# Patient Record
Sex: FEMALE | Race: BLACK OR AFRICAN AMERICAN | Employment: OTHER | URBAN - METROPOLITAN AREA
[De-identification: names, ages, dates, MRNs, and addresses within clinical notes are randomized per-mention and may not be internally consistent; named-entity substitution may affect disease eponyms.]

---

## 2017-01-09 ENCOUNTER — HOSPITAL ENCOUNTER (OUTPATIENT)
Dept: OTHER | Age: 62
Discharge: HOME | End: 2017-01-09
Attending: PHYSICIAN ASSISTANT | Admitting: PHYSICIAN ASSISTANT

## 2017-01-09 DIAGNOSIS — L03.115 CELLULITIS OF RIGHT LOWER LEG: ICD-10-CM

## 2017-01-09 DIAGNOSIS — M79.89 PAIN AND SWELLING OF RIGHT LOWER LEG: ICD-10-CM

## 2017-01-09 DIAGNOSIS — M79.661 PAIN AND SWELLING OF RIGHT LOWER LEG: ICD-10-CM

## 2017-05-31 ENCOUNTER — HOSPITAL ENCOUNTER (OUTPATIENT)
Dept: MAMMOGRAPHY | Age: 62
Discharge: HOME | End: 2017-05-31
Attending: FAMILY MEDICINE | Admitting: FAMILY MEDICINE

## 2017-05-31 DIAGNOSIS — Z12.31 ENCOUNTER FOR SCREENING MAMMOGRAM FOR BREAST CANCER: ICD-10-CM

## 2017-05-31 DIAGNOSIS — N95.9 MENOPAUSAL AND PERIMENOPAUSAL DISORDER: ICD-10-CM

## 2017-08-02 PROBLEM — K92.2 GIB (GASTROINTESTINAL BLEEDING): Status: ACTIVE | Noted: 2017-08-02

## 2017-08-02 PROBLEM — G47.00 INSOMNIA: Status: ACTIVE | Noted: 2017-08-02

## 2017-08-03 PROBLEM — D62 ANEMIA ASSOCIATED WITH ACUTE BLOOD LOSS: Status: ACTIVE | Noted: 2017-08-03

## 2017-12-01 ENCOUNTER — HOSPITAL ENCOUNTER (OUTPATIENT)
Dept: OTHER | Age: 62
Discharge: HOME | End: 2017-12-01
Attending: PODIATRIST | Admitting: PODIATRIST

## 2017-12-01 ENCOUNTER — HOSPITAL ENCOUNTER (OUTPATIENT)
Dept: MRI IMAGING | Age: 62
Discharge: HOME | End: 2017-12-01
Admitting: PODIATRIST

## 2017-12-01 DIAGNOSIS — S93.315D DISLOCATION OF TARSAL JOINT OF LEFT FOOT, SUBSEQUENT ENCOUNTER: ICD-10-CM

## 2017-12-01 DIAGNOSIS — S93.314D DISLOCATION OF TARSAL JOINT OF RIGHT FOOT, SUBSEQUENT ENCOUNTER: ICD-10-CM

## 2017-12-15 ENCOUNTER — HOSPITAL ENCOUNTER (OUTPATIENT)
Dept: MRI IMAGING | Age: 62
Discharge: HOME | End: 2017-12-15
Attending: PODIATRIST | Admitting: PODIATRIST

## 2017-12-15 DIAGNOSIS — M12.571: ICD-10-CM

## 2017-12-15 DIAGNOSIS — M12.579: ICD-10-CM

## 2018-01-05 PROBLEM — D64.9 ANEMIA: Status: ACTIVE | Noted: 2018-01-05

## 2018-01-05 PROBLEM — G47.00 INSOMNIA: Status: ACTIVE | Noted: 2018-01-05

## 2018-01-05 PROBLEM — E87.1 HYPONATREMIA: Status: ACTIVE | Noted: 2018-01-05

## 2018-01-05 PROBLEM — E87.6 HYPOKALEMIA: Status: ACTIVE | Noted: 2018-01-05

## 2018-01-06 PROBLEM — K50.10 CROHN'S COLITIS (HCC): Status: ACTIVE | Noted: 2018-01-06

## 2018-01-08 PROBLEM — E83.42 HYPOMAGNESEMIA: Status: ACTIVE | Noted: 2018-01-08

## 2018-02-05 PROBLEM — R07.9 CHEST PAIN: Status: ACTIVE | Noted: 2018-02-05

## 2018-02-05 PROBLEM — K50.10 CROHN'S COLITIS (HCC): Status: ACTIVE | Noted: 2018-02-05

## 2018-03-27 ENCOUNTER — PAT TELEPHONE (OUTPATIENT)
Dept: PREADMISSION TESTING | Age: 63
End: 2018-03-27

## 2018-03-27 VITALS — WEIGHT: 220 LBS | BODY MASS INDEX: 35.36 KG/M2 | HEIGHT: 66 IN

## 2018-03-27 RX ORDER — PREDNISONE 20 MG/1
20 TABLET ORAL DAILY
Status: ON HOLD | COMMUNITY
End: 2018-05-24 | Stop reason: HOSPADM

## 2018-04-02 ENCOUNTER — HOSPITAL ENCOUNTER (OUTPATIENT)
Dept: ENDOSCOPY | Age: 63
Discharge: HOME | End: 2018-04-02
Attending: INTERNAL MEDICINE | Admitting: INTERNAL MEDICINE

## 2018-04-02 VITALS
RESPIRATION RATE: 16 BRPM | HEART RATE: 72 BPM | TEMPERATURE: 97.7 F | SYSTOLIC BLOOD PRESSURE: 137 MMHG | WEIGHT: 241.31 LBS | BODY MASS INDEX: 38.78 KG/M2 | HEIGHT: 66 IN | OXYGEN SATURATION: 100 % | DIASTOLIC BLOOD PRESSURE: 89 MMHG

## 2018-04-02 RX ORDER — SODIUM CHLORIDE 9 MG/ML
INJECTION, SOLUTION INTRAVENOUS CONTINUOUS
Status: DISCONTINUED | OUTPATIENT
Start: 2018-04-02 | End: 2018-04-03 | Stop reason: HOSPADM

## 2018-04-02 RX ORDER — SODIUM CHLORIDE 0.9 % (FLUSH) 0.9 %
10 SYRINGE (ML) INJECTION PRN
Status: DISCONTINUED | OUTPATIENT
Start: 2018-04-02 | End: 2018-04-03 | Stop reason: HOSPADM

## 2018-04-02 RX ORDER — SODIUM CHLORIDE 0.9 % (FLUSH) 0.9 %
10 SYRINGE (ML) INJECTION EVERY 12 HOURS SCHEDULED
Status: DISCONTINUED | OUTPATIENT
Start: 2018-04-02 | End: 2018-04-03 | Stop reason: HOSPADM

## 2018-04-02 RX ADMIN — SODIUM CHLORIDE: 9 INJECTION, SOLUTION INTRAVENOUS at 10:21

## 2018-04-02 ASSESSMENT — PAIN - FUNCTIONAL ASSESSMENT: PAIN_FUNCTIONAL_ASSESSMENT: 0-10

## 2018-04-02 ASSESSMENT — PAIN SCALES - GENERAL
PAINLEVEL_OUTOF10: 0

## 2018-05-20 PROBLEM — K52.9 COLITIS: Status: ACTIVE | Noted: 2018-05-20

## 2018-06-05 PROBLEM — K56.609 SBO (SMALL BOWEL OBSTRUCTION) (HCC): Status: ACTIVE | Noted: 2018-06-05

## 2018-06-07 PROBLEM — N39.0 UTI (URINARY TRACT INFECTION): Status: ACTIVE | Noted: 2018-06-07

## 2018-06-08 PROBLEM — A49.02 MRSA (METHICILLIN RESISTANT STAPHYLOCOCCUS AUREUS) INFECTION: Status: ACTIVE | Noted: 2018-06-08

## 2018-06-26 PROBLEM — I95.9 HYPOTENSION: Status: ACTIVE | Noted: 2018-06-26

## 2018-06-26 PROBLEM — R55 SYNCOPE: Status: ACTIVE | Noted: 2018-06-26

## 2018-06-26 PROBLEM — R55 SYNCOPE AND COLLAPSE: Status: ACTIVE | Noted: 2018-06-26

## 2018-07-03 PROBLEM — I82.4Z3 DVT, LOWER EXTREMITY, DISTAL, ACUTE, BILATERAL (HCC): Status: ACTIVE | Noted: 2018-07-03

## 2018-07-03 PROBLEM — I82.409 DVT (DEEP VENOUS THROMBOSIS) (HCC): Status: ACTIVE | Noted: 2018-07-03

## 2018-07-26 PROBLEM — N39.0 UTI (URINARY TRACT INFECTION): Status: RESOLVED | Noted: 2018-06-07 | Resolved: 2018-07-26

## 2018-09-26 ENCOUNTER — APPOINTMENT (OUTPATIENT)
Dept: CT IMAGING | Age: 63
DRG: 720 | End: 2018-09-26
Payer: MEDICAID

## 2018-09-26 ENCOUNTER — APPOINTMENT (OUTPATIENT)
Dept: GENERAL RADIOLOGY | Age: 63
DRG: 720 | End: 2018-09-26
Payer: MEDICAID

## 2018-09-26 ENCOUNTER — HOSPITAL ENCOUNTER (INPATIENT)
Age: 63
LOS: 5 days | Discharge: HOME | DRG: 720 | End: 2018-10-01
Attending: EMERGENCY MEDICINE | Admitting: INTERNAL MEDICINE
Payer: MEDICAID

## 2018-09-26 DIAGNOSIS — A41.9 SEVERE SEPSIS (HCC): ICD-10-CM

## 2018-09-26 DIAGNOSIS — L98.8 FISTULA: ICD-10-CM

## 2018-09-26 DIAGNOSIS — E87.1 HYPONATREMIA: ICD-10-CM

## 2018-09-26 DIAGNOSIS — K57.20 PERFORATION AND ABSCESS OF LARGE INTESTINE CONCURRENT WITH AND DUE TO DIVERTICULITIS: Primary | ICD-10-CM

## 2018-09-26 DIAGNOSIS — R65.20 SEVERE SEPSIS (HCC): ICD-10-CM

## 2018-09-26 PROBLEM — K63.1 PERFORATION BOWEL (HCC): Status: ACTIVE | Noted: 2018-09-26

## 2018-09-26 PROBLEM — K57.92 DIVERTICULITIS: Status: ACTIVE | Noted: 2018-09-26

## 2018-09-26 LAB
A/G RATIO: 0.6 (ref 1.1–2.2)
ALBUMIN SERPL-MCNC: 2.8 G/DL (ref 3.4–5)
ALP BLD-CCNC: 78 U/L (ref 40–129)
ALT SERPL-CCNC: <5 U/L (ref 10–40)
ANION GAP SERPL CALCULATED.3IONS-SCNC: 19 MMOL/L (ref 3–16)
AST SERPL-CCNC: 8 U/L (ref 15–37)
BASE EXCESS VENOUS: -4.7 MMOL/L (ref -3–3)
BASOPHILS ABSOLUTE: 0 K/UL (ref 0–0.2)
BASOPHILS RELATIVE PERCENT: 0.2 %
BILIRUB SERPL-MCNC: 0.5 MG/DL (ref 0–1)
BILIRUBIN URINE: NEGATIVE
BLOOD, URINE: ABNORMAL
BUN BLDV-MCNC: 8 MG/DL (ref 7–20)
C DIFFICILE TOXIN, EIA: NORMAL
CALCIUM SERPL-MCNC: 9.1 MG/DL (ref 8.3–10.6)
CARBOXYHEMOGLOBIN: 7.1 % (ref 0–1.5)
CHLORIDE BLD-SCNC: 87 MMOL/L (ref 99–110)
CLARITY: ABNORMAL
CO2: 19 MMOL/L (ref 21–32)
COLOR: YELLOW
CREAT SERPL-MCNC: 0.8 MG/DL (ref 0.6–1.2)
EKG ATRIAL RATE: 131 BPM
EKG DIAGNOSIS: NORMAL
EKG P AXIS: 109 DEGREES
EKG P-R INTERVAL: 122 MS
EKG Q-T INTERVAL: 296 MS
EKG QRS DURATION: 80 MS
EKG QTC CALCULATION (BAZETT): 437 MS
EKG R AXIS: 11 DEGREES
EKG T AXIS: 98 DEGREES
EKG VENTRICULAR RATE: 131 BPM
EOSINOPHILS ABSOLUTE: 0 K/UL (ref 0–0.6)
EOSINOPHILS RELATIVE PERCENT: 0 %
EPITHELIAL CELLS, UA: 5 /HPF (ref 0–5)
GFR AFRICAN AMERICAN: >60
GFR NON-AFRICAN AMERICAN: >60
GIARDIA ANTIGEN STOOL: NORMAL
GLOBULIN: 4.4 G/DL
GLUCOSE BLD-MCNC: 144 MG/DL (ref 70–99)
GLUCOSE URINE: NEGATIVE MG/DL
HCO3 VENOUS: 16.6 MMOL/L (ref 23–29)
HCT VFR BLD CALC: 25.2 % (ref 36–48)
HEMOGLOBIN: 8.1 G/DL (ref 12–16)
HYALINE CASTS: 0 /LPF (ref 0–8)
INR BLD: 1.33 (ref 0.86–1.14)
KETONES, URINE: NEGATIVE MG/DL
LACTIC ACID: 1.2 MMOL/L (ref 0.4–2)
LACTIC ACID: 2.5 MMOL/L (ref 0.4–2)
LEUKOCYTE ESTERASE, URINE: ABNORMAL
LIPASE: 22 U/L (ref 13–60)
LYMPHOCYTES ABSOLUTE: 1.7 K/UL (ref 1–5.1)
LYMPHOCYTES RELATIVE PERCENT: 10.4 %
MCH RBC QN AUTO: 26.9 PG (ref 26–34)
MCHC RBC AUTO-ENTMCNC: 32.3 G/DL (ref 31–36)
MCV RBC AUTO: 83.3 FL (ref 80–100)
METHEMOGLOBIN VENOUS: 0.3 %
MICROSCOPIC EXAMINATION: YES
MONOCYTES ABSOLUTE: 0.9 K/UL (ref 0–1.3)
MONOCYTES RELATIVE PERCENT: 5.5 %
NEUTROPHILS ABSOLUTE: 13.6 K/UL (ref 1.7–7.7)
NEUTROPHILS RELATIVE PERCENT: 83.9 %
NITRITE, URINE: NEGATIVE
O2 CONTENT, VEN: 11 VOL %
O2 SAT, VEN: 100 %
O2 THERAPY: ABNORMAL
OCCULT BLOOD SCREENING: ABNORMAL
PCO2, VEN: 19.1 MMHG (ref 40–50)
PDW BLD-RTO: 17.3 % (ref 12.4–15.4)
PH UA: 5.5
PH VENOUS: 7.55 (ref 7.35–7.45)
PLATELET # BLD: 609 K/UL (ref 135–450)
PMV BLD AUTO: 6.5 FL (ref 5–10.5)
PO2, VEN: 137 MMHG (ref 25–40)
POTASSIUM SERPL-SCNC: 3.7 MMOL/L (ref 3.5–5.1)
PROTEIN UA: NEGATIVE MG/DL
PROTHROMBIN TIME: 15.2 SEC (ref 9.8–13)
RBC # BLD: 3.02 M/UL (ref 4–5.2)
RBC UA: 13 /HPF (ref 0–4)
SODIUM BLD-SCNC: 125 MMOL/L (ref 136–145)
SODIUM BLD-SCNC: 128 MMOL/L (ref 136–145)
SODIUM URINE: 50 MMOL/L
SPECIFIC GRAVITY UA: 1.01
TCO2 CALC VENOUS: 39 MMOL/L
TOTAL PROTEIN: 7.2 G/DL (ref 6.4–8.2)
URIC ACID, SERUM: 4.1 MG/DL (ref 2.6–6)
URINE REFLEX TO CULTURE: YES
URINE TYPE: ABNORMAL
UROBILINOGEN, URINE: 0.2 E.U./DL
WBC # BLD: 16.2 K/UL (ref 4–11)
WBC UA: 1 /HPF (ref 0–5)

## 2018-09-26 PROCEDURE — 36415 COLL VENOUS BLD VENIPUNCTURE: CPT

## 2018-09-26 PROCEDURE — 93005 ELECTROCARDIOGRAM TRACING: CPT | Performed by: PHYSICIAN ASSISTANT

## 2018-09-26 PROCEDURE — 6360000002 HC RX W HCPCS: Performed by: INTERNAL MEDICINE

## 2018-09-26 PROCEDURE — 99223 1ST HOSP IP/OBS HIGH 75: CPT | Performed by: SURGERY

## 2018-09-26 PROCEDURE — 84300 ASSAY OF URINE SODIUM: CPT

## 2018-09-26 PROCEDURE — 82705 FATS/LIPIDS FECES QUAL: CPT

## 2018-09-26 PROCEDURE — 83690 ASSAY OF LIPASE: CPT

## 2018-09-26 PROCEDURE — 2580000003 HC RX 258: Performed by: INTERNAL MEDICINE

## 2018-09-26 PROCEDURE — 6370000000 HC RX 637 (ALT 250 FOR IP): Performed by: PHYSICIAN ASSISTANT

## 2018-09-26 PROCEDURE — 93010 ELECTROCARDIOGRAM REPORT: CPT | Performed by: INTERNAL MEDICINE

## 2018-09-26 PROCEDURE — 87209 SMEAR COMPLEX STAIN: CPT

## 2018-09-26 PROCEDURE — 1200000000 HC SEMI PRIVATE

## 2018-09-26 PROCEDURE — APPSS60 APP SPLIT SHARED TIME 46-60 MINUTES: Performed by: NURSE PRACTITIONER

## 2018-09-26 PROCEDURE — 83935 ASSAY OF URINE OSMOLALITY: CPT

## 2018-09-26 PROCEDURE — 87505 NFCT AGENT DETECTION GI: CPT

## 2018-09-26 PROCEDURE — 96367 TX/PROPH/DG ADDL SEQ IV INF: CPT

## 2018-09-26 PROCEDURE — 87324 CLOSTRIDIUM AG IA: CPT

## 2018-09-26 PROCEDURE — 85610 PROTHROMBIN TIME: CPT

## 2018-09-26 PROCEDURE — 96376 TX/PRO/DX INJ SAME DRUG ADON: CPT

## 2018-09-26 PROCEDURE — 87040 BLOOD CULTURE FOR BACTERIA: CPT

## 2018-09-26 PROCEDURE — 71045 X-RAY EXAM CHEST 1 VIEW: CPT

## 2018-09-26 PROCEDURE — 87086 URINE CULTURE/COLONY COUNT: CPT

## 2018-09-26 PROCEDURE — 87449 NOS EACH ORGANISM AG IA: CPT

## 2018-09-26 PROCEDURE — 87177 OVA AND PARASITES SMEARS: CPT

## 2018-09-26 PROCEDURE — APPNB30 APP NON BILLABLE TIME 0-30 MINS: Performed by: NURSE PRACTITIONER

## 2018-09-26 PROCEDURE — 96375 TX/PRO/DX INJ NEW DRUG ADDON: CPT

## 2018-09-26 PROCEDURE — 96365 THER/PROPH/DIAG IV INF INIT: CPT

## 2018-09-26 PROCEDURE — 99291 CRITICAL CARE FIRST HOUR: CPT

## 2018-09-26 PROCEDURE — 2500000003 HC RX 250 WO HCPCS: Performed by: INTERNAL MEDICINE

## 2018-09-26 PROCEDURE — 85025 COMPLETE CBC W/AUTO DIFF WBC: CPT

## 2018-09-26 PROCEDURE — 6360000002 HC RX W HCPCS: Performed by: PHYSICIAN ASSISTANT

## 2018-09-26 PROCEDURE — 6360000004 HC RX CONTRAST MEDICATION: Performed by: EMERGENCY MEDICINE

## 2018-09-26 PROCEDURE — 87425 ROTAVIRUS AG IA: CPT

## 2018-09-26 PROCEDURE — 84295 ASSAY OF SERUM SODIUM: CPT

## 2018-09-26 PROCEDURE — 87046 STOOL CULTR AEROBIC BACT EA: CPT

## 2018-09-26 PROCEDURE — 6360000002 HC RX W HCPCS: Performed by: EMERGENCY MEDICINE

## 2018-09-26 PROCEDURE — 84550 ASSAY OF BLOOD/URIC ACID: CPT

## 2018-09-26 PROCEDURE — G0328 FECAL BLOOD SCRN IMMUNOASSAY: HCPCS

## 2018-09-26 PROCEDURE — 2580000003 HC RX 258: Performed by: PHYSICIAN ASSISTANT

## 2018-09-26 PROCEDURE — 83605 ASSAY OF LACTIC ACID: CPT

## 2018-09-26 PROCEDURE — 82803 BLOOD GASES ANY COMBINATION: CPT

## 2018-09-26 PROCEDURE — 81001 URINALYSIS AUTO W/SCOPE: CPT

## 2018-09-26 PROCEDURE — 96361 HYDRATE IV INFUSION ADD-ON: CPT

## 2018-09-26 PROCEDURE — 74177 CT ABD & PELVIS W/CONTRAST: CPT

## 2018-09-26 PROCEDURE — 80053 COMPREHEN METABOLIC PANEL: CPT

## 2018-09-26 RX ORDER — ZOLPIDEM TARTRATE 10 MG/1
10 TABLET ORAL NIGHTLY PRN
Status: DISCONTINUED | OUTPATIENT
Start: 2018-09-26 | End: 2018-09-26 | Stop reason: SINTOL

## 2018-09-26 RX ORDER — SODIUM CHLORIDE 9 MG/ML
INJECTION, SOLUTION INTRAVENOUS CONTINUOUS
Status: DISCONTINUED | OUTPATIENT
Start: 2018-09-26 | End: 2018-09-27

## 2018-09-26 RX ORDER — ATENOLOL 50 MG/1
100 TABLET ORAL DAILY
Status: DISCONTINUED | OUTPATIENT
Start: 2018-09-26 | End: 2018-09-26 | Stop reason: SINTOL

## 2018-09-26 RX ORDER — MESALAMINE 400 MG/1
1600 CAPSULE, DELAYED RELEASE ORAL 3 TIMES DAILY
Status: DISCONTINUED | OUTPATIENT
Start: 2018-09-26 | End: 2018-09-26 | Stop reason: SINTOL

## 2018-09-26 RX ORDER — ONDANSETRON 2 MG/ML
4 INJECTION INTRAMUSCULAR; INTRAVENOUS EVERY 6 HOURS PRN
Status: DISCONTINUED | OUTPATIENT
Start: 2018-09-26 | End: 2018-10-01 | Stop reason: HOSPADM

## 2018-09-26 RX ORDER — ACETAMINOPHEN, ASPIRIN AND CAFFEINE 250; 250; 65 MG/1; MG/1; MG/1
1 TABLET, FILM COATED ORAL EVERY 6 HOURS PRN
Status: DISCONTINUED | OUTPATIENT
Start: 2018-09-26 | End: 2018-09-26 | Stop reason: SINTOL

## 2018-09-26 RX ORDER — 0.9 % SODIUM CHLORIDE 0.9 %
30 INTRAVENOUS SOLUTION INTRAVENOUS ONCE
Status: COMPLETED | OUTPATIENT
Start: 2018-09-26 | End: 2018-09-26

## 2018-09-26 RX ORDER — BUSPIRONE HYDROCHLORIDE 5 MG/1
30 TABLET ORAL 2 TIMES DAILY
Status: DISCONTINUED | OUTPATIENT
Start: 2018-09-26 | End: 2018-09-26 | Stop reason: SINTOL

## 2018-09-26 RX ORDER — ONDANSETRON 2 MG/ML
4 INJECTION INTRAMUSCULAR; INTRAVENOUS ONCE
Status: COMPLETED | OUTPATIENT
Start: 2018-09-26 | End: 2018-09-26

## 2018-09-26 RX ORDER — POTASSIUM CHLORIDE 7.45 MG/ML
10 INJECTION INTRAVENOUS PRN
Status: DISCONTINUED | OUTPATIENT
Start: 2018-09-26 | End: 2018-10-01 | Stop reason: HOSPADM

## 2018-09-26 RX ORDER — POTASSIUM CHLORIDE 20 MEQ/1
40 TABLET, EXTENDED RELEASE ORAL PRN
Status: DISCONTINUED | OUTPATIENT
Start: 2018-09-26 | End: 2018-10-01 | Stop reason: HOSPADM

## 2018-09-26 RX ORDER — BACLOFEN 10 MG/1
10 TABLET ORAL 3 TIMES DAILY
Status: DISCONTINUED | OUTPATIENT
Start: 2018-09-26 | End: 2018-09-26 | Stop reason: SINTOL

## 2018-09-26 RX ORDER — FAMOTIDINE 20 MG/1
20 TABLET, FILM COATED ORAL 2 TIMES DAILY PRN
Status: DISCONTINUED | OUTPATIENT
Start: 2018-09-26 | End: 2018-09-26 | Stop reason: SINTOL

## 2018-09-26 RX ORDER — FLUTICASONE PROPIONATE 50 MCG
1 SPRAY, SUSPENSION (ML) NASAL DAILY
Status: DISCONTINUED | OUTPATIENT
Start: 2018-09-26 | End: 2018-09-26 | Stop reason: SINTOL

## 2018-09-26 RX ORDER — MESALAMINE 4 G/60ML
4 ENEMA RECTAL NIGHTLY
Status: DISCONTINUED | OUTPATIENT
Start: 2018-09-26 | End: 2018-09-26 | Stop reason: SINTOL

## 2018-09-26 RX ORDER — HYDROMORPHONE HCL 110MG/55ML
1 PATIENT CONTROLLED ANALGESIA SYRINGE INTRAVENOUS EVERY 4 HOURS PRN
Status: DISCONTINUED | OUTPATIENT
Start: 2018-09-26 | End: 2018-10-01 | Stop reason: HOSPADM

## 2018-09-26 RX ORDER — SODIUM CHLORIDE 0.9 % (FLUSH) 0.9 %
10 SYRINGE (ML) INJECTION PRN
Status: DISCONTINUED | OUTPATIENT
Start: 2018-09-26 | End: 2018-10-01 | Stop reason: HOSPADM

## 2018-09-26 RX ORDER — ACETAMINOPHEN 650 MG/1
650 SUPPOSITORY RECTAL ONCE
Status: COMPLETED | OUTPATIENT
Start: 2018-09-26 | End: 2018-09-26

## 2018-09-26 RX ORDER — PANTOPRAZOLE SODIUM 40 MG/1
40 TABLET, DELAYED RELEASE ORAL DAILY
Status: DISCONTINUED | OUTPATIENT
Start: 2018-09-26 | End: 2018-09-26 | Stop reason: SINTOL

## 2018-09-26 RX ORDER — VANCOMYCIN HYDROCHLORIDE 1 G/200ML
1000 INJECTION, SOLUTION INTRAVENOUS ONCE
Status: COMPLETED | OUTPATIENT
Start: 2018-09-26 | End: 2018-09-26

## 2018-09-26 RX ORDER — LORAZEPAM 2 MG/ML
0.5 INJECTION INTRAMUSCULAR NIGHTLY PRN
Status: DISCONTINUED | OUTPATIENT
Start: 2018-09-26 | End: 2018-10-01 | Stop reason: HOSPADM

## 2018-09-26 RX ORDER — MORPHINE SULFATE 4 MG/ML
4 INJECTION, SOLUTION INTRAMUSCULAR; INTRAVENOUS EVERY 30 MIN PRN
Status: DISCONTINUED | OUTPATIENT
Start: 2018-09-26 | End: 2018-09-26 | Stop reason: HOSPADM

## 2018-09-26 RX ORDER — POTASSIUM CHLORIDE 20 MEQ/1
20 TABLET, EXTENDED RELEASE ORAL 2 TIMES DAILY
Status: DISCONTINUED | OUTPATIENT
Start: 2018-09-26 | End: 2018-09-26 | Stop reason: SINTOL

## 2018-09-26 RX ORDER — DULOXETIN HYDROCHLORIDE 30 MG/1
60 CAPSULE, DELAYED RELEASE ORAL DAILY
Status: DISCONTINUED | OUTPATIENT
Start: 2018-09-26 | End: 2018-09-26 | Stop reason: SINTOL

## 2018-09-26 RX ORDER — LANOLIN ALCOHOL/MO/W.PET/CERES
400 CREAM (GRAM) TOPICAL 2 TIMES DAILY
Status: DISCONTINUED | OUTPATIENT
Start: 2018-09-26 | End: 2018-09-26 | Stop reason: SINTOL

## 2018-09-26 RX ORDER — SODIUM CHLORIDE 0.9 % (FLUSH) 0.9 %
10 SYRINGE (ML) INJECTION EVERY 12 HOURS SCHEDULED
Status: DISCONTINUED | OUTPATIENT
Start: 2018-09-26 | End: 2018-10-01 | Stop reason: HOSPADM

## 2018-09-26 RX ORDER — FUROSEMIDE 20 MG/1
20 TABLET ORAL DAILY
Status: DISCONTINUED | OUTPATIENT
Start: 2018-09-26 | End: 2018-09-26 | Stop reason: SINTOL

## 2018-09-26 RX ORDER — SODIUM CHLORIDE 1000 MG
1 TABLET, SOLUBLE MISCELLANEOUS 4 TIMES DAILY
Status: DISCONTINUED | OUTPATIENT
Start: 2018-09-26 | End: 2018-09-26 | Stop reason: SINTOL

## 2018-09-26 RX ORDER — NICOTINE POLACRILEX 4 MG/1
20 GUM, CHEWING ORAL DAILY
Status: DISCONTINUED | OUTPATIENT
Start: 2018-09-26 | End: 2018-09-26 | Stop reason: CLARIF

## 2018-09-26 RX ORDER — ONDANSETRON 4 MG/1
4 TABLET, ORALLY DISINTEGRATING ORAL EVERY 6 HOURS PRN
Status: DISCONTINUED | OUTPATIENT
Start: 2018-09-26 | End: 2018-10-01 | Stop reason: HOSPADM

## 2018-09-26 RX ORDER — MESALAMINE 1.2 G/1
4800 TABLET, DELAYED RELEASE ORAL
Status: DISCONTINUED | OUTPATIENT
Start: 2018-09-27 | End: 2018-09-26 | Stop reason: CLARIF

## 2018-09-26 RX ADMIN — HYDROMORPHONE HYDROCHLORIDE 1 MG: 2 INJECTION INTRAMUSCULAR; INTRAVENOUS; SUBCUTANEOUS at 12:29

## 2018-09-26 RX ADMIN — TAZOBACTAM SODIUM AND PIPERACILLIN SODIUM 3.38 G: 375; 3 INJECTION, SOLUTION INTRAVENOUS at 04:52

## 2018-09-26 RX ADMIN — HYDROMORPHONE HYDROCHLORIDE 1 MG: 2 INJECTION INTRAMUSCULAR; INTRAVENOUS; SUBCUTANEOUS at 07:58

## 2018-09-26 RX ADMIN — SODIUM CHLORIDE: 9 INJECTION, SOLUTION INTRAVENOUS at 05:56

## 2018-09-26 RX ADMIN — HYDROMORPHONE HYDROCHLORIDE 1 MG: 2 INJECTION INTRAMUSCULAR; INTRAVENOUS; SUBCUTANEOUS at 18:06

## 2018-09-26 RX ADMIN — Medication 10 ML: at 20:54

## 2018-09-26 RX ADMIN — SODIUM CHLORIDE: 9 INJECTION, SOLUTION INTRAVENOUS at 20:51

## 2018-09-26 RX ADMIN — CEFEPIME HYDROCHLORIDE 1 G: 1 INJECTION, POWDER, FOR SOLUTION INTRAMUSCULAR; INTRAVENOUS at 02:52

## 2018-09-26 RX ADMIN — IOPAMIDOL: 755 INJECTION, SOLUTION INTRAVENOUS at 03:25

## 2018-09-26 RX ADMIN — VANCOMYCIN HYDROCHLORIDE 1000 MG: 1 INJECTION, SOLUTION INTRAVENOUS at 03:43

## 2018-09-26 RX ADMIN — SODIUM CHLORIDE 2994 ML: 9 INJECTION, SOLUTION INTRAVENOUS at 01:53

## 2018-09-26 RX ADMIN — ONDANSETRON 4 MG: 2 INJECTION INTRAMUSCULAR; INTRAVENOUS at 01:56

## 2018-09-26 RX ADMIN — MORPHINE SULFATE 4 MG: 4 INJECTION INTRAVENOUS at 01:56

## 2018-09-26 RX ADMIN — TAZOBACTAM SODIUM AND PIPERACILLIN SODIUM 3.38 G: 375; 3 INJECTION, SOLUTION INTRAVENOUS at 12:20

## 2018-09-26 RX ADMIN — MORPHINE SULFATE 4 MG: 4 INJECTION INTRAVENOUS at 05:04

## 2018-09-26 RX ADMIN — HYDROMORPHONE HYDROCHLORIDE 1 MG: 2 INJECTION INTRAMUSCULAR; INTRAVENOUS; SUBCUTANEOUS at 22:08

## 2018-09-26 RX ADMIN — TAZOBACTAM SODIUM AND PIPERACILLIN SODIUM 3.38 G: 375; 3 INJECTION, SOLUTION INTRAVENOUS at 17:55

## 2018-09-26 RX ADMIN — ACETAMINOPHEN 650 MG: 650 SUPPOSITORY RECTAL at 01:56

## 2018-09-26 ASSESSMENT — ENCOUNTER SYMPTOMS
ABDOMINAL PAIN: 1
DIARRHEA: 1
COUGH: 0
BLOOD IN STOOL: 1
NAUSEA: 0
DOUBLE VISION: 0
NAUSEA: 1
BLURRED VISION: 0
VOMITING: 0
RHINORRHEA: 0
BACK PAIN: 0
SHORTNESS OF BREATH: 0

## 2018-09-26 ASSESSMENT — PAIN SCALES - GENERAL
PAINLEVEL_OUTOF10: 10
PAINLEVEL_OUTOF10: 5
PAINLEVEL_OUTOF10: 4
PAINLEVEL_OUTOF10: 10
PAINLEVEL_OUTOF10: 9
PAINLEVEL_OUTOF10: 10
PAINLEVEL_OUTOF10: 9
PAINLEVEL_OUTOF10: 8
PAINLEVEL_OUTOF10: 9
PAINLEVEL_OUTOF10: 10
PAINLEVEL_OUTOF10: 8
PAINLEVEL_OUTOF10: 10
PAINLEVEL_OUTOF10: 9

## 2018-09-26 ASSESSMENT — PAIN DESCRIPTION - LOCATION: LOCATION: ABDOMEN

## 2018-09-26 ASSESSMENT — PAIN DESCRIPTION - ORIENTATION: ORIENTATION: LEFT;LOWER

## 2018-09-26 ASSESSMENT — PAIN DESCRIPTION - PAIN TYPE: TYPE: ACUTE PAIN

## 2018-09-26 NOTE — ED PROVIDER NOTES
IVPB (1,000 mg Intravenous New Bag 9/26/18 0343)   piperacillin-tazobactam (ZOSYN) 3.375 g in dextrose 50 mL IVPB (premix) (not administered)   0.9 % sodium chloride bolus (0 mLs Intravenous Stopped 9/26/18 0438)   acetaminophen (TYLENOL) suppository 650 mg (650 mg Rectal Given 9/26/18 0156)   ondansetron (ZOFRAN) injection 4 mg (4 mg Intravenous Given 9/26/18 0156)   cefepime (MAXIPIME) 1 g in dextrose 5 % 50 mL IVPB (0 g Intravenous Stopped 9/26/18 0343)   iopamidol (ISOVUE-370) 76 % injection 75 mL ( Intravenous Given 9/26/18 0325)        CLINICAL IMPRESSION  1. Perforation and abscess of large intestine concurrent with and due to diverticulitis    2. Fistula    3. Severe sepsis ([address]    4. Hyponatremia        DISPOSITION  Dianne Keller was admitted in fair condition. I have discussed the findings of today's workup with the patient and addressed the patient's questions and concerns. The plan is to admit to the hospital at this time under the PCP's covering service. I spoke with Dr. Casa Fraga who accepted the patient and will take over the patient's care. The patient is agreeable with this plan. The total critical care time spent while evaluating and treating this patient was at least 45 minutes. This excludes time spent doing separately billable procedures. This includes time at the bedside, data interpretation, medication management, obtaining critical history from collateral sources if the patient is unable to provide it directly, and physician consultation. Specifics of interventions taken and potentially life-threatening diagnostic considerations are listed above in the medical decision making. This chart was created using Dragon dictation software. Efforts were made by me to ensure accuracy, however some errors may be present due to limitations of this technology.             Mahendra Vasquez MD  09/26/18 [address] Thakur MD  09/26/18 3991

## 2018-09-26 NOTE — H&P
History and Physical  Dr. Moncho Monte  9/26/2018    PCP: Tera Pritchett MD    Cc:   Chief Complaint   Patient presents with    Abdominal Pain     LLQ abdominal pain for weeks, now with fever and worsening pain. hx chrons. + dizziness       HPI:  Marie Willams is a 65 y.o. female who  has a past medical history of Arthritis; Blood transfusion reaction; Crohn's disease ([facility] GERD (gastroesophageal reflux disease); Hiatal hernia; Hypertension; MRSA (methicillin resistant staph aureus) culture positive; and Pneumonia. Patient presents with Diverticulitis. HPI of presenting complaint in block format: (1-3 for expanded problem focused, ?4 for detailed/comprehensive)   Location: LLQ abd pain  Duration: for weeks  Timing: acutely worse in past 48 hrs  Context: 65 y.o. female presents to the emergency department today for evaluation for left lower abdominal pain. The patient states that she has been experiencing constant abdominal pain for weeks, but she states that she does have a history of Crohn's disease. She states that her gastroenterologist is started on prednisone however she never filled this prescription. Patient states over the past week she has had increasing lower abdominal pain which she states that sharp, constant   Severity: severe, rated 10/10  Quality: descubed as crampy  Modifying Factors: pain a little better after BM. Worse with movement, palpation  Associated Signs or Symptoms: +brbpr, +fevers. No cough. Problem list of hospitalization thus far: Active Hospital Problems    Diagnosis    Diverticulitis [K57.92]    Perforation bowel ([facility] [K63.1]    Crohn's colitis ([facility] [K50.10]    Hyponatremia [E87.1]    Essential hypertension, benign [I10]         Review of Systems: (1 system for EPF, 2-9 for detailed, 10+ for comprehensive)  Review of Systems   Constitutional: Positive for fever. Negative for chills and weight loss. HENT: Negative for congestion and hearing loss.     Eyes: Negative for blurred vision and double vision. Respiratory: Negative for cough and shortness of breath. Cardiovascular: Negative for chest pain and claudication. Gastrointestinal: Positive for abdominal pain, blood in stool, diarrhea and nausea. Genitourinary: Negative for frequency and urgency. Musculoskeletal: Positive for myalgias. Negative for back pain. Skin: Negative for itching and rash. Neurological: Negative for dizziness and sensory change. Endo/Heme/Allergies: Negative for environmental allergies. Psychiatric/Behavioral: Negative for depression. The patient is not nervous/anxious.             Past Medical History:   Past Medical History:   Diagnosis Date    Arthritis     Blood transfusion reaction     Crohn's disease ([facility]     GERD (gastroesophageal reflux disease)     Hiatal hernia 6/24/2014    Hypertension     MRSA (methicillin resistant staph aureus) culture positive 06/05/2018    urine    Pneumonia 1/8/2014       Past Surgical History:   Past Surgical History:   Procedure Laterality Date    ABDOMEN SURGERY      ABDOMINAL ADHESION SURGERY  06/08/2018    BLADDER SUSPENSION      COLONOSCOPY      COLONOSCOPY  9/14/15    sigmoid colon biopsy     COLONOSCOPY  08/07/2018    FOOT SURGERY Left 6/25/14    excision plantar left hallux    FOOT SURGERY  6/3/15    PLANTAR PLATE REPAIR BILATERAL, ARTHROTOMY MPJ 2ND BILATERAL    FOOT SURGERY Left 08/05/2002    Excision second metatarsal head left    FRACTURE SURGERY      rt hip    HAND CARPECTOMY Bilateral     HEEL SPUR SURGERY      HERNIA REPAIR      HYSTERECTOMY      bladder surgery    JOINT REPLACEMENT      rt hip, L shoulder replacement 11/2014    KNEE SURGERY Bilateral     arthrosvopic    SIGMOIDOSCOPY  01/15/2018    with biopsies    TONSILLECTOMY      UPPER GASTROINTESTINAL ENDOSCOPY  6/14/13    and colonsocopy with antral erosion biopsies       Social History:   Social History     Social History    Marital status: needed for Nausea or Vomiting 20 tablet 0    aspirin-acetaminophen-caffeine (EXCEDRIN MIGRAINE) 250-250-65 MG per tablet Take 1 tablet by mouth every 6 hours as needed for Headaches      mesalamine (ROWASA) 4 g enema Place 60 mLs rectally nightly 1 enema 3    mesalamine (LIALDA) 1.2 g EC tablet Take 4 tablets by mouth daily (with breakfast) 30 tablet 3    fluticasone (FLONASE) 50 MCG/ACT nasal spray 1 spray by Nasal route daily 1 Bottle 3    DULoxetine (CYMBALTA) 60 MG extended release capsule Take 60 mg by mouth daily      lidocaine (XYLOCAINE) 5 % ointment Apply topically as needed Apply topically as needed to feet      zolpidem (AMBIEN) 10 MG tablet Take 10 mg by mouth nightly as needed for Sleep .  potassium chloride SA (K-DUR;KLOR-CON M) 20 MEQ tablet Take 1 tablet by mouth 2 times daily. 60 tablet 3    Omeprazole 20 MG TBEC Take 20 mg by mouth daily       busPIRone (BUSPAR) 15 MG tablet Take 30 mg by mouth 2 times daily            Allergies   Allergen Reactions    Ace Inhibitors Swelling    Skelaxin [Metaxalone] Swelling             EXAM: (2-7 system for EPF/Detailed, ?8 for Comprehensive)  /80   Pulse 91   Temp 98.5 °F (36.9 °C) (Oral)   Resp 18   Ht 5' 6\" (1.676 m)   Wt 220 lb (99.8 kg)   SpO2 94%   BMI 35.51 kg/m²   Constitutional: vitals as above: alert, appears stated age and cooperative  Psychiatric: normal insight and judgment, oriented to person, place, time, and general circumstances  Head: Normocephalic, without obvious abnormality, atraumatic  Eyes:lids and lashes normal, conjunctivae and sclerae normal and pupils equal, round, reactive to light and accomodation  EMNT: lips normal, external ears   Neck: no adenopathy, no carotid bruit, no JVD and supple, symmetrical, trachea midline   Respiratory: clear to auscultation without wheezes or rales  Cardiovascular: normal rate, regular rhythm, normal S1 and S2 and no murmurs  Gastrointestinal: mild distended, bs+.  Diffusely by myself. Ct abd  (2) EKG  was not viewed by myself. The patient is being placed in inpatient status with the expectation of requiring a hospital stay spanning at least two midnights for care and treatment of the problems noted in the problem list.  This determination is also based on the patients comorbidities and past medical history, the severity and timing of the signs and symptoms upon presentation.         Electronically signed by: Lillian Mixon 9/26/2018

## 2018-09-26 NOTE — CONSULTS
Mg) MG tablet Take 1 tablet by mouth 2 times daily 60 tablet 1    atenolol (TENORMIN) 100 MG tablet Take 1 tablet by mouth daily 30 tablet 3    furosemide (LASIX) 20 MG tablet Take 1 tablet by mouth daily 60 tablet 3    adalimumab (HUMIRA) 40 MG/0.8ML injection Inject 40 mg into the skin every 14 days      ondansetron (ZOFRAN ODT) 4 MG disintegrating tablet Take 1 tablet by mouth every 6 hours as needed for Nausea or Vomiting 20 tablet 0    aspirin-acetaminophen-caffeine (EXCEDRIN MIGRAINE) 250-250-65 MG per tablet Take 1 tablet by mouth every 6 hours as needed for Headaches      mesalamine (ROWASA) 4 g enema Place 60 mLs rectally nightly 1 enema 3    mesalamine (LIALDA) 1.2 g EC tablet Take 4 tablets by mouth daily (with breakfast) 30 tablet 3    fluticasone (FLONASE) 50 MCG/ACT nasal spray 1 spray by Nasal route daily 1 Bottle 3    DULoxetine (CYMBALTA) 60 MG extended release capsule Take 60 mg by mouth daily      lidocaine (XYLOCAINE) 5 % ointment Apply topically as needed Apply topically as needed to feet      zolpidem (AMBIEN) 10 MG tablet Take 10 mg by mouth nightly as needed for Sleep .  potassium chloride SA (K-DUR;KLOR-CON M) 20 MEQ tablet Take 1 tablet by mouth 2 times daily.  60 tablet 3    Omeprazole 20 MG TBEC Take 20 mg by mouth daily       busPIRone (BUSPAR) 15 MG tablet Take 30 mg by mouth 2 times daily            Allergies  Allergies   Allergen Reactions    Ace Inhibitors Swelling    Skelaxin [Metaxalone] Swelling      Social   Social History   Substance Use Topics    Smoking status: Current Every Day Smoker     Packs/day: 1.00     Years: 10.00     Types: Cigarettes, E-Cigarettes     Last attempt to quit: 10/5/2017    Smokeless tobacco: Never Used    Alcohol use 1.2 oz/week     2 Shots of liquor per week      Comment: occasionally        Family History   Problem Relation Age of Onset    High Blood Pressure Mother     High Blood Pressure Father     Kidney Disease Sister

## 2018-09-26 NOTE — ED PROVIDER NOTES
[address] [facility]  eMERGENCY dEPARTMENT eNCOUnter        Pt Name: Mary Kate Wiley  MRN: [id number]  [place] 1955  Date of evaluation: 9/26/2018  Provider: Raul Billings PA-C  PCP: Sebastian Andrea MD  ED Attending: Marie Lemus MD    [address] [place] [address]       Chief Complaint   Patient presents with    Abdominal Pain     LLQ abdominal pain for weeks, now with fever and worsening pain. hx chrons. + dizziness       HISTORY OF PRESENT ILLNESS   (Location/Symptom, Timing/Onset, Context/Setting, Quality, Duration, Modifying Factors, Severity)  Note limiting factors. Mary Kate Wiley is a 65 y.o. female presents to the emergency department today for evaluation for left lower abdominal pain. The patient states that she has been experiencing constant abdominal pain for weeks, but she states that she does have a history of Crohn's disease. She states that her gastroenterologist is started on prednisone however she never filled this prescription. Patient states over the past week she has had increasing lower abdominal pain which she states that sharp, constant and is currently rated as a 10/10. No known alleviating factors. She states she has had multiple episodes of diarrhea, she states that she has noticed some bright red blood in the stool, again intravenous or Crohn's, she is not noticed any recently. No black tarry appearance the stool. She denies any recent antibiotics. She is nauseous but denies any vomiting. She had a fever of 104 upon around to the she is not losing fevers at home. She denies a cough or congestion. No urinary symptoms. No chest pain or shortness of breath. Nursing Notes were all reviewed and agreed with or any disagreements were addressed  in the HPI. REVIEW OF SYSTEMS    (2-9 systems for level 4, 10 or more for level 5)     Review of Systems   Constitutional: Negative for activity change, appetite change, chills and fever. mouth every 6 hours as needed for Headaches    ATENOLOL (TENORMIN) 100 MG TABLET    Take 1 tablet by mouth daily    BACLOFEN (LIORESAL) 10 MG TABLET    Take 1 tablet by mouth 3 times daily    BUSPIRONE (BUSPAR) 15 MG TABLET    Take 30 mg by mouth 2 times daily     DULOXETINE (CYMBALTA) 60 MG EXTENDED RELEASE CAPSULE    Take 60 mg by mouth daily    FLUTICASONE (FLONASE) 50 MCG/ACT NASAL SPRAY    1 spray by Nasal route daily    FUROSEMIDE (LASIX) 20 MG TABLET    Take 1 tablet by mouth daily    LIDOCAINE (XYLOCAINE) 5 % OINTMENT    Apply topically as needed Apply topically as needed to feet    MAGNESIUM OXIDE (MAG-OX) 400 (240 MG) MG TABLET    Take 1 tablet by mouth 2 times daily    MESALAMINE (LIALDA) 1.2 G EC TABLET    Take 4 tablets by mouth daily (with breakfast)    MESALAMINE (ROWASA) 4 G ENEMA    Place 60 mLs rectally nightly    OMEPRAZOLE 20 MG TBEC    Take 20 mg by mouth daily     ONDANSETRON (ZOFRAN ODT) 4 MG DISINTEGRATING TABLET    Take 1 tablet by mouth every 6 hours as needed for Nausea or Vomiting    POTASSIUM CHLORIDE SA (K-DUR;KLOR-CON M) 20 MEQ TABLET    Take 1 tablet by mouth 2 times daily. RIVAROXABAN (XARELTO) 20 MG TABS TABLET    Take 1 tablet by mouth daily (with breakfast)    RIVAROXABAN 15 & 20 MG STARTER PACK    Per pack    SODIUM CHLORIDE 1 G TABLET    Take 1 tablet by mouth 4 times daily    ZOLPIDEM (AMBIEN) 10 MG TABLET    Take 10 mg by mouth nightly as needed for Sleep .          ALLERGIES     Ace inhibitors and Skelaxin [metaxalone]    FAMILY HISTORY       Family History   Problem Relation Age of Onset    High Blood Pressure Mother     High Blood Pressure Father     Kidney Disease Sister           SOCIAL HISTORY       Social History     Social History    Marital status:      Spouse name: Mardel Goldberg Number of children: 2    Years of education: 15     Social History Main Topics    Smoking status: Current Every Day Smoker     Packs/day: 1.00     Years: 10.00     Types: Cigarettes, E-Cigarettes     Last attempt to quit: 10/5/2017    Smokeless tobacco: Never Used    Alcohol use 1.2 oz/week     2 Shots of liquor per week      Comment: occasionally    Drug use: No    Sexual activity: Not Currently     Partners: Male     Other Topics Concern    None     Social History Narrative    None       SCREENINGS             PHYSICAL EXAM    (up to 7 for level 4, 8 or more for level 5)     ED Triage Vitals [09/26/18 0107]   BP Temp Temp Source Pulse Resp SpO2 Height Weight   116/66 104.2 °F (40.1 °C) Oral 134 18 95 % 5' 6\" (1.676 m) 220 lb (99.8 kg)       Physical Exam   Constitutional: She is oriented to person, place, and time. She appears well-developed and well-nourished. HENT:   Head: Normocephalic and atraumatic. Right Ear: External ear normal.   Left Ear: External ear normal.   Nose: Nose normal.   Eyes: Right eye exhibits no discharge. Left eye exhibits no discharge. Neck: Normal range of motion. Neck supple. No tracheal deviation present. Cardiovascular: Regular rhythm and normal heart sounds. Tachycardia present. No murmur heard. Pulmonary/Chest: Effort normal and breath sounds normal. No respiratory distress. She has no wheezes. She has no rales. Abdominal: Soft. She exhibits no distension. Bowel sounds are decreased. There is tenderness in the left lower quadrant. There is guarding. There is no rebound. Musculoskeletal: Normal range of motion. Neurological: She is alert and oriented to person, place, and time. Skin: Skin is warm and dry. She is not diaphoretic. Psychiatric: She has a normal mood and affect. Her behavior is normal.   Nursing note and vitals reviewed.       DIAGNOSTIC RESULTS   LABS:    Labs Reviewed   CBC WITH AUTO DIFFERENTIAL - Abnormal; Notable for the following:        Result Value    WBC 16.2 (*)     RBC 3.02 (*)     Hemoglobin 8.1 (*)     Hematocrit 25.2 (*)     RDW 17.3 (*)     Platelets 497 (*)     Neutrophils # 13.6 (*)     All other components within normal limits    Narrative:     Performed at:  [facility] [address] [employer]   Phone [phone]   COMPREHENSIVE METABOLIC PANEL - Abnormal; Notable for the following:     Sodium 125 (*)     Chloride 87 (*)     CO2 19 (*)     Anion Gap 19 (*)     Glucose 144 (*)     Alb 2.8 (*)     Albumin/Globulin Ratio 0.6 (*)     ALT <5 (*)     AST 8 (*)     All other components within normal limits    Narrative:     Performed at:  [facility] [address] [facility] [employer]   Phone [phone]   URINE RT REFLEX TO CULTURE - Abnormal; Notable for the following:     Clarity, UA CLOUDY (*)     Blood, Urine SMALL (*)     Leukocyte Esterase, Urine SMALL (*)     All other components within normal limits    Narrative:     Performed at:  [facility] [address] [employer]   Phone [phone]   LACTIC ACID, PLASMA - Abnormal; Notable for the following:     Lactic Acid 2.5 (*)     All other components within normal limits    Narrative:     Performed at:  [facility] [address], [place], [facility] [employer]   Phone [phone]   [address] - Abnormal; Notable for the following:     Occult Blood Screening   (*)     Value: Result: POSITIVE  Normal range: Negative      All other components within normal limits    Narrative:     ORDER#: [id number]                          ORDERED BY: CURRY Zarate  SOURCE: Stool Watery Loose                 COLLECTED:  09/26/18 01:34  ANTIBIOTICS AT LINA.:                      RECEIVED :  09/26/18 02:00  Performed at:  [facility] [address] [employer]   Phone [phone]   PROTIME-INR - Abnormal; Notable for the following:     Protime 15.2 (*)     INR 1.33 (*)     All other components within normal limits    Narrative:     Performed formation with suspected   fistulous communication bridging the proximal sigmoid colon to the   rectosigmoid junction or possibly the vaginal stump (see annotated images). Exam findings were discussed by Dr. Bessie Aguirre to [facility] on   9/26/2018 at 04:15. XR CHEST PORTABLE   Preliminary Result   No acute abnormality. Large hiatal hernia. Ct Abdomen Pelvis W Iv Contrast Additional Contrast? None    Result Date: 9/26/2018  EXAMINATION: CT OF THE ABDOMEN AND PELVIS WITH CONTRAST 9/26/2018 3:32 am TECHNIQUE: CT of the abdomen and pelvis was performed with the administration of intravenous contrast. Multiplanar reformatted images are provided for review. Dose modulation, iterative reconstruction, and/or weight based adjustment of the mA/kV was utilized to reduce the radiation dose to as low as reasonably achievable. COMPARISON: CT abdomen pelvis 08/02/2018, 06/05/2018, 05/20/2018 HISTORY: ORDERING SYSTEM PROVIDED HISTORY: LLQ pain, fever TECHNOLOGIST PROVIDED HISTORY: If patient is on cardiac monitor and/or pulse ox, they may be taken off cardiac monitor and pulse ox, left on O2 if currently on. All monitors reattached when patient returns to room. Additional Contrast?->None Ordering Physician Provided Reason for Exam: abdominal pain Acuity: Unknown Type of Exam: Unknown Relevant Medical/Surgical History: (LLQ abdominal pain for weeks, now with fever and worsening pain. hx chrons. + dizziness) FINDINGS: ABDOMEN Liver: Unchanged too small to characterize hypodensity at the right hepatic dome and along the falciform ligament, the latter of which has fluctuated in size and probably represents focal fatty infiltration. Gallbladder: Normal. Biliary: No intra or extrahepatic bile duct dilatation. Pancreas: Normal. Spleen: Surgically absent. Adrenals: Normal. Kidneys: Unchanged exophytic right renal simple cyst at the inferior pole.  Additional too small to characterize renal losing fevers at home. She denies a cough or congestion. No urinary symptoms. No chest pain or shortness of breath. On physical exam she does have tenderness and guarding to her left lower quadrant. She is tachycardic. She is noted to be febrile at 104. Patient was able to give us a stool seen: The ED this is positive for occult blood, negative for C. diff. She is a leukocytosis of 16.2, anemia of 8.1 although this seems to be similar to her previous readings. Mild hyponatremia of 125. Blood gases 2.5. Concern for sepsis, therefore patient was given 30 mL/kg fluid bolus, started empirically on vancomycin and cefepime. Urine shows no evidence of infection or significant blood. CT the abdomen and pelvis does show worsening severe diverticulitis with evidence of perforation and contained abscess with suspected fistulous communication. Gen. surgery has been consult it. This marks the end of my shift. Please see attending note for details, disposition, plan is to admit the patient. The patient tolerated their visit well. They were seen and evaluated by the attending physician who agreed with the assessment and plan. The patient and / or the family were informed of the results of any tests, a time was given to answer questions, a plan was proposed and they agreed with plan. FINAL IMPRESSION      1. Perforation and abscess of large intestine concurrent with and due to diverticulitis    2. Fistula    3. Severe sepsis [facility])          DISPOSITION/PLAN   DISPOSITION Decision To Admit 09/26/2018 04:16:48 AM      PATIENT REFERRED TO:  No follow-up provider specified.     DISCHARGE MEDICATIONS:  New Prescriptions    No medications on file       DISCONTINUED MEDICATIONS:  Discontinued Medications    No medications on file              (Please note that portions of this note were completed with a voice recognition program.  Efforts were made to edit the dictations but occasionally words are

## 2018-09-26 NOTE — CONSULTS
[facility] GENERAL AND LAPAROSCOPIC SURGERY                       PATIENT NAME: Shell Mendozafield     ADMISSION DATE: 9/26/2018  1:02 AM      TODAY'S DATE: 9/26/2018    Reason for Consult:  Abd pain    Requesting Physician:  Dr. Marcos Plaza:              The patient is a 65 y.o. female who presents with abd pain, LLQ, focal, severe, worse over past week, on top of chronic pain in area over many months. Has had dx of Crohn's. Possible diverticulitis and or possible fistula issue raised as well. Has had fever, no chills. Nausea and has decreased appetite, no emesis. Ha adhesive SBO with lap SHAILESH done several mos ago. Left colon noted to be abnormal in appearance at that time.     Past Medical History:        Diagnosis Date    Arthritis     Blood transfusion reaction     Crohn's disease ([address]     GERD (gastroesophageal reflux disease)     Hiatal hernia 6/24/2014    Hypertension     MRSA (methicillin resistant staph aureus) culture positive 06/05/2018    urine    Pneumonia 1/8/2014       Past Surgical History:        Procedure Laterality Date    ABDOMEN SURGERY      ABDOMINAL ADHESION SURGERY  06/08/2018    BLADDER SUSPENSION      COLONOSCOPY      COLONOSCOPY  9/14/15    sigmoid colon biopsy     COLONOSCOPY  08/07/2018    FOOT SURGERY Left 6/25/14    excision plantar left hallux    FOOT SURGERY  6/3/15    PLANTAR PLATE REPAIR BILATERAL, ARTHROTOMY MPJ 2ND BILATERAL    FOOT SURGERY Left 08/05/2002    Excision second metatarsal head left    FRACTURE SURGERY      rt hip    HAND CARPECTOMY Bilateral     HEEL SPUR SURGERY      HERNIA REPAIR      HYSTERECTOMY      bladder surgery    JOINT REPLACEMENT      rt hip, L shoulder replacement 11/2014    KNEE SURGERY Bilateral     arthrosvopic    SIGMOIDOSCOPY  01/15/2018    with biopsies    TONSILLECTOMY      UPPER GASTROINTESTINAL ENDOSCOPY  6/14/13    and colonsocopy with antral erosion biopsies       Current Medications: and/or weight based adjustment of   the mA/kV was utilized to reduce the radiation dose to as low as reasonably   achievable.       COMPARISON:   CT abdomen pelvis 08/02/2018, 06/05/2018, 05/20/2018       HISTORY:   ORDERING SYSTEM PROVIDED HISTORY: LLQ pain, fever   TECHNOLOGIST PROVIDED HISTORY:   If patient is on cardiac monitor and/or pulse ox, they may be taken off   cardiac monitor and pulse ox, left on O2 if currently on. All monitors   reattached when patient returns to room. Additional Contrast?->None   Ordering Physician Provided Reason for Exam: abdominal pain   Acuity: Unknown   Type of Exam: Unknown   Relevant Medical/Surgical History: (LLQ abdominal pain for weeks, now with   fever and worsening pain. hx chrons. + dizziness)       FINDINGS:   ABDOMEN       Liver: Unchanged too small to characterize hypodensity at the right hepatic   dome and along the falciform ligament, the latter of which has fluctuated in   size and probably represents focal fatty infiltration.       Gallbladder: Normal.       Biliary: No intra or extrahepatic bile duct dilatation.       Pancreas: Normal.       Spleen: Surgically absent.       Adrenals: Normal.       Kidneys: Unchanged exophytic right renal simple cyst at the inferior pole.    Additional too small to characterize renal hypodensities are unchanged.  No   hydronephrosis or nephrolithiasis.       GI Tract: Large hiatus hernia with the majority of the stomach intrathoracic   in position.  No gastric outlet obstruction.  Appendix not discretely   identified however there are no pericecal inflammatory changes.  Colonic   diverticulosis with a long segment of severe diverticulitis along the distal   descending and proximal sigmoid colon.  Severe surrounding inflammatory   stranding with extraluminal gas containing collection with faint peripheral   enhancement measuring approximately 2.6 x 1.7 x 3.6 cm.  This area of   inflammation seems to bridge the proximal sigmoid

## 2018-09-26 NOTE — ED NOTES
Collected stool sample in [facility] which included urine. Per Autumn Sacks in lab, unable to take stool samples with urine.       [facility], [facility]  09/26/18 7971

## 2018-09-27 LAB
ABO/RH: NORMAL
ANION GAP SERPL CALCULATED.3IONS-SCNC: 15 MMOL/L (ref 3–16)
ANTIBODY SCREEN: NORMAL
BASOPHILS ABSOLUTE: 0.1 K/UL (ref 0–0.2)
BASOPHILS RELATIVE PERCENT: 1 %
BLOOD BANK DISPENSE STATUS: NORMAL
BLOOD BANK DISPENSE STATUS: NORMAL
BLOOD BANK PRODUCT CODE: NORMAL
BLOOD BANK PRODUCT CODE: NORMAL
BPU ID: NORMAL
BPU ID: NORMAL
BUN BLDV-MCNC: 5 MG/DL (ref 7–20)
CALCIUM SERPL-MCNC: 8.5 MG/DL (ref 8.3–10.6)
CHLORIDE BLD-SCNC: 97 MMOL/L (ref 99–110)
CO2: 20 MMOL/L (ref 21–32)
CREAT SERPL-MCNC: 0.6 MG/DL (ref 0.6–1.2)
DESCRIPTION BLOOD BANK: NORMAL
DESCRIPTION BLOOD BANK: NORMAL
EKG ATRIAL RATE: 77 BPM
EKG DIAGNOSIS: NORMAL
EKG P AXIS: -3 DEGREES
EKG P-R INTERVAL: 110 MS
EKG Q-T INTERVAL: 360 MS
EKG QRS DURATION: 88 MS
EKG QTC CALCULATION (BAZETT): 407 MS
EKG R AXIS: 22 DEGREES
EKG T AXIS: 27 DEGREES
EKG VENTRICULAR RATE: 77 BPM
EOSINOPHILS ABSOLUTE: 0 K/UL (ref 0–0.6)
EOSINOPHILS RELATIVE PERCENT: 0.5 %
FERRITIN: 67.5 NG/ML (ref 15–150)
FOLATE: 8.73 NG/ML (ref 4.78–24.2)
GFR AFRICAN AMERICAN: >60
GFR NON-AFRICAN AMERICAN: >60
GI BACTERIAL PATHOGENS BY PCR: NORMAL
GLUCOSE BLD-MCNC: 100 MG/DL (ref 70–99)
HCT VFR BLD CALC: 21.2 % (ref 36–48)
HCT VFR BLD CALC: 21.6 % (ref 36–48)
HCT VFR BLD CALC: 28.9 % (ref 36–48)
HEMOGLOBIN: 6.9 G/DL (ref 12–16)
HEMOGLOBIN: 9.2 G/DL (ref 12–16)
IMMATURE RETIC FRACT: 0.6 (ref 0.21–0.37)
IRON SATURATION: 7 % (ref 15–50)
IRON: 13 UG/DL (ref 37–145)
LYMPHOCYTES ABSOLUTE: 1.8 K/UL (ref 1–5.1)
LYMPHOCYTES RELATIVE PERCENT: 21.4 %
MCH RBC QN AUTO: 27.2 PG (ref 26–34)
MCHC RBC AUTO-ENTMCNC: 32.4 G/DL (ref 31–36)
MCV RBC AUTO: 84 FL (ref 80–100)
MONOCYTES ABSOLUTE: 1.1 K/UL (ref 0–1.3)
MONOCYTES RELATIVE PERCENT: 13.4 %
NEUTROPHILS ABSOLUTE: 5.3 K/UL (ref 1.7–7.7)
NEUTROPHILS RELATIVE PERCENT: 63.7 %
OSMOLALITY URINE: 454 MOSM/KG (ref 390–1070)
PDW BLD-RTO: 17.4 % (ref 12.4–15.4)
PLATELET # BLD: 512 K/UL (ref 135–450)
PMV BLD AUTO: 6.1 FL (ref 5–10.5)
POTASSIUM SERPL-SCNC: 3 MMOL/L (ref 3.5–5.1)
RBC # BLD: 2.52 M/UL (ref 4–5.2)
RETICULOCYTE ABSOLUTE COUNT: 0.08 M/UL (ref 0.02–0.1)
RETICULOCYTE COUNT PCT: 3.04 % (ref 0.5–2.18)
SODIUM BLD-SCNC: 127 MMOL/L (ref 136–145)
SODIUM BLD-SCNC: 132 MMOL/L (ref 136–145)
TOTAL IRON BINDING CAPACITY: 190 UG/DL (ref 260–445)
URINE CULTURE, ROUTINE: NORMAL
VITAMIN B-12: >2000 PG/ML (ref 211–911)
WBC # BLD: 8.4 K/UL (ref 4–11)

## 2018-09-27 PROCEDURE — 86850 RBC ANTIBODY SCREEN: CPT

## 2018-09-27 PROCEDURE — 82607 VITAMIN B-12: CPT

## 2018-09-27 PROCEDURE — 84295 ASSAY OF SERUM SODIUM: CPT

## 2018-09-27 PROCEDURE — 82728 ASSAY OF FERRITIN: CPT

## 2018-09-27 PROCEDURE — 85018 HEMOGLOBIN: CPT

## 2018-09-27 PROCEDURE — P9016 RBC LEUKOCYTES REDUCED: HCPCS

## 2018-09-27 PROCEDURE — 2580000003 HC RX 258: Performed by: SURGERY

## 2018-09-27 PROCEDURE — 86901 BLOOD TYPING SEROLOGIC RH(D): CPT

## 2018-09-27 PROCEDURE — 2580000003 HC RX 258: Performed by: INTERNAL MEDICINE

## 2018-09-27 PROCEDURE — 85014 HEMATOCRIT: CPT

## 2018-09-27 PROCEDURE — 86900 BLOOD TYPING SEROLOGIC ABO: CPT

## 2018-09-27 PROCEDURE — 99232 SBSQ HOSP IP/OBS MODERATE 35: CPT | Performed by: SURGERY

## 2018-09-27 PROCEDURE — 2500000003 HC RX 250 WO HCPCS: Performed by: INTERNAL MEDICINE

## 2018-09-27 PROCEDURE — 86923 COMPATIBILITY TEST ELECTRIC: CPT

## 2018-09-27 PROCEDURE — 6360000002 HC RX W HCPCS: Performed by: INTERNAL MEDICINE

## 2018-09-27 PROCEDURE — 82746 ASSAY OF FOLIC ACID SERUM: CPT

## 2018-09-27 PROCEDURE — 85045 AUTOMATED RETICULOCYTE COUNT: CPT

## 2018-09-27 PROCEDURE — 83550 IRON BINDING TEST: CPT

## 2018-09-27 PROCEDURE — 93005 ELECTROCARDIOGRAM TRACING: CPT | Performed by: INTERNAL MEDICINE

## 2018-09-27 PROCEDURE — 1200000000 HC SEMI PRIVATE

## 2018-09-27 PROCEDURE — 83540 ASSAY OF IRON: CPT

## 2018-09-27 PROCEDURE — 80048 BASIC METABOLIC PNL TOTAL CA: CPT

## 2018-09-27 PROCEDURE — 85025 COMPLETE CBC W/AUTO DIFF WBC: CPT

## 2018-09-27 PROCEDURE — 36415 COLL VENOUS BLD VENIPUNCTURE: CPT

## 2018-09-27 PROCEDURE — 93010 ELECTROCARDIOGRAM REPORT: CPT | Performed by: INTERNAL MEDICINE

## 2018-09-27 PROCEDURE — 36430 TRANSFUSION BLD/BLD COMPNT: CPT

## 2018-09-27 PROCEDURE — 6370000000 HC RX 637 (ALT 250 FOR IP): Performed by: INTERNAL MEDICINE

## 2018-09-27 RX ORDER — SODIUM CHLORIDE 9 MG/ML
INJECTION, SOLUTION INTRAVENOUS
Status: DISCONTINUED
Start: 2018-09-27 | End: 2018-09-27 | Stop reason: WASHOUT

## 2018-09-27 RX ORDER — LANOLIN ALCOHOL/MO/W.PET/CERES
400 CREAM (GRAM) TOPICAL 2 TIMES DAILY
Status: DISCONTINUED | OUTPATIENT
Start: 2018-09-27 | End: 2018-10-01 | Stop reason: HOSPADM

## 2018-09-27 RX ORDER — ATENOLOL 50 MG/1
100 TABLET ORAL DAILY
Status: DISCONTINUED | OUTPATIENT
Start: 2018-09-27 | End: 2018-10-01 | Stop reason: HOSPADM

## 2018-09-27 RX ORDER — POTASSIUM CHLORIDE 750 MG/1
20 TABLET, FILM COATED, EXTENDED RELEASE ORAL 2 TIMES DAILY
Status: DISCONTINUED | OUTPATIENT
Start: 2018-09-27 | End: 2018-10-01 | Stop reason: HOSPADM

## 2018-09-27 RX ORDER — POTASSIUM CHLORIDE 7.45 MG/ML
10 INJECTION INTRAVENOUS
Status: COMPLETED | OUTPATIENT
Start: 2018-09-27 | End: 2018-09-28

## 2018-09-27 RX ORDER — 0.9 % SODIUM CHLORIDE 0.9 %
250 INTRAVENOUS SOLUTION INTRAVENOUS ONCE
Status: COMPLETED | OUTPATIENT
Start: 2018-09-27 | End: 2018-09-28

## 2018-09-27 RX ADMIN — ATENOLOL 100 MG: 50 TABLET ORAL at 10:37

## 2018-09-27 RX ADMIN — Medication 400 MG: at 10:36

## 2018-09-27 RX ADMIN — Medication 10 ML: at 07:58

## 2018-09-27 RX ADMIN — TAZOBACTAM SODIUM AND PIPERACILLIN SODIUM 3.38 G: 375; 3 INJECTION, SOLUTION INTRAVENOUS at 10:37

## 2018-09-27 RX ADMIN — HYDROMORPHONE HYDROCHLORIDE 1 MG: 2 INJECTION INTRAMUSCULAR; INTRAVENOUS; SUBCUTANEOUS at 15:34

## 2018-09-27 RX ADMIN — POTASSIUM CHLORIDE 20 MEQ: 750 TABLET, FILM COATED, EXTENDED RELEASE ORAL at 20:07

## 2018-09-27 RX ADMIN — TAZOBACTAM SODIUM AND PIPERACILLIN SODIUM 3.38 G: 375; 3 INJECTION, SOLUTION INTRAVENOUS at 19:01

## 2018-09-27 RX ADMIN — SODIUM CHLORIDE 250 ML: 9 INJECTION, SOLUTION INTRAVENOUS at 10:46

## 2018-09-27 RX ADMIN — Medication 400 MG: at 20:07

## 2018-09-27 RX ADMIN — Medication 10 ML: at 20:12

## 2018-09-27 RX ADMIN — POTASSIUM CHLORIDE 10 MEQ: 7.46 INJECTION, SOLUTION INTRAVENOUS at 20:49

## 2018-09-27 RX ADMIN — POTASSIUM CHLORIDE 20 MEQ: 750 TABLET, FILM COATED, EXTENDED RELEASE ORAL at 10:37

## 2018-09-27 RX ADMIN — SODIUM CHLORIDE 250 ML: 9 INJECTION, SOLUTION INTRAVENOUS at 19:26

## 2018-09-27 RX ADMIN — POTASSIUM CHLORIDE 10 MEQ: 7.46 INJECTION, SOLUTION INTRAVENOUS at 19:02

## 2018-09-27 RX ADMIN — POTASSIUM CHLORIDE 10 MEQ: 7.46 INJECTION, SOLUTION INTRAVENOUS at 22:52

## 2018-09-27 RX ADMIN — HYDROMORPHONE HYDROCHLORIDE 1 MG: 2 INJECTION INTRAMUSCULAR; INTRAVENOUS; SUBCUTANEOUS at 05:47

## 2018-09-27 RX ADMIN — TAZOBACTAM SODIUM AND PIPERACILLIN SODIUM 3.38 G: 375; 3 INJECTION, SOLUTION INTRAVENOUS at 03:44

## 2018-09-27 ASSESSMENT — PAIN SCALES - GENERAL
PAINLEVEL_OUTOF10: 5
PAINLEVEL_OUTOF10: 10
PAINLEVEL_OUTOF10: 10

## 2018-09-27 NOTE — PROGRESS NOTES
treatment, recommend surgical resecttion  [facility] pt and Dr. Latha Aguirre, all in agreement  Will try to cool off and do semi electively  May need stoma temporarily along with left hemicolectomy    Ulysses Getting

## 2018-09-27 NOTE — PROGRESS NOTES
Other: Extraluminal fluid collection along the sigmoid colon as detailed above. No enlarged lymph nodes. MUSCULOSKELETAL Bones and Soft Tissues: No acute superficial soft tissue or osseous abnormality. Thoracolumbar spondylosis with grade 1 degenerative anterolisthesis of L4 on L5 and minimal retrolisthesis of L1 on L2. Other: Basilar atelectasis relating to hiatus hernia. Normal included heart and pericardium. Worsening severe diverticulitis involving the proximal sigmoid colon with evidence of perforation and (contained) abscess formation with suspected fistulous communication bridging the proximal sigmoid colon to the rectosigmoid junction or possibly the vaginal stump (see annotated images). Exam findings were discussed by Dr. Gordon Abdul to [facility] on 9/26/2018 at 04:15. Xr Chest Portable    Result Date: 9/27/2018  EXAMINATION: SINGLE XRAY VIEW OF THE CHEST, 9/26/2018 2:12 am COMPARISON: 06/26/2018 HISTORY: ORDERING SYSTEM PROVIDED HISTORY: SOB TECHNOLOGIST PROVIDED HISTORY: Reason for exam:->SOB Ordering Physician Provided Reason for Exam: Fever,shortness of breath, and dizziness Acuity: Acute Type of Exam: Initial Relevant Medical/Surgical History: Previous pneumonia and hypertension FINDINGS: There is a large hiatal hernia. There is chronic scarring in the left lung base. There is no new acute consolidation. The cardiac silhouette is unchanged. There is a left shoulder arthroplasty. No acute abnormality. Large hiatal hernia. Assessment and Plan:  Patient Active Hospital Problem List:   Diverticulitis (9/26/2018)    Assessment: Established problem. Stable. Still with some abd pain    Plan: cont iv zosyn. Appreciate surgery eval   Essential hypertension, benign (9/13/2015)    Assessment: Established problem. Stable. 127/74    Plan: bp controlled. Cont same bp meds. Cont pain control   Hyponatremia (1/5/2018)    Assessment: Established problem. Stable.    - discussed

## 2018-09-27 NOTE — PLAN OF CARE
Problem: Falls - Risk of:  Goal: Will remain free from falls  Will remain free from falls   Outcome: Ongoing  Fall prevention measures in place at this time. Absence of falls at this time, will continue to monitor.

## 2018-09-27 NOTE — PROGRESS NOTES
[Urine:350]  No intake/output data recorded. Chest- clear  Heart-regular  Abd-soft  Ext- no edema    Labs  Hemoglobin   Date Value Ref Range Status   09/27/2018 6.9 (LL) 12.0 - 16.0 g/dL Final     Hematocrit   Date Value Ref Range Status   09/27/2018 21.6 (L) 36.0 - 48.0 % Final     WBC   Date Value Ref Range Status   09/27/2018 8.4 4.0 - 11.0 K/uL Final     Platelets   Date Value Ref Range Status   09/27/2018 512 (H) 135 - 450 K/uL Final     Lab Results   Component Value Date    CREATININE 0.6 09/27/2018    BUN 5 (L) 09/27/2018     (L) 09/27/2018    K 3.0 (L) 09/27/2018    CL 97 (L) 09/27/2018    CO2 20 (L) 09/27/2018   Deo-50  Uosm pending   Uric acid wnl    Assessment/Plan:  1. Acute-on-chronic hyponatremia, best serum sodium around 133 to 136. This may be secondary to increased ADH due to decreased intravascular  volume. Na increased from 125 to 132. D/C NSS. Bolus PRN. 2.  Resp. Alkalosis  3. History of hypertension. Blood pressure is acceptable. 4.  Anemia-transfuse  5. Severe diverticulitis as per Medicine and Surgery. On Zosyn. 6.  History of Crohn disease.   7.  Hypokalemia- replace     Ritche Neil Matters

## 2018-09-27 NOTE — CONSULTS
buspirone. SOCIAL HISTORY:  . Positive smoking. Occasional alcohol use. Denies any drug abuse. FAMILY HISTORY:  Includes hypertension and kidney disease. REVIEW OF SYSTEMS:  General:  Positive malaise. Skin:  No skin rash,  itching or discharge. Neurologic:  No headaches or focal deficits. Cardiovascular:  No chest pain. Pulmonary:  No shortness breath. No  cough, no hemoptysis. GI:  Positive abdominal pain. Episodes of nausea  and vomiting. No diarrhea. Decreased p.o. intake. Renal:  Denies any  change in urine output or gross hematuria. Endocrine:  No history of  diabetes. No heat or cold intolerance. ID:  Positive fever and chills. Musculoskeletal:  Denies any active joint pains. PHYSICAL EXAMINATION:  VITAL SIGNS:  Blood pressure 118/76, pulse 85, respiration 11, temperature  97.2, and saturating 95%. Weight listed at 99.8 kg. GENERAL:  Appears well. HEENT:  Anicteric sclera. Clear conjunctiva. SKIN:  Anicteric. No rash. CHEST:  Clear. HEART:  Regular. ABDOMEN:  Tenderness on the left quadrant. No rebound or involuntary  guarding. EXTREMITIES:  Show no edema. LABORATORY DATA:  Sodium 125, potassium 3.7, chloride _____, bicarb 19, BUN  8, creatinine 0.8, glucose 144, calcium 9.1, and albumin 2.8. WBC 16.2,  hemoglobin 8.1, hematocrit 25.2, and platelet count 514,655. INR 1.3. Blood cultures are pending. Urinalysis, specific gravity 1.009, pH 5.5,  blood is small, protein is negative, leukocyte esterase small, and wbc's 1. IMAGING STUDIES:  Chest x-ray shows no acute abnormality, _____ hernia. CT  of the abdomen and pelvis with IV contrast shows severe diverticulitis  involving the proximal sigmoid colon with evidence of perforation and  abscess formation with suspected fistulous communication bridging the  proximal sigmoid colon to the rectosigmoid junction or possibly the vaginal  stem. ASSESSMENT AND PLAN:  1.   Acute-on-chronic hyponatremia, best Call Dr. Otoole's office for your follow-up appointment.  Eat soft diet for 2 weeks.

## 2018-09-27 NOTE — PROGRESS NOTES
Shift assessment completed at this time, see flowsheet. VSS, Evening meds given, see MAR. Bed alarm engaged. Call light within reach, pt denies any other needs at this time. Will continue to monitor.

## 2018-09-28 LAB
ANION GAP SERPL CALCULATED.3IONS-SCNC: 13 MMOL/L (ref 3–16)
BASOPHILS ABSOLUTE: 0.1 K/UL (ref 0–0.2)
BASOPHILS RELATIVE PERCENT: 0.7 %
BUN BLDV-MCNC: 3 MG/DL (ref 7–20)
CALCIUM SERPL-MCNC: 8.8 MG/DL (ref 8.3–10.6)
CHLORIDE BLD-SCNC: 95 MMOL/L (ref 99–110)
CO2: 19 MMOL/L (ref 21–32)
CREAT SERPL-MCNC: <0.5 MG/DL (ref 0.6–1.2)
EOSINOPHILS ABSOLUTE: 0.1 K/UL (ref 0–0.6)
EOSINOPHILS RELATIVE PERCENT: 1.1 %
FECAL NEUTRAL FAT: NORMAL
FECAL SPLIT FATS: NORMAL
GFR AFRICAN AMERICAN: >60
GFR NON-AFRICAN AMERICAN: >60
GLUCOSE BLD-MCNC: 78 MG/DL (ref 70–99)
HCT VFR BLD CALC: 27 % (ref 36–48)
HEMOGLOBIN: 8.8 G/DL (ref 12–16)
LYMPHOCYTES ABSOLUTE: 1.7 K/UL (ref 1–5.1)
LYMPHOCYTES RELATIVE PERCENT: 20.9 %
MAGNESIUM: 1.6 MG/DL (ref 1.8–2.4)
MCH RBC QN AUTO: 27.8 PG (ref 26–34)
MCHC RBC AUTO-ENTMCNC: 32.5 G/DL (ref 31–36)
MCV RBC AUTO: 85.6 FL (ref 80–100)
MONOCYTES ABSOLUTE: 1.2 K/UL (ref 0–1.3)
MONOCYTES RELATIVE PERCENT: 15.3 %
NEUTROPHILS ABSOLUTE: 4.9 K/UL (ref 1.7–7.7)
NEUTROPHILS RELATIVE PERCENT: 62 %
PDW BLD-RTO: 16.3 % (ref 12.4–15.4)
PLATELET # BLD: 439 K/UL (ref 135–450)
PMV BLD AUTO: 6.1 FL (ref 5–10.5)
POTASSIUM SERPL-SCNC: 3.8 MMOL/L (ref 3.5–5.1)
RBC # BLD: 3.16 M/UL (ref 4–5.2)
ROTAVIRUS ANTIGEN: NEGATIVE
SODIUM BLD-SCNC: 127 MMOL/L (ref 136–145)
SODIUM BLD-SCNC: 127 MMOL/L (ref 136–145)
SODIUM BLD-SCNC: 132 MMOL/L (ref 136–145)
WBC # BLD: 7.9 K/UL (ref 4–11)

## 2018-09-28 PROCEDURE — 80048 BASIC METABOLIC PNL TOTAL CA: CPT

## 2018-09-28 PROCEDURE — APPNB30 APP NON BILLABLE TIME 0-30 MINS: Performed by: NURSE PRACTITIONER

## 2018-09-28 PROCEDURE — 36415 COLL VENOUS BLD VENIPUNCTURE: CPT

## 2018-09-28 PROCEDURE — 6360000002 HC RX W HCPCS: Performed by: INTERNAL MEDICINE

## 2018-09-28 PROCEDURE — 85025 COMPLETE CBC W/AUTO DIFF WBC: CPT

## 2018-09-28 PROCEDURE — 99232 SBSQ HOSP IP/OBS MODERATE 35: CPT | Performed by: SURGERY

## 2018-09-28 PROCEDURE — APPSS15 APP SPLIT SHARED TIME 0-15 MINUTES: Performed by: NURSE PRACTITIONER

## 2018-09-28 PROCEDURE — 2580000003 HC RX 258: Performed by: INTERNAL MEDICINE

## 2018-09-28 PROCEDURE — 83735 ASSAY OF MAGNESIUM: CPT

## 2018-09-28 PROCEDURE — 2580000003 HC RX 258

## 2018-09-28 PROCEDURE — 1200000000 HC SEMI PRIVATE

## 2018-09-28 PROCEDURE — 6370000000 HC RX 637 (ALT 250 FOR IP): Performed by: INTERNAL MEDICINE

## 2018-09-28 PROCEDURE — 6360000002 HC RX W HCPCS: Performed by: NURSE PRACTITIONER

## 2018-09-28 PROCEDURE — 2500000003 HC RX 250 WO HCPCS: Performed by: INTERNAL MEDICINE

## 2018-09-28 PROCEDURE — 84295 ASSAY OF SERUM SODIUM: CPT

## 2018-09-28 RX ORDER — MAGNESIUM SULFATE IN WATER 40 MG/ML
2 INJECTION, SOLUTION INTRAVENOUS ONCE
Status: COMPLETED | OUTPATIENT
Start: 2018-09-28 | End: 2018-09-28

## 2018-09-28 RX ORDER — SODIUM CHLORIDE 9 MG/ML
INJECTION, SOLUTION INTRAVENOUS CONTINUOUS
Status: DISPENSED | OUTPATIENT
Start: 2018-09-28 | End: 2018-09-29

## 2018-09-28 RX ORDER — SODIUM CHLORIDE 9 MG/ML
INJECTION, SOLUTION INTRAVENOUS
Status: COMPLETED
Start: 2018-09-28 | End: 2018-09-28

## 2018-09-28 RX ORDER — PANTOPRAZOLE SODIUM 40 MG/1
40 TABLET, DELAYED RELEASE ORAL
Status: DISCONTINUED | OUTPATIENT
Start: 2018-09-29 | End: 2018-10-01 | Stop reason: HOSPADM

## 2018-09-28 RX ORDER — BACLOFEN 10 MG/1
10 TABLET ORAL 3 TIMES DAILY
Status: DISCONTINUED | OUTPATIENT
Start: 2018-09-28 | End: 2018-10-01 | Stop reason: HOSPADM

## 2018-09-28 RX ORDER — DULOXETIN HYDROCHLORIDE 30 MG/1
60 CAPSULE, DELAYED RELEASE ORAL DAILY
Status: DISCONTINUED | OUTPATIENT
Start: 2018-09-28 | End: 2018-10-01 | Stop reason: HOSPADM

## 2018-09-28 RX ORDER — OMEPRAZOLE 20 MG/1
20 CAPSULE, DELAYED RELEASE ORAL DAILY
Status: DISCONTINUED | OUTPATIENT
Start: 2018-09-28 | End: 2018-09-28

## 2018-09-28 RX ORDER — SODIUM CHLORIDE 1000 MG
1 TABLET, SOLUBLE MISCELLANEOUS
Status: DISCONTINUED | OUTPATIENT
Start: 2018-09-28 | End: 2018-10-01 | Stop reason: HOSPADM

## 2018-09-28 RX ORDER — MESALAMINE 400 MG/1
400 CAPSULE, DELAYED RELEASE ORAL 3 TIMES DAILY
Status: DISCONTINUED | OUTPATIENT
Start: 2018-09-28 | End: 2018-10-01 | Stop reason: HOSPADM

## 2018-09-28 RX ORDER — BUSPIRONE HYDROCHLORIDE 5 MG/1
15 TABLET ORAL 2 TIMES DAILY
Status: DISCONTINUED | OUTPATIENT
Start: 2018-09-28 | End: 2018-10-01 | Stop reason: HOSPADM

## 2018-09-28 RX ORDER — MESALAMINE 1.2 G/1
1.2 TABLET, DELAYED RELEASE ORAL
Status: DISCONTINUED | OUTPATIENT
Start: 2018-09-29 | End: 2018-09-28

## 2018-09-28 RX ADMIN — MAGNESIUM SULFATE HEPTAHYDRATE 2 G: 40 INJECTION, SOLUTION INTRAVENOUS at 09:29

## 2018-09-28 RX ADMIN — HYDROMORPHONE HYDROCHLORIDE 1 MG: 2 INJECTION INTRAMUSCULAR; INTRAVENOUS; SUBCUTANEOUS at 02:22

## 2018-09-28 RX ADMIN — POTASSIUM CHLORIDE 10 MEQ: 7.46 INJECTION, SOLUTION INTRAVENOUS at 02:16

## 2018-09-28 RX ADMIN — SODIUM CHLORIDE TAB 1 GM 1 G: 1 TAB at 17:19

## 2018-09-28 RX ADMIN — SODIUM CHLORIDE: 9 INJECTION, SOLUTION INTRAVENOUS at 16:03

## 2018-09-28 RX ADMIN — DULOXETINE HYDROCHLORIDE 60 MG: 30 CAPSULE, DELAYED RELEASE ORAL at 16:03

## 2018-09-28 RX ADMIN — MESALAMINE 400 MG: 400 CAPSULE, DELAYED RELEASE ORAL at 16:02

## 2018-09-28 RX ADMIN — POTASSIUM CHLORIDE 20 MEQ: 750 TABLET, FILM COATED, EXTENDED RELEASE ORAL at 22:18

## 2018-09-28 RX ADMIN — TAZOBACTAM SODIUM AND PIPERACILLIN SODIUM 3.38 G: 375; 3 INJECTION, SOLUTION INTRAVENOUS at 12:12

## 2018-09-28 RX ADMIN — POTASSIUM CHLORIDE 20 MEQ: 750 TABLET, FILM COATED, EXTENDED RELEASE ORAL at 09:28

## 2018-09-28 RX ADMIN — BACLOFEN 10 MG: 10 TABLET ORAL at 22:18

## 2018-09-28 RX ADMIN — Medication 10 ML: at 22:37

## 2018-09-28 RX ADMIN — Medication 400 MG: at 09:28

## 2018-09-28 RX ADMIN — BUSPIRONE HYDROCHLORIDE 15 MG: 5 TABLET ORAL at 22:26

## 2018-09-28 RX ADMIN — SODIUM CHLORIDE TAB 1 GM 1 G: 1 TAB at 12:12

## 2018-09-28 RX ADMIN — HYDROMORPHONE HYDROCHLORIDE 1 MG: 2 INJECTION INTRAMUSCULAR; INTRAVENOUS; SUBCUTANEOUS at 16:16

## 2018-09-28 RX ADMIN — TAZOBACTAM SODIUM AND PIPERACILLIN SODIUM 3.38 G: 375; 3 INJECTION, SOLUTION INTRAVENOUS at 03:23

## 2018-09-28 RX ADMIN — Medication 10 ML: at 09:29

## 2018-09-28 RX ADMIN — BACLOFEN 10 MG: 10 TABLET ORAL at 16:03

## 2018-09-28 RX ADMIN — TAZOBACTAM SODIUM AND PIPERACILLIN SODIUM 3.38 G: 375; 3 INJECTION, SOLUTION INTRAVENOUS at 19:41

## 2018-09-28 RX ADMIN — MESALAMINE 400 MG: 400 CAPSULE, DELAYED RELEASE ORAL at 23:00

## 2018-09-28 RX ADMIN — POTASSIUM CHLORIDE 10 MEQ: 7.46 INJECTION, SOLUTION INTRAVENOUS at 03:24

## 2018-09-28 RX ADMIN — ATENOLOL 100 MG: 50 TABLET ORAL at 09:29

## 2018-09-28 RX ADMIN — SODIUM CHLORIDE 250 ML: 9 INJECTION, SOLUTION INTRAVENOUS at 09:29

## 2018-09-28 RX ADMIN — Medication 400 MG: at 22:26

## 2018-09-28 RX ADMIN — BUSPIRONE HYDROCHLORIDE 15 MG: 5 TABLET ORAL at 16:02

## 2018-09-28 RX ADMIN — POTASSIUM CHLORIDE 10 MEQ: 7.46 INJECTION, SOLUTION INTRAVENOUS at 00:15

## 2018-09-28 RX ADMIN — LORAZEPAM 0.5 MG: 2 INJECTION INTRAMUSCULAR; INTRAVENOUS at 22:27

## 2018-09-28 ASSESSMENT — PAIN SCALES - GENERAL
PAINLEVEL_OUTOF10: 10
PAINLEVEL_OUTOF10: 6
PAINLEVEL_OUTOF10: 8
PAINLEVEL_OUTOF10: 4
PAINLEVEL_OUTOF10: 2
PAINLEVEL_OUTOF10: 9

## 2018-09-28 NOTE — PROGRESS NOTES
Message sent to Dr. Sharon Thomas regarding home meds that pt would like to take, waiting on call back.

## 2018-09-28 NOTE — PROGRESS NOTES
Progress Note - Dr. Norma Zeng - Internal Medicine  PCP: Josh Cardoza MD [address] / [place] [phone]    Hospital Day: 2  Code Status: Full Code  Current Diet: DIET CLEAR LIQUID;        CC: follow up on medical issues    Subjective:   Mariposa Brand is a 65 y.o. female. She denies problems    Doing ok  No new issues  Pain controlled  Awaiting decision on timing for surgery    She denies chest pain, denies shortness of breath, denies nausea,  denies emesis. 10 system Review of Systems is reviewed with patient, and pertinent positives are listed here: None . Otherwise, Review of systems is negative. I have reviewed the patient's medical and social history in detail and updated the computerized patient record. To recap: She  has a past medical history of Arthritis; Blood transfusion reaction; Crohn's disease ([facility] GERD (gastroesophageal reflux disease); Hiatal hernia; Hypertension; MRSA (methicillin resistant staph aureus) culture positive; and Pneumonia. . She  has a past surgical history that includes Hysterectomy; knee surgery (Bilateral); Hand Carpectomy (Bilateral); hernia repair; Abdomen surgery; bladder suspension; fracture surgery; Heel spur surgery; Tonsillectomy; Colonoscopy; Upper gastrointestinal endoscopy (6/14/13); Foot surgery (Left, 6/25/14); Foot surgery (6/3/15); Colonoscopy (9/14/15); Foot surgery (Left, 08/05/2002); joint replacement; Sigmoidoscopy (01/15/2018); Abdominal adhesion surgery (06/08/2018); and Colonoscopy (08/07/2018). . She  reports that she has been smoking Cigarettes and E-Cigarettes. She has a 10.00 pack-year smoking history. She has never used smokeless tobacco. She reports that she drinks about 1.2 oz of alcohol per week . She reports that she does not use drugs. .        Active Hospital Problems    Diagnosis Date Noted    Diverticulitis [K57.92] 09/26/2018    Perforation bowel ([facility] [K63.1] 09/26/2018    Crohn's colitis ([facility] [K50.10] 01/06/2018    arthroplasty. No acute abnormality. Large hiatal hernia. Assessment and Plan:  Patient Active Hospital Problem List:   Diverticulitis (9/26/2018)    Assessment: Established problem. Stable. Still with some abd pain    Plan: cont iv zosyn. Appreciate surgery eval - colectomy at some point   Essential hypertension, benign (9/13/2015)    Assessment: Established problem. Stable. 124/82    Plan: bp controlled. Cont same bp meds. Cont pain control   Hyponatremia (1/5/2018)    Assessment: Established problem. Stable. na 128 - discussed with dr Carmen Carrero - likely due to persistent fluid loss thru diarrhea    Plan: will cont fluid restriction. Cont to monitor labs   Cro[facility]'s colitis ([facility] (1/6/2018)   Perforation bowel ([address] (9/26/2018)    Assessment: Established problem. Stable. On iv pain meds, iv zosyn    Plan: plan for surgery in next few days   Anemia    Assessment - Established problem. Hgb 6.9 yest. Pt was transfused 2u; hgb now 8.8    Plan - No indication for transfusion. Cont to monitor h/h to assess progression of anemia.   Recommend ferrous sulfate or MVI as outpatient.      Dr Mari Andrews to cover over weekend     Katarina Mao  9/28/2018

## 2018-09-28 NOTE — PROGRESS NOTES
Pt c/o pain 9/10 and very tearful, Dilaudid administered per MAR. Pt states she takes Xarelto for small blood clots in legs from previous stay. Pt educated on not starting back on this until otherwise specified from surgery. Will continue to monitor.

## 2018-09-28 NOTE — PROGRESS NOTES
[Blood:718.8]  Out: -   No intake/output data recorded. Chest- clear  Heart-regular  Abd-soft  Ext- no edema    Labs  Hemoglobin   Date Value Ref Range Status   09/28/2018 8.8 (L) 12.0 - 16.0 g/dL Final     Hematocrit   Date Value Ref Range Status   09/28/2018 27.0 (L) 36.0 - 48.0 % Final     WBC   Date Value Ref Range Status   09/28/2018 7.9 4.0 - 11.0 K/uL Final     Platelets   Date Value Ref Range Status   09/28/2018 439 135 - 450 K/uL Final     Lab Results   Component Value Date    CREATININE <0.5 (L) 09/28/2018    BUN 3 (L) 09/28/2018     (L) 09/28/2018    K 3.8 09/28/2018    CL 95 (L) 09/28/2018    CO2 19 (L) 09/28/2018   Deo-50  Uosm 454  Uric acid wnl    Assessment/Plan:  1. Acute-on-chronic hyponatremia, best serum sodium around 133 to 136. This may be secondary to increased ADH due to decreased intravascular volume + underlying increased ADH. Continue NaCl tabs. NSS IV. Follow Na every 6 hours. 2.  Resp. Alkalosis  3. History of hypertension. Blood pressure is acceptable. 4.  Anemia-transfuse  5. Severe diverticulitis as per Medicine and Surgery. On Zosyn. 6.  History of Crohn disease.   7.  Hypokalemia- replaced     Martina Chan

## 2018-09-29 LAB
ANION GAP SERPL CALCULATED.3IONS-SCNC: 7 MMOL/L (ref 3–16)
BASOPHILS ABSOLUTE: 0 K/UL (ref 0–0.2)
BASOPHILS RELATIVE PERCENT: 0.4 %
BUN BLDV-MCNC: 3 MG/DL (ref 7–20)
CALCIUM SERPL-MCNC: 8.1 MG/DL (ref 8.3–10.6)
CHLORIDE BLD-SCNC: 95 MMOL/L (ref 99–110)
CO2: 26 MMOL/L (ref 21–32)
CREAT SERPL-MCNC: <0.5 MG/DL (ref 0.6–1.2)
EOSINOPHILS ABSOLUTE: 0.1 K/UL (ref 0–0.6)
EOSINOPHILS RELATIVE PERCENT: 1.2 %
GFR AFRICAN AMERICAN: >60
GFR NON-AFRICAN AMERICAN: >60
GLUCOSE BLD-MCNC: 106 MG/DL (ref 70–99)
HCT VFR BLD CALC: 26.4 % (ref 36–48)
HEMOGLOBIN: 8.5 G/DL (ref 12–16)
LYMPHOCYTES ABSOLUTE: 1.3 K/UL (ref 1–5.1)
LYMPHOCYTES RELATIVE PERCENT: 18.9 %
MCH RBC QN AUTO: 27 PG (ref 26–34)
MCHC RBC AUTO-ENTMCNC: 32.2 G/DL (ref 31–36)
MCV RBC AUTO: 83.9 FL (ref 80–100)
MONOCYTES ABSOLUTE: 1 K/UL (ref 0–1.3)
MONOCYTES RELATIVE PERCENT: 15.1 %
NEUTROPHILS ABSOLUTE: 4.4 K/UL (ref 1.7–7.7)
NEUTROPHILS RELATIVE PERCENT: 64.4 %
PDW BLD-RTO: 16.8 % (ref 12.4–15.4)
PLATELET # BLD: 494 K/UL (ref 135–450)
PMV BLD AUTO: 6 FL (ref 5–10.5)
POTASSIUM SERPL-SCNC: 3.9 MMOL/L (ref 3.5–5.1)
RBC # BLD: 3.14 M/UL (ref 4–5.2)
SODIUM BLD-SCNC: 127 MMOL/L (ref 136–145)
SODIUM BLD-SCNC: 128 MMOL/L (ref 136–145)
SODIUM BLD-SCNC: 129 MMOL/L (ref 136–145)
WBC # BLD: 6.9 K/UL (ref 4–11)

## 2018-09-29 PROCEDURE — 6370000000 HC RX 637 (ALT 250 FOR IP): Performed by: INTERNAL MEDICINE

## 2018-09-29 PROCEDURE — 6360000002 HC RX W HCPCS: Performed by: INTERNAL MEDICINE

## 2018-09-29 PROCEDURE — 99231 SBSQ HOSP IP/OBS SF/LOW 25: CPT | Performed by: SURGERY

## 2018-09-29 PROCEDURE — 1200000000 HC SEMI PRIVATE

## 2018-09-29 PROCEDURE — 36415 COLL VENOUS BLD VENIPUNCTURE: CPT

## 2018-09-29 PROCEDURE — 85025 COMPLETE CBC W/AUTO DIFF WBC: CPT

## 2018-09-29 PROCEDURE — 80048 BASIC METABOLIC PNL TOTAL CA: CPT

## 2018-09-29 PROCEDURE — 84295 ASSAY OF SERUM SODIUM: CPT

## 2018-09-29 PROCEDURE — 2580000003 HC RX 258: Performed by: INTERNAL MEDICINE

## 2018-09-29 PROCEDURE — 2500000003 HC RX 250 WO HCPCS: Performed by: INTERNAL MEDICINE

## 2018-09-29 RX ORDER — FUROSEMIDE 20 MG/1
20 TABLET ORAL 2 TIMES DAILY
Status: DISCONTINUED | OUTPATIENT
Start: 2018-09-29 | End: 2018-10-01 | Stop reason: HOSPADM

## 2018-09-29 RX ORDER — FUROSEMIDE 20 MG/1
20 TABLET ORAL ONCE
Status: COMPLETED | OUTPATIENT
Start: 2018-09-29 | End: 2018-09-29

## 2018-09-29 RX ADMIN — TAZOBACTAM SODIUM AND PIPERACILLIN SODIUM 3.38 G: 375; 3 INJECTION, SOLUTION INTRAVENOUS at 11:41

## 2018-09-29 RX ADMIN — Medication 400 MG: at 09:54

## 2018-09-29 RX ADMIN — Medication 10 ML: at 17:20

## 2018-09-29 RX ADMIN — POTASSIUM CHLORIDE 20 MEQ: 750 TABLET, FILM COATED, EXTENDED RELEASE ORAL at 09:55

## 2018-09-29 RX ADMIN — MESALAMINE 400 MG: 400 CAPSULE, DELAYED RELEASE ORAL at 15:46

## 2018-09-29 RX ADMIN — BACLOFEN 10 MG: 10 TABLET ORAL at 21:15

## 2018-09-29 RX ADMIN — Medication 10 ML: at 21:16

## 2018-09-29 RX ADMIN — POTASSIUM CHLORIDE 20 MEQ: 750 TABLET, FILM COATED, EXTENDED RELEASE ORAL at 21:15

## 2018-09-29 RX ADMIN — SODIUM CHLORIDE TAB 1 GM 1 G: 1 TAB at 09:55

## 2018-09-29 RX ADMIN — SODIUM CHLORIDE TAB 1 GM 1 G: 1 TAB at 13:05

## 2018-09-29 RX ADMIN — Medication 400 MG: at 21:15

## 2018-09-29 RX ADMIN — HYDROMORPHONE HYDROCHLORIDE 1 MG: 2 INJECTION INTRAMUSCULAR; INTRAVENOUS; SUBCUTANEOUS at 04:50

## 2018-09-29 RX ADMIN — FUROSEMIDE 20 MG: 20 TABLET ORAL at 11:41

## 2018-09-29 RX ADMIN — PANTOPRAZOLE SODIUM 40 MG: 40 TABLET, DELAYED RELEASE ORAL at 06:49

## 2018-09-29 RX ADMIN — HYDROMORPHONE HYDROCHLORIDE 1 MG: 2 INJECTION INTRAMUSCULAR; INTRAVENOUS; SUBCUTANEOUS at 17:19

## 2018-09-29 RX ADMIN — BACLOFEN 10 MG: 10 TABLET ORAL at 15:46

## 2018-09-29 RX ADMIN — MESALAMINE 400 MG: 400 CAPSULE, DELAYED RELEASE ORAL at 09:55

## 2018-09-29 RX ADMIN — BACLOFEN 10 MG: 10 TABLET ORAL at 09:55

## 2018-09-29 RX ADMIN — DULOXETINE HYDROCHLORIDE 60 MG: 30 CAPSULE, DELAYED RELEASE ORAL at 09:55

## 2018-09-29 RX ADMIN — FUROSEMIDE 20 MG: 20 TABLET ORAL at 17:19

## 2018-09-29 RX ADMIN — Medication 10 ML: at 09:54

## 2018-09-29 RX ADMIN — BUSPIRONE HYDROCHLORIDE 15 MG: 5 TABLET ORAL at 09:55

## 2018-09-29 RX ADMIN — TAZOBACTAM SODIUM AND PIPERACILLIN SODIUM 3.38 G: 375; 3 INJECTION, SOLUTION INTRAVENOUS at 03:05

## 2018-09-29 RX ADMIN — LORAZEPAM 0.5 MG: 2 INJECTION INTRAMUSCULAR; INTRAVENOUS at 23:39

## 2018-09-29 RX ADMIN — MESALAMINE 400 MG: 400 CAPSULE, DELAYED RELEASE ORAL at 21:18

## 2018-09-29 RX ADMIN — ATENOLOL 100 MG: 50 TABLET ORAL at 09:55

## 2018-09-29 RX ADMIN — HYDROMORPHONE HYDROCHLORIDE 1 MG: 2 INJECTION INTRAMUSCULAR; INTRAVENOUS; SUBCUTANEOUS at 23:38

## 2018-09-29 RX ADMIN — BUSPIRONE HYDROCHLORIDE 15 MG: 5 TABLET ORAL at 21:15

## 2018-09-29 RX ADMIN — SODIUM CHLORIDE TAB 1 GM 1 G: 1 TAB at 17:20

## 2018-09-29 RX ADMIN — TAZOBACTAM SODIUM AND PIPERACILLIN SODIUM 3.38 G: 375; 3 INJECTION, SOLUTION INTRAVENOUS at 19:19

## 2018-09-29 ASSESSMENT — PAIN SCALES - GENERAL
PAINLEVEL_OUTOF10: 9
PAINLEVEL_OUTOF10: 10
PAINLEVEL_OUTOF10: 8
PAINLEVEL_OUTOF10: 8
PAINLEVEL_OUTOF10: 9
PAINLEVEL_OUTOF10: 8

## 2018-09-29 ASSESSMENT — PAIN DESCRIPTION - ORIENTATION
ORIENTATION: LEFT;LOWER
ORIENTATION: LEFT;LOWER
ORIENTATION: LOWER

## 2018-09-29 ASSESSMENT — PAIN DESCRIPTION - LOCATION
LOCATION: ABDOMEN

## 2018-09-29 NOTE — PROGRESS NOTES
[facility] GI  Gastroenterology Progress Note  Thuy Andrade is a 59 y.o. female patient. 1. Perforation and abscess of large intestine concurrent with and due to diverticulitis    2. Fistula    3. Severe sepsis ([address]    4. Hyponatremia        SUBJECTIVE:  Pain continued improved, less diarrhea. Physical    VITALS:  BP (!) 135/91   Pulse 91   Temp 99.1 °F (37.3 °C) (Oral)   Resp 18   Ht 5' 6\" (1.676 m)   Wt 217 lb 6.4 oz (98.6 kg)   SpO2 100%   BMI 35.09 kg/m²   TEMPERATURE:  Current - Temp: 99.1 °F (37.3 °C); Max - Temp  Av °F (36.7 °C)  Min: 97.3 °F (36.3 °C)  Max: 99.1 °F (37.3 °C)    Abdomen soft, ND, minimal tender, Bowel sounds normal   rrr   Lungs clear anteriorly    Data      Recent Labs      18   0536   18   2049  18   0603  18   0723   WBC  8.4   --    --   7.9  6.9   HGB  6.9*   --   9.2*  8.8*  8.5*   HCT  21.2*   < >  28.9*  27.0*  26.4*   MCV  84.0   --    --   85.6  83.9   PLT  512*   --    --   439  494*    < > = values in this interval not displayed. Recent Labs      18   0845   18   0603   18   1854  18   0053  18   0722   NA  132*   < >  127*   < >  132*  129*  128*   K  3.0*   --   3.8   --    --    --   3.9   CL  97*   --   95*   --    --    --   95*   CO2  20*   --   19*   --    --    --   26   BUN  5*   --   3*   --    --    --   3*   CREATININE  0.6   --   <0.5*   --    --    --   <0.5*    < > = values in this interval not displayed. No results for input(s): AST, ALT, ALB, BILIDIR, BILITOT, ALKPHOS in the last 72 hours. No results for input(s): LIPASE, AMYLASE in the last 72 hours. ASSESSMENT   1. Crohn's diease of the left colon with abscess and fistula. Diverticula in the area so ? Diverticulitis but this may be all crohn's related. Continues clinically Improving with abx. PLAN    1. Iv abx  2. Plan elective delayed surgery per Dr. Lula Gregg. 3. Continue Hold steroids and Humira for now. Charanjit Burgess MD  [id number] [facility]

## 2018-09-29 NOTE — PROGRESS NOTES
[facility] and Laparoscopic Surgery        Progress Note    Patient Name: Erlin Yeh  MRN: [id number]  YOB: 1955  Date of Evaluation: 2018    Chief Complaint: Abdominal pain    Subjective:  No acute events overnight  Pain continues to improve  Tolerating full liquids without nausea  Passing stool    Vital Signs:  Patient Vitals for the past 24 hrs:   BP Temp Temp src Pulse Resp SpO2 Weight   18 0450 (!) 135/91 99.1 °F (37.3 °C) Oral 91 18 100 % 217 lb 6.4 oz (98.6 kg)   18 2213 (!) 136/97 97.9 °F (36.6 °C) Oral 80 18 91 % -   18 1715 (!) 144/103 97.3 °F (36.3 °C) Axillary 67 16 96 % -   18 1412 (!) 150/91 98.5 °F (36.9 °C) Oral 57 16 98 % -      TEMPERATURE HISTORY 24H: Temp (24hrs), Av.2 °F (36.8 °C), Min:97.3 °F (36.3 °C), Max:99.1 °F (37.3 °C)    BLOOD PRESSURE HISTORY: Systolic (80CLS), HGN:794 , Min:124 , QOC:324    Diastolic (71HCH), WIX:91, Min:82, Max:103      Intake/Output:  I/O last 3 completed shifts: In: 960 [P.O.:960]  Out: -   No intake/output data recorded. Drain/tube Output:       Physical Exam:  General: awake, alert, oriented to  person, place, time  Abdomen: soft, minimal left lower quadrant tenderness without peritonitis, non-distended  SKIN:  no bruising or bleeding and normal skin color, texture, turgor      Labs:  CBC:    Recent Labs      18   0536   18   2049  18   0603  18   0723   WBC  8.4   --    --   7.9  6.9   HGB  6.9*   --   9.2*  8.8*  8.5*   HCT  21.2*   < >  28.9*  27.0*  26.4*   PLT  512*   --    --   439  494*    < > = values in this interval not displayed.      BMP:    Recent Labs      18   0845   18   0603   18   1854  18   0053  18   0722   NA  132*   < >  127*   < >  132*  129*  128*   K  3.0*   --   3.8   --    --    --   3.9   CL  97*   --   95*   --    --    --   95*   CO2  20*   --   19*   --    --    --   26   BUN  5*   --   3*   --    --    --

## 2018-09-29 NOTE — PROGRESS NOTES
hypertension. Plan:       Sodium levels are still low129. Lites today multifactorial.  May also have SIADH-like picture. Continue fluid restriction at current rate. Continue salt tablets 1 g 3 times a day. Add Lasix 20 mg by mouth twice daily. Reduce sodium check every 12 hours. Aim for slow correction. Medications reviewed. All nephrotoxic medications have been discontinued. Discussed with patient. Discussed with RN. Not Ready for discharge          EXAM  Vitals:    09/29/18 0946   BP: 123/86   Pulse: 74   Resp: 18   Temp: 97.9 °F (36.6 °C)   SpO2: 95%       Intake/Output Summary (Last 24 hours) at 09/29/18 1138  Last data filed at 09/29/18 1030   Gross per 24 hour   Intake             1320 ml   Output                0 ml   Net             1320 ml       Not ill appearing. No respiratory distress  Awake alert   no hypotension  External exam of the ears and nose are normal  HENT: exam is normal  Eyes: Pupils are equal, round, and reactive to light. Lymph Nodes. No axillary or cervical lymph nodes are palpable. Neck. JVD not visible. No lymph nodes palpable. CVS.  Heart sounds are normal.Palpation of the heart is normal. No murmurs. No pericardial rub.  RS.dullness on percussion of the lower chest wall. Lung fields are clear   PA soft , bowel sounds are normal no distension and no tenderness to palpation. Skin No rash , No palpable nodules  Musculoskeletal: Normal range of motion. 1+ edema and no tenderness. CNS  No focal    Edematrace  More awake and alert. All pulses are well felt      Principal Problem:    Diverticulitis  Active Problems:    Essential hypertension, benign    Hyponatremia    Crohn's colitis ([address]    Perforation bowel ([address]  Resolved Problems:    * No resolved hospital problems.  *        Medications Reviewed by me   furosemide  20 mg Oral BID    furosemide  20 mg Oral Once    sodium chloride  1 g Oral TID     busPIRone  15 mg Oral BID    baclofen  10 mg 15.4 07/04/2018    PROTIME 13.2 06/26/2018    INR 1.33 09/26/2018    INR 1.35 07/04/2018    INR 1.16 06/26/2018     PTT:    Lab Results   Component Value Date    APTT 28.7 10/06/2017    APTT 25.8 08/02/2017    APTT 23.7 05/17/2017     MARION:  Lab Results   Component Value Date    MARION Negative 07/03/2018     CHEMISTRY COMMON GROUP : Lab Results   Component Value Date    GLUCOSE 106 09/29/2018     Recent Labs      09/27/18   0845  09/28/18   0603  09/29/18   0722   GLUCOSE  100*  78  106*   CALCIUM  8.5  8.8  8.1*         RADIOLOGY:        Imaging Results. Chest X Ray reviwed by me    Chest Xray Reviewed by me  Renal Ultrasound Reviewed by me    EKG reviewed by me.                 Electronically Signed: Pan Stubbs MD 9/29/2018 11:38 AM

## 2018-09-30 PROBLEM — D62 ANEMIA ASSOCIATED WITH ACUTE BLOOD LOSS: Status: RESOLVED | Noted: 2017-08-03 | Resolved: 2018-09-30

## 2018-09-30 PROBLEM — K50.10 CROHN'S COLITIS (HCC): Status: RESOLVED | Noted: 2018-02-05 | Resolved: 2018-09-30

## 2018-09-30 PROBLEM — K52.9 COLITIS: Status: RESOLVED | Noted: 2018-05-20 | Resolved: 2018-09-30

## 2018-09-30 PROBLEM — D64.9 ANEMIA: Status: RESOLVED | Noted: 2018-01-05 | Resolved: 2018-09-30

## 2018-09-30 PROBLEM — K92.2 GIB (GASTROINTESTINAL BLEEDING): Status: RESOLVED | Noted: 2017-08-02 | Resolved: 2018-09-30

## 2018-09-30 PROBLEM — G47.00 INSOMNIA: Status: RESOLVED | Noted: 2017-08-02 | Resolved: 2018-09-30

## 2018-09-30 PROBLEM — L02.211 ABDOMINAL WALL ABSCESS: Status: RESOLVED | Noted: 2018-09-30 | Resolved: 2018-09-30

## 2018-09-30 PROBLEM — R55 SYNCOPE AND COLLAPSE: Status: RESOLVED | Noted: 2018-06-26 | Resolved: 2018-09-30

## 2018-09-30 PROBLEM — K56.609 SMALL BOWEL OBSTRUCTION (HCC): Status: RESOLVED | Noted: 2018-06-05 | Resolved: 2018-09-30

## 2018-09-30 PROBLEM — L02.211 ABDOMINAL WALL ABSCESS: Status: ACTIVE | Noted: 2018-09-30

## 2018-09-30 PROBLEM — A49.02 MRSA (METHICILLIN RESISTANT STAPHYLOCOCCUS AUREUS) INFECTION: Status: RESOLVED | Noted: 2018-06-08 | Resolved: 2018-09-30

## 2018-09-30 PROBLEM — R07.9 CHEST PAIN: Status: RESOLVED | Noted: 2018-02-05 | Resolved: 2018-09-30

## 2018-09-30 PROBLEM — K65.1 INTRA-ABDOMINAL ABSCESS (HCC): Status: ACTIVE | Noted: 2018-09-30

## 2018-09-30 LAB
ANION GAP SERPL CALCULATED.3IONS-SCNC: 9 MMOL/L (ref 3–16)
BASOPHILS ABSOLUTE: 0.1 K/UL (ref 0–0.2)
BASOPHILS RELATIVE PERCENT: 0.8 %
BUN BLDV-MCNC: <2 MG/DL (ref 7–20)
CALCIUM SERPL-MCNC: 8.9 MG/DL (ref 8.3–10.6)
CHLORIDE BLD-SCNC: 97 MMOL/L (ref 99–110)
CO2: 26 MMOL/L (ref 21–32)
CREAT SERPL-MCNC: <0.5 MG/DL (ref 0.6–1.2)
EOSINOPHILS ABSOLUTE: 0.1 K/UL (ref 0–0.6)
EOSINOPHILS RELATIVE PERCENT: 1 %
GFR AFRICAN AMERICAN: >60
GFR NON-AFRICAN AMERICAN: >60
GLUCOSE BLD-MCNC: 96 MG/DL (ref 70–99)
HCT VFR BLD CALC: 26.5 % (ref 36–48)
HEMOGLOBIN: 8.5 G/DL (ref 12–16)
LYMPHOCYTES ABSOLUTE: 1.8 K/UL (ref 1–5.1)
LYMPHOCYTES RELATIVE PERCENT: 23 %
MCH RBC QN AUTO: 27.4 PG (ref 26–34)
MCHC RBC AUTO-ENTMCNC: 32.2 G/DL (ref 31–36)
MCV RBC AUTO: 85 FL (ref 80–100)
MONOCYTES ABSOLUTE: 1.1 K/UL (ref 0–1.3)
MONOCYTES RELATIVE PERCENT: 13.4 %
NEUTROPHILS ABSOLUTE: 4.9 K/UL (ref 1.7–7.7)
NEUTROPHILS RELATIVE PERCENT: 61.8 %
OVA AND PARASITE TRICHROME: NEGATIVE
OVA AND PARASITE WET MOUNT: NEGATIVE
PDW BLD-RTO: 16.5 % (ref 12.4–15.4)
PLATELET # BLD: 463 K/UL (ref 135–450)
PMV BLD AUTO: 6.2 FL (ref 5–10.5)
POTASSIUM SERPL-SCNC: 3.9 MMOL/L (ref 3.5–5.1)
RBC # BLD: 3.11 M/UL (ref 4–5.2)
SODIUM BLD-SCNC: 132 MMOL/L (ref 136–145)
SODIUM BLD-SCNC: 132 MMOL/L (ref 136–145)
WBC # BLD: 8 K/UL (ref 4–11)

## 2018-09-30 PROCEDURE — 84295 ASSAY OF SERUM SODIUM: CPT

## 2018-09-30 PROCEDURE — 1200000000 HC SEMI PRIVATE

## 2018-09-30 PROCEDURE — 2580000003 HC RX 258: Performed by: INTERNAL MEDICINE

## 2018-09-30 PROCEDURE — 6370000000 HC RX 637 (ALT 250 FOR IP): Performed by: INTERNAL MEDICINE

## 2018-09-30 PROCEDURE — 85025 COMPLETE CBC W/AUTO DIFF WBC: CPT

## 2018-09-30 PROCEDURE — 99231 SBSQ HOSP IP/OBS SF/LOW 25: CPT | Performed by: SURGERY

## 2018-09-30 PROCEDURE — 36415 COLL VENOUS BLD VENIPUNCTURE: CPT

## 2018-09-30 PROCEDURE — 2500000003 HC RX 250 WO HCPCS: Performed by: INTERNAL MEDICINE

## 2018-09-30 PROCEDURE — 80048 BASIC METABOLIC PNL TOTAL CA: CPT

## 2018-09-30 PROCEDURE — 6360000002 HC RX W HCPCS: Performed by: INTERNAL MEDICINE

## 2018-09-30 RX ORDER — NICOTINE 21 MG/24HR
1 PATCH, TRANSDERMAL 24 HOURS TRANSDERMAL EVERY 24 HOURS
Status: DISCONTINUED | OUTPATIENT
Start: 2018-09-30 | End: 2018-10-01 | Stop reason: HOSPADM

## 2018-09-30 RX ADMIN — BUSPIRONE HYDROCHLORIDE 15 MG: 5 TABLET ORAL at 13:07

## 2018-09-30 RX ADMIN — TAZOBACTAM SODIUM AND PIPERACILLIN SODIUM 3.38 G: 375; 3 INJECTION, SOLUTION INTRAVENOUS at 18:23

## 2018-09-30 RX ADMIN — TAZOBACTAM SODIUM AND PIPERACILLIN SODIUM 3.38 G: 375; 3 INJECTION, SOLUTION INTRAVENOUS at 13:06

## 2018-09-30 RX ADMIN — MESALAMINE 400 MG: 400 CAPSULE, DELAYED RELEASE ORAL at 22:16

## 2018-09-30 RX ADMIN — Medication 400 MG: at 13:07

## 2018-09-30 RX ADMIN — PANTOPRAZOLE SODIUM 40 MG: 40 TABLET, DELAYED RELEASE ORAL at 06:29

## 2018-09-30 RX ADMIN — POTASSIUM CHLORIDE 20 MEQ: 750 TABLET, FILM COATED, EXTENDED RELEASE ORAL at 13:06

## 2018-09-30 RX ADMIN — FUROSEMIDE 20 MG: 20 TABLET ORAL at 13:07

## 2018-09-30 RX ADMIN — SODIUM CHLORIDE TAB 1 GM 1 G: 1 TAB at 18:23

## 2018-09-30 RX ADMIN — Medication 10 ML: at 13:15

## 2018-09-30 RX ADMIN — BACLOFEN 10 MG: 10 TABLET ORAL at 13:07

## 2018-09-30 RX ADMIN — TAZOBACTAM SODIUM AND PIPERACILLIN SODIUM 3.38 G: 375; 3 INJECTION, SOLUTION INTRAVENOUS at 03:15

## 2018-09-30 RX ADMIN — HYDROMORPHONE HYDROCHLORIDE 1 MG: 2 INJECTION INTRAMUSCULAR; INTRAVENOUS; SUBCUTANEOUS at 13:03

## 2018-09-30 RX ADMIN — BUSPIRONE HYDROCHLORIDE 15 MG: 5 TABLET ORAL at 22:15

## 2018-09-30 RX ADMIN — DULOXETINE HYDROCHLORIDE 60 MG: 30 CAPSULE, DELAYED RELEASE ORAL at 13:07

## 2018-09-30 RX ADMIN — SODIUM CHLORIDE TAB 1 GM 1 G: 1 TAB at 13:44

## 2018-09-30 RX ADMIN — POTASSIUM CHLORIDE 20 MEQ: 750 TABLET, FILM COATED, EXTENDED RELEASE ORAL at 22:16

## 2018-09-30 RX ADMIN — Medication 400 MG: at 22:16

## 2018-09-30 RX ADMIN — FUROSEMIDE 20 MG: 20 TABLET ORAL at 18:23

## 2018-09-30 RX ADMIN — MESALAMINE 400 MG: 400 CAPSULE, DELAYED RELEASE ORAL at 13:07

## 2018-09-30 RX ADMIN — BACLOFEN 10 MG: 10 TABLET ORAL at 22:15

## 2018-09-30 RX ADMIN — ATENOLOL 100 MG: 50 TABLET ORAL at 13:07

## 2018-09-30 RX ADMIN — Medication 10 ML: at 22:16

## 2018-09-30 ASSESSMENT — PAIN DESCRIPTION - PAIN TYPE: TYPE: ACUTE PAIN

## 2018-09-30 ASSESSMENT — PAIN SCALES - GENERAL
PAINLEVEL_OUTOF10: 10
PAINLEVEL_OUTOF10: 10
PAINLEVEL_OUTOF10: 4
PAINLEVEL_OUTOF10: 4

## 2018-09-30 ASSESSMENT — PAIN DESCRIPTION - FREQUENCY: FREQUENCY: CONTINUOUS

## 2018-09-30 ASSESSMENT — PAIN DESCRIPTION - LOCATION: LOCATION: ABDOMEN;HEAD

## 2018-09-30 ASSESSMENT — PAIN DESCRIPTION - ORIENTATION: ORIENTATION: LOWER;LEFT

## 2018-09-30 NOTE — PROGRESS NOTES
[facility]        Progress Note    Patient Name: Khanh Heredia  MRN: [id number]  YOB: 1955  Date of Evaluation: 2018    Chief Complaint: Abdominal pain    Subjective:  No acute events overnight  Pain continues to improve  Tolerating diet without nausea  Passing stool  Primary complaint is a sore throat    Vital Signs:  Patient Vitals for the past 24 hrs:   BP Temp Temp src Pulse Resp SpO2   18 0538 121/82 98.3 °F (36.8 °C) Oral 60 16 95 %   18 0130 126/68 99.1 °F (37.3 °C) - 90 18 96 %   18 2112 129/63 99 °F (37.2 °C) Oral 91 18 97 %   18 1718 (!) 135/90 98 °F (36.7 °C) Oral 67 18 99 %   18 1305 126/84 97.7 °F (36.5 °C) Oral 59 18 94 %   18 0946 123/86 97.9 °F (36.6 °C) Oral 74 18 95 %      TEMPERATURE HISTORY 24H: Temp (24hrs), Av.3 °F (36.8 °C), Min:97.7 °F (36.5 °C), Max:99.1 °F (37.3 °C)    BLOOD PRESSURE HISTORY: Systolic (90BET), TEJAS:949 , Min:121 , QHV:903    Diastolic (21HMM), JQU:12, Min:63, Max:97      Intake/Output:  I/O last 3 completed shifts: In: 360 [P.O.:360]  Out: 900 [Urine:900]  No intake/output data recorded.   Drain/tube Output:       Physical Exam:  General: awake, alert, oriented to  person, place, time  Abdomen: soft, minimal if any left lower quadrant tenderness without peritonitis, non-distended  SKIN:  no bruising or bleeding and normal skin color, texture, turgor      Labs:  CBC:    Recent Labs      18   0603  18   0723  18   0659   WBC  7.9  6.9  8.0   HGB  8.8*  8.5*  8.5*   HCT  27.0*  26.4*  26.5*   PLT  439  494*  463*     BMP:    Recent Labs      18   0603   18   0722  18   19418   0659   NA  127*   < >  128*  127*  132*   K  3.8   --   3.9   --   3.9   CL  95*   --   95*   --   97*   CO2  19*   --   26   --   26   BUN  3*   --   3*   --   <2*   CREATININE  <0.5*   --   <0.5*   --   <0.5*   GLUCOSE  78   --   106*   --   96    < > HISTORY: LLQ pain, fever TECHNOLOGIST PROVIDED HISTORY: If patient is on cardiac monitor and/or pulse ox, they may be taken off cardiac monitor and pulse ox, left on O2 if currently on. All monitors reattached when patient returns to room. Additional Contrast?->None Ordering Physician Provided Reason for Exam: abdominal pain Acuity: Unknown Type of Exam: Unknown Relevant Medical/Surgical History: (LLQ abdominal pain for weeks, now with fever and worsening pain. hx chrons. + dizziness) FINDINGS: ABDOMEN Liver: Unchanged too small to characterize hypodensity at the right hepatic dome and along the falciform ligament, the latter of which has fluctuated in size and probably represents focal fatty infiltration. Gallbladder: Normal. Biliary: No intra or extrahepatic bile duct dilatation. Pancreas: Normal. Spleen: Surgically absent. Adrenals: Normal. Kidneys: Unchanged exophytic right renal simple cyst at the inferior pole. Additional too small to characterize renal hypodensities are unchanged. No hydronephrosis or nephrolithiasis. GI Tract: Large hiatus hernia with the majority of the stomach intrathoracic in position. No gastric outlet obstruction. Appendix not discretely identified however there are no pericecal inflammatory changes. Colonic diverticulosis with a long segment of severe diverticulitis along the distal descending and proximal sigmoid colon. Severe surrounding inflammatory stranding with extraluminal gas containing collection with faint peripheral enhancement measuring approximately 2.6 x 1.7 x 3.6 cm. This area of inflammation seems to bridge the proximal sigmoid colon and sigmoid rectum, suspicious for fistula a surgical clip is present along the distal margin. Peritoneum/Retroperitoneum: Scattered reactive lymph nodes about the mesentery. Unchanged omental stranding. Vascular: Normal caliber abdominal aorta. Patent hepatic, portal, superior mesenteric and splenic veins.  PELVIS Genitourinary: times per day    piperacillin-tazobactam  3.375 g Intravenous Q8H     Continuous Infusions:  PRN Meds:. HYDROmorphone, sodium chloride flush, potassium chloride **OR** potassium bicarb-citric acid **OR** potassium chloride, ondansetron, ondansetron, LORazepam      Assessment:  59 y.o. female admitted with   1. Perforation and abscess of large intestine concurrent with and due to diverticulitis    2. Fistula    3. Severe sepsis ([address]    4. Hyponatremia        Sigmoid diverticulitis perforation with contained abscess, with possible fistula to the proximal sigmoid colon to the rectosigmoid junction or possibly the vaginal stump   History of Crohn's disease  Hyponatremia      Plan:  1. No plans for immediate surgical intervention; ideally resected diseased portion of colon electively after recovery from period of acute inflammation, showing improvement with conservative therapy  2. Tolerating diet  3. Antibiotics, switch to PO on discharge  4. Activity as tolerated, ambulate TID, up to chair for all meals  5. Pulmonary toilet, incentive spirometry  6. Pain controlled  7. May discharge from surgery standpoint and follow with Dr. Dima Costa in 2 weeks to discuss elective resection     Miguel Angel Haynes MD, FACS  9/30/2018  8:12 AM

## 2018-09-30 NOTE — PROGRESS NOTES
Department of Internal Medicine  General Internal Medicine   Progress Note      SUBJECTIVE: feeling better , PO intake improved , no emesis     History obtained from chart review and the patient  General ROS: positive for  - fatigue and malaise  negative for - chills, fever or night sweats  Psychological ROS: negative  Ophthalmic ROS: negative  Respiratory ROS: no cough, shortness of breath, or wheezing  Cardiovascular ROS: no chest pain or dyspnea on exertion  Gastrointestinal ROS: positive for - abdominal pain, appetite loss, constipation and nausea/vomiting  negative for - hematemesis, melena or swallowing difficulty/pain  Genito-Urinary ROS: some  dysuria,  No trouble voiding, or hematuria  Musculoskeletal ROS: chronic pain   Neurological ROS: no TIA or stroke symptoms    OBJECTIVE      Medications      Current Facility-Administered Medications: furosemide (LASIX) tablet 20 mg, 20 mg, Oral, BID  sodium chloride tablet 1 g, 1 g, Oral, TID WC  busPIRone (BUSPAR) tablet 15 mg, 15 mg, Oral, BID  baclofen (LIORESAL) tablet 10 mg, 10 mg, Oral, TID  DULoxetine (CYMBALTA) extended release capsule 60 mg, 60 mg, Oral, Daily  pantoprazole (PROTONIX) tablet 40 mg, 40 mg, Oral, QAM AC  mesalamine (DELZICOL) delayed release capsule 400 mg, 400 mg, Oral, TID  magnesium oxide (MAG-OX) tablet 400 mg, 400 mg, Oral, BID  potassium chloride (KLOR-CON) extended release tablet 20 mEq, 20 mEq, Oral, BID  atenolol (TENORMIN) tablet 100 mg, 100 mg, Oral, Daily  HYDROmorphone (DILAUDID) injection 1 mg, 1 mg, Intravenous, Q4H PRN  sodium chloride flush 0.9 % injection 10 mL, 10 mL, Intravenous, 2 times per day  sodium chloride flush 0.9 % injection 10 mL, 10 mL, Intravenous, PRN  potassium chloride (KLOR-CON M) extended release tablet 40 mEq, 40 mEq, Oral, PRN **OR** potassium bicarb-citric acid (EFFER-K) effervescent tablet 40 mEq, 40 mEq, Oral, PRN **OR** potassium chloride 10 mEq/100 mL IVPB (Peripheral Line), 10 mEq, Intravenous,

## 2018-10-01 VITALS
SYSTOLIC BLOOD PRESSURE: 121 MMHG | BODY MASS INDEX: 34.94 KG/M2 | RESPIRATION RATE: 18 BRPM | TEMPERATURE: 98.3 F | OXYGEN SATURATION: 97 % | HEIGHT: 66 IN | DIASTOLIC BLOOD PRESSURE: 82 MMHG | WEIGHT: 217.4 LBS | HEART RATE: 74 BPM

## 2018-10-01 PROBLEM — A41.9 SEPSIS (HCC): Status: ACTIVE | Noted: 2018-10-01

## 2018-10-01 LAB
BLOOD CULTURE, ROUTINE: NORMAL
CULTURE, BLOOD 2: NORMAL
SODIUM BLD-SCNC: 131 MMOL/L (ref 136–145)

## 2018-10-01 PROCEDURE — 6370000000 HC RX 637 (ALT 250 FOR IP): Performed by: INTERNAL MEDICINE

## 2018-10-01 PROCEDURE — 99232 SBSQ HOSP IP/OBS MODERATE 35: CPT | Performed by: SURGERY

## 2018-10-01 PROCEDURE — 2500000003 HC RX 250 WO HCPCS: Performed by: INTERNAL MEDICINE

## 2018-10-01 PROCEDURE — APPNB30 APP NON BILLABLE TIME 0-30 MINS: Performed by: NURSE PRACTITIONER

## 2018-10-01 PROCEDURE — APPSS15 APP SPLIT SHARED TIME 0-15 MINUTES: Performed by: NURSE PRACTITIONER

## 2018-10-01 PROCEDURE — 6360000002 HC RX W HCPCS: Performed by: INTERNAL MEDICINE

## 2018-10-01 PROCEDURE — 84295 ASSAY OF SERUM SODIUM: CPT

## 2018-10-01 PROCEDURE — 36415 COLL VENOUS BLD VENIPUNCTURE: CPT

## 2018-10-01 PROCEDURE — 2580000003 HC RX 258: Performed by: INTERNAL MEDICINE

## 2018-10-01 RX ORDER — HYDROCODONE BITARTRATE AND ACETAMINOPHEN 5; 325 MG/1; MG/1
1 TABLET ORAL EVERY 6 HOURS PRN
Qty: 20 TABLET | Refills: 0 | Status: SHIPPED | OUTPATIENT
Start: 2018-10-01 | End: 2018-10-06

## 2018-10-01 RX ORDER — AMOXICILLIN AND CLAVULANATE POTASSIUM 875; 125 MG/1; MG/1
1 TABLET, FILM COATED ORAL 2 TIMES DAILY
Qty: 14 TABLET | Refills: 0 | Status: ON HOLD | OUTPATIENT
Start: 2018-10-01 | End: 2018-10-22

## 2018-10-01 RX ORDER — SODIUM CHLORIDE 1000 MG
1 TABLET, SOLUBLE MISCELLANEOUS 2 TIMES DAILY
Qty: 120 TABLET | Refills: 3
Start: 2018-10-01 | End: 2019-06-05 | Stop reason: ALTCHOICE

## 2018-10-01 RX ADMIN — DULOXETINE HYDROCHLORIDE 60 MG: 30 CAPSULE, DELAYED RELEASE ORAL at 08:26

## 2018-10-01 RX ADMIN — PANTOPRAZOLE SODIUM 40 MG: 40 TABLET, DELAYED RELEASE ORAL at 06:42

## 2018-10-01 RX ADMIN — LORAZEPAM 0.5 MG: 2 INJECTION INTRAMUSCULAR; INTRAVENOUS at 01:24

## 2018-10-01 RX ADMIN — ATENOLOL 100 MG: 50 TABLET ORAL at 08:25

## 2018-10-01 RX ADMIN — Medication 400 MG: at 08:26

## 2018-10-01 RX ADMIN — BUSPIRONE HYDROCHLORIDE 15 MG: 5 TABLET ORAL at 08:24

## 2018-10-01 RX ADMIN — POTASSIUM CHLORIDE 20 MEQ: 750 TABLET, FILM COATED, EXTENDED RELEASE ORAL at 08:25

## 2018-10-01 RX ADMIN — HYDROMORPHONE HYDROCHLORIDE 1 MG: 2 INJECTION INTRAMUSCULAR; INTRAVENOUS; SUBCUTANEOUS at 06:47

## 2018-10-01 RX ADMIN — BACLOFEN 10 MG: 10 TABLET ORAL at 08:26

## 2018-10-01 RX ADMIN — FUROSEMIDE 20 MG: 20 TABLET ORAL at 08:26

## 2018-10-01 RX ADMIN — TAZOBACTAM SODIUM AND PIPERACILLIN SODIUM 3.38 G: 375; 3 INJECTION, SOLUTION INTRAVENOUS at 03:06

## 2018-10-01 RX ADMIN — TAZOBACTAM SODIUM AND PIPERACILLIN SODIUM 3.38 G: 375; 3 INJECTION, SOLUTION INTRAVENOUS at 11:21

## 2018-10-01 RX ADMIN — MESALAMINE 400 MG: 400 CAPSULE, DELAYED RELEASE ORAL at 08:26

## 2018-10-01 RX ADMIN — Medication 10 ML: at 08:26

## 2018-10-01 RX ADMIN — SODIUM CHLORIDE TAB 1 GM 1 G: 1 TAB at 08:25

## 2018-10-01 RX ADMIN — HYDROMORPHONE HYDROCHLORIDE 1 MG: 2 INJECTION INTRAMUSCULAR; INTRAVENOUS; SUBCUTANEOUS at 01:19

## 2018-10-01 ASSESSMENT — PAIN SCALES - GENERAL
PAINLEVEL_OUTOF10: 9
PAINLEVEL_OUTOF10: 10

## 2018-10-01 NOTE — PROGRESS NOTES
Department of Internal Medicine  General Internal Medicine   Progress Note      SUBJECTIVE: feeling better ,  Denies fever , does have a Nicotine urge for being a life long smoker     History obtained from chart review and the patient  General ROS: positive for  - fatigue and malaise  negative for - chills, fever or night sweats  Psychological ROS: negative  Ophthalmic ROS: negative  Respiratory ROS: no cough, shortness of breath, or wheezing  Cardiovascular ROS: no chest pain or dyspnea on exertion  Gastrointestinal ROS: positive for - abdominal pain, appetite loss, constipation and nausea/vomiting  negative for - hematemesis, melena or swallowing difficulty/pain  Genito-Urinary ROS: some  dysuria,  No trouble voiding, or hematuria  Musculoskeletal ROS: chronic pain   Neurological ROS: no TIA or stroke symptoms    OBJECTIVE      Medications      Current Facility-Administered Medications: nicotine (NICODERM CQ) 21 MG/24HR 1 patch, 1 patch, Transdermal, Q24H  furosemide (LASIX) tablet 20 mg, 20 mg, Oral, BID  sodium chloride tablet 1 g, 1 g, Oral, TID WC  busPIRone (BUSPAR) tablet 15 mg, 15 mg, Oral, BID  baclofen (LIORESAL) tablet 10 mg, 10 mg, Oral, TID  DULoxetine (CYMBALTA) extended release capsule 60 mg, 60 mg, Oral, Daily  pantoprazole (PROTONIX) tablet 40 mg, 40 mg, Oral, QAM AC  mesalamine (DELZICOL) delayed release capsule 400 mg, 400 mg, Oral, TID  magnesium oxide (MAG-OX) tablet 400 mg, 400 mg, Oral, BID  potassium chloride (KLOR-CON) extended release tablet 20 mEq, 20 mEq, Oral, BID  atenolol (TENORMIN) tablet 100 mg, 100 mg, Oral, Daily  HYDROmorphone (DILAUDID) injection 1 mg, 1 mg, Intravenous, Q4H PRN  sodium chloride flush 0.9 % injection 10 mL, 10 mL, Intravenous, 2 times per day  sodium chloride flush 0.9 % injection 10 mL, 10 mL, Intravenous, PRN  potassium chloride (KLOR-CON M) extended release tablet 40 mEq, 40 mEq, Oral, PRN **OR** potassium bicarb-citric acid (EFFER-K) effervescent tablet 40

## 2018-10-01 NOTE — PROGRESS NOTES
Patient has discharge order from Dr. Tammy Salcedo, Dr. Viola Martinez said okay for discharge.   Paged GI and Nephrology to see if they are ok with pt being discharged, waiting for response

## 2018-10-01 NOTE — PROGRESS NOTES
and Plan    Assessment:     Hyponatremiaslow to improvemoderate to severe  Possible SIADH. History of Crohn's disease. History of hypertension. Plan:       Sodium levels improving. Repeat sodium is 132    Continue on sodium tablets 1 g twice daily. Continue on Lasix 20 mg once daily. 1200 fluid restriction. Stable from renal for discharge. Will need weekly renal panel  Discussed with RN. EXAM  Vitals:    10/01/18 0818   BP: 121/82   Pulse: 74   Resp: 18   Temp: 98.3 °F (36.8 °C)   SpO2: 97%     No intake or output data in the 24 hours ending 10/01/18 1020      External exam of the ears and nose are normal  HENT: exam is normal  Eyes: Pupils are equal, round, and reactive to light. Lymph Nodes. No axillary or cervical lymph nodes are palpable. Neck. JVD not visible. No lymph nodes palpable. CVS.  Heart sounds are normal.Palpation of the heart is normal. No murmurs. No pericardial rub.  RS.dullness on percussion of the lower chest wall. Lung fields are clear   PA soft , bowel sounds are normal no distension and no tenderness to palpation. Skin No rash , No palpable nodules  Musculoskeletal: Normal range of motion. 1+ edema and no tenderness. CNS  No focal       Principal Problem:    Diverticulitis  Active Problems:    Essential hypertension, benign    Atherosclerotic PVD with intermittent claudication (HCC)    Anemia    Hyponatremia    Crohn's colitis ([address]    Hypotension    Perforation bowel (HCC)    Intra-abdominal abscess (HCC)    Sepsis ([address]  Resolved Problems:    * No resolved hospital problems.  *        Medications Reviewed by me   nicotine  1 patch Transdermal Q24H    furosemide  20 mg Oral BID    sodium chloride  1 g Oral TID WC    busPIRone  15 mg Oral BID    baclofen  10 mg Oral TID    DULoxetine  60 mg Oral Daily    pantoprazole  40 mg Oral QAM AC    mesalamine  400 mg Oral TID    magnesium oxide  400 mg Oral BID    potassium chloride  20 mEq Oral BID    5024   GLUCOSE  106*  96   CALCIUM  8.1*  8.9         RADIOLOGY:        Imaging Results. Chest X Ray reviwed by me    Chest Xray Reviewed by me  Renal Ultrasound Reviewed by me    EKG reviewed by me.                 Electronically Signed: Ayesha Coombs MD 10/1/2018 10:20 AM

## 2018-10-07 ENCOUNTER — HOSPITAL ENCOUNTER (INPATIENT)
Age: 63
LOS: 15 days | Discharge: NURSING, REHAB OR LONG-TERM CARE | DRG: 231 | End: 2018-10-22
Attending: EMERGENCY MEDICINE | Admitting: EMERGENCY MEDICINE
Payer: MEDICAID

## 2018-10-07 ENCOUNTER — APPOINTMENT (OUTPATIENT)
Dept: CT IMAGING | Age: 63
DRG: 231 | End: 2018-10-07
Payer: MEDICAID

## 2018-10-07 DIAGNOSIS — K63.1 PERFORATION BOWEL (HCC): ICD-10-CM

## 2018-10-07 DIAGNOSIS — I70.219 ATHEROSCLEROTIC PVD WITH INTERMITTENT CLAUDICATION (HCC): ICD-10-CM

## 2018-10-07 DIAGNOSIS — F51.01 PRIMARY INSOMNIA: ICD-10-CM

## 2018-10-07 DIAGNOSIS — K52.9 COLITIS: Primary | ICD-10-CM

## 2018-10-07 LAB
A/G RATIO: 0.7 (ref 1.1–2.2)
ALBUMIN SERPL-MCNC: 3 G/DL (ref 3.4–5)
ALP BLD-CCNC: 88 U/L (ref 40–129)
ALT SERPL-CCNC: <5 U/L (ref 10–40)
ANION GAP SERPL CALCULATED.3IONS-SCNC: 14 MMOL/L (ref 3–16)
AST SERPL-CCNC: 9 U/L (ref 15–37)
BASOPHILS ABSOLUTE: 0.1 K/UL (ref 0–0.2)
BASOPHILS RELATIVE PERCENT: 0.7 %
BILIRUB SERPL-MCNC: 0.3 MG/DL (ref 0–1)
BILIRUBIN URINE: NEGATIVE
BLOOD, URINE: NEGATIVE
BUN BLDV-MCNC: 6 MG/DL (ref 7–20)
C DIFFICILE TOXIN, EIA: NORMAL
CALCIUM SERPL-MCNC: 9.4 MG/DL (ref 8.3–10.6)
CHLORIDE BLD-SCNC: 97 MMOL/L (ref 99–110)
CLARITY: CLEAR
CO2: 23 MMOL/L (ref 21–32)
COLOR: YELLOW
CREAT SERPL-MCNC: 0.5 MG/DL (ref 0.6–1.2)
EKG ATRIAL RATE: 83 BPM
EKG DIAGNOSIS: NORMAL
EKG P AXIS: 110 DEGREES
EKG P-R INTERVAL: 132 MS
EKG Q-T INTERVAL: 370 MS
EKG QRS DURATION: 88 MS
EKG QTC CALCULATION (BAZETT): 434 MS
EKG R AXIS: 26 DEGREES
EKG T AXIS: 22 DEGREES
EKG VENTRICULAR RATE: 83 BPM
EOSINOPHILS ABSOLUTE: 0.2 K/UL (ref 0–0.6)
EOSINOPHILS RELATIVE PERCENT: 2.4 %
EPITHELIAL CELLS, UA: 3 /HPF (ref 0–5)
GFR AFRICAN AMERICAN: >60
GFR NON-AFRICAN AMERICAN: >60
GLOBULIN: 4.4 G/DL
GLUCOSE BLD-MCNC: 124 MG/DL (ref 70–99)
GLUCOSE URINE: NEGATIVE MG/DL
HCT VFR BLD CALC: 29.9 % (ref 36–48)
HEMOGLOBIN: 9.8 G/DL (ref 12–16)
HYALINE CASTS: 4 /LPF (ref 0–8)
KETONES, URINE: ABNORMAL MG/DL
LEUKOCYTE ESTERASE, URINE: ABNORMAL
LIPASE: 26 U/L (ref 13–60)
LYMPHOCYTES ABSOLUTE: 2.1 K/UL (ref 1–5.1)
LYMPHOCYTES RELATIVE PERCENT: 23.3 %
MCH RBC QN AUTO: 27.3 PG (ref 26–34)
MCHC RBC AUTO-ENTMCNC: 32.6 G/DL (ref 31–36)
MCV RBC AUTO: 83.6 FL (ref 80–100)
MICROSCOPIC EXAMINATION: YES
MONOCYTES ABSOLUTE: 0.4 K/UL (ref 0–1.3)
MONOCYTES RELATIVE PERCENT: 4.5 %
NEUTROPHILS ABSOLUTE: 6.1 K/UL (ref 1.7–7.7)
NEUTROPHILS RELATIVE PERCENT: 69.1 %
NITRITE, URINE: NEGATIVE
PDW BLD-RTO: 18 % (ref 12.4–15.4)
PH UA: 6.5
PLATELET # BLD: 562 K/UL (ref 135–450)
PMV BLD AUTO: 6.4 FL (ref 5–10.5)
POTASSIUM SERPL-SCNC: 3.3 MMOL/L (ref 3.5–5.1)
PROTEIN UA: NEGATIVE MG/DL
RBC # BLD: 3.58 M/UL (ref 4–5.2)
RBC UA: 4 /HPF (ref 0–4)
SODIUM BLD-SCNC: 134 MMOL/L (ref 136–145)
SPECIFIC GRAVITY UA: 1.02
TOTAL PROTEIN: 7.4 G/DL (ref 6.4–8.2)
TROPONIN: <0.01 NG/ML
URINE REFLEX TO CULTURE: YES
URINE TYPE: ABNORMAL
UROBILINOGEN, URINE: 0.2 E.U./DL
WBC # BLD: 8.9 K/UL (ref 4–11)
WBC UA: 5 /HPF (ref 0–5)

## 2018-10-07 PROCEDURE — 87324 CLOSTRIDIUM AG IA: CPT

## 2018-10-07 PROCEDURE — 96361 HYDRATE IV INFUSION ADD-ON: CPT

## 2018-10-07 PROCEDURE — 96374 THER/PROPH/DIAG INJ IV PUSH: CPT

## 2018-10-07 PROCEDURE — 99233 SBSQ HOSP IP/OBS HIGH 50: CPT | Performed by: SURGERY

## 2018-10-07 PROCEDURE — 6360000002 HC RX W HCPCS: Performed by: INTERNAL MEDICINE

## 2018-10-07 PROCEDURE — 99285 EMERGENCY DEPT VISIT HI MDM: CPT

## 2018-10-07 PROCEDURE — 96375 TX/PRO/DX INJ NEW DRUG ADDON: CPT

## 2018-10-07 PROCEDURE — 6360000004 HC RX CONTRAST MEDICATION: Performed by: EMERGENCY MEDICINE

## 2018-10-07 PROCEDURE — 2580000003 HC RX 258: Performed by: EMERGENCY MEDICINE

## 2018-10-07 PROCEDURE — 87040 BLOOD CULTURE FOR BACTERIA: CPT

## 2018-10-07 PROCEDURE — 96376 TX/PRO/DX INJ SAME DRUG ADON: CPT

## 2018-10-07 PROCEDURE — 87086 URINE CULTURE/COLONY COUNT: CPT

## 2018-10-07 PROCEDURE — 93005 ELECTROCARDIOGRAM TRACING: CPT | Performed by: EMERGENCY MEDICINE

## 2018-10-07 PROCEDURE — 84484 ASSAY OF TROPONIN QUANT: CPT

## 2018-10-07 PROCEDURE — 87449 NOS EACH ORGANISM AG IA: CPT

## 2018-10-07 PROCEDURE — 81001 URINALYSIS AUTO W/SCOPE: CPT

## 2018-10-07 PROCEDURE — 36415 COLL VENOUS BLD VENIPUNCTURE: CPT

## 2018-10-07 PROCEDURE — 83690 ASSAY OF LIPASE: CPT

## 2018-10-07 PROCEDURE — 74177 CT ABD & PELVIS W/CONTRAST: CPT

## 2018-10-07 PROCEDURE — 85025 COMPLETE CBC W/AUTO DIFF WBC: CPT

## 2018-10-07 PROCEDURE — 6360000002 HC RX W HCPCS: Performed by: EMERGENCY MEDICINE

## 2018-10-07 PROCEDURE — 2500000003 HC RX 250 WO HCPCS: Performed by: INTERNAL MEDICINE

## 2018-10-07 PROCEDURE — 1200000000 HC SEMI PRIVATE

## 2018-10-07 PROCEDURE — 2580000003 HC RX 258: Performed by: INTERNAL MEDICINE

## 2018-10-07 PROCEDURE — 80053 COMPREHEN METABOLIC PANEL: CPT

## 2018-10-07 PROCEDURE — 93010 ELECTROCARDIOGRAM REPORT: CPT | Performed by: INTERNAL MEDICINE

## 2018-10-07 PROCEDURE — 94760 N-INVAS EAR/PLS OXIMETRY 1: CPT

## 2018-10-07 RX ORDER — ONDANSETRON 2 MG/ML
4 INJECTION INTRAMUSCULAR; INTRAVENOUS ONCE
Status: COMPLETED | OUTPATIENT
Start: 2018-10-07 | End: 2018-10-07

## 2018-10-07 RX ORDER — SODIUM CHLORIDE 9 MG/ML
INJECTION, SOLUTION INTRAVENOUS CONTINUOUS
Status: DISCONTINUED | OUTPATIENT
Start: 2018-10-07 | End: 2018-10-08

## 2018-10-07 RX ORDER — BUSPIRONE HYDROCHLORIDE 5 MG/1
30 TABLET ORAL 2 TIMES DAILY
Status: DISCONTINUED | OUTPATIENT
Start: 2018-10-07 | End: 2018-10-22 | Stop reason: HOSPADM

## 2018-10-07 RX ORDER — ONDANSETRON 2 MG/ML
4 INJECTION INTRAMUSCULAR; INTRAVENOUS EVERY 6 HOURS PRN
Status: DISCONTINUED | OUTPATIENT
Start: 2018-10-07 | End: 2018-10-22 | Stop reason: HOSPADM

## 2018-10-07 RX ORDER — SODIUM CHLORIDE 1000 MG
1 TABLET, SOLUBLE MISCELLANEOUS 2 TIMES DAILY
Status: DISCONTINUED | OUTPATIENT
Start: 2018-10-07 | End: 2018-10-08

## 2018-10-07 RX ORDER — NICOTINE POLACRILEX 4 MG/1
20 GUM, CHEWING ORAL DAILY
Status: DISCONTINUED | OUTPATIENT
Start: 2018-10-07 | End: 2018-10-07 | Stop reason: CLARIF

## 2018-10-07 RX ORDER — MESALAMINE 4 G/60ML
4 ENEMA RECTAL NIGHTLY
Status: DISCONTINUED | OUTPATIENT
Start: 2018-10-08 | End: 2018-10-08

## 2018-10-07 RX ORDER — POTASSIUM CHLORIDE 20 MEQ/1
40 TABLET, EXTENDED RELEASE ORAL PRN
Status: DISCONTINUED | OUTPATIENT
Start: 2018-10-07 | End: 2018-10-22 | Stop reason: HOSPADM

## 2018-10-07 RX ORDER — POTASSIUM CHLORIDE 7.45 MG/ML
10 INJECTION INTRAVENOUS PRN
Status: DISCONTINUED | OUTPATIENT
Start: 2018-10-07 | End: 2018-10-22 | Stop reason: HOSPADM

## 2018-10-07 RX ORDER — PROMETHAZINE HYDROCHLORIDE 25 MG/ML
12.5 INJECTION, SOLUTION INTRAMUSCULAR; INTRAVENOUS ONCE
Status: COMPLETED | OUTPATIENT
Start: 2018-10-07 | End: 2018-10-07

## 2018-10-07 RX ORDER — HYDROMORPHONE HCL 110MG/55ML
1 PATIENT CONTROLLED ANALGESIA SYRINGE INTRAVENOUS ONCE
Status: COMPLETED | OUTPATIENT
Start: 2018-10-07 | End: 2018-10-07

## 2018-10-07 RX ORDER — PHENAZOPYRIDINE HYDROCHLORIDE 100 MG/1
200 TABLET, FILM COATED ORAL
Status: DISCONTINUED | OUTPATIENT
Start: 2018-10-07 | End: 2018-10-22 | Stop reason: HOSPADM

## 2018-10-07 RX ORDER — ZOLPIDEM TARTRATE 5 MG/1
10 TABLET ORAL NIGHTLY PRN
Status: DISCONTINUED | OUTPATIENT
Start: 2018-10-07 | End: 2018-10-22 | Stop reason: HOSPADM

## 2018-10-07 RX ORDER — ATENOLOL 50 MG/1
100 TABLET ORAL DAILY
Status: DISCONTINUED | OUTPATIENT
Start: 2018-10-08 | End: 2018-10-22 | Stop reason: HOSPADM

## 2018-10-07 RX ORDER — LANOLIN ALCOHOL/MO/W.PET/CERES
400 CREAM (GRAM) TOPICAL 2 TIMES DAILY
Status: DISCONTINUED | OUTPATIENT
Start: 2018-10-07 | End: 2018-10-22 | Stop reason: HOSPADM

## 2018-10-07 RX ORDER — BACLOFEN 10 MG/1
10 TABLET ORAL 3 TIMES DAILY
Status: DISCONTINUED | OUTPATIENT
Start: 2018-10-07 | End: 2018-10-22 | Stop reason: HOSPADM

## 2018-10-07 RX ORDER — FLUTICASONE PROPIONATE 50 MCG
1 SPRAY, SUSPENSION (ML) NASAL DAILY
Status: DISCONTINUED | OUTPATIENT
Start: 2018-10-08 | End: 2018-10-22 | Stop reason: HOSPADM

## 2018-10-07 RX ORDER — MORPHINE SULFATE 2 MG/ML
2 INJECTION, SOLUTION INTRAMUSCULAR; INTRAVENOUS EVERY 4 HOURS PRN
Status: DISCONTINUED | OUTPATIENT
Start: 2018-10-07 | End: 2018-10-08

## 2018-10-07 RX ORDER — SODIUM CHLORIDE 0.9 % (FLUSH) 0.9 %
10 SYRINGE (ML) INJECTION PRN
Status: DISCONTINUED | OUTPATIENT
Start: 2018-10-07 | End: 2018-10-22 | Stop reason: HOSPADM

## 2018-10-07 RX ORDER — SODIUM CHLORIDE 0.9 % (FLUSH) 0.9 %
10 SYRINGE (ML) INJECTION EVERY 12 HOURS SCHEDULED
Status: DISCONTINUED | OUTPATIENT
Start: 2018-10-07 | End: 2018-10-22 | Stop reason: HOSPADM

## 2018-10-07 RX ORDER — POTASSIUM CHLORIDE 20 MEQ/1
20 TABLET, EXTENDED RELEASE ORAL 2 TIMES DAILY
Status: DISCONTINUED | OUTPATIENT
Start: 2018-10-07 | End: 2018-10-16

## 2018-10-07 RX ORDER — FUROSEMIDE 40 MG/1
20 TABLET ORAL DAILY
Status: DISCONTINUED | OUTPATIENT
Start: 2018-10-08 | End: 2018-10-14

## 2018-10-07 RX ORDER — LORAZEPAM 2 MG/ML
1 INJECTION INTRAMUSCULAR EVERY 6 HOURS PRN
Status: DISCONTINUED | OUTPATIENT
Start: 2018-10-07 | End: 2018-10-11

## 2018-10-07 RX ORDER — SIMETHICONE 80 MG
80 TABLET,CHEWABLE ORAL EVERY 6 HOURS
Status: DISCONTINUED | OUTPATIENT
Start: 2018-10-07 | End: 2018-10-22 | Stop reason: HOSPADM

## 2018-10-07 RX ORDER — PANTOPRAZOLE SODIUM 40 MG/1
40 TABLET, DELAYED RELEASE ORAL
Status: DISCONTINUED | OUTPATIENT
Start: 2018-10-08 | End: 2018-10-22 | Stop reason: HOSPADM

## 2018-10-07 RX ORDER — DULOXETIN HYDROCHLORIDE 60 MG/1
60 CAPSULE, DELAYED RELEASE ORAL DAILY
Status: DISCONTINUED | OUTPATIENT
Start: 2018-10-08 | End: 2018-10-22 | Stop reason: HOSPADM

## 2018-10-07 RX ORDER — ONDANSETRON 4 MG/1
4 TABLET, ORALLY DISINTEGRATING ORAL EVERY 6 HOURS PRN
Status: DISCONTINUED | OUTPATIENT
Start: 2018-10-07 | End: 2018-10-22 | Stop reason: HOSPADM

## 2018-10-07 RX ORDER — ACETAMINOPHEN, ASPIRIN AND CAFFEINE 250; 250; 65 MG/1; MG/1; MG/1
1 TABLET, FILM COATED ORAL EVERY 6 HOURS PRN
Status: DISCONTINUED | OUTPATIENT
Start: 2018-10-07 | End: 2018-10-22 | Stop reason: HOSPADM

## 2018-10-07 RX ORDER — MESALAMINE 1.2 G/1
4800 TABLET, DELAYED RELEASE ORAL
Status: DISCONTINUED | OUTPATIENT
Start: 2018-10-08 | End: 2018-10-22 | Stop reason: HOSPADM

## 2018-10-07 RX ORDER — 0.9 % SODIUM CHLORIDE 0.9 %
1000 INTRAVENOUS SOLUTION INTRAVENOUS ONCE
Status: COMPLETED | OUTPATIENT
Start: 2018-10-07 | End: 2018-10-07

## 2018-10-07 RX ORDER — HYDRALAZINE HYDROCHLORIDE 20 MG/ML
10 INJECTION INTRAMUSCULAR; INTRAVENOUS EVERY 6 HOURS PRN
Status: DISCONTINUED | OUTPATIENT
Start: 2018-10-07 | End: 2018-10-22 | Stop reason: HOSPADM

## 2018-10-07 RX ADMIN — TAZOBACTAM SODIUM AND PIPERACILLIN SODIUM 3.38 G: 375; 3 INJECTION, SOLUTION INTRAVENOUS at 18:38

## 2018-10-07 RX ADMIN — TAZOBACTAM SODIUM AND PIPERACILLIN SODIUM 3.38 G: 375; 3 INJECTION, SOLUTION INTRAVENOUS at 11:25

## 2018-10-07 RX ADMIN — PROMETHAZINE HYDROCHLORIDE 12.5 MG: 25 INJECTION INTRAMUSCULAR; INTRAVENOUS at 11:25

## 2018-10-07 RX ADMIN — IOPAMIDOL 75 ML: 755 INJECTION, SOLUTION INTRAVENOUS at 07:21

## 2018-10-07 RX ADMIN — MORPHINE SULFATE 2 MG: 2 INJECTION, SOLUTION INTRAMUSCULAR; INTRAVENOUS at 15:50

## 2018-10-07 RX ADMIN — HYDRALAZINE HYDROCHLORIDE 10 MG: 20 INJECTION INTRAMUSCULAR; INTRAVENOUS at 15:50

## 2018-10-07 RX ADMIN — SODIUM CHLORIDE: 9 INJECTION, SOLUTION INTRAVENOUS at 15:17

## 2018-10-07 RX ADMIN — LORAZEPAM 1 MG: 2 INJECTION INTRAMUSCULAR; INTRAVENOUS at 21:39

## 2018-10-07 RX ADMIN — SODIUM CHLORIDE 1000 ML: 9 INJECTION, SOLUTION INTRAVENOUS at 08:03

## 2018-10-07 RX ADMIN — ONDANSETRON HYDROCHLORIDE 4 MG: 2 SOLUTION INTRAMUSCULAR; INTRAVENOUS at 20:56

## 2018-10-07 RX ADMIN — HYDROMORPHONE HYDROCHLORIDE 1 MG: 2 INJECTION, SOLUTION INTRAMUSCULAR; INTRAVENOUS; SUBCUTANEOUS at 11:25

## 2018-10-07 RX ADMIN — ONDANSETRON HYDROCHLORIDE 4 MG: 2 SOLUTION INTRAMUSCULAR; INTRAVENOUS at 08:04

## 2018-10-07 RX ADMIN — HYDROMORPHONE HYDROCHLORIDE 1 MG: 2 INJECTION INTRAMUSCULAR; INTRAVENOUS; SUBCUTANEOUS at 08:04

## 2018-10-07 ASSESSMENT — PAIN DESCRIPTION - PAIN TYPE
TYPE: ACUTE PAIN
TYPE: ACUTE PAIN

## 2018-10-07 ASSESSMENT — ENCOUNTER SYMPTOMS
CONSTIPATION: 0
EYE PAIN: 0
VOMITING: 1
SHORTNESS OF BREATH: 0
EYE DISCHARGE: 0
DIARRHEA: 1
BACK PAIN: 0
NAUSEA: 1
ABDOMINAL PAIN: 1
COUGH: 0
BLOOD IN STOOL: 0

## 2018-10-07 ASSESSMENT — PAIN SCALES - GENERAL
PAINLEVEL_OUTOF10: 9
PAINLEVEL_OUTOF10: 10
PAINLEVEL_OUTOF10: 8
PAINLEVEL_OUTOF10: 8

## 2018-10-07 ASSESSMENT — PAIN DESCRIPTION - LOCATION
LOCATION: ABDOMEN
LOCATION: ABDOMEN

## 2018-10-07 ASSESSMENT — PAIN DESCRIPTION - FREQUENCY: FREQUENCY: CONTINUOUS

## 2018-10-07 NOTE — ED PROVIDER NOTES
tablet by mouth 2 times daily.  60 tablet 3    Omeprazole 20 MG TBEC Take 20 mg by mouth daily       busPIRone (BUSPAR) 15 MG tablet Take 30 mg by mouth 2 times daily       amoxicillin-clavulanate (AUGMENTIN) 875-125 MG per tablet Take 1 tablet by mouth 2 times daily for 7 days 14 tablet 0    baclofen (LIORESAL) 10 MG tablet Take 1 tablet by mouth 3 times daily 90 tablet 1    ondansetron (ZOFRAN ODT) 4 MG disintegrating tablet Take 1 tablet by mouth every 6 hours as needed for Nausea or Vomiting 20 tablet 0    aspirin-acetaminophen-caffeine (EXCEDRIN MIGRAINE) 250-250-65 MG per tablet Take 1 tablet by mouth every 6 hours as needed for Headaches       ALLERGIES  Ace inhibitors and Skelaxin [metaxalone]  FAMILY HISTORY:  Family History   Problem Relation Age of Onset    High Blood Pressure Mother     High Blood Pressure Father     Kidney Disease Sister      SOCIAL HISTORY:  Social History   Substance Use Topics    Smoking status: Current Every Day Smoker     Packs/day: 1.00     Years: 10.00     Types: Cigarettes, E-Cigarettes     Last attempt to quit: 10/5/2017    Smokeless tobacco: Never Used    Alcohol use 1.2 oz/week     2 Shots of liquor per week      Comment: occasionally     IMMUNIZATIONS:  Noncontributory    PHYSICAL EXAM  VITAL SIGNS:  BP (!) 152/87   Pulse 87   Temp 97.7 °F (36.5 °C) (Oral)   Resp 20   Ht 5' 6\" (1.676 m)   Wt 217 lb (98.4 kg)   SpO2 96%   BMI 35.02 kg/m²   Constitutional:  59 y.o. female cooperative, appears uncomfortable moaning  HENT:  Atraumatic, mucous membranes moist  Eyes:   Conjunctiva clear, no icterus  Neck:  Supple, no adenopathy  Cardiovascular:  Regular, no rubs, no discernible murmur  Thorax & Lungs:  No accessory muscle usage, clear  Abdomen:  Soft, distended, bowel sounds decreased, diffuse tenderness present  Back:  No deformity  Genitalia:  Deferred  Rectal:  Deferred  Extremities:  No cyanosis, no edema  Skin:  Warm, dry  Neurologic:  Alert, no slurred

## 2018-10-07 NOTE — H&P
History and Physical  Dr. Aleksandra Avendaño  10/7/2018    PCP: Lisa Cerna MD    Cc:   Chief Complaint   Patient presents with    Abdominal Pain     Patient presents to ER via [address] [facility] with complaints of abdominal pain with nausea and vomiting. Also complains of chest pain. HPI:  Nisa Andrews is a 59 y.o. female who  has a past medical history of Arthritis; Blood transfusion reaction; Crohn's disease ([facility] [address] GERD (gastroesophageal reflux disease); Hiatal hernia; Hypertension; MRSA (methicillin resistant staph aureus) culture positive; and Pneumonia. Patient presents with Crohn's colitis ([facility] [address] [facility] HPI of presenting complaint in block format: (1-3 for expanded problem focused, ?4 for detailed/comprehensive)   Location: diffuse abd pain  Duration: going on for weeks  Timing: significantly more severe in past 10-12 hrs  Context: 59 y.o. female who presents because of abdominal pain. She has a history of Crohn's disease and was just admitted because of bowel perforation and intra-abdominal abscess. She is being medically managed at this point in time with oral antibiotics after an admission with IV antibiotics. She does have a delayed surgical treatment planned in the future. She says for the past 10 hours, her symptoms have gotten much worse. She's had multiple episodes of nausea and vomiting. CT Abd/Pelvis reviewed with radiology - Persistent short segment wall thickening of the proximal sigmoid colon with upstream colonic dilation consistent with obstruction.  Given the persistent wall thickening in this region and absence of significant diverticulosis elsewhere in the colon, findings are highly concerning for malignancy. Severity: rated 8/10  Quality: sharp pain  Modifying Factors: worse with eating. Worse with palpation  Associated Signs or Symptoms: +abd pain. +diarrhea. No f/c. Denies BRBPR/melena         Problem list of hospitalization thus far:   Active Hospital Problems    Diagnosis    (Patient taking differently: Take 20 mg by mouth 2 times daily ) 60 tablet 3    mesalamine (ROWASA) 4 g enema Place 60 mLs rectally nightly 1 enema 3    mesalamine (LIALDA) 1.2 g EC tablet Take 4 tablets by mouth daily (with breakfast) 30 tablet 3    fluticasone (FLONASE) 50 MCG/ACT nasal spray 1 spray by Nasal route daily 1 Bottle 3    DULoxetine (CYMBALTA) 60 MG extended release capsule Take 60 mg by mouth daily      lidocaine (XYLOCAINE) 5 % ointment Apply topically as needed Apply topically as needed to feet      zolpidem (AMBIEN) 10 MG tablet Take 10 mg by mouth nightly as needed for Sleep .  potassium chloride SA (K-DUR;KLOR-CON M) 20 MEQ tablet Take 1 tablet by mouth 2 times daily.  60 tablet 3    Omeprazole 20 MG TBEC Take 20 mg by mouth daily       busPIRone (BUSPAR) 15 MG tablet Take 30 mg by mouth 2 times daily       amoxicillin-clavulanate (AUGMENTIN) 875-125 MG per tablet Take 1 tablet by mouth 2 times daily for 7 days 14 tablet 0    baclofen (LIORESAL) 10 MG tablet Take 1 tablet by mouth 3 times daily 90 tablet 1    ondansetron (ZOFRAN ODT) 4 MG disintegrating tablet Take 1 tablet by mouth every 6 hours as needed for Nausea or Vomiting 20 tablet 0    aspirin-acetaminophen-caffeine (EXCEDRIN MIGRAINE) 250-250-65 MG per tablet Take 1 tablet by mouth every 6 hours as needed for Headaches           Allergies   Allergen Reactions    Ace Inhibitors Swelling    Skelaxin [Metaxalone] Swelling             EXAM: (2-7 system for EPF/Detailed, ?8 for Comprehensive)  BP (!) 155/102   Pulse 92   Temp 97.7 °F (36.5 °C) (Oral)   Resp 20   Ht 5' 6\" (1.676 m)   Wt 217 lb (98.4 kg)   SpO2 97%   BMI 35.02 kg/m²   Constitutional: vitals as above: alert, appears stated age and cooperative  Psychiatric: normal insight and judgment, oriented to person, place, time, and general circumstances  Head: Normocephalic, without obvious abnormality, atraumatic  Eyes:lids and lashes normal, conjunctivae and without evidence of mass. The pancreas is unremarkable. 3.6 cm near fluid density lesion of the right renal lower pole most likely represents a cyst.  There is mild bilateral renal cortical scarring. No hydronephrosis or hydroureter. No evidence of urolithiasis. Imaging through the pelvis limited by streak artifact from right hip prosthesis. There is diffuse urinary bladder wall thickening. No definite gas within the urinary bladder is visible. GI/Bowel: Significant short segment wall thickening of the proximal sigmoid colon, overall mildly decreased from September 2018. There is proximal dilation of the colon, which contains air-fluid levels. The cecum is markedly distended. The appendix is not visualized. Large hiatal hernia partially visualized. There is no small bowel dilation or focal wall thickening. Pelvis: The uterus is absent. The ovaries are not visualized. Trace pelvic free fluid is present. A soft tissue tract extends from thickened sigmoid colon towards the vaginal cuff and urinary bladder. Soft tissue density in this region is also inseparable from small bowel. There is left anterior pelvic fat stranding. Nonenlarged pelvic lymph nodes are present. Peritoneum/Retroperitoneum: Trace perihepatic fluid. Small soft tissue nodules along the distal descending colon. There is otherwise no concerning intra-abdominal adenopathy. There is no pneumoperitoneum. The abdominal aorta is normal caliber and without evidence of aneurysm. The portal vein is patent. Bones/Soft Tissues: There are no suspicious osseous lesions. Persistent short segment wall thickening of the proximal sigmoid colon with upstream colonic dilation consistent with obstruction. Given the persistent wall thickening in this region and absence of significant diverticulosis elsewhere in the colon, findings are highly concerning for malignancy.  Soft tissue tract extending from thickened sigmoid colon, inseparable from the

## 2018-10-07 NOTE — ED NOTES
Assume patient care at this time. Agree with previous assessment - pt resting, requesting pain meds. Current Plan of Care reviewed with patient. Pt denies any needs or questions at this time. Bed locked and in low position, with side rails up x 2.       Lawyer Hung, RN  10/07/18 332 Higinio Avalos RN  10/07/18 2038

## 2018-10-08 ENCOUNTER — ANESTHESIA EVENT (OUTPATIENT)
Dept: OPERATING ROOM | Age: 63
DRG: 231 | End: 2018-10-08
Payer: MEDICAID

## 2018-10-08 ENCOUNTER — ANESTHESIA (OUTPATIENT)
Dept: OPERATING ROOM | Age: 63
DRG: 231 | End: 2018-10-08
Payer: MEDICAID

## 2018-10-08 VITALS
SYSTOLIC BLOOD PRESSURE: 142 MMHG | RESPIRATION RATE: 18 BRPM | TEMPERATURE: 95.2 F | DIASTOLIC BLOOD PRESSURE: 94 MMHG | OXYGEN SATURATION: 99 %

## 2018-10-08 LAB
ANION GAP SERPL CALCULATED.3IONS-SCNC: 11 MMOL/L (ref 3–16)
ANION GAP SERPL CALCULATED.3IONS-SCNC: 13 MMOL/L (ref 3–16)
BASOPHILS ABSOLUTE: 0 K/UL (ref 0–0.2)
BASOPHILS ABSOLUTE: 0.1 K/UL (ref 0–0.2)
BASOPHILS RELATIVE PERCENT: 0.2 %
BASOPHILS RELATIVE PERCENT: 0.7 %
BUN BLDV-MCNC: 3 MG/DL (ref 7–20)
BUN BLDV-MCNC: 5 MG/DL (ref 7–20)
CALCIUM SERPL-MCNC: 6.6 MG/DL (ref 8.3–10.6)
CALCIUM SERPL-MCNC: 8.3 MG/DL (ref 8.3–10.6)
CHLORIDE BLD-SCNC: 96 MMOL/L (ref 99–110)
CHLORIDE BLD-SCNC: 97 MMOL/L (ref 99–110)
CO2: 20 MMOL/L (ref 21–32)
CO2: 26 MMOL/L (ref 21–32)
CREAT SERPL-MCNC: 0.6 MG/DL (ref 0.6–1.2)
CREAT SERPL-MCNC: <0.5 MG/DL (ref 0.6–1.2)
EOSINOPHILS ABSOLUTE: 0 K/UL (ref 0–0.6)
EOSINOPHILS ABSOLUTE: 0.1 K/UL (ref 0–0.6)
EOSINOPHILS RELATIVE PERCENT: 0.1 %
EOSINOPHILS RELATIVE PERCENT: 1.1 %
GFR AFRICAN AMERICAN: >60
GFR AFRICAN AMERICAN: >60
GFR NON-AFRICAN AMERICAN: >60
GFR NON-AFRICAN AMERICAN: >60
GLUCOSE BLD-MCNC: 104 MG/DL (ref 70–99)
GLUCOSE BLD-MCNC: 134 MG/DL (ref 70–99)
HCT VFR BLD CALC: 28.1 % (ref 36–48)
HCT VFR BLD CALC: 28.9 % (ref 36–48)
HEMOGLOBIN: 8.8 G/DL (ref 12–16)
HEMOGLOBIN: 9.4 G/DL (ref 12–16)
LYMPHOCYTES ABSOLUTE: 1.3 K/UL (ref 1–5.1)
LYMPHOCYTES ABSOLUTE: 1.9 K/UL (ref 1–5.1)
LYMPHOCYTES RELATIVE PERCENT: 25.6 %
LYMPHOCYTES RELATIVE PERCENT: 9.8 %
MAGNESIUM: 1.3 MG/DL (ref 1.8–2.4)
MCH RBC QN AUTO: 26.6 PG (ref 26–34)
MCH RBC QN AUTO: 27 PG (ref 26–34)
MCHC RBC AUTO-ENTMCNC: 31.3 G/DL (ref 31–36)
MCHC RBC AUTO-ENTMCNC: 32.5 G/DL (ref 31–36)
MCV RBC AUTO: 83.2 FL (ref 80–100)
MCV RBC AUTO: 85.1 FL (ref 80–100)
MONOCYTES ABSOLUTE: 0.3 K/UL (ref 0–1.3)
MONOCYTES ABSOLUTE: 0.5 K/UL (ref 0–1.3)
MONOCYTES RELATIVE PERCENT: 2.2 %
MONOCYTES RELATIVE PERCENT: 7.2 %
NEUTROPHILS ABSOLUTE: 11.7 K/UL (ref 1.7–7.7)
NEUTROPHILS ABSOLUTE: 5 K/UL (ref 1.7–7.7)
NEUTROPHILS RELATIVE PERCENT: 65.4 %
NEUTROPHILS RELATIVE PERCENT: 87.7 %
ORGANISM: ABNORMAL
PDW BLD-RTO: 17.9 % (ref 12.4–15.4)
PDW BLD-RTO: 18 % (ref 12.4–15.4)
PERFORMED ON: ABNORMAL
PLATELET # BLD: 471 K/UL (ref 135–450)
PLATELET # BLD: 559 K/UL (ref 135–450)
PMV BLD AUTO: 6.2 FL (ref 5–10.5)
PMV BLD AUTO: 6.4 FL (ref 5–10.5)
POC POTASSIUM: 3.2 MMOL/L (ref 3.5–5.1)
POC SAMPLE TYPE: ABNORMAL
POTASSIUM SERPL-SCNC: 2.7 MMOL/L (ref 3.5–5.1)
POTASSIUM SERPL-SCNC: 3.4 MMOL/L (ref 3.5–5.1)
RBC # BLD: 3.3 M/UL (ref 4–5.2)
RBC # BLD: 3.47 M/UL (ref 4–5.2)
SODIUM BLD-SCNC: 128 MMOL/L (ref 136–145)
SODIUM BLD-SCNC: 135 MMOL/L (ref 136–145)
URINE CULTURE, ROUTINE: ABNORMAL
URINE CULTURE, ROUTINE: ABNORMAL
WBC # BLD: 13.3 K/UL (ref 4–11)
WBC # BLD: 7.6 K/UL (ref 4–11)

## 2018-10-08 PROCEDURE — 6360000002 HC RX W HCPCS

## 2018-10-08 PROCEDURE — 7100000000 HC PACU RECOVERY - FIRST 15 MIN: Performed by: SURGERY

## 2018-10-08 PROCEDURE — 87075 CULTR BACTERIA EXCEPT BLOOD: CPT

## 2018-10-08 PROCEDURE — 87070 CULTURE OTHR SPECIMN AEROBIC: CPT

## 2018-10-08 PROCEDURE — 84132 ASSAY OF SERUM POTASSIUM: CPT

## 2018-10-08 PROCEDURE — 6360000002 HC RX W HCPCS: Performed by: SURGERY

## 2018-10-08 PROCEDURE — 7100000001 HC PACU RECOVERY - ADDTL 15 MIN: Performed by: SURGERY

## 2018-10-08 PROCEDURE — 6360000002 HC RX W HCPCS: Performed by: INTERNAL MEDICINE

## 2018-10-08 PROCEDURE — 83735 ASSAY OF MAGNESIUM: CPT

## 2018-10-08 PROCEDURE — 3700000000 HC ANESTHESIA ATTENDED CARE: Performed by: SURGERY

## 2018-10-08 PROCEDURE — 3600000013 HC SURGERY LEVEL 3 ADDTL 15MIN: Performed by: SURGERY

## 2018-10-08 PROCEDURE — 88307 TISSUE EXAM BY PATHOLOGIST: CPT

## 2018-10-08 PROCEDURE — 2500000003 HC RX 250 WO HCPCS: Performed by: NURSE ANESTHETIST, CERTIFIED REGISTERED

## 2018-10-08 PROCEDURE — 0DNU0ZZ RELEASE OMENTUM, OPEN APPROACH: ICD-10-PCS | Performed by: SURGERY

## 2018-10-08 PROCEDURE — 2500000003 HC RX 250 WO HCPCS: Performed by: INTERNAL MEDICINE

## 2018-10-08 PROCEDURE — 6370000000 HC RX 637 (ALT 250 FOR IP): Performed by: SURGERY

## 2018-10-08 PROCEDURE — 80048 BASIC METABOLIC PNL TOTAL CA: CPT

## 2018-10-08 PROCEDURE — 6360000002 HC RX W HCPCS: Performed by: NURSE ANESTHETIST, CERTIFIED REGISTERED

## 2018-10-08 PROCEDURE — 0W9G30Z DRAINAGE OF PERITONEAL CAVITY WITH DRAINAGE DEVICE, PERCUTANEOUS APPROACH: ICD-10-PCS | Performed by: RADIOLOGY

## 2018-10-08 PROCEDURE — 0D1L0Z4 BYPASS TRANSVERSE COLON TO CUTANEOUS, OPEN APPROACH: ICD-10-PCS | Performed by: SURGERY

## 2018-10-08 PROCEDURE — 2580000003 HC RX 258: Performed by: FAMILY MEDICINE

## 2018-10-08 PROCEDURE — P9045 ALBUMIN (HUMAN), 5%, 250 ML: HCPCS

## 2018-10-08 PROCEDURE — 6360000002 HC RX W HCPCS: Performed by: FAMILY MEDICINE

## 2018-10-08 PROCEDURE — 2709999900 HC NON-CHARGEABLE SUPPLY: Performed by: SURGERY

## 2018-10-08 PROCEDURE — 87186 SC STD MICRODIL/AGAR DIL: CPT

## 2018-10-08 PROCEDURE — 87077 CULTURE AEROBIC IDENTIFY: CPT

## 2018-10-08 PROCEDURE — APPNB30 APP NON BILLABLE TIME 0-30 MINS: Performed by: NURSE PRACTITIONER

## 2018-10-08 PROCEDURE — 1200000000 HC SEMI PRIVATE

## 2018-10-08 PROCEDURE — 2580000003 HC RX 258: Performed by: INTERNAL MEDICINE

## 2018-10-08 PROCEDURE — 2720000010 HC SURG SUPPLY STERILE: Performed by: SURGERY

## 2018-10-08 PROCEDURE — 6370000000 HC RX 637 (ALT 250 FOR IP): Performed by: INTERNAL MEDICINE

## 2018-10-08 PROCEDURE — 88305 TISSUE EXAM BY PATHOLOGIST: CPT

## 2018-10-08 PROCEDURE — 85025 COMPLETE CBC W/AUTO DIFF WBC: CPT

## 2018-10-08 PROCEDURE — 2580000003 HC RX 258: Performed by: NURSE ANESTHETIST, CERTIFIED REGISTERED

## 2018-10-08 PROCEDURE — 44139 MOBILIZATION OF COLON: CPT | Performed by: SURGERY

## 2018-10-08 PROCEDURE — 3600000003 HC SURGERY LEVEL 3 BASE: Performed by: SURGERY

## 2018-10-08 PROCEDURE — 0DBU0ZZ EXCISION OF OMENTUM, OPEN APPROACH: ICD-10-PCS | Performed by: SURGERY

## 2018-10-08 PROCEDURE — 0DTN0ZZ RESECTION OF SIGMOID COLON, OPEN APPROACH: ICD-10-PCS | Performed by: SURGERY

## 2018-10-08 PROCEDURE — 49204 PR EXCISION/DESTRUCTION OPEN ABDOMINAL TUMORS 5.1-10.0 CM: CPT | Performed by: SURGERY

## 2018-10-08 PROCEDURE — 87205 SMEAR GRAM STAIN: CPT

## 2018-10-08 PROCEDURE — 36415 COLL VENOUS BLD VENIPUNCTURE: CPT

## 2018-10-08 PROCEDURE — 99232 SBSQ HOSP IP/OBS MODERATE 35: CPT | Performed by: SURGERY

## 2018-10-08 PROCEDURE — 87076 CULTURE ANAEROBE IDENT EACH: CPT

## 2018-10-08 PROCEDURE — 3700000001 HC ADD 15 MINUTES (ANESTHESIA): Performed by: SURGERY

## 2018-10-08 PROCEDURE — 2580000003 HC RX 258: Performed by: SURGERY

## 2018-10-08 PROCEDURE — 44143 PARTIAL REMOVAL OF COLON: CPT | Performed by: SURGERY

## 2018-10-08 PROCEDURE — 87185 SC STD ENZYME DETCJ PER NZM: CPT

## 2018-10-08 RX ORDER — METOCLOPRAMIDE HYDROCHLORIDE 5 MG/ML
10 INJECTION INTRAMUSCULAR; INTRAVENOUS EVERY 6 HOURS
Status: COMPLETED | OUTPATIENT
Start: 2018-10-08 | End: 2018-10-10

## 2018-10-08 RX ORDER — OXYCODONE HYDROCHLORIDE 5 MG/1
5 TABLET ORAL PRN
Status: DISCONTINUED | OUTPATIENT
Start: 2018-10-08 | End: 2018-10-08 | Stop reason: HOSPADM

## 2018-10-08 RX ORDER — EPHEDRINE SULFATE 50 MG/ML
INJECTION INTRAVENOUS PRN
Status: DISCONTINUED | OUTPATIENT
Start: 2018-10-08 | End: 2018-10-08 | Stop reason: SDUPTHER

## 2018-10-08 RX ORDER — HYDROMORPHONE HCL 110MG/55ML
1 PATIENT CONTROLLED ANALGESIA SYRINGE INTRAVENOUS EVERY 4 HOURS PRN
Status: DISCONTINUED | OUTPATIENT
Start: 2018-10-08 | End: 2018-10-08

## 2018-10-08 RX ORDER — DIPHENHYDRAMINE HYDROCHLORIDE 50 MG/ML
12.5 INJECTION INTRAMUSCULAR; INTRAVENOUS
Status: DISCONTINUED | OUTPATIENT
Start: 2018-10-08 | End: 2018-10-08 | Stop reason: HOSPADM

## 2018-10-08 RX ORDER — LIDOCAINE HYDROCHLORIDE 20 MG/ML
INJECTION, SOLUTION EPIDURAL; INFILTRATION; INTRACAUDAL; PERINEURAL PRN
Status: DISCONTINUED | OUTPATIENT
Start: 2018-10-08 | End: 2018-10-08 | Stop reason: SDUPTHER

## 2018-10-08 RX ORDER — HYDROMORPHONE HCL 110MG/55ML
0.25 PATIENT CONTROLLED ANALGESIA SYRINGE INTRAVENOUS EVERY 5 MIN PRN
Status: DISCONTINUED | OUTPATIENT
Start: 2018-10-08 | End: 2018-10-08 | Stop reason: HOSPADM

## 2018-10-08 RX ORDER — SODIUM CHLORIDE 0.9 % (FLUSH) 0.9 %
10 SYRINGE (ML) INJECTION PRN
Status: DISCONTINUED | OUTPATIENT
Start: 2018-10-08 | End: 2018-10-08 | Stop reason: HOSPADM

## 2018-10-08 RX ORDER — HYDROMORPHONE HCL 110MG/55ML
1 PATIENT CONTROLLED ANALGESIA SYRINGE INTRAVENOUS
Status: DISCONTINUED | OUTPATIENT
Start: 2018-10-08 | End: 2018-10-11

## 2018-10-08 RX ORDER — OXYCODONE HYDROCHLORIDE 5 MG/1
10 TABLET ORAL PRN
Status: DISCONTINUED | OUTPATIENT
Start: 2018-10-08 | End: 2018-10-08 | Stop reason: HOSPADM

## 2018-10-08 RX ORDER — FENTANYL CITRATE 50 UG/ML
50 INJECTION, SOLUTION INTRAMUSCULAR; INTRAVENOUS EVERY 5 MIN PRN
Status: COMPLETED | OUTPATIENT
Start: 2018-10-08 | End: 2018-10-08

## 2018-10-08 RX ORDER — HYDROMORPHONE HCL 110MG/55ML
0.5 PATIENT CONTROLLED ANALGESIA SYRINGE INTRAVENOUS EVERY 5 MIN PRN
Status: COMPLETED | OUTPATIENT
Start: 2018-10-08 | End: 2018-10-08

## 2018-10-08 RX ORDER — SODIUM CHLORIDE 0.9 % (FLUSH) 0.9 %
10 SYRINGE (ML) INJECTION EVERY 12 HOURS SCHEDULED
Status: DISCONTINUED | OUTPATIENT
Start: 2018-10-08 | End: 2018-10-08 | Stop reason: HOSPADM

## 2018-10-08 RX ORDER — NEOSTIGMINE METHYLSULFATE 5 MG/5 ML
SYRINGE (ML) INTRAVENOUS PRN
Status: DISCONTINUED | OUTPATIENT
Start: 2018-10-08 | End: 2018-10-08 | Stop reason: SDUPTHER

## 2018-10-08 RX ORDER — LABETALOL HYDROCHLORIDE 5 MG/ML
5 INJECTION, SOLUTION INTRAVENOUS EVERY 10 MIN PRN
Status: DISCONTINUED | OUTPATIENT
Start: 2018-10-08 | End: 2018-10-08 | Stop reason: HOSPADM

## 2018-10-08 RX ORDER — ALBUMIN, HUMAN INJ 5% 5 %
12.5 SOLUTION INTRAVENOUS ONCE
Status: COMPLETED | OUTPATIENT
Start: 2018-10-08 | End: 2018-10-08

## 2018-10-08 RX ORDER — POTASSIUM CHLORIDE 7.45 MG/ML
10 INJECTION INTRAVENOUS
Status: DISPENSED | OUTPATIENT
Start: 2018-10-08 | End: 2018-10-08

## 2018-10-08 RX ORDER — ACETAMINOPHEN 10 MG/ML
1000 INJECTION, SOLUTION INTRAVENOUS EVERY 6 HOURS
Status: COMPLETED | OUTPATIENT
Start: 2018-10-08 | End: 2018-10-10

## 2018-10-08 RX ORDER — MEPERIDINE HYDROCHLORIDE 25 MG/ML
12.5 INJECTION INTRAMUSCULAR; INTRAVENOUS; SUBCUTANEOUS EVERY 5 MIN PRN
Status: DISCONTINUED | OUTPATIENT
Start: 2018-10-08 | End: 2018-10-08 | Stop reason: HOSPADM

## 2018-10-08 RX ORDER — SODIUM CHLORIDE 9 MG/ML
INJECTION, SOLUTION INTRAVENOUS CONTINUOUS
Status: DISCONTINUED | OUTPATIENT
Start: 2018-10-08 | End: 2018-10-08

## 2018-10-08 RX ORDER — PROMETHAZINE HYDROCHLORIDE 25 MG/ML
6.25 INJECTION, SOLUTION INTRAMUSCULAR; INTRAVENOUS PRN
Status: DISCONTINUED | OUTPATIENT
Start: 2018-10-08 | End: 2018-10-08 | Stop reason: HOSPADM

## 2018-10-08 RX ORDER — SUCCINYLCHOLINE CHLORIDE 20 MG/ML
INJECTION INTRAMUSCULAR; INTRAVENOUS PRN
Status: DISCONTINUED | OUTPATIENT
Start: 2018-10-08 | End: 2018-10-08 | Stop reason: SDUPTHER

## 2018-10-08 RX ORDER — PROPOFOL 10 MG/ML
INJECTION, EMULSION INTRAVENOUS PRN
Status: DISCONTINUED | OUTPATIENT
Start: 2018-10-08 | End: 2018-10-08 | Stop reason: SDUPTHER

## 2018-10-08 RX ORDER — MAGNESIUM HYDROXIDE 1200 MG/15ML
LIQUID ORAL CONTINUOUS PRN
Status: COMPLETED | OUTPATIENT
Start: 2018-10-08 | End: 2018-10-08

## 2018-10-08 RX ORDER — FENTANYL CITRATE 50 UG/ML
INJECTION, SOLUTION INTRAMUSCULAR; INTRAVENOUS PRN
Status: DISCONTINUED | OUTPATIENT
Start: 2018-10-08 | End: 2018-10-08 | Stop reason: SDUPTHER

## 2018-10-08 RX ORDER — ALBUMIN, HUMAN INJ 5% 5 %
SOLUTION INTRAVENOUS
Status: COMPLETED
Start: 2018-10-08 | End: 2018-10-08

## 2018-10-08 RX ORDER — ONDANSETRON 2 MG/ML
INJECTION INTRAMUSCULAR; INTRAVENOUS PRN
Status: DISCONTINUED | OUTPATIENT
Start: 2018-10-08 | End: 2018-10-08 | Stop reason: SDUPTHER

## 2018-10-08 RX ORDER — ROCURONIUM BROMIDE 10 MG/ML
INJECTION, SOLUTION INTRAVENOUS PRN
Status: DISCONTINUED | OUTPATIENT
Start: 2018-10-08 | End: 2018-10-08 | Stop reason: SDUPTHER

## 2018-10-08 RX ORDER — GLYCOPYRROLATE 1 MG/5 ML
SYRINGE (ML) INTRAVENOUS PRN
Status: DISCONTINUED | OUTPATIENT
Start: 2018-10-08 | End: 2018-10-08 | Stop reason: SDUPTHER

## 2018-10-08 RX ORDER — SODIUM CHLORIDE 9 MG/ML
INJECTION, SOLUTION INTRAVENOUS CONTINUOUS PRN
Status: DISCONTINUED | OUTPATIENT
Start: 2018-10-08 | End: 2018-10-08 | Stop reason: SDUPTHER

## 2018-10-08 RX ORDER — DEXAMETHASONE SODIUM PHOSPHATE 10 MG/ML
INJECTION, SOLUTION INTRAMUSCULAR; INTRAVENOUS PRN
Status: DISCONTINUED | OUTPATIENT
Start: 2018-10-08 | End: 2018-10-08 | Stop reason: SDUPTHER

## 2018-10-08 RX ORDER — MAGNESIUM SULFATE IN WATER 40 MG/ML
2 INJECTION, SOLUTION INTRAVENOUS ONCE
Status: COMPLETED | OUTPATIENT
Start: 2018-10-08 | End: 2018-10-08

## 2018-10-08 RX ORDER — SODIUM CHLORIDE 9 MG/ML
INJECTION, SOLUTION INTRAVENOUS CONTINUOUS
Status: DISCONTINUED | OUTPATIENT
Start: 2018-10-08 | End: 2018-10-10

## 2018-10-08 RX ADMIN — TAZOBACTAM SODIUM AND PIPERACILLIN SODIUM 3.38 G: 375; 3 INJECTION, SOLUTION INTRAVENOUS at 13:38

## 2018-10-08 RX ADMIN — ACETAMINOPHEN, ASPIRIN AND CAFFEINE 1 TABLET: 250; 250; 65 TABLET, FILM COATED ORAL at 04:40

## 2018-10-08 RX ADMIN — ACETAMINOPHEN 1000 MG: 10 INJECTION, SOLUTION INTRAVENOUS at 19:54

## 2018-10-08 RX ADMIN — ATENOLOL 100 MG: 50 TABLET ORAL at 09:12

## 2018-10-08 RX ADMIN — FENTANYL CITRATE 25 MCG: 50 INJECTION, SOLUTION INTRAMUSCULAR; INTRAVENOUS at 17:37

## 2018-10-08 RX ADMIN — PROPOFOL 140 MG: 10 INJECTION, EMULSION INTRAVENOUS at 15:49

## 2018-10-08 RX ADMIN — Medication 3.5 MG: at 18:56

## 2018-10-08 RX ADMIN — POTASSIUM CHLORIDE 10 MEQ: 7.46 INJECTION, SOLUTION INTRAVENOUS at 16:57

## 2018-10-08 RX ADMIN — TAZOBACTAM SODIUM AND PIPERACILLIN SODIUM 3.38 G: 375; 3 INJECTION, SOLUTION INTRAVENOUS at 04:17

## 2018-10-08 RX ADMIN — POTASSIUM CHLORIDE 10 MEQ: 7.46 INJECTION, SOLUTION INTRAVENOUS at 11:24

## 2018-10-08 RX ADMIN — HYDROMORPHONE HYDROCHLORIDE 0.5 MG: 2 INJECTION INTRAMUSCULAR; INTRAVENOUS; SUBCUTANEOUS at 20:18

## 2018-10-08 RX ADMIN — SUCCINYLCHOLINE CHLORIDE 100 MG: 20 INJECTION, SOLUTION INTRAMUSCULAR; INTRAVENOUS at 15:50

## 2018-10-08 RX ADMIN — FENTANYL CITRATE 25 MCG: 50 INJECTION, SOLUTION INTRAMUSCULAR; INTRAVENOUS at 17:48

## 2018-10-08 RX ADMIN — SIMETHICONE CHEW TAB 80 MG 80 MG: 80 TABLET ORAL at 04:18

## 2018-10-08 RX ADMIN — FENTANYL CITRATE 50 MCG: 50 INJECTION INTRAMUSCULAR; INTRAVENOUS at 19:50

## 2018-10-08 RX ADMIN — Medication 0.6 MG: at 18:56

## 2018-10-08 RX ADMIN — FENTANYL CITRATE 50 MCG: 50 INJECTION INTRAMUSCULAR; INTRAVENOUS at 19:43

## 2018-10-08 RX ADMIN — HYDROMORPHONE HYDROCHLORIDE 0.5 MG: 2 INJECTION INTRAMUSCULAR; INTRAVENOUS; SUBCUTANEOUS at 20:04

## 2018-10-08 RX ADMIN — FENTANYL CITRATE 50 MCG: 50 INJECTION, SOLUTION INTRAMUSCULAR; INTRAVENOUS at 16:45

## 2018-10-08 RX ADMIN — PHENYLEPHRINE HYDROCHLORIDE 50 MCG: 10 INJECTION INTRAVENOUS at 16:55

## 2018-10-08 RX ADMIN — Medication 10 ML: at 10:17

## 2018-10-08 RX ADMIN — EPHEDRINE SULFATE 10 MG: 50 INJECTION INTRAVENOUS at 16:08

## 2018-10-08 RX ADMIN — FENTANYL CITRATE 50 MCG: 50 INJECTION INTRAMUSCULAR; INTRAVENOUS at 19:35

## 2018-10-08 RX ADMIN — FENTANYL CITRATE 50 MCG: 50 INJECTION INTRAMUSCULAR; INTRAVENOUS at 19:54

## 2018-10-08 RX ADMIN — HYDROMORPHONE HYDROCHLORIDE 0.5 MG: 2 INJECTION INTRAMUSCULAR; INTRAVENOUS; SUBCUTANEOUS at 19:59

## 2018-10-08 RX ADMIN — ROCURONIUM BROMIDE 10 MG: 10 INJECTION INTRAVENOUS at 17:19

## 2018-10-08 RX ADMIN — PHENYLEPHRINE HYDROCHLORIDE 100 MCG: 10 INJECTION INTRAVENOUS at 18:16

## 2018-10-08 RX ADMIN — HYDROMORPHONE HYDROCHLORIDE 1 MG: 2 INJECTION INTRAMUSCULAR; INTRAVENOUS; SUBCUTANEOUS at 13:29

## 2018-10-08 RX ADMIN — ALBUMIN, HUMAN INJ 5% 12.5 G: 5 SOLUTION at 19:36

## 2018-10-08 RX ADMIN — HYDROMORPHONE HYDROCHLORIDE 1 MG: 2 INJECTION INTRAMUSCULAR; INTRAVENOUS; SUBCUTANEOUS at 09:38

## 2018-10-08 RX ADMIN — PANTOPRAZOLE SODIUM 40 MG: 40 TABLET, DELAYED RELEASE ORAL at 09:56

## 2018-10-08 RX ADMIN — TAZOBACTAM SODIUM AND PIPERACILLIN SODIUM 3.38 G: 375; 3 INJECTION, SOLUTION INTRAVENOUS at 21:57

## 2018-10-08 RX ADMIN — HYDROMORPHONE HYDROCHLORIDE 1 MG: 2 INJECTION INTRAMUSCULAR; INTRAVENOUS; SUBCUTANEOUS at 22:30

## 2018-10-08 RX ADMIN — SODIUM CHLORIDE: 9 INJECTION, SOLUTION INTRAVENOUS at 15:49

## 2018-10-08 RX ADMIN — POTASSIUM CHLORIDE 10 MEQ: 7.46 INJECTION, SOLUTION INTRAVENOUS at 12:26

## 2018-10-08 RX ADMIN — POTASSIUM CHLORIDE 10 MEQ: 7.46 INJECTION, SOLUTION INTRAVENOUS at 10:17

## 2018-10-08 RX ADMIN — ROCURONIUM BROMIDE 10 MG: 10 INJECTION INTRAVENOUS at 16:29

## 2018-10-08 RX ADMIN — EPHEDRINE SULFATE 10 MG: 50 INJECTION INTRAVENOUS at 16:04

## 2018-10-08 RX ADMIN — HYDROMORPHONE HYDROCHLORIDE 0.5 MG: 2 INJECTION INTRAMUSCULAR; INTRAVENOUS; SUBCUTANEOUS at 20:09

## 2018-10-08 RX ADMIN — ONDANSETRON 4 MG: 2 INJECTION INTRAMUSCULAR; INTRAVENOUS at 16:00

## 2018-10-08 RX ADMIN — SODIUM CHLORIDE: 9 INJECTION, SOLUTION INTRAVENOUS at 04:18

## 2018-10-08 RX ADMIN — POTASSIUM CHLORIDE 10 MEQ: 7.46 INJECTION, SOLUTION INTRAVENOUS at 13:30

## 2018-10-08 RX ADMIN — PHENYLEPHRINE HYDROCHLORIDE 50 MCG: 10 INJECTION INTRAVENOUS at 17:00

## 2018-10-08 RX ADMIN — FENTANYL CITRATE 100 MCG: 50 INJECTION, SOLUTION INTRAMUSCULAR; INTRAVENOUS at 15:49

## 2018-10-08 RX ADMIN — SODIUM CHLORIDE: 9 INJECTION, SOLUTION INTRAVENOUS at 16:45

## 2018-10-08 RX ADMIN — ROCURONIUM BROMIDE 40 MG: 10 INJECTION INTRAVENOUS at 16:06

## 2018-10-08 RX ADMIN — Medication 0.2 MG: at 16:15

## 2018-10-08 RX ADMIN — MAGNESIUM SULFATE HEPTAHYDRATE 2 G: 40 INJECTION, SOLUTION INTRAVENOUS at 17:58

## 2018-10-08 RX ADMIN — Medication 10 ML: at 21:57

## 2018-10-08 RX ADMIN — MORPHINE SULFATE 2 MG: 2 INJECTION, SOLUTION INTRAMUSCULAR; INTRAVENOUS at 01:42

## 2018-10-08 RX ADMIN — HYDROCORTISONE SODIUM SUCCINATE 100 MG: 100 INJECTION, POWDER, FOR SOLUTION INTRAMUSCULAR; INTRAVENOUS at 15:18

## 2018-10-08 RX ADMIN — ALBUMIN (HUMAN) 12.5 G: 12.5 INJECTION, SOLUTION INTRAVENOUS at 19:36

## 2018-10-08 RX ADMIN — PHENYLEPHRINE HYDROCHLORIDE 100 MCG: 10 INJECTION INTRAVENOUS at 16:15

## 2018-10-08 RX ADMIN — ROCURONIUM BROMIDE 10 MG: 10 INJECTION INTRAVENOUS at 18:19

## 2018-10-08 RX ADMIN — EPHEDRINE SULFATE 10 MG: 50 INJECTION INTRAVENOUS at 16:13

## 2018-10-08 RX ADMIN — LIDOCAINE HYDROCHLORIDE 80 MG: 20 INJECTION, SOLUTION EPIDURAL; INFILTRATION; INTRACAUDAL; PERINEURAL at 15:49

## 2018-10-08 RX ADMIN — POTASSIUM CHLORIDE 10 MEQ: 7.46 INJECTION, SOLUTION INTRAVENOUS at 15:18

## 2018-10-08 RX ADMIN — SODIUM CHLORIDE: 9 INJECTION, SOLUTION INTRAVENOUS at 21:56

## 2018-10-08 RX ADMIN — DEXAMETHASONE SODIUM PHOSPHATE 10 MG: 10 INJECTION, SOLUTION INTRAMUSCULAR; INTRAVENOUS at 16:00

## 2018-10-08 RX ADMIN — PHENYLEPHRINE HYDROCHLORIDE 100 MCG: 10 INJECTION INTRAVENOUS at 18:55

## 2018-10-08 ASSESSMENT — PAIN DESCRIPTION - INTENSITY: RATING_2: 9

## 2018-10-08 ASSESSMENT — PULMONARY FUNCTION TESTS
PIF_VALUE: 17
PIF_VALUE: 17
PIF_VALUE: 19
PIF_VALUE: 17
PIF_VALUE: 18
PIF_VALUE: 17
PIF_VALUE: 19
PIF_VALUE: 16
PIF_VALUE: 16
PIF_VALUE: 19
PIF_VALUE: 18
PIF_VALUE: 16
PIF_VALUE: 18
PIF_VALUE: 19
PIF_VALUE: 16
PIF_VALUE: 17
PIF_VALUE: 19
PIF_VALUE: 2
PIF_VALUE: 20
PIF_VALUE: 17
PIF_VALUE: 17
PIF_VALUE: 1
PIF_VALUE: 17
PIF_VALUE: 18
PIF_VALUE: 19
PIF_VALUE: 17
PIF_VALUE: 19
PIF_VALUE: 20
PIF_VALUE: 16
PIF_VALUE: 19
PIF_VALUE: 17
PIF_VALUE: 17
PIF_VALUE: 19
PIF_VALUE: 20
PIF_VALUE: 19
PIF_VALUE: 19
PIF_VALUE: 17
PIF_VALUE: 16
PIF_VALUE: 0
PIF_VALUE: 18
PIF_VALUE: 1
PIF_VALUE: 16
PIF_VALUE: 19
PIF_VALUE: 30
PIF_VALUE: 17
PIF_VALUE: 18
PIF_VALUE: 14
PIF_VALUE: 17
PIF_VALUE: 20
PIF_VALUE: 17
PIF_VALUE: 18
PIF_VALUE: 17
PIF_VALUE: 16
PIF_VALUE: 17
PIF_VALUE: 16
PIF_VALUE: 19
PIF_VALUE: 16
PIF_VALUE: 19
PIF_VALUE: 18
PIF_VALUE: 20
PIF_VALUE: 17
PIF_VALUE: 15
PIF_VALUE: 17
PIF_VALUE: 19
PIF_VALUE: 19
PIF_VALUE: 20
PIF_VALUE: 16
PIF_VALUE: 17
PIF_VALUE: 16
PIF_VALUE: 18
PIF_VALUE: 16
PIF_VALUE: 17
PIF_VALUE: 17
PIF_VALUE: 16
PIF_VALUE: 19
PIF_VALUE: 18
PIF_VALUE: 17
PIF_VALUE: 19
PIF_VALUE: 18
PIF_VALUE: 17
PIF_VALUE: 18
PIF_VALUE: 17
PIF_VALUE: 17
PIF_VALUE: 19
PIF_VALUE: 18
PIF_VALUE: 1
PIF_VALUE: 19
PIF_VALUE: 17
PIF_VALUE: 18
PIF_VALUE: 19
PIF_VALUE: 18
PIF_VALUE: 19
PIF_VALUE: 18
PIF_VALUE: 18
PIF_VALUE: 20
PIF_VALUE: 17
PIF_VALUE: 20
PIF_VALUE: 17
PIF_VALUE: 3
PIF_VALUE: 19
PIF_VALUE: 18
PIF_VALUE: 19
PIF_VALUE: 17
PIF_VALUE: 19
PIF_VALUE: 16
PIF_VALUE: 19
PIF_VALUE: 17
PIF_VALUE: 18
PIF_VALUE: 17
PIF_VALUE: 19
PIF_VALUE: 19
PIF_VALUE: 17
PIF_VALUE: 19
PIF_VALUE: 16
PIF_VALUE: 17
PIF_VALUE: 19
PIF_VALUE: 17
PIF_VALUE: 20
PIF_VALUE: 16
PIF_VALUE: 15
PIF_VALUE: 19
PIF_VALUE: 17
PIF_VALUE: 20
PIF_VALUE: 16
PIF_VALUE: 16
PIF_VALUE: 17
PIF_VALUE: 17
PIF_VALUE: 18
PIF_VALUE: 17
PIF_VALUE: 18
PIF_VALUE: 19
PIF_VALUE: 18
PIF_VALUE: 17
PIF_VALUE: 19
PIF_VALUE: 18
PIF_VALUE: 19
PIF_VALUE: 19
PIF_VALUE: 1
PIF_VALUE: 19
PIF_VALUE: 20
PIF_VALUE: 13
PIF_VALUE: 18
PIF_VALUE: 18
PIF_VALUE: 17
PIF_VALUE: 20
PIF_VALUE: 19
PIF_VALUE: 19
PIF_VALUE: 18
PIF_VALUE: 19
PIF_VALUE: 19
PIF_VALUE: 17
PIF_VALUE: 18
PIF_VALUE: 17
PIF_VALUE: 17
PIF_VALUE: 19
PIF_VALUE: 19
PIF_VALUE: 15
PIF_VALUE: 16
PIF_VALUE: 17
PIF_VALUE: 18
PIF_VALUE: 16
PIF_VALUE: 19
PIF_VALUE: 17
PIF_VALUE: 17
PIF_VALUE: 18
PIF_VALUE: 19
PIF_VALUE: 17
PIF_VALUE: 20
PIF_VALUE: 1
PIF_VALUE: 17
PIF_VALUE: 16
PIF_VALUE: 16
PIF_VALUE: 19
PIF_VALUE: 17
PIF_VALUE: 18
PIF_VALUE: 16
PIF_VALUE: 18
PIF_VALUE: 16
PIF_VALUE: 17
PIF_VALUE: 16
PIF_VALUE: 18
PIF_VALUE: 17
PIF_VALUE: 17
PIF_VALUE: 16
PIF_VALUE: 1
PIF_VALUE: 16
PIF_VALUE: 16
PIF_VALUE: 20
PIF_VALUE: 17
PIF_VALUE: 15
PIF_VALUE: 20
PIF_VALUE: 16
PIF_VALUE: 18
PIF_VALUE: 19

## 2018-10-08 ASSESSMENT — PAIN SCALES - GENERAL
PAINLEVEL_OUTOF10: 10
PAINLEVEL_OUTOF10: 9
PAINLEVEL_OUTOF10: 9
PAINLEVEL_OUTOF10: 3
PAINLEVEL_OUTOF10: 9
PAINLEVEL_OUTOF10: 10
PAINLEVEL_OUTOF10: 9
PAINLEVEL_OUTOF10: 9
PAINLEVEL_OUTOF10: 0
PAINLEVEL_OUTOF10: 9
PAINLEVEL_OUTOF10: 9

## 2018-10-08 ASSESSMENT — PAIN DESCRIPTION - LOCATION
LOCATION: ABDOMEN
LOCATION_2: HEAD
LOCATION: ABDOMEN

## 2018-10-08 ASSESSMENT — PAIN DESCRIPTION - PAIN TYPE
TYPE: ACUTE PAIN
TYPE: SURGICAL PAIN
TYPE: ACUTE PAIN
TYPE: SURGICAL PAIN

## 2018-10-08 ASSESSMENT — PAIN DESCRIPTION - ORIENTATION
ORIENTATION: LOWER;MID
ORIENTATION: LOWER;MID

## 2018-10-08 ASSESSMENT — PAIN DESCRIPTION - PROGRESSION: CLINICAL_PROGRESSION_2: GRADUALLY WORSENING

## 2018-10-08 ASSESSMENT — PAIN DESCRIPTION - DURATION: DURATION_2: CONTINUOUS

## 2018-10-08 ASSESSMENT — PAIN DESCRIPTION - ONSET: ONSET_2: ON-GOING

## 2018-10-08 ASSESSMENT — PAIN DESCRIPTION - DESCRIPTORS: DESCRIPTORS_2: HEADACHE

## 2018-10-08 NOTE — PROGRESS NOTES
C/o increased anxiety, states she takes ativan at home, message sent via [employer] serve to hospitalist regarding c/o, awaiting return call.  Kim Deal RN

## 2018-10-08 NOTE — ANESTHESIA PRE PROCEDURE
Department of Anesthesiology  Preprocedure Note       Name:  Niurka Marin   Age:  59 y.o.  :  1955                                          MRN:  [id number]         Date:  10/8/2018      Surgeon: Jacklyn Atwood):  Eleno Fernandez MD    Procedure: Procedure(s):  EXPLORATORY LAPAROTOMY, SIGMOID RESECTION    Medications prior to admission:   Prior to Admission medications    Medication Sig Start Date End Date Taking? Authorizing Provider   sodium chloride 1 g tablet Take 1 tablet by mouth 2 times daily 10/1/18  Yes Jaylene Paez MD   rivaroxaban (XARELTO) 20 MG TABS tablet Take 1 tablet by mouth daily (with breakfast) 18  Yes Tello Ceron MD   magnesium oxide (MAG-OX) 400 (240 Mg) MG tablet Take 1 tablet by mouth 2 times daily 18  Yes Tello Ceron MD   atenolol (TENORMIN) 100 MG tablet Take 1 tablet by mouth daily 18  Yes Tello Ceron MD   furosemide (LASIX) 20 MG tablet Take 1 tablet by mouth daily  Patient taking differently: Take 20 mg by mouth 2 times daily  18  Yes Tello Ceron MD   mesalamine (ROWASA) 4 g enema Place 60 mLs rectally nightly 4/15/87  Yes Sugar Ivy PA-C   mesalamine (LIALDA) 1.2 g EC tablet Take 4 tablets by mouth daily (with breakfast)   Yes Sugar Ivy PA-C   fluticasone (FLONASE) 50 MCG/ACT nasal spray 1 spray by Nasal route daily 17  Yes Ana Fontana MD   DULoxetine (CYMBALTA) 60 MG extended release capsule Take 60 mg by mouth daily   Yes Historical Provider, MD   lidocaine (XYLOCAINE) 5 % ointment Apply topically as needed Apply topically as needed to feet   Yes Historical Provider, MD   zolpidem (AMBIEN) 10 MG tablet Take 10 mg by mouth nightly as needed for Sleep . Yes Historical Provider, MD   potassium chloride SA (K-DUR;KLOR-CON M) 20 MEQ tablet Take 1 tablet by mouth 2 times daily.  1/15/14  Yes Georgette Harvey MD   Omeprazole 20 MG TBEC Take 20 mg by mouth daily    Yes Historical Provider, MD

## 2018-10-08 NOTE — PROGRESS NOTES
Occupational/Occupational Therapy  Dianne Krishna    Attempted to see pt for initial OT/PT evaluation. Pt declined OT/PT eval today d/t having surgery later and having been in and out of bed multiple times. Pt reported just back to bed and she doesn't want to participate in the evaluation now and would prefer to wait until after her surgery. Max encouragement for participation, but pt continued to decline. Will attempt again after surgery as schedule allows and pt is appropriate. RN aware. Thank you.      Alvaro Kaiser, OTR/L, [address], [place], [id number]

## 2018-10-08 NOTE — PROGRESS NOTES
per day  sodium chloride flush 0.9 % injection 10 mL, 10 mL, Intravenous, PRN  magnesium hydroxide (MILK OF MAGNESIA) 400 MG/5ML suspension 30 mL, 30 mL, Oral, Daily PRN  ondansetron (ZOFRAN) injection 4 mg, 4 mg, Intravenous, Q6H PRN  phenazopyridine (PYRIDIUM) tablet 200 mg, 200 mg, Oral, TID WC  piperacillin-tazobactam (ZOSYN) 3.375 g in dextrose 50 mL IVPB extended infusion (premix), 3.375 g, Intravenous, Q8H  simethicone (MYLICON) chewable tablet 80 mg, 80 mg, Oral, Q6H  pantoprazole (PROTONIX) tablet 40 mg, 40 mg, Oral, QAM AC  hydrALAZINE (APRESOLINE) injection 10 mg, 10 mg, Intravenous, Q6H PRN  LORazepam (ATIVAN) injection 1 mg, 1 mg, Intravenous, Q6H PRN  Prior to Admission medications    Medication Sig Start Date End Date Taking?  Authorizing Provider   sodium chloride 1 g tablet Take 1 tablet by mouth 2 times daily 10/1/18  Yes Macy Padron MD   rivaroxaban (XARELTO) 20 MG TABS tablet Take 1 tablet by mouth daily (with breakfast) 8/8/18  Yes Argenis Barksdale MD   magnesium oxide (MAG-OX) 400 (240 Mg) MG tablet Take 1 tablet by mouth 2 times daily 6/30/18  Yes Argenis Barksdale MD   atenolol (TENORMIN) 100 MG tablet Take 1 tablet by mouth daily 6/30/18  Yes Argenis Barksdale MD   furosemide (LASIX) 20 MG tablet Take 1 tablet by mouth daily  Patient taking differently: Take 20 mg by mouth 2 times daily  6/30/18  Yes Argenis Barksdale MD   mesalamine (ROWASA) 4 g enema Place 60 mLs rectally nightly 3/40/23  Yes Ramiro Ivy PA-C   mesalamine (LIALDA) 1.2 g EC tablet Take 4 tablets by mouth daily (with breakfast) 5/40/04  Yes Ramiro Ivy PA-C   fluticasone (FLONASE) 50 MCG/ACT nasal spray 1 spray by Nasal route daily 8/5/17  Yes Petra Smith MD   DULoxetine (CYMBALTA) 60 MG extended release capsule Take 60 mg by mouth daily   Yes Historical Provider, MD   lidocaine (XYLOCAINE) 5 % ointment Apply topically as needed Apply topically as needed to feet   Yes Historical Provider, MD   zolpidem

## 2018-10-08 NOTE — PROGRESS NOTES
Patient received to PACU in stable condition. VSS. Denies pain. Dressing to abdomin clean dry and intact. Ice applied. Will continue to monitor.

## 2018-10-08 NOTE — BRIEF OP NOTE
Brief Postoperative Note    Damien Keller  YOB: 1955  [id number]    Pre-operative Diagnosis: Colon obstruction, Crohn's, or possible diverticulitis with perforation also.  Excision of mesenteric mass (Most likely inflammatory)     Post-operative Diagnosis: Same    Procedure: Ex Lap, SHAILESH, Sigmoid colectomy, with end colostomy, mobilization of splenic flexure, closure of rectal stump    Anesthesia: General    Surgeons/Assistants: Ric    Estimated Blood Loss: 280 ml     Complications: None    Specimens: Was Obtained: Sigmoid colon and additional proximal colon, and mesenteric mass    Findings: Severe Crohn's along with possible diverticulitis with obstruction, perforation, abscess in pelvis associated with colonic disease    Drain: ELIAS X 1 in pelvis     Electronically signed by Jennifer Ace MD on 10/8/2018 at 7:01 PM

## 2018-10-08 NOTE — PROGRESS NOTES
Pt complain of 10/10 abdominal pain PRN dilaudid given see MAR.  K 2.7 requested IV K from pharmacy pt NPO

## 2018-10-08 NOTE — PROGRESS NOTES
Assessment complete. VSS. Patient resting in bed. Respirations even and easy. Call light in reach. No needs expressed at this time. Will continue to monitor.

## 2018-10-09 LAB
ABO/RH: NORMAL
ANION GAP SERPL CALCULATED.3IONS-SCNC: 13 MMOL/L (ref 3–16)
ANTIBODY SCREEN: NORMAL
BASOPHILS ABSOLUTE: 0 K/UL (ref 0–0.2)
BASOPHILS RELATIVE PERCENT: 0.2 %
BUN BLDV-MCNC: 9 MG/DL (ref 7–20)
CALCIUM SERPL-MCNC: 7.4 MG/DL (ref 8.3–10.6)
CHLORIDE BLD-SCNC: 102 MMOL/L (ref 99–110)
CO2: 20 MMOL/L (ref 21–32)
CREAT SERPL-MCNC: 1 MG/DL (ref 0.6–1.2)
EOSINOPHILS ABSOLUTE: 0 K/UL (ref 0–0.6)
EOSINOPHILS RELATIVE PERCENT: 0 %
GFR AFRICAN AMERICAN: >60
GFR NON-AFRICAN AMERICAN: 56
GLUCOSE BLD-MCNC: 112 MG/DL (ref 70–99)
GLUCOSE BLD-MCNC: 120 MG/DL (ref 70–99)
HAV IGM SER IA-ACNC: NORMAL
HCT VFR BLD CALC: 23.1 % (ref 36–48)
HCT VFR BLD CALC: 24.6 % (ref 36–48)
HEMOGLOBIN: 7.4 G/DL (ref 12–16)
HEMOGLOBIN: 7.9 G/DL (ref 12–16)
HEPATITIS B CORE IGM ANTIBODY: NORMAL
HEPATITIS B SURFACE ANTIGEN INTERPRETATION: NORMAL
HEPATITIS C ANTIBODY INTERPRETATION: NORMAL
LYMPHOCYTES ABSOLUTE: 1.6 K/UL (ref 1–5.1)
LYMPHOCYTES RELATIVE PERCENT: 10.3 %
MCH RBC QN AUTO: 27.2 PG (ref 26–34)
MCHC RBC AUTO-ENTMCNC: 32.1 G/DL (ref 31–36)
MCV RBC AUTO: 84.7 FL (ref 80–100)
MONOCYTES ABSOLUTE: 0.6 K/UL (ref 0–1.3)
MONOCYTES RELATIVE PERCENT: 3.7 %
NEUTROPHILS ABSOLUTE: 13.6 K/UL (ref 1.7–7.7)
NEUTROPHILS RELATIVE PERCENT: 85.8 %
PDW BLD-RTO: 18 % (ref 12.4–15.4)
PERFORMED ON: ABNORMAL
PLATELET # BLD: 414 K/UL (ref 135–450)
PMV BLD AUTO: 6.9 FL (ref 5–10.5)
POTASSIUM SERPL-SCNC: 3.9 MMOL/L (ref 3.5–5.1)
RBC # BLD: 2.91 M/UL (ref 4–5.2)
SODIUM BLD-SCNC: 135 MMOL/L (ref 136–145)
WBC # BLD: 15.9 K/UL (ref 4–11)

## 2018-10-09 PROCEDURE — 85014 HEMATOCRIT: CPT

## 2018-10-09 PROCEDURE — 6360000002 HC RX W HCPCS: Performed by: SURGERY

## 2018-10-09 PROCEDURE — 36415 COLL VENOUS BLD VENIPUNCTURE: CPT

## 2018-10-09 PROCEDURE — 6370000000 HC RX 637 (ALT 250 FOR IP): Performed by: SURGERY

## 2018-10-09 PROCEDURE — 99024 POSTOP FOLLOW-UP VISIT: CPT | Performed by: SURGERY

## 2018-10-09 PROCEDURE — 2580000003 HC RX 258: Performed by: SURGERY

## 2018-10-09 PROCEDURE — 6370000000 HC RX 637 (ALT 250 FOR IP): Performed by: INTERNAL MEDICINE

## 2018-10-09 PROCEDURE — P9045 ALBUMIN (HUMAN), 5%, 250 ML: HCPCS | Performed by: SURGERY

## 2018-10-09 PROCEDURE — 85018 HEMOGLOBIN: CPT

## 2018-10-09 PROCEDURE — 86850 RBC ANTIBODY SCREEN: CPT

## 2018-10-09 PROCEDURE — 86901 BLOOD TYPING SEROLOGIC RH(D): CPT

## 2018-10-09 PROCEDURE — 85025 COMPLETE CBC W/AUTO DIFF WBC: CPT

## 2018-10-09 PROCEDURE — 1200000000 HC SEMI PRIVATE

## 2018-10-09 PROCEDURE — 86900 BLOOD TYPING SEROLOGIC ABO: CPT

## 2018-10-09 PROCEDURE — 2580000003 HC RX 258: Performed by: INTERNAL MEDICINE

## 2018-10-09 PROCEDURE — 80048 BASIC METABOLIC PNL TOTAL CA: CPT

## 2018-10-09 PROCEDURE — 86923 COMPATIBILITY TEST ELECTRIC: CPT

## 2018-10-09 PROCEDURE — 80074 ACUTE HEPATITIS PANEL: CPT

## 2018-10-09 RX ORDER — 0.9 % SODIUM CHLORIDE 0.9 %
1000 INTRAVENOUS SOLUTION INTRAVENOUS ONCE
Status: COMPLETED | OUTPATIENT
Start: 2018-10-09 | End: 2018-10-09

## 2018-10-09 RX ORDER — 0.9 % SODIUM CHLORIDE 0.9 %
500 INTRAVENOUS SOLUTION INTRAVENOUS ONCE
Status: COMPLETED | OUTPATIENT
Start: 2018-10-09 | End: 2018-10-09

## 2018-10-09 RX ORDER — 0.9 % SODIUM CHLORIDE 0.9 %
250 INTRAVENOUS SOLUTION INTRAVENOUS ONCE
Status: COMPLETED | OUTPATIENT
Start: 2018-10-09 | End: 2018-10-10

## 2018-10-09 RX ORDER — ALBUMIN, HUMAN INJ 5% 5 %
12.5 SOLUTION INTRAVENOUS ONCE
Status: COMPLETED | OUTPATIENT
Start: 2018-10-09 | End: 2018-10-09

## 2018-10-09 RX ADMIN — BACLOFEN 10 MG: 10 TABLET ORAL at 09:47

## 2018-10-09 RX ADMIN — SIMETHICONE CHEW TAB 80 MG 80 MG: 80 TABLET ORAL at 15:54

## 2018-10-09 RX ADMIN — TAZOBACTAM SODIUM AND PIPERACILLIN SODIUM 4.5 G: 500; 4 INJECTION, SOLUTION INTRAVENOUS at 23:54

## 2018-10-09 RX ADMIN — BACLOFEN 10 MG: 10 TABLET ORAL at 21:43

## 2018-10-09 RX ADMIN — TAZOBACTAM SODIUM AND PIPERACILLIN SODIUM 4.5 G: 500; 4 INJECTION, SOLUTION INTRAVENOUS at 11:53

## 2018-10-09 RX ADMIN — TAZOBACTAM SODIUM AND PIPERACILLIN SODIUM 4.5 G: 500; 4 INJECTION, SOLUTION INTRAVENOUS at 06:21

## 2018-10-09 RX ADMIN — POTASSIUM CHLORIDE 20 MEQ: 1500 TABLET, EXTENDED RELEASE ORAL at 09:47

## 2018-10-09 RX ADMIN — PHENAZOPYRIDINE HYDROCHLORIDE 200 MG: 100 TABLET ORAL at 09:47

## 2018-10-09 RX ADMIN — PHENAZOPYRIDINE HYDROCHLORIDE 200 MG: 100 TABLET ORAL at 17:37

## 2018-10-09 RX ADMIN — SODIUM CHLORIDE: 9 INJECTION, SOLUTION INTRAVENOUS at 17:38

## 2018-10-09 RX ADMIN — TAZOBACTAM SODIUM AND PIPERACILLIN SODIUM 4.5 G: 500; 4 INJECTION, SOLUTION INTRAVENOUS at 17:37

## 2018-10-09 RX ADMIN — SODIUM CHLORIDE 500 ML: 9 INJECTION, SOLUTION INTRAVENOUS at 07:00

## 2018-10-09 RX ADMIN — ACETAMINOPHEN 1000 MG: 10 INJECTION, SOLUTION INTRAVENOUS at 16:16

## 2018-10-09 RX ADMIN — METOCLOPRAMIDE 10 MG: 5 INJECTION, SOLUTION INTRAMUSCULAR; INTRAVENOUS at 15:54

## 2018-10-09 RX ADMIN — PHENAZOPYRIDINE HYDROCHLORIDE 200 MG: 100 TABLET ORAL at 11:53

## 2018-10-09 RX ADMIN — SODIUM CHLORIDE 250 ML: 9 INJECTION, SOLUTION INTRAVENOUS at 18:41

## 2018-10-09 RX ADMIN — METOCLOPRAMIDE 10 MG: 5 INJECTION, SOLUTION INTRAMUSCULAR; INTRAVENOUS at 21:44

## 2018-10-09 RX ADMIN — DULOXETINE HYDROCHLORIDE 60 MG: 60 CAPSULE, DELAYED RELEASE ORAL at 09:47

## 2018-10-09 RX ADMIN — Medication 10 ML: at 09:48

## 2018-10-09 RX ADMIN — ACETAMINOPHEN 1000 MG: 10 INJECTION, SOLUTION INTRAVENOUS at 09:48

## 2018-10-09 RX ADMIN — FUROSEMIDE 20 MG: 40 TABLET ORAL at 09:47

## 2018-10-09 RX ADMIN — METOCLOPRAMIDE 10 MG: 5 INJECTION, SOLUTION INTRAMUSCULAR; INTRAVENOUS at 09:48

## 2018-10-09 RX ADMIN — ALBUMIN (HUMAN) 12.5 G: 12.5 INJECTION, SOLUTION INTRAVENOUS at 18:41

## 2018-10-09 RX ADMIN — SODIUM CHLORIDE: 9 INJECTION, SOLUTION INTRAVENOUS at 02:12

## 2018-10-09 RX ADMIN — SIMETHICONE CHEW TAB 80 MG 80 MG: 80 TABLET ORAL at 09:47

## 2018-10-09 RX ADMIN — HYDROMORPHONE HYDROCHLORIDE 1 MG: 2 INJECTION INTRAMUSCULAR; INTRAVENOUS; SUBCUTANEOUS at 06:20

## 2018-10-09 RX ADMIN — SIMETHICONE CHEW TAB 80 MG 80 MG: 80 TABLET ORAL at 21:43

## 2018-10-09 RX ADMIN — SODIUM CHLORIDE 1000 ML: 9 INJECTION, SOLUTION INTRAVENOUS at 10:39

## 2018-10-09 RX ADMIN — BACLOFEN 10 MG: 10 TABLET ORAL at 15:54

## 2018-10-09 RX ADMIN — HYDROMORPHONE HYDROCHLORIDE 1 MG: 2 INJECTION INTRAMUSCULAR; INTRAVENOUS; SUBCUTANEOUS at 11:53

## 2018-10-09 RX ADMIN — ACETAMINOPHEN 1000 MG: 10 INJECTION, SOLUTION INTRAVENOUS at 02:07

## 2018-10-09 RX ADMIN — BUSPIRONE HYDROCHLORIDE 30 MG: 5 TABLET ORAL at 21:42

## 2018-10-09 RX ADMIN — Medication 400 MG: at 09:47

## 2018-10-09 RX ADMIN — METOCLOPRAMIDE 10 MG: 5 INJECTION, SOLUTION INTRAMUSCULAR; INTRAVENOUS at 02:07

## 2018-10-09 RX ADMIN — Medication 10 ML: at 21:45

## 2018-10-09 RX ADMIN — POTASSIUM CHLORIDE 20 MEQ: 1500 TABLET, EXTENDED RELEASE ORAL at 21:43

## 2018-10-09 RX ADMIN — HYDROMORPHONE HYDROCHLORIDE 1 MG: 2 INJECTION INTRAMUSCULAR; INTRAVENOUS; SUBCUTANEOUS at 09:47

## 2018-10-09 RX ADMIN — ATENOLOL 100 MG: 50 TABLET ORAL at 09:47

## 2018-10-09 RX ADMIN — ACETAMINOPHEN 1000 MG: 10 INJECTION, SOLUTION INTRAVENOUS at 22:10

## 2018-10-09 RX ADMIN — Medication 400 MG: at 21:43

## 2018-10-09 RX ADMIN — HYDROMORPHONE HYDROCHLORIDE 1 MG: 2 INJECTION INTRAMUSCULAR; INTRAVENOUS; SUBCUTANEOUS at 18:30

## 2018-10-09 RX ADMIN — HYDROMORPHONE HYDROCHLORIDE 1 MG: 2 INJECTION INTRAMUSCULAR; INTRAVENOUS; SUBCUTANEOUS at 15:59

## 2018-10-09 RX ADMIN — BUSPIRONE HYDROCHLORIDE 30 MG: 5 TABLET ORAL at 09:46

## 2018-10-09 ASSESSMENT — PAIN DESCRIPTION - ORIENTATION: ORIENTATION: LOWER

## 2018-10-09 ASSESSMENT — PAIN DESCRIPTION - ONSET: ONSET: ON-GOING

## 2018-10-09 ASSESSMENT — PAIN DESCRIPTION - FREQUENCY: FREQUENCY: CONTINUOUS

## 2018-10-09 ASSESSMENT — PAIN SCALES - GENERAL
PAINLEVEL_OUTOF10: 0
PAINLEVEL_OUTOF10: 7
PAINLEVEL_OUTOF10: 9
PAINLEVEL_OUTOF10: 7
PAINLEVEL_OUTOF10: 7
PAINLEVEL_OUTOF10: 9
PAINLEVEL_OUTOF10: 10
PAINLEVEL_OUTOF10: 9
PAINLEVEL_OUTOF10: 10
PAINLEVEL_OUTOF10: 10

## 2018-10-09 ASSESSMENT — PAIN DESCRIPTION - PROGRESSION: CLINICAL_PROGRESSION: GRADUALLY WORSENING

## 2018-10-09 ASSESSMENT — PAIN DESCRIPTION - PAIN TYPE: TYPE: SURGICAL PAIN

## 2018-10-09 ASSESSMENT — PAIN DESCRIPTION - DESCRIPTORS: DESCRIPTORS: BURNING

## 2018-10-09 ASSESSMENT — PAIN SCALES - WONG BAKER: WONGBAKER_NUMERICALRESPONSE: 0

## 2018-10-09 ASSESSMENT — PAIN DESCRIPTION - LOCATION: LOCATION: ABDOMEN

## 2018-10-09 NOTE — PROGRESS NOTES
[facility]    Surgery Progress Note           POD # 1    PATIENT NAME: Dianne Keller     TODAY'S DATE: 10/9/2018    SUBJECTIVE:    Pt  Alert, awake, surgery reviewed  No N/V, NO CP or SOB  Feels tired, some incisional pain     OBJECTIVE:   VITALS:  BP 98/70   Pulse 75   Temp 97.6 °F (36.4 °C) (Oral)   Resp 18   Ht 5' 6\" (1.676 m)   Wt 217 lb (98.4 kg)   SpO2 94%   BMI 35.02 kg/m²     INTAKE/OUTPUT:    I/O last 3 completed shifts: In: 7396 [I.V.:7396]  Out: 400 [Urine:250; Drains:150]  No intake/output data recorded. CONSTITUTIONAL:  awake and alert  LUNGS:  clear to auscultation  HEART: RRR  ABDOMEN:   hypoactive bowel sounds, soft, non-distended, tenderness noted diffusely   INCISION: clean, dry dressing  ELIAS fairly low output, mildly bloody appearing    Data:  CBC:   Recent Labs      10/08/18   0634  10/08/18   2010  10/09/18   0611   WBC  7.6  13.3*  15.9*   HGB  9.4*  8.8*  7.9*   HCT  28.9*  28.1*  24.6*   PLT  559*  471*  414     BMP:    Recent Labs      10/08/18   0634  10/08/18   2010  10/09/18   0611   NA  135*  128*  135*   K  2.7*  3.4*  3.9   CL  96*  97*  102   CO2  26  20*  20*   BUN  3*  5*  9   CREATININE  <0.5*  0.6  1.0   GLUCOSE  104*  134*  120*     Hepatic:   Recent Labs      10/07/18   0631   AST  9*   ALT  <5*   BILITOT  0.3   ALKPHOS  88     Mag:      Recent Labs      10/08/18   0634   MG  1.30*      Phos:   No results for input(s): PHOS in the last 72 hours. INR: No results for input(s): INR in the last 72 hours.     Radiology Review:  None    ASSESSMENT AND PLAN:  59 y.o. female status post ex lap, sigmoid colectomy, end colostomy, mob spl flexure  Colon obstruction, Crohn's, pelvic abscess    Cont IV, needs addn IVF, leave elliott today  Cont IV atbx  Follow drain output / amount, consistency  OOB, IS, po ice chips, not ready for diet yet  BID dressing changes, possible wound closure later this week    FU labs in [place]

## 2018-10-09 NOTE — PROGRESS NOTES
are highly concerning for malignancy. Soft tissue tract extending from thickened sigmoid colon, inseparable from the urinary bladder, vaginal cuff, and small bowel in the pelvis. Small soft tissue nodules within the left anterior pelvis and along the distal descending colon. Otherwise no concerning intra-abdominal adenopathy. Large hiatal hernia. Ct Abdomen Pelvis W Iv Contrast Additional Contrast? None    Result Date: 9/26/2018  EXAMINATION: CT OF THE ABDOMEN AND PELVIS WITH CONTRAST 9/26/2018 3:32 am TECHNIQUE: CT of the abdomen and pelvis was performed with the administration of intravenous contrast. Multiplanar reformatted images are provided for review. Dose modulation, iterative reconstruction, and/or weight based adjustment of the mA/kV was utilized to reduce the radiation dose to as low as reasonably achievable. COMPARISON: CT abdomen pelvis 08/02/2018, 06/05/2018, 05/20/2018 HISTORY: ORDERING SYSTEM PROVIDED HISTORY: LLQ pain, fever TECHNOLOGIST PROVIDED HISTORY: If patient is on cardiac monitor and/or pulse ox, they may be taken off cardiac monitor and pulse ox, left on O2 if currently on. All monitors reattached when patient returns to room. Additional Contrast?->None Ordering Physician Provided Reason for Exam: abdominal pain Acuity: Unknown Type of Exam: Unknown Relevant Medical/Surgical History: (LLQ abdominal pain for weeks, now with fever and worsening pain. hx chrons. + dizziness) FINDINGS: ABDOMEN Liver: Unchanged too small to characterize hypodensity at the right hepatic dome and along the falciform ligament, the latter of which has fluctuated in size and probably represents focal fatty infiltration. Gallbladder: Normal. Biliary: No intra or extrahepatic bile duct dilatation. Pancreas: Normal. Spleen: Surgically absent. Adrenals: Normal. Kidneys: Unchanged exophytic right renal simple cyst at the inferior pole. Additional too small to characterize renal hypodensities are unchanged.   No SYSTEM PROVIDED HISTORY: SOB TECHNOLOGIST PROVIDED HISTORY: Reason for exam:->SOB Ordering Physician Provided Reason for Exam: Fever,shortness of breath, and dizziness Acuity: Acute Type of Exam: Initial Relevant Medical/Surgical History: Previous pneumonia and hypertension FINDINGS: There is a large hiatal hernia. There is chronic scarring in the left lung base. There is no new acute consolidation. The cardiac silhouette is unchanged. There is a left shoulder arthroplasty. No acute abnormality. Large hiatal hernia. Assessment and Plan:  Patient Active Hospital Problem List:   Crohn's colitis [facility]) (1/6/2018)    Assessment: Established problem. Improving. Doing well s/p Ex Lap, SHAILESH, Sigmoid colectomy, with end colostomy, mobilization of splenic flexure, closure of rectal stump     Plan: cont mgmt per surgery. I increased pain meds to iv dilaudid overnight   Essential hypertension, benign (9/13/2015)    Assessment: Established problem. Stable. 98/70. Plan: watch closely. Could be lower due to anemia vs narcotics. Hold parameters placed on bp meds   Anemia (1/5/2018)    Assessment: Established problem. Uncontrolled. Acute loss from surgery, hgb 7.9    Plan: No indication for transfusion. Cont to monitor h/h to assess progression of anemia. Recommend ferrous sulfate or MVI as outpatient. Will transfuse if less than 7. Hyponatremia (1/5/2018)    Assessment: Established problem. Stable. Na 135    Plan: cont fluids   Omental mass ()    Assessment: New Problem to me.   Seen in surgery    Plan: await bx              Vladimir Verma  10/9/2018

## 2018-10-09 NOTE — PROGRESS NOTES
Patient returned from surgery at this time. Report bedside hand off received from PACU nurse. Dressing to abdomen clean dry and intact. ELIAS drain noted with minimal output. Patient resting comfortably at this time. Denies needs at present. Will monitor.

## 2018-10-09 NOTE — CARE COORDINATION
Ostomy Referral Progress Note      NAME:  Vanda Pittman [facility]  MEDICAL RECORD NUMBER:  [id number]  AGE: 59 y.o. GENDER:  female  :  1955  TODAY'S DATE:  10/9/2018    Suzanne Atkinson is a 59 y.o. female referred by:   [x] Physician  [x] Nursing  [] Other:     Pt S/P Ex Lap, SHAILESH, Sigmoid colectomy, with end colostomy, mobilization of splenic flexure, closure of rectal stump    Surgeon Judson Juarez    PAST MEDICAL HISTORY:        Diagnosis Date    Arthritis     Blood transfusion reaction     Crohn's disease ([facility] [address]     GERD (gastroesophageal reflux disease)     Hiatal hernia 2014    Hypertension     MRSA (methicillin resistant staph aureus) culture positive 2018    urine    Pneumonia 2014       MEDICATIONS:    No current facility-administered medications on file prior to encounter.       Current Outpatient Prescriptions on File Prior to Encounter   Medication Sig Dispense Refill    sodium chloride 1 g tablet Take 1 tablet by mouth 2 times daily 120 tablet 3    rivaroxaban (XARELTO) 20 MG TABS tablet Take 1 tablet by mouth daily (with breakfast) 30 tablet 0    magnesium oxide (MAG-OX) 400 (240 Mg) MG tablet Take 1 tablet by mouth 2 times daily 60 tablet 1    atenolol (TENORMIN) 100 MG tablet Take 1 tablet by mouth daily 30 tablet 3    furosemide (LASIX) 20 MG tablet Take 1 tablet by mouth daily (Patient taking differently: Take 20 mg by mouth 2 times daily ) 60 tablet 3    mesalamine (ROWASA) 4 g enema Place 60 mLs rectally nightly 1 enema 3    mesalamine (LIALDA) 1.2 g EC tablet Take 4 tablets by mouth daily (with breakfast) 30 tablet 3    fluticasone (FLONASE) 50 MCG/ACT nasal spray 1 spray by Nasal route daily 1 Bottle 3    DULoxetine (CYMBALTA) 60 MG extended release capsule Take 60 mg by mouth daily      lidocaine (XYLOCAINE) 5 % ointment Apply topically as needed Apply topically as needed to feet      zolpidem piece;Clean;Dry; Intact 10/9/2018 10:23 AM   Stoma  Assessment Pink;Moist;Protrudes 10/9/2018 10:23 AM   Peristomal Assessment Clean; Intact 10/9/2018 12:00 AM   Treatment Bag change 10/8/2018  8:04 PM   Stool Appearance Watery 10/9/2018 10:23 AM   Stool Color Other (Comment) 10/9/2018 10:23 AM   Output (mL) 0 ml 10/9/2018  6:49 AM   Number of days: 0            Pt seen. Stoma is pink and moist with serosang effluent. No leaking noted. Will plan pouch change tomorrow afternoon. Intake/Output Summary (Last 24 hours) at 10/09/18 1023  Last data filed at 10/09/18 0649   Gross per 24 hour   Intake             7396 ml   Output              400 ml   Net             6996 ml         Plan   Plan for Ostomy Care:     Will continue to follow for educational needs    Ostomy Plan of Care  [] Supplies/Instructions left in room  [] Patient using home supplies  [] Brand/supplies at bedside   [] Current pouching system     Current Diet: Diet NPO Effective Now Exceptions are: Ice Chips, Sips with Meds, Popsicles  Dietician consult:  No    Discharge Plan:  Placement for patient upon discharge: home with support    Outpatient visit plan No  Supplies given No   Samples requested No    Referrals:  []   [] [facility]  [] Supplies  [] Other      Patient/Caregiver Teaching:  Written Instructions given to patient/family  Teaching provided:  [] Reviewed GI and A&P        [] Supplies  [] Pouch emptying      [] Manipulate closure  [] Routine Care         [] Comment  [] Pouch maintenance           Level of patient/caregiver understanding able to:  [] Indicates understanding       [] Needs reinforcement  [] Unsuccessful      [] Verbal Understanding  [] Demonstrated understanding       [] No evidence of learning  [] Refused teaching         [x] N/A    Electronically signed by Dustin Durand on 10/9/2018 at 10:23 AM

## 2018-10-10 LAB
A/G RATIO: 0.6 (ref 1.1–2.2)
ALBUMIN SERPL-MCNC: 2.5 G/DL (ref 3.4–5)
ALP BLD-CCNC: 80 U/L (ref 40–129)
ALT SERPL-CCNC: <5 U/L (ref 10–40)
ANION GAP SERPL CALCULATED.3IONS-SCNC: 11 MMOL/L (ref 3–16)
AST SERPL-CCNC: 19 U/L (ref 15–37)
BASOPHILS ABSOLUTE: 0 K/UL (ref 0–0.2)
BASOPHILS RELATIVE PERCENT: 0.3 %
BILIRUB SERPL-MCNC: 0.5 MG/DL (ref 0–1)
BLOOD BANK DISPENSE STATUS: NORMAL
BLOOD BANK PRODUCT CODE: NORMAL
BPU ID: NORMAL
BUN BLDV-MCNC: 10 MG/DL (ref 7–20)
CALCIUM SERPL-MCNC: 7.8 MG/DL (ref 8.3–10.6)
CHLORIDE BLD-SCNC: 104 MMOL/L (ref 99–110)
CO2: 18 MMOL/L (ref 21–32)
CREAT SERPL-MCNC: 0.7 MG/DL (ref 0.6–1.2)
DESCRIPTION BLOOD BANK: NORMAL
EOSINOPHILS ABSOLUTE: 0.2 K/UL (ref 0–0.6)
EOSINOPHILS RELATIVE PERCENT: 1.4 %
GFR AFRICAN AMERICAN: >60
GFR NON-AFRICAN AMERICAN: >60
GLOBULIN: 4 G/DL
GLUCOSE BLD-MCNC: 163 MG/DL (ref 70–99)
HCT VFR BLD CALC: 26.7 % (ref 36–48)
HEMOGLOBIN: 8.3 G/DL (ref 12–16)
LYMPHOCYTES ABSOLUTE: 2.1 K/UL (ref 1–5.1)
LYMPHOCYTES RELATIVE PERCENT: 16.7 %
MAGNESIUM: 1.8 MG/DL (ref 1.8–2.4)
MCH RBC QN AUTO: 26.7 PG (ref 26–34)
MCHC RBC AUTO-ENTMCNC: 31.3 G/DL (ref 31–36)
MCV RBC AUTO: 85.3 FL (ref 80–100)
MONOCYTES ABSOLUTE: 0.5 K/UL (ref 0–1.3)
MONOCYTES RELATIVE PERCENT: 4.1 %
NEUTROPHILS ABSOLUTE: 9.6 K/UL (ref 1.7–7.7)
NEUTROPHILS RELATIVE PERCENT: 77.5 %
PDW BLD-RTO: 17.7 % (ref 12.4–15.4)
PLATELET # BLD: 440 K/UL (ref 135–450)
PMV BLD AUTO: 6.6 FL (ref 5–10.5)
POTASSIUM SERPL-SCNC: 4.4 MMOL/L (ref 3.5–5.1)
RBC # BLD: 3.13 M/UL (ref 4–5.2)
SODIUM BLD-SCNC: 133 MMOL/L (ref 136–145)
TOTAL PROTEIN: 6.5 G/DL (ref 6.4–8.2)
WBC # BLD: 12.4 K/UL (ref 4–11)

## 2018-10-10 PROCEDURE — G8988 SELF CARE GOAL STATUS: HCPCS

## 2018-10-10 PROCEDURE — 97530 THERAPEUTIC ACTIVITIES: CPT

## 2018-10-10 PROCEDURE — G8978 MOBILITY CURRENT STATUS: HCPCS

## 2018-10-10 PROCEDURE — APPNB30 APP NON BILLABLE TIME 0-30 MINS: Performed by: NURSE PRACTITIONER

## 2018-10-10 PROCEDURE — G8979 MOBILITY GOAL STATUS: HCPCS

## 2018-10-10 PROCEDURE — 6370000000 HC RX 637 (ALT 250 FOR IP): Performed by: INTERNAL MEDICINE

## 2018-10-10 PROCEDURE — 2580000003 HC RX 258: Performed by: INTERNAL MEDICINE

## 2018-10-10 PROCEDURE — 80053 COMPREHEN METABOLIC PANEL: CPT

## 2018-10-10 PROCEDURE — 97165 OT EVAL LOW COMPLEX 30 MIN: CPT

## 2018-10-10 PROCEDURE — 99024 POSTOP FOLLOW-UP VISIT: CPT | Performed by: SURGERY

## 2018-10-10 PROCEDURE — 36415 COLL VENOUS BLD VENIPUNCTURE: CPT

## 2018-10-10 PROCEDURE — P9016 RBC LEUKOCYTES REDUCED: HCPCS

## 2018-10-10 PROCEDURE — 6370000000 HC RX 637 (ALT 250 FOR IP): Performed by: SURGERY

## 2018-10-10 PROCEDURE — 6360000002 HC RX W HCPCS: Performed by: NURSE PRACTITIONER

## 2018-10-10 PROCEDURE — 97161 PT EVAL LOW COMPLEX 20 MIN: CPT

## 2018-10-10 PROCEDURE — 85025 COMPLETE CBC W/AUTO DIFF WBC: CPT

## 2018-10-10 PROCEDURE — 1200000000 HC SEMI PRIVATE

## 2018-10-10 PROCEDURE — 2500000003 HC RX 250 WO HCPCS: Performed by: NURSE PRACTITIONER

## 2018-10-10 PROCEDURE — 6360000002 HC RX W HCPCS: Performed by: SURGERY

## 2018-10-10 PROCEDURE — 36430 TRANSFUSION BLD/BLD COMPNT: CPT

## 2018-10-10 PROCEDURE — G8987 SELF CARE CURRENT STATUS: HCPCS

## 2018-10-10 PROCEDURE — 2580000003 HC RX 258: Performed by: SURGERY

## 2018-10-10 PROCEDURE — 83735 ASSAY OF MAGNESIUM: CPT

## 2018-10-10 PROCEDURE — APPSS15 APP SPLIT SHARED TIME 0-15 MINUTES: Performed by: NURSE PRACTITIONER

## 2018-10-10 RX ORDER — DEXTROSE, SODIUM CHLORIDE, AND POTASSIUM CHLORIDE 5; .45; .15 G/100ML; G/100ML; G/100ML
INJECTION INTRAVENOUS CONTINUOUS
Status: DISCONTINUED | OUTPATIENT
Start: 2018-10-10 | End: 2018-10-13

## 2018-10-10 RX ORDER — KETOROLAC TROMETHAMINE 15 MG/ML
15 INJECTION, SOLUTION INTRAMUSCULAR; INTRAVENOUS EVERY 6 HOURS PRN
Status: DISCONTINUED | OUTPATIENT
Start: 2018-10-10 | End: 2018-10-10 | Stop reason: CLARIF

## 2018-10-10 RX ORDER — KETOROLAC TROMETHAMINE 15 MG/ML
15 INJECTION, SOLUTION INTRAMUSCULAR; INTRAVENOUS EVERY 6 HOURS PRN
Status: DISCONTINUED | OUTPATIENT
Start: 2018-10-10 | End: 2018-10-10

## 2018-10-10 RX ORDER — FLUCONAZOLE 2 MG/ML
200 INJECTION, SOLUTION INTRAVENOUS ONCE
Status: COMPLETED | OUTPATIENT
Start: 2018-10-10 | End: 2018-10-10

## 2018-10-10 RX ORDER — FLUCONAZOLE 2 MG/ML
200 INJECTION, SOLUTION INTRAVENOUS EVERY 24 HOURS
Status: DISCONTINUED | OUTPATIENT
Start: 2018-10-11 | End: 2018-10-11

## 2018-10-10 RX ORDER — FLUCONAZOLE 2 MG/ML
400 INJECTION, SOLUTION INTRAVENOUS ONCE
Status: DISCONTINUED | OUTPATIENT
Start: 2018-10-10 | End: 2018-10-10 | Stop reason: DRUGHIGH

## 2018-10-10 RX ORDER — FLUCONAZOLE 2 MG/ML
200 INJECTION, SOLUTION INTRAVENOUS EVERY 24 HOURS
Status: DISCONTINUED | OUTPATIENT
Start: 2018-10-10 | End: 2018-10-10

## 2018-10-10 RX ADMIN — KETOROLAC TROMETHAMINE 15 MG: 15 INJECTION, SOLUTION INTRAMUSCULAR; INTRAVENOUS at 09:37

## 2018-10-10 RX ADMIN — ATENOLOL 100 MG: 50 TABLET ORAL at 08:41

## 2018-10-10 RX ADMIN — BACLOFEN 10 MG: 10 TABLET ORAL at 08:40

## 2018-10-10 RX ADMIN — PHENAZOPYRIDINE HYDROCHLORIDE 200 MG: 100 TABLET ORAL at 12:34

## 2018-10-10 RX ADMIN — Medication 10 ML: at 20:37

## 2018-10-10 RX ADMIN — TAZOBACTAM SODIUM AND PIPERACILLIN SODIUM 4.5 G: 500; 4 INJECTION, SOLUTION INTRAVENOUS at 06:20

## 2018-10-10 RX ADMIN — ACETAMINOPHEN 1000 MG: 10 INJECTION, SOLUTION INTRAVENOUS at 14:04

## 2018-10-10 RX ADMIN — POTASSIUM CHLORIDE, DEXTROSE MONOHYDRATE AND SODIUM CHLORIDE: 150; 5; 450 INJECTION, SOLUTION INTRAVENOUS at 09:14

## 2018-10-10 RX ADMIN — TAZOBACTAM SODIUM AND PIPERACILLIN SODIUM 4.5 G: 500; 4 INJECTION, SOLUTION INTRAVENOUS at 12:34

## 2018-10-10 RX ADMIN — HYDROMORPHONE HYDROCHLORIDE 1 MG: 2 INJECTION INTRAMUSCULAR; INTRAVENOUS; SUBCUTANEOUS at 22:47

## 2018-10-10 RX ADMIN — METOCLOPRAMIDE 10 MG: 5 INJECTION, SOLUTION INTRAMUSCULAR; INTRAVENOUS at 08:41

## 2018-10-10 RX ADMIN — Medication 10 ML: at 08:41

## 2018-10-10 RX ADMIN — FLUCONAZOLE IN SODIUM CHLORIDE 200 MG: 2 INJECTION, SOLUTION INTRAVENOUS at 09:28

## 2018-10-10 RX ADMIN — PANTOPRAZOLE SODIUM 40 MG: 40 TABLET, DELAYED RELEASE ORAL at 06:21

## 2018-10-10 RX ADMIN — SIMETHICONE CHEW TAB 80 MG 80 MG: 80 TABLET ORAL at 20:37

## 2018-10-10 RX ADMIN — Medication 10 ML: at 01:33

## 2018-10-10 RX ADMIN — PHENAZOPYRIDINE HYDROCHLORIDE 200 MG: 100 TABLET ORAL at 08:40

## 2018-10-10 RX ADMIN — BUSPIRONE HYDROCHLORIDE 30 MG: 5 TABLET ORAL at 20:36

## 2018-10-10 RX ADMIN — HYDROMORPHONE HYDROCHLORIDE 1 MG: 2 INJECTION INTRAMUSCULAR; INTRAVENOUS; SUBCUTANEOUS at 01:37

## 2018-10-10 RX ADMIN — PHENAZOPYRIDINE HYDROCHLORIDE 200 MG: 100 TABLET ORAL at 17:54

## 2018-10-10 RX ADMIN — POTASSIUM CHLORIDE 20 MEQ: 1500 TABLET, EXTENDED RELEASE ORAL at 20:37

## 2018-10-10 RX ADMIN — FLUCONAZOLE IN SODIUM CHLORIDE 200 MG: 2 INJECTION, SOLUTION INTRAVENOUS at 11:17

## 2018-10-10 RX ADMIN — TAZOBACTAM SODIUM AND PIPERACILLIN SODIUM 4.5 G: 500; 4 INJECTION, SOLUTION INTRAVENOUS at 17:54

## 2018-10-10 RX ADMIN — SODIUM CHLORIDE: 9 INJECTION, SOLUTION INTRAVENOUS at 01:33

## 2018-10-10 RX ADMIN — METOCLOPRAMIDE 10 MG: 5 INJECTION, SOLUTION INTRAMUSCULAR; INTRAVENOUS at 20:36

## 2018-10-10 RX ADMIN — METOCLOPRAMIDE 10 MG: 5 INJECTION, SOLUTION INTRAMUSCULAR; INTRAVENOUS at 14:03

## 2018-10-10 RX ADMIN — Medication 400 MG: at 20:37

## 2018-10-10 RX ADMIN — SIMETHICONE CHEW TAB 80 MG 80 MG: 80 TABLET ORAL at 08:40

## 2018-10-10 RX ADMIN — HYDROMORPHONE HYDROCHLORIDE 1 MG: 2 INJECTION INTRAMUSCULAR; INTRAVENOUS; SUBCUTANEOUS at 17:54

## 2018-10-10 RX ADMIN — HYDROMORPHONE HYDROCHLORIDE 1 MG: 2 INJECTION INTRAMUSCULAR; INTRAVENOUS; SUBCUTANEOUS at 08:41

## 2018-10-10 RX ADMIN — ACETAMINOPHEN 1000 MG: 10 INJECTION, SOLUTION INTRAVENOUS at 01:36

## 2018-10-10 RX ADMIN — HYDROMORPHONE HYDROCHLORIDE 1 MG: 2 INJECTION INTRAMUSCULAR; INTRAVENOUS; SUBCUTANEOUS at 11:17

## 2018-10-10 RX ADMIN — FUROSEMIDE 20 MG: 40 TABLET ORAL at 08:41

## 2018-10-10 RX ADMIN — HYDROMORPHONE HYDROCHLORIDE 1 MG: 2 INJECTION INTRAMUSCULAR; INTRAVENOUS; SUBCUTANEOUS at 14:03

## 2018-10-10 RX ADMIN — DULOXETINE HYDROCHLORIDE 60 MG: 60 CAPSULE, DELAYED RELEASE ORAL at 08:42

## 2018-10-10 RX ADMIN — BACLOFEN 10 MG: 10 TABLET ORAL at 20:37

## 2018-10-10 RX ADMIN — BUSPIRONE HYDROCHLORIDE 30 MG: 5 TABLET ORAL at 08:40

## 2018-10-10 RX ADMIN — POTASSIUM CHLORIDE, DEXTROSE MONOHYDRATE AND SODIUM CHLORIDE: 150; 5; 450 INJECTION, SOLUTION INTRAVENOUS at 17:54

## 2018-10-10 RX ADMIN — Medication 10 ML: at 01:37

## 2018-10-10 RX ADMIN — BACLOFEN 10 MG: 10 TABLET ORAL at 14:04

## 2018-10-10 RX ADMIN — POTASSIUM CHLORIDE 20 MEQ: 1500 TABLET, EXTENDED RELEASE ORAL at 08:41

## 2018-10-10 RX ADMIN — ACETAMINOPHEN 1000 MG: 10 INJECTION, SOLUTION INTRAVENOUS at 09:13

## 2018-10-10 RX ADMIN — METOCLOPRAMIDE 10 MG: 5 INJECTION, SOLUTION INTRAMUSCULAR; INTRAVENOUS at 01:33

## 2018-10-10 ASSESSMENT — PAIN DESCRIPTION - FREQUENCY
FREQUENCY: INTERMITTENT
FREQUENCY: INTERMITTENT
FREQUENCY: CONTINUOUS

## 2018-10-10 ASSESSMENT — PAIN DESCRIPTION - LOCATION
LOCATION: ABDOMEN

## 2018-10-10 ASSESSMENT — PAIN DESCRIPTION - PAIN TYPE
TYPE: SURGICAL PAIN

## 2018-10-10 ASSESSMENT — PAIN SCALES - GENERAL
PAINLEVEL_OUTOF10: 10
PAINLEVEL_OUTOF10: 10
PAINLEVEL_OUTOF10: 7
PAINLEVEL_OUTOF10: 9
PAINLEVEL_OUTOF10: 10
PAINLEVEL_OUTOF10: 10
PAINLEVEL_OUTOF10: 7
PAINLEVEL_OUTOF10: 9
PAINLEVEL_OUTOF10: 10
PAINLEVEL_OUTOF10: 8
PAINLEVEL_OUTOF10: 9
PAINLEVEL_OUTOF10: 9
PAINLEVEL_OUTOF10: 3
PAINLEVEL_OUTOF10: 0

## 2018-10-10 ASSESSMENT — PAIN DESCRIPTION - ONSET
ONSET: ON-GOING
ONSET: ON-GOING

## 2018-10-10 ASSESSMENT — PAIN DESCRIPTION - DESCRIPTORS
DESCRIPTORS: BURNING
DESCRIPTORS: SHARP
DESCRIPTORS: SHARP

## 2018-10-10 ASSESSMENT — PAIN SCALES - WONG BAKER
WONGBAKER_NUMERICALRESPONSE: 0

## 2018-10-10 ASSESSMENT — PAIN DESCRIPTION - PROGRESSION: CLINICAL_PROGRESSION: NOT CHANGED

## 2018-10-10 ASSESSMENT — PAIN DESCRIPTION - ORIENTATION
ORIENTATION: LOWER;MID;LEFT
ORIENTATION: LEFT
ORIENTATION: LOWER
ORIENTATION: LOWER

## 2018-10-10 ASSESSMENT — PAIN DESCRIPTION - DIRECTION: RADIATING_TOWARDS: LEFT SIDE

## 2018-10-10 NOTE — PROGRESS NOTES
38.1  Mobility Inpatient CMS 0-100% Score: 61.29  Mobility Inpatient CMS G-Code Modifier : CL          Goals  Short term goals  Time Frame for Short term goals: Pt to meet STG prior to discharge. Short term goal 1: Pt to demonstrate all bed mobility with supervision. Short term goal 2: Pt to demonstrate sit <> stand with RW and supervision. Short term goal 3: Pt to demonstrate amb > 50' with RW and min A. Short term goal 4: Pt to demonstrate ascend/descend 1 curb step with RW and min A. Therapy Time   Individual Concurrent Group Co-treatment   Time In 1019         Time Out 1100         Minutes 41         Timed Code Treatment Minutes: [address], SPT  PT providing direct supervision during session and assisting in making skilled judgements throughout session.   Ankit Rinaldi, PT, DPT, [id number]

## 2018-10-10 NOTE — PROGRESS NOTES
Blood vitals obtained. Vital signs stable. Pt denies s/s of reaction. Pt denies pain at this time. Call light in hand. Bed alarm set. Will continue to monitor.  Electronically signed by Alex Soto RN on 10/10/2018 at 1:49 AM

## 2018-10-10 NOTE — PROGRESS NOTES
PT with pt, BP slightly low. Explained to pt that elliott has to be removed and pt is hesitant about getting it removed because of having to get up out of bed to use the bathroom. Will talk to her again about it later.

## 2018-10-10 NOTE — PROGRESS NOTES
busPIRone  30 mg Oral BID    potassium chloride  20 mEq Oral BID    DULoxetine  60 mg Oral Daily    fluticasone  1 spray Nasal Daily    mesalamine  4,800 mg Oral Daily with breakfast    baclofen  10 mg Oral TID    magnesium oxide  400 mg Oral BID    atenolol  100 mg Oral Daily    furosemide  20 mg Oral Daily    sodium chloride flush  10 mL Intravenous 2 times per day    phenazopyridine  200 mg Oral TID WC    simethicone  80 mg Oral Q6H    pantoprazole  40 mg Oral QAM AC     Continuous Infusions:   dextrose 5% and 0.45% NaCl with KCl 20 mEq 125 mL/hr at 10/10/18 0914     PRN Meds:.ketorolac, HYDROmorphone, zolpidem, aspirin-acetaminophen-caffeine, ondansetron, potassium chloride **OR** potassium bicarb-citric acid **OR** potassium chloride, sodium chloride flush, magnesium hydroxide, ondansetron, hydrALAZINE, LORazepam      Assessment:  59 y.o. female admitted with   1. Colitis        Status-post exploratory laparotomy, lysis of adhesions, sigmoid colectomy with end colostomy, mobilization of splenic flexure,  closure of rectal stump, and resection of mesenteric mass, 6 cm in size on 10/8/2018 for Colon obstruction, Crohn's, and possible  diverticulitis with perforation and pericolonic abscess in pelvis and mesenteric mass  Electrolyte derangement      Plan:  1. Continue wet-to-dry dressing changes BID to abdominal wound; possible secondary closure later this week  2. Advance to clear liquid diet as tolerated  3. IV hydration; monitor and replace electrolytes as needed  4. Antibiotics; add Diflucan for Candida albicans per cultures, follow WBC  5. Activity as tolerated, ambulate TID  6. Pulmonary toilet, incentive spirometry, wean oxygen as tolerated  7. PRN analgesics and antiemetics--minimizing narcotics as tolerated, add Toradol  8. DVT prophylaxis with SCD's  9.  Management of medical comorbid etiologies per primary team and consulting services    EDUCATION:  Educated patient on plan of care and

## 2018-10-10 NOTE — CARE COORDINATION
to:  [] Indicates understanding       [x] Needs reinforcement  [] Unsuccessful      [] Verbal Understanding  [] Demonstrated understanding       [] No evidence of learning  [] Refused teaching         [] N/A    Electronically signed by Carina Almeida on 10/10/2018 at 4:17 PM

## 2018-10-10 NOTE — PROGRESS NOTES
injection 4 mg, 4 mg, Intravenous, Q6H PRN  phenazopyridine (PYRIDIUM) tablet 200 mg, 200 mg, Oral, TID WC  simethicone (MYLICON) chewable tablet 80 mg, 80 mg, Oral, Q6H  pantoprazole (PROTONIX) tablet 40 mg, 40 mg, Oral, QAM AC  hydrALAZINE (APRESOLINE) injection 10 mg, 10 mg, Intravenous, Q6H PRN  LORazepam (ATIVAN) injection 1 mg, 1 mg, Intravenous, Q6H PRN         Objective:  /87   Pulse 77   Temp 98.6 °F (37 °C) (Oral)   Resp 18   Ht 5' 6\" (1.676 m)   Wt 217 lb (98.4 kg)   SpO2 96%   BMI 35.02 kg/m²      Patient Vitals for the past 24 hrs:   BP Temp Temp src Pulse Resp SpO2   10/10/18 0745 117/87 98.6 °F (37 °C) Oral 77 18 96 %   10/10/18 0522 120/81 98.2 °F (36.8 °C) Oral 64 18 95 %   10/10/18 0411 (!) 122/90 97.8 °F (36.6 °C) Axillary 60 16 94 %   10/10/18 0148 127/84 98.7 °F (37.1 °C) Oral 72 16 93 %   10/10/18 0130 - - - - - 91 %   10/10/18 0125 85/63 98.3 °F (36.8 °C) Oral 78 16 (!) 86 %   10/09/18 2140 109/72 98.2 °F (36.8 °C) Oral 78 18 95 %   10/09/18 1930 110/67 - - - - -   10/09/18 1829 - - - - - 95 %   10/09/18 1715 108/81 97.6 °F (36.4 °C) Oral 83 18 -   10/09/18 1417 109/75 97.9 °F (36.6 °C) Oral 76 18 -   10/09/18 1215 110/82 97.9 °F (36.6 °C) Oral 81 18 92 %   10/09/18 0941 111/76 97.3 °F (36.3 °C) Oral 85 16 92 %     Patient Vitals for the past 96 hrs (Last 3 readings):   Weight   10/07/18 0618 217 lb (98.4 kg)           Intake/Output Summary (Last 24 hours) at 10/10/18 0820  Last data filed at 10/10/18 9159   Gross per 24 hour   Intake             1944 ml   Output              875 ml   Net             1069 ml         Physical Exam:   S1, S2 normal, no murmur, rub or gallop, regular rate and rhythm  clear to auscultation bilaterally  Appropriately tender.  BS+  extremities normal, atraumatic, no cyanosis or edema    Labs:  Lab Results   Component Value Date    WBC 15.9 (H) 10/09/2018    HGB 7.4 (L) 10/09/2018    HCT 23.1 (L) 10/09/2018     10/09/2018    CHOL 151 02/06/2018 TRIG 57 02/06/2018    HDL 81 (H) 02/06/2018    ALT <5 (L) 10/07/2018    AST 9 (L) 10/07/2018     (L) 10/09/2018    K 3.9 10/09/2018     10/09/2018    CREATININE 1.0 10/09/2018    BUN 9 10/09/2018    CO2 20 (L) 10/09/2018    INR 1.33 (H) 09/26/2018    LABMICR YES 10/07/2018     Lab Results   Component Value Date    TROPONINI <0.01 10/07/2018       Recent Imaging Results are Reviewed:  Ct Abdomen Pelvis W Iv Contrast Additional Contrast? None    Result Date: 10/7/2018  EXAMINATION: CT OF THE ABDOMEN AND PELVIS WITH CONTRAST 10/7/2018 7:30 am TECHNIQUE: CT of the abdomen and pelvis was performed with the administration of intravenous contrast. Multiplanar reformatted images are provided for review. Dose modulation, iterative reconstruction, and/or weight based adjustment of the mA/kV was utilized to reduce the radiation dose to as low as reasonably achievable. COMPARISON: CT abdomen and pelvis with contrast 09/26/2018 HISTORY: ORDERING SYSTEM PROVIDED HISTORY: ABDOMINAL PAIN TECHNOLOGIST PROVIDED HISTORY: Additional Contrast?->None Ordering Physician Provided Reason for Exam: abd pain FINDINGS: Lower Chest: Mild left basilar atelectasis. Organs: Stable hypoattenuation along level of falciform ligament, likely focal steatosis. A faint subcentimeter hypodense lesion posteriorly in the right hepatic lobe is unchanged and too small to characterize. There is no evidence of biliary ductal dilation. The gallbladder is unremarkable. Splenic atrophy. The adrenal glands are unremarkable without evidence of mass. The pancreas is unremarkable. 3.6 cm near fluid density lesion of the right renal lower pole most likely represents a cyst.  There is mild bilateral renal cortical scarring. No hydronephrosis or hydroureter. No evidence of urolithiasis. Imaging through the pelvis limited by streak artifact from right hip prosthesis. There is diffuse urinary bladder wall thickening.   No definite gas within the urinary

## 2018-10-10 NOTE — PROGRESS NOTES
Co-treatment   Time In 1020         Time Out 1100         Minutes 40               Timed Code Treatment Minutes:  25    Total Treatment Minutes:  [address] [facility], [address], OTR/L, [id number]

## 2018-10-10 NOTE — PLAN OF CARE
Problem: Pain:  Goal: Control of acute pain  Control of acute pain   Outcome: Ongoing    Goal: Control of chronic pain  Control of chronic pain   Outcome: Ongoing      Problem: Falls - Risk of:  Goal: Absence of physical injury  Absence of physical injury   Outcome: Ongoing      Problem: Infection - Methicillin-Resistant Staphylococcus Aureus Infection:  Goal: Absence of methicillin-resistant Staphylococcus aureus infection  Absence of methicillin-resistant Staphylococcus aureus infection   Outcome: Ongoing      Problem: Risk for Impaired Skin Integrity  Goal: Tissue integrity - skin and mucous membranes  Structural intactness and normal physiological function of skin and  mucous membranes.    Outcome: Ongoing

## 2018-10-11 LAB
ANION GAP SERPL CALCULATED.3IONS-SCNC: 11 MMOL/L (ref 3–16)
BASOPHILS ABSOLUTE: 0 K/UL (ref 0–0.2)
BASOPHILS RELATIVE PERCENT: 0.1 %
BLOOD BANK DISPENSE STATUS: NORMAL
BLOOD BANK PRODUCT CODE: NORMAL
BPU ID: NORMAL
BUN BLDV-MCNC: 10 MG/DL (ref 7–20)
CALCIUM SERPL-MCNC: 8.5 MG/DL (ref 8.3–10.6)
CHLORIDE BLD-SCNC: 104 MMOL/L (ref 99–110)
CO2: 17 MMOL/L (ref 21–32)
CREAT SERPL-MCNC: 0.8 MG/DL (ref 0.6–1.2)
DESCRIPTION BLOOD BANK: NORMAL
EOSINOPHILS ABSOLUTE: 0.2 K/UL (ref 0–0.6)
EOSINOPHILS RELATIVE PERCENT: 1 %
GFR AFRICAN AMERICAN: >60
GFR NON-AFRICAN AMERICAN: >60
GLUCOSE BLD-MCNC: 132 MG/DL (ref 70–99)
HCT VFR BLD CALC: 23.8 % (ref 36–48)
HEMOGLOBIN: 7.4 G/DL (ref 12–16)
LYMPHOCYTES ABSOLUTE: 1.8 K/UL (ref 1–5.1)
LYMPHOCYTES RELATIVE PERCENT: 9.1 %
MCH RBC QN AUTO: 26.6 PG (ref 26–34)
MCHC RBC AUTO-ENTMCNC: 31 G/DL (ref 31–36)
MCV RBC AUTO: 85.7 FL (ref 80–100)
MONOCYTES ABSOLUTE: 0.6 K/UL (ref 0–1.3)
MONOCYTES RELATIVE PERCENT: 3 %
NEUTROPHILS ABSOLUTE: 17.2 K/UL (ref 1.7–7.7)
NEUTROPHILS RELATIVE PERCENT: 86.8 %
PDW BLD-RTO: 17.7 % (ref 12.4–15.4)
PLATELET # BLD: 414 K/UL (ref 135–450)
PMV BLD AUTO: 6.9 FL (ref 5–10.5)
POTASSIUM SERPL-SCNC: 4.7 MMOL/L (ref 3.5–5.1)
RBC # BLD: 2.78 M/UL (ref 4–5.2)
SODIUM BLD-SCNC: 132 MMOL/L (ref 136–145)
WBC # BLD: 19.8 K/UL (ref 4–11)

## 2018-10-11 PROCEDURE — 6360000002 HC RX W HCPCS: Performed by: SURGERY

## 2018-10-11 PROCEDURE — 2580000003 HC RX 258: Performed by: INTERNAL MEDICINE

## 2018-10-11 PROCEDURE — 1200000000 HC SEMI PRIVATE

## 2018-10-11 PROCEDURE — 6370000000 HC RX 637 (ALT 250 FOR IP): Performed by: INTERNAL MEDICINE

## 2018-10-11 PROCEDURE — 36415 COLL VENOUS BLD VENIPUNCTURE: CPT

## 2018-10-11 PROCEDURE — 85025 COMPLETE CBC W/AUTO DIFF WBC: CPT

## 2018-10-11 PROCEDURE — 6360000002 HC RX W HCPCS: Performed by: NURSE PRACTITIONER

## 2018-10-11 PROCEDURE — APPNB30 APP NON BILLABLE TIME 0-30 MINS: Performed by: NURSE PRACTITIONER

## 2018-10-11 PROCEDURE — 2500000003 HC RX 250 WO HCPCS: Performed by: NURSE PRACTITIONER

## 2018-10-11 PROCEDURE — 2580000003 HC RX 258: Performed by: NURSE PRACTITIONER

## 2018-10-11 PROCEDURE — 6370000000 HC RX 637 (ALT 250 FOR IP): Performed by: SURGERY

## 2018-10-11 PROCEDURE — 99024 POSTOP FOLLOW-UP VISIT: CPT | Performed by: SURGERY

## 2018-10-11 PROCEDURE — 86923 COMPATIBILITY TEST ELECTRIC: CPT

## 2018-10-11 PROCEDURE — APPSS15 APP SPLIT SHARED TIME 0-15 MINUTES: Performed by: NURSE PRACTITIONER

## 2018-10-11 PROCEDURE — 80048 BASIC METABOLIC PNL TOTAL CA: CPT

## 2018-10-11 PROCEDURE — 2500000003 HC RX 250 WO HCPCS: Performed by: SURGERY

## 2018-10-11 PROCEDURE — P9016 RBC LEUKOCYTES REDUCED: HCPCS

## 2018-10-11 RX ORDER — FLUCONAZOLE 2 MG/ML
400 INJECTION, SOLUTION INTRAVENOUS EVERY 24 HOURS
Status: DISCONTINUED | OUTPATIENT
Start: 2018-10-11 | End: 2018-10-22

## 2018-10-11 RX ORDER — LINEZOLID 2 MG/ML
600 INJECTION, SOLUTION INTRAVENOUS EVERY 12 HOURS
Status: DISCONTINUED | OUTPATIENT
Start: 2018-10-11 | End: 2018-10-22

## 2018-10-11 RX ORDER — HYDROCODONE BITARTRATE AND ACETAMINOPHEN 5; 325 MG/1; MG/1
1 TABLET ORAL EVERY 6 HOURS PRN
Status: DISCONTINUED | OUTPATIENT
Start: 2018-10-11 | End: 2018-10-17

## 2018-10-11 RX ORDER — 0.9 % SODIUM CHLORIDE 0.9 %
250 INTRAVENOUS SOLUTION INTRAVENOUS ONCE
Status: COMPLETED | OUTPATIENT
Start: 2018-10-11 | End: 2018-10-11

## 2018-10-11 RX ORDER — HYDROCODONE BITARTRATE AND ACETAMINOPHEN 5; 325 MG/1; MG/1
2 TABLET ORAL EVERY 6 HOURS PRN
Status: DISCONTINUED | OUTPATIENT
Start: 2018-10-11 | End: 2018-10-11

## 2018-10-11 RX ORDER — LORAZEPAM 2 MG/ML
0.5 INJECTION INTRAMUSCULAR EVERY 6 HOURS PRN
Status: DISCONTINUED | OUTPATIENT
Start: 2018-10-11 | End: 2018-10-22 | Stop reason: HOSPADM

## 2018-10-11 RX ADMIN — FUROSEMIDE 20 MG: 40 TABLET ORAL at 09:29

## 2018-10-11 RX ADMIN — POTASSIUM CHLORIDE 20 MEQ: 1500 TABLET, EXTENDED RELEASE ORAL at 20:22

## 2018-10-11 RX ADMIN — Medication: at 17:43

## 2018-10-11 RX ADMIN — TAZOBACTAM SODIUM AND PIPERACILLIN SODIUM 4.5 G: 500; 4 INJECTION, SOLUTION INTRAVENOUS at 12:38

## 2018-10-11 RX ADMIN — TAZOBACTAM SODIUM AND PIPERACILLIN SODIUM 4.5 G: 500; 4 INJECTION, SOLUTION INTRAVENOUS at 05:37

## 2018-10-11 RX ADMIN — PHENAZOPYRIDINE HYDROCHLORIDE 200 MG: 100 TABLET ORAL at 12:38

## 2018-10-11 RX ADMIN — Medication 400 MG: at 20:22

## 2018-10-11 RX ADMIN — DULOXETINE HYDROCHLORIDE 60 MG: 60 CAPSULE, DELAYED RELEASE ORAL at 09:30

## 2018-10-11 RX ADMIN — FLUTICASONE PROPIONATE 1 SPRAY: 50 SPRAY, METERED NASAL at 09:32

## 2018-10-11 RX ADMIN — BACLOFEN 10 MG: 10 TABLET ORAL at 12:38

## 2018-10-11 RX ADMIN — BUSPIRONE HYDROCHLORIDE 30 MG: 5 TABLET ORAL at 20:22

## 2018-10-11 RX ADMIN — TAZOBACTAM SODIUM AND PIPERACILLIN SODIUM 4.5 G: 500; 4 INJECTION, SOLUTION INTRAVENOUS at 15:00

## 2018-10-11 RX ADMIN — TAZOBACTAM SODIUM AND PIPERACILLIN SODIUM 4.5 G: 500; 4 INJECTION, SOLUTION INTRAVENOUS at 00:16

## 2018-10-11 RX ADMIN — SIMETHICONE CHEW TAB 80 MG 80 MG: 80 TABLET ORAL at 09:32

## 2018-10-11 RX ADMIN — SIMETHICONE CHEW TAB 80 MG 80 MG: 80 TABLET ORAL at 20:22

## 2018-10-11 RX ADMIN — LINEZOLID 600 MG: 600 INJECTION, SOLUTION INTRAVENOUS at 20:21

## 2018-10-11 RX ADMIN — BUSPIRONE HYDROCHLORIDE 30 MG: 5 TABLET ORAL at 09:30

## 2018-10-11 RX ADMIN — LINEZOLID 600 MG: 600 INJECTION, SOLUTION INTRAVENOUS at 09:59

## 2018-10-11 RX ADMIN — SODIUM CHLORIDE 250 ML: 9 INJECTION, SOLUTION INTRAVENOUS at 09:54

## 2018-10-11 RX ADMIN — PHENAZOPYRIDINE HYDROCHLORIDE 200 MG: 100 TABLET ORAL at 15:56

## 2018-10-11 RX ADMIN — HYDROMORPHONE HYDROCHLORIDE 0.5 MG: 1 INJECTION, SOLUTION INTRAMUSCULAR; INTRAVENOUS; SUBCUTANEOUS at 15:07

## 2018-10-11 RX ADMIN — SIMETHICONE CHEW TAB 80 MG 80 MG: 80 TABLET ORAL at 12:38

## 2018-10-11 RX ADMIN — POTASSIUM CHLORIDE, DEXTROSE MONOHYDRATE AND SODIUM CHLORIDE: 150; 5; 450 INJECTION, SOLUTION INTRAVENOUS at 22:33

## 2018-10-11 RX ADMIN — POTASSIUM CHLORIDE 20 MEQ: 1500 TABLET, EXTENDED RELEASE ORAL at 09:30

## 2018-10-11 RX ADMIN — FLUCONAZOLE 400 MG: 2 INJECTION INTRAVENOUS at 15:18

## 2018-10-11 RX ADMIN — POTASSIUM CHLORIDE, DEXTROSE MONOHYDRATE AND SODIUM CHLORIDE: 150; 5; 450 INJECTION, SOLUTION INTRAVENOUS at 01:43

## 2018-10-11 RX ADMIN — FLUCONAZOLE IN SODIUM CHLORIDE 200 MG: 2 INJECTION, SOLUTION INTRAVENOUS at 09:51

## 2018-10-11 RX ADMIN — BACLOFEN 10 MG: 10 TABLET ORAL at 09:31

## 2018-10-11 RX ADMIN — Medication 400 MG: at 09:31

## 2018-10-11 RX ADMIN — ATENOLOL 100 MG: 50 TABLET ORAL at 09:30

## 2018-10-11 RX ADMIN — ACETAMINOPHEN, ASPIRIN AND CAFFEINE 1 TABLET: 250; 250; 65 TABLET, FILM COATED ORAL at 12:38

## 2018-10-11 RX ADMIN — PHENAZOPYRIDINE HYDROCHLORIDE 200 MG: 100 TABLET ORAL at 09:31

## 2018-10-11 RX ADMIN — Medication 10 ML: at 20:22

## 2018-10-11 RX ADMIN — Medication 10 ML: at 09:32

## 2018-10-11 RX ADMIN — BACLOFEN 10 MG: 10 TABLET ORAL at 20:22

## 2018-10-11 ASSESSMENT — PAIN SCALES - GENERAL
PAINLEVEL_OUTOF10: 3
PAINLEVEL_OUTOF10: 5
PAINLEVEL_OUTOF10: 1
PAINLEVEL_OUTOF10: 6
PAINLEVEL_OUTOF10: 0
PAINLEVEL_OUTOF10: 5
PAINLEVEL_OUTOF10: 3

## 2018-10-11 ASSESSMENT — PAIN DESCRIPTION - PROGRESSION
CLINICAL_PROGRESSION: NOT CHANGED

## 2018-10-11 ASSESSMENT — PAIN SCALES - WONG BAKER
WONGBAKER_NUMERICALRESPONSE: 0

## 2018-10-11 ASSESSMENT — PAIN DESCRIPTION - PAIN TYPE: TYPE: ACUTE PAIN

## 2018-10-11 ASSESSMENT — PAIN DESCRIPTION - LOCATION: LOCATION: HEAD

## 2018-10-11 NOTE — PROGRESS NOTES
Bedside report completed, patient resting comfortably. Will continue to monitor.    Katharine Morales

## 2018-10-11 NOTE — PROGRESS NOTES
visible. GI/Bowel: Significant short segment wall thickening of the proximal sigmoid colon, overall mildly decreased from September 2018. There is proximal dilation of the colon, which contains air-fluid levels. The cecum is markedly distended. The appendix is not visualized. Large hiatal hernia partially visualized. There is no small bowel dilation or focal wall thickening. Pelvis: The uterus is absent. The ovaries are not visualized. Trace pelvic free fluid is present. A soft tissue tract extends from thickened sigmoid colon towards the vaginal cuff and urinary bladder. Soft tissue density in this region is also inseparable from small bowel. There is left anterior pelvic fat stranding. Nonenlarged pelvic lymph nodes are present. Peritoneum/Retroperitoneum: Trace perihepatic fluid. Small soft tissue nodules along the distal descending colon. There is otherwise no concerning intra-abdominal adenopathy. There is no pneumoperitoneum. The abdominal aorta is normal caliber and without evidence of aneurysm. The portal vein is patent. Bones/Soft Tissues: There are no suspicious osseous lesions. Persistent short segment wall thickening of the proximal sigmoid colon with upstream colonic dilation consistent with obstruction. Given the persistent wall thickening in this region and absence of significant diverticulosis elsewhere in the colon, findings are highly concerning for malignancy. Soft tissue tract extending from thickened sigmoid colon, inseparable from the urinary bladder, vaginal cuff, and small bowel in the pelvis. Small soft tissue nodules within the left anterior pelvis and along the distal descending colon. Otherwise no concerning intra-abdominal adenopathy. Large hiatal hernia.      Ct Abdomen Pelvis W Iv Contrast Additional Contrast? None    Result Date: 9/26/2018  EXAMINATION: CT OF THE ABDOMEN AND PELVIS WITH CONTRAST 9/26/2018 3:32 am TECHNIQUE: CT of the abdomen and pelvis was performed

## 2018-10-11 NOTE — PROGRESS NOTES
Colostomy care provided bag changed pt educated. Tolerated well. Pt not involved in care of stoma at this time.

## 2018-10-11 NOTE — PROGRESS NOTES
09/26/2018    LABMICR YES 10/07/2018     Lab Results   Component Value Date    TROPONINI <0.01 10/07/2018       Recent Imaging Results are Reviewed:  Ct Abdomen Pelvis W Iv Contrast Additional Contrast? None    Result Date: 10/7/2018  EXAMINATION: CT OF THE ABDOMEN AND PELVIS WITH CONTRAST 10/7/2018 7:30 am TECHNIQUE: CT of the abdomen and pelvis was performed with the administration of intravenous contrast. Multiplanar reformatted images are provided for review. Dose modulation, iterative reconstruction, and/or weight based adjustment of the mA/kV was utilized to reduce the radiation dose to as low as reasonably achievable. COMPARISON: CT abdomen and pelvis with contrast 09/26/2018 HISTORY: ORDERING SYSTEM PROVIDED HISTORY: ABDOMINAL PAIN TECHNOLOGIST PROVIDED HISTORY: Additional Contrast?->None Ordering Physician Provided Reason for Exam: abd pain FINDINGS: Lower Chest: Mild left basilar atelectasis. Organs: Stable hypoattenuation along level of falciform ligament, likely focal steatosis. A faint subcentimeter hypodense lesion posteriorly in the right hepatic lobe is unchanged and too small to characterize. There is no evidence of biliary ductal dilation. The gallbladder is unremarkable. Splenic atrophy. The adrenal glands are unremarkable without evidence of mass. The pancreas is unremarkable. 3.6 cm near fluid density lesion of the right renal lower pole most likely represents a cyst.  There is mild bilateral renal cortical scarring. No hydronephrosis or hydroureter. No evidence of urolithiasis. Imaging through the pelvis limited by streak artifact from right hip prosthesis. There is diffuse urinary bladder wall thickening. No definite gas within the urinary bladder is visible. GI/Bowel: Significant short segment wall thickening of the proximal sigmoid colon, overall mildly decreased from September 2018. There is proximal dilation of the colon, which contains air-fluid levels.   The cecum is markedly

## 2018-10-11 NOTE — PROGRESS NOTES
Shift assessment complete. meds administered as ordered. VSS. Pt. Very confused. MD aware. Pt. Denies any other additional needs at this time.  Call light in reach

## 2018-10-12 LAB
ANION GAP SERPL CALCULATED.3IONS-SCNC: 9 MMOL/L (ref 3–16)
ANISOCYTOSIS: ABNORMAL
BASOPHILS ABSOLUTE: 0 K/UL (ref 0–0.2)
BASOPHILS RELATIVE PERCENT: 0 %
BLOOD CULTURE, ROUTINE: NORMAL
BUN BLDV-MCNC: 5 MG/DL (ref 7–20)
BURR CELLS: ABNORMAL
CALCIUM SERPL-MCNC: 8.7 MG/DL (ref 8.3–10.6)
CHLORIDE BLD-SCNC: 105 MMOL/L (ref 99–110)
CO2: 20 MMOL/L (ref 21–32)
CREAT SERPL-MCNC: 0.6 MG/DL (ref 0.6–1.2)
EOSINOPHILS ABSOLUTE: 0.2 K/UL (ref 0–0.6)
EOSINOPHILS RELATIVE PERCENT: 1 %
GFR AFRICAN AMERICAN: >60
GFR NON-AFRICAN AMERICAN: >60
GLUCOSE BLD-MCNC: 103 MG/DL (ref 70–99)
HCT VFR BLD CALC: 26.9 % (ref 36–48)
HEMOGLOBIN: 8.6 G/DL (ref 12–16)
LYMPHOCYTES ABSOLUTE: 1.7 K/UL (ref 1–5.1)
LYMPHOCYTES RELATIVE PERCENT: 11 %
MACROCYTES: ABNORMAL
MCH RBC QN AUTO: 27.1 PG (ref 26–34)
MCHC RBC AUTO-ENTMCNC: 31.9 G/DL (ref 31–36)
MCV RBC AUTO: 84.7 FL (ref 80–100)
MICROCYTES: ABNORMAL
MONOCYTES ABSOLUTE: 1.1 K/UL (ref 0–1.3)
MONOCYTES RELATIVE PERCENT: 7 %
NEUTROPHILS ABSOLUTE: 12.7 K/UL (ref 1.7–7.7)
NEUTROPHILS RELATIVE PERCENT: 81 %
PDW BLD-RTO: 18 % (ref 12.4–15.4)
PLATELET # BLD: 425 K/UL (ref 135–450)
PLATELET SLIDE REVIEW: ADEQUATE
PMV BLD AUTO: 6.7 FL (ref 5–10.5)
POTASSIUM SERPL-SCNC: 4.4 MMOL/L (ref 3.5–5.1)
RBC # BLD: 3.17 M/UL (ref 4–5.2)
SLIDE REVIEW: ABNORMAL
SODIUM BLD-SCNC: 134 MMOL/L (ref 136–145)
TARGET CELLS: ABNORMAL
TOXIC GRANULATION: PRESENT
VACUOLATED NEUTROPHILS: PRESENT
WBC # BLD: 15.7 K/UL (ref 4–11)

## 2018-10-12 PROCEDURE — 1200000000 HC SEMI PRIVATE

## 2018-10-12 PROCEDURE — 6360000002 HC RX W HCPCS: Performed by: NURSE PRACTITIONER

## 2018-10-12 PROCEDURE — 36415 COLL VENOUS BLD VENIPUNCTURE: CPT

## 2018-10-12 PROCEDURE — APPSS15 APP SPLIT SHARED TIME 0-15 MINUTES: Performed by: NURSE PRACTITIONER

## 2018-10-12 PROCEDURE — 2580000003 HC RX 258: Performed by: INTERNAL MEDICINE

## 2018-10-12 PROCEDURE — 2500000003 HC RX 250 WO HCPCS: Performed by: SURGERY

## 2018-10-12 PROCEDURE — 99024 POSTOP FOLLOW-UP VISIT: CPT | Performed by: SURGERY

## 2018-10-12 PROCEDURE — 6370000000 HC RX 637 (ALT 250 FOR IP): Performed by: INTERNAL MEDICINE

## 2018-10-12 PROCEDURE — APPNB30 APP NON BILLABLE TIME 0-30 MINS: Performed by: NURSE PRACTITIONER

## 2018-10-12 PROCEDURE — 6370000000 HC RX 637 (ALT 250 FOR IP): Performed by: SURGERY

## 2018-10-12 PROCEDURE — 2500000003 HC RX 250 WO HCPCS: Performed by: NURSE PRACTITIONER

## 2018-10-12 PROCEDURE — 80048 BASIC METABOLIC PNL TOTAL CA: CPT

## 2018-10-12 PROCEDURE — 85025 COMPLETE CBC W/AUTO DIFF WBC: CPT

## 2018-10-12 PROCEDURE — 6360000002 HC RX W HCPCS: Performed by: SURGERY

## 2018-10-12 RX ADMIN — FUROSEMIDE 20 MG: 40 TABLET ORAL at 08:58

## 2018-10-12 RX ADMIN — BACLOFEN 10 MG: 10 TABLET ORAL at 13:05

## 2018-10-12 RX ADMIN — POTASSIUM CHLORIDE 20 MEQ: 1500 TABLET, EXTENDED RELEASE ORAL at 08:57

## 2018-10-12 RX ADMIN — HYDROMORPHONE HYDROCHLORIDE 0.5 MG: 1 INJECTION, SOLUTION INTRAMUSCULAR; INTRAVENOUS; SUBCUTANEOUS at 05:00

## 2018-10-12 RX ADMIN — BUSPIRONE HYDROCHLORIDE 30 MG: 5 TABLET ORAL at 08:59

## 2018-10-12 RX ADMIN — POTASSIUM CHLORIDE 20 MEQ: 1500 TABLET, EXTENDED RELEASE ORAL at 22:02

## 2018-10-12 RX ADMIN — Medication 400 MG: at 22:02

## 2018-10-12 RX ADMIN — DULOXETINE HYDROCHLORIDE 60 MG: 60 CAPSULE, DELAYED RELEASE ORAL at 10:18

## 2018-10-12 RX ADMIN — POTASSIUM CHLORIDE, DEXTROSE MONOHYDRATE AND SODIUM CHLORIDE: 150; 5; 450 INJECTION, SOLUTION INTRAVENOUS at 22:01

## 2018-10-12 RX ADMIN — PHENAZOPYRIDINE HYDROCHLORIDE 200 MG: 100 TABLET ORAL at 08:58

## 2018-10-12 RX ADMIN — TAZOBACTAM SODIUM AND PIPERACILLIN SODIUM 4.5 G: 500; 4 INJECTION, SOLUTION INTRAVENOUS at 13:06

## 2018-10-12 RX ADMIN — HYDROMORPHONE HYDROCHLORIDE 0.5 MG: 1 INJECTION, SOLUTION INTRAMUSCULAR; INTRAVENOUS; SUBCUTANEOUS at 17:34

## 2018-10-12 RX ADMIN — SIMETHICONE CHEW TAB 80 MG 80 MG: 80 TABLET ORAL at 13:06

## 2018-10-12 RX ADMIN — FLUTICASONE PROPIONATE 1 SPRAY: 50 SPRAY, METERED NASAL at 09:26

## 2018-10-12 RX ADMIN — Medication 10 ML: at 09:00

## 2018-10-12 RX ADMIN — HYDROMORPHONE HYDROCHLORIDE 0.5 MG: 1 INJECTION, SOLUTION INTRAMUSCULAR; INTRAVENOUS; SUBCUTANEOUS at 09:25

## 2018-10-12 RX ADMIN — TAZOBACTAM SODIUM AND PIPERACILLIN SODIUM 4.5 G: 500; 4 INJECTION, SOLUTION INTRAVENOUS at 17:34

## 2018-10-12 RX ADMIN — ATENOLOL 100 MG: 50 TABLET ORAL at 08:56

## 2018-10-12 RX ADMIN — TAZOBACTAM SODIUM AND PIPERACILLIN SODIUM 4.5 G: 500; 4 INJECTION, SOLUTION INTRAVENOUS at 00:14

## 2018-10-12 RX ADMIN — Medication 400 MG: at 08:56

## 2018-10-12 RX ADMIN — HYDROMORPHONE HYDROCHLORIDE 0.5 MG: 1 INJECTION, SOLUTION INTRAMUSCULAR; INTRAVENOUS; SUBCUTANEOUS at 22:10

## 2018-10-12 RX ADMIN — HYDROMORPHONE HYDROCHLORIDE 0.5 MG: 1 INJECTION, SOLUTION INTRAMUSCULAR; INTRAVENOUS; SUBCUTANEOUS at 00:57

## 2018-10-12 RX ADMIN — LINEZOLID 600 MG: 600 INJECTION, SOLUTION INTRAVENOUS at 09:00

## 2018-10-12 RX ADMIN — FLUCONAZOLE 400 MG: 2 INJECTION INTRAVENOUS at 13:06

## 2018-10-12 RX ADMIN — PHENAZOPYRIDINE HYDROCHLORIDE 200 MG: 100 TABLET ORAL at 17:34

## 2018-10-12 RX ADMIN — POTASSIUM CHLORIDE, DEXTROSE MONOHYDRATE AND SODIUM CHLORIDE: 150; 5; 450 INJECTION, SOLUTION INTRAVENOUS at 09:11

## 2018-10-12 RX ADMIN — BACLOFEN 10 MG: 10 TABLET ORAL at 08:59

## 2018-10-12 RX ADMIN — PANTOPRAZOLE SODIUM 40 MG: 40 TABLET, DELAYED RELEASE ORAL at 05:00

## 2018-10-12 RX ADMIN — BACLOFEN 10 MG: 10 TABLET ORAL at 22:02

## 2018-10-12 RX ADMIN — LINEZOLID 600 MG: 600 INJECTION, SOLUTION INTRAVENOUS at 22:01

## 2018-10-12 RX ADMIN — PHENAZOPYRIDINE HYDROCHLORIDE 200 MG: 100 TABLET ORAL at 13:05

## 2018-10-12 RX ADMIN — SIMETHICONE CHEW TAB 80 MG 80 MG: 80 TABLET ORAL at 22:02

## 2018-10-12 RX ADMIN — BUSPIRONE HYDROCHLORIDE 30 MG: 5 TABLET ORAL at 22:01

## 2018-10-12 RX ADMIN — Medication 10 ML: at 22:01

## 2018-10-12 RX ADMIN — TAZOBACTAM SODIUM AND PIPERACILLIN SODIUM 4.5 G: 500; 4 INJECTION, SOLUTION INTRAVENOUS at 05:00

## 2018-10-12 RX ADMIN — SIMETHICONE CHEW TAB 80 MG 80 MG: 80 TABLET ORAL at 08:56

## 2018-10-12 ASSESSMENT — PAIN SCALES - GENERAL
PAINLEVEL_OUTOF10: 9
PAINLEVEL_OUTOF10: 8
PAINLEVEL_OUTOF10: 5
PAINLEVEL_OUTOF10: 6
PAINLEVEL_OUTOF10: 6
PAINLEVEL_OUTOF10: 9
PAINLEVEL_OUTOF10: 9
PAINLEVEL_OUTOF10: 4
PAINLEVEL_OUTOF10: 5
PAINLEVEL_OUTOF10: 7

## 2018-10-12 ASSESSMENT — PAIN DESCRIPTION - ORIENTATION
ORIENTATION: LEFT;LOWER
ORIENTATION: LEFT;LOWER

## 2018-10-12 ASSESSMENT — PAIN DESCRIPTION - PAIN TYPE
TYPE: ACUTE PAIN
TYPE: ACUTE PAIN

## 2018-10-12 ASSESSMENT — PAIN DESCRIPTION - LOCATION
LOCATION: ABDOMEN
LOCATION: ABDOMEN

## 2018-10-12 NOTE — PROGRESS NOTES
auscultation, diminished  Abdomen: soft, incisional tenderness only, bowel sounds present, stoma pink/viable  Drain: serosanguinous   Skin/Wound: generally clean wound bed without signs of infection, only mild odor, scant clear drainage deep in wound bed, appropriately tender    Labs:  CBC:    Recent Labs      10/10/18   0917  10/11/18   0609  10/12/18   0551   WBC  12.4*  19.8*  15.7*   HGB  8.3*  7.4*  8.6*   HCT  26.7*  23.8*  26.9*   PLT  440  414  425     BMP:    Recent Labs      10/10/18   0917  10/11/18   0609  10/12/18   0551   NA  133*  132*  134*   K  4.4  4.7  4.4   CL  104  104  105   CO2  18*  17*  20*   BUN  10  10  5*   CREATININE  0.7  0.8  0.6   GLUCOSE  163*  132*  103*     Hepatic:   Recent Labs      10/10/18   0917   AST  19   ALT  <5*   BILITOT  0.5   ALKPHOS  80     Amylase: No results for input(s): AMYLASE in the last 72 hours. Lipase: No results for input(s): LIPASE in the last 72 hours. Mag:      Recent Labs      10/10/18   0917   MG  1.80      Phos:   No results for input(s): PHOS in the last 72 hours. Coags: No results for input(s): INR, APTT in the last 72 hours. Cultures:  Anaerobic culture  No results for input(s): LABANAE in the last 72 hours. Blood culture  No results for input(s): BC in the last 72 hours. Blood culture 2  No results for input(s): BLOODCULT2 in the last 72 hours. Body fluid culture  No results for input(s): BLOODCULT2 in the last 72 hours. Surgical culture  No results for input(s): CXSURG in the last 72 hours. Fecal occult  No results for input(s): OCCULTBLDFEC in the last 72 hours. Gram stain  No results for input(s): LABGRAM in the last 72 hours. Stool culture 1  No results for input(s): CXST in the last 72 hours. Stool culture 2  No results for input(s): STOOLCULT2 in the last 72 hours. Urine culture  No results for input(s): LABURIN in the last 72 hours.     Wound abscess  No results for input(s): WNDABS in the last 72 chrons. + dizziness) FINDINGS: ABDOMEN Liver: Unchanged too small to characterize hypodensity at the right hepatic dome and along the falciform ligament, the latter of which has fluctuated in size and probably represents focal fatty infiltration. Gallbladder: Normal. Biliary: No intra or extrahepatic bile duct dilatation. Pancreas: Normal. Spleen: Surgically absent. Adrenals: Normal. Kidneys: Unchanged exophytic right renal simple cyst at the inferior pole. Additional too small to characterize renal hypodensities are unchanged. No hydronephrosis or nephrolithiasis. GI Tract: Large hiatus hernia with the majority of the stomach intrathoracic in position. No gastric outlet obstruction. Appendix not discretely identified however there are no pericecal inflammatory changes. Colonic diverticulosis with a long segment of severe diverticulitis along the distal descending and proximal sigmoid colon. Severe surrounding inflammatory stranding with extraluminal gas containing collection with faint peripheral enhancement measuring approximately 2.6 x 1.7 x 3.6 cm. This area of inflammation seems to bridge the proximal sigmoid colon and sigmoid rectum, suspicious for fistula a surgical clip is present along the distal margin. Peritoneum/Retroperitoneum: Scattered reactive lymph nodes about the mesentery. Unchanged omental stranding. Vascular: Normal caliber abdominal aorta. Patent hepatic, portal, superior mesenteric and splenic veins. PELVIS Genitourinary: Normal urinary bladder. Status post hysterectomy. Other: Extraluminal fluid collection along the sigmoid colon as detailed above. No enlarged lymph nodes. MUSCULOSKELETAL Bones and Soft Tissues: No acute superficial soft tissue or osseous abnormality. Thoracolumbar spondylosis with grade 1 degenerative anterolisthesis of L4 on L5 and minimal retrolisthesis of L1 on L2. Other: Basilar atelectasis relating to hiatus hernia. Normal included heart and pericardium.

## 2018-10-12 NOTE — PLAN OF CARE
Problem: Nutrition  Goal: Optimal nutrition therapy  Outcome: Ongoing  Nutrition Problem: Inadequate oral intake  Intervention: Food and/or Nutrient Delivery: Continue current diet, Start ONS  Nutritional Goals: Pt will consume at least 50% of meals and supplements

## 2018-10-12 NOTE — PROGRESS NOTES
Assessment completed and medications for the night passed. Ostomy bag leaking again. Changed at this time and depends and bed changed as well. Will continue to monitor.    Marry Garcia

## 2018-10-12 NOTE — CARE COORDINATION
concurrent with and due to diverticulitis K57.20    Intra-abdominal abscess ([address] K65.1    Sepsis ([address] A41.9    Colitis K52.9    Omental mass K66.8       Measurements:        Incision 10/08/18 Abdomen (Active)   Wound Assessment NISHANT 10/12/2018  8:49 AM   Shweta-wound Assessment Intact 10/12/2018  8:49 AM   Closure NISHANT 10/11/2018  9:37 AM   Culture Taken No 10/8/2018  8:04 PM   Drainage Amount NISHANT 10/12/2018  8:49 AM   Odor None 10/12/2018  8:49 AM   Dressing/Treatment ABD; Moist to dry;Packing 10/12/2018  8:49 AM   Dressing Changed Dressing reinforced 10/12/2018  8:49 AM   Dressing Status Dry;Clean; Intact; Changed 10/12/2018  8:49 AM   Dressing Change Due 10/12/18 10/12/2018  8:49 AM   Number of days: 3   Midline wound - [id number], undermines 3 at 12 and 3 at 9. White foam placed to these areas - there is suture visible around 9. Pt seen for VAC application. Premedicated, pt has some confused conversation following pain medication. Ostomy had been leaking. Still has dip and crease at side of stoma. Stoma is pink, moist. Peristomal skin intact. Placed in 1 pc with soft convexity and barrier ring. Effluent is green to brown. Response to treatment:  Well tolerated by patient. Plan   Plan of Care:  Incision 10/08/18 Abdomen-Dressing/Treatment: ABD, Moist to dry, Packing    Specialty Bed Required : No   [] Low Air Loss   [] Pressure Redistribution  [] Fluid Immersion  [] Bariatric  [] Total Pressure Relief  [] Other:     Current Diet: DIET FULL LIQUID;  Dietician consult:  No    Discharge Plan:  Placement for patient upon discharge: skilled nursing    Patient appropriate for Outpatient [address]: No    Referrals:  [x]   [] [facility]  [] Supplies  [] Other    Patient/Caregiver Teaching:  Level of patient/caregiver understanding able to:   [] Indicates understanding       [] Needs reinforcement  [] Unsuccessful      [] Verbal Understanding  [] Demonstrated understanding       [] No evidence of learning  [] Refused teaching         [x] N/A       Electronically signed by Ruth Wilson, [id number] on 10/12/2018 at 10:24 AM

## 2018-10-12 NOTE — PROGRESS NOTES
Progress Note - Dr. Natalie Arguello - Internal Medicine  PCP: Denice Antonio MD [address] / [place] [phone]    Hospital Day: 5  Code Status: Full Code  Current Diet: DIET FULL LIQUID;        CC: follow up on medical issues    Subjective:   Jelani Dhillon is a 59 y.o. female. She denies problems    About the same  Awaiting possible secondary closure next week if leukocytosis is resolving and no signs of active infection  No new issues    She denies chest pain, denies shortness of breath, denies nausea,  denies emesis. 10 system Review of Systems is reviewed with patient, and pertinent positives are listed here: None . Otherwise, Review of systems is negative. I have reviewed the patient's medical and social history in detail and updated the computerized patient record. To recap: She  has a past medical history of Arthritis; Blood transfusion reaction; Crohn's disease ([address] GERD (gastroesophageal reflux disease); Hiatal hernia; Hypertension; MRSA (methicillin resistant staph aureus) culture positive; Pneumonia; and VRE (vancomycin resistant enterococcus) culture positive. . She  has a past surgical history that includes Hysterectomy; knee surgery (Bilateral); Hand Carpectomy (Bilateral); hernia repair; Abdomen surgery; bladder suspension; fracture surgery; Heel spur surgery; Tonsillectomy; Colonoscopy; Upper gastrointestinal endoscopy (6/14/13); Foot surgery (Left, 6/25/14); Foot surgery (6/3/15); Colonoscopy (9/14/15); Foot surgery (Left, 08/05/2002); joint replacement; Sigmoidoscopy (01/15/2018); Abdominal adhesion surgery (06/08/2018); Colonoscopy (08/07/2018); and colostomy (N/A, 10/8/2018). . She  reports that she has been smoking Cigarettes and E-Cigarettes. She has a 10.00 pack-year smoking history. She has never used smokeless tobacco. She reports that she drinks about 1.2 oz of alcohol per week . She reports that she does not use drugs. .        Active Hospital Problems    Diagnosis Date tablet 40 mEq, 40 mEq, Oral, PRN **OR** potassium chloride 10 mEq/100 mL IVPB (Peripheral Line), 10 mEq, Intravenous, PRN  sodium chloride flush 0.9 % injection 10 mL, 10 mL, Intravenous, 2 times per day  sodium chloride flush 0.9 % injection 10 mL, 10 mL, Intravenous, PRN  magnesium hydroxide (MILK OF MAGNESIA) 400 MG/5ML suspension 30 mL, 30 mL, Oral, Daily PRN  ondansetron (ZOFRAN) injection 4 mg, 4 mg, Intravenous, Q6H PRN  phenazopyridine (PYRIDIUM) tablet 200 mg, 200 mg, Oral, TID WC  simethicone (MYLICON) chewable tablet 80 mg, 80 mg, Oral, Q6H  pantoprazole (PROTONIX) tablet 40 mg, 40 mg, Oral, QAM AC  hydrALAZINE (APRESOLINE) injection 10 mg, 10 mg, Intravenous, Q6H PRN         Objective:  BP (!) 104/56   Pulse 61   Temp 98.4 °F (36.9 °C) (Oral)   Resp 18   Ht 5' 6\" (1.676 m)   Wt 217 lb (98.4 kg)   SpO2 92%   BMI 35.02 kg/m²      Patient Vitals for the past 24 hrs:   BP Temp Temp src Pulse Resp SpO2   10/12/18 0620 - - - 61 - -   10/12/18 0447 (!) 104/56 98.4 °F (36.9 °C) Oral 67 18 92 %   10/12/18 0441 - - - 72 - -   10/12/18 0228 - - - 62 - -   10/12/18 0100 115/78 97.6 °F (36.4 °C) Oral 67 20 92 %   10/12/18 0010 - - - 60 - -   10/11/18 2245 - - - 68 - -   10/11/18 2024 128/86 98.4 °F (36.9 °C) Oral 75 18 94 %   10/11/18 1530 121/78 98.3 °F (36.8 °C) Oral 70 16 94 %   10/11/18 1247 (!) 141/100 98.1 °F (36.7 °C) Oral 54 16 98 %   10/11/18 1230 122/80 98.1 °F (36.7 °C) Oral 53 16 -   10/11/18 1138 124/82 99.4 °F (37.4 °C) Oral 76 16 97 %   10/11/18 1037 108/73 99.8 °F (37.7 °C) Oral 77 16 98 %   10/11/18 1022 118/83 99.4 °F (37.4 °C) Oral 78 16 -   10/11/18 1020 116/75 98 °F (36.7 °C) - 77 16 -   10/11/18 0927 (!) 138/93 99.6 °F (37.6 °C) Oral 89 15 90 %     No data found.           Intake/Output Summary (Last 24 hours) at 10/12/18 0766  Last data filed at 10/12/18 0520   Gross per 24 hour   Intake             3089 ml   Output              565 ml   Net             2524 ml         Physical Exam: S1, S2 normal, no murmur, rub or gallop, regular rate and rhythm  clear to auscultation bilaterally  Soft, appropriately tender, bs+  extremities normal, atraumatic, no cyanosis or edema    Labs:  Lab Results   Component Value Date    WBC 15.7 (H) 10/12/2018    HGB 8.6 (L) 10/12/2018    HCT 26.9 (L) 10/12/2018     10/12/2018    CHOL 151 02/06/2018    TRIG 57 02/06/2018    HDL 81 (H) 02/06/2018    ALT <5 (L) 10/10/2018    AST 19 10/10/2018     (L) 10/12/2018    K 4.4 10/12/2018     10/12/2018    CREATININE 0.6 10/12/2018    BUN 5 (L) 10/12/2018    CO2 20 (L) 10/12/2018    INR 1.33 (H) 09/26/2018    LABMICR YES 10/07/2018     Lab Results   Component Value Date    TROPONINI <0.01 10/07/2018       Recent Imaging Results are Reviewed:  Ct Abdomen Pelvis W Iv Contrast Additional Contrast? None    Result Date: 10/7/2018  EXAMINATION: CT OF THE ABDOMEN AND PELVIS WITH CONTRAST 10/7/2018 7:30 am TECHNIQUE: CT of the abdomen and pelvis was performed with the administration of intravenous contrast. Multiplanar reformatted images are provided for review. Dose modulation, iterative reconstruction, and/or weight based adjustment of the mA/kV was utilized to reduce the radiation dose to as low as reasonably achievable. COMPARISON: CT abdomen and pelvis with contrast 09/26/2018 HISTORY: ORDERING SYSTEM PROVIDED HISTORY: ABDOMINAL PAIN TECHNOLOGIST PROVIDED HISTORY: Additional Contrast?->None Ordering Physician Provided Reason for Exam: abd pain FINDINGS: Lower Chest: Mild left basilar atelectasis. Organs: Stable hypoattenuation along level of falciform ligament, likely focal steatosis. A faint subcentimeter hypodense lesion posteriorly in the right hepatic lobe is unchanged and too small to characterize. There is no evidence of biliary ductal dilation. The gallbladder is unremarkable. Splenic atrophy. The adrenal glands are unremarkable without evidence of mass. The pancreas is unremarkable.  3.6 cm near Exam: Initial Relevant Medical/Surgical History: Previous pneumonia and hypertension FINDINGS: There is a large hiatal hernia. There is chronic scarring in the left lung base. There is no new acute consolidation. The cardiac silhouette is unchanged. There is a left shoulder arthroplasty. No acute abnormality. Large hiatal hernia. Assessment and Plan:  Patient Active Hospital Problem List:   Crohn's colitis [facility]) (1/6/2018)    Assessment: Established problem. Improving. Doing ok post op. Plan: possible secondary closure next week if leukocytosis is resolving and no signs of active infection. Case d/w surgery   Essential hypertension, benign (9/13/2015)    Assessment: Established problem. Stable.  138/93 this am    Plan: bp doing ok. Stay on same meds. IV hydralazine as needed   Anemia (1/5/2018)    Assessment: Established problem. Acute loss from surgery. 8.6   Plan:  transfusion ordered of prbc 10/11. Cont to check h/h   Hyponatremia (1/5/2018)    Assessment: Established problem. Stable.  134    Plan: cont to monitor   Omental mass ()    Assessment: Established problem. Stable.     Plan: bx results - negative for malignancy     Plan - possible secondary closure next week. Family deciding on SNF. Would strongly encourage SNF.  Pt is very complex, and I do not think she would do well at home by herself        Nathalia Mcpherson  10/12/2018

## 2018-10-13 LAB
ANAEROBIC CULTURE: ABNORMAL
ANION GAP SERPL CALCULATED.3IONS-SCNC: 9 MMOL/L (ref 3–16)
BASOPHILS ABSOLUTE: 0 K/UL (ref 0–0.2)
BASOPHILS RELATIVE PERCENT: 0.3 %
BUN BLDV-MCNC: 4 MG/DL (ref 7–20)
CALCIUM SERPL-MCNC: 8.6 MG/DL (ref 8.3–10.6)
CHLORIDE BLD-SCNC: 98 MMOL/L (ref 99–110)
CO2: 22 MMOL/L (ref 21–32)
CREAT SERPL-MCNC: <0.5 MG/DL (ref 0.6–1.2)
CULTURE SURGICAL: ABNORMAL
EOSINOPHILS ABSOLUTE: 0.3 K/UL (ref 0–0.6)
EOSINOPHILS RELATIVE PERCENT: 2.6 %
GFR AFRICAN AMERICAN: >60
GFR NON-AFRICAN AMERICAN: >60
GLUCOSE BLD-MCNC: 107 MG/DL (ref 70–99)
GRAM STAIN RESULT: ABNORMAL
HCT VFR BLD CALC: 26.5 % (ref 36–48)
HEMOGLOBIN: 8.6 G/DL (ref 12–16)
LYMPHOCYTES ABSOLUTE: 2 K/UL (ref 1–5.1)
LYMPHOCYTES RELATIVE PERCENT: 15 %
MCH RBC QN AUTO: 27.3 PG (ref 26–34)
MCHC RBC AUTO-ENTMCNC: 32.6 G/DL (ref 31–36)
MCV RBC AUTO: 83.6 FL (ref 80–100)
MONOCYTES ABSOLUTE: 1.2 K/UL (ref 0–1.3)
MONOCYTES RELATIVE PERCENT: 9.1 %
NEUTROPHILS ABSOLUTE: 9.6 K/UL (ref 1.7–7.7)
NEUTROPHILS RELATIVE PERCENT: 73 %
ORGANISM: ABNORMAL
PDW BLD-RTO: 18.4 % (ref 12.4–15.4)
PLATELET # BLD: 499 K/UL (ref 135–450)
PMV BLD AUTO: 6.8 FL (ref 5–10.5)
POTASSIUM SERPL-SCNC: 4.5 MMOL/L (ref 3.5–5.1)
RBC # BLD: 3.17 M/UL (ref 4–5.2)
SODIUM BLD-SCNC: 129 MMOL/L (ref 136–145)
WBC # BLD: 13.2 K/UL (ref 4–11)

## 2018-10-13 PROCEDURE — 6360000002 HC RX W HCPCS: Performed by: INTERNAL MEDICINE

## 2018-10-13 PROCEDURE — 85025 COMPLETE CBC W/AUTO DIFF WBC: CPT

## 2018-10-13 PROCEDURE — 6370000000 HC RX 637 (ALT 250 FOR IP): Performed by: INTERNAL MEDICINE

## 2018-10-13 PROCEDURE — 6360000002 HC RX W HCPCS: Performed by: SURGERY

## 2018-10-13 PROCEDURE — 6360000002 HC RX W HCPCS: Performed by: NURSE PRACTITIONER

## 2018-10-13 PROCEDURE — APPNB30 APP NON BILLABLE TIME 0-30 MINS: Performed by: NURSE PRACTITIONER

## 2018-10-13 PROCEDURE — 6370000000 HC RX 637 (ALT 250 FOR IP): Performed by: SURGERY

## 2018-10-13 PROCEDURE — 1200000000 HC SEMI PRIVATE

## 2018-10-13 PROCEDURE — 80048 BASIC METABOLIC PNL TOTAL CA: CPT

## 2018-10-13 PROCEDURE — APPSS15 APP SPLIT SHARED TIME 0-15 MINUTES: Performed by: NURSE PRACTITIONER

## 2018-10-13 PROCEDURE — 36415 COLL VENOUS BLD VENIPUNCTURE: CPT

## 2018-10-13 PROCEDURE — 99024 POSTOP FOLLOW-UP VISIT: CPT | Performed by: SURGERY

## 2018-10-13 RX ADMIN — ENOXAPARIN SODIUM 40 MG: 40 INJECTION SUBCUTANEOUS at 12:59

## 2018-10-13 RX ADMIN — SIMETHICONE CHEW TAB 80 MG 80 MG: 80 TABLET ORAL at 05:14

## 2018-10-13 RX ADMIN — TAZOBACTAM SODIUM AND PIPERACILLIN SODIUM 4.5 G: 500; 4 INJECTION, SOLUTION INTRAVENOUS at 01:18

## 2018-10-13 RX ADMIN — BACLOFEN 10 MG: 10 TABLET ORAL at 14:41

## 2018-10-13 RX ADMIN — TAZOBACTAM SODIUM AND PIPERACILLIN SODIUM 4.5 G: 500; 4 INJECTION, SOLUTION INTRAVENOUS at 12:59

## 2018-10-13 RX ADMIN — ACETAMINOPHEN, ASPIRIN AND CAFFEINE 1 TABLET: 250; 250; 65 TABLET, FILM COATED ORAL at 23:14

## 2018-10-13 RX ADMIN — HYDROMORPHONE HYDROCHLORIDE 0.5 MG: 1 INJECTION, SOLUTION INTRAMUSCULAR; INTRAVENOUS; SUBCUTANEOUS at 23:14

## 2018-10-13 RX ADMIN — PANTOPRAZOLE SODIUM 40 MG: 40 TABLET, DELAYED RELEASE ORAL at 05:14

## 2018-10-13 RX ADMIN — HYDROMORPHONE HYDROCHLORIDE 0.5 MG: 1 INJECTION, SOLUTION INTRAMUSCULAR; INTRAVENOUS; SUBCUTANEOUS at 05:22

## 2018-10-13 RX ADMIN — LINEZOLID 600 MG: 600 INJECTION, SOLUTION INTRAVENOUS at 20:57

## 2018-10-13 RX ADMIN — ATENOLOL 100 MG: 50 TABLET ORAL at 10:05

## 2018-10-13 RX ADMIN — TAZOBACTAM SODIUM AND PIPERACILLIN SODIUM 4.5 G: 500; 4 INJECTION, SOLUTION INTRAVENOUS at 18:31

## 2018-10-13 RX ADMIN — LINEZOLID 600 MG: 600 INJECTION, SOLUTION INTRAVENOUS at 10:05

## 2018-10-13 RX ADMIN — HYDROMORPHONE HYDROCHLORIDE 0.5 MG: 1 INJECTION, SOLUTION INTRAMUSCULAR; INTRAVENOUS; SUBCUTANEOUS at 12:59

## 2018-10-13 RX ADMIN — SIMETHICONE CHEW TAB 80 MG 80 MG: 80 TABLET ORAL at 14:42

## 2018-10-13 RX ADMIN — TAZOBACTAM SODIUM AND PIPERACILLIN SODIUM 4.5 G: 500; 4 INJECTION, SOLUTION INTRAVENOUS at 05:14

## 2018-10-13 RX ADMIN — HYDROMORPHONE HYDROCHLORIDE 0.5 MG: 1 INJECTION, SOLUTION INTRAMUSCULAR; INTRAVENOUS; SUBCUTANEOUS at 18:31

## 2018-10-13 RX ADMIN — FLUCONAZOLE 400 MG: 2 INJECTION INTRAVENOUS at 14:41

## 2018-10-13 RX ADMIN — LORAZEPAM 0.5 MG: 2 INJECTION INTRAMUSCULAR; INTRAVENOUS at 18:39

## 2018-10-13 RX ADMIN — ONDANSETRON HYDROCHLORIDE 4 MG: 2 SOLUTION INTRAMUSCULAR; INTRAVENOUS at 06:47

## 2018-10-13 ASSESSMENT — PAIN DESCRIPTION - PAIN TYPE
TYPE: ACUTE PAIN

## 2018-10-13 ASSESSMENT — PAIN SCALES - GENERAL
PAINLEVEL_OUTOF10: 10
PAINLEVEL_OUTOF10: 7
PAINLEVEL_OUTOF10: 8
PAINLEVEL_OUTOF10: 5
PAINLEVEL_OUTOF10: 5
PAINLEVEL_OUTOF10: 9

## 2018-10-13 ASSESSMENT — PAIN DESCRIPTION - DESCRIPTORS: DESCRIPTORS: BURNING;SHARP

## 2018-10-13 ASSESSMENT — PAIN DESCRIPTION - LOCATION
LOCATION: ABDOMEN

## 2018-10-13 ASSESSMENT — PAIN DESCRIPTION - ORIENTATION
ORIENTATION: LEFT;LOWER
ORIENTATION: LEFT;LOWER

## 2018-10-13 NOTE — PROGRESS NOTES
Incontinent of urine. Shweta care provided and tolerated well. Colostomy bag leaked at this time and bag changed. Patient tolerated well. Complaints of abdominal pain at this time 10/10. PRN dilaudid given as rx'd and awaiting results. Denies needs at this time. Will monitor.

## 2018-10-13 NOTE — PROGRESS NOTES
with contrast 09/26/2018 HISTORY: ORDERING SYSTEM PROVIDED HISTORY: ABDOMINAL PAIN TECHNOLOGIST PROVIDED HISTORY: Additional Contrast?->None Ordering Physician Provided Reason for Exam: abd pain FINDINGS: Lower Chest: Mild left basilar atelectasis. Organs: Stable hypoattenuation along level of falciform ligament, likely focal steatosis. A faint subcentimeter hypodense lesion posteriorly in the right hepatic lobe is unchanged and too small to characterize. There is no evidence of biliary ductal dilation. The gallbladder is unremarkable. Splenic atrophy. The adrenal glands are unremarkable without evidence of mass. The pancreas is unremarkable. 3.6 cm near fluid density lesion of the right renal lower pole most likely represents a cyst.  There is mild bilateral renal cortical scarring. No hydronephrosis or hydroureter. No evidence of urolithiasis. Imaging through the pelvis limited by streak artifact from right hip prosthesis. There is diffuse urinary bladder wall thickening. No definite gas within the urinary bladder is visible. GI/Bowel: Significant short segment wall thickening of the proximal sigmoid colon, overall mildly decreased from September 2018. There is proximal dilation of the colon, which contains air-fluid levels. The cecum is markedly distended. The appendix is not visualized. Large hiatal hernia partially visualized. There is no small bowel dilation or focal wall thickening. Pelvis: The uterus is absent. The ovaries are not visualized. Trace pelvic free fluid is present. A soft tissue tract extends from thickened sigmoid colon towards the vaginal cuff and urinary bladder. Soft tissue density in this region is also inseparable from small bowel. There is left anterior pelvic fat stranding. Nonenlarged pelvic lymph nodes are present. Peritoneum/Retroperitoneum: Trace perihepatic fluid. Small soft tissue nodules along the distal descending colon.   There is otherwise no concerning

## 2018-10-13 NOTE — PROGRESS NOTES
renal lower pole most likely represents a cyst.  There is mild bilateral renal cortical scarring. No hydronephrosis or hydroureter. No evidence of urolithiasis. Imaging through the pelvis limited by streak artifact from right hip prosthesis. There is diffuse urinary bladder wall thickening. No definite gas within the urinary bladder is visible. GI/Bowel: Significant short segment wall thickening of the proximal sigmoid colon, overall mildly decreased from September 2018. There is proximal dilation of the colon, which contains air-fluid levels. The cecum is markedly distended. The appendix is not visualized. Large hiatal hernia partially visualized. There is no small bowel dilation or focal wall thickening. Pelvis: The uterus is absent. The ovaries are not visualized. Trace pelvic free fluid is present. A soft tissue tract extends from thickened sigmoid colon towards the vaginal cuff and urinary bladder. Soft tissue density in this region is also inseparable from small bowel. There is left anterior pelvic fat stranding. Nonenlarged pelvic lymph nodes are present. Peritoneum/Retroperitoneum: Trace perihepatic fluid. Small soft tissue nodules along the distal descending colon. There is otherwise no concerning intra-abdominal adenopathy. There is no pneumoperitoneum. The abdominal aorta is normal caliber and without evidence of aneurysm. The portal vein is patent. Bones/Soft Tissues: There are no suspicious osseous lesions. Persistent short segment wall thickening of the proximal sigmoid colon with upstream colonic dilation consistent with obstruction. Given the persistent wall thickening in this region and absence of significant diverticulosis elsewhere in the colon, findings are highly concerning for malignancy. Soft tissue tract extending from thickened sigmoid colon, inseparable from the urinary bladder, vaginal cuff, and small bowel in the pelvis.  Small soft tissue nodules within the left inflammatory changes. Colonic diverticulosis with a long segment of severe diverticulitis along the distal descending and proximal sigmoid colon. Severe surrounding inflammatory stranding with extraluminal gas containing collection with faint peripheral enhancement measuring approximately 2.6 x 1.7 x 3.6 cm. This area of inflammation seems to bridge the proximal sigmoid colon and sigmoid rectum, suspicious for fistula a surgical clip is present along the distal margin. Peritoneum/Retroperitoneum: Scattered reactive lymph nodes about the mesentery. Unchanged omental stranding. Vascular: Normal caliber abdominal aorta. Patent hepatic, portal, superior mesenteric and splenic veins. PELVIS Genitourinary: Normal urinary bladder. Status post hysterectomy. Other: Extraluminal fluid collection along the sigmoid colon as detailed above. No enlarged lymph nodes. MUSCULOSKELETAL Bones and Soft Tissues: No acute superficial soft tissue or osseous abnormality. Thoracolumbar spondylosis with grade 1 degenerative anterolisthesis of L4 on L5 and minimal retrolisthesis of L1 on L2. Other: Basilar atelectasis relating to hiatus hernia. Normal included heart and pericardium. Worsening severe diverticulitis involving the proximal sigmoid colon with evidence of perforation and (contained) abscess formation with suspected fistulous communication bridging the proximal sigmoid colon to the rectosigmoid junction or possibly the vaginal stump (see annotated images). Exam findings were discussed by Dr. Cristhian Hernandez to [facility] on 9/26/2018 at 04:15.      Xr Chest Portable    Result Date: 9/27/2018  EXAMINATION: SINGLE XRAY VIEW OF THE CHEST, 9/26/2018 2:12 am COMPARISON: 06/26/2018 HISTORY: ORDERING SYSTEM PROVIDED HISTORY: SOB TECHNOLOGIST PROVIDED HISTORY: Reason for exam:->SOB Ordering Physician Provided Reason for Exam: Fever,shortness of breath, and dizziness Acuity: Acute Type of Exam: Initial Relevant

## 2018-10-14 LAB
ANION GAP SERPL CALCULATED.3IONS-SCNC: 9 MMOL/L (ref 3–16)
BASOPHILS ABSOLUTE: 0 K/UL (ref 0–0.2)
BASOPHILS RELATIVE PERCENT: 0.3 %
BUN BLDV-MCNC: 4 MG/DL (ref 7–20)
CALCIUM SERPL-MCNC: 8.7 MG/DL (ref 8.3–10.6)
CHLORIDE BLD-SCNC: 95 MMOL/L (ref 99–110)
CO2: 23 MMOL/L (ref 21–32)
CREAT SERPL-MCNC: <0.5 MG/DL (ref 0.6–1.2)
EOSINOPHILS ABSOLUTE: 0.2 K/UL (ref 0–0.6)
EOSINOPHILS RELATIVE PERCENT: 1.7 %
GFR AFRICAN AMERICAN: >60
GFR NON-AFRICAN AMERICAN: >60
GLUCOSE BLD-MCNC: 88 MG/DL (ref 70–99)
HCT VFR BLD CALC: 28.1 % (ref 36–48)
HEMOGLOBIN: 9 G/DL (ref 12–16)
LYMPHOCYTES ABSOLUTE: 1.9 K/UL (ref 1–5.1)
LYMPHOCYTES RELATIVE PERCENT: 13.8 %
MAGNESIUM: 1.6 MG/DL (ref 1.8–2.4)
MCH RBC QN AUTO: 26.6 PG (ref 26–34)
MCHC RBC AUTO-ENTMCNC: 32.1 G/DL (ref 31–36)
MCV RBC AUTO: 83 FL (ref 80–100)
MONOCYTES ABSOLUTE: 0.9 K/UL (ref 0–1.3)
MONOCYTES RELATIVE PERCENT: 6.4 %
NEUTROPHILS ABSOLUTE: 10.5 K/UL (ref 1.7–7.7)
NEUTROPHILS RELATIVE PERCENT: 77.8 %
PDW BLD-RTO: 18.8 % (ref 12.4–15.4)
PHOSPHORUS: 2.9 MG/DL (ref 2.5–4.9)
PLATELET # BLD: 500 K/UL (ref 135–450)
PMV BLD AUTO: 6.8 FL (ref 5–10.5)
POTASSIUM SERPL-SCNC: 4 MMOL/L (ref 3.5–5.1)
RBC # BLD: 3.39 M/UL (ref 4–5.2)
SODIUM BLD-SCNC: 127 MMOL/L (ref 136–145)
WBC # BLD: 13.5 K/UL (ref 4–11)

## 2018-10-14 PROCEDURE — APPNB30 APP NON BILLABLE TIME 0-30 MINS: Performed by: NURSE PRACTITIONER

## 2018-10-14 PROCEDURE — APPSS15 APP SPLIT SHARED TIME 0-15 MINUTES: Performed by: NURSE PRACTITIONER

## 2018-10-14 PROCEDURE — 6360000002 HC RX W HCPCS: Performed by: NURSE PRACTITIONER

## 2018-10-14 PROCEDURE — 6360000002 HC RX W HCPCS: Performed by: SURGERY

## 2018-10-14 PROCEDURE — 99024 POSTOP FOLLOW-UP VISIT: CPT | Performed by: SURGERY

## 2018-10-14 PROCEDURE — 6370000000 HC RX 637 (ALT 250 FOR IP): Performed by: SURGERY

## 2018-10-14 PROCEDURE — 80048 BASIC METABOLIC PNL TOTAL CA: CPT

## 2018-10-14 PROCEDURE — 83735 ASSAY OF MAGNESIUM: CPT

## 2018-10-14 PROCEDURE — 1200000000 HC SEMI PRIVATE

## 2018-10-14 PROCEDURE — 36415 COLL VENOUS BLD VENIPUNCTURE: CPT

## 2018-10-14 PROCEDURE — 6370000000 HC RX 637 (ALT 250 FOR IP): Performed by: INTERNAL MEDICINE

## 2018-10-14 PROCEDURE — 2580000003 HC RX 258: Performed by: INTERNAL MEDICINE

## 2018-10-14 PROCEDURE — 85025 COMPLETE CBC W/AUTO DIFF WBC: CPT

## 2018-10-14 PROCEDURE — 84100 ASSAY OF PHOSPHORUS: CPT

## 2018-10-14 PROCEDURE — 6360000002 HC RX W HCPCS: Performed by: INTERNAL MEDICINE

## 2018-10-14 RX ORDER — FUROSEMIDE 40 MG/1
40 TABLET ORAL 2 TIMES DAILY
Status: DISCONTINUED | OUTPATIENT
Start: 2018-10-14 | End: 2018-10-22

## 2018-10-14 RX ORDER — MAGNESIUM SULFATE IN WATER 40 MG/ML
2 INJECTION, SOLUTION INTRAVENOUS ONCE
Status: DISCONTINUED | OUTPATIENT
Start: 2018-10-14 | End: 2018-10-22 | Stop reason: HOSPADM

## 2018-10-14 RX ADMIN — TAZOBACTAM SODIUM AND PIPERACILLIN SODIUM 4.5 G: 500; 4 INJECTION, SOLUTION INTRAVENOUS at 05:34

## 2018-10-14 RX ADMIN — FUROSEMIDE 20 MG: 40 TABLET ORAL at 10:12

## 2018-10-14 RX ADMIN — SIMETHICONE CHEW TAB 80 MG 80 MG: 80 TABLET ORAL at 21:55

## 2018-10-14 RX ADMIN — ATENOLOL 100 MG: 50 TABLET ORAL at 10:13

## 2018-10-14 RX ADMIN — PHENAZOPYRIDINE HYDROCHLORIDE 200 MG: 100 TABLET ORAL at 10:12

## 2018-10-14 RX ADMIN — BACLOFEN 10 MG: 10 TABLET ORAL at 10:13

## 2018-10-14 RX ADMIN — ENOXAPARIN SODIUM 40 MG: 40 INJECTION SUBCUTANEOUS at 10:12

## 2018-10-14 RX ADMIN — LINEZOLID 600 MG: 600 INJECTION, SOLUTION INTRAVENOUS at 21:54

## 2018-10-14 RX ADMIN — TAZOBACTAM SODIUM AND PIPERACILLIN SODIUM 4.5 G: 500; 4 INJECTION, SOLUTION INTRAVENOUS at 01:00

## 2018-10-14 RX ADMIN — POTASSIUM CHLORIDE 20 MEQ: 1500 TABLET, EXTENDED RELEASE ORAL at 21:55

## 2018-10-14 RX ADMIN — HYDROMORPHONE HYDROCHLORIDE 0.5 MG: 1 INJECTION, SOLUTION INTRAMUSCULAR; INTRAVENOUS; SUBCUTANEOUS at 21:55

## 2018-10-14 RX ADMIN — ONDANSETRON 4 MG: 4 TABLET, ORALLY DISINTEGRATING ORAL at 10:13

## 2018-10-14 RX ADMIN — Medication 400 MG: at 10:13

## 2018-10-14 RX ADMIN — POTASSIUM CHLORIDE 20 MEQ: 1500 TABLET, EXTENDED RELEASE ORAL at 10:12

## 2018-10-14 RX ADMIN — BUSPIRONE HYDROCHLORIDE 30 MG: 5 TABLET ORAL at 21:55

## 2018-10-14 RX ADMIN — DULOXETINE HYDROCHLORIDE 60 MG: 60 CAPSULE, DELAYED RELEASE ORAL at 10:13

## 2018-10-14 RX ADMIN — HYDROMORPHONE HYDROCHLORIDE 0.5 MG: 1 INJECTION, SOLUTION INTRAMUSCULAR; INTRAVENOUS; SUBCUTANEOUS at 05:34

## 2018-10-14 RX ADMIN — Medication 10 ML: at 21:55

## 2018-10-14 RX ADMIN — LORAZEPAM 0.5 MG: 2 INJECTION INTRAMUSCULAR; INTRAVENOUS at 17:48

## 2018-10-14 RX ADMIN — Medication 10 ML: at 05:34

## 2018-10-14 RX ADMIN — HYDROMORPHONE HYDROCHLORIDE 0.5 MG: 1 INJECTION, SOLUTION INTRAMUSCULAR; INTRAVENOUS; SUBCUTANEOUS at 17:49

## 2018-10-14 RX ADMIN — BUSPIRONE HYDROCHLORIDE 30 MG: 5 TABLET ORAL at 10:12

## 2018-10-14 RX ADMIN — SIMETHICONE CHEW TAB 80 MG 80 MG: 80 TABLET ORAL at 10:12

## 2018-10-14 RX ADMIN — Medication 400 MG: at 21:55

## 2018-10-14 RX ADMIN — BACLOFEN 10 MG: 10 TABLET ORAL at 21:56

## 2018-10-14 RX ADMIN — LINEZOLID 600 MG: 600 INJECTION, SOLUTION INTRAVENOUS at 10:14

## 2018-10-14 ASSESSMENT — PAIN SCALES - GENERAL
PAINLEVEL_OUTOF10: 7
PAINLEVEL_OUTOF10: 8
PAINLEVEL_OUTOF10: 5
PAINLEVEL_OUTOF10: 8

## 2018-10-14 ASSESSMENT — PAIN DESCRIPTION - LOCATION: LOCATION: ABDOMEN

## 2018-10-14 NOTE — PROGRESS NOTES
10/14/2018    CHOL 151 02/06/2018    TRIG 57 02/06/2018    HDL 81 (H) 02/06/2018    ALT <5 (L) 10/10/2018    AST 19 10/10/2018     (L) 10/14/2018    K 4.0 10/14/2018    CL 95 (L) 10/14/2018    CREATININE <0.5 (L) 10/14/2018    BUN 4 (L) 10/14/2018    CO2 23 10/14/2018    INR 1.33 (H) 09/26/2018    LABMICR YES 10/07/2018     Lab Results   Component Value Date    TROPONINI <0.01 10/07/2018       Recent Imaging Results are Reviewed:  Ct Abdomen Pelvis W Iv Contrast Additional Contrast? None    Result Date: 10/7/2018  EXAMINATION: CT OF THE ABDOMEN AND PELVIS WITH CONTRAST 10/7/2018 7:30 am TECHNIQUE: CT of the abdomen and pelvis was performed with the administration of intravenous contrast. Multiplanar reformatted images are provided for review. Dose modulation, iterative reconstruction, and/or weight based adjustment of the mA/kV was utilized to reduce the radiation dose to as low as reasonably achievable. COMPARISON: CT abdomen and pelvis with contrast 09/26/2018 HISTORY: ORDERING SYSTEM PROVIDED HISTORY: ABDOMINAL PAIN TECHNOLOGIST PROVIDED HISTORY: Additional Contrast?->None Ordering Physician Provided Reason for Exam: abd pain FINDINGS: Lower Chest: Mild left basilar atelectasis. Organs: Stable hypoattenuation along level of falciform ligament, likely focal steatosis. A faint subcentimeter hypodense lesion posteriorly in the right hepatic lobe is unchanged and too small to characterize. There is no evidence of biliary ductal dilation. The gallbladder is unremarkable. Splenic atrophy. The adrenal glands are unremarkable without evidence of mass. The pancreas is unremarkable. 3.6 cm near fluid density lesion of the right renal lower pole most likely represents a cyst.  There is mild bilateral renal cortical scarring. No hydronephrosis or hydroureter. No evidence of urolithiasis. Imaging through the pelvis limited by streak artifact from right hip prosthesis.   There is diffuse urinary bladder wall thickening. No definite gas within the urinary bladder is visible. GI/Bowel: Significant short segment wall thickening of the proximal sigmoid colon, overall mildly decreased from September 2018. There is proximal dilation of the colon, which contains air-fluid levels. The cecum is markedly distended. The appendix is not visualized. Large hiatal hernia partially visualized. There is no small bowel dilation or focal wall thickening. Pelvis: The uterus is absent. The ovaries are not visualized. Trace pelvic free fluid is present. A soft tissue tract extends from thickened sigmoid colon towards the vaginal cuff and urinary bladder. Soft tissue density in this region is also inseparable from small bowel. There is left anterior pelvic fat stranding. Nonenlarged pelvic lymph nodes are present. Peritoneum/Retroperitoneum: Trace perihepatic fluid. Small soft tissue nodules along the distal descending colon. There is otherwise no concerning intra-abdominal adenopathy. There is no pneumoperitoneum. The abdominal aorta is normal caliber and without evidence of aneurysm. The portal vein is patent. Bones/Soft Tissues: There are no suspicious osseous lesions. Persistent short segment wall thickening of the proximal sigmoid colon with upstream colonic dilation consistent with obstruction. Given the persistent wall thickening in this region and absence of significant diverticulosis elsewhere in the colon, findings are highly concerning for malignancy. Soft tissue tract extending from thickened sigmoid colon, inseparable from the urinary bladder, vaginal cuff, and small bowel in the pelvis. Small soft tissue nodules within the left anterior pelvis and along the distal descending colon. Otherwise no concerning intra-abdominal adenopathy. Large hiatal hernia.      Ct Abdomen Pelvis W Iv Contrast Additional Contrast? None    Result Date: 9/26/2018  EXAMINATION: CT OF THE ABDOMEN AND PELVIS WITH CONTRAST 9/26/2018

## 2018-10-14 NOTE — PROGRESS NOTES
5*  4*  4*   CREATININE  0.6  <0.5*  <0.5*   GLUCOSE  103*  107*  88     Hepatic:   No results for input(s): AST, ALT, ALB, BILITOT, ALKPHOS in the last 72 hours. Amylase: No results for input(s): AMYLASE in the last 72 hours. Lipase: No results for input(s): LIPASE in the last 72 hours. Mag:      Recent Labs      10/14/18   0647   MG  1.60*      Phos:     Recent Labs      10/14/18   0647   PHOS  2.9      Coags: No results for input(s): INR, APTT in the last 72 hours. Cultures:  Anaerobic culture  No results for input(s): LABANAE in the last 72 hours. Blood culture  No results for input(s): BC in the last 72 hours. Blood culture 2  No results for input(s): BLOODCULT2 in the last 72 hours. Body fluid culture  No results for input(s): BLOODCULT2 in the last 72 hours. Surgical culture  No results for input(s): CXSURG in the last 72 hours. Fecal occult  No results for input(s): OCCULTBLDFEC in the last 72 hours. Gram stain  No results for input(s): LABGRAM in the last 72 hours. Stool culture 1  No results for input(s): CXST in the last 72 hours. Stool culture 2  No results for input(s): STOOLCULT2 in the last 72 hours. Urine culture  No results for input(s): LABURIN in the last 72 hours. Wound abscess  No results for input(s): WNDABS in the last 72 hours. Pathology:  FINAL DIAGNOSIS:       A. Sigmoid colon, resection:       - Portion of large bowel with perforated acute diverticulitis and         mural abscess formation with background chronic active colitis -         see comment.       - Negative for dysplasia and malignancy       B. Mass, omentum, excision:       - Mature adipose tissue with fibrosis and fat necrosis       - Negative for malignancy       C.  Additional colon, proximal, excision:       - Portion of large bowel with patchy mild to moderate chronic active         colitis - see comment       - Negative for dysplasia and malignancy    COMMENT:  The history of Crohn's disease is noted by clinical report. Soumya Sanders current  specimen display abundant diverticular disease in the sigmoid colon  specimen (specimen A), including areas with perforation and mural abscess  formation.  The portion of additional proximal colon (specimen C) also  shows evidence of active inflammation in the form of acute cryptitis.  In  addition, signs of chronic disease are evident, including transmural  inflammation with focal granulomas.  These findings are compatible with  involvement by the patient's inflammatory bowel disease, although  assessing the degree of acute and active colitis is hindered by the  abundant acute inflammation and ulceration from the concurrent  diverticular disease. Julia Noel is no evidence of viral cytopathic effect,  dysplasia, or malignancy. Imaging:  I have personally reviewed the following films:    Ct Abdomen Pelvis W Iv Contrast Additional Contrast? None    Result Date: 10/7/2018  EXAMINATION: CT OF THE ABDOMEN AND PELVIS WITH CONTRAST 10/7/2018 7:30 am TECHNIQUE: CT of the abdomen and pelvis was performed with the administration of intravenous contrast. Multiplanar reformatted images are provided for review. Dose modulation, iterative reconstruction, and/or weight based adjustment of the mA/kV was utilized to reduce the radiation dose to as low as reasonably achievable. COMPARISON: CT abdomen and pelvis with contrast 09/26/2018 HISTORY: ORDERING SYSTEM PROVIDED HISTORY: ABDOMINAL PAIN TECHNOLOGIST PROVIDED HISTORY: Additional Contrast?->None Ordering Physician Provided Reason for Exam: abd pain FINDINGS: Lower Chest: Mild left basilar atelectasis. Organs: Stable hypoattenuation along level of falciform ligament, likely focal steatosis. A faint subcentimeter hypodense lesion posteriorly in the right hepatic lobe is unchanged and too small to characterize. There is no evidence of biliary ductal dilation. The gallbladder is unremarkable. Splenic atrophy.   The adrenal glands are unremarkable without evidence of mass. The pancreas is unremarkable. 3.6 cm near fluid density lesion of the right renal lower pole most likely represents a cyst.  There is mild bilateral renal cortical scarring. No hydronephrosis or hydroureter. No evidence of urolithiasis. Imaging through the pelvis limited by streak artifact from right hip prosthesis. There is diffuse urinary bladder wall thickening. No definite gas within the urinary bladder is visible. GI/Bowel: Significant short segment wall thickening of the proximal sigmoid colon, overall mildly decreased from September 2018. There is proximal dilation of the colon, which contains air-fluid levels. The cecum is markedly distended. The appendix is not visualized. Large hiatal hernia partially visualized. There is no small bowel dilation or focal wall thickening. Pelvis: The uterus is absent. The ovaries are not visualized. Trace pelvic free fluid is present. A soft tissue tract extends from thickened sigmoid colon towards the vaginal cuff and urinary bladder. Soft tissue density in this region is also inseparable from small bowel. There is left anterior pelvic fat stranding. Nonenlarged pelvic lymph nodes are present. Peritoneum/Retroperitoneum: Trace perihepatic fluid. Small soft tissue nodules along the distal descending colon. There is otherwise no concerning intra-abdominal adenopathy. There is no pneumoperitoneum. The abdominal aorta is normal caliber and without evidence of aneurysm. The portal vein is patent. Bones/Soft Tissues: There are no suspicious osseous lesions. Persistent short segment wall thickening of the proximal sigmoid colon with upstream colonic dilation consistent with obstruction. Given the persistent wall thickening in this region and absence of significant diverticulosis elsewhere in the colon, findings are highly concerning for malignancy.  Soft tissue tract extending from thickened sigmoid colon, inseparable

## 2018-10-14 NOTE — PROGRESS NOTES
Patient AOx4, VSS, assessment performed and documented. Patient refused scheduled PO meds. Plan of care discussed with patient, verbalized understanding. Fall risk interventions in place. Call light within reach. No additional needs verbalized at this time. Will continue to monitor.

## 2018-10-15 ENCOUNTER — APPOINTMENT (OUTPATIENT)
Dept: CT IMAGING | Age: 63
DRG: 231 | End: 2018-10-15
Payer: MEDICAID

## 2018-10-15 LAB
ACANTHOCYTES: ABNORMAL
ANION GAP SERPL CALCULATED.3IONS-SCNC: 11 MMOL/L (ref 3–16)
ANISOCYTOSIS: ABNORMAL
BASOPHILS ABSOLUTE: 0 K/UL (ref 0–0.2)
BASOPHILS RELATIVE PERCENT: 0 %
BUN BLDV-MCNC: 4 MG/DL (ref 7–20)
BURR CELLS: ABNORMAL
CALCIUM SERPL-MCNC: 8.9 MG/DL (ref 8.3–10.6)
CHLORIDE BLD-SCNC: 93 MMOL/L (ref 99–110)
CO2: 24 MMOL/L (ref 21–32)
CREAT SERPL-MCNC: <0.5 MG/DL (ref 0.6–1.2)
EOSINOPHILS ABSOLUTE: 0.2 K/UL (ref 0–0.6)
EOSINOPHILS RELATIVE PERCENT: 1 %
GFR AFRICAN AMERICAN: >60
GFR NON-AFRICAN AMERICAN: >60
GLUCOSE BLD-MCNC: 84 MG/DL (ref 70–99)
HCT VFR BLD CALC: 25.9 % (ref 36–48)
HEMOGLOBIN: 8.4 G/DL (ref 12–16)
LYMPHOCYTES ABSOLUTE: 1.9 K/UL (ref 1–5.1)
LYMPHOCYTES RELATIVE PERCENT: 11 %
MAGNESIUM: 1.6 MG/DL (ref 1.8–2.4)
MCH RBC QN AUTO: 27 PG (ref 26–34)
MCHC RBC AUTO-ENTMCNC: 32.5 G/DL (ref 31–36)
MCV RBC AUTO: 82.9 FL (ref 80–100)
MONOCYTES ABSOLUTE: 0.5 K/UL (ref 0–1.3)
MONOCYTES RELATIVE PERCENT: 3 %
NEUTROPHILS ABSOLUTE: 14.5 K/UL (ref 1.7–7.7)
NEUTROPHILS RELATIVE PERCENT: 85 %
OSMOLALITY URINE: 598 MOSM/KG (ref 390–1070)
PDW BLD-RTO: 19.2 % (ref 12.4–15.4)
PHOSPHORUS: 3.4 MG/DL (ref 2.5–4.9)
PLATELET # BLD: 507 K/UL (ref 135–450)
PLATELET SLIDE REVIEW: ABNORMAL
PMV BLD AUTO: 6.8 FL (ref 5–10.5)
POLYCHROMASIA: ABNORMAL
POTASSIUM SERPL-SCNC: 4 MMOL/L (ref 3.5–5.1)
RBC # BLD: 3.12 M/UL (ref 4–5.2)
SCHISTOCYTES: ABNORMAL
SLIDE REVIEW: ABNORMAL
SODIUM BLD-SCNC: 128 MMOL/L (ref 136–145)
SODIUM URINE: 75 MMOL/L
TOXIC GRANULATION: PRESENT
VACUOLATED NEUTROPHILS: PRESENT
WBC # BLD: 17 K/UL (ref 4–11)

## 2018-10-15 PROCEDURE — 85025 COMPLETE CBC W/AUTO DIFF WBC: CPT

## 2018-10-15 PROCEDURE — 6370000000 HC RX 637 (ALT 250 FOR IP): Performed by: INTERNAL MEDICINE

## 2018-10-15 PROCEDURE — 83735 ASSAY OF MAGNESIUM: CPT

## 2018-10-15 PROCEDURE — APPNB30 APP NON BILLABLE TIME 0-30 MINS: Performed by: NURSE PRACTITIONER

## 2018-10-15 PROCEDURE — 2580000003 HC RX 258: Performed by: INTERNAL MEDICINE

## 2018-10-15 PROCEDURE — 84100 ASSAY OF PHOSPHORUS: CPT

## 2018-10-15 PROCEDURE — 6360000004 HC RX CONTRAST MEDICATION: Performed by: NURSE PRACTITIONER

## 2018-10-15 PROCEDURE — 80048 BASIC METABOLIC PNL TOTAL CA: CPT

## 2018-10-15 PROCEDURE — 6360000002 HC RX W HCPCS: Performed by: SURGERY

## 2018-10-15 PROCEDURE — 1200000000 HC SEMI PRIVATE

## 2018-10-15 PROCEDURE — 6370000000 HC RX 637 (ALT 250 FOR IP): Performed by: SURGERY

## 2018-10-15 PROCEDURE — 99024 POSTOP FOLLOW-UP VISIT: CPT | Performed by: SURGERY

## 2018-10-15 PROCEDURE — 83935 ASSAY OF URINE OSMOLALITY: CPT

## 2018-10-15 PROCEDURE — 6360000002 HC RX W HCPCS: Performed by: NURSE PRACTITIONER

## 2018-10-15 PROCEDURE — 74177 CT ABD & PELVIS W/CONTRAST: CPT

## 2018-10-15 PROCEDURE — APPSS15 APP SPLIT SHARED TIME 0-15 MINUTES: Performed by: NURSE PRACTITIONER

## 2018-10-15 PROCEDURE — 84300 ASSAY OF URINE SODIUM: CPT

## 2018-10-15 PROCEDURE — 6360000004 HC RX CONTRAST MEDICATION: Performed by: INTERNAL MEDICINE

## 2018-10-15 PROCEDURE — 36415 COLL VENOUS BLD VENIPUNCTURE: CPT

## 2018-10-15 RX ORDER — MAGNESIUM SULFATE IN WATER 40 MG/ML
2 INJECTION, SOLUTION INTRAVENOUS ONCE
Status: COMPLETED | OUTPATIENT
Start: 2018-10-15 | End: 2018-10-15

## 2018-10-15 RX ADMIN — SIMETHICONE CHEW TAB 80 MG 80 MG: 80 TABLET ORAL at 09:12

## 2018-10-15 RX ADMIN — ENOXAPARIN SODIUM 40 MG: 40 INJECTION SUBCUTANEOUS at 09:11

## 2018-10-15 RX ADMIN — PHENAZOPYRIDINE HYDROCHLORIDE 200 MG: 100 TABLET ORAL at 14:04

## 2018-10-15 RX ADMIN — TAZOBACTAM SODIUM AND PIPERACILLIN SODIUM 4.5 G: 500; 4 INJECTION, SOLUTION INTRAVENOUS at 00:25

## 2018-10-15 RX ADMIN — Medication 10 ML: at 09:12

## 2018-10-15 RX ADMIN — Medication 10 ML: at 05:44

## 2018-10-15 RX ADMIN — SIMETHICONE CHEW TAB 80 MG 80 MG: 80 TABLET ORAL at 14:04

## 2018-10-15 RX ADMIN — Medication 10 ML: at 21:13

## 2018-10-15 RX ADMIN — PHENAZOPYRIDINE HYDROCHLORIDE 200 MG: 100 TABLET ORAL at 09:11

## 2018-10-15 RX ADMIN — PANTOPRAZOLE SODIUM 40 MG: 40 TABLET, DELAYED RELEASE ORAL at 05:44

## 2018-10-15 RX ADMIN — BUSPIRONE HYDROCHLORIDE 30 MG: 5 TABLET ORAL at 09:11

## 2018-10-15 RX ADMIN — FUROSEMIDE 40 MG: 40 TABLET ORAL at 09:12

## 2018-10-15 RX ADMIN — IOHEXOL 50 ML: 240 INJECTION, SOLUTION INTRATHECAL; INTRAVASCULAR; INTRAVENOUS; ORAL at 10:46

## 2018-10-15 RX ADMIN — TAZOBACTAM SODIUM AND PIPERACILLIN SODIUM 4.5 G: 500; 4 INJECTION, SOLUTION INTRAVENOUS at 14:10

## 2018-10-15 RX ADMIN — ATENOLOL 100 MG: 50 TABLET ORAL at 09:12

## 2018-10-15 RX ADMIN — HYDROMORPHONE HYDROCHLORIDE 0.5 MG: 1 INJECTION, SOLUTION INTRAMUSCULAR; INTRAVENOUS; SUBCUTANEOUS at 10:45

## 2018-10-15 RX ADMIN — BUSPIRONE HYDROCHLORIDE 30 MG: 5 TABLET ORAL at 21:12

## 2018-10-15 RX ADMIN — Medication 400 MG: at 09:12

## 2018-10-15 RX ADMIN — HYDROMORPHONE HYDROCHLORIDE 0.5 MG: 1 INJECTION, SOLUTION INTRAMUSCULAR; INTRAVENOUS; SUBCUTANEOUS at 14:38

## 2018-10-15 RX ADMIN — DULOXETINE HYDROCHLORIDE 60 MG: 60 CAPSULE, DELAYED RELEASE ORAL at 09:11

## 2018-10-15 RX ADMIN — TAZOBACTAM SODIUM AND PIPERACILLIN SODIUM 4.5 G: 500; 4 INJECTION, SOLUTION INTRAVENOUS at 05:44

## 2018-10-15 RX ADMIN — LINEZOLID 600 MG: 600 INJECTION, SOLUTION INTRAVENOUS at 09:11

## 2018-10-15 RX ADMIN — SIMETHICONE CHEW TAB 80 MG 80 MG: 80 TABLET ORAL at 21:12

## 2018-10-15 RX ADMIN — BACLOFEN 10 MG: 10 TABLET ORAL at 09:12

## 2018-10-15 RX ADMIN — PHENAZOPYRIDINE HYDROCHLORIDE 200 MG: 100 TABLET ORAL at 18:01

## 2018-10-15 RX ADMIN — FUROSEMIDE 40 MG: 40 TABLET ORAL at 18:01

## 2018-10-15 RX ADMIN — MAGNESIUM SULFATE HEPTAHYDRATE 2 G: 40 INJECTION, SOLUTION INTRAVENOUS at 11:44

## 2018-10-15 RX ADMIN — TAZOBACTAM SODIUM AND PIPERACILLIN SODIUM 4.5 G: 500; 4 INJECTION, SOLUTION INTRAVENOUS at 18:20

## 2018-10-15 RX ADMIN — LINEZOLID 600 MG: 600 INJECTION, SOLUTION INTRAVENOUS at 21:12

## 2018-10-15 RX ADMIN — POTASSIUM CHLORIDE 20 MEQ: 1500 TABLET, EXTENDED RELEASE ORAL at 21:13

## 2018-10-15 RX ADMIN — IOPAMIDOL 75 ML: 755 INJECTION, SOLUTION INTRAVENOUS at 12:53

## 2018-10-15 RX ADMIN — LORAZEPAM 0.5 MG: 2 INJECTION INTRAMUSCULAR; INTRAVENOUS at 21:22

## 2018-10-15 RX ADMIN — ACETAMINOPHEN, ASPIRIN AND CAFFEINE 1 TABLET: 250; 250; 65 TABLET, FILM COATED ORAL at 04:22

## 2018-10-15 RX ADMIN — Medication 10 ML: at 00:26

## 2018-10-15 RX ADMIN — BACLOFEN 10 MG: 10 TABLET ORAL at 21:12

## 2018-10-15 RX ADMIN — HYDROMORPHONE HYDROCHLORIDE 0.5 MG: 1 INJECTION, SOLUTION INTRAMUSCULAR; INTRAVENOUS; SUBCUTANEOUS at 05:44

## 2018-10-15 RX ADMIN — Medication 400 MG: at 21:12

## 2018-10-15 RX ADMIN — POTASSIUM CHLORIDE 20 MEQ: 1500 TABLET, EXTENDED RELEASE ORAL at 09:12

## 2018-10-15 RX ADMIN — BACLOFEN 10 MG: 10 TABLET ORAL at 14:06

## 2018-10-15 RX ADMIN — FLUCONAZOLE 400 MG: 2 INJECTION INTRAVENOUS at 14:03

## 2018-10-15 ASSESSMENT — PAIN DESCRIPTION - FREQUENCY
FREQUENCY: INTERMITTENT
FREQUENCY: INTERMITTENT

## 2018-10-15 ASSESSMENT — PAIN DESCRIPTION - PAIN TYPE
TYPE: ACUTE PAIN

## 2018-10-15 ASSESSMENT — PAIN SCALES - GENERAL
PAINLEVEL_OUTOF10: 7
PAINLEVEL_OUTOF10: 7
PAINLEVEL_OUTOF10: 8
PAINLEVEL_OUTOF10: 9
PAINLEVEL_OUTOF10: 2
PAINLEVEL_OUTOF10: 9
PAINLEVEL_OUTOF10: 4
PAINLEVEL_OUTOF10: 5

## 2018-10-15 ASSESSMENT — PAIN DESCRIPTION - DESCRIPTORS
DESCRIPTORS: ACHING

## 2018-10-15 ASSESSMENT — PAIN DESCRIPTION - LOCATION
LOCATION: ABDOMEN
LOCATION: HEAD

## 2018-10-15 ASSESSMENT — PAIN DESCRIPTION - ORIENTATION: ORIENTATION: MID

## 2018-10-15 ASSESSMENT — PAIN DESCRIPTION - ONSET: ONSET: ON-GOING

## 2018-10-15 NOTE — PLAN OF CARE
Problem: Pain:  Goal: Control of acute pain  Control of acute pain   Outcome: Ongoing    Goal: Control of chronic pain  Control of chronic pain   Outcome: Ongoing

## 2018-10-15 NOTE — PROGRESS NOTES
a large hiatal hernia. There is chronic scarring in the left lung base. There is no new acute consolidation. The cardiac silhouette is unchanged. There is a left shoulder arthroplasty. No acute abnormality. Large hiatal hernia. Assessment and Plan:  Patient Active Hospital Problem List:   Crohn's colitis ([facility] [address] (1/6/2018)    Assessment: Established problem. Improving.  Doing ok post op.     Plan: possible secondary closure next week if leukocytosis is resolving and no signs of active infection. Case d/w surgery   Anemia (1/5/2018)    Assessment: Established problem. Acute loss from surgery. 8.4 today  Plan:  transfusion ordered of prbc 10/11. Cont to check h/h - has been stable since   Hyponatremia (1/5/2018)    Assessment: Established problem. Worse. Due to hypotonic fluids. Na 128    Plan: Historically needs fluid restriction to keep Na up.  Will monitor Na daily. To start Lasix per d/w renal    Essential hypertension, benign (9/13/2015)    Assessment: Established problem. Stable 119/87    Plan: bp doing ok. Stay on same meds. IV hydralazine as needed   Omental mass ()    Assessment: Established problem.  Stable.     Plan: bx results - negative for malignancy             Glenis Gifford  10/15/2018

## 2018-10-15 NOTE — PROGRESS NOTES
WBC  13.2*  13.5*  17.0*   HGB  8.6*  9.0*  8.4*   HCT  26.5*  28.1*  25.9*   PLT  499*  500*  507*     BMP:    Recent Labs      10/13/18   0641  10/14/18   0647  10/15/18   0556   NA  129*  127*  128*   K  4.5  4.0  4.0   CL  98*  95*  93*   CO2  22  23  24   BUN  4*  4*  4*   CREATININE  <0.5*  <0.5*  <0.5*   GLUCOSE  107*  88  84     Hepatic:   No results for input(s): AST, ALT, ALB, BILITOT, ALKPHOS in the last 72 hours. Amylase: No results for input(s): AMYLASE in the last 72 hours. Lipase: No results for input(s): LIPASE in the last 72 hours. Mag:      Recent Labs      10/14/18   0647  10/15/18   0556   MG  1.60*  1.60*      Phos:     Recent Labs      10/14/18   0647  10/15/18   0556   PHOS  2.9  3.4      Coags: No results for input(s): INR, APTT in the last 72 hours. Cultures:  Anaerobic culture  No results for input(s): LABANAE in the last 72 hours. Blood culture  No results for input(s): BC in the last 72 hours. Blood culture 2  No results for input(s): BLOODCULT2 in the last 72 hours. Body fluid culture  No results for input(s): BLOODCULT2 in the last 72 hours. Surgical culture  No results for input(s): CXSURG in the last 72 hours. Fecal occult  No results for input(s): OCCULTBLDFEC in the last 72 hours. Gram stain  No results for input(s): LABGRAM in the last 72 hours. Stool culture 1  No results for input(s): CXST in the last 72 hours. Stool culture 2  No results for input(s): STOOLCULT2 in the last 72 hours. Urine culture  No results for input(s): LABURIN in the last 72 hours. Wound abscess  No results for input(s): WNDABS in the last 72 hours. Pathology:  FINAL DIAGNOSIS:       A. Sigmoid colon, resection:       - Portion of large bowel with perforated acute diverticulitis and         mural abscess formation with background chronic active colitis -         see comment.       - Negative for dysplasia and malignancy       B.  Mass, omentum, excision:       - Mature adipose tissue with fibrosis and fat necrosis       - Negative for malignancy       C. Additional colon, proximal, excision:       - Portion of large bowel with patchy mild to moderate chronic active         colitis - see comment       - Negative for dysplasia and malignancy    COMMENT:  The history of Crohn's disease is noted by clinical report. Jesus Cui current  specimen display abundant diverticular disease in the sigmoid colon  specimen (specimen A), including areas with perforation and mural abscess  formation.  The portion of additional proximal colon (specimen C) also  shows evidence of active inflammation in the form of acute cryptitis.  In  addition, signs of chronic disease are evident, including transmural  inflammation with focal granulomas.  These findings are compatible with  involvement by the patient's inflammatory bowel disease, although  assessing the degree of acute and active colitis is hindered by the  abundant acute inflammation and ulceration from the concurrent  diverticular disease. Fili Pock is no evidence of viral cytopathic effect,  dysplasia, or malignancy. Imaging:  I have personally reviewed the following films:    Ct Abdomen Pelvis W Iv Contrast Additional Contrast? None    Result Date: 10/7/2018  EXAMINATION: CT OF THE ABDOMEN AND PELVIS WITH CONTRAST 10/7/2018 7:30 am TECHNIQUE: CT of the abdomen and pelvis was performed with the administration of intravenous contrast. Multiplanar reformatted images are provided for review. Dose modulation, iterative reconstruction, and/or weight based adjustment of the mA/kV was utilized to reduce the radiation dose to as low as reasonably achievable. COMPARISON: CT abdomen and pelvis with contrast 09/26/2018 HISTORY: ORDERING SYSTEM PROVIDED HISTORY: ABDOMINAL PAIN TECHNOLOGIST PROVIDED HISTORY: Additional Contrast?->None Ordering Physician Provided Reason for Exam: abd pain FINDINGS: Lower Chest: Mild left basilar atelectasis.  Organs: Stable admitted with   1. Colitis        Status-post exploratory laparotomy, lysis of adhesions, sigmoid colectomy with end colostomy, mobilization of splenic flexure,  closure of rectal stump, and resection of mesenteric mass, 6 cm in size on 10/8/2018 for Colon obstruction, Crohn's, and possible  diverticulitis with perforation and pericolonic abscess in pelvis and mesenteric mass  Electrolyte derangement      Plan:  1. Continue wound VAC to open abdominal surgical wound, Wound Care RN following; possible secondary closure later this week if leukocytosis is resolving and no signs of active infection  2. Increasing leukocytosis today, STAT CT abdomen/pelvis with IV/PO contrast--Appears to have fluid collection, will wait for official read but may need IR drainage  3. May advance to general diet with nutritional supplementation as tolerated; NPO at midnight for possible IR percutaneous drainage  4. ELIAS drain appears to have migrated/pulled out from CT, will wait for read but may be able to remove later today  5. IV hydration; monitor and replace electrolytes as needed  6. Antibiotics; continue Diflucan 400 mg, Zyvox for VRE, and Zosyn 4.5 g, follow WBC and monitor for fevers/tachycardia  7. Activity as tolerated, ambulate TID--PT/OT evaluation and treatment  8. Pulmonary toilet, incentive spirometry  9. PRN analgesics and antiemetics--minimizing narcotics as tolerated  10. DVT prophylaxis with Lovenox and SCD's  11. Management of medical comorbid etiologies per primary team and consulting services    EDUCATION:  Educated patient on plan of care and disease process--all questions answered. Plans discussed with patient and nursing. Reviewed and discussed with Dr. Sandra Head.       Signed:  GAYLA De Luna - CNP  10/15/2018 1:10 PM

## 2018-10-15 NOTE — CARE COORDINATION
Outpatient visit plan No  Supplies given No   Samples requested No    Referrals:  [x]   [] [facility]  [] Supplies  [] Other      Patient/Caregiver Teaching:  Written Instructions given to patient/family  Teaching provided:  [] Reviewed GI and A&P        [] Supplies  [] Pouch emptying      [] Manipulate closure  [] Routine Care         [] Comment  [] Pouch maintenance           Level of patient/caregiver understanding able to:  [] Indicates understanding       [x] Needs reinforcement  [] Unsuccessful      [] Verbal Understanding  [] Demonstrated understanding       [] No evidence of learning  [] Refused teaching         [] N/A    Electronically signed by Humaira Mireles on 10/15/2018 at 2:59 PM

## 2018-10-15 NOTE — PROGRESS NOTES
bed, no active complaints    - ROS reported: as above + improving swelling , and all other 10 systems' review negative,   - PMH, [address], Social history: reviewed       MEDICATIONS:  Prior to Admission Medications:  Prescriptions Prior to Admission: sodium chloride 1 g tablet, Take 1 tablet by mouth 2 times daily  rivaroxaban (XARELTO) 20 MG TABS tablet, Take 1 tablet by mouth daily (with breakfast)  magnesium oxide (MAG-OX) 400 (240 Mg) MG tablet, Take 1 tablet by mouth 2 times daily  atenolol (TENORMIN) 100 MG tablet, Take 1 tablet by mouth daily  furosemide (LASIX) 20 MG tablet, Take 1 tablet by mouth daily (Patient taking differently: Take 20 mg by mouth 2 times daily )  mesalamine (ROWASA) 4 g enema, Place 60 mLs rectally nightly  mesalamine (LIALDA) 1.2 g EC tablet, Take 4 tablets by mouth daily (with breakfast)  fluticasone (FLONASE) 50 MCG/ACT nasal spray, 1 spray by Nasal route daily  DULoxetine (CYMBALTA) 60 MG extended release capsule, Take 60 mg by mouth daily  lidocaine (XYLOCAINE) 5 % ointment, Apply topically as needed Apply topically as needed to feet  zolpidem (AMBIEN) 10 MG tablet, Take 10 mg by mouth nightly as needed for Sleep . potassium chloride SA (K-DUR;KLOR-CON M) 20 MEQ tablet, Take 1 tablet by mouth 2 times daily.   Omeprazole 20 MG TBEC, Take 20 mg by mouth daily   busPIRone (BUSPAR) 15 MG tablet, Take 30 mg by mouth 2 times daily   [] amoxicillin-clavulanate (AUGMENTIN) 875-125 MG per tablet, Take 1 tablet by mouth 2 times daily for 7 days  baclofen (LIORESAL) 10 MG tablet, Take 1 tablet by mouth 3 times daily  ondansetron (ZOFRAN ODT) 4 MG disintegrating tablet, Take 1 tablet by mouth every 6 hours as needed for Nausea or Vomiting  aspirin-acetaminophen-caffeine (EXCEDRIN MIGRAINE) 250-250-65 MG per tablet, Take 1 tablet by mouth every 6 hours as needed for Headaches     Scheduled Meds:   magnesium sulfate  2 g Intravenous Once    magnesium sulfate  2 g Intravenous Once    09/26/2018 HISTORY: ORDERING SYSTEM PROVIDED HISTORY: ABDOMINAL PAIN TECHNOLOGIST PROVIDED HISTORY: Additional Contrast?->None Ordering Physician Provided Reason for Exam: abd pain FINDINGS: Lower Chest: Mild left basilar atelectasis. Organs: Stable hypoattenuation along level of falciform ligament, likely focal steatosis. A faint subcentimeter hypodense lesion posteriorly in the right hepatic lobe is unchanged and too small to characterize. There is no evidence of biliary ductal dilation. The gallbladder is unremarkable. Splenic atrophy. The adrenal glands are unremarkable without evidence of mass. The pancreas is unremarkable. 3.6 cm near fluid density lesion of the right renal lower pole most likely represents a cyst.  There is mild bilateral renal cortical scarring. No hydronephrosis or hydroureter. No evidence of urolithiasis. Imaging through the pelvis limited by streak artifact from right hip prosthesis. There is diffuse urinary bladder wall thickening. No definite gas within the urinary bladder is visible. GI/Bowel: Significant short segment wall thickening of the proximal sigmoid colon, overall mildly decreased from September 2018. There is proximal dilation of the colon, which contains air-fluid levels. The cecum is markedly distended. The appendix is not visualized. Large hiatal hernia partially visualized. There is no small bowel dilation or focal wall thickening. Pelvis: The uterus is absent. The ovaries are not visualized. Trace pelvic free fluid is present. A soft tissue tract extends from thickened sigmoid colon towards the vaginal cuff and urinary bladder. Soft tissue density in this region is also inseparable from small bowel. There is left anterior pelvic fat stranding. Nonenlarged pelvic lymph nodes are present. Peritoneum/Retroperitoneum: Trace perihepatic fluid. Small soft tissue nodules along the distal descending colon.   There is otherwise no concerning intra-abdominal characterize hypodensity at the right hepatic dome and along the falciform ligament, the latter of which has fluctuated in size and probably represents focal fatty infiltration. Gallbladder: Normal. Biliary: No intra or extrahepatic bile duct dilatation. Pancreas: Normal. Spleen: Surgically absent. Adrenals: Normal. Kidneys: Unchanged exophytic right renal simple cyst at the inferior pole. Additional too small to characterize renal hypodensities are unchanged. No hydronephrosis or nephrolithiasis. GI Tract: Large hiatus hernia with the majority of the stomach intrathoracic in position. No gastric outlet obstruction. Appendix not discretely identified however there are no pericecal inflammatory changes. Colonic diverticulosis with a long segment of severe diverticulitis along the distal descending and proximal sigmoid colon. Severe surrounding inflammatory stranding with extraluminal gas containing collection with faint peripheral enhancement measuring approximately 2.6 x 1.7 x 3.6 cm. This area of inflammation seems to bridge the proximal sigmoid colon and sigmoid rectum, suspicious for fistula a surgical clip is present along the distal margin. Peritoneum/Retroperitoneum: Scattered reactive lymph nodes about the mesentery. Unchanged omental stranding. Vascular: Normal caliber abdominal aorta. Patent hepatic, portal, superior mesenteric and splenic veins. PELVIS Genitourinary: Normal urinary bladder. Status post hysterectomy. Other: Extraluminal fluid collection along the sigmoid colon as detailed above. No enlarged lymph nodes. MUSCULOSKELETAL Bones and Soft Tissues: No acute superficial soft tissue or osseous abnormality. Thoracolumbar spondylosis with grade 1 degenerative anterolisthesis of L4 on L5 and minimal retrolisthesis of L1 on L2. Other: Basilar atelectasis relating to hiatus hernia. Normal included heart and pericardium.      Worsening severe diverticulitis involving the proximal sigmoid colon

## 2018-10-16 ENCOUNTER — APPOINTMENT (OUTPATIENT)
Dept: CT IMAGING | Age: 63
DRG: 231 | End: 2018-10-16
Payer: MEDICAID

## 2018-10-16 LAB
ABO/RH: NORMAL
ANION GAP SERPL CALCULATED.3IONS-SCNC: 9 MMOL/L (ref 3–16)
ANTIBODY SCREEN: NORMAL
APTT: 28.9 SEC (ref 26–36)
BASOPHILS ABSOLUTE: 0.1 K/UL (ref 0–0.2)
BASOPHILS RELATIVE PERCENT: 0.6 %
BLOOD BANK DISPENSE STATUS: NORMAL
BLOOD BANK DISPENSE STATUS: NORMAL
BLOOD BANK PRODUCT CODE: NORMAL
BLOOD BANK PRODUCT CODE: NORMAL
BPU ID: NORMAL
BPU ID: NORMAL
BUN BLDV-MCNC: <2 MG/DL (ref 7–20)
CALCIUM SERPL-MCNC: 8.3 MG/DL (ref 8.3–10.6)
CHLORIDE BLD-SCNC: 92 MMOL/L (ref 99–110)
CO2: 26 MMOL/L (ref 21–32)
CREAT SERPL-MCNC: <0.5 MG/DL (ref 0.6–1.2)
DESCRIPTION BLOOD BANK: NORMAL
DESCRIPTION BLOOD BANK: NORMAL
EOSINOPHILS ABSOLUTE: 0.5 K/UL (ref 0–0.6)
EOSINOPHILS RELATIVE PERCENT: 3.6 %
GFR AFRICAN AMERICAN: >60
GFR NON-AFRICAN AMERICAN: >60
GLUCOSE BLD-MCNC: 79 MG/DL (ref 70–99)
GLUCOSE BLD-MCNC: 95 MG/DL (ref 70–99)
HCT VFR BLD CALC: 22.1 % (ref 36–48)
HCT VFR BLD CALC: 30.3 % (ref 36–48)
HEMOGLOBIN: 10.1 G/DL (ref 12–16)
HEMOGLOBIN: 7.4 G/DL (ref 12–16)
INR BLD: 1.18 (ref 0.86–1.14)
LYMPHOCYTES ABSOLUTE: 1.8 K/UL (ref 1–5.1)
LYMPHOCYTES RELATIVE PERCENT: 13.7 %
MCH RBC QN AUTO: 27.6 PG (ref 26–34)
MCHC RBC AUTO-ENTMCNC: 33.5 G/DL (ref 31–36)
MCV RBC AUTO: 82.3 FL (ref 80–100)
MONOCYTES ABSOLUTE: 0.9 K/UL (ref 0–1.3)
MONOCYTES RELATIVE PERCENT: 6.9 %
NEUTROPHILS ABSOLUTE: 9.9 K/UL (ref 1.7–7.7)
NEUTROPHILS RELATIVE PERCENT: 75.2 %
PDW BLD-RTO: 18.9 % (ref 12.4–15.4)
PERFORMED ON: NORMAL
PLATELET # BLD: 453 K/UL (ref 135–450)
PMV BLD AUTO: 6.4 FL (ref 5–10.5)
POTASSIUM SERPL-SCNC: 3.7 MMOL/L (ref 3.5–5.1)
PROTHROMBIN TIME: 13.5 SEC (ref 9.8–13)
RBC # BLD: 2.69 M/UL (ref 4–5.2)
SODIUM BLD-SCNC: 127 MMOL/L (ref 136–145)
WBC # BLD: 13.1 K/UL (ref 4–11)

## 2018-10-16 PROCEDURE — 6360000002 HC RX W HCPCS: Performed by: RADIOLOGY

## 2018-10-16 PROCEDURE — APPNB30 APP NON BILLABLE TIME 0-30 MINS: Performed by: NURSE PRACTITIONER

## 2018-10-16 PROCEDURE — 86850 RBC ANTIBODY SCREEN: CPT

## 2018-10-16 PROCEDURE — 36415 COLL VENOUS BLD VENIPUNCTURE: CPT

## 2018-10-16 PROCEDURE — 86901 BLOOD TYPING SEROLOGIC RH(D): CPT

## 2018-10-16 PROCEDURE — 85025 COMPLETE CBC W/AUTO DIFF WBC: CPT

## 2018-10-16 PROCEDURE — 10140 I&D HMTMA SEROMA/FLUID COLLJ: CPT

## 2018-10-16 PROCEDURE — 1200000000 HC SEMI PRIVATE

## 2018-10-16 PROCEDURE — 6370000000 HC RX 637 (ALT 250 FOR IP): Performed by: INTERNAL MEDICINE

## 2018-10-16 PROCEDURE — 86923 COMPATIBILITY TEST ELECTRIC: CPT

## 2018-10-16 PROCEDURE — 85610 PROTHROMBIN TIME: CPT

## 2018-10-16 PROCEDURE — 93005 ELECTROCARDIOGRAM TRACING: CPT | Performed by: INTERNAL MEDICINE

## 2018-10-16 PROCEDURE — 2580000003 HC RX 258: Performed by: INTERNAL MEDICINE

## 2018-10-16 PROCEDURE — 6360000002 HC RX W HCPCS: Performed by: NURSE PRACTITIONER

## 2018-10-16 PROCEDURE — APPSS15 APP SPLIT SHARED TIME 0-15 MINUTES: Performed by: NURSE PRACTITIONER

## 2018-10-16 PROCEDURE — 6370000000 HC RX 637 (ALT 250 FOR IP): Performed by: SURGERY

## 2018-10-16 PROCEDURE — 6370000000 HC RX 637 (ALT 250 FOR IP): Performed by: NURSE PRACTITIONER

## 2018-10-16 PROCEDURE — 85014 HEMATOCRIT: CPT

## 2018-10-16 PROCEDURE — 99024 POSTOP FOLLOW-UP VISIT: CPT | Performed by: SURGERY

## 2018-10-16 PROCEDURE — 6360000002 HC RX W HCPCS: Performed by: SURGERY

## 2018-10-16 PROCEDURE — 85730 THROMBOPLASTIN TIME PARTIAL: CPT

## 2018-10-16 PROCEDURE — 86900 BLOOD TYPING SEROLOGIC ABO: CPT

## 2018-10-16 PROCEDURE — 80048 BASIC METABOLIC PNL TOTAL CA: CPT

## 2018-10-16 PROCEDURE — 85018 HEMOGLOBIN: CPT

## 2018-10-16 PROCEDURE — P9016 RBC LEUKOCYTES REDUCED: HCPCS

## 2018-10-16 PROCEDURE — 36430 TRANSFUSION BLD/BLD COMPNT: CPT

## 2018-10-16 RX ORDER — SODIUM CHLORIDE 9 MG/ML
INJECTION, SOLUTION INTRAVENOUS
Status: COMPLETED
Start: 2018-10-16 | End: 2018-10-16

## 2018-10-16 RX ORDER — FENTANYL CITRATE 50 UG/ML
INJECTION, SOLUTION INTRAMUSCULAR; INTRAVENOUS
Status: COMPLETED | OUTPATIENT
Start: 2018-10-16 | End: 2018-10-16

## 2018-10-16 RX ORDER — MIDAZOLAM HYDROCHLORIDE 1 MG/ML
INJECTION INTRAMUSCULAR; INTRAVENOUS
Status: COMPLETED | OUTPATIENT
Start: 2018-10-16 | End: 2018-10-16

## 2018-10-16 RX ORDER — 0.9 % SODIUM CHLORIDE 0.9 %
250 INTRAVENOUS SOLUTION INTRAVENOUS ONCE
Status: COMPLETED | OUTPATIENT
Start: 2018-10-16 | End: 2018-10-16

## 2018-10-16 RX ORDER — POTASSIUM CHLORIDE 20 MEQ/1
40 TABLET, EXTENDED RELEASE ORAL 2 TIMES DAILY
Status: DISCONTINUED | OUTPATIENT
Start: 2018-10-16 | End: 2018-10-22 | Stop reason: HOSPADM

## 2018-10-16 RX ADMIN — BACLOFEN 10 MG: 10 TABLET ORAL at 09:45

## 2018-10-16 RX ADMIN — PHENAZOPYRIDINE HYDROCHLORIDE 200 MG: 100 TABLET ORAL at 09:44

## 2018-10-16 RX ADMIN — TAZOBACTAM SODIUM AND PIPERACILLIN SODIUM 4.5 G: 500; 4 INJECTION, SOLUTION INTRAVENOUS at 14:51

## 2018-10-16 RX ADMIN — Medication 10 ML: at 21:37

## 2018-10-16 RX ADMIN — HYDROCODONE BITARTRATE AND ACETAMINOPHEN 1 TABLET: 5; 325 TABLET ORAL at 09:43

## 2018-10-16 RX ADMIN — MIDAZOLAM HYDROCHLORIDE 1 MG: 1 INJECTION INTRAMUSCULAR; INTRAVENOUS at 11:48

## 2018-10-16 RX ADMIN — BUSPIRONE HYDROCHLORIDE 30 MG: 5 TABLET ORAL at 21:38

## 2018-10-16 RX ADMIN — FENTANYL CITRATE 50 MCG: 50 INJECTION, SOLUTION INTRAMUSCULAR; INTRAVENOUS at 11:48

## 2018-10-16 RX ADMIN — FLUCONAZOLE 400 MG: 2 INJECTION INTRAVENOUS at 14:51

## 2018-10-16 RX ADMIN — SIMETHICONE CHEW TAB 80 MG 80 MG: 80 TABLET ORAL at 21:38

## 2018-10-16 RX ADMIN — Medication 10 ML: at 09:46

## 2018-10-16 RX ADMIN — SIMETHICONE CHEW TAB 80 MG 80 MG: 80 TABLET ORAL at 13:19

## 2018-10-16 RX ADMIN — ATENOLOL 100 MG: 50 TABLET ORAL at 09:45

## 2018-10-16 RX ADMIN — POTASSIUM CHLORIDE 40 MEQ: 1500 TABLET, EXTENDED RELEASE ORAL at 21:38

## 2018-10-16 RX ADMIN — DULOXETINE HYDROCHLORIDE 60 MG: 60 CAPSULE, DELAYED RELEASE ORAL at 09:45

## 2018-10-16 RX ADMIN — MIDAZOLAM HYDROCHLORIDE 2 MG: 1 INJECTION INTRAMUSCULAR; INTRAVENOUS at 11:42

## 2018-10-16 RX ADMIN — TAZOBACTAM SODIUM AND PIPERACILLIN SODIUM 4.5 G: 500; 4 INJECTION, SOLUTION INTRAVENOUS at 01:17

## 2018-10-16 RX ADMIN — POTASSIUM CHLORIDE 20 MEQ: 1500 TABLET, EXTENDED RELEASE ORAL at 09:45

## 2018-10-16 RX ADMIN — HYDROMORPHONE HYDROCHLORIDE 0.5 MG: 1 INJECTION, SOLUTION INTRAMUSCULAR; INTRAVENOUS; SUBCUTANEOUS at 01:24

## 2018-10-16 RX ADMIN — FENTANYL CITRATE 50 MCG: 50 INJECTION, SOLUTION INTRAMUSCULAR; INTRAVENOUS at 11:51

## 2018-10-16 RX ADMIN — PHENAZOPYRIDINE HYDROCHLORIDE 200 MG: 100 TABLET ORAL at 16:22

## 2018-10-16 RX ADMIN — Medication 400 MG: at 21:37

## 2018-10-16 RX ADMIN — FLUTICASONE PROPIONATE 1 SPRAY: 50 SPRAY, METERED NASAL at 09:44

## 2018-10-16 RX ADMIN — BACLOFEN 10 MG: 10 TABLET ORAL at 21:38

## 2018-10-16 RX ADMIN — LINEZOLID 600 MG: 600 INJECTION, SOLUTION INTRAVENOUS at 21:37

## 2018-10-16 RX ADMIN — FUROSEMIDE 40 MG: 40 TABLET ORAL at 09:44

## 2018-10-16 RX ADMIN — SODIUM CHLORIDE 250 ML: 9 INJECTION, SOLUTION INTRAVENOUS at 09:49

## 2018-10-16 RX ADMIN — TAZOBACTAM SODIUM AND PIPERACILLIN SODIUM 4.5 G: 500; 4 INJECTION, SOLUTION INTRAVENOUS at 05:37

## 2018-10-16 RX ADMIN — Medication 400 MG: at 09:45

## 2018-10-16 RX ADMIN — HYDROCODONE BITARTRATE AND ACETAMINOPHEN 1 TABLET: 5; 325 TABLET ORAL at 18:58

## 2018-10-16 RX ADMIN — FUROSEMIDE 40 MG: 40 TABLET ORAL at 16:22

## 2018-10-16 RX ADMIN — LINEZOLID 600 MG: 600 INJECTION, SOLUTION INTRAVENOUS at 12:51

## 2018-10-16 RX ADMIN — BUSPIRONE HYDROCHLORIDE 30 MG: 5 TABLET ORAL at 09:45

## 2018-10-16 RX ADMIN — BACLOFEN 10 MG: 10 TABLET ORAL at 13:19

## 2018-10-16 RX ADMIN — TAZOBACTAM SODIUM AND PIPERACILLIN SODIUM 4.5 G: 500; 4 INJECTION, SOLUTION INTRAVENOUS at 21:36

## 2018-10-16 ASSESSMENT — PAIN SCALES - GENERAL
PAINLEVEL_OUTOF10: 3
PAINLEVEL_OUTOF10: 3
PAINLEVEL_OUTOF10: 9
PAINLEVEL_OUTOF10: 6
PAINLEVEL_OUTOF10: 5
PAINLEVEL_OUTOF10: 5
PAINLEVEL_OUTOF10: 10
PAINLEVEL_OUTOF10: 5

## 2018-10-16 ASSESSMENT — PAIN DESCRIPTION - PAIN TYPE
TYPE: ACUTE PAIN

## 2018-10-16 ASSESSMENT — PAIN DESCRIPTION - LOCATION
LOCATION: ABDOMEN

## 2018-10-16 ASSESSMENT — PAIN DESCRIPTION - DESCRIPTORS: DESCRIPTORS: ACHING

## 2018-10-16 NOTE — PROGRESS NOTES
Patient was incontinent. Up to bedside commode afterwards, full bath, ECG leads changed and full linen change. Will continue to monitor.    Jonathan Eagle

## 2018-10-16 NOTE — PROGRESS NOTES
EKG shows NSR with PVCs. Tele monitor changed, patient shows NSR with PVCs on new tele monitor. Patient is stable. Bed alarm engaged. No further needs expressed. Call light within reach. Will continue to monitor.

## 2018-10-16 NOTE — PROGRESS NOTES
PRN  baclofen (LIORESAL) tablet 10 mg, 10 mg, Oral, TID  magnesium oxide (MAG-OX) tablet 400 mg, 400 mg, Oral, BID  atenolol (TENORMIN) tablet 100 mg, 100 mg, Oral, Daily  potassium chloride (KLOR-CON M) extended release tablet 40 mEq, 40 mEq, Oral, PRN **OR** potassium bicarb-citric acid (EFFER-K) effervescent tablet 40 mEq, 40 mEq, Oral, PRN **OR** potassium chloride 10 mEq/100 mL IVPB (Peripheral Line), 10 mEq, Intravenous, PRN  sodium chloride flush 0.9 % injection 10 mL, 10 mL, Intravenous, 2 times per day  sodium chloride flush 0.9 % injection 10 mL, 10 mL, Intravenous, PRN  magnesium hydroxide (MILK OF MAGNESIA) 400 MG/5ML suspension 30 mL, 30 mL, Oral, Daily PRN  ondansetron (ZOFRAN) injection 4 mg, 4 mg, Intravenous, Q6H PRN  phenazopyridine (PYRIDIUM) tablet 200 mg, 200 mg, Oral, TID WC  simethicone (MYLICON) chewable tablet 80 mg, 80 mg, Oral, Q6H  pantoprazole (PROTONIX) tablet 40 mg, 40 mg, Oral, QAM AC  hydrALAZINE (APRESOLINE) injection 10 mg, 10 mg, Intravenous, Q6H PRN         Objective:  /68   Pulse 92   Temp 98.5 °F (36.9 °C) (Oral)   Resp 18   Ht 5' 6\" (1.676 m)   Wt 234 lb 11.2 oz (106.5 kg)   SpO2 98%   BMI 37.88 kg/m²      Patient Vitals for the past 24 hrs:   BP Temp Temp src Pulse Resp SpO2 Weight   10/16/18 0601 - - - 92 - - -   10/16/18 0400 - - - 87 - - -   10/16/18 0350 103/68 98.5 °F (36.9 °C) Oral 83 18 98 % 234 lb 11.2 oz (106.5 kg)   10/16/18 0241 - - - 87 - - -   10/16/18 0116 115/82 99 °F (37.2 °C) Oral 91 20 92 % -   10/16/18 0045 - - - 90 - - -   10/15/18 2256 - - - 70 - - -   10/15/18 2111 - - - - - 92 % -   10/15/18 2109 133/85 98.6 °F (37 °C) Oral 77 18 (!) 88 % -   10/15/18 2059 - - - 76 - - -   10/15/18 1745 122/87 98.6 °F (37 °C) Oral 94 17 97 % -   10/15/18 1136 102/76 97.9 °F (36.6 °C) Oral 86 18 94 % -   10/15/18 0910 119/87 97.8 °F (36.6 °C) Oral 50 18 94 % -     Patient Vitals for the past 96 hrs (Last 3 readings):   Weight   10/16/18 0350 234 lb 11.2 oz simple cyst at the inferior pole. Additional too small to characterize renal hypodensities are unchanged. No hydronephrosis or nephrolithiasis. GI Tract: Large hiatus hernia with the majority of the stomach intrathoracic in position. No gastric outlet obstruction. Appendix not discretely identified however there are no pericecal inflammatory changes. Colonic diverticulosis with a long segment of severe diverticulitis along the distal descending and proximal sigmoid colon. Severe surrounding inflammatory stranding with extraluminal gas containing collection with faint peripheral enhancement measuring approximately 2.6 x 1.7 x 3.6 cm. This area of inflammation seems to bridge the proximal sigmoid colon and sigmoid rectum, suspicious for fistula a surgical clip is present along the distal margin. Peritoneum/Retroperitoneum: Scattered reactive lymph nodes about the mesentery. Unchanged omental stranding. Vascular: Normal caliber abdominal aorta. Patent hepatic, portal, superior mesenteric and splenic veins. PELVIS Genitourinary: Normal urinary bladder. Status post hysterectomy. Other: Extraluminal fluid collection along the sigmoid colon as detailed above. No enlarged lymph nodes. MUSCULOSKELETAL Bones and Soft Tissues: No acute superficial soft tissue or osseous abnormality. Thoracolumbar spondylosis with grade 1 degenerative anterolisthesis of L4 on L5 and minimal retrolisthesis of L1 on L2. Other: Basilar atelectasis relating to hiatus hernia. Normal included heart and pericardium. Worsening severe diverticulitis involving the proximal sigmoid colon with evidence of perforation and (contained) abscess formation with suspected fistulous communication bridging the proximal sigmoid colon to the rectosigmoid junction or possibly the vaginal stump (see annotated images). Exam findings were discussed by Dr. Sandra Og to [facility] on 9/26/2018 at 04:15.      Xr Chest Portable    Result Date:

## 2018-10-16 NOTE — PROGRESS NOTES
98.6 °F (37 °C) Oral 94 17 97 % -   10/15/18 1136 102/76 97.9 °F (36.6 °C) Oral 86 18 94 % -       Intake/Output Summary (Last 24 hours) at 10/16/18 0948  Last data filed at 10/16/18 0600   Gross per 24 hour   Intake              500 ml   Output             1370 ml   Net             -870 ml       Physical Exam   Constitutional: She is oriented to person, place, and time. She appears well-developed and well-nourished. HENT:   Head: Normocephalic and atraumatic. Eyes: Conjunctivae are normal. No scleral icterus. Neck: Neck supple. Cardiovascular: Normal rate and regular rhythm. Pulmonary/Chest: Effort normal and breath sounds normal.   Abdominal: Soft. Bowel sounds are normal.   Musculoskeletal: She exhibits edema (1+). She exhibits no deformity. Neurological: She is alert and oriented to person, place, and time. Skin: Skin is warm and dry. Psychiatric: She has a normal mood and affect. Her behavior is normal.         DATA:  Diagnostic tests reviewed for today's visit:    Recent Labs      10/14/18   0647  10/15/18   0556  10/16/18   0648   WBC  13.5*  17.0*  13.1*   HCT  28.1*  25.9*  22.1*   PLT  500*  507*  453*     Iron Saturation:  No components found for: PERCENTFE  FERRITIN:    Lab Results   Component Value Date    FERRITIN 67.5 09/27/2018     IRON:    Lab Results   Component Value Date    IRON 13 09/27/2018     TIBC:    Lab Results   Component Value Date    TIBC 190 09/27/2018       Recent Labs      10/14/18   0647  10/15/18   0556  10/16/18   0648   NA  127*  128*  127*   K  4.0  4.0  3.7   CL  95*  93*  92*   CO2  23  24  26   BUN  4*  4*  <2*   CREATININE  <0.5*  <0.5*  <0.5*     Recent Labs      10/14/18   0647  10/15/18   0556  10/16/18   0648   CALCIUM  8.7  8.9  8.3   MG  1.60*  1.60*   --    PHOS  2.9  3.4   --      No results for input(s): PH, PCO2, PO2 in the last 72 hours.     Invalid input(s): [id number], INSPIREDO2    ABG:  No results found for: PH, PCO2, PO2, HCO3, BE, THGB, Contrast? None    Result Date: 10/7/2018  EXAMINATION: CT OF THE ABDOMEN AND PELVIS WITH CONTRAST 10/7/2018 7:30 am TECHNIQUE: CT of the abdomen and pelvis was performed with the administration of intravenous contrast. Multiplanar reformatted images are provided for review. Dose modulation, iterative reconstruction, and/or weight based adjustment of the mA/kV was utilized to reduce the radiation dose to as low as reasonably achievable. COMPARISON: CT abdomen and pelvis with contrast 09/26/2018 HISTORY: ORDERING SYSTEM PROVIDED HISTORY: ABDOMINAL PAIN TECHNOLOGIST PROVIDED HISTORY: Additional Contrast?->None Ordering Physician Provided Reason for Exam: abd pain FINDINGS: Lower Chest: Mild left basilar atelectasis. Organs: Stable hypoattenuation along level of falciform ligament, likely focal steatosis. A faint subcentimeter hypodense lesion posteriorly in the right hepatic lobe is unchanged and too small to characterize. There is no evidence of biliary ductal dilation. The gallbladder is unremarkable. Splenic atrophy. The adrenal glands are unremarkable without evidence of mass. The pancreas is unremarkable. 3.6 cm near fluid density lesion of the right renal lower pole most likely represents a cyst.  There is mild bilateral renal cortical scarring. No hydronephrosis or hydroureter. No evidence of urolithiasis. Imaging through the pelvis limited by streak artifact from right hip prosthesis. There is diffuse urinary bladder wall thickening. No definite gas within the urinary bladder is visible. GI/Bowel: Significant short segment wall thickening of the proximal sigmoid colon, overall mildly decreased from September 2018. There is proximal dilation of the colon, which contains air-fluid levels. The cecum is markedly distended. The appendix is not visualized. Large hiatal hernia partially visualized. There is no small bowel dilation or focal wall thickening. Pelvis: The uterus is absent.   The ovaries are not patient is on cardiac monitor and/or pulse ox, they may be taken off cardiac monitor and pulse ox, left on O2 if currently on. All monitors reattached when patient returns to room. Additional Contrast?->None Ordering Physician Provided Reason for Exam: abdominal pain Acuity: Unknown Type of Exam: Unknown Relevant Medical/Surgical History: (LLQ abdominal pain for weeks, now with fever and worsening pain. hx chrons. + dizziness) FINDINGS: ABDOMEN Liver: Unchanged too small to characterize hypodensity at the right hepatic dome and along the falciform ligament, the latter of which has fluctuated in size and probably represents focal fatty infiltration. Gallbladder: Normal. Biliary: No intra or extrahepatic bile duct dilatation. Pancreas: Normal. Spleen: Surgically absent. Adrenals: Normal. Kidneys: Unchanged exophytic right renal simple cyst at the inferior pole. Additional too small to characterize renal hypodensities are unchanged. No hydronephrosis or nephrolithiasis. GI Tract: Large hiatus hernia with the majority of the stomach intrathoracic in position. No gastric outlet obstruction. Appendix not discretely identified however there are no pericecal inflammatory changes. Colonic diverticulosis with a long segment of severe diverticulitis along the distal descending and proximal sigmoid colon. Severe surrounding inflammatory stranding with extraluminal gas containing collection with faint peripheral enhancement measuring approximately 2.6 x 1.7 x 3.6 cm. This area of inflammation seems to bridge the proximal sigmoid colon and sigmoid rectum, suspicious for fistula a surgical clip is present along the distal margin. Peritoneum/Retroperitoneum: Scattered reactive lymph nodes about the mesentery. Unchanged omental stranding. Vascular: Normal caliber abdominal aorta. Patent hepatic, portal, superior mesenteric and splenic veins. PELVIS Genitourinary: Normal urinary bladder. Status post hysterectomy.  Other:

## 2018-10-16 NOTE — PROGRESS NOTES
chrons. + dizziness) FINDINGS: ABDOMEN Liver: Unchanged too small to characterize hypodensity at the right hepatic dome and along the falciform ligament, the latter of which has fluctuated in size and probably represents focal fatty infiltration. Gallbladder: Normal. Biliary: No intra or extrahepatic bile duct dilatation. Pancreas: Normal. Spleen: Surgically absent. Adrenals: Normal. Kidneys: Unchanged exophytic right renal simple cyst at the inferior pole. Additional too small to characterize renal hypodensities are unchanged. No hydronephrosis or nephrolithiasis. GI Tract: Large hiatus hernia with the majority of the stomach intrathoracic in position. No gastric outlet obstruction. Appendix not discretely identified however there are no pericecal inflammatory changes. Colonic diverticulosis with a long segment of severe diverticulitis along the distal descending and proximal sigmoid colon. Severe surrounding inflammatory stranding with extraluminal gas containing collection with faint peripheral enhancement measuring approximately 2.6 x 1.7 x 3.6 cm. This area of inflammation seems to bridge the proximal sigmoid colon and sigmoid rectum, suspicious for fistula a surgical clip is present along the distal margin. Peritoneum/Retroperitoneum: Scattered reactive lymph nodes about the mesentery. Unchanged omental stranding. Vascular: Normal caliber abdominal aorta. Patent hepatic, portal, superior mesenteric and splenic veins. PELVIS Genitourinary: Normal urinary bladder. Status post hysterectomy. Other: Extraluminal fluid collection along the sigmoid colon as detailed above. No enlarged lymph nodes. MUSCULOSKELETAL Bones and Soft Tissues: No acute superficial soft tissue or osseous abnormality. Thoracolumbar spondylosis with grade 1 degenerative anterolisthesis of L4 on L5 and minimal retrolisthesis of L1 on L2. Other: Basilar atelectasis relating to hiatus hernia. Normal included heart and pericardium. FAIRLY WELL TODAY  DRAIN FLUID AND CHECK CX WITH WBC STAYING UP A LITTLE, ABD IS LOOKING GOOD OVERALL, WORKING TOWARD SNF    Particia Mate

## 2018-10-16 NOTE — PROGRESS NOTES
Bedside report completed, patient helped to bedside commode then back to bed. Patient resting comfortably with  at bedside. Will continue to monitor.    Sanam Feliz

## 2018-10-16 NOTE — PLAN OF CARE
Problem: Nutrition  Goal: Optimal nutrition therapy  Outcome: Ongoing  Nutrition Problem: Inadequate oral intake  Intervention: Food and/or Nutrient Delivery: Continue NPO (Diet advancement per surgery )  Nutritional Goals: Pt will consume at least 50% of meals and supplements

## 2018-10-16 NOTE — PROGRESS NOTES
Scheduled medications administered. Blood infusing, no transfusion reaction evident. PCA going to help patient bathe and place clean sheets on bed per this RN request. Bed alarm engaged. No further needs expressed. Call light within reach. Will continue to monitor.

## 2018-10-17 ENCOUNTER — ANESTHESIA (OUTPATIENT)
Dept: OPERATING ROOM | Age: 63
DRG: 231 | End: 2018-10-17
Payer: MEDICAID

## 2018-10-17 ENCOUNTER — ANESTHESIA EVENT (OUTPATIENT)
Dept: OPERATING ROOM | Age: 63
DRG: 231 | End: 2018-10-17
Payer: MEDICAID

## 2018-10-17 VITALS
OXYGEN SATURATION: 99 % | RESPIRATION RATE: 11 BRPM | DIASTOLIC BLOOD PRESSURE: 72 MMHG | SYSTOLIC BLOOD PRESSURE: 90 MMHG

## 2018-10-17 LAB
ANION GAP SERPL CALCULATED.3IONS-SCNC: 7 MMOL/L (ref 3–16)
BASOPHILS ABSOLUTE: 0 K/UL (ref 0–0.2)
BASOPHILS RELATIVE PERCENT: 0.4 %
BUN BLDV-MCNC: 3 MG/DL (ref 7–20)
CALCIUM SERPL-MCNC: 8.3 MG/DL (ref 8.3–10.6)
CHLORIDE BLD-SCNC: 93 MMOL/L (ref 99–110)
CO2: 27 MMOL/L (ref 21–32)
CREAT SERPL-MCNC: <0.5 MG/DL (ref 0.6–1.2)
EKG ATRIAL RATE: 66 BPM
EKG DIAGNOSIS: NORMAL
EKG P AXIS: 70 DEGREES
EKG P-R INTERVAL: 130 MS
EKG Q-T INTERVAL: 416 MS
EKG QRS DURATION: 88 MS
EKG QTC CALCULATION (BAZETT): 436 MS
EKG R AXIS: 18 DEGREES
EKG T AXIS: 44 DEGREES
EKG VENTRICULAR RATE: 66 BPM
EOSINOPHILS ABSOLUTE: 0.6 K/UL (ref 0–0.6)
EOSINOPHILS RELATIVE PERCENT: 5.7 %
GFR AFRICAN AMERICAN: >60
GFR NON-AFRICAN AMERICAN: >60
GLUCOSE BLD-MCNC: 115 MG/DL (ref 70–99)
HCT VFR BLD CALC: 29.5 % (ref 36–48)
HEMOGLOBIN: 10 G/DL (ref 12–16)
LYMPHOCYTES ABSOLUTE: 1.4 K/UL (ref 1–5.1)
LYMPHOCYTES RELATIVE PERCENT: 13.8 %
MCH RBC QN AUTO: 28.5 PG (ref 26–34)
MCHC RBC AUTO-ENTMCNC: 34 G/DL (ref 31–36)
MCV RBC AUTO: 83.9 FL (ref 80–100)
MONOCYTES ABSOLUTE: 0.8 K/UL (ref 0–1.3)
MONOCYTES RELATIVE PERCENT: 7.3 %
NEUTROPHILS ABSOLUTE: 7.4 K/UL (ref 1.7–7.7)
NEUTROPHILS RELATIVE PERCENT: 72.8 %
OSMOLALITY: 261 MOSM/KG (ref 278–305)
PDW BLD-RTO: 17.5 % (ref 12.4–15.4)
PLATELET # BLD: 431 K/UL (ref 135–450)
PMV BLD AUTO: 6.7 FL (ref 5–10.5)
POTASSIUM SERPL-SCNC: 4.3 MMOL/L (ref 3.5–5.1)
RBC # BLD: 3.52 M/UL (ref 4–5.2)
SODIUM BLD-SCNC: 127 MMOL/L (ref 136–145)
WBC # BLD: 10.2 K/UL (ref 4–11)

## 2018-10-17 PROCEDURE — 6370000000 HC RX 637 (ALT 250 FOR IP): Performed by: SURGERY

## 2018-10-17 PROCEDURE — 6370000000 HC RX 637 (ALT 250 FOR IP): Performed by: INTERNAL MEDICINE

## 2018-10-17 PROCEDURE — 3700000001 HC ADD 15 MINUTES (ANESTHESIA): Performed by: SURGERY

## 2018-10-17 PROCEDURE — 2700000000 HC OXYGEN THERAPY PER DAY

## 2018-10-17 PROCEDURE — 2580000003 HC RX 258: Performed by: NURSE ANESTHETIST, CERTIFIED REGISTERED

## 2018-10-17 PROCEDURE — 7100000000 HC PACU RECOVERY - FIRST 15 MIN: Performed by: SURGERY

## 2018-10-17 PROCEDURE — APPSS15 APP SPLIT SHARED TIME 0-15 MINUTES: Performed by: NURSE PRACTITIONER

## 2018-10-17 PROCEDURE — 1200000000 HC SEMI PRIVATE

## 2018-10-17 PROCEDURE — 6360000002 HC RX W HCPCS: Performed by: SURGERY

## 2018-10-17 PROCEDURE — 3600000012 HC SURGERY LEVEL 2 ADDTL 15MIN: Performed by: SURGERY

## 2018-10-17 PROCEDURE — 2580000003 HC RX 258: Performed by: INTERNAL MEDICINE

## 2018-10-17 PROCEDURE — 2580000003 HC RX 258: Performed by: ANESTHESIOLOGY

## 2018-10-17 PROCEDURE — 36415 COLL VENOUS BLD VENIPUNCTURE: CPT

## 2018-10-17 PROCEDURE — 2709999900 HC NON-CHARGEABLE SUPPLY: Performed by: SURGERY

## 2018-10-17 PROCEDURE — 3700000000 HC ANESTHESIA ATTENDED CARE: Performed by: SURGERY

## 2018-10-17 PROCEDURE — 93010 ELECTROCARDIOGRAM REPORT: CPT | Performed by: INTERNAL MEDICINE

## 2018-10-17 PROCEDURE — 6360000002 HC RX W HCPCS: Performed by: NURSE ANESTHETIST, CERTIFIED REGISTERED

## 2018-10-17 PROCEDURE — 2500000003 HC RX 250 WO HCPCS: Performed by: NURSE PRACTITIONER

## 2018-10-17 PROCEDURE — 6360000002 HC RX W HCPCS: Performed by: NURSE PRACTITIONER

## 2018-10-17 PROCEDURE — 05HY33Z INSERTION OF INFUSION DEVICE INTO UPPER VEIN, PERCUTANEOUS APPROACH: ICD-10-PCS | Performed by: INTERNAL MEDICINE

## 2018-10-17 PROCEDURE — 3600000002 HC SURGERY LEVEL 2 BASE: Performed by: SURGERY

## 2018-10-17 PROCEDURE — 7100000001 HC PACU RECOVERY - ADDTL 15 MIN: Performed by: SURGERY

## 2018-10-17 PROCEDURE — 0WQFXZZ REPAIR ABDOMINAL WALL, EXTERNAL APPROACH: ICD-10-PCS | Performed by: INTERNAL MEDICINE

## 2018-10-17 PROCEDURE — 6370000000 HC RX 637 (ALT 250 FOR IP): Performed by: NURSE PRACTITIONER

## 2018-10-17 PROCEDURE — 2580000003 HC RX 258: Performed by: SURGERY

## 2018-10-17 PROCEDURE — APPNB30 APP NON BILLABLE TIME 0-30 MINS: Performed by: NURSE PRACTITIONER

## 2018-10-17 PROCEDURE — 2500000003 HC RX 250 WO HCPCS: Performed by: SURGERY

## 2018-10-17 PROCEDURE — 13160 SEC CLSR SURG WND/DEHSN XTN: CPT | Performed by: SURGERY

## 2018-10-17 PROCEDURE — 36569 INSJ PICC 5 YR+ W/O IMAGING: CPT

## 2018-10-17 PROCEDURE — 80048 BASIC METABOLIC PNL TOTAL CA: CPT

## 2018-10-17 PROCEDURE — 83930 ASSAY OF BLOOD OSMOLALITY: CPT

## 2018-10-17 PROCEDURE — 85025 COMPLETE CBC W/AUTO DIFF WBC: CPT

## 2018-10-17 PROCEDURE — 2500000003 HC RX 250 WO HCPCS: Performed by: NURSE ANESTHETIST, CERTIFIED REGISTERED

## 2018-10-17 RX ORDER — LIDOCAINE HYDROCHLORIDE 10 MG/ML
1 INJECTION, SOLUTION EPIDURAL; INFILTRATION; INTRACAUDAL; PERINEURAL
Status: DISCONTINUED | OUTPATIENT
Start: 2018-10-17 | End: 2018-10-17 | Stop reason: HOSPADM

## 2018-10-17 RX ORDER — SODIUM CHLORIDE, SODIUM LACTATE, POTASSIUM CHLORIDE, CALCIUM CHLORIDE 600; 310; 30; 20 MG/100ML; MG/100ML; MG/100ML; MG/100ML
INJECTION, SOLUTION INTRAVENOUS CONTINUOUS
Status: DISCONTINUED | OUTPATIENT
Start: 2018-10-17 | End: 2018-10-17

## 2018-10-17 RX ORDER — SUCCINYLCHOLINE CHLORIDE 20 MG/ML
INJECTION INTRAMUSCULAR; INTRAVENOUS PRN
Status: DISCONTINUED | OUTPATIENT
Start: 2018-10-17 | End: 2018-10-17 | Stop reason: SDUPTHER

## 2018-10-17 RX ORDER — SODIUM CHLORIDE 0.9 % (FLUSH) 0.9 %
10 SYRINGE (ML) INJECTION PRN
Status: DISCONTINUED | OUTPATIENT
Start: 2018-10-17 | End: 2018-10-17 | Stop reason: HOSPADM

## 2018-10-17 RX ORDER — FENTANYL CITRATE 50 UG/ML
INJECTION, SOLUTION INTRAMUSCULAR; INTRAVENOUS PRN
Status: DISCONTINUED | OUTPATIENT
Start: 2018-10-17 | End: 2018-10-17 | Stop reason: SDUPTHER

## 2018-10-17 RX ORDER — SODIUM CHLORIDE 9 MG/ML
INJECTION, SOLUTION INTRAVENOUS CONTINUOUS PRN
Status: DISCONTINUED | OUTPATIENT
Start: 2018-10-17 | End: 2018-10-17 | Stop reason: SDUPTHER

## 2018-10-17 RX ORDER — OXYCODONE HYDROCHLORIDE AND ACETAMINOPHEN 5; 325 MG/1; MG/1
1 TABLET ORAL EVERY 4 HOURS PRN
Status: DISCONTINUED | OUTPATIENT
Start: 2018-10-17 | End: 2018-10-22 | Stop reason: HOSPADM

## 2018-10-17 RX ORDER — LIDOCAINE HYDROCHLORIDE 20 MG/ML
INJECTION, SOLUTION INFILTRATION; PERINEURAL PRN
Status: DISCONTINUED | OUTPATIENT
Start: 2018-10-17 | End: 2018-10-17 | Stop reason: SDUPTHER

## 2018-10-17 RX ORDER — LABETALOL HYDROCHLORIDE 5 MG/ML
5 INJECTION, SOLUTION INTRAVENOUS EVERY 10 MIN PRN
Status: DISCONTINUED | OUTPATIENT
Start: 2018-10-17 | End: 2018-10-17 | Stop reason: HOSPADM

## 2018-10-17 RX ORDER — HYDROMORPHONE HCL 110MG/55ML
0.5 PATIENT CONTROLLED ANALGESIA SYRINGE INTRAVENOUS EVERY 5 MIN PRN
Status: DISCONTINUED | OUTPATIENT
Start: 2018-10-17 | End: 2018-10-17 | Stop reason: HOSPADM

## 2018-10-17 RX ORDER — DEXAMETHASONE SODIUM PHOSPHATE 4 MG/ML
INJECTION, SOLUTION INTRA-ARTICULAR; INTRALESIONAL; INTRAMUSCULAR; INTRAVENOUS; SOFT TISSUE PRN
Status: DISCONTINUED | OUTPATIENT
Start: 2018-10-17 | End: 2018-10-17 | Stop reason: SDUPTHER

## 2018-10-17 RX ORDER — BACITRACIN ZINC AND POLYMYXIN B SULFATE 500; 1000 [USP'U]/G; [USP'U]/G
OINTMENT TOPICAL
Status: COMPLETED | OUTPATIENT
Start: 2018-10-17 | End: 2018-10-17

## 2018-10-17 RX ORDER — SODIUM CHLORIDE 9 MG/ML
INJECTION, SOLUTION INTRAVENOUS CONTINUOUS
Status: DISCONTINUED | OUTPATIENT
Start: 2018-10-17 | End: 2018-10-17

## 2018-10-17 RX ORDER — MAGNESIUM HYDROXIDE 1200 MG/15ML
LIQUID ORAL CONTINUOUS PRN
Status: COMPLETED | OUTPATIENT
Start: 2018-10-17 | End: 2018-10-17

## 2018-10-17 RX ORDER — ONDANSETRON 2 MG/ML
4 INJECTION INTRAMUSCULAR; INTRAVENOUS
Status: DISCONTINUED | OUTPATIENT
Start: 2018-10-17 | End: 2018-10-17 | Stop reason: HOSPADM

## 2018-10-17 RX ORDER — DEXTROSE, SODIUM CHLORIDE, AND POTASSIUM CHLORIDE 5; .45; .15 G/100ML; G/100ML; G/100ML
INJECTION INTRAVENOUS CONTINUOUS
Status: DISCONTINUED | OUTPATIENT
Start: 2018-10-17 | End: 2018-10-20

## 2018-10-17 RX ORDER — BUPIVACAINE HYDROCHLORIDE AND EPINEPHRINE 5; 5 MG/ML; UG/ML
INJECTION, SOLUTION PERINEURAL
Status: COMPLETED | OUTPATIENT
Start: 2018-10-17 | End: 2018-10-17

## 2018-10-17 RX ORDER — LIDOCAINE HYDROCHLORIDE 10 MG/ML
5 INJECTION, SOLUTION EPIDURAL; INFILTRATION; INTRACAUDAL; PERINEURAL ONCE
Status: DISCONTINUED | OUTPATIENT
Start: 2018-10-17 | End: 2018-10-22 | Stop reason: HOSPADM

## 2018-10-17 RX ORDER — SODIUM CHLORIDE 0.9 % (FLUSH) 0.9 %
10 SYRINGE (ML) INJECTION PRN
Status: DISCONTINUED | OUTPATIENT
Start: 2018-10-17 | End: 2018-10-22 | Stop reason: HOSPADM

## 2018-10-17 RX ORDER — SODIUM CHLORIDE 9 MG/ML
INJECTION, SOLUTION INTRAVENOUS
Status: COMPLETED
Start: 2018-10-17 | End: 2018-10-17

## 2018-10-17 RX ORDER — ACETAMINOPHEN 650 MG
TABLET, EXTENDED RELEASE ORAL
Status: COMPLETED | OUTPATIENT
Start: 2018-10-17 | End: 2018-10-17

## 2018-10-17 RX ORDER — SODIUM CHLORIDE 0.9 % (FLUSH) 0.9 %
10 SYRINGE (ML) INJECTION EVERY 12 HOURS SCHEDULED
Status: DISCONTINUED | OUTPATIENT
Start: 2018-10-17 | End: 2018-10-22 | Stop reason: HOSPADM

## 2018-10-17 RX ORDER — SODIUM CHLORIDE 0.9 % (FLUSH) 0.9 %
10 SYRINGE (ML) INJECTION EVERY 12 HOURS SCHEDULED
Status: DISCONTINUED | OUTPATIENT
Start: 2018-10-17 | End: 2018-10-17 | Stop reason: HOSPADM

## 2018-10-17 RX ORDER — HYDRALAZINE HYDROCHLORIDE 20 MG/ML
5 INJECTION INTRAMUSCULAR; INTRAVENOUS EVERY 10 MIN PRN
Status: DISCONTINUED | OUTPATIENT
Start: 2018-10-17 | End: 2018-10-17 | Stop reason: HOSPADM

## 2018-10-17 RX ORDER — ONDANSETRON 2 MG/ML
INJECTION INTRAMUSCULAR; INTRAVENOUS PRN
Status: DISCONTINUED | OUTPATIENT
Start: 2018-10-17 | End: 2018-10-17 | Stop reason: SDUPTHER

## 2018-10-17 RX ORDER — PROPOFOL 10 MG/ML
INJECTION, EMULSION INTRAVENOUS PRN
Status: DISCONTINUED | OUTPATIENT
Start: 2018-10-17 | End: 2018-10-17 | Stop reason: SDUPTHER

## 2018-10-17 RX ORDER — OXYCODONE HYDROCHLORIDE AND ACETAMINOPHEN 5; 325 MG/1; MG/1
2 TABLET ORAL EVERY 4 HOURS PRN
Status: DISCONTINUED | OUTPATIENT
Start: 2018-10-17 | End: 2018-10-22 | Stop reason: HOSPADM

## 2018-10-17 RX ADMIN — BUSPIRONE HYDROCHLORIDE 30 MG: 5 TABLET ORAL at 09:40

## 2018-10-17 RX ADMIN — HYDROMORPHONE HYDROCHLORIDE 0.5 MG: 1 INJECTION, SOLUTION INTRAMUSCULAR; INTRAVENOUS; SUBCUTANEOUS at 04:41

## 2018-10-17 RX ADMIN — SIMETHICONE CHEW TAB 80 MG 80 MG: 80 TABLET ORAL at 09:40

## 2018-10-17 RX ADMIN — PROPOFOL 120 MG: 10 INJECTION, EMULSION INTRAVENOUS at 12:26

## 2018-10-17 RX ADMIN — ATENOLOL 100 MG: 50 TABLET ORAL at 09:40

## 2018-10-17 RX ADMIN — TAZOBACTAM SODIUM AND PIPERACILLIN SODIUM 4.5 G: 500; 4 INJECTION, SOLUTION INTRAVENOUS at 09:59

## 2018-10-17 RX ADMIN — FUROSEMIDE 40 MG: 40 TABLET ORAL at 17:44

## 2018-10-17 RX ADMIN — PHENYLEPHRINE HYDROCHLORIDE 100 MCG: 10 INJECTION INTRAVENOUS at 12:30

## 2018-10-17 RX ADMIN — BACLOFEN 10 MG: 10 TABLET ORAL at 21:13

## 2018-10-17 RX ADMIN — PHENAZOPYRIDINE HYDROCHLORIDE 200 MG: 100 TABLET ORAL at 17:44

## 2018-10-17 RX ADMIN — POTASSIUM CHLORIDE, DEXTROSE MONOHYDRATE AND SODIUM CHLORIDE: 150; 5; 450 INJECTION, SOLUTION INTRAVENOUS at 13:21

## 2018-10-17 RX ADMIN — BACLOFEN 10 MG: 10 TABLET ORAL at 09:40

## 2018-10-17 RX ADMIN — PANTOPRAZOLE SODIUM 40 MG: 40 TABLET, DELAYED RELEASE ORAL at 04:42

## 2018-10-17 RX ADMIN — SODIUM CHLORIDE: 9 INJECTION, SOLUTION INTRAVENOUS at 12:12

## 2018-10-17 RX ADMIN — PHENYLEPHRINE HYDROCHLORIDE 200 MCG: 10 INJECTION INTRAVENOUS at 12:45

## 2018-10-17 RX ADMIN — TAZOBACTAM SODIUM AND PIPERACILLIN SODIUM 4.5 G: 500; 4 INJECTION, SOLUTION INTRAVENOUS at 02:56

## 2018-10-17 RX ADMIN — BUSPIRONE HYDROCHLORIDE 30 MG: 5 TABLET ORAL at 21:12

## 2018-10-17 RX ADMIN — DULOXETINE HYDROCHLORIDE 60 MG: 60 CAPSULE, DELAYED RELEASE ORAL at 09:41

## 2018-10-17 RX ADMIN — Medication 10 ML: at 09:44

## 2018-10-17 RX ADMIN — SODIUM CHLORIDE: 9 INJECTION, SOLUTION INTRAVENOUS at 12:22

## 2018-10-17 RX ADMIN — SIMETHICONE CHEW TAB 80 MG 80 MG: 80 TABLET ORAL at 21:12

## 2018-10-17 RX ADMIN — Medication 400 MG: at 21:13

## 2018-10-17 RX ADMIN — TAZOBACTAM SODIUM AND PIPERACILLIN SODIUM 4.5 G: 500; 4 INJECTION, SOLUTION INTRAVENOUS at 14:32

## 2018-10-17 RX ADMIN — LINEZOLID 600 MG: 600 INJECTION, SOLUTION INTRAVENOUS at 21:18

## 2018-10-17 RX ADMIN — OXYCODONE HYDROCHLORIDE AND ACETAMINOPHEN 2 TABLET: 5; 325 TABLET ORAL at 21:13

## 2018-10-17 RX ADMIN — ENOXAPARIN SODIUM 40 MG: 40 INJECTION SUBCUTANEOUS at 09:40

## 2018-10-17 RX ADMIN — DEXAMETHASONE SODIUM PHOSPHATE 4 MG: 4 INJECTION, SOLUTION INTRAMUSCULAR; INTRAVENOUS at 12:31

## 2018-10-17 RX ADMIN — FENTANYL CITRATE 50 MCG: 50 INJECTION, SOLUTION INTRAMUSCULAR; INTRAVENOUS at 12:24

## 2018-10-17 RX ADMIN — POTASSIUM CHLORIDE 40 MEQ: 1500 TABLET, EXTENDED RELEASE ORAL at 09:40

## 2018-10-17 RX ADMIN — SUCCINYLCHOLINE CHLORIDE 120 MG: 20 INJECTION, SOLUTION INTRAMUSCULAR; INTRAVENOUS at 12:26

## 2018-10-17 RX ADMIN — SIMETHICONE CHEW TAB 80 MG 80 MG: 80 TABLET ORAL at 14:32

## 2018-10-17 RX ADMIN — FUROSEMIDE 40 MG: 40 TABLET ORAL at 09:40

## 2018-10-17 RX ADMIN — POTASSIUM CHLORIDE 40 MEQ: 1500 TABLET, EXTENDED RELEASE ORAL at 21:13

## 2018-10-17 RX ADMIN — PHENAZOPYRIDINE HYDROCHLORIDE 200 MG: 100 TABLET ORAL at 09:41

## 2018-10-17 RX ADMIN — POTASSIUM CHLORIDE, DEXTROSE MONOHYDRATE AND SODIUM CHLORIDE: 150; 5; 450 INJECTION, SOLUTION INTRAVENOUS at 09:58

## 2018-10-17 RX ADMIN — Medication 400 MG: at 09:40

## 2018-10-17 RX ADMIN — LIDOCAINE HYDROCHLORIDE 100 MG: 20 INJECTION, SOLUTION INFILTRATION; PERINEURAL at 12:26

## 2018-10-17 RX ADMIN — PHENYLEPHRINE HYDROCHLORIDE 200 MCG: 10 INJECTION INTRAVENOUS at 12:39

## 2018-10-17 RX ADMIN — BACLOFEN 10 MG: 10 TABLET ORAL at 14:33

## 2018-10-17 RX ADMIN — Medication 10 ML: at 22:00

## 2018-10-17 RX ADMIN — LINEZOLID 600 MG: 600 INJECTION, SOLUTION INTRAVENOUS at 09:59

## 2018-10-17 RX ADMIN — FLUCONAZOLE 400 MG: 2 INJECTION INTRAVENOUS at 14:32

## 2018-10-17 RX ADMIN — OXYCODONE HYDROCHLORIDE AND ACETAMINOPHEN 1 TABLET: 5; 325 TABLET ORAL at 09:41

## 2018-10-17 RX ADMIN — ONDANSETRON 4 MG: 2 INJECTION INTRAMUSCULAR; INTRAVENOUS at 12:31

## 2018-10-17 ASSESSMENT — PAIN SCALES - GENERAL
PAINLEVEL_OUTOF10: 0
PAINLEVEL_OUTOF10: 5
PAINLEVEL_OUTOF10: 9
PAINLEVEL_OUTOF10: 8
PAINLEVEL_OUTOF10: 5
PAINLEVEL_OUTOF10: 9
PAINLEVEL_OUTOF10: 0
PAINLEVEL_OUTOF10: 10
PAINLEVEL_OUTOF10: 9
PAINLEVEL_OUTOF10: 0
PAINLEVEL_OUTOF10: 5
PAINLEVEL_OUTOF10: 5

## 2018-10-17 ASSESSMENT — PULMONARY FUNCTION TESTS
PIF_VALUE: 18
PIF_VALUE: 3
PIF_VALUE: 20
PIF_VALUE: 18
PIF_VALUE: 20
PIF_VALUE: 20
PIF_VALUE: 2
PIF_VALUE: 3
PIF_VALUE: 0
PIF_VALUE: 1
PIF_VALUE: 21
PIF_VALUE: 2
PIF_VALUE: 23
PIF_VALUE: 19
PIF_VALUE: 19
PIF_VALUE: 18
PIF_VALUE: 19
PIF_VALUE: 19
PIF_VALUE: 20
PIF_VALUE: 20
PIF_VALUE: 19
PIF_VALUE: 24
PIF_VALUE: 20
PIF_VALUE: 0
PIF_VALUE: 19
PIF_VALUE: 20
PIF_VALUE: 19
PIF_VALUE: 21
PIF_VALUE: 0
PIF_VALUE: 31

## 2018-10-17 ASSESSMENT — PAIN DESCRIPTION - PAIN TYPE: TYPE: SURGICAL PAIN

## 2018-10-17 ASSESSMENT — ENCOUNTER SYMPTOMS: SHORTNESS OF BREATH: 0

## 2018-10-17 ASSESSMENT — PAIN DESCRIPTION - LOCATION: LOCATION: ABDOMEN

## 2018-10-17 ASSESSMENT — PAIN - FUNCTIONAL ASSESSMENT: PAIN_FUNCTIONAL_ASSESSMENT: 0-10

## 2018-10-17 NOTE — PROGRESS NOTES
Bedside report completed, patient resting comfortably with bed alarm on. Will continue to monitor.    Jonathan Eagle

## 2018-10-17 NOTE — PROGRESS NOTES
Assessment completed and medications for the night passed. Patient received partial bed bath after being incontinent, full linen change. Up to bedside commode about 20 minutes later. Now resting in bed will call light in reach and bed alarm on. Will continue to monitor.    Laura Morrison

## 2018-10-17 NOTE — PROGRESS NOTES
Transport here to take patient down to PACU bed #1 for abdominal wound closure. Surgery RN, Sherrell Councilman, aware that consent is NOT signed but the form is in the chart to be completed before surgery. Patient aware that she can ask all questions and have them answered before consent is signed. Patient is leaving unit in stable condition.

## 2018-10-17 NOTE — ANESTHESIA PRE PROCEDURE
Department of Anesthesiology  Preprocedure Note       Name:  Niurka Marin   Age:  59 y.o.  :  1955                                          MRN:  [id number]         Date:  10/17/2018      Surgeon: Jacklyn Atwood):  Eleno Fernandez MD    Procedure: SECONDARY WOUND CLOSURE OF ABDOMINAL WOUND (N/A )    Medications prior to admission:   Prior to Admission medications    Medication Sig Start Date End Date Taking? Authorizing Provider   sodium chloride 1 g tablet Take 1 tablet by mouth 2 times daily 10/1/18  Yes Jaylene Paez MD   rivaroxaban (XARELTO) 20 MG TABS tablet Take 1 tablet by mouth daily (with breakfast) 18  Yes Tello Ceron MD   magnesium oxide (MAG-OX) 400 (240 Mg) MG tablet Take 1 tablet by mouth 2 times daily 18  Yes Tello Ceron MD   atenolol (TENORMIN) 100 MG tablet Take 1 tablet by mouth daily 18  Yes Tello Ceron MD   furosemide (LASIX) 20 MG tablet Take 1 tablet by mouth daily  Patient taking differently: Take 20 mg by mouth 2 times daily  18  Yes Tello Ceron MD   mesalamine (ROWASA) 4 g enema Place 60 mLs rectally nightly   Yes Sugar Ivy PA-C   mesalamine (LIALDA) 1.2 g EC tablet Take 4 tablets by mouth daily (with breakfast) 3/54/12  Yes Sugar Ivy PA-C   fluticasone (FLONASE) 50 MCG/ACT nasal spray 1 spray by Nasal route daily 17  Yes Ana Fontana MD   DULoxetine (CYMBALTA) 60 MG extended release capsule Take 60 mg by mouth daily   Yes Historical Provider, MD   lidocaine (XYLOCAINE) 5 % ointment Apply topically as needed Apply topically as needed to feet   Yes Historical Provider, MD   zolpidem (AMBIEN) 10 MG tablet Take 10 mg by mouth nightly as needed for Sleep . Yes Historical Provider, MD   potassium chloride SA (K-DUR;KLOR-CON M) 20 MEQ tablet Take 1 tablet by mouth 2 times daily.  1/15/14  Yes Georgette Harvey MD   Omeprazole 20 MG TBEC Take 20 mg by mouth daily    Yes Historical Provider, MD   busPIRone (BUSPAR) 15 MG tablet Take 30 mg by mouth 2 times daily    Yes Historical Provider, MD   baclofen (LIORESAL) 10 MG tablet Take 1 tablet by mouth 3 times daily 6/30/18   Carrie Shaw MD   ondansetron (ZOFRAN ODT) 4 MG disintegrating tablet Take 1 tablet by mouth every 6 hours as needed for Nausea or Vomiting 5/27/18   Jeanine Johnson MD   aspirin-acetaminophen-caffeine (Mike Bison) 240-433-04 MG per tablet Take 1 tablet by mouth every 6 hours as needed for Headaches    Historical Provider, MD       Current medications:    Current Facility-Administered Medications   Medication Dose Route Frequency Provider Last Rate Last Dose    oxyCODONE-acetaminophen (PERCOCET) 5-325 MG per tablet 1 tablet  1 tablet Oral Q4H PRN GAYLA Carr CNP   1 tablet at 10/17/18 0941    Or    oxyCODONE-acetaminophen (PERCOCET) 5-325 MG per tablet 2 tablet  2 tablet Oral Q4H PRN GAYLA Carr CNP        dextrose 5 % and 0.45 % NaCl with KCl 20 mEq infusion   Intravenous Continuous GAYLA Carr CNP 50 mL/hr at 10/17/18 0958      sodium chloride 0.9 % infusion             potassium chloride (KLOR-CON M) extended release tablet 40 mEq  40 mEq Oral BID Serapio Duverney, MD   40 mEq at 10/17/18 0940    magnesium sulfate 2 g in 50 mL IVPB premix  2 g Intravenous Once GAYLA Carr CNP        furosemide (LASIX) tablet 40 mg  40 mg Oral BID Martina Poole MD   40 mg at 10/17/18 0940    enoxaparin (LOVENOX) injection 40 mg  40 mg Subcutaneous Daily GAYLA Carr CNP   40 mg at 10/17/18 0940    linezolid (ZYVOX) IVPB 600 mg  600 mg Intravenous Q12H GAYLA Carr CNP   Stopped at 10/17/18 1059    fluconazole (DIFLUCAN) in 0.9 % sodium chloride IVPB 400 mg  400 mg Intravenous Q24H GAYLA Carr  mL/hr at 10/16/18 1451 400 mg at 10/16/18 1451    LIP BALM ointment   Topical Daily Carrie Shaw MD        LORazepam (ATIVAN) injection 0.5 mg  0.5 mg mL at 10/15/18 0544    magnesium hydroxide (MILK OF MAGNESIA) 400 MG/5ML suspension 30 mL  30 mL Oral Daily PRN Glenis Gifford MD        ond[facility]etron [facility]) injection 4 mg  4 mg Intravenous Q6H PRN Glenis Gifford MD   4 mg at 10/13/18 0647    phenazopyridine (PYRIDIUM) tablet 200 mg  200 mg Oral TID WC Glenis Gifford MD   200 mg at 10/17/18 0941    simethicone (MYLICON) chewable tablet 80 mg  80 mg Oral Q6H Karolina Dailey MD   80 mg at 10/17/18 0940    pantoprazole (PROTONIX) tablet 40 mg  40 mg Oral QAM AC Glenis Gifford MD   40 mg at 10/17/18 0442    hydrALAZINE (APRESOLINE) injection 10 mg  10 mg Intravenous Q6H PRN Glenis Gifford MD   10 mg at 10/07/18 1550       Allergies:     Allergies   Allergen Reactions    Ace Inhibitors Swelling    Skelaxin [Metaxalone] Swelling       Problem List:    Patient Active Problem List   Diagnosis Code    Hiatal hernia K44.9    Essential hypertension, benign I10    Atherosclerotic PVD with intermittent claudication ([facility]) I70.219    Bone spur M77.9    Anemia D64.9    Hyponatremia E87.1    Insomnia G47.00    Hypokalemia E87.6    Crohn's colitis ([facility] K50.10    Hypomagnesemia E83.42    Syncope R55    Hypotension I95.9    DVT (deep venous thrombosis) ([facility]) I82.409    DVT, lower extremity, distal, acute, bilateral ([facility]) I82.4Z3    Diverticulitis K57.92    Perforation bowel ([address] K63.1    Perforation and abscess of large intestine concurrent with and due to diverticulitis K57.20    Intra-abdominal abscess ([facility]) K65.1    Sepsis ([facility] [address] A41.9    Colitis K52.9    Omental mass K66.8       Past Medical History:        Diagnosis Date    Arthritis     Blood transfusion reaction     Crohn's disease (HCC)     GERD (gastroesophageal reflux disease)     Hiatal hernia 6/24/2014    Hypertension     MRSA (methicillin resistant staph aureus) culture positive 06/05/2018    urine    Pneumonia 1/8/2014    VRE (vancomycin resistant enterococcus) culture positive 10/08/2018    abd culture       Past Surgical History:        Procedure Laterality Date    ABDOMEN SURGERY      ABDOMINAL ADHESION SURGERY  06/08/2018    BLADDER SUSPENSION      COLONOSCOPY      COLONOSCOPY  9/14/15    sigmoid colon biopsy     COLONOSCOPY  08/07/2018    COLOSTOMY N/A 10/8/2018    EXPLORATORY LAPAROTOMY WITH COLOSTOMY performed by Eleno Fernandez MD at [address] Left 6/25/14    excision plantar left hallux    FOOT SURGERY  6/3/15    PLANTAR PLATE REPAIR BILATERAL, ARTHROTOMY MPJ 2ND BILATERAL    FOOT SURGERY Left 08/05/2002    Excision second metatarsal head left    FRACTURE SURGERY      rt hip    HAND CARPECTOMY Bilateral     HEEL SPUR SURGERY      HERNIA REPAIR      HYSTERECTOMY      bladder surgery    JOINT REPLACEMENT      rt hip, L shoulder replacement 11/2014    KNEE SURGERY Bilateral     arthrosvopic    SIGMOIDOSCOPY  01/15/2018    with biopsies    TONSILLECTOMY      UPPER GASTROINTESTINAL ENDOSCOPY  6/14/13    and colonsocopy with antral erosion biopsies       Social History:    Social History   Substance Use Topics    Smoking status: Current Every Day Smoker     Packs/day: 1.00     Years: 10.00     Types: Cigarettes, E-Cigarettes     Last attempt to quit: 10/5/2017    Smokeless tobacco: Never Used    Alcohol use 1.2 oz/week     2 Shots of liquor per week      Comment: occasionally                                Ready to quit: Not Answered  Counseling given: Not Answered      Vital Signs (Current):   Vitals:    10/17/18 0410 10/17/18 0613 10/17/18 0800 10/17/18 1120   BP:   128/85 125/80   Pulse: 67 70 83 66   Resp:   24 20   Temp:   98.6 °F (37 °C) 96.8 °F (36 °C)   TempSrc:   Oral Temporal   SpO2:   92% 97%   Weight:       Height:                                                  BP Readings from Last 3 Encounters:   10/17/18 125/80   10/08/18 (!) 142/94   10/01/18 121/82       NPO Status: Time of last liquid consumption: 0940

## 2018-10-17 NOTE — PROGRESS NOTES
sigmoid colon, overall mildly decreased from September 2018. There is proximal dilation of the colon, which contains air-fluid levels. The cecum is markedly distended. The appendix is not visualized. Large hiatal hernia partially visualized. There is no small bowel dilation or focal wall thickening. Pelvis: The uterus is absent. The ovaries are not visualized. Trace pelvic free fluid is present. A soft tissue tract extends from thickened sigmoid colon towards the vaginal cuff and urinary bladder. Soft tissue density in this region is also inseparable from small bowel. There is left anterior pelvic fat stranding. Nonenlarged pelvic lymph nodes are present. Peritoneum/Retroperitoneum: Trace perihepatic fluid. Small soft tissue nodules along the distal descending colon. There is otherwise no concerning intra-abdominal adenopathy. There is no pneumoperitoneum. The abdominal aorta is normal caliber and without evidence of aneurysm. The portal vein is patent. Bones/Soft Tissues: There are no suspicious osseous lesions. Persistent short segment wall thickening of the proximal sigmoid colon with upstream colonic dilation consistent with obstruction. Given the persistent wall thickening in this region and absence of significant diverticulosis elsewhere in the colon, findings are highly concerning for malignancy. Soft tissue tract extending from thickened sigmoid colon, inseparable from the urinary bladder, vaginal cuff, and small bowel in the pelvis. Small soft tissue nodules within the left anterior pelvis and along the distal descending colon. Otherwise no concerning intra-abdominal adenopathy. Large hiatal hernia.      Ct Abdomen Pelvis W Iv Contrast Additional Contrast? None    Result Date: 9/26/2018  EXAMINATION: CT OF THE ABDOMEN AND PELVIS WITH CONTRAST 9/26/2018 3:32 am TECHNIQUE: CT of the abdomen and pelvis was performed with the administration of intravenous contrast. Multiplanar reformatted

## 2018-10-17 NOTE — CARE COORDINATION
Pt currently off unit for secondary wound closure.   Lakisha Carrero, MSN, RN, [facility]

## 2018-10-17 NOTE — PROGRESS NOTES
as low as reasonably achievable. COMPARISON: CT abdomen and pelvis with contrast 09/26/2018 HISTORY: ORDERING SYSTEM PROVIDED HISTORY: ABDOMINAL PAIN TECHNOLOGIST PROVIDED HISTORY: Additional Contrast?->None Ordering Physician Provided Reason for Exam: abd pain FINDINGS: Lower Chest: Mild left basilar atelectasis. Organs: Stable hypoattenuation along level of falciform ligament, likely focal steatosis. A faint subcentimeter hypodense lesion posteriorly in the right hepatic lobe is unchanged and too small to characterize. There is no evidence of biliary ductal dilation. The gallbladder is unremarkable. Splenic atrophy. The adrenal glands are unremarkable without evidence of mass. The pancreas is unremarkable. 3.6 cm near fluid density lesion of the right renal lower pole most likely represents a cyst.  There is mild bilateral renal cortical scarring. No hydronephrosis or hydroureter. No evidence of urolithiasis. Imaging through the pelvis limited by streak artifact from right hip prosthesis. There is diffuse urinary bladder wall thickening. No definite gas within the urinary bladder is visible. GI/Bowel: Significant short segment wall thickening of the proximal sigmoid colon, overall mildly decreased from September 2018. There is proximal dilation of the colon, which contains air-fluid levels. The cecum is markedly distended. The appendix is not visualized. Large hiatal hernia partially visualized. There is no small bowel dilation or focal wall thickening. Pelvis: The uterus is absent. The ovaries are not visualized. Trace pelvic free fluid is present. A soft tissue tract extends from thickened sigmoid colon towards the vaginal cuff and urinary bladder. Soft tissue density in this region is also inseparable from small bowel. There is left anterior pelvic fat stranding. Nonenlarged pelvic lymph nodes are present. Peritoneum/Retroperitoneum: Trace perihepatic fluid.   Small soft tissue nodules along the

## 2018-10-17 NOTE — PROGRESS NOTES
MD Anibal Donovan MD Aleen Hummingbird, MD                 Office: [phone]                      Fax: [phone]          NEPHROLOGY PROGRESS NOTE:     PATIENT NAME: Estela Davila  : 1955  MRN: [id number]    IMPRESSION/RECOMMENDATIONS:    1. Acute on Chronic Hyponatremia- best serum Na 133-134. Etiology probably due to underlying increased ADH. Currenty hypervolemic. Citalopram can also increase ADH. Increased lasix. Strict I&O and daily weight. Pain control as pain can increase non-osmotic ADH release  Hypokalemia - increased KCL to 40 BID - that should help improve hypoNa  Will add Ure-NA 30 g BID in 1-2 days if not improvement  Recheck urine lytes    Worsened also due to hypotonic fluids such as D5 - avoid as it worsens her hyponatremia    2. Renal function WNL  3. Recent abdominal surgery  4. H/O Crohn's Disease/colitis, Abdominal abscess: management per surgery  5. HTN- controlled    Other Problems:   Patient Active Problem List   Diagnosis    Hiatal hernia    Essential hypertension, benign    Atherosclerotic PVD with intermittent claudication (HCC)    Bone spur    Anemia    Hyponatremia    Insomnia    Hypokalemia    Crohn's colitis ([address]    Hypomagnesemia    Syncope    Hypotension    DVT (deep venous thrombosis) (HCC)    DVT, lower extremity, distal, acute, bilateral (HCC)    Diverticulitis    Perforation bowel (HCC)    Perforation and abscess of large intestine concurrent with and due to diverticulitis    Intra-abdominal abscess ([address]    Sepsis ([address]    Colitis    Omental mass      : Other supportive care :   - Check daily renal function panel with electrolytes-phosphorus  - Strict monitoring of I/Os, daily weight  - Renal feeds/diet  - Current medications reviewed. High  complexity. Multiple complex problems.    Discussed with patient,  and treatment team.  Thank you for allowing me to participate in this patient's care. Please do not hesitate to contact me with any questions/concerns. We will follow along with you. Sadaf Morejon MD       [facility]: [phone]   Fax: [phone]        SUBJECTIVE/INTERVAL HISTORY:   - seen resting comfortably in bed, no active complaints- underwent surgery yesterday        MEDICATIONS:  Prior to Admission Medications:  Prescriptions Prior to Admission: sodium chloride 1 g tablet, Take 1 tablet by mouth 2 times daily  rivaroxaban (XARELTO) 20 MG TABS tablet, Take 1 tablet by mouth daily (with breakfast)  magnesium oxide (MAG-OX) 400 (240 Mg) MG tablet, Take 1 tablet by mouth 2 times daily  atenolol (TENORMIN) 100 MG tablet, Take 1 tablet by mouth daily  furosemide (LASIX) 20 MG tablet, Take 1 tablet by mouth daily (Patient taking differently: Take 20 mg by mouth 2 times daily )  mesalamine (ROWASA) 4 g enema, Place 60 mLs rectally nightly  mesalamine (LIALDA) 1.2 g EC tablet, Take 4 tablets by mouth daily (with breakfast)  fluticasone (FLONASE) 50 MCG/ACT nasal spray, 1 spray by Nasal route daily  DULoxetine (CYMBALTA) 60 MG extended release capsule, Take 60 mg by mouth daily  lidocaine (XYLOCAINE) 5 % ointment, Apply topically as needed Apply topically as needed to feet  zolpidem (AMBIEN) 10 MG tablet, Take 10 mg by mouth nightly as needed for Sleep . potassium chloride SA (K-DUR;KLOR-CON M) 20 MEQ tablet, Take 1 tablet by mouth 2 times daily.   Omeprazole 20 MG TBEC, Take 20 mg by mouth daily   busPIRone (BUSPAR) 15 MG tablet, Take 30 mg by mouth 2 times daily   [] amoxicillin-clavulanate (AUGMENTIN) 875-125 MG per tablet, Take 1 tablet by mouth 2 times daily for 7 days  baclofen (LIORESAL) 10 MG tablet, Take 1 tablet by mouth 3 times daily  ondansetron (ZOFRAN ODT) 4 MG disintegrating tablet, Take 1 tablet by mouth every 6 hours as needed for Nausea or Vomiting  aspirin-acetaminophen-caffeine (EXCEDRIN MIGRAINE) 1334 107/72 97.6 °F (36.4 °C) Oral 69 16 98 %   10/16/18 1329 116/84 97.8 °F (36.6 °C) Oral 71 16 96 %   10/16/18 1324 112/77 97.2 °F (36.2 °C) Oral 69 16 98 %   10/16/18 1316 88/60 97.7 °F (36.5 °C) Oral 67 16 96 %   10/16/18 1239 103/74 98 °F (36.7 °C) Oral 74 14 97 %   10/16/18 1203 109/81 - - 78 11 98 %   10/16/18 1200 106/82 - - 76 10 98 %   10/16/18 1155 109/84 - - 78 11 98 %   10/16/18 1150 117/83 - - 79 12 100 %   10/16/18 1145 111/69 - - 74 18 98 %   10/16/18 1140 105/72 - - 74 16 98 %       Intake/Output Summary (Last 24 hours) at 10/17/18 1003  Last data filed at 10/17/18 0422   Gross per 24 hour   Intake          2642.17 ml   Output              990 ml   Net          1652.17 ml       Physical Exam   Constitutional: She is oriented to person, place, and time. She appears well-developed and well-nourished. HENT:   Head: Normocephalic and atraumatic. Eyes: Conjunctivae are normal. No scleral icterus. Neck: Neck supple. Cardiovascular: Normal rate and regular rhythm. Pulmonary/Chest: Effort normal and breath sounds normal.   Abdominal: Soft. Bowel sounds are normal.   Musculoskeletal: She exhibits edema (1+). She exhibits no deformity. Neurological: She is alert and oriented to person, place, and time. Skin: Skin is warm and dry. Psychiatric: She has a normal mood and affect.  Her behavior is normal.         DATA:  Diagnostic tests reviewed for today's visit:    Recent Labs      10/15/18   0556  10/16/18   0648  10/16/18   1829  10/17/18   0612   WBC  17.0*  13.1*   --   10.2   HCT  25.9*  22.1*  30.3*  29.5*   PLT  507*  453*   --   431     Iron Saturation:  No components found for: PERCENTFE  FERRITIN:    Lab Results   Component Value Date    FERRITIN 67.5 09/27/2018     IRON:    Lab Results   Component Value Date    IRON 13 09/27/2018     TIBC:    Lab Results   Component Value Date    TIBC 190 09/27/2018       Recent Labs      10/15/18   0556  10/16/18   0648  10/17/18   0612   NA  128*

## 2018-10-18 LAB
ANION GAP SERPL CALCULATED.3IONS-SCNC: 8 MMOL/L (ref 3–16)
BASOPHILS ABSOLUTE: 0 K/UL (ref 0–0.2)
BASOPHILS RELATIVE PERCENT: 0.2 %
BUN BLDV-MCNC: 3 MG/DL (ref 7–20)
CALCIUM SERPL-MCNC: 8.5 MG/DL (ref 8.3–10.6)
CHLORIDE BLD-SCNC: 94 MMOL/L (ref 99–110)
CHLORIDE URINE RANDOM: 98 MMOL/L
CO2: 27 MMOL/L (ref 21–32)
CREAT SERPL-MCNC: <0.5 MG/DL (ref 0.6–1.2)
EOSINOPHILS ABSOLUTE: 0 K/UL (ref 0–0.6)
EOSINOPHILS RELATIVE PERCENT: 0.2 %
GFR AFRICAN AMERICAN: >60
GFR NON-AFRICAN AMERICAN: >60
GLUCOSE BLD-MCNC: 124 MG/DL (ref 70–99)
HCT VFR BLD CALC: 28 % (ref 36–48)
HEMOGLOBIN: 9.3 G/DL (ref 12–16)
LYMPHOCYTES ABSOLUTE: 1.2 K/UL (ref 1–5.1)
LYMPHOCYTES RELATIVE PERCENT: 11.9 %
MCH RBC QN AUTO: 28.3 PG (ref 26–34)
MCHC RBC AUTO-ENTMCNC: 33.4 G/DL (ref 31–36)
MCV RBC AUTO: 84.8 FL (ref 80–100)
MONOCYTES ABSOLUTE: 0.2 K/UL (ref 0–1.3)
MONOCYTES RELATIVE PERCENT: 2.5 %
NEUTROPHILS ABSOLUTE: 8.3 K/UL (ref 1.7–7.7)
NEUTROPHILS RELATIVE PERCENT: 85.2 %
PDW BLD-RTO: 18 % (ref 12.4–15.4)
PLATELET # BLD: 402 K/UL (ref 135–450)
PMV BLD AUTO: 6.8 FL (ref 5–10.5)
POTASSIUM SERPL-SCNC: 4.3 MMOL/L (ref 3.5–5.1)
POTASSIUM, UR: 59.8 MMOL/L
RBC # BLD: 3.3 M/UL (ref 4–5.2)
SODIUM BLD-SCNC: 129 MMOL/L (ref 136–145)
SODIUM URINE: 68 MMOL/L
UREA NITROGEN, UR: 138.1 MG/DL (ref 800–1666)
WBC # BLD: 9.8 K/UL (ref 4–11)

## 2018-10-18 PROCEDURE — 2580000003 HC RX 258: Performed by: INTERNAL MEDICINE

## 2018-10-18 PROCEDURE — 84300 ASSAY OF URINE SODIUM: CPT

## 2018-10-18 PROCEDURE — 36415 COLL VENOUS BLD VENIPUNCTURE: CPT

## 2018-10-18 PROCEDURE — 6370000000 HC RX 637 (ALT 250 FOR IP): Performed by: INTERNAL MEDICINE

## 2018-10-18 PROCEDURE — 84133 ASSAY OF URINE POTASSIUM: CPT

## 2018-10-18 PROCEDURE — 6370000000 HC RX 637 (ALT 250 FOR IP): Performed by: SURGERY

## 2018-10-18 PROCEDURE — 6360000002 HC RX W HCPCS: Performed by: NURSE PRACTITIONER

## 2018-10-18 PROCEDURE — 84540 ASSAY OF URINE/UREA-N: CPT

## 2018-10-18 PROCEDURE — 83935 ASSAY OF URINE OSMOLALITY: CPT

## 2018-10-18 PROCEDURE — 97530 THERAPEUTIC ACTIVITIES: CPT

## 2018-10-18 PROCEDURE — 85025 COMPLETE CBC W/AUTO DIFF WBC: CPT

## 2018-10-18 PROCEDURE — 82436 ASSAY OF URINE CHLORIDE: CPT

## 2018-10-18 PROCEDURE — 99024 POSTOP FOLLOW-UP VISIT: CPT | Performed by: SURGERY

## 2018-10-18 PROCEDURE — 97535 SELF CARE MNGMENT TRAINING: CPT

## 2018-10-18 PROCEDURE — 97116 GAIT TRAINING THERAPY: CPT

## 2018-10-18 PROCEDURE — 2580000003 HC RX 258

## 2018-10-18 PROCEDURE — 80048 BASIC METABOLIC PNL TOTAL CA: CPT

## 2018-10-18 PROCEDURE — 6370000000 HC RX 637 (ALT 250 FOR IP): Performed by: NURSE PRACTITIONER

## 2018-10-18 PROCEDURE — 1200000000 HC SEMI PRIVATE

## 2018-10-18 PROCEDURE — 6360000002 HC RX W HCPCS: Performed by: SURGERY

## 2018-10-18 RX ORDER — SODIUM CHLORIDE 9 MG/ML
INJECTION, SOLUTION INTRAVENOUS
Status: COMPLETED
Start: 2018-10-18 | End: 2018-10-18

## 2018-10-18 RX ADMIN — TAZOBACTAM SODIUM AND PIPERACILLIN SODIUM 4.5 G: 500; 4 INJECTION, SOLUTION INTRAVENOUS at 01:46

## 2018-10-18 RX ADMIN — LINEZOLID 600 MG: 600 INJECTION, SOLUTION INTRAVENOUS at 20:27

## 2018-10-18 RX ADMIN — PHENAZOPYRIDINE HYDROCHLORIDE 200 MG: 100 TABLET ORAL at 10:13

## 2018-10-18 RX ADMIN — Medication 10 ML: at 10:16

## 2018-10-18 RX ADMIN — LINEZOLID 600 MG: 600 INJECTION, SOLUTION INTRAVENOUS at 10:13

## 2018-10-18 RX ADMIN — FUROSEMIDE 40 MG: 40 TABLET ORAL at 10:13

## 2018-10-18 RX ADMIN — OXYCODONE HYDROCHLORIDE AND ACETAMINOPHEN 2 TABLET: 5; 325 TABLET ORAL at 11:19

## 2018-10-18 RX ADMIN — ENOXAPARIN SODIUM 40 MG: 40 INJECTION SUBCUTANEOUS at 10:12

## 2018-10-18 RX ADMIN — Medication 10 ML: at 10:15

## 2018-10-18 RX ADMIN — Medication 10 ML: at 01:28

## 2018-10-18 RX ADMIN — Medication 10 ML: at 20:28

## 2018-10-18 RX ADMIN — DULOXETINE HYDROCHLORIDE 60 MG: 60 CAPSULE, DELAYED RELEASE ORAL at 10:14

## 2018-10-18 RX ADMIN — OXYCODONE HYDROCHLORIDE AND ACETAMINOPHEN 2 TABLET: 5; 325 TABLET ORAL at 17:10

## 2018-10-18 RX ADMIN — BACLOFEN 10 MG: 10 TABLET ORAL at 10:12

## 2018-10-18 RX ADMIN — FUROSEMIDE 40 MG: 40 TABLET ORAL at 17:10

## 2018-10-18 RX ADMIN — Medication 400 MG: at 10:14

## 2018-10-18 RX ADMIN — ATENOLOL 100 MG: 50 TABLET ORAL at 10:13

## 2018-10-18 RX ADMIN — TAZOBACTAM SODIUM AND PIPERACILLIN SODIUM 4.5 G: 500; 4 INJECTION, SOLUTION INTRAVENOUS at 18:25

## 2018-10-18 RX ADMIN — SIMETHICONE CHEW TAB 80 MG 80 MG: 80 TABLET ORAL at 10:13

## 2018-10-18 RX ADMIN — SODIUM CHLORIDE: 9 INJECTION, SOLUTION INTRAVENOUS at 01:28

## 2018-10-18 RX ADMIN — BUSPIRONE HYDROCHLORIDE 30 MG: 5 TABLET ORAL at 10:14

## 2018-10-18 RX ADMIN — PHENAZOPYRIDINE HYDROCHLORIDE 200 MG: 100 TABLET ORAL at 17:10

## 2018-10-18 RX ADMIN — POTASSIUM CHLORIDE 40 MEQ: 1500 TABLET, EXTENDED RELEASE ORAL at 10:12

## 2018-10-18 RX ADMIN — PHENAZOPYRIDINE HYDROCHLORIDE 200 MG: 100 TABLET ORAL at 15:00

## 2018-10-18 RX ADMIN — PANTOPRAZOLE SODIUM 40 MG: 40 TABLET, DELAYED RELEASE ORAL at 10:16

## 2018-10-18 RX ADMIN — BACLOFEN 10 MG: 10 TABLET ORAL at 15:01

## 2018-10-18 RX ADMIN — TAZOBACTAM SODIUM AND PIPERACILLIN SODIUM 4.5 G: 500; 4 INJECTION, SOLUTION INTRAVENOUS at 12:55

## 2018-10-18 RX ADMIN — SIMETHICONE CHEW TAB 80 MG 80 MG: 80 TABLET ORAL at 12:56

## 2018-10-18 RX ADMIN — FLUCONAZOLE 400 MG: 2 INJECTION INTRAVENOUS at 12:58

## 2018-10-18 RX ADMIN — FLUTICASONE PROPIONATE 1 SPRAY: 50 SPRAY, METERED NASAL at 10:15

## 2018-10-18 ASSESSMENT — PAIN SCALES - GENERAL
PAINLEVEL_OUTOF10: 7
PAINLEVEL_OUTOF10: 9
PAINLEVEL_OUTOF10: 7
PAINLEVEL_OUTOF10: 0
PAINLEVEL_OUTOF10: 0

## 2018-10-18 ASSESSMENT — PAIN SCALES - WONG BAKER: WONGBAKER_NUMERICALRESPONSE: 0

## 2018-10-18 NOTE — PROGRESS NOTES
MD Anibal Donovan MD Aleen Hummingbird, MD                 Office: [phone]                      Fax: [phone]          NEPHROLOGY PROGRESS NOTE:     PATIENT NAME: Estela Davila  : 1955  MRN: [id number]    IMPRESSION/RECOMMENDATIONS:    1. Acute on Chronic Hyponatremia- best serum Na 133-134. Etiology probably due to underlying increased ADH. Currenty hypervolemic. Citalopram can also increase ADH. Increased lasix. Strict I&O and daily weight. Pain control as pain can increase non-osmotic ADH release  Hypokalemia - increased KCL to 40 BID - that should help improve hypoNa  Rechecked urine lytes w/ high [place] (but on lasix), higher Usom    Worsened also due to hypotonic fluids such as D5 - avoid as it worsens her hyponatremia  Improving now  add Ure-NA 30 g BID today     2. Renal function WNL  3. Recent abdominal surgery  4. H/O Crohn's Disease/colitis, Abdominal abscess: management per surgery  5. HTN- controlled    Other Problems:   Patient Active Problem List   Diagnosis    Hiatal hernia    Essential hypertension, benign    Atherosclerotic PVD with intermittent claudication (HCC)    Bone spur    Anemia    Hyponatremia    Insomnia    Hypokalemia    Crohn's colitis ([address]    Hypomagnesemia    Syncope    Hypotension    DVT (deep venous thrombosis) (HCC)    DVT, lower extremity, distal, acute, bilateral (HCC)    Diverticulitis    Perforation bowel (HCC)    Perforation and abscess of large intestine concurrent with and due to diverticulitis    Intra-abdominal abscess ([address]    Sepsis ([address]    Colitis    Omental mass    Open abdominal wall wound      : Other supportive care :   - Check daily renal function panel with electrolytes-phosphorus  - Strict monitoring of I/Os, daily weight  - Renal feeds/diet  - Current medications reviewed. High  complexity. Multiple complex problems.    Discussed with patient,  and

## 2018-10-18 NOTE — PROGRESS NOTES
(LIORESAL) tablet 10 mg, 10 mg, Oral, TID  magnesium oxide (MAG-OX) tablet 400 mg, 400 mg, Oral, BID  atenolol (TENORMIN) tablet 100 mg, 100 mg, Oral, Daily  potassium chloride (KLOR-CON M) extended release tablet 40 mEq, 40 mEq, Oral, PRN **OR** potassium bicarb-citric acid (EFFER-K) effervescent tablet 40 mEq, 40 mEq, Oral, PRN **OR** potassium chloride 10 mEq/100 mL IVPB (Peripheral Line), 10 mEq, Intravenous, PRN  sodium chloride flush 0.9 % injection 10 mL, 10 mL, Intravenous, 2 times per day  sodium chloride flush 0.9 % injection 10 mL, 10 mL, Intravenous, PRN  magnesium hydroxide (MILK OF MAGNESIA) 400 MG/5ML suspension 30 mL, 30 mL, Oral, Daily PRN  ondansetron (ZOFRAN) injection 4 mg, 4 mg, Intravenous, Q6H PRN  phenazopyridine (PYRIDIUM) tablet 200 mg, 200 mg, Oral, TID WC  simethicone (MYLICON) chewable tablet 80 mg, 80 mg, Oral, Q6H  pantoprazole (PROTONIX) tablet 40 mg, 40 mg, Oral, QAM AC  hydrALAZINE (APRESOLINE) injection 10 mg, 10 mg, Intravenous, Q6H PRN         Objective:  /79   Pulse 55   Temp 97.3 °F (36.3 °C) (Oral)   Resp 17   Ht 5' 6\" (1.676 m)   Wt 236 lb 1.6 oz (107.1 kg)   SpO2 95%   BMI 38.11 kg/m²      Patient Vitals for the past 24 hrs:   BP Temp Temp src Pulse Resp SpO2 Weight   10/18/18 0950 116/79 97.3 °F (36.3 °C) Oral 55 17 95 % -   10/18/18 0600 - - - 50 - - -   10/18/18 0525 100/65 97.1 °F (36.2 °C) Axillary 57 16 94 % 236 lb 1.6 oz (107.1 kg)   10/18/18 0400 - - - 57 - - -   10/18/18 0200 - - - 58 - - -   10/17/18 2100 123/88 97.3 °F (36.3 °C) Oral 70 17 92 % -   10/17/18 2000 - - - 69 - - -   10/17/18 1743 123/76 98.3 °F (36.8 °C) Oral 61 18 97 % -   10/17/18 1600 - - - 63 - - -   10/17/18 1345 120/75 97.8 °F (36.6 °C) Oral 66 18 98 % -   10/17/18 1315 112/68 - - 63 18 95 % -   10/17/18 1310 114/70 97 °F (36.1 °C) Temporal 64 18 95 % -   10/17/18 1305 107/72 - - 64 18 98 % -   10/17/18 1300 119/74 - - 64 18 94 % -   10/17/18 1257 119/74 97.2 °F (36.2 °C) Temporal consent was obtained after a detailed explanation of the procedure including risks, benefits, and alternatives. Universal protocol was observed. Preliminary CT was performed. In the left upper quadrant, there was a perisplenic fluid collection identified. A lateral, subcostal approach was chosen, with the window between the descending colon and small bowel. The skin was sterilely prepared and infiltrated with 1% lidocaine. Deep lidocaine was also administered via 5 Zimbabwean needle sheath to the abdominal musculature. The needle was further advanced approximately 7 cm from skin surface into the fluid collection. After observing serosanguineous fluid an 035 J-wire was advanced, followed by 8 Western Sophia dilator and 10 Zimbabwean pigtail drain. It was locked in position. A total of 170 cc of serosanguineous fluid was aspirated. A 20 cc specimen was forwarded to the laboratory for culture. The external portion of the catheter was secured to skin with resolution device. The catheter was connected to a ELIAS bulb. The pelvic fluid collection was also evaluated by CT. Previous oral contrast in the colon and small bowel had cleared. The margins of the collection were not well appreciated. The urinary bladder was relatively more distended. No aspiration or drainage was performed. FINDINGS: Please see above. 1.  Successful placement of 10 Zimbabwean pigtail catheter to left upper quadrant fluid collection, from subcostal approach. Fluid appeared postoperative (serosanguineous). Cultures pending. 2.  Pelvic fluid collection was not well visualized and did not appear loculated. No aspiration or drainage was performed. Assessment and Plan:  Patient Active Hospital Problem List:   Crohn's colitis ([facility] [address] (1/6/2018)    Assessment: Established problem. Stable.      Plan: s/p surgery; secondary closure 10/17 done; though lower portion of wound left open. Pigtail now in for drainage (placed 10/16) Cont iv abx.  Cont iv pain

## 2018-10-18 NOTE — PROGRESS NOTES
impaired functional mobility that presents below her typical baseline - currently only able to ambulate x 15ft with RW and CGA with frequent standing rest.  Patient is limited by abdominal pain, weakness, fatigue, decreased endurance, and shortness of breath during standing mobility. Patient will continue to benefit from additional skilled PT intervention to facilitate safe mobility and to optimize (I) to promote return to prior level of function. Treatment Diagnosis: impaired mobility  Patient Education: Patient educated on role of PT, use of call light, PT recommendations, and seated HEP - patient verbalizes understanding. REQUIRES PT FOLLOW UP: Yes  Activity Tolerance  Activity Tolerance: Patient limited by fatigue;Patient limited by endurance; Patient limited by pain     AM-PAC Score  AM-PAC Inpatient Mobility Raw Score : 17  AM-PAC Inpatient T-Scale Score : 42.13  Mobility Inpatient CMS 0-100% Score: 50.57  Mobility Inpatient CMS G-Code Modifier : CK          Goals  Short term goals  Time Frame for Short term goals: Pt to meet STG prior to discharge.   Short term goal 1: Pt to demonstrate all bed mobility with supervision. - ongoing 10/18  Short term goal 2: Pt to demonstrate sit <> stand with RW and supervision. - ongoing 10/18  Short term goal 3: Pt to demonstrate amb > 50' with RW and min A. - ongoing 10/18  Short term goal 4: Pt to demonstrate ascend/descend 1 curb step with RW and min A. - ongoing 10/18  Patient Goals   Patient goals : \"to get stronger and better\"    Plan    Plan  Times per week: 2-5  Current Treatment Recommendations: Strengthening, Balance Training, Functional Mobility Training, Transfer Training, ADL/Self-care Training, IADL Training, Endurance Training, Gait Training, Stair training, Home Exercise Program, Safety Education & Training, Equipment Evaluation, Education, & procurement, ROM, Patient/Caregiver Education & Training  Safety Devices  Type of devices: Call light within reach,

## 2018-10-18 NOTE — CARE COORDINATION
Kb stated medically, they can meet pt's needs. Liza Nathan at [facility] is working to determine if they can take pt's ins-stated they were in network but are under new ownership-awaiting decision from director.   Electronically signed by ASHLEE Ying on 10/18/2018 at 4:07 PM

## 2018-10-18 NOTE — PROGRESS NOTES
Nutrition Assessment    Type and Reason for Visit: Reassess     Nutrition Recommendations:   1. Offer Ensure High Protein TID  2. Encourage PO intake  3. Document all PO intake in flow sheets     Malnutrition Assessment:  · Malnutrition Status: At risk for malnutrition  · Context: Acute illness or injury  · Findings of the 6 clinical characteristics of malnutrition (Minimum of 2 out of 6 clinical characteristics is required to make the diagnosis of moderate or severe Protein Calorie Malnutrition based on AND/ASPEN Guidelines):  1. Energy Intake-Less than or equal to 50%, greater than 7 days    2. Weight Loss- (Unable to assess d/t fluid status ),    3. Fat Loss-No significant subcutaneous fat loss,    4. Muscle Loss-No significant muscle mass loss,    5. Fluid Accumulation-Moderate to severe fluid accumulation, Extremities  6.  Strength-Not measured    Nutrition Diagnosis:   · Problem: Inadequate oral intake  · Etiology: related to Insufficient energy/nutrient consumption     Signs and symptoms:  as evidenced by Intake 0-25%    Nutrition Assessment:  · Subjective Assessment: Pt s/p partial closure of abdominal wound in OR yesterday. Pt with poor appetite and PO intake on general diet, seen over breakfast tray having consumed only a few bites of toast and banana. Pt is drinking milk well. Pt agreeable to receive Ensure High Protein TID and may also have family bring Boost from home. RD provided maximum encouragement for PO intake. · Nutrition-Focused Physical Findings: +3 RLE edema. +2 LLE Edema. +20.4 liters. Colostomy.  Pt appears weak/tired   · Wound Type: Surgical Wound  · Current Nutrition Therapies:  · Oral Diet Orders: General   · Oral Diet intake: 0%, 1-25%  · Oral Nutrition Supplement (ONS) Orders: None  · Anthropometric Measures:  · Ht: 5' 6\" (167.6 cm)   · Current Body Wt: 236 lb (107 kg)  · Admission Body Wt: 217 lb (98.4 kg)  · Ideal Body Wt: 130 lb (59 kg)   · BMI Classification: BMI 35.0 -

## 2018-10-18 NOTE — PROGRESS NOTES
53.32  ADL Inpatient CMS G-Code Modifier : CK    Goals  Short term goals  Time Frame for Short term goals: Discharge  Short term goal 1: SBA for transfers to all ADL surfaces w/RW- CGA w/ RW  10/18--Goal not met; ongoing  Short term goal 2: SBA for functional mobility w/RW for ADL activity- CGA w/ RW  10/18--Goal not met; ongoing  Short term goal 3: SBA for LB bathing w/AE as needed- bathing completed prior to tx session  10/18  Short term goal 4: SBA for LB dressing w/AE as needed- MOD A with clothing managemnet w/toileting 10/18--Goal not met  Short term goal 5: SBA for toileting- MOD A with clothing managemnet w/toileting 10/18--Goal not met  Long term goals  Time Frame for Long term goals : LTG=STG  Long term goal 1: Pt to tolerate standing 5-7 min for ADL activity/functional mobility--Not addressed this session       Therapy Time   Individual Concurrent Group Co-treatment   Time In 1400         Time Out 1441         Minutes 41         Timed Code Treatment Minutes:  41 Minutes    Total Treatment Minutes: 1455 [facility]  Cosign: I have read and approve of this note.  John Erwin, OTR/L, [id number]

## 2018-10-19 LAB
ANION GAP SERPL CALCULATED.3IONS-SCNC: 8 MMOL/L (ref 3–16)
BASOPHILS ABSOLUTE: 0 K/UL (ref 0–0.2)
BASOPHILS RELATIVE PERCENT: 0.3 %
BUN BLDV-MCNC: 5 MG/DL (ref 7–20)
CALCIUM SERPL-MCNC: 8.9 MG/DL (ref 8.3–10.6)
CHLORIDE BLD-SCNC: 95 MMOL/L (ref 99–110)
CO2: 28 MMOL/L (ref 21–32)
CREAT SERPL-MCNC: <0.5 MG/DL (ref 0.6–1.2)
EOSINOPHILS ABSOLUTE: 0 K/UL (ref 0–0.6)
EOSINOPHILS RELATIVE PERCENT: 0.4 %
GFR AFRICAN AMERICAN: >60
GFR NON-AFRICAN AMERICAN: >60
GLUCOSE BLD-MCNC: 121 MG/DL (ref 70–99)
HCT VFR BLD CALC: 28.6 % (ref 36–48)
HEMOGLOBIN: 9.4 G/DL (ref 12–16)
LYMPHOCYTES ABSOLUTE: 1.9 K/UL (ref 1–5.1)
LYMPHOCYTES RELATIVE PERCENT: 17.1 %
MCH RBC QN AUTO: 28 PG (ref 26–34)
MCHC RBC AUTO-ENTMCNC: 32.9 G/DL (ref 31–36)
MCV RBC AUTO: 85.1 FL (ref 80–100)
MONOCYTES ABSOLUTE: 0.5 K/UL (ref 0–1.3)
MONOCYTES RELATIVE PERCENT: 5 %
NEUTROPHILS ABSOLUTE: 8.5 K/UL (ref 1.7–7.7)
NEUTROPHILS RELATIVE PERCENT: 77.2 %
OSMOLALITY URINE: 332 MOSM/KG (ref 390–1070)
PDW BLD-RTO: 17.7 % (ref 12.4–15.4)
PLATELET # BLD: 385 K/UL (ref 135–450)
PMV BLD AUTO: 6.4 FL (ref 5–10.5)
POTASSIUM SERPL-SCNC: 4.7 MMOL/L (ref 3.5–5.1)
RBC # BLD: 3.36 M/UL (ref 4–5.2)
SODIUM BLD-SCNC: 131 MMOL/L (ref 136–145)
WBC # BLD: 11 K/UL (ref 4–11)

## 2018-10-19 PROCEDURE — 2500000003 HC RX 250 WO HCPCS: Performed by: NURSE PRACTITIONER

## 2018-10-19 PROCEDURE — 80048 BASIC METABOLIC PNL TOTAL CA: CPT

## 2018-10-19 PROCEDURE — 6370000000 HC RX 637 (ALT 250 FOR IP): Performed by: SURGERY

## 2018-10-19 PROCEDURE — 6360000002 HC RX W HCPCS: Performed by: NURSE PRACTITIONER

## 2018-10-19 PROCEDURE — 6370000000 HC RX 637 (ALT 250 FOR IP): Performed by: NURSE PRACTITIONER

## 2018-10-19 PROCEDURE — 85025 COMPLETE CBC W/AUTO DIFF WBC: CPT

## 2018-10-19 PROCEDURE — 1200000000 HC SEMI PRIVATE

## 2018-10-19 PROCEDURE — 36415 COLL VENOUS BLD VENIPUNCTURE: CPT

## 2018-10-19 PROCEDURE — 6360000002 HC RX W HCPCS: Performed by: SURGERY

## 2018-10-19 PROCEDURE — 6370000000 HC RX 637 (ALT 250 FOR IP): Performed by: INTERNAL MEDICINE

## 2018-10-19 PROCEDURE — APPNB30 APP NON BILLABLE TIME 0-30 MINS: Performed by: NURSE PRACTITIONER

## 2018-10-19 PROCEDURE — 99024 POSTOP FOLLOW-UP VISIT: CPT | Performed by: SURGERY

## 2018-10-19 PROCEDURE — 2580000003 HC RX 258: Performed by: INTERNAL MEDICINE

## 2018-10-19 RX ADMIN — POTASSIUM CHLORIDE 40 MEQ: 1500 TABLET, EXTENDED RELEASE ORAL at 10:20

## 2018-10-19 RX ADMIN — BACLOFEN 10 MG: 10 TABLET ORAL at 00:40

## 2018-10-19 RX ADMIN — BUSPIRONE HYDROCHLORIDE 30 MG: 5 TABLET ORAL at 21:21

## 2018-10-19 RX ADMIN — BACLOFEN 10 MG: 10 TABLET ORAL at 21:20

## 2018-10-19 RX ADMIN — DULOXETINE HYDROCHLORIDE 60 MG: 60 CAPSULE, DELAYED RELEASE ORAL at 10:21

## 2018-10-19 RX ADMIN — POTASSIUM CHLORIDE 40 MEQ: 1500 TABLET, EXTENDED RELEASE ORAL at 21:20

## 2018-10-19 RX ADMIN — Medication 400 MG: at 00:40

## 2018-10-19 RX ADMIN — LINEZOLID 600 MG: 600 INJECTION, SOLUTION INTRAVENOUS at 23:01

## 2018-10-19 RX ADMIN — OXYCODONE HYDROCHLORIDE AND ACETAMINOPHEN 2 TABLET: 5; 325 TABLET ORAL at 19:07

## 2018-10-19 RX ADMIN — PHENAZOPYRIDINE HYDROCHLORIDE 200 MG: 100 TABLET ORAL at 10:20

## 2018-10-19 RX ADMIN — POTASSIUM CHLORIDE, DEXTROSE MONOHYDRATE AND SODIUM CHLORIDE: 150; 5; 450 INJECTION, SOLUTION INTRAVENOUS at 06:09

## 2018-10-19 RX ADMIN — FUROSEMIDE 40 MG: 40 TABLET ORAL at 10:20

## 2018-10-19 RX ADMIN — OXYCODONE HYDROCHLORIDE AND ACETAMINOPHEN 2 TABLET: 5; 325 TABLET ORAL at 13:19

## 2018-10-19 RX ADMIN — TAZOBACTAM SODIUM AND PIPERACILLIN SODIUM 4.5 G: 500; 4 INJECTION, SOLUTION INTRAVENOUS at 18:06

## 2018-10-19 RX ADMIN — BACLOFEN 10 MG: 10 TABLET ORAL at 14:13

## 2018-10-19 RX ADMIN — Medication 10 ML: at 10:27

## 2018-10-19 RX ADMIN — POTASSIUM CHLORIDE 40 MEQ: 1500 TABLET, EXTENDED RELEASE ORAL at 00:36

## 2018-10-19 RX ADMIN — POTASSIUM CHLORIDE, DEXTROSE MONOHYDRATE AND SODIUM CHLORIDE: 150; 5; 450 INJECTION, SOLUTION INTRAVENOUS at 23:01

## 2018-10-19 RX ADMIN — Medication 10 ML: at 21:21

## 2018-10-19 RX ADMIN — Medication 400 MG: at 10:20

## 2018-10-19 RX ADMIN — FLUCONAZOLE 400 MG: 2 INJECTION INTRAVENOUS at 15:45

## 2018-10-19 RX ADMIN — RIVAROXABAN 20 MG: 20 TABLET, FILM COATED ORAL at 17:51

## 2018-10-19 RX ADMIN — Medication 10 ML: at 21:24

## 2018-10-19 RX ADMIN — SIMETHICONE CHEW TAB 80 MG 80 MG: 80 TABLET ORAL at 21:20

## 2018-10-19 RX ADMIN — ATENOLOL 100 MG: 50 TABLET ORAL at 10:21

## 2018-10-19 RX ADMIN — SIMETHICONE CHEW TAB 80 MG 80 MG: 80 TABLET ORAL at 14:13

## 2018-10-19 RX ADMIN — TAZOBACTAM SODIUM AND PIPERACILLIN SODIUM 4.5 G: 500; 4 INJECTION, SOLUTION INTRAVENOUS at 00:35

## 2018-10-19 RX ADMIN — BUSPIRONE HYDROCHLORIDE 30 MG: 5 TABLET ORAL at 10:21

## 2018-10-19 RX ADMIN — PANTOPRAZOLE SODIUM 40 MG: 40 TABLET, DELAYED RELEASE ORAL at 06:06

## 2018-10-19 RX ADMIN — PHENAZOPYRIDINE HYDROCHLORIDE 200 MG: 100 TABLET ORAL at 14:13

## 2018-10-19 RX ADMIN — BACLOFEN 10 MG: 10 TABLET ORAL at 10:20

## 2018-10-19 RX ADMIN — Medication 400 MG: at 21:21

## 2018-10-19 RX ADMIN — ENOXAPARIN SODIUM 40 MG: 40 INJECTION SUBCUTANEOUS at 10:21

## 2018-10-19 RX ADMIN — TAZOBACTAM SODIUM AND PIPERACILLIN SODIUM 4.5 G: 500; 4 INJECTION, SOLUTION INTRAVENOUS at 14:13

## 2018-10-19 RX ADMIN — OXYCODONE HYDROCHLORIDE AND ACETAMINOPHEN 2 TABLET: 5; 325 TABLET ORAL at 01:30

## 2018-10-19 RX ADMIN — PHENAZOPYRIDINE HYDROCHLORIDE 200 MG: 100 TABLET ORAL at 17:52

## 2018-10-19 RX ADMIN — SIMETHICONE CHEW TAB 80 MG 80 MG: 80 TABLET ORAL at 10:21

## 2018-10-19 RX ADMIN — BUSPIRONE HYDROCHLORIDE 30 MG: 5 TABLET ORAL at 00:39

## 2018-10-19 RX ADMIN — TAZOBACTAM SODIUM AND PIPERACILLIN SODIUM 4.5 G: 500; 4 INJECTION, SOLUTION INTRAVENOUS at 06:06

## 2018-10-19 RX ADMIN — LINEZOLID 600 MG: 600 INJECTION, SOLUTION INTRAVENOUS at 10:23

## 2018-10-19 RX ADMIN — Medication 10 ML: at 10:26

## 2018-10-19 ASSESSMENT — PAIN SCALES - GENERAL
PAINLEVEL_OUTOF10: 0
PAINLEVEL_OUTOF10: 9
PAINLEVEL_OUTOF10: 0
PAINLEVEL_OUTOF10: 8
PAINLEVEL_OUTOF10: 0
PAINLEVEL_OUTOF10: 0
PAINLEVEL_OUTOF10: 8
PAINLEVEL_OUTOF10: 8

## 2018-10-19 ASSESSMENT — PAIN DESCRIPTION - LOCATION: LOCATION: ABDOMEN

## 2018-10-19 ASSESSMENT — PAIN SCALES - WONG BAKER
WONGBAKER_NUMERICALRESPONSE: 0
WONGBAKER_NUMERICALRESPONSE: 0

## 2018-10-19 ASSESSMENT — PAIN DESCRIPTION - PAIN TYPE: TYPE: SURGICAL PAIN

## 2018-10-19 ASSESSMENT — PAIN DESCRIPTION - FREQUENCY: FREQUENCY: CONTINUOUS

## 2018-10-19 ASSESSMENT — PAIN DESCRIPTION - DESCRIPTORS: DESCRIPTORS: SORE

## 2018-10-19 NOTE — PROGRESS NOTES
(EXCEDRIN MIGRAINE) per tablet 1 tablet, 1 tablet, Oral, Q6H PRN  ondansetron (ZOFRAN-ODT) disintegrating tablet 4 mg, 4 mg, Oral, Q6H PRN  baclofen (LIORESAL) tablet 10 mg, 10 mg, Oral, TID  magnesium oxide (MAG-OX) tablet 400 mg, 400 mg, Oral, BID  atenolol (TENORMIN) tablet 100 mg, 100 mg, Oral, Daily  potassium chloride (KLOR-CON M) extended release tablet 40 mEq, 40 mEq, Oral, PRN **OR** potassium bicarb-citric acid (EFFER-K) effervescent tablet 40 mEq, 40 mEq, Oral, PRN **OR** potassium chloride 10 mEq/100 mL IVPB (Peripheral Line), 10 mEq, Intravenous, PRN  sodium chloride flush 0.9 % injection 10 mL, 10 mL, Intravenous, 2 times per day  sodium chloride flush 0.9 % injection 10 mL, 10 mL, Intravenous, PRN  magnesium hydroxide (MILK OF MAGNESIA) 400 MG/5ML suspension 30 mL, 30 mL, Oral, Daily PRN  ondansetron (ZOFRAN) injection 4 mg, 4 mg, Intravenous, Q6H PRN  phenazopyridine (PYRIDIUM) tablet 200 mg, 200 mg, Oral, TID WC  simethicone (MYLICON) chewable tablet 80 mg, 80 mg, Oral, Q6H  pantoprazole (PROTONIX) tablet 40 mg, 40 mg, Oral, QAM AC  hydrALAZINE (APRESOLINE) injection 10 mg, 10 mg, Intravenous, Q6H PRN         Objective:  /86   Pulse 65   Temp 97.9 °F (36.6 °C) (Oral)   Resp 16   Ht 5' 6\" (1.676 m)   Wt 237 lb 1.6 oz (107.5 kg)   SpO2 92%   BMI 38.27 kg/m²      Patient Vitals for the past 24 hrs:   BP Temp Temp src Pulse Resp SpO2 Weight   10/19/18 0621 - - - 65 - - -   10/19/18 0519 - - - - - - 237 lb 1.6 oz (107.5 kg)   10/19/18 0413 - - - 65 - - -   10/19/18 0318 133/86 97.9 °F (36.6 °C) Oral 65 16 92 % -   10/19/18 0200 - - - 73 - - -   10/19/18 0027 126/83 97.9 °F (36.6 °C) Oral 76 16 92 % -   10/18/18 2023 119/79 98.3 °F (36.8 °C) Oral 68 16 93 % -   10/18/18 1710 131/80 97.6 °F (36.4 °C) Oral 69 16 98 % -   10/18/18 1311 120/79 97.4 °F (36.3 °C) Oral 66 17 97 % -   10/18/18 0950 116/79 97.3 °F (36.3 °C) Oral 55 17 95 % -     Patient Vitals for the past 96 hrs (Last 3 readings): benefits, and alternatives. Universal protocol was observed. Preliminary CT was performed. In the left upper quadrant, there was a perisplenic fluid collection identified. A lateral, subcostal approach was chosen, with the window between the descending colon and small bowel. The skin was sterilely prepared and infiltrated with 1% lidocaine. Deep lidocaine was also administered via 5 Lebanese needle sheath to the abdominal musculature. The needle was further advanced approximately 7 cm from skin surface into the fluid collection. After observing serosanguineous fluid an 035 J-wire was advanced, followed by 8 Western Sophia dilator and 10 Lebanese pigtail drain. It was locked in position. A total of 170 cc of serosanguineous fluid was aspirated. A 20 cc specimen was forwarded to the laboratory for culture. The external portion of the catheter was secured to skin with resolution device. The catheter was connected to a ELIAS bulb. The pelvic fluid collection was also evaluated by CT. Previous oral contrast in the colon and small bowel had cleared. The margins of the collection were not well appreciated. The urinary bladder was relatively more distended. No aspiration or drainage was performed. FINDINGS: Please see above. 1.  Successful placement of 10 Lebanese pigtail catheter to left upper quadrant fluid collection, from subcostal approach. Fluid appeared postoperative (serosanguineous). Cultures pending. 2.  Pelvic fluid collection was not well visualized and did not appear loculated. No aspiration or drainage was performed. Assessment and Plan:  Patient Active Hospital Problem List:   Crohn's colitis ([facility] [address] (1/6/2018)    Assessment: Established problem. Stable.      Plan: s/p surgery; secondary closure 10/17 done; though lower portion of wound left open. Pigtail now in for drainage (placed 10/16) Cont iv abx. Cont iv pain meds   Anemia (1/5/2018)    Assessment: Established problem. Today Hgb 9.4.   Some element of acute loss post surgery     Plan: No indication for transfusion. Cont to monitor h/h to assess progression of anemia. Recommend ferrous sulfate or MVI as outpatient.    Essential hypertension, benign (9/13/2015)    Assessment: Established problem. Stable.  133/86    Plan: bp controlled. Stay on same meds   Hyponatremia (1/5/2018)    Assessment: Established problem. Stable.  Still a little low - 131    Plan: renal on board - cont fluid restriction   Colitis (10/7/2018)   Abdominal abscess ()    Assessment: Pigtail placed 10/16    Plan: cont to drain. Iv abx on board.       Disp- moving towards SNF placement.     Dr Rosemary Erazo to cover starting evening 10/19        Glenis Gifford  10/19/2018

## 2018-10-19 NOTE — PROGRESS NOTES
Pt calling out for help. Pt incontinent of urine. Pt voided through 2 briefs, 2 bedpads, sheet, and it was noted on the floor. Pericare complete. Full bath given. Bed linen changed. During bed change, 2 pills noted to be in sheets. Both identified as Percocets. Medications wasted. Pt repositioned in bed. Pt appears to still be slightly disoriented. Pulled at PICC line wanting to know what was attached to her. Oriented pt. PT denies further needs at this time. Call light in hand. Bed alarm set. Will continue to monitor.  Electronically signed by Nini Sales RN on 10/18/2018 at 11:50 PM

## 2018-10-19 NOTE — PROGRESS NOTES
Pt getting OOB to use BSC. Pt was already incontinent. Pericare complete. New brief applied. Pt assisted back to bed. Head to toe assessment complete. Vital signs obtained. Pt answers all direct questions appropriately; however, she appears to be confused. Unsure how to use TV remote, unsure how to use room telephone. Talking about being at home and needs items from home. Oriented pt. To room, call light, and bed settings. Pt verbalized understanding. Pt does not want to take any evening medication. Pt states \"I don't take any\". Explained medications to pt. Pt continues to refuse. Pt denies pain. Call light in hand. Pt verbalizes correct use. Bed alarm set. Will continue to monitor.  Electronically signed by Betito Todd RN on 10/18/2018 at 8:41 PM

## 2018-10-19 NOTE — PROGRESS NOTES
97.9 °F (36.6 °C) Oral 76 16 92 % -   10/18/18 2023 119/79 98.3 °F (36.8 °C) Oral 68 16 93 % -   10/18/18 1710 131/80 97.6 °F (36.4 °C) Oral 69 16 98 % -   10/18/18 1311 120/79 97.4 °F (36.3 °C) Oral 66 17 97 % -       Intake/Output Summary (Last 24 hours) at 10/19/18 1023  Last data filed at 10/19/18 1015   Gross per 24 hour   Intake             1994 ml   Output             1050 ml   Net              944 ml       Physical Exam   Constitutional: She is oriented to person, place, and time. She appears well-developed and well-nourished. HENT:   Head: Normocephalic and atraumatic. Eyes: Conjunctivae are normal. No scleral icterus. Neck: Neck supple. Cardiovascular: Normal rate and regular rhythm. Pulmonary/Chest: Effort normal and breath sounds normal.   Abdominal: Soft. Bowel sounds are normal.   Musculoskeletal: She exhibits edema (1+). She exhibits no deformity. Neurological: She is alert and oriented to person, place, and time. Skin: Skin is warm and dry. Psychiatric: She has a normal mood and affect. Her behavior is normal.         DATA:  Diagnostic tests reviewed for today's visit:    Recent Labs      10/16/18   1829  10/17/18   0612  10/18/18   0616  10/19/18   0641   WBC   --   10.2  9.8  11.0   HCT  30.3*  29.5*  28.0*  28.6*   PLT   --   431  402  385     Iron Saturation:  No components found for: PERCENTFE  FERRITIN:    Lab Results   Component Value Date    FERRITIN 67.5 09/27/2018     IRON:    Lab Results   Component Value Date    IRON 13 09/27/2018     TIBC:    Lab Results   Component Value Date    TIBC 190 09/27/2018       Recent Labs      10/17/18   0612  10/18/18   0616  10/19/18   0641   NA  127*  129*  131*   K  4.3  4.3  4.7   CL  93*  94*  95*   CO2  27  27  28   BUN  3*  3*  5*   CREATININE  <0.5*  <0.5*  <0.5*     Recent Labs      10/17/18   0612  10/18/18   0616  10/19/18   0641   CALCIUM  8.3  8.5  8.9     No results for input(s): PH, PCO2, PO2 in the last 72 hours.     Invalid input(s): [id number], INSPIREDO2    ABG:  No results found for: PH, PCO2, PO2, HCO3, BE, THGB, TCO2, O2SAT  VBG:    Lab Results   Component Value Date    PHVEN 7.548 09/26/2018    VJC6HCN 19.1 09/26/2018    BEVEN -4.7 09/26/2018    V8MBPTAL 100 09/26/2018       LDH:  No results found for: LDH  Uric Acid:    Lab Results   Component Value Date    LABURIC 4.1 09/26/2018       PT/INR:    Lab Results   Component Value Date    PROTIME 13.5 10/16/2018    INR 1.18 10/16/2018     Warfarin PT/INR:  No components found for: Jessica Rincon  PTT:    Lab Results   Component Value Date    APTT 28.9 10/16/2018   [APTT}    Last 3 Troponin:    Lab Results   Component Value Date    TROPONINI <0.01 10/07/2018    TROPONINI <0.01 06/26/2018    TROPONINI <0.01 06/26/2018       U/A:    Lab Results   Component Value Date    COLORU YELLOW 10/07/2018    PROTEINU Negative 10/07/2018    PHUR 6.5 10/07/2018    WBCUA 5 10/07/2018    RBCUA 4 10/07/2018    BACTERIA 1+ 07/03/2018    CLARITYU Clear 10/07/2018    SPECGRAV 1.021 10/07/2018    LEUKOCYTESUR SMALL 10/07/2018    UROBILINOGEN 0.2 10/07/2018    BILIRUBINUR Negative 10/07/2018    BLOODU Negative 10/07/2018    GLUCOSEU Negative 10/07/2018     Microalbumen/Creatinine ratio:  No components found for: RUCREAT  24 Hour Urine for Protein:  No components found for: RAWUPRO, UHRS3, CGAN62UF, UTV3  24 Hour Urine for Creatinine Clearance:  No components found for: CREAT4, UHRS10, UTV10  Urine Toxicology: No components found for: IAMMENTA, IBARBIT, IBENZO, ICOCAINE, IMARTHC, IOPIATES, IPHENCYC    HgBA1c:  No results found for: LABA1C  RPR:  No results found for: RPR  HIV:  No results found for: HIV  MARION:    Lab Results   Component Value Date    MARION Negative 07/03/2018     RF:  No results found for: RF  DSDNA:  No components found for: DNA  AMYLASE:    Lab Results   Component Value Date    AMYLASE 48 08/02/2018     LIPASE:    Lab Results   Component Value Date    LIPASE 26.0 10/07/2018

## 2018-10-19 NOTE — PROGRESS NOTES
Pt up to bsc. Voided without difficulty. Pt assisted back to bed. Pt requesting pain medication. Medication administered per order. Pt tolerated well. Denies further needs at this time. Call light in hand. Bed alarm set. Will continue to monitor.  Electronically signed by Gonzalez Hollis RN on 10/19/2018 at 1:32 AM

## 2018-10-19 NOTE — PROGRESS NOTES
Physical Therapy      Attempted to see pt but had just had dressing change and currently in a lot of pain. Will attempt another time as schedule permits.    Thanks, Syeda Mercer, DPT [id number]

## 2018-10-19 NOTE — PROGRESS NOTES
Bedside rounding with ELLE Rich. Pt resting in bed with eyes closed, respirations even and unlabored. Call light noted to be resting in pt's lap. All needs appear within reach. White board updated. Bed alarm set.  Electronically signed by Jose Salas RN on 10/19/2018 at 7:19 AM

## 2018-10-19 NOTE — PROGRESS NOTES
subcentimeter hypodense lesion posteriorly in the right hepatic lobe is unchanged and too small to characterize. There is no evidence of biliary ductal dilation. The gallbladder is unremarkable. Splenic atrophy. The adrenal glands are unremarkable without evidence of mass. The pancreas is unremarkable. 3.6 cm near fluid density lesion of the right renal lower pole most likely represents a cyst.  There is mild bilateral renal cortical scarring. No hydronephrosis or hydroureter. No evidence of urolithiasis. Imaging through the pelvis limited by streak artifact from right hip prosthesis. There is diffuse urinary bladder wall thickening. No definite gas within the urinary bladder is visible. GI/Bowel: Significant short segment wall thickening of the proximal sigmoid colon, overall mildly decreased from September 2018. There is proximal dilation of the colon, which contains air-fluid levels. The cecum is markedly distended. The appendix is not visualized. Large hiatal hernia partially visualized. There is no small bowel dilation or focal wall thickening. Pelvis: The uterus is absent. The ovaries are not visualized. Trace pelvic free fluid is present. A soft tissue tract extends from thickened sigmoid colon towards the vaginal cuff and urinary bladder. Soft tissue density in this region is also inseparable from small bowel. There is left anterior pelvic fat stranding. Nonenlarged pelvic lymph nodes are present. Peritoneum/Retroperitoneum: Trace perihepatic fluid. Small soft tissue nodules along the distal descending colon. There is otherwise no concerning intra-abdominal adenopathy. There is no pneumoperitoneum. The abdominal aorta is normal caliber and without evidence of aneurysm. The portal vein is patent. Bones/Soft Tissues: There are no suspicious osseous lesions. Persistent short segment wall thickening of the proximal sigmoid colon with upstream colonic dilation consistent with obstruction. Date: 9/27/2018  EXAMINATION: SINGLE XRAY VIEW OF THE CHEST, 9/26/2018 2:12 am COMPARISON: 06/26/2018 HISTORY: ORDERING SYSTEM PROVIDED HISTORY: SOB TECHNOLOGIST PROVIDED HISTORY: Reason for exam:->SOB Ordering Physician Provided Reason for Exam: Fever,shortness of breath, and dizziness Acuity: Acute Type of Exam: Initial Relevant Medical/Surgical History: Previous pneumonia and hypertension FINDINGS: There is a large hiatal hernia. There is chronic scarring in the left lung base. There is no new acute consolidation. The cardiac silhouette is unchanged. There is a left shoulder arthroplasty. No acute abnormality. Large hiatal hernia.        Scheduled Meds:   piperacillin-tazobactam  4.5 g Intravenous Q6H    Urea  30 g Oral BID    lidocaine 1 % injection  5 mL Intradermal Once    sodium chloride flush  10 mL Intravenous 2 times per day    potassium chloride  40 mEq Oral BID    magnesium sulfate  2 g Intravenous Once    furosemide  40 mg Oral BID    enoxaparin  40 mg Subcutaneous Daily    linezolid  600 mg Intravenous Q12H    fluconazole  400 mg Intravenous Q24H    LIP BALM   Topical Daily    busPIRone  30 mg Oral BID    DULoxetine  60 mg Oral Daily    fluticasone  1 spray Nasal Daily    mesalamine  4,800 mg Oral Daily with breakfast    baclofen  10 mg Oral TID    magnesium oxide  400 mg Oral BID    atenolol  100 mg Oral Daily    sodium chloride flush  10 mL Intravenous 2 times per day    phenazopyridine  200 mg Oral TID WC    simethicone  80 mg Oral Q6H    pantoprazole  40 mg Oral QAM AC     Continuous Infusions:   dextrose 5% and 0.45% NaCl with KCl 20 mEq 50 mL/hr at 10/19/18 0609     PRN Meds:.oxyCODONE-acetaminophen **OR** oxyCODONE-acetaminophen, sodium chloride flush, LORazepam, zolpidem, aspirin-acetaminophen-caffeine, ondansetron, potassium chloride **OR** potassium bicarb-citric acid **OR** potassium chloride, sodium chloride flush, magnesium hydroxide, ondansetron,

## 2018-10-19 NOTE — PROGRESS NOTES
Abd dressing changed with Dr. Clotilde Blackburn. ELIAS drain removed by him and dry dressing placed over site. Pt tolerated well.

## 2018-10-20 PROCEDURE — 6370000000 HC RX 637 (ALT 250 FOR IP): Performed by: INTERNAL MEDICINE

## 2018-10-20 PROCEDURE — 6360000002 HC RX W HCPCS: Performed by: NURSE PRACTITIONER

## 2018-10-20 PROCEDURE — 1200000000 HC SEMI PRIVATE

## 2018-10-20 PROCEDURE — 6370000000 HC RX 637 (ALT 250 FOR IP): Performed by: SURGERY

## 2018-10-20 PROCEDURE — 2580000003 HC RX 258: Performed by: INTERNAL MEDICINE

## 2018-10-20 PROCEDURE — 6370000000 HC RX 637 (ALT 250 FOR IP): Performed by: NURSE PRACTITIONER

## 2018-10-20 PROCEDURE — 2580000003 HC RX 258

## 2018-10-20 PROCEDURE — 6360000002 HC RX W HCPCS: Performed by: SURGERY

## 2018-10-20 PROCEDURE — 99024 POSTOP FOLLOW-UP VISIT: CPT | Performed by: SURGERY

## 2018-10-20 RX ORDER — SODIUM CHLORIDE 9 MG/ML
INJECTION, SOLUTION INTRAVENOUS
Status: COMPLETED
Start: 2018-10-20 | End: 2018-10-20

## 2018-10-20 RX ADMIN — OXYCODONE HYDROCHLORIDE AND ACETAMINOPHEN 2 TABLET: 5; 325 TABLET ORAL at 11:27

## 2018-10-20 RX ADMIN — POTASSIUM CHLORIDE 40 MEQ: 1500 TABLET, EXTENDED RELEASE ORAL at 10:38

## 2018-10-20 RX ADMIN — Medication 400 MG: at 10:39

## 2018-10-20 RX ADMIN — BUSPIRONE HYDROCHLORIDE 30 MG: 5 TABLET ORAL at 20:05

## 2018-10-20 RX ADMIN — SIMETHICONE CHEW TAB 80 MG 80 MG: 80 TABLET ORAL at 03:41

## 2018-10-20 RX ADMIN — Medication 10 ML: at 10:37

## 2018-10-20 RX ADMIN — RIVAROXABAN 20 MG: 20 TABLET, FILM COATED ORAL at 17:39

## 2018-10-20 RX ADMIN — PHENAZOPYRIDINE HYDROCHLORIDE 200 MG: 100 TABLET ORAL at 10:38

## 2018-10-20 RX ADMIN — PHENAZOPYRIDINE HYDROCHLORIDE 200 MG: 100 TABLET ORAL at 17:39

## 2018-10-20 RX ADMIN — Medication: at 10:55

## 2018-10-20 RX ADMIN — Medication 30 G: at 10:37

## 2018-10-20 RX ADMIN — Medication 10 ML: at 22:16

## 2018-10-20 RX ADMIN — BACLOFEN 10 MG: 10 TABLET ORAL at 12:40

## 2018-10-20 RX ADMIN — Medication 10 ML: at 11:01

## 2018-10-20 RX ADMIN — SIMETHICONE CHEW TAB 80 MG 80 MG: 80 TABLET ORAL at 23:25

## 2018-10-20 RX ADMIN — FUROSEMIDE 40 MG: 40 TABLET ORAL at 10:39

## 2018-10-20 RX ADMIN — DULOXETINE HYDROCHLORIDE 60 MG: 60 CAPSULE, DELAYED RELEASE ORAL at 10:39

## 2018-10-20 RX ADMIN — OXYCODONE HYDROCHLORIDE AND ACETAMINOPHEN 2 TABLET: 5; 325 TABLET ORAL at 20:31

## 2018-10-20 RX ADMIN — LINEZOLID 600 MG: 600 INJECTION, SOLUTION INTRAVENOUS at 23:26

## 2018-10-20 RX ADMIN — BACLOFEN 10 MG: 10 TABLET ORAL at 10:38

## 2018-10-20 RX ADMIN — SIMETHICONE CHEW TAB 80 MG 80 MG: 80 TABLET ORAL at 17:39

## 2018-10-20 RX ADMIN — FLUCONAZOLE 400 MG: 2 INJECTION INTRAVENOUS at 12:40

## 2018-10-20 RX ADMIN — OXYCODONE HYDROCHLORIDE AND ACETAMINOPHEN 2 TABLET: 5; 325 TABLET ORAL at 01:03

## 2018-10-20 RX ADMIN — ATENOLOL 100 MG: 50 TABLET ORAL at 10:39

## 2018-10-20 RX ADMIN — PANTOPRAZOLE SODIUM 40 MG: 40 TABLET, DELAYED RELEASE ORAL at 05:47

## 2018-10-20 RX ADMIN — SODIUM CHLORIDE 250 ML: 9 INJECTION, SOLUTION INTRAVENOUS at 23:26

## 2018-10-20 RX ADMIN — TAZOBACTAM SODIUM AND PIPERACILLIN SODIUM 4.5 G: 500; 4 INJECTION, SOLUTION INTRAVENOUS at 12:45

## 2018-10-20 RX ADMIN — PHENAZOPYRIDINE HYDROCHLORIDE 200 MG: 100 TABLET ORAL at 12:40

## 2018-10-20 RX ADMIN — POTASSIUM CHLORIDE 40 MEQ: 1500 TABLET, EXTENDED RELEASE ORAL at 20:05

## 2018-10-20 RX ADMIN — TAZOBACTAM SODIUM AND PIPERACILLIN SODIUM 4.5 G: 500; 4 INJECTION, SOLUTION INTRAVENOUS at 05:48

## 2018-10-20 RX ADMIN — Medication 10 ML: at 20:05

## 2018-10-20 RX ADMIN — SIMETHICONE CHEW TAB 80 MG 80 MG: 80 TABLET ORAL at 10:37

## 2018-10-20 RX ADMIN — Medication 400 MG: at 20:05

## 2018-10-20 RX ADMIN — TAZOBACTAM SODIUM AND PIPERACILLIN SODIUM 4.5 G: 500; 4 INJECTION, SOLUTION INTRAVENOUS at 17:39

## 2018-10-20 RX ADMIN — BACLOFEN 10 MG: 10 TABLET ORAL at 20:05

## 2018-10-20 RX ADMIN — BUSPIRONE HYDROCHLORIDE 30 MG: 5 TABLET ORAL at 10:37

## 2018-10-20 RX ADMIN — LINEZOLID 600 MG: 600 INJECTION, SOLUTION INTRAVENOUS at 10:37

## 2018-10-20 ASSESSMENT — PAIN SCALES - GENERAL
PAINLEVEL_OUTOF10: 0
PAINLEVEL_OUTOF10: 9
PAINLEVEL_OUTOF10: 0
PAINLEVEL_OUTOF10: 4
PAINLEVEL_OUTOF10: 0
PAINLEVEL_OUTOF10: 9
PAINLEVEL_OUTOF10: 8

## 2018-10-20 ASSESSMENT — PAIN DESCRIPTION - PROGRESSION
CLINICAL_PROGRESSION: GRADUALLY WORSENING
CLINICAL_PROGRESSION: GRADUALLY WORSENING

## 2018-10-20 ASSESSMENT — PAIN DESCRIPTION - DESCRIPTORS
DESCRIPTORS: ACHING
DESCRIPTORS: BURNING

## 2018-10-20 ASSESSMENT — PAIN DESCRIPTION - PAIN TYPE
TYPE: SURGICAL PAIN
TYPE: SURGICAL PAIN

## 2018-10-20 ASSESSMENT — PAIN DESCRIPTION - ONSET
ONSET: GRADUAL
ONSET: GRADUAL

## 2018-10-20 ASSESSMENT — PAIN DESCRIPTION - LOCATION
LOCATION: ABDOMEN
LOCATION: ABDOMEN

## 2018-10-20 ASSESSMENT — PAIN DESCRIPTION - FREQUENCY
FREQUENCY: INTERMITTENT
FREQUENCY: INTERMITTENT

## 2018-10-20 ASSESSMENT — PAIN DESCRIPTION - ORIENTATION
ORIENTATION: MID
ORIENTATION: MID

## 2018-10-20 NOTE — PROGRESS NOTES
Vital signs obtained at this time and WNL. Patient's dressing is clean dry and intact. Pt denies further needs at this time. Call light in hand. Bed alarm set. Will continue to monitor.  Electronically signed by Jorge Luis Dillard RN on 10/20/2018 at 6:02 AM

## 2018-10-20 NOTE — PROGRESS NOTES
Nasreen Waggoner is a 59 y.o. female patient.     Current Facility-Administered Medications   Medication Dose Route Frequency Provider Last Rate Last Dose    piperacillin-tazobactam (ZOSYN) 4.5 g in dextrose 100 mL IVPB (premix)  4.5 g Intravenous Q6H Archer Bloch,  mL/hr at 10/20/18 0548 4.5 g at 10/20/18 0548    rivaroxaban (XARELTO) tablet 20 mg  20 mg Oral Daily Archer Bloch, MD   20 mg at 10/19/18 1751    Urea PACK 30 g  30 g Oral BID Rox Messina MD        oxyCODONE-acetaminophen (PERCOCET) 5-325 MG per tablet 1 tablet  1 tablet Oral Q4H PRN GAYLA Bee CNP   1 tablet at 10/17/18 0941    Or    oxyCODONE-acetaminophen (PERCOCET) 5-325 MG per tablet 2 tablet  2 tablet Oral Q4H PRN GAYLA Bee CNP   2 tablet at 10/20/18 0103    dextrose 5 % and 0.45 % NaCl with KCl 20 mEq infusion   Intravenous Continuous GAYLA Bee CNP 50 mL/hr at 10/19/18 2301      lidocaine PF 1 % injection 5 mL  5 mL Intradermal Once Argenis Barksdale MD        sodium chloride flush 0.9 % injection 10 mL  10 mL Intravenous 2 times per day Argenis Barksdale MD   10 mL at 10/19/18 2121    sodium chloride flush 0.9 % injection 10 mL  10 mL Intravenous PRN Argenis Barksdale MD        potassium chloride (KLOR-CON M) extended release tablet 40 mEq  40 mEq Oral BID Rox Messina MD   40 mEq at 10/19/18 2120    magnesium sulfate 2 g in 50 mL IVPB premix  2 g Intravenous Once GAYLA Bee CNP        furosemide (LASIX) tablet 40 mg  40 mg Oral BID Martina Soriano MD   40 mg at 10/19/18 1020    linezolid (ZYVOX) IVPB 600 mg  600 mg Intravenous Q12H GAYLA Bee CNP   Stopped at 10/20/18 0547    fluconazole (DIFLUCAN) in 0.9 % sodium chloride IVPB 400 mg  400 mg Intravenous Q24H GAYLA Bee  mL/hr at 10/19/18 1545 400 mg at 10/19/18 1545    LIP BALM ointment   Topical Daily Argenis Barksdale MD        LORazepam (ATIVAN) injection 0.5 mg  0.5 mg Intravenous Q6H PRN Caterina Regan MD   0.5 mg at 10/15/18 2122    busPIRone (BUSPAR) tablet 30 mg  30 mg Oral BID Lisa Griffith MD   30 mg at 10/19/18 2121    zolpidem (AMBIEN) tablet 10 mg  10 mg Oral Nightly PRN Lisa Griffith MD        DULoxetine (CYMBALTA) extended release capsule 60 mg  60 mg Oral Daily Lisa Griffith MD   60 mg at 10/19/18 1021    fluticasone (FLONASE) 50 MCG/ACT nasal spray 1 spray  1 spray Nasal Daily Lisa Grifftih MD   1 spray at 10/18/18 1015    mesalamine (LIALDA) EC tablet 4.8 g (Patient Supplied)  4,800 mg Oral Daily with breakfast Lisa Grififth MD        aspirin-acetaminophen-caffeine (Pamalee Reading) per tablet 1 tablet  1 tablet Oral Q6H PRN Lisa Griffith MD   1 tablet at 10/15/18 0422    ondansetron (ZOFRAN-ODT) disintegrating tablet 4 mg  4 mg Oral Q6H PRN Lisa Griffith MD   4 mg at 10/14/18 1013    baclofen (LIORESAL) tablet 10 mg  10 mg Oral TID Lisa Griffith MD   10 mg at 10/19/18 2120    magnesium oxide (MAG-OX) tablet 400 mg  400 mg Oral BID Lisa Griffith MD   400 mg at 10/19/18 2121    atenolol (TENORMIN) tablet 100 mg  100 mg Oral Daily Lisa Griffith MD   100 mg at 10/19/18 1021    potassium chloride (KLOR-CON M) extended release tablet 40 mEq  40 mEq Oral PRN Lisa Griffith MD        Or    potassium bicarb-citric acid (EFFER-K) effervescent tablet 40 mEq  40 mEq Oral PRN Lisa Griffith MD        Or    potassium chloride 10 mEq/100 mL IVPB (Peripheral Line)  10 mEq Intravenous PRN Lisa Griffith MD   10 mEq at 10/08/18 1657    sodium chloride flush 0.9 % injection 10 mL  10 mL Intravenous 2 times per day Lisa Griffith MD   10 mL at 10/19/18 2124    sodium chloride flush 0.9 % injection 10 mL  10 mL Intravenous PRN Lisa Griffith MD   10 mL at 10/15/18 0544    magnesium hydroxide (MILK OF MAGNESIA) 400 MG/5ML suspension 30 mL  30 mL Oral Daily PRN Lisa Griffith MD        ondansetron

## 2018-10-20 NOTE — PROGRESS NOTES
Large hiatus hernia with the majority of the stomach intrathoracic in position. No gastric outlet obstruction. Appendix not discretely identified however there are no pericecal inflammatory changes. Colonic diverticulosis with a long segment of severe diverticulitis along the distal descending and proximal sigmoid colon. Severe surrounding inflammatory stranding with extraluminal gas containing collection with faint peripheral enhancement measuring approximately 2.6 x 1.7 x 3.6 cm. This area of inflammation seems to bridge the proximal sigmoid colon and sigmoid rectum, suspicious for fistula a surgical clip is present along the distal margin. Peritoneum/Retroperitoneum: Scattered reactive lymph nodes about the mesentery. Unchanged omental stranding. Vascular: Normal caliber abdominal aorta. Patent hepatic, portal, superior mesenteric and splenic veins. PELVIS Genitourinary: Normal urinary bladder. Status post hysterectomy. Other: Extraluminal fluid collection along the sigmoid colon as detailed above. No enlarged lymph nodes. MUSCULOSKELETAL Bones and Soft Tissues: No acute superficial soft tissue or osseous abnormality. Thoracolumbar spondylosis with grade 1 degenerative anterolisthesis of L4 on L5 and minimal retrolisthesis of L1 on L2. Other: Basilar atelectasis relating to hiatus hernia. Normal included heart and pericardium. Worsening severe diverticulitis involving the proximal sigmoid colon with evidence of perforation and (contained) abscess formation with suspected fistulous communication bridging the proximal sigmoid colon to the rectosigmoid junction or possibly the vaginal stump (see annotated images). Exam findings were discussed by Dr. Mary De La Rosa to [facility] on 9/26/2018 at 04:15.      Xr Chest Portable    Result Date: 9/27/2018  EXAMINATION: SINGLE XRAY VIEW OF THE CHEST, 9/26/2018 2:12 am COMPARISON: 06/26/2018 HISTORY: ORDERING SYSTEM PROVIDED HISTORY: SOB TECHNOLOGIST

## 2018-10-21 PROCEDURE — 1200000000 HC SEMI PRIVATE

## 2018-10-21 PROCEDURE — 6370000000 HC RX 637 (ALT 250 FOR IP): Performed by: INTERNAL MEDICINE

## 2018-10-21 PROCEDURE — 2580000003 HC RX 258: Performed by: INTERNAL MEDICINE

## 2018-10-21 PROCEDURE — 6370000000 HC RX 637 (ALT 250 FOR IP): Performed by: SURGERY

## 2018-10-21 PROCEDURE — 6360000002 HC RX W HCPCS: Performed by: SURGERY

## 2018-10-21 PROCEDURE — 99024 POSTOP FOLLOW-UP VISIT: CPT | Performed by: SURGERY

## 2018-10-21 PROCEDURE — 6370000000 HC RX 637 (ALT 250 FOR IP): Performed by: NURSE PRACTITIONER

## 2018-10-21 PROCEDURE — 6360000002 HC RX W HCPCS: Performed by: NURSE PRACTITIONER

## 2018-10-21 RX ADMIN — SIMETHICONE CHEW TAB 80 MG 80 MG: 80 TABLET ORAL at 17:35

## 2018-10-21 RX ADMIN — PANTOPRAZOLE SODIUM 40 MG: 40 TABLET, DELAYED RELEASE ORAL at 06:54

## 2018-10-21 RX ADMIN — TAZOBACTAM SODIUM AND PIPERACILLIN SODIUM 4.5 G: 500; 4 INJECTION, SOLUTION INTRAVENOUS at 00:58

## 2018-10-21 RX ADMIN — OXYCODONE HYDROCHLORIDE AND ACETAMINOPHEN 2 TABLET: 5; 325 TABLET ORAL at 19:25

## 2018-10-21 RX ADMIN — Medication 30 G: at 10:51

## 2018-10-21 RX ADMIN — FUROSEMIDE 40 MG: 40 TABLET ORAL at 10:30

## 2018-10-21 RX ADMIN — SIMETHICONE CHEW TAB 80 MG 80 MG: 80 TABLET ORAL at 06:54

## 2018-10-21 RX ADMIN — BACLOFEN 10 MG: 10 TABLET ORAL at 10:30

## 2018-10-21 RX ADMIN — Medication 10 ML: at 21:58

## 2018-10-21 RX ADMIN — Medication 10 ML: at 10:29

## 2018-10-21 RX ADMIN — TAZOBACTAM SODIUM AND PIPERACILLIN SODIUM 4.5 G: 500; 4 INJECTION, SOLUTION INTRAVENOUS at 14:04

## 2018-10-21 RX ADMIN — SIMETHICONE CHEW TAB 80 MG 80 MG: 80 TABLET ORAL at 14:03

## 2018-10-21 RX ADMIN — LINEZOLID 600 MG: 600 INJECTION, SOLUTION INTRAVENOUS at 10:25

## 2018-10-21 RX ADMIN — FLUCONAZOLE 400 MG: 2 INJECTION INTRAVENOUS at 14:04

## 2018-10-21 RX ADMIN — RIVAROXABAN 20 MG: 20 TABLET, FILM COATED ORAL at 17:35

## 2018-10-21 RX ADMIN — BUSPIRONE HYDROCHLORIDE 30 MG: 5 TABLET ORAL at 10:29

## 2018-10-21 RX ADMIN — BACLOFEN 10 MG: 10 TABLET ORAL at 14:03

## 2018-10-21 RX ADMIN — TAZOBACTAM SODIUM AND PIPERACILLIN SODIUM 4.5 G: 500; 4 INJECTION, SOLUTION INTRAVENOUS at 17:35

## 2018-10-21 RX ADMIN — LINEZOLID 600 MG: 600 INJECTION, SOLUTION INTRAVENOUS at 23:42

## 2018-10-21 RX ADMIN — PHENAZOPYRIDINE HYDROCHLORIDE 200 MG: 100 TABLET ORAL at 14:03

## 2018-10-21 RX ADMIN — PHENAZOPYRIDINE HYDROCHLORIDE 200 MG: 100 TABLET ORAL at 17:35

## 2018-10-21 RX ADMIN — TAZOBACTAM SODIUM AND PIPERACILLIN SODIUM 4.5 G: 500; 4 INJECTION, SOLUTION INTRAVENOUS at 06:54

## 2018-10-21 RX ADMIN — DULOXETINE HYDROCHLORIDE 60 MG: 60 CAPSULE, DELAYED RELEASE ORAL at 10:30

## 2018-10-21 RX ADMIN — Medication 400 MG: at 10:30

## 2018-10-21 RX ADMIN — POTASSIUM CHLORIDE 40 MEQ: 1500 TABLET, EXTENDED RELEASE ORAL at 21:58

## 2018-10-21 RX ADMIN — Medication 400 MG: at 21:58

## 2018-10-21 RX ADMIN — ATENOLOL 100 MG: 50 TABLET ORAL at 10:30

## 2018-10-21 RX ADMIN — Medication 30 G: at 21:58

## 2018-10-21 RX ADMIN — FLUTICASONE PROPIONATE 1 SPRAY: 50 SPRAY, METERED NASAL at 10:29

## 2018-10-21 RX ADMIN — Medication 10 ML: at 10:31

## 2018-10-21 RX ADMIN — POTASSIUM CHLORIDE 40 MEQ: 1500 TABLET, EXTENDED RELEASE ORAL at 10:30

## 2018-10-21 RX ADMIN — BUSPIRONE HYDROCHLORIDE 30 MG: 5 TABLET ORAL at 21:58

## 2018-10-21 RX ADMIN — BACLOFEN 10 MG: 10 TABLET ORAL at 21:58

## 2018-10-21 RX ADMIN — FUROSEMIDE 40 MG: 40 TABLET ORAL at 17:35

## 2018-10-21 RX ADMIN — PHENAZOPYRIDINE HYDROCHLORIDE 200 MG: 100 TABLET ORAL at 10:30

## 2018-10-21 ASSESSMENT — PAIN SCALES - GENERAL
PAINLEVEL_OUTOF10: 3
PAINLEVEL_OUTOF10: 0
PAINLEVEL_OUTOF10: 8

## 2018-10-21 ASSESSMENT — PAIN DESCRIPTION - ORIENTATION: ORIENTATION: LEFT;LOWER

## 2018-10-21 ASSESSMENT — PAIN DESCRIPTION - LOCATION: LOCATION: ABDOMEN

## 2018-10-21 ASSESSMENT — PAIN DESCRIPTION - PAIN TYPE: TYPE: SURGICAL PAIN

## 2018-10-21 NOTE — PROGRESS NOTES
All scheduled medications given. Wet to dry dressing change completed on abdominal incision. Patient tolerated well.

## 2018-10-21 NOTE — PROGRESS NOTES
phenazopyridine (PYRIDIUM) tablet 200 mg  200 mg Oral TID WC Argenis Barksdale MD   200 mg at 10/21/18 1030    simethicone (MYLICON) chewable tablet 80 mg  80 mg Oral Q6H Ally Nicolas MD   80 mg at 10/21/18 0654    pantoprazole (PROTONIX) tablet 40 mg  40 mg Oral QAM AC Argenis Barksdale MD   40 mg at 10/21/18 0654    hydrALAZINE (APRESOLINE) injection 10 mg  10 mg Intravenous Q6H PRN Argenis Barksdale MD   10 mg at 10/07/18 1550     Allergies   Allergen Reactions    Ace Inhibitors Swelling    Skelaxin [Metaxalone] Swelling     Principal Problem:    Crohn's colitis ([address]  Active Problems:    Essential hypertension, benign    Anemia    Hyponatremia    Colitis    Omental mass    Open abdominal wall wound  Resolved Problems:    * No resolved hospital problems. *    Blood pressure 108/78, pulse 75, temperature 98.3 °F (36.8 °C), temperature source Oral, resp. rate 16, height 5' 6\" (1.676 m), weight 237 lb 1.6 oz (107.5 kg), SpO2 96 %, not currently breastfeeding. Subjective:  Symptoms:  Stable. Diet:  Adequate intake. Activity level: Returning to normal.    Pain:  She complains of pain that is mild. Objective:  General Appearance:  Comfortable. Vital signs: (most recent): Blood pressure 108/78, pulse 75, temperature 98.3 °F (36.8 °C), temperature source Oral, resp. rate 16, height 5' 6\" (1.676 m), weight 237 lb 1.6 oz (107.5 kg), SpO2 96 %, not currently breastfeeding. Lungs:  Normal effort and normal respiratory rate. Abdomen: Abdomen is soft. (Wound clean). Assessment & Plan 59year old female with crohn's disease who is S/P Pal's procedure for perforated diverticulitis. Doing well but having some leakage issues with the colostomy. Will ask Magdalena Hu to see patient in am. Should be ready for dispo to [facility]CF when arrangements maded.     Ally Nicolas MD  10/21/2018

## 2018-10-21 NOTE — PROGRESS NOTES
Vital signs obtained at this time and WNL. Pt denies further needs at this time. Call light in hand. Bed alarm set. Will continue to monitor.  Electronically signed by Mary Richardson RN on 10/21/2018 at 6:53 AM

## 2018-10-21 NOTE — PROGRESS NOTES
MD Hayley Babb MD Tora Flash, MD                 Office: [phone]                      Fax: [phone]          NEPHROLOGY PROGRESS NOTE:     PATIENT NAME: Mary Feldman  : 1955  MRN: [id number]    IMPRESSION/RECOMMENDATIONS:        Electrolytes improving. Sodium levels improving. Repeat sodium is 131. Electrolytes are stable. Labs are pending today     Patient is acute on chronic hyponatremia. Continue current management. 2.  Renal function WNL  3. Recent abdominal surgery  4. H/O Crohn's Disease/colitis, Abdominal abscess: management per surgery  5. HTN- controlled    Dispo: Working on SNF, d/c on current hypoNA management  Will arrange f/up w/ us in 2-4 weeks of d/c w/ renal panel    Other Problems:   Patient Active Problem List   Diagnosis    Hiatal hernia    Essential hypertension, benign    Atherosclerotic PVD with intermittent claudication (HCC)    Bone spur    Anemia    Hyponatremia    Insomnia    Hypokalemia    Crohn's colitis ([address]    Hypomagnesemia    Syncope    Hypotension    [facility]deep [facility] thrombosis[facility]    DVT, lower extremity, distal, acute, bilateral ([address]    Diverticulitis    Perforation bowel ([address]    Perforation and abscess of large intestine concurrent with and due to diverticulitis    Intra-abdominal abscess ([address]    Sepsis ([address]    Colitis    Omental mass    Open abdominal wall wound      : Other supportive care :   - Check daily renal function panel with electrolytes-phosphorus  - Strict monitoring of I/Os, daily weight  - Renal feeds/diet  - Current medications reviewed.            SUBJECTIVE/INTERVAL HISTORY:   - no active complaints seen resting comfortably in bed,     MEDICATIONS:  Prior to Admission Medications:  Prescriptions Prior to Admission: sodium chloride 1 g tablet, Take 1 tablet by mouth 2 times daily  rivaroxaban (XARELTO) 20 MG TABS tablet, Take 1 tablet by are highly concerning for malignancy. Soft tissue tract extending from thickened sigmoid colon, inseparable from the urinary bladder, vaginal cuff, and small bowel in the pelvis. Small soft tissue nodules within the left anterior pelvis and along the distal descending colon. Otherwise no concerning intra-abdominal adenopathy. Large hiatal hernia. Ct Abdomen Pelvis W Iv Contrast Additional Contrast? None    Result Date: 9/26/2018  EXAMINATION: CT OF THE ABDOMEN AND PELVIS WITH CONTRAST 9/26/2018 3:32 am TECHNIQUE: CT of the abdomen and pelvis was performed with the administration of intravenous contrast. Multiplanar reformatted images are provided for review. Dose modulation, iterative reconstruction, and/or weight based adjustment of the mA/kV was utilized to reduce the radiation dose to as low as reasonably achievable. COMPARISON: CT abdomen pelvis 08/02/2018, 06/05/2018, 05/20/2018 HISTORY: ORDERING SYSTEM PROVIDED HISTORY: LLQ pain, fever TECHNOLOGIST PROVIDED HISTORY: If patient is on cardiac monitor and/or pulse ox, they may be taken off cardiac monitor and pulse ox, left on O2 if currently on. All monitors reattached when patient returns to room. Additional Contrast?->None Ordering Physician Provided Reason for Exam: abdominal pain Acuity: Unknown Type of Exam: Unknown Relevant Medical/Surgical History: (LLQ abdominal pain for weeks, now with fever and worsening pain. hx chrons. + dizziness) FINDINGS: ABDOMEN Liver: Unchanged too small to characterize hypodensity at the right hepatic dome and along the falciform ligament, the latter of which has fluctuated in size and probably represents focal fatty infiltration. Gallbladder: Normal. Biliary: No intra or extrahepatic bile duct dilatation. Pancreas: Normal. Spleen: Surgically absent. Adrenals: Normal. Kidneys: Unchanged exophytic right renal simple cyst at the inferior pole. Additional too small to characterize renal hypodensities are unchanged.   No

## 2018-10-22 VITALS
SYSTOLIC BLOOD PRESSURE: 135 MMHG | HEART RATE: 67 BPM | OXYGEN SATURATION: 95 % | HEIGHT: 66 IN | WEIGHT: 237.1 LBS | RESPIRATION RATE: 18 BRPM | BODY MASS INDEX: 38.11 KG/M2 | TEMPERATURE: 99.6 F | DIASTOLIC BLOOD PRESSURE: 92 MMHG

## 2018-10-22 PROBLEM — I95.9 HYPOTENSION: Status: RESOLVED | Noted: 2018-06-26 | Resolved: 2018-10-22

## 2018-10-22 LAB
ALBUMIN SERPL-MCNC: 2.6 G/DL (ref 3.4–5)
ANION GAP SERPL CALCULATED.3IONS-SCNC: 11 MMOL/L (ref 3–16)
BUN BLDV-MCNC: 43 MG/DL (ref 7–20)
CALCIUM SERPL-MCNC: 9.7 MG/DL (ref 8.3–10.6)
CHLORIDE BLD-SCNC: 94 MMOL/L (ref 99–110)
CO2: 29 MMOL/L (ref 21–32)
CREAT SERPL-MCNC: 0.6 MG/DL (ref 0.6–1.2)
GFR AFRICAN AMERICAN: >60
GFR NON-AFRICAN AMERICAN: >60
GLUCOSE BLD-MCNC: 100 MG/DL (ref 70–99)
GLUCOSE BLD-MCNC: 102 MG/DL (ref 70–99)
GLUCOSE BLD-MCNC: 198 MG/DL (ref 70–99)
GLUCOSE BLD-MCNC: 87 MG/DL (ref 70–99)
HCT VFR BLD CALC: 28.9 % (ref 36–48)
HEMOGLOBIN: 9.5 G/DL (ref 12–16)
MCH RBC QN AUTO: 28.1 PG (ref 26–34)
MCHC RBC AUTO-ENTMCNC: 33 G/DL (ref 31–36)
MCV RBC AUTO: 85.3 FL (ref 80–100)
PDW BLD-RTO: 18.1 % (ref 12.4–15.4)
PERFORMED ON: ABNORMAL
PERFORMED ON: ABNORMAL
PERFORMED ON: NORMAL
PHOSPHORUS: 3.3 MG/DL (ref 2.5–4.9)
PLATELET # BLD: 381 K/UL (ref 135–450)
PMV BLD AUTO: 7 FL (ref 5–10.5)
POTASSIUM SERPL-SCNC: 4.2 MMOL/L (ref 3.5–5.1)
RBC # BLD: 3.39 M/UL (ref 4–5.2)
SODIUM BLD-SCNC: 134 MMOL/L (ref 136–145)
WBC # BLD: 7.1 K/UL (ref 4–11)

## 2018-10-22 PROCEDURE — 85027 COMPLETE CBC AUTOMATED: CPT

## 2018-10-22 PROCEDURE — 6370000000 HC RX 637 (ALT 250 FOR IP): Performed by: INTERNAL MEDICINE

## 2018-10-22 PROCEDURE — 6360000002 HC RX W HCPCS: Performed by: NURSE PRACTITIONER

## 2018-10-22 PROCEDURE — 99024 POSTOP FOLLOW-UP VISIT: CPT | Performed by: SURGERY

## 2018-10-22 PROCEDURE — 6370000000 HC RX 637 (ALT 250 FOR IP): Performed by: SURGERY

## 2018-10-22 PROCEDURE — 97530 THERAPEUTIC ACTIVITIES: CPT

## 2018-10-22 PROCEDURE — 2580000003 HC RX 258: Performed by: INTERNAL MEDICINE

## 2018-10-22 PROCEDURE — 6360000002 HC RX W HCPCS: Performed by: SURGERY

## 2018-10-22 PROCEDURE — 6370000000 HC RX 637 (ALT 250 FOR IP): Performed by: NURSE PRACTITIONER

## 2018-10-22 PROCEDURE — APPSS15 APP SPLIT SHARED TIME 0-15 MINUTES: Performed by: NURSE PRACTITIONER

## 2018-10-22 PROCEDURE — APPNB30 APP NON BILLABLE TIME 0-30 MINS: Performed by: NURSE PRACTITIONER

## 2018-10-22 PROCEDURE — 80069 RENAL FUNCTION PANEL: CPT

## 2018-10-22 PROCEDURE — 36415 COLL VENOUS BLD VENIPUNCTURE: CPT

## 2018-10-22 RX ORDER — OXYCODONE HYDROCHLORIDE AND ACETAMINOPHEN 5; 325 MG/1; MG/1
1 TABLET ORAL EVERY 4 HOURS PRN
Qty: 20 TABLET | Refills: 0 | Status: SHIPPED | OUTPATIENT
Start: 2018-10-22 | End: 2018-10-29

## 2018-10-22 RX ORDER — SIMETHICONE 80 MG
80 TABLET,CHEWABLE ORAL EVERY 6 HOURS
Qty: 180 TABLET | Refills: 3 | Status: SHIPPED | OUTPATIENT
Start: 2018-10-22 | End: 2019-06-05 | Stop reason: ALTCHOICE

## 2018-10-22 RX ORDER — ZOLPIDEM TARTRATE 10 MG/1
10 TABLET ORAL NIGHTLY PRN
Qty: 14 TABLET | Refills: 0 | Status: SHIPPED | OUTPATIENT
Start: 2018-10-22 | End: 2018-11-05

## 2018-10-22 RX ORDER — PHENAZOPYRIDINE HYDROCHLORIDE 200 MG/1
200 TABLET, FILM COATED ORAL
Qty: 10 TABLET | Refills: 0 | Status: SHIPPED | OUTPATIENT
Start: 2018-10-22 | End: 2018-10-25

## 2018-10-22 RX ORDER — FUROSEMIDE 40 MG/1
40 TABLET ORAL DAILY
Qty: 60 TABLET | Refills: 3 | Status: ON HOLD | OUTPATIENT
Start: 2018-10-23 | End: 2020-07-23

## 2018-10-22 RX ORDER — FUROSEMIDE 40 MG/1
40 TABLET ORAL DAILY
Status: DISCONTINUED | OUTPATIENT
Start: 2018-10-23 | End: 2018-10-22 | Stop reason: HOSPADM

## 2018-10-22 RX ORDER — AMOXICILLIN AND CLAVULANATE POTASSIUM 875; 125 MG/1; MG/1
1 TABLET, FILM COATED ORAL 2 TIMES DAILY
Qty: 14 TABLET | Refills: 0 | Status: SHIPPED | OUTPATIENT
Start: 2018-10-22 | End: 2018-10-29

## 2018-10-22 RX ADMIN — SIMETHICONE CHEW TAB 80 MG 80 MG: 80 TABLET ORAL at 05:38

## 2018-10-22 RX ADMIN — OXYCODONE HYDROCHLORIDE AND ACETAMINOPHEN 2 TABLET: 5; 325 TABLET ORAL at 14:18

## 2018-10-22 RX ADMIN — Medication 400 MG: at 09:37

## 2018-10-22 RX ADMIN — PHENAZOPYRIDINE HYDROCHLORIDE 200 MG: 100 TABLET ORAL at 14:15

## 2018-10-22 RX ADMIN — RIVAROXABAN 20 MG: 20 TABLET, FILM COATED ORAL at 18:47

## 2018-10-22 RX ADMIN — Medication 30 G: at 11:16

## 2018-10-22 RX ADMIN — ATENOLOL 100 MG: 50 TABLET ORAL at 09:37

## 2018-10-22 RX ADMIN — LINEZOLID 600 MG: 600 INJECTION, SOLUTION INTRAVENOUS at 11:23

## 2018-10-22 RX ADMIN — SIMETHICONE CHEW TAB 80 MG 80 MG: 80 TABLET ORAL at 14:15

## 2018-10-22 RX ADMIN — BACLOFEN 10 MG: 10 TABLET ORAL at 14:15

## 2018-10-22 RX ADMIN — BACLOFEN 10 MG: 10 TABLET ORAL at 09:37

## 2018-10-22 RX ADMIN — SIMETHICONE CHEW TAB 80 MG 80 MG: 80 TABLET ORAL at 18:47

## 2018-10-22 RX ADMIN — FUROSEMIDE 40 MG: 40 TABLET ORAL at 09:37

## 2018-10-22 RX ADMIN — PANTOPRAZOLE SODIUM 40 MG: 40 TABLET, DELAYED RELEASE ORAL at 05:38

## 2018-10-22 RX ADMIN — DULOXETINE HYDROCHLORIDE 60 MG: 60 CAPSULE, DELAYED RELEASE ORAL at 09:37

## 2018-10-22 RX ADMIN — Medication 10 ML: at 09:38

## 2018-10-22 RX ADMIN — PHENAZOPYRIDINE HYDROCHLORIDE 200 MG: 100 TABLET ORAL at 18:47

## 2018-10-22 RX ADMIN — PHENAZOPYRIDINE HYDROCHLORIDE 200 MG: 100 TABLET ORAL at 09:37

## 2018-10-22 RX ADMIN — POTASSIUM CHLORIDE 40 MEQ: 1500 TABLET, EXTENDED RELEASE ORAL at 09:37

## 2018-10-22 RX ADMIN — BUSPIRONE HYDROCHLORIDE 30 MG: 5 TABLET ORAL at 11:16

## 2018-10-22 RX ADMIN — TAZOBACTAM SODIUM AND PIPERACILLIN SODIUM 4.5 G: 500; 4 INJECTION, SOLUTION INTRAVENOUS at 01:56

## 2018-10-22 RX ADMIN — TAZOBACTAM SODIUM AND PIPERACILLIN SODIUM 4.5 G: 500; 4 INJECTION, SOLUTION INTRAVENOUS at 05:38

## 2018-10-22 RX ADMIN — SIMETHICONE CHEW TAB 80 MG 80 MG: 80 TABLET ORAL at 01:56

## 2018-10-22 ASSESSMENT — PAIN SCALES - GENERAL: PAINLEVEL_OUTOF10: 9

## 2018-10-22 ASSESSMENT — PAIN DESCRIPTION - LOCATION: LOCATION: ABDOMEN

## 2018-10-22 ASSESSMENT — PAIN DESCRIPTION - PAIN TYPE: TYPE: SURGICAL PAIN

## 2018-10-22 ASSESSMENT — PAIN SCALES - WONG BAKER: WONGBAKER_NUMERICALRESPONSE: 2

## 2018-10-22 NOTE — PROGRESS NOTES
Department of Internal Medicine  General Internal Medicine   Progress Note      SUBJECTIVE: feeling improved steadily , no emesis , pain  Is un kalyani control    History obtained from chart review and the patient  General ROS: positive for  - fatigue and malaise  negative for - chills, fever or night sweats  Psychological ROS: negative  Ophthalmic ROS: negative  Respiratory ROS: no cough, shortness of breath, or wheezing  Cardiovascular ROS: no chest pain or dyspnea on exertion  Gastrointestinal ROS: positive for - abdominal pain, appetite loss, constipation and nausea/vomiting  negative for - hematemesis, melena or swallowing difficulty/pain  Genito-Urinary ROS: some  dysuria,  No trouble voiding, or hematuria  Musculoskeletal ROS: chronic pain   Neurological ROS: no TIA or stroke symptoms    OBJECTIVE      Medications      Current Facility-Administered Medications: [START ON 10/23/2018] furosemide (LASIX) tablet 40 mg, 40 mg, Oral, Daily  rivaroxaban (XARELTO) tablet 20 mg, 20 mg, Oral, Daily  Urea PACK 30 g, 30 g, Oral, BID  oxyCODONE-acetaminophen (PERCOCET) 5-325 MG per tablet 1 tablet, 1 tablet, Oral, Q4H PRN **OR** oxyCODONE-acetaminophen (PERCOCET) 5-325 MG per tablet 2 tablet, 2 tablet, Oral, Q4H PRN  lidocaine PF 1 % injection 5 mL, 5 mL, Intradermal, Once  sodium chloride flush 0.9 % injection 10 mL, 10 mL, Intravenous, 2 times per day  sodium chloride flush 0.9 % injection 10 mL, 10 mL, Intravenous, PRN  potassium chloride (KLOR-CON M) extended release tablet 40 mEq, 40 mEq, Oral, BID  magnesium sulfate 2 g in 50 mL IVPB premix, 2 g, Intravenous, Once  LIP BALM ointment, , Topical, Daily  LORazepam (ATIVAN) injection 0.5 mg, 0.5 mg, Intravenous, Q6H PRN  busPIRone (BUSPAR) tablet 30 mg, 30 mg, Oral, BID  zolpidem (AMBIEN) tablet 10 mg, 10 mg, Oral, Nightly PRN  DULoxetine (CYMBALTA) extended release capsule 60 mg, 60 mg, Oral, Daily  fluticasone (FLONASE) 50 MCG/ACT nasal spray 1 spray, 1 spray, Nasal,

## 2018-10-22 NOTE — PROGRESS NOTES
Patient resting in bed quietly at this time. No complaints of pain or discomfort voiced at this time. Denies needs at present. Will monitor.

## 2018-10-22 NOTE — PROGRESS NOTES
Department of Internal Medicine  General Internal Medicine   Progress Note      SUBJECTIVE: feeling better ,  Denies fever , does have  Some nausea she is eating regular diet    History obtained from chart review and the patient  General ROS: positive for  - fatigue and malaise  negative for - chills, fever or night sweats  Psychological ROS: negative  Ophthalmic ROS: negative  Respiratory ROS: no cough, shortness of breath, or wheezing  Cardiovascular ROS: no chest pain or dyspnea on exertion  Gastrointestinal ROS: positive for - abdominal pain, appetite loss, constipation and nausea/vomiting  negative for - hematemesis, melena or swallowing difficulty/pain  Genito-Urinary ROS: some  dysuria,  No trouble voiding, or hematuria  Musculoskeletal ROS: chronic pain   Neurological ROS: no TIA or stroke symptoms    OBJECTIVE      Medications      Current Facility-Administered Medications: rivaroxaban (XARELTO) tablet 20 mg, 20 mg, Oral, Daily  Urea PACK 30 g, 30 g, Oral, BID  oxyCODONE-acetaminophen (PERCOCET) 5-325 MG per tablet 1 tablet, 1 tablet, Oral, Q4H PRN **OR** oxyCODONE-acetaminophen (PERCOCET) 5-325 MG per tablet 2 tablet, 2 tablet, Oral, Q4H PRN  lidocaine PF 1 % injection 5 mL, 5 mL, Intradermal, Once  sodium chloride flush 0.9 % injection 10 mL, 10 mL, Intravenous, 2 times per day  sodium chloride flush 0.9 % injection 10 mL, 10 mL, Intravenous, PRN  potassium chloride (KLOR-CON M) extended release tablet 40 mEq, 40 mEq, Oral, BID  magnesium sulfate 2 g in 50 mL IVPB premix, 2 g, Intravenous, Once  furosemide (LASIX) tablet 40 mg, 40 mg, Oral, BID  LIP BALM ointment, , Topical, Daily  LORazepam (ATIVAN) injection 0.5 mg, 0.5 mg, Intravenous, Q6H PRN  busPIRone (BUSPAR) tablet 30 mg, 30 mg, Oral, BID  zolpidem (AMBIEN) tablet 10 mg, 10 mg, Oral, Nightly PRN  DULoxetine (CYMBALTA) extended release capsule 60 mg, 60 mg, Oral, Daily  fluticasone (FLONASE) 50 MCG/ACT nasal spray 1 spray, 1 spray, Nasal, data filed at 10/22/18 1423   Gross per 24 hour   Intake         42316.21 ml   Output             7450 ml   Net         26731.21 ml     CONSTITUTIONAL:  awake, alert, cooperative, no apparent distress, and appears stated age  EYES:  Unremarkable   NECK:  No JVD  and supple, symmetrical, trachea midline  BACK:  Unremarkable  and symmetric  LUNGS:  No increased work of breathing, good air exchange, clear to auscultation bilaterally, no crackles or wheezing  CARDIOVASCULAR:  normal apical pulses, regular rate and rhythm, normal S1 and S2 and no S3  ABDOMEN:  Soft BS hypoactive , no distention , has colostomy in left sigmoid region   MUSCULOSKELETAL:  No edema   NEUROLOGIC:  No acute focal deficit   SKIN:  Warm and dry  and no bruising or bleeding    Data      No results found for: PHART, PO2ART, EXB9OVI, RVD6XIS, BEART, M5EZVCFU    Lab Results   Component Value Date     10/22/2018    K 4.2 10/22/2018    K 3.8 08/08/2018    CL 94 10/22/2018    CO2 29 10/22/2018    BUN 43 10/22/2018    CREATININE 0.6 10/22/2018    GLUCOSE 102 10/22/2018    CALCIUM 9.7 10/22/2018     Lab Results   Component Value Date    WBC 7.1 10/22/2018    HGB 9.5 10/22/2018    HCT 28.9 10/22/2018    MCV 85.3 10/22/2018     10/22/2018         Lab Results   Component Value Date    INR 1.18 (H) 10/16/2018    PROTIME 13.5 (H) 10/16/2018       ASSESSMENT AND PLAN     Active Hospital Problems    Diagnosis Date Noted    Open abdominal wall wound [S31.109A]     Omental mass [K66.8]     Colitis [K52.9] 10/07/2018    Crohn's colitis ([facility] [address] [K50.10] 01/06/2018    Hyponatremia [E87.1] 01/05/2018    Anemia [D64.9] 01/05/2018    Essential hypertension, benign [I10] 09/13/2015   Colostomy status     Ct present medical management    renal dysfunction resolved    sodium level improved    Nephrology signing off   Ct Mesalamine for Chrohn's   Oral anticoagulation with xarelto  Ct PPI  For stress ulcer prophylaxis   Management of HTN  Should be

## 2018-10-22 NOTE — PROGRESS NOTES
Nutrition Assessment    Type and Reason for Visit: Reassess    Malnutrition Assessment:  · Malnutrition Status: No malnutrition    Nutrition Diagnosis:   · Problem: No nutrition diagnosis at this time    Nutrition Assessment:  · Subjective Assessment: Pt sleeping during RDN visit. Spoke to nurse who indicating pt is tolerating general diet, consumed % this am for breakfast.  Na+ remains low 131. Nutrition Risk Level   Risk Level: Low    Nutrition Intervention  Food and/or Delivery: Continue current diet, Continue current ONS  Nutrition Education/Counseling/Coordination of Care:  Continued Inpatient Monitoring, Education Not Indicated    Patient assessed for nutrition risk. Deemed to be at low risk at this time. Will continue to follow patient.       Electronically signed by Cathleen Bennett RD, LD on 10/22/18 at 2:19 PM  Contact Number: [phone]

## 2018-10-22 NOTE — PROGRESS NOTES
position/activity restrictions: Status-post exploratory laparotomy, lysis of adhesions, sigmoid colectomy with end colostomy, mobilization of splenic flexure, closure of rectal stump, and resection of mesenteric mass, 6 cm in size on 10/8/2018 for Colon obstruction, Crohn's, and possible diverticulitis with perforation and pericolonic abscess in pelvis and mesenteric mass; 10/17 s/p secondary partial closure of abdominal wound  Subjective   General  Chart Reviewed: Yes  Patient assessed for rehabilitation services?: Yes  Response to previous treatment: Patient with no complaints from previous session  Family / Caregiver Present: No  Referring Practitioner: Javed Reilly MD, for d/c planning  Diagnosis: colitis, s/p sigmoid colectomy  Subjective  Subjective: Pt in bed supine at arrival,  agreeable to tx. General Comment  Comments:    Pre Treatment Pain Screening  Intervention List: Patient able to continue with treatment  Pain Assessment  Patient Currently in Pain: Yes  [place] Pain Rating: Hurts a little bit  Pain Type: Surgical pain  Pain Location: Abdomen  Pain Intervention(s): Repositioned; Ambulation/Increased activity  Vital Signs  Patient Currently in Pain: Yes   Orientation  Orientation  Overall Orientation Status: Within Functional Limits  Objective    ADL  Additional Comments: Denies need for ADLs at this time. States, \"I got cleaned up earlier. \"  Patient in bed. Supine to sit with SBA with HOB elevated. Patient gets up to R side of bed. Sitting balance supervision. Sit to stand with CGA and ambulation to BR with RW.  OT noted that patient's colostomy bag was leaking. Patient ambulated back to bed with RW and CGA. Sit to supine with SBA. Max of 2 with OT and RN to scoot patient up in bed.         Balance  Sitting Balance: Supervision  Standing Balance: Contact guard assistance  Standing Balance  Sit to stand: Contact guard assistance  Stand to sit: Contact guard assistance  Comment:

## 2018-10-23 NOTE — PROGRESS NOTES
Bedside report received. Pt resting in bed. Colostomy bag and abd drsg changed d/t colostomy bag leaking. Pt also, assisted to BSC, Pt voided. Assisted back to bed. Call light and belongings in reach. Will monitor.

## 2018-10-30 ENCOUNTER — TELEPHONE (OUTPATIENT)
Dept: SURGERY | Age: 63
End: 2018-10-30

## 2018-10-30 ENCOUNTER — OFFICE VISIT (OUTPATIENT)
Dept: SURGERY | Age: 63
End: 2018-10-30

## 2018-10-30 VITALS — SYSTOLIC BLOOD PRESSURE: 100 MMHG | DIASTOLIC BLOOD PRESSURE: 92 MMHG

## 2018-10-30 DIAGNOSIS — K57.20 PERFORATION AND ABSCESS OF LARGE INTESTINE CONCURRENT WITH AND DUE TO DIVERTICULITIS: Primary | ICD-10-CM

## 2018-10-30 PROCEDURE — 99024 POSTOP FOLLOW-UP VISIT: CPT | Performed by: SURGERY

## 2018-10-30 NOTE — PROGRESS NOTES
None      ASSESSMENT AND PLAN:  S/P Expl lap, left colon resection and colostomy  Perforated severe diverticulitis and Crohn's  Wound improving  2 sutures removed, yovany again and remove 2 remaining sutures in 1 week  Wound center visit and stoma reccs for leak and fit with Heron Bonilla

## 2018-11-08 ENCOUNTER — TELEPHONE (OUTPATIENT)
Dept: SURGERY | Age: 63
End: 2018-11-08

## 2018-11-08 ENCOUNTER — HOSPITAL ENCOUNTER (EMERGENCY)
Age: 63
Discharge: HOME | End: 2018-11-08
Payer: MEDICAID

## 2018-11-08 VITALS
WEIGHT: 237 LBS | RESPIRATION RATE: 16 BRPM | TEMPERATURE: 97.9 F | DIASTOLIC BLOOD PRESSURE: 80 MMHG | SYSTOLIC BLOOD PRESSURE: 117 MMHG | OXYGEN SATURATION: 94 % | BODY MASS INDEX: 38.25 KG/M2 | HEART RATE: 82 BPM

## 2018-11-08 DIAGNOSIS — Z43.3 COLOSTOMY CARE (HCC): Primary | ICD-10-CM

## 2018-11-08 PROCEDURE — 99283 EMERGENCY DEPT VISIT LOW MDM: CPT

## 2018-11-08 ASSESSMENT — ENCOUNTER SYMPTOMS
NAUSEA: 0
DIARRHEA: 0
COLOR CHANGE: 1
CHEST TIGHTNESS: 0
VOMITING: 0
SHORTNESS OF BREATH: 0
ABDOMINAL PAIN: 0

## 2018-11-09 ENCOUNTER — HOSPITAL ENCOUNTER (EMERGENCY)
Age: 63
Discharge: HOME | End: 2018-11-09
Attending: EMERGENCY MEDICINE
Payer: MEDICAID

## 2018-11-09 VITALS
OXYGEN SATURATION: 100 % | HEIGHT: 66 IN | RESPIRATION RATE: 18 BRPM | DIASTOLIC BLOOD PRESSURE: 82 MMHG | HEART RATE: 84 BPM | BODY MASS INDEX: 38.09 KG/M2 | WEIGHT: 237 LBS | TEMPERATURE: 98.2 F | SYSTOLIC BLOOD PRESSURE: 110 MMHG

## 2018-11-09 DIAGNOSIS — R23.8 SKIN IRRITATION: ICD-10-CM

## 2018-11-09 DIAGNOSIS — Z78.9 DIFFICULTY MANAGING CARE OF STOMA: Primary | ICD-10-CM

## 2018-11-09 DIAGNOSIS — T81.31XS DEHISCENCE OF OPERATIVE WOUND, SEQUELA: ICD-10-CM

## 2018-11-09 LAB
A/G RATIO: 0.6 (ref 1.1–2.2)
ALBUMIN SERPL-MCNC: 2.7 G/DL (ref 3.4–5)
ALP BLD-CCNC: 86 U/L (ref 40–129)
ALT SERPL-CCNC: <5 U/L (ref 10–40)
ANION GAP SERPL CALCULATED.3IONS-SCNC: 14 MMOL/L (ref 3–16)
ANISOCYTOSIS: ABNORMAL
AST SERPL-CCNC: 18 U/L (ref 15–37)
ATYPICAL LYMPHOCYTE RELATIVE PERCENT: 9 % (ref 0–6)
BANDED NEUTROPHILS RELATIVE PERCENT: 1 % (ref 0–7)
BASOPHILS ABSOLUTE: 0.1 K/UL (ref 0–0.2)
BASOPHILS RELATIVE PERCENT: 1 %
BILIRUB SERPL-MCNC: 0.3 MG/DL (ref 0–1)
BUN BLDV-MCNC: 7 MG/DL (ref 7–20)
CALCIUM SERPL-MCNC: 8.5 MG/DL (ref 8.3–10.6)
CHLORIDE BLD-SCNC: 96 MMOL/L (ref 99–110)
CO2: 19 MMOL/L (ref 21–32)
CREAT SERPL-MCNC: 0.6 MG/DL (ref 0.6–1.2)
EOSINOPHILS ABSOLUTE: 0.1 K/UL (ref 0–0.6)
EOSINOPHILS RELATIVE PERCENT: 1 %
GFR AFRICAN AMERICAN: >60
GFR NON-AFRICAN AMERICAN: >60
GLOBULIN: 4.5 G/DL
GLUCOSE BLD-MCNC: 112 MG/DL (ref 70–99)
HCT VFR BLD CALC: 29.7 % (ref 36–48)
HEMOGLOBIN: 9.7 G/DL (ref 12–16)
LACTIC ACID: 1.6 MMOL/L (ref 0.4–2)
LYMPHOCYTES ABSOLUTE: 1.5 K/UL (ref 1–5.1)
LYMPHOCYTES RELATIVE PERCENT: 11 %
MACROCYTES: ABNORMAL
MCH RBC QN AUTO: 27.7 PG (ref 26–34)
MCHC RBC AUTO-ENTMCNC: 32.5 G/DL (ref 31–36)
MCV RBC AUTO: 85.2 FL (ref 80–100)
MICROCYTES: ABNORMAL
MONOCYTES ABSOLUTE: 0.9 K/UL (ref 0–1.3)
MONOCYTES RELATIVE PERCENT: 12 %
NEUTROPHILS ABSOLUTE: 5 K/UL (ref 1.7–7.7)
NEUTROPHILS RELATIVE PERCENT: 65 %
PDW BLD-RTO: 19.7 % (ref 12.4–15.4)
PLATELET # BLD: 575 K/UL (ref 135–450)
PLATELET SLIDE REVIEW: ABNORMAL
PMV BLD AUTO: 6.8 FL (ref 5–10.5)
POTASSIUM SERPL-SCNC: 3.6 MMOL/L (ref 3.5–5.1)
RBC # BLD: 3.48 M/UL (ref 4–5.2)
SLIDE REVIEW: ABNORMAL
SODIUM BLD-SCNC: 129 MMOL/L (ref 136–145)
TOTAL PROTEIN: 7.2 G/DL (ref 6.4–8.2)
WBC # BLD: 7.5 K/UL (ref 4–11)

## 2018-11-09 PROCEDURE — 36415 COLL VENOUS BLD VENIPUNCTURE: CPT

## 2018-11-09 PROCEDURE — 85025 COMPLETE CBC W/AUTO DIFF WBC: CPT

## 2018-11-09 PROCEDURE — 99283 EMERGENCY DEPT VISIT LOW MDM: CPT

## 2018-11-09 PROCEDURE — 83605 ASSAY OF LACTIC ACID: CPT

## 2018-11-09 PROCEDURE — 80053 COMPREHEN METABOLIC PANEL: CPT

## 2018-11-09 PROCEDURE — 6370000000 HC RX 637 (ALT 250 FOR IP): Performed by: NURSE PRACTITIONER

## 2018-11-09 PROCEDURE — 87040 BLOOD CULTURE FOR BACTERIA: CPT

## 2018-11-09 RX ORDER — OXYCODONE HYDROCHLORIDE AND ACETAMINOPHEN 5; 325 MG/1; MG/1
2 TABLET ORAL ONCE
Status: COMPLETED | OUTPATIENT
Start: 2018-11-09 | End: 2018-11-09

## 2018-11-09 RX ADMIN — OXYCODONE AND ACETAMINOPHEN 2 TABLET: 5; 325 TABLET ORAL at 19:24

## 2018-11-09 ASSESSMENT — PAIN DESCRIPTION - PROGRESSION: CLINICAL_PROGRESSION: GRADUALLY IMPROVING

## 2018-11-09 ASSESSMENT — PAIN DESCRIPTION - LOCATION
LOCATION: ABDOMEN
LOCATION: ABDOMEN

## 2018-11-09 ASSESSMENT — ENCOUNTER SYMPTOMS
BLOOD IN STOOL: 0
RHINORRHEA: 0
VOMITING: 0
DIARRHEA: 0
CONSTIPATION: 0
SORE THROAT: 0
NAUSEA: 0
SHORTNESS OF BREATH: 0
ABDOMINAL PAIN: 0

## 2018-11-09 ASSESSMENT — PAIN SCALES - GENERAL
PAINLEVEL_OUTOF10: 4
PAINLEVEL_OUTOF10: 9
PAINLEVEL_OUTOF10: 8

## 2018-11-09 ASSESSMENT — PAIN DESCRIPTION - PAIN TYPE
TYPE: CHRONIC PAIN
TYPE: ACUTE PAIN

## 2018-11-09 NOTE — ED PROVIDER NOTES
**EVALUATED BY ADVANCED PRACTICE PROVIDER**        [facility] dEPART[facility] eNCOUnter      Pt Name: Jenelle Beltran  JOT:[id number]  [place] 1955  Date of evaluation: 11/8/2018  Provider: Jody Gaffney PA-C      Chief Complaint:    Chief Complaint   Patient presents with    Wound Check     Pt had an ostomy site created on lower left abdomen on 10/2. Pt released to [facility] where they were unable to keep any product securley on the site. Skin became raw and bled for several days. Released to [address] where she stayed one night to get a new colostomy bag placed. Sent back to [place] where she was discharged home with no fitting supplies. Told to come here for help with colostomy supplies and proper fitting materials       Nursing Notes, Past Medical Hx, Past Surgical Hx, Social Hx, Allergies, and Family Hx were all reviewed and agreed with or any disagreements were addressed in the HPI.    HPI:  (Location, Duration, Timing, Severity,Quality, Assoc Sx, Context, Modifying factors)  This is a  59 y.o. female who presents to the emergency department with for colostomy supplies. Patient underwent a left lower quadrant diverting colostomy by Dr. Lindsay Collins on October 2. She was discharged and has been in [place] [address]. She has had her first postoperative evaluation and arrangements at that time were made for the patient to follow up with the wound care center for assistance with ostomy issues that she was having at that time. Unfortunately this is continued. The patient has since been discharged [facility] this evening and arrangements utilizing [facility] was such that the patient is without colostomy supplies. Patient states she otherwise is well. She states that she is getting by relatively well. She states that she does not have a lot in the way of energy but this is expected following the surgery.   Family members are at bedside stating that they're at wits end and are not sure how this to take care of her and they don't have any supplies for the home environment. It is reported that she has been unable to keep the colostomy bag in place for extended periods of time because of some mild skin irritation but this appears at this point to have now resolved. Patient ports to me she has minimal pain and discomfort. She states that she has not had any additional symptoms other than the above-mentioned and presents for arrangements for colostomy supplies.     PastMedical/Surgical History:      Diagnosis Date    Arthritis     Blood transfusion reaction     Crohn's disease ([facility] [address]     GERD (gastroesophageal reflux disease)     Hiatal hernia 6/24/2014    Hypertension     MRSA (methicillin resistant staph aureus) culture positive 06/05/2018    urine    Pneumonia 1/8/2014    VRE (vancomycin resistant enterococcus) culture positive 10/08/2018    abd culture         Procedure Laterality Date    ABDOMEN SURGERY      ABDOMINAL ADHESION SURGERY  06/08/2018    BLADDER SUSPENSION      COLONOSCOPY      COLONOSCOPY  9/14/15    sigmoid colon biopsy     COLONOSCOPY  08/07/2018    COLOSTOMY N/A 10/8/2018    EXPLORATORY LAPAROTOMY WITH COLOSTOMY performed by Karen Morgan MD at [facility] Left 6/25/14    excision plantar left hallux    FOOT SURGERY  6/3/15    PLANTAR PLATE REPAIR BILATERAL, ARTHROTOMY MPJ 2ND BILATERAL    FOOT SURGERY Left 08/05/2002    Excision second metatarsal head left    FRACTURE SURGERY      rt hip    HAND CARPECTOMY Bilateral     HEEL SPUR SURGERY      HERNIA REPAIR      HYSTERECTOMY      bladder surgery    JOINT REPLACEMENT      rt hip, L shoulder replacement 11/2014    KNEE SURGERY Bilateral     arthrosvopic    OK SECD CLOS SURG WND EXTEN/COMPLIC N/A 75/14/2181    SECONDARY WOUND CLOSURE OF ABDOMINAL WOUND performed by Karen Morgan MD at [address] [place] [address] Constitutional: Negative for activity change, chills and fever. Respiratory: Negative for chest tightness and shortness of breath. Cardiovascular: Negative for chest pain. Gastrointestinal: Negative for abdominal pain (postoperative), diarrhea, nausea and vomiting. Genitourinary: Negative for dysuria and flank pain. Skin: Positive for color change and wound. Neurological: Negative for seizures, syncope, numbness and headaches. Positives and Pertinent negatives as per HPI. Except as noted above in the ROS, problem specific ROS was completed and is negative. Physical Exam:  Physical Exam   Constitutional: She is oriented to person, place, and time. She appears well-developed and well-nourished. No distress. HENT:   Head: Normocephalic and atraumatic. Right Ear: External ear normal.   Left Ear: External ear normal.   Eyes: Conjunctivae are normal. Right eye exhibits no discharge. Left eye exhibits no discharge. No scleral icterus. Neck: Normal range of motion. No JVD present. Cardiovascular: Normal rate and regular rhythm. Exam reveals no gallop and no friction rub. No murmur heard. Pulmonary/Chest: Effort normal and breath sounds normal. No accessory muscle usage. No respiratory distress. She has no wheezes. She has no rhonchi. She has no rales. Abdominal: Soft. Normal appearance. She exhibits no distension and no mass. There is generalized tenderness. There is no rigidity, no rebound, no guarding and no CVA tenderness. Surgical sites do have sutures in place. Inferior wound packing is in place. Ostomy site is currently pink and benign in appearance. Output is within normal limits. Minimal skin irritation is noted. There is not currently any type of colostomy bag in place. See clinical image capture of wound site. Neurological: She is alert and oriented to person, place, and time. She has normal strength. No cranial nerve deficit or sensory deficit.  Coordination normal.

## 2018-11-09 NOTE — ED NOTES
Placed new colostomy bag, pt tolerated well, bandage placed to open wounds on pt's abdomen, cellulitis noted to large portion of  Abdomen.       Radha Erazo RN  11/08/18 0845

## 2018-11-09 NOTE — ED NOTES
Pt recently discharged from the [facility]. Colostomy without bag or wafer. Per family, the colostomy was not being taken care of very much and area is sore. Currently pt has a towel at site and per family, Gastroenterologist told pt to come thru ED to be admitted for colostomy care.           Lilo Quesada RN  11/08/18 8941

## 2018-11-10 NOTE — ED PROVIDER NOTES
[address] [facility]  eMERGENCY dEPARTMENT eNCOUnter        Pt Name: Khanh Heredia  MRN: [id number]  [place] 1955  Date of evaluation: 11/9/2018  Provider: GAYLA Lemons - ROBERT  PCP: Roberta Morris MD      [address] [place] [address]       Chief Complaint   Patient presents with    Fever     c/o fever and chills x 2 days. pt seen here last night        HISTORY OF PRESENT ILLNESS   (Location/Symptom, Timing/Onset,Context/Setting, Quality, Duration, Modifying Factors, Severity)  Note limiting factors. Khanh Heredia is a 59 y.o. female who presents emergency Department for evaluation of a colostomy. Nursing note reports fever and chills for 2 days. However the patient denies this. The fever and chills were reported by her daughter wants her admitted to the hospital, however the patient reports that she's not had any fever. She is here to get back for her stoma. She was seen yesterday in the emergency department for similar symptoms. At this time, the daughter is not at bedside to help provide history. From what I can gather from the PA note from yesterday as well as from the patient, the patient did have recent colostomy about one month ago. She has a midline abdominal incision which dehisced. She was being taken care of at [facility] for several weeks. She only returned home a couple days ago. The patient and her daughter having trouble getting a good seal around her has ostomy bag. She denies fever, rash, headaches, dizziness, chest pain, shortness of breath, cough, congestion, abdominal pain, nausea, vomiting, diarrhea, constipation, blood in the stool, or painful urination. No family at bedside. Nursing Notes triage note reviewed and agreed with or any disagreements were addressed  in the HPI. REVIEW OF SYSTEMS    (2-9 systems for level 4, 10 or more for level 5)     Review of Systems   Constitutional: Negative for chills and fever. HENT: Negative for postnasal drip, rhinorrhea and sore throat. Eyes: Negative for visual disturbance. Respiratory: Negative for shortness of breath. Cardiovascular: Negative for chest pain. Gastrointestinal: Negative for abdominal pain, blood in stool, constipation, diarrhea, nausea and vomiting. Genitourinary: Negative for dysuria, flank pain and hematuria. Skin: Negative for rash. Neurological: Negative for weakness and headaches. All other systems reviewed and are negative. Positives and Pertinent negatives as per HPI. Except as noted above in the ROS, all other systems were reviewed and negative.        PAST MEDICAL HISTORY     Past Medical History:   Diagnosis Date    Arthritis     Blood transfusion reaction     Crohn's disease ([facility] [address]     GERD (gastroesophageal reflux disease)     Hiatal hernia 6/24/2014    Hypertension     MRSA (methicillin resistant staph aureus) culture positive 06/05/2018    urine    Pneumonia 1/8/2014    VRE (vancomycin resistant enterococcus) culture positive 10/08/2018    abd culture         SURGICAL HISTORY       Past Surgical History:   Procedure Laterality Date    ABDOMEN SURGERY      ABDOMINAL ADHESION SURGERY  06/08/2018    BLADDER SUSPENSION      COLONOSCOPY      COLONOSCOPY  9/14/15    sigmoid colon biopsy     COLONOSCOPY  08/07/2018    COLOSTOMY N/A 10/8/2018    EXPLORATORY LAPAROTOMY WITH COLOSTOMY performed by Jayson Escamilla MD at [facility] Left 6/25/14    excision plantar left hallux    FOOT SURGERY  6/3/15    PLANTAR PLATE REPAIR BILATERAL, ARTHROTOMY MPJ 2ND BILATERAL    FOOT SURGERY Left 08/05/2002    Excision second metatarsal head left    FRACTURE SURGERY      rt hip    HAND CARPECTOMY Bilateral     HEEL SPUR SURGERY      HERNIA REPAIR      HYSTERECTOMY      bladder surgery    JOINT REPLACEMENT      rt hip, L shoulder replacement 11/2014    KNEE SURGERY Bilateral     arthrosvopic    CT SECD CLOS

## 2018-11-10 NOTE — ED PROVIDER NOTES
I independently performed a history and physical on Dianne Keller. All diagnostic, treatment, and disposition decisions were made by myself in conjunction with the mid-level provider. Patient was seen for concern for abdominal wall infection,, it just appeared to be a chemical irritation, did speak with her surgeon and went over my exam and based on this we did feel she could be safely discharged home with local wound care. For further details of [facility] emergency department encounter, please see [facility] documentation.     Results for orders placed or performed during the hospital encounter of 11/09/18   CBC Auto Differential   Result Value Ref Range    WBC 7.5 4.0 - 11.0 K/uL    RBC 3.48 (L) 4.00 - 5.20 M/uL    Hemoglobin 9.7 (L) 12.0 - 16.0 g/dL    Hematocrit 29.7 (L) 36.0 - 48.0 %    MCV 85.2 80.0 - 100.0 fL    MCH 27.7 26.0 - 34.0 pg    MCHC 32.5 31.0 - 36.0 g/dL    RDW 19.7 (H) 12.4 - 15.4 %    Platelets 943 (H) 435 - 450 K/uL    MPV 6.8 5.0 - 10.5 fL    PLATELET SLIDE REVIEW Increased     SLIDE REVIEW see below     Neutrophils % 65.0 %    Lymphocytes % 11.0 %    Monocytes % 12.0 %    Eosinophils % 1.0 %    Basophils % 1.0 %    Neutrophils # 5.0 1.7 - 7.7 K/uL    Lymphocytes # 1.5 1.0 - 5.1 K/uL    Monocytes # 0.9 0.0 - 1.3 K/uL    Eosinophils # 0.1 0.0 - 0.6 K/uL    Basophils # 0.1 0.0 - 0.2 K/uL    Bands Relative 1 0 - 7 %    Atypical Lymphocytes Relative 9 (H) 0 - 6 %    Anisocytosis Occasional (A)     Macrocytes Occasional (A)     Microcytes Occasional (A)    Comprehensive metabolic panel   Result Value Ref Range    Sodium 129 (L) 136 - 145 mmol/L    Potassium 3.6 3.5 - 5.1 mmol/L    Chloride 96 (L) 99 - 110 mmol/L    CO2 19 (L) 21 - 32 mmol/L    Anion Gap 14 3 - 16    Glucose 112 (H) 70 - 99 mg/dL    BUN 7 7 - 20 mg/dL    CREATININE 0.6 0.6 - 1.2 mg/dL    GFR Non-African American >60 >60    GFR African American >60 >60    Calcium 8.5 8.3 - 10.6 mg/dL    Total Protein 7.2 6.4 - 8.2 g/dL    Alb 2.7 (L) 3.4 - 5.0 g/dL    Albumin/Globulin Ratio 0.6 (L) 1.1 - 2.2    Total Bilirubin 0.3 0.0 - 1.0 mg/dL    Alkaline Phosphatase 86 40 - 129 U/L    ALT <5 (L) 10 - 40 U/L    AST 18 15 - 37 U/L    Globulin 4.5 g/dL   Lactic acid, plasma   Result Value Ref Range    Lactic Acid 1.6 0.4 - 2.0 mmol/L          Julio Gutierrez MD  11/23/18 4568

## 2018-11-10 NOTE — ED NOTES
Another colostomy bag place over pt's stoma. Pt cleaned and gown changed. Pt being discharged to home. No complaints verbalized at present. Daughter and pt were shown how to use colostomy supplies. Per Dr. Marilin Rojo- no sign of infection to wound.      Marjan Mejia RN  11/09/18 5535

## 2018-11-13 ENCOUNTER — HOSPITAL ENCOUNTER (OUTPATIENT)
Dept: WOUND CARE | Age: 63
Discharge: HOME | End: 2018-11-13
Attending: SURGERY
Payer: MEDICAID

## 2018-11-13 VITALS — RESPIRATION RATE: 18 BRPM | DIASTOLIC BLOOD PRESSURE: 82 MMHG | SYSTOLIC BLOOD PRESSURE: 109 MMHG | HEART RATE: 70 BPM

## 2018-11-13 PROCEDURE — 99024 POSTOP FOLLOW-UP VISIT: CPT | Performed by: SURGERY

## 2018-11-13 PROCEDURE — 99213 OFFICE O/P EST LOW 20 MIN: CPT

## 2018-11-14 LAB — BLOOD CULTURE, ROUTINE: NORMAL

## 2018-11-20 ENCOUNTER — OFFICE VISIT (OUTPATIENT)
Dept: SURGERY | Age: 63
End: 2018-11-20

## 2018-11-20 VITALS — SYSTOLIC BLOOD PRESSURE: 118 MMHG | DIASTOLIC BLOOD PRESSURE: 76 MMHG

## 2018-11-20 DIAGNOSIS — K57.20 DIVERTICULITIS OF LARGE INTESTINE WITH PERFORATION AND ABSCESS WITHOUT BLEEDING: Primary | ICD-10-CM

## 2018-11-20 DIAGNOSIS — K57.92 DIVERTICULITIS: ICD-10-CM

## 2018-11-20 PROCEDURE — 99024 POSTOP FOLLOW-UP VISIT: CPT | Performed by: SURGERY

## 2018-11-20 RX ORDER — OXYCODONE HYDROCHLORIDE AND ACETAMINOPHEN 5; 325 MG/1; MG/1
1 TABLET ORAL EVERY 6 HOURS PRN
Qty: 30 TABLET | Refills: 0 | Status: ON HOLD | OUTPATIENT
Start: 2018-11-20 | End: 2018-11-30 | Stop reason: HOSPADM

## 2018-11-20 NOTE — PROGRESS NOTES
[facility] GENERAL AND LAPAROSCOPIC SURGERY          PATIENT NAME: Annabelle Keller     TODAY'S DATE: 11/20/2018    SUBJECTIVE:    Pt improving slowly at home. OBJECTIVE:  VITALS:  /76     CONSTITUTIONAL:  awake and alert  LUNGS:  clear to auscultation  ABDOMEN:  normal bowel sounds, soft, non-distended, tenderness noted in the left lower quadrant - incision and stoma area - wound clean and closing    Data:  CBC: No results for input(s): WBC, HGB, HCT, PLT in the last 72 hours. BMP:  No results for input(s): NA, K, CL, CO2, BUN, CREATININE, GLUCOSE in the last 72 hours. Hepatic: No results for input(s): AST, ALT, ALB, BILITOT, ALKPHOS in the last 72 hours. Mag:    No results for input(s): MG in the last 72 hours. Phos:   No results for input(s): PHOS in the last 72 hours. INR: No results for input(s): INR in the last 72 hours.     Radiology Review:  None      ASSESSMENT AND PLAN:  Crohn's, and diverticulitis  Severe disease  Continues recovery now at home post Pal's  Will do new home care co, and add rehab eval  Will plan for addn stoma care, help from [place]  See back in 3 weeks    Immanuel Pereyra

## 2018-11-26 ENCOUNTER — TELEPHONE (OUTPATIENT)
Dept: SURGERY | Age: 63
End: 2018-11-26

## 2018-11-27 ENCOUNTER — APPOINTMENT (OUTPATIENT)
Dept: GENERAL RADIOLOGY | Age: 63
DRG: 425 | End: 2018-11-27
Payer: MEDICAID

## 2018-11-27 ENCOUNTER — APPOINTMENT (OUTPATIENT)
Dept: CT IMAGING | Age: 63
DRG: 425 | End: 2018-11-27
Payer: MEDICAID

## 2018-11-27 ENCOUNTER — HOSPITAL ENCOUNTER (INPATIENT)
Age: 63
LOS: 4 days | Discharge: NURSING, REHAB OR LONG-TERM CARE | DRG: 425 | End: 2018-12-01
Attending: EMERGENCY MEDICINE | Admitting: INTERNAL MEDICINE
Payer: MEDICAID

## 2018-11-27 DIAGNOSIS — Z91.81 AT MAXIMUM RISK FOR FALL: ICD-10-CM

## 2018-11-27 DIAGNOSIS — E83.42 HYPOMAGNESEMIA: Primary | ICD-10-CM

## 2018-11-27 DIAGNOSIS — F51.01 PRIMARY INSOMNIA: ICD-10-CM

## 2018-11-27 DIAGNOSIS — Z79.01 ANTICOAGULATION ADEQUATE: ICD-10-CM

## 2018-11-27 DIAGNOSIS — R94.31 ACUTE ELECTROCARDIOGRAPHY CHANGES: ICD-10-CM

## 2018-11-27 DIAGNOSIS — S86.812A RUPTURE OF LEFT PATELLAR TENDON, INITIAL ENCOUNTER: ICD-10-CM

## 2018-11-27 PROBLEM — S86.819A PATELLAR TENDON RUPTURE: Status: ACTIVE | Noted: 2018-11-27

## 2018-11-27 LAB
ANION GAP SERPL CALCULATED.3IONS-SCNC: 16 MMOL/L (ref 3–16)
ANISOCYTOSIS: ABNORMAL
BANDED NEUTROPHILS RELATIVE PERCENT: 2 % (ref 0–7)
BASOPHILS ABSOLUTE: 0 K/UL (ref 0–0.2)
BASOPHILS RELATIVE PERCENT: 0 %
BILIRUBIN URINE: ABNORMAL
BLOOD, URINE: NEGATIVE
BUN BLDV-MCNC: 7 MG/DL (ref 7–20)
CALCIUM SERPL-MCNC: 7.1 MG/DL (ref 8.3–10.6)
CHLORIDE BLD-SCNC: 92 MMOL/L (ref 99–110)
CLARITY: CLEAR
CO2: 26 MMOL/L (ref 21–32)
COLOR: ABNORMAL
CREAT SERPL-MCNC: <0.5 MG/DL (ref 0.6–1.2)
EOSINOPHILS ABSOLUTE: 0.4 K/UL (ref 0–0.6)
EOSINOPHILS RELATIVE PERCENT: 4 %
GFR AFRICAN AMERICAN: >60
GFR NON-AFRICAN AMERICAN: >60
GLUCOSE BLD-MCNC: 98 MG/DL (ref 70–99)
GLUCOSE URINE: NEGATIVE MG/DL
HCT VFR BLD CALC: 30.1 % (ref 36–48)
HEMOGLOBIN: 10.1 G/DL (ref 12–16)
INR BLD: 2.75 (ref 0.86–1.14)
KETONES, URINE: NEGATIVE MG/DL
LEUKOCYTE ESTERASE, URINE: NEGATIVE
LYMPHOCYTES ABSOLUTE: 2.3 K/UL (ref 1–5.1)
LYMPHOCYTES RELATIVE PERCENT: 24 %
MAGNESIUM: 0.5 MG/DL (ref 1.8–2.4)
MCH RBC QN AUTO: 29.2 PG (ref 26–34)
MCHC RBC AUTO-ENTMCNC: 33.6 G/DL (ref 31–36)
MCV RBC AUTO: 87 FL (ref 80–100)
MICROCYTES: ABNORMAL
MICROSCOPIC EXAMINATION: ABNORMAL
MONOCYTES ABSOLUTE: 0.7 K/UL (ref 0–1.3)
MONOCYTES RELATIVE PERCENT: 7 %
NEUTROPHILS ABSOLUTE: 6.3 K/UL (ref 1.7–7.7)
NEUTROPHILS RELATIVE PERCENT: 63 %
NITRITE, URINE: NEGATIVE
OVALOCYTES: ABNORMAL
PDW BLD-RTO: 19.8 % (ref 12.4–15.4)
PH UA: 5.5
PLATELET # BLD: 424 K/UL (ref 135–450)
PLATELET SLIDE REVIEW: ABNORMAL
PMV BLD AUTO: 6.6 FL (ref 5–10.5)
POLYCHROMASIA: ABNORMAL
POTASSIUM REFLEX MAGNESIUM: 3.5 MMOL/L (ref 3.5–5.1)
PROTEIN UA: NEGATIVE MG/DL
PROTHROMBIN TIME: 31.3 SEC (ref 9.8–13)
RBC # BLD: 3.46 M/UL (ref 4–5.2)
REASON FOR REJECTION: NORMAL
REJECTED TEST: NORMAL
SLIDE REVIEW: ABNORMAL
SMUDGE CELLS: PRESENT
SODIUM BLD-SCNC: 134 MMOL/L (ref 136–145)
SPECIFIC GRAVITY UA: 1.02
TARGET CELLS: ABNORMAL
TROPONIN: <0.01 NG/ML
URINE REFLEX TO CULTURE: ABNORMAL
URINE TYPE: ABNORMAL
UROBILINOGEN, URINE: 1 E.U./DL
WBC # BLD: 9.7 K/UL (ref 4–11)

## 2018-11-27 PROCEDURE — 83735 ASSAY OF MAGNESIUM: CPT

## 2018-11-27 PROCEDURE — 6360000002 HC RX W HCPCS: Performed by: PHYSICIAN ASSISTANT

## 2018-11-27 PROCEDURE — 84484 ASSAY OF TROPONIN QUANT: CPT

## 2018-11-27 PROCEDURE — 81003 URINALYSIS AUTO W/O SCOPE: CPT

## 2018-11-27 PROCEDURE — 96374 THER/PROPH/DIAG INJ IV PUSH: CPT

## 2018-11-27 PROCEDURE — 80048 BASIC METABOLIC PNL TOTAL CA: CPT

## 2018-11-27 PROCEDURE — 96375 TX/PRO/DX INJ NEW DRUG ADDON: CPT

## 2018-11-27 PROCEDURE — 73560 X-RAY EXAM OF KNEE 1 OR 2: CPT

## 2018-11-27 PROCEDURE — 93005 ELECTROCARDIOGRAM TRACING: CPT | Performed by: PHYSICIAN ASSISTANT

## 2018-11-27 PROCEDURE — 85025 COMPLETE CBC W/AUTO DIFF WBC: CPT

## 2018-11-27 PROCEDURE — 70450 CT HEAD/BRAIN W/O DYE: CPT

## 2018-11-27 PROCEDURE — 6370000000 HC RX 637 (ALT 250 FOR IP): Performed by: INTERNAL MEDICINE

## 2018-11-27 PROCEDURE — 85610 PROTHROMBIN TIME: CPT

## 2018-11-27 PROCEDURE — 94760 N-INVAS EAR/PLS OXIMETRY 1: CPT

## 2018-11-27 PROCEDURE — 6360000002 HC RX W HCPCS: Performed by: INTERNAL MEDICINE

## 2018-11-27 PROCEDURE — 99285 EMERGENCY DEPT VISIT HI MDM: CPT

## 2018-11-27 PROCEDURE — 72125 CT NECK SPINE W/O DYE: CPT

## 2018-11-27 PROCEDURE — 1200000000 HC SEMI PRIVATE

## 2018-11-27 RX ORDER — POTASSIUM CHLORIDE 7.45 MG/ML
10 INJECTION INTRAVENOUS PRN
Status: DISCONTINUED | OUTPATIENT
Start: 2018-11-27 | End: 2018-12-02 | Stop reason: HOSPADM

## 2018-11-27 RX ORDER — SODIUM CHLORIDE 1000 MG
1 TABLET, SOLUBLE MISCELLANEOUS 2 TIMES DAILY
Status: DISCONTINUED | OUTPATIENT
Start: 2018-11-27 | End: 2018-12-02 | Stop reason: HOSPADM

## 2018-11-27 RX ORDER — SODIUM CHLORIDE 0.9 % (FLUSH) 0.9 %
10 SYRINGE (ML) INJECTION PRN
Status: DISCONTINUED | OUTPATIENT
Start: 2018-11-27 | End: 2018-12-02 | Stop reason: HOSPADM

## 2018-11-27 RX ORDER — POTASSIUM CHLORIDE 20 MEQ/1
20 TABLET, EXTENDED RELEASE ORAL 2 TIMES DAILY
Status: DISCONTINUED | OUTPATIENT
Start: 2018-11-27 | End: 2018-12-02 | Stop reason: HOSPADM

## 2018-11-27 RX ORDER — POTASSIUM CHLORIDE 20MEQ/15ML
40 LIQUID (ML) ORAL PRN
Status: DISCONTINUED | OUTPATIENT
Start: 2018-11-27 | End: 2018-11-27

## 2018-11-27 RX ORDER — POTASSIUM CHLORIDE 20 MEQ/1
40 TABLET, EXTENDED RELEASE ORAL PRN
Status: DISCONTINUED | OUTPATIENT
Start: 2018-11-27 | End: 2018-11-27

## 2018-11-27 RX ORDER — SIMETHICONE 80 MG
80 TABLET,CHEWABLE ORAL EVERY 6 HOURS
Status: DISCONTINUED | OUTPATIENT
Start: 2018-11-27 | End: 2018-12-02 | Stop reason: HOSPADM

## 2018-11-27 RX ORDER — MAGNESIUM SULFATE IN WATER 40 MG/ML
2 INJECTION, SOLUTION INTRAVENOUS ONCE
Status: COMPLETED | OUTPATIENT
Start: 2018-11-27 | End: 2018-11-28

## 2018-11-27 RX ORDER — FLUTICASONE PROPIONATE 50 MCG
1 SPRAY, SUSPENSION (ML) NASAL DAILY
Status: DISCONTINUED | OUTPATIENT
Start: 2018-11-28 | End: 2018-12-02 | Stop reason: HOSPADM

## 2018-11-27 RX ORDER — OXYCODONE HYDROCHLORIDE AND ACETAMINOPHEN 5; 325 MG/1; MG/1
2 TABLET ORAL EVERY 6 HOURS PRN
Status: DISCONTINUED | OUTPATIENT
Start: 2018-11-27 | End: 2018-11-27 | Stop reason: CLARIF

## 2018-11-27 RX ORDER — MAGNESIUM SULFATE IN WATER 40 MG/ML
2 INJECTION, SOLUTION INTRAVENOUS ONCE
Status: DISCONTINUED | OUTPATIENT
Start: 2018-11-27 | End: 2018-11-28 | Stop reason: ALTCHOICE

## 2018-11-27 RX ORDER — OXYCODONE HYDROCHLORIDE AND ACETAMINOPHEN 5; 325 MG/1; MG/1
1 TABLET ORAL EVERY 6 HOURS PRN
Status: DISCONTINUED | OUTPATIENT
Start: 2018-11-27 | End: 2018-11-27 | Stop reason: SDUPTHER

## 2018-11-27 RX ORDER — MESALAMINE 4 G/60ML
4 ENEMA RECTAL NIGHTLY
Status: DISCONTINUED | OUTPATIENT
Start: 2018-11-27 | End: 2018-12-02 | Stop reason: HOSPADM

## 2018-11-27 RX ORDER — OXYCODONE HYDROCHLORIDE AND ACETAMINOPHEN 5; 325 MG/1; MG/1
1 TABLET ORAL EVERY 6 HOURS PRN
Status: DISCONTINUED | OUTPATIENT
Start: 2018-11-27 | End: 2018-12-02 | Stop reason: HOSPADM

## 2018-11-27 RX ORDER — FAMOTIDINE 20 MG/1
20 TABLET, FILM COATED ORAL 2 TIMES DAILY PRN
Status: DISCONTINUED | OUTPATIENT
Start: 2018-11-27 | End: 2018-12-02 | Stop reason: HOSPADM

## 2018-11-27 RX ORDER — MESALAMINE 400 MG/1
1600 CAPSULE, DELAYED RELEASE ORAL 3 TIMES DAILY
Status: DISCONTINUED | OUTPATIENT
Start: 2018-11-27 | End: 2018-12-02 | Stop reason: HOSPADM

## 2018-11-27 RX ORDER — DULOXETIN HYDROCHLORIDE 60 MG/1
60 CAPSULE, DELAYED RELEASE ORAL DAILY
Status: DISCONTINUED | OUTPATIENT
Start: 2018-11-28 | End: 2018-11-29

## 2018-11-27 RX ORDER — MESALAMINE 1.2 G/1
4800 TABLET, DELAYED RELEASE ORAL
Status: DISCONTINUED | OUTPATIENT
Start: 2018-11-28 | End: 2018-11-27 | Stop reason: CLARIF

## 2018-11-27 RX ORDER — OXYCODONE HYDROCHLORIDE AND ACETAMINOPHEN 5; 325 MG/1; MG/1
2 TABLET ORAL EVERY 6 HOURS PRN
Status: DISCONTINUED | OUTPATIENT
Start: 2018-11-27 | End: 2018-12-02 | Stop reason: HOSPADM

## 2018-11-27 RX ORDER — NICOTINE POLACRILEX 4 MG/1
20 GUM, CHEWING ORAL DAILY
Status: DISCONTINUED | OUTPATIENT
Start: 2018-11-28 | End: 2018-11-27 | Stop reason: CLARIF

## 2018-11-27 RX ORDER — ONDANSETRON 4 MG/1
4 TABLET, ORALLY DISINTEGRATING ORAL EVERY 6 HOURS PRN
Status: DISCONTINUED | OUTPATIENT
Start: 2018-11-27 | End: 2018-12-02 | Stop reason: HOSPADM

## 2018-11-27 RX ORDER — BACLOFEN 10 MG/1
10 TABLET ORAL 3 TIMES DAILY
Status: DISCONTINUED | OUTPATIENT
Start: 2018-11-27 | End: 2018-12-02 | Stop reason: HOSPADM

## 2018-11-27 RX ORDER — ZOLPIDEM TARTRATE 10 MG/1
10 TABLET ORAL NIGHTLY PRN
Status: DISCONTINUED | OUTPATIENT
Start: 2018-11-27 | End: 2018-12-02 | Stop reason: HOSPADM

## 2018-11-27 RX ORDER — POTASSIUM CHLORIDE 20 MEQ/1
40 TABLET, EXTENDED RELEASE ORAL PRN
Status: DISCONTINUED | OUTPATIENT
Start: 2018-11-27 | End: 2018-12-02 | Stop reason: HOSPADM

## 2018-11-27 RX ORDER — ONDANSETRON 2 MG/ML
4 INJECTION INTRAMUSCULAR; INTRAVENOUS EVERY 6 HOURS PRN
Status: DISCONTINUED | OUTPATIENT
Start: 2018-11-27 | End: 2018-12-02 | Stop reason: HOSPADM

## 2018-11-27 RX ORDER — MORPHINE SULFATE 4 MG/ML
4 INJECTION, SOLUTION INTRAMUSCULAR; INTRAVENOUS ONCE
Status: COMPLETED | OUTPATIENT
Start: 2018-11-27 | End: 2018-11-27

## 2018-11-27 RX ORDER — SODIUM CHLORIDE 0.9 % (FLUSH) 0.9 %
10 SYRINGE (ML) INJECTION EVERY 12 HOURS SCHEDULED
Status: DISCONTINUED | OUTPATIENT
Start: 2018-11-27 | End: 2018-12-02 | Stop reason: HOSPADM

## 2018-11-27 RX ORDER — MAGNESIUM SULFATE IN WATER 40 MG/ML
2 INJECTION, SOLUTION INTRAVENOUS ONCE
Status: COMPLETED | OUTPATIENT
Start: 2018-11-27 | End: 2018-11-27

## 2018-11-27 RX ORDER — ONDANSETRON 2 MG/ML
4 INJECTION INTRAMUSCULAR; INTRAVENOUS ONCE
Status: COMPLETED | OUTPATIENT
Start: 2018-11-27 | End: 2018-11-27

## 2018-11-27 RX ORDER — ATENOLOL 50 MG/1
100 TABLET ORAL DAILY
Status: DISCONTINUED | OUTPATIENT
Start: 2018-11-28 | End: 2018-12-02 | Stop reason: HOSPADM

## 2018-11-27 RX ORDER — FUROSEMIDE 40 MG/1
40 TABLET ORAL DAILY
Status: DISCONTINUED | OUTPATIENT
Start: 2018-11-28 | End: 2018-12-02 | Stop reason: HOSPADM

## 2018-11-27 RX ORDER — BUSPIRONE HYDROCHLORIDE 5 MG/1
30 TABLET ORAL 2 TIMES DAILY
Status: DISCONTINUED | OUTPATIENT
Start: 2018-11-27 | End: 2018-12-02 | Stop reason: HOSPADM

## 2018-11-27 RX ORDER — ACETAMINOPHEN, ASPIRIN AND CAFFEINE 250; 250; 65 MG/1; MG/1; MG/1
1 TABLET, FILM COATED ORAL EVERY 6 HOURS PRN
Status: DISCONTINUED | OUTPATIENT
Start: 2018-11-27 | End: 2018-12-02 | Stop reason: HOSPADM

## 2018-11-27 RX ORDER — POTASSIUM CHLORIDE 7.45 MG/ML
10 INJECTION INTRAVENOUS PRN
Status: DISCONTINUED | OUTPATIENT
Start: 2018-11-27 | End: 2018-11-27

## 2018-11-27 RX ORDER — PANTOPRAZOLE SODIUM 40 MG/1
40 TABLET, DELAYED RELEASE ORAL
Status: DISCONTINUED | OUTPATIENT
Start: 2018-11-28 | End: 2018-12-02 | Stop reason: HOSPADM

## 2018-11-27 RX ORDER — LANOLIN ALCOHOL/MO/W.PET/CERES
400 CREAM (GRAM) TOPICAL 2 TIMES DAILY
Status: DISCONTINUED | OUTPATIENT
Start: 2018-11-27 | End: 2018-12-02 | Stop reason: HOSPADM

## 2018-11-27 RX ADMIN — MORPHINE SULFATE 4 MG: 4 INJECTION INTRAVENOUS at 17:45

## 2018-11-27 RX ADMIN — BUSPIRONE HYDROCHLORIDE 30 MG: 5 TABLET ORAL at 22:39

## 2018-11-27 RX ADMIN — ENOXAPARIN SODIUM 105 MG: 150 INJECTION SUBCUTANEOUS at 22:42

## 2018-11-27 RX ADMIN — MAGNESIUM SULFATE HEPTAHYDRATE 2 G: 40 INJECTION, SOLUTION INTRAVENOUS at 22:47

## 2018-11-27 RX ADMIN — ONDANSETRON 4 MG: 2 INJECTION INTRAMUSCULAR; INTRAVENOUS at 17:45

## 2018-11-27 RX ADMIN — MAGNESIUM SULFATE HEPTAHYDRATE 2 G: 40 INJECTION, SOLUTION INTRAVENOUS at 19:02

## 2018-11-27 RX ADMIN — OXYCODONE AND ACETAMINOPHEN 2 TABLET: 5; 325 TABLET ORAL at 22:37

## 2018-11-27 RX ADMIN — BACLOFEN 10 MG: 10 TABLET ORAL at 22:38

## 2018-11-27 RX ADMIN — SIMETHICONE CHEW TAB 80 MG 80 MG: 80 TABLET ORAL at 22:39

## 2018-11-27 ASSESSMENT — PAIN SCALES - GENERAL
PAINLEVEL_OUTOF10: 8
PAINLEVEL_OUTOF10: 8
PAINLEVEL_OUTOF10: 9

## 2018-11-27 ASSESSMENT — PAIN DESCRIPTION - LOCATION: LOCATION: KNEE

## 2018-11-27 ASSESSMENT — ENCOUNTER SYMPTOMS
CHEST TIGHTNESS: 0
PHOTOPHOBIA: 0
COLOR CHANGE: 1
CONSTIPATION: 0
FACIAL SWELLING: 0
ABDOMINAL PAIN: 0
NAUSEA: 0
SHORTNESS OF BREATH: 0
BACK PAIN: 0
COUGH: 0
COLOR CHANGE: 0
DIARRHEA: 0
VOMITING: 0

## 2018-11-27 ASSESSMENT — PAIN DESCRIPTION - DESCRIPTORS: DESCRIPTORS: SHOOTING

## 2018-11-27 ASSESSMENT — PAIN DESCRIPTION - ORIENTATION: ORIENTATION: LEFT

## 2018-11-27 ASSESSMENT — PAIN DESCRIPTION - PAIN TYPE: TYPE: ACUTE PAIN

## 2018-11-27 NOTE — ED PROVIDER NOTES
Performed at:  [facility] [address] [facility], [address]   Phone [phone]   MAGNESIUM - Abnormal; Notable for the following:     Magnesium 0.50 (*)     All other components within normal limits    Narrative:     Bebo Niesha  [facility] tel. [phone],  Rejected Test Name/Called to:CBCWD/ Ekta Hess RN, 11/27/2018 18:19, by Bhavik Coronado  [facility] tel. [phone],  Chemistry results called to and read back by bessie smith, 11/27/2018  18:26, by GALLO  Performed at:  [facility] [address]"[employer]s, [employer]   Phone [phone]   CBC WITH AUTO DIFFERENTIAL - Abnormal; Notable for the following:     RBC 3.46 (*)     Hemoglobin 10.1 (*)     Hematocrit 30.1 (*)     RDW 19.8 (*)     All other components within normal limits    Narrative:     Performed at:  [facility] [address] [facility] [place]"[employer]s, [employer]   Phone [phone]    Narrative:     [address],  Rejected Test Name/Called to:CBCWD/ Marilee Rangel, 11/27/2018 18:19, by Kimi Dunlap  Performed at:  [facility] [address] [place],  [place], [employer] [employer]   Phone [phone]   TROPONIN    Narrative:     Performed at:  [facility] [address],  [place], [employer] [employer]   Phone [phone]   URINE RT REFLEX TO CULTURE        Remainder of labs reviewed and werenegative at this time or not returned at the time of this note.     RADIOLOGY:   Non-plain film images such as CT, Ultrasound and MRI are read by the radiologist. Orman Mcburney, PA-C have directly visualized the radiologic plain film image(s) with the below findings:        Interpretation per the Radiologist below, if available at the time of thisnote:    XR KNEE LEFT (1-2 VIEWS)   Final Result   Evidence of patella payal, but the patellar tendon shadow does not appear to   be grossly abnormal

## 2018-11-28 ENCOUNTER — APPOINTMENT (OUTPATIENT)
Dept: MRI IMAGING | Age: 63
DRG: 425 | End: 2018-11-28
Payer: MEDICAID

## 2018-11-28 LAB
A/G RATIO: 0.7 (ref 1.1–2.2)
ALBUMIN SERPL-MCNC: 2.7 G/DL (ref 3.4–5)
ALP BLD-CCNC: 68 U/L (ref 40–129)
ALT SERPL-CCNC: <5 U/L (ref 10–40)
ANION GAP SERPL CALCULATED.3IONS-SCNC: 18 MMOL/L (ref 3–16)
AST SERPL-CCNC: 13 U/L (ref 15–37)
BASOPHILS ABSOLUTE: 0.1 K/UL (ref 0–0.2)
BASOPHILS RELATIVE PERCENT: 0.9 %
BILIRUB SERPL-MCNC: 0.5 MG/DL (ref 0–1)
BILIRUBIN URINE: NEGATIVE
BLOOD, URINE: NEGATIVE
BUN BLDV-MCNC: 5 MG/DL (ref 7–20)
CALCIUM SERPL-MCNC: 6.6 MG/DL (ref 8.3–10.6)
CHLORIDE BLD-SCNC: 91 MMOL/L (ref 99–110)
CHLORIDE URINE RANDOM: <20 MMOL/L
CLARITY: CLEAR
CO2: 22 MMOL/L (ref 21–32)
COLOR: NORMAL
CREAT SERPL-MCNC: <0.5 MG/DL (ref 0.6–1.2)
CREATININE URINE: 113.8 MG/DL (ref 28–259)
CREATININE URINE: 114 MG/DL (ref 28–259)
EKG ATRIAL RATE: 88 BPM
EKG DIAGNOSIS: NORMAL
EKG P AXIS: 88 DEGREES
EKG P-R INTERVAL: 120 MS
EKG Q-T INTERVAL: 368 MS
EKG QRS DURATION: 82 MS
EKG QTC CALCULATION (BAZETT): 445 MS
EKG R AXIS: 28 DEGREES
EKG T AXIS: 96 DEGREES
EKG VENTRICULAR RATE: 88 BPM
EOSINOPHILS ABSOLUTE: 0.1 K/UL (ref 0–0.6)
EOSINOPHILS RELATIVE PERCENT: 0.9 %
GFR AFRICAN AMERICAN: >60
GFR NON-AFRICAN AMERICAN: >60
GLOBULIN: 4 G/DL
GLUCOSE BLD-MCNC: 130 MG/DL (ref 70–99)
GLUCOSE URINE: NEGATIVE MG/DL
HCT VFR BLD CALC: 28.3 % (ref 36–48)
HEMOGLOBIN: 9.4 G/DL (ref 12–16)
KETONES, URINE: NEGATIVE MG/DL
LEUKOCYTE ESTERASE, URINE: NEGATIVE
LYMPHOCYTES ABSOLUTE: 2.2 K/UL (ref 1–5.1)
LYMPHOCYTES RELATIVE PERCENT: 21.5 %
MAGNESIUM URINE: 6.7 MG/DL (ref 4.1–13.8)
MAGNESIUM: 1.6 MG/DL (ref 1.8–2.4)
MCH RBC QN AUTO: 29 PG (ref 26–34)
MCHC RBC AUTO-ENTMCNC: 33.2 G/DL (ref 31–36)
MCV RBC AUTO: 87.6 FL (ref 80–100)
MICROSCOPIC EXAMINATION: NORMAL
MONOCYTES ABSOLUTE: 0.8 K/UL (ref 0–1.3)
MONOCYTES RELATIVE PERCENT: 7.6 %
NEUTROPHILS ABSOLUTE: 7.2 K/UL (ref 1.7–7.7)
NEUTROPHILS RELATIVE PERCENT: 69.1 %
NITRITE, URINE: NEGATIVE
PDW BLD-RTO: 19.4 % (ref 12.4–15.4)
PH UA: 5
PLATELET # BLD: 399 K/UL (ref 135–450)
PMV BLD AUTO: 6.5 FL (ref 5–10.5)
POTASSIUM SERPL-SCNC: 2.8 MMOL/L (ref 3.5–5.1)
POTASSIUM SERPL-SCNC: 4.1 MMOL/L (ref 3.5–5.1)
POTASSIUM, UR: 84.6 MMOL/L
PROTEIN PROTEIN: 30 MG/DL
PROTEIN UA: NEGATIVE MG/DL
PROTEIN/CREAT RATIO: 0.3 MG/DL
RBC # BLD: 3.24 M/UL (ref 4–5.2)
SODIUM BLD-SCNC: 131 MMOL/L (ref 136–145)
SODIUM URINE: 83 MMOL/L
SPECIFIC GRAVITY UA: 1.02
TOTAL PROTEIN: 6.7 G/DL (ref 6.4–8.2)
UREA NITROGEN, UR: 550.4 MG/DL (ref 800–1666)
URINE TYPE: NORMAL
UROBILINOGEN, URINE: 0.2 E.U./DL
WBC # BLD: 10.4 K/UL (ref 4–11)

## 2018-11-28 PROCEDURE — 80053 COMPREHEN METABOLIC PANEL: CPT

## 2018-11-28 PROCEDURE — 84540 ASSAY OF URINE/UREA-N: CPT

## 2018-11-28 PROCEDURE — 6360000002 HC RX W HCPCS: Performed by: INTERNAL MEDICINE

## 2018-11-28 PROCEDURE — 84132 ASSAY OF SERUM POTASSIUM: CPT

## 2018-11-28 PROCEDURE — 82570 ASSAY OF URINE CREATININE: CPT

## 2018-11-28 PROCEDURE — 83735 ASSAY OF MAGNESIUM: CPT

## 2018-11-28 PROCEDURE — APPNB30 APP NON BILLABLE TIME 0-30 MINS: Performed by: NURSE PRACTITIONER

## 2018-11-28 PROCEDURE — 82436 ASSAY OF URINE CHLORIDE: CPT

## 2018-11-28 PROCEDURE — 85025 COMPLETE CBC W/AUTO DIFF WBC: CPT

## 2018-11-28 PROCEDURE — 84300 ASSAY OF URINE SODIUM: CPT

## 2018-11-28 PROCEDURE — 81003 URINALYSIS AUTO W/O SCOPE: CPT

## 2018-11-28 PROCEDURE — 1200000000 HC SEMI PRIVATE

## 2018-11-28 PROCEDURE — 2580000003 HC RX 258: Performed by: INTERNAL MEDICINE

## 2018-11-28 PROCEDURE — 84156 ASSAY OF PROTEIN URINE: CPT

## 2018-11-28 PROCEDURE — 6370000000 HC RX 637 (ALT 250 FOR IP): Performed by: INTERNAL MEDICINE

## 2018-11-28 PROCEDURE — 73721 MRI JNT OF LWR EXTRE W/O DYE: CPT

## 2018-11-28 PROCEDURE — 36415 COLL VENOUS BLD VENIPUNCTURE: CPT

## 2018-11-28 PROCEDURE — 99222 1ST HOSP IP/OBS MODERATE 55: CPT | Performed by: NURSE PRACTITIONER

## 2018-11-28 PROCEDURE — 84133 ASSAY OF URINE POTASSIUM: CPT

## 2018-11-28 PROCEDURE — 93010 ELECTROCARDIOGRAM REPORT: CPT | Performed by: INTERNAL MEDICINE

## 2018-11-28 RX ORDER — MORPHINE SULFATE 2 MG/ML
1 INJECTION, SOLUTION INTRAMUSCULAR; INTRAVENOUS EVERY 4 HOURS PRN
Status: DISCONTINUED | OUTPATIENT
Start: 2018-11-28 | End: 2018-11-28

## 2018-11-28 RX ORDER — SODIUM CHLORIDE 9 MG/ML
INJECTION, SOLUTION INTRAVENOUS
Status: DISPENSED
Start: 2018-11-28 | End: 2018-11-28

## 2018-11-28 RX ORDER — MAGNESIUM SULFATE IN WATER 40 MG/ML
2 INJECTION, SOLUTION INTRAVENOUS ONCE
Status: COMPLETED | OUTPATIENT
Start: 2018-11-28 | End: 2018-11-28

## 2018-11-28 RX ADMIN — BUSPIRONE HYDROCHLORIDE 30 MG: 5 TABLET ORAL at 09:10

## 2018-11-28 RX ADMIN — Medication 1 G: at 09:11

## 2018-11-28 RX ADMIN — SODIUM CHLORIDE TAB 1 GM 1 G: 1 TAB at 00:17

## 2018-11-28 RX ADMIN — POTASSIUM CHLORIDE 10 MEQ: 7.46 INJECTION, SOLUTION INTRAVENOUS at 07:42

## 2018-11-28 RX ADMIN — OXYCODONE AND ACETAMINOPHEN 2 TABLET: 5; 325 TABLET ORAL at 20:42

## 2018-11-28 RX ADMIN — BACLOFEN 10 MG: 10 TABLET ORAL at 14:23

## 2018-11-28 RX ADMIN — BUSPIRONE HYDROCHLORIDE 30 MG: 5 TABLET ORAL at 20:41

## 2018-11-28 RX ADMIN — HYDROMORPHONE HYDROCHLORIDE 1 MG: 1 INJECTION, SOLUTION INTRAMUSCULAR; INTRAVENOUS; SUBCUTANEOUS at 18:19

## 2018-11-28 RX ADMIN — SIMETHICONE CHEW TAB 80 MG 80 MG: 80 TABLET ORAL at 14:24

## 2018-11-28 RX ADMIN — SODIUM CHLORIDE TAB 1 GM 1 G: 1 TAB at 20:54

## 2018-11-28 RX ADMIN — MAGNESIUM SULFATE HEPTAHYDRATE 2 G: 40 INJECTION, SOLUTION INTRAVENOUS at 09:22

## 2018-11-28 RX ADMIN — DULOXETINE HYDROCHLORIDE 60 MG: 60 CAPSULE, DELAYED RELEASE ORAL at 09:10

## 2018-11-28 RX ADMIN — FUROSEMIDE 40 MG: 40 TABLET ORAL at 09:11

## 2018-11-28 RX ADMIN — HYDROMORPHONE HYDROCHLORIDE 0.5 MG: 1 INJECTION, SOLUTION INTRAMUSCULAR; INTRAVENOUS; SUBCUTANEOUS at 06:10

## 2018-11-28 RX ADMIN — Medication 400 MG: at 09:11

## 2018-11-28 RX ADMIN — POTASSIUM CHLORIDE 10 MEQ: 7.46 INJECTION, SOLUTION INTRAVENOUS at 09:09

## 2018-11-28 RX ADMIN — ATENOLOL 100 MG: 50 TABLET ORAL at 09:11

## 2018-11-28 RX ADMIN — MORPHINE SULFATE 1 MG: 2 INJECTION, SOLUTION INTRAMUSCULAR; INTRAVENOUS at 00:14

## 2018-11-28 RX ADMIN — FLUTICASONE PROPIONATE 1 SPRAY: 50 SPRAY, METERED NASAL at 09:19

## 2018-11-28 RX ADMIN — HYDROMORPHONE HYDROCHLORIDE 1 MG: 1 INJECTION, SOLUTION INTRAMUSCULAR; INTRAVENOUS; SUBCUTANEOUS at 14:24

## 2018-11-28 RX ADMIN — SIMETHICONE CHEW TAB 80 MG 80 MG: 80 TABLET ORAL at 20:42

## 2018-11-28 RX ADMIN — POTASSIUM CHLORIDE 20 MEQ: 1500 TABLET, EXTENDED RELEASE ORAL at 00:18

## 2018-11-28 RX ADMIN — PANTOPRAZOLE SODIUM 40 MG: 40 TABLET, DELAYED RELEASE ORAL at 06:10

## 2018-11-28 RX ADMIN — MORPHINE SULFATE 1 MG: 2 INJECTION, SOLUTION INTRAMUSCULAR; INTRAVENOUS at 04:08

## 2018-11-28 RX ADMIN — HYDROMORPHONE HYDROCHLORIDE 1 MG: 1 INJECTION, SOLUTION INTRAMUSCULAR; INTRAVENOUS; SUBCUTANEOUS at 10:08

## 2018-11-28 RX ADMIN — BACLOFEN 10 MG: 10 TABLET ORAL at 20:42

## 2018-11-28 RX ADMIN — POTASSIUM CHLORIDE 20 MEQ: 1500 TABLET, EXTENDED RELEASE ORAL at 09:11

## 2018-11-28 RX ADMIN — Medication 10 ML: at 20:45

## 2018-11-28 RX ADMIN — BACLOFEN 10 MG: 10 TABLET ORAL at 09:10

## 2018-11-28 RX ADMIN — POTASSIUM CHLORIDE 10 MEQ: 7.46 INJECTION, SOLUTION INTRAVENOUS at 14:24

## 2018-11-28 RX ADMIN — POTASSIUM CHLORIDE 10 MEQ: 7.46 INJECTION, SOLUTION INTRAVENOUS at 11:36

## 2018-11-28 RX ADMIN — ENOXAPARIN SODIUM 105 MG: 150 INJECTION SUBCUTANEOUS at 09:11

## 2018-11-28 RX ADMIN — ACETAMINOPHEN, ASPIRIN AND CAFFEINE 1 TABLET: 250; 250; 65 TABLET, FILM COATED ORAL at 09:19

## 2018-11-28 RX ADMIN — POTASSIUM CHLORIDE 10 MEQ: 7.46 INJECTION, SOLUTION INTRAVENOUS at 16:28

## 2018-11-28 RX ADMIN — POTASSIUM CHLORIDE 10 MEQ: 7.46 INJECTION, SOLUTION INTRAVENOUS at 13:20

## 2018-11-28 RX ADMIN — SIMETHICONE CHEW TAB 80 MG 80 MG: 80 TABLET ORAL at 09:10

## 2018-11-28 RX ADMIN — OXYCODONE AND ACETAMINOPHEN 2 TABLET: 5; 325 TABLET ORAL at 05:22

## 2018-11-28 RX ADMIN — OXYCODONE AND ACETAMINOPHEN 2 TABLET: 5; 325 TABLET ORAL at 11:53

## 2018-11-28 RX ADMIN — ZOLPIDEM TARTRATE 10 MG: 10 TABLET, COATED ORAL at 00:41

## 2018-11-28 RX ADMIN — ENOXAPARIN SODIUM 105 MG: 150 INJECTION SUBCUTANEOUS at 20:45

## 2018-11-28 RX ADMIN — POTASSIUM CHLORIDE 20 MEQ: 1500 TABLET, EXTENDED RELEASE ORAL at 20:42

## 2018-11-28 RX ADMIN — SODIUM CHLORIDE TAB 1 GM 1 G: 1 TAB at 09:11

## 2018-11-28 RX ADMIN — MESALAMINE 1600 MG: 400 CAPSULE, DELAYED RELEASE ORAL at 09:09

## 2018-11-28 RX ADMIN — Medication 400 MG: at 20:42

## 2018-11-28 ASSESSMENT — PAIN DESCRIPTION - DESCRIPTORS
DESCRIPTORS: THROBBING

## 2018-11-28 ASSESSMENT — PAIN DESCRIPTION - LOCATION
LOCATION: KNEE

## 2018-11-28 ASSESSMENT — PAIN DESCRIPTION - PAIN TYPE
TYPE: ACUTE PAIN

## 2018-11-28 ASSESSMENT — PAIN SCALES - GENERAL
PAINLEVEL_OUTOF10: 9
PAINLEVEL_OUTOF10: 10
PAINLEVEL_OUTOF10: 6
PAINLEVEL_OUTOF10: 9
PAINLEVEL_OUTOF10: 9
PAINLEVEL_OUTOF10: 10
PAINLEVEL_OUTOF10: 7
PAINLEVEL_OUTOF10: 10
PAINLEVEL_OUTOF10: 10
PAINLEVEL_OUTOF10: 7
PAINLEVEL_OUTOF10: 9
PAINLEVEL_OUTOF10: 10
PAINLEVEL_OUTOF10: 8
PAINLEVEL_OUTOF10: 6

## 2018-11-28 ASSESSMENT — PAIN DESCRIPTION - ORIENTATION
ORIENTATION: LEFT

## 2018-11-28 NOTE — PROGRESS NOTES
is on cardiac monitor and/or pulse ox, they may be taken off cardiac monitor and pulse ox, left on O2 if currently on. All monitors reattached when patient returns to room. Ordering Physician Provided Reason for Exam: fall on Xarelto Acuity: Acute Type of Exam: Initial FINDINGS: BONES/ALIGNMENT: No evidence of fracture. Grade 1 anterolisthesis of C4 on C5. DEGENERATIVE CHANGES: Mild multilevel anterior and posterior osteophyte formation. Multilevel facet arthrosis. SOFT TISSUES: There is no prevertebral soft tissue swelling. 8 mm low-density lesion in the left thyroid which does not require follow-up imaging. No acute abnormality of the cervical spine. Mild multilevel degenerative changes. Assessment and Plan:  Patient Active Hospital Problem List:   Hypomagnesemia (11/27/2018)    Assessment: Established problem. Improving. Mag better, 1.6    Plan: will give 2 more gm iv mag sulfate   Essential hypertension, benign (9/13/2015)    Assessment: Established problem. Stable. 143/98    Plan: bp ok. Cont same meds   Hyponatremia (1/5/2018)    Assessment: Established problem. Worsening. Drifting down    Plan: await renal eval   DVT (deep venous thrombosis) ([facility] [address] (7/3/2018)    Assessment: Established problem. Stable. Holding xarelto    Plan: cont heparin   Patellar tendon rupture (11/27/2018)    Assessment: Established problem. Stable. Plan: ortho asked to see.  May need MRI              Naman May  11/28/2018

## 2018-11-28 NOTE — DISCHARGE INSTR - COC
11/29/2018 10:06 AM   Drainage Amount Scant 11/29/2018 10:06 AM   Drainage Description Serosanguinous 11/29/2018 10:06 AM   Odor None 11/29/2018 10:06 AM   Shweta-wound Assessment Clean;Dry 11/29/2018 10:06 AM   [place]%Wound Bed 100 11/29/2018 10:06 AM   Red%Wound Bed 0 11/13/2018 11:23 AM   Yellow%Wound Bed 0 11/13/2018 11:23 AM   Black%Wound Bed 0 11/13/2018 11:23 AM   Purple%Wound Bed 0 11/13/2018 11:23 AM   Op First Treatment Date 11/13/18 11/13/2018 11:23 AM   Number of days: 19       Wound 11/13/18 Abdomen Distal #3 (Active)   Wound Image   11/13/2018 11:23 AM   Wound Type Wound 11/13/2018 11:23 AM   Wound Other 11/13/2018 11:23 AM   Dressing/Treatment Alginate with Ag;Dry dressing 11/13/2018 11:23 AM   Wound Cleansed Rinsed/Irrigated with saline 11/13/2018 11:23 AM   Wound Length (cm) 3 cm 11/29/2018 10:06 AM   Wound Width (cm) 1.5 cm 11/29/2018 10:06 AM   Wound Depth (cm)  0.5 11/29/2018 10:06 AM   Calculated Wound Size (cm^2) (l*w) 4.5 cm^2 11/29/2018 10:06 AM   Change in Wound Size % (l*w) 11.76 11/29/2018 10:06 AM   Distance Tunneling (cm) 4 cm 11/13/2018 11:23 AM   Tunneling Position ___ O'Clock 12 11/13/2018 11:23 AM   Wound Assessment Granulation tissue;Pink 11/29/2018 10:06 AM   Drainage Amount Small 11/29/2018 10:06 AM   Drainage Description Serosanguinous 11/29/2018 10:06 AM   Odor None 11/29/2018 10:06 AM   Shweta-wound Assessment Clean;Dry 11/29/2018 10:06 AM   [place]%Wound Bed 100 11/29/2018 10:06 AM   Red%Wound Bed 0 11/13/2018 11:23 AM   Yellow%Wound Bed 0 11/13/2018 11:23 AM   Black%Wound Bed 0 11/13/2018 11:23 AM   Purple%Wound Bed 0 11/13/2018 11:23 AM   Op First Treatment Date 11/13/18 11/13/2018 11:23 AM   Number of days: 19        Elimination:  Continence:   · Bowel:  Yes  · Bladder: Yes  Urinary Catheter: None   Colostomy/Ileostomy/Ileal Conduit: Yes  Colostomy LLQ-Stomal Appliance: 1 piece, Clean, Dry, Intact  Colostomy LLQ-Stoma  Assessment: Pink, Moist  Colostomy LLQ-Mucocutaneous Junction: 11/28/18 at 2:49 PM    PHYSICIAN SECTION    Prognosis: Fair    Condition at Discharge: Stable    Rehab Potential (if transferring to Rehab): Good    Recommended Labs or Other Treatments After Discharge:  Check BMP, magnesium level in 1 wk. Needs outpt appt with Dr Nelly Morales (ortho) in 2 wk    Physician Certification: I certify the above information and transfer of Suleiman Magana  is necessary for the continuing treatment of the diagnosis listed and that she requires [place] for greater 30 days.      Update Admission H&P: No change in H&P    PHYSICIAN SIGNATURE:  Electronically signed by Constancia Nageotte, MD on 11/30/18 at 8:27 AM

## 2018-11-28 NOTE — ED NOTES
Report called to [facility] MARIA DEL ROSARIO AMAYA RN.  Awaiting portable tele     Andry Huerta RN  11/27/18 2009

## 2018-11-28 NOTE — PLAN OF CARE
Problem: Falls - Risk of:  Goal: Will remain free from falls  Will remain free from falls   Outcome: Ongoing  High fall risk. Patient remains free from falls. Patient encouraged to use call light with any needs. Patient states understanding, call light in reach, bed alarm on. Problem: Risk for Impaired Skin Integrity  Goal: Tissue integrity - skin and mucous membranes  Structural intactness and normal physiological function of skin and  mucous membranes. Outcome: Ongoing  Patient repositions self in bed. No signs of skin breakdown. Will continue to monitor. Problem: Pain:  Goal: Control of acute pain  Control of acute pain   Outcome: Ongoing  PRN dilaudid dose increased to 1 mg. Given as ordered.

## 2018-11-28 NOTE — H&P
History and Physical  Dr. Aleksandra Avendaño  11/27/2018    PCP: Lisa Cerna MD    Cc:   Chief Complaint   Patient presents with   Yomi Georges     pt brought in by squad from home. pt fell yesterday from slipping on ice. pt doesnt remember if she hit her head. pt is on blood thinners. pt having headache and left leg pain. HPI:  Nisa Andrews is a 59 y.o. female who has a past medical history of Arthritis; Blood transfusion reaction; Crohn's disease ([address] GERD (gastroesophageal reflux disease); Hiatal hernia; Hypertension; MRSA (methicillin resistant staph aureus) culture positive; Pneumonia; and VRE (vancomycin resistant enterococcus) culture positive. Patient presents with Hypomagnesemia. HPI     59 y.o. female presents to the emergency department after injuries from a fall. Patient states late yesterday afternoon she had a fall in the kitchen. She states she had her house slippers on and there was some water on the floor that she didn't see. It caused her to fall and she believes that she fell on a flexed knee on the left-hand side. Patient states that she initially could not get up off of the ground. She reports that she was required to crawl through the kitchen to the chair where she was able to get herself out. Patient states she really didn't think much of it but has had a difficult time independently ambulating. She states the nurse came out to the house to care for her today and noticed that she has significant pain and swelling and suggested she come to the emergency department for evaluation and treatment. Patient states she is having difficulties with movement to her left knee. Patient states that she is anticoagulated with Xarelto because of previous lower extremity DVTs was never had a pulmonary embolism. Patient states that she is unsure if she hit her head yesterday when she fell or not. She states it is a possibility but she cannot recall.   Unfortunate she also has associated headache for food allergies. Neurological: Negative for light-headedness and headaches. Hematological: Bruises/bleeds easily. Psychiatric/Behavioral: Negative for decreased concentration. The patient is not nervous/anxious.             Past Medical History:   Past Medical History:   Diagnosis Date    Arthritis     Blood transfusion reaction     Crohn's disease ([address]     GERD (gastroesophageal reflux disease)     Hiatal hernia 6/24/2014    Hypertension     MRSA (methicillin resistant staph aureus) culture positive 06/05/2018    urine    Pneumonia 1/8/2014    VRE (vancomycin resistant enterococcus) culture positive 10/08/2018    abd culture       Past Surgical History:   Past Surgical History:   Procedure Laterality Date    ABDOMEN SURGERY      ABDOMINAL ADHESION SURGERY  06/08/2018    BLADDER SUSPENSION      COLONOSCOPY      COLONOSCOPY  9/14/15    sigmoid colon biopsy     COLONOSCOPY  08/07/2018    COLOSTOMY N/A 10/8/2018    EXPLORATORY LAPAROTOMY WITH COLOSTOMY performed by Edelmira Treadwell MD at [address] Left 6/25/14    excision plantar left hallux    FOOT SURGERY  6/3/15    PLANTAR PLATE REPAIR BILATERAL, ARTHROTOMY MPJ 2ND BILATERAL    FOOT SURGERY Left 08/05/2002    Excision second metatarsal head left    FRACTURE SURGERY      rt hip    HAND CARPECTOMY Bilateral     HEEL SPUR SURGERY      HERNIA REPAIR      HYSTERECTOMY      bladder surgery    JOINT REPLACEMENT      rt hip, L shoulder replacement 11/2014    KNEE SURGERY Bilateral     arthrosvopic    CT SECD CLOS SURG WND EXTEN/COMPLIC N/A 26/11/7152    SECONDARY WOUND CLOSURE OF ABDOMINAL WOUND performed by Edelmira Treadwell MD at [address]  01/15/2018    with biopsies   [address] UPPER GASTROINTESTINAL ENDOSCOPY  6/14/13    and colonsocopy with antral erosion biopsies       Social History:   Social History     Social History    Marital status:      Spouse name: Aman Carrillo ondansetron (ZOFRAN ODT) 4 MG disintegrating tablet Take 1 tablet by mouth every 6 hours as needed for Nausea or Vomiting 20 tablet 0    aspirin-acetaminophen-caffeine (EXCEDRIN MIGRAINE) 250-250-65 MG per tablet Take 1 tablet by mouth every 6 hours as needed for Headaches      mesalamine (ROWASA) 4 g enema Place 60 mLs rectally nightly 1 enema 3    mesalamine (LIALDA) 1.2 g EC tablet Take 4 tablets by mouth daily (with breakfast) 30 tablet 3    fluticasone (FLONASE) 50 MCG/ACT nasal spray 1 spray by Nasal route daily (Patient taking differently: 1 spray by Nasal route as needed ) 1 Bottle 3    DULoxetine (CYMBALTA) 60 MG extended release capsule Take 60 mg by mouth daily      lidocaine (XYLOCAINE) 5 % ointment Apply topically as needed Apply topically as needed to feet      zolpidem (AMBIEN) 10 MG tablet Take 10 mg by mouth nightly as needed for Sleep .  potassium chloride SA (K-DUR;KLOR-CON M) 20 MEQ tablet Take 1 tablet by mouth 2 times daily.  60 tablet 3    Omeprazole 20 MG TBEC Take 20 mg by mouth daily       busPIRone (BUSPAR) 15 MG tablet Take 30 mg by mouth 2 times daily       rivaroxaban (XARELTO) 20 MG TABS tablet Take 1 tablet by mouth daily (with breakfast) 30 tablet 0         Allergies   Allergen Reactions    Ace Inhibitors Swelling    Skelaxin [Metaxalone] Swelling             EXAM: (2-7 system for EPF/Detailed, ?8 for Comprehensive)  BP (!) 138/92   Pulse 85   Temp 98.9 °F (37.2 °C) (Oral)   Resp 18   Ht 5' 6\" (1.676 m)   Wt 237 lb (107.5 kg)   SpO2 100%   BMI 38.25 kg/m²   Constitutional: vitals as above: alert, appears stated age and cooperative  Psychiatric: normal insight and judgment, oriented to person, place, time, and general circumstances  Head: Normocephalic, without obvious abnormality, atraumatic  Eyes:lids and lashes normal, conjunctivae and sclerae normal and pupils equal, round, reactive to light and accomodation  EMNT: lips normal, external ears normal without HISTORY: ORDERING SYSTEM PROVIDED HISTORY: fall on Xarelto TECHNOLOGIST PROVIDED HISTORY: If patient is on cardiac monitor and/or pulse ox, they may be taken off cardiac monitor and pulse ox, left on O2 if currently on. All monitors reattached when patient returns to room. Ordering Physician Provided Reason for Exam: fall on Xarelto Acuity: Acute Type of Exam: Initial FINDINGS: BONES/ALIGNMENT: No evidence of fracture. Grade 1 anterolisthesis of C4 on C5. DEGENERATIVE CHANGES: Mild multilevel anterior and posterior osteophyte formation. Multilevel facet arthrosis. SOFT TISSUES: There is no prevertebral soft tissue swelling. 8 mm low-density lesion in the left thyroid which does not require follow-up imaging. No acute abnormality of the cervical spine. Mild multilevel degenerative changes. EKG: from ER, reviewed by self - from ER, interpreted by self. Sinus rhythm at 88, occasional PAC seen. No st elevation. TWI noted V1-5. Comparison is made to old ekg in chart dated 10/16/18 - there is change noted, no TWI seen on old study        [address]:    Patient C/[address] Problem List:   Hypomagnesemia (11/27/2018)    Assessment: New Problem to me. Pt with profound hypomagnesemia - assoc with EKG changes. Pt has known hx of low mag, but never this low. She states that she is compliant with po mag oxide    Plan: admit, iv mag sulfate ordered. Monitor on tele. Consult nephrology for assistance with electrolyte management   Essential hypertension, benign (9/13/2015)    Assessment: Established problem. Stable. 138/92    Plan: Pt home BP meds reviewed and will be continued. IV Hydralazine ordered for control of extremely high blood pressures. Will monitor labs to assess Creat/K for possible complications of medications. Hyponatremia (1/5/2018)    Assessment: Established problem. Stable. Actually pretty good for her.  Na still slightly low at 134, but review of old chart shows this to be

## 2018-11-28 NOTE — CONSULTS
repletion --> 0.6 now   · Context (ex: activity at onset or related to condition): Crohn's disease, s/p colectomy with left-sided ostomy; GI loss - reported high output ~6-7 times  · Timing (ex: continuous, intermittent): continous  · Modifying factors (ex: medications, interventions):  Mag repletion  · Associated signs & symptoms (ex: edema, SOB): as above    PAST MEDICAL HISTORY:   Past Medical History:   Diagnosis Date    Arthritis     Blood transfusion reaction     Cro[facility]'s disease ([facility] [address]     GERD (gastroesophageal reflux disease)     Hiatal hernia 6/24/2014    Hypertension     MRSA (methicillin resistant staph aureus) culture positive 06/05/2018    urine    Pneumonia 1/8/2014    VRE (vancomycin resistant enterococcus) culture positive 10/08/2018    abd culture     PAST SURGICAL HISTORY:   Past Surgical History:   Procedure Laterality Date    ABDOMEN SURGERY      ABDOMINAL ADHESION SURGERY  06/08/2018    BLADDER SUSPENSION      COLONOSCOPY      COLONOSCOPY  9/14/15    sigmoid colon biopsy     COLONOSCOPY  08/07/2018    COLOSTOMY N/A 10/8/2018    EXPLORATORY LAPAROTOMY WITH COLOSTOMY performed by Diana Hager MD at [address] Left 6/25/14    excision plantar left hallux    FOOT SURGERY  6/3/15    PLANTAR PLATE REPAIR BILATERAL, ARTHROTOMY MPJ 2ND BILATERAL    FOOT SURGERY Left 08/05/2002    Excision second metatarsal head left    FRACTURE SURGERY      rt hip    HAND CARPECTOMY Bilateral     HEEL SPUR SURGERY      HERNIA REPAIR      HYSTERECTOMY      bladder surgery    JOINT REPLACEMENT      rt hip, L shoulder replacement 11/2014    KNEE SURGERY Bilateral     arthrosvopic    NE SECD CLOS SURG WND EXTEN/COMPLIC N/A 39/94/2761    SECONDARY WOUND CLOSURE OF ABDOMINAL WOUND performed by Diana Hager MD at [address]  01/15/2018    with biopsies    TONSILLECTOMY      UPPER GASTROINTESTINAL ENDOSCOPY  6/14/13    and colonsocopy with antral erosion biopsies     FAMILY HISTORY:   Family History   Problem Relation Age of Onset    High Blood Pressure Mother     High Blood Pressure Father     Kidney Disease Sister      SOCIAL HISTORY:   Social History     Social History    Marital status:      Spouse name: Geoff Guzman Number of children: 2    Years of education: 15     Social History Main Topics    Smoking status: Current Every Day Smoker     Packs/day: 1.00     Years: 10.00     Types: Cigarettes, E-Cigarettes     Last attempt to quit: 10/5/2017    Smokeless tobacco: Never Used    Alcohol use 1.2 oz/week     2 Shots of liquor per week      Comment: occasionally    Drug use: No    Sexual activity: Not Currently     Partners: Male     Other Topics Concern    None     Social History Narrative    None     MEDICATIONS:  Prior to Admission Medications:  Prescriptions Prior to Admission: [] oxyCODONE-acetaminophen (PERCOCET) 5-325 MG per tablet, Take 1 tablet by mouth every 6 hours as needed for Pain for up to 7 days. .  Skin Protectants, Misc.  (LIP BALM) OINT ointment, Apply 1 g topically daily  Urea 15 g PACK, Take 30 g by mouth 2 times daily  simethicone (MYLICON) 80 MG chewable tablet, Take 1 tablet by mouth every 6 hours  sodium chloride 1 g tablet, Take 1 tablet by mouth 2 times daily  rivaroxaban (XARELTO) 20 MG TABS tablet, Take 1 tablet by mouth daily (with breakfast)  baclofen (LIORESAL) 10 MG tablet, Take 1 tablet by mouth 3 times daily  magnesium oxide (MAG-OX) 400 (240 Mg) MG tablet, Take 1 tablet by mouth 2 times daily  atenolol (TENORMIN) 100 MG tablet, Take 1 tablet by mouth daily  ondansetron (ZOFRAN ODT) 4 MG disintegrating tablet, Take 1 tablet by mouth every 6 hours as needed for Nausea or Vomiting  aspirin-acetaminophen-caffeine (EXCEDRIN MIGRAINE) 250-250-65 MG per tablet, Take 1 tablet by mouth every 6 hours as needed for Headaches  mesalamine (ROWASA) 4 g enema, Place 60 mLs rectally nightly  mesalamine (LIALDA) 1.2 hrs:   BP Temp Temp src Pulse Resp SpO2 Height Weight   11/28/18 0610 (!) 143/98 98.4 °F (36.9 °C) Oral 98 18 94 % - -   11/28/18 0408 - - - - - - - 237 lb (107.5 kg)   11/28/18 0002 105/63 99 °F (37.2 °C) Temporal 124 18 94 % - -   11/27/18 2213 - - - - 16 97 % - -   11/27/18 2126 129/81 98.4 °F (36.9 °C) Oral 101 16 96 % - -   11/27/18 1900 (!) 144/106 - - 96 16 98 % - -   11/27/18 1800 130/89 - - - - - - -   11/27/18 1629 (!) 138/92 98.9 °F (37.2 °C) Oral 85 18 100 % 5' 6\" (1.676 m) 237 lb (107.5 kg)       Intake/Output Summary (Last 24 hours) at 11/28/18 0741  Last data filed at 11/28/18 0610   Gross per 24 hour   Intake                0 ml   Output              160 ml   Net             -160 ml       Physical Exam   Constitutional: She is oriented to person, place, and time. She appears well-developed and well-nourished. HENT:   Head: Normocephalic and atraumatic. Eyes: Conjunctivae are normal. No scleral icterus. Neck: Neck supple. No JVD present. Cardiovascular: Normal rate and regular rhythm. Pulmonary/Chest: Effort normal and breath sounds normal.   Abdominal: Bowel sounds are normal. Distention: left-sided ostomy bag. Musculoskeletal: She exhibits no edema or deformity. Neurological: She is alert and oriented to person, place, and time. Skin: Skin is warm and dry. Psychiatric: She has a normal mood and affect. Her behavior is normal.   Vitals reviewed.       DATA:  Diagnostic tests reviewed by me for today's visit:    Recent Labs      11/27/18   1835  11/28/18   0502   WBC  9.7  10.4   HCT  30.1*  28.3*   PLT  424  399     Iron Saturation:  No components found for: PERCENTFE  FERRITIN:    Lab Results   Component Value Date    FERRITIN 67.5 09/27/2018     IRON:    Lab Results   Component Value Date    IRON 13 09/27/2018     TIBC:    Lab Results   Component Value Date    [facility] 190 09/27/2018       Recent Labs      11/27/18   1747  11/28/18   0502   NA  134*  131*   K  3.5  2.8*   CL  92*  91* CO2  26  22   BUN  7  5*   CREATININE  <0.5*  <0.5*     Recent Labs      11/27/18   1747  11/28/18   0502   CALCIUM  7.1*  6.6*   MG  0.50*  1.60*     No results for input(s): PH, PCO2, PO2 in the last 72 hours. Invalid input(s): Ragini Brar    ABG:  No results found for: PH, PCO2, PO2, HCO3, BE, THGB, TCO2, O2SAT  VBG:  Lab Results   Component Value Date    PHVEN 7.548 09/26/2018    QEI5HHC 19.1 09/26/2018    BEVEN -4.7 09/26/2018    J9FYHEKB 100 09/26/2018       LDH:  No results found for: LDH  Uric Acid:    Lab Results   Component Value Date    LABURIC 4.1 09/26/2018       PT/INR:    Lab Results   Component Value Date    PROTIME 31.3 11/27/2018    INR 2.75 11/27/2018     Warfarin PT/INR:  No components found for: PTPATWAR, PTINRWAR  PTT:    Lab Results   Component Value Date    APTT 28.9 10/16/2018   [APTT}  Last 3 Troponin:    Lab Results   Component Value Date    TROPONINI <0.01 11/27/2018    TROPONINI <0.01 10/07/2018    TROPONINI <0.01 06/26/2018       U/A:  Lab Results   Component Value Date    COLORU DK YELLOW 11/28/2018    PROTEINU Negative 11/28/2018    PHUR 5.0 11/28/2018    WBCUA 5 10/07/2018    RBCUA 4 10/07/2018    BACTERIA 1+ 07/03/2018    CLARITYU Clear 11/28/2018    SPECGRAV 1.023 11/28/2018    LEUKOCYTESUR Negative 11/28/2018    UROBILINOGEN 0.2 11/28/2018    BILIRUBINUR Negative 11/28/2018    BLOODU Negative 11/28/2018    GLUCOSEU Negative 11/28/2018           BELOW MENTIONED RADIOLOGY STUDIES REVIEWED BY ME:    Xr Knee Left (1-2 Views)    Result Date: 11/27/2018  EXAMINATION: TWO XRAY VIEWS OF THE LEFT KNEE 11/27/2018 1:46 pm COMPARISON: None.  HISTORY: ORDERING SYSTEM PROVIDED HISTORY: patella tendon rupture TECHNOLOGIST PROVIDED HISTORY: Reason for exam:->patella tendon rupture Ordering Physician Provided Reason for Exam: patella tendon rupture Acuity: Acute Type of Exam: Initial FINDINGS: There is severe tricompartmental degenerative disease, greatest within the medial

## 2018-11-29 ENCOUNTER — APPOINTMENT (OUTPATIENT)
Dept: GENERAL RADIOLOGY | Age: 63
DRG: 425 | End: 2018-11-29
Payer: MEDICAID

## 2018-11-29 PROBLEM — S83.512A LEFT ACL TEAR: Status: ACTIVE | Noted: 2018-11-29

## 2018-11-29 LAB
ANION GAP SERPL CALCULATED.3IONS-SCNC: 11 MMOL/L (ref 3–16)
BASOPHILS ABSOLUTE: 0.1 K/UL (ref 0–0.2)
BASOPHILS RELATIVE PERCENT: 0.9 %
BUN BLDV-MCNC: 6 MG/DL (ref 7–20)
CALCIUM SERPL-MCNC: 7.5 MG/DL (ref 8.3–10.6)
CHLORIDE BLD-SCNC: 95 MMOL/L (ref 99–110)
CO2: 24 MMOL/L (ref 21–32)
CREAT SERPL-MCNC: <0.5 MG/DL (ref 0.6–1.2)
EOSINOPHILS ABSOLUTE: 0.1 K/UL (ref 0–0.6)
EOSINOPHILS RELATIVE PERCENT: 0.8 %
GFR AFRICAN AMERICAN: >60
GFR NON-AFRICAN AMERICAN: >60
GLUCOSE BLD-MCNC: 120 MG/DL (ref 70–99)
HCT VFR BLD CALC: 29.4 % (ref 36–48)
HEMOGLOBIN: 9.8 G/DL (ref 12–16)
LYMPHOCYTES ABSOLUTE: 2.7 K/UL (ref 1–5.1)
LYMPHOCYTES RELATIVE PERCENT: 24.9 %
MAGNESIUM: 1.8 MG/DL (ref 1.8–2.4)
MCH RBC QN AUTO: 29.3 PG (ref 26–34)
MCHC RBC AUTO-ENTMCNC: 33.3 G/DL (ref 31–36)
MCV RBC AUTO: 88 FL (ref 80–100)
MONOCYTES ABSOLUTE: 0.7 K/UL (ref 0–1.3)
MONOCYTES RELATIVE PERCENT: 6.7 %
NEUTROPHILS ABSOLUTE: 7.2 K/UL (ref 1.7–7.7)
NEUTROPHILS RELATIVE PERCENT: 66.7 %
PDW BLD-RTO: 19.6 % (ref 12.4–15.4)
PLATELET # BLD: 415 K/UL (ref 135–450)
PMV BLD AUTO: 6.8 FL (ref 5–10.5)
POTASSIUM SERPL-SCNC: 4.4 MMOL/L (ref 3.5–5.1)
RBC # BLD: 3.34 M/UL (ref 4–5.2)
SODIUM BLD-SCNC: 130 MMOL/L (ref 136–145)
WBC # BLD: 10.8 K/UL (ref 4–11)

## 2018-11-29 PROCEDURE — 6360000002 HC RX W HCPCS: Performed by: INTERNAL MEDICINE

## 2018-11-29 PROCEDURE — 97162 PT EVAL MOD COMPLEX 30 MIN: CPT

## 2018-11-29 PROCEDURE — 97530 THERAPEUTIC ACTIVITIES: CPT

## 2018-11-29 PROCEDURE — G8978 MOBILITY CURRENT STATUS: HCPCS

## 2018-11-29 PROCEDURE — 99231 SBSQ HOSP IP/OBS SF/LOW 25: CPT | Performed by: NURSE PRACTITIONER

## 2018-11-29 PROCEDURE — 83735 ASSAY OF MAGNESIUM: CPT

## 2018-11-29 PROCEDURE — 80048 BASIC METABOLIC PNL TOTAL CA: CPT

## 2018-11-29 PROCEDURE — 6370000000 HC RX 637 (ALT 250 FOR IP): Performed by: INTERNAL MEDICINE

## 2018-11-29 PROCEDURE — G8988 SELF CARE GOAL STATUS: HCPCS

## 2018-11-29 PROCEDURE — 2580000003 HC RX 258: Performed by: INTERNAL MEDICINE

## 2018-11-29 PROCEDURE — 74019 RADEX ABDOMEN 2 VIEWS: CPT

## 2018-11-29 PROCEDURE — 36415 COLL VENOUS BLD VENIPUNCTURE: CPT

## 2018-11-29 PROCEDURE — 1200000000 HC SEMI PRIVATE

## 2018-11-29 PROCEDURE — G8987 SELF CARE CURRENT STATUS: HCPCS

## 2018-11-29 PROCEDURE — 97166 OT EVAL MOD COMPLEX 45 MIN: CPT

## 2018-11-29 PROCEDURE — 85025 COMPLETE CBC W/AUTO DIFF WBC: CPT

## 2018-11-29 PROCEDURE — G8979 MOBILITY GOAL STATUS: HCPCS

## 2018-11-29 RX ORDER — DULOXETIN HYDROCHLORIDE 60 MG/1
60 CAPSULE, DELAYED RELEASE ORAL 2 TIMES DAILY
Status: DISCONTINUED | OUTPATIENT
Start: 2018-11-29 | End: 2018-12-02 | Stop reason: HOSPADM

## 2018-11-29 RX ADMIN — Medication 1 G: at 09:30

## 2018-11-29 RX ADMIN — DULOXETINE HYDROCHLORIDE 60 MG: 60 CAPSULE, DELAYED RELEASE ORAL at 09:28

## 2018-11-29 RX ADMIN — Medication 400 MG: at 09:27

## 2018-11-29 RX ADMIN — HYDROMORPHONE HYDROCHLORIDE 1 MG: 1 INJECTION, SOLUTION INTRAMUSCULAR; INTRAVENOUS; SUBCUTANEOUS at 11:29

## 2018-11-29 RX ADMIN — ENOXAPARIN SODIUM 105 MG: 150 INJECTION SUBCUTANEOUS at 13:45

## 2018-11-29 RX ADMIN — BUSPIRONE HYDROCHLORIDE 30 MG: 5 TABLET ORAL at 09:27

## 2018-11-29 RX ADMIN — HYDROMORPHONE HYDROCHLORIDE 1 MG: 1 INJECTION, SOLUTION INTRAMUSCULAR; INTRAVENOUS; SUBCUTANEOUS at 00:16

## 2018-11-29 RX ADMIN — Medication 10 ML: at 22:36

## 2018-11-29 RX ADMIN — ZOLPIDEM TARTRATE 10 MG: 10 TABLET, COATED ORAL at 22:36

## 2018-11-29 RX ADMIN — BACLOFEN 10 MG: 10 TABLET ORAL at 20:49

## 2018-11-29 RX ADMIN — SIMETHICONE CHEW TAB 80 MG 80 MG: 80 TABLET ORAL at 09:28

## 2018-11-29 RX ADMIN — ENOXAPARIN SODIUM 105 MG: 150 INJECTION SUBCUTANEOUS at 20:44

## 2018-11-29 RX ADMIN — MESALAMINE 1600 MG: 400 CAPSULE, DELAYED RELEASE ORAL at 09:27

## 2018-11-29 RX ADMIN — POTASSIUM CHLORIDE 20 MEQ: 1500 TABLET, EXTENDED RELEASE ORAL at 09:29

## 2018-11-29 RX ADMIN — OXYCODONE AND ACETAMINOPHEN 2 TABLET: 5; 325 TABLET ORAL at 09:28

## 2018-11-29 RX ADMIN — SODIUM CHLORIDE TAB 1 GM 1 G: 1 TAB at 20:48

## 2018-11-29 RX ADMIN — POTASSIUM CHLORIDE 20 MEQ: 1500 TABLET, EXTENDED RELEASE ORAL at 20:45

## 2018-11-29 RX ADMIN — FUROSEMIDE 40 MG: 40 TABLET ORAL at 09:28

## 2018-11-29 RX ADMIN — ATENOLOL 100 MG: 50 TABLET ORAL at 09:28

## 2018-11-29 RX ADMIN — BUSPIRONE HYDROCHLORIDE 30 MG: 5 TABLET ORAL at 20:47

## 2018-11-29 RX ADMIN — PANTOPRAZOLE SODIUM 40 MG: 40 TABLET, DELAYED RELEASE ORAL at 06:27

## 2018-11-29 RX ADMIN — ZOLPIDEM TARTRATE 10 MG: 10 TABLET, COATED ORAL at 00:24

## 2018-11-29 RX ADMIN — BACLOFEN 10 MG: 10 TABLET ORAL at 13:40

## 2018-11-29 RX ADMIN — OXYCODONE AND ACETAMINOPHEN 2 TABLET: 5; 325 TABLET ORAL at 19:33

## 2018-11-29 RX ADMIN — Medication 10 ML: at 09:29

## 2018-11-29 RX ADMIN — SIMETHICONE CHEW TAB 80 MG 80 MG: 80 TABLET ORAL at 13:40

## 2018-11-29 RX ADMIN — HYDROMORPHONE HYDROCHLORIDE 1 MG: 1 INJECTION, SOLUTION INTRAMUSCULAR; INTRAVENOUS; SUBCUTANEOUS at 06:26

## 2018-11-29 RX ADMIN — HYDROMORPHONE HYDROCHLORIDE 1 MG: 1 INJECTION, SOLUTION INTRAMUSCULAR; INTRAVENOUS; SUBCUTANEOUS at 15:59

## 2018-11-29 RX ADMIN — BACLOFEN 10 MG: 10 TABLET ORAL at 09:27

## 2018-11-29 RX ADMIN — Medication 400 MG: at 20:48

## 2018-11-29 RX ADMIN — SODIUM CHLORIDE TAB 1 GM 1 G: 1 TAB at 09:28

## 2018-11-29 RX ADMIN — HYDROMORPHONE HYDROCHLORIDE 1 MG: 1 INJECTION, SOLUTION INTRAMUSCULAR; INTRAVENOUS; SUBCUTANEOUS at 22:37

## 2018-11-29 RX ADMIN — SIMETHICONE CHEW TAB 80 MG 80 MG: 80 TABLET ORAL at 20:49

## 2018-11-29 RX ADMIN — FLUTICASONE PROPIONATE 1 SPRAY: 50 SPRAY, METERED NASAL at 09:29

## 2018-11-29 ASSESSMENT — PAIN DESCRIPTION - DESCRIPTORS
DESCRIPTORS: SHOOTING
DESCRIPTORS: SHOOTING
DESCRIPTORS: THROBBING
DESCRIPTORS: THROBBING
DESCRIPTORS: SHOOTING
DESCRIPTORS: THROBBING

## 2018-11-29 ASSESSMENT — PAIN DESCRIPTION - ORIENTATION
ORIENTATION: RIGHT;LEFT
ORIENTATION: LEFT
ORIENTATION: LEFT
ORIENTATION: RIGHT;LEFT
ORIENTATION: RIGHT;LEFT
ORIENTATION: LEFT
ORIENTATION: RIGHT;LEFT
ORIENTATION: LEFT
ORIENTATION: RIGHT;LEFT

## 2018-11-29 ASSESSMENT — PAIN DESCRIPTION - PAIN TYPE
TYPE: ACUTE PAIN
TYPE: ACUTE PAIN;CHRONIC PAIN
TYPE: ACUTE PAIN
TYPE: ACUTE PAIN;CHRONIC PAIN
TYPE: ACUTE PAIN
TYPE: ACUTE PAIN;CHRONIC PAIN
TYPE: ACUTE PAIN;CHRONIC PAIN

## 2018-11-29 ASSESSMENT — PAIN DESCRIPTION - PROGRESSION: CLINICAL_PROGRESSION: GRADUALLY IMPROVING

## 2018-11-29 ASSESSMENT — PAIN SCALES - GENERAL
PAINLEVEL_OUTOF10: 10
PAINLEVEL_OUTOF10: 9
PAINLEVEL_OUTOF10: 7
PAINLEVEL_OUTOF10: 9
PAINLEVEL_OUTOF10: 9
PAINLEVEL_OUTOF10: 10
PAINLEVEL_OUTOF10: 7
PAINLEVEL_OUTOF10: 10
PAINLEVEL_OUTOF10: 7
PAINLEVEL_OUTOF10: 10
PAINLEVEL_OUTOF10: 9
PAINLEVEL_OUTOF10: 10

## 2018-11-29 ASSESSMENT — PAIN DESCRIPTION - LOCATION
LOCATION: KNEE
LOCATION: KNEE;ABDOMEN

## 2018-11-29 ASSESSMENT — PAIN DESCRIPTION - FREQUENCY
FREQUENCY: CONTINUOUS

## 2018-11-29 NOTE — PROGRESS NOTES
Bearing  Required Braces or Orthoses?: Yes (L knee immobilizer)  Lower Extremity Weight Bearing Restrictions  Left Lower Extremity Weight Bearing: Weight Bearing As Tolerated  Position Activity Restriction  Other position/activity restrictions: This is a  59 y.o. female presents to the emergency department after injuries from a fall. Patient states late yesterday afternoon she had a fall in the kitchen. She states she had her house slippers on and there was some water on the floor that she didn't see. It caused her to fall and she believes that she fell on a flexed knee on the left-hand side. Patient states that she initially could not get up off of the ground. She reports that she was required to crawl through the kitchen to the chair where she was able to get herself out. Patient states she really didn't think much of it but has had a difficult time independently ambulating. She states the nurse came out to the house to care for her today and noticed that she has significant pain and swelling and suggested she come to the emergency department for evaluation and treatment. Patient states she is having difficulties with movement to her left knee. Patient states that she is anticoagulated with Xarelto because of previous lower extremity DVTs was never had a pulmonary embolism. Patient states that she is unsure if she hit her head yesterday when she fell or not. She states it is a possibility but she cannot recall. Unfortunate she also has associated headache pain. She is describing diffuse frontal headache pain that rates to be 8 out of 10. She states her knee is more painful at 9 out of 10. Patient goes on to report she's done nothing for the above-mentioned. Upon further questioning she denies prodromal symptoms prior to the fall. She denies that she is experiencing difficulties as a pertains to chest pain, palpitations, lightheadedness, shortness of breath.   MRI findings: ACL and PCL tear- IADLs  Assistance / Modification: Unable to transfer or ambulate this date (refused during evaluation)  Patient Education: Eval, POC, discharge recommednations  REQUIRES OT FOLLOW UP: Yes  Activity Tolerance  Activity Tolerance: Patient limited by pain  Safety Devices  Safety Devices in place: Yes  Type of devices: All fall risk precautions in place; Bed alarm in place;Call light within reach; Patient at risk for falls; Left in bed;Nurse notified         Plan   Plan  Times per week: 5-7  Times per day: Daily  Current Treatment Recommendations: Strengthening, Balance Training, Functional Mobility Training, Endurance Training, Safety Education & Training, Self-Care / ADL, Wheelchair Mobility Training, Home Management Training, Patient/Caregiver Education & Training, Equipment Evaluation, Education, & procurement    G-Code  OT G-codes  Functional Limitation: Self care  Self Care Current Status (): At least 60 percent but less than 80 percent impaired, limited or restricted  Self Care Goal Status ():  At least 20 percent but less than 40 percent impaired, limited or restricted    AM-PAC Score        AM-[facility] Inpatient Daily Activity Raw Score: 15  AM-PAC Inpatient ADL T-Scale Score : 34.69  ADL Inpatient CMS 0-100% Score: 56.46  ADL Inpatient CMS G-Code Modifier : CK    Goals  Short term goals  Time Frame for Short term goals: Discharge  Short term goal 1: Patient will perform bed mobility Yamile  Short term goal 2: Patient will tolerate functional ADL transfer Yamile  Short term goal 3: Patient will bathe Yamile  Short term goal 4: Patient will dress Marda Duster term goals  Time Frame for Long term goals : LTG=STG  Patient Goals   Patient goals : Home       Therapy Time   Individual Concurrent Group Co-treatment   Time In 1145         Time Out 1224         Minutes 39              Timed Code Treatment Minutes:   24    Total Treatment Minutes:  Ren Booth OTR/L [id number]

## 2018-11-29 NOTE — PROGRESS NOTES
quad tendon and patellar tendon. Vision/Hearing  Vision: Within Functional Limits  Hearing: Within functional limits       Subjective  General  Chart Reviewed: Yes  Additional Pertinent Hx: HTN, arthritis, GERD, DVTs  Family / Caregiver Present: No  Follows Commands: Within Functional Limits  Subjective  Subjective: Pt sitting up in bed upon arrival. RN just administered pain meds. Pt states she cannot put weight on either leg to get to the chair but is agreeable to attempt sitting up on EOB. Pt reporting 10/10 pain in BLE. Pain Screening  Patient Currently in Pain: Yes  Pain Assessment  Pain Assessment: 0-10  Pain Level: 10  Pain Type: Acute pain;Chronic pain  Pain Location: Knee  Pain Orientation: Right;Left  Pain Intervention(s): Medication (see eMar);Repositioned;Distraction  Vital Signs  Patient Currently in Pain: Yes     Orientation  Orientation  Overall Orientation Status: Within Functional Limits     Social/Functional History  Social/Functional History  Lives With: Family (, daughter, 6 small dogs)  Type of Home: House  Home Access: Stairs to enter without rails  Entrance Stairs - Number of Steps: 1 DEON  Bathroom Shower/Tub: Tub/Shower unit  Bathroom Toilet: Bedside commode  Bathroom Equipment: Commode  Home Equipment: Cane, Rolling walker  Receives Help From: Family  ADL Assistance: Needs assistance (Pt takes sponge baths; daughter helps with colostomy bag; pt bathes and dresses on her own, however has help for shoes and socks)  Ambulation Assistance: Independent  Transfer Assistance: Independent  Active : No  Patient's  Info: Pt has had 6 recent surgeries so she has not driven in awhile  Additional Comments: Gabino Sayres outside in the front of the house about 2 weeks ago. Denies any other falls in the last 6 months- states \"maybe a few stumbles\".     Cognition        Objective  AROM RLE (degrees)  RLE AROM: WFL  AROM LLE (degrees)  LLE AROM : Exceptions  LLE General AROM: LLE in knee

## 2018-11-29 NOTE — PLAN OF CARE
Problem: Pain:  Goal: Pain level will decrease  Pain level will decrease   Outcome: Ongoing  Patient states her pain relief is much better than last night. Sleeping between assessments. Will continue to monitor.

## 2018-11-29 NOTE — CONSULTS
PT/INR:    Lab Results   Component Value Date    PROTIME 31.3 11/27/2018    INR 2.75 11/27/2018     Warfarin PT/INR:  No components found for: Korey Velazquez  PTT:    Lab Results   Component Value Date    APTT 28.9 10/16/2018   [APTT}  Last 3 Troponin:    Lab Results   Component Value Date    TROPONINI <0.01 11/27/2018    TROPONINI <0.01 10/07/2018    TROPONINI <0.01 06/26/2018       U/A:    Lab Results   Component Value Date    COLORU DK YELLOW 11/28/2018    PROTEINU Negative 11/28/2018    PHUR 5.0 11/28/2018    WBCUA 5 10/07/2018    RBCUA 4 10/07/2018    BACTERIA 1+ 07/03/2018    CLARITYU Clear 11/28/2018    SPECGRAV 1.023 11/28/2018    LEUKOCYTESUR Negative 11/28/2018    UROBILINOGEN 0.2 11/28/2018    BILIRUBINUR Negative 11/28/2018    BLOODU Negative 11/28/2018    GLUCOSEU Negative 11/28/2018           BELOW MENTIONED RADIOLOGY STUDIES REVIEWED BY ME:    Xr Knee Left (1-2 Views)    Result Date: 11/27/2018  EXAMINATION: TWO XRAY VIEWS OF THE LEFT KNEE 11/27/2018 1:46 pm COMPARISON: None. HISTORY: ORDERING SYSTEM PROVIDED HISTORY: patella tendon rupture TECHNOLOGIST PROVIDED HISTORY: Reason for exam:->patella tendon rupture Ordering Physician Provided Reason for Exam: patella tendon rupture Acuity: Acute Type of Exam: Initial FINDINGS: There is severe tricompartmental degenerative disease, greatest within the medial compartment, characterized by osteophytosis and severe joint space narrowing. There is bony hypertrophy along the superior margin the patella, probably from remote injury. There is evidence patella payal, with an Insall-Salvati ratio of approximately 1.5, but the patellar tendon shadow is normal in appearance, without surrounding fat stranding. The quadriceps tendon is ill-defined, and there is likely soft tissue swelling in the suprapatellar region.      Evidence of patella payal, but the patellar tendon shadow does not appear to be grossly abnormal (although radiographs are insensitive for the administration of intravenous contrast. Multiplanar reformatted images are provided for review. Dose modulation, iterative reconstruction, and/or weight based adjustment of the mA/kV was utilized to reduce the radiation dose to as low as reasonably achievable. COMPARISON: CT 06/26/2018 HISTORY: ORDERING SYSTEM PROVIDED HISTORY: fall on Xarelto TECHNOLOGIST PROVIDED HISTORY: If patient is on cardiac monitor and/or pulse ox, they may be taken off cardiac monitor and pulse ox, left on O2 if currently on. All monitors reattached when patient returns to room. Ordering Physician Provided Reason for Exam: fall on Xarelto Acuity: Acute Type of Exam: Initial FINDINGS: BONES/ALIGNMENT: No evidence of fracture. Grade 1 anterolisthesis of C4 on C5. DEGENERATIVE CHANGES: Mild multilevel anterior and posterior osteophyte formation. Multilevel facet arthrosis. SOFT TISSUES: There is no prevertebral soft tissue swelling. 8 mm low-density lesion in the left thyroid which does not require follow-up imaging. No acute abnormality of the cervical spine. Mild multilevel degenerative changes.

## 2018-11-29 NOTE — PROGRESS NOTES
(Bilateral); Hand Carpectomy (Bilateral); hernia repair; Abdomen surgery; bladder suspension; fracture surgery; Heel spur surgery; Tonsillectomy; Colonoscopy; Upper gastrointestinal endoscopy (6/14/13); Foot surgery (Left, 6/25/14); Foot surgery (6/3/15); Colonoscopy (9/14/15); Foot surgery (Left, 08/05/2002); joint replacement; Sigmoidoscopy (01/15/2018); Abdominal adhesion surgery (06/08/2018); Colonoscopy (08/07/2018); colostomy (N/A, 10/8/2018); and pr secd clos surg wnd exten/complic (N/A, 12/59/2408). . She  reports that she has been smoking Cigarettes and E-Cigarettes. She has a 10.00 pack-year smoking history. She has never used smokeless tobacco. She reports that she drinks about 1.2 oz of alcohol per week . She reports that she does not use drugs. .        Active Hospital Problems    Diagnosis Date Noted    Patellar tendon rupture [S86.819A] 11/27/2018    Hypomagnesemia [E83.42] 11/27/2018    DVT (deep venous thrombosis) (HCC) [I82.409] 07/03/2018    Hyponatremia [E87.1] 01/05/2018    Hypokalemia [E87.6] 01/05/2018    Essential hypertension, benign [I10] 09/13/2015       Current Facility-Administered Medications: HYDROmorphone (DILAUDID) injection 1 mg, 1 mg, Intravenous, Q4H PRN  busPIRone (BUSPAR) tablet 30 mg, 30 mg, Oral, BID  potassium chloride (KLOR-CON M) extended release tablet 20 mEq, 20 mEq, Oral, BID  zolpidem (AMBIEN) tablet 10 mg, 10 mg, Oral, Nightly PRN  DULoxetine (CYMBALTA) extended release capsule 60 mg, 60 mg, Oral, Daily  fluticasone (FLONASE) 50 MCG/ACT nasal spray 1 spray, 1 spray, Nasal, Daily  mesalamine (ROWASA) enema 4 g, 4 g, Rectal, Nightly  aspirin-acetaminophen-caffeine (EXCEDRIN MIGRAINE) per tablet 1 tablet, 1 tablet, Oral, Q6H PRN  ondansetron (ZOFRAN-ODT) disintegrating tablet 4 mg, 4 mg, Oral, Q6H PRN  baclofen (LIORESAL) tablet 10 mg, 10 mg, Oral, TID  magnesium oxide (MAG-OX) tablet 400 mg, 400 mg, Oral, BID  atenolol (TENORMIN) tablet 100 mg, 100 mg, Oral, evidence patella payal, with an Insall-Salvati ratio of approximately 1.5, but the patellar tendon shadow is normal in appearance, without surrounding fat stranding. The quadriceps tendon is ill-defined, and there is likely soft tissue swelling in the suprapatellar region. Evidence of patella payal, but the patellar tendon shadow does not appear to be grossly abnormal (although radiographs are insensitive for tendon pathology). If there is concern for patellar tendon tear, nonemergent but prompt MRI should be considered. Severe tricompartmental degenerative disease, greatest within the medial compartment. Ill definition of the quadriceps tendon. Correlate with clinical evidence of tendinopathy or tearing. Ct Head Wo Contrast    Result Date: 11/27/2018  EXAMINATION: CT OF THE HEAD WITHOUT CONTRAST  11/27/2018 5:08 pm TECHNIQUE: CT of the head was performed without the administration of intravenous contrast. Dose modulation, iterative reconstruction, and/or weight based adjustment of the mA/kV was utilized to reduce the radiation dose to as low as reasonably achievable. COMPARISON: 06/26/2018. HISTORY: ORDERING SYSTEM PROVIDED HISTORY: fall TECHNOLOGIST PROVIDED HISTORY: If patient is on cardiac monitor and/or pulse ox, they may be taken off cardiac monitor and pulse ox, left on O2 if currently on. All monitors reattached when patient returns to room. Has a \"code stroke\" or \"stroke alert\" been called? ->No Ordering Physician Provided Reason for Exam: fall on Xarelto Acuity: Acute Type of Exam: Initial Headache. FINDINGS: BRAIN/VENTRICLES: There is no acute intracranial hemorrhage, mass effect or midline shift. No abnormal extra-axial fluid collection. The gray-white differentiation is maintained without evidence of an acute infarct. There is no evidence of hydrocephalus. There is physiologic calcification within the basal ganglia.  ORBITS: The visualized portion of the orbits demonstrate no acute and tibial plateau. Moderate to large popliteal cyst.  Extensive subcutaneous edema about the knee. Large knee effusion and synovitis. BONE MARROW: Degenerative marrow signal changes are present. No evidence for osseous contusion, fracture, or suspicious marrow occupying lesion. Bulky marginal osteophytes are identified within all 3 compartments of the knee. 1. No acute fracture identified. 2. Maceration of the posterior horn and body of the medial meniscus, likely chronic. 3. Indistinct appearance of the posterior root attachment of the lateral meniscus with irregular signal seen diffusely throughout the lateral meniscus likely reflecting tear of the posterior root. Findings may also be chronic. 4. Complete tears of the anterior and posterior cruciate ligaments. 5. Tendinosis of the extensor mechanism. Fluid signal identified within the distal fibers of the quadriceps tendon which may reflect partial tearing. 6. Severe tricompartmental osteoarthritis of the knee with underlying grade 3 and 4 chondromalacia involving all 3 compartments. 7. Diffuse nonspecific subcutaneous edema. 8. Large knee effusion and synovitis. 9. Large popliteal cyst.       Assessment and Plan:  Patient Active Hospital Problem List:   Hypomagnesemia (11/27/2018)    Assessment: Established problem. Improving.  1,8 now    Plan: cont oral Mag Ox. louis defer to Renal if higher dose needed as outpt   Essential hypertension, benign (9/13/2015)    Assessment: Established problem. Stable. 132/87    Plan: cont home meds   Hyponatremia (1/5/2018)    Assessment: Established problem. Stable. 130 this am    Plan: Continue present orders/plan. Hypokalemia (1/5/2018)    Assessment: Established problem, now resolved. k 4.4 this am    Plan: Continue present orders/plan. DVT (deep venous thrombosis) ([facility] [address] (7/3/2018)    Assessment: Established problem. Stable.      Plan: holding anticoagulation   L ACL Tear (11/27/2018)    Assessment: New Problem

## 2018-11-30 LAB
ANION GAP SERPL CALCULATED.3IONS-SCNC: 9 MMOL/L (ref 3–16)
BASOPHILS ABSOLUTE: 0 K/UL (ref 0–0.2)
BASOPHILS RELATIVE PERCENT: 0.5 %
BUN BLDV-MCNC: 9 MG/DL (ref 7–20)
CALCIUM SERPL-MCNC: 9.1 MG/DL (ref 8.3–10.6)
CHLORIDE BLD-SCNC: 96 MMOL/L (ref 99–110)
CO2: 24 MMOL/L (ref 21–32)
CREAT SERPL-MCNC: <0.5 MG/DL (ref 0.6–1.2)
EOSINOPHILS ABSOLUTE: 0.3 K/UL (ref 0–0.6)
EOSINOPHILS RELATIVE PERCENT: 2.5 %
GFR AFRICAN AMERICAN: >60
GFR NON-AFRICAN AMERICAN: >60
GLUCOSE BLD-MCNC: 109 MG/DL (ref 70–99)
HCT VFR BLD CALC: 28.1 % (ref 36–48)
HEMOGLOBIN: 9.1 G/DL (ref 12–16)
LYMPHOCYTES ABSOLUTE: 1.4 K/UL (ref 1–5.1)
LYMPHOCYTES RELATIVE PERCENT: 14.4 %
MCH RBC QN AUTO: 29.1 PG (ref 26–34)
MCHC RBC AUTO-ENTMCNC: 32.3 G/DL (ref 31–36)
MCV RBC AUTO: 90 FL (ref 80–100)
MONOCYTES ABSOLUTE: 0.9 K/UL (ref 0–1.3)
MONOCYTES RELATIVE PERCENT: 8.7 %
NEUTROPHILS ABSOLUTE: 7.3 K/UL (ref 1.7–7.7)
NEUTROPHILS RELATIVE PERCENT: 73.9 %
PDW BLD-RTO: 19.2 % (ref 12.4–15.4)
PLATELET # BLD: 379 K/UL (ref 135–450)
PMV BLD AUTO: 6.7 FL (ref 5–10.5)
POTASSIUM SERPL-SCNC: 4.9 MMOL/L (ref 3.5–5.1)
RBC # BLD: 3.12 M/UL (ref 4–5.2)
SODIUM BLD-SCNC: 129 MMOL/L (ref 136–145)
WBC # BLD: 9.8 K/UL (ref 4–11)

## 2018-11-30 PROCEDURE — 6370000000 HC RX 637 (ALT 250 FOR IP): Performed by: INTERNAL MEDICINE

## 2018-11-30 PROCEDURE — 80048 BASIC METABOLIC PNL TOTAL CA: CPT

## 2018-11-30 PROCEDURE — 2580000003 HC RX 258: Performed by: INTERNAL MEDICINE

## 2018-11-30 PROCEDURE — 85025 COMPLETE CBC W/AUTO DIFF WBC: CPT

## 2018-11-30 PROCEDURE — 1200000000 HC SEMI PRIVATE

## 2018-11-30 PROCEDURE — 97530 THERAPEUTIC ACTIVITIES: CPT

## 2018-11-30 PROCEDURE — 36415 COLL VENOUS BLD VENIPUNCTURE: CPT

## 2018-11-30 PROCEDURE — 6360000002 HC RX W HCPCS: Performed by: INTERNAL MEDICINE

## 2018-11-30 PROCEDURE — G8987 SELF CARE CURRENT STATUS: HCPCS

## 2018-11-30 RX ORDER — ZOLPIDEM TARTRATE 10 MG/1
10 TABLET ORAL NIGHTLY PRN
Qty: 5 TABLET | Refills: 0 | Status: SHIPPED | OUTPATIENT
Start: 2018-11-30 | End: 2018-12-05

## 2018-11-30 RX ORDER — OXYCODONE HYDROCHLORIDE AND ACETAMINOPHEN 5; 325 MG/1; MG/1
2 TABLET ORAL EVERY 6 HOURS PRN
Qty: 40 TABLET | Refills: 0 | Status: SHIPPED | OUTPATIENT
Start: 2018-11-30 | End: 2018-12-05

## 2018-11-30 RX ORDER — FAMOTIDINE 20 MG/1
20 TABLET, FILM COATED ORAL 2 TIMES DAILY PRN
Qty: 60 TABLET | Refills: 3
Start: 2018-11-30 | End: 2019-06-28 | Stop reason: ALTCHOICE

## 2018-11-30 RX ADMIN — FUROSEMIDE 40 MG: 40 TABLET ORAL at 10:19

## 2018-11-30 RX ADMIN — POTASSIUM CHLORIDE 20 MEQ: 1500 TABLET, EXTENDED RELEASE ORAL at 20:24

## 2018-11-30 RX ADMIN — OXYCODONE AND ACETAMINOPHEN 2 TABLET: 5; 325 TABLET ORAL at 01:17

## 2018-11-30 RX ADMIN — DULOXETINE HYDROCHLORIDE 60 MG: 60 CAPSULE, DELAYED RELEASE ORAL at 00:07

## 2018-11-30 RX ADMIN — OXYCODONE AND ACETAMINOPHEN 2 TABLET: 5; 325 TABLET ORAL at 22:48

## 2018-11-30 RX ADMIN — ZOLPIDEM TARTRATE 10 MG: 10 TABLET, COATED ORAL at 22:52

## 2018-11-30 RX ADMIN — OXYCODONE AND ACETAMINOPHEN 2 TABLET: 5; 325 TABLET ORAL at 10:15

## 2018-11-30 RX ADMIN — MESALAMINE 1600 MG: 400 CAPSULE, DELAYED RELEASE ORAL at 15:08

## 2018-11-30 RX ADMIN — DULOXETINE HYDROCHLORIDE 60 MG: 60 CAPSULE, DELAYED RELEASE ORAL at 20:24

## 2018-11-30 RX ADMIN — Medication 10 ML: at 20:23

## 2018-11-30 RX ADMIN — MESALAMINE 1600 MG: 400 CAPSULE, DELAYED RELEASE ORAL at 10:20

## 2018-11-30 RX ADMIN — Medication 400 MG: at 20:24

## 2018-11-30 RX ADMIN — BACLOFEN 10 MG: 10 TABLET ORAL at 15:08

## 2018-11-30 RX ADMIN — Medication 10 ML: at 12:53

## 2018-11-30 RX ADMIN — SIMETHICONE CHEW TAB 80 MG 80 MG: 80 TABLET ORAL at 15:08

## 2018-11-30 RX ADMIN — SIMETHICONE CHEW TAB 80 MG 80 MG: 80 TABLET ORAL at 20:25

## 2018-11-30 RX ADMIN — SIMETHICONE CHEW TAB 80 MG 80 MG: 80 TABLET ORAL at 02:19

## 2018-11-30 RX ADMIN — ENOXAPARIN SODIUM 105 MG: 150 INJECTION SUBCUTANEOUS at 20:22

## 2018-11-30 RX ADMIN — Medication 1 G: at 10:19

## 2018-11-30 RX ADMIN — BUSPIRONE HYDROCHLORIDE 30 MG: 5 TABLET ORAL at 10:54

## 2018-11-30 RX ADMIN — DULOXETINE HYDROCHLORIDE 60 MG: 60 CAPSULE, DELAYED RELEASE ORAL at 10:19

## 2018-11-30 RX ADMIN — HYDROMORPHONE HYDROCHLORIDE 1 MG: 1 INJECTION, SOLUTION INTRAMUSCULAR; INTRAVENOUS; SUBCUTANEOUS at 12:53

## 2018-11-30 RX ADMIN — SODIUM CHLORIDE TAB 1 GM 1 G: 1 TAB at 10:18

## 2018-11-30 RX ADMIN — POTASSIUM CHLORIDE 20 MEQ: 1500 TABLET, EXTENDED RELEASE ORAL at 10:19

## 2018-11-30 RX ADMIN — PANTOPRAZOLE SODIUM 40 MG: 40 TABLET, DELAYED RELEASE ORAL at 10:17

## 2018-11-30 RX ADMIN — Medication 10 ML: at 10:26

## 2018-11-30 RX ADMIN — ENOXAPARIN SODIUM 105 MG: 150 INJECTION SUBCUTANEOUS at 10:54

## 2018-11-30 RX ADMIN — BACLOFEN 10 MG: 10 TABLET ORAL at 10:20

## 2018-11-30 RX ADMIN — SODIUM CHLORIDE TAB 1 GM 1 G: 1 TAB at 20:34

## 2018-11-30 RX ADMIN — BUSPIRONE HYDROCHLORIDE 30 MG: 5 TABLET ORAL at 20:23

## 2018-11-30 RX ADMIN — BACLOFEN 10 MG: 10 TABLET ORAL at 20:24

## 2018-11-30 RX ADMIN — SIMETHICONE CHEW TAB 80 MG 80 MG: 80 TABLET ORAL at 10:17

## 2018-11-30 RX ADMIN — OXYCODONE AND ACETAMINOPHEN 2 TABLET: 5; 325 TABLET ORAL at 16:23

## 2018-11-30 RX ADMIN — HYDROMORPHONE HYDROCHLORIDE 1 MG: 1 INJECTION, SOLUTION INTRAMUSCULAR; INTRAVENOUS; SUBCUTANEOUS at 06:20

## 2018-11-30 RX ADMIN — Medication 400 MG: at 10:19

## 2018-11-30 RX ADMIN — HYDROMORPHONE HYDROCHLORIDE 1 MG: 1 INJECTION, SOLUTION INTRAMUSCULAR; INTRAVENOUS; SUBCUTANEOUS at 19:11

## 2018-11-30 RX ADMIN — ATENOLOL 100 MG: 50 TABLET ORAL at 10:20

## 2018-11-30 ASSESSMENT — PAIN SCALES - GENERAL
PAINLEVEL_OUTOF10: 10
PAINLEVEL_OUTOF10: 8
PAINLEVEL_OUTOF10: 9
PAINLEVEL_OUTOF10: 8
PAINLEVEL_OUTOF10: 5
PAINLEVEL_OUTOF10: 8
PAINLEVEL_OUTOF10: 9
PAINLEVEL_OUTOF10: 5
PAINLEVEL_OUTOF10: 10
PAINLEVEL_OUTOF10: 10
PAINLEVEL_OUTOF10: 9
PAINLEVEL_OUTOF10: 8
PAINLEVEL_OUTOF10: 7

## 2018-11-30 ASSESSMENT — PAIN DESCRIPTION - PAIN TYPE
TYPE: CHRONIC PAIN
TYPE: ACUTE PAIN
TYPE: CHRONIC PAIN
TYPE: ACUTE PAIN
TYPE: CHRONIC PAIN

## 2018-11-30 ASSESSMENT — PAIN DESCRIPTION - LOCATION
LOCATION: KNEE

## 2018-11-30 ASSESSMENT — PAIN DESCRIPTION - DESCRIPTORS
DESCRIPTORS: SHOOTING
DESCRIPTORS: SHOOTING

## 2018-11-30 ASSESSMENT — PAIN DESCRIPTION - ORIENTATION
ORIENTATION: LEFT
ORIENTATION: LEFT;RIGHT
ORIENTATION: LEFT
ORIENTATION: RIGHT;LEFT
ORIENTATION: LEFT
ORIENTATION: RIGHT;LEFT
ORIENTATION: LEFT

## 2018-11-30 ASSESSMENT — PAIN DESCRIPTION - FREQUENCY
FREQUENCY: CONTINUOUS
FREQUENCY: CONTINUOUS

## 2018-11-30 ASSESSMENT — PAIN DESCRIPTION - PROGRESSION: CLINICAL_PROGRESSION: GRADUALLY IMPROVING

## 2018-11-30 NOTE — PROGRESS NOTES
Occupational Therapy  Facility/Department: [address]  Daily Treatment Note  NAME: Nisa Andrews  : 1955  MRN: [id number]    Date of Service: 2018    Discharge Recommendations:      Nisa Andrews scored a 15/24 on the AM-PAC ADL Inpatient form. Current research shows that an AM-PAC score of 17 or less is typically not associated with a discharge to the patient's home setting. Based on the patients AM-PAC score and their current ADL deficits, it is recommended that the patient have 3-5 sessions per week of Occupational Therapy at d/c to increase the patients independence. Patient Diagnosis(es): The primary encounter diagnosis was Hypomagnesemia. Diagnoses of Rupture of left patellar tendon, initial encounter, Acute electrocardiography changes, Anticoagulation adequate, At maximum risk for fall, and Primary insomnia were also pertinent to this visit. has a past medical history of Arthritis; Blood transfusion reaction; Crohn's disease ([address] GERD (gastroesophageal reflux disease); Hiatal hernia; Hypertension; MRSA (methicillin resistant staph aureus) culture positive; Pneumonia; and VRE (vancomycin resistant enterococcus) culture positive. has a past surgical history that includes Hysterectomy; knee surgery (Bilateral); Hand Carpectomy (Bilateral); hernia repair; Abdomen surgery; bladder suspension; fracture surgery; Heel spur surgery; Tonsillectomy; Colonoscopy; Upper gastrointestinal endoscopy (13); Foot surgery (Left, 14); Foot surgery (6/3/15); Colonoscopy (9/14/15); Foot surgery (Left, 2002); joint replacement; Sigmoidoscopy (01/15/2018); Abdominal adhesion surgery (2018); Colonoscopy (2018); colostomy (N/A, 10/8/2018); and pr secd clos surg wnd exten/complic (N/A, ).     Restrictions  Restrictions/Precautions  Restrictions/Precautions: Weight Bearing  Required Braces or Orthoses?: Yes (L knee immobilizer)  Lower Extremity Weight Bearing Restrictions  Left Lower Extremity Weight Bearing: Weight Bearing As Tolerated  Position Activity Restriction  Other position/activity restrictions: This is a  59 y.o. female presents to the emergency department after injuries from a fall. Patient states late yesterday afternoon she had a fall in the kitchen. She states she had her house slippers on and there was some water on the floor that she didn't see. It caused her to fall and she believes that she fell on a flexed knee on the left-hand side. Patient states that she initially could not get up off of the ground. She reports that she was required to crawl through the kitchen to the chair where she was able to get herself out. Patient states she really didn't think much of it but has had a difficult time independently ambulating. She states the nurse came out to the house to care for her today and noticed that she has significant pain and swelling and suggested she come to the emergency department for evaluation and treatment. Patient states she is having difficulties with movement to her left knee. Patient states that she is anticoagulated with Xarelto because of previous lower extremity DVTs was never had a pulmonary embolism. Patient states that she is unsure if she hit her head yesterday when she fell or not. She states it is a possibility but she cannot recall. Unfortunate she also has associated headache pain. She is describing diffuse frontal headache pain that rates to be 8 out of 10. She states her knee is more painful at 9 out of 10. Patient goes on to report she's done nothing for the above-mentioned. Upon further questioning she denies prodromal symptoms prior to the fall. She denies that she is experiencing difficulties as a pertains to chest pain, palpitations, lightheadedness, shortness of breath. MRI findings: ACL and PCL tear- probably chronic, tendinosis of quad tendon and patellar tendon.    Subjective   General  Chart Reviewed: Yes  Family / Caregiver Present: No  Diagnosis: Hypomagnesemia  Subjective  Subjective: Patient ready for therapy  Pain Assessment  Patient Currently in Pain: Yes  Vital Signs  Patient Currently in Pain: Yes   Orientation  Orientation  Overall Orientation Status: Within Functional Limits  Objective    ADL  Additional Comments: declined ADL        Balance  Sitting Balance: Independent  Standing Balance: Minimal assistance  Standing Balance  Time: 10 minutes  Functional Mobility  Functional - Mobility Device: Rolling Walker  Activity: Other  Assist Level: Minimal assistance  Bed mobility  Supine to Sit: Minimal assistance  Sit to Supine: Minimal assistance                          Cognition  Overall Cognitive Status: WFL     Perception  Overall Perceptual Status: WFL                                   Assessment   Performance deficits / Impairments: Decreased functional mobility ; Decreased ADL status; Decreased safe awareness;Decreased cognition;Decreased strength;Decreased balance;Decreased high-level IADLs  Assessment: Patient presents with the above deficits, which are below baseline, and would benefit from continued skilled OT to address  Treatment Diagnosis: Decreased ADLs, IADLs, transfers, mobility associated with Hypomagnesemia  Prognosis: Fair  History: Patient ambulatory and I prior to admission (daughter does assist with colostomy)  Exam: six clicks, physical assessment reveal deficits in bathing, dressing, transfers, mobility, IADLs  Assistance / Modification: Unable to transfer or ambulate this date (refused during evaluation)  Patient Education: Eval, POC, discharge recommednations  REQUIRES OT FOLLOW UP: Yes  Activity Tolerance  Activity Tolerance: Patient limited by pain  Safety Devices  Safety Devices in place: Yes  Type of devices: All fall risk precautions in place; Bed alarm in place;Call light within reach; Patient at risk for falls; Left in bed;Nurse notified          Plan   Plan  Times per

## 2018-11-30 NOTE — DISCHARGE SUMMARY
[facility] -- Physician Discharge Summary     Sydney Coyle  1955  MRN: [id number]    Admit Date: 11/27/2018  Discharge Date: 12/1/2018  2:30 PM    Attending MD: Toñito Luciano MD  Discharging MD: Toñito Luciano MD  PCP: Carlos Eduardo Griffith MD [address] / [place] Power [phone]    Admission Diagnosis: Hypomagnesemia [E83.42]  Hypomagnesemia [E83.42]  DISCHARGE DIAGNOSIS: hypomagnesemia    Full Hospital Problem List:  Active Hospital Problems    Diagnosis Date Noted    Left ACL tear [S83.512A] 11/29/2018    Hypomagnesemia [E83.42] 11/27/2018    DVT (deep venous thrombosis) ([facility]) [I82.409] 07/03/2018    Hyponatremia [E87.1] 01/05/2018    Hypokalemia [E87.6] 01/05/2018    Essential hypertension, benign [I10] 09/13/2015           Hospital Course:  59 y. o. female presents to the emergency department after injuries from a fall. Saad Fitting states late yesterday afternoon she had a fall in the kitchen.  She states she had her house slippers on and there was some water on the floor that she didn't see. Emanuel Tolentino caused her to fall and she believes that she fell on a flexed knee on the left-hand side.  Patient states that she initially could not get up off of the ground.  She reports that she was required to crawl through the kitchen to the chair where she was able to get herself out. Saad Fitting states she really didn't think much of it but has had a difficult time independently ambulating.  She states the nurse came out to the house to care for her today and noticed that she has significant pain and swelling and suggested she come to the emergency department for evaluation and treatment. Saad Fitting states she is having difficulties with movement to her left knee.  Patient states that she is anticoagulated with Xarelto because of previous lower extremity DVTs was never had a pulmonary embolism.  Patient states that she is unsure if she hit her head yesterday when she fell or not.  She states it is a possibility but she cannot recall.  Unfortunate she also has associated headache pain. Kathy Castaneda is describing diffuse frontal headache pain that rates to be 8 out of 10.  She states her knee is more painful at 9 out of 10.  Patient goes on to report she's done nothing for the above-mentioned. Wendy Asp further questioning she denies prodromal symptoms prior to the fall.      Plain films from ER are reviewed -   Evidence of patella payal, but the patellar tendon shadow does not appear to  be grossly abnormal (although radiographs are insensitive for tendon  pathology).  If there is concern for patellar tendon tear, nonemergent but  prompt MRI should be considered. Ortho is consulted  MRI is done   Impression:       1. No acute fracture identified. 2. Maceration of the posterior horn and body of the medial meniscus, likely  chronic. 3. Indistinct appearance of the posterior root attachment of the lateral  meniscus with irregular signal seen diffusely throughout the lateral meniscus  likely reflecting tear of the posterior root.  Findings may also be chronic. 4. Complete tears of the anterior and posterior cruciate ligaments. 5. Tendinosis of the extensor mechanism.  Fluid signal identified within the  distal fibers of the quadriceps tendon which may reflect partial tearing. 6. Severe tricompartmental osteoarthritis of the knee with underlying grade 3  and 4 chondromalacia involving all 3 compartments. Ortho recs include  PLAN:   - No orthopaedic surgical intervention at this time  - WB status:  WBAT in knee immobilizer   - DVT prophylaxis: Lovenox  - PT/OT  - D/C Plan: Per PT recs  - Hx DVT:  Home Xarelto  - F U with Dr. Dale Hughes in 2 weeks; call [phone] for appt.        PT/OT see patient  Both services recommend SNF  She is amenable to this       Lab exam from ER also shows Mag to be profoundly low at 0.5  IV Mag sulfate already started in ER  EKG with poss TWI in V1-5 (by my interpretation)  SHe contrast. Multiplanar reformatted images are provided for review. Dose modulation, iterative reconstruction, and/or weight based adjustment of the mA/kV was utilized to reduce the radiation dose to as low as reasonably achievable. COMPARISON: CT 06/26/2018 HISTORY: ORDERING SYSTEM PROVIDED HISTORY: fall on Xarelto TECHNOLOGIST PROVIDED HISTORY: If patient is on cardiac monitor and/or pulse ox, they may be taken off cardiac monitor and pulse ox, left on O2 if currently on. All monitors reattached when patient returns to room. Ordering Physician Provided Reason for Exam: fall on Xarelto Acuity: Acute Type of Exam: Initial FINDINGS: BONES/ALIGNMENT: No evidence of fracture. Grade 1 anterolisthesis of C4 on C5. DEGENERATIVE CHANGES: Mild multilevel anterior and posterior osteophyte formation. Multilevel facet arthrosis. SOFT TISSUES: There is no prevertebral soft tissue swelling. 8 mm low-density lesion in the left thyroid which does not require follow-up imaging. No acute abnormality of the cervical spine. Mild multilevel degenerative changes. Mri Knee Left Wo Contrast    Result Date: 11/28/2018  EXAMINATION: MRI OF THE LEFT KNEE WITHOUT CONTRAST, 11/28/2018 7:46 pm TECHNIQUE: Multiplanar multisequence MRI of the left knee was performed without the administration of intravenous contrast. COMPARISON: Left knee radiograph November 27, 2018 HISTORY: ORDERING SYSTEM PROVIDED HISTORY: KNEE TRAUMA, TENDERNESS OR EFFUSION OR CANNOT BEAR WEIGHT, NO XRAY TECHNOLOGIST PROVIDED HISTORY: Ordering Physician Provided Reason for Exam: Knee trauma, tenderness or effusion or cannot bear weight, no xray; R/o patellar tendon rupture Acuity: Acute Type of Exam: Unknown FINDINGS: MENISCI: Maceration of the posterior horn and body of the medial meniscus likely related to severe advanced degenerative changes of the medial compartment.   There is diffuse intrasubstance signal throughout the lateral meniscus with indistinct meniscus, likely chronic. 3. Indistinct appearance of the posterior root attachment of the lateral meniscus with irregular signal seen diffusely throughout the lateral meniscus likely reflecting tear of the posterior root. Findings may also be chronic. 4. Complete tears of the anterior and posterior cruciate ligaments. 5. Tendinosis of the extensor mechanism. Fluid signal identified within the distal fibers of the quadriceps tendon which may reflect partial tearing. 6. Severe tricompartmental osteoarthritis of the knee with underlying grade 3 and 4 chondromalacia involving all 3 compartments. 7. Diffuse nonspecific subcutaneous edema. 8. Large knee effusion and synovitis. 9. Large popliteal cyst.     Xr Abdomen (2 Views)    Result Date: 11/29/2018  EXAMINATION: TWO XRAY VIEWS OF THE ABDOMEN 11/29/2018 3:18 pm COMPARISON: CT abdomen and pelvis performed 10/15/2018. HISTORY: ORDERING SYSTEM PROVIDED HISTORY: no output from colostomy TECHNOLOGIST PROVIDED HISTORY: Reason for exam:->no output from colostomy Ordering Physician Provided Reason for Exam: no output from colostomy Acuity: Unknown Type of Exam: Unknown FINDINGS: Visualized lungs are without consolidation or effusion. There is bibasilar atelectasis. There is a hiatal hernia. There is a nonspecific bowel gas pattern without evidence for obstruction. There is no evidence for intraperitoneal free air. There are no suspicious calcifications. There is degenerative change of the lumbar spine, SI joints, and left hip joint. There is a partially visualized right hip arthroplasty without evidence for complication. There is a presumed Del Real catheter. Bibasilar atelectasis. Hiatal hernia. Nonspecific bowel gas pattern without evidence for obstruction.          Discharge Exam:  /83   Pulse 72   Temp 97.8 °F (36.6 °C) (Temporal)   Resp 16   Ht 5' 6\" (1.676 m)   Wt 215 lb 4.8 oz (97.7 kg)   SpO2 95%   BMI 34.75 kg/m²   General appearance: alert,

## 2018-12-01 LAB
ANION GAP SERPL CALCULATED.3IONS-SCNC: 5 MMOL/L (ref 3–16)
ANISOCYTOSIS: ABNORMAL
BASOPHILIC STIPPLING: ABNORMAL
BASOPHILS ABSOLUTE: 0 K/UL (ref 0–0.2)
BASOPHILS RELATIVE PERCENT: 0 %
BUN BLDV-MCNC: 9 MG/DL (ref 7–20)
CALCIUM SERPL-MCNC: 9.7 MG/DL (ref 8.3–10.6)
CHLORIDE BLD-SCNC: 99 MMOL/L (ref 99–110)
CO2: 26 MMOL/L (ref 21–32)
CREAT SERPL-MCNC: <0.5 MG/DL (ref 0.6–1.2)
EOSINOPHILS ABSOLUTE: 0.1 K/UL (ref 0–0.6)
EOSINOPHILS RELATIVE PERCENT: 1 %
GFR AFRICAN AMERICAN: >60
GFR NON-AFRICAN AMERICAN: >60
GLUCOSE BLD-MCNC: 109 MG/DL (ref 70–99)
HCT VFR BLD CALC: 26.6 % (ref 36–48)
HEMOGLOBIN: 8.9 G/DL (ref 12–16)
LYMPHOCYTES ABSOLUTE: 1.5 K/UL (ref 1–5.1)
LYMPHOCYTES RELATIVE PERCENT: 20 %
MCH RBC QN AUTO: 30 PG (ref 26–34)
MCHC RBC AUTO-ENTMCNC: 33.4 G/DL (ref 31–36)
MCV RBC AUTO: 89.7 FL (ref 80–100)
MONOCYTES ABSOLUTE: 0.3 K/UL (ref 0–1.3)
MONOCYTES RELATIVE PERCENT: 4 %
NEUTROPHILS ABSOLUTE: 5.6 K/UL (ref 1.7–7.7)
NEUTROPHILS RELATIVE PERCENT: 75 %
PDW BLD-RTO: 18.6 % (ref 12.4–15.4)
PLATELET # BLD: 410 K/UL (ref 135–450)
PLATELET SLIDE REVIEW: ADEQUATE
PMV BLD AUTO: 7 FL (ref 5–10.5)
POLYCHROMASIA: ABNORMAL
POTASSIUM SERPL-SCNC: 5 MMOL/L (ref 3.5–5.1)
RBC # BLD: 2.97 M/UL (ref 4–5.2)
SLIDE REVIEW: ABNORMAL
SODIUM BLD-SCNC: 130 MMOL/L (ref 136–145)
TARGET CELLS: ABNORMAL
WBC # BLD: 7.5 K/UL (ref 4–11)

## 2018-12-01 PROCEDURE — 6360000002 HC RX W HCPCS: Performed by: INTERNAL MEDICINE

## 2018-12-01 PROCEDURE — 85025 COMPLETE CBC W/AUTO DIFF WBC: CPT

## 2018-12-01 PROCEDURE — 36415 COLL VENOUS BLD VENIPUNCTURE: CPT

## 2018-12-01 PROCEDURE — 80048 BASIC METABOLIC PNL TOTAL CA: CPT

## 2018-12-01 PROCEDURE — 6370000000 HC RX 637 (ALT 250 FOR IP): Performed by: INTERNAL MEDICINE

## 2018-12-01 PROCEDURE — 2580000003 HC RX 258: Performed by: INTERNAL MEDICINE

## 2018-12-01 PROCEDURE — 1200000000 HC SEMI PRIVATE

## 2018-12-01 RX ADMIN — ATENOLOL 100 MG: 50 TABLET ORAL at 10:00

## 2018-12-01 RX ADMIN — BACLOFEN 10 MG: 10 TABLET ORAL at 10:00

## 2018-12-01 RX ADMIN — OXYCODONE AND ACETAMINOPHEN 2 TABLET: 5; 325 TABLET ORAL at 16:31

## 2018-12-01 RX ADMIN — SODIUM CHLORIDE TAB 1 GM 1 G: 1 TAB at 10:00

## 2018-12-01 RX ADMIN — DULOXETINE HYDROCHLORIDE 60 MG: 60 CAPSULE, DELAYED RELEASE ORAL at 21:04

## 2018-12-01 RX ADMIN — POTASSIUM CHLORIDE 20 MEQ: 1500 TABLET, EXTENDED RELEASE ORAL at 10:00

## 2018-12-01 RX ADMIN — Medication 10 ML: at 21:12

## 2018-12-01 RX ADMIN — SIMETHICONE CHEW TAB 80 MG 80 MG: 80 TABLET ORAL at 02:02

## 2018-12-01 RX ADMIN — POTASSIUM CHLORIDE 20 MEQ: 1500 TABLET, EXTENDED RELEASE ORAL at 21:04

## 2018-12-01 RX ADMIN — PANTOPRAZOLE SODIUM 40 MG: 40 TABLET, DELAYED RELEASE ORAL at 10:00

## 2018-12-01 RX ADMIN — FUROSEMIDE 40 MG: 40 TABLET ORAL at 10:00

## 2018-12-01 RX ADMIN — SODIUM CHLORIDE TAB 1 GM 1 G: 1 TAB at 21:04

## 2018-12-01 RX ADMIN — BUSPIRONE HYDROCHLORIDE 30 MG: 5 TABLET ORAL at 10:00

## 2018-12-01 RX ADMIN — SIMETHICONE CHEW TAB 80 MG 80 MG: 80 TABLET ORAL at 21:03

## 2018-12-01 RX ADMIN — BACLOFEN 10 MG: 10 TABLET ORAL at 13:37

## 2018-12-01 RX ADMIN — HYDROMORPHONE HYDROCHLORIDE 1 MG: 1 INJECTION, SOLUTION INTRAMUSCULAR; INTRAVENOUS; SUBCUTANEOUS at 00:06

## 2018-12-01 RX ADMIN — Medication 400 MG: at 10:00

## 2018-12-01 RX ADMIN — HYDROMORPHONE HYDROCHLORIDE 1 MG: 1 INJECTION, SOLUTION INTRAMUSCULAR; INTRAVENOUS; SUBCUTANEOUS at 23:52

## 2018-12-01 RX ADMIN — DULOXETINE HYDROCHLORIDE 60 MG: 60 CAPSULE, DELAYED RELEASE ORAL at 10:00

## 2018-12-01 RX ADMIN — MESALAMINE 1600 MG: 400 CAPSULE, DELAYED RELEASE ORAL at 10:00

## 2018-12-01 RX ADMIN — BACLOFEN 10 MG: 10 TABLET ORAL at 21:04

## 2018-12-01 RX ADMIN — ENOXAPARIN SODIUM 105 MG: 150 INJECTION SUBCUTANEOUS at 21:06

## 2018-12-01 RX ADMIN — SIMETHICONE CHEW TAB 80 MG 80 MG: 80 TABLET ORAL at 13:37

## 2018-12-01 RX ADMIN — HYDROMORPHONE HYDROCHLORIDE 1 MG: 1 INJECTION, SOLUTION INTRAMUSCULAR; INTRAVENOUS; SUBCUTANEOUS at 19:45

## 2018-12-01 RX ADMIN — SIMETHICONE CHEW TAB 80 MG 80 MG: 80 TABLET ORAL at 10:00

## 2018-12-01 RX ADMIN — ENOXAPARIN SODIUM 105 MG: 150 INJECTION SUBCUTANEOUS at 10:00

## 2018-12-01 RX ADMIN — HYDROMORPHONE HYDROCHLORIDE 1 MG: 1 INJECTION, SOLUTION INTRAMUSCULAR; INTRAVENOUS; SUBCUTANEOUS at 13:35

## 2018-12-01 RX ADMIN — OXYCODONE AND ACETAMINOPHEN 2 TABLET: 5; 325 TABLET ORAL at 10:17

## 2018-12-01 RX ADMIN — Medication 10 ML: at 10:00

## 2018-12-01 RX ADMIN — BUSPIRONE HYDROCHLORIDE 30 MG: 5 TABLET ORAL at 21:03

## 2018-12-01 RX ADMIN — Medication 400 MG: at 21:04

## 2018-12-01 RX ADMIN — HYDROMORPHONE HYDROCHLORIDE 1 MG: 1 INJECTION, SOLUTION INTRAMUSCULAR; INTRAVENOUS; SUBCUTANEOUS at 06:34

## 2018-12-01 ASSESSMENT — PAIN DESCRIPTION - ORIENTATION
ORIENTATION: LEFT

## 2018-12-01 ASSESSMENT — PAIN DESCRIPTION - LOCATION
LOCATION: ABDOMEN
LOCATION: ABDOMEN
LOCATION: ABDOMEN;LEG
LOCATION: KNEE
LOCATION: ABDOMEN;LEG
LOCATION: ABDOMEN
LOCATION: ABDOMEN
LOCATION: KNEE

## 2018-12-01 ASSESSMENT — PAIN SCALES - GENERAL
PAINLEVEL_OUTOF10: 9
PAINLEVEL_OUTOF10: 10
PAINLEVEL_OUTOF10: 9
PAINLEVEL_OUTOF10: 8
PAINLEVEL_OUTOF10: 9
PAINLEVEL_OUTOF10: 0
PAINLEVEL_OUTOF10: 8
PAINLEVEL_OUTOF10: 9
PAINLEVEL_OUTOF10: 9
PAINLEVEL_OUTOF10: 0
PAINLEVEL_OUTOF10: 0

## 2018-12-01 ASSESSMENT — PAIN DESCRIPTION - PAIN TYPE
TYPE: CHRONIC PAIN;ACUTE PAIN
TYPE: CHRONIC PAIN
TYPE: CHRONIC PAIN;ACUTE PAIN
TYPE: CHRONIC PAIN

## 2018-12-01 ASSESSMENT — PAIN DESCRIPTION - FREQUENCY: FREQUENCY: CONTINUOUS

## 2018-12-01 ASSESSMENT — PAIN DESCRIPTION - DESCRIPTORS: DESCRIPTORS: STABBING

## 2018-12-01 ASSESSMENT — PAIN DESCRIPTION - PROGRESSION: CLINICAL_PROGRESSION: GRADUALLY IMPROVING

## 2018-12-01 NOTE — PROGRESS NOTES
Pt assessment completed and documented- see doc flowsheet, VSS, medications administered per MAR. Del Real and Colostomy emptied. Patient updated on POC. White board updated. Denies further needs at this time. Will continue to monitor.

## 2018-12-01 NOTE — PROGRESS NOTES
Results   Component Value Date    WBC 7.5 12/01/2018    HGB 8.9 (L) 12/01/2018    HCT 26.6 (L) 12/01/2018     12/01/2018    CHOL 151 02/06/2018    TRIG 57 02/06/2018    HDL 81 (H) 02/06/2018    ALT <5 (L) 11/28/2018    AST 13 (L) 11/28/2018     (L) 12/01/2018    K 5.0 12/01/2018    CL 99 12/01/2018    CREATININE <0.5 (L) 12/01/2018    BUN 9 12/01/2018    CO2 26 12/01/2018    INR 2.75 (H) 11/27/2018    LABMICR Not Indicated 11/28/2018     Lab Results   Component Value Date    TROPONINI <0.01 11/27/2018       Recent Imaging Results are Reviewed:  Xr Knee Left (1-2 Views)    Result Date: 11/27/2018  EXAMINATION: TWO XRAY VIEWS OF THE LEFT KNEE 11/27/2018 1:46 pm COMPARISON: None. HISTORY: ORDERING SYSTEM PROVIDED HISTORY: patella tendon rupture TECHNOLOGIST PROVIDED HISTORY: Reason for exam:->patella tendon rupture Ordering Physician Provided Reason for Exam: patella tendon rupture Acuity: Acute Type of Exam: Initial FINDINGS: There is severe tricompartmental degenerative disease, greatest within the medial compartment, characterized by osteophytosis and severe joint space narrowing. There is bony hypertrophy along the superior margin the patella, probably from remote injury. There is evidence patella payal, with an Insall-Salvati ratio of approximately 1.5, but the patellar tendon shadow is normal in appearance, without surrounding fat stranding. The quadriceps tendon is ill-defined, and there is likely soft tissue swelling in the suprapatellar region. Evidence of patella payal, but the patellar tendon shadow does not appear to be grossly abnormal (although radiographs are insensitive for tendon pathology). If there is concern for patellar tendon tear, nonemergent but prompt MRI should be considered. Severe tricompartmental degenerative disease, greatest within the medial compartment. Ill definition of the quadriceps tendon. Correlate with clinical evidence of tendinopathy or tearing.      Ct Head

## 2018-12-01 NOTE — PROGRESS NOTES
Shift assessment completed, patient updated that she will be going to [place] [id number] on Monday. Patient complaining of pain, medicated per MAR. Also complaining of itching. Gave lotion and will continue to monitor.  Fall precautions in place, hourly rounding, call light and belongings in reach, bed in lowest position, wheels locked in place, side rails up x 2, walkways free of clutter

## 2018-12-01 NOTE — PLAN OF CARE
Problem: Pain:  Goal: Control of acute pain  Control of acute pain   Outcome: Ongoing  Pain assessed using 0-10 scale, patient able to voice pain level 9/10, PRN pain medication administered. Will reassess.

## 2018-12-02 VITALS
HEART RATE: 82 BPM | HEIGHT: 66 IN | WEIGHT: 206.3 LBS | BODY MASS INDEX: 33.16 KG/M2 | TEMPERATURE: 97.8 F | OXYGEN SATURATION: 98 % | RESPIRATION RATE: 16 BRPM | SYSTOLIC BLOOD PRESSURE: 110 MMHG | DIASTOLIC BLOOD PRESSURE: 71 MMHG

## 2018-12-02 LAB
ANION GAP SERPL CALCULATED.3IONS-SCNC: 11 MMOL/L (ref 3–16)
BASOPHILS ABSOLUTE: 0 K/UL (ref 0–0.2)
BASOPHILS RELATIVE PERCENT: 0.5 %
BUN BLDV-MCNC: 11 MG/DL (ref 7–20)
CALCIUM SERPL-MCNC: 10.2 MG/DL (ref 8.3–10.6)
CHLORIDE BLD-SCNC: 93 MMOL/L (ref 99–110)
CO2: 25 MMOL/L (ref 21–32)
CREAT SERPL-MCNC: <0.5 MG/DL (ref 0.6–1.2)
EOSINOPHILS ABSOLUTE: 0.3 K/UL (ref 0–0.6)
EOSINOPHILS RELATIVE PERCENT: 3.9 %
GFR AFRICAN AMERICAN: >60
GFR NON-AFRICAN AMERICAN: >60
GLUCOSE BLD-MCNC: 102 MG/DL (ref 70–99)
HCT VFR BLD CALC: 29.2 % (ref 36–48)
HEMOGLOBIN: 9.7 G/DL (ref 12–16)
LYMPHOCYTES ABSOLUTE: 1.5 K/UL (ref 1–5.1)
LYMPHOCYTES RELATIVE PERCENT: 22.3 %
MAGNESIUM: 1.4 MG/DL (ref 1.8–2.4)
MCH RBC QN AUTO: 29.5 PG (ref 26–34)
MCHC RBC AUTO-ENTMCNC: 33.1 G/DL (ref 31–36)
MCV RBC AUTO: 89.1 FL (ref 80–100)
MONOCYTES ABSOLUTE: 0.5 K/UL (ref 0–1.3)
MONOCYTES RELATIVE PERCENT: 8 %
NEUTROPHILS ABSOLUTE: 4.4 K/UL (ref 1.7–7.7)
NEUTROPHILS RELATIVE PERCENT: 65.3 %
PDW BLD-RTO: 18.7 % (ref 12.4–15.4)
PLATELET # BLD: 485 K/UL (ref 135–450)
PMV BLD AUTO: 6.9 FL (ref 5–10.5)
POTASSIUM SERPL-SCNC: 4.7 MMOL/L (ref 3.5–5.1)
RBC # BLD: 3.28 M/UL (ref 4–5.2)
SODIUM BLD-SCNC: 129 MMOL/L (ref 136–145)
WBC # BLD: 6.8 K/UL (ref 4–11)

## 2018-12-02 PROCEDURE — 97116 GAIT TRAINING THERAPY: CPT

## 2018-12-02 PROCEDURE — 97530 THERAPEUTIC ACTIVITIES: CPT

## 2018-12-02 PROCEDURE — 36415 COLL VENOUS BLD VENIPUNCTURE: CPT

## 2018-12-02 PROCEDURE — 83735 ASSAY OF MAGNESIUM: CPT

## 2018-12-02 PROCEDURE — 80048 BASIC METABOLIC PNL TOTAL CA: CPT

## 2018-12-02 PROCEDURE — 85025 COMPLETE CBC W/AUTO DIFF WBC: CPT

## 2018-12-02 PROCEDURE — 2580000003 HC RX 258: Performed by: INTERNAL MEDICINE

## 2018-12-02 PROCEDURE — G8978 MOBILITY CURRENT STATUS: HCPCS

## 2018-12-02 PROCEDURE — 6360000002 HC RX W HCPCS: Performed by: INTERNAL MEDICINE

## 2018-12-02 PROCEDURE — G8979 MOBILITY GOAL STATUS: HCPCS

## 2018-12-02 PROCEDURE — 6370000000 HC RX 637 (ALT 250 FOR IP): Performed by: INTERNAL MEDICINE

## 2018-12-02 PROCEDURE — 97535 SELF CARE MNGMENT TRAINING: CPT

## 2018-12-02 RX ORDER — MAGNESIUM SULFATE IN WATER 40 MG/ML
2 INJECTION, SOLUTION INTRAVENOUS ONCE
Status: DISCONTINUED | OUTPATIENT
Start: 2018-12-02 | End: 2018-12-02 | Stop reason: HOSPADM

## 2018-12-02 RX ADMIN — SIMETHICONE CHEW TAB 80 MG 80 MG: 80 TABLET ORAL at 13:51

## 2018-12-02 RX ADMIN — POTASSIUM CHLORIDE 20 MEQ: 1500 TABLET, EXTENDED RELEASE ORAL at 09:40

## 2018-12-02 RX ADMIN — HYDROMORPHONE HYDROCHLORIDE 1 MG: 1 INJECTION, SOLUTION INTRAMUSCULAR; INTRAVENOUS; SUBCUTANEOUS at 03:57

## 2018-12-02 RX ADMIN — DULOXETINE HYDROCHLORIDE 60 MG: 60 CAPSULE, DELAYED RELEASE ORAL at 09:42

## 2018-12-02 RX ADMIN — Medication 400 MG: at 09:39

## 2018-12-02 RX ADMIN — MESALAMINE 1600 MG: 400 CAPSULE, DELAYED RELEASE ORAL at 13:51

## 2018-12-02 RX ADMIN — OXYCODONE AND ACETAMINOPHEN 2 TABLET: 5; 325 TABLET ORAL at 02:39

## 2018-12-02 RX ADMIN — SODIUM CHLORIDE TAB 1 GM 1 G: 1 TAB at 09:51

## 2018-12-02 RX ADMIN — BACLOFEN 10 MG: 10 TABLET ORAL at 13:51

## 2018-12-02 RX ADMIN — ZOLPIDEM TARTRATE 10 MG: 10 TABLET, COATED ORAL at 02:39

## 2018-12-02 RX ADMIN — ENOXAPARIN SODIUM 105 MG: 150 INJECTION SUBCUTANEOUS at 09:38

## 2018-12-02 RX ADMIN — MESALAMINE 1600 MG: 400 CAPSULE, DELAYED RELEASE ORAL at 09:42

## 2018-12-02 RX ADMIN — BUSPIRONE HYDROCHLORIDE 30 MG: 5 TABLET ORAL at 09:40

## 2018-12-02 RX ADMIN — SIMETHICONE CHEW TAB 80 MG 80 MG: 80 TABLET ORAL at 03:57

## 2018-12-02 RX ADMIN — Medication 30 G: at 09:48

## 2018-12-02 RX ADMIN — FUROSEMIDE 40 MG: 40 TABLET ORAL at 09:40

## 2018-12-02 RX ADMIN — OXYCODONE AND ACETAMINOPHEN 2 TABLET: 5; 325 TABLET ORAL at 09:40

## 2018-12-02 RX ADMIN — Medication 1 G: at 09:51

## 2018-12-02 RX ADMIN — Medication 10 ML: at 09:49

## 2018-12-02 RX ADMIN — ATENOLOL 100 MG: 50 TABLET ORAL at 09:38

## 2018-12-02 RX ADMIN — FLUTICASONE PROPIONATE 1 SPRAY: 50 SPRAY, METERED NASAL at 09:48

## 2018-12-02 RX ADMIN — PANTOPRAZOLE SODIUM 40 MG: 40 TABLET, DELAYED RELEASE ORAL at 09:39

## 2018-12-02 RX ADMIN — SIMETHICONE CHEW TAB 80 MG 80 MG: 80 TABLET ORAL at 09:39

## 2018-12-02 RX ADMIN — BACLOFEN 10 MG: 10 TABLET ORAL at 09:41

## 2018-12-02 RX ADMIN — HYDROMORPHONE HYDROCHLORIDE 1 MG: 1 INJECTION, SOLUTION INTRAMUSCULAR; INTRAVENOUS; SUBCUTANEOUS at 09:56

## 2018-12-02 ASSESSMENT — PAIN SCALES - GENERAL
PAINLEVEL_OUTOF10: 3
PAINLEVEL_OUTOF10: 7
PAINLEVEL_OUTOF10: 4
PAINLEVEL_OUTOF10: 5
PAINLEVEL_OUTOF10: 8
PAINLEVEL_OUTOF10: 8
PAINLEVEL_OUTOF10: 9
PAINLEVEL_OUTOF10: 9

## 2018-12-02 ASSESSMENT — PAIN DESCRIPTION - FREQUENCY
FREQUENCY: CONTINUOUS

## 2018-12-02 ASSESSMENT — PAIN DESCRIPTION - PROGRESSION
CLINICAL_PROGRESSION: GRADUALLY WORSENING

## 2018-12-02 ASSESSMENT — PAIN DESCRIPTION - LOCATION
LOCATION: ABDOMEN;LEG

## 2018-12-02 ASSESSMENT — PAIN DESCRIPTION - DESCRIPTORS
DESCRIPTORS: STABBING

## 2018-12-02 ASSESSMENT — PAIN DESCRIPTION - PAIN TYPE
TYPE: ACUTE PAIN
TYPE: ACUTE PAIN
TYPE: ACUTE PAIN;CHRONIC PAIN
TYPE: ACUTE PAIN
TYPE: ACUTE PAIN

## 2018-12-02 ASSESSMENT — PAIN DESCRIPTION - ORIENTATION
ORIENTATION: RIGHT;LEFT
ORIENTATION: RIGHT;LEFT
ORIENTATION: LEFT

## 2018-12-02 NOTE — PROGRESS NOTES
Awakened for assessment. VS stable, color natural pink, resp easy, afebrile. Ostomy bag intact no stool at present. Saline lock intact left AC site without redness, edema or drainage, dressing dry & intact. Del Real intact draining clear yellow urine. Brace on left knee. Pain per patient 8/10 abdomen and knee. Dressing back on abdomen with 2 small open areas noted. Denies other needs at present.  Katelyn Marcus

## 2018-12-02 NOTE — PROGRESS NOTES
Progress Note - Dr. Cherylene Koller - Internal Medicine  PCP: Xander Hong MD [address] / [place] [phone]    Hospital Day: 5  Code Status: Full Code  Current Diet: DIET GENERAL;        CC: follow up on medical issues    Subjective:   Mandie Tafoya is a 59 y.o. female. She denies problems    Doing ok  Still with pain to knee  Mag low again  2gm iv mag ordered    She denies chest pain, denies shortness of breath, denies nausea,  denies emesis. 10 system Review of Systems is reviewed with patient, and pertinent positives are listed here: None . Otherwise, Review of systems is negative. I have reviewed the patient's medical and social history in detail and updated the computerized patient record. To recap: She  has a past medical history of Arthritis; Blood transfusion reaction; Crohn's disease ([address] GERD (gastroesophageal reflux disease); Hiatal hernia; Hypertension; MRSA (methicillin resistant staph aureus) culture positive; Pneumonia; and VRE (vancomycin resistant enterococcus) culture positive. . She  has a past surgical history that includes Hysterectomy; knee surgery (Bilateral); Hand Carpectomy (Bilateral); hernia repair; Abdomen surgery; bladder suspension; fracture surgery; Heel spur surgery; Tonsillectomy; Colonoscopy; Upper gastrointestinal endoscopy (6/14/13); Foot surgery (Left, 6/25/14); Foot surgery (6/3/15); Colonoscopy (9/14/15); Foot surgery (Left, 08/05/2002); joint replacement; Sigmoidoscopy (01/15/2018); Abdominal adhesion surgery (06/08/2018); Colonoscopy (08/07/2018); colostomy (N/A, 10/8/2018); and pr secd clos surg wnd exten/complic (N/A, 28/90/0573). . She  reports that she has been smoking Cigarettes and E-Cigarettes. She has a 10.00 pack-year smoking history. She has never used smokeless tobacco. She reports that she drinks about 1.2 oz of alcohol per week . She reports that she does not use drugs. .        Active Hospital Problems    Diagnosis Date Noted    Left ACL the distal fibers of the quadriceps tendon which may reflect partial tearing. 6. Severe tricompartmental osteoarthritis of the knee with underlying grade 3 and 4 chondromalacia involving all 3 compartments. 7. Diffuse nonspecific subcutaneous edema. 8. Large knee effusion and synovitis. 9. Large popliteal cyst.     Xr Abdomen (2 Views)    Result Date: 11/29/2018  EXAMINATION: TWO XRAY VIEWS OF THE ABDOMEN 11/29/2018 3:18 pm COMPARISON: CT abdomen and pelvis performed 10/15/2018. HISTORY: ORDERING SYSTEM PROVIDED HISTORY: no output from colostomy TECHNOLOGIST PROVIDED HISTORY: Reason for exam:->no output from colostomy Ordering Physician Provided Reason for Exam: no output from colostomy Acuity: Unknown Type of Exam: Unknown FINDINGS: Visualized lungs are without consolidation or effusion. There is bibasilar atelectasis. There is a hiatal hernia. There is a nonspecific bowel gas pattern without evidence for obstruction. There is no evidence for intraperitoneal free air. There are no suspicious calcifications. There is degenerative change of the lumbar spine, SI joints, and left hip joint. There is a partially visualized right hip arthroplasty without evidence for complication. There is a presumed Del Real catheter. Bibasilar atelectasis. Hiatal hernia. Nonspecific bowel gas pattern without evidence for obstruction. Assessment and Plan:  Patient Active Hospital Problem List:   Hypomagnesemia (11/27/2018)    Assessment: Established problem. Stable. 1.4 at last check    Plan: repeat labs ordered. Cont on mag oxide replacement. 2gm iv mag sulfate ordered 12/2   Hyponatremia (1/5/2018)    Assessment: Established problem. Stable. 119 today    Plan: Na mgmt per renal   Essential hypertension, benign (9/13/2015)    Assessment: Established problem. Stable. 128/88    Plan: cont on same meds   Hypokalemia (1/5/2018)    Assessment: Established problem. Stable. 4.7 today    Plan: Continue present orders/plan.

## 2018-12-02 NOTE — PROGRESS NOTES
CALCIUM  9.1  9.7  10.2       Magnesium:   Lab Results   Component Value Date    MG 1.40 12/02/2018    MG 1.80 11/29/2018    MG 1.60 11/28/2018       Arterial Blood Gasses  No results for input(s): PH, PCO2, PO2 in the last 72 hours. Invalid input(s): Amy Gillis    UA:No results for input(s): NITRITE, COLORU, PHUR, LABCAST, WBCUA, RBCUA, MUCUS, TRICHOMONAS, YEAST, BACTERIA, CLARITYU, SPECGRAV, LEUKOCYTESUR, UROBILINOGEN, BILIRUBINUR, BLOODU, GLUCOSEU, AMORPHOUS in the last 72 hours. Invalid input(s): Tina Nims    LIVER PROFILE: No results for input(s): AST, ALT, LIPASE, BILIDIR, BILITOT, ALKPHOS in the last 72 hours. Invalid input(s): AMYLASE,  ALB  PT/INR:    Lab Results   Component Value Date    PROTIME 31.3 11/27/2018    PROTIME 13.5 10/16/2018    PROTIME 15.2 09/26/2018    INR 2.75 11/27/2018    INR 1.18 10/16/2018    INR 1.33 09/26/2018     PTT:    Lab Results   Component Value Date    APTT 28.9 10/16/2018    APTT 28.7 10/06/2017    APTT 25.8 08/02/2017     MARION:    Lab Results   Component Value Date    MARION Negative 07/03/2018           RADIOLOGY:      Imaging Results. Chest X Ray reviwed by me      Medications Reviewed by me   magnesium sulfate  2 g Intravenous Once    DULoxetine  60 mg Oral BID    busPIRone  30 mg Oral BID    potassium chloride  20 mEq Oral BID    fluticasone  1 spray Nasal Daily    mesalamine  4 g Rectal Nightly    baclofen  10 mg Oral TID    magnesium oxide  400 mg Oral BID    atenolol  100 mg Oral Daily    sodium chloride  1 g Oral BID    LIP BALM  1 g Topical Daily    Urea  30 g Oral BID    furosemide  40 mg Oral Daily    simethicone  80 mg Oral Q6H    sodium chloride flush  10 mL Intravenous 2 times per day    enoxaparin  1 mg/kg Subcutaneous BID    pantoprazole  40 mg Oral QAM AC    mesalamine  1,600 mg Oral TID         Chest Xray Reviewed by me  Renal Ultrasound Reviewed by me  EKG reviewed by me.     Principal Problem:

## 2018-12-02 NOTE — PROGRESS NOTES
Restrictions  Left Lower Extremity Weight Bearing: Weight Bearing As Tolerated  Required Braces or Orthoses  Left Lower Extremity Brace: Knee Immobilizer  Position Activity Restriction  Other position/activity restrictions: MRI + left ACL, PCL tear and tendonitis of quad tendin - all chronic. Pt s/p fall at home  Subjective   General  Chart Reviewed: Yes  Additional Pertinent Hx: HTN, arthritis, GERD, DVTs  Response To Previous Treatment: Patient with no complaints from previous session. Subjective  Subjective: attempted to see pt at 11:20 this am, requesting therapist return in 30 minutes to allow pain medicine to take effect. PT/OT return and pt agreeable to therapy  Pain Screening  Patient Currently in Pain: Denies  Vital Signs  Patient Currently in Pain: Denies       Orientation  Orientation  Overall Orientation Status: Within Functional Limits  Cognition      Objective   Bed mobility  Supine to Sit: Stand by assistance  Scooting: Stand by assistance  Comment: Min VC;s for use of bed rails and technique  Transfers  Sit to Stand: Dependent/Total (Mod A x 2 from EOB up to RW x 2 trials. Min A x 1-2 from [facility])  Stand to sit: Contact guard assistance  Stand Pivot Transfers: Moderate Assistance (Min A x 2 with RW to/from [facility])  Comment: demonstrates improved safety awareness  Ambulation  Ambulation?: Yes  WB Status: WBAT LLE  Ambulation 1  Surface: level tile  Device: Standard Walker  Assistance: Moderate assistance (Min/CGA x 1-2)  Quality of Gait: step to pattern, improved upright posture. decreased foot clearance bilaterally. Initially Min A x 2, progressed to CGA x 1-2 as gait quality improved  Distance: 15'     Balance  Sitting - Static: Good  Sitting - Dynamic: Good  Standing - Static: Fair;+  Standing - Dynamic: Fair                           Assessment   Body structures, Functions, Activity limitations: Decreased functional mobility ; Decreased ROM; Decreased strength;Decreased endurance;Decreased

## 2018-12-02 NOTE — PROGRESS NOTES
Del Real removed with no complications. 1400 mls OP. Instructed pt to use call light when needing assistance urinating. Pt VU. Call light in reach.will continue to monitor.  Daryn Keith RN BSN

## 2018-12-02 NOTE — PROGRESS NOTES
Occupational Therapy  Facility/Department: [address]  Daily Treatment Note  NAME: Khanh Heredia  : 1955  MRN: [id number]    Date of Service: 2018    Discharge Recommendations:Dianne Keller scored a 17/24 on the AM-PAC ADL Inpatient form. Current research shows that an AM-PAC score of 17 or less is typically not associated with a discharge to the patient's home setting. Based on the patients AM-PAC score and their current ADL deficits, it is recommended that the patient have 3-5 sessions per week of Occupational Therapy at d/c to increase the patients independence. Patient Diagnosis(es): The primary encounter diagnosis was Hypomagnesemia. Diagnoses of Rupture of left patellar tendon, initial encounter, Acute electrocardiography changes, Anticoagulation adequate, At maximum risk for fall, and Primary insomnia were also pertinent to this visit. has a past medical history of Arthritis; Blood transfusion reaction; Crohn's disease ([address] GERD (gastroesophageal reflux disease); Hiatal hernia; Hypertension; MRSA (methicillin resistant staph aureus) culture positive; Pneumonia; and VRE (vancomycin resistant enterococcus) culture positive. has a past surgical history that includes Hysterectomy; knee surgery (Bilateral); Hand Carpectomy (Bilateral); hernia repair; Abdomen surgery; bladder suspension; fracture surgery; Heel spur surgery; Tonsillectomy; Colonoscopy; Upper gastrointestinal endoscopy (13); Foot surgery (Left, 14); Foot surgery (6/3/15); Colonoscopy (9/14/15); Foot surgery (Left, 2002); joint replacement; Sigmoidoscopy (01/15/2018); Abdominal adhesion surgery (2018); Colonoscopy (2018); colostomy (N/A, 10/8/2018); and pr secd clos surg wnd exten/complic (N/A, ).     Restrictions  Restrictions/Precautions  Restrictions/Precautions: Weight Bearing  Required Braces or Orthoses?: Yes  Lower Extremity Weight Bearing Restrictions  Left Lower Extremity Weight Bearing: Weight Bearing As Tolerated  Required Braces or Orthoses  Left Lower Extremity Brace: Knee Immobilizer  Position Activity Restriction  Other position/activity restrictions: MRI + left ACL, PCL tear and tendonitis of quad tendin - all chronic. Pt s/p fall at home  Subjective   General  Chart Reviewed: Yes  Response to previous treatment: Patient with no complaints from previous session  Family / Caregiver Present: No  Diagnosis: Hypomagnesemia  Subjective  Subjective: Patient supine in bed and agreeable to OT/PT tx. Pain Assessment  Patient Currently in Pain: Denies  Vital Signs  Patient Currently in Pain: Denies   Orientation  Orientation  Overall Orientation Status: Within Functional Limits  Objective    ADL  Grooming: None  UE Bathing: None  LE Bathing: None  UE Dressing: Minimal assistance;Setup  LE Dressing: Minimal assistance;Setup  Toileting: Contact guard assistance  Additional Comments: Pt declined grooming and ADL's except for toileiting and dressing. Dressing:   Min A for donning button up sweatshirt (secondary to heart monitor) and elastic waist shorts over foot. Toileting:  CGA  in stance duiring clothing pulling up into place over buttocks. Balance  Sitting Balance: Independent  Standing Balance: Contact guard assistance  Standing Balance  Time: ~10 minutes total   Activity: functional mobility in room and to/from bathroom   Sit to stand: Dependent/Total  Stand to sit: Contact guard assistance  Comment: CGA of 2 initially, then CGA of 1 after patient up for a while, standard walker   Functional Mobility  Functional - Mobility Device: Standard Walker  Activity: To/from bathroom; Other  Assist Level: Contact guard assistance  Functional Mobility Comments: CGA of 2 initially, then CGA of 1 after patient up for a while, standard walker   Toilet Transfers  Toilet - Technique: Stand step  Equipment Used: OutComes Score                                           AM-PAC Score        AM-PAC Inpatient Daily Activity Raw Score: 17  AM-PAC Inpatient ADL T-Scale Score : 37.26  ADL Inpatient CMS 0-100% Score: 50.11  ADL Inpatient CMS G-Code Modifier : CK    Goals  Short term goals  Time Frame for Short term goals: Discharge  Short term goal 1: Patient will perform bed mobility Yamile mod-  SBA supine to sit on EOB  12/2   Short term goal 2: Patient will tolerate functional ADL transfer Yamile min- CGA of 2 sit to stand from EOB,  initially functional mobility with CGAof 2, but then CGA of 1 aftre up for a bit , standard walker   12/2    Short term goal 3: Patient will bathe Yamile- pt declined  12/2   Short term goal 4: Patient will dress Yamile-  MIn A for shorts over foot, CGA in stance puling pants over buttocks, Min Afor shirt secondary to heart monitor  12/2    Long term goals  Time Frame for Long term goals : LTG=STG  Patient Goals   Patient goals : Home       Therapy Time   Individual Concurrent Group Co-treatment   Time In       1156   Time Out       1226   Minutes       30   Timed Code Treatment Minutes:  30 Minutes    Total Treatment Minutes:  [facility]

## 2018-12-03 NOTE — PROGRESS NOTES
Occupational Therapy  Occupational Therapy Discharge Summary    Name: Nisa Andrews  : 1955    The pt was evaluated by OT on 18 and seen for 2 treatment sessions prior to DC to [facility] on 18 per MD order. The pt's acute therapy goals were:  Short term goals  Time Frame for Short term goals: Discharge  Short term goal 1: Patient will perform bed mobility Yamile mod-  SBA supine to sit on EOB     Short term goal 2: Patient will tolerate functional ADL transfer Yamile min- CGA of 2 sit to stand from EOB,  initially functional mobility with CGAof 2, but then CGA of 1 aftre up for a bit , standard walker       Short term goal 3: Patient will bathe Yamile- pt declined     Short term goal 4: Patient will dress Yamile-  MIn A for shorts over foot, CGA in stance puling pants over buttocks, Min Afor shirt secondary to heart monitor      Long term goals  Time Frame for Long term goals : LTG=STG     Patient met 2 goals during stay. Number of Refusals:0  Number of Holds: 0  During this hospitalization, the patient was educated on:  Patient Education: Eval, POC, discharge recommednations    DC pt from OT caseload at this time.   Thank you, Jose Alegria OTR/L

## 2018-12-13 ENCOUNTER — OFFICE VISIT (OUTPATIENT)
Dept: SURGERY | Age: 63
End: 2018-12-13

## 2018-12-13 VITALS — SYSTOLIC BLOOD PRESSURE: 110 MMHG | DIASTOLIC BLOOD PRESSURE: 70 MMHG

## 2018-12-13 DIAGNOSIS — K57.20 DIVERTICULITIS OF LARGE INTESTINE WITH PERFORATION AND ABSCESS WITHOUT BLEEDING: ICD-10-CM

## 2018-12-13 DIAGNOSIS — S31.109A OPEN WOUND OF ABDOMINAL WALL, INITIAL ENCOUNTER: Primary | ICD-10-CM

## 2018-12-13 PROCEDURE — 99024 POSTOP FOLLOW-UP VISIT: CPT | Performed by: SURGERY

## 2018-12-17 LAB
GRAM STAIN RESULT: ABNORMAL
ORGANISM: ABNORMAL
WOUND/ABSCESS: ABNORMAL

## 2018-12-17 RX ORDER — AMPICILLIN 500 MG/1
500 CAPSULE ORAL 4 TIMES DAILY
Qty: 40 CAPSULE | Refills: 0 | Status: SHIPPED | OUTPATIENT
Start: 2018-12-17 | End: 2018-12-27

## 2018-12-18 ENCOUNTER — OFFICE VISIT (OUTPATIENT)
Dept: ORTHOPEDIC SURGERY | Age: 63
End: 2018-12-18

## 2018-12-18 VITALS — BODY MASS INDEX: 33.11 KG/M2 | HEIGHT: 66 IN | WEIGHT: 206 LBS

## 2018-12-18 DIAGNOSIS — M17.12 PRIMARY OSTEOARTHRITIS OF LEFT KNEE: Primary | ICD-10-CM

## 2018-12-18 PROCEDURE — 99203 OFFICE O/P NEW LOW 30 MIN: CPT | Performed by: ORTHOPAEDIC SURGERY

## 2018-12-18 PROCEDURE — G8484 FLU IMMUNIZE NO ADMIN: HCPCS | Performed by: ORTHOPAEDIC SURGERY

## 2018-12-18 PROCEDURE — 3017F COLORECTAL CA SCREEN DOC REV: CPT | Performed by: ORTHOPAEDIC SURGERY

## 2018-12-18 PROCEDURE — G8427 DOCREV CUR MEDS BY ELIG CLIN: HCPCS | Performed by: ORTHOPAEDIC SURGERY

## 2018-12-18 PROCEDURE — G8417 CALC BMI ABV UP PARAM F/U: HCPCS | Performed by: ORTHOPAEDIC SURGERY

## 2018-12-18 PROCEDURE — 1111F DSCHRG MED/CURRENT MED MERGE: CPT | Performed by: ORTHOPAEDIC SURGERY

## 2018-12-18 PROCEDURE — L1812 KO ELASTIC W/JOINTS PRE OTS: HCPCS | Performed by: ORTHOPAEDIC SURGERY

## 2018-12-18 PROCEDURE — 20610 DRAIN/INJ JOINT/BURSA W/O US: CPT | Performed by: ORTHOPAEDIC SURGERY

## 2018-12-18 PROCEDURE — 4004F PT TOBACCO SCREEN RCVD TLK: CPT | Performed by: ORTHOPAEDIC SURGERY

## 2018-12-18 PROCEDURE — G8598 ASA/ANTIPLAT THER USED: HCPCS | Performed by: ORTHOPAEDIC SURGERY

## 2018-12-19 PROBLEM — M17.12 PRIMARY OSTEOARTHRITIS OF LEFT KNEE: Status: ACTIVE | Noted: 2018-12-19

## 2019-01-08 ENCOUNTER — OFFICE VISIT (OUTPATIENT)
Dept: SURGERY | Age: 64
End: 2019-01-08
Payer: MEDICAID

## 2019-01-08 VITALS — BODY MASS INDEX: 32.12 KG/M2 | SYSTOLIC BLOOD PRESSURE: 124 MMHG | WEIGHT: 199 LBS | DIASTOLIC BLOOD PRESSURE: 88 MMHG

## 2019-01-08 DIAGNOSIS — L03.311 CELLULITIS OF ABDOMINAL WALL: Primary | ICD-10-CM

## 2019-01-08 PROCEDURE — 99213 OFFICE O/P EST LOW 20 MIN: CPT | Performed by: SURGERY

## 2019-01-08 PROCEDURE — G8417 CALC BMI ABV UP PARAM F/U: HCPCS | Performed by: SURGERY

## 2019-01-08 PROCEDURE — 4004F PT TOBACCO SCREEN RCVD TLK: CPT | Performed by: SURGERY

## 2019-01-08 PROCEDURE — G8598 ASA/ANTIPLAT THER USED: HCPCS | Performed by: SURGERY

## 2019-01-08 PROCEDURE — 3017F COLORECTAL CA SCREEN DOC REV: CPT | Performed by: SURGERY

## 2019-01-08 PROCEDURE — G8427 DOCREV CUR MEDS BY ELIG CLIN: HCPCS | Performed by: SURGERY

## 2019-01-08 PROCEDURE — G8484 FLU IMMUNIZE NO ADMIN: HCPCS | Performed by: SURGERY

## 2019-01-08 RX ORDER — SULFAMETHOXAZOLE AND TRIMETHOPRIM 800; 160 MG/1; MG/1
1 TABLET ORAL 2 TIMES DAILY
Qty: 10 TABLET | Refills: 0 | Status: SHIPPED | OUTPATIENT
Start: 2019-01-08 | End: 2019-01-13

## 2019-01-24 ENCOUNTER — OFFICE VISIT (OUTPATIENT)
Dept: SURGERY | Age: 64
End: 2019-01-24
Payer: MEDICAID

## 2019-01-24 VITALS — DIASTOLIC BLOOD PRESSURE: 75 MMHG | WEIGHT: 199 LBS | BODY MASS INDEX: 32.12 KG/M2 | SYSTOLIC BLOOD PRESSURE: 130 MMHG

## 2019-01-24 DIAGNOSIS — R10.9 LEFT LATERAL ABDOMINAL PAIN: Primary | ICD-10-CM

## 2019-01-24 DIAGNOSIS — K46.9 PERISTOMAL HERNIA: ICD-10-CM

## 2019-01-24 PROCEDURE — G8427 DOCREV CUR MEDS BY ELIG CLIN: HCPCS | Performed by: SURGERY

## 2019-01-24 PROCEDURE — 3017F COLORECTAL CA SCREEN DOC REV: CPT | Performed by: SURGERY

## 2019-01-24 PROCEDURE — G8417 CALC BMI ABV UP PARAM F/U: HCPCS | Performed by: SURGERY

## 2019-01-24 PROCEDURE — G8484 FLU IMMUNIZE NO ADMIN: HCPCS | Performed by: SURGERY

## 2019-01-24 PROCEDURE — 99213 OFFICE O/P EST LOW 20 MIN: CPT | Performed by: SURGERY

## 2019-01-24 PROCEDURE — G8598 ASA/ANTIPLAT THER USED: HCPCS | Performed by: SURGERY

## 2019-01-24 PROCEDURE — 4004F PT TOBACCO SCREEN RCVD TLK: CPT | Performed by: SURGERY

## 2019-02-12 ENCOUNTER — HOSPITAL ENCOUNTER (OUTPATIENT)
Dept: CT IMAGING | Age: 64
Discharge: HOME | End: 2019-02-12
Payer: MEDICAID

## 2019-02-12 DIAGNOSIS — K46.9 PERISTOMAL HERNIA: ICD-10-CM

## 2019-02-12 DIAGNOSIS — R10.9 LEFT LATERAL ABDOMINAL PAIN: ICD-10-CM

## 2019-02-12 LAB
BUN BLDV-MCNC: 23 MG/DL (ref 7–20)
CREAT SERPL-MCNC: 0.6 MG/DL (ref 0.6–1.2)
GFR AFRICAN AMERICAN: >60
GFR NON-AFRICAN AMERICAN: >60

## 2019-02-12 PROCEDURE — 84520 ASSAY OF UREA NITROGEN: CPT

## 2019-02-12 PROCEDURE — 82565 ASSAY OF CREATININE: CPT

## 2019-02-12 PROCEDURE — 74177 CT ABD & PELVIS W/CONTRAST: CPT

## 2019-02-12 PROCEDURE — 6360000004 HC RX CONTRAST MEDICATION: Performed by: SURGERY

## 2019-02-12 PROCEDURE — 36415 COLL VENOUS BLD VENIPUNCTURE: CPT

## 2019-02-12 RX ADMIN — IOHEXOL 50 ML: 240 INJECTION, SOLUTION INTRATHECAL; INTRAVASCULAR; INTRAVENOUS; ORAL at 19:30

## 2019-02-12 RX ADMIN — IOPAMIDOL 75 ML: 755 INJECTION, SOLUTION INTRAVENOUS at 19:30

## 2019-02-18 ENCOUNTER — HOSPITAL ENCOUNTER (OUTPATIENT)
Dept: WOMENS IMAGING | Age: 64
Discharge: HOME | End: 2019-02-18
Payer: MEDICAID

## 2019-02-18 ENCOUNTER — HOSPITAL ENCOUNTER (OUTPATIENT)
Dept: ULTRASOUND IMAGING | Age: 64
Discharge: HOME | End: 2019-02-18
Payer: MEDICAID

## 2019-02-18 DIAGNOSIS — N63.10 BREAST MASS, RIGHT: ICD-10-CM

## 2019-02-18 DIAGNOSIS — Z12.39 BREAST CANCER SCREENING: ICD-10-CM

## 2019-02-18 PROCEDURE — G0279 TOMOSYNTHESIS, MAMMO: HCPCS

## 2019-02-18 PROCEDURE — 76642 ULTRASOUND BREAST LIMITED: CPT

## 2019-02-19 ENCOUNTER — OFFICE VISIT (OUTPATIENT)
Dept: SURGERY | Age: 64
End: 2019-02-19
Payer: MEDICAID

## 2019-02-19 VITALS — BODY MASS INDEX: 32.12 KG/M2 | SYSTOLIC BLOOD PRESSURE: 130 MMHG | WEIGHT: 199 LBS | DIASTOLIC BLOOD PRESSURE: 77 MMHG

## 2019-02-19 DIAGNOSIS — K46.9 PERISTOMAL HERNIA: Primary | ICD-10-CM

## 2019-02-19 PROCEDURE — G8427 DOCREV CUR MEDS BY ELIG CLIN: HCPCS | Performed by: SURGERY

## 2019-02-19 PROCEDURE — 4004F PT TOBACCO SCREEN RCVD TLK: CPT | Performed by: SURGERY

## 2019-02-19 PROCEDURE — 3017F COLORECTAL CA SCREEN DOC REV: CPT | Performed by: SURGERY

## 2019-02-19 PROCEDURE — G8417 CALC BMI ABV UP PARAM F/U: HCPCS | Performed by: SURGERY

## 2019-02-19 PROCEDURE — 99213 OFFICE O/P EST LOW 20 MIN: CPT | Performed by: SURGERY

## 2019-02-19 PROCEDURE — G8598 ASA/ANTIPLAT THER USED: HCPCS | Performed by: SURGERY

## 2019-02-19 PROCEDURE — G8484 FLU IMMUNIZE NO ADMIN: HCPCS | Performed by: SURGERY

## 2019-02-19 RX ORDER — GABAPENTIN 300 MG/1
CAPSULE ORAL
Refills: 3 | COMMUNITY
Start: 2019-01-24 | End: 2019-06-05 | Stop reason: ALTCHOICE

## 2019-04-11 ENCOUNTER — HOSPITAL ENCOUNTER (OUTPATIENT)
Dept: VASCULAR LAB | Age: 64
Discharge: HOME | End: 2019-04-11
Payer: MEDICAID

## 2019-04-17 ENCOUNTER — HOSPITAL ENCOUNTER (OUTPATIENT)
Dept: VASCULAR LAB | Age: 64
Discharge: HOME | End: 2019-04-17
Payer: MEDICAID

## 2019-04-17 DIAGNOSIS — I70.223 ATHEROSCLEROSIS OF NATIVE ARTERY OF BOTH LOWER EXTREMITIES WITH REST PAIN (HCC): Primary | ICD-10-CM

## 2019-04-17 PROCEDURE — 93922 UPR/L XTREMITY ART 2 LEVELS: CPT

## 2019-05-17 ENCOUNTER — INITIAL CONSULT (OUTPATIENT)
Dept: SURGERY | Age: 64
End: 2019-05-17
Payer: MEDICAID

## 2019-05-17 VITALS — BODY MASS INDEX: 32.12 KG/M2 | DIASTOLIC BLOOD PRESSURE: 86 MMHG | SYSTOLIC BLOOD PRESSURE: 130 MMHG | HEIGHT: 66 IN

## 2019-05-17 DIAGNOSIS — M79.89 LEG SWELLING: ICD-10-CM

## 2019-05-17 DIAGNOSIS — I87.2 VENOUS INSUFFICIENCY: Primary | ICD-10-CM

## 2019-05-17 DIAGNOSIS — I87.323 CHRONIC VENOUS HYPERTENSION (IDIOPATHIC) WITH INFLAMMATION OF BILATERAL LOWER EXTREMITY: ICD-10-CM

## 2019-05-17 PROBLEM — I73.9 PAD (PERIPHERAL ARTERY DISEASE) (HCC): Status: ACTIVE | Noted: 2019-05-17

## 2019-05-17 PROCEDURE — G8427 DOCREV CUR MEDS BY ELIG CLIN: HCPCS | Performed by: SURGERY

## 2019-05-17 PROCEDURE — G8417 CALC BMI ABV UP PARAM F/U: HCPCS | Performed by: SURGERY

## 2019-05-17 PROCEDURE — 99244 OFF/OP CNSLTJ NEW/EST MOD 40: CPT | Performed by: SURGERY

## 2019-05-17 RX ORDER — BALSALAZIDE DISODIUM 750 MG/1
2250 CAPSULE ORAL 3 TIMES DAILY
COMMUNITY
End: 2019-06-28 | Stop reason: ALTCHOICE

## 2019-05-17 RX ORDER — OMEPRAZOLE 20 MG/1
20 CAPSULE, DELAYED RELEASE ORAL DAILY
COMMUNITY
End: 2021-08-03 | Stop reason: ALTCHOICE

## 2019-05-17 ASSESSMENT — ENCOUNTER SYMPTOMS
ALLERGIC/IMMUNOLOGIC NEGATIVE: 1
GASTROINTESTINAL NEGATIVE: 1
EYES NEGATIVE: 1
RESPIRATORY NEGATIVE: 1
COLOR CHANGE: 1

## 2019-05-17 NOTE — LETTER
[address] Vascular Surgery  [address] [facility] [address]  Phone: [phone]  Fax: [phone]      May 17, 2019       Patient: Irish Eagle   MR Number: [id number]   YOB: 1955   Date of Visit: 5/17/2019       Dear Hazel Peralta : Thank you for the request for consultation for Destinee Dunlap to me. Below are the relevant portions of my assessment and plan of care. Assessment:       Diagnosis   1. Venous insufficiency    2. Chronic venous hypertension (idiopathic) with inflammation of bilateral lower extremity    3. Leg swelling      Patient presents for follow-up of lower extremity pain and swelling. 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 Her foot pain may be more consistent with arthritic or neurologic pain. Plan:       Patient to elevate leg(s) with the ankle at or above the level of the heart as needed to relieve leg pain and swelling. Patient to participate in exercise as tolerated with focus on the leg(s) including, daily walking, repetitive toe pointing, and calve muscle pumping/stretching as tolerated. Patient has been given a prescription for compression stockings. Patient was instructed to wear compression stockings (20-30 mmHg) on a daily basis, off every night. Patient has been instructed to follow up in 1-2 weeks with a venous duplex scan and office visit. Patient was ordered a uric acid level to r/o gout, and will continue care with Dr. Xiomara Melendez. If you have questions, please do not hesitate to call me. I look forward to following Cher Erwin along with you.     Sincerely,        Abad Leon MD    CC providers:  No Recipients

## 2019-05-17 NOTE — PATIENT INSTRUCTIONS
Patient to elevate leg(s) with the ankle at or above the level of the heart as needed to relieve leg pain and swelling. Patient to participate in exercise as tolerated with focus on the leg(s) including, daily walking, repetitive toe pointing, and calve muscle pumping/stretching as tolerated. Patient has been given a prescription for compression stockings. Patient was instructed to wear compression stockings (20-30 mmHg) on a daily basis, off every night. Patient has been instructed to follow up in 1-2 weeks with a venous duplex scan and office visit. Patient is to continue care with Dr. Hollis Bennett.

## 2019-05-17 NOTE — PROGRESS NOTES
Subjective:      Patient ID: Abraham Mcdaniel is a 59 y.o. female. Chief Complaint   Patient presents with    Surgical Consult     Pt is here today, sent by Dr Hollis Bennett, for a PAD that couldn't be read at [place]. Leg Pain    The incident occurred more than 1 week ago. The incident occurred at home. The injury mechanism is unknown. The pain is present in the left leg and right leg. The quality of the pain is described as burning. The pain is at a severity of 5/10. The pain is moderate. The pain has been intermittent since onset. Pertinent negatives include no loss of sensation or muscle weakness. It is unknown if a foreign body is present. The symptoms are aggravated by movement. She has tried elevation for the symptoms. The treatment provided no relief.      Family History   Problem Relation Age of Onset    High Blood Pressure Mother     High Blood Pressure Father     Kidney Disease Sister      Past Medical History:   Diagnosis Date    Arthritis     Blood transfusion reaction     Crohn's disease ([address]     GERD (gastroesophageal reflux disease)     Hiatal hernia 6/24/2014    Hypertension     MRSA (methicillin resistant staph aureus) culture positive 06/05/2018    urine    Pneumonia 1/8/2014    VRE (vancomycin resistant enterococcus) culture positive 10/08/2018    abd culture     Past Surgical History:   Procedure Laterality Date    ABDOMEN SURGERY      ABDOMINAL ADHESION SURGERY  06/08/2018    BLADDER SUSPENSION      COLONOSCOPY      COLONOSCOPY  9/14/15    sigmoid colon biopsy     COLONOSCOPY  08/07/2018    COLOSTOMY N/A 10/8/2018    EXPLORATORY LAPAROTOMY WITH COLOSTOMY performed by Filiberto Calvo MD at [facility] Left 6/25/14    excision plantar left hallux    FOOT SURGERY  6/3/15    PLANTAR PLATE REPAIR BILATERAL, ARTHROTOMY MPJ 2ND BILATERAL    FOOT SURGERY Left 08/05/2002    Excision second metatarsal head left    FRACTURE SURGERY      rt hip    HAND Emotionally abused: Not on file     Physically abused: Not on file     Forced sexual activity: Not on file   Other Topics Concern    Not on file   Social History Narrative    Not on file         Review of Systems   Constitutional: Positive for activity change and fatigue. HENT: Negative. Eyes: Negative. Respiratory: Negative. Cardiovascular: Positive for leg swelling. Gastrointestinal: Negative. Endocrine: Negative. Genitourinary: Negative. Musculoskeletal: Positive for arthralgias and gait problem (uses cane). Skin: Positive for color change. Negative for pallor, rash and wound. Allergic/Immunologic: Negative. Hematological: Negative. Psychiatric/Behavioral: Negative. Objective:   Physical Exam   Constitutional: She is oriented to person, place, and time. She appears well-developed and well-nourished. No distress. HENT:   Head: Normocephalic and atraumatic. Nose: Nose normal.   Eyes: Conjunctivae and EOM are normal. Right eye exhibits no discharge. Left eye exhibits no discharge. No scleral icterus. Neck: Normal range of motion. No thyromegaly present. Cardiovascular: Normal rate and regular rhythm. Right OLEG 1.17 and left OLEG 1.21   Pulmonary/Chest: Effort normal and breath sounds normal.   Abdominal: Soft. Bowel sounds are normal.   Musculoskeletal: She exhibits edema, tenderness and deformity. S/P digital surgery to B/L feet, with deformity to R foot, 2+ pitting edema to B/L LE. Neurological: She is alert and oriented to person, place, and time. Skin: Skin is warm and dry. Capillary refill takes less than 2 seconds. No rash noted. She is not diaphoretic. There is erythema. No pallor. Psychiatric: She has a normal mood and affect. Her behavior is normal. Judgment and thought content normal.   Nursing note and vitals reviewed.     Allergies   Allergen Reactions    Ace Inhibitors Swelling    Skelaxin [Metaxalone] Swelling       Assessment:       Diagnosis Orders   1. Venous insufficiency  VL Extremity Venous Bilateral   2. Chronic venous hypertension (idiopathic) with inflammation of bilateral lower extremity  VL Extremity Venous Bilateral   3. Leg swelling  VL Extremity Venous Bilateral    URIC ACID   Patient presents for follow-up of lower extremity pain and swelling. 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 Her foot pain may be more consistent with arthritic or neurologic pain. Plan:       Patient to elevate leg(s) with the ankle at or above the level of the heart as needed to relieve leg pain and swelling. Patient to participate in exercise as tolerated with focus on the leg(s) including, daily walking, repetitive toe pointing, and calve muscle pumping/stretching as tolerated. Patient has been given a prescription for compression stockings. Patient was instructed to wear compression stockings (20-30 mmHg) on a daily basis, off every night. Patient has been instructed to follow up in 1-2 weeks with a venous duplex scan and office visit. Patient was ordered a uric acid level to r/o gout, and will continue care with Dr. Remona Babinski. DEONNA Le MA am scribing for and in the presence of Chely Ray MD on this date of 05/17/19 at 12:24 PM     I Chely Ray MD personally performed the services described in this documentation as scribed by the Certified Medical Assistant Gelacio Le in my presence and it is both accurate and complete.       General Lexie DPM   Podiatric Resident, PGY-2  Pager: [phone]  5/17/2019, 12:24 PM

## 2019-06-05 RX ORDER — IPRATROPIUM BROMIDE AND ALBUTEROL SULFATE 2.5; .5 MG/3ML; MG/3ML
3 SOLUTION RESPIRATORY (INHALATION) EVERY 4 HOURS PRN
Status: ON HOLD | COMMUNITY
Start: 2018-08-29 | End: 2019-06-07 | Stop reason: ALTCHOICE

## 2019-06-05 RX ORDER — FERROUS SULFATE 325(65) MG
325 TABLET ORAL 2 TIMES DAILY
Status: ON HOLD | COMMUNITY
End: 2020-07-23 | Stop reason: ALTCHOICE

## 2019-06-05 RX ORDER — NYSTATIN 100000 U/G
CREAM TOPICAL
COMMUNITY
Start: 2019-02-21 | End: 2019-10-22 | Stop reason: SDUPTHER

## 2019-06-05 NOTE — PROGRESS NOTES
[facility] time__1300__________        Surgery time___1430_________    Take the following medications with a sip of water: Follow your MD/Surgeons pre-procedure instructions regarding your medications    Do not eat or drink anything after 12:00 midnight prior to your surgery. This includes water chewing gum, mints and ice chips. You may brush your teeth and gargle the morning of your surgery, but do not swallow the water     Please see your family doctor/pediatrician for a history and physical and/or concerning medications. Bring any test results/reports from your physicians office. If you are under the care of a heart doctor or specialist doctor, please be aware that you may be asked to them for clearance    You may be asked to stop blood thinners such as Coumadin, Plavix, Fragmin, Lovenox, etc., or any anti-inflammatories such as:  Aspirin, Ibuprofen, Advil, Naproxen prior to your surgery. We also ask that you stop any OTC medications such as fish oil, vitamin E, glucosamine, garlic, Multivitamins, COQ 10, etc.call dr. Elma Tellez regarding instructions on xarelto    We ask that you do not smoke 24 hours prior to surgery  We ask that you do not  drink any alcoholic beverages 24 hours prior to surgery     You must make arrangements for a responsible adult to take you home after your surgery. For your safety you will not be allowed to leave alone or drive yourself home. Your surgery will be cancelled if you do not have a ride home. Also for your safety, it is strongly suggested that someone stay with you the first 24 hours after your surgery. A parent or legal guardian must accompany a child scheduled for surgery and plan to stay at the hospital until the child is discharged. Please do not bring other children with you. For your comfort, please wear simple loose fitting clothing to the hospital.  Please do not bring valuables.     Do not wear according to your surgery.

## 2019-06-06 ENCOUNTER — ANESTHESIA EVENT (OUTPATIENT)
Dept: ENDOSCOPY | Age: 64
End: 2019-06-06
Payer: MEDICAID

## 2019-06-07 ENCOUNTER — HOSPITAL ENCOUNTER (OUTPATIENT)
Age: 64
Setting detail: OUTPATIENT SURGERY
Discharge: HOME | End: 2019-06-07
Attending: INTERNAL MEDICINE | Admitting: INTERNAL MEDICINE
Payer: MEDICAID

## 2019-06-07 ENCOUNTER — ANESTHESIA (OUTPATIENT)
Dept: ENDOSCOPY | Age: 64
End: 2019-06-07
Payer: MEDICAID

## 2019-06-07 VITALS
WEIGHT: 200 LBS | OXYGEN SATURATION: 98 % | DIASTOLIC BLOOD PRESSURE: 80 MMHG | TEMPERATURE: 97.9 F | HEART RATE: 54 BPM | SYSTOLIC BLOOD PRESSURE: 154 MMHG | BODY MASS INDEX: 32.14 KG/M2 | HEIGHT: 66 IN | RESPIRATION RATE: 14 BRPM

## 2019-06-07 VITALS
SYSTOLIC BLOOD PRESSURE: 108 MMHG | OXYGEN SATURATION: 100 % | DIASTOLIC BLOOD PRESSURE: 76 MMHG | RESPIRATION RATE: 17 BRPM

## 2019-06-07 DIAGNOSIS — K50.90 CROHN'S DISEASE WITHOUT COMPLICATION, UNSPECIFIED GASTROINTESTINAL TRACT LOCATION (HCC): ICD-10-CM

## 2019-06-07 DIAGNOSIS — Z12.11 SCREENING FOR COLON CANCER: ICD-10-CM

## 2019-06-07 PROCEDURE — 7100000000 HC PACU RECOVERY - FIRST 15 MIN: Performed by: INTERNAL MEDICINE

## 2019-06-07 PROCEDURE — 2580000003 HC RX 258: Performed by: ANESTHESIOLOGY

## 2019-06-07 PROCEDURE — 3609009600 HC COLONOSCOPY STOMA DX INCLUDING COLLJ SPEC SPX: Performed by: INTERNAL MEDICINE

## 2019-06-07 PROCEDURE — 7100000011 HC PHASE II RECOVERY - ADDTL 15 MIN: Performed by: INTERNAL MEDICINE

## 2019-06-07 PROCEDURE — 6360000002 HC RX W HCPCS: Performed by: NURSE ANESTHETIST, CERTIFIED REGISTERED

## 2019-06-07 PROCEDURE — 2500000003 HC RX 250 WO HCPCS: Performed by: NURSE ANESTHETIST, CERTIFIED REGISTERED

## 2019-06-07 PROCEDURE — 3700000001 HC ADD 15 MINUTES (ANESTHESIA): Performed by: INTERNAL MEDICINE

## 2019-06-07 PROCEDURE — 3700000000 HC ANESTHESIA ATTENDED CARE: Performed by: INTERNAL MEDICINE

## 2019-06-07 PROCEDURE — 2709999900 HC NON-CHARGEABLE SUPPLY: Performed by: INTERNAL MEDICINE

## 2019-06-07 PROCEDURE — 88305 TISSUE EXAM BY PATHOLOGIST: CPT

## 2019-06-07 PROCEDURE — 3609010300 HC COLONOSCOPY W/BIOPSY SINGLE/MULTIPLE: Performed by: INTERNAL MEDICINE

## 2019-06-07 PROCEDURE — 7100000001 HC PACU RECOVERY - ADDTL 15 MIN: Performed by: INTERNAL MEDICINE

## 2019-06-07 PROCEDURE — 7100000010 HC PHASE II RECOVERY - FIRST 15 MIN: Performed by: INTERNAL MEDICINE

## 2019-06-07 RX ORDER — SODIUM CHLORIDE 0.9 % (FLUSH) 0.9 %
10 SYRINGE (ML) INJECTION EVERY 12 HOURS SCHEDULED
Status: DISCONTINUED | OUTPATIENT
Start: 2019-06-07 | End: 2019-06-07 | Stop reason: HOSPADM

## 2019-06-07 RX ORDER — SODIUM CHLORIDE 9 MG/ML
INJECTION, SOLUTION INTRAVENOUS CONTINUOUS
Status: DISCONTINUED | OUTPATIENT
Start: 2019-06-07 | End: 2019-06-07 | Stop reason: HOSPADM

## 2019-06-07 RX ORDER — SODIUM CHLORIDE 0.9 % (FLUSH) 0.9 %
10 SYRINGE (ML) INJECTION PRN
Status: DISCONTINUED | OUTPATIENT
Start: 2019-06-07 | End: 2019-06-07 | Stop reason: HOSPADM

## 2019-06-07 RX ORDER — LIDOCAINE HYDROCHLORIDE 20 MG/ML
INJECTION, SOLUTION EPIDURAL; INFILTRATION; INTRACAUDAL; PERINEURAL PRN
Status: DISCONTINUED | OUTPATIENT
Start: 2019-06-07 | End: 2019-06-07 | Stop reason: SDUPTHER

## 2019-06-07 RX ORDER — PROPOFOL 10 MG/ML
INJECTION, EMULSION INTRAVENOUS CONTINUOUS PRN
Status: DISCONTINUED | OUTPATIENT
Start: 2019-06-07 | End: 2019-06-07 | Stop reason: SDUPTHER

## 2019-06-07 RX ORDER — PROPOFOL 10 MG/ML
INJECTION, EMULSION INTRAVENOUS PRN
Status: DISCONTINUED | OUTPATIENT
Start: 2019-06-07 | End: 2019-06-07 | Stop reason: SDUPTHER

## 2019-06-07 RX ADMIN — SODIUM CHLORIDE: 9 INJECTION, SOLUTION INTRAVENOUS at 14:36

## 2019-06-07 RX ADMIN — PROPOFOL 100 MG: 10 INJECTION, EMULSION INTRAVENOUS at 15:00

## 2019-06-07 RX ADMIN — PROPOFOL 100 MG: 10 INJECTION, EMULSION INTRAVENOUS at 14:43

## 2019-06-07 RX ADMIN — PROPOFOL 200 MCG/KG/MIN: 10 INJECTION, EMULSION INTRAVENOUS at 14:43

## 2019-06-07 RX ADMIN — LIDOCAINE HYDROCHLORIDE 60 MG: 20 INJECTION, SOLUTION EPIDURAL; INFILTRATION; INTRACAUDAL; PERINEURAL at 14:43

## 2019-06-07 ASSESSMENT — PAIN SCALES - GENERAL
PAINLEVEL_OUTOF10: 0

## 2019-06-07 ASSESSMENT — PAIN - FUNCTIONAL ASSESSMENT
PAIN_FUNCTIONAL_ASSESSMENT: 0-10
PAIN_FUNCTIONAL_ASSESSMENT: PREVENTS OR INTERFERES SOME ACTIVE ACTIVITIES AND ADLS

## 2019-06-07 ASSESSMENT — ENCOUNTER SYMPTOMS: SHORTNESS OF BREATH: 0

## 2019-06-07 ASSESSMENT — PAIN DESCRIPTION - DESCRIPTORS: DESCRIPTORS: POUNDING

## 2019-06-07 NOTE — PROGRESS NOTES
Pt voided. Walked back to room. Up to chair. VSS. Pt has a sore on right heel present from dry skin. Denies any other pain. Refuses PO at present.

## 2019-06-07 NOTE — LETTER
[facility] Endoscopy  [address]  Phone: [phone]    No name on file. June 7, 2019     Patient: Byron Ceron   YOB: 1955   Date of Visit: 5/31/2019       To Whom it May Concern:    Becki Oreilly underwent a procedure with anesthesia at [facility] with Dr. Chris Palacios. She may not be alone due to the anesthesia. Please excuse Sagar Toussaint from work on Friday, June 7, 2019. If you have any questions or concerns, please don't hesitate to call. Sincerely,     Martín Mckeon RN     . If you have any questions or concerns, please don't hesitate to call. Sincerely,         No name on file.

## 2019-06-07 NOTE — PROGRESS NOTES
Daughter present with pt. Discharge instructions given to pt and daughter. Both express an understanding of instructions. Pt tolerates sitting up in chair.

## 2019-06-07 NOTE — OP NOTE
COLONOSCOPY      Patient: Christina Moran  : 1955   Acct#: [de-identified]    Procedure: Colonoscopy through the colostomy stoma and through the rectum . Date: 2019    Endoscopist: Christel Aguirre MD    Referring Physician:  Dr. Paul Frazier    Indications: This is a 59y.o. year old female who presents today for colonoscopy to evaluate inflammatory bowel disease before surgical reversal.    Anesthesia: Propofol 500 mg IV. Procedure: An informed consent was obtained from the patient after explanation of indications, benefits, possible risks and complications of the procedure. The patient was placed in the left lateral decubitus position, and the above IV anesthesia was administered. Digital rectal examination revealed no significant hemorrhoids. The stoma was also normal to palpation without ant narrowing at the skin or fascial level      The Olympus CFQ-190-AL video colonoscope was inserted and advanced without significant difficulty to the cecum. The cecum was identified by characteristic anatomy of the appendix and the ileo-cecal valve. The preparation was adequate. Circumferential examination of the mucosa demonstrated normal mucosa. Intubation through the rectum demonstrated no abnormalities. Random biopsies were obtained. The patient tolerated the procedure well and was taken to the [address] [place] [address] Unit in good condition. Impression: Normal colonic mucosa, pathology pending. Recommendations: Review pathology. No pathology to interfere with surgical plans. Previous Colonoscopy: Yes  Date:   Greater than 3 years?  No    Electronically signed by Christel Aguirre MD on 2019 at 3:09 PM.

## 2019-06-07 NOTE — PROGRESS NOTES
Patient assisted to bedpan to void clear yellow urine. Incontinent of urine in bed. Linens changed. Depend diaper placed. Patient denies C/O pain or nausea. Colostomy bag intact. VSS. IV patent.

## 2019-06-07 NOTE — LETTER
[facility]  [address]  Phone: [phone]    No name on file. June 7, 2019     Patient: Lorna Simmons   YOB: 1955   Date of Visit: 5/31/2019       To Whom it May Concern:    Amanda Bello underwent a procedure with anesthesia at [facility] with Dr. Lina Abraham. She may not be alone due to the anesthesia. Please excuse Corey Otoole from work on Friday, June 7, 2019. If you have any questions or concerns, please don't hesitate to call. Sincerely,     Adelia Mark RN      No name on file.

## 2019-06-07 NOTE — ANESTHESIA PRE PROCEDURE
Department of Anesthesiology  Preprocedure Note       Name:  Skylar Valenzuela   Age:  59 y.o.  :  1955                                          MRN:  [id number]         Date:  2019      Surgeon: Trinidad Ramachandran):  Adia Bagley MD    Procedure: COLONOSCOPY, POSSIBLE BIOPSY, POSSIBLE POLYPECTOMY (N/A )    Medications prior to admission:   Prior to Admission medications    Medication Sig Start Date End Date Taking? Authorizing Provider   ferrous sulfate 325 (65 Fe) MG tablet Take 325 mg by mouth 3 times daily (with meals)     Historical Provider, MD   nystatin (MYCOSTATIN) 161887 UNIT/GM cream APPLY TO THE AFFECTED AREA LIGHTLY 2 TIMES A DAY UNTIL CLEAR AND AS NEEDED **MAY REFILL** only as needed 19   Historical Provider, MD   balsalazide (COLAZAL) 750 MG capsule Take 2,250 mg by mouth 3 times daily    Historical Provider, MD   omeprazole (PRILOSEC) 20 MG delayed release capsule Take 20 mg by mouth daily    Historical Provider, MD   famotidine (PEPCID) 20 MG tablet Take 1 tablet by mouth 2 times daily as needed (s) 18   Gay Obrien MD   Skin Protectants, Misc.  (LIP BALM) OINT ointment Apply 1 g topically daily 10/22/18   Ryan Vigil MD   furosemide (LASIX) 40 MG tablet Take 1 tablet by mouth daily 10/23/18   Ryan Vigil MD   rivaroxaban (XARELTO) 20 MG TABS tablet Take 1 tablet by mouth daily (with breakfast) 18   Gay Obrien MD   baclofen (LIORESAL) 10 MG tablet Take 1 tablet by mouth 3 times daily 18   Gay Obrien MD   atenolol (TENORMIN) 100 MG tablet Take 1 tablet by mouth daily 18   Gay Obrien MD   ondansetron (ZOFRAN ODT) 4 MG disintegrating tablet Take 1 tablet by mouth every 6 hours as needed for Nausea or Vomiting 18   Johny Duran MD   aspirin-acetaminophen-caffeine (EXCEDRIN MIGRAINE) 612-234-41 MG per tablet Take 1 tablet by mouth every 6 hours as needed for Headaches    Historical Provider, MD   fluticasone (FLONASE) 50 MCG/ACT nasal spray 1 spray by Nasal route daily  Patient taking differently: 1 spray by Nasal route as needed  8/5/17   Odin Nielson MD   DULoxetine (CYMBALTA) 60 MG extended release capsule Take 60 mg by mouth daily    Historical Provider, MD   lidocaine (XYLOCAINE) 5 % ointment Apply topically as needed Apply topically as needed to feet    Historical Provider, MD   zolpidem (AMBIEN) 10 MG tablet Take 10 mg by mouth nightly as needed for Sleep . Historical Provider, MD   potassium chloride SA (K-DUR;KLOR-CON M) 20 MEQ tablet Take 1 tablet by mouth 2 times daily. 1/15/14   Grant Salazar MD   busPIRone (BUSPAR) 15 MG tablet Take 30 mg by mouth 2 times daily     Historical Provider, MD       Current medications:    No current facility-administered medications for this visit. No current outpatient medications on file. Facility-Administered Medications Ordered in Other Visits   Medication Dose Route Frequency Provider Last Rate Last Dose    0.9 % sodium chloride infusion   Intravenous Continuous Anel Jonas MD        sodium chloride flush 0.9 % injection 10 mL  10 mL Intravenous 2 times per day Anel Jonas MD        sodium chloride flush 0.9 % injection 10 mL  10 mL Intravenous PRN Anel Jonas MD           Allergies:     Allergies   Allergen Reactions    Ace Inhibitors Swelling    Skelaxin [Metaxalone] Swelling       Problem List:    Patient Active Problem List   Diagnosis Code    Hiatal hernia K44.9    Essential hypertension, benign I10    Atherosclerosis of native arteries of extremities with intermittent claudication, bilateral legs ([facility]) I70.213    Bone spur M77.9    Anemia D64.9    Hyponatremia E87.1    Insomnia G47.00    Hypokalemia E87.6    Crohn's colitis ([facility] K50.10    Hypomagnesemia E83.42    Syncope R55    DVT (deep venous thrombosis) ([facility]) I82.409    DVT, lower extremity, distal, acute, bilateral ([facility] I82.4Z3    Diverticulitis K57.92    Perforation bowel ([facility] K63.1    Perforation and abscess of large intestine concurrent with and due to diverticulitis K57.20    Intra-abdominal abscess ([facility]) K65.1    Sepsis ([address] A41.9    Colitis K52.9    Omental mass K66.8    Open abdominal wall wound S31.109A    Patellar tendon rupture S86.819A    Hypomagnesemia E83.42    Left ACL tear W69.952Z    Primary osteoarthritis of left knee M17.12    PAD (peripheral artery disease) ([facility]) I73.9    Leg swelling M79.89    Venous insufficiency I87.2    Chronic venous hypertension (idiopathic) with inflammation of bilateral lower extremity I87.323       Past Medical History:        Diagnosis Date    Anxiety     Arthritis     Blood transfusion reaction     Crohn's disease ([facility])     Depression     GERD (gastroesophageal reflux disease)     Hiatal hernia 6/24/2014    Hx of blood clots     Hypertension     MRSA (methicillin resistant staph aureus) culture positive 06/05/2018    urine    Neuropathy     Pneumonia 1/8/2014    Sinus headache     SOB (shortness of breath)     VRE (vancomycin resistant enterococcus) culture positive 10/08/2018    abd culture       Past Surgical History:        Procedure Laterality Date    ABDOMEN SURGERY      ABDOMINAL ADHESION SURGERY  06/08/2018    BLADDER SUSPENSION      COLONOSCOPY      COLONOSCOPY  9/14/15    sigmoid colon biopsy     COLONOSCOPY  08/07/2018    COLOSTOMY N/A 10/8/2018    EXPLORATORY LAPAROTOMY WITH COLOSTOMY performed by Filiberto Calvo MD at [address] [facility] Left 6/25/14    excision plantar left hallux    FOOT SURGERY  6/3/15    PLANTAR PLATE REPAIR BILATERAL, ARTHROTOMY MPJ 2ND BILATERAL    FOOT SURGERY Left 08/05/2002    Excision second metatarsal head left    FRACTURE SURGERY      rt hip    HAND CARPECTOMY Bilateral     HEEL SPUR SURGERY      HERNIA REPAIR      HYSTERECTOMY      bladder surgery    JOINT REPLACEMENT      rt hip, L shoulder replacement 11/2014    KNEE SURGERY Bilateral     arthrosvopic  LEG SURGERY Right     CT SECD CLOS SURG WND EXTEN/COMPLIC N/A 61/37/7351    SECONDARY WOUND CLOSURE OF ABDOMINAL WOUND performed by Melissa Dawn MD at [address]  01/15/2018    with biopsies    TONSILLECTOMY      UPPER GASTROINTESTINAL ENDOSCOPY  6/14/13    and colonsocopy with antral erosion biopsies       Social History:    Social History     Tobacco Use    Smoking status: Current Every Day Smoker     Packs/day: 1.00     Years: 10.00     Pack years: 10.00     Types: Cigarettes, E-Cigarettes    Smokeless tobacco: Never Used   Substance Use Topics    Alcohol use: Yes     Alcohol/week: 1.2 oz     Types: 2 Shots of liquor per week     Comment: occasionally                                Ready to quit: Not Answered  Counseling given: Not Answered      Vital Signs (Current): There were no vitals filed for this visit.                                            BP Readings from Last 3 Encounters:   06/07/19 (!) 149/87   05/17/19 130/86   02/19/19 130/77       NPO Status:                                                                                 BMI:   Wt Readings from Last 3 Encounters:   06/05/19 200 lb (90.7 kg)   02/19/19 199 lb (90.3 kg)   01/24/19 199 lb (90.3 kg)     There is no height or weight on file to calculate BMI.    CBC:   Lab Results   Component Value Date    WBC 6.8 12/02/2018    RBC 3.28 12/02/2018    HGB 9.7 12/02/2018    HCT 29.2 12/02/2018    MCV 89.1 12/02/2018    RDW 18.7 12/02/2018     12/02/2018       CMP:   Lab Results   Component Value Date     12/02/2018    K 4.7 12/02/2018    K 3.5 11/27/2018    CL 93 12/02/2018    CO2 25 12/02/2018    BUN 23 02/12/2019    CREATININE 0.6 02/12/2019    GFRAA >60 02/12/2019    GFRAA >60 06/14/2013    AGRATIO 0.7 11/28/2018    LABGLOM >60 02/12/2019    GLUCOSE 102 12/02/2018    PROT 6.7 11/28/2018    CALCIUM 10.2 12/02/2018    BILITOT 0.5 11/28/2018    ALKPHOS 68 11/28/2018    AST 13 11/28/2018    ALT <5 11/28/2018       POC Tests:   No results for input(s): POCGLU, POCNA, POCK, POCCL, POCBUN, POCHEMO, POCHCT in the last 72 hours. Coags:   Lab Results   Component Value Date    PROTIME 31.3 11/27/2018    INR 2.75 11/27/2018    APTT 28.9 10/16/2018       HCG (If Applicable): No results found for: PREGTESTUR, PREGSERUM, HCG, HCGQUANT     ABGs: No results found for: PHART, PO2ART, VAV7XPO, WRI9VKU, BEART, E3JMGVSI     Type & Screen (If Applicable):  Lab Results   Component Value Date    LABABO B 06/13/2013    [address] Positive 06/13/2013       Anesthesia Evaluation  Patient summary reviewed and Nursing notes reviewed no history of anesthetic complications:   Airway: Mallampati: I  TM distance: >3 FB   Neck ROM: full  Mouth opening: > = 3 FB Dental: normal exam         Pulmonary: breath sounds clear to auscultation  (+) pneumonia: resolved,      (-) asthma and shortness of breath                           Cardiovascular:  Exercise tolerance: good (>4 METS),   (+) hypertension:,     (-)  angina      Rhythm: regular  Rate: normal                 ROS comment: 6/18 TTE    \" Conclusions      Summary   -Normal left ventricle size, wall thickness, and systolic function with an   estimated ejection fraction of 50-55%.  -No regional wall motion abnormalities are seen.   -Mildly dilated left atrium. \"     Neuro/Psych:   (+) neuromuscular disease:, headaches:, psychiatric history:            GI/Hepatic/Renal:   (+) hiatal hernia, GERD:,           Endo/Other:    (+) : arthritis:., .    (-) diabetes mellitus               Abdominal:           Vascular:                                          Plan for GETA with standard ASA monitoring. Additional monitoring and lines as dictated by intra-op course. Risks/benefits reviewed with patient and all anesthestic questions answered prior to procedure. NPO appropriate.     This pre-anesthesia assessment may be used as a history and physical.    DOS STAFF ADDENDUM:    Pt seen and examined, chart

## 2019-06-07 NOTE — PROGRESS NOTES
Patient admitted to PACU from [facility]. Patient awake and alert. Resp easy unlabored on room air O2 with SaO2 99%. Monitor in SR. Abdomen rounded soft with Colostomy bag intact. Patient denies C/O pain or nausea. VSS. IV patent. Repositioned for comfort.

## 2019-06-07 NOTE — PROGRESS NOTES
Daughter states that she is the . Overheard daughter speaking on the telephone and stating to bring the other car because \"Mom won't be able to get into the van\". Asked daughter about ride. Her father is picking them up. Explained that pt cannot sit in lobby and wait for ride. Daughter states \"I just can't handle this right now. \" Daughter assisting pt to dress.

## 2019-06-07 NOTE — H&P
Patient: Lolly Hammer  : 1955   Acct#: [de-identified]    Procedure: Colonoscopy    Date: 2019    Endoscopist: Batsheva Strong MD    Pre-Sedation Documentation and Exam:   I have personally completed a history, physical exam & review of systems for this patient (see notes).     Mallampati Airway Assessment:  Mallampati Class II - (soft palate, fauces & uvula are visible)    ASA Classification:  Class 2 - A normal healthy patient with mild systemic disease    Sedation/ Anesthesia Plan:   general    Patient is an appropriate candidate for plan of sedation: yes    Electronically signed by Batsheva Strong MD on 2019 at 1:08 PM

## 2019-06-07 NOTE — ANESTHESIA POSTPROCEDURE EVALUATION
Department of Anesthesiology  Postprocedure Note    Patient: Adrianna Patten  MRN: [id number]  YOB: 1955  Date of evaluation: 6/7/2019  Time:  6:47 PM     Procedure Summary     Date:  06/07/19 Room / Location:  [facility] 02 / [facility] ENDOSCOPY    Anesthesia Start:  3719 Anesthesia Stop:  6547    Procedures:       COLONOSCOPY WITH BIOPSY (N/A )      COLONOSCOPY DIAGNOSTIC/STOMA (N/A ) Diagnosis:       Screening for colon cancer      Crohn's disease without complication, unspecified gastrointestinal tract location [facility]      (Screening for colon cancer, CROHNS DISEASE UNCOMPLICATED, WITHOUT COMPLICATIONS)    Surgeon:  Kelli Ram MD Responsible Provider:  Tk Martin MD    Anesthesia Type:  Not recorded ASA Status:  Not recorded          Anesthesia Type: No value filed. Arely Phase I: Aerly Score: 10    Arely Phase II:      Last vitals: Reviewed and per EMR flowsheets.        Anesthesia Post Evaluation    Patient location during evaluation: PACU  Patient participation: complete - patient participated  Level of consciousness: awake and alert  Airway patency: patent  Nausea & Vomiting: no nausea and no vomiting  Complications: no  Cardiovascular status: hemodynamically stable  Respiratory status: acceptable  Hydration status: stable

## 2019-06-10 ASSESSMENT — ENCOUNTER SYMPTOMS: SHORTNESS OF BREATH: 1

## 2019-06-10 NOTE — ANESTHESIA PRE PROCEDURE
Department of Anesthesiology  Preprocedure Note       Name:  Angelica Duty   Age:  59 y.o.  :  1955                                          MRN:  [id number]         Date:  6/10/2019      Surgeon: Donn Wiggins):  Dorie Mansfield MD    Procedure: COLONOSCOPY WITH BIOPSY (N/A )  COLONOSCOPY DIAGNOSTIC/STOMA (N/A )    Medications prior to admission:   Prior to Admission medications    Medication Sig Start Date End Date Taking?  Authorizing Provider   ferrous sulfate 325 (65 Fe) MG tablet Take 325 mg by mouth 3 times daily (with meals)    Yes Historical Provider, MD   nystatin (MYCOSTATIN) 268399 UNIT/GM cream APPLY TO THE AFFECTED AREA LIGHTLY 2 TIMES A DAY UNTIL CLEAR AND AS NEEDED **MAY REFILL** only as needed 19  Yes Historical Provider, MD   balsalazide (COLAZAL) 750 MG capsule Take 2,250 mg by mouth 3 times daily   Yes Historical Provider, MD   omeprazole (PRILOSEC) 20 MG delayed release capsule Take 20 mg by mouth daily   Yes Historical Provider, MD   famotidine (PEPCID) 20 MG tablet Take 1 tablet by mouth 2 times daily as needed (s) 18  Yes Joel Reed MD   furosemide (LASIX) 40 MG tablet Take 1 tablet by mouth daily 10/23/18  Yes Georgina Cordon MD   rivaroxaban (XARELTO) 20 MG TABS tablet Take 1 tablet by mouth daily (with breakfast) 18  Yes Joel Reed MD   baclofen (LIORESAL) 10 MG tablet Take 1 tablet by mouth 3 times daily 18  Yes Joel Reed MD   atenolol (TENORMIN) 100 MG tablet Take 1 tablet by mouth daily 18  Yes Joel Reed MD   aspirin-acetaminophen-caffeine (EXCEDRIN MIGRAINE) 466-316-36 MG per tablet Take 1 tablet by mouth every 6 hours as needed for Headaches   Yes Historical Provider, MD   fluticasone (FLONASE) 50 MCG/ACT nasal spray 1 spray by Nasal route daily  Patient taking differently: 1 spray by Nasal route as needed  17  Yes Georgina Cordon MD   DULoxetine (CYMBALTA) 60 MG extended release capsule Take 60 mg by mouth daily   Yes Historical Provider, MD   lidocaine (XYLOCAINE) 5 % ointment Apply topically as needed Apply topically as needed to feet   Yes Historical Provider, MD   zolpidem (AMBIEN) 10 MG tablet Take 10 mg by mouth nightly as needed for Sleep . Yes Historical Provider, MD   potassium chloride SA (K-DUR;KLOR-CON M) 20 MEQ tablet Take 1 tablet by mouth 2 times daily. 1/15/14  Yes Heber Rodriguez MD   busPIRone (BUSPAR) 15 MG tablet Take 30 mg by mouth 2 times daily    Yes Historical Provider, MD   Skin Protectants, Misc. (LIP BALM) OINT ointment Apply 1 g topically daily 10/22/18   Austin Bee MD   ondansetron (ZOFRAN ODT) 4 MG disintegrating tablet Take 1 tablet by mouth every 6 hours as needed for Nausea or Vomiting 5/27/18   Jami Barksdale MD       Current medications:    No current facility-administered medications for this encounter.       Current Outpatient Medications   Medication Sig Dispense Refill    ferrous sulfate 325 (65 Fe) MG tablet Take 325 mg by mouth 3 times daily (with meals)       nystatin (MYCOSTATIN) 955114 UNIT/GM cream APPLY TO THE AFFECTED AREA LIGHTLY 2 TIMES A DAY UNTIL CLEAR AND AS NEEDED **MAY REFILL** only as needed      balsalazide (COLAZAL) 750 MG capsule Take 2,250 mg by mouth 3 times daily      omeprazole (PRILOSEC) 20 MG delayed release capsule Take 20 mg by mouth daily      famotidine (PEPCID) 20 MG tablet Take 1 tablet by mouth 2 times daily as needed (s) 60 tablet 3    furosemide (LASIX) 40 MG tablet Take 1 tablet by mouth daily 60 tablet 3    rivaroxaban (XARELTO) 20 MG TABS tablet Take 1 tablet by mouth daily (with breakfast) 30 tablet 0    baclofen (LIORESAL) 10 MG tablet Take 1 tablet by mouth 3 times daily 90 tablet 1    atenolol (TENORMIN) 100 MG tablet Take 1 tablet by mouth daily 30 tablet 3    aspirin-acetaminophen-caffeine (EXCEDRIN MIGRAINE) 250-250-65 MG per tablet Take 1 tablet by mouth every 6 hours as needed for Headaches      fluticasone (FLONASE) 50 MCG/ACT  Venous insufficiency I87.2    Chronic venous hypertension (idiopathic) with inflammation of bilateral lower extremity I87.323       Past Medical History:        Diagnosis Date    Anxiety     Arthritis     Blood transfusion reaction     Crohn's disease ([address]     Depression     GERD (gastroesophageal reflux disease)     Hiatal hernia 6/24/2014    Hx of blood clots     Hypertension     MRSA (methicillin resistant staph aureus) culture positive 06/05/2018    urine    Neuropathy     Pneumonia 1/8/2014    Sinus headache     SOB (shortness of breath)     VRE (vancomycin resistant enterococcus) culture positive 10/08/2018    abd culture       Past Surgical History:        Procedure Laterality Date    ABDOMEN SURGERY      ABDOMINAL ADHESION SURGERY  06/08/2018    BLADDER SUSPENSION      COLONOSCOPY      COLONOSCOPY  9/14/15    sigmoid colon biopsy     COLONOSCOPY  08/07/2018    COLONOSCOPY  06/07/2019    COLONOSCOPY, POSSIBLE BIOPSY, POSSIBLE POLYPECTOMY    COLONOSCOPY N/A 6/7/2019    COLONOSCOPY WITH BIOPSY performed by Tommy Rush MD at [address] N/A 6/7/2019    COLONOSCOPY DIAGNOSTIC/STOMA performed by Tommy Rush MD at [address] 10/8/2018    EXPLORATORY LAPAROTOMY WITH COLOSTOMY performed by Javed Villareal MD at [address] [facility] Left 6/25/14    excision plantar left hallux    FOOT SURGERY  6/3/15    PLANTAR PLATE REPAIR BILATERAL, ARTHROTOMY MPJ 2ND BILATERAL    FOOT SURGERY Left 08/05/2002    Excision second metatarsal head left    FRACTURE SURGERY      rt hip    HAND CARPECTOMY Bilateral     HEEL SPUR SURGERY      HERNIA REPAIR      HYSTERECTOMY      bladder surgery    JOINT REPLACEMENT      rt hip, L shoulder replacement 11/2014    KNEE SURGERY Bilateral     arthrosvopic    LEG SURGERY Right     [facility] SECD CLOS SURG WND EXTEN/COMPLIC N/A 59/25/4885    SECONDARY WOUND CLOSURE OF ABDOMINAL WOUND performed by Maggie Bravo MD at [address] [facility]  01/15/2018    with biopsies    TONSILLECTOMY      UPPER GASTROINTESTINAL ENDOSCOPY  6/14/13    and colonsocopy with antral erosion biopsies       Social History:    Social History     Tobacco Use    Smoking status: Current Every Day Smoker     Packs/day: 1.00     Years: 10.00     Pack years: 10.00     Types: Cigarettes, E-Cigarettes    Smokeless tobacco: Never Used   Substance Use Topics    Alcohol use: Yes     Alcohol/week: 1.2 oz     Types: 2 Shots of liquor per week     Comment: occasionally                                Ready to quit: Not Answered  Counseling given: Not Answered      Vital Signs (Current):   Vitals:    06/07/19 1554 06/07/19 1555 06/07/19 1613 06/07/19 1621   BP:   (!) 148/85 (!) 154/80   Pulse:  53 63 54   Resp:  16 16 14   Temp: 97 °F (36.1 °C)  97.9 °F (36.6 °C)    TempSrc: Temporal  Oral    SpO2:  94% 94% 98%   Weight:       Height:                                                  BP Readings from Last 3 Encounters:   06/07/19 108/76   06/07/19 (!) 154/80   05/17/19 130/86       NPO Status: Time of last liquid consumption: 0845                        Time of last solid consumption: 1700                        Date of last liquid consumption: 06/07/19                        Date of last solid food consumption: 06/05/19    BMI:   Wt Readings from Last 3 Encounters:   06/05/19 200 lb (90.7 kg)   02/19/19 199 lb (90.3 kg)   01/24/19 199 lb (90.3 kg)     Body mass index is 32.28 kg/m².     CBC:   Lab Results   Component Value Date    WBC 6.8 12/02/2018    RBC 3.28 12/02/2018    HGB 9.7 12/02/2018    HCT 29.2 12/02/2018    MCV 89.1 12/02/2018    RDW 18.7 12/02/2018     12/02/2018       CMP:   Lab Results   Component Value Date     12/02/2018    K 4.7 12/02/2018    K 3.5 11/27/2018    CL 93 12/02/2018    CO2 25 12/02/2018    BUN 23 02/12/2019    CREATININE 0.6 02/12/2019    GFRAA >60 02/12/2019    GFRAA >60 06/14/2013 AGRATIO 0.7 11/28/2018    LABGLOM >60 02/12/2019    GLUCOSE 102 12/02/2018    PROT 6.7 11/28/2018    CALCIUM 10.2 12/02/2018    BILITOT 0.5 11/28/2018    ALKPHOS 68 11/28/2018    AST 13 11/28/2018    ALT <5 11/28/2018       POC Tests: No results for input(s): POCGLU, POCNA, POCK, POCCL, POCBUN, POCHEMO, POCHCT in the last 72 hours. Coags:   Lab Results   Component Value Date    PROTIME 31.3 11/27/2018    INR 2.75 11/27/2018    APTT 28.9 10/16/2018       HCG (If Applicable): No results found for: PREGTESTUR, PREGSERUM, HCG, HCGQUANT     ABGs: No results found for: PHART, PO2ART, PPI9YOP, ROM7OVY, BEART, E0FZGBSA     Type & Screen (If Applicable):  Lab Results   Component Value Date    LABABO B 06/13/2013    [address] Positive 06/13/2013       Anesthesia Evaluation    Airway: Mallampati: II  TM distance: >3 FB     Mouth opening: > = 3 FB Dental:          Pulmonary:   (+) pneumonia:  shortness of breath:                             Cardiovascular:  Exercise tolerance: good (>4 METS),   (+) hypertension:,         Rhythm: regular  Rate: normal                    Neuro/Psych:   (+) neuromuscular disease:, headaches:, psychiatric history:            GI/Hepatic/Renal:   (+) hiatal hernia, GERD:,           Endo/Other: Negative Endo/Other ROS                    Abdominal:           Vascular:                                        Anesthesia Plan      MAC and TIVA     ASA 3       Induction: intravenous. Anesthetic plan and risks discussed with patient. Plan discussed with CRNA.                   Colt Zapata MD   6/10/2019

## 2019-06-18 ENCOUNTER — PROCEDURE VISIT (OUTPATIENT)
Dept: SURGERY | Age: 64
End: 2019-06-18

## 2019-06-18 ENCOUNTER — OFFICE VISIT (OUTPATIENT)
Dept: SURGERY | Age: 64
End: 2019-06-18
Payer: MEDICAID

## 2019-06-18 VITALS
SYSTOLIC BLOOD PRESSURE: 141 MMHG | BODY MASS INDEX: 33.41 KG/M2 | WEIGHT: 207 LBS | HEART RATE: 61 BPM | DIASTOLIC BLOOD PRESSURE: 91 MMHG

## 2019-06-18 DIAGNOSIS — I87.2 VENOUS INSUFFICIENCY: ICD-10-CM

## 2019-06-18 DIAGNOSIS — I87.323 CHRONIC VENOUS HYPERTENSION (IDIOPATHIC) WITH INFLAMMATION OF BILATERAL LOWER EXTREMITY: ICD-10-CM

## 2019-06-18 DIAGNOSIS — M79.89 LEG SWELLING: ICD-10-CM

## 2019-06-18 DIAGNOSIS — I87.2 VENOUS INSUFFICIENCY: Primary | ICD-10-CM

## 2019-06-18 PROCEDURE — 99213 OFFICE O/P EST LOW 20 MIN: CPT | Performed by: SURGERY

## 2019-06-18 PROCEDURE — 3017F COLORECTAL CA SCREEN DOC REV: CPT | Performed by: SURGERY

## 2019-06-18 PROCEDURE — G8598 ASA/ANTIPLAT THER USED: HCPCS | Performed by: SURGERY

## 2019-06-18 PROCEDURE — G8417 CALC BMI ABV UP PARAM F/U: HCPCS | Performed by: SURGERY

## 2019-06-18 PROCEDURE — 4004F PT TOBACCO SCREEN RCVD TLK: CPT | Performed by: SURGERY

## 2019-06-18 PROCEDURE — G8427 DOCREV CUR MEDS BY ELIG CLIN: HCPCS | Performed by: SURGERY

## 2019-06-18 ASSESSMENT — ENCOUNTER SYMPTOMS
GASTROINTESTINAL NEGATIVE: 1
COLOR CHANGE: 1
ALLERGIC/IMMUNOLOGIC NEGATIVE: 1
RESPIRATORY NEGATIVE: 1
EYES NEGATIVE: 1

## 2019-06-18 NOTE — PATIENT INSTRUCTIONS
Patient discussed severe pain in legs that correlate with neuropathy states refuse to take Gabapentin due to weight gain. Results of venous duplex scan discussed, ok to given to stop Xarelto   Dr. Delio Case recommended patient to start taking Aspirin 81 mg daily. Recommended following up with a Orthopedic doctor for left knee. Advised patient to continue elevating leg(s) with the ankle at or above the level of the heart as needed to relieve leg pain and swelling. Patient to participate in exercise as tolerated with focus on the leg(s) including, daily walking, repetitive toe pointing, and calve muscle pumping/stretching as tolerated. Patient was instructed to wear compression stockings (20-30 mmHg) on a daily basis, off every night. Follow up in office as needed.

## 2019-06-18 NOTE — PROGRESS NOTES
Subjective:      Patient ID: Mahesh Onofre is a 59 y.o. female. Chief Complaint   Patient presents with    Claudication     patient presents today with leg swelling and for results of VDS          Leg Pain    The incident occurred more than 1 week ago. The incident occurred at home. The injury mechanism is unknown. The pain is present in the left leg and right leg. The quality of the pain is described as burning. The pain is at a severity of 5/10. The pain is moderate. The pain has been intermittent since onset. Pertinent negatives include no loss of sensation or muscle weakness. It is unknown if a foreign body is present. The symptoms are aggravated by movement. She has tried elevation for the symptoms. The treatment provided no relief.      Family History   Problem Relation Age of Onset    High Blood Pressure Mother     High Blood Pressure Father     Kidney Disease Sister      Past Medical History:   Diagnosis Date    Anxiety     Arthritis     Blood transfusion reaction     Crohn's disease ([address]     Depression     GERD (gastroesophageal reflux disease)     Hiatal hernia 6/24/2014    Hx of blood clots     Hypertension     MRSA (methicillin resistant staph aureus) culture positive 06/05/2018    urine    Neuropathy     Pneumonia 1/8/2014    Sinus headache     SOB (shortness of breath)     VRE (vancomycin resistant enterococcus) culture positive 10/08/2018    abd culture     Past Surgical History:   Procedure Laterality Date    ABDOMEN SURGERY      ABDOMINAL ADHESION SURGERY  06/08/2018    BLADDER SUSPENSION      COLONOSCOPY      COLONOSCOPY  9/14/15    sigmoid colon biopsy     COLONOSCOPY  08/07/2018    COLONOSCOPY  06/07/2019    COLONOSCOPY, POSSIBLE BIOPSY, POSSIBLE POLYPECTOMY    COLONOSCOPY N/A 6/7/2019    COLONOSCOPY WITH BIOPSY performed by Shell Amado MD at [address] [place] [address] 6/7/2019    COLONOSCOPY DIAGNOSTIC/STOMA performed by Shell Amado MD at [facility] END[facility]COPY    COLOSTOMY N/A 10/8/2018    EXPLORATORY LAPAROTOMY WITH COLOSTOMY performed by Lindsey Dutton MD at [address] Left 6/25/14    excision plantar left hallux    FOOT SURGERY  6/3/15    PLANTAR PLATE REPAIR BILATERAL, ARTHROTOMY MPJ 2ND BILATERAL    FOOT SURGERY Left 08/05/2002    Excision second metatarsal head left    FRACTURE SURGERY      rt hip    HAND CARPECTOMY Bilateral     HEEL SPUR SURGERY      HERNIA REPAIR      HYSTERECTOMY      bladder surgery    JOINT REPLACEMENT      rt hip, L shoulder replacement 11/2014    KNEE SURGERY Bilateral     arthrosvopic    LEG SURGERY Right     MD SECD CLOS SURG WND EXTEN/COMPLIC N/A 93/76/7682    SECONDARY WOUND CLOSURE OF ABDOMINAL WOUND performed by Lindsey Dutton MD at [address]  01/15/2018    with biopsies   [address] UPPER GASTROINTESTINAL ENDOSCOPY  6/14/13    and colonsocopy with antral erosion biopsies     Social History     Socioeconomic History    Marital status:      Spouse name: Kelsey Guerrero Number of children: 2    Years of education: 15    Highest education level: Not on file   Occupational History    Not on file   Social Needs    Financial resource strain: Not on file    Food insecurity:     Worry: Not on file     Inability: Not on file   [employer] needs:     Medical: Not on file     Non-medical: Not on file   Tobacco Use    Smoking status: Current Every Day Smoker     Packs/day: 1.00     Years: 10.00     Pack years: 10.00     Types: Cigarettes, E-Cigarettes    Smokeless tobacco: Never Used   Substance and Sexual Activity    Alcohol use:  Yes     Alcohol/week: 1.2 oz     Types: 2 Shots of liquor per week     Comment: occasionally    Drug use: No    Sexual activity: Not Currently     Partners: Male   Lifestyle    Physical activity:     Days per week: Not on file     Minutes per session: Not on file    Stress: Not on file   Relationships    Social seconds. No rash noted. She is not diaphoretic. There is erythema. No pallor. Psychiatric: She has a normal mood and affect. Her behavior is normal. Judgment and thought content normal.   Nursing note and vitals reviewed. Allergies   Allergen Reactions    Ace Inhibitors Swelling    Skelaxin [Metaxalone] Swelling       Assessment:       Diagnosis Orders   1. Venous insufficiency     2. Chronic venous hypertension (idiopathic) with inflammation of bilateral lower extremity     3. Leg swelling     Patient presents for follow-up of lower extremity pain and swelling. 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 Her foot pain may be more consistent with arthritic or neurologic pain. She underwent venous duplex imaging of her lower extremities today and I reviewed these images. She has evidence of prior DVT in the right popliteal and left femoral veins. She is noted to have bilateral Baker's cyst indicating arthritic changes in her knees. She has been on Xarelto greater than 6 months. Plan:       Patient discussed severe pain in legs that correlate with neuropathy states refuse to take Gabapentin due to weight gain. Results of venous duplex scan discussed, ok to given to stop Xarelto   Dr. Dinesh Mancuso recommended patient to start taking Aspirin 81 mg daily. Recommended following up with a Orthopedic doctor for left knee. Advised patient to continue elevating leg(s) with the ankle at or above the level of the heart as needed to relieve leg pain and swelling. Patient to participate in exercise as tolerated with focus on the leg(s) including, daily walking, repetitive toe pointing, and calve muscle pumping/stretching as tolerated. Patient was instructed to wear compression stockings (20-30 mmHg) on a daily basis, off every night. Follow up in office as needed.           Gurpreet Will MA am scribing for and in the presence of Iva Mitchell MD on this date of 06/18/19 at 2:16 PM     I Iva Mitchell MD personally performed the services described in this documentation as scribed by the Certified Medical Assistant Aleena Langley in my presence and it is both accurate and complete.

## 2019-06-19 ENCOUNTER — TELEPHONE (OUTPATIENT)
Dept: SURGERY | Age: 64
End: 2019-06-19

## 2019-06-19 NOTE — TELEPHONE ENCOUNTER
Spoke with Obed Crain and she stated that it is Dr. Juliano Bansal. PER Dr. Delio Case patient is cleared from a vascular standpoint to have her injections. Called and advised Dr. Margareth Yang office and spoke Yoli and advised.

## 2019-06-19 NOTE — TELEPHONE ENCOUNTER
PT would like to have Dr Eugenia Carolina call Dr Veena Balderrama so he will start resume her cortisone shots.

## 2019-06-21 ENCOUNTER — TELEPHONE (OUTPATIENT)
Dept: SURGERY | Age: 64
End: 2019-06-21

## 2019-06-21 NOTE — TELEPHONE ENCOUNTER
Patient will need to contact her PCP and have a work up and then her referral can be placed by PCP for her knee pain. Called patient and advised.

## 2019-06-25 ENCOUNTER — OFFICE VISIT (OUTPATIENT)
Dept: SURGERY | Age: 64
End: 2019-06-25
Payer: MEDICAID

## 2019-06-25 VITALS — SYSTOLIC BLOOD PRESSURE: 119 MMHG | DIASTOLIC BLOOD PRESSURE: 78 MMHG | WEIGHT: 206 LBS | BODY MASS INDEX: 33.25 KG/M2

## 2019-06-25 DIAGNOSIS — K57.20 PERFORATION AND ABSCESS OF LARGE INTESTINE CONCURRENT WITH AND DUE TO DIVERTICULITIS: Primary | ICD-10-CM

## 2019-06-25 DIAGNOSIS — Z93.3 COLOSTOMY IN PLACE (HCC): ICD-10-CM

## 2019-06-25 PROCEDURE — 3017F COLORECTAL CA SCREEN DOC REV: CPT | Performed by: SURGERY

## 2019-06-25 PROCEDURE — G8598 ASA/ANTIPLAT THER USED: HCPCS | Performed by: SURGERY

## 2019-06-25 PROCEDURE — G8427 DOCREV CUR MEDS BY ELIG CLIN: HCPCS | Performed by: SURGERY

## 2019-06-25 PROCEDURE — 4004F PT TOBACCO SCREEN RCVD TLK: CPT | Performed by: SURGERY

## 2019-06-25 PROCEDURE — 99213 OFFICE O/P EST LOW 20 MIN: CPT | Performed by: SURGERY

## 2019-06-25 PROCEDURE — G8417 CALC BMI ABV UP PARAM F/U: HCPCS | Performed by: SURGERY

## 2019-06-25 NOTE — PATIENT INSTRUCTIONS
Patient Education        Learning About Benefits From Quitting Smoking  How does quitting smoking make you healthier? If you're thinking about quitting smoking, you may have a few reasons to be smoke-free. Your health may be one of them. · When you quit smoking, you lower your risks for cancer, lung disease, heart attack, stroke, blood vessel disease, and blindness from macular degeneration. · When you're smoke-free, you get sick less often, and you heal faster. You are less likely to get colds, flu, bronchitis, and pneumonia. · As a nonsmoker, you may find that your mood is better and you are less stressed. When and how will you feel healthier? Quitting has real health benefits that start from day 1 of being smoke-free. And the longer you stay smoke-free, the healthier you get and the better you feel. The first hours  · After just 20 minutes, your blood pressure and heart rate go down. That means there's less stress on your heart and blood vessels. · Within 12 hours, the level of carbon monoxide in your blood drops back to normal. That makes room for more oxygen. With more oxygen in your body, you may notice that you have more energy than when you smoked. After 2 weeks  · Your lungs start to work better. · Your risk of heart attack starts to drop. After 1 month  · When your lungs are clear, you cough less and breathe deeper, so it's easier to be active. · Your sense of taste and smell return. That means you can enjoy food more than you have since you started smoking. Over the years  · After 1 year, your risk of heart disease is half what it would be if you kept smoking. · After 5 years, your risk of stroke starts to shrink. Within a few years after that, it's about the same as if you'd never smoked. · After 10 years, your risk of dying from lung cancer is cut by about half. And your risk for many other types of cancer is lower too. How would quitting help others in your life?   When you quit smoking, you improve the health of everyone who now breathes in your smoke. · Their heart, lung, and cancer risks drop, much like yours. · They are sick less. For babies and small children, living smoke-free means they're less likely to have ear infections, pneumonia, and bronchitis. · If you're a woman who is or will be pregnant someday, quitting smoking means a healthier . · Children who are close to you are less likely to become adult smokers. Where can you learn more? Go to https://[email or web address] org and sign in to your [employer] account. Enter [phone] in the [facility] box to learn more about \"Learning About Benefits From Quitting Smoking. \"     If you do not have an account, please click on the \"Sign Up Now\" link. Current as of: 2018  Content Version: 12.0  © 8423-6298 Healthwise, Incorporated. Care instructions adapted under license by [facility] ([facility]). If you have questions about a medical condition or this instruction, always ask your healthcare professional. [address] any warranty or liability for your use of this information.

## 2019-06-28 NOTE — PROGRESS NOTES
Name_______________________________________Printed:____________________  Date and time of [id number]_______________________Vpmicpq Time:___0900 MAIN_____________   1. Do not eat or drink anything after 12 midnight (or____hours) prior to surgery. This includes no water, chewing gum or mints. Endoscopy patients follow your doctors bowel prep instructions,which may include taking part of prep after midnight. 2. Take the following pills with a small sip of water on the morning of surgery____ATENOLOL, Kathrene Harries _______________________________________________                  Do not take blood pressure medications ending in pril or sartan the maile prior to surgery or the morning of surgery_   3. Aspirin, Ibuprofen, Advil, Naproxen, Vitamin E and other Anti-inflammatory products should be stopped for 5 days before surgery or as directed by your physician. 4. Check with your Doctor regarding stopping Plavix, Coumadin,Eliquis, Lovenox,Effient,Pradaxa,Xarelto, Fragmin or other blood thinners and follow their instructions. 5. Do not smoke, and do not drink any alcoholic beverages 24 hours prior to surgery. This includes NA Beer. Refrain from the usage of any recreational drugs. 6. You may brush your teeth and gargle the morning of surgery. DO NOT SWALLOW WATER   7. You MUST make arrangements for a responsible adult to stay on site while you are here and take you home after your surgery. You will not be allowed to leave alone or drive yourself home. It is strongly suggested someone stay with you the first 24 hrs. Your surgery will be cancelled if you do not have a ride home. 8. A parent/legal guardian must accompany a child scheduled for surgery and plan to stay at the hospital until the child is discharged. Please do not bring other children with you.    9. Please wear simple, loose fitting clothing to the hospital.  Do not bring valuables (money, credit cards, checkbooks, etc.) Do not wear any makeup on site to fill your prescriptions. 24. If you use oxygen and have a portable tank please bring it  with you the DOS             25. Bring a complete list of all your medications with name and dose include any supplements. 26. Other__________________________________________   *Please call pre admission testing if you any further questions   Sarina Su     [id number]  [phone]   [address] [place] [address]       All above information reviewed with patient in person or by phone. Patient verbalizes understanding. All questions and concerns addressed.                                                                                                  Patient/Rep__PT__________________                                                                                                                                    PRE OP INSTRUCTIONS

## 2019-07-03 ENCOUNTER — OFFICE VISIT (OUTPATIENT)
Dept: ORTHOPEDIC SURGERY | Age: 64
End: 2019-07-03
Payer: MEDICAID

## 2019-07-03 VITALS
BODY MASS INDEX: 32.33 KG/M2 | WEIGHT: 206 LBS | RESPIRATION RATE: 16 BRPM | HEART RATE: 63 BPM | HEIGHT: 67 IN | DIASTOLIC BLOOD PRESSURE: 88 MMHG | SYSTOLIC BLOOD PRESSURE: 134 MMHG

## 2019-07-03 DIAGNOSIS — M17.11 PRIMARY OSTEOARTHRITIS OF RIGHT KNEE: ICD-10-CM

## 2019-07-03 DIAGNOSIS — M17.12 PRIMARY OSTEOARTHRITIS OF LEFT KNEE: Primary | ICD-10-CM

## 2019-07-03 PROCEDURE — G8598 ASA/ANTIPLAT THER USED: HCPCS | Performed by: ORTHOPAEDIC SURGERY

## 2019-07-03 PROCEDURE — G8417 CALC BMI ABV UP PARAM F/U: HCPCS | Performed by: ORTHOPAEDIC SURGERY

## 2019-07-03 PROCEDURE — 3017F COLORECTAL CA SCREEN DOC REV: CPT | Performed by: ORTHOPAEDIC SURGERY

## 2019-07-03 PROCEDURE — G8427 DOCREV CUR MEDS BY ELIG CLIN: HCPCS | Performed by: ORTHOPAEDIC SURGERY

## 2019-07-03 PROCEDURE — 99214 OFFICE O/P EST MOD 30 MIN: CPT | Performed by: ORTHOPAEDIC SURGERY

## 2019-07-03 PROCEDURE — 4004F PT TOBACCO SCREEN RCVD TLK: CPT | Performed by: ORTHOPAEDIC SURGERY

## 2019-07-03 PROCEDURE — 20610 DRAIN/INJ JOINT/BURSA W/O US: CPT | Performed by: ORTHOPAEDIC SURGERY

## 2019-07-04 PROBLEM — M17.11 PRIMARY OSTEOARTHRITIS OF RIGHT KNEE: Status: ACTIVE | Noted: 2019-07-04

## 2019-07-04 NOTE — PROGRESS NOTES
[facility] OR    FOOT SURGERY Left 6/25/14    excision plantar left hallux    FOOT SURGERY  6/3/15    PLANTAR PLATE REPAIR BILATERAL, ARTHROTOMY MPJ 2ND BILATERAL    FOOT SURGERY Left 08/05/2002    Excision second metatarsal head left    FRACTURE SURGERY      rt hip    HAND CARPECTOMY Bilateral     HEEL SPUR SURGERY      HERNIA REPAIR      HYSTERECTOMY      bladder surgery    JOINT REPLACEMENT      rt hip, L shoulder replacement 11/2014    KNEE SURGERY Bilateral     arthrosvopic    LEG SURGERY Right     [place] SECD CLOS SURG WND EXTEN/COMPLIC N/A 87/53/1645    SECONDARY WOUND CLOSURE OF ABDOMINAL WOUND performed by Samuel Yañez MD at [address]  01/15/2018    with biopsies   [address] UPPER GASTROINTESTINAL ENDOSCOPY  6/14/13    and colonsocopy with antral erosion biopsies       Social History     Socioeconomic History    Marital status:      Spouse name: Vickie Chavez Number of children: 2    Years of education: 15    Highest education level: Not on file   Occupational History    Not on file   Social Needs    Financial resource strain: Not on file    Food insecurity:     Worry: Not on file     Inability: Not on file   [employer] needs:     Medical: Not on file     Non-medical: Not on file   Tobacco Use    Smoking status: Current Every Day Smoker     Packs/day: 1.00     Years: 10.00     Pack years: 10.00     Types: Cigarettes, E-Cigarettes    Smokeless tobacco: Never Used    Tobacco comment: CUT BACK TO 1/2 PPD WITH E CIG 6-2019   Substance and Sexual Activity    Alcohol use:  Yes     Alcohol/week: 1.2 oz     Types: 2 Shots of liquor per week     Comment: 6 DRINKS A WEEK OR LESS    Drug use: No    Sexual activity: Not Currently     Partners: Male   Lifestyle    Physical activity:     Days per week: Not on file     Minutes per session: Not on file    Stress: Not on file   Relationships    Social connections:     Talks on phone: Not on file     Gets together: Not on file     Attends Sikhism service: Not on file     Active member of club or organization: Not on file     Attends meetings of clubs or organizations: Not on file     Relationship status: Not on file    Intimate partner violence:     Fear of current or ex partner: Not on file     Emotionally abused: Not on file     Physically abused: Not on file     Forced sexual activity: Not on file   Other Topics Concern    Not on file   Social History Narrative    Not on file       Family History   Problem Relation Age of Onset    High Blood Pressure Mother     High Blood Pressure Father     Kidney Disease Sister        Current Outpatient Medications on File Prior to Visit   Medication Sig Dispense Refill    ferrous sulfate 325 (65 Fe) MG tablet Take 325 mg by mouth 2 times daily       nystatin (MYCOSTATIN) 620606 UNIT/GM cream APPLY TO THE AFFECTED AREA LIGHTLY 2 TIMES A DAY UNTIL CLEAR AND AS NEEDED **MAY REFILL** only as needed      omeprazole (PRILOSEC) 20 MG delayed release capsule Take 20 mg by mouth Daily       Skin Protectants, Misc.  (LIP BALM) OINT ointment Apply 1 g topically daily      furosemide (LASIX) 40 MG tablet Take 1 tablet by mouth daily 60 tablet 3    baclofen (LIORESAL) 10 MG tablet Take 1 tablet by mouth 3 times daily 90 tablet 1    atenolol (TENORMIN) 100 MG tablet Take 1 tablet by mouth daily 30 tablet 3    ondansetron (ZOFRAN ODT) 4 MG disintegrating tablet Take 1 tablet by mouth every 6 hours as needed for Nausea or Vomiting 20 tablet 0    aspirin-acetaminophen-caffeine (EXCEDRIN MIGRAINE) 250-250-65 MG per tablet Take 1 tablet by mouth every 6 hours as needed for Headaches      fluticasone (FLONASE) 50 MCG/ACT nasal spray 1 spray by Nasal route daily (Patient taking differently: 1 spray by Nasal route as needed ) 1 Bottle 3    DULoxetine (CYMBALTA) 60 MG extended release capsule Take 60 mg by mouth 2 times daily       lidocaine (XYLOCAINE) 5 % ointment Apply topically

## 2019-07-13 ENCOUNTER — HOSPITAL ENCOUNTER (OUTPATIENT)
Age: 64
Discharge: HOME | End: 2019-07-13
Payer: MEDICAID

## 2019-07-13 DIAGNOSIS — Z01.818 PREOP TESTING: ICD-10-CM

## 2019-07-13 LAB
ANION GAP SERPL CALCULATED.3IONS-SCNC: 14 MMOL/L (ref 3–16)
BUN BLDV-MCNC: 12 MG/DL (ref 7–20)
CALCIUM SERPL-MCNC: 9.9 MG/DL (ref 8.3–10.6)
CHLORIDE BLD-SCNC: 102 MMOL/L (ref 99–110)
CO2: 23 MMOL/L (ref 21–32)
CREAT SERPL-MCNC: 0.7 MG/DL (ref 0.6–1.2)
GFR AFRICAN AMERICAN: >60
GFR NON-AFRICAN AMERICAN: >60
GLUCOSE BLD-MCNC: 79 MG/DL (ref 70–99)
HCT VFR BLD CALC: 37.9 % (ref 36–48)
HEMOGLOBIN: 12.7 G/DL (ref 12–16)
MCH RBC QN AUTO: 32.3 PG (ref 26–34)
MCHC RBC AUTO-ENTMCNC: 33.5 G/DL (ref 31–36)
MCV RBC AUTO: 96.2 FL (ref 80–100)
PDW BLD-RTO: 14.6 % (ref 12.4–15.4)
PLATELET # BLD: 256 K/UL (ref 135–450)
PMV BLD AUTO: 8.1 FL (ref 5–10.5)
POTASSIUM SERPL-SCNC: 4.2 MMOL/L (ref 3.5–5.1)
RBC # BLD: 3.94 M/UL (ref 4–5.2)
SODIUM BLD-SCNC: 139 MMOL/L (ref 136–145)
WBC # BLD: 5.2 K/UL (ref 4–11)

## 2019-07-13 PROCEDURE — 80048 BASIC METABOLIC PNL TOTAL CA: CPT

## 2019-07-13 PROCEDURE — 85027 COMPLETE CBC AUTOMATED: CPT

## 2019-07-13 PROCEDURE — 36415 COLL VENOUS BLD VENIPUNCTURE: CPT

## 2019-07-17 ENCOUNTER — ANESTHESIA EVENT (OUTPATIENT)
Dept: OPERATING ROOM | Age: 64
DRG: 223 | End: 2019-07-17
Payer: MEDICAID

## 2019-07-17 ENCOUNTER — ANESTHESIA (OUTPATIENT)
Dept: OPERATING ROOM | Age: 64
DRG: 223 | End: 2019-07-17
Payer: MEDICAID

## 2019-07-17 ENCOUNTER — HOSPITAL ENCOUNTER (INPATIENT)
Age: 64
LOS: 5 days | Discharge: HOME | DRG: 223 | End: 2019-07-22
Attending: SURGERY | Admitting: SURGERY
Payer: MEDICAID

## 2019-07-17 VITALS
DIASTOLIC BLOOD PRESSURE: 87 MMHG | RESPIRATION RATE: 26 BRPM | TEMPERATURE: 96.4 F | SYSTOLIC BLOOD PRESSURE: 136 MMHG | OXYGEN SATURATION: 100 %

## 2019-07-17 DIAGNOSIS — Z01.818 PREOP TESTING: Primary | ICD-10-CM

## 2019-07-17 DIAGNOSIS — L76.82 INCISIONAL PAIN: ICD-10-CM

## 2019-07-17 PROBLEM — Z93.3 COLOSTOMY IN PLACE (HCC): Status: ACTIVE | Noted: 2019-07-17

## 2019-07-17 PROCEDURE — 6360000002 HC RX W HCPCS: Performed by: ANESTHESIOLOGY

## 2019-07-17 PROCEDURE — 2709999900 HC NON-CHARGEABLE SUPPLY: Performed by: SURGERY

## 2019-07-17 PROCEDURE — 3600000005 HC SURGERY LEVEL 5 BASE: Performed by: SURGERY

## 2019-07-17 PROCEDURE — 2500000003 HC RX 250 WO HCPCS: Performed by: NURSE ANESTHETIST, CERTIFIED REGISTERED

## 2019-07-17 PROCEDURE — 44626 REPAIR BOWEL OPENING: CPT | Performed by: SURGERY

## 2019-07-17 PROCEDURE — 7100000000 HC PACU RECOVERY - FIRST 15 MIN: Performed by: SURGERY

## 2019-07-17 PROCEDURE — 6360000002 HC RX W HCPCS: Performed by: SURGERY

## 2019-07-17 PROCEDURE — 3700000000 HC ANESTHESIA ATTENDED CARE: Performed by: SURGERY

## 2019-07-17 PROCEDURE — C9113 INJ PANTOPRAZOLE SODIUM, VIA: HCPCS | Performed by: SURGERY

## 2019-07-17 PROCEDURE — 7100000001 HC PACU RECOVERY - ADDTL 15 MIN: Performed by: SURGERY

## 2019-07-17 PROCEDURE — 3700000001 HC ADD 15 MINUTES (ANESTHESIA): Performed by: SURGERY

## 2019-07-17 PROCEDURE — 2580000003 HC RX 258: Performed by: SURGERY

## 2019-07-17 PROCEDURE — 2500000003 HC RX 250 WO HCPCS: Performed by: SURGERY

## 2019-07-17 PROCEDURE — 2580000003 HC RX 258: Performed by: NURSE ANESTHETIST, CERTIFIED REGISTERED

## 2019-07-17 PROCEDURE — 3600000015 HC SURGERY LEVEL 5 ADDTL 15MIN: Performed by: SURGERY

## 2019-07-17 PROCEDURE — 2720000010 HC SURG SUPPLY STERILE: Performed by: SURGERY

## 2019-07-17 PROCEDURE — 0DSN0ZZ REPOSITION SIGMOID COLON, OPEN APPROACH: ICD-10-PCS | Performed by: SURGERY

## 2019-07-17 PROCEDURE — 6370000000 HC RX 637 (ALT 250 FOR IP): Performed by: SURGERY

## 2019-07-17 PROCEDURE — 6360000002 HC RX W HCPCS: Performed by: NURSE ANESTHETIST, CERTIFIED REGISTERED

## 2019-07-17 PROCEDURE — 0WQF0ZZ REPAIR ABDOMINAL WALL, OPEN APPROACH: ICD-10-PCS | Performed by: SURGERY

## 2019-07-17 PROCEDURE — 0DNW0ZZ RELEASE PERITONEUM, OPEN APPROACH: ICD-10-PCS | Performed by: SURGERY

## 2019-07-17 PROCEDURE — 2580000003 HC RX 258: Performed by: ANESTHESIOLOGY

## 2019-07-17 PROCEDURE — 1200000000 HC SEMI PRIVATE

## 2019-07-17 PROCEDURE — 88304 TISSUE EXAM BY PATHOLOGIST: CPT

## 2019-07-17 RX ORDER — ONDANSETRON 2 MG/ML
INJECTION INTRAMUSCULAR; INTRAVENOUS PRN
Status: DISCONTINUED | OUTPATIENT
Start: 2019-07-17 | End: 2019-07-17 | Stop reason: SDUPTHER

## 2019-07-17 RX ORDER — BUSPIRONE HYDROCHLORIDE 5 MG/1
15 TABLET ORAL 3 TIMES DAILY
Status: DISCONTINUED | OUTPATIENT
Start: 2019-07-17 | End: 2019-07-22 | Stop reason: HOSPADM

## 2019-07-17 RX ORDER — CIPROFLOXACIN 2 MG/ML
400 INJECTION, SOLUTION INTRAVENOUS EVERY 12 HOURS
Status: DISCONTINUED | OUTPATIENT
Start: 2019-07-17 | End: 2019-07-22 | Stop reason: HOSPADM

## 2019-07-17 RX ORDER — HYDROMORPHONE HCL 110MG/55ML
0.25 PATIENT CONTROLLED ANALGESIA SYRINGE INTRAVENOUS EVERY 5 MIN PRN
Status: DISCONTINUED | OUTPATIENT
Start: 2019-07-17 | End: 2019-07-17 | Stop reason: HOSPADM

## 2019-07-17 RX ORDER — POTASSIUM CHLORIDE 20 MEQ/1
20 TABLET, EXTENDED RELEASE ORAL 2 TIMES DAILY
Status: DISCONTINUED | OUTPATIENT
Start: 2019-07-18 | End: 2019-07-22 | Stop reason: HOSPADM

## 2019-07-17 RX ORDER — VECURONIUM BROMIDE 1 MG/ML
INJECTION, POWDER, LYOPHILIZED, FOR SOLUTION INTRAVENOUS PRN
Status: DISCONTINUED | OUTPATIENT
Start: 2019-07-17 | End: 2019-07-17 | Stop reason: SDUPTHER

## 2019-07-17 RX ORDER — GLYCOPYRROLATE 0.2 MG/ML
INJECTION INTRAMUSCULAR; INTRAVENOUS PRN
Status: DISCONTINUED | OUTPATIENT
Start: 2019-07-17 | End: 2019-07-17 | Stop reason: SDUPTHER

## 2019-07-17 RX ORDER — DEXAMETHASONE SODIUM PHOSPHATE 10 MG/ML
INJECTION, SOLUTION INTRAMUSCULAR; INTRAVENOUS PRN
Status: DISCONTINUED | OUTPATIENT
Start: 2019-07-17 | End: 2019-07-17 | Stop reason: SDUPTHER

## 2019-07-17 RX ORDER — DULOXETIN HYDROCHLORIDE 60 MG/1
60 CAPSULE, DELAYED RELEASE ORAL 2 TIMES DAILY
Status: DISCONTINUED | OUTPATIENT
Start: 2019-07-18 | End: 2019-07-22 | Stop reason: HOSPADM

## 2019-07-17 RX ORDER — ATENOLOL 50 MG/1
100 TABLET ORAL DAILY
Status: DISCONTINUED | OUTPATIENT
Start: 2019-07-18 | End: 2019-07-22 | Stop reason: HOSPADM

## 2019-07-17 RX ORDER — ACETAMINOPHEN, ASPIRIN AND CAFFEINE 250; 250; 65 MG/1; MG/1; MG/1
1 TABLET, FILM COATED ORAL EVERY 6 HOURS PRN
Status: DISCONTINUED | OUTPATIENT
Start: 2019-07-17 | End: 2019-07-22 | Stop reason: HOSPADM

## 2019-07-17 RX ORDER — MIDAZOLAM HYDROCHLORIDE 1 MG/ML
INJECTION INTRAMUSCULAR; INTRAVENOUS PRN
Status: DISCONTINUED | OUTPATIENT
Start: 2019-07-17 | End: 2019-07-17 | Stop reason: SDUPTHER

## 2019-07-17 RX ORDER — MORPHINE SULFATE 4 MG/ML
4 INJECTION, SOLUTION INTRAMUSCULAR; INTRAVENOUS
Status: DISCONTINUED | OUTPATIENT
Start: 2019-07-17 | End: 2019-07-22 | Stop reason: HOSPADM

## 2019-07-17 RX ORDER — HYDROCODONE BITARTRATE AND ACETAMINOPHEN 5; 325 MG/1; MG/1
1 TABLET ORAL
Status: DISCONTINUED | OUTPATIENT
Start: 2019-07-17 | End: 2019-07-17 | Stop reason: HOSPADM

## 2019-07-17 RX ORDER — DEXTROSE, SODIUM CHLORIDE, AND POTASSIUM CHLORIDE 5; .45; .15 G/100ML; G/100ML; G/100ML
INJECTION INTRAVENOUS CONTINUOUS
Status: DISCONTINUED | OUTPATIENT
Start: 2019-07-17 | End: 2019-07-20

## 2019-07-17 RX ORDER — ONDANSETRON 2 MG/ML
4 INJECTION INTRAMUSCULAR; INTRAVENOUS EVERY 4 HOURS PRN
Status: DISCONTINUED | OUTPATIENT
Start: 2019-07-17 | End: 2019-07-22 | Stop reason: HOSPADM

## 2019-07-17 RX ORDER — SODIUM CHLORIDE 0.9 % (FLUSH) 0.9 %
10 SYRINGE (ML) INJECTION PRN
Status: DISCONTINUED | OUTPATIENT
Start: 2019-07-17 | End: 2019-07-22 | Stop reason: HOSPADM

## 2019-07-17 RX ORDER — FUROSEMIDE 40 MG/1
40 TABLET ORAL DAILY
Status: DISCONTINUED | OUTPATIENT
Start: 2019-07-18 | End: 2019-07-22 | Stop reason: HOSPADM

## 2019-07-17 RX ORDER — MORPHINE SULFATE 2 MG/ML
2 INJECTION, SOLUTION INTRAMUSCULAR; INTRAVENOUS
Status: DISCONTINUED | OUTPATIENT
Start: 2019-07-17 | End: 2019-07-22 | Stop reason: HOSPADM

## 2019-07-17 RX ORDER — MAGNESIUM HYDROXIDE 1200 MG/15ML
LIQUID ORAL
Status: COMPLETED | OUTPATIENT
Start: 2019-07-17 | End: 2019-07-17

## 2019-07-17 RX ORDER — LIDOCAINE HYDROCHLORIDE 10 MG/ML
1 INJECTION, SOLUTION EPIDURAL; INFILTRATION; INTRACAUDAL; PERINEURAL
Status: DISCONTINUED | OUTPATIENT
Start: 2019-07-17 | End: 2019-07-17 | Stop reason: HOSPADM

## 2019-07-17 RX ORDER — SUCCINYLCHOLINE CHLORIDE 20 MG/ML
INJECTION INTRAMUSCULAR; INTRAVENOUS PRN
Status: DISCONTINUED | OUTPATIENT
Start: 2019-07-17 | End: 2019-07-17 | Stop reason: SDUPTHER

## 2019-07-17 RX ORDER — HYDROMORPHONE HCL 110MG/55ML
PATIENT CONTROLLED ANALGESIA SYRINGE INTRAVENOUS PRN
Status: DISCONTINUED | OUTPATIENT
Start: 2019-07-17 | End: 2019-07-17 | Stop reason: SDUPTHER

## 2019-07-17 RX ORDER — FENTANYL CITRATE 50 UG/ML
INJECTION, SOLUTION INTRAMUSCULAR; INTRAVENOUS PRN
Status: DISCONTINUED | OUTPATIENT
Start: 2019-07-17 | End: 2019-07-17 | Stop reason: SDUPTHER

## 2019-07-17 RX ORDER — 0.9 % SODIUM CHLORIDE 0.9 %
10 VIAL (ML) INJECTION DAILY
Status: DISCONTINUED | OUTPATIENT
Start: 2019-07-17 | End: 2019-07-22 | Stop reason: HOSPADM

## 2019-07-17 RX ORDER — BACLOFEN 10 MG/1
10 TABLET ORAL 3 TIMES DAILY
Status: DISCONTINUED | OUTPATIENT
Start: 2019-07-18 | End: 2019-07-22 | Stop reason: HOSPADM

## 2019-07-17 RX ORDER — PROPOFOL 10 MG/ML
INJECTION, EMULSION INTRAVENOUS PRN
Status: DISCONTINUED | OUTPATIENT
Start: 2019-07-17 | End: 2019-07-17 | Stop reason: SDUPTHER

## 2019-07-17 RX ORDER — ONDANSETRON 2 MG/ML
4 INJECTION INTRAMUSCULAR; INTRAVENOUS
Status: DISCONTINUED | OUTPATIENT
Start: 2019-07-17 | End: 2019-07-17 | Stop reason: HOSPADM

## 2019-07-17 RX ORDER — ACETAMINOPHEN 10 MG/ML
1000 INJECTION, SOLUTION INTRAVENOUS EVERY 6 HOURS
Status: DISCONTINUED | OUTPATIENT
Start: 2019-07-17 | End: 2019-07-19

## 2019-07-17 RX ORDER — LIDOCAINE HYDROCHLORIDE 20 MG/ML
INJECTION, SOLUTION INFILTRATION; PERINEURAL PRN
Status: DISCONTINUED | OUTPATIENT
Start: 2019-07-17 | End: 2019-07-17 | Stop reason: SDUPTHER

## 2019-07-17 RX ORDER — TRAMADOL HYDROCHLORIDE 50 MG/1
50 TABLET ORAL EVERY 6 HOURS PRN
Status: DISCONTINUED | OUTPATIENT
Start: 2019-07-17 | End: 2019-07-22 | Stop reason: HOSPADM

## 2019-07-17 RX ORDER — KETAMINE HYDROCHLORIDE 10 MG/ML
INJECTION, SOLUTION INTRAMUSCULAR; INTRAVENOUS PRN
Status: DISCONTINUED | OUTPATIENT
Start: 2019-07-17 | End: 2019-07-17 | Stop reason: SDUPTHER

## 2019-07-17 RX ORDER — ESMOLOL HYDROCHLORIDE 10 MG/ML
INJECTION INTRAVENOUS PRN
Status: DISCONTINUED | OUTPATIENT
Start: 2019-07-17 | End: 2019-07-17 | Stop reason: SDUPTHER

## 2019-07-17 RX ORDER — PANTOPRAZOLE SODIUM 40 MG/10ML
40 INJECTION, POWDER, LYOPHILIZED, FOR SOLUTION INTRAVENOUS DAILY
Status: DISCONTINUED | OUTPATIENT
Start: 2019-07-17 | End: 2019-07-22 | Stop reason: HOSPADM

## 2019-07-17 RX ORDER — SODIUM CHLORIDE 0.9 % (FLUSH) 0.9 %
10 SYRINGE (ML) INJECTION EVERY 12 HOURS SCHEDULED
Status: DISCONTINUED | OUTPATIENT
Start: 2019-07-17 | End: 2019-07-22 | Stop reason: HOSPADM

## 2019-07-17 RX ORDER — METOCLOPRAMIDE HYDROCHLORIDE 5 MG/ML
10 INJECTION INTRAMUSCULAR; INTRAVENOUS EVERY 6 HOURS
Status: COMPLETED | OUTPATIENT
Start: 2019-07-17 | End: 2019-07-19

## 2019-07-17 RX ORDER — SODIUM CHLORIDE, SODIUM LACTATE, POTASSIUM CHLORIDE, CALCIUM CHLORIDE 600; 310; 30; 20 MG/100ML; MG/100ML; MG/100ML; MG/100ML
INJECTION, SOLUTION INTRAVENOUS CONTINUOUS PRN
Status: DISCONTINUED | OUTPATIENT
Start: 2019-07-17 | End: 2019-07-17 | Stop reason: SDUPTHER

## 2019-07-17 RX ORDER — ZOLPIDEM TARTRATE 10 MG/1
10 TABLET ORAL NIGHTLY PRN
Status: DISCONTINUED | OUTPATIENT
Start: 2019-07-17 | End: 2019-07-22 | Stop reason: HOSPADM

## 2019-07-17 RX ORDER — SODIUM CHLORIDE 9 MG/ML
INJECTION, SOLUTION INTRAVENOUS CONTINUOUS
Status: DISCONTINUED | OUTPATIENT
Start: 2019-07-17 | End: 2019-07-17

## 2019-07-17 RX ORDER — HYDROMORPHONE HCL 110MG/55ML
0.5 PATIENT CONTROLLED ANALGESIA SYRINGE INTRAVENOUS EVERY 5 MIN PRN
Status: DISCONTINUED | OUTPATIENT
Start: 2019-07-17 | End: 2019-07-17 | Stop reason: HOSPADM

## 2019-07-17 RX ORDER — MAGNESIUM HYDROXIDE 1200 MG/15ML
LIQUID ORAL CONTINUOUS PRN
Status: COMPLETED | OUTPATIENT
Start: 2019-07-17 | End: 2019-07-17

## 2019-07-17 RX ADMIN — HYDROMORPHONE HYDROCHLORIDE 0.5 MG: 2 INJECTION, SOLUTION INTRAMUSCULAR; INTRAVENOUS; SUBCUTANEOUS at 15:18

## 2019-07-17 RX ADMIN — GLYCOPYRROLATE 0.1 MG: 0.2 INJECTION, SOLUTION INTRAMUSCULAR; INTRAVENOUS at 10:35

## 2019-07-17 RX ADMIN — PROPOFOL 100 MG: 10 INJECTION, EMULSION INTRAVENOUS at 14:48

## 2019-07-17 RX ADMIN — KETAMINE HYDROCHLORIDE 10 MG: 10 INJECTION, SOLUTION INTRAMUSCULAR; INTRAVENOUS at 13:42

## 2019-07-17 RX ADMIN — VECURONIUM BROMIDE 4 MG: 1 INJECTION, POWDER, LYOPHILIZED, FOR SOLUTION INTRAVENOUS at 11:42

## 2019-07-17 RX ADMIN — METRONIDAZOLE 500 MG: 500 INJECTION, SOLUTION INTRAVENOUS at 18:47

## 2019-07-17 RX ADMIN — PROPOFOL 150 MG: 10 INJECTION, EMULSION INTRAVENOUS at 10:22

## 2019-07-17 RX ADMIN — METOCLOPRAMIDE 10 MG: 5 INJECTION, SOLUTION INTRAMUSCULAR; INTRAVENOUS at 16:39

## 2019-07-17 RX ADMIN — ONDANSETRON 4 MG: 2 INJECTION INTRAMUSCULAR; INTRAVENOUS at 14:50

## 2019-07-17 RX ADMIN — PHENYLEPHRINE HYDROCHLORIDE 100 MCG: 10 INJECTION INTRAVENOUS at 13:53

## 2019-07-17 RX ADMIN — MORPHINE SULFATE 4 MG: 4 INJECTION INTRAVENOUS at 21:58

## 2019-07-17 RX ADMIN — FENTANYL CITRATE 100 MCG: 50 INJECTION, SOLUTION INTRAMUSCULAR; INTRAVENOUS at 10:55

## 2019-07-17 RX ADMIN — ACETAMINOPHEN 1000 MG: 10 INJECTION, SOLUTION INTRAVENOUS at 15:27

## 2019-07-17 RX ADMIN — SODIUM CHLORIDE, POTASSIUM CHLORIDE, SODIUM LACTATE AND CALCIUM CHLORIDE: 600; 310; 30; 20 INJECTION, SOLUTION INTRAVENOUS at 14:23

## 2019-07-17 RX ADMIN — HYDROMORPHONE HYDROCHLORIDE 0.4 MG: 2 INJECTION, SOLUTION INTRAMUSCULAR; INTRAVENOUS; SUBCUTANEOUS at 12:00

## 2019-07-17 RX ADMIN — LIDOCAINE HYDROCHLORIDE 50 MG: 20 INJECTION, SOLUTION INFILTRATION; PERINEURAL at 14:50

## 2019-07-17 RX ADMIN — FENTANYL CITRATE 75 MCG: 50 INJECTION, SOLUTION INTRAMUSCULAR; INTRAVENOUS at 10:20

## 2019-07-17 RX ADMIN — FENTANYL CITRATE 75 MCG: 50 INJECTION, SOLUTION INTRAMUSCULAR; INTRAVENOUS at 10:27

## 2019-07-17 RX ADMIN — MORPHINE SULFATE 2 MG: 2 INJECTION, SOLUTION INTRAMUSCULAR; INTRAVENOUS at 16:45

## 2019-07-17 RX ADMIN — LIDOCAINE HYDROCHLORIDE 50 MG: 20 INJECTION, SOLUTION INFILTRATION; PERINEURAL at 10:22

## 2019-07-17 RX ADMIN — VECURONIUM BROMIDE 3 MG: 1 INJECTION, POWDER, LYOPHILIZED, FOR SOLUTION INTRAVENOUS at 14:00

## 2019-07-17 RX ADMIN — PHENYLEPHRINE HYDROCHLORIDE 100 MCG: 10 INJECTION INTRAVENOUS at 13:51

## 2019-07-17 RX ADMIN — SUCCINYLCHOLINE CHLORIDE 100 MG: 20 INJECTION, SOLUTION INTRAMUSCULAR; INTRAVENOUS at 10:22

## 2019-07-17 RX ADMIN — PHENYLEPHRINE HYDROCHLORIDE 150 MCG: 10 INJECTION INTRAVENOUS at 14:54

## 2019-07-17 RX ADMIN — ESMOLOL HYDROCHLORIDE 30 MG: 10 INJECTION, SOLUTION INTRAVENOUS at 12:16

## 2019-07-17 RX ADMIN — METOCLOPRAMIDE 10 MG: 5 INJECTION, SOLUTION INTRAMUSCULAR; INTRAVENOUS at 22:19

## 2019-07-17 RX ADMIN — GLYCOPYRROLATE 0.2 MG: 0.2 INJECTION, SOLUTION INTRAMUSCULAR; INTRAVENOUS at 10:19

## 2019-07-17 RX ADMIN — KETAMINE HYDROCHLORIDE 10 MG: 10 INJECTION, SOLUTION INTRAMUSCULAR; INTRAVENOUS at 14:50

## 2019-07-17 RX ADMIN — METRONIDAZOLE 500 MG: 500 INJECTION, SOLUTION INTRAVENOUS at 10:13

## 2019-07-17 RX ADMIN — PHENYLEPHRINE HYDROCHLORIDE 100 MCG: 10 INJECTION INTRAVENOUS at 13:20

## 2019-07-17 RX ADMIN — KETAMINE HYDROCHLORIDE 10 MG: 10 INJECTION, SOLUTION INTRAMUSCULAR; INTRAVENOUS at 10:20

## 2019-07-17 RX ADMIN — PHENYLEPHRINE HYDROCHLORIDE 100 MCG: 10 INJECTION INTRAVENOUS at 10:38

## 2019-07-17 RX ADMIN — SUGAMMADEX 200 MG: 100 INJECTION, SOLUTION INTRAVENOUS at 14:50

## 2019-07-17 RX ADMIN — VECURONIUM BROMIDE 3 MG: 1 INJECTION, POWDER, LYOPHILIZED, FOR SOLUTION INTRAVENOUS at 11:24

## 2019-07-17 RX ADMIN — BUSPIRONE HYDROCHLORIDE 15 MG: 5 TABLET ORAL at 22:19

## 2019-07-17 RX ADMIN — PANTOPRAZOLE SODIUM 40 MG: 40 INJECTION, POWDER, LYOPHILIZED, FOR SOLUTION INTRAVENOUS at 16:46

## 2019-07-17 RX ADMIN — HYDROMORPHONE HYDROCHLORIDE 0.5 MG: 2 INJECTION, SOLUTION INTRAMUSCULAR; INTRAVENOUS; SUBCUTANEOUS at 15:27

## 2019-07-17 RX ADMIN — VECURONIUM BROMIDE 3 MG: 1 INJECTION, POWDER, LYOPHILIZED, FOR SOLUTION INTRAVENOUS at 10:30

## 2019-07-17 RX ADMIN — VECURONIUM BROMIDE 2 MG: 1 INJECTION, POWDER, LYOPHILIZED, FOR SOLUTION INTRAVENOUS at 10:54

## 2019-07-17 RX ADMIN — CIPROFLOXACIN 400 MG: 2 INJECTION, SOLUTION INTRAVENOUS at 10:45

## 2019-07-17 RX ADMIN — VECURONIUM BROMIDE 4 MG: 1 INJECTION, POWDER, LYOPHILIZED, FOR SOLUTION INTRAVENOUS at 12:38

## 2019-07-17 RX ADMIN — CIPROFLOXACIN 400 MG: 2 INJECTION, SOLUTION INTRAVENOUS at 22:20

## 2019-07-17 RX ADMIN — MORPHINE SULFATE 2 MG: 2 INJECTION, SOLUTION INTRAMUSCULAR; INTRAVENOUS at 16:39

## 2019-07-17 RX ADMIN — ESMOLOL HYDROCHLORIDE 30 MG: 10 INJECTION, SOLUTION INTRAVENOUS at 13:41

## 2019-07-17 RX ADMIN — VECURONIUM BROMIDE 2 MG: 1 INJECTION, POWDER, LYOPHILIZED, FOR SOLUTION INTRAVENOUS at 10:45

## 2019-07-17 RX ADMIN — SODIUM CHLORIDE: 9 INJECTION, SOLUTION INTRAVENOUS at 10:13

## 2019-07-17 RX ADMIN — SODIUM CHLORIDE: 9 INJECTION, SOLUTION INTRAVENOUS at 12:00

## 2019-07-17 RX ADMIN — POTASSIUM CHLORIDE, DEXTROSE MONOHYDRATE AND SODIUM CHLORIDE: 150; 5; 450 INJECTION, SOLUTION INTRAVENOUS at 16:41

## 2019-07-17 RX ADMIN — HYDROMORPHONE HYDROCHLORIDE 0.8 MG: 2 INJECTION, SOLUTION INTRAMUSCULAR; INTRAVENOUS; SUBCUTANEOUS at 11:21

## 2019-07-17 RX ADMIN — VECURONIUM BROMIDE 3 MG: 1 INJECTION, POWDER, LYOPHILIZED, FOR SOLUTION INTRAVENOUS at 13:27

## 2019-07-17 RX ADMIN — DEXAMETHASONE SODIUM PHOSPHATE 8 MG: 10 INJECTION, SOLUTION INTRAMUSCULAR; INTRAVENOUS at 10:22

## 2019-07-17 RX ADMIN — VECURONIUM BROMIDE 2 MG: 1 INJECTION, POWDER, LYOPHILIZED, FOR SOLUTION INTRAVENOUS at 12:02

## 2019-07-17 RX ADMIN — HYDROMORPHONE HYDROCHLORIDE 0.8 MG: 2 INJECTION, SOLUTION INTRAMUSCULAR; INTRAVENOUS; SUBCUTANEOUS at 12:38

## 2019-07-17 RX ADMIN — MIDAZOLAM HYDROCHLORIDE 2 MG: 1 INJECTION, SOLUTION INTRAMUSCULAR; INTRAVENOUS at 10:12

## 2019-07-17 ASSESSMENT — PULMONARY FUNCTION TESTS
PIF_VALUE: 16
PIF_VALUE: 17
PIF_VALUE: 18
PIF_VALUE: 17
PIF_VALUE: 18
PIF_VALUE: 18
PIF_VALUE: 15
PIF_VALUE: 16
PIF_VALUE: 17
PIF_VALUE: 17
PIF_VALUE: 15
PIF_VALUE: 17
PIF_VALUE: 16
PIF_VALUE: 22
PIF_VALUE: 17
PIF_VALUE: 17
PIF_VALUE: 16
PIF_VALUE: 17
PIF_VALUE: 17
PIF_VALUE: 18
PIF_VALUE: 15
PIF_VALUE: 15
PIF_VALUE: 17
PIF_VALUE: 18
PIF_VALUE: 17
PIF_VALUE: 14
PIF_VALUE: 18
PIF_VALUE: 18
PIF_VALUE: 16
PIF_VALUE: 18
PIF_VALUE: 16
PIF_VALUE: 15
PIF_VALUE: 16
PIF_VALUE: 17
PIF_VALUE: 18
PIF_VALUE: 17
PIF_VALUE: 16
PIF_VALUE: 17
PIF_VALUE: 16
PIF_VALUE: 17
PIF_VALUE: 22
PIF_VALUE: 1
PIF_VALUE: 16
PIF_VALUE: 18
PIF_VALUE: 15
PIF_VALUE: 17
PIF_VALUE: 18
PIF_VALUE: 16
PIF_VALUE: 23
PIF_VALUE: 18
PIF_VALUE: 16
PIF_VALUE: 18
PIF_VALUE: 18
PIF_VALUE: 17
PIF_VALUE: 16
PIF_VALUE: 0
PIF_VALUE: 17
PIF_VALUE: 17
PIF_VALUE: 16
PIF_VALUE: 18
PIF_VALUE: 18
PIF_VALUE: 16
PIF_VALUE: 17
PIF_VALUE: 18
PIF_VALUE: 16
PIF_VALUE: 17
PIF_VALUE: 18
PIF_VALUE: 17
PIF_VALUE: 16
PIF_VALUE: 16
PIF_VALUE: 18
PIF_VALUE: 19
PIF_VALUE: 16
PIF_VALUE: 17
PIF_VALUE: 16
PIF_VALUE: 0
PIF_VALUE: 19
PIF_VALUE: 17
PIF_VALUE: 16
PIF_VALUE: 24
PIF_VALUE: 18
PIF_VALUE: 18
PIF_VALUE: 16
PIF_VALUE: 17
PIF_VALUE: 17
PIF_VALUE: 18
PIF_VALUE: 16
PIF_VALUE: 18
PIF_VALUE: 17
PIF_VALUE: 16
PIF_VALUE: 15
PIF_VALUE: 18
PIF_VALUE: 27
PIF_VALUE: 16
PIF_VALUE: 17
PIF_VALUE: 18
PIF_VALUE: 44
PIF_VALUE: 16
PIF_VALUE: 18
PIF_VALUE: 17
PIF_VALUE: 17
PIF_VALUE: 1
PIF_VALUE: 17
PIF_VALUE: 16
PIF_VALUE: 18
PIF_VALUE: 16
PIF_VALUE: 16
PIF_VALUE: 18
PIF_VALUE: 16
PIF_VALUE: 17
PIF_VALUE: 15
PIF_VALUE: 16
PIF_VALUE: 18
PIF_VALUE: 16
PIF_VALUE: 19
PIF_VALUE: 15
PIF_VALUE: 16
PIF_VALUE: 16
PIF_VALUE: 18
PIF_VALUE: 19
PIF_VALUE: 16
PIF_VALUE: 16
PIF_VALUE: 17
PIF_VALUE: 16
PIF_VALUE: 17
PIF_VALUE: 18
PIF_VALUE: 19
PIF_VALUE: 18
PIF_VALUE: 17
PIF_VALUE: 1
PIF_VALUE: 17
PIF_VALUE: 17
PIF_VALUE: 16
PIF_VALUE: 18
PIF_VALUE: 16
PIF_VALUE: 17
PIF_VALUE: 18
PIF_VALUE: 19
PIF_VALUE: 17
PIF_VALUE: 1
PIF_VALUE: 16
PIF_VALUE: 23
PIF_VALUE: 15
PIF_VALUE: 17
PIF_VALUE: 16
PIF_VALUE: 18
PIF_VALUE: 17
PIF_VALUE: 19
PIF_VALUE: 18
PIF_VALUE: 16
PIF_VALUE: 15
PIF_VALUE: 17
PIF_VALUE: 18
PIF_VALUE: 17
PIF_VALUE: 15
PIF_VALUE: 27
PIF_VALUE: 20
PIF_VALUE: 17
PIF_VALUE: 16
PIF_VALUE: 9
PIF_VALUE: 16
PIF_VALUE: 16
PIF_VALUE: 18
PIF_VALUE: 5
PIF_VALUE: 17
PIF_VALUE: 17
PIF_VALUE: 16
PIF_VALUE: 19
PIF_VALUE: 18
PIF_VALUE: 17
PIF_VALUE: 18
PIF_VALUE: 17
PIF_VALUE: 16
PIF_VALUE: 17
PIF_VALUE: 16
PIF_VALUE: 17
PIF_VALUE: 16
PIF_VALUE: 18
PIF_VALUE: 17
PIF_VALUE: 1
PIF_VALUE: 17
PIF_VALUE: 18
PIF_VALUE: 17
PIF_VALUE: 15
PIF_VALUE: 15
PIF_VALUE: 17
PIF_VALUE: 16
PIF_VALUE: 17
PIF_VALUE: 16
PIF_VALUE: 18
PIF_VALUE: 17
PIF_VALUE: 18
PIF_VALUE: 16
PIF_VALUE: 17
PIF_VALUE: 18
PIF_VALUE: 16
PIF_VALUE: 17
PIF_VALUE: 8
PIF_VALUE: 15
PIF_VALUE: 17
PIF_VALUE: 17
PIF_VALUE: 16
PIF_VALUE: 15
PIF_VALUE: 17
PIF_VALUE: 16
PIF_VALUE: 17
PIF_VALUE: 18
PIF_VALUE: 18
PIF_VALUE: 1
PIF_VALUE: 17
PIF_VALUE: 17
PIF_VALUE: 16
PIF_VALUE: 18
PIF_VALUE: 16
PIF_VALUE: 16
PIF_VALUE: 18
PIF_VALUE: 17
PIF_VALUE: 3
PIF_VALUE: 18
PIF_VALUE: 17
PIF_VALUE: 22
PIF_VALUE: 17
PIF_VALUE: 16
PIF_VALUE: 18
PIF_VALUE: 17

## 2019-07-17 ASSESSMENT — PAIN SCALES - GENERAL
PAINLEVEL_OUTOF10: 10
PAINLEVEL_OUTOF10: 6
PAINLEVEL_OUTOF10: 9
PAINLEVEL_OUTOF10: 8

## 2019-07-17 ASSESSMENT — ENCOUNTER SYMPTOMS: SHORTNESS OF BREATH: 1

## 2019-07-17 ASSESSMENT — PAIN DESCRIPTION - ORIENTATION: ORIENTATION: MID;RIGHT;LEFT

## 2019-07-17 ASSESSMENT — PAIN DESCRIPTION - LOCATION
LOCATION: ABDOMEN
LOCATION: ABDOMEN

## 2019-07-17 ASSESSMENT — PAIN - FUNCTIONAL ASSESSMENT: PAIN_FUNCTIONAL_ASSESSMENT: 0-10

## 2019-07-17 ASSESSMENT — PAIN DESCRIPTION - PAIN TYPE
TYPE: SURGICAL PAIN
TYPE: SURGICAL PAIN

## 2019-07-17 NOTE — ANESTHESIA PRE PROCEDURE
(vancomycin resistant enterococcus) culture positive 10/08/2018    abd culture       Past Surgical History:        Procedure Laterality Date    ABDOMEN SURGERY      ABDOMINAL ADHESION SURGERY  06/08/2018    BLADDER SUSPENSION      COLONOSCOPY      COLONOSCOPY  9/14/15    sigmoid colon biopsy     COLONOSCOPY  08/07/2018    COLONOSCOPY  06/07/2019    COLONOSCOPY, POSSIBLE BIOPSY, POSSIBLE POLYPECTOMY    COLONOSCOPY N/A 6/7/2019    COLONOSCOPY WITH BIOPSY performed by Shani Reyes MD at [address] COLONOSCOPY N/A 6/7/2019    COLONOSCOPY DIAGNOSTIC/STOMA performed by Shani Reyes MD at [address] 10/8/2018    EXPLORATORY LAPAROTOMY WITH COLOSTOMY performed by Zen Garcias MD at [address] Left 6/25/14    excision plantar left hallux    FOOT SURGERY  6/3/15    PLANTAR PLATE REPAIR BILATERAL, ARTHROTOMY MPJ 2ND BILATERAL    FOOT SURGERY Left 08/05/2002    Excision second metatarsal head left    FRACTURE SURGERY      rt hip    HAND CARPECTOMY Bilateral     HEEL SPUR SURGERY      HERNIA REPAIR      HYSTERECTOMY      bladder surgery    JOINT REPLACEMENT      rt hip, L shoulder replacement 11/2014    KNEE SURGERY Bilateral     arthrosvopic    LEG SURGERY Right     [place] SECD CLOS SURG WND EXTEN/COMPLIC N/A 58/69/8275    SECONDARY WOUND CLOSURE OF ABDOMINAL WOUND performed by Zen Garcias MD at [address]  01/15/2018    with biopsies    TONSILLECTOMY      UPPER GASTROINTESTINAL ENDOSCOPY  6/14/13    and colonsocopy with antral erosion biopsies       Social History:    Social History     Tobacco Use    Smoking status: Current Every Day Smoker     Packs/day: 1.00     Years: 10.00     Pack years: 10.00     Types: Cigarettes, E-Cigarettes    Smokeless tobacco: Never Used    Tobacco comment: CUT BACK TO 1/2 PPD WITH E CIG 6-2019   Substance Use Topics    Alcohol use:  Yes     Alcohol/week: 2.0 standard drinks     Types: 2 Shots of liquor per week     Comment: 6 DRINKS A WEEK OR LESS                                Ready to quit: No  Counseling given: Yes  Comment: CUT BACK TO 1/2 PPD WITH E CIG 6-2019      Vital Signs (Current):   Vitals:    06/28/19 1724   Weight: 206 lb (93.4 kg)   Height: 5' 7\" (1.702 m)                                              BP Readings from Last 3 Encounters:   07/03/19 134/88   06/25/19 119/78   06/18/19 (!) 141/91       NPO Status:                                                                                 BMI:   Wt Readings from Last 3 Encounters:   07/03/19 206 lb (93.4 kg)   06/25/19 206 lb (93.4 kg)   06/18/19 207 lb (93.9 kg)     Body mass index is 32.26 kg/m². CBC:   Lab Results   Component Value Date    WBC 5.2 07/13/2019    RBC 3.94 07/13/2019    HGB 12.7 07/13/2019    HCT 37.9 07/13/2019    MCV 96.2 07/13/2019    RDW 14.6 07/13/2019     07/13/2019       CMP:   Lab Results   Component Value Date     07/13/2019    K 4.2 07/13/2019    K 3.5 11/27/2018     07/13/2019    CO2 23 07/13/2019    BUN 12 07/13/2019    CREATININE 0.7 07/13/2019    GFRAA >60 07/13/2019    GFRAA >60 06/14/2013    AGRATIO 0.7 11/28/2018    LABGLOM >60 07/13/2019    GLUCOSE 79 07/13/2019    PROT 6.7 11/28/2018    CALCIUM 9.9 07/13/2019    BILITOT 0.5 11/28/2018    ALKPHOS 68 11/28/2018    AST 13 11/28/2018    ALT <5 11/28/2018       POC Tests: No results for input(s): POCGLU, POCNA, POCK, POCCL, POCBUN, POCHEMO, POCHCT in the last 72 hours.     Coags:   Lab Results   Component Value Date    PROTIME 31.3 11/27/2018    INR 2.75 11/27/2018    APTT 28.9 10/16/2018       HCG (If Applicable): No results found for: PREGTESTUR, PREGSERUM, HCG, HCGQUANT     ABGs: No results found for: PHART, PO2ART, HWQ1MYD, PTF9NHB, BEART, M7RKSCLQ     Type & Screen (If Applicable):  Lab Results   Component Value Date    LABABO B 06/13/2013    [address] Positive 06/13/2013       Anesthesia Evaluation  Patient summary reviewed  Airway: Mallampati: II  TM distance: >3 FB   Neck ROM: full  Mouth opening: > = 3 FB Dental:      Comment: Missing many teeth    Pulmonary: breath sounds clear to auscultation  (+) pneumonia:  shortness of breath:                             Cardiovascular:    (+) hypertension:,     (-) dysrhythmias and  angina      Rhythm: regular  Rate: normal                 ROS comment: 2018  Summary   -Normal left ventricle size, wall thickness, and systolic function with an   estimated ejection fraction of 50-55%.  -No regional wall motion abnormalities are seen.   -Mildly dilated left atrium. Summary   -There is a small anterior apical defect that is likely due to breast   attenuation and some movement artifact, rather than ischemia.   -Wall motion is normal with EF=57%.   -Overall, low risk scan. B/l edema LE, patient states it is not as much as it is sometimes                   Neuro/Psych:   (+) neuromuscular disease:, headaches:, psychiatric history:   (-) seizures, TIA and CVA           GI/Hepatic/Renal:   (+) hiatal hernia, GERD: well controlled,          ROS comment: Crohn's dz. Endo/Other:    (+) : arthritis:., .                 Abdominal:   (+) obese,         Vascular:   + PVD, aortic or cerebral, DVT (resolved per pt), . Anesthesia Plan      general     ASA 3       Induction: intravenous. MIPS: Postoperative opioids intended, Prophylactic antiemetics administered and Postoperative trial extubation. Anesthetic plan and risks discussed with patient. Use of blood products discussed with patient whom consented to blood products. Plan discussed with CRNA.                   Danny Fairbanks MD   7/17/2019

## 2019-07-17 NOTE — PROGRESS NOTES
Incentive Spirometry education and demonstration completed by Respiratory Therapy Yes      Response to education: Poor     Teaching Time: 20 minutes    Minimum Predicted Vital Capacity - 468 mL. Patient's Actual Vital Capacity - 100 mL. Turning over to Nursing for routine follow-up No.    Comments: will continue is therapy until goal is met.     Electronically signed by Skye Moralez RCP on 7/17/2019 at 5:42 PM

## 2019-07-18 LAB
ANION GAP SERPL CALCULATED.3IONS-SCNC: 10 MMOL/L (ref 3–16)
BASOPHILS ABSOLUTE: 0 K/UL (ref 0–0.2)
BASOPHILS RELATIVE PERCENT: 0.4 %
BUN BLDV-MCNC: 8 MG/DL (ref 7–20)
CALCIUM SERPL-MCNC: 7.9 MG/DL (ref 8.3–10.6)
CHLORIDE BLD-SCNC: 104 MMOL/L (ref 99–110)
CO2: 21 MMOL/L (ref 21–32)
CREAT SERPL-MCNC: 0.5 MG/DL (ref 0.6–1.2)
EOSINOPHILS ABSOLUTE: 0 K/UL (ref 0–0.6)
EOSINOPHILS RELATIVE PERCENT: 0.1 %
GFR AFRICAN AMERICAN: >60
GFR NON-AFRICAN AMERICAN: >60
GLUCOSE BLD-MCNC: 150 MG/DL (ref 70–99)
HCT VFR BLD CALC: 34.7 % (ref 36–48)
HEMOGLOBIN: 11.4 G/DL (ref 12–16)
LYMPHOCYTES ABSOLUTE: 1.8 K/UL (ref 1–5.1)
LYMPHOCYTES RELATIVE PERCENT: 15.4 %
MCH RBC QN AUTO: 31.3 PG (ref 26–34)
MCHC RBC AUTO-ENTMCNC: 32.8 G/DL (ref 31–36)
MCV RBC AUTO: 95.6 FL (ref 80–100)
MONOCYTES ABSOLUTE: 0.7 K/UL (ref 0–1.3)
MONOCYTES RELATIVE PERCENT: 5.7 %
NEUTROPHILS ABSOLUTE: 9.2 K/UL (ref 1.7–7.7)
NEUTROPHILS RELATIVE PERCENT: 78.4 %
PDW BLD-RTO: 14.4 % (ref 12.4–15.4)
PLATELET # BLD: 212 K/UL (ref 135–450)
PMV BLD AUTO: 7.5 FL (ref 5–10.5)
POTASSIUM SERPL-SCNC: 4 MMOL/L (ref 3.5–5.1)
RBC # BLD: 3.63 M/UL (ref 4–5.2)
SODIUM BLD-SCNC: 135 MMOL/L (ref 136–145)
WBC # BLD: 11.7 K/UL (ref 4–11)

## 2019-07-18 PROCEDURE — 6370000000 HC RX 637 (ALT 250 FOR IP): Performed by: SURGERY

## 2019-07-18 PROCEDURE — 85025 COMPLETE CBC W/AUTO DIFF WBC: CPT

## 2019-07-18 PROCEDURE — 1200000000 HC SEMI PRIVATE

## 2019-07-18 PROCEDURE — 99024 POSTOP FOLLOW-UP VISIT: CPT | Performed by: SURGERY

## 2019-07-18 PROCEDURE — 2500000003 HC RX 250 WO HCPCS: Performed by: SURGERY

## 2019-07-18 PROCEDURE — 80048 BASIC METABOLIC PNL TOTAL CA: CPT

## 2019-07-18 PROCEDURE — C9113 INJ PANTOPRAZOLE SODIUM, VIA: HCPCS | Performed by: SURGERY

## 2019-07-18 PROCEDURE — 6360000002 HC RX W HCPCS: Performed by: SURGERY

## 2019-07-18 PROCEDURE — 2700000000 HC OXYGEN THERAPY PER DAY

## 2019-07-18 PROCEDURE — 36415 COLL VENOUS BLD VENIPUNCTURE: CPT

## 2019-07-18 RX ADMIN — MORPHINE SULFATE 4 MG: 4 INJECTION INTRAVENOUS at 00:24

## 2019-07-18 RX ADMIN — METRONIDAZOLE 500 MG: 500 INJECTION, SOLUTION INTRAVENOUS at 10:53

## 2019-07-18 RX ADMIN — MORPHINE SULFATE 4 MG: 4 INJECTION INTRAVENOUS at 13:23

## 2019-07-18 RX ADMIN — METOCLOPRAMIDE 10 MG: 5 INJECTION, SOLUTION INTRAMUSCULAR; INTRAVENOUS at 04:20

## 2019-07-18 RX ADMIN — TRAMADOL HYDROCHLORIDE 50 MG: 50 TABLET, FILM COATED ORAL at 16:41

## 2019-07-18 RX ADMIN — CIPROFLOXACIN 400 MG: 2 INJECTION, SOLUTION INTRAVENOUS at 23:30

## 2019-07-18 RX ADMIN — MORPHINE SULFATE 4 MG: 4 INJECTION INTRAVENOUS at 18:08

## 2019-07-18 RX ADMIN — MORPHINE SULFATE 4 MG: 4 INJECTION INTRAVENOUS at 10:53

## 2019-07-18 RX ADMIN — BACLOFEN 10 MG: 10 TABLET ORAL at 15:16

## 2019-07-18 RX ADMIN — FUROSEMIDE 40 MG: 40 TABLET ORAL at 08:19

## 2019-07-18 RX ADMIN — METRONIDAZOLE 500 MG: 500 INJECTION, SOLUTION INTRAVENOUS at 18:08

## 2019-07-18 RX ADMIN — ENOXAPARIN SODIUM 40 MG: 40 INJECTION SUBCUTANEOUS at 08:20

## 2019-07-18 RX ADMIN — PANTOPRAZOLE SODIUM 40 MG: 40 INJECTION, POWDER, LYOPHILIZED, FOR SOLUTION INTRAVENOUS at 08:20

## 2019-07-18 RX ADMIN — MORPHINE SULFATE 4 MG: 4 INJECTION INTRAVENOUS at 08:20

## 2019-07-18 RX ADMIN — ACETAMINOPHEN 1000 MG: 10 INJECTION, SOLUTION INTRAVENOUS at 15:20

## 2019-07-18 RX ADMIN — BUSPIRONE HYDROCHLORIDE 15 MG: 5 TABLET ORAL at 15:16

## 2019-07-18 RX ADMIN — BUSPIRONE HYDROCHLORIDE 15 MG: 5 TABLET ORAL at 20:46

## 2019-07-18 RX ADMIN — DULOXETINE HYDROCHLORIDE 60 MG: 60 CAPSULE, DELAYED RELEASE ORAL at 08:19

## 2019-07-18 RX ADMIN — TRAMADOL HYDROCHLORIDE 50 MG: 50 TABLET, FILM COATED ORAL at 10:17

## 2019-07-18 RX ADMIN — MORPHINE SULFATE 4 MG: 4 INJECTION INTRAVENOUS at 15:59

## 2019-07-18 RX ADMIN — MORPHINE SULFATE 4 MG: 4 INJECTION INTRAVENOUS at 20:46

## 2019-07-18 RX ADMIN — POTASSIUM CHLORIDE 20 MEQ: 1500 TABLET, EXTENDED RELEASE ORAL at 08:19

## 2019-07-18 RX ADMIN — BACLOFEN 10 MG: 10 TABLET ORAL at 08:19

## 2019-07-18 RX ADMIN — METOCLOPRAMIDE 10 MG: 5 INJECTION, SOLUTION INTRAMUSCULAR; INTRAVENOUS at 15:59

## 2019-07-18 RX ADMIN — ACETAMINOPHEN 1000 MG: 10 INJECTION, SOLUTION INTRAVENOUS at 10:17

## 2019-07-18 RX ADMIN — METRONIDAZOLE 500 MG: 500 INJECTION, SOLUTION INTRAVENOUS at 02:25

## 2019-07-18 RX ADMIN — METOCLOPRAMIDE 10 MG: 5 INJECTION, SOLUTION INTRAMUSCULAR; INTRAVENOUS at 10:53

## 2019-07-18 RX ADMIN — ATENOLOL 100 MG: 50 TABLET ORAL at 08:20

## 2019-07-18 RX ADMIN — BUSPIRONE HYDROCHLORIDE 15 MG: 5 TABLET ORAL at 08:19

## 2019-07-18 RX ADMIN — DULOXETINE HYDROCHLORIDE 60 MG: 60 CAPSULE, DELAYED RELEASE ORAL at 20:46

## 2019-07-18 RX ADMIN — POTASSIUM CHLORIDE, DEXTROSE MONOHYDRATE AND SODIUM CHLORIDE: 150; 5; 450 INJECTION, SOLUTION INTRAVENOUS at 02:25

## 2019-07-18 RX ADMIN — BACLOFEN 10 MG: 10 TABLET ORAL at 20:46

## 2019-07-18 RX ADMIN — METOCLOPRAMIDE 10 MG: 5 INJECTION, SOLUTION INTRAMUSCULAR; INTRAVENOUS at 23:30

## 2019-07-18 RX ADMIN — TRAMADOL HYDROCHLORIDE 50 MG: 50 TABLET, FILM COATED ORAL at 04:20

## 2019-07-18 RX ADMIN — POTASSIUM CHLORIDE 20 MEQ: 1500 TABLET, EXTENDED RELEASE ORAL at 20:46

## 2019-07-18 RX ADMIN — CIPROFLOXACIN 400 MG: 2 INJECTION, SOLUTION INTRAVENOUS at 10:53

## 2019-07-18 RX ADMIN — MORPHINE SULFATE 4 MG: 4 INJECTION INTRAVENOUS at 02:25

## 2019-07-18 RX ADMIN — TRAMADOL HYDROCHLORIDE 50 MG: 50 TABLET, FILM COATED ORAL at 23:30

## 2019-07-18 ASSESSMENT — PAIN SCALES - GENERAL
PAINLEVEL_OUTOF10: 8
PAINLEVEL_OUTOF10: 8
PAINLEVEL_OUTOF10: 3
PAINLEVEL_OUTOF10: 7
PAINLEVEL_OUTOF10: 7
PAINLEVEL_OUTOF10: 6
PAINLEVEL_OUTOF10: 9
PAINLEVEL_OUTOF10: 10
PAINLEVEL_OUTOF10: 6
PAINLEVEL_OUTOF10: 4
PAINLEVEL_OUTOF10: 6
PAINLEVEL_OUTOF10: 4
PAINLEVEL_OUTOF10: 4
PAINLEVEL_OUTOF10: 7

## 2019-07-18 NOTE — OP NOTE
was  chronically scarred and thickened at the end as well after healing from  the prior surgery. With this being the case, we dissected the rectum  down further and took approximately another 4 cm of the rectum out. I  staple closed this with an Terril 60-mm stapler with the green load. This was sent as specimen. We will have Pathology analyze this, as the  patient does have a prior history of Crohn's disease as well. The old colostomy was then approached. The colostomy was then mobilized  and the skin was cut free at this level. Dissection was carried through  the abdominal wall through the areas of the hernia. There was small  bowel stuck up in the hernia in the peristomal site and this was all  dissected out safely with no enterotomies. The colon was eventually  mobilized and brought down intra-abdominally. Once this was done, the  colon was cleaned off approximately 4 cm down from the end where the  bowel was soft, pink and healthy and eventually the AutoSuture  purse-cong was placed across the bowel at this site. The old  colostomy was then cut off and removed. The bowel was opened. It  appeared pink and healthy and viable. The 25 and 29 mm dilators were  placed and the 29 sat nicely with no problem. The stapler was then  selected. The anvil was placed in the bowel and the purse-cong was  closed over the anvil. An additional 3-0 silk suture was also placed  around the anvil. Surgical team changed gloves. I changed gown and  gloves after manipulating the rectal dilator previously also. At this point in time, the dilators were again passed per anus and the  stapler was ultimately passed up to the end of the rectal staple line. The spike was then advanced just anterior to the rectal staple line and  the anvil and spike were brought together and the stapler was closed. Correct alignment was assured. The stapler fit nicely with no  intervening tissue stuck in the way.   The

## 2019-07-18 NOTE — PLAN OF CARE
Problem: Falls - Risk of:  Goal: Will remain free from falls  Description  Will remain free from falls  Outcome: Ongoing  Goal: Absence of physical injury  Description  Absence of physical injury  Outcome: Ongoing     Problem: Pain:  Goal: Pain level will decrease  Description  Pain level will decrease  Outcome: Ongoing  Goal: Control of acute pain  Description  Control of acute pain  Outcome: Ongoing  Goal: Control of chronic pain  Description  Control of chronic pain  Outcome: Ongoing     Problem: Infection - Surgical Site:  Goal: Will show no infection signs and symptoms  Description  Will show no infection signs and symptoms  Outcome: Ongoing     Problem:  Bowel/Gastric:  Goal: Ability to achieve a regular elimination pattern will improve  Description  Ability to achieve a regular elimination pattern will improve  Outcome: Ongoing

## 2019-07-19 LAB
ANION GAP SERPL CALCULATED.3IONS-SCNC: 11 MMOL/L (ref 3–16)
BASOPHILS ABSOLUTE: 0 K/UL (ref 0–0.2)
BASOPHILS RELATIVE PERCENT: 0.2 %
BUN BLDV-MCNC: 6 MG/DL (ref 7–20)
CALCIUM SERPL-MCNC: 7.9 MG/DL (ref 8.3–10.6)
CHLORIDE BLD-SCNC: 102 MMOL/L (ref 99–110)
CO2: 19 MMOL/L (ref 21–32)
CREAT SERPL-MCNC: 0.5 MG/DL (ref 0.6–1.2)
EOSINOPHILS ABSOLUTE: 0.1 K/UL (ref 0–0.6)
EOSINOPHILS RELATIVE PERCENT: 0.8 %
GFR AFRICAN AMERICAN: >60
GFR NON-AFRICAN AMERICAN: >60
GLUCOSE BLD-MCNC: 134 MG/DL (ref 70–99)
HCT VFR BLD CALC: 31.8 % (ref 36–48)
HEMOGLOBIN: 10.5 G/DL (ref 12–16)
LYMPHOCYTES ABSOLUTE: 1.5 K/UL (ref 1–5.1)
LYMPHOCYTES RELATIVE PERCENT: 14 %
MCH RBC QN AUTO: 31.4 PG (ref 26–34)
MCHC RBC AUTO-ENTMCNC: 33 G/DL (ref 31–36)
MCV RBC AUTO: 95.3 FL (ref 80–100)
MONOCYTES ABSOLUTE: 0.9 K/UL (ref 0–1.3)
MONOCYTES RELATIVE PERCENT: 7.9 %
NEUTROPHILS ABSOLUTE: 8.5 K/UL (ref 1.7–7.7)
NEUTROPHILS RELATIVE PERCENT: 77.1 %
PDW BLD-RTO: 14.8 % (ref 12.4–15.4)
PLATELET # BLD: 182 K/UL (ref 135–450)
PMV BLD AUTO: 7.2 FL (ref 5–10.5)
POTASSIUM SERPL-SCNC: 4.1 MMOL/L (ref 3.5–5.1)
RBC # BLD: 3.34 M/UL (ref 4–5.2)
SODIUM BLD-SCNC: 132 MMOL/L (ref 136–145)
WBC # BLD: 11 K/UL (ref 4–11)

## 2019-07-19 PROCEDURE — 6360000002 HC RX W HCPCS: Performed by: SURGERY

## 2019-07-19 PROCEDURE — 99024 POSTOP FOLLOW-UP VISIT: CPT | Performed by: SURGERY

## 2019-07-19 PROCEDURE — 2700000000 HC OXYGEN THERAPY PER DAY

## 2019-07-19 PROCEDURE — 2500000003 HC RX 250 WO HCPCS: Performed by: SURGERY

## 2019-07-19 PROCEDURE — 6370000000 HC RX 637 (ALT 250 FOR IP): Performed by: SURGERY

## 2019-07-19 PROCEDURE — 36415 COLL VENOUS BLD VENIPUNCTURE: CPT

## 2019-07-19 PROCEDURE — 1200000000 HC SEMI PRIVATE

## 2019-07-19 PROCEDURE — 80048 BASIC METABOLIC PNL TOTAL CA: CPT

## 2019-07-19 PROCEDURE — 94760 N-INVAS EAR/PLS OXIMETRY 1: CPT

## 2019-07-19 PROCEDURE — 85025 COMPLETE CBC W/AUTO DIFF WBC: CPT

## 2019-07-19 PROCEDURE — C9113 INJ PANTOPRAZOLE SODIUM, VIA: HCPCS | Performed by: SURGERY

## 2019-07-19 PROCEDURE — 2580000003 HC RX 258: Performed by: SURGERY

## 2019-07-19 RX ORDER — ACETAMINOPHEN 10 MG/ML
1000 INJECTION, SOLUTION INTRAVENOUS EVERY 6 HOURS
Status: COMPLETED | OUTPATIENT
Start: 2019-07-19 | End: 2019-07-20

## 2019-07-19 RX ORDER — METOCLOPRAMIDE HYDROCHLORIDE 5 MG/ML
10 INJECTION INTRAMUSCULAR; INTRAVENOUS EVERY 6 HOURS
Status: COMPLETED | OUTPATIENT
Start: 2019-07-19 | End: 2019-07-20

## 2019-07-19 RX ADMIN — METOCLOPRAMIDE 10 MG: 5 INJECTION, SOLUTION INTRAMUSCULAR; INTRAVENOUS at 23:38

## 2019-07-19 RX ADMIN — METOCLOPRAMIDE 10 MG: 5 INJECTION, SOLUTION INTRAMUSCULAR; INTRAVENOUS at 17:02

## 2019-07-19 RX ADMIN — ACETAMINOPHEN 1000 MG: 10 INJECTION, SOLUTION INTRAVENOUS at 21:13

## 2019-07-19 RX ADMIN — Medication 10 ML: at 21:12

## 2019-07-19 RX ADMIN — METOCLOPRAMIDE 10 MG: 5 INJECTION, SOLUTION INTRAMUSCULAR; INTRAVENOUS at 10:48

## 2019-07-19 RX ADMIN — ACETAMINOPHEN 1000 MG: 10 INJECTION, SOLUTION INTRAVENOUS at 08:08

## 2019-07-19 RX ADMIN — POTASSIUM CHLORIDE 20 MEQ: 1500 TABLET, EXTENDED RELEASE ORAL at 08:11

## 2019-07-19 RX ADMIN — BACLOFEN 10 MG: 10 TABLET ORAL at 08:08

## 2019-07-19 RX ADMIN — BACLOFEN 10 MG: 10 TABLET ORAL at 21:13

## 2019-07-19 RX ADMIN — Medication 10 ML: at 08:10

## 2019-07-19 RX ADMIN — DULOXETINE HYDROCHLORIDE 60 MG: 60 CAPSULE, DELAYED RELEASE ORAL at 21:12

## 2019-07-19 RX ADMIN — ACETAMINOPHEN 1000 MG: 10 INJECTION, SOLUTION INTRAVENOUS at 14:42

## 2019-07-19 RX ADMIN — ENOXAPARIN SODIUM 40 MG: 40 INJECTION SUBCUTANEOUS at 08:11

## 2019-07-19 RX ADMIN — METOCLOPRAMIDE 10 MG: 5 INJECTION, SOLUTION INTRAMUSCULAR; INTRAVENOUS at 05:07

## 2019-07-19 RX ADMIN — METRONIDAZOLE 500 MG: 500 INJECTION, SOLUTION INTRAVENOUS at 17:02

## 2019-07-19 RX ADMIN — CIPROFLOXACIN 400 MG: 2 INJECTION, SOLUTION INTRAVENOUS at 23:39

## 2019-07-19 RX ADMIN — ACETAMINOPHEN 1000 MG: 10 INJECTION, SOLUTION INTRAVENOUS at 05:07

## 2019-07-19 RX ADMIN — METRONIDAZOLE 500 MG: 500 INJECTION, SOLUTION INTRAVENOUS at 08:46

## 2019-07-19 RX ADMIN — CIPROFLOXACIN 400 MG: 2 INJECTION, SOLUTION INTRAVENOUS at 10:47

## 2019-07-19 RX ADMIN — MORPHINE SULFATE 4 MG: 4 INJECTION INTRAVENOUS at 10:47

## 2019-07-19 RX ADMIN — BUSPIRONE HYDROCHLORIDE 15 MG: 5 TABLET ORAL at 21:12

## 2019-07-19 RX ADMIN — FUROSEMIDE 40 MG: 40 TABLET ORAL at 08:10

## 2019-07-19 RX ADMIN — MORPHINE SULFATE 4 MG: 4 INJECTION INTRAVENOUS at 17:02

## 2019-07-19 RX ADMIN — MORPHINE SULFATE 4 MG: 4 INJECTION INTRAVENOUS at 14:41

## 2019-07-19 RX ADMIN — POTASSIUM CHLORIDE, DEXTROSE MONOHYDRATE AND SODIUM CHLORIDE: 150; 5; 450 INJECTION, SOLUTION INTRAVENOUS at 23:43

## 2019-07-19 RX ADMIN — POTASSIUM CHLORIDE 20 MEQ: 1500 TABLET, EXTENDED RELEASE ORAL at 21:12

## 2019-07-19 RX ADMIN — POTASSIUM CHLORIDE, DEXTROSE MONOHYDRATE AND SODIUM CHLORIDE: 150; 5; 450 INJECTION, SOLUTION INTRAVENOUS at 01:32

## 2019-07-19 RX ADMIN — DULOXETINE HYDROCHLORIDE 60 MG: 60 CAPSULE, DELAYED RELEASE ORAL at 08:08

## 2019-07-19 RX ADMIN — BACLOFEN 10 MG: 10 TABLET ORAL at 14:42

## 2019-07-19 RX ADMIN — METRONIDAZOLE 500 MG: 500 INJECTION, SOLUTION INTRAVENOUS at 01:32

## 2019-07-19 RX ADMIN — PANTOPRAZOLE SODIUM 40 MG: 40 INJECTION, POWDER, LYOPHILIZED, FOR SOLUTION INTRAVENOUS at 08:10

## 2019-07-19 RX ADMIN — MORPHINE SULFATE 4 MG: 4 INJECTION INTRAVENOUS at 22:44

## 2019-07-19 RX ADMIN — MORPHINE SULFATE 4 MG: 4 INJECTION INTRAVENOUS at 01:32

## 2019-07-19 RX ADMIN — POTASSIUM CHLORIDE, DEXTROSE MONOHYDRATE AND SODIUM CHLORIDE: 150; 5; 450 INJECTION, SOLUTION INTRAVENOUS at 10:47

## 2019-07-19 RX ADMIN — MORPHINE SULFATE 4 MG: 4 INJECTION INTRAVENOUS at 05:07

## 2019-07-19 RX ADMIN — BUSPIRONE HYDROCHLORIDE 15 MG: 5 TABLET ORAL at 14:42

## 2019-07-19 RX ADMIN — BUSPIRONE HYDROCHLORIDE 15 MG: 5 TABLET ORAL at 08:08

## 2019-07-19 RX ADMIN — MORPHINE SULFATE 4 MG: 4 INJECTION INTRAVENOUS at 08:10

## 2019-07-19 ASSESSMENT — PAIN SCALES - GENERAL
PAINLEVEL_OUTOF10: 8
PAINLEVEL_OUTOF10: 0
PAINLEVEL_OUTOF10: 7
PAINLEVEL_OUTOF10: 8
PAINLEVEL_OUTOF10: 7
PAINLEVEL_OUTOF10: 0
PAINLEVEL_OUTOF10: 8
PAINLEVEL_OUTOF10: 0
PAINLEVEL_OUTOF10: 8
PAINLEVEL_OUTOF10: 3
PAINLEVEL_OUTOF10: 7

## 2019-07-19 ASSESSMENT — PAIN DESCRIPTION - ORIENTATION
ORIENTATION: MID

## 2019-07-19 ASSESSMENT — PAIN DESCRIPTION - PAIN TYPE
TYPE: SURGICAL PAIN

## 2019-07-19 ASSESSMENT — PAIN DESCRIPTION - LOCATION
LOCATION: ABDOMEN

## 2019-07-19 NOTE — PROGRESS NOTES
Pt is alert and oriented x4. Able to follow commands throughout assessment. Pt HR and RR within normal limits. Respirations were even and easy with no adventitious sounds. Bowel sounds hypoactive x4. Dressing in place with moderate amount of old drainage. Will evaluate dressing change with Dr. Cecile Paz this AM. Pt demonstrated how to reach nurse with call light. Call light in reach. Will continue to monitor.

## 2019-07-19 NOTE — PROGRESS NOTES
Incentive Spirometry education and demonstration completed by Respiratory Therapy No      Response to education: N/A     Teaching Time: 0 minutes    Minimum Predicted Vital Capacity - 550 mL. Patient's Actual Vital Capacity - 0 mL. Turning over to Nursing for routine follow-up No.    Comments: Refused due to pain. Instructed on deep breathing and breath hold to supplement.     Electronically signed by Samuel Ansari RCP on 7/19/2019 at 12:54 PM

## 2019-07-20 PROCEDURE — 1200000000 HC SEMI PRIVATE

## 2019-07-20 PROCEDURE — 6360000002 HC RX W HCPCS: Performed by: SURGERY

## 2019-07-20 PROCEDURE — 2500000003 HC RX 250 WO HCPCS: Performed by: SURGERY

## 2019-07-20 PROCEDURE — 2580000003 HC RX 258: Performed by: SURGERY

## 2019-07-20 PROCEDURE — 99024 POSTOP FOLLOW-UP VISIT: CPT | Performed by: SURGERY

## 2019-07-20 PROCEDURE — 6370000000 HC RX 637 (ALT 250 FOR IP): Performed by: SURGERY

## 2019-07-20 PROCEDURE — 94760 N-INVAS EAR/PLS OXIMETRY 1: CPT

## 2019-07-20 PROCEDURE — C9113 INJ PANTOPRAZOLE SODIUM, VIA: HCPCS | Performed by: SURGERY

## 2019-07-20 RX ORDER — ACETAMINOPHEN 325 MG/1
650 TABLET ORAL EVERY 4 HOURS PRN
Status: DISCONTINUED | OUTPATIENT
Start: 2019-07-20 | End: 2019-07-22 | Stop reason: HOSPADM

## 2019-07-20 RX ADMIN — ATENOLOL 100 MG: 50 TABLET ORAL at 09:27

## 2019-07-20 RX ADMIN — FUROSEMIDE 40 MG: 40 TABLET ORAL at 09:27

## 2019-07-20 RX ADMIN — ACETAMINOPHEN 650 MG: 325 TABLET, FILM COATED ORAL at 16:46

## 2019-07-20 RX ADMIN — BUSPIRONE HYDROCHLORIDE 15 MG: 5 TABLET ORAL at 09:26

## 2019-07-20 RX ADMIN — ACETAMINOPHEN 650 MG: 325 TABLET, FILM COATED ORAL at 22:25

## 2019-07-20 RX ADMIN — MORPHINE SULFATE 4 MG: 4 INJECTION INTRAVENOUS at 13:12

## 2019-07-20 RX ADMIN — CIPROFLOXACIN 400 MG: 2 INJECTION, SOLUTION INTRAVENOUS at 11:55

## 2019-07-20 RX ADMIN — METRONIDAZOLE 500 MG: 500 INJECTION, SOLUTION INTRAVENOUS at 17:56

## 2019-07-20 RX ADMIN — BUSPIRONE HYDROCHLORIDE 15 MG: 5 TABLET ORAL at 22:25

## 2019-07-20 RX ADMIN — BACLOFEN 10 MG: 10 TABLET ORAL at 22:26

## 2019-07-20 RX ADMIN — METOCLOPRAMIDE 10 MG: 5 INJECTION, SOLUTION INTRAMUSCULAR; INTRAVENOUS at 05:47

## 2019-07-20 RX ADMIN — ZOLPIDEM TARTRATE 10 MG: 10 TABLET ORAL at 22:25

## 2019-07-20 RX ADMIN — MORPHINE SULFATE 4 MG: 4 INJECTION INTRAVENOUS at 05:47

## 2019-07-20 RX ADMIN — ENOXAPARIN SODIUM 40 MG: 40 INJECTION SUBCUTANEOUS at 09:24

## 2019-07-20 RX ADMIN — DULOXETINE HYDROCHLORIDE 60 MG: 60 CAPSULE, DELAYED RELEASE ORAL at 22:26

## 2019-07-20 RX ADMIN — BACLOFEN 10 MG: 10 TABLET ORAL at 14:59

## 2019-07-20 RX ADMIN — CIPROFLOXACIN 400 MG: 2 INJECTION, SOLUTION INTRAVENOUS at 22:29

## 2019-07-20 RX ADMIN — POTASSIUM CHLORIDE 20 MEQ: 1500 TABLET, EXTENDED RELEASE ORAL at 22:26

## 2019-07-20 RX ADMIN — DULOXETINE HYDROCHLORIDE 60 MG: 60 CAPSULE, DELAYED RELEASE ORAL at 09:26

## 2019-07-20 RX ADMIN — BACLOFEN 10 MG: 10 TABLET ORAL at 09:26

## 2019-07-20 RX ADMIN — BUSPIRONE HYDROCHLORIDE 15 MG: 5 TABLET ORAL at 14:59

## 2019-07-20 RX ADMIN — PANTOPRAZOLE SODIUM 40 MG: 40 INJECTION, POWDER, LYOPHILIZED, FOR SOLUTION INTRAVENOUS at 09:25

## 2019-07-20 RX ADMIN — POTASSIUM CHLORIDE 20 MEQ: 1500 TABLET, EXTENDED RELEASE ORAL at 09:26

## 2019-07-20 RX ADMIN — TRAMADOL HYDROCHLORIDE 50 MG: 50 TABLET, FILM COATED ORAL at 22:26

## 2019-07-20 RX ADMIN — METOCLOPRAMIDE 10 MG: 5 INJECTION, SOLUTION INTRAMUSCULAR; INTRAVENOUS at 11:56

## 2019-07-20 RX ADMIN — ACETAMINOPHEN 1000 MG: 10 INJECTION, SOLUTION INTRAVENOUS at 02:48

## 2019-07-20 RX ADMIN — TRAMADOL HYDROCHLORIDE 50 MG: 50 TABLET, FILM COATED ORAL at 09:24

## 2019-07-20 RX ADMIN — Medication 10 ML: at 09:26

## 2019-07-20 RX ADMIN — TRAMADOL HYDROCHLORIDE 50 MG: 50 TABLET, FILM COATED ORAL at 16:46

## 2019-07-20 RX ADMIN — METRONIDAZOLE 500 MG: 500 INJECTION, SOLUTION INTRAVENOUS at 02:48

## 2019-07-20 RX ADMIN — METRONIDAZOLE 500 MG: 500 INJECTION, SOLUTION INTRAVENOUS at 10:26

## 2019-07-20 RX ADMIN — ACETAMINOPHEN 1000 MG: 10 INJECTION, SOLUTION INTRAVENOUS at 09:28

## 2019-07-20 ASSESSMENT — PAIN SCALES - GENERAL
PAINLEVEL_OUTOF10: 6
PAINLEVEL_OUTOF10: 2
PAINLEVEL_OUTOF10: 7
PAINLEVEL_OUTOF10: 5
PAINLEVEL_OUTOF10: 0
PAINLEVEL_OUTOF10: 7
PAINLEVEL_OUTOF10: 4
PAINLEVEL_OUTOF10: 3
PAINLEVEL_OUTOF10: 0
PAINLEVEL_OUTOF10: 8

## 2019-07-20 ASSESSMENT — PAIN DESCRIPTION - LOCATION
LOCATION: ABDOMEN

## 2019-07-20 ASSESSMENT — PAIN DESCRIPTION - DESCRIPTORS
DESCRIPTORS: DISCOMFORT

## 2019-07-20 ASSESSMENT — PAIN - FUNCTIONAL ASSESSMENT: PAIN_FUNCTIONAL_ASSESSMENT: PREVENTS OR INTERFERES SOME ACTIVE ACTIVITIES AND ADLS

## 2019-07-20 ASSESSMENT — PAIN DESCRIPTION - ONSET
ONSET: ON-GOING
ONSET: ON-GOING
ONSET: PROGRESSIVE

## 2019-07-20 ASSESSMENT — PAIN DESCRIPTION - PAIN TYPE
TYPE: SURGICAL PAIN

## 2019-07-20 ASSESSMENT — PAIN DESCRIPTION - PROGRESSION: CLINICAL_PROGRESSION: GRADUALLY IMPROVING

## 2019-07-20 ASSESSMENT — PAIN DESCRIPTION - ORIENTATION
ORIENTATION: MID

## 2019-07-20 ASSESSMENT — PAIN DESCRIPTION - FREQUENCY
FREQUENCY: CONTINUOUS

## 2019-07-20 NOTE — PROGRESS NOTES
Pt assisted to BR for urine and stool occurrence. Tolerated fairly well. Returned to bed. Pt demonstrated how to reach nurse with call light. Call light in reach. Will continue to monitor.

## 2019-07-20 NOTE — PROGRESS NOTES
Pt is alert and oriented x4. Able to follow commands throughout assessment. Pt HR and RR within normal limits. Respirations were even and easy with no adventitious sounds. Bowel sounds hypoactive x4. Dressing to midline ABD with small amount of old drainage. Will evaluate with surgery. Pt assisted to BR for urine occurrence and returned to chair. SBA. Tolerated fairly well. Pt demonstrated how to reach nurse with call light. Call light in reach. Will continue to monitor.

## 2019-07-21 PROCEDURE — 2500000003 HC RX 250 WO HCPCS: Performed by: SURGERY

## 2019-07-21 PROCEDURE — 6370000000 HC RX 637 (ALT 250 FOR IP): Performed by: SURGERY

## 2019-07-21 PROCEDURE — 99024 POSTOP FOLLOW-UP VISIT: CPT | Performed by: SURGERY

## 2019-07-21 PROCEDURE — 2580000003 HC RX 258: Performed by: SURGERY

## 2019-07-21 PROCEDURE — 1200000000 HC SEMI PRIVATE

## 2019-07-21 PROCEDURE — C9113 INJ PANTOPRAZOLE SODIUM, VIA: HCPCS | Performed by: SURGERY

## 2019-07-21 PROCEDURE — 6360000002 HC RX W HCPCS: Performed by: SURGERY

## 2019-07-21 RX ADMIN — CIPROFLOXACIN 400 MG: 2 INJECTION, SOLUTION INTRAVENOUS at 20:52

## 2019-07-21 RX ADMIN — ENOXAPARIN SODIUM 40 MG: 40 INJECTION SUBCUTANEOUS at 08:26

## 2019-07-21 RX ADMIN — METRONIDAZOLE 500 MG: 500 INJECTION, SOLUTION INTRAVENOUS at 01:45

## 2019-07-21 RX ADMIN — MORPHINE SULFATE 4 MG: 4 INJECTION INTRAVENOUS at 22:20

## 2019-07-21 RX ADMIN — MORPHINE SULFATE 4 MG: 4 INJECTION INTRAVENOUS at 18:24

## 2019-07-21 RX ADMIN — BACLOFEN 10 MG: 10 TABLET ORAL at 15:53

## 2019-07-21 RX ADMIN — FUROSEMIDE 40 MG: 40 TABLET ORAL at 08:26

## 2019-07-21 RX ADMIN — BUSPIRONE HYDROCHLORIDE 15 MG: 5 TABLET ORAL at 20:53

## 2019-07-21 RX ADMIN — Medication 10 ML: at 20:56

## 2019-07-21 RX ADMIN — BACLOFEN 10 MG: 10 TABLET ORAL at 20:52

## 2019-07-21 RX ADMIN — METRONIDAZOLE 500 MG: 500 INJECTION, SOLUTION INTRAVENOUS at 09:32

## 2019-07-21 RX ADMIN — CIPROFLOXACIN 400 MG: 2 INJECTION, SOLUTION INTRAVENOUS at 09:32

## 2019-07-21 RX ADMIN — METRONIDAZOLE 500 MG: 500 INJECTION, SOLUTION INTRAVENOUS at 17:23

## 2019-07-21 RX ADMIN — TRAMADOL HYDROCHLORIDE 50 MG: 50 TABLET, FILM COATED ORAL at 11:41

## 2019-07-21 RX ADMIN — DULOXETINE HYDROCHLORIDE 60 MG: 60 CAPSULE, DELAYED RELEASE ORAL at 08:26

## 2019-07-21 RX ADMIN — TRAMADOL HYDROCHLORIDE 50 MG: 50 TABLET, FILM COATED ORAL at 05:55

## 2019-07-21 RX ADMIN — TRAMADOL HYDROCHLORIDE 50 MG: 50 TABLET, FILM COATED ORAL at 17:23

## 2019-07-21 RX ADMIN — MORPHINE SULFATE 4 MG: 4 INJECTION INTRAVENOUS at 08:26

## 2019-07-21 RX ADMIN — DULOXETINE HYDROCHLORIDE 60 MG: 60 CAPSULE, DELAYED RELEASE ORAL at 20:52

## 2019-07-21 RX ADMIN — ATENOLOL 100 MG: 50 TABLET ORAL at 08:26

## 2019-07-21 RX ADMIN — POTASSIUM CHLORIDE 20 MEQ: 1500 TABLET, EXTENDED RELEASE ORAL at 20:52

## 2019-07-21 RX ADMIN — BACLOFEN 10 MG: 10 TABLET ORAL at 08:26

## 2019-07-21 RX ADMIN — POTASSIUM CHLORIDE 20 MEQ: 1500 TABLET, EXTENDED RELEASE ORAL at 08:26

## 2019-07-21 RX ADMIN — BUSPIRONE HYDROCHLORIDE 15 MG: 5 TABLET ORAL at 08:26

## 2019-07-21 RX ADMIN — PANTOPRAZOLE SODIUM 40 MG: 40 INJECTION, POWDER, LYOPHILIZED, FOR SOLUTION INTRAVENOUS at 08:26

## 2019-07-21 ASSESSMENT — PAIN DESCRIPTION - FREQUENCY
FREQUENCY: INTERMITTENT
FREQUENCY: CONTINUOUS
FREQUENCY: INTERMITTENT
FREQUENCY: INTERMITTENT

## 2019-07-21 ASSESSMENT — PAIN SCALES - GENERAL
PAINLEVEL_OUTOF10: 4
PAINLEVEL_OUTOF10: 0
PAINLEVEL_OUTOF10: 0
PAINLEVEL_OUTOF10: 7
PAINLEVEL_OUTOF10: 7
PAINLEVEL_OUTOF10: 3
PAINLEVEL_OUTOF10: 7
PAINLEVEL_OUTOF10: 6
PAINLEVEL_OUTOF10: 4
PAINLEVEL_OUTOF10: 7
PAINLEVEL_OUTOF10: 6

## 2019-07-21 ASSESSMENT — PAIN DESCRIPTION - PAIN TYPE
TYPE: SURGICAL PAIN;ACUTE PAIN
TYPE: SURGICAL PAIN
TYPE: SURGICAL PAIN;ACUTE PAIN
TYPE: SURGICAL PAIN

## 2019-07-21 ASSESSMENT — PAIN DESCRIPTION - ORIENTATION
ORIENTATION: LOWER
ORIENTATION: LOWER;RIGHT;LEFT
ORIENTATION: LOWER;RIGHT;LEFT
ORIENTATION: MID

## 2019-07-21 ASSESSMENT — PAIN DESCRIPTION - DESCRIPTORS
DESCRIPTORS: SHARP;DISCOMFORT
DESCRIPTORS: DISCOMFORT
DESCRIPTORS: SHARP;DISCOMFORT
DESCRIPTORS: DISCOMFORT

## 2019-07-21 ASSESSMENT — PAIN DESCRIPTION - ONSET
ONSET: ON-GOING
ONSET: ON-GOING
ONSET: PROGRESSIVE
ONSET: PROGRESSIVE

## 2019-07-21 ASSESSMENT — PAIN SCALES - WONG BAKER: WONGBAKER_NUMERICALRESPONSE: 0

## 2019-07-21 ASSESSMENT — PAIN DESCRIPTION - LOCATION
LOCATION: ABDOMEN;FOOT
LOCATION: ABDOMEN;FOOT
LOCATION: ABDOMEN
LOCATION: ABDOMEN

## 2019-07-21 NOTE — PROGRESS NOTES
Incentive Spirometry education and demonstration completed by Respiratory Therapy No      Response to education: Poor     Teaching Time: 0 minutes    Minimum Predicted Vital Capacity -  mL. Patient's Actual Vital Capacity -  mL.  Turning over to Nursing for routine follow-up No.    Comments: patient refusing    Electronically signed by Sadiq Grover RCP on 7/20/2019 at 8:02 PM

## 2019-07-21 NOTE — PROGRESS NOTES
Incentive Spirometry education and demonstration completed by Respiratory Therapy No      Response to education: No education     Teaching Time: 0 minutes    Minimum Predicted Vital Capacity - 593mL. Patient's Actual Vital Capacity - 0 mL. Turning over to Nursing for routine follow-up No.    Comments: Pt refused IS at this time, \"pt states lungs are clear, doesn't feel the need to perform at this time. \"    Electronically signed by Ivis Franks RCP on 7/21/2019 at 5:54 PM

## 2019-07-21 NOTE — PROGRESS NOTES
time.    Skin:  Warm and dry. Assessment & Plan postop day #4 status post reversal of sigmoid colostomy. Doing well. Had several bowel movements over the last 24 hours. P.o. intake is fair. The remainder of the packing was removed. She is having a moderate amount of serosanguineous drainage but there is no evidence of infection. Continue general diet. Needs to increase ambulation.     Lyla Gaffney MD  7/21/2019

## 2019-07-22 VITALS
BODY MASS INDEX: 33.85 KG/M2 | OXYGEN SATURATION: 95 % | HEART RATE: 98 BPM | TEMPERATURE: 98.1 F | HEIGHT: 66 IN | DIASTOLIC BLOOD PRESSURE: 86 MMHG | RESPIRATION RATE: 16 BRPM | WEIGHT: 210.6 LBS | SYSTOLIC BLOOD PRESSURE: 123 MMHG

## 2019-07-22 PROCEDURE — 6370000000 HC RX 637 (ALT 250 FOR IP): Performed by: SURGERY

## 2019-07-22 PROCEDURE — 2580000003 HC RX 258: Performed by: SURGERY

## 2019-07-22 PROCEDURE — 2500000003 HC RX 250 WO HCPCS: Performed by: SURGERY

## 2019-07-22 PROCEDURE — 99024 POSTOP FOLLOW-UP VISIT: CPT | Performed by: SURGERY

## 2019-07-22 PROCEDURE — C9113 INJ PANTOPRAZOLE SODIUM, VIA: HCPCS | Performed by: SURGERY

## 2019-07-22 PROCEDURE — 6360000002 HC RX W HCPCS: Performed by: SURGERY

## 2019-07-22 RX ORDER — METRONIDAZOLE 500 MG/1
500 TABLET ORAL 2 TIMES DAILY
Qty: 8 TABLET | Refills: 0 | Status: SHIPPED | OUTPATIENT
Start: 2019-07-22 | End: 2019-07-26

## 2019-07-22 RX ORDER — TRAMADOL HYDROCHLORIDE 50 MG/1
50 TABLET ORAL EVERY 6 HOURS PRN
Qty: 20 TABLET | Refills: 0 | Status: ON HOLD | OUTPATIENT
Start: 2019-07-22 | End: 2019-07-31 | Stop reason: HOSPADM

## 2019-07-22 RX ORDER — SULFAMETHOXAZOLE AND TRIMETHOPRIM 800; 160 MG/1; MG/1
1 TABLET ORAL 2 TIMES DAILY
Qty: 8 TABLET | Refills: 0 | Status: SHIPPED | OUTPATIENT
Start: 2019-07-22 | End: 2019-07-26

## 2019-07-22 RX ADMIN — PANTOPRAZOLE SODIUM 40 MG: 40 INJECTION, POWDER, LYOPHILIZED, FOR SOLUTION INTRAVENOUS at 08:16

## 2019-07-22 RX ADMIN — POTASSIUM CHLORIDE 20 MEQ: 1500 TABLET, EXTENDED RELEASE ORAL at 08:17

## 2019-07-22 RX ADMIN — ENOXAPARIN SODIUM 40 MG: 40 INJECTION SUBCUTANEOUS at 08:16

## 2019-07-22 RX ADMIN — METRONIDAZOLE 500 MG: 500 INJECTION, SOLUTION INTRAVENOUS at 02:36

## 2019-07-22 RX ADMIN — TRAMADOL HYDROCHLORIDE 50 MG: 50 TABLET, FILM COATED ORAL at 08:16

## 2019-07-22 RX ADMIN — MORPHINE SULFATE 2 MG: 2 INJECTION, SOLUTION INTRAMUSCULAR; INTRAVENOUS at 02:34

## 2019-07-22 RX ADMIN — FUROSEMIDE 40 MG: 40 TABLET ORAL at 08:16

## 2019-07-22 RX ADMIN — BACLOFEN 10 MG: 10 TABLET ORAL at 08:16

## 2019-07-22 RX ADMIN — MORPHINE SULFATE 2 MG: 2 INJECTION, SOLUTION INTRAMUSCULAR; INTRAVENOUS at 00:20

## 2019-07-22 RX ADMIN — ATENOLOL 100 MG: 50 TABLET ORAL at 08:16

## 2019-07-22 RX ADMIN — Medication 10 ML: at 08:23

## 2019-07-22 RX ADMIN — BUSPIRONE HYDROCHLORIDE 15 MG: 5 TABLET ORAL at 08:17

## 2019-07-22 RX ADMIN — DULOXETINE HYDROCHLORIDE 60 MG: 60 CAPSULE, DELAYED RELEASE ORAL at 08:16

## 2019-07-22 ASSESSMENT — PAIN SCALES - GENERAL
PAINLEVEL_OUTOF10: 4
PAINLEVEL_OUTOF10: 5
PAINLEVEL_OUTOF10: 0
PAINLEVEL_OUTOF10: 0
PAINLEVEL_OUTOF10: 6

## 2019-07-22 ASSESSMENT — PAIN DESCRIPTION - LOCATION
LOCATION: ABDOMEN
LOCATION: ABDOMEN

## 2019-07-22 ASSESSMENT — PAIN DESCRIPTION - ORIENTATION
ORIENTATION: LOWER
ORIENTATION: LOWER

## 2019-07-22 ASSESSMENT — PAIN DESCRIPTION - PAIN TYPE
TYPE: SURGICAL PAIN
TYPE: SURGICAL PAIN

## 2019-07-22 ASSESSMENT — PAIN DESCRIPTION - FREQUENCY
FREQUENCY: INTERMITTENT
FREQUENCY: INTERMITTENT

## 2019-07-22 ASSESSMENT — PAIN SCALES - WONG BAKER
WONGBAKER_NUMERICALRESPONSE: 0
WONGBAKER_NUMERICALRESPONSE: 0

## 2019-07-22 ASSESSMENT — PAIN DESCRIPTION - ONSET
ONSET: ON-GOING
ONSET: ON-GOING

## 2019-07-22 ASSESSMENT — PAIN DESCRIPTION - DESCRIPTORS
DESCRIPTORS: DISCOMFORT
DESCRIPTORS: DISCOMFORT

## 2019-07-22 NOTE — DISCHARGE SUMMARY
Pt admitted and underwent an open colostomy closure. She tolerated this surgery and the post op period well. BP monitored and treated with po meds, was stable  She is discharged to home with normal a temperature and vitals, tolerating an oral diet well, and having normal bowel activity. Follow up is planned, and all questions have been answered.     [facility] and [facility]

## 2019-07-22 NOTE — PROGRESS NOTES
Night shift assessment complete. Vital signs stable. Afebrile. Abdomen soft, non-distended, appropriately tender. Moderate amount of serosanguinous drainage to abdominal dressing, thus new dressing applied. No s/s of infection apparent. Abdominal binder applied for comfort. Pt now ambulating independently, and tolerating well. Encouraged IS use. Tolerating diet. Scheduled night meds administered in addition to morphine for c/o persisting abdominal discomfort and pain in feet bilaterally. The care plan and education has been reviewed and mutually agreed upon with the patient. Call light within reach. Pt denies any further needs at this time. Will continue to monitor.

## 2019-07-22 NOTE — PROGRESS NOTES
Patient provided with discharge instructions, discussed in detail, new medications reviewed including use and side effects. Patient verbalized understanding. Prescriptions filled per outpatient pharmacy. All questions answered, family at the bedside to transport home.   Patient discharged home with all belongings

## 2019-07-27 ENCOUNTER — HOSPITAL ENCOUNTER (INPATIENT)
Age: 64
LOS: 4 days | Discharge: HOME | DRG: 383 | End: 2019-07-31
Attending: EMERGENCY MEDICINE | Admitting: INTERNAL MEDICINE
Payer: MEDICAID

## 2019-07-27 ENCOUNTER — APPOINTMENT (OUTPATIENT)
Dept: GENERAL RADIOLOGY | Age: 64
DRG: 383 | End: 2019-07-27
Payer: MEDICAID

## 2019-07-27 DIAGNOSIS — L76.82 INCISIONAL PAIN: ICD-10-CM

## 2019-07-27 DIAGNOSIS — L03.116 CELLULITIS OF LEFT FOOT: Primary | ICD-10-CM

## 2019-07-27 PROBLEM — L08.9 FOOT INFECTION: Status: ACTIVE | Noted: 2019-07-27

## 2019-07-27 LAB
A/G RATIO: 0.8 (ref 1.1–2.2)
ALBUMIN SERPL-MCNC: 3 G/DL (ref 3.4–5)
ALP BLD-CCNC: 53 U/L (ref 40–129)
ALT SERPL-CCNC: 11 U/L (ref 10–40)
ANION GAP SERPL CALCULATED.3IONS-SCNC: 16 MMOL/L (ref 3–16)
AST SERPL-CCNC: 15 U/L (ref 15–37)
BASOPHILS ABSOLUTE: 0 K/UL (ref 0–0.2)
BASOPHILS RELATIVE PERCENT: 0.5 %
BILIRUB SERPL-MCNC: <0.2 MG/DL (ref 0–1)
BUN BLDV-MCNC: 6 MG/DL (ref 7–20)
CALCIUM SERPL-MCNC: 7.7 MG/DL (ref 8.3–10.6)
CHLORIDE BLD-SCNC: 97 MMOL/L (ref 99–110)
CO2: 18 MMOL/L (ref 21–32)
CREAT SERPL-MCNC: 0.5 MG/DL (ref 0.6–1.2)
EOSINOPHILS ABSOLUTE: 0.2 K/UL (ref 0–0.6)
EOSINOPHILS RELATIVE PERCENT: 1.8 %
GFR AFRICAN AMERICAN: >60
GFR NON-AFRICAN AMERICAN: >60
GLOBULIN: 3.6 G/DL
GLUCOSE BLD-MCNC: 110 MG/DL (ref 70–99)
GLUCOSE BLD-MCNC: 145 MG/DL (ref 70–99)
GLUCOSE BLD-MCNC: 99 MG/DL (ref 70–99)
HCT VFR BLD CALC: 29.4 % (ref 36–48)
HEMOGLOBIN: 10 G/DL (ref 12–16)
LACTIC ACID, SEPSIS: 1.2 MMOL/L (ref 0.4–1.9)
LYMPHOCYTES ABSOLUTE: 1.4 K/UL (ref 1–5.1)
LYMPHOCYTES RELATIVE PERCENT: 15.5 %
MCH RBC QN AUTO: 31.3 PG (ref 26–34)
MCHC RBC AUTO-ENTMCNC: 34 G/DL (ref 31–36)
MCV RBC AUTO: 92 FL (ref 80–100)
MONOCYTES ABSOLUTE: 0.6 K/UL (ref 0–1.3)
MONOCYTES RELATIVE PERCENT: 7 %
NEUTROPHILS ABSOLUTE: 6.6 K/UL (ref 1.7–7.7)
NEUTROPHILS RELATIVE PERCENT: 75.2 %
PDW BLD-RTO: 14.7 % (ref 12.4–15.4)
PERFORMED ON: ABNORMAL
PERFORMED ON: ABNORMAL
PLATELET # BLD: 516 K/UL (ref 135–450)
PMV BLD AUTO: 6.9 FL (ref 5–10.5)
POTASSIUM SERPL-SCNC: 3.5 MMOL/L (ref 3.5–5.1)
RBC # BLD: 3.19 M/UL (ref 4–5.2)
SODIUM BLD-SCNC: 131 MMOL/L (ref 136–145)
TOTAL PROTEIN: 6.6 G/DL (ref 6.4–8.2)
WBC # BLD: 8.8 K/UL (ref 4–11)

## 2019-07-27 PROCEDURE — 85025 COMPLETE CBC W/AUTO DIFF WBC: CPT

## 2019-07-27 PROCEDURE — 94760 N-INVAS EAR/PLS OXIMETRY 1: CPT

## 2019-07-27 PROCEDURE — 96365 THER/PROPH/DIAG IV INF INIT: CPT

## 2019-07-27 PROCEDURE — 73590 X-RAY EXAM OF LOWER LEG: CPT

## 2019-07-27 PROCEDURE — 1200000000 HC SEMI PRIVATE

## 2019-07-27 PROCEDURE — 96366 THER/PROPH/DIAG IV INF ADDON: CPT

## 2019-07-27 PROCEDURE — 83605 ASSAY OF LACTIC ACID: CPT

## 2019-07-27 PROCEDURE — 99285 EMERGENCY DEPT VISIT HI MDM: CPT

## 2019-07-27 PROCEDURE — 6370000000 HC RX 637 (ALT 250 FOR IP): Performed by: INTERNAL MEDICINE

## 2019-07-27 PROCEDURE — 2W1LX6Z COMPRESSION OF RIGHT LOWER EXTREMITY USING PRESSURE DRESSING: ICD-10-PCS | Performed by: PODIATRIST

## 2019-07-27 PROCEDURE — 87077 CULTURE AEROBIC IDENTIFY: CPT

## 2019-07-27 PROCEDURE — G0378 HOSPITAL OBSERVATION PER HR: HCPCS

## 2019-07-27 PROCEDURE — 2W1SX6Z COMPRESSION OF RIGHT FOOT USING PRESSURE DRESSING: ICD-10-PCS | Performed by: PODIATRIST

## 2019-07-27 PROCEDURE — 6370000000 HC RX 637 (ALT 250 FOR IP): Performed by: PODIATRIST

## 2019-07-27 PROCEDURE — 2580000003 HC RX 258: Performed by: INTERNAL MEDICINE

## 2019-07-27 PROCEDURE — 96367 TX/PROPH/DG ADDL SEQ IV INF: CPT

## 2019-07-27 PROCEDURE — 87040 BLOOD CULTURE FOR BACTERIA: CPT

## 2019-07-27 PROCEDURE — 87070 CULTURE OTHR SPECIMN AEROBIC: CPT

## 2019-07-27 PROCEDURE — 96372 THER/PROPH/DIAG INJ SC/IM: CPT

## 2019-07-27 PROCEDURE — 96376 TX/PRO/DX INJ SAME DRUG ADON: CPT

## 2019-07-27 PROCEDURE — 2580000003 HC RX 258: Performed by: EMERGENCY MEDICINE

## 2019-07-27 PROCEDURE — 2W1TX6Z COMPRESSION OF LEFT FOOT USING PRESSURE DRESSING: ICD-10-PCS | Performed by: PODIATRIST

## 2019-07-27 PROCEDURE — 96375 TX/PRO/DX INJ NEW DRUG ADDON: CPT

## 2019-07-27 PROCEDURE — 87205 SMEAR GRAM STAIN: CPT

## 2019-07-27 PROCEDURE — 6360000002 HC RX W HCPCS: Performed by: EMERGENCY MEDICINE

## 2019-07-27 PROCEDURE — 80053 COMPREHEN METABOLIC PANEL: CPT

## 2019-07-27 PROCEDURE — 6360000002 HC RX W HCPCS: Performed by: INTERNAL MEDICINE

## 2019-07-27 PROCEDURE — 73630 X-RAY EXAM OF FOOT: CPT

## 2019-07-27 PROCEDURE — 2W1MX6Z COMPRESSION OF LEFT LOWER EXTREMITY USING PRESSURE DRESSING: ICD-10-PCS | Performed by: PODIATRIST

## 2019-07-27 RX ORDER — MORPHINE SULFATE 4 MG/ML
4 INJECTION, SOLUTION INTRAMUSCULAR; INTRAVENOUS ONCE
Status: COMPLETED | OUTPATIENT
Start: 2019-07-27 | End: 2019-07-27

## 2019-07-27 RX ORDER — BUSPIRONE HYDROCHLORIDE 5 MG/1
15 TABLET ORAL 3 TIMES DAILY
Status: DISCONTINUED | OUTPATIENT
Start: 2019-07-27 | End: 2019-07-31 | Stop reason: HOSPADM

## 2019-07-27 RX ORDER — TRAMADOL HYDROCHLORIDE 50 MG/1
50 TABLET ORAL EVERY 6 HOURS PRN
Status: DISCONTINUED | OUTPATIENT
Start: 2019-07-27 | End: 2019-07-31 | Stop reason: HOSPADM

## 2019-07-27 RX ORDER — ATENOLOL 50 MG/1
100 TABLET ORAL DAILY
Status: DISCONTINUED | OUTPATIENT
Start: 2019-07-27 | End: 2019-07-31 | Stop reason: HOSPADM

## 2019-07-27 RX ORDER — DULOXETIN HYDROCHLORIDE 30 MG/1
60 CAPSULE, DELAYED RELEASE ORAL 2 TIMES DAILY
Status: DISCONTINUED | OUTPATIENT
Start: 2019-07-27 | End: 2019-07-31 | Stop reason: HOSPADM

## 2019-07-27 RX ORDER — PANTOPRAZOLE SODIUM 40 MG/1
40 TABLET, DELAYED RELEASE ORAL
Status: DISCONTINUED | OUTPATIENT
Start: 2019-07-28 | End: 2019-07-31 | Stop reason: HOSPADM

## 2019-07-27 RX ORDER — POTASSIUM CHLORIDE 20 MEQ/1
20 TABLET, EXTENDED RELEASE ORAL 2 TIMES DAILY
Status: DISCONTINUED | OUTPATIENT
Start: 2019-07-27 | End: 2019-07-31 | Stop reason: HOSPADM

## 2019-07-27 RX ORDER — ONDANSETRON 2 MG/ML
4 INJECTION INTRAMUSCULAR; INTRAVENOUS ONCE
Status: COMPLETED | OUTPATIENT
Start: 2019-07-27 | End: 2019-07-27

## 2019-07-27 RX ORDER — BACLOFEN 10 MG/1
10 TABLET ORAL 3 TIMES DAILY
Status: DISCONTINUED | OUTPATIENT
Start: 2019-07-27 | End: 2019-07-28

## 2019-07-27 RX ORDER — FERROUS SULFATE 325(65) MG
325 TABLET ORAL 2 TIMES DAILY
Status: DISCONTINUED | OUTPATIENT
Start: 2019-07-27 | End: 2019-07-31 | Stop reason: HOSPADM

## 2019-07-27 RX ORDER — ACETAMINOPHEN, ASPIRIN AND CAFFEINE 250; 250; 65 MG/1; MG/1; MG/1
1 TABLET, FILM COATED ORAL EVERY 6 HOURS PRN
Status: DISCONTINUED | OUTPATIENT
Start: 2019-07-27 | End: 2019-07-31 | Stop reason: HOSPADM

## 2019-07-27 RX ORDER — FLUTICASONE PROPIONATE 50 MCG
1 SPRAY, SUSPENSION (ML) NASAL DAILY
Status: DISCONTINUED | OUTPATIENT
Start: 2019-07-27 | End: 2019-07-31 | Stop reason: HOSPADM

## 2019-07-27 RX ORDER — NYSTATIN 100000 U/G
CREAM TOPICAL 2 TIMES DAILY
Status: DISCONTINUED | OUTPATIENT
Start: 2019-07-27 | End: 2019-07-31 | Stop reason: HOSPADM

## 2019-07-27 RX ORDER — LIDOCAINE 50 MG/G
OINTMENT TOPICAL PRN
Status: DISCONTINUED | OUTPATIENT
Start: 2019-07-27 | End: 2019-07-31 | Stop reason: HOSPADM

## 2019-07-27 RX ORDER — FUROSEMIDE 40 MG/1
40 TABLET ORAL DAILY
Status: DISCONTINUED | OUTPATIENT
Start: 2019-07-27 | End: 2019-07-31 | Stop reason: HOSPADM

## 2019-07-27 RX ORDER — SODIUM CHLORIDE 9 MG/ML
INJECTION, SOLUTION INTRAVENOUS CONTINUOUS
Status: DISCONTINUED | OUTPATIENT
Start: 2019-07-27 | End: 2019-07-31 | Stop reason: HOSPADM

## 2019-07-27 RX ORDER — ONDANSETRON 4 MG/1
4 TABLET, ORALLY DISINTEGRATING ORAL EVERY 6 HOURS PRN
Status: DISCONTINUED | OUTPATIENT
Start: 2019-07-27 | End: 2019-07-31 | Stop reason: HOSPADM

## 2019-07-27 RX ORDER — HYDROCODONE BITARTRATE AND ACETAMINOPHEN 5; 325 MG/1; MG/1
1 TABLET ORAL EVERY 4 HOURS PRN
Status: DISCONTINUED | OUTPATIENT
Start: 2019-07-27 | End: 2019-07-28

## 2019-07-27 RX ORDER — ZOLPIDEM TARTRATE 10 MG/1
10 TABLET ORAL NIGHTLY PRN
Status: DISCONTINUED | OUTPATIENT
Start: 2019-07-27 | End: 2019-07-31 | Stop reason: HOSPADM

## 2019-07-27 RX ADMIN — ONDANSETRON 4 MG: 2 INJECTION INTRAMUSCULAR; INTRAVENOUS at 09:49

## 2019-07-27 RX ADMIN — TRAMADOL HYDROCHLORIDE 50 MG: 50 TABLET, FILM COATED ORAL at 17:36

## 2019-07-27 RX ADMIN — HYDROCODONE BITARTRATE AND ACETAMINOPHEN 1 TABLET: 5; 325 TABLET ORAL at 20:42

## 2019-07-27 RX ADMIN — MORPHINE SULFATE 4 MG: 4 INJECTION INTRAVENOUS at 11:25

## 2019-07-27 RX ADMIN — AMPICILLIN SODIUM AND SULBACTAM SODIUM 3 G: 2; 1 INJECTION, POWDER, FOR SOLUTION INTRAMUSCULAR; INTRAVENOUS at 09:49

## 2019-07-27 RX ADMIN — Medication 1 EACH: at 23:08

## 2019-07-27 RX ADMIN — NYSTATIN: 100000 CREAM TOPICAL at 20:41

## 2019-07-27 RX ADMIN — FERROUS SULFATE TAB 325 MG (65 MG ELEMENTAL FE) 325 MG: 325 (65 FE) TAB at 20:42

## 2019-07-27 RX ADMIN — FUROSEMIDE 40 MG: 40 TABLET ORAL at 14:40

## 2019-07-27 RX ADMIN — MORPHINE SULFATE 4 MG: 4 INJECTION INTRAVENOUS at 09:49

## 2019-07-27 RX ADMIN — POTASSIUM CHLORIDE 20 MEQ: 1500 TABLET, EXTENDED RELEASE ORAL at 14:40

## 2019-07-27 RX ADMIN — BACLOFEN 10 MG: 10 TABLET ORAL at 14:40

## 2019-07-27 RX ADMIN — HYDROCODONE BITARTRATE AND ACETAMINOPHEN 1 TABLET: 5; 325 TABLET ORAL at 14:39

## 2019-07-27 RX ADMIN — AMPICILLIN SODIUM AND SULBACTAM SODIUM 1.5 G: 1; .5 INJECTION, POWDER, FOR SOLUTION INTRAMUSCULAR; INTRAVENOUS at 20:41

## 2019-07-27 RX ADMIN — Medication 1 EACH: at 23:09

## 2019-07-27 RX ADMIN — AMPICILLIN SODIUM AND SULBACTAM SODIUM 1.5 G: 1; .5 INJECTION, POWDER, FOR SOLUTION INTRAMUSCULAR; INTRAVENOUS at 14:59

## 2019-07-27 RX ADMIN — BUSPIRONE HYDROCHLORIDE 15 MG: 5 TABLET ORAL at 20:41

## 2019-07-27 RX ADMIN — POTASSIUM CHLORIDE 20 MEQ: 1500 TABLET, EXTENDED RELEASE ORAL at 20:42

## 2019-07-27 RX ADMIN — NYSTATIN: 100000 CREAM TOPICAL at 14:40

## 2019-07-27 RX ADMIN — DULOXETINE HYDROCHLORIDE 60 MG: 30 CAPSULE, DELAYED RELEASE ORAL at 14:40

## 2019-07-27 RX ADMIN — ENOXAPARIN SODIUM 40 MG: 40 INJECTION SUBCUTANEOUS at 14:40

## 2019-07-27 RX ADMIN — FERROUS SULFATE TAB 325 MG (65 MG ELEMENTAL FE) 325 MG: 325 (65 FE) TAB at 14:40

## 2019-07-27 RX ADMIN — SODIUM CHLORIDE: 9 INJECTION, SOLUTION INTRAVENOUS at 14:41

## 2019-07-27 RX ADMIN — DULOXETINE HYDROCHLORIDE 60 MG: 30 CAPSULE, DELAYED RELEASE ORAL at 20:42

## 2019-07-27 RX ADMIN — VANCOMYCIN HYDROCHLORIDE 1250 MG: 10 INJECTION, POWDER, LYOPHILIZED, FOR SOLUTION INTRAVENOUS at 10:29

## 2019-07-27 RX ADMIN — BACLOFEN 10 MG: 10 TABLET ORAL at 20:42

## 2019-07-27 RX ADMIN — BUSPIRONE HYDROCHLORIDE 15 MG: 5 TABLET ORAL at 14:40

## 2019-07-27 ASSESSMENT — PAIN DESCRIPTION - ORIENTATION
ORIENTATION: RIGHT;LEFT
ORIENTATION: LEFT;LOWER
ORIENTATION: LEFT

## 2019-07-27 ASSESSMENT — PAIN DESCRIPTION - ONSET
ONSET: ON-GOING
ONSET: ON-GOING

## 2019-07-27 ASSESSMENT — PAIN DESCRIPTION - PAIN TYPE
TYPE: ACUTE PAIN

## 2019-07-27 ASSESSMENT — PAIN SCALES - GENERAL
PAINLEVEL_OUTOF10: 9
PAINLEVEL_OUTOF10: 5
PAINLEVEL_OUTOF10: 9
PAINLEVEL_OUTOF10: 8
PAINLEVEL_OUTOF10: 7
PAINLEVEL_OUTOF10: 8
PAINLEVEL_OUTOF10: 4
PAINLEVEL_OUTOF10: 8
PAINLEVEL_OUTOF10: 10

## 2019-07-27 ASSESSMENT — PAIN DESCRIPTION - FREQUENCY
FREQUENCY: CONTINUOUS
FREQUENCY: CONTINUOUS

## 2019-07-27 ASSESSMENT — PAIN DESCRIPTION - LOCATION
LOCATION: FOOT
LOCATION: LEG
LOCATION: FOOT
LOCATION: LEG
LOCATION: FOOT

## 2019-07-27 ASSESSMENT — PAIN DESCRIPTION - PROGRESSION
CLINICAL_PROGRESSION: GRADUALLY IMPROVING
CLINICAL_PROGRESSION: GRADUALLY IMPROVING

## 2019-07-27 ASSESSMENT — PAIN DESCRIPTION - DESCRIPTORS: DESCRIPTORS: ACHING

## 2019-07-27 NOTE — ED NOTES
Pt. Reports that her pain level has improved after second dose of Morphine rating it a 5/10. Pt. Resting in bed with no distress noted, pt. Updated on plan of care, denies any concerns, will continue to monitor.      Maikel Acevedo RN  07/27/19 [id number]

## 2019-07-27 NOTE — PROGRESS NOTES
Full note dictated. Pain, edema and erythema noted bilateral feet. Vascular consult. Unna boot both feet and legs. Will follow.     iMke Romo DPM

## 2019-07-27 NOTE — PROGRESS NOTES
Patient arrived to [address] [place] [address]. Helped to [facility] upon arrival to room.

## 2019-07-27 NOTE — PROGRESS NOTES
4:16 PM: Morning assessment completed, patient denies needs at this time, call light in reach, will continue to monitor. The care plan and education has been reviewed and mutually agreed upon with the patient. Educated patient on the potential for falls during their hospital stay. Reviewed current fall safety protocol. Reviewed indication for use of bed alarm. Patient verbalized understanding.

## 2019-07-27 NOTE — ED PROVIDER NOTES
TABLET    Take 1 tablet by mouth 3 times daily    BUSPIRONE (BUSPAR) 15 MG TABLET    Take 15 mg by mouth 3 times daily     DULOXETINE (CYMBALTA) 60 MG EXTENDED RELEASE CAPSULE    Take 60 mg by mouth 2 times daily     FERROUS SULFATE 325 (65 FE) MG TABLET    Take 325 mg by mouth 2 times daily     FLUTICASONE (FLONASE) 50 MCG/ACT NASAL SPRAY    1 spray by Nasal route daily    FUROSEMIDE (LASIX) 40 MG TABLET    Take 1 tablet by mouth daily    LIDOCAINE (XYLOCAINE) 5 % OINTMENT    Apply topically as needed Apply topically as needed to feet    NYSTATIN (MYCOSTATIN) 814607 UNIT/GM CREAM    APPLY TO THE AFFECTED AREA LIGHTLY 2 TIMES A DAY UNTIL CLEAR AND AS NEEDED **MAY REFILL** only as needed    OMEPRAZOLE (PRILOSEC) 20 MG DELAYED RELEASE CAPSULE    Take 20 mg by mouth Daily     ONDANSETRON (ZOFRAN ODT) 4 MG DISINTEGRATING TABLET    Take 1 tablet by mouth every 6 hours as needed for Nausea or Vomiting    POTASSIUM CHLORIDE SA (K-DUR;KLOR-CON M) 20 MEQ TABLET    Take 1 tablet by mouth 2 times daily. SKIN PROTECTANTS, MISC. (LIP BALM) OINT OINTMENT    Apply 1 g topically daily    TRAMADOL (ULTRAM) 50 MG TABLET    Take 1 tablet by mouth every 6 hours as needed for Pain for up to 5 days. Intended supply: 5 days. Take lowest dose possible to manage pain    ZOLPIDEM (AMBIEN) 10 MG TABLET    Take 10 mg by mouth nightly as needed for Sleep .        ALLERGIES     Ace inhibitors and Skelaxin [metaxalone]    FAMILY HISTORY       Family History   Problem Relation Age of Onset    High Blood Pressure Mother     High Blood Pressure Father     Kidney Disease Sister           SOCIAL HISTORY       Social History     Socioeconomic History    Marital status:      Spouse name: Jose Manuel Vidal Number of children: 2    Years of education: 15    Highest education level: None   Occupational History    None   Social Needs    Financial resource strain: None    Food insecurity:     Worry: None     Inability: None    Transportation (has no administration in time range)   ondansetron (ZOFRAN) injection 4 mg (has no administration in time range)   vancomycin (VANCOCIN) 1,250 mg in dextrose 5 % 250 mL IVPB (has no administration in time range)   ampicillin-sulbactam (UNASYN) 3 g ivpb minibag (has no administration in time range)       MDM. Vancomycin, Unasyn, morphine, Zofran, blood cultures and lactate, CBC, CMP ordered as well as x-ray of the foot and tib-fib. Pt admitted to dr. Daryn Marroquin for further mgt. CONSULTS:  None    PROCEDURES:  Unless otherwise noted below, none     Procedures    FINAL IMPRESSION      1. Cellulitis of left foot          DISPOSITION/PLAN   DISPOSITION        PATIENT REFERRED TO:  No follow-up provider specified. DISCHARGE MEDICATIONS:  New Prescriptions    No medications on file          (Please note:  Portions of this note were completed with a voice recognition program. Efforts were made to edit the dictations but occasionally words and phrases are mis-transcribed.)    Form v2016. J.5-cn    Gisselle Godwin DO (electronically signed)  Emergency Medicine Provider              Tigre Chavez DO  07/27/19 [id number]

## 2019-07-28 PROBLEM — S83.512A LEFT ACL TEAR: Status: RESOLVED | Noted: 2018-11-29 | Resolved: 2019-07-28

## 2019-07-28 PROBLEM — K65.1 INTRA-ABDOMINAL ABSCESS (HCC): Status: RESOLVED | Noted: 2018-09-30 | Resolved: 2019-07-28

## 2019-07-28 PROBLEM — E87.1 HYPONATREMIA: Status: RESOLVED | Noted: 2018-01-05 | Resolved: 2019-07-28

## 2019-07-28 PROBLEM — S86.819A PATELLAR TENDON RUPTURE: Status: RESOLVED | Noted: 2018-11-27 | Resolved: 2019-07-28

## 2019-07-28 PROBLEM — K52.9 COLITIS: Status: RESOLVED | Noted: 2018-10-07 | Resolved: 2019-07-28

## 2019-07-28 PROBLEM — Z93.3 COLOSTOMY IN PLACE (HCC): Status: RESOLVED | Noted: 2019-07-17 | Resolved: 2019-07-28

## 2019-07-28 PROBLEM — E87.6 HYPOKALEMIA: Status: RESOLVED | Noted: 2018-01-05 | Resolved: 2019-07-28

## 2019-07-28 PROBLEM — G47.00 INSOMNIA: Status: RESOLVED | Noted: 2018-01-05 | Resolved: 2019-07-28

## 2019-07-28 PROBLEM — I82.4Z3 DVT, LOWER EXTREMITY, DISTAL, ACUTE, BILATERAL (HCC): Status: RESOLVED | Noted: 2018-07-03 | Resolved: 2019-07-28

## 2019-07-28 PROBLEM — A41.9 SEPSIS (HCC): Status: RESOLVED | Noted: 2018-10-01 | Resolved: 2019-07-28

## 2019-07-28 PROBLEM — K57.92 DIVERTICULITIS: Status: RESOLVED | Noted: 2018-09-26 | Resolved: 2019-07-28

## 2019-07-28 PROBLEM — R55 SYNCOPE: Status: RESOLVED | Noted: 2018-06-26 | Resolved: 2019-07-28

## 2019-07-28 PROBLEM — K63.1 PERFORATION BOWEL (HCC): Status: RESOLVED | Noted: 2018-09-26 | Resolved: 2019-07-28

## 2019-07-28 PROBLEM — M17.12 PRIMARY OSTEOARTHRITIS OF LEFT KNEE: Status: RESOLVED | Noted: 2018-12-19 | Resolved: 2019-07-28

## 2019-07-28 PROBLEM — E83.42 HYPOMAGNESEMIA: Status: RESOLVED | Noted: 2018-11-27 | Resolved: 2019-07-28

## 2019-07-28 PROBLEM — M17.11 PRIMARY OSTEOARTHRITIS OF RIGHT KNEE: Status: RESOLVED | Noted: 2019-07-04 | Resolved: 2019-07-28

## 2019-07-28 PROCEDURE — 6370000000 HC RX 637 (ALT 250 FOR IP): Performed by: INTERNAL MEDICINE

## 2019-07-28 PROCEDURE — 94760 N-INVAS EAR/PLS OXIMETRY 1: CPT

## 2019-07-28 PROCEDURE — 1200000000 HC SEMI PRIVATE

## 2019-07-28 PROCEDURE — G0378 HOSPITAL OBSERVATION PER HR: HCPCS

## 2019-07-28 PROCEDURE — 96376 TX/PRO/DX INJ SAME DRUG ADON: CPT

## 2019-07-28 PROCEDURE — 96366 THER/PROPH/DIAG IV INF ADDON: CPT

## 2019-07-28 PROCEDURE — 6360000002 HC RX W HCPCS: Performed by: INTERNAL MEDICINE

## 2019-07-28 PROCEDURE — 99222 1ST HOSP IP/OBS MODERATE 55: CPT | Performed by: SURGERY

## 2019-07-28 PROCEDURE — 96372 THER/PROPH/DIAG INJ SC/IM: CPT

## 2019-07-28 PROCEDURE — 2580000003 HC RX 258: Performed by: INTERNAL MEDICINE

## 2019-07-28 RX ORDER — CYCLOBENZAPRINE HCL 10 MG
10 TABLET ORAL 3 TIMES DAILY PRN
Status: DISCONTINUED | OUTPATIENT
Start: 2019-07-28 | End: 2019-07-31 | Stop reason: HOSPADM

## 2019-07-28 RX ORDER — HYDROCODONE BITARTRATE AND ACETAMINOPHEN 5; 325 MG/1; MG/1
2 TABLET ORAL EVERY 4 HOURS PRN
Status: DISCONTINUED | OUTPATIENT
Start: 2019-07-28 | End: 2019-07-31 | Stop reason: HOSPADM

## 2019-07-28 RX ADMIN — HYDROCODONE BITARTRATE AND ACETAMINOPHEN 1 TABLET: 5; 325 TABLET ORAL at 00:44

## 2019-07-28 RX ADMIN — HYDROCODONE BITARTRATE AND ACETAMINOPHEN 2 TABLET: 5; 325 TABLET ORAL at 19:08

## 2019-07-28 RX ADMIN — HYDROCODONE BITARTRATE AND ACETAMINOPHEN 2 TABLET: 5; 325 TABLET ORAL at 23:20

## 2019-07-28 RX ADMIN — AMPICILLIN SODIUM AND SULBACTAM SODIUM 3 G: 2; 1 INJECTION, POWDER, FOR SOLUTION INTRAMUSCULAR; INTRAVENOUS at 20:54

## 2019-07-28 RX ADMIN — DULOXETINE HYDROCHLORIDE 60 MG: 30 CAPSULE, DELAYED RELEASE ORAL at 20:54

## 2019-07-28 RX ADMIN — BUSPIRONE HYDROCHLORIDE 15 MG: 5 TABLET ORAL at 13:03

## 2019-07-28 RX ADMIN — CYCLOBENZAPRINE HYDROCHLORIDE 10 MG: 10 TABLET, FILM COATED ORAL at 15:13

## 2019-07-28 RX ADMIN — CYCLOBENZAPRINE HYDROCHLORIDE 10 MG: 10 TABLET, FILM COATED ORAL at 23:20

## 2019-07-28 RX ADMIN — BACLOFEN 10 MG: 10 TABLET ORAL at 07:33

## 2019-07-28 RX ADMIN — DULOXETINE HYDROCHLORIDE 60 MG: 30 CAPSULE, DELAYED RELEASE ORAL at 07:33

## 2019-07-28 RX ADMIN — NYSTATIN: 100000 CREAM TOPICAL at 07:33

## 2019-07-28 RX ADMIN — FERROUS SULFATE TAB 325 MG (65 MG ELEMENTAL FE) 325 MG: 325 (65 FE) TAB at 20:54

## 2019-07-28 RX ADMIN — FERROUS SULFATE TAB 325 MG (65 MG ELEMENTAL FE) 325 MG: 325 (65 FE) TAB at 07:34

## 2019-07-28 RX ADMIN — ENOXAPARIN SODIUM 40 MG: 40 INJECTION SUBCUTANEOUS at 07:34

## 2019-07-28 RX ADMIN — FUROSEMIDE 40 MG: 40 TABLET ORAL at 07:34

## 2019-07-28 RX ADMIN — BUSPIRONE HYDROCHLORIDE 15 MG: 5 TABLET ORAL at 20:54

## 2019-07-28 RX ADMIN — BUSPIRONE HYDROCHLORIDE 15 MG: 5 TABLET ORAL at 07:33

## 2019-07-28 RX ADMIN — SODIUM CHLORIDE: 9 INJECTION, SOLUTION INTRAVENOUS at 13:03

## 2019-07-28 RX ADMIN — PANTOPRAZOLE SODIUM 40 MG: 40 TABLET, DELAYED RELEASE ORAL at 05:19

## 2019-07-28 RX ADMIN — HYDROCODONE BITARTRATE AND ACETAMINOPHEN 1 TABLET: 5; 325 TABLET ORAL at 08:32

## 2019-07-28 RX ADMIN — POTASSIUM CHLORIDE 20 MEQ: 1500 TABLET, EXTENDED RELEASE ORAL at 07:33

## 2019-07-28 RX ADMIN — HYDROCODONE BITARTRATE AND ACETAMINOPHEN 1 TABLET: 5; 325 TABLET ORAL at 14:10

## 2019-07-28 RX ADMIN — AMPICILLIN SODIUM AND SULBACTAM SODIUM 1.5 G: 1; .5 INJECTION, POWDER, FOR SOLUTION INTRAMUSCULAR; INTRAVENOUS at 07:32

## 2019-07-28 RX ADMIN — AMPICILLIN SODIUM AND SULBACTAM SODIUM 3 G: 2; 1 INJECTION, POWDER, FOR SOLUTION INTRAMUSCULAR; INTRAVENOUS at 13:03

## 2019-07-28 RX ADMIN — SODIUM CHLORIDE: 9 INJECTION, SOLUTION INTRAVENOUS at 02:06

## 2019-07-28 RX ADMIN — NYSTATIN: 100000 CREAM TOPICAL at 23:20

## 2019-07-28 RX ADMIN — POTASSIUM CHLORIDE 20 MEQ: 1500 TABLET, EXTENDED RELEASE ORAL at 20:54

## 2019-07-28 RX ADMIN — AMPICILLIN SODIUM AND SULBACTAM SODIUM 1.5 G: 1; .5 INJECTION, POWDER, FOR SOLUTION INTRAMUSCULAR; INTRAVENOUS at 02:07

## 2019-07-28 RX ADMIN — BACLOFEN 10 MG: 10 TABLET ORAL at 13:04

## 2019-07-28 ASSESSMENT — PAIN DESCRIPTION - PAIN TYPE
TYPE: ACUTE PAIN
TYPE: ACUTE PAIN

## 2019-07-28 ASSESSMENT — PAIN SCALES - GENERAL
PAINLEVEL_OUTOF10: 10
PAINLEVEL_OUTOF10: 8
PAINLEVEL_OUTOF10: 9
PAINLEVEL_OUTOF10: 8
PAINLEVEL_OUTOF10: 8

## 2019-07-28 ASSESSMENT — PAIN DESCRIPTION - ORIENTATION
ORIENTATION: RIGHT;LEFT
ORIENTATION: LEFT

## 2019-07-28 ASSESSMENT — PAIN DESCRIPTION - LOCATION
LOCATION: LEG
LOCATION: FOOT

## 2019-07-28 NOTE — CONSULTS
[address]                     [facility], [address]                                  CONSULTATION    PATIENT NAME: Gisele Conn                :        1955  MED REC NO:   [id number]                          ROOM:       [id number]  ACCOUNT NO:   [de-identified]                           ADMIT DATE: 2019  PROVIDER:     Natividad Simon DPM    CONSULT DATE:  2019    REASON FOR CONSULTATION:  Foot and leg pain. HISTORY OF PRESENT ILLNESS:  This 43-year-old female who presented for  consult with pain in both feet and legs on both sides. He apparently  had some swelling and was admitted to the hospital at this time. No  previous history of trauma. No other prior treatment within the foot  and the ankle at this point. Approximately one week ago, she had a  bowel surgery. Apparently, it had gone fairly unremarkable. Over the  last few days, she has had some increasing pain mostly in the left feet  and ankle. She has been admitted through the emergency room and then I  was consulted at this time. PHYSICAL EXAMINATION:  VASCULAR:  Dorsalis pedis and posterior tibial pulses are nonpalpable. NEUROLOGIC:  Light touch and pinprick sensations are intact bilaterally. There is quite a bit of erythema and edema noted from the digits _____  level of the ankles and this is bilaterally. Severe pain with palpation  as well at this time. Good range of motion at the level of the joints,  although severely painful.    _____ did not really reveal specific type of fracture or bone infection  that is noted. Review of medications as well as her surgeries are in the charted at  this point. ASSESSMENT:  1. Erythema with edema. 2.  Possible peripheral vascular disease at this point. PLAN:  I had the nursing staff place an Unna boot from the toes to the  ankle up the legs. This was bilaterally.   A vascular consult as well to  rule out a peripheral

## 2019-07-28 NOTE — H&P
Peripheral vascular disease. 3.  Anemia of chronic disease. 4.  Hyponatremia. 5.  Insomnia. 6.  Hypokalemia. 7.  Crohn's disease. PLAN:  Get her admitted. Treat her with cautious IV hydration, IV  antibiotics, wound culture and sensitivity. Surgical consultation.         Osvaldo Mejia MD    D: 07/28/2019 0:02:48       T: 07/28/2019 1:34:48     [email or web address]  Job#: [id number]     Doc#: [id number]    CC:

## 2019-07-28 NOTE — PROGRESS NOTES
7:39 AM: Morning assessment completed, patient denies needs at this time, call light in reach, will continue to monitor. The care plan and education has been reviewed and mutually agreed upon with the patient. Educated patient on the potential for falls during their hospital stay. Reviewed current fall safety protocol. Reviewed indication for use of bed alarm. Patient verbalized understanding. Patient resting in bed. Denies needs at this time. Dressing to BLE in place. 2:13 PM: Patient crying out in pain, reports muscle spasms. Administered PRN norco and sent message to dr London Rashid for an increase in pain medication/ muscle relaxer. Awaiting response.     4:02 PM: flexeril given following new orders by dr London Rashid. Patient expresses relief.

## 2019-07-28 NOTE — PROGRESS NOTES
Patient Active Problem List   Diagnosis    Essential hypertension, benign    Atherosclerosis of native arteries of extremities with intermittent claudication, bilateral legs ([facility]    Anemia    [facility]'s [facility]    Hypomagnesemia    DVT (deep venous thrombosis) ([facility])    PAD (peripheral artery disease) ([facility])    Leg swelling    Venous insufficiency    Chronic venous hypertension (idiopathic) with inflammation of bilateral lower extremity    Foot infection   H&P dictated

## 2019-07-29 ENCOUNTER — TELEPHONE (OUTPATIENT)
Dept: SURGERY | Age: 64
End: 2019-07-29

## 2019-07-29 LAB
ANION GAP SERPL CALCULATED.3IONS-SCNC: 12 MMOL/L (ref 3–16)
BUN BLDV-MCNC: 7 MG/DL (ref 7–20)
CALCIUM SERPL-MCNC: 6.9 MG/DL (ref 8.3–10.6)
CHLORIDE BLD-SCNC: 98 MMOL/L (ref 99–110)
CO2: 25 MMOL/L (ref 21–32)
CREAT SERPL-MCNC: <0.5 MG/DL (ref 0.6–1.2)
GFR AFRICAN AMERICAN: >60
GFR NON-AFRICAN AMERICAN: >60
GLUCOSE BLD-MCNC: 90 MG/DL (ref 70–99)
GRAM STAIN RESULT: ABNORMAL
HCT VFR BLD CALC: 28 % (ref 36–48)
HEMOGLOBIN: 9.4 G/DL (ref 12–16)
MCH RBC QN AUTO: 31.4 PG (ref 26–34)
MCHC RBC AUTO-ENTMCNC: 33.6 G/DL (ref 31–36)
MCV RBC AUTO: 93.5 FL (ref 80–100)
PDW BLD-RTO: 15 % (ref 12.4–15.4)
PLATELET # BLD: 551 K/UL (ref 135–450)
PMV BLD AUTO: 6.6 FL (ref 5–10.5)
POTASSIUM SERPL-SCNC: 3.8 MMOL/L (ref 3.5–5.1)
RBC # BLD: 3 M/UL (ref 4–5.2)
SODIUM BLD-SCNC: 135 MMOL/L (ref 136–145)
WBC # BLD: 7.6 K/UL (ref 4–11)
WOUND/ABSCESS: ABNORMAL

## 2019-07-29 PROCEDURE — 6370000000 HC RX 637 (ALT 250 FOR IP): Performed by: INTERNAL MEDICINE

## 2019-07-29 PROCEDURE — 96376 TX/PRO/DX INJ SAME DRUG ADON: CPT

## 2019-07-29 PROCEDURE — APPSS30 APP SPLIT SHARED TIME 16-30 MINUTES: Performed by: NURSE PRACTITIONER

## 2019-07-29 PROCEDURE — 1200000000 HC SEMI PRIVATE

## 2019-07-29 PROCEDURE — 36415 COLL VENOUS BLD VENIPUNCTURE: CPT

## 2019-07-29 PROCEDURE — 6370000000 HC RX 637 (ALT 250 FOR IP): Performed by: PODIATRIST

## 2019-07-29 PROCEDURE — 96366 THER/PROPH/DIAG IV INF ADDON: CPT

## 2019-07-29 PROCEDURE — 96372 THER/PROPH/DIAG INJ SC/IM: CPT

## 2019-07-29 PROCEDURE — 85027 COMPLETE CBC AUTOMATED: CPT

## 2019-07-29 PROCEDURE — G0378 HOSPITAL OBSERVATION PER HR: HCPCS

## 2019-07-29 PROCEDURE — 6360000002 HC RX W HCPCS: Performed by: INTERNAL MEDICINE

## 2019-07-29 PROCEDURE — APPNB30 APP NON BILLABLE TIME 0-30 MINS: Performed by: NURSE PRACTITIONER

## 2019-07-29 PROCEDURE — 80048 BASIC METABOLIC PNL TOTAL CA: CPT

## 2019-07-29 PROCEDURE — 2580000003 HC RX 258: Performed by: INTERNAL MEDICINE

## 2019-07-29 RX ORDER — POTASSIUM CHLORIDE 7.45 MG/ML
10 INJECTION INTRAVENOUS PRN
Status: DISCONTINUED | OUTPATIENT
Start: 2019-07-29 | End: 2019-07-31 | Stop reason: HOSPADM

## 2019-07-29 RX ORDER — POTASSIUM CHLORIDE 20 MEQ/1
40 TABLET, EXTENDED RELEASE ORAL PRN
Status: DISCONTINUED | OUTPATIENT
Start: 2019-07-29 | End: 2019-07-31 | Stop reason: HOSPADM

## 2019-07-29 RX ORDER — 0.9 % SODIUM CHLORIDE 0.9 %
500 INTRAVENOUS SOLUTION INTRAVENOUS PRN
Status: DISCONTINUED | OUTPATIENT
Start: 2019-07-29 | End: 2019-07-31 | Stop reason: HOSPADM

## 2019-07-29 RX ORDER — OSTOMY ADHESIVE
2 STRIP MISCELLANEOUS ONCE
Status: COMPLETED | OUTPATIENT
Start: 2019-07-29 | End: 2019-07-29

## 2019-07-29 RX ORDER — HYDRALAZINE HYDROCHLORIDE 20 MG/ML
10 INJECTION INTRAMUSCULAR; INTRAVENOUS EVERY 6 HOURS PRN
Status: DISCONTINUED | OUTPATIENT
Start: 2019-07-29 | End: 2019-07-31 | Stop reason: HOSPADM

## 2019-07-29 RX ORDER — CALCIUM CARBONATE 200(500)MG
1000 TABLET,CHEWABLE ORAL 3 TIMES DAILY PRN
Status: DISCONTINUED | OUTPATIENT
Start: 2019-07-29 | End: 2019-07-31 | Stop reason: HOSPADM

## 2019-07-29 RX ADMIN — TRAMADOL HYDROCHLORIDE 50 MG: 50 TABLET, FILM COATED ORAL at 09:43

## 2019-07-29 RX ADMIN — Medication 2 EACH: at 11:28

## 2019-07-29 RX ADMIN — ANTACID TABLETS 1000 MG: 500 TABLET, CHEWABLE ORAL at 18:22

## 2019-07-29 RX ADMIN — BUSPIRONE HYDROCHLORIDE 15 MG: 5 TABLET ORAL at 09:43

## 2019-07-29 RX ADMIN — AMPICILLIN SODIUM AND SULBACTAM SODIUM 3 G: 2; 1 INJECTION, POWDER, FOR SOLUTION INTRAMUSCULAR; INTRAVENOUS at 06:45

## 2019-07-29 RX ADMIN — ATENOLOL 100 MG: 50 TABLET ORAL at 09:43

## 2019-07-29 RX ADMIN — HYDROCODONE BITARTRATE AND ACETAMINOPHEN 2 TABLET: 5; 325 TABLET ORAL at 14:10

## 2019-07-29 RX ADMIN — ZOLPIDEM TARTRATE 10 MG: 10 TABLET ORAL at 21:55

## 2019-07-29 RX ADMIN — CYCLOBENZAPRINE HYDROCHLORIDE 10 MG: 10 TABLET, FILM COATED ORAL at 14:10

## 2019-07-29 RX ADMIN — FERROUS SULFATE TAB 325 MG (65 MG ELEMENTAL FE) 325 MG: 325 (65 FE) TAB at 21:55

## 2019-07-29 RX ADMIN — HYDROCODONE BITARTRATE AND ACETAMINOPHEN 2 TABLET: 5; 325 TABLET ORAL at 06:42

## 2019-07-29 RX ADMIN — AMPICILLIN SODIUM AND SULBACTAM SODIUM 3 G: 2; 1 INJECTION, POWDER, FOR SOLUTION INTRAMUSCULAR; INTRAVENOUS at 02:23

## 2019-07-29 RX ADMIN — HYDROCODONE BITARTRATE AND ACETAMINOPHEN 2 TABLET: 5; 325 TABLET ORAL at 21:55

## 2019-07-29 RX ADMIN — ENOXAPARIN SODIUM 40 MG: 40 INJECTION SUBCUTANEOUS at 09:43

## 2019-07-29 RX ADMIN — POTASSIUM CHLORIDE 20 MEQ: 1500 TABLET, EXTENDED RELEASE ORAL at 09:43

## 2019-07-29 RX ADMIN — BUSPIRONE HYDROCHLORIDE 15 MG: 5 TABLET ORAL at 21:55

## 2019-07-29 RX ADMIN — AMPICILLIN SODIUM AND SULBACTAM SODIUM 3 G: 2; 1 INJECTION, POWDER, FOR SOLUTION INTRAMUSCULAR; INTRAVENOUS at 21:55

## 2019-07-29 RX ADMIN — FUROSEMIDE 40 MG: 40 TABLET ORAL at 09:43

## 2019-07-29 RX ADMIN — ZOLPIDEM TARTRATE 10 MG: 10 TABLET ORAL at 01:44

## 2019-07-29 RX ADMIN — DULOXETINE HYDROCHLORIDE 60 MG: 30 CAPSULE, DELAYED RELEASE ORAL at 21:55

## 2019-07-29 RX ADMIN — POTASSIUM CHLORIDE 20 MEQ: 1500 TABLET, EXTENDED RELEASE ORAL at 21:55

## 2019-07-29 RX ADMIN — SODIUM CHLORIDE: 9 INJECTION, SOLUTION INTRAVENOUS at 16:32

## 2019-07-29 RX ADMIN — TRAMADOL HYDROCHLORIDE 50 MG: 50 TABLET, FILM COATED ORAL at 01:44

## 2019-07-29 RX ADMIN — PANTOPRAZOLE SODIUM 40 MG: 40 TABLET, DELAYED RELEASE ORAL at 06:42

## 2019-07-29 RX ADMIN — BUSPIRONE HYDROCHLORIDE 15 MG: 5 TABLET ORAL at 14:10

## 2019-07-29 RX ADMIN — AMPICILLIN SODIUM AND SULBACTAM SODIUM 3 G: 2; 1 INJECTION, POWDER, FOR SOLUTION INTRAMUSCULAR; INTRAVENOUS at 14:11

## 2019-07-29 RX ADMIN — NYSTATIN: 100000 CREAM TOPICAL at 09:46

## 2019-07-29 RX ADMIN — FERROUS SULFATE TAB 325 MG (65 MG ELEMENTAL FE) 325 MG: 325 (65 FE) TAB at 09:43

## 2019-07-29 RX ADMIN — DULOXETINE HYDROCHLORIDE 60 MG: 30 CAPSULE, DELAYED RELEASE ORAL at 09:43

## 2019-07-29 ASSESSMENT — PAIN DESCRIPTION - PROGRESSION
CLINICAL_PROGRESSION: NOT CHANGED
CLINICAL_PROGRESSION: GRADUALLY IMPROVING

## 2019-07-29 ASSESSMENT — PAIN SCALES - GENERAL
PAINLEVEL_OUTOF10: 8
PAINLEVEL_OUTOF10: 6
PAINLEVEL_OUTOF10: 8
PAINLEVEL_OUTOF10: 8
PAINLEVEL_OUTOF10: 10
PAINLEVEL_OUTOF10: 9
PAINLEVEL_OUTOF10: 9

## 2019-07-29 ASSESSMENT — PAIN DESCRIPTION - LOCATION: LOCATION: FOOT

## 2019-07-29 ASSESSMENT — PAIN DESCRIPTION - ORIENTATION: ORIENTATION: LEFT

## 2019-07-29 ASSESSMENT — PAIN DESCRIPTION - PAIN TYPE: TYPE: ACUTE PAIN

## 2019-07-29 NOTE — PROGRESS NOTES
31. 3 (H) 11/27/2018        Imaging:    BLE venous duplex 6/18/19:  Conclusions        Summary        No evidence for deep or superficial vein thrombosis bilaterally        Chronic venous insufficiency bilaterally on the basis of both venous    valvular reflux and chronic recanalization effects to deep veins    bilaterally(right popliteal vein and left common femoral and femoral veins).    Reflux is as noted above        Right medial knee popliteal cyst 2.9 x 0.4 cm. 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 Continue wound care to be followed by wound care nurse and will benefit from wound clinic follow up after discharge. May ultimately require skin biopsy. Clinically patient does not appear to have arterial insufficiency with biphasic pedal doppler signals, so would hold off at this time for any further testing. Continue antibiotics. No further vascular testing at this time. Patient is stable from vascular standpoint. We will sign off for now, but please do not hesitate to contact us with any questions. Thank you for the consultation. D/w Dr. Pattie Bowie.     Patient educated on plan of care

## 2019-07-29 NOTE — CARE COORDINATION
Discharge planning:       Chart reviewed. Noted patient currently on IV antibiotic. Requested IV benefits check.  Patient will continue to be followed for support and discharge planning.

## 2019-07-29 NOTE — PROGRESS NOTES
Department of Internal Medicine  General Internal Medicine   Progress Note      SUBJECTIVE: feeling less pain  Denies fever     History obtained from chart review and the patient  General ROS: positive for  - fatigue and malaise  negative for - chills, fever or night sweats  Psychological ROS: negative  Ophthalmic ROS: negative  Respiratory ROS: no cough, shortness of breath, or wheezing  Cardiovascular ROS: no chest pain or dyspnea on exertion  Gastrointestinal ROS: positive for -  Heartburn and dyspepsia   negative for - hematemesis, melena or swallowing difficulty/pain  Genito-Urinary ROS: some  dysuria,  No trouble voiding, or hematuria  Musculoskeletal ROS: chronic pain   Neurological ROS: no TIA or stroke symptoms    OBJECTIVE      Medications      Current Facility-Administered Medications: ampicillin-sulbactam (UNASYN) 3 g ivpb minibag, 3 g, Intravenous, Q6H  cyclobenzaprine (FLEXERIL) tablet 10 mg, 10 mg, Oral, TID PRN  HYDROcodone-acetaminophen (NORCO) 5-325 MG per tablet 2 tablet, 2 tablet, Oral, Q4H PRN  aspirin-acetaminophen-caffeine (EXCEDRIN MIGRAINE) per tablet 1 tablet, 1 tablet, Oral, Q6H PRN  atenolol (TENORMIN) tablet 100 mg, 100 mg, Oral, Daily  busPIRone (BUSPAR) tablet 15 mg, 15 mg, Oral, TID  DULoxetine (CYMBALTA) extended release capsule 60 mg, 60 mg, Oral, BID  ferrous sulfate tablet 325 mg, 325 mg, Oral, BID  fluticasone (FLONASE) 50 MCG/ACT nasal spray 1 spray, 1 spray, Nasal, Daily  furosemide (LASIX) tablet 40 mg, 40 mg, Oral, Daily  lidocaine (XYLOCAINE) 5 % ointment, , Topical, PRN  nystatin (MYCOSTATIN) cream, , Topical, BID  pantoprazole (PROTONIX) tablet 40 mg, 40 mg, Oral, QAM AC  ondansetron (ZOFRAN-ODT) disintegrating tablet 4 mg, 4 mg, Oral, Q6H PRN  potassium chloride (KLOR-CON M) extended release tablet 20 mEq, 20 mEq, Oral, BID  traMADol (ULTRAM) tablet 50 mg, 50 mg, Oral, Q6H PRN  zolpidem (AMBIEN) tablet 10 mg, 10 mg, Oral, Nightly PRN  0.9 % sodium chloride infusion, , Intravenous, Continuous  enoxaparin (LOVENOX) injection 40 mg, 40 mg, Subcutaneous, Daily    Physical      VITALS:  BP (!) 138/91   Pulse 63   Temp 97.9 °F (36.6 °C) (Oral)   Resp 16   Ht 5' 6\" (1.676 m)   Wt 205 lb (93 kg)   SpO2 94%   BMI 33.09 kg/m²   TEMPERATURE:  Current - Temp: 97.9 °F (36.6 °C);  Max - Temp  Av.8 °F (36.6 °C)  Min: 97.5 °F (36.4 °C)  Max: 98 °F (36.7 °C)  RESPIRATIONS RANGE: Resp  Av.2  Min: 16  Max: 17  PULSE RANGE: Pulse  Av.8  Min: 63  Max: 104  BLOOD PRESSURE RANGE:  Systolic (10XOI), AIV:462 , Min:116 , FZB:656   ; Diastolic (18TKH), QUN:41, Min:75, Max:91    PULSE OXIMETRY RANGE: SpO2  Av.3 %  Min: 94 %  Max: 98 %  24HR INTAKE/OUTPUT:      Intake/Output Summary (Last 24 hours) at 2019 1647  Last data filed at 2019 1350  Gross per 24 hour   Intake 1929 ml   Output --   Net 1929 ml     CONSTITUTIONAL:  awake, alert, cooperative, no apparent distress, and appears stated age  EYES:  Unremarkable   NECK:  No JVD  and supple, symmetrical, trachea midline  BACK:  Unremarkable  and symmetric  LUNGS:  No increased work of breathing, good air exchange, clear to auscultation bilaterally, no crackles or wheezing  CARDIOVASCULAR:  normal apical pulses, regular rate and rhythm, normal S1 and S2 and no S3  ABDOMEN:  Soft BS hypoactive , no distention , has colostomy in left sigmoid region   MUSCULOSKELETAL:  Left leg edematous  Has blisters  And is tender to touch   NEUROLOGIC:  No acute focal deficit   SKIN:  Warm and dry  and no bruising or bleeding    Data      No results found for: PHART, PO2ART, ZMB7TMV, LFH4PHZ, BEART, I8VYINJJ    Lab Results   Component Value Date     2019    K 3.5 2019    K 3.5 2018    CL 97 2019    CO2 18 2019    BUN 6 2019    CREATININE 0.5 2019    GLUCOSE 99 2019    CALCIUM 7.7 2019     Lab Results   Component Value Date    WBC 8.8 2019    HGB 10.0 2019    HCT 29.4 07/27/2019    MCV 92.0 07/27/2019     07/27/2019         Lab Results   Component Value Date    INR 2.75 (H) 11/27/2018    PROTIME 31.3 (H) 11/27/2018       ASSESSMENT AND PLAN     Active Hospital Problems    Diagnosis Date Noted    Cellulitis of left foot [L03.116]     Foot infection [L08.9] 07/27/2019    Chronic venous hypertension (idiopathic) with inflammation of bilateral lower extremity [I87.323] 05/17/2019    Venous insufficiency [I87.2] 05/17/2019    Leg swelling [M79.89] 05/17/2019    [facility] [I73.9] 05/17/2019    [facility] thrombosis) [facility] [I82.409] 07/03/2018    Hypomagnesemia [E83.42] 01/08/2018    Crohn's colitis ([facility] [K50.10] 01/06/2018    Anemia [D64.9] 01/05/2018    Atherosclerosis of native arteries of extremities with intermittent claudication, bilateral legs ([facility] [I70.213] 01/19/2016    Essential hypertension, benign [I10] 09/13/2015       Ct IV Unasyn ,   ct DVtprophylaxis    Vascular surgery consult

## 2019-07-29 NOTE — DISCHARGE INSTR - COC
Continuity of Care Form    Patient Name: Selma Boyer   :  1955  MRN:  [id number]    Admit date:  2019  Discharge date:  19    Code Status Order: Full Code   Advance Directives:   [facility] Documentation     Date/Time Healthcare Directive Type of Healthcare Directive Copy in [address] Agent's Name Healthcare Agent's Phone Number    19 1346  No, patient does not have an advance directive for healthcare treatment  Durable power of  for health care  No, copy requested from family  Healthcare power of   Leonela Dickerson --          Admitting Physician:  Crys Ac MD  PCP: Sharon Mcallister MD    Discharging Nurse: Kolton Rousseau RN  [address] Unit/Room#: 9KT-2788/4246-39  Discharging Unit Phone Number: [phone]    Emergency Contact:   Extended Emergency Contact Information  Primary Emergency Contact: Omari Keller  Address: [address] Phone: [phone]  Relation: Spouse  Secondary Emergency Contact: Gretta Keller  Address:                     Home Phone: [phone]  Mobile Phone: [phone]  Relation: Child    Past Surgical History:  Past Surgical History:   Procedure Laterality Date    ABDOMEN SURGERY      ABDOMINAL ADHESION SURGERY  2018    BLADDER SUSPENSION      COLONOSCOPY      COLONOSCOPY  9/14/15    sigmoid colon biopsy     COLONOSCOPY  2018    COLONOSCOPY  2019    COLONOSCOPY, POSSIBLE BIOPSY, POSSIBLE POLYPECTOMY    COLONOSCOPY N/A 2019    COLONOSCOPY WITH BIOPSY performed by Benito Landeros MD at [address] [facility] 2019    COLONOSCOPY DIAGNOSTIC/STOMA performed by Benito Landeros MD at [address] [place] [address] 10/8/2018    EXPLORATORY LAPAROTOMY WITH COLOSTOMY performed by Koki Elizondo MD at [address] [facility] 14    excision plantar left hallux    FOOT SURGERY  6/3/15    PLANTAR PLATE REPAIR BILATERAL, ARTHROTOMY MPJ 2ND BILATERAL    FOOT SURGERY Left 08/05/2002    Excision second metatarsal head left    FRACTURE SURGERY      rt hip    HAND CARPECTOMY Bilateral     HEEL SPUR SURGERY      HERNIA REPAIR      HYSTERECTOMY      bladder surgery    JOINT REPLACEMENT      rt hip, L shoulder replacement 11/2014    KNEE SURGERY Bilateral     arthrosvopic    LEG SURGERY Right     ID SECD CLOS SURG WND EXTEN/COMPLIC N/A 90/74/[id number]    SECONDARY WOUND CLOSURE OF ABDOMINAL WOUND performed by Anahy Martin MD at [address] N/A 7/17/2019    FLEXIBLE SIGMOIDOSCOPY performed by Anahy Martin MD at [address]  01/15/2018    with biopsies    SMALL INTESTINE SURGERY N/A 7/17/2019    COLOSTOMY CLOSURE WITH COLORECTAL ANASTOMOSIS performed by Anahy Martin MD at [address] [facility]  6/14/13    and colonsocopy with antral erosion biopsies       Immunization History:   Immunization History   Administered Date(s) Administered    PPD Test 01/16/2018       Active Problems:  Patient Active Problem List   Diagnosis Code    Essential hypertension, benign I10    Atherosclerosis of native arteries of extremities with intermittent claudication, bilateral legs ([facility]) I70.213    Anemia D64.9    Crohn's colitis ([facility] K50.10    Hypomagnesemia E83.42    DVT (deep venous thrombosis) ([facility]) I82.409    PAD (peripheral artery disease) ([facility]) I73.9    Leg swelling M79.89    Venous insufficiency I87.2    Chronic venous hypertension (idiopathic) with inflammation of bilateral lower extremity I87.323    Foot infection L08.9    Cellulitis of left foot L03.116       Isolation/Infection:   Isolation          Contact        Patient Infection Status     Infection Onset Added Last Indicated Last Indicated By Review Planned Expiration Resolved Resolved By VRE  10/12/18 10/12/18 Sindy Varela RN        10/8/18; abd culture    MRSA  06/08/18 06/08/18 Sindy Varela RN        6/5/18; urine          Nurse Assessment:  Last Vital Signs: BP (!) 116/90   Pulse 104   Temp 98 °F (36.7 °C) (Oral)   Resp 16   Ht 5' 6\" (1.676 m)   Wt 205 lb (93 kg)   SpO2 97%   BMI 33.09 kg/m²     Last documented pain score (0-10 scale): Pain Level: 6  Last Weight:   Wt Readings from Last 1 Encounters:   07/27/19 205 lb (93 kg)     Mental Status:  oriented    IV Access:  none    Nursing Mobility/ADLs:  Walking   Assisted  Transfer  Assisted  Bathing  Assisted  Dressing  Assisted  Toileting  Assisted  Feeding  Independent  Med Admin  Assisted  Med Delivery   none    Wound Care Documentation and Therapy:  Wound 07/27/19 Foot Anterior;Right (Active)   Wound Other 7/27/2019  8:50 PM   Dressing Status Clean;Dry; Intact 7/29/2019  9:40 AM   Dressing/Treatment Other (comment) 7/29/2019  9:40 AM   Wound Assessment NISHANT 7/29/2019  9:40 AM   Drainage Amount None 7/29/2019  9:40 AM   Odor None 7/29/2019  9:40 AM   Shweta-wound Assessment NISHANT 7/29/2019  9:40 AM   Number of days: 1        Elimination:  Continence:   · Bowel: Yes  · Bladder: Yes  Urinary Catheter: None   Colostomy/Ileostomy/Ileal Conduit: No       Date of Last BM: 7/31/19    Intake/Output Summary (Last 24 hours) at 7/29/2019 0959  Last data filed at 7/28/2019 2300  Gross per 24 hour   Intake 2249 ml   Output --   Net 2249 ml     I/O last 3 completed shifts:   In: 2249 [P.O.:360; I.V.:8359]  Out: -     Safety Concerns:     None    Impairments/Disabilities:      None    Nutrition Therapy:  Current Nutrition Therapy:   - Oral Diet:  General    Routes of Feeding: Oral  Liquids: No Restrictions  Daily Fluid Restriction: no  Last Modified Barium Swallow with Video (Video Swallowing Test): not done    Treatments at the Time of Hospital Discharge:   Respiratory Treatments: ***  Oxygen Therapy:  is not on home oxygen

## 2019-07-29 NOTE — PROGRESS NOTES
(AMBIEN) tablet 10 mg, 10 mg, Oral, Nightly PRN  0.9 % sodium chloride infusion, , Intravenous, Continuous  enoxaparin (LOVENOX) injection 40 mg, 40 mg, Subcutaneous, Daily    Physical      VITALS:  BP (!) 138/91   Pulse 63   Temp 97.9 °F (36.6 °C) (Oral)   Resp 16   Ht 5' 6\" (1.676 m)   Wt 205 lb (93 kg)   SpO2 94%   BMI 33.09 kg/m²   TEMPERATURE:  Current - Temp: 97.9 °F (36.6 °C);  Max - Temp  Av.8 °F (36.6 °C)  Min: 97.5 °F (36.4 °C)  Max: 98 °F (36.7 °C)  RESPIRATIONS RANGE: Resp  Av.2  Min: 16  Max: 17  PULSE RANGE: Pulse  Av.8  Min: 63  Max: 104  BLOOD PRESSURE RANGE:  Systolic (25RUR), WAP:466 , Min:116 , IUR:476   ; Diastolic (84PCM), IQT:85, Min:75, Max:91    PULSE OXIMETRY RANGE: SpO2  Av.3 %  Min: 94 %  Max: 98 %  24HR INTAKE/OUTPUT:      Intake/Output Summary (Last 24 hours) at 2019 1744  Last data filed at 2019 1350  Gross per 24 hour   Intake 1929 ml   Output --   Net 1929 ml     CONSTITUTIONAL:  awake, alert, cooperative, no apparent distress, and appears stated age  EYES:  Unremarkable   NECK:  No JVD  and supple, symmetrical, trachea midline  BACK:  Unremarkable  and symmetric  LUNGS:  No increased work of breathing, good air exchange, clear to auscultation bilaterally, no crackles or wheezing  CARDIOVASCULAR:  normal apical pulses, regular rate and rhythm, normal S1 and S2 and no S3  ABDOMEN:  Soft BS hypoactive , no distention , has colostomy in left sigmoid region   MUSCULOSKELETAL:  Left leg edematous  Has blisters  And is tender to touch   NEUROLOGIC:  No acute focal deficit   SKIN:  Warm and dry  and no bruising or bleeding    Data      No results found for: PHART, PO2ART, ZIS7APU, DWG5TJO, BEART, O0JBLHLC    Lab Results   Component Value Date     2019    K 3.5 2019    K 3.5 2018    CL 97 2019    CO2 18 2019    BUN 6 2019    CREATININE 0.5 2019    GLUCOSE 99 2019    CALCIUM 7.7 2019     Lab Results Component Value Date    WBC 7.6 07/29/2019    HGB 9.4 07/29/2019    HCT 28.0 07/29/2019    MCV 93.5 07/29/2019     07/29/2019         Lab Results   Component Value Date    INR 2.75 (H) 11/27/2018    PROTIME 31.3 (H) 11/27/2018       ASSESSMENT AND PLAN     Active Hospital Problems    Diagnosis Date Noted    Cellulitis of left foot [L03.116]     Foot infection [L08.9] 07/27/2019    Chronic venous hypertension (idiopathic) with inflammation of bilateral lower extremity [I87.323] 05/17/2019    Venous insufficiency [I87.2] 05/17/2019    Leg swelling [M79.89] 05/17/2019    [facility] [I73.9] 05/17/2019    DVT (deep venous thrombosis) ([facility]) [I82.409] 07/03/2018    Hypomagnesemia [E83.42] 01/08/2018    Crohn's colitis ([facility] [K50.10] 01/06/2018    Anemia [D64.9] 01/05/2018    Atherosclerosis of native arteries of extremities with intermittent claudication, bilateral legs ([facility] [I70.213] 01/19/2016    Essential hypertension, benign [I10] 09/13/2015       Ct IV  Antibiotics    ct DVtprophylaxis    Vascular surgery consult  And podiatry consult on the case

## 2019-07-29 NOTE — CARE COORDINATION
to Encounter   Medication Sig Dispense Refill    ferrous sulfate 325 (65 Fe) MG tablet Take 325 mg by mouth 2 times daily       nystatin (MYCOSTATIN) 255480 UNIT/GM cream APPLY TO THE AFFECTED AREA LIGHTLY 2 TIMES A DAY UNTIL CLEAR AND AS NEEDED **MAY REFILL** only as needed      omeprazole (PRILOSEC) 20 MG delayed release capsule Take 20 mg by mouth Daily       Skin Protectants, Misc. (LIP BALM) OINT ointment Apply 1 g topically daily      furosemide (LASIX) 40 MG tablet Take 1 tablet by mouth daily 60 tablet 3    baclofen (LIORESAL) 10 MG tablet Take 1 tablet by mouth 3 times daily 90 tablet 1    atenolol (TENORMIN) 100 MG tablet Take 1 tablet by mouth daily 30 tablet 3    ondansetron (ZOFRAN ODT) 4 MG disintegrating tablet Take 1 tablet by mouth every 6 hours as needed for Nausea or Vomiting 20 tablet 0    aspirin-acetaminophen-caffeine (EXCEDRIN MIGRAINE) 250-250-65 MG per tablet Take 1 tablet by mouth every 6 hours as needed for Headaches      fluticasone (FLONASE) 50 MCG/ACT nasal spray 1 spray by Nasal route daily (Patient taking differently: 1 spray by Nasal route as needed ) 1 Bottle 3    DULoxetine (CYMBALTA) 60 MG extended release capsule Take 60 mg by mouth 2 times daily       lidocaine (XYLOCAINE) 5 % ointment Apply topically as needed Apply topically as needed to feet      zolpidem (AMBIEN) 10 MG tablet Take 10 mg by mouth nightly as needed for Sleep .  potassium chloride SA (K-DUR;KLOR-CON M) 20 MEQ tablet Take 1 tablet by mouth 2 times daily.  60 tablet 3    busPIRone (BUSPAR) 15 MG tablet Take 15 mg by mouth 3 times daily          Objective    BP (!) 116/90   Pulse 104   Temp 98 °F (36.7 °C) (Oral)   Resp 16   Ht 5' 6\" (1.676 m)   Wt 205 lb (93 kg)   SpO2 97%   BMI 33.09 kg/m²     LABS:  WBC:    Lab Results   Component Value Date    WBC 8.8 07/27/2019     H/H:    Lab Results   Component Value Date    HGB 10.0 07/27/2019    HCT 29.4 07/27/2019     PTT:    Lab Results Component Value Date    APTT 28.9 10/16/2018   [APTT}  PT/INR:    Lab Results   Component Value Date    PROTIME 31.3 11/27/2018    INR 2.75 11/27/2018     HgBA1c:  No results found for: LABA1C    Assessment   Nelson Risk Score: Nelson Scale Score: 19    Patient Active Problem List   Diagnosis Code    Essential hypertension, benign I10    Atherosclerosis of native arteries of extremities with intermittent claudication, bilateral legs ([facility]) I70.213    Anemia D64.9    Crohn's colitis ([facility] K50.10    Hypomagnesemia E83.42    DVT (deep venous thrombosis) ([facility]) I82.409    PAD (peripheral artery disease) ([facility]) I73.9    Leg swelling M79.89    Venous insufficiency I87.2    Chronic venous hypertension (idiopathic) with inflammation of bilateral lower extremity I87.323    Foot infection L08.9    Cellulitis of left foot L03. 116       Measurements:        Wound 07/27/19 Foot Anterior;Right (Active)   Wound Image   7/29/2019 12:16 PM   Wound Other 7/29/2019 12:16 PM   Dressing Status Clean;Dry; Intact 7/29/2019 12:16 PM   Dressing Changed Changed/New 7/29/2019 12:16 PM   Dressing/Treatment Pharmaceutical agent (see MAR) 7/29/2019 12:16 PM   Wound Cleansed Other (Comment) 7/29/2019 12:16 PM   Dressing Change Due 08/05/19 7/29/2019 12:16 PM   Wound Assessment Red;Edema;Painful 7/29/2019 12:16 PM   Drainage Amount None 7/29/2019 12:16 PM   Odor None 7/29/2019 12:16 PM   Shweta-wound Assessment Clean;Dry 7/29/2019 12:16 PM   Number of days: 1           Wound 07/29/19 Toe (Comment  which one) Anterior; Left left great toe (Active)   Wound Image   7/29/2019 12:17 PM   Wound Other 7/29/2019 12:17 PM   Dressing Status Dry 7/29/2019 12:17 PM   Dressing Changed Changed/New 7/29/2019 12:17 PM   Dressing/Treatment Pharmaceutical agent (see MAR) 7/29/2019 12:17 PM   Wound Cleansed Rinsed/Irrigated with saline 7/29/2019 12:17 PM   Dressing Change Due 08/05/19 7/29/2019 12:17 PM   Wound Length (cm) 0.3 cm 7/29/2019 12:17 PM   Wound Width (cm) 3 cm 7/29/2019 12:17 PM   Wound Depth (cm) 0.1 cm 7/29/2019 12:17 PM   Wound Surface Area (cm^2) 0.9 cm^2 7/29/2019 12:17 PM   Wound Volume (cm^3) 0.09 cm^3 7/29/2019 12:17 PM   Drainage Amount None 7/29/2019 12:17 PM   Odor None 7/29/2019 12:17 PM   Margins Defined edges 7/29/2019 12:17 PM   Shweta-wound Assessment Red;Painful 7/29/2019 12:17 PM   Non-staged Wound Description Partial thickness 7/29/2019 12:17 PM   Red%Wound Bed 100 7/29/2019 12:17 PM   Culture Taken No 7/29/2019 12:17 PM   Number of days: 0           Wound 07/29/19 Foot Anterior; Left (Active)   Wound Image   7/29/2019 12:17 PM   Wound Other 7/29/2019 12:17 PM   Dressing Changed Changed/New 7/29/2019 12:17 PM   Dressing/Treatment Pharmaceutical agent (see MAR) 7/29/2019 12:17 PM   Wound Cleansed Other (Comment) 7/29/2019 12:17 PM   Wound Length (cm) 3 cm 7/29/2019 12:17 PM   Wound Width (cm) 3 cm 7/29/2019 12:17 PM   Wound Depth (cm) 0.1 cm 7/29/2019 12:17 PM   Wound Surface Area (cm^2) 9 cm^2 7/29/2019 12:17 PM   Wound Volume (cm^3) 0.9 cm^3 7/29/2019 12:17 PM   Wound Assessment Clean;Pink 7/29/2019 12:17 PM   Drainage Amount None 7/29/2019 12:17 PM   Odor None 7/29/2019 12:17 PM   Margins Defined edges 7/29/2019 12:17 PM   Shweta-wound Assessment Red;Painful;Edema 7/29/2019 12:17 PM   Wayne%Wound Bed 100 7/29/2019 12:17 PM   Number of days: 0       Wound 07/29/19 Heel Right;Posterior (Active)   Wound Other 7/29/2019 12:17 PM   Dressing Status Clean;Dry 7/29/2019 12:17 PM   Dressing Changed Changed/New 7/29/2019 12:17 PM   Dressing/Treatment ABD;Pharmaceutical agent (see MAR) 7/29/2019 12:17 PM   Wound Cleansed Other (Comment) 7/29/2019 12:17 PM   Dressing Change Due 08/05/19 7/29/2019 12:17 PM   Wound Length (cm) 0.5 cm 7/29/2019 12:17 PM   Wound Width (cm) 0.1 cm 7/29/2019 12:17 PM   Wound Depth (cm) 0.1 cm 7/29/2019 12:17 PM   Wound Surface Area (cm^2) 0.05 cm^2 7/29/2019 12:17 PM   Wound Volume (cm^3) 0 cm^3 7/29/2019 12:17 PM   Wound

## 2019-07-29 NOTE — TELEPHONE ENCOUNTER
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 She was scheduled to come into the office tomorrow for a post op. Do you want to go see her while she is in house?

## 2019-07-30 LAB
ANION GAP SERPL CALCULATED.3IONS-SCNC: 13 MMOL/L (ref 3–16)
BASOPHILS ABSOLUTE: 0.1 K/UL (ref 0–0.2)
BASOPHILS RELATIVE PERCENT: 0.8 %
BUN BLDV-MCNC: 4 MG/DL (ref 7–20)
CALCIUM SERPL-MCNC: 6.6 MG/DL (ref 8.3–10.6)
CHLORIDE BLD-SCNC: 103 MMOL/L (ref 99–110)
CO2: 24 MMOL/L (ref 21–32)
CREAT SERPL-MCNC: <0.5 MG/DL (ref 0.6–1.2)
EOSINOPHILS ABSOLUTE: 0.2 K/UL (ref 0–0.6)
EOSINOPHILS RELATIVE PERCENT: 2.7 %
GFR AFRICAN AMERICAN: >60
GFR NON-AFRICAN AMERICAN: >60
GLUCOSE BLD-MCNC: 92 MG/DL (ref 70–99)
HCT VFR BLD CALC: 26.4 % (ref 36–48)
HEMOGLOBIN: 8.7 G/DL (ref 12–16)
LYMPHOCYTES ABSOLUTE: 1.6 K/UL (ref 1–5.1)
LYMPHOCYTES RELATIVE PERCENT: 21.6 %
MCH RBC QN AUTO: 30.9 PG (ref 26–34)
MCHC RBC AUTO-ENTMCNC: 33 G/DL (ref 31–36)
MCV RBC AUTO: 93.7 FL (ref 80–100)
MONOCYTES ABSOLUTE: 0.5 K/UL (ref 0–1.3)
MONOCYTES RELATIVE PERCENT: 6.6 %
NEUTROPHILS ABSOLUTE: 5 K/UL (ref 1.7–7.7)
NEUTROPHILS RELATIVE PERCENT: 68.3 %
PDW BLD-RTO: 14.8 % (ref 12.4–15.4)
PLATELET # BLD: 481 K/UL (ref 135–450)
PMV BLD AUTO: 6.6 FL (ref 5–10.5)
POTASSIUM SERPL-SCNC: 3.3 MMOL/L (ref 3.5–5.1)
RBC # BLD: 2.82 M/UL (ref 4–5.2)
SODIUM BLD-SCNC: 140 MMOL/L (ref 136–145)
WBC # BLD: 7.4 K/UL (ref 4–11)

## 2019-07-30 PROCEDURE — 2580000003 HC RX 258: Performed by: INTERNAL MEDICINE

## 2019-07-30 PROCEDURE — 1200000000 HC SEMI PRIVATE

## 2019-07-30 PROCEDURE — G0378 HOSPITAL OBSERVATION PER HR: HCPCS

## 2019-07-30 PROCEDURE — 96376 TX/PRO/DX INJ SAME DRUG ADON: CPT

## 2019-07-30 PROCEDURE — 85025 COMPLETE CBC W/AUTO DIFF WBC: CPT

## 2019-07-30 PROCEDURE — 94760 N-INVAS EAR/PLS OXIMETRY 1: CPT

## 2019-07-30 PROCEDURE — 36415 COLL VENOUS BLD VENIPUNCTURE: CPT

## 2019-07-30 PROCEDURE — 93971 EXTREMITY STUDY: CPT

## 2019-07-30 PROCEDURE — 96366 THER/PROPH/DIAG IV INF ADDON: CPT

## 2019-07-30 PROCEDURE — 6360000002 HC RX W HCPCS: Performed by: INTERNAL MEDICINE

## 2019-07-30 PROCEDURE — 96372 THER/PROPH/DIAG INJ SC/IM: CPT

## 2019-07-30 PROCEDURE — 6370000000 HC RX 637 (ALT 250 FOR IP): Performed by: INTERNAL MEDICINE

## 2019-07-30 PROCEDURE — 80048 BASIC METABOLIC PNL TOTAL CA: CPT

## 2019-07-30 RX ORDER — OSTOMY ADHESIVE
1 STRIP MISCELLANEOUS ONCE
Status: DISCONTINUED | OUTPATIENT
Start: 2019-07-30 | End: 2019-07-31 | Stop reason: HOSPADM

## 2019-07-30 RX ADMIN — BUSPIRONE HYDROCHLORIDE 15 MG: 5 TABLET ORAL at 20:11

## 2019-07-30 RX ADMIN — CYCLOBENZAPRINE HYDROCHLORIDE 10 MG: 10 TABLET, FILM COATED ORAL at 23:46

## 2019-07-30 RX ADMIN — NYSTATIN: 100000 CREAM TOPICAL at 09:59

## 2019-07-30 RX ADMIN — HYDROCODONE BITARTRATE AND ACETAMINOPHEN 2 TABLET: 5; 325 TABLET ORAL at 17:52

## 2019-07-30 RX ADMIN — DULOXETINE HYDROCHLORIDE 60 MG: 30 CAPSULE, DELAYED RELEASE ORAL at 20:11

## 2019-07-30 RX ADMIN — POTASSIUM CHLORIDE 20 MEQ: 1500 TABLET, EXTENDED RELEASE ORAL at 09:55

## 2019-07-30 RX ADMIN — ZOLPIDEM TARTRATE 10 MG: 10 TABLET ORAL at 23:46

## 2019-07-30 RX ADMIN — AMPICILLIN SODIUM AND SULBACTAM SODIUM 3 G: 2; 1 INJECTION, POWDER, FOR SOLUTION INTRAMUSCULAR; INTRAVENOUS at 06:29

## 2019-07-30 RX ADMIN — AMPICILLIN SODIUM AND SULBACTAM SODIUM 3 G: 2; 1 INJECTION, POWDER, FOR SOLUTION INTRAMUSCULAR; INTRAVENOUS at 19:36

## 2019-07-30 RX ADMIN — AMPICILLIN SODIUM AND SULBACTAM SODIUM 3 G: 2; 1 INJECTION, POWDER, FOR SOLUTION INTRAMUSCULAR; INTRAVENOUS at 01:40

## 2019-07-30 RX ADMIN — HYDROCODONE BITARTRATE AND ACETAMINOPHEN 2 TABLET: 5; 325 TABLET ORAL at 03:57

## 2019-07-30 RX ADMIN — FUROSEMIDE 40 MG: 40 TABLET ORAL at 09:54

## 2019-07-30 RX ADMIN — FLUTICASONE PROPIONATE 1 SPRAY: 50 SPRAY, METERED NASAL at 04:02

## 2019-07-30 RX ADMIN — ENOXAPARIN SODIUM 40 MG: 40 INJECTION SUBCUTANEOUS at 10:02

## 2019-07-30 RX ADMIN — FERROUS SULFATE TAB 325 MG (65 MG ELEMENTAL FE) 325 MG: 325 (65 FE) TAB at 09:54

## 2019-07-30 RX ADMIN — CYCLOBENZAPRINE HYDROCHLORIDE 10 MG: 10 TABLET, FILM COATED ORAL at 01:40

## 2019-07-30 RX ADMIN — PANTOPRAZOLE SODIUM 40 MG: 40 TABLET, DELAYED RELEASE ORAL at 06:29

## 2019-07-30 RX ADMIN — ATENOLOL 100 MG: 50 TABLET ORAL at 09:54

## 2019-07-30 RX ADMIN — FERROUS SULFATE TAB 325 MG (65 MG ELEMENTAL FE) 325 MG: 325 (65 FE) TAB at 20:12

## 2019-07-30 RX ADMIN — BUSPIRONE HYDROCHLORIDE 15 MG: 5 TABLET ORAL at 09:55

## 2019-07-30 RX ADMIN — NYSTATIN: 100000 CREAM TOPICAL at 20:13

## 2019-07-30 RX ADMIN — HYDROCODONE BITARTRATE AND ACETAMINOPHEN 2 TABLET: 5; 325 TABLET ORAL at 23:46

## 2019-07-30 RX ADMIN — FLUTICASONE PROPIONATE 1 SPRAY: 50 SPRAY, METERED NASAL at 09:59

## 2019-07-30 RX ADMIN — DULOXETINE HYDROCHLORIDE 60 MG: 30 CAPSULE, DELAYED RELEASE ORAL at 09:54

## 2019-07-30 RX ADMIN — AMPICILLIN SODIUM AND SULBACTAM SODIUM 3 G: 2; 1 INJECTION, POWDER, FOR SOLUTION INTRAMUSCULAR; INTRAVENOUS at 13:34

## 2019-07-30 RX ADMIN — BUSPIRONE HYDROCHLORIDE 15 MG: 5 TABLET ORAL at 13:34

## 2019-07-30 RX ADMIN — POTASSIUM CHLORIDE 20 MEQ: 1500 TABLET, EXTENDED RELEASE ORAL at 20:11

## 2019-07-30 RX ADMIN — CYCLOBENZAPRINE HYDROCHLORIDE 10 MG: 10 TABLET, FILM COATED ORAL at 17:52

## 2019-07-30 RX ADMIN — HYDROCODONE BITARTRATE AND ACETAMINOPHEN 2 TABLET: 5; 325 TABLET ORAL at 10:00

## 2019-07-30 ASSESSMENT — PAIN DESCRIPTION - PAIN TYPE
TYPE: ACUTE PAIN
TYPE: ACUTE PAIN

## 2019-07-30 ASSESSMENT — PAIN SCALES - GENERAL
PAINLEVEL_OUTOF10: 9
PAINLEVEL_OUTOF10: 8
PAINLEVEL_OUTOF10: 6
PAINLEVEL_OUTOF10: 5
PAINLEVEL_OUTOF10: 6
PAINLEVEL_OUTOF10: 10
PAINLEVEL_OUTOF10: 8

## 2019-07-30 ASSESSMENT — PAIN DESCRIPTION - ORIENTATION: ORIENTATION: LEFT

## 2019-07-30 ASSESSMENT — PAIN DESCRIPTION - LOCATION
LOCATION: FOOT
LOCATION: FOOT

## 2019-07-30 NOTE — PLAN OF CARE
Problem: Falls - Risk of:  Goal: Will remain free from falls  Description  Will remain free from falls  7/30/2019 0502 by Arelis Cramer RN  Outcome: Ongoing  7/29/2019 1645 by Laura Nolen RN  Outcome: Ongoing  Note:   Fall precautions in place, hourly rounding, call light and belongings in reach, bed in lowest position, wheels locked in place, side rails up x 2, walkways free of clutter    Goal: Absence of physical injury  Description  Absence of physical injury  7/30/2019 0502 by Arelis Cramer RN  Outcome: Ongoing  7/29/2019 1645 by Laura Nolen RN  Outcome: Ongoing     Problem: Risk for Impaired Skin Integrity  Goal: Tissue integrity - skin and mucous membranes  Description  Structural intactness and normal physiological function of skin and  mucous membranes. 7/30/2019 0502 by Arelis Cramer RN  Outcome: Ongoing  7/29/2019 1645 by Laura Nolen RN  Outcome: Ongoing  Note:   Skin remains dry and intact, no signs of breakdown noted. Patient encouraged to reposition every 2 hours and is assisted to do so when unable to reposition independently. Problem: Pain:  Goal: Pain level will decrease  Description  Pain level will decrease  7/30/2019 0502 by Arelis Cramer RN  Outcome: Ongoing  7/29/2019 1645 by Laura Nolen RN  Outcome: Ongoing  Note:   Pt has PRN pain medication available upon request. Pt aware to let nursing know when pain medication is needed.     Goal: Control of acute pain  Description  Control of acute pain  7/30/2019 0502 by Arelis Cramer RN  Outcome: Ongoing  7/29/2019 1645 by Laura Nolen RN  Outcome: Ongoing  Goal: Control of chronic pain  Description  Control of chronic pain  7/30/2019 0502 by Arelis Cramer RN  Outcome: Ongoing  7/29/2019 1645 by Laura Nolen RN  Outcome: Ongoing

## 2019-07-30 NOTE — PROGRESS NOTES
proctosigmoidoscopy (N/A, 7/17/2019). . She  reports that she has been smoking cigarettes and e-cigarettes. She has a 10.00 pack-year smoking history. She has never used smokeless tobacco. She reports that she drinks about 2.0 standard drinks of alcohol per week. She reports that she does not use drugs. .        Active Hospital Problems    Diagnosis Date Noted    Cellulitis of left foot [Q65.610]     Foot infection [L08.9] 07/27/2019    Chronic venous hypertension (idiopathic) with inflammation of bilateral lower extremity [I87.323] 05/17/2019    Venous insufficiency [I87.2] 05/17/2019    Leg swelling [M79.89] 05/17/2019    PAD [facility]peripheral artery disease) ([facility]) [I73.9] 05/17/2019    DVT (deep venous thrombosis) ([facility]) [I82.409] 07/03/2018    Hypomagnesemia [E83.42] 01/08/2018    Crohn's colitis ([facility] [K50.10] 01/06/2018    Anemia [D64.9] 01/05/2018    Atherosclerosis of native arteries of extremities with intermittent claudication, bilateral legs ([facility] [I70.213] 01/19/2016    Essential hypertension, benign [I10] 09/13/2015       Current Facility-Administered Medications: calcium carbonate (TUMS) chewable tablet 1,000 mg, 1,000 mg, Oral, TID PRN  potassium chloride (KLOR-CON M) extended release tablet 40 mEq, 40 mEq, Oral, PRN **OR** potassium bicarb-citric acid (EFFER-K) effervescent tablet 40 mEq, 40 mEq, Oral, PRN **OR** potassium chloride 10 mEq/100 mL IVPB (Peripheral Line), 10 mEq, Intravenous, PRN  hydrALAZINE (APRESOLINE) injection 10 mg, 10 mg, Intravenous, Q6H PRN  0.9 % sodium chloride bolus, 500 mL, Intravenous, PRN  ampicillin-sulbactam (UNASYN) 3 g ivpb minibag, 3 g, Intravenous, Q6H  cyclobenzaprine (FLEXERIL) tablet 10 mg, 10 mg, Oral, TID PRN  HYDROcodone-acetaminophen (NORCO) 5-325 MG per tablet 2 tablet, 2 tablet, Oral, Q4H PRN  aspirin-acetaminophen-caffeine (EXCEDRIN MIGRAINE) per tablet 1 tablet, 1 tablet, Oral, Q6H PRN  atenolol (TENORMIN) tablet 100 mg, 100 mg, Oral, Daily  busPIRone Component Value Date    WBC 7.4 07/30/2019    HGB 8.7 (L) 07/30/2019    HCT 26.4 (L) 07/30/2019     (H) 07/30/2019    CHOL 151 02/06/2018    TRIG 57 02/06/2018    HDL 81 (H) 02/06/2018    ALT 11 07/27/2019    AST 15 07/27/2019     07/30/2019    K 3.3 (L) 07/30/2019     07/30/2019    CREATININE <0.5 (L) 07/30/2019    BUN 4 (L) 07/30/2019    CO2 24 07/30/2019    INR 2.75 (H) 11/27/2018    LABMICR Not Indicated 11/28/2018     Lab Results   Component Value Date    TROPONINI <0.01 11/27/2018       Recent Imaging Results are Reviewed:  Xr Knee Left (3 Views)    Result Date: 7/3/2019  Radiology exam is complete. No Radiologist dictation. Please follow up with ordering provider. Xr Tibia Fibula Left (2 Views)    Result Date: 7/27/2019  EXAMINATION: 2 XRAY VIEWS OF THE LEFT TIBIA AND FIBULA 7/27/2019 9:14 am COMPARISON: None. HISTORY: ORDERING SYSTEM PROVIDED HISTORY: infection lower leg/foot TECHNOLOGIST PROVIDED HISTORY: Reason for exam:->infection lower leg/foot Reason for Exam: Edema (pt brought in by Wana Roxo c/o LLE swelling and redness. states she was released here on Monday for same) Acuity: Chronic Type of Exam: Initial FINDINGS: Infiltration of the subcutaneous tissue with phleboliths. No radiopaque foreign body or soft tissue gas. No bone destruction or aggressive periostitis. Osteo arthritic change of the knee and midfoot are incidentally noted     No bony abnormality     Xr Foot Left (min 3 Views)    Result Date: 7/27/2019  EXAMINATION: THREE XRAY VIEWS OF THE LEFT FOOT 7/27/2019 9:14 am COMPARISON: None. HISTORY: ORDERING SYSTEM PROVIDED HISTORY: infection TECHNOLOGIST PROVIDED HISTORY: Reason for exam:->infection Reason for Exam: Edema (pt brought in by Wana Roxo c/o LLE swelling and redness. states she was released here on Monday for same) Acuity: Chronic Type of Exam: Initial FINDINGS: Prominent soft tissue swelling dorsal aspect of the foot. Inferior calcaneal spur.

## 2019-07-31 VITALS
WEIGHT: 205 LBS | BODY MASS INDEX: 32.95 KG/M2 | DIASTOLIC BLOOD PRESSURE: 89 MMHG | OXYGEN SATURATION: 96 % | TEMPERATURE: 97.4 F | SYSTOLIC BLOOD PRESSURE: 131 MMHG | RESPIRATION RATE: 16 BRPM | HEIGHT: 66 IN | HEART RATE: 70 BPM

## 2019-07-31 LAB
ANION GAP SERPL CALCULATED.3IONS-SCNC: 11 MMOL/L (ref 3–16)
BASOPHILS ABSOLUTE: 0 K/UL (ref 0–0.2)
BASOPHILS RELATIVE PERCENT: 0 %
BUN BLDV-MCNC: 5 MG/DL (ref 7–20)
CALCIUM SERPL-MCNC: 6.9 MG/DL (ref 8.3–10.6)
CHLORIDE BLD-SCNC: 103 MMOL/L (ref 99–110)
CO2: 25 MMOL/L (ref 21–32)
CREAT SERPL-MCNC: <0.5 MG/DL (ref 0.6–1.2)
EOSINOPHILS ABSOLUTE: 0 K/UL (ref 0–0.6)
EOSINOPHILS RELATIVE PERCENT: 0 %
GFR AFRICAN AMERICAN: >60
GFR NON-AFRICAN AMERICAN: >60
GLUCOSE BLD-MCNC: 118 MG/DL (ref 70–99)
HCT VFR BLD CALC: 25.7 % (ref 36–48)
HEMOGLOBIN: 8.5 G/DL (ref 12–16)
LYMPHOCYTES ABSOLUTE: 1.9 K/UL (ref 1–5.1)
LYMPHOCYTES RELATIVE PERCENT: 27 %
MCH RBC QN AUTO: 31.1 PG (ref 26–34)
MCHC RBC AUTO-ENTMCNC: 33.2 G/DL (ref 31–36)
MCV RBC AUTO: 93.8 FL (ref 80–100)
METAMYELOCYTES RELATIVE PERCENT: 1 %
MONOCYTES ABSOLUTE: 0.4 K/UL (ref 0–1.3)
MONOCYTES RELATIVE PERCENT: 5 %
NEUTROPHILS ABSOLUTE: 4.8 K/UL (ref 1.7–7.7)
NEUTROPHILS RELATIVE PERCENT: 67 %
PDW BLD-RTO: 15 % (ref 12.4–15.4)
PLATELET # BLD: 482 K/UL (ref 135–450)
PLATELET SLIDE REVIEW: ABNORMAL
PMV BLD AUTO: 6.9 FL (ref 5–10.5)
POLYCHROMASIA: ABNORMAL
POTASSIUM SERPL-SCNC: 3.3 MMOL/L (ref 3.5–5.1)
RBC # BLD: 2.74 M/UL (ref 4–5.2)
SODIUM BLD-SCNC: 139 MMOL/L (ref 136–145)
WBC # BLD: 7 K/UL (ref 4–11)

## 2019-07-31 PROCEDURE — 96372 THER/PROPH/DIAG INJ SC/IM: CPT

## 2019-07-31 PROCEDURE — 6370000000 HC RX 637 (ALT 250 FOR IP): Performed by: INTERNAL MEDICINE

## 2019-07-31 PROCEDURE — 97535 SELF CARE MNGMENT TRAINING: CPT

## 2019-07-31 PROCEDURE — 6360000002 HC RX W HCPCS: Performed by: INTERNAL MEDICINE

## 2019-07-31 PROCEDURE — 97530 THERAPEUTIC ACTIVITIES: CPT

## 2019-07-31 PROCEDURE — 85025 COMPLETE CBC W/AUTO DIFF WBC: CPT

## 2019-07-31 PROCEDURE — 97162 PT EVAL MOD COMPLEX 30 MIN: CPT

## 2019-07-31 PROCEDURE — 96366 THER/PROPH/DIAG IV INF ADDON: CPT

## 2019-07-31 PROCEDURE — 94760 N-INVAS EAR/PLS OXIMETRY 1: CPT

## 2019-07-31 PROCEDURE — 80048 BASIC METABOLIC PNL TOTAL CA: CPT

## 2019-07-31 PROCEDURE — 36415 COLL VENOUS BLD VENIPUNCTURE: CPT

## 2019-07-31 PROCEDURE — 2580000003 HC RX 258: Performed by: INTERNAL MEDICINE

## 2019-07-31 PROCEDURE — G0378 HOSPITAL OBSERVATION PER HR: HCPCS

## 2019-07-31 PROCEDURE — 97165 OT EVAL LOW COMPLEX 30 MIN: CPT

## 2019-07-31 PROCEDURE — 96376 TX/PRO/DX INJ SAME DRUG ADON: CPT

## 2019-07-31 RX ORDER — AMOXICILLIN AND CLAVULANATE POTASSIUM 875; 125 MG/1; MG/1
1 TABLET, FILM COATED ORAL EVERY 12 HOURS SCHEDULED
Status: DISCONTINUED | OUTPATIENT
Start: 2019-07-31 | End: 2019-07-31 | Stop reason: HOSPADM

## 2019-07-31 RX ORDER — HYDROCODONE BITARTRATE AND ACETAMINOPHEN 5; 325 MG/1; MG/1
1 TABLET ORAL EVERY 6 HOURS PRN
Qty: 20 TABLET | Refills: 0 | Status: SHIPPED | OUTPATIENT
Start: 2019-07-31 | End: 2019-08-05

## 2019-07-31 RX ORDER — AMOXICILLIN AND CLAVULANATE POTASSIUM 875; 125 MG/1; MG/1
1 TABLET, FILM COATED ORAL 2 TIMES DAILY
Qty: 14 TABLET | Refills: 0 | Status: SHIPPED | OUTPATIENT
Start: 2019-07-31 | End: 2019-08-07

## 2019-07-31 RX ADMIN — CYCLOBENZAPRINE HYDROCHLORIDE 10 MG: 10 TABLET, FILM COATED ORAL at 05:22

## 2019-07-31 RX ADMIN — ENOXAPARIN SODIUM 40 MG: 40 INJECTION SUBCUTANEOUS at 08:15

## 2019-07-31 RX ADMIN — AMOXICILLIN AND CLAVULANATE POTASSIUM 1 TABLET: 875; 125 TABLET, FILM COATED ORAL at 14:32

## 2019-07-31 RX ADMIN — DULOXETINE HYDROCHLORIDE 60 MG: 30 CAPSULE, DELAYED RELEASE ORAL at 08:15

## 2019-07-31 RX ADMIN — BUSPIRONE HYDROCHLORIDE 15 MG: 5 TABLET ORAL at 14:32

## 2019-07-31 RX ADMIN — BUSPIRONE HYDROCHLORIDE 15 MG: 5 TABLET ORAL at 08:15

## 2019-07-31 RX ADMIN — HYDROCODONE BITARTRATE AND ACETAMINOPHEN 2 TABLET: 5; 325 TABLET ORAL at 09:48

## 2019-07-31 RX ADMIN — HYDROCODONE BITARTRATE AND ACETAMINOPHEN 2 TABLET: 5; 325 TABLET ORAL at 14:34

## 2019-07-31 RX ADMIN — NYSTATIN: 100000 CREAM TOPICAL at 08:16

## 2019-07-31 RX ADMIN — HYDROCODONE BITARTRATE AND ACETAMINOPHEN 2 TABLET: 5; 325 TABLET ORAL at 05:22

## 2019-07-31 RX ADMIN — CYCLOBENZAPRINE HYDROCHLORIDE 10 MG: 10 TABLET, FILM COATED ORAL at 14:34

## 2019-07-31 RX ADMIN — AMPICILLIN SODIUM AND SULBACTAM SODIUM 3 G: 2; 1 INJECTION, POWDER, FOR SOLUTION INTRAMUSCULAR; INTRAVENOUS at 06:36

## 2019-07-31 RX ADMIN — FUROSEMIDE 40 MG: 40 TABLET ORAL at 08:15

## 2019-07-31 RX ADMIN — AMPICILLIN SODIUM AND SULBACTAM SODIUM 3 G: 2; 1 INJECTION, POWDER, FOR SOLUTION INTRAMUSCULAR; INTRAVENOUS at 01:11

## 2019-07-31 RX ADMIN — POTASSIUM CHLORIDE 20 MEQ: 1500 TABLET, EXTENDED RELEASE ORAL at 08:15

## 2019-07-31 RX ADMIN — FERROUS SULFATE TAB 325 MG (65 MG ELEMENTAL FE) 325 MG: 325 (65 FE) TAB at 08:15

## 2019-07-31 RX ADMIN — PANTOPRAZOLE SODIUM 40 MG: 40 TABLET, DELAYED RELEASE ORAL at 05:22

## 2019-07-31 RX ADMIN — ATENOLOL 100 MG: 50 TABLET ORAL at 08:15

## 2019-07-31 RX ADMIN — FLUTICASONE PROPIONATE 1 SPRAY: 50 SPRAY, METERED NASAL at 08:16

## 2019-07-31 ASSESSMENT — PAIN DESCRIPTION - LOCATION: LOCATION: FOOT

## 2019-07-31 ASSESSMENT — PAIN SCALES - GENERAL
PAINLEVEL_OUTOF10: 8
PAINLEVEL_OUTOF10: 5
PAINLEVEL_OUTOF10: 7
PAINLEVEL_OUTOF10: 8
PAINLEVEL_OUTOF10: 8

## 2019-07-31 ASSESSMENT — PAIN SCALES - WONG BAKER: WONGBAKER_NUMERICALRESPONSE: 4

## 2019-07-31 ASSESSMENT — PAIN DESCRIPTION - ORIENTATION: ORIENTATION: RIGHT;LEFT

## 2019-07-31 ASSESSMENT — PAIN DESCRIPTION - PAIN TYPE: TYPE: ACUTE PAIN

## 2019-07-31 NOTE — PROGRESS NOTES
left foot/leg. Onset: 2 days ago. Recent hospitalization for reversal of colostomy. Vision/Hearing  Vision: Impaired(reading glasses)  Hearing: Within functional limits     Subjective  General  Chart Reviewed: Yes  Additional Pertinent Hx: Arhtritis, neuropathy, Chron's   Response To Previous Treatment: Not applicable  Family / Caregiver Present: No  Diagnosis: foot infection  Follows Commands: Within Functional Limits  General Comment  Comments: Pt sitting in bed upon arrival. Agreeable to PT/ OT eval.  Subjective  Subjective: Pt reports burning pain in foot, unable to give pain rating.    Pain Screening  Patient Currently in Pain: Yes  Pain Assessment  Pain Assessment: Faces  Hammer-Baker Pain Rating: Hurts little more  Vital Signs  Patient Currently in Pain: Yes       Orientation  Orientation  Overall Orientation Status: Within Normal Limits  Social/Functional History  Social/Functional History  Lives With: Spouse, Daughter(daughter 35 y/o)  Type of Home: House  Home Layout: One level  Home Access: Ramped entrance, Stairs to enter with rails  Entrance Stairs - Number of Steps: Small 1 step to enter  Bathroom Shower/Tub: Tub/Shower unit(Not using, cannot step over the tub.)  Bathroom Toilet: Bedside commode  Bathroom Equipment: Grab bars in shower, Hand-held shower  Bathroom Accessibility: Not accessible, Accessible  Home Equipment: Standard walker, Cane, Wheelchair-manual(Cane in house)  Receives Help From: Family(Occasional help with colostomy bags. )  ADL Assistance: Independent  Homemaking Assistance: Independent  Ambulation Assistance: Independent(Primarily uses cane)  Transfer Assistance: Independent  Active : No(Due to surgeries)  Occupation: Retired  Type of occupation: private duty nursing  Leisure & Hobbies: Feels good after a good day, flowers  Additional Comments: 3/4 falls within last 6 months, house shoe in home    Objective     Observation/Palpation  Posture: Fair  Observation: bruises on B belt, Patient at risk for falls, Left in bed  Restraints  Initially in place: No    G-Code       OutComes Score       AM-PAC Score  AM-PAC Inpatient Mobility Raw Score : 16 (07/31/19 1240)  AM-PAC Inpatient T-Scale Score : 40.78 (07/31/19 1240)  Mobility Inpatient CMS 0-100% Score: 54.16 (07/31/19 1240)  Mobility Inpatient CMS G-Code Modifier : CK (07/31/19 1240)          Goals  Short term goals  Time Frame for Short term goals: upon d/c   Short term goal 1: Pt will complete bed mobility with independence. Short term goal 2: Pt will complete sit <> stand with supervision. Short term goal 3: Pt will ambulate 20' with LRAD and SBA. Short term goal 4: Pt will negotiate 1 step with LRAD and CGA. Patient Goals   Patient goals : return home       Therapy Time   Individual Concurrent Group Co-treatment   Time In 1114         Time Out 1147         Minutes 33              Timed Code Treatment Minutes:   18    Total Treatment Minutes:  33    Holger Couch, SPT    PT providing direct supervision during session and assisting in making skilled judgements throughout session.   [id number] [facility][note 3[facility] PT, DPT [id number]    [id number] Lecant[address]anna Avalos, PT

## 2019-08-01 LAB
BLOOD CULTURE, ROUTINE: NORMAL
CULTURE, BLOOD 2: NORMAL

## 2019-08-05 ENCOUNTER — HOSPITAL ENCOUNTER (OUTPATIENT)
Dept: WOUND CARE | Age: 64
Discharge: HOME | End: 2019-08-05
Payer: MEDICAID

## 2019-08-05 VITALS
SYSTOLIC BLOOD PRESSURE: 138 MMHG | BODY MASS INDEX: 33.09 KG/M2 | DIASTOLIC BLOOD PRESSURE: 99 MMHG | HEART RATE: 75 BPM | TEMPERATURE: 98.4 F | WEIGHT: 205 LBS

## 2019-08-05 PROCEDURE — 99213 OFFICE O/P EST LOW 20 MIN: CPT

## 2019-08-05 RX ORDER — LIDOCAINE 40 MG/G
CREAM TOPICAL PRN
Status: DISCONTINUED | OUTPATIENT
Start: 2019-08-05 | End: 2019-08-06 | Stop reason: HOSPADM

## 2019-08-05 ASSESSMENT — PAIN DESCRIPTION - ORIENTATION: ORIENTATION: LEFT

## 2019-08-05 ASSESSMENT — PAIN DESCRIPTION - ONSET: ONSET: ON-GOING

## 2019-08-05 ASSESSMENT — PAIN DESCRIPTION - LOCATION: LOCATION: FOOT

## 2019-08-05 ASSESSMENT — PAIN DESCRIPTION - DESCRIPTORS: DESCRIPTORS: ACHING

## 2019-08-05 ASSESSMENT — PAIN SCALES - GENERAL: PAINLEVEL_OUTOF10: 8

## 2019-08-05 ASSESSMENT — PAIN DESCRIPTION - FREQUENCY: FREQUENCY: CONTINUOUS

## 2019-08-05 ASSESSMENT — PAIN DESCRIPTION - PROGRESSION: CLINICAL_PROGRESSION: NOT CHANGED

## 2019-08-05 ASSESSMENT — PAIN DESCRIPTION - PAIN TYPE: TYPE: CHRONIC PAIN

## 2019-08-06 ENCOUNTER — OFFICE VISIT (OUTPATIENT)
Dept: SURGERY | Age: 64
End: 2019-08-06

## 2019-08-06 VITALS — WEIGHT: 205 LBS | SYSTOLIC BLOOD PRESSURE: 137 MMHG | DIASTOLIC BLOOD PRESSURE: 95 MMHG | BODY MASS INDEX: 33.09 KG/M2

## 2019-08-06 DIAGNOSIS — Z93.3 COLOSTOMY IN PLACE (HCC): Primary | ICD-10-CM

## 2019-08-06 PROCEDURE — 99024 POSTOP FOLLOW-UP VISIT: CPT | Performed by: SURGERY

## 2019-08-06 NOTE — PROGRESS NOTES
[facility] GENERAL AND LAPAROSCOPIC SURGERY          PATIENT NAME: Louie Keller     TODAY'S DATE: 8/6/2019    SUBJECTIVE:    Pt here for post op exam.     OBJECTIVE:  VITALS:  BP (!) 137/95     CONSTITUTIONAL:  awake and alert  LUNGS:  clear to auscultation  ABDOMEN:  normal bowel sounds, soft, non-distended, non-tender, incision healed, old colostomy site healed     Data:  Pathology  FINAL DIAGNOSIS:    Colostomy:     - Portion of colon and attached skin with patchy acute and chronic       inflammatory injury (including focal mucosal ulceration and       erosion), well-formed crypt granulomas, diverticular disease and       hemorrhage; negative for dysplasia or malignancy - see comment.     -  Serosal adhesions and focal multinucleated foreign-body giant cell       reaction surrounding polarizable foreign material.     - Focal superficial acute inflammation and crypt granulomas present in       separately submitted anastomotic rings. COMMENT: The clinical history of Crohn's disease and acute diverticulitis  is noted.  The submitted colostomy takedown specimen shows focal/patchy  acute and chronic inflammatory injury that may relate to the known  history of Crohn's disease, the presence of diverticula or the presence  of the ostomy (among other less likely etiologies).  The chronic  inflammatory injury present is limited to focal crypt distortion.   Readily identifiable well-formed crypt granulomas are present including  in both of the anastomotic rings.  No viral cytopathic effect, dysplasia  or malignancy is noted.  Please correlate with clinical and laboratory  findings.      Radiology Review:  None      ASSESSMENT AND PLAN:  S/P Colostomy closure  Doing well  Sutures removed, regular diet and activity as tolerated  Will see back prn, all Q answered     Alisson Menard

## 2019-08-12 ENCOUNTER — HOSPITAL ENCOUNTER (OUTPATIENT)
Dept: WOUND CARE | Age: 64
Discharge: HOME | End: 2019-08-12

## 2019-08-30 ENCOUNTER — HOSPITAL ENCOUNTER (OUTPATIENT)
Dept: CT IMAGING | Age: 64
Discharge: HOME | End: 2019-08-30
Payer: MEDICAID

## 2019-08-30 DIAGNOSIS — K50.00 CROHN'S DISEASE OF SMALL INTESTINE WITHOUT COMPLICATION (HCC): ICD-10-CM

## 2019-08-30 PROCEDURE — 74176 CT ABD & PELVIS W/O CONTRAST: CPT

## 2019-10-22 ENCOUNTER — APPOINTMENT (OUTPATIENT)
Dept: GENERAL RADIOLOGY | Age: 64
DRG: 383 | End: 2019-10-22
Payer: MEDICAID

## 2019-10-22 ENCOUNTER — HOSPITAL ENCOUNTER (INPATIENT)
Age: 64
LOS: 3 days | Discharge: HOME | DRG: 383 | End: 2019-10-25
Attending: EMERGENCY MEDICINE | Admitting: INTERNAL MEDICINE
Payer: MEDICAID

## 2019-10-22 DIAGNOSIS — L08.9 FOOT INFECTION: ICD-10-CM

## 2019-10-22 DIAGNOSIS — L03.119 CELLULITIS OF FOOT: Primary | ICD-10-CM

## 2019-10-22 DIAGNOSIS — S91.309A OPEN WOUND OF FOOT, UNSPECIFIED LATERALITY, INITIAL ENCOUNTER: ICD-10-CM

## 2019-10-22 DIAGNOSIS — L03.116 CELLULITIS OF LEFT FOOT: ICD-10-CM

## 2019-10-22 DIAGNOSIS — M86.9 OSTEOMYELITIS, UNSPECIFIED SITE, UNSPECIFIED TYPE (HCC): ICD-10-CM

## 2019-10-22 DIAGNOSIS — I70.213 ATHEROSCLEROSIS OF NATIVE ARTERIES OF EXTREMITIES WITH INTERMITTENT CLAUDICATION, BILATERAL LEGS (HCC): ICD-10-CM

## 2019-10-22 PROBLEM — L02.419 CELLULITIS AND ABSCESS OF LEG: Status: ACTIVE | Noted: 2019-10-22

## 2019-10-22 LAB
A/G RATIO: 1.2 (ref 1.1–2.2)
ALBUMIN SERPL-MCNC: 4.1 G/DL (ref 3.4–5)
ALP BLD-CCNC: 56 U/L (ref 40–129)
ALT SERPL-CCNC: 10 U/L (ref 10–40)
ANION GAP SERPL CALCULATED.3IONS-SCNC: 13 MMOL/L (ref 3–16)
AST SERPL-CCNC: 23 U/L (ref 15–37)
BASOPHILS ABSOLUTE: 0 K/UL (ref 0–0.2)
BASOPHILS RELATIVE PERCENT: 0.9 %
BILIRUB SERPL-MCNC: 0.3 MG/DL (ref 0–1)
BUN BLDV-MCNC: 8 MG/DL (ref 7–20)
C-REACTIVE PROTEIN: 6.2 MG/L (ref 0–5.1)
CALCIUM SERPL-MCNC: 9.1 MG/DL (ref 8.3–10.6)
CHLORIDE BLD-SCNC: 91 MMOL/L (ref 99–110)
CO2: 28 MMOL/L (ref 21–32)
CREAT SERPL-MCNC: <0.5 MG/DL (ref 0.6–1.2)
EOSINOPHILS ABSOLUTE: 0.1 K/UL (ref 0–0.6)
EOSINOPHILS RELATIVE PERCENT: 1.3 %
GFR AFRICAN AMERICAN: >60
GFR NON-AFRICAN AMERICAN: >60
GLOBULIN: 3.3 G/DL
GLUCOSE BLD-MCNC: 86 MG/DL (ref 70–99)
HCT VFR BLD CALC: 35.3 % (ref 36–48)
HEMOGLOBIN: 12.1 G/DL (ref 12–16)
LACTIC ACID, SEPSIS: 1 MMOL/L (ref 0.4–1.9)
LYMPHOCYTES ABSOLUTE: 1.5 K/UL (ref 1–5.1)
LYMPHOCYTES RELATIVE PERCENT: 26.8 %
MCH RBC QN AUTO: 32.9 PG (ref 26–34)
MCHC RBC AUTO-ENTMCNC: 34.2 G/DL (ref 31–36)
MCV RBC AUTO: 96.2 FL (ref 80–100)
MONOCYTES ABSOLUTE: 0.5 K/UL (ref 0–1.3)
MONOCYTES RELATIVE PERCENT: 8.2 %
NEUTROPHILS ABSOLUTE: 3.6 K/UL (ref 1.7–7.7)
NEUTROPHILS RELATIVE PERCENT: 62.8 %
PDW BLD-RTO: 15.6 % (ref 12.4–15.4)
PLATELET # BLD: 333 K/UL (ref 135–450)
PMV BLD AUTO: 6.9 FL (ref 5–10.5)
POTASSIUM SERPL-SCNC: 3.5 MMOL/L (ref 3.5–5.1)
RBC # BLD: 3.67 M/UL (ref 4–5.2)
SEDIMENTATION RATE, ERYTHROCYTE: 33 MM/HR (ref 0–30)
SODIUM BLD-SCNC: 132 MMOL/L (ref 136–145)
TOTAL PROTEIN: 7.4 G/DL (ref 6.4–8.2)
WBC # BLD: 5.7 K/UL (ref 4–11)

## 2019-10-22 PROCEDURE — 6360000002 HC RX W HCPCS: Performed by: INTERNAL MEDICINE

## 2019-10-22 PROCEDURE — 73630 X-RAY EXAM OF FOOT: CPT

## 2019-10-22 PROCEDURE — 6370000000 HC RX 637 (ALT 250 FOR IP): Performed by: PHYSICIAN ASSISTANT

## 2019-10-22 PROCEDURE — 99285 EMERGENCY DEPT VISIT HI MDM: CPT

## 2019-10-22 PROCEDURE — 85652 RBC SED RATE AUTOMATED: CPT

## 2019-10-22 PROCEDURE — 85025 COMPLETE CBC W/AUTO DIFF WBC: CPT

## 2019-10-22 PROCEDURE — 96367 TX/PROPH/DG ADDL SEQ IV INF: CPT

## 2019-10-22 PROCEDURE — 96365 THER/PROPH/DIAG IV INF INIT: CPT

## 2019-10-22 PROCEDURE — 87040 BLOOD CULTURE FOR BACTERIA: CPT

## 2019-10-22 PROCEDURE — 6370000000 HC RX 637 (ALT 250 FOR IP): Performed by: INTERNAL MEDICINE

## 2019-10-22 PROCEDURE — 80053 COMPREHEN METABOLIC PANEL: CPT

## 2019-10-22 PROCEDURE — 1200000000 HC SEMI PRIVATE

## 2019-10-22 PROCEDURE — 83605 ASSAY OF LACTIC ACID: CPT

## 2019-10-22 PROCEDURE — 96375 TX/PRO/DX INJ NEW DRUG ADDON: CPT

## 2019-10-22 PROCEDURE — 96376 TX/PRO/DX INJ SAME DRUG ADON: CPT

## 2019-10-22 PROCEDURE — 6360000002 HC RX W HCPCS: Performed by: PHYSICIAN ASSISTANT

## 2019-10-22 PROCEDURE — 86140 C-REACTIVE PROTEIN: CPT

## 2019-10-22 PROCEDURE — 2580000003 HC RX 258: Performed by: PHYSICIAN ASSISTANT

## 2019-10-22 RX ORDER — ZOLPIDEM TARTRATE 5 MG/1
10 TABLET ORAL NIGHTLY PRN
Status: DISCONTINUED | OUTPATIENT
Start: 2019-10-22 | End: 2019-10-25 | Stop reason: HOSPADM

## 2019-10-22 RX ORDER — HYDROCODONE BITARTRATE AND ACETAMINOPHEN 5; 325 MG/1; MG/1
1 TABLET ORAL EVERY 6 HOURS PRN
Status: DISCONTINUED | OUTPATIENT
Start: 2019-10-22 | End: 2019-10-22

## 2019-10-22 RX ORDER — PANTOPRAZOLE SODIUM 40 MG/1
40 TABLET, DELAYED RELEASE ORAL
Status: DISCONTINUED | OUTPATIENT
Start: 2019-10-23 | End: 2019-10-25 | Stop reason: HOSPADM

## 2019-10-22 RX ORDER — NYSTATIN 100000 U/G
CREAM TOPICAL 2 TIMES DAILY PRN
Status: DISCONTINUED | OUTPATIENT
Start: 2019-10-22 | End: 2019-10-25 | Stop reason: HOSPADM

## 2019-10-22 RX ORDER — SODIUM CHLORIDE 0.9 % (FLUSH) 0.9 %
10 SYRINGE (ML) INJECTION EVERY 12 HOURS SCHEDULED
Status: DISCONTINUED | OUTPATIENT
Start: 2019-10-22 | End: 2019-10-22 | Stop reason: SDUPTHER

## 2019-10-22 RX ORDER — SODIUM CHLORIDE 0.9 % (FLUSH) 0.9 %
10 SYRINGE (ML) INJECTION PRN
Status: DISCONTINUED | OUTPATIENT
Start: 2019-10-22 | End: 2019-10-22 | Stop reason: SDUPTHER

## 2019-10-22 RX ORDER — BALSALAZIDE DISODIUM 750 MG/1
3000 CAPSULE ORAL DAILY
COMMUNITY

## 2019-10-22 RX ORDER — ATENOLOL 50 MG/1
100 TABLET ORAL DAILY
Status: DISCONTINUED | OUTPATIENT
Start: 2019-10-22 | End: 2019-10-25 | Stop reason: HOSPADM

## 2019-10-22 RX ORDER — FERROUS SULFATE 325(65) MG
325 TABLET ORAL
Status: DISCONTINUED | OUTPATIENT
Start: 2019-10-23 | End: 2019-10-25 | Stop reason: HOSPADM

## 2019-10-22 RX ORDER — MORPHINE SULFATE 4 MG/ML
4 INJECTION, SOLUTION INTRAMUSCULAR; INTRAVENOUS EVERY 30 MIN PRN
Status: COMPLETED | OUTPATIENT
Start: 2019-10-22 | End: 2019-10-22

## 2019-10-22 RX ORDER — FLUTICASONE PROPIONATE 50 MCG
1 SPRAY, SUSPENSION (ML) NASAL DAILY PRN
Status: DISCONTINUED | OUTPATIENT
Start: 2019-10-22 | End: 2019-10-25 | Stop reason: HOSPADM

## 2019-10-22 RX ORDER — OXYCODONE HYDROCHLORIDE AND ACETAMINOPHEN 5; 325 MG/1; MG/1
1 TABLET ORAL ONCE
Status: COMPLETED | OUTPATIENT
Start: 2019-10-22 | End: 2019-10-22

## 2019-10-22 RX ORDER — DULOXETIN HYDROCHLORIDE 60 MG/1
60 CAPSULE, DELAYED RELEASE ORAL 2 TIMES DAILY
Status: DISCONTINUED | OUTPATIENT
Start: 2019-10-22 | End: 2019-10-25 | Stop reason: HOSPADM

## 2019-10-22 RX ORDER — FUROSEMIDE 40 MG/1
40 TABLET ORAL DAILY
Status: DISCONTINUED | OUTPATIENT
Start: 2019-10-22 | End: 2019-10-25 | Stop reason: HOSPADM

## 2019-10-22 RX ORDER — VANCOMYCIN HYDROCHLORIDE 1 G/200ML
15 INJECTION, SOLUTION INTRAVENOUS EVERY 12 HOURS
Status: DISCONTINUED | OUTPATIENT
Start: 2019-10-23 | End: 2019-10-24

## 2019-10-22 RX ORDER — OXYCODONE HYDROCHLORIDE AND ACETAMINOPHEN 5; 325 MG/1; MG/1
2 TABLET ORAL EVERY 4 HOURS PRN
Status: DISCONTINUED | OUTPATIENT
Start: 2019-10-22 | End: 2019-10-25 | Stop reason: HOSPADM

## 2019-10-22 RX ORDER — BACLOFEN 10 MG/1
10 TABLET ORAL 3 TIMES DAILY
Status: DISCONTINUED | OUTPATIENT
Start: 2019-10-22 | End: 2019-10-25 | Stop reason: HOSPADM

## 2019-10-22 RX ORDER — NYSTATIN 100000 U/G
CREAM TOPICAL 2 TIMES DAILY
Status: DISCONTINUED | OUTPATIENT
Start: 2019-10-22 | End: 2019-10-22 | Stop reason: SDUPTHER

## 2019-10-22 RX ORDER — VANCOMYCIN HYDROCHLORIDE 1 G/200ML
15 INJECTION, SOLUTION INTRAVENOUS ONCE
Status: COMPLETED | OUTPATIENT
Start: 2019-10-22 | End: 2019-10-22

## 2019-10-22 RX ORDER — ACETAMINOPHEN, ASPIRIN AND CAFFEINE 250; 250; 65 MG/1; MG/1; MG/1
2 TABLET, FILM COATED ORAL EVERY 6 HOURS PRN
Status: DISCONTINUED | OUTPATIENT
Start: 2019-10-22 | End: 2019-10-25 | Stop reason: HOSPADM

## 2019-10-22 RX ORDER — BUSPIRONE HYDROCHLORIDE 5 MG/1
10 TABLET ORAL 3 TIMES DAILY PRN
Status: DISCONTINUED | OUTPATIENT
Start: 2019-10-22 | End: 2019-10-25 | Stop reason: HOSPADM

## 2019-10-22 RX ORDER — ONDANSETRON 4 MG/1
4 TABLET, ORALLY DISINTEGRATING ORAL EVERY 6 HOURS PRN
Status: DISCONTINUED | OUTPATIENT
Start: 2019-10-22 | End: 2019-10-25 | Stop reason: HOSPADM

## 2019-10-22 RX ORDER — BUSPIRONE HYDROCHLORIDE 10 MG/1
10 TABLET ORAL 3 TIMES DAILY PRN
COMMUNITY

## 2019-10-22 RX ORDER — NYSTATIN 100000 U/G
CREAM TOPICAL 2 TIMES DAILY PRN
COMMUNITY
End: 2021-08-03 | Stop reason: ALTCHOICE

## 2019-10-22 RX ORDER — FLUTICASONE PROPIONATE 50 MCG
1 SPRAY, SUSPENSION (ML) NASAL DAILY PRN
Status: ON HOLD | COMMUNITY
End: 2020-07-23

## 2019-10-22 RX ORDER — BALSALAZIDE DISODIUM 750 MG/1
3000 CAPSULE ORAL DAILY
Status: DISCONTINUED | OUTPATIENT
Start: 2019-10-22 | End: 2019-10-25 | Stop reason: HOSPADM

## 2019-10-22 RX ORDER — FLUTICASONE PROPIONATE 50 MCG
1 SPRAY, SUSPENSION (ML) NASAL DAILY
Status: DISCONTINUED | OUTPATIENT
Start: 2019-10-22 | End: 2019-10-22 | Stop reason: DRUGHIGH

## 2019-10-22 RX ORDER — POTASSIUM CHLORIDE 20 MEQ/1
20 TABLET, EXTENDED RELEASE ORAL 2 TIMES DAILY WITH MEALS
Status: DISCONTINUED | OUTPATIENT
Start: 2019-10-22 | End: 2019-10-25 | Stop reason: HOSPADM

## 2019-10-22 RX ORDER — BUSPIRONE HYDROCHLORIDE 5 MG/1
15 TABLET ORAL 3 TIMES DAILY
Status: DISCONTINUED | OUTPATIENT
Start: 2019-10-22 | End: 2019-10-22 | Stop reason: DRUGHIGH

## 2019-10-22 RX ADMIN — OXYCODONE HYDROCHLORIDE AND ACETAMINOPHEN 2 TABLET: 5; 325 TABLET ORAL at 21:03

## 2019-10-22 RX ADMIN — DULOXETINE HYDROCHLORIDE 60 MG: 60 CAPSULE, DELAYED RELEASE ORAL at 21:03

## 2019-10-22 RX ADMIN — MORPHINE SULFATE 4 MG: 4 INJECTION INTRAVENOUS at 13:41

## 2019-10-22 RX ADMIN — POTASSIUM CHLORIDE 20 MEQ: 1500 TABLET, EXTENDED RELEASE ORAL at 21:04

## 2019-10-22 RX ADMIN — VANCOMYCIN HYDROCHLORIDE 1000 MG: 1 INJECTION, SOLUTION INTRAVENOUS at 23:35

## 2019-10-22 RX ADMIN — MORPHINE SULFATE 4 MG: 4 INJECTION INTRAVENOUS at 12:56

## 2019-10-22 RX ADMIN — MORPHINE SULFATE 4 MG: 4 INJECTION INTRAVENOUS at 12:24

## 2019-10-22 RX ADMIN — FUROSEMIDE 40 MG: 40 TABLET ORAL at 21:04

## 2019-10-22 RX ADMIN — CEFEPIME HYDROCHLORIDE 2 G: 2 INJECTION, POWDER, FOR SOLUTION INTRAVENOUS at 12:27

## 2019-10-22 RX ADMIN — VANCOMYCIN HYDROCHLORIDE 1000 MG: 1 INJECTION, SOLUTION INTRAVENOUS at 13:15

## 2019-10-22 RX ADMIN — ATENOLOL 100 MG: 50 TABLET ORAL at 21:04

## 2019-10-22 RX ADMIN — HYDROCODONE BITARTRATE AND ACETAMINOPHEN 1 TABLET: 5; 325 TABLET ORAL at 18:12

## 2019-10-22 RX ADMIN — BALSALAZIDE DISODIUM 3000 MG: 750 CAPSULE ORAL at 23:35

## 2019-10-22 RX ADMIN — BACLOFEN 10 MG: 10 TABLET ORAL at 21:04

## 2019-10-22 RX ADMIN — BUSPIRONE HYDROCHLORIDE 10 MG: 5 TABLET ORAL at 21:03

## 2019-10-22 RX ADMIN — OXYCODONE HYDROCHLORIDE AND ACETAMINOPHEN 1 TABLET: 5; 325 TABLET ORAL at 14:59

## 2019-10-22 ASSESSMENT — PAIN SCALES - GENERAL
PAINLEVEL_OUTOF10: 7
PAINLEVEL_OUTOF10: 10
PAINLEVEL_OUTOF10: 7
PAINLEVEL_OUTOF10: 7
PAINLEVEL_OUTOF10: 8
PAINLEVEL_OUTOF10: 10
PAINLEVEL_OUTOF10: 8
PAINLEVEL_OUTOF10: 10

## 2019-10-22 ASSESSMENT — PAIN DESCRIPTION - LOCATION
LOCATION: FOOT
LOCATION: FOOT

## 2019-10-22 ASSESSMENT — PAIN DESCRIPTION - PAIN TYPE
TYPE: ACUTE PAIN
TYPE: ACUTE PAIN

## 2019-10-22 ASSESSMENT — PAIN DESCRIPTION - ORIENTATION: ORIENTATION: LEFT;RIGHT

## 2019-10-23 LAB
A/G RATIO: 1.1 (ref 1.1–2.2)
ALBUMIN SERPL-MCNC: 3.4 G/DL (ref 3.4–5)
ALP BLD-CCNC: 48 U/L (ref 40–129)
ALT SERPL-CCNC: 9 U/L (ref 10–40)
ANION GAP SERPL CALCULATED.3IONS-SCNC: 9 MMOL/L (ref 3–16)
AST SERPL-CCNC: 20 U/L (ref 15–37)
BILIRUB SERPL-MCNC: 0.3 MG/DL (ref 0–1)
BUN BLDV-MCNC: 10 MG/DL (ref 7–20)
CALCIUM SERPL-MCNC: 8.9 MG/DL (ref 8.3–10.6)
CHLORIDE BLD-SCNC: 95 MMOL/L (ref 99–110)
CO2: 28 MMOL/L (ref 21–32)
CREAT SERPL-MCNC: <0.5 MG/DL (ref 0.6–1.2)
GFR AFRICAN AMERICAN: >60
GFR NON-AFRICAN AMERICAN: >60
GLOBULIN: 3.1 G/DL
GLUCOSE BLD-MCNC: 114 MG/DL (ref 70–99)
HCT VFR BLD CALC: 33.4 % (ref 36–48)
HEMOGLOBIN: 11 G/DL (ref 12–16)
MCH RBC QN AUTO: 32.1 PG (ref 26–34)
MCHC RBC AUTO-ENTMCNC: 33.1 G/DL (ref 31–36)
MCV RBC AUTO: 96.8 FL (ref 80–100)
PDW BLD-RTO: 15.7 % (ref 12.4–15.4)
PLATELET # BLD: 308 K/UL (ref 135–450)
PMV BLD AUTO: 6.7 FL (ref 5–10.5)
POTASSIUM SERPL-SCNC: 4.1 MMOL/L (ref 3.5–5.1)
RBC # BLD: 3.45 M/UL (ref 4–5.2)
SODIUM BLD-SCNC: 132 MMOL/L (ref 136–145)
TOTAL PROTEIN: 6.5 G/DL (ref 6.4–8.2)
WBC # BLD: 4.4 K/UL (ref 4–11)

## 2019-10-23 PROCEDURE — 1200000000 HC SEMI PRIVATE

## 2019-10-23 PROCEDURE — 6370000000 HC RX 637 (ALT 250 FOR IP): Performed by: PODIATRIST

## 2019-10-23 PROCEDURE — 36415 COLL VENOUS BLD VENIPUNCTURE: CPT

## 2019-10-23 PROCEDURE — 6360000002 HC RX W HCPCS: Performed by: INTERNAL MEDICINE

## 2019-10-23 PROCEDURE — 6370000000 HC RX 637 (ALT 250 FOR IP): Performed by: INTERNAL MEDICINE

## 2019-10-23 PROCEDURE — 2580000003 HC RX 258: Performed by: INTERNAL MEDICINE

## 2019-10-23 PROCEDURE — 85027 COMPLETE CBC AUTOMATED: CPT

## 2019-10-23 PROCEDURE — 94760 N-INVAS EAR/PLS OXIMETRY 1: CPT

## 2019-10-23 PROCEDURE — 87205 SMEAR GRAM STAIN: CPT

## 2019-10-23 PROCEDURE — 80053 COMPREHEN METABOLIC PANEL: CPT

## 2019-10-23 PROCEDURE — 87070 CULTURE OTHR SPECIMN AEROBIC: CPT

## 2019-10-23 RX ORDER — GABAPENTIN 100 MG/1
200 CAPSULE ORAL 3 TIMES DAILY
Status: DISCONTINUED | OUTPATIENT
Start: 2019-10-23 | End: 2019-10-25 | Stop reason: HOSPADM

## 2019-10-23 RX ORDER — LIDOCAINE HYDROCHLORIDE 20 MG/ML
JELLY TOPICAL PRN
Status: DISCONTINUED | OUTPATIENT
Start: 2019-10-23 | End: 2019-10-25 | Stop reason: HOSPADM

## 2019-10-23 RX ORDER — GENTAMICIN SULFATE 1 MG/G
CREAM TOPICAL 3 TIMES DAILY
Status: DISCONTINUED | OUTPATIENT
Start: 2019-10-23 | End: 2019-10-25

## 2019-10-23 RX ADMIN — OXYCODONE HYDROCHLORIDE AND ACETAMINOPHEN 2 TABLET: 5; 325 TABLET ORAL at 22:31

## 2019-10-23 RX ADMIN — CEFEPIME HYDROCHLORIDE 2 G: 2 INJECTION, POWDER, FOR SOLUTION INTRAVENOUS at 01:08

## 2019-10-23 RX ADMIN — GABAPENTIN 200 MG: 100 CAPSULE ORAL at 16:59

## 2019-10-23 RX ADMIN — FERROUS SULFATE TAB 325 MG (65 MG ELEMENTAL FE) 325 MG: 325 (65 FE) TAB at 11:59

## 2019-10-23 RX ADMIN — ENOXAPARIN SODIUM 40 MG: 40 INJECTION SUBCUTANEOUS at 10:22

## 2019-10-23 RX ADMIN — OXYCODONE HYDROCHLORIDE AND ACETAMINOPHEN 2 TABLET: 5; 325 TABLET ORAL at 18:28

## 2019-10-23 RX ADMIN — OXYCODONE HYDROCHLORIDE AND ACETAMINOPHEN 2 TABLET: 5; 325 TABLET ORAL at 10:17

## 2019-10-23 RX ADMIN — ZOLPIDEM TARTRATE 10 MG: 5 TABLET ORAL at 22:31

## 2019-10-23 RX ADMIN — BACLOFEN 10 MG: 10 TABLET ORAL at 14:23

## 2019-10-23 RX ADMIN — DULOXETINE HYDROCHLORIDE 60 MG: 60 CAPSULE, DELAYED RELEASE ORAL at 20:42

## 2019-10-23 RX ADMIN — CEFEPIME HYDROCHLORIDE 2 G: 2 INJECTION, POWDER, FOR SOLUTION INTRAVENOUS at 11:59

## 2019-10-23 RX ADMIN — BACLOFEN 10 MG: 10 TABLET ORAL at 20:42

## 2019-10-23 RX ADMIN — BACLOFEN 10 MG: 10 TABLET ORAL at 10:18

## 2019-10-23 RX ADMIN — VANCOMYCIN HYDROCHLORIDE 1000 MG: 1 INJECTION, SOLUTION INTRAVENOUS at 12:40

## 2019-10-23 RX ADMIN — OXYCODONE HYDROCHLORIDE AND ACETAMINOPHEN 2 TABLET: 5; 325 TABLET ORAL at 05:40

## 2019-10-23 RX ADMIN — GABAPENTIN 200 MG: 100 CAPSULE ORAL at 20:42

## 2019-10-23 RX ADMIN — PANTOPRAZOLE SODIUM 40 MG: 40 TABLET, DELAYED RELEASE ORAL at 06:07

## 2019-10-23 RX ADMIN — ZOLPIDEM TARTRATE 10 MG: 5 TABLET ORAL at 01:27

## 2019-10-23 RX ADMIN — OXYCODONE HYDROCHLORIDE AND ACETAMINOPHEN 2 TABLET: 5; 325 TABLET ORAL at 01:28

## 2019-10-23 RX ADMIN — GENTAMICIN SULFATE: 1 CREAM TOPICAL at 20:45

## 2019-10-23 RX ADMIN — ATENOLOL 100 MG: 50 TABLET ORAL at 10:18

## 2019-10-23 RX ADMIN — BALSALAZIDE DISODIUM 3000 MG: 750 CAPSULE ORAL at 10:31

## 2019-10-23 RX ADMIN — POTASSIUM CHLORIDE 20 MEQ: 1500 TABLET, EXTENDED RELEASE ORAL at 16:59

## 2019-10-23 RX ADMIN — DULOXETINE HYDROCHLORIDE 60 MG: 60 CAPSULE, DELAYED RELEASE ORAL at 10:35

## 2019-10-23 RX ADMIN — POTASSIUM CHLORIDE 20 MEQ: 1500 TABLET, EXTENDED RELEASE ORAL at 10:17

## 2019-10-23 RX ADMIN — OXYCODONE HYDROCHLORIDE AND ACETAMINOPHEN 2 TABLET: 5; 325 TABLET ORAL at 14:23

## 2019-10-23 ASSESSMENT — PAIN DESCRIPTION - PROGRESSION
CLINICAL_PROGRESSION: GRADUALLY WORSENING
CLINICAL_PROGRESSION: NOT CHANGED
CLINICAL_PROGRESSION: NOT CHANGED
CLINICAL_PROGRESSION: GRADUALLY WORSENING
CLINICAL_PROGRESSION: NOT CHANGED
CLINICAL_PROGRESSION: GRADUALLY IMPROVING

## 2019-10-23 ASSESSMENT — PAIN SCALES - GENERAL
PAINLEVEL_OUTOF10: 5
PAINLEVEL_OUTOF10: 8
PAINLEVEL_OUTOF10: 10
PAINLEVEL_OUTOF10: 10
PAINLEVEL_OUTOF10: 9
PAINLEVEL_OUTOF10: 7
PAINLEVEL_OUTOF10: 8
PAINLEVEL_OUTOF10: 10
PAINLEVEL_OUTOF10: 10
PAINLEVEL_OUTOF10: 9
PAINLEVEL_OUTOF10: 8

## 2019-10-23 ASSESSMENT — PAIN DESCRIPTION - LOCATION
LOCATION: FOOT

## 2019-10-23 ASSESSMENT — PAIN DESCRIPTION - PAIN TYPE
TYPE: ACUTE PAIN

## 2019-10-23 ASSESSMENT — PAIN DESCRIPTION - DESCRIPTORS
DESCRIPTORS: BURNING;SHARP
DESCRIPTORS: BURNING;SHARP
DESCRIPTORS: THROBBING

## 2019-10-23 ASSESSMENT — PAIN DESCRIPTION - FREQUENCY
FREQUENCY: CONTINUOUS
FREQUENCY: CONTINUOUS
FREQUENCY: INTERMITTENT
FREQUENCY: INTERMITTENT
FREQUENCY: CONTINUOUS

## 2019-10-23 ASSESSMENT — PAIN - FUNCTIONAL ASSESSMENT
PAIN_FUNCTIONAL_ASSESSMENT: ACTIVITIES ARE NOT PREVENTED
PAIN_FUNCTIONAL_ASSESSMENT: ACTIVITIES ARE NOT PREVENTED

## 2019-10-23 ASSESSMENT — PAIN DESCRIPTION - ORIENTATION
ORIENTATION: RIGHT;LEFT
ORIENTATION: LEFT;RIGHT
ORIENTATION: RIGHT;LEFT

## 2019-10-23 ASSESSMENT — PAIN DESCRIPTION - ONSET
ONSET: ON-GOING
ONSET: ON-GOING

## 2019-10-24 ENCOUNTER — APPOINTMENT (OUTPATIENT)
Dept: MRI IMAGING | Age: 64
DRG: 383 | End: 2019-10-24
Payer: MEDICAID

## 2019-10-24 LAB — VANCOMYCIN TROUGH: 12 UG/ML (ref 10–20)

## 2019-10-24 PROCEDURE — 80202 ASSAY OF VANCOMYCIN: CPT

## 2019-10-24 PROCEDURE — 73718 MRI LOWER EXTREMITY W/O DYE: CPT

## 2019-10-24 PROCEDURE — 6370000000 HC RX 637 (ALT 250 FOR IP): Performed by: INTERNAL MEDICINE

## 2019-10-24 PROCEDURE — 6370000000 HC RX 637 (ALT 250 FOR IP): Performed by: PODIATRIST

## 2019-10-24 PROCEDURE — 2580000003 HC RX 258: Performed by: INTERNAL MEDICINE

## 2019-10-24 PROCEDURE — 36415 COLL VENOUS BLD VENIPUNCTURE: CPT

## 2019-10-24 PROCEDURE — 94760 N-INVAS EAR/PLS OXIMETRY 1: CPT

## 2019-10-24 PROCEDURE — 1200000000 HC SEMI PRIVATE

## 2019-10-24 PROCEDURE — 6360000002 HC RX W HCPCS: Performed by: INTERNAL MEDICINE

## 2019-10-24 RX ORDER — AMOXICILLIN AND CLAVULANATE POTASSIUM 875; 125 MG/1; MG/1
1 TABLET, FILM COATED ORAL EVERY 12 HOURS SCHEDULED
Status: DISCONTINUED | OUTPATIENT
Start: 2019-10-24 | End: 2019-10-25 | Stop reason: HOSPADM

## 2019-10-24 RX ORDER — HYDRALAZINE HYDROCHLORIDE 25 MG/1
25 TABLET, FILM COATED ORAL EVERY 8 HOURS SCHEDULED
Status: DISCONTINUED | OUTPATIENT
Start: 2019-10-24 | End: 2019-10-25 | Stop reason: HOSPADM

## 2019-10-24 RX ADMIN — POTASSIUM CHLORIDE 20 MEQ: 1500 TABLET, EXTENDED RELEASE ORAL at 08:14

## 2019-10-24 RX ADMIN — ENOXAPARIN SODIUM 40 MG: 40 INJECTION SUBCUTANEOUS at 08:15

## 2019-10-24 RX ADMIN — VANCOMYCIN HYDROCHLORIDE 1000 MG: 1 INJECTION, SOLUTION INTRAVENOUS at 12:49

## 2019-10-24 RX ADMIN — ATENOLOL 100 MG: 50 TABLET ORAL at 08:14

## 2019-10-24 RX ADMIN — DULOXETINE HYDROCHLORIDE 60 MG: 60 CAPSULE, DELAYED RELEASE ORAL at 21:15

## 2019-10-24 RX ADMIN — POTASSIUM CHLORIDE 20 MEQ: 1500 TABLET, EXTENDED RELEASE ORAL at 16:45

## 2019-10-24 RX ADMIN — COLLAGENASE SANTYL: 250 OINTMENT TOPICAL at 11:00

## 2019-10-24 RX ADMIN — AMOXICILLIN AND CLAVULANATE POTASSIUM 1 TABLET: 875; 125 TABLET, FILM COATED ORAL at 21:15

## 2019-10-24 RX ADMIN — CEFEPIME HYDROCHLORIDE 2 G: 2 INJECTION, POWDER, FOR SOLUTION INTRAVENOUS at 02:02

## 2019-10-24 RX ADMIN — BALSALAZIDE DISODIUM 3000 MG: 750 CAPSULE ORAL at 11:35

## 2019-10-24 RX ADMIN — PANTOPRAZOLE SODIUM 40 MG: 40 TABLET, DELAYED RELEASE ORAL at 08:14

## 2019-10-24 RX ADMIN — OXYCODONE HYDROCHLORIDE AND ACETAMINOPHEN 2 TABLET: 5; 325 TABLET ORAL at 10:58

## 2019-10-24 RX ADMIN — GABAPENTIN 200 MG: 100 CAPSULE ORAL at 21:15

## 2019-10-24 RX ADMIN — HYDRALAZINE HYDROCHLORIDE 25 MG: 25 TABLET, FILM COATED ORAL at 16:45

## 2019-10-24 RX ADMIN — FUROSEMIDE 40 MG: 40 TABLET ORAL at 08:15

## 2019-10-24 RX ADMIN — BACLOFEN 10 MG: 10 TABLET ORAL at 21:15

## 2019-10-24 RX ADMIN — GENTAMICIN SULFATE: 1 CREAM TOPICAL at 10:59

## 2019-10-24 RX ADMIN — GABAPENTIN 200 MG: 100 CAPSULE ORAL at 14:38

## 2019-10-24 RX ADMIN — ACETAMINOPHEN, ASPIRIN AND CAFFEINE 2 TABLET: 250; 250; 65 TABLET, FILM COATED ORAL at 22:29

## 2019-10-24 RX ADMIN — OXYCODONE HYDROCHLORIDE AND ACETAMINOPHEN 2 TABLET: 5; 325 TABLET ORAL at 20:22

## 2019-10-24 RX ADMIN — GENTAMICIN SULFATE: 1 CREAM TOPICAL at 15:14

## 2019-10-24 RX ADMIN — BACLOFEN 10 MG: 10 TABLET ORAL at 14:38

## 2019-10-24 RX ADMIN — GABAPENTIN 200 MG: 100 CAPSULE ORAL at 08:13

## 2019-10-24 RX ADMIN — ACETAMINOPHEN, ASPIRIN AND CAFFEINE 2 TABLET: 250; 250; 65 TABLET, FILM COATED ORAL at 08:14

## 2019-10-24 RX ADMIN — OXYCODONE HYDROCHLORIDE AND ACETAMINOPHEN 2 TABLET: 5; 325 TABLET ORAL at 15:13

## 2019-10-24 RX ADMIN — VANCOMYCIN HYDROCHLORIDE 1000 MG: 1 INJECTION, SOLUTION INTRAVENOUS at 00:59

## 2019-10-24 RX ADMIN — HYDRALAZINE HYDROCHLORIDE 25 MG: 25 TABLET, FILM COATED ORAL at 21:15

## 2019-10-24 RX ADMIN — DULOXETINE HYDROCHLORIDE 60 MG: 60 CAPSULE, DELAYED RELEASE ORAL at 08:15

## 2019-10-24 RX ADMIN — CEFEPIME HYDROCHLORIDE 2 G: 2 INJECTION, POWDER, FOR SOLUTION INTRAVENOUS at 14:39

## 2019-10-24 RX ADMIN — FERROUS SULFATE TAB 325 MG (65 MG ELEMENTAL FE) 325 MG: 325 (65 FE) TAB at 12:50

## 2019-10-24 RX ADMIN — BACLOFEN 10 MG: 10 TABLET ORAL at 08:14

## 2019-10-24 RX ADMIN — LIDOCAINE HYDROCHLORIDE: 20 JELLY TOPICAL at 10:59

## 2019-10-24 RX ADMIN — GENTAMICIN SULFATE: 1 CREAM TOPICAL at 21:18

## 2019-10-24 ASSESSMENT — PAIN DESCRIPTION - ORIENTATION
ORIENTATION: LEFT;RIGHT
ORIENTATION: RIGHT;LEFT
ORIENTATION: RIGHT;LEFT

## 2019-10-24 ASSESSMENT — PAIN DESCRIPTION - PAIN TYPE
TYPE: ACUTE PAIN

## 2019-10-24 ASSESSMENT — PAIN SCALES - GENERAL
PAINLEVEL_OUTOF10: 9
PAINLEVEL_OUTOF10: 6
PAINLEVEL_OUTOF10: 8
PAINLEVEL_OUTOF10: 8
PAINLEVEL_OUTOF10: 5
PAINLEVEL_OUTOF10: 0
PAINLEVEL_OUTOF10: 5
PAINLEVEL_OUTOF10: 6
PAINLEVEL_OUTOF10: 9
PAINLEVEL_OUTOF10: 5
PAINLEVEL_OUTOF10: 10

## 2019-10-24 ASSESSMENT — PAIN DESCRIPTION - FREQUENCY
FREQUENCY: CONTINUOUS
FREQUENCY: CONTINUOUS

## 2019-10-24 ASSESSMENT — PAIN DESCRIPTION - PROGRESSION
CLINICAL_PROGRESSION: NOT CHANGED

## 2019-10-24 ASSESSMENT — PAIN - FUNCTIONAL ASSESSMENT
PAIN_FUNCTIONAL_ASSESSMENT: PREVENTS OR INTERFERES SOME ACTIVE ACTIVITIES AND ADLS
PAIN_FUNCTIONAL_ASSESSMENT: ACTIVITIES ARE NOT PREVENTED

## 2019-10-24 ASSESSMENT — PAIN DESCRIPTION - DESCRIPTORS
DESCRIPTORS: HEADACHE
DESCRIPTORS: CRUSHING;THROBBING

## 2019-10-24 ASSESSMENT — PAIN DESCRIPTION - ONSET
ONSET: ON-GOING
ONSET: ON-GOING

## 2019-10-24 ASSESSMENT — PAIN DESCRIPTION - LOCATION
LOCATION: FOOT

## 2019-10-25 VITALS
WEIGHT: 202.6 LBS | TEMPERATURE: 96.7 F | BODY MASS INDEX: 32.56 KG/M2 | RESPIRATION RATE: 16 BRPM | SYSTOLIC BLOOD PRESSURE: 133 MMHG | HEART RATE: 64 BPM | HEIGHT: 66 IN | DIASTOLIC BLOOD PRESSURE: 88 MMHG | OXYGEN SATURATION: 96 %

## 2019-10-25 LAB
GRAM STAIN RESULT: NORMAL
WOUND/ABSCESS: NORMAL

## 2019-10-25 PROCEDURE — 6360000002 HC RX W HCPCS: Performed by: INTERNAL MEDICINE

## 2019-10-25 PROCEDURE — 6370000000 HC RX 637 (ALT 250 FOR IP): Performed by: INTERNAL MEDICINE

## 2019-10-25 PROCEDURE — 6370000000 HC RX 637 (ALT 250 FOR IP): Performed by: PODIATRIST

## 2019-10-25 RX ORDER — GABAPENTIN 100 MG/1
200 CAPSULE ORAL 3 TIMES DAILY
Qty: 180 CAPSULE | Refills: 0 | Status: ON HOLD | OUTPATIENT
Start: 2019-10-25 | End: 2020-07-23

## 2019-10-25 RX ORDER — GENTAMICIN SULFATE 1 MG/G
CREAM TOPICAL
Qty: 1 TUBE | Refills: 0 | Status: SHIPPED | OUTPATIENT
Start: 2019-10-26 | End: 2019-10-25

## 2019-10-25 RX ORDER — OXYCODONE HYDROCHLORIDE AND ACETAMINOPHEN 5; 325 MG/1; MG/1
2 TABLET ORAL EVERY 4 HOURS PRN
Qty: 20 TABLET | Refills: 0 | Status: SHIPPED | OUTPATIENT
Start: 2019-10-25 | End: 2019-10-28

## 2019-10-25 RX ORDER — HYDRALAZINE HYDROCHLORIDE 25 MG/1
25 TABLET, FILM COATED ORAL EVERY 8 HOURS SCHEDULED
Qty: 90 TABLET | Refills: 0 | Status: ON HOLD | OUTPATIENT
Start: 2019-10-25 | End: 2020-07-23

## 2019-10-25 RX ORDER — GENTAMICIN SULFATE 1 MG/G
CREAM TOPICAL
Qty: 1 TUBE | Refills: 0 | Status: ON HOLD | OUTPATIENT
Start: 2019-10-26 | End: 2020-07-23

## 2019-10-25 RX ORDER — GENTAMICIN SULFATE 1 MG/G
CREAM TOPICAL DAILY
Status: DISCONTINUED | OUTPATIENT
Start: 2019-10-26 | End: 2019-10-25 | Stop reason: HOSPADM

## 2019-10-25 RX ORDER — AMOXICILLIN AND CLAVULANATE POTASSIUM 875; 125 MG/1; MG/1
1 TABLET, FILM COATED ORAL EVERY 12 HOURS SCHEDULED
Qty: 20 TABLET | Refills: 0 | Status: SHIPPED | OUTPATIENT
Start: 2019-10-25 | End: 2019-11-04

## 2019-10-25 RX ORDER — LIDOCAINE HYDROCHLORIDE 20 MG/ML
JELLY TOPICAL
Status: ON HOLD | COMMUNITY
Start: 2019-10-25 | End: 2020-07-23

## 2019-10-25 RX ADMIN — POTASSIUM CHLORIDE 20 MEQ: 1500 TABLET, EXTENDED RELEASE ORAL at 16:30

## 2019-10-25 RX ADMIN — GABAPENTIN 200 MG: 100 CAPSULE ORAL at 15:21

## 2019-10-25 RX ADMIN — OXYCODONE HYDROCHLORIDE AND ACETAMINOPHEN 2 TABLET: 5; 325 TABLET ORAL at 08:31

## 2019-10-25 RX ADMIN — ZOLPIDEM TARTRATE 10 MG: 5 TABLET ORAL at 00:55

## 2019-10-25 RX ADMIN — OXYCODONE HYDROCHLORIDE AND ACETAMINOPHEN 2 TABLET: 5; 325 TABLET ORAL at 16:30

## 2019-10-25 RX ADMIN — POTASSIUM CHLORIDE 20 MEQ: 1500 TABLET, EXTENDED RELEASE ORAL at 08:31

## 2019-10-25 RX ADMIN — GABAPENTIN 200 MG: 100 CAPSULE ORAL at 08:31

## 2019-10-25 RX ADMIN — DULOXETINE HYDROCHLORIDE 60 MG: 60 CAPSULE, DELAYED RELEASE ORAL at 08:32

## 2019-10-25 RX ADMIN — ATENOLOL 100 MG: 50 TABLET ORAL at 08:31

## 2019-10-25 RX ADMIN — FERROUS SULFATE TAB 325 MG (65 MG ELEMENTAL FE) 325 MG: 325 (65 FE) TAB at 12:35

## 2019-10-25 RX ADMIN — BACLOFEN 10 MG: 10 TABLET ORAL at 15:21

## 2019-10-25 RX ADMIN — FUROSEMIDE 40 MG: 40 TABLET ORAL at 08:32

## 2019-10-25 RX ADMIN — PANTOPRAZOLE SODIUM 40 MG: 40 TABLET, DELAYED RELEASE ORAL at 08:32

## 2019-10-25 RX ADMIN — OXYCODONE HYDROCHLORIDE AND ACETAMINOPHEN 2 TABLET: 5; 325 TABLET ORAL at 00:54

## 2019-10-25 RX ADMIN — BACLOFEN 10 MG: 10 TABLET ORAL at 08:31

## 2019-10-25 RX ADMIN — AMOXICILLIN AND CLAVULANATE POTASSIUM 1 TABLET: 875; 125 TABLET, FILM COATED ORAL at 08:32

## 2019-10-25 RX ADMIN — COLLAGENASE SANTYL: 250 OINTMENT TOPICAL at 08:30

## 2019-10-25 RX ADMIN — BALSALAZIDE DISODIUM 3000 MG: 750 CAPSULE ORAL at 10:05

## 2019-10-25 RX ADMIN — ENOXAPARIN SODIUM 40 MG: 40 INJECTION SUBCUTANEOUS at 08:31

## 2019-10-25 RX ADMIN — HYDRALAZINE HYDROCHLORIDE 25 MG: 25 TABLET, FILM COATED ORAL at 15:21

## 2019-10-25 RX ADMIN — GENTAMICIN SULFATE: 1 CREAM TOPICAL at 10:06

## 2019-10-25 RX ADMIN — OXYCODONE HYDROCHLORIDE AND ACETAMINOPHEN 2 TABLET: 5; 325 TABLET ORAL at 12:35

## 2019-10-25 ASSESSMENT — PAIN SCALES - GENERAL
PAINLEVEL_OUTOF10: 8
PAINLEVEL_OUTOF10: 4
PAINLEVEL_OUTOF10: 8
PAINLEVEL_OUTOF10: 4
PAINLEVEL_OUTOF10: 0

## 2019-10-25 ASSESSMENT — PAIN DESCRIPTION - PROGRESSION
CLINICAL_PROGRESSION: NOT CHANGED

## 2019-10-27 LAB
BLOOD CULTURE, ROUTINE: NORMAL
CULTURE, BLOOD 2: NORMAL

## 2019-10-28 ENCOUNTER — HOSPITAL ENCOUNTER (OUTPATIENT)
Dept: WOUND CARE | Age: 64
Discharge: HOME | End: 2019-10-28
Payer: MEDICAID

## 2019-10-28 VITALS — HEART RATE: 58 BPM | SYSTOLIC BLOOD PRESSURE: 134 MMHG | DIASTOLIC BLOOD PRESSURE: 92 MMHG | RESPIRATION RATE: 16 BRPM

## 2019-10-28 DIAGNOSIS — I70.213 ATHEROSCLEROSIS OF NATIVE ARTERIES OF EXTREMITIES WITH INTERMITTENT CLAUDICATION, BILATERAL LEGS (HCC): Primary | ICD-10-CM

## 2019-10-28 PROCEDURE — 99213 OFFICE O/P EST LOW 20 MIN: CPT

## 2019-10-28 PROCEDURE — 11042 DBRDMT SUBQ TIS 1ST 20SQCM/<: CPT

## 2019-10-28 RX ORDER — LIDOCAINE 40 MG/G
CREAM TOPICAL PRN
Status: DISCONTINUED | OUTPATIENT
Start: 2019-10-28 | End: 2019-10-29 | Stop reason: HOSPADM

## 2019-10-28 ASSESSMENT — PAIN DESCRIPTION - FREQUENCY: FREQUENCY: CONTINUOUS

## 2019-10-28 ASSESSMENT — PAIN DESCRIPTION - PAIN TYPE: TYPE: CHRONIC PAIN

## 2019-10-28 ASSESSMENT — PAIN DESCRIPTION - ORIENTATION: ORIENTATION: RIGHT;LEFT

## 2019-10-28 ASSESSMENT — PAIN SCALES - GENERAL: PAINLEVEL_OUTOF10: 9

## 2019-10-28 ASSESSMENT — PAIN DESCRIPTION - LOCATION: LOCATION: FOOT

## 2019-10-28 ASSESSMENT — PAIN DESCRIPTION - DESCRIPTORS: DESCRIPTORS: SHARP;SHOOTING

## 2019-11-04 ENCOUNTER — HOSPITAL ENCOUNTER (OUTPATIENT)
Dept: WOUND CARE | Age: 64
Discharge: HOME | End: 2019-11-04
Payer: MEDICAID

## 2019-11-04 VITALS — DIASTOLIC BLOOD PRESSURE: 93 MMHG | HEART RATE: 71 BPM | SYSTOLIC BLOOD PRESSURE: 151 MMHG | RESPIRATION RATE: 18 BRPM

## 2019-11-04 PROCEDURE — 99213 OFFICE O/P EST LOW 20 MIN: CPT

## 2019-11-04 RX ORDER — LIDOCAINE 40 MG/G
CREAM TOPICAL PRN
Status: DISCONTINUED | OUTPATIENT
Start: 2019-11-04 | End: 2019-11-05 | Stop reason: HOSPADM

## 2019-11-04 ASSESSMENT — PAIN DESCRIPTION - ONSET: ONSET: ON-GOING

## 2019-11-04 ASSESSMENT — PAIN DESCRIPTION - ORIENTATION: ORIENTATION: RIGHT;LEFT

## 2019-11-04 ASSESSMENT — PAIN DESCRIPTION - DESCRIPTORS: DESCRIPTORS: ACHING

## 2019-11-04 ASSESSMENT — PAIN DESCRIPTION - PAIN TYPE: TYPE: CHRONIC PAIN

## 2019-11-04 ASSESSMENT — PAIN DESCRIPTION - FREQUENCY: FREQUENCY: CONTINUOUS

## 2019-11-04 ASSESSMENT — PAIN DESCRIPTION - LOCATION: LOCATION: FOOT

## 2019-11-04 ASSESSMENT — PAIN DESCRIPTION - PROGRESSION: CLINICAL_PROGRESSION: NOT CHANGED

## 2019-11-11 ENCOUNTER — HOSPITAL ENCOUNTER (OUTPATIENT)
Dept: WOUND CARE | Age: 64
Discharge: HOME | End: 2019-11-11

## 2019-11-18 ENCOUNTER — HOSPITAL ENCOUNTER (OUTPATIENT)
Dept: WOUND CARE | Age: 64
Discharge: HOME | End: 2019-11-18

## 2019-11-25 ENCOUNTER — HOSPITAL ENCOUNTER (OUTPATIENT)
Dept: WOUND CARE | Age: 64
Discharge: HOME | End: 2019-11-25
Payer: MEDICAID

## 2019-11-25 PROCEDURE — 11042 DBRDMT SUBQ TIS 1ST 20SQCM/<: CPT

## 2019-11-25 RX ORDER — LIDOCAINE 40 MG/G
CREAM TOPICAL PRN
Status: DISCONTINUED | OUTPATIENT
Start: 2019-11-25 | End: 2019-11-26 | Stop reason: HOSPADM

## 2019-12-02 ENCOUNTER — HOSPITAL ENCOUNTER (OUTPATIENT)
Dept: WOUND CARE | Age: 64
Discharge: HOME | End: 2019-12-02

## 2019-12-17 ENCOUNTER — HOSPITAL ENCOUNTER (EMERGENCY)
Age: 64
Discharge: ANOTHER HOSPITAL | End: 2019-12-18
Attending: EMERGENCY MEDICINE
Payer: MEDICAID

## 2019-12-17 ENCOUNTER — APPOINTMENT (OUTPATIENT)
Dept: CT IMAGING | Age: 64
End: 2019-12-17
Payer: MEDICAID

## 2019-12-17 DIAGNOSIS — E87.1 HYPONATREMIA: ICD-10-CM

## 2019-12-17 DIAGNOSIS — N39.0 ACUTE UTI: Primary | ICD-10-CM
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
## 2019-12-17 DIAGNOSIS — S30.1XXA CONTUSION OF ABDOMINAL WALL, INITIAL ENCOUNTER: ICD-10-CM
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
## 2019-12-17 LAB
A/G RATIO: 1.1 (ref 1.1–2.2)
ALBUMIN SERPL-MCNC: 4 G/DL (ref 3.4–5)
ALP BLD-CCNC: 54 U/L (ref 40–129)
ALT SERPL-CCNC: 9 U/L (ref 10–40)
ANION GAP SERPL CALCULATED.3IONS-SCNC: 16 MMOL/L (ref 3–16)
AST SERPL-CCNC: 25 U/L (ref 15–37)
BACTERIA: ABNORMAL /HPF
BASOPHILS ABSOLUTE: 0 K/UL (ref 0–0.2)
BASOPHILS RELATIVE PERCENT: 1 %
BILIRUB SERPL-MCNC: 0.7 MG/DL (ref 0–1)
BILIRUBIN URINE: NEGATIVE
BLOOD, URINE: ABNORMAL
BUN BLDV-MCNC: 8 MG/DL (ref 7–20)
CALCIUM SERPL-MCNC: 9.8 MG/DL (ref 8.3–10.6)
CHLORIDE BLD-SCNC: 90 MMOL/L (ref 99–110)
CLARITY: ABNORMAL
CO2: 21 MMOL/L (ref 21–32)
COLOR: YELLOW
COMMENT UA: ABNORMAL
CREAT SERPL-MCNC: <0.5 MG/DL (ref 0.6–1.2)
EOSINOPHILS ABSOLUTE: 0 K/UL (ref 0–0.6)
EOSINOPHILS RELATIVE PERCENT: 0.7 %
EPITHELIAL CELLS, UA: 1 /HPF (ref 0–5)
GFR AFRICAN AMERICAN: >60
GFR NON-AFRICAN AMERICAN: >60
GLOBULIN: 3.6 G/DL
GLUCOSE BLD-MCNC: 82 MG/DL (ref 70–99)
GLUCOSE URINE: NEGATIVE MG/DL
HCT VFR BLD CALC: 39.5 % (ref 36–48)
HEMOGLOBIN: 13 G/DL (ref 12–16)
HYALINE CASTS: 1 /LPF (ref 0–8)
KETONES, URINE: ABNORMAL MG/DL
LEUKOCYTE ESTERASE, URINE: ABNORMAL
LYMPHOCYTES ABSOLUTE: 1.3 K/UL (ref 1–5.1)
LYMPHOCYTES RELATIVE PERCENT: 26.3 %
MCH RBC QN AUTO: 33.6 PG (ref 26–34)
MCHC RBC AUTO-ENTMCNC: 32.9 G/DL (ref 31–36)
MCV RBC AUTO: 102.2 FL (ref 80–100)
MICROSCOPIC EXAMINATION: YES
MONOCYTES ABSOLUTE: 0.4 K/UL (ref 0–1.3)
MONOCYTES RELATIVE PERCENT: 9.1 %
NEUTROPHILS ABSOLUTE: 3 K/UL (ref 1.7–7.7)
NEUTROPHILS RELATIVE PERCENT: 62.9 %
NITRITE, URINE: POSITIVE
PDW BLD-RTO: 14.3 % (ref 12.4–15.4)
PH UA: 6 (ref 5–8)
PLATELET # BLD: 294 K/UL (ref 135–450)
PMV BLD AUTO: 7.2 FL (ref 5–10.5)
POTASSIUM REFLEX MAGNESIUM: 3.9 MMOL/L (ref 3.5–5.1)
PROTEIN UA: NEGATIVE MG/DL
RBC # BLD: 3.87 M/UL (ref 4–5.2)
RBC UA: 10 /HPF (ref 0–4)
SODIUM BLD-SCNC: 127 MMOL/L (ref 136–145)
SPECIFIC GRAVITY UA: 1.01 (ref 1–1.03)
TOTAL PROTEIN: 7.6 G/DL (ref 6.4–8.2)
TROPONIN: <0.01 NG/ML
URINE REFLEX TO CULTURE: YES
URINE TYPE: ABNORMAL
UROBILINOGEN, URINE: 0.2 E.U./DL
WBC # BLD: 4.8 K/UL (ref 4–11)
WBC UA: 47 /HPF (ref 0–5)

## 2019-12-17 PROCEDURE — 96361 HYDRATE IV INFUSION ADD-ON: CPT

## 2019-12-17 PROCEDURE — 85025 COMPLETE CBC W/AUTO DIFF WBC: CPT

## 2019-12-17 PROCEDURE — 6360000002 HC RX W HCPCS: Performed by: NURSE PRACTITIONER

## 2019-12-17 PROCEDURE — 84484 ASSAY OF TROPONIN QUANT: CPT

## 2019-12-17 PROCEDURE — 93005 ELECTROCARDIOGRAM TRACING: CPT | Performed by: NURSE PRACTITIONER

## 2019-12-17 PROCEDURE — 80053 COMPREHEN METABOLIC PANEL: CPT

## 2019-12-17 PROCEDURE — 99285 EMERGENCY DEPT VISIT HI MDM: CPT

## 2019-12-17 PROCEDURE — 87086 URINE CULTURE/COLONY COUNT: CPT

## 2019-12-17 PROCEDURE — 74177 CT ABD & PELVIS W/CONTRAST: CPT

## 2019-12-17 PROCEDURE — 70450 CT HEAD/BRAIN W/O DYE: CPT

## 2019-12-17 PROCEDURE — 87186 SC STD MICRODIL/AGAR DIL: CPT

## 2019-12-17 PROCEDURE — 6360000002 HC RX W HCPCS: Performed by: EMERGENCY MEDICINE

## 2019-12-17 PROCEDURE — 87077 CULTURE AEROBIC IDENTIFY: CPT

## 2019-12-17 PROCEDURE — 71250 CT THORAX DX C-: CPT

## 2019-12-17 PROCEDURE — 2580000003 HC RX 258: Performed by: NURSE PRACTITIONER

## 2019-12-17 PROCEDURE — 81001 URINALYSIS AUTO W/SCOPE: CPT

## 2019-12-17 PROCEDURE — 96374 THER/PROPH/DIAG INJ IV PUSH: CPT

## 2019-12-17 PROCEDURE — 6360000004 HC RX CONTRAST MEDICATION: Performed by: NURSE PRACTITIONER

## 2019-12-17 PROCEDURE — 72125 CT NECK SPINE W/O DYE: CPT

## 2019-12-17 PROCEDURE — 96375 TX/PRO/DX INJ NEW DRUG ADDON: CPT

## 2019-12-17 RX ORDER — MORPHINE SULFATE 2 MG/ML
2 INJECTION, SOLUTION INTRAMUSCULAR; INTRAVENOUS ONCE
Status: COMPLETED | OUTPATIENT
Start: 2019-12-17 | End: 2019-12-17

## 2019-12-17 RX ORDER — MORPHINE SULFATE 4 MG/ML
4 INJECTION, SOLUTION INTRAMUSCULAR; INTRAVENOUS ONCE
Status: COMPLETED | OUTPATIENT
Start: 2019-12-17 | End: 2019-12-17

## 2019-12-17 RX ORDER — ONDANSETRON 2 MG/ML
4 INJECTION INTRAMUSCULAR; INTRAVENOUS EVERY 6 HOURS PRN
Status: DISCONTINUED | OUTPATIENT
Start: 2019-12-17 | End: 2019-12-18 | Stop reason: HOSPADM

## 2019-12-17 RX ORDER — 0.9 % SODIUM CHLORIDE 0.9 %
1000 INTRAVENOUS SOLUTION INTRAVENOUS ONCE
Status: COMPLETED | OUTPATIENT
Start: 2019-12-17 | End: 2019-12-17

## 2019-12-17 RX ADMIN — ONDANSETRON 4 MG: 2 INJECTION INTRAMUSCULAR; INTRAVENOUS at 17:36

## 2019-12-17 RX ADMIN — SODIUM CHLORIDE 1000 ML: 9 INJECTION, SOLUTION INTRAVENOUS at 17:36

## 2019-12-17 RX ADMIN — MORPHINE SULFATE 4 MG: 4 INJECTION INTRAVENOUS at 20:55

## 2019-12-17 RX ADMIN — Medication 1 G: at 19:58

## 2019-12-17 RX ADMIN — MORPHINE SULFATE 2 MG: 2 INJECTION, SOLUTION INTRAMUSCULAR; INTRAVENOUS at 17:36

## 2019-12-17 RX ADMIN — IOPAMIDOL 75 ML: 755 INJECTION, SOLUTION INTRAVENOUS at 19:19

## 2019-12-17 ASSESSMENT — PAIN SCALES - GENERAL
PAINLEVEL_OUTOF10: 9
PAINLEVEL_OUTOF10: 10
PAINLEVEL_OUTOF10: 10

## 2019-12-18 VITALS
OXYGEN SATURATION: 93 % | HEART RATE: 70 BPM | DIASTOLIC BLOOD PRESSURE: 93 MMHG | BODY MASS INDEX: 32.62 KG/M2 | TEMPERATURE: 98.6 F | RESPIRATION RATE: 15 BRPM | WEIGHT: 203 LBS | HEIGHT: 66 IN | SYSTOLIC BLOOD PRESSURE: 171 MMHG

## 2019-12-18 LAB
EKG ATRIAL RATE: 67 BPM
EKG DIAGNOSIS: NORMAL
EKG P AXIS: 54 DEGREES
EKG P-R INTERVAL: 144 MS
EKG Q-T INTERVAL: 404 MS
EKG QRS DURATION: 88 MS
EKG QTC CALCULATION (BAZETT): 426 MS
EKG R AXIS: 29 DEGREES
EKG T AXIS: 45 DEGREES
EKG VENTRICULAR RATE: 67 BPM

## 2019-12-18 PROCEDURE — 93010 ELECTROCARDIOGRAM REPORT: CPT | Performed by: INTERNAL MEDICINE

## 2019-12-19 LAB
ORGANISM: ABNORMAL
URINE CULTURE, ROUTINE: ABNORMAL

## 2020-02-24 ENCOUNTER — HOSPITAL ENCOUNTER (OUTPATIENT)
Dept: WOUND CARE | Age: 65
Discharge: HOME | End: 2020-02-24
Payer: MEDICARE

## 2020-02-24 VITALS
HEART RATE: 98 BPM | DIASTOLIC BLOOD PRESSURE: 117 MMHG | RESPIRATION RATE: 18 BRPM | TEMPERATURE: 97.9 F | SYSTOLIC BLOOD PRESSURE: 166 MMHG

## 2020-02-24 PROCEDURE — 11042 DBRDMT SUBQ TIS 1ST 20SQCM/<: CPT

## 2020-02-24 RX ORDER — LIDOCAINE 40 MG/G
CREAM TOPICAL PRN
Status: DISCONTINUED | OUTPATIENT
Start: 2020-02-24 | End: 2020-02-25 | Stop reason: HOSPADM

## 2020-02-24 ASSESSMENT — PAIN DESCRIPTION - LOCATION: LOCATION: GENERALIZED

## 2020-02-24 ASSESSMENT — PAIN DESCRIPTION - PAIN TYPE: TYPE: CHRONIC PAIN

## 2020-02-24 ASSESSMENT — PAIN SCALES - GENERAL: PAINLEVEL_OUTOF10: 9

## 2020-02-24 NOTE — PROGRESS NOTES
Marvin [facility]  Progress Note and Procedure Note      Dianne Keller  AGE: 62 y.o. GENDER: female  : 1955  TODAY'S DATE:  2020    Subjective:     Chief Complaint   Patient presents with    Wound Check     Left foot         HISTORY of PRESENT ILLNESS HPI     Doris Peterson is a 62 y.o. female who presents today for wound evaluation. History of Wound: Pt presents to clinic today for evaluation of B/L foot ulcers. She states she was in the hospital for cellulitis and edema of the b/l LE. She had MRI that showed no evidence of OM. She was d/c on oral abx. She does admit to severe pain b/l LE. Pt has been using Santyl except this last week she ran out. She has not been seen in several months due to falling and breaking her ribs.    Wound Pain:  moderate  Severity:  6 / 10   Wound Type:  venous and arterial  Modifying Factors:  edema, venous stasis, shear force and arterial insufficiency  Associated Signs/Symptoms:  edema and pain        PAST MEDICAL HISTORY        Diagnosis Date    Anxiety     Arthritis     Blood transfusion reaction     [facility]hn's disease ([facility] [address]     Depression     GERD (gastroesophageal reflux disease)     Hiatal hernia 2014    Hx of blood clots     Hypertension     MRSA (methicillin resistant staph aureus) culture positive 2018    urine    Neuropathy     Pneumonia 2014    Sinus headache     SOB (shortness of breath)     VRE (vancomycin resistant enterococcus) culture positive 10/08/2018    abd culture       PAST SURGICAL HISTORY    Past Surgical History:   Procedure Laterality Date    ABDOMEN SURGERY      ABDOMINAL ADHESION SURGERY  2018    BLADDER SUSPENSION      COLONOSCOPY      COLONOSCOPY  9/14/15    sigmoid colon biopsy     COLONOSCOPY  2018    COLONOSCOPY  2019    COLONOSCOPY, POSSIBLE BIOPSY, POSSIBLE POLYPECTOMY    COLONOSCOPY N/A 2019    COLONOSCOPY WITH BIOPSY performed by Kusum Brandt MD at [address] COLONOSCOPY N/A 6/7/2019    COLONOSCOPY DIAGNOSTIC/STOMA performed by Emanuel Hernandez MD at [address] N/A 10/8/2018    EXPLORATORY LAPAROTOMY WITH COLOSTOMY performed by Rose Murrieta MD at [address] Left 6/25/14    excision plantar left hallux    FOOT SURGERY  6/3/15    PLANTAR PLATE REPAIR BILATERAL, ARTHROTOMY MPJ 2ND BILATERAL    FOOT SURGERY Left 08/05/2002    Excision second metatarsal head left    FRACTURE SURGERY      rt hip    HAND CARPECTOMY Bilateral     HEEL SPUR SURGERY      HERNIA REPAIR      HYSTERECTOMY      bladder surgery    JOINT REPLACEMENT      rt hip, L shoulder replacement 11/2014    KNEE SURGERY Bilateral     arthrosvopic    LEG SURGERY Right     CA SECD CLOS SURG WND EXTEN/COMPLIC N/A 83/06/5200    SECONDARY WOUND CLOSURE OF ABDOMINAL WOUND performed by Rose Murrieta MD at [facility] 7/17/2019    FLEXIBLE SIGMOIDOSCOPY performed by Rose Murrieta MD at [address] [facility]  01/15/2018    with biopsies    SMALL INTESTINE SURGERY N/A 7/17/2019    COLOSTOMY CLOSURE WITH COLORECTAL ANASTOMOSIS performed by Rose Murrieta MD at [place] [address]  6/14/13    and colonsocopy with antral erosion biopsies       FAMILY HISTORY    Family History   Problem Relation Age of Onset    High Blood Pressure Mother     High Blood Pressure Father     Kidney Disease Sister        SOCIAL HISTORY    Social History     Tobacco Use    Smoking status: Current Every Day Smoker     Packs/day: 1.00     Years: 10.00     Pack years: 10.00     Types: Cigarettes, E-Cigarettes    Smokeless tobacco: Never Used    Tobacco comment: CUT BACK TO 1/2 PPD WITH E CIG 6-2019   Substance Use Topics    Alcohol use: Yes     Alcohol/week: 2.0 standard drinks     Types: 2 Shots of liquor per week     Comment: 6 DRINKS A WEEK OR LESS    Drug use:  No feet      zolpidem (AMBIEN) 10 MG tablet Take 10 mg by mouth nightly as needed for Sleep .  potassium chloride SA (K-DUR;KLOR-CON M) 20 MEQ tablet Take 1 tablet by mouth 2 times daily. 60 tablet 3     No current facility-administered medications on file prior to encounter. REVIEW OF SYSTEMS    Pertinent items are noted in HPI. Objective:      BP (!) 166/117   Pulse 98   Temp 97.9 °F (36.6 °C)   Resp 18     PHYSICAL EXAM    Vascular: Vascular status Impaired  palpable pedal pulses, right DP1/4 and PT1/4, left DP1/4 and PT1/4. Kylie Simple growthAbsent  both lower extremities and feet.  Skin temperature is warm to warm from pretibial area to distal digits bilateral.  Exam is negative for pallor, cyanosis or signs of acute vascular compromise bilaterally.  Positive for rubor. exam is positive for edema bilateral lower extremity.  Varicosities Present bilateral lower extremity. Neuro: Neurologic status normal bilateral with epicritic Present , proprioceptive Present, vibratory sensation Present and protopathic Present.  DTRs Present bilateral Achilles. There were no reproducible neuritic symptoms on exam bilateral feet/ankles. Derm: Ulceration to bilateral feet.  Ecchymosis Absent  bilateral feet/foot.  Dorsal ulcers show fibrotic base with no SOI  Musculoskeletal: Pain with palpation of the foot whether on the wounds or distal toes she complains of pain.  5/5 muscle strength in/eversion and dorsi/plantarflexion bilateral feet.  No gross instability noted.       Assessment:     Patient Active Problem List   Diagnosis    Essential hypertension, benign    Atherosclerosis of native arteries of extremities with intermittent claudication, bilateral legs (HCC)    Anemia    Hyponatremia    [facility]'s colitis ([facility] [address]    Hypomagnesemia    DVT (deep venous thrombosis) (HCC)    PAD (peripheral artery disease) (HCC)    Leg swelling    Venous insufficiency    Chronic venous hypertension (idiopathic) with inflammation of bilateral lower extremity    Foot infection    Cellulitis of left foot    Cellulitis and abscess of leg       Procedure Note    Performed by: Alistair Ivan DPM    Consent obtained: Yes    Time out taken:  Yes    Pain Control: Anesthetic  Anesthetic: 4% Lidocaine Cream     Debridement:Excisional Debridement    Using curette the wound was sharply debrided    down through and including the removal of epidermis, dermis and subcutaneous tissue.         Devitalized Tissue Debrided:  fibrin, biofilm, slough and callus    Pre Debridement Measurements:  Are located in the Wound Documentation Flow Sheet    Wound #: 1     Post  Debridement Measurements:  Wound 02/24/20 Foot Right;Dorsal #1 (Active)   Wound Image   2/24/2020  3:14 PM   Wound Cleansed Rinsed/Irrigated with saline 2/24/2020  3:14 PM   Wound Length (cm) 1 cm 2/24/2020  3:14 PM   Wound Width (cm) 0.6 cm 2/24/2020  3:14 PM   Wound Depth (cm) 0.1 cm 2/24/2020  3:14 PM   Wound Surface Area (cm^2) 0.6 cm^2 2/24/2020  3:14 PM   Wound Volume (cm^3) 0.06 cm^3 2/24/2020  3:14 PM   Post-Procedure Length (cm) 1 cm 2/24/2020  3:42 PM   Post-Procedure Width (cm) 0.6 cm 2/24/2020  3:42 PM   Post-Procedure Depth (cm) 0.1 cm 2/24/2020  3:42 PM   Post-Procedure Surface Area (cm^2) 0.6 cm^2 2/24/2020  3:42 PM   Post-Procedure Volume (cm^3) 0.06 cm^3 2/24/2020  3:42 PM   Wound Assessment Bleeding 2/24/2020  3:42 PM   Drainage Amount Moderate 2/24/2020  3:42 PM   Odor None 2/24/2020  3:14 PM   [place]%Wound Bed 80 2/24/2020  3:14 PM   Red%Wound Bed 0 2/24/2020  3:14 PM   Yellow%Wound Bed 20 2/24/2020  3:14 PM   Black%Wound Bed 0 2/24/2020  3:14 PM   Purple%Wound Bed 0 2/24/2020  3:14 PM   Other%Wound Bed 0 2/24/2020  3:14 PM   Number of days: 0       Wound 02/24/20 Toe (Comment  which one) Right #2 (Active)   Wound Image   2/24/2020  3:14 PM   Wound Cleansed Rinsed/Irrigated with saline 2/24/2020  3:14 PM   Wound Length (cm) 0.3 cm 2/24/2020  3:14 PM   Wound Width left foot, and left big toe, Collagen , dry dressing.  Betadine to the toes on the left foot. Change dressing every other day. Wear post op shoe on the right and left foot. Do not walk in your home bare foot.     Written Patient Discharge Instructions Given            Electronically signed by Bernie Berg DPM on 2/24/2020 at 3:50 PM

## 2020-03-04 ENCOUNTER — HOSPITAL ENCOUNTER (OUTPATIENT)
Dept: WOUND CARE | Age: 65
Discharge: HOME | End: 2020-03-04
Payer: MEDICARE

## 2020-03-04 VITALS
DIASTOLIC BLOOD PRESSURE: 101 MMHG | TEMPERATURE: 98.2 F | HEART RATE: 78 BPM | SYSTOLIC BLOOD PRESSURE: 160 MMHG | RESPIRATION RATE: 18 BRPM

## 2020-03-04 PROCEDURE — 11042 DBRDMT SUBQ TIS 1ST 20SQCM/<: CPT

## 2020-03-04 PROCEDURE — 11042 DBRDMT SUBQ TIS 1ST 20SQCM/<: CPT | Performed by: NURSE PRACTITIONER

## 2020-03-04 NOTE — PLAN OF CARE
Discharge instructions given. Patient verbalized understanding. Return to [address] [place] [address],[address] in 1 week.

## 2020-03-06 PROBLEM — S91.302A OPEN WOUND OF LEFT FOOT WITH COMPLICATION: Status: ACTIVE | Noted: 2020-03-06

## 2020-03-06 NOTE — PROGRESS NOTES
Marvin [facility]  Progress Note and Procedure Note      Dianne Keller  AGE: 62 y.o. GENDER: female  : 1955  TODAY'S DATE:  3/4/2020    Subjective:     Chief Complaint   Patient presents with    Wound Check     left lower extremity         HISTORY of PRESENT ILLNESS HPI  Mae Conroy is a 62 y.o. female who presents today for wound evaluation. History of Wound: Pt presents to clinic today for evaluation of B/L foot ulcers. She states she was in the hospital for cellulitis and edema of the bilateral LE. She had MRI that showed no evidence of OM. She was d/c on oral abx. She does admit to severe pain b/l LE. Pt has been using Santyl except this last week she ran out. She has not been seen in several months due to falling and breaking her ribs.    Wound Pain:  moderate  Severity:  6 / 10   Wound Type:  venous and arterial  Modifying Factors:  edema, venous stasis, shear force and arterial insufficiency  Associated Signs/Symptoms:  edema and pain                                          PAST MEDICAL HISTORY        Diagnosis Date    Anxiety     Arthritis     Blood transfusion reaction     [facility]'s disease ([facility] [address]     Depression     GERD (gastroesophageal reflux disease)     Hiatal hernia 2014    Hx of blood clots     Hypertension     MRSA (methicillin resistant staph aureus) culture positive 2018    urine    Neuropathy     Pneumonia 2014    Sinus headache     SOB (shortness of breath)     VRE (vancomycin resistant enterococcus) culture positive 10/08/2018    abd culture       PAST SURGICAL HISTORY    Past Surgical History:   Procedure Laterality Date    ABDOMEN SURGERY      ABDOMINAL ADHESION SURGERY  2018    BLADDER SUSPENSION      COLONOSCOPY      COLONOSCOPY  9/14/15    sigmoid colon biopsy     COLONOSCOPY  2018    COLONOSCOPY  2019    COLONOSCOPY, POSSIBLE BIOPSY, POSSIBLE POLYPECTOMY    COLONOSCOPY N/A 2019 Comment: 6 DRINKS A WEEK OR LESS    Drug use: No     The patient was instructed/counseled on smoking cessation. ALLERGIES    Allergies   Allergen Reactions    Ace Inhibitors Swelling    Skelaxin [Metaxalone] Swelling       MEDICATIONS    Current Outpatient Medications on File Prior to Encounter   Medication Sig Dispense Refill    gabapentin (NEURONTIN) 100 MG capsule Take 2 capsules by mouth 3 times daily for 30 days. 180 capsule 0    hydrALAZINE (APRESOLINE) 25 MG tablet Take 1 tablet by mouth every 8 hours 90 tablet 0    lidocaine (XYLOCAINE) 2 % jelly Apply locally in painful area      gentamicin (GARAMYCIN) 0.1 % cream Apply topically daily. 1 Tube 0    busPIRone (BUSPAR) 10 MG tablet Take 10 mg by mouth 3 times daily as needed      fluticasone (FLONASE) 50 MCG/ACT nasal spray 1 spray by Each Nostril route daily as needed for Rhinitis      nystatin (MYCOSTATIN) 262757 UNIT/GM cream Apply topically 2 times daily as needed for Dry Skin Apply topically 2 times daily.  balsalazide (COLAZAL) 750 MG capsule Take 3,000 mg by mouth daily Take 4 capsules (total 3000 mg) daily each morning.       mupirocin (BACTROBAN) 2 % ointment Apply daily 1 Tube 0    ferrous sulfate 325 (65 Fe) MG tablet Take 325 mg by mouth 2 times daily       omeprazole (PRILOSEC) 20 MG delayed release capsule Take 20 mg by mouth Daily       furosemide (LASIX) 40 MG tablet Take 1 tablet by mouth daily 60 tablet 3    baclofen (LIORESAL) 10 MG tablet Take 1 tablet by mouth 3 times daily 90 tablet 1    atenolol (TENORMIN) 100 MG tablet Take 1 tablet by mouth daily 30 tablet 3    ondansetron (ZOFRAN ODT) 4 MG disintegrating tablet Take 1 tablet by mouth every 6 hours as needed for Nausea or Vomiting 20 tablet 0    aspirin-acetaminophen-caffeine (EXCEDRIN MIGRAINE) 250-250-65 MG per tablet Take 2 tablets by mouth every 6 hours as needed for Headaches       DULoxetine (CYMBALTA) 60 MG extended release capsule Take 60 mg by

## 2020-03-09 ENCOUNTER — HOSPITAL ENCOUNTER (OUTPATIENT)
Dept: WOUND CARE | Age: 65
Discharge: HOME | End: 2020-03-09
Payer: MEDICARE

## 2020-03-09 PROCEDURE — 11042 DBRDMT SUBQ TIS 1ST 20SQCM/<: CPT | Performed by: PODIATRIST

## 2020-03-09 PROCEDURE — 11042 DBRDMT SUBQ TIS 1ST 20SQCM/<: CPT

## 2020-03-09 RX ORDER — LIDOCAINE 40 MG/G
CREAM TOPICAL PRN
Status: DISCONTINUED | OUTPATIENT
Start: 2020-03-09 | End: 2020-03-10 | Stop reason: HOSPADM

## 2020-03-09 ASSESSMENT — PAIN DESCRIPTION - ONSET: ONSET: ON-GOING

## 2020-03-09 ASSESSMENT — PAIN DESCRIPTION - LOCATION: LOCATION: FOOT

## 2020-03-09 ASSESSMENT — PAIN DESCRIPTION - PROGRESSION: CLINICAL_PROGRESSION: NOT CHANGED

## 2020-03-09 ASSESSMENT — PAIN DESCRIPTION - FREQUENCY: FREQUENCY: INTERMITTENT

## 2020-03-09 NOTE — PROGRESS NOTES
Marvin Ramsey  Progress Note and Procedure Note      Dianne Keller  AGE: 62 y.o. GENDER: female  : 1955  TODAY'S DATE:  3/9/2020    Subjective:     Chief Complaint   Patient presents with    Wound Check     Bilateral feet wounds         HISTORY of PRESENT ILLNESS HPI     Stephanie Gramajo is a 62 y.o. female who presents today for wound evaluation. History of Wound: Pt presents to clinic today for evaluation of B/L foot ulcers. She states she was in the hospital for cellulitis and edema of the b/l LE. She had MRI that showed no evidence of OM. She was d/c on oral abx. She does admit to severe pain b/l LE. Pt has been using Santyl except this last week she ran out. She has not been seen in several months due to falling and breaking her ribs.    Wound Pain:  moderate  Severity:  5 / 10   Wound Type:  venous and arterial  Modifying Factors:  edema, venous stasis and arterial insufficiency  Associated Signs/Symptoms:  edema and pain        PAST MEDICAL HISTORY        Diagnosis Date    Anxiety     Arthritis     Blood transfusion reaction     [facility]'s disease ([facility] [address]     Depression     GERD (gastroesophageal reflux disease)     Hiatal hernia 2014    Hx of blood clots     Hypertension     MRSA (methicillin resistant staph aureus) culture positive 2018    urine    Neuropathy     Pneumonia 2014    Sinus headache     SOB (shortness of breath)     VRE (vancomycin resistant enterococcus) culture positive 10/08/2018    abd culture       PAST SURGICAL HISTORY    Past Surgical History:   Procedure Laterality Date    ABDOMEN SURGERY      ABDOMINAL ADHESION SURGERY  2018    BLADDER SUSPENSION      COLONOSCOPY      COLONOSCOPY  9/14/15    sigmoid colon biopsy     COLONOSCOPY  2018    COLONOSCOPY  2019    COLONOSCOPY, POSSIBLE BIOPSY, POSSIBLE POLYPECTOMY    COLONOSCOPY N/A 2019    COLONOSCOPY WITH BIOPSY performed by Aline Carreon MD at [address] [facility]  COLONOSCOPY N/A 6/7/2019    COLONOSCOPY DIAGNOSTIC/STOMA performed by Facundo Lopez MD at [address] [facility] [address] N/A 10/8/2018    EXPLORATORY LAPAROTOMY WITH COLOSTOMY performed by Abdoul Gottlieb MD at [address] Left 6/25/14    excision plantar left hallux    FOOT SURGERY  6/3/15    PLANTAR PLATE REPAIR BILATERAL, ARTHROTOMY MPJ 2ND BILATERAL    FOOT SURGERY Left 08/05/2002    Excision second metatarsal head left    FRACTURE SURGERY      rt hip    HAND CARPECTOMY Bilateral     HEEL SPUR SURGERY      HERNIA REPAIR      HYSTERECTOMY      bladder surgery    JOINT REPLACEMENT      rt hip, L shoulder replacement 11/2014    KNEE SURGERY Bilateral     arthrosvopic    LEG SURGERY Right     IN SECD CLOS SURG WND EXTEN/COMPLIC N/A 77/59/6896    SECONDARY WOUND CLOSURE OF ABDOMINAL WOUND performed [facility] Abd[facility]b MD at [facility] 7/17/2019    FLEXIBLE SIGMOIDOSCOPY performed by Abdoul Gottlieb MD at [address] [facility]  01/15/2018    with biopsies    SMALL INTESTINE SURGERY N/A 7/17/2019    COLOSTOMY CLOSURE WITH COLORECTAL ANASTOMOSIS performed by Abdoul Gottlieb MD at [place] [address]  6/14/13    and colonsocopy with antral erosion biopsies       FAMILY HISTORY    Family History   Problem Relation Age of Onset    High Blood Pressure Mother     High Blood Pressure Father     Kidney Disease Sister        SOCIAL HISTORY    Social History     Tobacco Use    Smoking status: Current Every Day Smoker     Packs/day: 1.00     Years: 10.00     Pack years: 10.00     Types: Cigarettes, E-Cigarettes    Smokeless tobacco: Never Used    Tobacco comment: CUT BACK TO 1/2 PPD WITH E CIG 6-2019   Substance Use Topics    Alcohol use: Yes     Alcohol/week: 2.0 standard drinks     Types: 2 Shots of liquor per week     Comment: 6 DRINKS A WEEK OR LESS    Drug use:  No ALLERGIES    Allergies   Allergen Reactions    Ace Inhibitors Swelling    Skelaxin [Metaxalone] Swelling       MEDICATIONS    Current Outpatient Medications on File Prior to Encounter   Medication Sig Dispense Refill    gabapentin (NEURONTIN) 100 MG capsule Take 2 capsules by mouth 3 times daily for 30 days. 180 capsule 0    hydrALAZINE (APRESOLINE) 25 MG tablet Take 1 tablet by mouth every 8 hours 90 tablet 0    lidocaine (XYLOCAINE) 2 % jelly Apply locally in painful area      gentamicin (GARAMYCIN) 0.1 % cream Apply topically daily. 1 Tube 0    busPIRone (BUSPAR) 10 MG tablet Take 10 mg by mouth 3 times daily as needed      fluticasone (FLONASE) 50 MCG/ACT nasal spray 1 spray by Each Nostril route daily as needed for Rhinitis      nystatin (MYCOSTATIN) 594719 UNIT/GM cream Apply topically 2 times daily as needed for Dry Skin Apply topically 2 times daily.  balsalazide (COLAZAL) 750 MG capsule Take 3,000 mg by mouth daily Take 4 capsules (total 3000 mg) daily each morning.       mupirocin (BACTROBAN) 2 % ointment Apply daily 1 Tube 0    ferrous sulfate 325 (65 Fe) MG tablet Take 325 mg by mouth 2 times daily       omeprazole (PRILOSEC) 20 MG delayed release capsule Take 20 mg by mouth Daily       furosemide (LASIX) 40 MG tablet Take 1 tablet by mouth daily 60 tablet 3    baclofen (LIORESAL) 10 MG tablet Take 1 tablet by mouth 3 times daily 90 tablet 1    atenolol (TENORMIN) 100 MG tablet Take 1 tablet by mouth daily 30 tablet 3    ondansetron (ZOFRAN ODT) 4 MG disintegrating tablet Take 1 tablet by mouth every 6 hours as needed for Nausea or Vomiting 20 tablet 0    aspirin-acetaminophen-caffeine (EXCEDRIN MIGRAINE) 250-250-65 MG per tablet Take 2 tablets by mouth every 6 hours as needed for Headaches       DULoxetine (CYMBALTA) 60 MG extended release capsule Take 60 mg by mouth 2 times daily       lidocaine (XYLOCAINE) 5 % ointment Apply topically as needed Apply topically as needed to hypertension (idiopathic) with inflammation of bilateral lower extremity    Foot infection    Cellulitis of left foot    Cellulitis and abscess of leg    Open wound of left foot with complication       Procedure Note    Performed by: Eliecer Vincent DPM    Consent obtained: Yes    Time out taken:  Yes    Pain Control:       Debridement:Excisional Debridement    Using curette the wound was sharply debrided    down through and including the removal of epidermis, dermis and subcutaneous tissue.         Devitalized Tissue Debrided:  fibrin, biofilm, slough and necrotic/eschar    Pre Debridement Measurements:  Are located in the Wound Documentation Flow Sheet    Wound #: 1, 2, 3, 4 and 5     Post  Debridement Measurements:  Wound 02/24/20 Foot Right;Dorsal #1 (Active)   Wound Image   2/24/2020  3:14 PM   Wound Pressure Stage  2 3/9/2020  3:40 PM   Dressing/Treatment Collagen;Dry dressing 3/4/2020  2:50 PM   Wound Cleansed Rinsed/Irrigated with saline 3/9/2020  3:40 PM   Wound Length (cm) 0.3 cm 3/9/2020  3:40 PM   Wound Width (cm) 0.2 cm 3/9/2020  3:40 PM   Wound Depth (cm) 0.1 cm 3/9/2020  3:40 PM   Wound Surface Area (cm^2) 0.06 cm^2 3/9/2020  3:40 PM   Change in Wound Size % (l*w) 90 3/9/2020  3:40 PM   Wound Volume (cm^3) 0.01 cm^3 3/9/2020  3:40 PM   Wound Healing % 83 3/9/2020  3:40 PM   Post-Procedure Length (cm) 0.3 cm 3/9/2020  3:56 PM   Post-Procedure Width (cm) 0.2 cm 3/9/2020  3:56 PM   Post-Procedure Depth (cm) 0.1 cm 3/9/2020  3:56 PM   Post-Procedure Surface Area (cm^2) 0.06 cm^2 3/9/2020  3:56 PM   Post-Procedure Volume (cm^3) 0.01 cm^3 3/9/2020  3:56 PM   Wound Assessment Bleeding 3/9/2020  3:56 PM   Drainage Amount Moderate 3/9/2020  3:56 PM   Drainage Description Serosanguinous 3/9/2020  3:40 PM   Odor None 3/4/2020  1:56 PM   Shweta-wound Assessment Dry 3/9/2020  3:40 PM   [place]%Wound Bed 0 3/9/2020  3:40 PM   Red%Wound Bed 0 3/9/2020  3:40 PM   Yellow%Wound Bed 100 3/9/2020  3:40 PM   Black%Wound Bed 0 3/9/2020  3:40 PM   Purple%Wound Bed 0 3/9/2020  3:40 PM   Other%Wound Bed 0 3/4/2020  1:56 PM   Number of days: 13       Wound 02/24/20 Foot Left;Dorsal #2 (Active)   Wound Image   2/24/2020  3:14 PM   Wound Pressure Stage  3 3/9/2020  3:40 PM   Dressing/Treatment Collagen;Dry dressing 3/4/2020  2:50 PM   Wound Cleansed Rinsed/Irrigated with saline 3/9/2020  3:40 PM   Wound Length (cm) 0.3 cm 3/9/2020  3:40 PM   Wound Width (cm) 0.5 cm 3/9/2020  3:40 PM   Wound Depth (cm) 0.1 cm 3/9/2020  3:40 PM   Wound Surface Area (cm^2) 0.15 cm^2 3/9/2020  3:40 PM   Change in Wound Size % (l*w) -66.67 3/9/2020  3:40 PM   Wound Volume (cm^3) 0.02 cm^3 3/9/2020  3:40 PM   Wound Healing % -100 3/9/2020  3:40 PM   Post-Procedure Length (cm) 0.3 cm 3/9/2020  3:56 PM   Post-Procedure Width (cm) 0.5 cm 3/9/2020  3:56 PM   Post-Procedure Depth (cm) 0.1 cm 3/9/2020  3:56 PM   Post-Procedure Surface Area (cm^2) 0.15 cm^2 3/9/2020  3:56 PM   Post-Procedure Volume (cm^3) 0.02 cm^3 3/9/2020  3:56 PM   Wound Assessment Bleeding 3/9/2020  3:56 PM   Drainage Amount Moderate 3/9/2020  3:56 PM   Drainage Description Serosanguinous 3/9/2020  3:40 PM   Odor None 3/4/2020  1:56 PM   Shweta-wound Assessment Clean;Dry 3/9/2020  3:40 PM   [place]%Wound Bed 60 3/9/2020  3:40 PM   Red%Wound Bed 0 3/9/2020  3:40 PM   Yellow%Wound Bed 40 3/9/2020  3:40 PM   Black%Wound Bed 0 3/9/2020  3:40 PM   Purple%Wound Bed 0 3/9/2020  3:40 PM   Other%Wound Bed 0 3/9/2020  3:40 PM   Number of days: 13       Wound 02/24/20 Toe (Comment  which one) Left #3 3 rd toe (Active)   Wound Image   2/24/2020  3:14 PM   Wound Arterial 3/9/2020  3:40 PM   Dressing/Treatment Betadine swabs 3/4/2020  2:50 PM   Wound Cleansed Rinsed/Irrigated with saline 3/9/2020  3:40 PM   Wound Length (cm) 2 cm 3/9/2020  3:40 PM   Wound Width (cm) 2.7 cm 3/9/2020  3:40 PM   Wound Depth (cm) 0.1 cm 3/9/2020  3:40 PM   Wound Surface Area (cm^2) 5.4 cm^2 3/9/2020  3:40 PM   Change in Wound Size %

## 2020-03-09 NOTE — PLAN OF CARE
Discharge instructions given. Patient verbalized understanding. Return to [facility] in 2 weeks.

## 2020-03-23 ENCOUNTER — HOSPITAL ENCOUNTER (OUTPATIENT)
Dept: WOUND CARE | Age: 65
Discharge: HOME | End: 2020-03-23

## 2020-03-30 ENCOUNTER — HOSPITAL ENCOUNTER (OUTPATIENT)
Dept: WOUND CARE | Age: 65
Discharge: HOME | End: 2020-03-30

## 2020-07-07 ENCOUNTER — HOSPITAL ENCOUNTER (OUTPATIENT)
Dept: WOUND CARE | Age: 65
Discharge: HOME | End: 2020-07-07
Payer: MEDICARE

## 2020-07-07 VITALS — BODY MASS INDEX: 33.09 KG/M2 | WEIGHT: 205 LBS | TEMPERATURE: 96.8 F

## 2020-07-07 PROCEDURE — 11042 DBRDMT SUBQ TIS 1ST 20SQCM/<: CPT

## 2020-07-07 PROCEDURE — 11042 DBRDMT SUBQ TIS 1ST 20SQCM/<: CPT | Performed by: SURGERY

## 2020-07-07 RX ORDER — LIDOCAINE 40 MG/G
CREAM TOPICAL ONCE
Status: CANCELLED | OUTPATIENT
Start: 2020-07-07

## 2020-07-07 RX ORDER — CLOBETASOL PROPIONATE 0.5 MG/G
OINTMENT TOPICAL ONCE
Status: CANCELLED | OUTPATIENT
Start: 2020-07-07

## 2020-07-07 RX ORDER — BACITRACIN ZINC AND POLYMYXIN B SULFATE 500; 1000 [USP'U]/G; [USP'U]/G
OINTMENT TOPICAL ONCE
Status: CANCELLED | OUTPATIENT
Start: 2020-07-07

## 2020-07-07 RX ORDER — GINSENG 100 MG
CAPSULE ORAL ONCE
Status: CANCELLED | OUTPATIENT
Start: 2020-07-07

## 2020-07-07 RX ORDER — BACITRACIN, NEOMYCIN, POLYMYXIN B 400; 3.5; 5 [USP'U]/G; MG/G; [USP'U]/G
OINTMENT TOPICAL ONCE
Status: CANCELLED | OUTPATIENT
Start: 2020-07-07

## 2020-07-07 RX ORDER — LIDOCAINE HYDROCHLORIDE 40 MG/ML
SOLUTION TOPICAL ONCE
Status: CANCELLED | OUTPATIENT
Start: 2020-07-07

## 2020-07-07 RX ORDER — LIDOCAINE 50 MG/G
OINTMENT TOPICAL ONCE
Status: CANCELLED | OUTPATIENT
Start: 2020-07-07

## 2020-07-07 RX ORDER — GENTAMICIN SULFATE 1 MG/G
OINTMENT TOPICAL ONCE
Status: CANCELLED | OUTPATIENT
Start: 2020-07-07

## 2020-07-07 RX ORDER — BETAMETHASONE DIPROPIONATE 0.05 %
OINTMENT (GRAM) TOPICAL ONCE
Status: CANCELLED | OUTPATIENT
Start: 2020-07-07

## 2020-07-07 RX ORDER — LIDOCAINE 40 MG/G
CREAM TOPICAL ONCE
Status: DISCONTINUED | OUTPATIENT
Start: 2020-07-07 | End: 2020-07-08 | Stop reason: HOSPADM

## 2020-07-07 ASSESSMENT — PAIN DESCRIPTION - PROGRESSION: CLINICAL_PROGRESSION: NOT CHANGED

## 2020-07-07 ASSESSMENT — PAIN DESCRIPTION - FREQUENCY: FREQUENCY: CONTINUOUS

## 2020-07-07 ASSESSMENT — PAIN SCALES - GENERAL: PAINLEVEL_OUTOF10: 7

## 2020-07-07 ASSESSMENT — PAIN DESCRIPTION - ONSET: ONSET: ON-GOING

## 2020-07-07 ASSESSMENT — PAIN DESCRIPTION - ORIENTATION: ORIENTATION: LEFT

## 2020-07-07 ASSESSMENT — PAIN DESCRIPTION - LOCATION: LOCATION: TOE (COMMENT WHICH ONE)

## 2020-07-07 NOTE — PROGRESS NOTES
Dianne CHETAN Krishna  AGE: 62 y.o. GENDER: female  : 1955  TODAY'S DATE:  2020    Chief Complaint   Patient presents with    Wound Check     left foot F/U        HISTORY of PRESENT ILLNESS HPI     Leonidas Otero is a 62 y.o. female who presents today for wound evaluation. History of Wound: Left third toe ulcer  Wound Pain:  moderate  Severity:  6 / 10   Wound Type:  neuropathic  Modifying Factors:  none  Associated Signs/Symptoms:  none    Procedure Note    Performed by: Sukhwinder Davison MD    Consent obtained: Yes    Time out taken:  Yes    Pain Control: Anesthetic  Anesthetic: 4% Lidocaine Cream     Debridement:Excisional Debridement    Using scissors and forceps the wound was sharply debrided    down through and including the removal of subcutaneous tissue.         Devitalized Tissue Debrided:  necrotic/eschar    Pre Debridement Measurements:  Are located in the Wound Documentation Flow Sheet    Wound #: 1     Post  Debridement Measurements:  Wound 20 Toe (Comment  which one) Left #1 3rd (Active)   Wound Image   2020  9:47 AM   Wound Other 2020  9:47 AM   Wound Cleansed Rinsed/Irrigated with saline 2020  9:47 AM   Wound Length (cm) 1.5 cm 2020  9:54 AM   Wound Width (cm) 2 cm 2020  9:54 AM   Wound Depth (cm) 0.2 cm 2020  9:54 AM   Wound Surface Area (cm^2) 3 cm^2 2020  9:54 AM   Wound Volume (cm^3) 0.6 cm^3 2020  9:54 AM   Wound Assessment Pink;Yellow 2020  9:54 AM   Drainage Amount None 2020  9:47 AM   Odor None 2020  9:47 AM   Shweta-wound Assessment Dry 2020  9:47 AM   Lago%Wound Bed 0 2020  9:47 AM   Red%Wound Bed 0 2020  9:47 AM   Yellow%Wound Bed 0 2020  9:47 AM   Black%Wound Bed 100 2020  9:47 AM   Purple%Wound Bed 0 2020  9:47 AM   Other%Wound Bed 0 2020  9:47 AM   Number of days: 0           Total Surface Area Debrided:  3 sq cm     Bleeding:  Minimal    Hemostasis Achieved:  by pressure    Procedural

## 2020-07-13 ENCOUNTER — HOSPITAL ENCOUNTER (OUTPATIENT)
Dept: WOUND CARE | Age: 65
Discharge: HOME | End: 2020-07-13

## 2020-07-15 ENCOUNTER — APPOINTMENT (OUTPATIENT)
Dept: GENERAL RADIOLOGY | Age: 65
DRG: 426 | End: 2020-07-15
Payer: MEDICAID

## 2020-07-15 ENCOUNTER — HOSPITAL ENCOUNTER (INPATIENT)
Age: 65
LOS: 8 days | Discharge: ANOTHER HOSPITAL | DRG: 426 | End: 2020-07-23
Attending: EMERGENCY MEDICINE | Admitting: INTERNAL MEDICINE
Payer: MEDICAID

## 2020-07-15 PROBLEM — E87.6 HYPOKALEMIA: Status: ACTIVE | Noted: 2020-07-15

## 2020-07-15 LAB
ANION GAP SERPL CALCULATED.3IONS-SCNC: 18 MMOL/L (ref 3–16)
BASOPHILS ABSOLUTE: 0 K/UL (ref 0–0.2)
BASOPHILS RELATIVE PERCENT: 0.6 %
BUN BLDV-MCNC: 8 MG/DL (ref 7–20)
CALCIUM SERPL-MCNC: 6.8 MG/DL (ref 8.3–10.6)
CHLORIDE BLD-SCNC: 82 MMOL/L (ref 99–110)
CO2: 25 MMOL/L (ref 21–32)
CREAT SERPL-MCNC: 0.7 MG/DL (ref 0.6–1.2)
EOSINOPHILS ABSOLUTE: 0.1 K/UL (ref 0–0.6)
EOSINOPHILS RELATIVE PERCENT: 1.1 %
GFR AFRICAN AMERICAN: >60
GFR NON-AFRICAN AMERICAN: >60
GLUCOSE BLD-MCNC: 91 MG/DL (ref 70–99)
HCT VFR BLD CALC: 33.3 % (ref 36–48)
HEMOGLOBIN: 11.6 G/DL (ref 12–16)
LYMPHOCYTES ABSOLUTE: 1.3 K/UL (ref 1–5.1)
LYMPHOCYTES RELATIVE PERCENT: 19.3 %
MAGNESIUM: 0.4 MG/DL (ref 1.8–2.4)
MCH RBC QN AUTO: 34.3 PG (ref 26–34)
MCHC RBC AUTO-ENTMCNC: 34.8 G/DL (ref 31–36)
MCV RBC AUTO: 98.4 FL (ref 80–100)
MONOCYTES ABSOLUTE: 1 K/UL (ref 0–1.3)
MONOCYTES RELATIVE PERCENT: 15.5 %
NEUTROPHILS ABSOLUTE: 4.1 K/UL (ref 1.7–7.7)
NEUTROPHILS RELATIVE PERCENT: 63.5 %
PDW BLD-RTO: 13.5 % (ref 12.4–15.4)
PLATELET # BLD: 245 K/UL (ref 135–450)
PMV BLD AUTO: 7 FL (ref 5–10.5)
POTASSIUM SERPL-SCNC: 2.4 MMOL/L (ref 3.5–5.1)
RBC # BLD: 3.39 M/UL (ref 4–5.2)
SODIUM BLD-SCNC: 125 MMOL/L (ref 136–145)
WBC # BLD: 6.5 K/UL (ref 4–11)

## 2020-07-15 PROCEDURE — 73562 X-RAY EXAM OF KNEE 3: CPT

## 2020-07-15 PROCEDURE — 1200000000 HC SEMI PRIVATE

## 2020-07-15 PROCEDURE — 90715 TDAP VACCINE 7 YRS/> IM: CPT | Performed by: PHYSICIAN ASSISTANT

## 2020-07-15 PROCEDURE — 6360000002 HC RX W HCPCS: Performed by: PHYSICIAN ASSISTANT

## 2020-07-15 PROCEDURE — 6360000002 HC RX W HCPCS: Performed by: INTERNAL MEDICINE

## 2020-07-15 PROCEDURE — 85025 COMPLETE CBC W/AUTO DIFF WBC: CPT

## 2020-07-15 PROCEDURE — 90471 IMMUNIZATION ADMIN: CPT | Performed by: PHYSICIAN ASSISTANT

## 2020-07-15 PROCEDURE — 99284 EMERGENCY DEPT VISIT MOD MDM: CPT

## 2020-07-15 PROCEDURE — 2060000000 HC ICU INTERMEDIATE R&B

## 2020-07-15 PROCEDURE — 96374 THER/PROPH/DIAG INJ IV PUSH: CPT

## 2020-07-15 PROCEDURE — 6360000002 HC RX W HCPCS: Performed by: EMERGENCY MEDICINE

## 2020-07-15 PROCEDURE — 36415 COLL VENOUS BLD VENIPUNCTURE: CPT

## 2020-07-15 PROCEDURE — 6370000000 HC RX 637 (ALT 250 FOR IP): Performed by: INTERNAL MEDICINE

## 2020-07-15 PROCEDURE — 83735 ASSAY OF MAGNESIUM: CPT

## 2020-07-15 PROCEDURE — 80048 BASIC METABOLIC PNL TOTAL CA: CPT

## 2020-07-15 PROCEDURE — 6370000000 HC RX 637 (ALT 250 FOR IP): Performed by: PHYSICIAN ASSISTANT

## 2020-07-15 PROCEDURE — 93005 ELECTROCARDIOGRAM TRACING: CPT | Performed by: PHYSICIAN ASSISTANT

## 2020-07-15 RX ORDER — LIDOCAINE HYDROCHLORIDE 20 MG/ML
JELLY TOPICAL PRN
Status: DISCONTINUED | OUTPATIENT
Start: 2020-07-15 | End: 2020-07-15 | Stop reason: ALTCHOICE

## 2020-07-15 RX ORDER — ATENOLOL 50 MG/1
100 TABLET ORAL DAILY
Status: DISCONTINUED | OUTPATIENT
Start: 2020-07-15 | End: 2020-07-23 | Stop reason: HOSPADM

## 2020-07-15 RX ORDER — POTASSIUM CHLORIDE 750 MG/1
40 TABLET, FILM COATED, EXTENDED RELEASE ORAL ONCE
Status: COMPLETED | OUTPATIENT
Start: 2020-07-15 | End: 2020-07-15

## 2020-07-15 RX ORDER — BALSALAZIDE DISODIUM 750 MG/1
3000 CAPSULE ORAL DAILY
Status: DISCONTINUED | OUTPATIENT
Start: 2020-07-15 | End: 2020-07-15 | Stop reason: CLARIF

## 2020-07-15 RX ORDER — GABAPENTIN 100 MG/1
200 CAPSULE ORAL 3 TIMES DAILY
Status: DISCONTINUED | OUTPATIENT
Start: 2020-07-15 | End: 2020-07-15 | Stop reason: ALTCHOICE

## 2020-07-15 RX ORDER — POTASSIUM CHLORIDE 20 MEQ/1
40 TABLET, EXTENDED RELEASE ORAL
Status: DISCONTINUED | OUTPATIENT
Start: 2020-07-15 | End: 2020-07-17

## 2020-07-15 RX ORDER — LIDOCAINE 50 MG/G
OINTMENT TOPICAL PRN
Status: DISCONTINUED | OUTPATIENT
Start: 2020-07-15 | End: 2020-07-15 | Stop reason: ALTCHOICE

## 2020-07-15 RX ORDER — DULOXETIN HYDROCHLORIDE 60 MG/1
60 CAPSULE, DELAYED RELEASE ORAL 2 TIMES DAILY
Status: DISCONTINUED | OUTPATIENT
Start: 2020-07-15 | End: 2020-07-23 | Stop reason: HOSPADM

## 2020-07-15 RX ORDER — ZOLPIDEM TARTRATE 10 MG/1
10 TABLET ORAL NIGHTLY PRN
Status: DISCONTINUED | OUTPATIENT
Start: 2020-07-15 | End: 2020-07-23 | Stop reason: HOSPADM

## 2020-07-15 RX ORDER — MAGNESIUM SULFATE IN WATER 40 MG/ML
2 INJECTION, SOLUTION INTRAVENOUS ONCE
Status: COMPLETED | OUTPATIENT
Start: 2020-07-15 | End: 2020-07-15

## 2020-07-15 RX ORDER — HYDRALAZINE HYDROCHLORIDE 25 MG/1
25 TABLET, FILM COATED ORAL EVERY 8 HOURS SCHEDULED
Status: DISCONTINUED | OUTPATIENT
Start: 2020-07-15 | End: 2020-07-23 | Stop reason: HOSPADM

## 2020-07-15 RX ORDER — BALSALAZIDE DISODIUM 750 MG/1
3000 CAPSULE ORAL DAILY
Status: DISCONTINUED | OUTPATIENT
Start: 2020-07-16 | End: 2020-07-23 | Stop reason: HOSPADM

## 2020-07-15 RX ORDER — FLUTICASONE PROPIONATE 50 MCG
1 SPRAY, SUSPENSION (ML) NASAL DAILY PRN
Status: DISCONTINUED | OUTPATIENT
Start: 2020-07-15 | End: 2020-07-23 | Stop reason: HOSPADM

## 2020-07-15 RX ORDER — BACLOFEN 10 MG/1
10 TABLET ORAL 3 TIMES DAILY
Status: DISCONTINUED | OUTPATIENT
Start: 2020-07-15 | End: 2020-07-23 | Stop reason: HOSPADM

## 2020-07-15 RX ORDER — AMOXICILLIN AND CLAVULANATE POTASSIUM 875; 125 MG/1; MG/1
1 TABLET, FILM COATED ORAL ONCE
Status: COMPLETED | OUTPATIENT
Start: 2020-07-15 | End: 2020-07-15

## 2020-07-15 RX ORDER — MORPHINE SULFATE 4 MG/ML
4 INJECTION, SOLUTION INTRAMUSCULAR; INTRAVENOUS ONCE
Status: COMPLETED | OUTPATIENT
Start: 2020-07-15 | End: 2020-07-15

## 2020-07-15 RX ORDER — ONDANSETRON 4 MG/1
4 TABLET, ORALLY DISINTEGRATING ORAL EVERY 6 HOURS PRN
Status: DISCONTINUED | OUTPATIENT
Start: 2020-07-15 | End: 2020-07-23 | Stop reason: HOSPADM

## 2020-07-15 RX ORDER — PANTOPRAZOLE SODIUM 40 MG/1
40 TABLET, DELAYED RELEASE ORAL
Status: DISCONTINUED | OUTPATIENT
Start: 2020-07-16 | End: 2020-07-23 | Stop reason: HOSPADM

## 2020-07-15 RX ORDER — ONDANSETRON 2 MG/ML
4 INJECTION INTRAMUSCULAR; INTRAVENOUS EVERY 6 HOURS PRN
Status: DISCONTINUED | OUTPATIENT
Start: 2020-07-15 | End: 2020-07-23 | Stop reason: HOSPADM

## 2020-07-15 RX ORDER — FERROUS SULFATE 325(65) MG
325 TABLET ORAL 2 TIMES DAILY
Status: DISCONTINUED | OUTPATIENT
Start: 2020-07-15 | End: 2020-07-23 | Stop reason: HOSPADM

## 2020-07-15 RX ORDER — POTASSIUM CHLORIDE 7.45 MG/ML
40 INJECTION INTRAVENOUS ONCE
Status: COMPLETED | OUTPATIENT
Start: 2020-07-15 | End: 2020-07-15

## 2020-07-15 RX ORDER — GENTAMICIN SULFATE 1 MG/G
CREAM TOPICAL 3 TIMES DAILY
Status: DISCONTINUED | OUTPATIENT
Start: 2020-07-15 | End: 2020-07-15 | Stop reason: ALTCHOICE

## 2020-07-15 RX ORDER — BUSPIRONE HYDROCHLORIDE 5 MG/1
10 TABLET ORAL 3 TIMES DAILY
Status: DISCONTINUED | OUTPATIENT
Start: 2020-07-15 | End: 2020-07-23 | Stop reason: HOSPADM

## 2020-07-15 RX ORDER — ONDANSETRON 4 MG/1
4 TABLET, ORALLY DISINTEGRATING ORAL EVERY 8 HOURS PRN
Qty: 20 TABLET | Refills: 0 | Status: SHIPPED | OUTPATIENT
Start: 2020-07-15

## 2020-07-15 RX ORDER — HYDROCODONE BITARTRATE AND ACETAMINOPHEN 5; 325 MG/1; MG/1
2 TABLET ORAL ONCE
Status: COMPLETED | OUTPATIENT
Start: 2020-07-15 | End: 2020-07-15

## 2020-07-15 RX ORDER — HYDROCODONE BITARTRATE AND ACETAMINOPHEN 5; 325 MG/1; MG/1
1 TABLET ORAL EVERY 6 HOURS PRN
Qty: 10 TABLET | Refills: 0 | Status: SHIPPED | OUTPATIENT
Start: 2020-07-15 | End: 2020-07-18

## 2020-07-15 RX ORDER — ACETAMINOPHEN, ASPIRIN AND CAFFEINE 250; 250; 65 MG/1; MG/1; MG/1
2 TABLET, FILM COATED ORAL EVERY 6 HOURS PRN
Status: DISCONTINUED | OUTPATIENT
Start: 2020-07-15 | End: 2020-07-23 | Stop reason: HOSPADM

## 2020-07-15 RX ORDER — HYDROMORPHONE HYDROCHLORIDE 1 MG/ML
0.5 INJECTION, SOLUTION INTRAMUSCULAR; INTRAVENOUS; SUBCUTANEOUS EVERY 4 HOURS PRN
Status: DISCONTINUED | OUTPATIENT
Start: 2020-07-15 | End: 2020-07-16

## 2020-07-15 RX ORDER — LANOLIN ALCOHOL/MO/W.PET/CERES
400 CREAM (GRAM) TOPICAL 2 TIMES DAILY
Status: DISCONTINUED | OUTPATIENT
Start: 2020-07-15 | End: 2020-07-22

## 2020-07-15 RX ORDER — NYSTATIN 100000 U/G
CREAM TOPICAL 2 TIMES DAILY
Status: DISCONTINUED | OUTPATIENT
Start: 2020-07-15 | End: 2020-07-23 | Stop reason: HOSPADM

## 2020-07-15 RX ORDER — POTASSIUM CHLORIDE AND SODIUM CHLORIDE 900; 300 MG/100ML; MG/100ML
INJECTION, SOLUTION INTRAVENOUS CONTINUOUS
Status: DISCONTINUED | OUTPATIENT
Start: 2020-07-15 | End: 2020-07-17

## 2020-07-15 RX ADMIN — DULOXETINE HYDROCHLORIDE 60 MG: 60 CAPSULE, DELAYED RELEASE ORAL at 21:46

## 2020-07-15 RX ADMIN — MAGNESIUM SULFATE HEPTAHYDRATE 2 G: 40 INJECTION, SOLUTION INTRAVENOUS at 16:59

## 2020-07-15 RX ADMIN — MAGNESIUM GLUCONATE 500 MG ORAL TABLET 400 MG: 500 TABLET ORAL at 21:46

## 2020-07-15 RX ADMIN — POTASSIUM CHLORIDE 40 MEQ: 750 TABLET, FILM COATED, EXTENDED RELEASE ORAL at 16:54

## 2020-07-15 RX ADMIN — POTASSIUM CHLORIDE 40 MEQ: 1500 TABLET, EXTENDED RELEASE ORAL at 21:57

## 2020-07-15 RX ADMIN — HYDROMORPHONE HYDROCHLORIDE 0.5 MG: 1 INJECTION, SOLUTION INTRAMUSCULAR; INTRAVENOUS; SUBCUTANEOUS at 21:49

## 2020-07-15 RX ADMIN — BUSPIRONE HYDROCHLORIDE 10 MG: 5 TABLET ORAL at 21:46

## 2020-07-15 RX ADMIN — POTASSIUM CHLORIDE 10 MEQ: 7.46 INJECTION, SOLUTION INTRAVENOUS at 18:48

## 2020-07-15 RX ADMIN — AMOXICILLIN AND CLAVULANATE POTASSIUM 1 TABLET: 875; 125 TABLET, FILM COATED ORAL at 16:54

## 2020-07-15 RX ADMIN — MORPHINE SULFATE 4 MG: 4 INJECTION INTRAVENOUS at 18:24

## 2020-07-15 RX ADMIN — TETANUS TOXOID, REDUCED DIPHTHERIA TOXOID AND ACELLULAR PERTUSSIS VACCINE, ADSORBED 0.5 ML: 5; 2.5; 8; 8; 2.5 SUSPENSION INTRAMUSCULAR at 16:57

## 2020-07-15 RX ADMIN — POTASSIUM CHLORIDE AND SODIUM CHLORIDE: 900; 300 INJECTION, SOLUTION INTRAVENOUS at 21:56

## 2020-07-15 RX ADMIN — ZOLPIDEM TARTRATE 10 MG: 10 TABLET ORAL at 21:46

## 2020-07-15 RX ADMIN — POTASSIUM CHLORIDE 10 MEQ: 7.46 INJECTION, SOLUTION INTRAVENOUS at 17:06

## 2020-07-15 RX ADMIN — BACLOFEN 10 MG: 10 TABLET ORAL at 21:46

## 2020-07-15 RX ADMIN — HYDRALAZINE HYDROCHLORIDE 25 MG: 25 TABLET, FILM COATED ORAL at 21:46

## 2020-07-15 RX ADMIN — HYDROCODONE BITARTRATE AND ACETAMINOPHEN 2 TABLET: 5; 325 TABLET ORAL at 12:47

## 2020-07-15 RX ADMIN — FERROUS SULFATE TAB 325 MG (65 MG ELEMENTAL FE) 325 MG: 325 (65 FE) TAB at 21:46

## 2020-07-15 ASSESSMENT — ENCOUNTER SYMPTOMS
RESPIRATORY NEGATIVE: 1
DIARRHEA: 0
COLOR CHANGE: 0
NAUSEA: 0
CONSTIPATION: 0
COUGH: 0
VOMITING: 0
SHORTNESS OF BREATH: 0
BACK PAIN: 0
ABDOMINAL PAIN: 0

## 2020-07-15 ASSESSMENT — PAIN SCALES - GENERAL
PAINLEVEL_OUTOF10: 10
PAINLEVEL_OUTOF10: 8
PAINLEVEL_OUTOF10: 10
PAINLEVEL_OUTOF10: 10

## 2020-07-15 ASSESSMENT — PAIN DESCRIPTION - LOCATION
LOCATION_3: SHOULDER
LOCATION_2: TOE (COMMENT WHICH ONE)
LOCATION: KNEE
LOCATION: KNEE

## 2020-07-15 ASSESSMENT — PAIN DESCRIPTION - ORIENTATION
ORIENTATION_2: LEFT
ORIENTATION: RIGHT
ORIENTATION_3: RIGHT

## 2020-07-15 ASSESSMENT — PAIN DESCRIPTION - PAIN TYPE
TYPE: ACUTE PAIN;CHRONIC PAIN
TYPE: ACUTE PAIN

## 2020-07-15 NOTE — ED PROVIDER NOTES
[facility]        Pt Name: Tessa Rae  MRN: [id number]  [place] 1955  Date of evaluation: 7/15/2020  Provider: LYNN Alanis  PCP: Adolfo Santo MD    [address] [address]       Chief Complaint   Patient presents with    Knee Pain     in by ems, where she has been having a lot of knee pain, bilaterally, patient usually gets injections, doesnt take meds at home for pain        HISTORY OF PRESENT ILLNESS   (Location, Timing/Onset, Context/Setting, Quality, Duration, Modifying Factors, Severity, Associated Signs and Symptoms)  Note limiting factors. Tessa Rae is a 62 y.o. female with past medical she of anxiety, Crohn's disease, depression, hiatal hernia, hypertension who presents to the ED implant of bilateral knee pain. Patient states has bilateral knee pain that she was told was due to arthritis. Patient states she follows up with the arthritis clinic. Patient states she has received gel injections to her bilateral knees. States normally the left knee is worse than the right. States yesterday she was walking with her walker when she twisted and states she had some pain to her right knee. States she fell and went down and hit her right anterior knee on the ground. States since then has had not been able to ambulate. States significant edema to the right knee with associated pain that she describes as an aching rated 10/10. Denies ecchymosis, erythema or warmth. Denies abrasion or laceration. Denies fever chills. Denies numbness or tingling. States decreased range of motion and strength due to pain. States she is unable to ambulate. Denies taking any over-the-counter medication for symptom control. Became concerned and called EMS who brought her to the ED for further evaluation and treatment.     Nursing Notes were all reviewed and agreed with or any disagreements were addressed in the HPI. REVIEW OF SYSTEMS    (2-9 systems for level 4, 10 or more for level 5)     Review of Systems   Constitutional: Negative for appetite change, chills, diaphoresis, fatigue and fever. Respiratory: Negative. Negative for cough and shortness of breath. Cardiovascular: Negative. Negative for chest pain, palpitations and leg swelling. Gastrointestinal: Negative for abdominal pain, constipation, diarrhea, nausea and vomiting. Genitourinary: Negative for decreased urine volume, difficulty urinating, dysuria, flank pain, frequency, hematuria and urgency. Musculoskeletal: Positive for arthralgias, gait problem, joint swelling and myalgias. Negative for back pain, neck pain and neck stiffness. Skin: Negative for color change, pallor, rash and wound. Neurological: Negative for dizziness, light-headedness and headaches. Positives and Pertinent negatives as per HPI. Except as noted above in the ROS, all other systems were reviewed and negative.        PAST MEDICAL HISTORY     Past Medical History:   Diagnosis Date    Anxiety     Arthritis     Blood transfusion reaction     Cro[facility]'s disease ([facility] [address]     Depression     GERD (gastroesophageal reflux disease)     Hiatal hernia 6/24/2014    Hx of blood clots     Hypertension     MRSA (methicillin resistant staph aureus) culture positive 06/05/2018    urine    Neuropathy     Pneumonia 1/8/2014    Sinus headache     SOB (shortness of breath)     VRE (vancomycin resistant enterococcus) culture positive 10/08/2018    abd culture         SURGICAL HISTORY     Past Surgical History:   Procedure Laterality Date    ABDOMEN SURGERY      ABDOMINAL ADHESION SURGERY  06/08/2018    BLADDER SUSPENSION      COLONOSCOPY      COLONOSCOPY  9/14/15    sigmoid colon biopsy     COLONOSCOPY  08/07/2018    COLONOSCOPY  06/07/2019    COLONOSCOPY, POSSIBLE BIOPSY, POSSIBLE POLYPECTOMY    COLONOSCOPY N/A 6/7/2019    COLONOSCOPY WITH BIOPSY performed by Leopold Cho Jeannine Davis MD at [facility]  COLONOSCOPY N/A 6/7/2019    COLONOSCOPY DIAGNOSTIC/STOMA performed by Yovany Torre MD at [address] N/A 10/8/2018    EXPLORATORY LAPAROTOMY WITH COLOSTOMY performed by Elba Conde MD at [address] Left 6/25/14    excision plantar left hallux    FOOT SURGERY  6/3/15    PLANTAR PLATE REPAIR BILATERAL, ARTHROTOMY MPJ 2ND BILATERAL    FOOT SURGERY Left 08/05/2002    Excision second metatarsal head left    FRACTURE SURGERY      rt hip    HAND CARPECTOMY Bilateral     HEEL SPUR SURGERY      HERNIA REPAIR      HYSTERECTOMY      bladder surgery    JOINT REPLACEMENT      rt hip, L shoulder replacement 11/2014    KNEE SURGERY Bilateral     arthrosvopic    LEG SURGERY Right     MI SECD CLOS SURG WND EXTEN/COMPLIC N/A 43/62/9504    SECONDARY WOUND CLOSURE OF ABDOMINAL WOUND performed by Elba Conde MD at [facility] 7/17/2019    FLEXIBLE SIGMOIDOSCOPY performed by Elba Conde MD at [address]  01/15/2018    with biopsies    SMALL INTESTINE SURGERY N/A 7/17/2019    COLOSTOMY CLOSURE WITH COLORECTAL ANASTOMOSIS performed by Elba Conde MD at [facility]  6/14/13    and colonsocopy with antral erosion biopsies         CURRENTMEDICATIONS       Previous Medications    ASPIRIN-ACETAMINOPHEN-CAFFEINE (EXCEDRIN MIGRAINE) 250-250-65 MG PER TABLET    Take 2 tablets by mouth every 6 hours as needed for Headaches     ATENOLOL (TENORMIN) 100 MG TABLET    Take 1 tablet by mouth daily    BACLOFEN (LIORESAL) 10 MG TABLET    Take 1 tablet by mouth 3 times daily    BALSALAZIDE (COLAZAL) 750 MG CAPSULE    Take 3,000 mg by mouth daily Take 4 capsules (total 3000 mg) daily each morning.     BUSPIRONE (BUSPAR) 10 MG TABLET    Take 10 mg by mouth 3 times daily as needed    DULOXETINE (CYMBALTA) 60 MG EXTENDED RELEASE CAPSULE    Take 60 mg by mouth 2 times daily     FERROUS SULFATE 325 (65 FE) MG TABLET    Take 325 mg by mouth 2 times daily     FLUTICASONE (FLONASE) 50 MCG/ACT NASAL SPRAY    1 spray by Each Nostril route daily as needed for Rhinitis    FUROSEMIDE (LASIX) 40 MG TABLET    Take 1 tablet by mouth daily    GABAPENTIN (NEURONTIN) 100 MG CAPSULE    Take 2 capsules by mouth 3 times daily for 30 days. GENTAMICIN (GARAMYCIN) 0.1 % CREAM    Apply topically daily. HYDRALAZINE (APRESOLINE) 25 MG TABLET    Take 1 tablet by mouth every 8 hours    LIDOCAINE (XYLOCAINE) 2 % JELLY    Apply locally in painful area    LIDOCAINE (XYLOCAINE) 5 % OINTMENT    Apply topically as needed Apply topically as needed to feet    MUPIROCIN (BACTROBAN) 2 % OINTMENT    Apply daily    NYSTATIN (MYCOSTATIN) 051264 UNIT/GM CREAM    Apply topically 2 times daily as needed for Dry Skin Apply topically 2 times daily. OMEPRAZOLE (PRILOSEC) 20 MG DELAYED RELEASE CAPSULE    Take 20 mg by mouth Daily     ONDANSETRON (ZOFRAN ODT) 4 MG DISINTEGRATING TABLET    Take 1 tablet by mouth every 6 hours as needed for Nausea or Vomiting    POTASSIUM CHLORIDE SA (K-DUR;KLOR-CON M) 20 MEQ TABLET    Take 1 tablet by mouth 2 times daily. ZOLPIDEM (AMBIEN) 10 MG TABLET    Take 10 mg by mouth nightly as needed for Sleep . ALLERGIES     Ace inhibitors and Skelaxin [metaxalone]    FAMILYHISTORY       Family History   Problem Relation Age of Onset    High Blood Pressure Mother     High Blood Pressure Father     Kidney Disease Sister           SOCIAL HISTORY       Social History     Tobacco Use    Smoking status: Current Every Day Smoker     Packs/day: 1.00     Years: 10.00     Pack years: 10.00     Types: Cigarettes, E-Cigarettes    Smokeless tobacco: Never Used    Tobacco comment: CUT BACK TO 1/2 PPD WITH E CIG 6-2019   Substance Use Topics    Alcohol use:  Yes     Alcohol/week: 2.0 standard drinks     Types: 2 Shots of liquor per week     Comment: 6 DRINKS A WEEK Notable for the following components:    Sodium 125 (*)     Potassium 2.4 (*)     Chloride 82 (*)     Anion Gap 18 (*)     Calcium 6.8 (*)     All other components within normal limits    Narrative:     Juanita Malcolm  [facility] tel. [phone],  Chemistry results called to and read back by Calebelvis Muller, 07/15/2020 15:32,  by Axel Sever  Performed at:  [facility] [address] [facility], [address] [employer]   Phone [phone]   MAGNESIUM - Abnormal; Notable for the following components:    Magnesium 0.40 (*)     All other components within normal limits    Narrative:     [facility] tel. [phone],  Chemistry results called to and read back by [facility], 07/15/2020 16:13,  by Axel Sever  Performed at:  [facility] [address], [place], [address] [employer]   Phone [phone]       All other labs were within normal range or not returned as of this dictation. EKG: All EKG's are interpreted by the Emergency Department Physician in the absence of a cardiologist.  Please see their note for interpretation of EKG. RADIOLOGY:   Non-plain film images such as CT, Ultrasound and MRI are read by the radiologist. Plain radiographic images are visualized and preliminarily interpreted by the ED Provider with the below findings:        Interpretation per the Radiologist below, if available at the time of this note:    XR KNEE RIGHT (3 VIEWS)   Final Result   Severe osteoarthritis. XR KNEE LEFT (3 VIEWS)   Final Result   Moderate-sized suprapatellar joint effusion but no acute osseous abnormality. No evidence of fracture. Severe tricompartmental osteoarthritis. No significant change in appearance   of the arthritis over the past 1 year. No results found.         PROCEDURES   Unless otherwise noted below, none     Procedures    CRITICAL CARE TIME   N/A    CONSULTS:  IP CONSULT TO INTERNAL MEDICINE      EMERGENCY DEPARTMENT COURSE and DIFFERENTIAL DIAGNOSIS/MDM:   Vitals:    Vitals:    07/15/20 1246   BP: 104/73   Pulse: 83   Resp: 16   Temp: 98 °F (36.7 °C)   SpO2: 95%       Patient was given the following medications:  Medications   potassium chloride 10 mEq/100 mL IVPB (Peripheral Line) (has no administration in time range)   magnesium sulfate 2 g in 50 mL IVPB premix (has no administration in time range)   HYDROcodone-acetaminophen (NORCO) 5-325 MG per tablet 2 tablet (2 tablets Oral Given 7/15/20 1247)   potassium chloride (KLOR-CON) extended release tablet 40 mEq (40 mEq Oral Given 7/15/20 1654)   amoxicillin-clavulanate (AUGMENTIN) 875-125 MG per tablet 1 tablet (1 tablet Oral Given 7/15/20 1654)   Tetanus-Diphth-Acell Pertussis (BOOSTRIX) injection 0.5 mL (0.5 mLs Intramuscular Given 7/15/20 1657)           Patient is a 73-year-old female who presents in implant of a fall. Had follow-up with right knee injury. Bilateral knee pain with history of osteoarthritis. X-rays obtained and showed significant osteoarthritis with some suprapatellar effusions noted. Able to ambulate here in the ED. Patient was going to be attempted to be discharged home but with consultation. her daughter states she actually sent her in for generalized weakness, fatigue, decreased oral intake and multiple falls. Unfortunate this was not relayed to the staff here in the emergency department patient was worked up for what appeared to be a mechanical fall. Given the history of weakness with decreased oral intake with history of blood transfusions in the past IV established and blood work obtained. CBC showed normal white count and platelets. Hemoglobin 11.6 and appears similar to previously documented results. BMP did show multiple electrolyte abnormalities. Sodium 125, potassium 2.4 and calcium 6.8. Anion gap 18. Magnesium 0.4. Patient written for 40 mEq oral potassium with 40 mEq IV potassium and 2 g magnesium.   Patient suffering from what appears to be weakness with decreased oral intake and multiple falls secondary to multiple electrolyte abnormalities which I believe is most likely secondary to patient's decreased oral intake. Did notice upon further discussion patient has wound to the dorsal aspect of the left hand. Apparently is been by dog about a week ago. Repair not indicated this time given week old injury but will start on antibiotic with Augmentin orally. Tetanus updated given unknown status. Patient will require admission for weakness, falls and multiple electrolyte abnormalities. Case discussed with Dr. Harpreet Jenkins, on-call for Dr. Lucinda Royal who normal limits for PCP, who graciously agreed accept patient for admission at this time. FINAL IMPRESSION      1. Acute pain of both knees    2. General weakness    3. Hypokalemia    4. Hypomagnesemia    5. Dog bite of left hand, initial encounter    6. Tetanus toxoid vaccination administered at current visit          DISPOSITION/PLAN   DISPOSITION Admitted 07/15/2020 05:00:29 PM      PATIENT REFERREDTO:  [facility] Emergency Department  [address] [place] [address]  [phone]  Go to   As needed, If symptoms worsen    Your Ortho Specialist    Schedule an appointment as soon as possible for a visit   For a Re-check in  3-5    days. Donita Bailey MD  [address] [place] [address]  [phone]            DISCHARGE MEDICATIONS:  New Prescriptions    HYDROCODONE-ACETAMINOPHEN (NORCO) 5-325 MG PER TABLET    Take 1 tablet by mouth every 6 hours as needed for Pain for up to 3 days.     ONDANSETRON (ZOFRAN ODT) 4 MG DISINTEGRATING TABLET    Take 1 tablet by mouth every 8 hours as needed for Nausea       DISCONTINUED MEDICATIONS:  Discontinued Medications    No medications on file              (Please note that portions of this note were completed with a voice recognition program.  Efforts were made to edit the dictations but occasionally words are mis-transcribed.)    LYNN Muro (electronically signed)          LYNN Armenta  07/15/20 [address] [facility] [place]  07/15/20 3999

## 2020-07-15 NOTE — LETTER
[facility] Emergency Department      [address] [place] [address], [address]            PROOF OF PRESENCE      To Whom It May Concern:    Christophe Lidia was present in the Emergency Department at [facility] on 7/15/2020.       Mar Esparza was here with her mother whom was admitted to hospital.                                    Sincerely,        ELLE Reese

## 2020-07-15 NOTE — ED NOTES
Patients daughter called this RN regarding treatment here, patient arrived to ER with single complaint of bilateral knee pain. Patients daughter called and reporting that she wanted her hemoglobin and potassium levels checked while here, patients daughter was very rude and condescending while speaking to this RN, pts daughter reporting that she hasn't been able to keep her eyes open and also reporting that she's been vomiting every morning and that she hasn't been able to keep anything down for days. This RN explained that the patient has been totally alert and oriented the entire stay a long with drinking water and able to keep down pain pills, patients daughter reporting that she hasn't been able to hold down any food but is ok with pills and pain medications. This RN spoke with [facility] [place] about incident and straight stuck patient for blood, will continue to monitor.       Juwan Navarro RN  07/15/20 4469

## 2020-07-15 NOTE — ED NOTES
Bed: 09  Expected date:   Expected time:   Means of arrival:   Comments:  [address] pt when cleaned     Unruly Butler RN  07/15/20 7313

## 2020-07-16 ENCOUNTER — APPOINTMENT (OUTPATIENT)
Dept: GENERAL RADIOLOGY | Age: 65
DRG: 426 | End: 2020-07-16
Payer: MEDICAID

## 2020-07-16 PROBLEM — L08.9 FOOT INFECTION: Status: RESOLVED | Noted: 2019-07-27 | Resolved: 2020-07-16

## 2020-07-16 PROBLEM — L02.419 CELLULITIS AND ABSCESS OF LEG: Status: RESOLVED | Noted: 2019-10-22 | Resolved: 2020-07-16

## 2020-07-16 PROBLEM — L03.119 CELLULITIS AND ABSCESS OF LEG: Status: RESOLVED | Noted: 2019-10-22 | Resolved: 2020-07-16

## 2020-07-16 LAB
ALBUMIN SERPL-MCNC: 3.5 G/DL (ref 3.4–5)
ALP BLD-CCNC: 55 U/L (ref 40–129)
ALT SERPL-CCNC: 6 U/L (ref 10–40)
ANION GAP SERPL CALCULATED.3IONS-SCNC: 15 MMOL/L (ref 3–16)
AST SERPL-CCNC: 24 U/L (ref 15–37)
BASOPHILS ABSOLUTE: 0 K/UL (ref 0–0.2)
BASOPHILS RELATIVE PERCENT: 0.4 %
BILIRUB SERPL-MCNC: 1.5 MG/DL (ref 0–1)
BILIRUBIN DIRECT: 0.5 MG/DL (ref 0–0.3)
BILIRUBIN, INDIRECT: 1 MG/DL (ref 0–1)
BUN BLDV-MCNC: 12 MG/DL (ref 7–20)
CALCIUM IONIZED: 0.76 MMOL/L (ref 1.12–1.32)
CALCIUM SERPL-MCNC: 6.6 MG/DL (ref 8.3–10.6)
CHLORIDE BLD-SCNC: 82 MMOL/L (ref 99–110)
CO2: 26 MMOL/L (ref 21–32)
CREAT SERPL-MCNC: 0.6 MG/DL (ref 0.6–1.2)
EKG ATRIAL RATE: 73 BPM
EKG DIAGNOSIS: NORMAL
EKG P AXIS: 73 DEGREES
EKG P-R INTERVAL: 140 MS
EKG Q-T INTERVAL: 450 MS
EKG QRS DURATION: 94 MS
EKG QTC CALCULATION (BAZETT): 495 MS
EKG R AXIS: 19 DEGREES
EKG T AXIS: 110 DEGREES
EKG VENTRICULAR RATE: 73 BPM
EOSINOPHILS ABSOLUTE: 0.1 K/UL (ref 0–0.6)
EOSINOPHILS RELATIVE PERCENT: 1.4 %
GFR AFRICAN AMERICAN: >60
GFR NON-AFRICAN AMERICAN: >60
GLUCOSE BLD-MCNC: 106 MG/DL (ref 70–99)
HCT VFR BLD CALC: 32.9 % (ref 36–48)
HEMOGLOBIN: 11.2 G/DL (ref 12–16)
LYMPHOCYTES ABSOLUTE: 0.9 K/UL (ref 1–5.1)
LYMPHOCYTES RELATIVE PERCENT: 14.9 %
MAGNESIUM: 0.9 MG/DL (ref 1.8–2.4)
MCH RBC QN AUTO: 33.3 PG (ref 26–34)
MCHC RBC AUTO-ENTMCNC: 34.1 G/DL (ref 31–36)
MCV RBC AUTO: 97.6 FL (ref 80–100)
MONOCYTES ABSOLUTE: 0.8 K/UL (ref 0–1.3)
MONOCYTES RELATIVE PERCENT: 12.7 %
NEUTROPHILS ABSOLUTE: 4.4 K/UL (ref 1.7–7.7)
NEUTROPHILS RELATIVE PERCENT: 70.6 %
OSMOLALITY: 257 MOSM/KG (ref 280–301)
PARATHYROID HORMONE INTACT: 58.9 PG/ML (ref 14–72)
PDW BLD-RTO: 13.6 % (ref 12.4–15.4)
PH VENOUS: 7.52 (ref 7.35–7.45)
PLATELET # BLD: 260 K/UL (ref 135–450)
PMV BLD AUTO: 7.5 FL (ref 5–10.5)
POTASSIUM REFLEX MAGNESIUM: 3.4 MMOL/L (ref 3.5–5.1)
RBC # BLD: 3.37 M/UL (ref 4–5.2)
SODIUM BLD-SCNC: 120 MMOL/L (ref 136–145)
SODIUM BLD-SCNC: 122 MMOL/L (ref 136–145)
SODIUM BLD-SCNC: 123 MMOL/L (ref 136–145)
TOTAL PROTEIN: 6.7 G/DL (ref 6.4–8.2)
WBC # BLD: 6.2 K/UL (ref 4–11)

## 2020-07-16 PROCEDURE — 1200000000 HC SEMI PRIVATE

## 2020-07-16 PROCEDURE — 73130 X-RAY EXAM OF HAND: CPT

## 2020-07-16 PROCEDURE — 84300 ASSAY OF URINE SODIUM: CPT

## 2020-07-16 PROCEDURE — 93010 ELECTROCARDIOGRAM REPORT: CPT | Performed by: INTERNAL MEDICINE

## 2020-07-16 PROCEDURE — 6370000000 HC RX 637 (ALT 250 FOR IP): Performed by: INTERNAL MEDICINE

## 2020-07-16 PROCEDURE — 6360000002 HC RX W HCPCS: Performed by: INTERNAL MEDICINE

## 2020-07-16 PROCEDURE — 83735 ASSAY OF MAGNESIUM: CPT

## 2020-07-16 PROCEDURE — 82570 ASSAY OF URINE CREATININE: CPT

## 2020-07-16 PROCEDURE — 83930 ASSAY OF BLOOD OSMOLALITY: CPT

## 2020-07-16 PROCEDURE — 80076 HEPATIC FUNCTION PANEL: CPT

## 2020-07-16 PROCEDURE — 2580000003 HC RX 258: Performed by: INTERNAL MEDICINE

## 2020-07-16 PROCEDURE — 84295 ASSAY OF SERUM SODIUM: CPT

## 2020-07-16 PROCEDURE — 73110 X-RAY EXAM OF WRIST: CPT

## 2020-07-16 PROCEDURE — 83935 ASSAY OF URINE OSMOLALITY: CPT

## 2020-07-16 PROCEDURE — 87205 SMEAR GRAM STAIN: CPT

## 2020-07-16 PROCEDURE — 82330 ASSAY OF CALCIUM: CPT

## 2020-07-16 PROCEDURE — 83970 ASSAY OF PARATHORMONE: CPT

## 2020-07-16 PROCEDURE — 36415 COLL VENOUS BLD VENIPUNCTURE: CPT

## 2020-07-16 PROCEDURE — 85025 COMPLETE CBC W/AUTO DIFF WBC: CPT

## 2020-07-16 PROCEDURE — 80048 BASIC METABOLIC PNL TOTAL CA: CPT

## 2020-07-16 RX ORDER — LOPERAMIDE HYDROCHLORIDE 2 MG/1
2 CAPSULE ORAL 4 TIMES DAILY PRN
Status: DISCONTINUED | OUTPATIENT
Start: 2020-07-16 | End: 2020-07-23 | Stop reason: HOSPADM

## 2020-07-16 RX ORDER — HYDROMORPHONE HYDROCHLORIDE 1 MG/ML
0.5 INJECTION, SOLUTION INTRAMUSCULAR; INTRAVENOUS; SUBCUTANEOUS EVERY 4 HOURS PRN
Status: DISCONTINUED | OUTPATIENT
Start: 2020-07-16 | End: 2020-07-23 | Stop reason: HOSPADM

## 2020-07-16 RX ORDER — MAGNESIUM SULFATE IN WATER 40 MG/ML
4 INJECTION, SOLUTION INTRAVENOUS ONCE
Status: COMPLETED | OUTPATIENT
Start: 2020-07-16 | End: 2020-07-16

## 2020-07-16 RX ORDER — HYDROCODONE BITARTRATE AND ACETAMINOPHEN 7.5; 325 MG/1; MG/1
1 TABLET ORAL EVERY 4 HOURS PRN
Status: DISCONTINUED | OUTPATIENT
Start: 2020-07-16 | End: 2020-07-23 | Stop reason: HOSPADM

## 2020-07-16 RX ORDER — CEPHALEXIN 250 MG/1
500 CAPSULE ORAL EVERY 8 HOURS SCHEDULED
Status: DISCONTINUED | OUTPATIENT
Start: 2020-07-16 | End: 2020-07-22 | Stop reason: ALTCHOICE

## 2020-07-16 RX ADMIN — HYDROMORPHONE HYDROCHLORIDE 0.5 MG: 1 INJECTION, SOLUTION INTRAMUSCULAR; INTRAVENOUS; SUBCUTANEOUS at 06:31

## 2020-07-16 RX ADMIN — LOPERAMIDE HYDROCHLORIDE 2 MG: 2 CAPSULE ORAL at 22:59

## 2020-07-16 RX ADMIN — ENOXAPARIN SODIUM 40 MG: 40 INJECTION SUBCUTANEOUS at 09:07

## 2020-07-16 RX ADMIN — MAGNESIUM SULFATE IN WATER 4 G: 40 INJECTION, SOLUTION INTRAVENOUS at 12:12

## 2020-07-16 RX ADMIN — BUSPIRONE HYDROCHLORIDE 10 MG: 5 TABLET ORAL at 15:14

## 2020-07-16 RX ADMIN — NYSTATIN: 100000 CREAM TOPICAL at 09:10

## 2020-07-16 RX ADMIN — HYDRALAZINE HYDROCHLORIDE 25 MG: 25 TABLET, FILM COATED ORAL at 21:12

## 2020-07-16 RX ADMIN — MAGNESIUM GLUCONATE 500 MG ORAL TABLET 400 MG: 500 TABLET ORAL at 09:08

## 2020-07-16 RX ADMIN — BUSPIRONE HYDROCHLORIDE 10 MG: 5 TABLET ORAL at 21:12

## 2020-07-16 RX ADMIN — PANTOPRAZOLE SODIUM 40 MG: 40 TABLET, DELAYED RELEASE ORAL at 05:18

## 2020-07-16 RX ADMIN — BUSPIRONE HYDROCHLORIDE 10 MG: 5 TABLET ORAL at 09:08

## 2020-07-16 RX ADMIN — POTASSIUM CHLORIDE AND SODIUM CHLORIDE: 900; 300 INJECTION, SOLUTION INTRAVENOUS at 18:29

## 2020-07-16 RX ADMIN — BACLOFEN 10 MG: 10 TABLET ORAL at 21:12

## 2020-07-16 RX ADMIN — BALSALAZIDE DISODIUM 3000 MG: 750 CAPSULE ORAL at 09:15

## 2020-07-16 RX ADMIN — BACLOFEN 10 MG: 10 TABLET ORAL at 15:14

## 2020-07-16 RX ADMIN — POTASSIUM CHLORIDE 40 MEQ: 1500 TABLET, EXTENDED RELEASE ORAL at 12:12

## 2020-07-16 RX ADMIN — FERROUS SULFATE TAB 325 MG (65 MG ELEMENTAL FE) 325 MG: 325 (65 FE) TAB at 21:12

## 2020-07-16 RX ADMIN — ZOLPIDEM TARTRATE 10 MG: 10 TABLET ORAL at 22:55

## 2020-07-16 RX ADMIN — OYSTER SHELL CALCIUM WITH VITAMIN D 1 TABLET: 500; 200 TABLET, FILM COATED ORAL at 21:12

## 2020-07-16 RX ADMIN — DULOXETINE HYDROCHLORIDE 60 MG: 60 CAPSULE, DELAYED RELEASE ORAL at 09:08

## 2020-07-16 RX ADMIN — CALCIUM GLUCONATE 3 G: 98 INJECTION, SOLUTION INTRAVENOUS at 17:57

## 2020-07-16 RX ADMIN — POTASSIUM CHLORIDE 40 MEQ: 1500 TABLET, EXTENDED RELEASE ORAL at 09:15

## 2020-07-16 RX ADMIN — ATENOLOL 100 MG: 50 TABLET ORAL at 09:08

## 2020-07-16 RX ADMIN — HYDROMORPHONE HYDROCHLORIDE 0.5 MG: 1 INJECTION, SOLUTION INTRAMUSCULAR; INTRAVENOUS; SUBCUTANEOUS at 12:11

## 2020-07-16 RX ADMIN — OYSTER SHELL CALCIUM WITH VITAMIN D 1 TABLET: 500; 200 TABLET, FILM COATED ORAL at 15:14

## 2020-07-16 RX ADMIN — HYDROMORPHONE HYDROCHLORIDE 0.5 MG: 1 INJECTION, SOLUTION INTRAMUSCULAR; INTRAVENOUS; SUBCUTANEOUS at 16:13

## 2020-07-16 RX ADMIN — POTASSIUM CHLORIDE 40 MEQ: 1500 TABLET, EXTENDED RELEASE ORAL at 17:56

## 2020-07-16 RX ADMIN — CEPHALEXIN 500 MG: 250 CAPSULE ORAL at 21:12

## 2020-07-16 RX ADMIN — HYDRALAZINE HYDROCHLORIDE 25 MG: 25 TABLET, FILM COATED ORAL at 15:14

## 2020-07-16 RX ADMIN — DULOXETINE HYDROCHLORIDE 60 MG: 60 CAPSULE, DELAYED RELEASE ORAL at 21:12

## 2020-07-16 RX ADMIN — FERROUS SULFATE TAB 325 MG (65 MG ELEMENTAL FE) 325 MG: 325 (65 FE) TAB at 09:08

## 2020-07-16 RX ADMIN — BACLOFEN 10 MG: 10 TABLET ORAL at 09:08

## 2020-07-16 RX ADMIN — LOPERAMIDE HYDROCHLORIDE 2 MG: 2 CAPSULE ORAL at 12:29

## 2020-07-16 RX ADMIN — HYDROMORPHONE HYDROCHLORIDE 0.5 MG: 1 INJECTION, SOLUTION INTRAMUSCULAR; INTRAVENOUS; SUBCUTANEOUS at 22:55

## 2020-07-16 RX ADMIN — HYDROCODONE BITARTRATE AND ACETAMINOPHEN 1 TABLET: 7.5; 325 TABLET ORAL at 21:12

## 2020-07-16 RX ADMIN — MAGNESIUM GLUCONATE 500 MG ORAL TABLET 400 MG: 500 TABLET ORAL at 21:12

## 2020-07-16 ASSESSMENT — PAIN SCALES - GENERAL
PAINLEVEL_OUTOF10: 0
PAINLEVEL_OUTOF10: 3
PAINLEVEL_OUTOF10: 0
PAINLEVEL_OUTOF10: 9
PAINLEVEL_OUTOF10: 8
PAINLEVEL_OUTOF10: 5
PAINLEVEL_OUTOF10: 10
PAINLEVEL_OUTOF10: 8

## 2020-07-16 ASSESSMENT — PAIN DESCRIPTION - LOCATION
LOCATION: HAND;KNEE

## 2020-07-16 ASSESSMENT — PAIN DESCRIPTION - FREQUENCY: FREQUENCY: CONTINUOUS

## 2020-07-16 ASSESSMENT — PAIN DESCRIPTION - DESCRIPTORS: DESCRIPTORS: ACHING;CONSTANT

## 2020-07-16 ASSESSMENT — PAIN DESCRIPTION - ORIENTATION: ORIENTATION: RIGHT;LEFT

## 2020-07-16 ASSESSMENT — PAIN DESCRIPTION - PAIN TYPE
TYPE: ACUTE PAIN

## 2020-07-16 ASSESSMENT — PAIN DESCRIPTION - ONSET: ONSET: ON-GOING

## 2020-07-16 ASSESSMENT — PAIN DESCRIPTION - PROGRESSION: CLINICAL_PROGRESSION: GRADUALLY WORSENING

## 2020-07-16 NOTE — PROGRESS NOTES
Patient Active Problem List   Diagnosis    Essential hypertension, benign    Atherosclerosis of native arteries of extremities with intermittent claudication, bilateral legs ([facility])    Anemia    Hyponatremia    Crohn's colitis ([facility] [address]    Hypomagnesemia    DVT (deep venous thrombosis) ([facility])    PAD (peripheral artery disease) ([facility])    Leg swelling    Venous insufficiency    Chronic venous hypertension (idiopathic) with inflammation of bilateral lower extremity    Open wound of left foot with complication    Hypokalemia   H&P dictated

## 2020-07-16 NOTE — PROGRESS NOTES
C/o 9/10 bilat knee and left hand pain - medicated for same; no significant changes since earlier assessment, except as noted; no other s/s of acute distress at present.

## 2020-07-16 NOTE — CONSULTS
Renal Consult  Full Note Dictated [id number]  A/P  1. Severe Hypomagnesemia- replace aggressively. Check FeMg. 2.   Hypokalemia- replace Mg. Replace K po. Probably due to GI loss + Metab. Alkalosis. 3.    Hypocalcemia- replace. Check Vit D.    4.    BP acceptable  5. Hyponatremia-follow Uosm. Continue NSS. Follow closely. 6.    H/O Crohn's Disease  7. N/V/Diarrhea    High risk with multiple electrolyte abnormalities. Would need close /fu. Thank you.

## 2020-07-16 NOTE — PROGRESS NOTES
C/o 10/10 pain to bilat knees and left hand - medicated for same; minimal air movement heard over bases; left hand remains edematous, reddened - elevated on pillow; pt declines resumption of icepack application, stating icepacks here are not sufficiently cold; BP mildly elevated; no other s/s of distress; family at bedside.

## 2020-07-16 NOTE — PLAN OF CARE
Dry dressing in place to reddened, swollen left hand where recently experienced dog bite at home; states she awakened, and started her dog, and do is current on all vaccinations

## 2020-07-16 NOTE — H&P
[place] [address]                     [facility], [address]                              HISTORY AND PHYSICAL    PATIENT NAME: Zari Garcia                :        1955  MED REC NO:   [id number]                          ROOM:       [id number]  ACCOUNT NO:   [de-identified]                           ADMIT DATE: 07/15/2020  PROVIDER:     Bradford Ventura MD    HISTORY OF PRESENT ILLNESS:  The patient is a 52-year-old black American  woman came to the emergency room from her home, has been falling  recently, has intractable pain in the lower extremity. The patient  actually needs total knee replacement on both sides and has been in  contact with orthopedic doctor. On further evaluation, she had extreme  weakness, fatigue and leg cramps. She was found to have some very  serious electrolyte disturbance and was admitted for further management. Denied any chest pain, denied any shortness of breath. PAST MEDICAL HISTORY:  Pertinent for depression, had VRE infection  before, chronic leg ulcer, wound management, Crohn's disease, blood  transfusion, reaction osteoarthritis, anxiety neurosis, MRSA infection,  peripheral neuropathy, pneumonia, sinus headache. PAST SURGICAL HISTORY:  Pertinent for foot surgery, upper GI endoscopy,  colonoscopy, sigmoidoscopy, abdominal adhesion surgery, colostomy, small  intestinal surgery, proctosigmoidoscopy, bladder suspension procedure,  multiple endoscopies, bilateral hand carpectomy, hernia repair,  bilateral knee repair, right-sided leg surgery for arthroscopy. MEDICATIONS:  The patient is on aspirin, acetaminophen with caffeine,  atenolol, baclofen, Colazal, BuSpar, duloxetine, ferrous sulfate,  Flonase, Lasix, gabapentin, gentamicin cream, hydralazine, lidocaine  jelly, mupirocin, omeprazole, ondansetron, potassium chloride, Ambien. ALLERGIES:  The patient is allergic to ACE INHIBITOR and SKELAXIN.     FAMILY HISTORY: Both her parents have high blood pressure. She has one  sister who suffers from chronic kidney disease. SOCIAL HISTORY:  She is , smokes one pack per day for years. Has  two grown children. She used to work as a private duty nurse as an LPN. No history of substance abuse. Does have social alcohol usage. REVIEW OF SYSTEMS:  Negative for loss of consciousness. No convulsions. No visual blurring. No headache. No dysphagia. Does have lower  extremity weakness and achy bilateral arthritic pain. No genitourinary  complaint. No hematuria. No incontinence. PHYSICAL EXAMINATION:  GENERAL:  Alert, awake, oriented x3. A 64-year-old black American  woman. VITAL SIGNS:  Temperature 98.3, blood pressure 124/85, respirations 16,  heart rate 88, O2 sat is 94%. HEENT:  Oral mucosa dry. SKIN:  Warm and dry. NECK:  Neck is supple. Faint carotid bruit. No jugular venous  distention. LUNGS:  Vesicular breath sounds. Fairly clear to auscultation. HEART:  Regular rate and rhythm. S1, S2 without any S3 or S4 gallop. ABDOMEN:  Soft, nontender. Does have a colostomy reversal scar. No  organomegaly. No tenderness. EXTREMITIES:  Show bilateral 1+ pitting edema, does have some trophic  changes and history of old chronic venous ulcer. LABORATORY EVALUATION:  Lab evaluation shows sodium 125, potassium 2.4,  chloride 82, CO2 25, BUN 8, creatinine 0.7, magnesium level is 0.40,  blood sugar is 91. White blood cell count 6.5, hemoglobin/hematocrit  11.6 and 33.3, platelet count is 967. DIAGNOSTIC DATA:  X-ray of the knee on both sides, moderate-sized  suprapatellar joint effusion, severe tricompartment osteoarthritis. No  significant change over the past one year on radiographic appearance of  the knees. EKG, normal sinus rhythm.     ASSESSMENT:  Hyponatremia, hypomagnesemia, hypokalemia, essential  hypertension, atherosclerotic heart disease, atherosclerotic vascular  disease, anemia of chronic disease, Crohn's colitis, peripheral arterial  disease, chronic venous hypertension. PLAN:  Get her admitted. Treat her with IV hydration. We will hold the  furosemide. Supplement the potassium. Supplement the magnesium. PT,  OT evaluation. Also provide the pain relief. We will also give DVT  prophylaxis. As always, it is a pleasure and privilege to take care of your patients  at [facility].         Mitch Hall MD    D: 07/16/2020 8:51:31       T: 07/16/2020 8:59:37     [id number]  Job#: [id number]     Doc#: [id number]    CC:  MD Hank Carrasco MD

## 2020-07-16 NOTE — PROGRESS NOTES
Wound care consult for left 3rd toe noted. the patient is an active with the wound care clinic for this toe which was debrided by dr Taylor Monahan on 7/7/2020. Wound care clinic discharge wound care orders placed in the patient's active orders; cleanse with normal saline, paint with betadine & apply dry dressing daily, keep left leg elevated as much as possible. Informed Edward ALMEIDA of continuation wf wound care orders, Edward RN verbalized understanding. Annita Shaw RN, BSN, PCCN.

## 2020-07-16 NOTE — CONSULTS
[facility]                     [facility], [address]                                  CONSULTATION    PATIENT NAME: Sonal Raines                :        1955  MED REC NO:   [id number]                          ROOM:       2908  ACCOUNT NO:   [de-identified]                           ADMIT DATE: 07/15/2020  PROVIDER:     Rakan Gil MD    RENAL CONSULTATION    CONSULT DATE:  2020    CONSULTING PHYSICIAN:  Rakan Gil MD    REFERRING PROVIDER:  Karla Andersen MD    REASON FOR CONSULTATION:  Hyponatremia, hypokalemia, hypomagnesemia,  hypocalcemia. HISTORY OF PRESENT ILLNESS:  The patient is a 60-year-old female with  history of Crohn's disease, hyponatremia, hypokalemia, hypertension,  hiatal hernia, sigmoid colectomy with colostomy, UTIs, GERD who presents  to the hospital on 07/15/2020 secondary to pain in the lower extremity. She also has been having significant weakness and fatigue associated  with leg cramps. During presentation, she had multiple electrolyte  abnormalities with a sodium of 125, potassium of 2.4, magnesium of 0.4,  serum calcium of 6.8. Her creatinine is normal at 0.7. According to  her, she has been having increased bowel movement for about two weeks  and has been having nausea and vomiting for about 3-4 weeks. She denies  any regular NSAID use. On presentation, her systolic blood pressure was  in the 100s to 120s range. Her outpatient medications include  buspirone, balsalazide, omeprazole, furosemide, and duloxetine. She is  also taking potassium tablets 20 mEq twice a day. After initial  attempts to replace the electrolytes, today her sodium is 123, potassium  3.4, ionized calcium of 0.76 and magnesium is up to 0.9. She refuses  PICC line placement. No other complaints.     PAST MEDICAL HISTORY:  Includes hyponatremia, hypokalemia, hypertension,  Crohn's disease, hiatal hernia, history of sigmoid colectomy with  colostomy and mobilization of splenic flexure, urinary tract infections,  insomnia. ALLERGIES:  Includes ACE INHIBITORS and SKELAXIN. MEDICATIONS PRIOR TO ADMISSION:  Includes gabapentin, hydralazine,  lidocaine patch, gentamicin cream, buspirone, fluticasone, nystatin  cream, balsalazide, _____, ferrous sulfate, omeprazole, furosemide,  baclofen, atenolol, ondansetron, Excedrin, duloxetine, zolpidem,  potassium chloride. SOCIAL HISTORY:  . History of smoking. No drug abuse. History  of occasional alcohol use. FAMILY HISTORY:  Includes hypertension and chronic kidney disease. REVIEW OF SYSTEMS:  GENERAL:  Positive malaise. SKIN:  No skin rash, itching or discharge. NEUROLOGIC:  No headaches or focal deficits. CARDIOVASCULAR:  No chest pain or palpitations. PULMONARY:  No shortness of breath. No coughing, no hemoptysis. GASTROINTESTINAL:  Episodes of nausea and vomiting. Intermittent  abdominal pain. Episodes of diarrhea. RENAL:  Denies any gross hematuria, change in urine output. ENDOCRINE:  No history of diabetes. No heat or cold intolerance. INFECTIOUS DISEASES:  No fever or chills. MUSCULOSKELETAL:  Denies active joint pain. PHYSICAL EXAMINATION:  VITAL SIGNS:  Blood pressure 139/95, pulse 85, respirations 18,  temperature 98.6, saturating 98%. Weight listed at 83.5 kg. Urine  output not recorded. GENERAL:  Appears tired. HEENT:  Anicteric sclerae, clear conjunctivae. SKIN:  Anicteric, no rash. CHEST:  Clear. HEART:  Regular. ABDOMEN:  Soft, nontender. No rebound or involuntary guarding. EXTREMITIES:  Show 1+ edema. LABORATORY DATA:  Sodium 123, potassium 3.4, chloride 82, bicarb 26, BUN  12, creatinine 0.6, glucose 106, calcium 6.6. Albumin 3.5. Ionized  calcium 0.76. PTH 59. WBC 6.2, hemoglobin 11.2, hematocrit 33,  platelet count 158,501. ASSESSMENT AND PLAN:  1. Severe hypomagnesemia. Would check fractional excretion of  magnesium.   The patient has been having nausea, vomiting and diarrhea  for several weeks. Would suspect GI loss as etiology. Aggressively  replace magnesium since this would help with serum potassium also. 2.  Hypokalemia in the setting of elevated serum bicarbonate and  diarrhea and vomiting. Continue potassium chloride 40 mEq three times  per day. 3.  Hyponatremia. Follow urine studies. Continue normal saline at 125  mL/hour. Follow sodium closely. 4.  Hypocalcemia. PTH is not significantly decreased. We will replace. Follow 25-hydroxyvitamin D level. She may need vitamin D  supplementation also. 5.  History of Crohn's disease. Management as per Medicine. 6.  Renal function within normal limits. 7.  The patient is currently high risk with multiple electrolyte  abnormalities. Would need very close followup. Thank you for this consult.         Jodi June MD    D: 07/16/2020 13:56:10       T: 07/16/2020 14:04:34     [email or web address]  Job#: [id number]     Doc#: [id number]    CC:

## 2020-07-17 LAB
ANION GAP SERPL CALCULATED.3IONS-SCNC: 14 MMOL/L (ref 3–16)
ANION GAP SERPL CALCULATED.3IONS-SCNC: 9 MMOL/L (ref 3–16)
BASOPHILS ABSOLUTE: 0 K/UL (ref 0–0.2)
BASOPHILS RELATIVE PERCENT: 0.4 %
BUN BLDV-MCNC: 10 MG/DL (ref 7–20)
BUN BLDV-MCNC: 9 MG/DL (ref 7–20)
CALCIUM IONIZED: 0.85 MMOL/L (ref 1.12–1.32)
CALCIUM SERPL-MCNC: 7.4 MG/DL (ref 8.3–10.6)
CALCIUM SERPL-MCNC: 7.7 MG/DL (ref 8.3–10.6)
CHLORIDE BLD-SCNC: 86 MMOL/L (ref 99–110)
CHLORIDE BLD-SCNC: 87 MMOL/L (ref 99–110)
CO2: 21 MMOL/L (ref 21–32)
CO2: 24 MMOL/L (ref 21–32)
CREAT SERPL-MCNC: <0.5 MG/DL (ref 0.6–1.2)
CREAT SERPL-MCNC: <0.5 MG/DL (ref 0.6–1.2)
CREATININE URINE: 238.3 MG/DL (ref 28–259)
EOSINOPHIL,URINE: NORMAL
EOSINOPHILS ABSOLUTE: 0.1 K/UL (ref 0–0.6)
EOSINOPHILS RELATIVE PERCENT: 0.8 %
GFR AFRICAN AMERICAN: >60
GFR AFRICAN AMERICAN: >60
GFR NON-AFRICAN AMERICAN: >60
GFR NON-AFRICAN AMERICAN: >60
GLUCOSE BLD-MCNC: 104 MG/DL (ref 70–99)
GLUCOSE BLD-MCNC: 112 MG/DL (ref 70–99)
HCT VFR BLD CALC: 35 % (ref 36–48)
HEMOGLOBIN: 12.7 G/DL (ref 12–16)
LYMPHOCYTES ABSOLUTE: 0.9 K/UL (ref 1–5.1)
LYMPHOCYTES RELATIVE PERCENT: 9.5 %
MAGNESIUM URINE: 3.7 MG/DL (ref 4.1–13.8)
MAGNESIUM: 1.5 MG/DL (ref 1.8–2.4)
MCH RBC QN AUTO: 35.8 PG (ref 26–34)
MCHC RBC AUTO-ENTMCNC: 36.3 G/DL (ref 31–36)
MCV RBC AUTO: 98.4 FL (ref 80–100)
MONOCYTES ABSOLUTE: 0.8 K/UL (ref 0–1.3)
MONOCYTES RELATIVE PERCENT: 7.7 %
NEUTROPHILS ABSOLUTE: 8 K/UL (ref 1.7–7.7)
NEUTROPHILS RELATIVE PERCENT: 81.6 %
OSMOLALITY URINE: 571 MOSM/KG (ref 390–1070)
OSMOLALITY: 260 MOSM/KG (ref 280–301)
PDW BLD-RTO: 13 % (ref 12.4–15.4)
PH VENOUS: 7.41 (ref 7.35–7.45)
PHOSPHORUS: 1.8 MG/DL (ref 2.5–4.9)
PLATELET # BLD: 239 K/UL (ref 135–450)
PMV BLD AUTO: 7.1 FL (ref 5–10.5)
POTASSIUM SERPL-SCNC: 4.7 MMOL/L (ref 3.5–5.1)
POTASSIUM SERPL-SCNC: 5.2 MMOL/L (ref 3.5–5.1)
RBC # BLD: 3.56 M/UL (ref 4–5.2)
SODIUM BLD-SCNC: 119 MMOL/L (ref 136–145)
SODIUM BLD-SCNC: 120 MMOL/L (ref 136–145)
SODIUM BLD-SCNC: 120 MMOL/L (ref 136–145)
SODIUM BLD-SCNC: 121 MMOL/L (ref 136–145)
SODIUM URINE: <20 MMOL/L
VITAMIN D 25-HYDROXY: 43.6 NG/ML
WBC # BLD: 9.8 K/UL (ref 4–11)

## 2020-07-17 PROCEDURE — 97530 THERAPEUTIC ACTIVITIES: CPT

## 2020-07-17 PROCEDURE — 83735 ASSAY OF MAGNESIUM: CPT

## 2020-07-17 PROCEDURE — 6360000002 HC RX W HCPCS: Performed by: INTERNAL MEDICINE

## 2020-07-17 PROCEDURE — 36415 COLL VENOUS BLD VENIPUNCTURE: CPT

## 2020-07-17 PROCEDURE — 2580000003 HC RX 258: Performed by: INTERNAL MEDICINE

## 2020-07-17 PROCEDURE — 1200000000 HC SEMI PRIVATE

## 2020-07-17 PROCEDURE — 84100 ASSAY OF PHOSPHORUS: CPT

## 2020-07-17 PROCEDURE — 82306 VITAMIN D 25 HYDROXY: CPT

## 2020-07-17 PROCEDURE — 97535 SELF CARE MNGMENT TRAINING: CPT

## 2020-07-17 PROCEDURE — 85025 COMPLETE CBC W/AUTO DIFF WBC: CPT

## 2020-07-17 PROCEDURE — 83930 ASSAY OF BLOOD OSMOLALITY: CPT

## 2020-07-17 PROCEDURE — 80048 BASIC METABOLIC PNL TOTAL CA: CPT

## 2020-07-17 PROCEDURE — 82330 ASSAY OF CALCIUM: CPT

## 2020-07-17 PROCEDURE — 6370000000 HC RX 637 (ALT 250 FOR IP): Performed by: INTERNAL MEDICINE

## 2020-07-17 PROCEDURE — 97162 PT EVAL MOD COMPLEX 30 MIN: CPT

## 2020-07-17 PROCEDURE — 97166 OT EVAL MOD COMPLEX 45 MIN: CPT

## 2020-07-17 PROCEDURE — 84295 ASSAY OF SERUM SODIUM: CPT

## 2020-07-17 RX ORDER — MAGNESIUM SULFATE IN WATER 40 MG/ML
2 INJECTION, SOLUTION INTRAVENOUS ONCE
Status: COMPLETED | OUTPATIENT
Start: 2020-07-17 | End: 2020-07-17

## 2020-07-17 RX ORDER — SODIUM CHLORIDE 1000 MG
2 TABLET, SOLUBLE MISCELLANEOUS
Status: DISCONTINUED | OUTPATIENT
Start: 2020-07-17 | End: 2020-07-23 | Stop reason: HOSPADM

## 2020-07-17 RX ORDER — 3% SODIUM CHLORIDE 3 G/100ML
25 INJECTION, SOLUTION INTRAVENOUS CONTINUOUS
Status: DISPENSED | OUTPATIENT
Start: 2020-07-17 | End: 2020-07-18

## 2020-07-17 RX ADMIN — CEPHALEXIN 500 MG: 250 CAPSULE ORAL at 13:04

## 2020-07-17 RX ADMIN — BALSALAZIDE DISODIUM 3000 MG: 750 CAPSULE ORAL at 09:21

## 2020-07-17 RX ADMIN — ATENOLOL 100 MG: 50 TABLET ORAL at 09:16

## 2020-07-17 RX ADMIN — FERROUS SULFATE TAB 325 MG (65 MG ELEMENTAL FE) 325 MG: 325 (65 FE) TAB at 09:16

## 2020-07-17 RX ADMIN — SODIUM CHLORIDE TAB 1 GM 2 G: 1 TAB at 18:09

## 2020-07-17 RX ADMIN — OYSTER SHELL CALCIUM WITH VITAMIN D 1 TABLET: 500; 200 TABLET, FILM COATED ORAL at 13:05

## 2020-07-17 RX ADMIN — BUSPIRONE HYDROCHLORIDE 10 MG: 5 TABLET ORAL at 13:05

## 2020-07-17 RX ADMIN — ZOLPIDEM TARTRATE 10 MG: 10 TABLET ORAL at 21:14

## 2020-07-17 RX ADMIN — HYDROCODONE BITARTRATE AND ACETAMINOPHEN 1 TABLET: 7.5; 325 TABLET ORAL at 13:09

## 2020-07-17 RX ADMIN — DULOXETINE HYDROCHLORIDE 60 MG: 60 CAPSULE, DELAYED RELEASE ORAL at 21:14

## 2020-07-17 RX ADMIN — HYDROCODONE BITARTRATE AND ACETAMINOPHEN 1 TABLET: 7.5; 325 TABLET ORAL at 05:09

## 2020-07-17 RX ADMIN — OYSTER SHELL CALCIUM WITH VITAMIN D 1 TABLET: 500; 200 TABLET, FILM COATED ORAL at 21:14

## 2020-07-17 RX ADMIN — NYSTATIN: 100000 CREAM TOPICAL at 09:17

## 2020-07-17 RX ADMIN — MAGNESIUM GLUCONATE 500 MG ORAL TABLET 400 MG: 500 TABLET ORAL at 09:16

## 2020-07-17 RX ADMIN — HYDRALAZINE HYDROCHLORIDE 25 MG: 25 TABLET, FILM COATED ORAL at 05:17

## 2020-07-17 RX ADMIN — BUSPIRONE HYDROCHLORIDE 10 MG: 5 TABLET ORAL at 21:14

## 2020-07-17 RX ADMIN — MAGNESIUM SULFATE HEPTAHYDRATE 2 G: 40 INJECTION, SOLUTION INTRAVENOUS at 11:10

## 2020-07-17 RX ADMIN — CALCIUM GLUCONATE 4 G: 98 INJECTION, SOLUTION INTRAVENOUS at 12:58

## 2020-07-17 RX ADMIN — BACLOFEN 10 MG: 10 TABLET ORAL at 21:15

## 2020-07-17 RX ADMIN — HYDROMORPHONE HYDROCHLORIDE 0.5 MG: 1 INJECTION, SOLUTION INTRAMUSCULAR; INTRAVENOUS; SUBCUTANEOUS at 21:15

## 2020-07-17 RX ADMIN — HYDROCODONE BITARTRATE AND ACETAMINOPHEN 1 TABLET: 7.5; 325 TABLET ORAL at 18:11

## 2020-07-17 RX ADMIN — BACLOFEN 10 MG: 10 TABLET ORAL at 09:17

## 2020-07-17 RX ADMIN — LOPERAMIDE HYDROCHLORIDE 2 MG: 2 CAPSULE ORAL at 09:21

## 2020-07-17 RX ADMIN — DULOXETINE HYDROCHLORIDE 60 MG: 60 CAPSULE, DELAYED RELEASE ORAL at 09:16

## 2020-07-17 RX ADMIN — LOPERAMIDE HYDROCHLORIDE 2 MG: 2 CAPSULE ORAL at 21:15

## 2020-07-17 RX ADMIN — SODIUM CHLORIDE 25 ML/HR: 3 INJECTION, SOLUTION INTRAVENOUS at 18:56

## 2020-07-17 RX ADMIN — BACLOFEN 10 MG: 10 TABLET ORAL at 13:05

## 2020-07-17 RX ADMIN — OYSTER SHELL CALCIUM WITH VITAMIN D 1 TABLET: 500; 200 TABLET, FILM COATED ORAL at 09:17

## 2020-07-17 RX ADMIN — BUSPIRONE HYDROCHLORIDE 10 MG: 5 TABLET ORAL at 09:16

## 2020-07-17 RX ADMIN — HYDROCODONE BITARTRATE AND ACETAMINOPHEN 1 TABLET: 7.5; 325 TABLET ORAL at 23:36

## 2020-07-17 RX ADMIN — MAGNESIUM GLUCONATE 500 MG ORAL TABLET 400 MG: 500 TABLET ORAL at 21:16

## 2020-07-17 RX ADMIN — SODIUM CHLORIDE TAB 1 GM 2 G: 1 TAB at 13:08

## 2020-07-17 RX ADMIN — HYDROCODONE BITARTRATE AND ACETAMINOPHEN 1 TABLET: 7.5; 325 TABLET ORAL at 09:17

## 2020-07-17 RX ADMIN — FERROUS SULFATE TAB 325 MG (65 MG ELEMENTAL FE) 325 MG: 325 (65 FE) TAB at 21:14

## 2020-07-17 RX ADMIN — CEPHALEXIN 500 MG: 250 CAPSULE ORAL at 21:14

## 2020-07-17 RX ADMIN — PANTOPRAZOLE SODIUM 40 MG: 40 TABLET, DELAYED RELEASE ORAL at 05:09

## 2020-07-17 RX ADMIN — ENOXAPARIN SODIUM 40 MG: 40 INJECTION SUBCUTANEOUS at 18:09

## 2020-07-17 RX ADMIN — HYDRALAZINE HYDROCHLORIDE 25 MG: 25 TABLET, FILM COATED ORAL at 13:05

## 2020-07-17 RX ADMIN — LOPERAMIDE HYDROCHLORIDE 2 MG: 2 CAPSULE ORAL at 13:08

## 2020-07-17 RX ADMIN — CEPHALEXIN 500 MG: 250 CAPSULE ORAL at 05:08

## 2020-07-17 ASSESSMENT — PAIN DESCRIPTION - ONSET
ONSET: ON-GOING
ONSET: ON-GOING

## 2020-07-17 ASSESSMENT — PAIN DESCRIPTION - ORIENTATION
ORIENTATION: RIGHT;LEFT

## 2020-07-17 ASSESSMENT — PAIN DESCRIPTION - PROGRESSION
CLINICAL_PROGRESSION: GRADUALLY WORSENING

## 2020-07-17 ASSESSMENT — ENCOUNTER SYMPTOMS
SHORTNESS OF BREATH: 0
NAUSEA: 0
COUGH: 0
EYE DISCHARGE: 0
EYE REDNESS: 0
VOMITING: 0
ABDOMINAL DISTENTION: 0
FACIAL SWELLING: 0
BLOOD IN STOOL: 0
ABDOMINAL PAIN: 0
PHOTOPHOBIA: 0
DIARRHEA: 1
CONSTIPATION: 0
CHEST TIGHTNESS: 0

## 2020-07-17 ASSESSMENT — PAIN DESCRIPTION - DESCRIPTORS
DESCRIPTORS: ACHING;CONSTANT
DESCRIPTORS: ACHING;CONSTANT

## 2020-07-17 ASSESSMENT — PAIN DESCRIPTION - FREQUENCY
FREQUENCY: CONTINUOUS
FREQUENCY: CONTINUOUS

## 2020-07-17 ASSESSMENT — PAIN SCALES - GENERAL
PAINLEVEL_OUTOF10: 9
PAINLEVEL_OUTOF10: 9
PAINLEVEL_OUTOF10: 8
PAINLEVEL_OUTOF10: 9
PAINLEVEL_OUTOF10: 8
PAINLEVEL_OUTOF10: 0
PAINLEVEL_OUTOF10: 0

## 2020-07-17 ASSESSMENT — PAIN DESCRIPTION - LOCATION
LOCATION: HAND;KNEE
LOCATION: KNEE;HAND
LOCATION: KNEE;HAND

## 2020-07-17 ASSESSMENT — PAIN DESCRIPTION - PAIN TYPE
TYPE: CHRONIC PAIN
TYPE: ACUTE PAIN
TYPE: CHRONIC PAIN

## 2020-07-17 NOTE — PROGRESS NOTES
Marcelo Shaw, [id number]    Lab Results   Component Value Date     07/17/2020    K 4.7 07/17/2020    K 3.4 07/16/2020    CL 87 07/17/2020    CO2 24 07/17/2020    BUN 9 07/17/2020    CREATININE <0.5 07/17/2020    GLUCOSE 112 07/17/2020    CALCIUM 7.4 07/17/2020     Lab Results   Component Value Date    WBC 9.8 07/17/2020    HGB 12.7 07/17/2020    HCT 35.0 07/17/2020    MCV 98.4 07/17/2020     07/17/2020         Lab Results   Component Value Date    INR 2.75 (H) 11/27/2018    PROTIME 31.3 (H) 11/27/2018       ASSESSMENT AND PLAN     Active Hospital Problems    Diagnosis Date Noted    Hypokalemia [E87.6] 07/15/2020    PAD (peripheral artery disease) ([facility] [address] [I73.9] 05/17/2019    Chronic venous hypertension (idiopathic) with inflammation of bilateral lower extremity [I87.323] 05/17/2019    Leg swelling [M79.89] 05/17/2019    Hypomagnesemia [E83.42] 01/08/2018    Crohn's colitis ([facility] [address] [K50.10] 01/06/2018    Anemia [D64.9] 01/05/2018    Hyponatremia [E87.1] 01/05/2018    Atherosclerosis of native arteries of extremities with intermittent claudication, bilateral legs ([facility] [address] [I70.213] 01/19/2016    Essential hypertension, benign [I10] 09/13/2015        add [place]  , ct parenteral analgesic    ct Magnesium and k supplemntation

## 2020-07-17 NOTE — PROGRESS NOTES
restriction. Repeat Na at 1800.   4.  Hypocalcemia. PTH is not significantly decreased. Replace. Follow 25-hydroxyvitamin D level. She may need vitamin D  supplementation also. 5.  History of Crohn's disease. Management as per Medicine. 6.  Renal function within normal limits.   7.  The patient is currently high risk with multiple electrolyte  abnormalities.     Martina Ferreira MD

## 2020-07-17 NOTE — PLAN OF CARE
Problem: Falls - Risk of:  Goal: Will remain free from falls  Description: Will remain free from falls  Outcome: Ongoing  Note: Call light in reach; bed in low position; SR up x2; pt affirms awareness and understanding of need to call for and await assist with OOB mobility,but is forgetful and confused at time; alarm active.      Problem: Pain:  Goal: Control of acute pain  Description: Control of acute pain  Outcome: Ongoing  Note: Pt c/o persistent bilat knee and left hand pain - instructed on plan to utilize PO pain med for more sustained relief (c/o Hydromorphone IV dose relief lasting only 1 hour)     Problem: Neurological  Intervention: Neurological Status Assessment  Note: Altered mentation, numbness bilat feet     Problem: Cardiovascular  Goal: No DVT, peripheral vascular complications  Outcome: Ongoing  Note: No calf tenderness or s/s of dvt     Problem: Cardiovascular  Goal: Hemodynamic stability  Outcome: Ongoing  Note: VSS; remains in SR; afebrile     Problem: Cardiovascular  Goal: Anticoagulate/Hct stable  Outcome: Ongoing  Note: Remains on prophylactic lovenox regimen - instructed onplan to move AM dose to late today in case Midline or PICC line required to meet needs of multiple IV and venipunctures for lab     Problem: Respiratory  Goal: No pulmonary complications  Outcome: Ongoing  Note: BBS CTA  with DB, though only air movement heard over bases     Problem: Respiratory  Goal: O2 Sat > 90%  Outcome: Ongoing  Note: WNL on RA at rest     Problem: GI  Goal: No bowel complications  Outcome: Ongoing  Note: Persistent loose and liquid stools R/T Crohn's - instructed on plan to administer Loperamide     Problem:   Goal: No urinary complication  Outcome: Ongoing  Note: Indwelling catheter in place     Problem: Nutrition  Goal: Optimal nutrition therapy  Outcome: Ongoing  Note: Ate 100% this AM     Problem: Skin Integrity/Risk  Goal: Wound healing  Outcome: Ongoing  Note: Dressing CD&I to left 3rd toe and left hand sites     Problem: Skin Integrity:  Goal: Will show no infection signs and symptoms  Description: Will show no infection signs and symptoms  Outcome: Ongoing  Note: Left hand remains reddened, swollen, tender, but slightly improved compared to yesterday; remains on Keflex     Problem: Skin Integrity:  Goal: Absence of new skin breakdown  Description: Absence of new skin breakdown  Outcome: Ongoing  Note: No new breakdown evident

## 2020-07-17 NOTE — PROGRESS NOTES
Occupational Therapy   Occupational Therapy Initial Assessment  Date: 2020   Patient Name: Nati Gregory  MRN: [id number]     : 1955    Date of Service: 2020    Discharge Recommendations:  Nati Gregory scored a  on the AM-PAC ADL Inpatient form. Current research shows that an AM-PAC score of 17 or less is typically not associated with a discharge to the patient's home setting. Based on the patient's AM-PAC score and their current ADL deficits, it is recommended that the patient have 3-5 sessions per week of Occupational Therapy at d/c to increase the patient's independence. Please see assessment section for further patient specific details. If patient discharges prior to next session this note will serve as a discharge summary. Please see below for the latest assessment towards goals. OT Equipment Recommendations  Equipment Needed: No    Assessment   Performance deficits / Impairments: Decreased functional mobility ; Decreased ADL status; Decreased endurance;Decreased fine motor control;Decreased ROM; Decreased coordination;Decreased balance;Decreased strength  Assessment: Pt is not at her baseline level of occupational function, based on the above deficits associated wit hypokalemia. Pt would benefit from continued skilled acute OT services to address these deficits. Treatment Diagnosis: Decreased ADL status, endurance, functional mobility, ROM, strength, balance, coordination, FM control associated with hypokalemia  Prognosis: Fair;Good  Decision Making: Medium Complexity  History: Pt 61 yo, lives w/spouse & dtr, gets occasional assistance w/ADLs, ambulation w/kitchen cart inside, SPC to car, at least 2 recent falls.  PMH: HTN, blood clots, colostomy & reversal, R hip fx, R CHETAN, severe arthritis bilat knees, gets injections  Exam: ROM, MMT, 6 clicks, 8 performance deficits/impairments, evolving presentation  Assistance / Modification: Max A of 2 transfers, Mod A supine to sit, D of 2 sit to supine, dependent LB ADLs, toileting  OT Education: OT Role;Plan of Care;Transfer Training  Patient Education: OT declan, d/c recommendation. Pt verbalized understanding. Needs reinforcement. Barriers to Learning: None  REQUIRES OT FOLLOW UP: Yes  Activity Tolerance  Activity Tolerance: Patient limited by pain  Safety Devices  Safety Devices in place: Yes  Type of devices: Gait belt;Left in bed;Nurse notified;Call light within reach; Bed alarm in place; All fall risk precautions in place(RN present)           Patient Diagnosis(es): The primary encounter diagnosis was Acute pain of both knees. Diagnoses of General weakness, Hypokalemia, Hypomagnesemia, Dog bite of left hand, initial encounter, and Tetanus toxoid vaccination administered at current visit were also pertinent to this visit. has a past medical history of Anxiety, Arthritis, Blood transfusion reaction, Crohn's disease ([facility] [address] Depression, GERD (gastroesophageal reflux disease), Hiatal hernia, Hx of blood clots, Hypertension, MRSA (methicillin resistant staph aureus) culture positive, Neuropathy, Pneumonia, Sinus headache, SOB (shortness of breath), and VRE (vancomycin resistant enterococcus) culture positive. has a past surgical history that includes Hysterectomy; knee surgery (Bilateral); Hand Carpectomy (Bilateral); hernia repair; Abdomen surgery; bladder suspension; fracture surgery; Heel spur surgery; Tonsillectomy; Colonoscopy; Upper gastrointestinal endoscopy (6/14/13); Foot surgery (Left, 6/25/14); Foot surgery (6/3/15); Colonoscopy (9/14/15); Foot surgery (Left, 08/05/2002); joint replacement; Sigmoidoscopy (01/15/2018); Abdominal adhesion surgery (06/08/2018); Colonoscopy (08/07/2018); colostomy (N/A, 10/8/2018); pr secd clos surg wnd exten/complic (N/A, 78/18/7900); Leg Surgery (Right); Colonoscopy (06/07/2019); Colonoscopy (N/A, 6/7/2019); Colonoscopy (N/A, 6/7/2019);  Small intestine surgery (N/A, 7/17/2019); and proctosigmoidoscopy (N/A, 7/17/2019). Treatment Diagnosis: Decreased ADL status, endurance, functional mobility, ROM, strength, balance, coordination, FM control associated with hypokalemia      Restrictions  Restrictions/Precautions  Restrictions/Precautions: Fall Risk(high fall risk)  Position Activity Restriction  Other position/activity restrictions: Cordelia Mandel is a 62 y.o. female with past medical she of anxiety, Crohn's disease, depression, hiatal hernia, hypertension who presents to the ED implant of bilateral knee pain. Patient states has bilateral knee pain that she was told was due to arthritis. Patient states she follows up with the arthritis clinic. Patient states she has received gel injections to her bilateral knees. States normally the left knee is worse than the right. States yesterday she was walking with her walker when she twisted and states she had some pain to her right knee. States she fell and went down and hit her right anterior knee on the ground. States since then has had not been able to ambulate. States significant edema to the right knee with associated pain that she describes as an aching rated 10/10. Denies ecchymosis, erythema or warmth. Denies abrasion or laceration. Denies fever chills. Denies numbness or tingling. States decreased range of motion and strength due to pain. States she is unable to ambulate. Denies taking any over-the-counter medication for symptom control. Became concerned and called EMS who brought her to the ED for further evaluation and treatment. Subjective   General  Chart Reviewed: Yes  Family / Caregiver Present: No  Referring Practitioner: Su Mack MD , for d/c planning  Diagnosis: Hypokalemia  Subjective  Subjective: Pt supine in bed, pleasant and agreeable to OT eval. Pt reports 9/10 pain, recently had pain medication.   Pain Assessment  Clinical Progression: Gradually worsening  Vital Signs  Level of Consciousness: Alert  Height and Weight  Height: 5' 6\" (167.6 cm)  Social/Functional History  Social/Functional History  Lives With: Family( & daughter)  Type of Home: House  Home Layout: One level  Home Access: Stairs to enter without rails, Stairs to enter with rails  Entrance Stairs - Number of Steps: 1 DEON  Entrance Stairs - Rails: Left  Bathroom Shower/Tub: Tub/Shower unit(Pt takes sponge baths, doesn't get in shower)  Bathroom Toilet: Handicap height(near living room; BSC next to bed)  Bathroom Equipment: Toilet raiser, Commode  Home Equipment: Cane, Wheelchair-manual, Rolling walker, Long-handled shoehorn, Sock aid, Reacher(SPC, 3-pronged cane, transport w/c)  ADL Assistance: Needs assistance(I sponge bath w/extra time; gets help w/back; sometimes needs help donning depends, wears flip flops; independent toileting; gets assist w/hair)  Homemaking Responsibilities: Yes(Family performs all, but pt folds clothes)  Ambulation Assistance: Independent(pushes sturdy kitchen cart, carries items on it; uses cane house to car, then transport chair in community)  Transfer Assistance: Needs assistance(occasional, especially last week or so)  Active : No  Patient's  Info: Dtr takes to dr. Mary Jane Saini: Folding clothes, time with dogs  Additional Comments: Dtr &   work;  planning to take leave of absence. Pt has adjustable bed. At least 2 falls past 6 mos. Ambulates short distances, down hallway at home, depending on how much pain pt is having.        Objective   Vision: Impaired  Vision Exceptions: Wears glasses for reading  Hearing: Within functional limits    Orientation  Overall Orientation Status: Within Normal Limits  Observation/Palpation  Observation: L forearm/hand wrapped due to recent dog bite  Edema: L hand, B knees  Balance  Sitting Balance: Stand by assistance  Standing Balance: Dependent/Total(Max A of 2; partial stand/squat)  Standing Balance  Time: ~20 sec, 30 sec, ~20 sec  Activity: transfer EOB to [facility], partial stand for joyce hygiene, transfer BSC to EOB  Comment: Used drop-arm BSC with arm down for transfer back to bed; pt with limited standing tolerance d/t pain. Pt holding on to bed rail. Toilet Transfers  Toilet - Technique: Sit pivot; To left  Equipment Used: Standard bedside commode  Toilet Transfer: Dependent/Total;2 Person assistance(Max A of 2)  ADL  LE Bathing: Dependent/Total(joyce/anal hygiene)  LE Dressing: Dependent/Total(socks, brief)  Toileting: Dependent/Total  Tone RUE  RUE Tone: Normotonic  Tone LUE  LUE Tone: Normotonic  Coordination  Movements Are Fluid And Coordinated: No  Coordination and Movement description: Fine motor impairments;Decreased speed;Decreased accuracy; Left UE     Bed mobility  Rolling to Left: Minimal assistance  Rolling to Right: Minimal assistance  Supine to Sit: Moderate assistance(HOB elevated)  Sit to Supine: Dependent/Total;2 Person assistance(of 2; limited by pain)  Scooting: Stand by assistance(extra time)  Transfers  Sit Pivot Transfers: Dependent/Total;2 Person assistance(max A of 2 EOB to/from drop arm BSC)  Sit to stand: Dependent/Total;2 Person assistance(Max A of 2)  Stand to sit: Dependent/Total;2 Person assistance(Max A of 2)  Vision - Basic Assessment  Prior Vision: Wears glasses only for reading  Visual History: No significant visual history  Patient Visual Report: No visual complaint reported.   Cognition  Overall Cognitive Status: WFL  Perception  Overall Perceptual Status: WFL     Sensation  Overall Sensation Status: WFL        LUE AROM (degrees)  LUE AROM : Exceptions  LUE General AROM: R shoulder flexion WFL, except wrist, limited ROM d/t dog bite location; edema  RUE AROM (degrees)  RUE AROM : WFL  Right Hand AROM (degrees)  Right Hand AROM: WFL  LUE Strength  Gross LUE Strength: WFL(for bed mobility; NT)  L Hand General: NT  RUE Strength  Gross RUE Strength: WFL(for bed mobility; NT)  R Hand General: NT                   Plan Plan  Times per week: 3-5  Current Treatment Recommendations: Endurance Training, Functional Mobility Training, Self-Care / ADL, Safety Education & Training      AM-PAC Score        AM-PAC Inpatient Daily Activity Raw Score: 12 (07/17/20 1228)  AM-PAC Inpatient ADL T-Scale Score : 30.6 (07/17/20 1228)  ADL Inpatient CMS 0-100% Score: 66.57 (07/17/20 1228)  ADL Inpatient CMS G-Code Modifier : CL (07/17/20 1228)    Goals  Short term goals  Time Frame for Short term goals: Discharge  Short term goal 1: Min A for supine to sit  Short term goal 2: Mod A for functional ADL transfers  Short term goal 3: S/U for simple grooming  Short term goal 4: Min A for UB bathing/dressing  Short term goal 5:  Mod A for LB bathing/dressing with AE as needed  Long term goals  Time Frame for Long term goals : LTG=STG  Long term goal 1: Pt to tolerate standing 2-3 min for ADL activity       Therapy Time   Individual Concurrent Group Co-treatment   Time In 0939         Time Out 1118         Minutes 99               Timed Code Treatment Minutes:  84 Minutes    Total Treatment Minutes:  [employer] [address], [place] [address], OTR/L, [id number]

## 2020-07-17 NOTE — PROGRESS NOTES
Wound care performed to left 3rd toe site - tolerated well; edema to bilat ankle & feet (Left > Rt) ace wrap applied; no other significant changes since earlier assessment, except as noted; c/o persistent knee and hand pain; no other s/s of acute distress at present.

## 2020-07-17 NOTE — DISCHARGE INSTR - COC
08/05/2002    Excision second metatarsal head left    FRACTURE SURGERY      rt hip    HAND CARPECTOMY Bilateral     HEEL SPUR SURGERY      HERNIA REPAIR      HYSTERECTOMY      bladder surgery    JOINT REPLACEMENT      rt hip, L shoulder replacement 11/2014    KNEE SURGERY Bilateral     arthrosvopic    LEG SURGERY Right     MT SECD CLOS SURG WND EXTEN/COMPLIC N/A 07/54/8747    SECONDARY WOUND CLOSURE OF ABDOMINAL WOUND performed by Hilda Kaiser MD at [address] N/A 7/17/2019    FLEXIBLE SIGMOIDOSCOPY performed by Hilda Kaiser MD at [address]  01/15/2018    with biopsies    SMALL INTESTINE SURGERY N/A 7/17/2019    COLOSTOMY CLOSURE WITH COLORECTAL ANASTOMOSIS performed by Hilda Kaiser MD at [place] [address]  6/14/13    and colonsocopy with antral erosion biopsies       Immunization History:   Immunization History   Administered Date(s) Administered    PPD Test 01/16/2018    Tdap (Boostrix, Adacel) 07/15/2020       Active Problems:  Patient Active Problem List   Diagnosis Code    Essential hypertension, benign I10    Atherosclerosis of native arteries of extremities with intermittent claudication, bilateral legs ([facility]) I70.213    Anemia D64.9    Hyponatremia E87.1    Crohn's colitis ([facility] [address] K50.10    Hypomagnesemia E83.42    DVT (deep venous thrombosis) ([facility]) I82.409    PAD (peripheral artery disease) ([facility]) I73.9    Leg swelling M79.89    Venous insufficiency I87.2    Chronic venous hypertension (idiopathic) with inflammation of bilateral lower extremity I87.323    Open wound of left foot with complication D10.898C    Hypokalemia E87.6       Isolation/Infection:   Isolation          No Isolation        Patient Infection Status     Infection Onset Added Last Indicated Last Indicated By Review Planned Expiration Resolved Resolved By    None active    Resolved    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/18 10/12/18 Margoth [place], RN   10/25/19 Margoth [place], RN    10/8/18; abd culture    MRSA  18 Margoth Guerin, ELLE   10/25/19 Dariana Callahan RN    18; urine          Nurse Assessment:  Last Vital Signs: /87   Pulse 77   Temp 97.4 °F (36.3 °C) (Oral)   Resp 17   Ht 5' 6\" (1.676 m)   Wt 184 lb 4.8 oz (83.6 kg)   SpO2 94%   BMI 29.75 kg/m²     Last documented pain score (0-10 scale): Pain Level: 9  Last Weight:   Wt Readings from Last 1 Encounters:   20 184 lb 4.8 oz (83.6 kg)     Mental Status:  {IP PT MENTAL STATUS:}    IV Access:  { MARIBEL IV ACCESS:321138085}    Nursing Mobility/ADLs:  Walking   {[facility] DME HEXX:791831988}  Transfer  {[facility] DME LKFR:835957113}  Bathing  {P DME LRTI:070685383}  Dressing  {[facility] DME FFBE:628960195}  Toileting  {[facility] DME ZQVH:180985680}  Feeding  {P DME DBSZ:477621562}  Med Admin  {P DME VUMI:617634068}  Med Delivery   { MARIBEL MED Delivery:577386174}    Wound Care Documentation and Therapy:  Wound 20 Toe (Comment  which one) Left #1 3rd (Active)   Wound Image   20 0947   Wound Traumatic 20 210   Dressing/Treatment Steri-strips 20 0840   Wound Cleansed Rinsed/Irrigated with saline 20 0947   Wound Length (cm) 1.5 cm 20 0954   Wound Width (cm) 2 cm 20 0954   Wound Depth (cm) 0.2 cm 20 0954   Wound Surface Area (cm^2) 3 cm^2 20 0954   Wound Volume (cm^3) 0.6 cm^3 20 0954   Wound Assessment Yellow 20 0505   Drainage Amount Scant 20 0505   Drainage Description Yellow 20 0505   Odor None 20 0947   Shweta-wound Assessment Dry 20 0947   Pink%Wound Bed 0 20 0947   Red%Wound Bed 0 20 0947   Yellow%Wound Bed 0 20 0947   Black%Wound Bed 100 20 0947   Purple%Wound Bed 0 20 0947   Other%Wound Bed 0 20 0947   Number of days: 10        Elimination:  Continence:   · Bowel: {YES / R}  · Bladder: {YES / JF:09837}  Urinary Catheter: Condition:895407120}    Rehab Potential (if transferring to Rehab): {Prognosis:8696524806}    Recommended Labs or Other Treatments After Discharge: ***    Physician Certification: I certify the above information and transfer of Polina Benavides  is necessary for the continuing treatment of the diagnosis listed and that she requires {Admit to Appropriate Level of Care:45279} for {GREATER/LESS:912504502} 30 days.      Update Admission H&P: {[facility] DME Changes in OUYIS:848919980}    PHYSICIAN SIGNATURE:  {Esignature:109538652}

## 2020-07-17 NOTE — PLAN OF CARE
Nutrition Problem #1: Inadequate oral intake  Intervention: Food and/or Nutrient Delivery: Continue Current Diet  Nutritional Goals: PO intake > 50% at all meals

## 2020-07-17 NOTE — PROGRESS NOTES
bilat knee and left hand pain persist; instructed pt on plan for and purpose of PO fluid restriction; pt objects to limiting fluids with consumption of meds - states she normally drinks a glass of water with each group of meds she takes - instructed pt on potential use of applesauce (she declines) or pudding to consume meds (she agrees); loose and liquid stools persist - PO intake declining, likely out of fear of stimulating new bowel activity; no other s/s of distress at present.

## 2020-07-17 NOTE — FLOWSHEET NOTE
· Inadequate oral intake related to altered GI function as evidenced by intake 0-25%, diarrhea, other (comment)(Pt reports afraid to eat d/t diarrhea)      Nutrition Interventions:   Food and/or Nutrient Delivery:  Continue Current Diet  Nutrition Education/Counseling:  Education not indicated   Coordination of Nutrition Care:  Continued Inpatient Monitoring    Goals:  PO intake > 50% at all meals       Nutrition Monitoring and Evaluation:    Food/Nutrient Intake Outcomes:  Food and Nutrient Intake    Discharge Planning:     Too soon to determine     Electronically signed by Vivian Cochran on 7/17/20 at 1:11 PM EDT    Contact: [phone]

## 2020-07-18 ENCOUNTER — APPOINTMENT (OUTPATIENT)
Dept: GENERAL RADIOLOGY | Age: 65
DRG: 426 | End: 2020-07-18
Payer: MEDICAID

## 2020-07-18 LAB
ANION GAP SERPL CALCULATED.3IONS-SCNC: 10 MMOL/L (ref 3–16)
BASOPHILS ABSOLUTE: 0 K/UL (ref 0–0.2)
BASOPHILS RELATIVE PERCENT: 0.3 %
BUN BLDV-MCNC: 8 MG/DL (ref 7–20)
CALCIUM SERPL-MCNC: 8.6 MG/DL (ref 8.3–10.6)
CHLORIDE BLD-SCNC: 90 MMOL/L (ref 99–110)
CO2: 24 MMOL/L (ref 21–32)
CREAT SERPL-MCNC: 0.5 MG/DL (ref 0.6–1.2)
EOSINOPHILS ABSOLUTE: 0.2 K/UL (ref 0–0.6)
EOSINOPHILS RELATIVE PERCENT: 2.5 %
GFR AFRICAN AMERICAN: >60
GFR NON-AFRICAN AMERICAN: >60
GLUCOSE BLD-MCNC: 79 MG/DL (ref 70–99)
HCT VFR BLD CALC: 30.7 % (ref 36–48)
HEMOGLOBIN: 10.6 G/DL (ref 12–16)
LYMPHOCYTES ABSOLUTE: 1 K/UL (ref 1–5.1)
LYMPHOCYTES RELATIVE PERCENT: 14.1 %
MAGNESIUM: 1.6 MG/DL (ref 1.8–2.4)
MCH RBC QN AUTO: 34.2 PG (ref 26–34)
MCHC RBC AUTO-ENTMCNC: 34.5 G/DL (ref 31–36)
MCV RBC AUTO: 99.2 FL (ref 80–100)
MONOCYTES ABSOLUTE: 0.6 K/UL (ref 0–1.3)
MONOCYTES RELATIVE PERCENT: 8.2 %
NEUTROPHILS ABSOLUTE: 5.3 K/UL (ref 1.7–7.7)
NEUTROPHILS RELATIVE PERCENT: 74.9 %
PDW BLD-RTO: 13.1 % (ref 12.4–15.4)
PHOSPHORUS: 2.1 MG/DL (ref 2.5–4.9)
PLATELET # BLD: 299 K/UL (ref 135–450)
PMV BLD AUTO: 7 FL (ref 5–10.5)
POTASSIUM SERPL-SCNC: 4.1 MMOL/L (ref 3.5–5.1)
RBC # BLD: 3.09 M/UL (ref 4–5.2)
SODIUM BLD-SCNC: 124 MMOL/L (ref 136–145)
SODIUM BLD-SCNC: 126 MMOL/L (ref 136–145)
WBC # BLD: 7 K/UL (ref 4–11)

## 2020-07-18 PROCEDURE — 36415 COLL VENOUS BLD VENIPUNCTURE: CPT

## 2020-07-18 PROCEDURE — 72100 X-RAY EXAM L-S SPINE 2/3 VWS: CPT

## 2020-07-18 PROCEDURE — 83735 ASSAY OF MAGNESIUM: CPT

## 2020-07-18 PROCEDURE — 80048 BASIC METABOLIC PNL TOTAL CA: CPT

## 2020-07-18 PROCEDURE — 84295 ASSAY OF SERUM SODIUM: CPT

## 2020-07-18 PROCEDURE — 1200000000 HC SEMI PRIVATE

## 2020-07-18 PROCEDURE — 6360000002 HC RX W HCPCS: Performed by: INTERNAL MEDICINE

## 2020-07-18 PROCEDURE — 6370000000 HC RX 637 (ALT 250 FOR IP): Performed by: INTERNAL MEDICINE

## 2020-07-18 PROCEDURE — 85025 COMPLETE CBC W/AUTO DIFF WBC: CPT

## 2020-07-18 PROCEDURE — 84100 ASSAY OF PHOSPHORUS: CPT

## 2020-07-18 RX ORDER — MAGNESIUM SULFATE IN WATER 40 MG/ML
4 INJECTION, SOLUTION INTRAVENOUS ONCE
Status: COMPLETED | OUTPATIENT
Start: 2020-07-18 | End: 2020-07-18

## 2020-07-18 RX ADMIN — SODIUM CHLORIDE TAB 1 GM 2 G: 1 TAB at 17:08

## 2020-07-18 RX ADMIN — HYDRALAZINE HYDROCHLORIDE 25 MG: 25 TABLET, FILM COATED ORAL at 14:25

## 2020-07-18 RX ADMIN — DULOXETINE HYDROCHLORIDE 60 MG: 60 CAPSULE, DELAYED RELEASE ORAL at 08:56

## 2020-07-18 RX ADMIN — MAGNESIUM GLUCONATE 500 MG ORAL TABLET 400 MG: 500 TABLET ORAL at 21:19

## 2020-07-18 RX ADMIN — BUSPIRONE HYDROCHLORIDE 10 MG: 5 TABLET ORAL at 21:19

## 2020-07-18 RX ADMIN — OYSTER SHELL CALCIUM WITH VITAMIN D 1 TABLET: 500; 200 TABLET, FILM COATED ORAL at 14:25

## 2020-07-18 RX ADMIN — NYSTATIN: 100000 CREAM TOPICAL at 21:20

## 2020-07-18 RX ADMIN — ENOXAPARIN SODIUM 40 MG: 40 INJECTION SUBCUTANEOUS at 08:56

## 2020-07-18 RX ADMIN — CEPHALEXIN 500 MG: 250 CAPSULE ORAL at 21:19

## 2020-07-18 RX ADMIN — BACLOFEN 10 MG: 10 TABLET ORAL at 08:57

## 2020-07-18 RX ADMIN — HYDROCODONE BITARTRATE AND ACETAMINOPHEN 1 TABLET: 7.5; 325 TABLET ORAL at 21:19

## 2020-07-18 RX ADMIN — ZOLPIDEM TARTRATE 10 MG: 10 TABLET ORAL at 22:19

## 2020-07-18 RX ADMIN — OYSTER SHELL CALCIUM WITH VITAMIN D 1 TABLET: 500; 200 TABLET, FILM COATED ORAL at 08:57

## 2020-07-18 RX ADMIN — BALSALAZIDE DISODIUM 3000 MG: 750 CAPSULE ORAL at 10:42

## 2020-07-18 RX ADMIN — BACLOFEN 10 MG: 10 TABLET ORAL at 21:20

## 2020-07-18 RX ADMIN — HYDROMORPHONE HYDROCHLORIDE 0.5 MG: 1 INJECTION, SOLUTION INTRAMUSCULAR; INTRAVENOUS; SUBCUTANEOUS at 06:10

## 2020-07-18 RX ADMIN — NYSTATIN: 100000 CREAM TOPICAL at 08:59

## 2020-07-18 RX ADMIN — HYDRALAZINE HYDROCHLORIDE 25 MG: 25 TABLET, FILM COATED ORAL at 06:09

## 2020-07-18 RX ADMIN — SODIUM CHLORIDE TAB 1 GM 2 G: 1 TAB at 11:26

## 2020-07-18 RX ADMIN — HYDROMORPHONE HYDROCHLORIDE 0.5 MG: 1 INJECTION, SOLUTION INTRAMUSCULAR; INTRAVENOUS; SUBCUTANEOUS at 22:20

## 2020-07-18 RX ADMIN — CEPHALEXIN 500 MG: 250 CAPSULE ORAL at 06:09

## 2020-07-18 RX ADMIN — BUSPIRONE HYDROCHLORIDE 10 MG: 5 TABLET ORAL at 17:07

## 2020-07-18 RX ADMIN — FERROUS SULFATE TAB 325 MG (65 MG ELEMENTAL FE) 325 MG: 325 (65 FE) TAB at 21:20

## 2020-07-18 RX ADMIN — CEPHALEXIN 500 MG: 250 CAPSULE ORAL at 14:25

## 2020-07-18 RX ADMIN — DULOXETINE HYDROCHLORIDE 60 MG: 60 CAPSULE, DELAYED RELEASE ORAL at 21:20

## 2020-07-18 RX ADMIN — MAGNESIUM SULFATE HEPTAHYDRATE 4 G: 40 INJECTION, SOLUTION INTRAVENOUS at 10:44

## 2020-07-18 RX ADMIN — HYDRALAZINE HYDROCHLORIDE 25 MG: 25 TABLET, FILM COATED ORAL at 21:20

## 2020-07-18 RX ADMIN — FERROUS SULFATE TAB 325 MG (65 MG ELEMENTAL FE) 325 MG: 325 (65 FE) TAB at 08:57

## 2020-07-18 RX ADMIN — BUSPIRONE HYDROCHLORIDE 10 MG: 5 TABLET ORAL at 08:56

## 2020-07-18 RX ADMIN — BACLOFEN 10 MG: 10 TABLET ORAL at 14:25

## 2020-07-18 RX ADMIN — ATENOLOL 100 MG: 50 TABLET ORAL at 08:57

## 2020-07-18 RX ADMIN — OYSTER SHELL CALCIUM WITH VITAMIN D 1 TABLET: 500; 200 TABLET, FILM COATED ORAL at 21:19

## 2020-07-18 RX ADMIN — PANTOPRAZOLE SODIUM 40 MG: 40 TABLET, DELAYED RELEASE ORAL at 06:09

## 2020-07-18 RX ADMIN — SODIUM CHLORIDE TAB 1 GM 2 G: 1 TAB at 08:57

## 2020-07-18 ASSESSMENT — PAIN DESCRIPTION - PROGRESSION
CLINICAL_PROGRESSION: GRADUALLY WORSENING
CLINICAL_PROGRESSION: GRADUALLY WORSENING

## 2020-07-18 ASSESSMENT — PAIN DESCRIPTION - ORIENTATION: ORIENTATION: RIGHT;LEFT

## 2020-07-18 ASSESSMENT — ENCOUNTER SYMPTOMS
EYE DISCHARGE: 0
ABDOMINAL PAIN: 0
DIARRHEA: 1
ABDOMINAL DISTENTION: 0
CHEST TIGHTNESS: 0
EYE REDNESS: 0
FACIAL SWELLING: 0
CONSTIPATION: 0
BLOOD IN STOOL: 0
VOMITING: 0
NAUSEA: 0
PHOTOPHOBIA: 0
SHORTNESS OF BREATH: 0
COUGH: 0

## 2020-07-18 ASSESSMENT — PAIN SCALES - GENERAL
PAINLEVEL_OUTOF10: 8

## 2020-07-18 ASSESSMENT — PAIN DESCRIPTION - LOCATION: LOCATION: KNEE;HAND

## 2020-07-18 ASSESSMENT — PAIN DESCRIPTION - FREQUENCY: FREQUENCY: CONTINUOUS

## 2020-07-18 ASSESSMENT — PAIN DESCRIPTION - ONSET: ONSET: ON-GOING

## 2020-07-18 ASSESSMENT — PAIN DESCRIPTION - DESCRIPTORS: DESCRIPTORS: ACHING;CONSTANT

## 2020-07-18 ASSESSMENT — PAIN DESCRIPTION - PAIN TYPE: TYPE: CHRONIC PAIN

## 2020-07-18 NOTE — PROGRESS NOTES
Endocrine: Negative for cold intolerance, heat intolerance and polyuria. Genitourinary: Negative for decreased urine volume, difficulty urinating, flank pain and hematuria. Musculoskeletal: Negative for joint swelling and neck pain. Neurological: Negative for dizziness, seizures, syncope, speech difficulty, light-headedness and headaches. Hematological: Does not bruise/bleed easily. Psychiatric/Behavioral: Negative for agitation, confusion and hallucinations. Objective:      BP (!) 138/99   Pulse 90   Temp 98 °F (36.7 °C) (Oral)   Resp 16   Ht 5' 6\" (1.676 m)   Wt 184 lb 4.8 oz (83.6 kg)   SpO2 91%   BMI 29.75 kg/m²     Wt Readings from Last 3 Encounters:   07/17/20 184 lb 4.8 oz (83.6 kg)   07/07/20 205 lb (93 kg)   12/17/19 203 lb (92.1 kg)       BP Readings from Last 3 Encounters:   07/18/20 (!) 138/99   03/04/20 (!) 160/101   02/24/20 (!) 166/117     Chest- clear  Heart-regular  Abd-soft  Ext- 1+ edema    Labs  Hemoglobin   Date Value Ref Range Status   07/18/2020 10.6 (L) 12.0 - 16.0 g/dL Final     Hematocrit   Date Value Ref Range Status   07/18/2020 30.7 (L) 36.0 - 48.0 % Final     WBC   Date Value Ref Range Status   07/18/2020 7.0 4.0 - 11.0 K/uL Final     Platelets   Date Value Ref Range Status   07/18/2020 299 135 - 450 K/uL Final     Lab Results   Component Value Date    CREATININE 0.5 (L) 07/18/2020    BUN 8 07/18/2020     (L) 07/18/2020    K 4.1 07/18/2020    CL 90 (L) 07/18/2020    CO2 24 07/18/2020     Mg 1.5  FeMg 1  Uosm 571  Deo less than 20    Assessment/Plan:  1. Severe hypomagnesemia. Follow fractional excretion of  Magnesium consistent with GI loss. The patient has been having nausea, vomiting and diarrhea for several weeks. Aggressively replace magnesium   2. Hypokalemia in the setting of elevated serum bicarbonate and  diarrhea and vomiting. Now better. 3.  Hyponatremia. due to volume depletion. Better with 3% saline. Continue NaCl tabs. Na lower. Fluid restriction. Follow Na this pm.   4.  Hypocalcemia. PTH is not significantly decreased. Replaced. Vit D at goal.   5.  History of Crohn's disease. Management as per Medicine. 6.  Renal function within normal limits.   7.  Hypophosphatemia- replace     Martina Mohr MD

## 2020-07-18 NOTE — PROGRESS NOTES
Department of Internal Medicine  General Internal Medicine   Progress Note      SUBJECTIVE: feeling better partially severe left lumbosacral pain     History obtained from chart review and the patient  General ROS: positive for  - fatigue and malaise  negative for - chills, fever or night sweats  Psychological ROS: negative  Ophthalmic ROS: negative  Respiratory ROS: no cough, shortness of breath, or wheezing  Cardiovascular ROS: no chest pain or dyspnea on exertion  Gastrointestinal ROS: positive for -  Heartburn and dyspepsia   negative for - hematemesis, melena or swallowing difficulty/pain  Genito-Urinary ROS: some  dysuria,  No trouble voiding, or hematuria  Musculoskeletal ROS: chronic pain   Neurological ROS: no TIA or stroke symptoms    OBJECTIVE      Medications      Current Facility-Administered Medications: sodium phosphate 20 mmol in dextrose 5 % 250 mL IVPB, 20 mmol, Intravenous, Once  magnesium sulfate 4 g in 100 mL IVPB premix, 4 g, Intravenous, Once  sodium chloride tablet 2 g, 2 g, Oral, TID WC  HYDROmorphone HCl PF (DILAUDID) injection 0.5 mg, 0.5 mg, Intramuscular, Q4H PRN  HYDROmorphone HCl PF (DILAUDID) injection 0.5 mg, 0.5 mg, Intravenous, Q4H PRN  loperamide (IMODIUM) capsule 2 mg, 2 mg, Oral, 4x Daily PRN  calcium-vitamin D 500-200 MG-UNIT per tablet 1 tablet, 1 tablet, Oral, TID  HYDROcodone-acetaminophen (NORCO) 7.5-325 MG per tablet 1 tablet, 1 tablet, Oral, Q4H PRN  cephALEXin (KEFLEX) capsule 500 mg, 500 mg, Oral, 3 times per day  aspirin-acetaminophen-caffeine (EXCEDRIN MIGRAINE) per tablet 2 tablet, 2 tablet, Oral, Q6H PRN  atenolol (TENORMIN) tablet 100 mg, 100 mg, Oral, Daily  baclofen (LIORESAL) tablet 10 mg, 10 mg, Oral, TID  DULoxetine (CYMBALTA) extended release capsule 60 mg, 60 mg, Oral, BID  ferrous sulfate (IRON 325) tablet 325 mg, 325 mg, Oral, BID  fluticasone (FLONASE) 50 MCG/ACT nasal spray 1 spray, 1 spray, Each Nostril, Daily PRN  busPIRone (BUSPAR) tablet 10 mg, 10 mg, Oral, TID  hydrALAZINE (APRESOLINE) tablet 25 mg, 25 mg, Oral, 3 times per day  nystatin (MYCOSTATIN) cream, , Topical, BID  pantoprazole (PROTONIX) tablet 40 mg, 40 mg, Oral, QAM AC  ondansetron (ZOFRAN-ODT) disintegrating tablet 4 mg, 4 mg, Oral, Q6H PRN  zolpidem (AMBIEN) tablet 10 mg, 10 mg, Oral, Nightly PRN  enoxaparin (LOVENOX) injection 40 mg, 40 mg, Subcutaneous, Daily  ondansetron (ZOFRAN) injection 4 mg, 4 mg, Intravenous, Q6H PRN  magnesium oxide (MAG-OX) tablet 400 mg, 400 mg, Oral, BID  balsalazide (COLAZAL) capsule 3,000 mg, 3,000 mg, Oral, Daily    Physical      VITALS:  BP (!) 138/99   Pulse 90   Temp 98 °F (36.7 °C) (Oral)   Resp 16   Ht 5' 6\" (1.676 m)   Wt 184 lb 4.8 oz (83.6 kg)   SpO2 91%   BMI 29.75 kg/m²   TEMPERATURE:  Current - Temp: 98 °F (36.7 °C);  Max - Temp  Av.9 °F (36.6 °C)  Min: 97.4 °F (36.3 °C)  Max: 98.3 °F (36.8 °C)  RESPIRATIONS RANGE: Resp  Av.4  Min: 16  Max: 17  PULSE RANGE: Pulse  Av.2  Min: 76  Max: 90  BLOOD PRESSURE RANGE:  Systolic (35DSW), EPM:479 , Min:80 , ZAZ:915   ; Diastolic (96LNT), XEM:42, Min:69, Max:99    PULSE OXIMETRY RANGE: SpO2  Av.6 %  Min: 91 %  Max: 97 %  24HR INTAKE/OUTPUT:      Intake/Output Summary (Last 24 hours) at 2020 4100  Last data filed at 2020 0440  Gross per 24 hour   Intake 240 ml   Output 650 ml   Net -410 ml     CONSTITUTIONAL:  awake, alert, cooperative, no apparent distress, and appears stated age  EYES:  Unremarkable   NECK:  No JVD  and supple, symmetrical, trachea midline  BACK:  Unremarkable  and symmetric  LUNGS:  No increased work of breathing, good air exchange, clear to auscultation bilaterally, no crackles or wheezing  CARDIOVASCULAR:  normal apical pulses, regular rate and rhythm, normal S1 and S2 and no S3  ABDOMEN:  Soft BS hypoactive , no distention , has colostomy in left sigmoid region   MUSCULOSKELETAL:  Toes deformed , cecilia ulcer on dorsum of both feet  With infection   NEUROLOGIC: No acute focal deficit   SKIN:  Warm and dry  and no bruising or bleeding    Data      No results found for: PHART, PO2ART, VLM4FWW, PVG2UWY, BEART, V5OQKZIY    Lab Results   Component Value Date     07/18/2020    K 4.1 07/18/2020    K 3.4 07/16/2020    CL 90 07/18/2020    CO2 24 07/18/2020    BUN 8 07/18/2020    CREATININE 0.5 07/18/2020    GLUCOSE 79 07/18/2020    CALCIUM 8.6 07/18/2020     Lab Results   Component Value Date    WBC 7.0 07/18/2020    HGB 10.6 07/18/2020    HCT 30.7 07/18/2020    MCV 99.2 07/18/2020     07/18/2020         Lab Results   Component Value Date    INR 2.75 (H) 11/27/2018    PROTIME 31.3 (H) 11/27/2018       ASSESSMENT AND PLAN     Active Hospital Problems    Diagnosis Date Noted    Hypokalemia [E87.6] 07/15/2020    PAD (peripheral artery disease) ([facility] [facility] [I73.9] 05/17/2019    Chronic venous hypertension (idiopathic) with inflammation of bilateral lower extremity [I87.323] 05/17/2019    Leg swelling [M79.89] 05/17/2019    Hypomagnesemia [E83.42] 01/08/2018    Crohn's colitis ([facility] [address] [K50.10] 01/06/2018    Anemia [D64.9] 01/05/2018    Hyponatremia [E87.1] 01/05/2018    Atherosclerosis of native arteries of extremities with intermittent claudication, bilateral legs ([facility] [I70.213] 01/19/2016    Essential hypertension, benign [I10] 09/13/2015        critical hyponatremia needs nephrologyconsult    may be a dose of Tolvaptan  Xray LS spine    discussed with nursing staff

## 2020-07-19 LAB
ANION GAP SERPL CALCULATED.3IONS-SCNC: 8 MMOL/L (ref 3–16)
BASOPHILS ABSOLUTE: 0 K/UL (ref 0–0.2)
BASOPHILS RELATIVE PERCENT: 0.9 %
BUN BLDV-MCNC: 8 MG/DL (ref 7–20)
CALCIUM SERPL-MCNC: 8.9 MG/DL (ref 8.3–10.6)
CHLORIDE BLD-SCNC: 91 MMOL/L (ref 99–110)
CO2: 27 MMOL/L (ref 21–32)
CREAT SERPL-MCNC: <0.5 MG/DL (ref 0.6–1.2)
EOSINOPHILS ABSOLUTE: 0.2 K/UL (ref 0–0.6)
EOSINOPHILS RELATIVE PERCENT: 3.7 %
GFR AFRICAN AMERICAN: >60
GFR NON-AFRICAN AMERICAN: >60
GLUCOSE BLD-MCNC: 96 MG/DL (ref 70–99)
HCT VFR BLD CALC: 29.8 % (ref 36–48)
HEMOGLOBIN: 10.1 G/DL (ref 12–16)
LYMPHOCYTES ABSOLUTE: 1.2 K/UL (ref 1–5.1)
LYMPHOCYTES RELATIVE PERCENT: 21.5 %
MAGNESIUM: 1.8 MG/DL (ref 1.8–2.4)
MCH RBC QN AUTO: 33.6 PG (ref 26–34)
MCHC RBC AUTO-ENTMCNC: 34 G/DL (ref 31–36)
MCV RBC AUTO: 98.7 FL (ref 80–100)
MONOCYTES ABSOLUTE: 0.5 K/UL (ref 0–1.3)
MONOCYTES RELATIVE PERCENT: 8.9 %
NEUTROPHILS ABSOLUTE: 3.5 K/UL (ref 1.7–7.7)
NEUTROPHILS RELATIVE PERCENT: 65 %
PDW BLD-RTO: 13.2 % (ref 12.4–15.4)
PHOSPHORUS: 2.6 MG/DL (ref 2.5–4.9)
PLATELET # BLD: 361 K/UL (ref 135–450)
PMV BLD AUTO: 6.9 FL (ref 5–10.5)
POTASSIUM REFLEX MAGNESIUM: 4.2 MMOL/L (ref 3.5–5.1)
RBC # BLD: 3.02 M/UL (ref 4–5.2)
SODIUM BLD-SCNC: 126 MMOL/L (ref 136–145)
WBC # BLD: 5.4 K/UL (ref 4–11)

## 2020-07-19 PROCEDURE — 1200000000 HC SEMI PRIVATE

## 2020-07-19 PROCEDURE — 80048 BASIC METABOLIC PNL TOTAL CA: CPT

## 2020-07-19 PROCEDURE — 83735 ASSAY OF MAGNESIUM: CPT

## 2020-07-19 PROCEDURE — 6370000000 HC RX 637 (ALT 250 FOR IP): Performed by: INTERNAL MEDICINE

## 2020-07-19 PROCEDURE — 84100 ASSAY OF PHOSPHORUS: CPT

## 2020-07-19 PROCEDURE — 85025 COMPLETE CBC W/AUTO DIFF WBC: CPT

## 2020-07-19 PROCEDURE — 6360000002 HC RX W HCPCS: Performed by: INTERNAL MEDICINE

## 2020-07-19 PROCEDURE — 36415 COLL VENOUS BLD VENIPUNCTURE: CPT

## 2020-07-19 RX ADMIN — CEPHALEXIN 500 MG: 250 CAPSULE ORAL at 13:39

## 2020-07-19 RX ADMIN — ENOXAPARIN SODIUM 40 MG: 40 INJECTION SUBCUTANEOUS at 08:53

## 2020-07-19 RX ADMIN — OYSTER SHELL CALCIUM WITH VITAMIN D 1 TABLET: 500; 200 TABLET, FILM COATED ORAL at 13:39

## 2020-07-19 RX ADMIN — FERROUS SULFATE TAB 325 MG (65 MG ELEMENTAL FE) 325 MG: 325 (65 FE) TAB at 20:54

## 2020-07-19 RX ADMIN — CEPHALEXIN 500 MG: 250 CAPSULE ORAL at 05:19

## 2020-07-19 RX ADMIN — HYDRALAZINE HYDROCHLORIDE 25 MG: 25 TABLET, FILM COATED ORAL at 13:39

## 2020-07-19 RX ADMIN — BACLOFEN 10 MG: 10 TABLET ORAL at 13:39

## 2020-07-19 RX ADMIN — MAGNESIUM GLUCONATE 500 MG ORAL TABLET 400 MG: 500 TABLET ORAL at 08:53

## 2020-07-19 RX ADMIN — SODIUM CHLORIDE TAB 1 GM 2 G: 1 TAB at 12:16

## 2020-07-19 RX ADMIN — MAGNESIUM GLUCONATE 500 MG ORAL TABLET 400 MG: 500 TABLET ORAL at 20:54

## 2020-07-19 RX ADMIN — OYSTER SHELL CALCIUM WITH VITAMIN D 1 TABLET: 500; 200 TABLET, FILM COATED ORAL at 08:53

## 2020-07-19 RX ADMIN — ZOLPIDEM TARTRATE 10 MG: 10 TABLET ORAL at 22:55

## 2020-07-19 RX ADMIN — BUSPIRONE HYDROCHLORIDE 10 MG: 5 TABLET ORAL at 08:52

## 2020-07-19 RX ADMIN — PANTOPRAZOLE SODIUM 40 MG: 40 TABLET, DELAYED RELEASE ORAL at 05:19

## 2020-07-19 RX ADMIN — HYDROMORPHONE HYDROCHLORIDE 0.5 MG: 1 INJECTION, SOLUTION INTRAMUSCULAR; INTRAVENOUS; SUBCUTANEOUS at 04:34

## 2020-07-19 RX ADMIN — BALSALAZIDE DISODIUM 3000 MG: 750 CAPSULE ORAL at 08:53

## 2020-07-19 RX ADMIN — FERROUS SULFATE TAB 325 MG (65 MG ELEMENTAL FE) 325 MG: 325 (65 FE) TAB at 08:53

## 2020-07-19 RX ADMIN — ATENOLOL 100 MG: 50 TABLET ORAL at 08:53

## 2020-07-19 RX ADMIN — CEPHALEXIN 500 MG: 250 CAPSULE ORAL at 20:54

## 2020-07-19 RX ADMIN — HYDRALAZINE HYDROCHLORIDE 25 MG: 25 TABLET, FILM COATED ORAL at 05:19

## 2020-07-19 RX ADMIN — SODIUM CHLORIDE TAB 1 GM 2 G: 1 TAB at 16:16

## 2020-07-19 RX ADMIN — SODIUM CHLORIDE TAB 1 GM 2 G: 1 TAB at 08:52

## 2020-07-19 RX ADMIN — HYDRALAZINE HYDROCHLORIDE 25 MG: 25 TABLET, FILM COATED ORAL at 20:53

## 2020-07-19 RX ADMIN — HYDROMORPHONE HYDROCHLORIDE 0.5 MG: 1 INJECTION, SOLUTION INTRAMUSCULAR; INTRAVENOUS; SUBCUTANEOUS at 16:16

## 2020-07-19 RX ADMIN — HYDROCODONE BITARTRATE AND ACETAMINOPHEN 1 TABLET: 7.5; 325 TABLET ORAL at 13:44

## 2020-07-19 RX ADMIN — BUSPIRONE HYDROCHLORIDE 10 MG: 5 TABLET ORAL at 20:54

## 2020-07-19 RX ADMIN — DULOXETINE HYDROCHLORIDE 60 MG: 60 CAPSULE, DELAYED RELEASE ORAL at 20:53

## 2020-07-19 RX ADMIN — HYDROMORPHONE HYDROCHLORIDE 0.5 MG: 1 INJECTION, SOLUTION INTRAMUSCULAR; INTRAVENOUS; SUBCUTANEOUS at 22:55

## 2020-07-19 RX ADMIN — NYSTATIN: 100000 CREAM TOPICAL at 20:56

## 2020-07-19 RX ADMIN — BUSPIRONE HYDROCHLORIDE 10 MG: 5 TABLET ORAL at 13:39

## 2020-07-19 RX ADMIN — BACLOFEN 10 MG: 10 TABLET ORAL at 20:54

## 2020-07-19 RX ADMIN — BACLOFEN 10 MG: 10 TABLET ORAL at 08:52

## 2020-07-19 RX ADMIN — HYDROMORPHONE HYDROCHLORIDE 0.5 MG: 1 INJECTION, SOLUTION INTRAMUSCULAR; INTRAVENOUS; SUBCUTANEOUS at 09:02

## 2020-07-19 RX ADMIN — DULOXETINE HYDROCHLORIDE 60 MG: 60 CAPSULE, DELAYED RELEASE ORAL at 08:52

## 2020-07-19 RX ADMIN — OYSTER SHELL CALCIUM WITH VITAMIN D 1 TABLET: 500; 200 TABLET, FILM COATED ORAL at 20:54

## 2020-07-19 ASSESSMENT — ENCOUNTER SYMPTOMS
EYE REDNESS: 0
CHEST TIGHTNESS: 0
SHORTNESS OF BREATH: 0
EYE DISCHARGE: 0
PHOTOPHOBIA: 0
COUGH: 0
VOMITING: 0
DIARRHEA: 1
NAUSEA: 0
ABDOMINAL PAIN: 0
CONSTIPATION: 0
BLOOD IN STOOL: 0
FACIAL SWELLING: 0
ABDOMINAL DISTENTION: 0

## 2020-07-19 ASSESSMENT — PAIN SCALES - GENERAL
PAINLEVEL_OUTOF10: 9
PAINLEVEL_OUTOF10: 8
PAINLEVEL_OUTOF10: 8
PAINLEVEL_OUTOF10: 7
PAINLEVEL_OUTOF10: 8
PAINLEVEL_OUTOF10: 9
PAINLEVEL_OUTOF10: 8
PAINLEVEL_OUTOF10: 10
PAINLEVEL_OUTOF10: 10

## 2020-07-19 NOTE — PROGRESS NOTES
Progress Note - Dr. Radha Rios - Internal Medicine  PCP: Elida Yang MD [address] / [place] [id number]    Hospital Day: 4  Code Status: Full Code  Current Diet: DIET GENERAL; Daily Fluid Restriction: 1000 ml        CC: follow up on medical issues    Subjective:   Angela Mcneal is a 62 y.o. female. She denies problems    Doing ok  Na 126  Case d/w dr Alia Sweeney      She denies chest pain, denies shortness of breath, denies nausea,  denies emesis. 10 system Review of Systems is reviewed with patient, and pertinent positives are listed here: None . Otherwise, Review of systems is negative. I have reviewed the patient's medical and social history in detail and updated the computerized patient record. To recap: She  has a past medical history of Anxiety, Arthritis, Blood transfusion reaction, Crohn's disease ([address] Depression, GERD (gastroesophageal reflux disease), Hiatal hernia, Hx of blood clots, Hypertension, MRSA (methicillin resistant staph aureus) culture positive, Neuropathy, Pneumonia, Sinus headache, SOB (shortness of breath), and VRE (vancomycin resistant enterococcus) culture positive. . She  has a past surgical history that includes Hysterectomy; knee surgery (Bilateral); Hand Carpectomy (Bilateral); hernia repair; Abdomen surgery; bladder suspension; fracture surgery; Heel spur surgery; Tonsillectomy; Colonoscopy; Upper gastrointestinal endoscopy (6/14/13); Foot surgery (Left, 6/25/14); Foot surgery (6/3/15); Colonoscopy (9/14/15); Foot surgery (Left, 08/05/2002); joint replacement; Sigmoidoscopy (01/15/2018); Abdominal adhesion surgery (06/08/2018); Colonoscopy (08/07/2018); colostomy (N/A, 10/8/2018); pr secd clos surg wnd exten/complic (N/A, 04/60/9580); Leg Surgery (Right); Colonoscopy (06/07/2019); Colonoscopy (N/A, 6/7/2019); Colonoscopy (N/A, 6/7/2019); Small intestine surgery (N/A, 7/17/2019); and proctosigmoidoscopy (N/A, 7/17/2019). . She  reports that she has been smoking cigarettes and e-cigarettes. She has a 10.00 pack-year smoking history. She has never used smokeless tobacco. She reports current alcohol use of about 2.0 standard drinks of alcohol per week. She reports that she does not use drugs. .        Active Hospital Problems    Diagnosis Date Noted    Hypokalemia [E87.6] 07/15/2020    PAD (peripheral artery disease) (HCC) [I73.9] 05/17/2019    Chronic venous hypertension (idiopathic) with inflammation of bilateral lower extremity [I87.323] 05/17/2019    Leg swelling [M79.89] 05/17/2019    Hypomagnesemia [E83.42] 01/08/2018    Crohn's colitis ([facility] [address] [address] [K50.10] 01/06/2018    Anemia [D64.9] 01/05/2018    Hyponatremia [E87.1] 01/05/2018    Atherosclerosis of native arteries of extremities with intermittent claudication, bilateral legs (HCC) [I70.213] 01/19/2016    Essential hypertension, benign [I10] 09/13/2015       Current Facility-Administered Medications: sodium phosphate 20 mmol in dextrose 5 % 250 mL IVPB, 20 mmol, Intravenous, Once  sodium chloride tablet 2 g, 2 g, Oral, TID WC  HYDROmorphone HCl PF (DILAUDID) injection 0.5 mg, 0.5 mg, Intramuscular, Q4H PRN  HYDROmorphone HCl PF (DILAUDID) injection 0.5 mg, 0.5 mg, Intravenous, Q4H PRN  loperamide (IMODIUM) capsule 2 mg, 2 mg, Oral, 4x Daily PRN  calcium-vitamin D 500-200 MG-UNIT per tablet 1 tablet, 1 tablet, Oral, TID  HYDROcodone-acetaminophen (NORCO) 7.5-325 MG per tablet 1 tablet, 1 tablet, Oral, Q4H PRN  cephALEXin (KEFLEX) capsule 500 mg, 500 mg, Oral, 3 times per day  aspirin-acetaminophen-caffeine (EXCEDRIN MIGRAINE) per tablet 2 tablet, 2 tablet, Oral, Q6H PRN  atenolol (TENORMIN) tablet 100 mg, 100 mg, Oral, Daily  baclofen (LIORESAL) tablet 10 mg, 10 mg, Oral, TID  DULoxetine (CYMBALTA) extended release capsule 60 mg, 60 mg, Oral, BID  ferrous sulfate (IRON 325) tablet 325 mg, 325 mg, Oral, BID  fluticasone (FLONASE) 50 MCG/ACT nasal spray 1 spray, 1 spray, Each Nostril, Daily PRN  busPIRone (BUSPAR) tablet 10 mg, 10 mg, Oral, TID  hydrALAZINE (APRESOLINE) tablet 25 mg, 25 mg, Oral, 3 times per day  nystatin (MYCOSTATIN) cream, , Topical, BID  pantoprazole (PROTONIX) tablet 40 mg, 40 mg, Oral, QAM AC  ondansetron (ZOFRAN-ODT) disintegrating tablet 4 mg, 4 mg, Oral, Q6H PRN  zolpidem (AMBIEN) tablet 10 mg, 10 mg, Oral, Nightly PRN  enoxaparin (LOVENOX) injection 40 mg, 40 mg, Subcutaneous, Daily  ondansetron (ZOFRAN) injection 4 mg, 4 mg, Intravenous, Q6H PRN  magnesium oxide (MAG-OX) tablet 400 mg, 400 mg, Oral, BID  balsalazide (COLAZAL) capsule 3,000 mg, 3,000 mg, Oral, Daily         Objective:  /76   Pulse 74   Temp 98.2 °F (36.8 °C) (Oral)   Resp 18   Ht 5' 6\" (1.676 m)   Wt 184 lb 4.8 oz (83.6 kg)   SpO2 93%   BMI 29.75 kg/m²      Patient Vitals for the past 24 hrs:   BP Temp Temp src Pulse Resp SpO2   07/19/20 1200 109/76 98.2 °F (36.8 °C) Oral 74 18 93 %   07/19/20 0845 (!) 140/101 98.4 °F (36.9 °C) Oral 103 16 94 %   07/19/20 0435 115/80 98.2 °F (36.8 °C) Oral 96 16 96 %   07/19/20 0057 97/64 98 °F (36.7 °C) Oral 104 18 96 %   07/18/20 2022 122/87 98 °F (36.7 °C) Oral 96 16 96 %   07/18/20 1706 114/82 98.3 °F (36.8 °C) Oral 85 16 93 %     Patient Vitals for the past 96 hrs (Last 3 readings):   Weight   07/17/20 0741 184 lb 4.8 oz (83.6 kg)   07/16/20 0330 184 lb (83.5 kg)   07/15/20 2133 184 lb 12.8 oz (83.8 kg)           Intake/Output Summary (Last 24 hours) at 7/19/2020 1313  Last data filed at 7/19/2020 1200  Gross per 24 hour   Intake --   Output 550 ml   Net -550 ml         Physical Exam:   S1, S2 normal, no murmur, rub or gallop, regular rate and rhythm  clear to auscultation bilaterally  abdomen is soft without significant tenderness, masses, organomegaly or guarding  extremities normal, atraumatic, no cyanosis or edema    Labs:  Lab Results   Component Value Date    WBC 5.4 07/19/2020    HGB 10.1 (L) 07/19/2020    HCT 29.8 (L) 07/19/2020     07/19/2020    CHOL 151 02/06/2018 TRIG 57 02/06/2018    HDL 81 (H) 02/06/2018    ALT 6 (L) 07/16/2020    AST 24 07/16/2020     (L) 07/19/2020    K 4.2 07/19/2020    CL 91 (L) 07/19/2020    CREATININE <0.5 (L) 07/19/2020    BUN 8 07/19/2020    CO2 27 07/19/2020    INR 2.75 (H) 11/27/2018    LABMICR YES 12/17/2019     Lab Results   Component Value Date    TROPONINI <0.01 12/17/2019       Recent Imaging Results are Reviewed:  Xr Lumbar Spine (2-3 Views)    Result Date: 7/18/2020  EXAMINATION: THREE XRAY VIEWS OF THE LUMBAR SPINE 7/18/2020 12:30 pm COMPARISON: CT abdomen and pelvis, 12/17/2019 HISTORY: ORDERING SYSTEM PROVIDED HISTORY: left paralumbar pain TECHNOLOGIST PROVIDED HISTORY: Reason for exam:->left paralumbar pain Reason for Exam: left paralumbar pain Acuity: Unknown Type of Exam: Unknown FINDINGS: At L1-2 there is retrolisthesis of 3 mm. At L4-5 there is anterolisthesis of 7 mm. No fracture is identified. At T12-L1 and L1-2 there is disc space narrowing with anterior spurring and endplate sclerosis/vacuum phenomenon. Facet arthropathy is noted at L4-5 and L5-S1. Right hip replacement is partially visualized and unremarkable. No acute fracture. Degenerative L1-2 retrolisthesis and L4-5 anterolisthesis. The latter is associated with moderate facet arthropathy. Overall moderate multilevel spondylosis. Xr Wrist Right (min 3 Views)    Result Date: 7/16/2020  EXAMINATION: 3 XRAY VIEWS OF THE RIGHT WRIST; THREE XRAY VIEWS OF THE LEFT HAND 7/16/2020 2:09 pm COMPARISON: None. HISTORY: ORDERING SYSTEM PROVIDED HISTORY: pain , s/p fall TECHNOLOGIST PROVIDED HISTORY: Reason for exam:->pain , s/p fall Reason for Exam: pain , s/p fall Acuity: Acute Type of Exam: Initial; ORDERING SYSTEM PROVIDED HISTORY: pain , s/p fall TECHNOLOGIST PROVIDED HISTORY: Reason for exam:->pain , s/p fall Reason for Exam: pain , s/p fall.  Pt unable to remove ring; best images possible Acuity: Acute Type of Exam: Initial FINDINGS: Mild pattern of osteopenia left hand and wrist.  There is also mild interphalangeal joint space narrowing that is chronic and degenerative. No focal soft tissue swelling. No acute finding in the left hand or wrist.     Xr Hand Left (min 3 Views)    Result Date: 7/16/2020  EXAMINATION: 3 XRAY VIEWS OF THE RIGHT WRIST; THREE XRAY VIEWS OF THE LEFT HAND 7/16/2020 2:09 pm COMPARISON: None. HISTORY: ORDERING SYSTEM PROVIDED HISTORY: pain , s/p fall TECHNOLOGIST PROVIDED HISTORY: Reason for exam:->pain , s/p fall Reason for Exam: pain , s/p fall Acuity: Acute Type of Exam: Initial; ORDERING SYSTEM PROVIDED HISTORY: pain , s/p fall TECHNOLOGIST PROVIDED HISTORY: Reason for exam:->pain , s/p fall Reason for Exam: pain , s/p fall. Pt unable to remove ring; best images possible Acuity: Acute Type of Exam: Initial FINDINGS: Mild pattern of osteopenia left hand and wrist.  There is also mild interphalangeal joint space narrowing that is chronic and degenerative. No focal soft tissue swelling. No acute finding in the left hand or wrist.     Xr Knee Left (3 Views)    Result Date: 7/15/2020  EXAMINATION: THREE XRAY VIEWS OF THE LEFT KNEE 7/15/2020 1:09 pm COMPARISON: July 3, 2019 HISTORY: ORDERING SYSTEM PROVIDED HISTORY: pain TECHNOLOGIST PROVIDED HISTORY: Reason for exam:->pain Reason for Exam: Knee Pain (in by ems, where she has been having a lot of knee pain, bilaterally, patient usually gets injections, doesnt take meds at home for pain ) Acuity: Acute Type of Exam: Initial FINDINGS: A suprapatellar joint effusion is present, increased in size. Severe tricompartmental osteoarthritic changes are again noted. Diffuse periarticular osteophytosis and joint space narrowing as well as medial subluxation of the femur in relation to the tibia again noted. No evidence of fracture. Moderate-sized suprapatellar joint effusion but no acute osseous abnormality. No evidence of fracture. Severe tricompartmental osteoarthritis.   No significant change in appearance of the arthritis over the past 1 year. Xr Knee Right (3 Views)    Result Date: 7/15/2020  EXAMINATION: THREE XRAY VIEWS OF THE RIGHT KNEE 7/15/2020 1:09 pm COMPARISON: None. HISTORY: ORDERING SYSTEM PROVIDED HISTORY: pain TECHNOLOGIST PROVIDED HISTORY: Reason for exam:->pain Reason for Exam: Right knee Pain (in by ems, where she has been having a lot of knee pain, bilaterally, patient usually gets injections, doesnt take meds at home for pain ) Acuity: Acute Type of Exam: Initial FINDINGS: There is severe end-stage degenerative change within all compartments manifested by complete loss of joint space and marginal osteophyte formation. There is a moderate-sized suprapatellar effusion. There is no fracture or osseous destruction. Severe osteoarthritis. Assessment and Plan:  Principal Problem:    Hyponatremia -/Established problem. Improving. Na 126 today  Plan: goal is 130. Cont tx per dr Nhi Nascimento  Active Problems:    Essential hypertension, benign -Established problem. Stable. 110/81  Plan: cont home meds    Atherosclerosis of native arteries of extremities with intermittent claudication, bilateral legs (HCC)    Anemia - Established problem. Stable.  hgb 10.1  Plan:  No indication for transfusion. Cont to monitor h/h to assess progression of anemia. Recommend ferrous sulfate or MVI as outpatient. -    Crohn's colitis ([address] -Established problem. Stable. Plan: Continue present orders/plan. Hypomagnesemia -Established problem. Stable.   Mg 1.8  Plan: cont to monitor    PAD (peripheral artery disease) ([address]            Primo Mejia  7/19/2020

## 2020-07-19 NOTE — PROGRESS NOTES
Department of Internal Medicine  General Internal Medicine   Progress Note      SUBJECTIVE: moderate pain , denies SOB     History obtained from chart review and the patient  General ROS: positive for  - fatigue and malaise  negative for - chills, fever or night sweats  Psychological ROS: negative  Ophthalmic ROS: negative  Respiratory ROS: no cough, shortness of breath, or wheezing  Cardiovascular ROS: no chest pain or dyspnea on exertion  Gastrointestinal ROS: positive for -  Heartburn and dyspepsia   negative for - hematemesis, melena or swallowing difficulty/pain  Genito-Urinary ROS: some  dysuria,  No trouble voiding, or hematuria  Musculoskeletal ROS: chronic pain   Neurological ROS: no TIA or stroke symptoms    OBJECTIVE      Medications      Current Facility-Administered Medications: sodium phosphate 20 mmol in dextrose 5 % 250 mL IVPB, 20 mmol, Intravenous, Once  sodium chloride tablet 2 g, 2 g, Oral, TID WC  HYDROmorphone HCl PF (DILAUDID) injection 0.5 mg, 0.5 mg, Intramuscular, Q4H PRN  HYDROmorphone HCl PF (DILAUDID) injection 0.5 mg, 0.5 mg, Intravenous, Q4H PRN  loperamide (IMODIUM) capsule 2 mg, 2 mg, Oral, 4x Daily PRN  calcium-vitamin D 500-200 MG-UNIT per tablet 1 tablet, 1 tablet, Oral, TID  HYDROcodone-acetaminophen (NORCO) 7.5-325 MG per tablet 1 tablet, 1 tablet, Oral, Q4H PRN  cephALEXin (KEFLEX) capsule 500 mg, 500 mg, Oral, 3 times per day  aspirin-acetaminophen-caffeine (EXCEDRIN MIGRAINE) per tablet 2 tablet, 2 tablet, Oral, Q6H PRN  atenolol (TENORMIN) tablet 100 mg, 100 mg, Oral, Daily  baclofen (LIORESAL) tablet 10 mg, 10 mg, Oral, TID  DULoxetine (CYMBALTA) extended release capsule 60 mg, 60 mg, Oral, BID  ferrous sulfate (IRON 325) tablet 325 mg, 325 mg, Oral, BID  fluticasone (FLONASE) 50 MCG/ACT nasal spray 1 spray, 1 spray, Each Nostril, Daily PRN  busPIRone (BUSPAR) tablet 10 mg, 10 mg, Oral, TID  hydrALAZINE (APRESOLINE) tablet 25 mg, 25 mg, Oral, 3 times per day  nystatin (MYCOSTATIN) cream, , Topical, BID  pantoprazole (PROTONIX) tablet 40 mg, 40 mg, Oral, QAM AC  ondansetron (ZOFRAN-ODT) disintegrating tablet 4 mg, 4 mg, Oral, Q6H PRN  zolpidem (AMBIEN) tablet 10 mg, 10 mg, Oral, Nightly PRN  enoxaparin (LOVENOX) injection 40 mg, 40 mg, Subcutaneous, Daily  ondansetron (ZOFRAN) injection 4 mg, 4 mg, Intravenous, Q6H PRN  magnesium oxide (MAG-OX) tablet 400 mg, 400 mg, Oral, BID  balsalazide (COLAZAL) capsule 3,000 mg, 3,000 mg, Oral, Daily    Physical      VITALS:  /76   Pulse 74   Temp 98.2 °F (36.8 °C) (Oral)   Resp 18   Ht 5' 6\" (1.676 m)   Wt 184 lb 4.8 oz (83.6 kg)   SpO2 93%   BMI 29.75 kg/m²   TEMPERATURE:  Current - Temp: 98.2 °F (36.8 °C);  Max - Temp  Av.2 °F (36.8 °C)  Min: 98 °F (36.7 °C)  Max: 98.4 °F (36.9 °C)  RESPIRATIONS RANGE: Resp  Av.7  Min: 16  Max: 18  PULSE RANGE: Pulse  Av  Min: 74  Max: 104  BLOOD PRESSURE RANGE:  Systolic (69GOQ), QOE:697 , Min:97 , MKR:696   ; Diastolic (03FYA), WEV:48, Min:64, Max:101    PULSE OXIMETRY RANGE: SpO2  Av.7 %  Min: 93 %  Max: 96 %  24HR INTAKE/OUTPUT:      Intake/Output Summary (Last 24 hours) at 2020 1219  Last data filed at 2020 1200  Gross per 24 hour   Intake --   Output 550 ml   Net -550 ml     CONSTITUTIONAL:  awake, alert, cooperative, no apparent distress, and appears stated age  EYES:  Unremarkable   NECK:  No JVD  and supple, symmetrical, trachea midline  BACK:  Unremarkable  and symmetric  LUNGS:  No increased work of breathing, good air exchange, clear to auscultation bilaterally, no crackles or wheezing  CARDIOVASCULAR:  normal apical pulses, regular rate and rhythm, normal S1 and S2 and no S3  ABDOMEN:  Soft BS hypoactive , no distention , has colostomy in left sigmoid region   MUSCULOSKELETAL:  Toes deformed , cecilia ulcer on dorsum of both feet  With infection   NEUROLOGIC:  No acute focal deficit   SKIN:  Warm and dry  and no bruising or bleeding    Data      No results found for: [place], [id number]    Lab Results   Component Value Date     07/19/2020    K 4.2 07/19/2020    CL 91 07/19/2020    CO2 27 07/19/2020    BUN 8 07/19/2020    CREATININE <0.5 07/19/2020    GLUCOSE 96 07/19/2020    CALCIUM 8.9 07/19/2020     Lab Results   Component Value Date    WBC 5.4 07/19/2020    HGB 10.1 07/19/2020    HCT 29.8 07/19/2020    MCV 98.7 07/19/2020     07/19/2020         Lab Results   Component Value Date    INR 2.75 (H) 11/27/2018    PROTIME 31.3 (H) 11/27/2018       ASSESSMENT AND PLAN     Active Hospital Problems    Diagnosis Date Noted    Hypokalemia [E87.6] 07/15/2020    PAD (peripheral artery disease) ([facility] [address] [I73.9] 05/17/2019    Chronic venous hypertension (idiopathic) with inflammation of bilateral lower extremity [I87.323] 05/17/2019    Leg swelling [M79.89] 05/17/2019    Hypomagnesemia [E83.42] 01/08/2018    Crohn's colitis ([facility] [address] [K50.10] 01/06/2018    Anemia [D64.9] 01/05/2018    Hyponatremia [E87.1] 01/05/2018    Atherosclerosis of native arteries of extremities with intermittent claudication, bilateral legs ([facility] [address] [I70.213] 01/19/2016    Essential hypertension, benign [I10] 09/13/2015        Hyponatremia still needs corrected , 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

## 2020-07-19 NOTE — PROGRESS NOTES
cold intolerance, heat intolerance and polyuria. Genitourinary: Negative for decreased urine volume, difficulty urinating, flank pain and hematuria. Musculoskeletal: Negative for joint swelling and neck pain. Neurological: Negative for dizziness, seizures, syncope, speech difficulty, light-headedness and headaches. Hematological: Does not bruise/bleed easily. Psychiatric/Behavioral: Negative for agitation, confusion and hallucinations. Objective:      BP (!) 140/101   Pulse 103   Temp 98.4 °F (36.9 °C) (Oral)   Resp 16   Ht 5' 6\" (1.676 m)   Wt 184 lb 4.8 oz (83.6 kg)   SpO2 94%   BMI 29.75 kg/m²     Wt Readings from Last 3 Encounters:   07/17/20 184 lb 4.8 oz (83.6 kg)   07/07/20 205 lb (93 kg)   12/17/19 203 lb (92.1 kg)       BP Readings from Last 3 Encounters:   07/19/20 (!) 140/101   03/04/20 (!) 160/101   02/24/20 (!) 166/117     Chest- clear  Heart-regular  Abd-soft  Ext- 1+ edema    Labs  Hemoglobin   Date Value Ref Range Status   07/19/2020 10.1 (L) 12.0 - 16.0 g/dL Final     Hematocrit   Date Value Ref Range Status   07/19/2020 29.8 (L) 36.0 - 48.0 % Final     WBC   Date Value Ref Range Status   07/19/2020 5.4 4.0 - 11.0 K/uL Final     Platelets   Date Value Ref Range Status   07/19/2020 361 135 - 450 K/uL Final     Lab Results   Component Value Date    CREATININE <0.5 (L) 07/19/2020    BUN 8 07/19/2020     (L) 07/19/2020    K 4.2 07/19/2020    CL 91 (L) 07/19/2020    CO2 27 07/19/2020       FeMg 1  Uosm 571  Deo less than 20    Assessment/Plan:  1. Severe hypomagnesemia. Fractional excretion of  Magnesium consistent with GI loss. The patient has been having nausea, vomiting and diarrhea for several weeks. Aggressively replace magnesium. Now better. 2.  Hypokalemia in the setting of elevated serum bicarbonate and  diarrhea and vomiting. Now better. 3.  Hyponatremia. due to volume depletion. Better with 3% saline. Continue NaCl tabs, dose increased.   Fluid restriction. Follow Na this pm.   4.  Hypocalcemia. PTH is not significantly decreased. Replaced. Vit D at goal.   5.  History of Crohn's disease. Management as per Medicine. 6.  Renal function within normal limits. 7.  Hypophosphatemia- replaced  8.   Dispo-once Na 130 and above.      Martina Moran MD

## 2020-07-20 LAB
ANION GAP SERPL CALCULATED.3IONS-SCNC: 8 MMOL/L (ref 3–16)
BASOPHILS ABSOLUTE: 0 K/UL (ref 0–0.2)
BASOPHILS RELATIVE PERCENT: 1 %
BUN BLDV-MCNC: 8 MG/DL (ref 7–20)
CALCIUM SERPL-MCNC: 8.9 MG/DL (ref 8.3–10.6)
CHLORIDE BLD-SCNC: 93 MMOL/L (ref 99–110)
CO2: 25 MMOL/L (ref 21–32)
CREAT SERPL-MCNC: 0.5 MG/DL (ref 0.6–1.2)
EOSINOPHILS ABSOLUTE: 0.2 K/UL (ref 0–0.6)
EOSINOPHILS RELATIVE PERCENT: 5.1 %
GFR AFRICAN AMERICAN: >60
GFR NON-AFRICAN AMERICAN: >60
GLUCOSE BLD-MCNC: 113 MG/DL (ref 70–99)
HCT VFR BLD CALC: 29.8 % (ref 36–48)
HEMOGLOBIN: 10.4 G/DL (ref 12–16)
LYMPHOCYTES ABSOLUTE: 0.9 K/UL (ref 1–5.1)
LYMPHOCYTES RELATIVE PERCENT: 22.8 %
MCH RBC QN AUTO: 34.8 PG (ref 26–34)
MCHC RBC AUTO-ENTMCNC: 34.9 G/DL (ref 31–36)
MCV RBC AUTO: 99.9 FL (ref 80–100)
MONOCYTES ABSOLUTE: 0.6 K/UL (ref 0–1.3)
MONOCYTES RELATIVE PERCENT: 13.9 %
NEUTROPHILS ABSOLUTE: 2.4 K/UL (ref 1.7–7.7)
NEUTROPHILS RELATIVE PERCENT: 57.2 %
PDW BLD-RTO: 13.3 % (ref 12.4–15.4)
PLATELET # BLD: 374 K/UL (ref 135–450)
PMV BLD AUTO: 6.8 FL (ref 5–10.5)
POTASSIUM REFLEX MAGNESIUM: 4.1 MMOL/L (ref 3.5–5.1)
RBC # BLD: 2.99 M/UL (ref 4–5.2)
SODIUM BLD-SCNC: 126 MMOL/L (ref 136–145)
WBC # BLD: 4.1 K/UL (ref 4–11)

## 2020-07-20 PROCEDURE — 6360000002 HC RX W HCPCS: Performed by: INTERNAL MEDICINE

## 2020-07-20 PROCEDURE — 1200000000 HC SEMI PRIVATE

## 2020-07-20 PROCEDURE — 36415 COLL VENOUS BLD VENIPUNCTURE: CPT

## 2020-07-20 PROCEDURE — 6370000000 HC RX 637 (ALT 250 FOR IP): Performed by: INTERNAL MEDICINE

## 2020-07-20 PROCEDURE — 85025 COMPLETE CBC W/AUTO DIFF WBC: CPT

## 2020-07-20 PROCEDURE — 80048 BASIC METABOLIC PNL TOTAL CA: CPT

## 2020-07-20 RX ORDER — HYDRALAZINE HYDROCHLORIDE 20 MG/ML
10 INJECTION INTRAMUSCULAR; INTRAVENOUS EVERY 6 HOURS PRN
Status: DISCONTINUED | OUTPATIENT
Start: 2020-07-20 | End: 2020-07-23 | Stop reason: HOSPADM

## 2020-07-20 RX ADMIN — ENOXAPARIN SODIUM 40 MG: 40 INJECTION SUBCUTANEOUS at 08:44

## 2020-07-20 RX ADMIN — BACLOFEN 10 MG: 10 TABLET ORAL at 21:06

## 2020-07-20 RX ADMIN — SODIUM CHLORIDE TAB 1 GM 2 G: 1 TAB at 08:45

## 2020-07-20 RX ADMIN — FERROUS SULFATE TAB 325 MG (65 MG ELEMENTAL FE) 325 MG: 325 (65 FE) TAB at 08:45

## 2020-07-20 RX ADMIN — BUSPIRONE HYDROCHLORIDE 10 MG: 5 TABLET ORAL at 08:45

## 2020-07-20 RX ADMIN — BALSALAZIDE DISODIUM 3000 MG: 750 CAPSULE ORAL at 08:45

## 2020-07-20 RX ADMIN — MAGNESIUM GLUCONATE 500 MG ORAL TABLET 400 MG: 500 TABLET ORAL at 08:44

## 2020-07-20 RX ADMIN — HYDROMORPHONE HYDROCHLORIDE 0.5 MG: 1 INJECTION, SOLUTION INTRAMUSCULAR; INTRAVENOUS; SUBCUTANEOUS at 03:00

## 2020-07-20 RX ADMIN — LOPERAMIDE HYDROCHLORIDE 2 MG: 2 CAPSULE ORAL at 02:55

## 2020-07-20 RX ADMIN — CEPHALEXIN 500 MG: 250 CAPSULE ORAL at 06:01

## 2020-07-20 RX ADMIN — HYDROCODONE BITARTRATE AND ACETAMINOPHEN 1 TABLET: 7.5; 325 TABLET ORAL at 13:56

## 2020-07-20 RX ADMIN — HYDRALAZINE HYDROCHLORIDE 10 MG: 20 INJECTION INTRAMUSCULAR; INTRAVENOUS at 21:04

## 2020-07-20 RX ADMIN — PANTOPRAZOLE SODIUM 40 MG: 40 TABLET, DELAYED RELEASE ORAL at 06:01

## 2020-07-20 RX ADMIN — CEPHALEXIN 500 MG: 250 CAPSULE ORAL at 21:08

## 2020-07-20 RX ADMIN — ZOLPIDEM TARTRATE 10 MG: 10 TABLET ORAL at 23:38

## 2020-07-20 RX ADMIN — ONDANSETRON 4 MG: 2 INJECTION INTRAMUSCULAR; INTRAVENOUS at 13:56

## 2020-07-20 RX ADMIN — HYDRALAZINE HYDROCHLORIDE 25 MG: 25 TABLET, FILM COATED ORAL at 06:01

## 2020-07-20 RX ADMIN — DULOXETINE HYDROCHLORIDE 60 MG: 60 CAPSULE, DELAYED RELEASE ORAL at 08:44

## 2020-07-20 RX ADMIN — ATENOLOL 100 MG: 50 TABLET ORAL at 08:44

## 2020-07-20 RX ADMIN — OYSTER SHELL CALCIUM WITH VITAMIN D 1 TABLET: 500; 200 TABLET, FILM COATED ORAL at 08:44

## 2020-07-20 RX ADMIN — NYSTATIN: 100000 CREAM TOPICAL at 08:45

## 2020-07-20 RX ADMIN — HYDROCODONE BITARTRATE AND ACETAMINOPHEN 1 TABLET: 7.5; 325 TABLET ORAL at 08:45

## 2020-07-20 RX ADMIN — HYDROMORPHONE HYDROCHLORIDE 0.5 MG: 1 INJECTION, SOLUTION INTRAMUSCULAR; INTRAVENOUS; SUBCUTANEOUS at 21:03

## 2020-07-20 RX ADMIN — ONDANSETRON 4 MG: 2 INJECTION INTRAMUSCULAR; INTRAVENOUS at 21:04

## 2020-07-20 RX ADMIN — BACLOFEN 10 MG: 10 TABLET ORAL at 08:45

## 2020-07-20 ASSESSMENT — PAIN SCALES - GENERAL
PAINLEVEL_OUTOF10: 0
PAINLEVEL_OUTOF10: 9
PAINLEVEL_OUTOF10: 8
PAINLEVEL_OUTOF10: 8
PAINLEVEL_OUTOF10: 9
PAINLEVEL_OUTOF10: 0
PAINLEVEL_OUTOF10: 7
PAINLEVEL_OUTOF10: 10
PAINLEVEL_OUTOF10: 2

## 2020-07-20 ASSESSMENT — PAIN DESCRIPTION - PAIN TYPE
TYPE: ACUTE PAIN
TYPE: ACUTE PAIN;CHRONIC PAIN
TYPE: ACUTE PAIN

## 2020-07-20 ASSESSMENT — PAIN DESCRIPTION - LOCATION
LOCATION: ABDOMEN
LOCATION: ABDOMEN
LOCATION: HAND;KNEE

## 2020-07-20 ASSESSMENT — PAIN DESCRIPTION - ORIENTATION: ORIENTATION: MID

## 2020-07-20 NOTE — PROGRESS NOTES
BP elevated (pt refused 1400 dose of Hydralazine, and other meds, due to nausea); pt affirms nausea persists, and is actively belching, burping; instructed pt on plan to notify Nephrology of BP elevation for further orders; no other significant changes since earlier assessment, except as noted; knee (Lt > Rt) pain and left hand tenderness persist - no other s/s of acute distress at present.

## 2020-07-20 NOTE — PROGRESS NOTES
10.4 (L) 12.0 - 16.0 g/dL Final     Hematocrit   Date Value Ref Range Status   07/20/2020 29.8 (L) 36.0 - 48.0 % Final     WBC   Date Value Ref Range Status   07/20/2020 4.1 4.0 - 11.0 K/uL Final     Platelets   Date Value Ref Range Status   07/20/2020 374 135 - 450 K/uL Final     Lab Results   Component Value Date    CREATININE 0.5 (L) 07/20/2020    BUN 8 07/20/2020     (L) 07/20/2020    K 4.1 07/20/2020    CL 93 (L) 07/20/2020    CO2 25 07/20/2020       FeMg 1  Uosm 571  Deo less than 20    Assessment/Plan:  1. Severe hypomagnesemia.    - Fractional excretion of  Magnesium consistent with GI loss. The patient has been having nausea, vomiting and diarrhea for several weeks. - Aggressively replace magnesium. Now better. 2.  Hypokalemia in the setting of elevated serum bicarbonate and diarrhea and vomiting. Now better. 3.  Hyponatremia. due to volume depletion.    - Better with 3% saline. Continue NaCl tabs, dose increased. - keep Fluid restriction.     - Follow Na Q12 hrs. - goal ~ high 130s now. 4.  Hypocalcemia. PTH is not significantly decreased. Replaced. Vit D at goal.   5.  History of Crohn's disease. Management as per Medicine. 6.  Renal function within normal limits. 7.  Hypophosphatemia- replaced  8. Dispo-once Na 130 and above. High complexity. Multiple medical problems. Discussed with patient and treatment team. Nursing   Thank you for allowing me to participate in this patient's care. Please do not hesitate to contact me for any questions/concerns. We will follow along with you.      Georgina Rogers MD  [facility] [address]   Phone: [phone] or [employer]  Fax: [phone]

## 2020-07-20 NOTE — PLAN OF CARE
Problem: Falls - Risk of:  Goal: Will remain free from falls  Description: Will remain free from falls  7/20/2020 0548 by Cielo Mccartney RN  Outcome: Ongoing  Note: Call light in reach; bed in low position; SR up x2; pt affirms awareness and understanding of need to call for and await assist with OOB mobility; alarm active.   7/20/2020 0057 by Andrés Ordonez RN  Outcome: Ongoing     Problem: Falls - Risk of:  Goal: Absence of physical injury  Description: Absence of physical injury  7/20/2020 0057 by Andrés Ordonez RN  Outcome: Ongoing     Problem: Pain:  Goal: Pain level will decrease  Description: Pain level will decrease  7/20/2020 0057 by Andrés Ordonez RN  Outcome: Ongoing     Problem: Pain:  Goal: Control of acute pain  Description: Control of acute pain  7/20/2020 0852 by Cielo Mccartney RN  Outcome: Ongoing  Note: C/o 9/10 bilat knee (Lt > Rt) and Lt hand pain - given Hydrocodone w/APAP PO; stat compared to last weekeft hand pain much improved  7/20/2020 0057 by Andrés Ordonez RN  Outcome: Ongoing     Problem: Pain:  Goal: Control of chronic pain  Description: Control of chronic pain  7/20/2020 0057 by Andrés Ordoenz RN  Outcome: Ongoing     Problem: Neurological  Intervention: Neurological Status Assessment  Note: Numbness (neuropathy) persists to bilat feet     Problem: Cardiovascular  Goal: No DVT, peripheral vascular complications  0/48/9125 0852 by Cielo Mccartney RN  Outcome: Ongoing  Note: No calf tenderness or s/s of dvt  7/20/2020 0057 by Andrés Ordonez RN  Outcome: Ongoing     Problem: Cardiovascular  Goal: Hemodynamic stability  7/20/2020 0852 by Cielo Mccartney RN  Outcome: Ongoing  Note: VSS except mildly elevated DBP in presence of pain; reamins in SR w/inverted T  7/20/2020 0057 by Andrés Ordonez RN  Outcome: Ongoing     Problem: Cardiovascular  Goal: Anticoagulate/Hct stable  7/20/2020 0852 by Cielo Mccartney RN  Outcome: Ongoing  Note: Remains on prophylactic lovenox  7/20/2020 0057 by Aurelia Hidalgo RN  Outcome: Ongoing     Problem: Respiratory  Goal: No pulmonary complications  8/40/8493 3727 by Angelo Randall RN  Outcome: Ongoing  Note: Breath sounds diminished at bases, with only minimal air movement with DB effort; BBS otherwise CTA with DB  7/20/2020 0057 by Aurelia Hidalgo RN  Outcome: Ongoing     Problem: Respiratory  Goal: O2 Sat > 90%  7/20/2020 0852 by Angelo Randall RN  Outcome: Ongoing  Note: WNL on RA at rest  7/20/2020 0057 by Aurelia Hidalgo RN  Outcome: Ongoing     Problem: GI  Goal: No bowel complications  4/90/2276 0852 by Angelo Randall RN  Outcome: Ongoing  Note: Chronic loose stool R/T Crohn's, but no BM since yesterday PM  7/20/2020 0057 by Aurelia Hidalgo RN  Outcome: Ongoing     Problem:   Goal: No urinary complication  9/22/0127 7595 by Angelo Randall RN  Outcome: Ongoing  Note: Indwelling catheter in place to monitor renal function/UOP  7/20/2020 0057 by Aurelia Hidalgo RN  Outcome: Ongoing     Problem: Nutrition  Goal: Optimal nutrition therapy  7/20/2020 0852 by Angelo Randall RN  Outcome: Ongoing  Note: Appetite fair, but pt tends to eat little due to chronic loose stool  7/20/2020 0057 by Aurelia Hidalgo RN  Outcome: Ongoing     Problem: Skin Integrity/Risk  Goal: Wound healing  7/20/2020 0852 by Angelo Randall RN  Outcome: Ongoing  Note: Dressing CD&I to left 3rd toe; Mepilex intact to left hand scabbed dog bite site  7/20/2020 0057 by Aurelia Hidalgo RN  Outcome: Ongoing     Problem: Skin Integrity:  Goal: Will show no infection signs and symptoms  Description: Will show no infection signs and symptoms  7/20/2020 0057 by Aruelia Hidalgo RN  Outcome: Ongoing     Problem: Skin Integrity:  Goal: Absence of new skin breakdown  Description: Absence of new skin breakdown  7/20/2020 0852 by Angelo Randall RN  Outcome: Ongoing  Note: No new breakdown evident  7/20/2020 0057 by Shane Garcia, RN  Outcome: Ongoing     Problem: DISCHARGE BARRIERS  Goal: Patient's continuum of care needs are met  Outcome: Ongoing  Note: Anticipate discharge to home (at pt insistence) with [facility] - pt declines any consideration for SNF; awaiting Na+ level >130

## 2020-07-20 NOTE — PROGRESS NOTES
Progress Note - Dr. Dion Jefferson - Internal Medicine  PCP: Adolfo Santo MD [address] / [place] [phone]    Hospital Day: 5  Code Status: Full Code  Current Diet: DIET GENERAL; Daily Fluid Restriction: 1000 ml        CC: follow up on medical issues    Subjective:   Tessa Rae is a 62 y.o. female. She denies problems    Doing ok  Na still low  Pt c/o weakness  She does state that she's not sure that she can go home  Will let social work know of her possible change of mind    She denies chest pain, denies shortness of breath, denies nausea,  denies emesis. 10 system Review of Systems is reviewed with patient, and pertinent positives are listed here: None . Otherwise, Review of systems is negative. I have reviewed the patient's medical and social history in detail and updated the computerized patient record. To recap: She  has a past medical history of Anxiety, Arthritis, Blood transfusion reaction, Crohn's disease ([address] Depression, GERD (gastroesophageal reflux disease), Hiatal hernia, Hx of blood clots, Hypertension, MRSA (methicillin resistant staph aureus) culture positive, Neuropathy, Pneumonia, Sinus headache, SOB (shortness of breath), and VRE (vancomycin resistant enterococcus) culture positive. . She  has a past surgical history that includes Hysterectomy; knee surgery (Bilateral); Hand Carpectomy (Bilateral); hernia repair; Abdomen surgery; bladder suspension; fracture surgery; Heel spur surgery; Tonsillectomy; Colonoscopy; Upper gastrointestinal endoscopy (6/14/13); Foot surgery (Left, 6/25/14); Foot surgery (6/3/15); Colonoscopy (9/14/15); Foot surgery (Left, 08/05/2002); joint replacement; Sigmoidoscopy (01/15/2018); Abdominal adhesion surgery (06/08/2018); Colonoscopy (08/07/2018); colostomy (N/A, 10/8/2018); pr secd clos surg wnd exten/complic (N/A, 07/12/0574); Leg Surgery (Right); Colonoscopy (06/07/2019); Colonoscopy (N/A, 6/7/2019); Colonoscopy (N/A, 6/7/2019);  Small intestine surgery (N/A, 7/17/2019); and proctosigmoidoscopy (N/A, 7/17/2019). . She  reports that she has been smoking cigarettes and e-cigarettes. She has a 10.00 pack-year smoking history. She has never used smokeless tobacco. She reports current alcohol use of about 2.0 standard drinks of alcohol per week. She reports that she does not use drugs. .        Active Hospital Problems    Diagnosis Date Noted    Hypokalemia [E87.6] 07/15/2020    PAD (peripheral artery disease) (HCC) [I73.9] 05/17/2019    Chronic venous hypertension (idiopathic) with inflammation of bilateral lower extremity [I87.323] 05/17/2019    Leg swelling [M79.89] 05/17/2019    Hypomagnesemia [E83.42] 01/08/2018    Crohn's colitis ([facility] [K50.10] 01/06/2018    Anemia [D64.9] 01/05/2018    Hyponatremia [E87.1] 01/05/2018    Atherosclerosis of native arteries of extremities with intermittent claudication, bilateral legs (HCC) [I70.213] 01/19/2016    Essential hypertension, benign [I10] 09/13/2015       Current Facility-Administered Medications: sodium phosphate 20 mmol in dextrose 5 % 250 mL IVPB, 20 mmol, Intravenous, Once  sodium chloride tablet 2 g, 2 g, Oral, TID WC  HYDROmorphone HCl PF (DILAUDID) injection 0.5 mg, 0.5 mg, Intramuscular, Q4H PRN  HYDROmorphone HCl PF (DILAUDID) injection 0.5 mg, 0.5 mg, Intravenous, Q4H PRN  loperamide (IMODIUM) capsule 2 mg, 2 mg, Oral, 4x Daily PRN  calcium-vitamin D 500-200 MG-UNIT per tablet 1 tablet, 1 tablet, Oral, TID  HYDROcodone-acetaminophen (NORCO) 7.5-325 MG per tablet 1 tablet, 1 tablet, Oral, Q4H PRN  cephALEXin (KEFLEX) capsule 500 mg, 500 mg, Oral, 3 times per day  aspirin-acetaminophen-caffeine (EXCEDRIN MIGRAINE) per tablet 2 tablet, 2 tablet, Oral, Q6H PRN  atenolol (TENORMIN) tablet 100 mg, 100 mg, Oral, Daily  baclofen (LIORESAL) tablet 10 mg, 10 mg, Oral, TID  DULoxetine (CYMBALTA) extended release capsule 60 mg, 60 mg, Oral, BID  ferrous sulfate (IRON 325) tablet 325 mg, 325 mg, Oral, BID  fluticasone (FLONASE) 50 MCG/ACT nasal spray 1 spray, 1 spray, Each Nostril, Daily PRN  busPIRone (BUSPAR) tablet 10 mg, 10 mg, Oral, TID  hydrALAZINE (APRESOLINE) tablet 25 mg, 25 mg, Oral, 3 times per day  nystatin (MYCOSTATIN) cream, , Topical, BID  pantoprazole (PROTONIX) tablet 40 mg, 40 mg, Oral, QAM AC  ondansetron (ZOFRAN-ODT) disintegrating tablet 4 mg, 4 mg, Oral, Q6H PRN  zolpidem (AMBIEN) tablet 10 mg, 10 mg, Oral, Nightly PRN  enoxaparin (LOVENOX) injection 40 mg, 40 mg, Subcutaneous, Daily  ondansetron (ZOFRAN) injection 4 mg, 4 mg, Intravenous, Q6H PRN  magnesium oxide (MAG-OX) tablet 400 mg, 400 mg, Oral, BID  balsalazide (COLAZAL) capsule 3,000 mg, 3,000 mg, Oral, Daily         Objective:  BP (!) 133/93   Pulse 93   Temp 98.4 °F (36.9 °C) (Oral)   Resp 16   Ht 5' 6\" (1.676 m)   Wt 184 lb 4.8 oz (83.6 kg)   SpO2 96%   BMI 29.75 kg/m²      Patient Vitals for the past 24 hrs:   BP Temp Temp src Pulse Resp SpO2   07/20/20 0830 (!) 133/93 98.4 °F (36.9 °C) Oral 93 16 96 %   07/20/20 0601 134/88 98.7 °F (37.1 °C) Temporal 87 16 96 %   07/19/20 2338 119/86 98.3 °F (36.8 °C) Oral 83 16 97 %   07/19/20 2052 108/78 98 °F (36.7 °C) Oral 88 16 97 %   07/19/20 1611 104/72 97.6 °F (36.4 °C) Oral 72 16 98 %   07/19/20 1339 110/81 -- -- -- -- --   07/19/20 1200 109/76 98.2 °F (36.8 °C) Oral 74 18 93 %   07/19/20 0845 (!) 140/101 98.4 °F (36.9 °C) Oral 103 16 94 %     Patient Vitals for the past 96 hrs (Last 3 readings):   Weight   07/17/20 0741 184 lb 4.8 oz (83.6 kg)           Intake/Output Summary (Last 24 hours) at 7/20/2020 0844  Last data filed at 7/19/2020 1200  Gross per 24 hour   Intake --   Output 550 ml   Net -550 ml         Physical Exam:   S1, S2 normal, no murmur, rub or gallop, regular rate and rhythm  clear to auscultation bilaterally  abdomen is soft without significant tenderness, masses, organomegaly or guarding  Trace edema    Labs:  Lab Results   Component Value Date    WBC 4.1 07/20/2020    HGB 10.4 (L) 07/20/2020    HCT 29.8 (L) 07/20/2020     07/20/2020    CHOL 151 02/06/2018    TRIG 57 02/06/2018    HDL 81 (H) 02/06/2018    ALT 6 (L) 07/16/2020    AST 24 07/16/2020     (L) 07/20/2020    K 4.1 07/20/2020    CL 93 (L) 07/20/2020    CREATININE 0.5 (L) 07/20/2020    BUN 8 07/20/2020    CO2 25 07/20/2020    INR 2.75 (H) 11/27/2018    LABMICR YES 12/17/2019     Lab Results   Component Value Date    TROPONINI <0.01 12/17/2019       Recent Imaging Results are Reviewed:  Xr Lumbar Spine (2-3 Views)    Result Date: 7/18/2020  EXAMINATION: THREE XRAY VIEWS OF THE LUMBAR SPINE 7/18/2020 12:30 pm COMPARISON: CT abdomen and pelvis, 12/17/2019 HISTORY: ORDERING SYSTEM PROVIDED HISTORY: left paralumbar pain TECHNOLOGIST PROVIDED HISTORY: Reason for exam:->left paralumbar pain Reason for Exam: left paralumbar pain Acuity: Unknown Type of Exam: Unknown FINDINGS: At L1-2 there is retrolisthesis of 3 mm. At L4-5 there is anterolisthesis of 7 mm. No fracture is identified. At T12-L1 and L1-2 there is disc space narrowing with anterior spurring and endplate sclerosis/vacuum phenomenon. Facet arthropathy is noted at L4-5 and L5-S1. Right hip replacement is partially visualized and unremarkable. No acute fracture. Degenerative L1-2 retrolisthesis and L4-5 anterolisthesis. The latter is associated with moderate facet arthropathy. Overall moderate multilevel spondylosis. Xr Wrist Right (min 3 Views)    Result Date: 7/16/2020  EXAMINATION: 3 XRAY VIEWS OF THE RIGHT WRIST; THREE XRAY VIEWS OF THE LEFT HAND 7/16/2020 2:09 pm COMPARISON: None. HISTORY: ORDERING SYSTEM PROVIDED HISTORY: pain , s/p fall TECHNOLOGIST PROVIDED HISTORY: Reason for exam:->pain , s/p fall Reason for Exam: pain , s/p fall Acuity: Acute Type of Exam: Initial; ORDERING SYSTEM PROVIDED HISTORY: pain , s/p fall TECHNOLOGIST PROVIDED HISTORY: Reason for exam:->pain , s/p fall Reason for Exam: pain , s/p fall.  Pt unable to remove ring; best images possible Acuity: Acute Type of Exam: Initial FINDINGS: Mild pattern of osteopenia left hand and wrist.  There is also mild interphalangeal joint space narrowing that is chronic and degenerative. No focal soft tissue swelling. No acute finding in the left hand or wrist.     Xr Hand Left (min 3 Views)    Result Date: 7/16/2020  EXAMINATION: 3 XRAY VIEWS OF THE RIGHT WRIST; THREE XRAY VIEWS OF THE LEFT HAND 7/16/2020 2:09 pm COMPARISON: None. HISTORY: ORDERING SYSTEM PROVIDED HISTORY: pain , s/p fall TECHNOLOGIST PROVIDED HISTORY: Reason for exam:->pain , s/p fall Reason for Exam: pain , s/p fall Acuity: Acute Type of Exam: Initial; ORDERING SYSTEM PROVIDED HISTORY: pain , s/p fall TECHNOLOGIST PROVIDED HISTORY: Reason for exam:->pain , s/p fall Reason for Exam: pain , s/p fall. Pt unable to remove ring; best images possible Acuity: Acute Type of Exam: Initial FINDINGS: Mild pattern of osteopenia left hand and wrist.  There is also mild interphalangeal joint space narrowing that is chronic and degenerative. No focal soft tissue swelling. No acute finding in the left hand or wrist.     Xr Knee Left (3 Views)    Result Date: 7/15/2020  EXAMINATION: THREE XRAY VIEWS OF THE LEFT KNEE 7/15/2020 1:09 pm COMPARISON: July 3, 2019 HISTORY: ORDERING SYSTEM PROVIDED HISTORY: pain TECHNOLOGIST PROVIDED HISTORY: Reason for exam:->pain Reason for Exam: Knee Pain (in by ems, where she has been having a lot of knee pain, bilaterally, patient usually gets injections, doesnt take meds at home for pain ) Acuity: Acute Type of Exam: Initial FINDINGS: A suprapatellar joint effusion is present, increased in size. Severe tricompartmental osteoarthritic changes are again noted. Diffuse periarticular osteophytosis and joint space narrowing as well as medial subluxation of the femur in relation to the tibia again noted. No evidence of fracture.      Moderate-sized suprapatellar joint effusion but no acute osseous abnormality. No evidence of fracture. Severe tricompartmental osteoarthritis. No significant change in appearance of the arthritis over the past 1 year. Xr Knee Right (3 Views)    Result Date: 7/15/2020  EXAMINATION: THREE XRAY VIEWS OF THE RIGHT KNEE 7/15/2020 1:09 pm COMPARISON: None. HISTORY: ORDERING SYSTEM PROVIDED HISTORY: pain TECHNOLOGIST PROVIDED HISTORY: Reason for exam:->pain Reason for Exam: Right knee Pain (in by ems, where she has been having a lot of knee pain, bilaterally, patient usually gets injections, doesnt take meds at home for pain ) Acuity: Acute Type of Exam: Initial FINDINGS: There is severe end-stage degenerative change within all compartments manifested by complete loss of joint space and marginal osteophyte formation. There is a moderate-sized suprapatellar effusion. There is no fracture or osseous destruction. Severe osteoarthritis. Assessment and Plan:  Principal Problem:    Hyponatremia -Established problem. Stable. Na still low  Plan: per [facility]. Appreciate their eval  Active Problems:    Essential hypertension, benign -Established problem. Stable. 133/93  Plan: stay on same meds    Atherosclerosis of native arteries of extremities with intermittent claudication, bilateral legs ([address]  Plan: Continue present orders/plan. Anemia -Established problem. Stable.  hgb 10.4  Plan: No indication for transfusion. Cont to monitor h/h to assess progression of anemia. Recommend ferrous sulfate or MVI as outpatient. Crohn's colitis ([address]    Hypomagnesemia -Established problem. Stable. 1.8 on last check    PAD (peripheral artery disease) (HCC)    Leg swelling - Established problem. Stable. chronic  Plan: Continue present orders/plan. Chronic venous hypertension (idiopathic) with inflammation of bilateral lower extremity    Hypokalemia    Disp - goal Na is 130. Not there yet. Pt is very debilitated. I think she would benefit from SNF, but she is unsure.  Will have SW discuss with pt again today/tomorrow          Primo Mejia  7/20/2020

## 2020-07-20 NOTE — CARE COORDINATION
The Plan for Transition of Care is related to the following treatment goals: The Patient  was provided with a choice of provider and agrees   with the discharge plan. [] Yes [x] No    Freedom of choice list was provided with basic dialogue that supports the patient's individualized plan of care/goals, treatment preferences and shares the quality data associated with the providers. [x] Yes [] No  Patient declined SNF and wants to go home.

## 2020-07-20 NOTE — PROGRESS NOTES
Resting quietly - arouses easily; declines lunch at this time duet to recent nausea, but states will eat if  Her \"stomach settles down\"; BP remains mildly elevated; no other s/s of distress at present.

## 2020-07-20 NOTE — CONSULTS
Met with patient and she is declining to go to a facility. The patient's daughter, Johnna Diazer [phone]  and spouse are in agreement to take her home up 2 platform steps with [facility]  home care PT/OT RN and [facility] pending a home care order. Notified Germania Villegas at [facility] that the daughter inquired about hospital rails.   MD, Please complete & sign the e-MARIBEL under the discharge instructions, AVS/Prescriptions, and or medical necessity note for assistance with discharge planning, Thank you, Linda Powers, MSW, [phone]

## 2020-07-21 LAB
ANION GAP SERPL CALCULATED.3IONS-SCNC: 7 MMOL/L (ref 3–16)
BASOPHILS ABSOLUTE: 0 K/UL (ref 0–0.2)
BASOPHILS RELATIVE PERCENT: 1.2 %
BUN BLDV-MCNC: 8 MG/DL (ref 7–20)
CALCIUM SERPL-MCNC: 9.2 MG/DL (ref 8.3–10.6)
CHLORIDE BLD-SCNC: 95 MMOL/L (ref 99–110)
CO2: 25 MMOL/L (ref 21–32)
CREAT SERPL-MCNC: <0.5 MG/DL (ref 0.6–1.2)
EOSINOPHILS ABSOLUTE: 0.2 K/UL (ref 0–0.6)
EOSINOPHILS RELATIVE PERCENT: 5.9 %
GFR AFRICAN AMERICAN: >60
GFR NON-AFRICAN AMERICAN: >60
GLUCOSE BLD-MCNC: 89 MG/DL (ref 70–99)
HCT VFR BLD CALC: 28.6 % (ref 36–48)
HEMOGLOBIN: 9.9 G/DL (ref 12–16)
LYMPHOCYTES ABSOLUTE: 1.1 K/UL (ref 1–5.1)
LYMPHOCYTES RELATIVE PERCENT: 29.1 %
MCH RBC QN AUTO: 34.4 PG (ref 26–34)
MCHC RBC AUTO-ENTMCNC: 34.6 G/DL (ref 31–36)
MCV RBC AUTO: 99.4 FL (ref 80–100)
MONOCYTES ABSOLUTE: 0.6 K/UL (ref 0–1.3)
MONOCYTES RELATIVE PERCENT: 15 %
NEUTROPHILS ABSOLUTE: 1.9 K/UL (ref 1.7–7.7)
NEUTROPHILS RELATIVE PERCENT: 48.8 %
PDW BLD-RTO: 13.2 % (ref 12.4–15.4)
PLATELET # BLD: 353 K/UL (ref 135–450)
PMV BLD AUTO: 6.6 FL (ref 5–10.5)
POTASSIUM SERPL-SCNC: 4 MMOL/L (ref 3.5–5.1)
RBC # BLD: 2.87 M/UL (ref 4–5.2)
SODIUM BLD-SCNC: 127 MMOL/L (ref 136–145)
WBC # BLD: 3.9 K/UL (ref 4–11)

## 2020-07-21 PROCEDURE — 6370000000 HC RX 637 (ALT 250 FOR IP): Performed by: INTERNAL MEDICINE

## 2020-07-21 PROCEDURE — 36415 COLL VENOUS BLD VENIPUNCTURE: CPT

## 2020-07-21 PROCEDURE — 85025 COMPLETE CBC W/AUTO DIFF WBC: CPT

## 2020-07-21 PROCEDURE — 1200000000 HC SEMI PRIVATE

## 2020-07-21 PROCEDURE — 6360000002 HC RX W HCPCS: Performed by: INTERNAL MEDICINE

## 2020-07-21 PROCEDURE — 80048 BASIC METABOLIC PNL TOTAL CA: CPT

## 2020-07-21 RX ADMIN — NYSTATIN: 100000 CREAM TOPICAL at 09:06

## 2020-07-21 RX ADMIN — MAGNESIUM GLUCONATE 500 MG ORAL TABLET 400 MG: 500 TABLET ORAL at 09:05

## 2020-07-21 RX ADMIN — SODIUM CHLORIDE TAB 1 GM 2 G: 1 TAB at 09:05

## 2020-07-21 RX ADMIN — CEPHALEXIN 500 MG: 250 CAPSULE ORAL at 14:29

## 2020-07-21 RX ADMIN — HYDROMORPHONE HYDROCHLORIDE 0.5 MG: 1 INJECTION, SOLUTION INTRAMUSCULAR; INTRAVENOUS; SUBCUTANEOUS at 02:23

## 2020-07-21 RX ADMIN — CEPHALEXIN 500 MG: 250 CAPSULE ORAL at 21:29

## 2020-07-21 RX ADMIN — FERROUS SULFATE TAB 325 MG (65 MG ELEMENTAL FE) 325 MG: 325 (65 FE) TAB at 09:05

## 2020-07-21 RX ADMIN — OYSTER SHELL CALCIUM WITH VITAMIN D 1 TABLET: 500; 200 TABLET, FILM COATED ORAL at 21:29

## 2020-07-21 RX ADMIN — BALSALAZIDE DISODIUM 3000 MG: 750 CAPSULE ORAL at 09:07

## 2020-07-21 RX ADMIN — HYDROMORPHONE HYDROCHLORIDE 0.5 MG: 1 INJECTION, SOLUTION INTRAMUSCULAR; INTRAVENOUS; SUBCUTANEOUS at 06:20

## 2020-07-21 RX ADMIN — ONDANSETRON 4 MG: 2 INJECTION INTRAMUSCULAR; INTRAVENOUS at 12:41

## 2020-07-21 RX ADMIN — BUSPIRONE HYDROCHLORIDE 10 MG: 5 TABLET ORAL at 14:29

## 2020-07-21 RX ADMIN — HYDRALAZINE HYDROCHLORIDE 25 MG: 25 TABLET, FILM COATED ORAL at 21:28

## 2020-07-21 RX ADMIN — ENOXAPARIN SODIUM 40 MG: 40 INJECTION SUBCUTANEOUS at 09:06

## 2020-07-21 RX ADMIN — BUSPIRONE HYDROCHLORIDE 10 MG: 5 TABLET ORAL at 09:04

## 2020-07-21 RX ADMIN — PANTOPRAZOLE SODIUM 40 MG: 40 TABLET, DELAYED RELEASE ORAL at 06:20

## 2020-07-21 RX ADMIN — SODIUM CHLORIDE TAB 1 GM 2 G: 1 TAB at 17:46

## 2020-07-21 RX ADMIN — OYSTER SHELL CALCIUM WITH VITAMIN D 1 TABLET: 500; 200 TABLET, FILM COATED ORAL at 09:05

## 2020-07-21 RX ADMIN — DULOXETINE HYDROCHLORIDE 60 MG: 60 CAPSULE, DELAYED RELEASE ORAL at 09:05

## 2020-07-21 RX ADMIN — BACLOFEN 10 MG: 10 TABLET ORAL at 14:29

## 2020-07-21 RX ADMIN — HYDRALAZINE HYDROCHLORIDE 25 MG: 25 TABLET, FILM COATED ORAL at 14:29

## 2020-07-21 RX ADMIN — MAGNESIUM GLUCONATE 500 MG ORAL TABLET 400 MG: 500 TABLET ORAL at 22:27

## 2020-07-21 RX ADMIN — CEPHALEXIN 500 MG: 250 CAPSULE ORAL at 06:20

## 2020-07-21 RX ADMIN — HYDROMORPHONE HYDROCHLORIDE 0.5 MG: 1 INJECTION, SOLUTION INTRAMUSCULAR; INTRAVENOUS; SUBCUTANEOUS at 22:27

## 2020-07-21 RX ADMIN — FERROUS SULFATE TAB 325 MG (65 MG ELEMENTAL FE) 325 MG: 325 (65 FE) TAB at 21:29

## 2020-07-21 RX ADMIN — OYSTER SHELL CALCIUM WITH VITAMIN D 1 TABLET: 500; 200 TABLET, FILM COATED ORAL at 14:29

## 2020-07-21 RX ADMIN — BACLOFEN 10 MG: 10 TABLET ORAL at 09:05

## 2020-07-21 RX ADMIN — DULOXETINE HYDROCHLORIDE 60 MG: 60 CAPSULE, DELAYED RELEASE ORAL at 21:28

## 2020-07-21 RX ADMIN — BUSPIRONE HYDROCHLORIDE 10 MG: 5 TABLET ORAL at 21:28

## 2020-07-21 RX ADMIN — NYSTATIN: 100000 CREAM TOPICAL at 21:30

## 2020-07-21 RX ADMIN — ATENOLOL 100 MG: 50 TABLET ORAL at 09:05

## 2020-07-21 RX ADMIN — SODIUM CHLORIDE TAB 1 GM 2 G: 1 TAB at 12:40

## 2020-07-21 RX ADMIN — BACLOFEN 10 MG: 10 TABLET ORAL at 21:30

## 2020-07-21 RX ADMIN — LOPERAMIDE HYDROCHLORIDE 2 MG: 2 CAPSULE ORAL at 17:46

## 2020-07-21 RX ADMIN — ONDANSETRON 4 MG: 2 INJECTION INTRAMUSCULAR; INTRAVENOUS at 06:21

## 2020-07-21 ASSESSMENT — PAIN SCALES - GENERAL
PAINLEVEL_OUTOF10: 8
PAINLEVEL_OUTOF10: 8
PAINLEVEL_OUTOF10: 9
PAINLEVEL_OUTOF10: 0

## 2020-07-21 ASSESSMENT — PAIN DESCRIPTION - LOCATION
LOCATION: KNEE
LOCATION: FOOT
LOCATION: KNEE

## 2020-07-21 ASSESSMENT — PAIN DESCRIPTION - PAIN TYPE
TYPE: CHRONIC PAIN;NEUROPATHIC PAIN
TYPE: CHRONIC PAIN
TYPE: CHRONIC PAIN

## 2020-07-21 ASSESSMENT — PAIN DESCRIPTION - ORIENTATION
ORIENTATION: RIGHT;LEFT

## 2020-07-21 NOTE — PROGRESS NOTES
MD Lenin Gonzalez MD Dail Callas, MD                  Office: [phone]                 Fax: [phone]          [facility] Note    PATIENT NAME: Jeniffer Cordon  : 1955  MRN: [id number]    Assessment/Plan:    1. Severe hypomagnesemia. FeMg 1  Uosm 571  Deo less than 20  - Fractional excretion of  Magnesium consistent with GI loss. The patient has been having nausea, vomiting and diarrhea for several weeks. - Aggressively replace magnesium.    - Now better. Continue to monitor   - check level     2. Hypokalemia in the setting of elevated serum bicarbonate and diarrhea and vomiting. Now better. 3.  Hyponatremia. due to volume depletion.    - Better with 3% saline. Continue NaCl tabs, dose increased. - keep Fluid restriction.     - Follow Na Q24 hrs. - goal ~ high 130s now. 4.  Hypocalcemia. PTH is not significantly decreased. Replaced. Vit D at goal. Continue to monitor. 5.  History of Crohn's disease. Management as per Medicine. 6.  Renal function within normal limits. 7.  Hypophosphatemia- replaced  8. Dispo-once Na 130 and above. High complexity. Multiple medical problems. Discussed with patient and treatment team.    Thank you for allowing me to participate in this patient's care. Please do not hesitate to contact me for any questions/concerns. We will follow along with you.      Susanne Cassidy MD  [facility] [address] [place] [address]   Phone: [phone] or [facility]  Fax: [phone]           Patient Active Problem List   Diagnosis    Essential hypertension, benign    Atherosclerosis of native arteries of extremities with intermittent claudication, bilateral legs ([address]    Anemia    Hyponatremia    Crohn's colitis ([address]    Hypomagnesemia    DVT (deep venous thrombosis) ([address]    PAD (peripheral artery disease) ([address]    Leg swelling    Venous insufficiency    Chronic venous hypertension (idiopathic) with inflammation of bilateral lower extremity    Open wound of left foot with complication    Hypokalemia       Past Medical History:   has a past medical history of Anxiety, Arthritis, Blood transfusion reaction, Crohn's disease ([address] Depression, GERD (gastroesophageal reflux disease), Hiatal hernia, Hx of blood clots, Hypertension, MRSA (methicillin resistant staph aureus) culture positive, Neuropathy, Pneumonia, Sinus headache, SOB (shortness of breath), and VRE (vancomycin resistant enterococcus) culture positive. Past Social History:   reports that she has been smoking cigarettes and e-cigarettes. She has a 10.00 pack-year smoking history. She has never used smokeless tobacco. She reports current alcohol use of about 2.0 standard drinks of alcohol per week. She reports that she does not use drugs.     Subjective:  Seen resting in bed      Objective:      /81   Pulse 80   Temp 98.6 °F (37 °C) (Oral)   Resp 16   Ht 5' 6\" (1.676 m)   Wt 184 lb 9.6 oz (83.7 kg)   SpO2 95%   BMI 29.80 kg/m²     Wt Readings from Last 3 Encounters:   07/21/20 184 lb 9.6 oz (83.7 kg)   07/07/20 205 lb (93 kg)   12/17/19 203 lb (92.1 kg)       BP Readings from Last 3 Encounters:   07/21/20 120/81   03/04/20 (!) 160/101   02/24/20 (!) 166/117     Chest- clear  Heart-regular  Abd-soft  Ext- 1+ edema    Labs  Hemoglobin   Date Value Ref Range Status   07/21/2020 9.9 (L) 12.0 - 16.0 g/dL Final     Hematocrit   Date Value Ref Range Status   07/21/2020 28.6 (L) 36.0 - 48.0 % Final     WBC   Date Value Ref Range Status   07/21/2020 3.9 (L) 4.0 - 11.0 K/uL Final     Platelets   Date Value Ref Range Status   07/21/2020 353 135 - 450 K/uL Final     Lab Results   Component Value Date    CREATININE <0.5 (L) 07/21/2020    BUN 8 07/21/2020     (L) 07/21/2020    K 4.0 07/21/2020    CL 95 (L) 07/21/2020    CO2 25 07/21/2020

## 2020-07-21 NOTE — PROGRESS NOTES
Pt c/o persistent bilat knee pain, but refuses pain med at this time, stating it made her too nauseous T/O day yesterday; Zofran administered IV, and pt did accept salt tabs with pudding; spouse at bedside; awaiting PT/OT to discuss therapy regimen, if pt will participate.

## 2020-07-21 NOTE — PROGRESS NOTES
BP much improved. Continues to c/o pain. Medicated with Dilaudid. Zofran given as well to prevent nausea from taking the Keflex.

## 2020-07-21 NOTE — PROGRESS NOTES
Progress Note - Dr. Elvie Purdy - Internal Medicine  PCP: Danika Harper MD [address] / [facility] [id number]    Hospital Day: 6  Code Status: Full Code  Current Diet: DIET GENERAL; Daily Fluid Restriction: 1000 ml        CC: follow up on medical issues    Subjective:   Jesse Escoto is a 62 y.o. female. She denies problems    Doing ok  Pt now wants to go back home  Appreciate SW assistance    Na still low, 127  Will d/w Renal whether samsca will be helpful in this setting    No other issues    She denies chest pain, denies shortness of breath, denies nausea,  denies emesis. 10 system Review of Systems is reviewed with patient, and pertinent positives are listed here: None . Otherwise, Review of systems is negative. I have reviewed the patient's medical and social history in detail and updated the computerized patient record. To recap: She  has a past medical history of Anxiety, Arthritis, Blood transfusion reaction, Crohn's disease ([address] Depression, GERD (gastroesophageal reflux disease), Hiatal hernia, Hx of blood clots, Hypertension, MRSA (methicillin resistant staph aureus) culture positive, Neuropathy, Pneumonia, Sinus headache, SOB (shortness of breath), and VRE (vancomycin resistant enterococcus) culture positive. . She  has a past surgical history that includes Hysterectomy; knee surgery (Bilateral); Hand Carpectomy (Bilateral); hernia repair; Abdomen surgery; bladder suspension; fracture surgery; Heel spur surgery; Tonsillectomy; Colonoscopy; Upper gastrointestinal endoscopy (6/14/13); Foot surgery (Left, 6/25/14); Foot surgery (6/3/15); Colonoscopy (9/14/15); Foot surgery (Left, 08/05/2002); joint replacement; Sigmoidoscopy (01/15/2018); Abdominal adhesion surgery (06/08/2018); Colonoscopy (08/07/2018); colostomy (N/A, 10/8/2018); pr secd clos surg wnd exten/complic (N/A, 06/52/4614); Leg Surgery (Right); Colonoscopy (06/07/2019); Colonoscopy (N/A, 6/7/2019);  Colonoscopy (N/A, 6/7/2019); Small intestine surgery (N/A, 7/17/2019); and proctosigmoidoscopy (N/A, 7/17/2019). . She  reports that she has been smoking cigarettes and e-cigarettes. She has a 10.00 pack-year smoking history. She has never used smokeless tobacco. She reports current alcohol use of about 2.0 standard drinks of alcohol per week. She reports that she does not use drugs. .        Active Hospital Problems    Diagnosis Date Noted    Hypokalemia [E87.6] 07/15/2020    PAD (peripheral artery disease) (HCC) [I73.9] 05/17/2019    Chronic venous hypertension (idiopathic) with inflammation of bilateral lower extremity [I87.323] 05/17/2019    Leg swelling [M79.89] 05/17/2019    Hypomagnesemia [E83.42] 01/08/2018    Crohn's colitis ([facility] [address] [K50.10] 01/06/2018    Anemia [D64.9] 01/05/2018    Hyponatremia [E87.1] 01/05/2018    Atherosclerosis of native arteries of extremities with intermittent claudication, bilateral legs (HCC) [I70.213] 01/19/2016    Essential hypertension, benign [I10] 09/13/2015       Current Facility-Administered Medications: hydrALAZINE (APRESOLINE) injection 10 mg, 10 mg, Intravenous, Q6H PRN  sodium phosphate 20 mmol in dextrose 5 % 250 mL IVPB, 20 mmol, Intravenous, Once  sodium chloride tablet 2 g, 2 g, Oral, TID WC  HYDROmorphone HCl PF (DILAUDID) injection 0.5 mg, 0.5 mg, Intramuscular, Q4H PRN  HYDROmorphone HCl PF (DILAUDID) injection 0.5 mg, 0.5 mg, Intravenous, Q4H PRN  loperamide (IMODIUM) capsule 2 mg, 2 mg, Oral, 4x Daily PRN  calcium-vitamin D 500-200 MG-UNIT per tablet 1 tablet, 1 tablet, Oral, TID  HYDROcodone-acetaminophen (NORCO) 7.5-325 MG per tablet 1 tablet, 1 tablet, Oral, Q4H PRN  cephALEXin (KEFLEX) capsule 500 mg, 500 mg, Oral, 3 times per day  aspirin-acetaminophen-caffeine (EXCEDRIN MIGRAINE) per tablet 2 tablet, 2 tablet, Oral, Q6H PRN  atenolol (TENORMIN) tablet 100 mg, 100 mg, Oral, Daily  baclofen (LIORESAL) tablet 10 mg, 10 mg, Oral, TID  DULoxetine (CYMBALTA) extended release capsule 60 mg, 60 mg, Oral, BID  ferrous sulfate (IRON 325) tablet 325 mg, 325 mg, Oral, BID  fluticasone (FLONASE) 50 MCG/ACT nasal spray 1 spray, 1 spray, Each Nostril, Daily PRN  busPIRone (BUSPAR) tablet 10 mg, 10 mg, Oral, TID  hydrALAZINE (APRESOLINE) tablet 25 mg, 25 mg, Oral, 3 times per day  nystatin (MYCOSTATIN) cream, , Topical, BID  pantoprazole (PROTONIX) tablet 40 mg, 40 mg, Oral, QAM AC  ondansetron (ZOFRAN-ODT) disintegrating tablet 4 mg, 4 mg, Oral, Q6H PRN  zolpidem (AMBIEN) tablet 10 mg, 10 mg, Oral, Nightly PRN  enoxaparin (LOVENOX) injection 40 mg, 40 mg, Subcutaneous, Daily  ondansetron (ZOFRAN) injection 4 mg, 4 mg, Intravenous, Q6H PRN  magnesium oxide (MAG-OX) tablet 400 mg, 400 mg, Oral, BID  balsalazide (COLAZAL) capsule 3,000 mg, 3,000 mg, Oral, Daily         Objective:  /70   Pulse 77   Temp 98.2 °F (36.8 °C) (Oral)   Resp 16   Ht 5' 6\" (1.676 m)   Wt 184 lb 9.6 oz (83.7 kg)   SpO2 94%   BMI 29.80 kg/m²      Patient Vitals for the past 24 hrs:   BP Temp Temp src Pulse Resp SpO2 Weight   07/21/20 0658 -- -- -- -- -- -- 184 lb 9.6 oz (83.7 kg)   07/21/20 0620 105/70 -- -- 77 16 94 % --   07/21/20 0219 117/82 98.2 °F (36.8 °C) Oral 74 16 95 % --   07/20/20 2334 110/76 -- -- 81 16 98 % --   07/20/20 2057 (!) 136/109 98 °F (36.7 °C) Oral 80 16 96 % --   07/20/20 1815 (!) 148/105 -- -- 78 -- -- --   07/20/20 1625 (!) 152/112 97.8 °F (36.6 °C) Oral 74 17 95 % --   07/20/20 1225 (!) 135/96 97.8 °F (36.6 °C) Axillary 80 17 94 % --   07/20/20 0830 (!) 133/93 98.4 °F (36.9 °C) Oral 93 16 96 % --     Patient Vitals for the past 96 hrs (Last 3 readings):   Weight   07/21/20 0658 184 lb 9.6 oz (83.7 kg)           Intake/Output Summary (Last 24 hours) at 7/21/2020 8572  Last data filed at 7/20/2020 2341  Gross per 24 hour   Intake --   Output 600 ml   Net -600 ml         Physical Exam:   S1, S2 normal, no murmur, rub or gallop, regular rate and rhythm  clear to auscultation bilaterally  abdomen is soft without significant tenderness, masses, organomegaly or guarding  extremities normal, atraumatic, no cyanosis. 1+ edema    Labs:  Lab Results   Component Value Date    WBC 3.9 (L) 07/21/2020    HGB 9.9 (L) 07/21/2020    HCT 28.6 (L) 07/21/2020     07/21/2020    CHOL 151 02/06/2018    TRIG 57 02/06/2018    HDL 81 (H) 02/06/2018    ALT 6 (L) 07/16/2020    AST 24 07/16/2020     (L) 07/21/2020    K 4.0 07/21/2020    CL 95 (L) 07/21/2020    CREATININE <0.5 (L) 07/21/2020    BUN 8 07/21/2020    CO2 25 07/21/2020    INR 2.75 (H) 11/27/2018    LABMICR YES 12/17/2019     Lab Results   Component Value Date    TROPONINI <0.01 12/17/2019       Recent Imaging Results are Reviewed:  Xr Lumbar Spine (2-3 Views)    Result Date: 7/18/2020  EXAMINATION: THREE XRAY VIEWS OF THE LUMBAR SPINE 7/18/2020 12:30 pm COMPARISON: CT abdomen and pelvis, 12/17/2019 HISTORY: ORDERING SYSTEM PROVIDED HISTORY: left paralumbar pain TECHNOLOGIST PROVIDED HISTORY: Reason for exam:->left paralumbar pain Reason for Exam: left paralumbar pain Acuity: Unknown Type of Exam: Unknown FINDINGS: At L1-2 there is retrolisthesis of 3 mm. At L4-5 there is anterolisthesis of 7 mm. No fracture is identified. At T12-L1 and L1-2 there is disc space narrowing with anterior spurring and endplate sclerosis/vacuum phenomenon. Facet arthropathy is noted at L4-5 and L5-S1. Right hip replacement is partially visualized and unremarkable. No acute fracture. Degenerative L1-2 retrolisthesis and L4-5 anterolisthesis. The latter is associated with moderate facet arthropathy. Overall moderate multilevel spondylosis. Xr Wrist Right (min 3 Views)    Result Date: 7/16/2020  EXAMINATION: 3 XRAY VIEWS OF THE RIGHT WRIST; THREE XRAY VIEWS OF THE LEFT HAND 7/16/2020 2:09 pm COMPARISON: None.  HISTORY: ORDERING SYSTEM PROVIDED HISTORY: pain , s/p fall TECHNOLOGIST PROVIDED HISTORY: Reason for exam:->pain , s/p fall Reason for Exam: pain , s/p fall Acuity: Acute Type of Exam: Initial; ORDERING SYSTEM PROVIDED HISTORY: pain , s/p fall TECHNOLOGIST PROVIDED HISTORY: Reason for exam:->pain , s/p fall Reason for Exam: pain , s/p fall. Pt unable to remove ring; best images possible Acuity: Acute Type of Exam: Initial FINDINGS: Mild pattern of osteopenia left hand and wrist.  There is also mild interphalangeal joint space narrowing that is chronic and degenerative. No focal soft tissue swelling. No acute finding in the left hand or wrist.     Xr Hand Left (min 3 Views)    Result Date: 7/16/2020  EXAMINATION: 3 XRAY VIEWS OF THE RIGHT WRIST; THREE XRAY VIEWS OF THE LEFT HAND 7/16/2020 2:09 pm COMPARISON: None. HISTORY: ORDERING SYSTEM PROVIDED HISTORY: pain , s/p fall TECHNOLOGIST PROVIDED HISTORY: Reason for exam:->pain , s/p fall Reason for Exam: pain , s/p fall Acuity: Acute Type of Exam: Initial; ORDERING SYSTEM PROVIDED HISTORY: pain , s/p fall TECHNOLOGIST PROVIDED HISTORY: Reason for exam:->pain , s/p fall Reason for Exam: pain , s/p fall. Pt unable to remove ring; best images possible Acuity: Acute Type of Exam: Initial FINDINGS: Mild pattern of osteopenia left hand and wrist.  There is also mild interphalangeal joint space narrowing that is chronic and degenerative. No focal soft tissue swelling. No acute finding in the left hand or wrist.     Xr Knee Left (3 Views)    Result Date: 7/15/2020  EXAMINATION: THREE XRAY VIEWS OF THE LEFT KNEE 7/15/2020 1:09 pm COMPARISON: July 3, 2019 HISTORY: ORDERING SYSTEM PROVIDED HISTORY: pain TECHNOLOGIST PROVIDED HISTORY: Reason for exam:->pain Reason for Exam: Knee Pain (in by ems, where she has been having a lot of knee pain, bilaterally, patient usually gets injections, doesnt take meds at home for pain ) Acuity: Acute Type of Exam: Initial FINDINGS: A suprapatellar joint effusion is present, increased in size. Severe tricompartmental osteoarthritic changes are again noted.   Diffuse periarticular osteophytosis and joint space narrowing as well as medial subluxation of the femur in relation to the tibia again noted. No evidence of fracture. Moderate-sized suprapatellar joint effusion but no acute osseous abnormality. No evidence of fracture. Severe tricompartmental osteoarthritis. No significant change in appearance of the arthritis over the past 1 year. Xr Knee Right (3 Views)    Result Date: 7/15/2020  EXAMINATION: THREE XRAY VIEWS OF THE RIGHT KNEE 7/15/2020 1:09 pm COMPARISON: None. HISTORY: ORDERING SYSTEM PROVIDED HISTORY: pain TECHNOLOGIST PROVIDED HISTORY: Reason for exam:->pain Reason for Exam: Right knee Pain (in by ems, where she has been having a lot of knee pain, bilaterally, patient usually gets injections, doesnt take meds at home for pain ) Acuity: Acute Type of Exam: Initial FINDINGS: There is severe end-stage degenerative change within all compartments manifested by complete loss of joint space and marginal osteophyte formation. There is a moderate-sized suprapatellar effusion. There is no fracture or osseous destruction. Severe osteoarthritis. Assessment and Plan:  Principal Problem:    Hyponatremia -Established problem. Stable. Still low, 127 - but better than yest  Plan: cont fluids, meds per renal.  Active Problems:    Essential hypertension, benign -Established problem. Stable. 105/70  Plan: cont fluids. Atherosclerosis of native arteries of extremities with intermittent claudication, bilateral legs (HCC)    Anemia - Established problem. Stable.  hgb 9.9  Plan: No indication for transfusion. Cont to monitor h/h to assess progression of anemia. Recommend ferrous sulfate or MVI as outpatient. Crohn's colitis ([facility] [address] -Established problem. Stable. Plan: Continue present orders/plan. Hypomagnesemia -Established problem. Stable.   1.8 at last chest  Plan: can recheck in am    PAD (peripheral artery disease) (HCC)    Leg swelling    Chronic venous hypertension (idiopathic) with inflammation of bilateral lower extremity    Hypokalemia -Established problem, now resolved. K 4.0  Plan: Continue present orders/plan.              Darwin Sims  7/21/2020

## 2020-07-21 NOTE — PLAN OF CARE
Problem: Falls - Risk of:  Goal: Will remain free from falls  Description: Will remain free from falls  Outcome: Ongoing  Note: Call light in reach; bed in low position; SR up x2; pt affirms awareness and understanding of need to call for and await assist with OOB mobility, and displays no impulsive behavior R/T mobility; alarm active. Problem: Pain:  Goal: Pain level will decrease  Description: Pain level will decrease  7/21/2020 0256 by Thompson Palacios RN  Outcome: Ongoing  Note: Medicated twice so far this shift. Last time for pain in both feet. Problem: Pain:  Goal: Control of acute pain  Description: Control of acute pain  Outcome: Ongoing  Note: Pt resting quietly with eyes closed, with no overt s/s of pain at this time; arouses easily but only briefly     Problem: Neurological  Intervention: Neurological Status Assessment  Note: Hx neuropathy bilat feet     Problem: Cardiovascular  Goal: No DVT, peripheral vascular complications  Outcome: Ongoing  Note: No calf tenderness or s/s of dvt     Problem: Cardiovascular  Goal: Hemodynamic stability  7/21/2020 0849 by Ginger Vidal RN  Outcome: Ongoing  Note: VSS; remains in SR  7/21/2020 0256 by Thompson Palacios RN  Outcome: Ongoing  Note: IV Hydralazine given at 2100. BP much improved. See flowsheet.      Problem: Cardiovascular  Goal: Anticoagulate/Hct stable  Outcome: Ongoing  Note: Remains on prophylactic lovenox regimen     Problem: Respiratory  Goal: No pulmonary complications  Outcome: Ongoing  Note: Breath sounds diminished at bases, with only minimal air movement heard, but otherwise clear with weak DB effort     Problem: Respiratory  Goal: O2 Sat > 90%  Outcome: Ongoing  Note: WNL on RA at rest     Problem: GI  Goal: No bowel complications  Outcome: Ongoing  Note: Last BM was small to med loose brown stool (incontinent) yesterday afternoon - poor PO intake all day testerday     Problem:   Goal: No urinary complication  Outcome: Ongoing  Note: Indwelling catheter in place to monitor UOP     Problem: Nutrition  Goal: Optimal nutrition therapy  Outcome: Ongoing  Note: Poor appetite, PO intake - suspect fear of recurrent stool incontinence, as well as med side effects, play a role      Problem: Skin Integrity/Risk  Goal: Wound healing  7/21/2020 0849 by Harmony Pollock RN  Outcome: Ongoing  Note: Dressings dry & intact to left hand dog bite wound and Left 3rd toe site; swelling, tenderness persist to left hand, though have improved since admission  7/21/2020 0256 by Kirill Palacios RN  Outcome: Ongoing  Note: Dressing to toe and left foot removed, painted with betadine and redressed with dry dressing secured with ace wrap. Tolerated well. Problem: Skin Integrity:  Goal: Will show no infection signs and symptoms  Description: Will show no infection signs and symptoms  Outcome: Ongoing  Note: Afebrile; no redness evident to wound sites     Problem: Skin Integrity:  Goal: Absence of new skin breakdown  Description: Absence of new skin breakdown  Outcome: Ongoing  Note: No new breakdown evident     Problem: KNOWLEDGE DEFICIT  Goal: Patient/S.O. demonstrates understanding of disease process, treatment plan, medications, and discharge instructions.   Outcome: Ongoing  Note: Instructed on current plan of care, but pt is very fatigued at this time     Problem: DISCHARGE BARRIERS  Goal: Patient's continuum of care needs are met  Outcome: Ongoing  Note: Awaiting increased alertness to further discuss discharge plan - pt has been adamant in planning discharge to home w/[facility], but mobility remains very limited

## 2020-07-21 NOTE — PROGRESS NOTES
Assessment complete. BP is elevated especially diastolic. C/o pain and nausea. Hydralazine IVP given. Dilaudid given for pain. Zofran given for nausea. Agrees to take her Baclofen and Keflex tonight. Refuses all other oral medications. Will continue to monitor BP and pain.

## 2020-07-21 NOTE — PROGRESS NOTES
Resting quietly with eyes closed - arouses easily; temp 99.1; breath sounds generally increased T/O bilat, though minimally to RUL; no other significant changes since earlier assessment, except as noted; no s/s of acute distress at present; spouse indicated intent to discuss discharge plan with pt, stating Aurelia Butterfield has to go somewhere for therapy before she comes home; I'll talk to her, and I'll talk to my supervisor about a 5 or 6 week leave\"; I conferred with PT/OT, and they will assure pt is seen tomorrow by therapy. Amrit Bridges

## 2020-07-21 NOTE — ED PROVIDER NOTES
As physician-in-triage, I performed a medical screening history and physical exam on Dianne Keller. I also cared for and evaluated the patient in conjunction with the ED Advanced Practice Provider. All diagnostic, treatment, and disposition decisions were made by myself in conjunction with the advanced practice provider. For all further details of the patient's emergency department visit, please see the advanced practice provider's documentation. Patient presents to the ER for evaluation of generalized fatigue and weakness, intractable arthralgias of bilateral knees and gait disorder with generalized fatigue and weakness, recent dog bite of the left hand, deconditioned status, hypokalemia hypo-magnesium, PT OT consultation need for possible extended care facility. Analgesia provided oral Augmentin, for recent dog bite, replacement of electrolytes. Impression: Arthralgia intractable bilateral knees. Gait disorder. Failure to thrive. Electrolyte disturbance, hypokalemia hypomagnesia. Dog bite left hand.      Sailaja Kate MD  75/[id number]

## 2020-07-21 NOTE — PLAN OF CARE
Problem: Pain:  Goal: Pain level will decrease  Description: Pain level will decrease  Outcome: Ongoing  Note: Medicated twice so far this shift. Last time for pain in both feet. Problem: Cardiovascular  Goal: Hemodynamic stability  Outcome: Ongoing  Note: IV Hydralazine given at 2100. BP much improved. See flowsheet. Problem: Skin Integrity/Risk  Goal: Wound healing  Outcome: Ongoing  Note: Dressing to toe and left foot removed, painted with betadine and redressed with dry dressing secured with ace wrap. Tolerated well.

## 2020-07-22 LAB
ANION GAP SERPL CALCULATED.3IONS-SCNC: 8 MMOL/L (ref 3–16)
BASOPHILS ABSOLUTE: 0.1 K/UL (ref 0–0.2)
BASOPHILS RELATIVE PERCENT: 1.2 %
BUN BLDV-MCNC: 7 MG/DL (ref 7–20)
CALCIUM SERPL-MCNC: 8.9 MG/DL (ref 8.3–10.6)
CHLORIDE BLD-SCNC: 96 MMOL/L (ref 99–110)
CO2: 24 MMOL/L (ref 21–32)
CREAT SERPL-MCNC: 0.5 MG/DL (ref 0.6–1.2)
EOSINOPHILS ABSOLUTE: 0.2 K/UL (ref 0–0.6)
EOSINOPHILS RELATIVE PERCENT: 4.9 %
GFR AFRICAN AMERICAN: >60
GFR NON-AFRICAN AMERICAN: >60
GLUCOSE BLD-MCNC: 105 MG/DL (ref 70–99)
HCT VFR BLD CALC: 29 % (ref 36–48)
HEMOGLOBIN: 9.9 G/DL (ref 12–16)
LYMPHOCYTES ABSOLUTE: 1.3 K/UL (ref 1–5.1)
LYMPHOCYTES RELATIVE PERCENT: 28.6 %
MAGNESIUM: 1.3 MG/DL (ref 1.8–2.4)
MCH RBC QN AUTO: 33.8 PG (ref 26–34)
MCHC RBC AUTO-ENTMCNC: 34.2 G/DL (ref 31–36)
MCV RBC AUTO: 98.8 FL (ref 80–100)
MONOCYTES ABSOLUTE: 0.7 K/UL (ref 0–1.3)
MONOCYTES RELATIVE PERCENT: 15.2 %
NEUTROPHILS ABSOLUTE: 2.3 K/UL (ref 1.7–7.7)
NEUTROPHILS RELATIVE PERCENT: 50.1 %
PDW BLD-RTO: 13.1 % (ref 12.4–15.4)
PLATELET # BLD: 404 K/UL (ref 135–450)
PMV BLD AUTO: 6.8 FL (ref 5–10.5)
POTASSIUM SERPL-SCNC: 3.9 MMOL/L (ref 3.5–5.1)
RBC # BLD: 2.93 M/UL (ref 4–5.2)
SODIUM BLD-SCNC: 128 MMOL/L (ref 136–145)
WBC # BLD: 4.7 K/UL (ref 4–11)

## 2020-07-22 PROCEDURE — 97530 THERAPEUTIC ACTIVITIES: CPT

## 2020-07-22 PROCEDURE — 80048 BASIC METABOLIC PNL TOTAL CA: CPT

## 2020-07-22 PROCEDURE — 36415 COLL VENOUS BLD VENIPUNCTURE: CPT

## 2020-07-22 PROCEDURE — 97116 GAIT TRAINING THERAPY: CPT

## 2020-07-22 PROCEDURE — 85025 COMPLETE CBC W/AUTO DIFF WBC: CPT

## 2020-07-22 PROCEDURE — 6370000000 HC RX 637 (ALT 250 FOR IP): Performed by: INTERNAL MEDICINE

## 2020-07-22 PROCEDURE — 83735 ASSAY OF MAGNESIUM: CPT

## 2020-07-22 PROCEDURE — 1200000000 HC SEMI PRIVATE

## 2020-07-22 PROCEDURE — 6360000002 HC RX W HCPCS: Performed by: INTERNAL MEDICINE

## 2020-07-22 RX ORDER — LANOLIN ALCOHOL/MO/W.PET/CERES
400 CREAM (GRAM) TOPICAL 3 TIMES DAILY
Status: DISCONTINUED | OUTPATIENT
Start: 2020-07-22 | End: 2020-07-23 | Stop reason: HOSPADM

## 2020-07-22 RX ORDER — LANOLIN ALCOHOL/MO/W.PET/CERES
400 CREAM (GRAM) TOPICAL 3 TIMES DAILY
Status: DISCONTINUED | OUTPATIENT
Start: 2020-07-22 | End: 2020-07-22

## 2020-07-22 RX ORDER — MAGNESIUM SULFATE IN WATER 40 MG/ML
4 INJECTION, SOLUTION INTRAVENOUS ONCE
Status: COMPLETED | OUTPATIENT
Start: 2020-07-22 | End: 2020-07-22

## 2020-07-22 RX ADMIN — BACLOFEN 10 MG: 10 TABLET ORAL at 22:21

## 2020-07-22 RX ADMIN — MAGNESIUM GLUCONATE 500 MG ORAL TABLET 400 MG: 500 TABLET ORAL at 09:32

## 2020-07-22 RX ADMIN — DULOXETINE HYDROCHLORIDE 60 MG: 60 CAPSULE, DELAYED RELEASE ORAL at 09:07

## 2020-07-22 RX ADMIN — OYSTER SHELL CALCIUM WITH VITAMIN D 1 TABLET: 500; 200 TABLET, FILM COATED ORAL at 14:22

## 2020-07-22 RX ADMIN — MAGNESIUM GLUCONATE 500 MG ORAL TABLET 400 MG: 500 TABLET ORAL at 22:22

## 2020-07-22 RX ADMIN — ONDANSETRON 4 MG: 2 INJECTION INTRAMUSCULAR; INTRAVENOUS at 09:44

## 2020-07-22 RX ADMIN — OYSTER SHELL CALCIUM WITH VITAMIN D 1 TABLET: 500; 200 TABLET, FILM COATED ORAL at 09:08

## 2020-07-22 RX ADMIN — BUSPIRONE HYDROCHLORIDE 10 MG: 5 TABLET ORAL at 09:07

## 2020-07-22 RX ADMIN — DULOXETINE HYDROCHLORIDE 60 MG: 60 CAPSULE, DELAYED RELEASE ORAL at 22:22

## 2020-07-22 RX ADMIN — ENOXAPARIN SODIUM 40 MG: 40 INJECTION SUBCUTANEOUS at 09:08

## 2020-07-22 RX ADMIN — MAGNESIUM GLUCONATE 500 MG ORAL TABLET 400 MG: 500 TABLET ORAL at 15:22

## 2020-07-22 RX ADMIN — BUSPIRONE HYDROCHLORIDE 10 MG: 5 TABLET ORAL at 14:22

## 2020-07-22 RX ADMIN — SODIUM CHLORIDE TAB 1 GM 2 G: 1 TAB at 09:32

## 2020-07-22 RX ADMIN — BACLOFEN 10 MG: 10 TABLET ORAL at 14:22

## 2020-07-22 RX ADMIN — BACLOFEN 10 MG: 10 TABLET ORAL at 09:08

## 2020-07-22 RX ADMIN — ATENOLOL 100 MG: 50 TABLET ORAL at 09:07

## 2020-07-22 RX ADMIN — PANTOPRAZOLE SODIUM 40 MG: 40 TABLET, DELAYED RELEASE ORAL at 06:53

## 2020-07-22 RX ADMIN — BUSPIRONE HYDROCHLORIDE 10 MG: 5 TABLET ORAL at 22:30

## 2020-07-22 RX ADMIN — HYDROMORPHONE HYDROCHLORIDE 0.5 MG: 1 INJECTION, SOLUTION INTRAMUSCULAR; INTRAVENOUS; SUBCUTANEOUS at 17:34

## 2020-07-22 RX ADMIN — ZOLPIDEM TARTRATE 10 MG: 10 TABLET ORAL at 00:39

## 2020-07-22 RX ADMIN — BALSALAZIDE DISODIUM 3000 MG: 750 CAPSULE ORAL at 09:32

## 2020-07-22 RX ADMIN — SODIUM CHLORIDE TAB 1 GM 2 G: 1 TAB at 11:46

## 2020-07-22 RX ADMIN — SODIUM CHLORIDE TAB 1 GM 2 G: 1 TAB at 17:35

## 2020-07-22 RX ADMIN — LOPERAMIDE HYDROCHLORIDE 2 MG: 2 CAPSULE ORAL at 17:35

## 2020-07-22 RX ADMIN — HYDRALAZINE HYDROCHLORIDE 25 MG: 25 TABLET, FILM COATED ORAL at 06:54

## 2020-07-22 RX ADMIN — ZOLPIDEM TARTRATE 10 MG: 10 TABLET ORAL at 22:23

## 2020-07-22 RX ADMIN — HYDROMORPHONE HYDROCHLORIDE 0.5 MG: 1 INJECTION, SOLUTION INTRAMUSCULAR; INTRAVENOUS; SUBCUTANEOUS at 06:53

## 2020-07-22 RX ADMIN — FERROUS SULFATE TAB 325 MG (65 MG ELEMENTAL FE) 325 MG: 325 (65 FE) TAB at 22:22

## 2020-07-22 RX ADMIN — HYDROMORPHONE HYDROCHLORIDE 0.5 MG: 1 INJECTION, SOLUTION INTRAMUSCULAR; INTRAVENOUS; SUBCUTANEOUS at 22:24

## 2020-07-22 RX ADMIN — CEPHALEXIN 500 MG: 250 CAPSULE ORAL at 06:54

## 2020-07-22 RX ADMIN — HYDRALAZINE HYDROCHLORIDE 25 MG: 25 TABLET, FILM COATED ORAL at 22:22

## 2020-07-22 RX ADMIN — HYDROMORPHONE HYDROCHLORIDE 0.5 MG: 1 INJECTION, SOLUTION INTRAMUSCULAR; INTRAVENOUS; SUBCUTANEOUS at 11:44

## 2020-07-22 RX ADMIN — OYSTER SHELL CALCIUM WITH VITAMIN D 1 TABLET: 500; 200 TABLET, FILM COATED ORAL at 22:22

## 2020-07-22 RX ADMIN — FERROUS SULFATE TAB 325 MG (65 MG ELEMENTAL FE) 325 MG: 325 (65 FE) TAB at 09:08

## 2020-07-22 RX ADMIN — MAGNESIUM SULFATE HEPTAHYDRATE 4 G: 40 INJECTION, SOLUTION INTRAVENOUS at 10:19

## 2020-07-22 ASSESSMENT — PAIN SCALES - GENERAL
PAINLEVEL_OUTOF10: 9
PAINLEVEL_OUTOF10: 7
PAINLEVEL_OUTOF10: 0
PAINLEVEL_OUTOF10: 7
PAINLEVEL_OUTOF10: 7
PAINLEVEL_OUTOF10: 9
PAINLEVEL_OUTOF10: 0
PAINLEVEL_OUTOF10: 6
PAINLEVEL_OUTOF10: 7
PAINLEVEL_OUTOF10: 5
PAINLEVEL_OUTOF10: 6
PAINLEVEL_OUTOF10: 8

## 2020-07-22 ASSESSMENT — PAIN DESCRIPTION - ORIENTATION
ORIENTATION: LEFT

## 2020-07-22 ASSESSMENT — PAIN DESCRIPTION - LOCATION
LOCATION: HIP
LOCATION: HIP;KNEE
LOCATION: HIP
LOCATION: HIP

## 2020-07-22 ASSESSMENT — PAIN DESCRIPTION - PAIN TYPE
TYPE: CHRONIC PAIN

## 2020-07-22 NOTE — PROGRESS NOTES
Occupational Therapy  Facility/Department: [address]  Daily Treatment Note  NAME: Megan Bond  : 1955  MRN: [id number]    Date of Service: 2020    Discharge Recommendations:  Megan Bond scored a 18/24 on the AM-PAC ADL Inpatient form. Current research shows that an AM-PAC score of 18 or greater is typically associated with a discharge to the patient's home setting. Based on the patient's AM-PAC score, and their current ADL deficits, it is recommended that the patient have 2-3 sessions per week of Occupational Therapy at d/c to increase the patient's independence. At this time, this patient demonstrates the endurance and safety to discharge home with home services and a follow up treatment frequency of 2-3x/wk. Please see assessment section for further patient specific details. HOME HEALTH CARE: LEVEL 3 SAFETY     - Initial home health evaluation to occur within 24-48 hours, in patient home   - Therapy evaluations in home within 24-48 hours of discharge; including DME and home safety   - Frontload therapy 5 days, then 3x a week   - Therapy to evaluate if patient has [address] needs for personal care   -  evaluation within 24-48 hours, includes evaluation of resources and insurance to determine [place], [place], LTC, and Medicaid options       If patient discharges prior to next session this note will serve as a discharge summary. Please see below for the latest assessment towards goals. OT Equipment Recommendations  Equipment Needed: No    Assessment   Performance deficits / Impairments: Decreased functional mobility ; Decreased ADL status; Decreased endurance;Decreased fine motor control;Decreased ROM; Decreased coordination;Decreased balance;Decreased strength  Assessment: Pt has made good progress toward her goals. She is not at her baseline level of occupational function, based on the above deficits associated wit hypokalemia.  Pt would benefit from (Bilateral); hernia repair; Abdomen surgery; bladder suspension; fracture surgery; Heel spur surgery; Tonsillectomy; Colonoscopy; Upper gastrointestinal endoscopy (6/14/13); Foot surgery (Left, 6/25/14); Foot surgery (6/3/15); Colonoscopy (9/14/15); Foot surgery (Left, 08/05/2002); joint replacement; Sigmoidoscopy (01/15/2018); Abdominal adhesion surgery (06/08/2018); Colonoscopy (08/07/2018); colostomy (N/A, 10/8/2018); pr secd clos surg wnd exten/complic (N/A, 74/31/3585); Leg Surgery (Right); Colonoscopy (06/07/2019); Colonoscopy (N/A, 6/7/2019); Colonoscopy (N/A, 6/7/2019); Small intestine surgery (N/A, 7/17/2019); and proctosigmoidoscopy (N/A, 7/17/2019). Restrictions  Restrictions/Precautions  Restrictions/Precautions: Fall Risk(high fall risk)  Position Activity Restriction  Other position/activity restrictions: Gomez Reyes is a 62 y.o. female with past medical she of anxiety, Crohn's disease, depression, hiatal hernia, hypertension who presents to the ED implant of bilateral knee pain. Patient states has bilateral knee pain that she was told was due to arthritis. Patient states she follows up with the arthritis clinic. Patient states she has received gel injections to her bilateral knees. States normally the left knee is worse than the right. States yesterday she was walking with her walker when she twisted and states she had some pain to her right knee. States she fell and went down and hit her right anterior knee on the ground. States since then has had not been able to ambulate. States significant edema to the right knee with associated pain that she describes as an aching rated 10/10. Denies ecchymosis, erythema or warmth. Denies abrasion or laceration. Denies fever chills. Denies numbness or tingling. States decreased range of motion and strength due to pain. States she is unable to ambulate. Denies taking any over-the-counter medication for symptom control.   Became concerned and called EMS who brought her to the ED for further evaluation and treatment. Subjective   General  Chart Reviewed: Yes  Family / Caregiver Present: No  Referring Practitioner: Sly Lazaro MD , for d/c planning  Diagnosis: Hypokalemia  Subjective  Subjective: Pt supine in bed, pleasant and agreeable to OT tx. Pt reports 8/10 pain L side/lumbar area. Pain Assessment  Pain Assessment: 0-10  Pain Level: 8  Pre Treatment Pain Screening  Intervention List: Patient able to continue with treatment;Nurse/Physician notified  Comments / Details: Pain increased to 10/10 after therapy session. Vital Signs  Patient Currently in Pain: Yes   Orientation  Orientation  Overall Orientation Status: Within Normal Limits  Objective    ADL  Equipment Provided: Reacher  Additional Comments: Pt initially reported wanting to don shorts, then declined. Declined need for other ADLs. Balance  Sitting Balance: Supervision  Standing Balance: Stand by assistance(w/RW)  Standing Balance  Time: ~15 sec, 5 min, 2 min  Activity: transfer EOB to BSC, functional mobility BSC to door and back to EOB ~38 ft; functional mobility from EOB to/from door, ~38 ft  Comment: Pt had dry heaves after functional mobility. No LOB  Functional Mobility  Functional - Mobility Device: Rolling Walker  Activity: Other  Assist Level: Stand by assistance  Toilet Transfers  Toilet - Technique: To left  Equipment Used: Standard bedside commode  Toilet Transfer: Stand by assistance  Bed mobility  Supine to Sit: Stand by assistance(HOB elevated)  Sit to Supine: Stand by assistance(HOB flat)  Scooting: Stand by assistance  Comment: Pt able to scoot herself up to [facility] on flat bed.   Transfers  Stand Step Transfers: Stand by assistance(w/RW)  Stand Pivot Transfers: Stand by assistance(w/RW)  Sit to stand: Stand by assistance  Stand to sit: Stand by assistance                       Cognition  Overall [facility]     Perception  Overall Perceptual Status: [facility]                                   Plan   Plan  Times per week: 3-5  Current Treatment Recommendations: Endurance Training, Functional Mobility Training, Self-Care / ADL, Safety Education & Training    AM-PAC Score        AM-PAC Inpatient Daily Activity Raw Score: 18 (07/22/20 1356)  AM-PAC Inpatient ADL T-Scale Score : 38.66 (07/22/20 1356)  ADL Inpatient CMS 0-100% Score: 46.65 (07/22/20 1356)  ADL Inpatient CMS G-Code Modifier : CK (07/22/20 1356)    Goals  Short term goals  Time Frame for Short term goals: Discharge  Short term goal 1: Min A for supine to sit-SBA supine to sit, sit to supine, HOB elevated 7/22--GOAL MET  Short term goal 2: Mod A for functional ADL transfers--SBA w/RW 7/22--GOAL MET  Short term goal 3: S/U for simple grooming--Not addressed; pt declined 7/22  Short term goal 4: Min A for UB bathing/dressing--Not addressed; pt declined 7/22  Short term goal 5:  Mod A for LB bathing/dressing with AE as needed--Not addressed; pt declined 7/22  Long term goals  Time Frame for Long term goals : LTG=STG  Long term goal 1: Pt to tolerate standing 2-3 min for ADL activity--5 min, 2 min for functional mobility 7/22--GOAL MET  Long term goal 2: REVISED STG2: Mod I for functional transfers to ADL surfaces w/RW  Long term goal 3: NEW GOAL: Mod I for functional mobility for ADL activity w/RW  Long term goal 4: REVISED LTG 1: Pt to tolerate standing 7-10 min for standing ADL activity/functional mobility       Therapy Time   Individual Concurrent Group Co-treatment   Time In       1030   Time Out       1103   Minutes       33         Timed Code Treatment Minutes:  33 Minutes    Total Treatment Minutes:  [address] [facility], [place] [id number], OTR/L, [id number]

## 2020-07-22 NOTE — PROGRESS NOTES
smoking cigarettes and e-cigarettes. She has a 10.00 pack-year smoking history. She has never used smokeless tobacco. She reports current alcohol use of about 2.0 standard drinks of alcohol per week. She reports that she does not use drugs. .        Active Hospital Problems    Diagnosis Date Noted    Hypokalemia [E87.6] 07/15/2020    PAD (peripheral artery disease) (HCC) [I73.9] 05/17/2019    Chronic venous hypertension (idiopathic) with inflammation of bilateral lower extremity [I87.323] 05/17/2019    Leg swelling [M79.89] 05/17/2019    Hypomagnesemia [E83.42] 01/08/2018    Crohn's colitis ([facility] [address] [K50.10] 01/06/2018    Anemia [D64.9] 01/05/2018    Hyponatremia [E87.1] 01/05/2018    Atherosclerosis of native arteries of extremities with intermittent claudication, bilateral legs (HCC) [I70.213] 01/19/2016    Essential hypertension, benign [I10] 09/13/2015       Current Facility-Administered Medications: hydrALAZINE (APRESOLINE) injection 10 mg, 10 mg, Intravenous, Q6H PRN  sodium phosphate 20 mmol in dextrose 5 % 250 mL IVPB, 20 mmol, Intravenous, Once  sodium chloride tablet 2 g, 2 g, Oral, TID WC  HYDROmorphone HCl PF (DILAUDID) injection 0.5 mg, 0.5 mg, Intramuscular, Q4H PRN  HYDROmorphone HCl PF (DILAUDID) injection 0.5 mg, 0.5 mg, Intravenous, Q4H PRN  loperamide (IMODIUM) capsule 2 mg, 2 mg, Oral, 4x Daily PRN  calcium-vitamin D 500-200 MG-UNIT per tablet 1 tablet, 1 tablet, Oral, TID  HYDROcodone-acetaminophen (NORCO) 7.5-325 MG per tablet 1 tablet, 1 tablet, Oral, Q4H PRN  cephALEXin (KEFLEX) capsule 500 mg, 500 mg, Oral, 3 times per day  aspirin-acetaminophen-caffeine (EXCEDRIN MIGRAINE) per tablet 2 tablet, 2 tablet, Oral, Q6H PRN  atenolol (TENORMIN) tablet 100 mg, 100 mg, Oral, Daily  baclofen (LIORESAL) tablet 10 mg, 10 mg, Oral, TID  DULoxetine (CYMBALTA) extended release capsule 60 mg, 60 mg, Oral, BID  ferrous sulfate (IRON 325) tablet 325 mg, 325 mg, Oral, BID  fluticasone (FLONASE) 50 MCG/ACT nasal spray 1 spray, 1 spray, Each Nostril, Daily PRN  busPIRone (BUSPAR) tablet 10 mg, 10 mg, Oral, TID  hydrALAZINE (APRESOLINE) tablet 25 mg, 25 mg, Oral, 3 times per day  nystatin (MYCOSTATIN) cream, , Topical, BID  pantoprazole (PROTONIX) tablet 40 mg, 40 mg, Oral, QAM AC  ondansetron (ZOFRAN-ODT) disintegrating tablet 4 mg, 4 mg, Oral, Q6H PRN  zolpidem (AMBIEN) tablet 10 mg, 10 mg, Oral, Nightly PRN  enoxaparin (LOVENOX) injection 40 mg, 40 mg, Subcutaneous, Daily  ondansetron (ZOFRAN) injection 4 mg, 4 mg, Intravenous, Q6H PRN  magnesium oxide (MAG-OX) tablet 400 mg, 400 mg, Oral, BID  balsalazide (COLAZAL) capsule 3,000 mg, 3,000 mg, Oral, Daily         Objective:  /81   Pulse 77   Temp 98.1 °F (36.7 °C) (Oral)   Resp 16   Ht 5' 6\" (1.676 m)   Wt 184 lb 9.6 oz (83.7 kg)   SpO2 94%   BMI 29.80 kg/m²      Patient Vitals for the past 24 hrs:   BP Temp Temp src Pulse Resp SpO2   07/22/20 0520 116/81 98.1 °F (36.7 °C) Oral 77 16 94 %   07/22/20 0012 110/77 98.4 °F (36.9 °C) Oral 80 16 98 %   07/21/20 2119 (!) 131/90 98.5 °F (36.9 °C) Oral 77 16 96 %   07/21/20 1625 117/80 99.1 °F (37.3 °C) Oral 72 17 97 %   07/21/20 1225 110/77 98.7 °F (37.1 °C) Oral 73 16 94 %   07/21/20 0835 120/81 98.6 °F (37 °C) Oral 80 16 95 %     Patient Vitals for the past 96 hrs (Last 3 readings):   Weight   07/21/20 0658 184 lb 9.6 oz (83.7 kg)           Intake/Output Summary (Last 24 hours) at 7/22/2020 0834  Last data filed at 7/21/2020 2228  Gross per 24 hour   Intake 720 ml   Output 975 ml   Net -255 ml         Physical Exam:   S1, S2 normal, no murmur, rub or gallop, regular rate and rhythm  clear to auscultation bilaterally  abdomen is soft without significant tenderness, masses, organomegaly or guarding  extremities normal, atraumatic, no cyanosis or edema    Labs:  Lab Results   Component Value Date    WBC 4.7 07/22/2020    HGB 9.9 (L) 07/22/2020    HCT 29.0 (L) 07/22/2020     07/22/2020    CHOL 151 02/06/2018    TRIG 57 02/06/2018    HDL 81 (H) 02/06/2018    ALT 6 (L) 07/16/2020    AST 24 07/16/2020     (L) 07/22/2020    K 3.9 07/22/2020    CL 96 (L) 07/22/2020    CREATININE 0.5 (L) 07/22/2020    BUN 7 07/22/2020    CO2 24 07/22/2020    INR 2.75 (H) 11/27/2018    LABMICR YES 12/17/2019     Lab Results   Component Value Date    TROPONINI <0.01 12/17/2019       Recent Imaging Results are Reviewed:  Xr Lumbar Spine (2-3 Views)    Result Date: 7/18/2020  EXAMINATION: THREE XRAY VIEWS OF THE LUMBAR SPINE 7/18/2020 12:30 pm COMPARISON: CT abdomen and pelvis, 12/17/2019 HISTORY: ORDERING SYSTEM PROVIDED HISTORY: left paralumbar pain TECHNOLOGIST PROVIDED HISTORY: Reason for exam:->left paralumbar pain Reason for Exam: left paralumbar pain Acuity: Unknown Type of Exam: Unknown FINDINGS: At L1-2 there is retrolisthesis of 3 mm. At L4-5 there is anterolisthesis of 7 mm. No fracture is identified. At T12-L1 and L1-2 there is disc space narrowing with anterior spurring and endplate sclerosis/vacuum phenomenon. Facet arthropathy is noted at L4-5 and L5-S1. Right hip replacement is partially visualized and unremarkable. No acute fracture. Degenerative L1-2 retrolisthesis and L4-5 anterolisthesis. The latter is associated with moderate facet arthropathy. Overall moderate multilevel spondylosis. Xr Wrist Right (min 3 Views)    Result Date: 7/16/2020  EXAMINATION: 3 XRAY VIEWS OF THE RIGHT WRIST; THREE XRAY VIEWS OF THE LEFT HAND 7/16/2020 2:09 pm COMPARISON: None. HISTORY: ORDERING SYSTEM PROVIDED HISTORY: pain , s/p fall TECHNOLOGIST PROVIDED HISTORY: Reason for exam:->pain , s/p fall Reason for Exam: pain , s/p fall Acuity: Acute Type of Exam: Initial; ORDERING SYSTEM PROVIDED HISTORY: pain , s/p fall TECHNOLOGIST PROVIDED HISTORY: Reason for exam:->pain , s/p fall Reason for Exam: pain , s/p fall.  Pt unable to remove ring; best images possible Acuity: Acute Type of Exam: Initial FINDINGS: Mild pattern of

## 2020-07-22 NOTE — PROGRESS NOTES
Physical Therapy/Occupational Therapy  Dianne Keller     PT/OT treatment attempted this morning. Pt supine in bed upon arrival, son present in room. Pt refused PT/OT treatment at this time, stating she is \"busy\" and \"taking her medicine\". Pt asking therapy to return later. Will follow up as schedule allows.     Thanks, Balbir Gaines, PT [place], OTR/L, [id number]

## 2020-07-22 NOTE — PLAN OF CARE
Problem: Falls - Risk of:  Goal: Will remain free from falls  Description: Will remain free from falls  Outcome: Ongoing  Note: Up to [facility] with SBA. Tolerated well. Slow moving but no injury. Hopefully to be seen by PT/OT today. Problem: Pain:  Goal: Pain level will decrease  Description: Pain level will decrease  Outcome: Ongoing  Note: C/o chronic pain in feet and knees. Medicated with Dilaudid. No c/o nausea. To all HS medications. Problem: Cardiovascular  Goal: Hemodynamic stability  Outcome: Ongoing  Note: BP elevated upon first rounds. Pain medication and oral Hydralazine given. BP at MN rounds much improved. Problem: Skin Integrity/Risk  Goal: Wound healing  Outcome: Ongoing  Note: Dressing to left 3 rd digit pained with betadine and covered with 2 x 2 and ace wrap. No new signs of skin breakdown.      Problem: Skin Integrity:  Goal: Absence of new skin breakdown  Description: Absence of new skin breakdown  Outcome: Ongoing

## 2020-07-22 NOTE — PROGRESS NOTES
MD Constantin Piña MD Idolina Magyar, MD                  Office: [phone]                 Fax: [phone]         [facility] Note    PATIENT NAME: Corona Tay  : 1955  MRN: [id number]    Assessment/Plan:    1. Severe hypomagnesemia - resistant:   FeMg 1  Uosm 571  eDo less than 20  - Fractional excretion of  Magnesium consistent with GI loss. The patient had been having nausea, vomiting and diarrhea for several weeks. - Aggressively replace magnesium.    - agree w/ IV replete today   - on 400BID PO-  increase that today     2. Hypokalemia in the setting of elevated serum bicarbonate and diarrhea and vomiting. Now better. 3.  Hyponatremia. due to volume depletion.    - Better with 3% saline. Continue NaCl tabs, dose increased. - keep Fluid restriction.     - Follow Na Q24 hrs. - goal ~ high 130s now. 4.  Hypocalcemia. PTH is not significantly decreased. Replaced. Vit D at goal. Continue to monitor. 5.  History of Crohn's disease. Management as per Medicine. 6.  Renal function within normal limits. 7.  Hypophosphatemia- replaced  8. Dispo-once Na 130 and above. High complexity. Multiple medical problems. Discussed with patient and treatment team.  Dr Eleni Baron   Thank you for allowing me to participate in this patient's care. Please do not hesitate to contact me for any questions/concerns. We will follow along with you.      Allie Nagy MD  [facility] [address]   Phone: [phone] or [address] [employer]  Fax: [phone]           Patient Active Problem List   Diagnosis    Essential hypertension, benign    Atherosclerosis of native arteries of extremities with intermittent claudication, bilateral legs ([address]    Anemia    Hyponatremia    Crohn's colitis ([address]    Hypomagnesemia    DVT (deep venous thrombosis) ([address]    PAD (peripheral artery disease) ([address]    Leg swelling    Venous insufficiency    Chronic venous hypertension (idiopathic) with inflammation of bilateral lower extremity    Open wound of left foot with complication    Hypokalemia       Past Medical History:   has a past medical history of Anxiety, Arthritis, Blood transfusion reaction, Crohn's disease ([address] Depression, GERD (gastroesophageal reflux disease), Hiatal hernia, Hx of blood clots, Hypertension, MRSA (methicillin resistant staph aureus) culture positive, Neuropathy, Pneumonia, Sinus headache, SOB (shortness of breath), and VRE (vancomycin resistant enterococcus) culture positive. Past Social History:   reports that she has been smoking cigarettes and e-cigarettes. She has a 10.00 pack-year smoking history. She has never used smokeless tobacco. She reports current alcohol use of about 2.0 standard drinks of alcohol per week. She reports that she does not use drugs.     Subjective:  Seen doing PT/OT - no complaints     Objective:      /82   Pulse 84   Temp 98.5 °F (36.9 °C) (Oral)   Resp 16   Ht 5' 6\" (1.676 m)   Wt 184 lb 9.6 oz (83.7 kg)   SpO2 96%   BMI 29.80 kg/m²     Wt Readings from Last 3 Encounters:   07/21/20 184 lb 9.6 oz (83.7 kg)   07/07/20 205 lb (93 kg)   12/17/19 203 lb (92.1 kg)       BP Readings from Last 3 Encounters:   07/22/20 135/82   03/04/20 (!) 160/101   02/24/20 (!) 166/117     Chest- clear  Heart-regular  Abd-soft  Ext- 1+ edema    Labs  Hemoglobin   Date Value Ref Range Status   07/22/2020 9.9 (L) 12.0 - 16.0 g/dL Final     Hematocrit   Date Value Ref Range Status   07/22/2020 29.0 (L) 36.0 - 48.0 % Final     WBC   Date Value Ref Range Status   07/22/2020 4.7 4.0 - 11.0 K/uL Final     Platelets   Date Value Ref Range Status   07/22/2020 404 135 - 450 K/uL Final     Lab Results   Component Value Date    CREATININE 0.5 (L) 07/22/2020    BUN 7 07/22/2020     (L) 07/22/2020    K 3.9 07/22/2020    CL 96 (L) 07/22/2020    CO2 24 07/22/2020

## 2020-07-22 NOTE — PROGRESS NOTES
Physical Therapy  Facility/Department: [address]  Daily Treatment Note  NAME: Gabriel Stone  : 1955  MRN: [id number]    Date of Service: 2020    Discharge Recommendations:Dianne Keller scored a 17/24 on the AM-PAC short mobility form. Current research shows that an AM-PAC score of 18 or greater is typically associated with a discharge to the patient's home setting. Based on the patient's AM-PAC score and their current functional mobility deficits, it is recommended that the patient have 2-3 sessions per week of Physical Therapy at d/c to increase the patient's independence. At this time, this patient demonstrates the endurance and safety to discharge home with HHPT and a follow up treatment frequency of 2-3x/wk. Please see assessment section for further patient specific details. If patient discharges prior to next session this note will serve as a discharge summary. Please see below for the latest assessment towards goals. HOME HEALTH CARE: LEVEL 3 SAFETY     - Initial home health evaluation to occur within 24-48 hours, in patient home   - Therapy evaluations in home within 24-48 hours of discharge; including DME and home safety   - Frontload therapy 5 days, then 3x a week   - Therapy to evaluate if patient has [address] needs for personal care   -  evaluation within 24-48 hours, includes evaluation of resources and insurance to determine [place], [place], LTC, and Medicaid options         PT Equipment Recommendations  Equipment Needed: No(Pt has RW)    Assessment   Body structures, Functions, Activity limitations: Decreased functional mobility ; Decreased ADL status; Decreased strength; Increased pain  Assessment: Pt is limited by the above deficits and would benefit from continued skilled PT services to maximize pt functional mobility and safety prior to discharge.  Pt has progressed since PT evaluation and is now ambulating short distances in her room with RW with risk)  Position Activity Restriction  Other position/activity restrictions: Leonidas Otero is a 62 y.o. female with past medical she of anxiety, Crohn's disease, depression, hiatal hernia, hypertension who presents to the ED implant of bilateral knee pain. Patient states has bilateral knee pain that she was told was due to arthritis. Patient states she follows up with the arthritis clinic. Patient states she has received gel injections to her bilateral knees. States normally the left knee is worse than the right. States yesterday she was walking with her walker when she twisted and states she had some pain to her right knee. States she fell and went down and hit her right anterior knee on the ground. States since then has had not been able to ambulate. States significant edema to the right knee with associated pain that she describes as an aching rated 10/10. Denies ecchymosis, erythema or warmth. Denies abrasion or laceration. Denies fever chills. Denies numbness or tingling. States decreased range of motion and strength due to pain. States she is unable to ambulate. Denies taking any over-the-counter medication for symptom control. Became concerned and called EMS who brought her to the ED for further evaluation and treatment. Subjective   General  Chart Reviewed: Yes  Family / Caregiver Present: No  Subjective  Subjective: Pt reports pain L side and low back 8/10 upon arrival to room; pt had already called nursing.  Pt agreable to PT/OT  General Comment  Comments: Pt supine in bed upon arrival          Orientation  Orientation  Overall Orientation Status: Within Functional Limits     Objective   Bed mobility  Supine to Sit: Stand by assistance(HOB elevated)  Sit to Supine: Stand by assistance(HOB flat)  Scooting: Stand by assistance  Transfers  Sit to Stand: Stand by assistance(from bed, commode, bed)  Stand to sit: Stand by assistance  Ambulation  Ambulation?: Yes  Ambulation 1  Surface: level tile  Device: Rolling Walker  Assistance: Stand by assistance  Gait Deviations: Slow Dee Dee;Decreased step length;Decreased step height  Distance: 38 ft x 2  Comments: no LOB, steady with RW     Balance  Posture: Fair  Sitting - Static: Good(on EOB)  Sitting - Dynamic: Good;-  Standing - Static: Good;-(SBA with RW)  Standing - Dynamic: Good;-(SBA with RW)                AM-PAC Score  AM-PAC Inpatient Mobility Raw Score : 17 (07/22/20 1256)  AM-PAC Inpatient T-Scale Score : 42.13 (07/22/20 1256)  Mobility Inpatient CMS 0-100% Score: 50.57 (07/22/20 1256)  Mobility Inpatient CMS G-Code Modifier : CK (07/22/20 1256)          Goals  Short term goals  Time Frame for Short term goals: discharge  Short term goal 1: bed mobility with min A-MET 7/22  Short term goal 2: sit to/from stand with mod A-MET 7/22  Short term goal 3: bed to/from chair with mod A-MET 7/22  Short term goal 4: Bed mobility with mod I  Short term goal 5: sit to/from stand with mod I  Short term goal 6: amb 60 ft with RW with mod I  Patient Goals   Patient goals : discharge home  3 STGs met    Plan    Plan  Times per week: 3-5x  Times per day: Daily  Current Treatment Recommendations: Strengthening, Balance Training, Functional Mobility Training, Transfer Training, Gait Training, Patient/Caregiver Education & Training  Safety Devices  Type of devices:  All fall risk precautions in place, Bed alarm in place, Call light within reach, Nurse notified, Left in bed, Patient at risk for falls(pt refused gait belt; nursing, Aj, bryson)     Therapy Time   Individual Concurrent Group Co-treatment   Time In       1030   Time Out       1103   Minutes       33   Timed Code Treatment Minutes: [facility], [address] [facility], [facility]

## 2020-07-23 ENCOUNTER — APPOINTMENT (OUTPATIENT)
Dept: CT IMAGING | Age: 65
DRG: 426 | End: 2020-07-23
Payer: MEDICAID

## 2020-07-23 ENCOUNTER — HOSPITAL ENCOUNTER (INPATIENT)
Age: 65
LOS: 7 days | Discharge: NURSING, REHAB OR LONG-TERM CARE | DRG: 045 | End: 2020-07-30
Attending: INTERNAL MEDICINE | Admitting: INTERNAL MEDICINE
Payer: MEDICAID

## 2020-07-23 ENCOUNTER — APPOINTMENT (OUTPATIENT)
Dept: INTERVENTIONAL RADIOLOGY/VASCULAR | Age: 65
DRG: 045 | End: 2020-07-23
Attending: INTERNAL MEDICINE
Payer: MEDICAID

## 2020-07-23 VITALS
RESPIRATION RATE: 15 BRPM | HEIGHT: 66 IN | OXYGEN SATURATION: 95 % | TEMPERATURE: 97.5 F | WEIGHT: 200.3 LBS | SYSTOLIC BLOOD PRESSURE: 106 MMHG | HEART RATE: 62 BPM | DIASTOLIC BLOOD PRESSURE: 68 MMHG | BODY MASS INDEX: 32.19 KG/M2

## 2020-07-23 PROBLEM — I63.9 ACUTE CEREBROVASCULAR ACCIDENT (CVA) (HCC): Status: ACTIVE | Noted: 2020-07-23

## 2020-07-23 LAB
ANION GAP SERPL CALCULATED.3IONS-SCNC: 10 MMOL/L (ref 3–16)
ANION GAP SERPL CALCULATED.3IONS-SCNC: 7 MMOL/L (ref 3–16)
BASOPHILS ABSOLUTE: 0 K/UL (ref 0–0.2)
BASOPHILS RELATIVE PERCENT: 0.9 %
BUN BLDV-MCNC: 6 MG/DL (ref 7–20)
BUN BLDV-MCNC: 7 MG/DL (ref 7–20)
CALCIUM SERPL-MCNC: 8.8 MG/DL (ref 8.3–10.6)
CALCIUM SERPL-MCNC: 9.1 MG/DL (ref 8.3–10.6)
CHLORIDE BLD-SCNC: 96 MMOL/L (ref 99–110)
CHLORIDE BLD-SCNC: 99 MMOL/L (ref 99–110)
CO2: 22 MMOL/L (ref 21–32)
CO2: 26 MMOL/L (ref 21–32)
CREAT SERPL-MCNC: 0.5 MG/DL (ref 0.6–1.2)
CREAT SERPL-MCNC: 0.6 MG/DL (ref 0.6–1.2)
EOSINOPHILS ABSOLUTE: 0.3 K/UL (ref 0–0.6)
EOSINOPHILS RELATIVE PERCENT: 6.6 %
GFR AFRICAN AMERICAN: >60
GFR AFRICAN AMERICAN: >60
GFR NON-AFRICAN AMERICAN: >60
GFR NON-AFRICAN AMERICAN: >60
GLUCOSE BLD-MCNC: 118 MG/DL (ref 70–99)
GLUCOSE BLD-MCNC: 126 MG/DL (ref 70–99)
GLUCOSE BLD-MCNC: 92 MG/DL (ref 70–99)
HCT VFR BLD CALC: 29.2 % (ref 36–48)
HCT VFR BLD CALC: 30.4 % (ref 36–48)
HEMOGLOBIN: 10 G/DL (ref 12–16)
HEMOGLOBIN: 10.4 G/DL (ref 12–16)
LYMPHOCYTES ABSOLUTE: 1.4 K/UL (ref 1–5.1)
LYMPHOCYTES RELATIVE PERCENT: 32.7 %
MAGNESIUM: 1.7 MG/DL (ref 1.8–2.4)
MCH RBC QN AUTO: 33.9 PG (ref 26–34)
MCH RBC QN AUTO: 34.4 PG (ref 26–34)
MCHC RBC AUTO-ENTMCNC: 34.1 G/DL (ref 31–36)
MCHC RBC AUTO-ENTMCNC: 34.1 G/DL (ref 31–36)
MCV RBC AUTO: 100.9 FL (ref 80–100)
MCV RBC AUTO: 99.3 FL (ref 80–100)
MONOCYTES ABSOLUTE: 0.6 K/UL (ref 0–1.3)
MONOCYTES RELATIVE PERCENT: 14.6 %
NEUTROPHILS ABSOLUTE: 1.9 K/UL (ref 1.7–7.7)
NEUTROPHILS RELATIVE PERCENT: 45.2 %
PDW BLD-RTO: 13.3 % (ref 12.4–15.4)
PDW BLD-RTO: 13.3 % (ref 12.4–15.4)
PERFORMED ON: ABNORMAL
PLATELET # BLD: 381 K/UL (ref 135–450)
PLATELET # BLD: 394 K/UL (ref 135–450)
PMV BLD AUTO: 6.4 FL (ref 5–10.5)
PMV BLD AUTO: 6.5 FL (ref 5–10.5)
POTASSIUM REFLEX MAGNESIUM: 4.3 MMOL/L (ref 3.5–5.1)
POTASSIUM SERPL-SCNC: 3.9 MMOL/L (ref 3.5–5.1)
RBC # BLD: 2.94 M/UL (ref 4–5.2)
RBC # BLD: 3.02 M/UL (ref 4–5.2)
REASON FOR REJECTION: NORMAL
REJECTED TEST: NORMAL
SODIUM BLD-SCNC: 129 MMOL/L (ref 136–145)
SODIUM BLD-SCNC: 131 MMOL/L (ref 136–145)
TROPONIN: 0.08 NG/ML
WBC # BLD: 4 K/UL (ref 4–11)
WBC # BLD: 4.2 K/UL (ref 4–11)

## 2020-07-23 PROCEDURE — 6360000004 HC RX CONTRAST MEDICATION: Performed by: HOSPITALIST

## 2020-07-23 PROCEDURE — 2580000003 HC RX 258

## 2020-07-23 PROCEDURE — 36223 PLACE CATH CAROTID/INOM ART: CPT

## 2020-07-23 PROCEDURE — 3E03317 INTRODUCTION OF OTHER THROMBOLYTIC INTO PERIPHERAL VEIN, PERCUTANEOUS APPROACH: ICD-10-PCS | Performed by: INTERNAL MEDICINE

## 2020-07-23 PROCEDURE — C1894 INTRO/SHEATH, NON-LASER: HCPCS

## 2020-07-23 PROCEDURE — 80048 BASIC METABOLIC PNL TOTAL CA: CPT

## 2020-07-23 PROCEDURE — 6360000002 HC RX W HCPCS: Performed by: HOSPITALIST

## 2020-07-23 PROCEDURE — 2709999900 HC NON-CHARGEABLE SUPPLY

## 2020-07-23 PROCEDURE — C1725 CATH, TRANSLUMIN NON-LASER: HCPCS

## 2020-07-23 PROCEDURE — 6370000000 HC RX 637 (ALT 250 FOR IP): Performed by: INTERNAL MEDICINE

## 2020-07-23 PROCEDURE — 99152 MOD SED SAME PHYS/QHP 5/>YRS: CPT

## 2020-07-23 PROCEDURE — 85025 COMPLETE CBC W/AUTO DIFF WBC: CPT

## 2020-07-23 PROCEDURE — 99153 MOD SED SAME PHYS/QHP EA: CPT

## 2020-07-23 PROCEDURE — B30RZZZ PLAIN RADIOGRAPHY OF INTRACRANIAL ARTERIES: ICD-10-PCS | Performed by: NEUROLOGICAL SURGERY

## 2020-07-23 PROCEDURE — 6360000002 HC RX W HCPCS: Performed by: INTERNAL MEDICINE

## 2020-07-23 PROCEDURE — 83735 ASSAY OF MAGNESIUM: CPT

## 2020-07-23 PROCEDURE — C1760 CLOSURE DEV, VASC: HCPCS

## 2020-07-23 PROCEDURE — C1887 CATHETER, GUIDING: HCPCS

## 2020-07-23 PROCEDURE — 36415 COLL VENOUS BLD VENIPUNCTURE: CPT

## 2020-07-23 PROCEDURE — 84484 ASSAY OF TROPONIN QUANT: CPT

## 2020-07-23 PROCEDURE — 70450 CT HEAD/BRAIN W/O DYE: CPT

## 2020-07-23 PROCEDURE — 3E04317 INTRODUCTION OF OTHER THROMBOLYTIC INTO CENTRAL VEIN, PERCUTANEOUS APPROACH: ICD-10-PCS | Performed by: NEUROLOGICAL SURGERY

## 2020-07-23 PROCEDURE — 70496 CT ANGIOGRAPHY HEAD: CPT

## 2020-07-23 PROCEDURE — C1769 GUIDE WIRE: HCPCS

## 2020-07-23 PROCEDURE — 2720000010 HC SURG SUPPLY STERILE

## 2020-07-23 PROCEDURE — 85027 COMPLETE CBC AUTOMATED: CPT

## 2020-07-23 PROCEDURE — 2000000000 HC ICU R&B

## 2020-07-23 RX ORDER — DOXYCYCLINE HYCLATE 50 MG/1
3 CAPSULE, GELATIN COATED ORAL
COMMUNITY

## 2020-07-23 RX ORDER — SODIUM CHLORIDE 9 MG/ML
INJECTION, SOLUTION INTRAVENOUS
Status: COMPLETED
Start: 2020-07-23 | End: 2020-07-23

## 2020-07-23 RX ORDER — HYDRALAZINE HYDROCHLORIDE 20 MG/ML
5 INJECTION INTRAMUSCULAR; INTRAVENOUS EVERY 10 MIN PRN
Status: DISCONTINUED | OUTPATIENT
Start: 2020-07-23 | End: 2020-07-30 | Stop reason: HOSPADM

## 2020-07-23 RX ORDER — POLYETHYLENE GLYCOL 3350 17 G/17G
17 POWDER, FOR SOLUTION ORAL DAILY PRN
Status: CANCELLED | OUTPATIENT
Start: 2020-07-23

## 2020-07-23 RX ORDER — SODIUM CHLORIDE 9 MG/ML
INJECTION, SOLUTION INTRAVENOUS CONTINUOUS
Status: DISCONTINUED | OUTPATIENT
Start: 2020-07-23 | End: 2020-07-24

## 2020-07-23 RX ORDER — ASPIRIN 300 MG/1
300 SUPPOSITORY RECTAL DAILY
Status: CANCELLED | OUTPATIENT
Start: 2020-07-24

## 2020-07-23 RX ORDER — ATORVASTATIN CALCIUM 40 MG/1
40 TABLET, FILM COATED ORAL NIGHTLY
Status: DISCONTINUED | OUTPATIENT
Start: 2020-07-23 | End: 2020-07-26

## 2020-07-23 RX ORDER — ATORVASTATIN CALCIUM 80 MG/1
80 TABLET, FILM COATED ORAL NIGHTLY
Status: DISCONTINUED | OUTPATIENT
Start: 2020-07-23 | End: 2020-07-23 | Stop reason: HOSPADM

## 2020-07-23 RX ORDER — SUCRALFATE 1 G/1
1 TABLET ORAL 4 TIMES DAILY
Status: ON HOLD | COMMUNITY
End: 2021-04-28

## 2020-07-23 RX ORDER — DEXTROSE MONOHYDRATE 25 G/50ML
12.5 INJECTION, SOLUTION INTRAVENOUS
Status: DISCONTINUED | OUTPATIENT
Start: 2020-07-23 | End: 2020-07-23 | Stop reason: HOSPADM

## 2020-07-23 RX ORDER — MAGNESIUM SULFATE IN WATER 40 MG/ML
2 INJECTION, SOLUTION INTRAVENOUS ONCE
Status: COMPLETED | OUTPATIENT
Start: 2020-07-23 | End: 2020-07-23

## 2020-07-23 RX ORDER — PROMETHAZINE HYDROCHLORIDE 25 MG/1
12.5 TABLET ORAL EVERY 6 HOURS PRN
Status: CANCELLED | OUTPATIENT
Start: 2020-07-23

## 2020-07-23 RX ORDER — FEXOFENADINE HCL 180 MG/1
180 TABLET ORAL DAILY
Status: ON HOLD | COMMUNITY
End: 2021-06-03

## 2020-07-23 RX ORDER — SODIUM CHLORIDE 0.9 % (FLUSH) 0.9 %
10 SYRINGE (ML) INJECTION EVERY 12 HOURS SCHEDULED
Status: DISCONTINUED | OUTPATIENT
Start: 2020-07-23 | End: 2020-07-23 | Stop reason: HOSPADM

## 2020-07-23 RX ORDER — ASPIRIN 81 MG/1
81 TABLET ORAL DAILY
Status: CANCELLED | OUTPATIENT
Start: 2020-07-24

## 2020-07-23 RX ORDER — HYDROXYZINE HYDROCHLORIDE 10 MG/1
10 TABLET, FILM COATED ORAL 3 TIMES DAILY PRN
COMMUNITY

## 2020-07-23 RX ORDER — SODIUM CHLORIDE 0.9 % (FLUSH) 0.9 %
10 SYRINGE (ML) INJECTION PRN
Status: CANCELLED | OUTPATIENT
Start: 2020-07-23

## 2020-07-23 RX ORDER — PROMETHAZINE HYDROCHLORIDE 25 MG/1
12.5 TABLET ORAL EVERY 6 HOURS PRN
Status: DISCONTINUED | OUTPATIENT
Start: 2020-07-23 | End: 2020-07-23 | Stop reason: HOSPADM

## 2020-07-23 RX ORDER — ONDANSETRON 2 MG/ML
4 INJECTION INTRAMUSCULAR; INTRAVENOUS EVERY 6 HOURS PRN
Status: DISCONTINUED | OUTPATIENT
Start: 2020-07-23 | End: 2020-07-23

## 2020-07-23 RX ORDER — NICOTINE 21 MG/24HR
1 PATCH, TRANSDERMAL 24 HOURS TRANSDERMAL DAILY
Status: DISCONTINUED | OUTPATIENT
Start: 2020-07-23 | End: 2020-07-23 | Stop reason: HOSPADM

## 2020-07-23 RX ORDER — SODIUM CHLORIDE 0.9 % (FLUSH) 0.9 %
10 SYRINGE (ML) INJECTION EVERY 12 HOURS SCHEDULED
Status: CANCELLED | OUTPATIENT
Start: 2020-07-23

## 2020-07-23 RX ORDER — ACETAMINOPHEN 650 MG/1
325 SUPPOSITORY RECTAL EVERY 4 HOURS PRN
Status: DISCONTINUED | OUTPATIENT
Start: 2020-07-23 | End: 2020-07-30 | Stop reason: HOSPADM

## 2020-07-23 RX ORDER — SODIUM CHLORIDE 0.9 % (FLUSH) 0.9 %
10 SYRINGE (ML) INJECTION PRN
Status: DISCONTINUED | OUTPATIENT
Start: 2020-07-23 | End: 2020-07-23 | Stop reason: HOSPADM

## 2020-07-23 RX ORDER — ONDANSETRON 2 MG/ML
4 INJECTION INTRAMUSCULAR; INTRAVENOUS EVERY 6 HOURS PRN
Status: CANCELLED | OUTPATIENT
Start: 2020-07-23

## 2020-07-23 RX ORDER — LABETALOL 20 MG/4 ML (5 MG/ML) INTRAVENOUS SYRINGE
5 EVERY 10 MIN PRN
Status: DISCONTINUED | OUTPATIENT
Start: 2020-07-23 | End: 2020-07-30 | Stop reason: HOSPADM

## 2020-07-23 RX ORDER — 0.9 % SODIUM CHLORIDE 0.9 %
50 INTRAVENOUS SOLUTION INTRAVENOUS ONCE
Status: DISCONTINUED | OUTPATIENT
Start: 2020-07-23 | End: 2020-07-23 | Stop reason: HOSPADM

## 2020-07-23 RX ADMIN — FERROUS SULFATE TAB 325 MG (65 MG ELEMENTAL FE) 325 MG: 325 (65 FE) TAB at 09:34

## 2020-07-23 RX ADMIN — SODIUM CHLORIDE TAB 1 GM 2 G: 1 TAB at 09:34

## 2020-07-23 RX ADMIN — BACLOFEN 10 MG: 10 TABLET ORAL at 09:34

## 2020-07-23 RX ADMIN — ENOXAPARIN SODIUM 40 MG: 40 INJECTION SUBCUTANEOUS at 09:33

## 2020-07-23 RX ADMIN — SODIUM CHLORIDE: 9 INJECTION, SOLUTION INTRAVENOUS at 17:07

## 2020-07-23 RX ADMIN — MAGNESIUM SULFATE HEPTAHYDRATE 2 G: 40 INJECTION, SOLUTION INTRAVENOUS at 09:59

## 2020-07-23 RX ADMIN — HYDRALAZINE HYDROCHLORIDE 25 MG: 25 TABLET, FILM COATED ORAL at 07:43

## 2020-07-23 RX ADMIN — ALTEPLASE 73.6 MG: KIT at 12:54

## 2020-07-23 RX ADMIN — IOPAMIDOL 75 ML: 755 INJECTION, SOLUTION INTRAVENOUS at 12:17

## 2020-07-23 RX ADMIN — ATENOLOL 100 MG: 50 TABLET ORAL at 09:33

## 2020-07-23 RX ADMIN — ALTEPLASE 8.2 MG: KIT at 12:53

## 2020-07-23 RX ADMIN — HYDROMORPHONE HYDROCHLORIDE 0.5 MG: 1 INJECTION, SOLUTION INTRAMUSCULAR; INTRAVENOUS; SUBCUTANEOUS at 09:31

## 2020-07-23 RX ADMIN — BUSPIRONE HYDROCHLORIDE 10 MG: 5 TABLET ORAL at 10:04

## 2020-07-23 RX ADMIN — OYSTER SHELL CALCIUM WITH VITAMIN D 1 TABLET: 500; 200 TABLET, FILM COATED ORAL at 09:33

## 2020-07-23 RX ADMIN — MAGNESIUM GLUCONATE 500 MG ORAL TABLET 400 MG: 500 TABLET ORAL at 10:12

## 2020-07-23 RX ADMIN — BALSALAZIDE DISODIUM 3000 MG: 750 CAPSULE ORAL at 10:09

## 2020-07-23 RX ADMIN — DULOXETINE HYDROCHLORIDE 60 MG: 60 CAPSULE, DELAYED RELEASE ORAL at 09:34

## 2020-07-23 RX ADMIN — PANTOPRAZOLE SODIUM 40 MG: 40 TABLET, DELAYED RELEASE ORAL at 07:43

## 2020-07-23 ASSESSMENT — PAIN DESCRIPTION - DESCRIPTORS
DESCRIPTORS: THROBBING;ACHING
DESCRIPTORS: THROBBING;ACHING

## 2020-07-23 ASSESSMENT — PAIN DESCRIPTION - LOCATION
LOCATION: KNEE
LOCATION: KNEE

## 2020-07-23 ASSESSMENT — PAIN DESCRIPTION - PAIN TYPE
TYPE: CHRONIC PAIN

## 2020-07-23 ASSESSMENT — PAIN SCALES - GENERAL
PAINLEVEL_OUTOF10: 5
PAINLEVEL_OUTOF10: 9
PAINLEVEL_OUTOF10: 0

## 2020-07-23 ASSESSMENT — PAIN DESCRIPTION - ORIENTATION
ORIENTATION: LEFT
ORIENTATION: LEFT

## 2020-07-23 NOTE — PROGRESS NOTES
MD Leslie Trent MD Epimenio Blowers, MD                  Office: [phone]                 Fax: [phone]          [facility] Note    PATIENT NAME: Angela Mcneal  : 1955  MRN: [id number]    Assessment/Plan:    1. Severe hypomagnesemia - resistant: but improving   FeMg 1  Uosm 571  Deo less than 20  - Fractional excretion of  Magnesium consistent with GI loss. The patient had been having nausea, vomiting and diarrhea for several weeks. - Aggressively replace magnesium.    - s/p IV replete yesterday   - on 400BID PO-  increased that - continue as improving     2. Hypokalemia in the setting of elevated serum bicarbonate and diarrhea and vomiting. Now better. 3.  Hyponatremia. due to volume depletion. Stable controlled. - Follow Na Q24 hrs. - goal ~ high 130s now. - s/p 3% saline.    - Continue NaCl tabs, dose increased. - keep Fluid restriction. 4.  Hypocalcemia. PTH is not significantly decreased. Replaced. Vit D at goal. Continue to monitor. 5.  History of Crohn's disease. Management as per Medicine. 6.  Renal function within normal limits. 7.  Hypophosphatemia- replaced       High complexity. Multiple medical problems. Discussed with patient and treatment team.  Dr Radha Rios   Thank you for allowing me to participate in this patient's care. Please do not hesitate to contact me for any questions/concerns. We will follow along with you.      Benoit Begum MD  [facility] [address] [place] [address] Road   Phone: [phone] or [address] [employer]  Fax: [phone]           Patient Active Problem List   Diagnosis    Essential hypertension, benign    Atherosclerosis of native arteries of extremities with intermittent claudication, bilateral legs ([address]    Anemia    Hyponatremia    Crohn's colitis ([facility]    Hypomagnesemia    DVT (deep venous thrombosis) ([facility]    PAD (peripheral artery disease) ([address]    Leg swelling    Venous insufficiency    Chronic venous hypertension (idiopathic) with inflammation of bilateral lower extremity    Open wound of left foot with complication    Hypokalemia       Past Medical History:   has a past medical history of Anxiety, Arthritis, Blood transfusion reaction, Crohn's disease ([address] Depression, GERD (gastroesophageal reflux disease), Hiatal hernia, Hx of blood clots, Hypertension, MRSA (methicillin resistant staph aureus) culture positive, Neuropathy, Pneumonia, Sinus headache, SOB (shortness of breath), and VRE (vancomycin resistant enterococcus) culture positive. Past Social History:   reports that she has been smoking cigarettes and e-cigarettes. She has a 10.00 pack-year smoking history. She has never used smokeless tobacco. She reports current alcohol use of about 2.0 standard drinks of alcohol per week. She reports that she does not use drugs. Subjective:  Seen talking to dietician today am - no complaints , discussed about improving lytes + protein intake w/ diet w/ dietician and pt.      Objective:      /68   Pulse 72   Temp 97.8 °F (36.6 °C) (Oral)   Resp 16   Ht 5' 6\" (1.676 m)   Wt 183 lb 6.4 oz (83.2 kg)   SpO2 95%   BMI 29.60 kg/m²     Wt Readings from Last 3 Encounters:   07/23/20 183 lb 6.4 oz (83.2 kg)   07/07/20 205 lb (93 kg)   12/17/19 203 lb (92.1 kg)       BP Readings from Last 3 Encounters:   07/23/20 116/68   03/04/20 (!) 160/101   02/24/20 (!) 166/117     Chest- clear  Heart-regular  Abd-soft  Ext- 1+ edema    Labs  Hemoglobin   Date Value Ref Range Status   07/23/2020 10.0 (L) 12.0 - 16.0 g/dL Final     Hematocrit   Date Value Ref Range Status   07/23/2020 29.2 (L) 36.0 - 48.0 % Final     WBC   Date Value Ref Range Status   07/23/2020 4.2 4.0 - 11.0 K/uL Final     Platelets   Date Value Ref Range Status   07/23/2020 394 135 - 450 K/uL Final     Lab Results   Component Value Date    CREATININE 0.6 07/23/2020    BUN 6 (L) 07/23/2020     (L) 07/23/2020    K 3.9 07/23/2020    CL 96 (L) 07/23/2020    CO2 26 07/23/2020

## 2020-07-23 NOTE — PROGRESS NOTES
Patient noted at 1130 to not be responding like she was earlier this shift. Patient could not answer nurses questions. She was shaking her head side to side and had a fixed gaze to the left. Patient unable to hold limbs up to gravity or follow any directions. Unable to do NIHSS at this time. Dr. Arnaud Davis and stroke alert called. Patient son was in the room at the time of the event. Son stated that he had just talked to her on the phone 15 minutes prior and she was speaking fine and able to tell him what she wanted him to bring. Patient transported to CT by ICU nurse and Becki Navarro. Patient's daughter and  showed up while patient was gone for CT Scan. Family was informed about what was going on. Patient returned and then had to be transported to ICU for TPA administration. Son called and updated and  was in ICU waiting room.

## 2020-07-23 NOTE — PROGRESS NOTES
Rapid Response Quick Summary    Room: [facility]3372/3372-01    Assessment of concern / patient:  Code stroke    Physician involved:  Dr. Natasah Trejo    Interventions:  Responded to stroke alert on 3 tower. According to April Aguero, primary RN patient was having full conversation clearly, using bedside commode. Pt now severly aphasic, moving left arm and occasionally left leg. Unable to follow commands, moving right arm but very weak, no movement to right leg. Left eye gaze noted. Transported to CT scan, new peripheral IV started. Returned to [phone], VSS. NIH difficult to complete, however, score 25. Remains severely aphasic/mute, moving left arm and right arm ( much weaker). Interacting with smiles to daughter. Dr. Dao Eubanks at bedside and speaking with  stroke team for plan of care. Pllan for TPA and possible transfer for thrombectomy. Pt transported to ICU 9. Report given to Sadaf Brown RN for safe handoff. TPA bolus and infusion started. Pharmacy at bedside. Care transferred at this time. Disposition:  Responded to stroke alert on 3 tower. According to April Aguero, primary RN patient was having full conversation clearly, using bedside commode. Pt now severly aphasic, moving left arm and occasionally left leg. Unable to follow commands, moving right arm but very weak, no movement to right leg. Left eye gaze noted. Transported to CT scan, new peripheral IV started. Returned to [phone], VSS. NIH difficult to complete, however, score 25. Remains severely aphasic/mute, moving left arm and right arm ( much weaker). Interacting with smiles to daughter. Dr. Dao Eubanks at bedside and speaking with  stroke team for plan of care. Pt transported to ICU 9. Report given to Sadaf Brown RN for safe handoff. TPA bolus and infusion started. Pharmacy at bedside. Care transferred at this time.        Jaida Lange RN, BSN, CCRN

## 2020-07-23 NOTE — H&P
ICU HISTORY AND PHYSICAL       Hospital Day: 1  ICU Day: 1                                                         Code:Full Code  Admit Date: 7/23/2020  PCP: Luis Stone MD                                  CC: CVA    HISTORY OF PRESENT ILLNESS:     This is a 62 y.o female with PMHx of HTN, arthritis, crohn's disease who presented from [facility] with after CVA s/p tPA. On 7/15/20, patient originally p/w generalized weakness, fatigue, decreased oral intake and multiple falls, and was found to have multiple electrolyte abnormalities including hyponatremia 125, hypokalemia 2.4, hypocalcemia 6.8, hypomagnesemia 0.4, AG 18. Patient was admitted and was being treated for her electrolyte imbalance. Today at 1140 stroke alert was called when patient began to have left gaze deviation, mute, not following commands, though spontaneous movement of the LUE but no effort against gravity on the RUE, left face droop; last seen normal was 1115. Initial NIHSS score of 24. CT head w/o contrast was negative for blood, CTA with contrast revealed left M2 occulsion. Spoke with UC Stroke team and decision was made to give IV tPA (given 1253) and transfer to [facility] for endovascular therapy. Patient was taken to cath lab where neurosurgery performed diagnostic cerebral angiogram. The left M2 branch thromboembolus seen on the CTA had partially lysed and moved distally and was now a subocclusive M3 branch thromboembolus. Therefore, no mechanical thrombectomy was performed. Patient was admitted to the [facility] ICU for further management and further workup.     PAST HISTORY:     Past Medical History:   Diagnosis Date    Anxiety     Arthritis     Blood transfusion reaction     Crohn's disease ([facility] [address]     Depression     GERD (gastroesophageal reflux disease)     Hiatal hernia 6/24/2014    Hx of blood clots     Hypertension     MRSA (methicillin resistant staph aureus) culture positive 06/05/2018    urine    Neuropathy     Pneumonia 1/8/2014    Sinus headache     SOB (shortness of breath)     VRE (vancomycin resistant enterococcus) culture positive 10/08/2018    abd culture       Past Surgical History:   Procedure Laterality Date    ABDOMEN SURGERY      ABDOMINAL ADHESION SURGERY  06/08/2018    BLADDER SUSPENSION      COLONOSCOPY      COLONOSCOPY  9/14/15    sigmoid colon biopsy     COLONOSCOPY  08/07/2018    COLONOSCOPY  06/07/2019    COLONOSCOPY, POSSIBLE BIOPSY, POSSIBLE POLYPECTOMY    COLONOSCOPY N/A 6/7/2019    COLONOSCOPY WITH BIOPSY performed by Keyanna Day MD at [address] COLONOSCOPY N/A 6/7/2019    COLONOSCOPY DIAGNOSTIC/STOMA performed by Keyanna Day MD at [address] [facility] 10/8/2018    EXPLORATORY LAPAROTOMY WITH COLOSTOMY performed by Camilla Macedo MD at [address] Left 6/25/14    excision plantar left hallux    FOOT SURGERY  6/3/15    PLANTAR PLATE REPAIR BILATERAL, ARTHROTOMY MPJ 2ND BILATERAL    FOOT SURGERY Left 08/05/2002    Excision second metatarsal head left    FRACTURE SURGERY      rt hip    HAND CARPECTOMY Bilateral     HEEL SPUR SURGERY      HERNIA REPAIR      HYSTERECTOMY      bladder surgery    JOINT REPLACEMENT      rt hip, L shoulder replacement 11/2014    KNEE SURGERY Bilateral     arthrosvopic    LEG SURGERY Right     GA SECD CLOS SURG WND EXTEN/COMPLIC N/A 14/73/1070    SECONDARY WOUND CLOSURE OF ABDOMINAL WOUND performed by Camilla Macedo MD at [facility] 7/17/2019    FLEXIBLE SIGMOIDOSCOPY performed by Caimlla Macedo MD at [address]  01/15/2018    with biopsies    SMALL INTESTINE SURGERY N/A 7/17/2019    COLOSTOMY CLOSURE WITH COLORECTAL ANASTOMOSIS performed by Camilla Macedo MD at [address]  6/14/13    and colonsocopy with antral erosion biopsies       SocialHistory:   The patient is , lives at home.    Alcohol: Social use  Illicit drugs: no use  Tobacco: 1 pack a day for years    Family History:  Family History   Problem Relation Age of Onset    High Blood Pressure Mother     High Blood Pressure Father     Kidney Disease Sister        MEDICATIONS:     No current facility-administered medications on file prior to encounter. Current Outpatient Medications on File Prior to Encounter   Medication Sig Dispense Refill    ferrous gluconate (FERGON) 324 (38 Fe) MG tablet Take 324 mg by mouth daily (with breakfast)      sucralfate (CARAFATE) 1 GM tablet Take 1 g by mouth 4 times daily      hydrOXYzine (ATARAX) 10 MG tablet Take 10 mg by mouth 3 times daily as needed for Itching      fexofenadine (ALLEGRA) 180 MG tablet Take 180 mg by mouth daily      ondansetron (ZOFRAN ODT) 4 MG disintegrating tablet Take 1 tablet by mouth every 8 hours as needed for Nausea 20 tablet 0    busPIRone (BUSPAR) 10 MG tablet Take 10 mg by mouth 3 times daily as needed      balsalazide (COLAZAL) 750 MG capsule Take 3,000 mg by mouth daily Take 4 capsules (total 3000 mg) daily each morning.  omeprazole (PRILOSEC) 20 MG delayed release capsule Take 20 mg by mouth Daily       baclofen (LIORESAL) 10 MG tablet Take 1 tablet by mouth 3 times daily 90 tablet 1    atenolol (TENORMIN) 100 MG tablet Take 1 tablet by mouth daily 30 tablet 3    aspirin-acetaminophen-caffeine (EXCEDRIN MIGRAINE) 250-250-65 MG per tablet Take 2 tablets by mouth every 6 hours as needed for Headaches       DULoxetine (CYMBALTA) 60 MG extended release capsule Take 60 mg by mouth 2 times daily       nystatin (MYCOSTATIN) 584012 UNIT/GM cream Apply topically 2 times daily as needed for Dry Skin Apply topically 2 times daily.       mupirocin (BACTROBAN) 2 % ointment Apply daily 1 Tube 0         Scheduled Meds:   atorvastatin  40 mg Oral Nightly      Continuous Infusions:   sodium chloride 100 mL/hr at 07/23/20 1707     PRN Meds:perflutren lipid microspheres, labetalol, hydrALAZINE, acetaminophen    Allergies: Allergies   Allergen Reactions    Ace Inhibitors Swelling    Skelaxin [Metaxalone] Swelling       REVIEW OF SYSTEMS:       History obtained from chart review    Review of Systems   Unable to perform ROS: Mental status change       PHYSICAL EXAM:       Vitals: /80   Pulse 63   Temp 98 °F (36.7 °C) (Oral)   Resp 12   Ht 5' 6\" (1.676 m)   Wt 200 lb 4.8 oz (90.9 kg)   SpO2 95%   BMI 32.33 kg/m²     I/O:      Intake/Output Summary (Last 24 hours) at 7/23/2020 1730  Last data filed at 7/23/2020 1555  Gross per 24 hour   Intake --   Output 150 ml   Net -150 ml     I/O this shift:  In: -   Out: 150 [Urine:150]  No intake/output data recorded. Physical Examination:     Physical Exam  Cardiovascular:      Rate and Rhythm: Regular rhythm. Bradycardia present. Pulmonary:      Effort: Pulmonary effort is normal.      Breath sounds: Normal breath sounds. Musculoskeletal:      Right lower leg: Edema present. Left lower leg: Edema present. Neurological:      Mental Status: She is alert. She is disoriented. Comments: Confused, unable to follow commands appropriately therefore unable to assess for motor/sensory deficits, was moving upper extremities bilaterally but would not move her lower extremities most likely due to confusion       Access:   -Central Access Day #: 0                                  -Peripheral Access Day#: R wrist - day 7; L forearm - day 1  -Arterial line Day#:0                                  [place] Day#:7  NGT Day#: 0                                            ETT Day#:0  Vent Settings:    / / /     No results for input(s): PHART, JLT2DGZ, PO2ART in the last 72 hours.       DATA:       Labs:  CBC:   Recent Labs     07/22/20  0511 07/23/20  0438 07/23/20  1251   WBC 4.7 4.2 4.0   HGB 9.9* 10.0* 10.4*   HCT 29.0* 29.2* 30.4*    394 381       BMP:   Recent Labs     07/22/20  0511 07/23/20  0438 07/23/20  1251   * 129* 131*   K 3.9 3.9 4.3   CL 96* 96* 99   CO2 24 26 22   BUN 7 6* 7   CREATININE 0.5* 0.6 0.5*   GLUCOSE 105* 126* 92     LFT's: No results for input(s): AST, ALT, ALB, BILITOT, ALKPHOS in the last 72 hours. Troponin:   Recent Labs     07/23/20  1251   TROPONINI 0.08*     BNP:No results for input(s): BNP in the last 72 hours. ABGs: No results for input(s): PHART, TVE8JUN, PO2ART in the last 72 hours. INR: No results for input(s): INR in the last 72 hours. U/A:No results for input(s): NITRITE, COLORU, PHUR, LABCAST, WBCUA, RBCUA, MUCUS, TRICHOMONAS, YEAST, BACTERIA, CLARITYU, SPECGRAV, LEUKOCYTESUR, UROBILINOGEN, BILIRUBINUR, BLOODU, GLUCOSEU, AMORPHOUS in the last 72 hours. Invalid input(s): KETONESU    IR ANGIOGRAM CAROTID CEREBRAL LEFT    (Results Pending)   Vascular carotid duplex bilateral    (Results Pending)   CT head without contrast    (Results Pending)   MRI brain without contrast    (Results Pending)     EKG (7/15/20): Vent. rate 73 BPM  MT interval 140 ms  QRS duration 94 ms  QT/QTc 450/495 ms  P-R-T axes 73 19 110  Sinus rhythm with Premature supraventricular complexes  ST & T wave abnormality, consider anterior ischemia  Prolonged QT  Echo: Pending  Micro: UA reflex to Mg    ASSESSMENT AND PLAN:   Sadaf Forte is a 62 y.o. female, who was transferred from [facility] after acute L CVA s/p tPA.     Neuro/Psych  Acute metabolic encephalopathy likely 2/2 to electrolyte abnormalities vs. CVA   Patient was originally admitted to [facility] on 7/15/20 for severe electrolyte derangements  On admission: hyponatremia 125, hypokalemia 2.4, hypocalcemia 6.8, hypomagnesemia 0.4  Most recent labs before tPA (7/23/20): Na+ 131, K+ 4.3, Ca2+ 9.1, Mg 1.7  Could not obtain STAT BMP on admission to [facility] ICU as there is no central line access and patient is post-tPA  Could not obtain UA with reflex to cx as the elliott has been inserted 7 days ago    -f/u BMP in the AM  -UA reflex to culture      Left MCA ischemic stroke  Patient was last seen normal at 1115; left gaze deviation, mute, not following commands, no effort against gravity on the RUE, left face droop -- stroke alert called at 1140  CTA with contrast (7/23/20): revealed left M2 occulsion; s/p tPA administered at 1253)  Cerebral angiogram (7/23/20): revealed that M2 thromboembolus has partially lysed and had moved distally and now has become subocclusive M3 thromboembolus; No mechanical thrombectomy was done    -Keep sBP < 160; labetalol and hydralazine PRN  -Obtain CT head w/o contrast, 24 hrs post-tPA  -Ordered MRI brain, carotid duplex US, echo  -Pending lipid panel, Hgb A1C, TSH w/ reflex  -Hold DVT ppx and ASA for at least 24 hrs post-tPA    Cardiovascular  Hx of HTN  -Hold home PO meds    Respiratory  N/A    GI  N/A    Renal  Electrolyte imbalance  Originally admitted for electrolyte imbalance  Most recent labs before tPA (7/23/20): Na+ 131, K+ 4.3, Ca2+ 9.1, Mg 1.7  Could not obtain STAT BMP on admission to [facility] ICU as there is no central line access and patient is post-tPA  -f/u BMP in the AM    Hematology  Hx of chronic anemia  Most recent Hgb: 10.4 (at baseline)    Infectious Disease  Hold off on COVID-19 post-tPA    Endocrine  -Pending TSH with reflex as part of stroke workup      Code Status:Full Code  FEN: Diet General -- will need swallow eval post-CVA  PPX: SCD  DISPO: ICU    This patient has been staffed and discussed with Raj Yuan MD.   -----------------------------  Mela Serrano MD, PGY-1  7/23/2020  3:89 PM     I certify that Jennifer Maldonado is expected to be hospitalized for 2 midnights based on the following assessment and plan:      Patient seen and examined, plan of care discussed with residents. Agree with their assessment and plan with following addendum:  Patient is a 61 y/o female who was transferred for thrombectomy from outside facility.  She was being treated for hyponatremia with last Na 129. She acutely developed right sided weakness and aphasia. CTA showed LVO. tPA was started and she was transferred here. Angiogram showed left M2 branch thrombus partially lysed and moved distally, and was not amenable to intervention. Patient was a bit confused on arrival, possible from moderate sedation used during procedure. She still has significant right sided weakness, worse in her leg, but able to hold right arm for few seconds against gravity. She cannot follow complex tasks. Profound expressive aphasia. Will continue post tPA protocol. Unable to get COVID rule out due to bleeding risks from swabbing.      Corrinne Craze

## 2020-07-23 NOTE — PROGRESS NOTES
Called back for decreased responsiveness  Pt aphasic  Not following directions    Code stroke called  STAT CT Head w/o contrast ordered

## 2020-07-23 NOTE — DISCHARGE SUMMARY
[facility] team, and she will need to be transferred to [facility], [facility]. for endovascular therapy    Pt is transferred to ICU per tPA protocol and transfer to [facility] is being arranged    Consults made during Hospitalization:  IP CONSULT TO INTERNAL MEDICINE  IP CONSULT TO NEPHROLOGY  IP CONSULT TO Madeline    Treatment team at time of Discharge: Treatment Team: Attending Provider: Yaw Love MD; Consulting Physician: Karly Soler MD; Utilization Reviewer: Steph Jay RN; Consulting Physician: Yaw Love MD; Respiratory Therapist (Day): Yaniv Walls RCP; Registered Nurse: Ian Noriega RN; Consulting Physician: Travis Kelley MD    Imaging Results:  Xr Lumbar Spine (2-3 Views)    Result Date: 7/18/2020  EXAMINATION: THREE XRAY VIEWS OF THE LUMBAR SPINE 7/18/2020 12:30 pm COMPARISON: CT abdomen and pelvis, 12/17/2019 HISTORY: ORDERING SYSTEM PROVIDED HISTORY: left paralumbar pain TECHNOLOGIST PROVIDED HISTORY: Reason for exam:->left paralumbar pain Reason for Exam: left paralumbar pain Acuity: Unknown Type of Exam: Unknown FINDINGS: At L1-2 there is retrolisthesis of 3 mm. At L4-5 there is anterolisthesis of 7 mm. No fracture is identified. At T12-L1 and L1-2 there is disc space narrowing with anterior spurring and endplate sclerosis/vacuum phenomenon. Facet arthropathy is noted at L4-5 and L5-S1. Right hip replacement is partially visualized and unremarkable. No acute fracture. Degenerative L1-2 retrolisthesis and L4-5 anterolisthesis. The latter is associated with moderate facet arthropathy. Overall moderate multilevel spondylosis. Xr Wrist Right (min 3 Views)    Result Date: 7/16/2020  EXAMINATION: 3 XRAY VIEWS OF THE RIGHT WRIST; THREE XRAY VIEWS OF THE LEFT HAND 7/16/2020 2:09 pm COMPARISON: None.  HISTORY: ORDERING SYSTEM PROVIDED HISTORY: pain , s/p fall TECHNOLOGIST PROVIDED HISTORY: Reason for exam:->pain , s/p fall Reason for Exam: pain , s/p fall Acuity: Acute Type of Exam: Initial; ORDERING SYSTEM PROVIDED HISTORY: pain , s/p fall TECHNOLOGIST PROVIDED HISTORY: Reason for exam:->pain , s/p fall Reason for Exam: pain , s/p fall. Pt unable to remove ring; best images possible Acuity: Acute Type of Exam: Initial FINDINGS: Mild pattern of osteopenia left hand and wrist.  There is also mild interphalangeal joint space narrowing that is chronic and degenerative. No focal soft tissue swelling. No acute finding in the left hand or wrist.     Xr Hand Left (min 3 Views)    Result Date: 7/16/2020  EXAMINATION: 3 XRAY VIEWS OF THE RIGHT WRIST; THREE XRAY VIEWS OF THE LEFT HAND 7/16/2020 2:09 pm COMPARISON: None. HISTORY: ORDERING SYSTEM PROVIDED HISTORY: pain , s/p fall TECHNOLOGIST PROVIDED HISTORY: Reason for exam:->pain , s/p fall Reason for Exam: pain , s/p fall Acuity: Acute Type of Exam: Initial; ORDERING SYSTEM PROVIDED HISTORY: pain , s/p fall TECHNOLOGIST PROVIDED HISTORY: Reason for exam:->pain , s/p fall Reason for Exam: pain , s/p fall. Pt unable to remove ring; best images possible Acuity: Acute Type of Exam: Initial FINDINGS: Mild pattern of osteopenia left hand and wrist.  There is also mild interphalangeal joint space narrowing that is chronic and degenerative. No focal soft tissue swelling. No acute finding in the left hand or wrist.     Xr Knee Left (3 Views)    Result Date: 7/15/2020  EXAMINATION: THREE XRAY VIEWS OF THE LEFT KNEE 7/15/2020 1:09 pm COMPARISON: July 3, 2019 HISTORY: ORDERING SYSTEM PROVIDED HISTORY: pain TECHNOLOGIST PROVIDED HISTORY: Reason for exam:->pain Reason for Exam: Knee Pain (in by ems, where she has been having a lot of knee pain, bilaterally, patient usually gets injections, doesnt take meds at home for pain ) Acuity: Acute Type of Exam: Initial FINDINGS: A suprapatellar joint effusion is present, increased in size.   Severe tricompartmental osteoarthritic changes are again noted. Diffuse periarticular osteophytosis and joint space narrowing as well as medial subluxation of the femur in relation to the tibia again noted. No evidence of fracture. Moderate-sized suprapatellar joint effusion but no acute osseous abnormality. No evidence of fracture. Severe tricompartmental osteoarthritis. No significant change in appearance of the arthritis over the past 1 year. Xr Knee Right (3 Views)    Result Date: 7/15/2020  EXAMINATION: THREE XRAY VIEWS OF THE RIGHT KNEE 7/15/2020 1:09 pm COMPARISON: None. HISTORY: ORDERING SYSTEM PROVIDED HISTORY: pain TECHNOLOGIST PROVIDED HISTORY: Reason for exam:->pain Reason for Exam: Right knee Pain (in by ems, where she has been having a lot of knee pain, bilaterally, patient usually gets injections, doesnt take meds at home for pain ) Acuity: Acute Type of Exam: Initial FINDINGS: There is severe end-stage degenerative change within all compartments manifested by complete loss of joint space and marginal osteophyte formation. There is a moderate-sized suprapatellar effusion. There is no fracture or osseous destruction. Severe osteoarthritis. Ct Head Wo Contrast    Addendum Date: 7/23/2020    ADDENDUM: Critical results were called by Dr. Scot Phan. Ale Jones MD to [facility] on 7/23/2020 at 12:12. Result Date: 7/23/2020  EXAMINATION: CT OF THE HEAD WITHOUT CONTRAST  7/23/2020 11:55 am TECHNIQUE: CT of the head was performed without the administration of intravenous contrast. Dose modulation, iterative reconstruction, and/or weight based adjustment of the mA/kV was utilized to reduce the radiation dose to as low as reasonably achievable. COMPARISON: 12/17/2019 HISTORY: ORDERING SYSTEM PROVIDED HISTORY: poss stroke TECHNOLOGIST PROVIDED HISTORY: Reason for exam:->poss stroke Has a \"code stroke\" or \"stroke alert\" been called? ->Yes Reason for Exam:  poss stroke Acuity: Unknown Type of Exam: Unknown Acute neurologic deficit. FINDINGS: BRAIN/VENTRICLES: There is no acute intracranial hemorrhage, mass effect or midline shift. No abnormal extra-axial fluid collection. The gray-white differentiation is maintained without evidence of an acute infarct. There is prominence of the ventricles and sulci due to global parenchymal volume loss. There are nonspecific areas of hypoattenuation within the periventricular and subcortical white matter, which likely represent chronic microvascular ischemic change. Idiopathic calcifications noted in the basal ganglia bilaterally. ORBITS: The visualized portion of the orbits demonstrate no acute abnormality. SINUSES: The visualized paranasal sinuses and mastoid air cells demonstrate no acute abnormality. SOFT TISSUES/SKULL: No acute abnormality of the visualized skull or soft tissues. No acute intracranial abnormality. Mild cerebral atrophy appropriate for age. Moderate chronic ischemic change also age-appropriate. No significant change from the prior study. Discharge Exam:  See progress note from earlier today    Disposition: transfer to [facility], [facility].    Condition: unstable    Discharge Medications:   [facility] Medication Instructions [facility]:[id number]    Printed on:07/23/20 8931   Medication Information                      aspirin-acetaminophen-caffeine (EXCEDRIN MIGRAINE) 250-250-65 MG per tablet  Take 2 tablets by mouth every 6 hours as needed for Headaches              atenolol (TENORMIN) 100 MG tablet  Take 1 tablet by mouth daily             baclofen (LIORESAL) 10 MG tablet  Take 1 tablet by mouth 3 times daily             balsalazide (COLAZAL) 750 MG capsule  Take 3,000 mg by mouth daily Take 4 capsules (total 3000 mg) daily each morning.              busPIRone (BUSPAR) 10 MG tablet  Take 10 mg by mouth 3 times daily as needed             DULoxetine (CYMBALTA) 60 MG extended release capsule  Take 60 mg by mouth 2 times daily              ferrous on day of discharge including face-to-face visit, examination, documentation, counseling, preparation of discharge plans and followup, and discharge medicine reconciliation and presciptions is 37 minutes    Signed:  Pierce Valentin MD  7/23/2020

## 2020-07-23 NOTE — PROGRESS NOTES
Clinical Pharmacy Note    Patient received tPA for stroke. Dose double verified prior to administration with Marlin Dhillon, PharmD.     Brittany Shaikh, PharmD, [id number]  Clinical Pharmacist  [id number]

## 2020-07-23 NOTE — CONSULTS
List of Home Medications the patient is currently taking is complete. Source of medications in list is [employer] prescription fill history, recent hospital admission, and recent MD visits. The following medications were added to admission medication list:  - Sucralfate 1g QID  - Hydroxyzine 10 mg TID PRN  - Fexofenadine 180 mg daily    The following medications were removed from admission medication list:  - Fluticasone nasal spray  - Furosemide 40 mg  - Gabapentin 100 mg  - Gentamicin cream  - Hydralazine 25 mg  - Lidocaine 2% jelly  - Lidocaine 5% ointment  - Ondansetron 4 mg (duplicate)  - Potassium chloride 20 mEq  - Zolpidem 10 mg    The following medication instructions/doses were adjusted to reflect how patient is taking:  - Ferrous sulfate 325 mg daily adjusted to 324 mg daily    Please note:  Per recent MD note in May, patient takes Lialda 4.8g daily but there is no evidence that patient fills this. Please call with questions.   Tess Villasenor, PharmD, BCPS  Main pharmacy: [id number]  7/23/2020 5:03 PM

## 2020-07-23 NOTE — CONSULTS
Brief Stroke Team Communication Note / Phone Consultation:     Stroke team was contacted regarding Ms. Keller due to change in neurologic examination. Briefly, Gabriel Stone is a 62 y.o. female who is admitted to [facility] for electrolyte derangements. This morning, she awoke at her neurologic baseline (intact). She was last seen well at 11:15 AM, then noted by her care team to be aphasic, flaccid weakness on the R side. Per the hospitalist and nursing staff, her NIHSS is 24 right now, consistent with a complete left MCA syndrome. I reviewed her chart and discussed her care with the hospitalist. She has no contraindications for IV tPA, and will receive IV tPA. I reviewed her CT CTA as well. Her CTA demonstrates a left M2 occlusion. I notified [facility] team, and she will need to be transferred to [facility], [facility]. for endovascular therapy. Ms. Ike Camilo hospitalist team notified as well.      MD Wojciech Loco  Stroke Team

## 2020-07-23 NOTE — PLAN OF CARE
Problem: Falls - Risk of:  Goal: Will remain free from falls  Description: Will remain free from falls  Outcome: Ongoing  Note: Pt remains free from falls. Safety precautions in place. Bed in lowest position, bed wheels locked, call light with in reach, bed alarm on, yellow blanket in place, fall risk wrist band on, SAFE outside of doorway. Will continue to monitor. Problem: Pain:  Goal: Pain level will decrease  Description: Pain level will decrease  Outcome: Ongoing  Note: Patient stated that dilaudid is only pain medication that is effective. Patient informed that she will need to transition to oral pain meds for discharge. Patient stated understanding, but then stated, but those make me so sick. Problem: Cardiovascular  Goal: Hemodynamic stability  Outcome: Ongoing  Note: Vital signs stable. Problem: Respiratory  Goal: O2 Sat > 90%  Outcome: Ongoing  Note: O2 saturation 96-97% on room air. Patient has no complaint of SOB. Will continue to monitor respiratory status.

## 2020-07-23 NOTE — PLAN OF CARE
Nutrition Problem #1: Inadequate oral intake  Intervention: Food and/or Nutrient Delivery: Modify Current Diet  Nutritional Goals: PO intake > 50% at all meals

## 2020-07-23 NOTE — PROGRESS NOTES
Patient discharged to [facility] cath lab via EMS. Report given to EMS and cath lab personnel. NIH score of 24 reported to EMS personnel at handoff and to the  cath lab personnel via phone. Patient was improving during transfer to cot. Moving both arms spontaneously. Patient said OK when loading onto stretcher to a request. Family here at bedside and will meet EMS at [facility] in the cath lab.

## 2020-07-23 NOTE — PROGRESS NOTES
Patient arrived into [facility] [phone] from [address] [place] [address] and CT to received TPA for CVA. Placed on monitor, vital signs obtained. Report received. Attempted to start second IV unsuccessfully.

## 2020-07-23 NOTE — PROGRESS NOTES
Comprehensive Nutrition Assessment    Type and Reason for Visit:  Reassess    Nutrition Recommendations/Plan:   1. Continue NPO until evaluated by speech  2. When PO diet resumes add dry tray modifier    - Strict monitoring of fluid intake to 1000mL daily   - Encourage fiber intake    Nutrition Assessment:  Pt remains at risk for nutrition compromise AEB varied PO intake, poor appetite, and increased protein needs for wound healing. Wt hx stable per EMR and wt hx stable since admit. Pt states not liking the food and only eating 1-25% of breakfast tray. Pt declined ONS. Pt reports wound on toe is improving. Spoke with nephrology about balancing electrolytes through diet. Mg and Na are low. This could be related to frequent, loose stools d/t Crohn's disease. RD to recommend sending dry trays at meal time with strict monitoring of fluid intake from nursing staff. May need to increase fiber intake to bulk up stools when PO diet resumes. Pt currently NPO awaiting SLP eval d/t stroke. Pt remains at risk for nutrition compromise until PO intake >50% at all meals. Malnutrition Assessment:  Malnutrition Status:  No malnutrition      Estimated Daily Nutrient Needs:  Energy (kcal):  9881-1087; Weight Used for Energy Requirements:  Current(91kg)     Protein (g):  83-94; Weight Used for Protein Requirements:  Ideal(1.4-1.6g/59kg)        Fluid (ml/day):  1000mL fluid restriction; Weight Used for Fluid Requirements:  Current      Nutrition Related Findings:  -3.4 liters. Trace nonpitting RLE edema. +2 nonpitting LLE edema. Wounds:  Venous Stasis(L toe)       Anthropometric Measures:  · Height: 5' 6\" (167.6 cm)  · Current Body Weight: 200 lb (90.7 kg)   · Admission Body Weight: 184 lb (83.5 kg)    · Ideal Body Weight: 130 lbs  · BMI: 32.3  · BMI Categories: Obese Class 1 (BMI 30.0-34. 9)       Nutrition Diagnosis:   · Inadequate oral intake related to inadequate protein-energy intake as evidenced by other (comment)(Varied PO intake (1-75%))      Nutrition Interventions:   Food and/or Nutrient Delivery:  Modify Current Diet  Nutrition Education/Counseling:  Education not indicated   Coordination of Nutrition Care:  Continued Inpatient Monitoring    Goals:  PO intake > 50% at all meals       Nutrition Monitoring and Evaluation:   Food/Nutrient Intake Outcomes:  Food and Nutrient Intake  Physical Signs/Symptoms Outcomes:  Biochemical Data, Diarrhea, Nutrition Focused Physical Findings     Discharge Planning:     Too soon to determine     Electronically signed by Phil Mccarthy RD, LD on 7/23/20 at 1:18 PM EDT    Contact: [phone]

## 2020-07-23 NOTE — PLAN OF CARE
Problem: Falls - Risk of:  Goal: Will remain free from falls  Description: Will remain free from falls  7/23/2020 0507 by Harini Cook RN  Outcome: Ongoing     Problem: Pain:  Goal: Pain level will decrease  Description: Pain level will decrease  7/23/2020 0507 by Latasha Palacios RN  Outcome: Ongoing  Note: Slept well this shift. Dilaudid given x 1 for pain.

## 2020-07-23 NOTE — PROGRESS NOTES
Progress Note - Dr. Fernando Jung - Internal Medicine  PCP: Brenda Grijalva MD [address] / [place] [phone]    Hospital Day: 8  Code Status: Full Code  Current Diet: DIET GENERAL; Daily Fluid Restriction: 1000 ml        CC: follow up on medical issues    Subjective:   T[place]so [place] is a 62 y.o. female. She denies problems    Na continues to creep up, now 129  Appreciate renal eval  Stated goal is 130    She denies chest pain, denies shortness of breath, denies nausea,  denies emesis. 10 system Review of Systems is reviewed with patient, and pertinent positives are listed here: None . Otherwise, Review of systems is negative. I have reviewed the patient's medical and social history in detail and updated the computerized patient record. To recap: She  has a past medical history of Anxiety, Arthritis, Blood transfusion reaction, Crohn's disease ([address] Depression, GERD (gastroesophageal reflux disease), Hiatal hernia, Hx of blood clots, Hypertension, MRSA (methicillin resistant staph aureus) culture positive, Neuropathy, Pneumonia, Sinus headache, SOB (shortness of breath), and VRE (vancomycin resistant enterococcus) culture positive. . She  has a past surgical history that includes Hysterectomy; knee surgery (Bilateral); Hand Carpectomy (Bilateral); hernia repair; Abdomen surgery; bladder suspension; fracture surgery; Heel spur surgery; Tonsillectomy; Colonoscopy; Upper gastrointestinal endoscopy (6/14/13); Foot surgery (Left, 6/25/14); Foot surgery (6/3/15); Colonoscopy (9/14/15); Foot surgery (Left, 08/05/2002); joint replacement; Sigmoidoscopy (01/15/2018); Abdominal adhesion surgery (06/08/2018); Colonoscopy (08/07/2018); colostomy (N/A, 10/8/2018); pr secd clos surg wnd exten/complic (N/A, 15/63/1579); Leg Surgery (Right); Colonoscopy (06/07/2019); Colonoscopy (N/A, 6/7/2019); Colonoscopy (N/A, 6/7/2019); Small intestine surgery (N/A, 7/17/2019); and proctosigmoidoscopy (N/A, 7/17/2019). Yanira German reports that she has been smoking cigarettes and e-cigarettes. She has a 10.00 pack-year smoking history. She has never used smokeless tobacco. She reports current alcohol use of about 2.0 standard drinks of alcohol per week. She reports that she does not use drugs. .        Active Hospital Problems    Diagnosis Date Noted    Hypokalemia [E87.6] 07/15/2020    PAD (peripheral artery disease) (HCC) [I73.9] 05/17/2019    Chronic venous hypertension (idiopathic) with inflammation of bilateral lower extremity [I87.323] 05/17/2019    Leg swelling [M79.89] 05/17/2019    Hypomagnesemia [E83.42] 01/08/2018    Crohn's colitis ([facility] [address] [K50.10] 01/06/2018    Anemia [D64.9] 01/05/2018    Hyponatremia [E87.1] 01/05/2018    Atherosclerosis of native arteries of extremities with intermittent claudication, bilateral legs (HCC) [I70.213] 01/19/2016    Essential hypertension, benign [I10] 09/13/2015       Current Facility-Administered Medications: magnesium oxide (MAG-OX) tablet 400 mg, 400 mg, Oral, TID  hydrALAZINE (APRESOLINE) injection 10 mg, 10 mg, Intravenous, Q6H PRN  sodium chloride tablet 2 g, 2 g, Oral, TID WC  HYDROmorphone HCl PF (DILAUDID) injection 0.5 mg, 0.5 mg, Intramuscular, Q4H PRN  HYDROmorphone HCl PF (DILAUDID) injection 0.5 mg, 0.5 mg, Intravenous, Q4H PRN  loperamide (IMODIUM) capsule 2 mg, 2 mg, Oral, 4x Daily PRN  calcium-vitamin D 500-200 MG-UNIT per tablet 1 tablet, 1 tablet, Oral, TID  HYDROcodone-acetaminophen (NORCO) 7.5-325 MG per tablet 1 tablet, 1 tablet, Oral, Q4H PRN  aspirin-acetaminophen-caffeine (EXCEDRIN MIGRAINE) per tablet 2 tablet, 2 tablet, Oral, Q6H PRN  atenolol (TENORMIN) tablet 100 mg, 100 mg, Oral, Daily  baclofen (LIORESAL) tablet 10 mg, 10 mg, Oral, TID  DULoxetine (CYMBALTA) extended release capsule 60 mg, 60 mg, Oral, BID  ferrous sulfate (IRON 325) tablet 325 mg, 325 mg, Oral, BID  fluticasone (FLONASE) 50 MCG/ACT nasal spray 1 spray, 1 spray, Each Nostril, Daily PRN  busPIRone (BUSPAR) tablet 10 mg, 10 mg, Oral, TID  hydrALAZINE (APRESOLINE) tablet 25 mg, 25 mg, Oral, 3 times per day  nystatin (MYCOSTATIN) cream, , Topical, BID  pantoprazole (PROTONIX) tablet 40 mg, 40 mg, Oral, QAM AC  ondansetron (ZOFRAN-ODT) disintegrating tablet 4 mg, 4 mg, Oral, Q6H PRN  zolpidem (AMBIEN) tablet 10 mg, 10 mg, Oral, Nightly PRN  enoxaparin (LOVENOX) injection 40 mg, 40 mg, Subcutaneous, Daily  ondansetron (ZOFRAN) injection 4 mg, 4 mg, Intravenous, Q6H PRN  balsalazide (COLAZAL) capsule 3,000 mg, 3,000 mg, Oral, Daily         Objective:  /68   Pulse 72   Temp 97.8 °F (36.6 °C) (Oral)   Resp 16   Ht 5' 6\" (1.676 m)   Wt 183 lb 6.4 oz (83.2 kg)   SpO2 95%   BMI 29.60 kg/m²      Patient Vitals for the past 24 hrs:   BP Temp Temp src Pulse Resp SpO2 Weight   07/23/20 0700 -- -- -- -- -- -- 183 lb 6.4 oz (83.2 kg)   07/23/20 0658 116/68 97.8 °F (36.6 °C) Oral 72 16 95 % --   07/22/20 2200 121/80 98.3 °F (36.8 °C) Oral 71 16 97 % --   07/22/20 1734 108/80 98.2 °F (36.8 °C) Oral 69 16 96 % --   07/22/20 1422 106/68 -- -- -- -- -- --   07/22/20 1215 113/77 97.9 °F (36.6 °C) Oral 75 16 97 % --     Patient Vitals for the past 96 hrs (Last 3 readings):   Weight   07/23/20 0700 183 lb 6.4 oz (83.2 kg)   07/21/20 0658 184 lb 9.6 oz (83.7 kg)           Intake/Output Summary (Last 24 hours) at 7/23/2020 0914  Last data filed at 7/22/2020 2020  Gross per 24 hour   Intake 120 ml   Output 550 ml   Net -430 ml         Physical Exam:   S1, S2 normal, no murmur, rub or gallop, regular rate and rhythm  clear to auscultation bilaterally  abdomen is soft without significant tenderness, masses, organomegaly or guarding  Trace edema    Labs:  Lab Results   Component Value Date    WBC 4.2 07/23/2020    HGB 10.0 (L) 07/23/2020    HCT 29.2 (L) 07/23/2020     07/23/2020    CHOL 151 02/06/2018    TRIG 57 02/06/2018    HDL 81 (H) 02/06/2018    ALT 6 (L) 07/16/2020    AST 24 07/16/2020     (L)

## 2020-07-23 NOTE — PROGRESS NOTES
Stroke Alert   Stroke Team paged 11:40  Patient with left gaze deviation, mute, not following commands, noted spontaneous movement of left upper extremity. Right upper extremity no effort against gravity. Left face droop noted. Family at bedside. Onset of not speaking and left gaze started at 11:00. Dr KATE Robles at bedside. Attempt x2 per ICU nurse to start IV for CTA unsuccessful. Patient transported to CT scan per bed accompanied by ICU RN x2 and stroke coordinator.

## 2020-07-24 ENCOUNTER — APPOINTMENT (OUTPATIENT)
Dept: CT IMAGING | Age: 65
DRG: 045 | End: 2020-07-24
Attending: INTERNAL MEDICINE
Payer: MEDICAID

## 2020-07-24 ENCOUNTER — APPOINTMENT (OUTPATIENT)
Dept: MRI IMAGING | Age: 65
DRG: 045 | End: 2020-07-24
Attending: INTERNAL MEDICINE
Payer: MEDICAID

## 2020-07-24 ENCOUNTER — APPOINTMENT (OUTPATIENT)
Dept: VASCULAR LAB | Age: 65
DRG: 045 | End: 2020-07-24
Attending: INTERNAL MEDICINE
Payer: MEDICAID

## 2020-07-24 LAB
ANION GAP SERPL CALCULATED.3IONS-SCNC: 9 MMOL/L (ref 3–16)
BASOPHILS ABSOLUTE: 0.1 K/UL (ref 0–0.2)
BASOPHILS RELATIVE PERCENT: 1.5 %
BUN BLDV-MCNC: 7 MG/DL (ref 7–20)
CALCIUM SERPL-MCNC: 9 MG/DL (ref 8.3–10.6)
CHLORIDE BLD-SCNC: 95 MMOL/L (ref 99–110)
CHOLESTEROL, TOTAL: 111 MG/DL (ref 0–199)
CO2: 24 MMOL/L (ref 21–32)
CREAT SERPL-MCNC: <0.5 MG/DL (ref 0.6–1.2)
EKG ATRIAL RATE: 70 BPM
EKG DIAGNOSIS: NORMAL
EKG P AXIS: 22 DEGREES
EKG P-R INTERVAL: 126 MS
EKG Q-T INTERVAL: 476 MS
EKG QRS DURATION: 90 MS
EKG QTC CALCULATION (BAZETT): 514 MS
EKG R AXIS: 15 DEGREES
EKG T AXIS: 195 DEGREES
EKG VENTRICULAR RATE: 70 BPM
EOSINOPHILS ABSOLUTE: 0.2 K/UL (ref 0–0.6)
EOSINOPHILS RELATIVE PERCENT: 4.4 %
ESTIMATED AVERAGE GLUCOSE: 96.8 MG/DL
GFR AFRICAN AMERICAN: >60
GFR NON-AFRICAN AMERICAN: >60
GLUCOSE BLD-MCNC: 95 MG/DL (ref 70–99)
HBA1C MFR BLD: 5 %
HCT VFR BLD CALC: 28.3 % (ref 36–48)
HDLC SERPL-MCNC: 43 MG/DL (ref 40–60)
HEMOGLOBIN: 9.7 G/DL (ref 12–16)
LDL CHOLESTEROL CALCULATED: 55 MG/DL
LYMPHOCYTES ABSOLUTE: 1.4 K/UL (ref 1–5.1)
LYMPHOCYTES RELATIVE PERCENT: 29 %
MCH RBC QN AUTO: 33.8 PG (ref 26–34)
MCHC RBC AUTO-ENTMCNC: 34.1 G/DL (ref 31–36)
MCV RBC AUTO: 99.3 FL (ref 80–100)
MONOCYTES ABSOLUTE: 0.6 K/UL (ref 0–1.3)
MONOCYTES RELATIVE PERCENT: 12.1 %
NEUTROPHILS ABSOLUTE: 2.6 K/UL (ref 1.7–7.7)
NEUTROPHILS RELATIVE PERCENT: 53 %
PDW BLD-RTO: 13.3 % (ref 12.4–15.4)
PLATELET # BLD: 397 K/UL (ref 135–450)
PMV BLD AUTO: 7.4 FL (ref 5–10.5)
POTASSIUM SERPL-SCNC: 4.1 MMOL/L (ref 3.5–5.1)
RBC # BLD: 2.85 M/UL (ref 4–5.2)
SODIUM BLD-SCNC: 128 MMOL/L (ref 136–145)
TRIGL SERPL-MCNC: 66 MG/DL (ref 0–150)
TROPONIN: 0.08 NG/ML
TSH REFLEX: 1.37 UIU/ML (ref 0.27–4.2)
VLDLC SERPL CALC-MCNC: 13 MG/DL
WBC # BLD: 4.9 K/UL (ref 4–11)

## 2020-07-24 PROCEDURE — 99223 1ST HOSP IP/OBS HIGH 75: CPT | Performed by: INTERNAL MEDICINE

## 2020-07-24 PROCEDURE — 1200000000 HC SEMI PRIVATE

## 2020-07-24 PROCEDURE — U0003 INFECTIOUS AGENT DETECTION BY NUCLEIC ACID (DNA OR RNA); SEVERE ACUTE RESPIRATORY SYNDROME CORONAVIRUS 2 (SARS-COV-2) (CORONAVIRUS DISEASE [COVID-19]), AMPLIFIED PROBE TECHNIQUE, MAKING USE OF HIGH THROUGHPUT TECHNOLOGIES AS DESCRIBED BY CMS-2020-01-R: HCPCS

## 2020-07-24 PROCEDURE — 70450 CT HEAD/BRAIN W/O DYE: CPT

## 2020-07-24 PROCEDURE — 84484 ASSAY OF TROPONIN QUANT: CPT

## 2020-07-24 PROCEDURE — 84443 ASSAY THYROID STIM HORMONE: CPT

## 2020-07-24 PROCEDURE — 6370000000 HC RX 637 (ALT 250 FOR IP): Performed by: INTERNAL MEDICINE

## 2020-07-24 PROCEDURE — 36415 COLL VENOUS BLD VENIPUNCTURE: CPT

## 2020-07-24 PROCEDURE — 93010 ELECTROCARDIOGRAM REPORT: CPT | Performed by: INTERNAL MEDICINE

## 2020-07-24 PROCEDURE — 92610 EVALUATE SWALLOWING FUNCTION: CPT | Performed by: SPEECH-LANGUAGE PATHOLOGIST

## 2020-07-24 PROCEDURE — 6370000000 HC RX 637 (ALT 250 FOR IP): Performed by: STUDENT IN AN ORGANIZED HEALTH CARE EDUCATION/TRAINING PROGRAM

## 2020-07-24 PROCEDURE — 92523 SPEECH SOUND LANG COMPREHEN: CPT | Performed by: SPEECH-LANGUAGE PATHOLOGIST

## 2020-07-24 PROCEDURE — 93005 ELECTROCARDIOGRAM TRACING: CPT | Performed by: STUDENT IN AN ORGANIZED HEALTH CARE EDUCATION/TRAINING PROGRAM

## 2020-07-24 PROCEDURE — 80048 BASIC METABOLIC PNL TOTAL CA: CPT

## 2020-07-24 PROCEDURE — 80061 LIPID PANEL: CPT

## 2020-07-24 PROCEDURE — 93880 EXTRACRANIAL BILAT STUDY: CPT

## 2020-07-24 PROCEDURE — 2580000003 HC RX 258: Performed by: STUDENT IN AN ORGANIZED HEALTH CARE EDUCATION/TRAINING PROGRAM

## 2020-07-24 PROCEDURE — 85025 COMPLETE CBC W/AUTO DIFF WBC: CPT

## 2020-07-24 PROCEDURE — 83036 HEMOGLOBIN GLYCOSYLATED A1C: CPT

## 2020-07-24 PROCEDURE — 99452 NTRPROF PH1/NTRNET/EHR RFRL: CPT | Performed by: INTERNAL MEDICINE

## 2020-07-24 PROCEDURE — 70551 MRI BRAIN STEM W/O DYE: CPT

## 2020-07-24 RX ORDER — ASPIRIN 81 MG/1
81 TABLET, CHEWABLE ORAL DAILY
Status: DISCONTINUED | OUTPATIENT
Start: 2020-07-24 | End: 2020-07-30 | Stop reason: HOSPADM

## 2020-07-24 RX ORDER — ACETAMINOPHEN 325 MG/1
650 TABLET ORAL EVERY 6 HOURS PRN
Status: DISCONTINUED | OUTPATIENT
Start: 2020-07-24 | End: 2020-07-30 | Stop reason: HOSPADM

## 2020-07-24 RX ORDER — ATENOLOL 50 MG/1
100 TABLET ORAL DAILY
Status: DISCONTINUED | OUTPATIENT
Start: 2020-07-24 | End: 2020-07-26

## 2020-07-24 RX ORDER — DULOXETIN HYDROCHLORIDE 60 MG/1
60 CAPSULE, DELAYED RELEASE ORAL 2 TIMES DAILY
Status: DISCONTINUED | OUTPATIENT
Start: 2020-07-25 | End: 2020-07-30 | Stop reason: HOSPADM

## 2020-07-24 RX ORDER — SODIUM CHLORIDE 9 MG/ML
INJECTION, SOLUTION INTRAVENOUS CONTINUOUS
Status: DISCONTINUED | OUTPATIENT
Start: 2020-07-24 | End: 2020-07-26

## 2020-07-24 RX ADMIN — ASPIRIN 81 MG: 81 TABLET, CHEWABLE ORAL at 17:34

## 2020-07-24 RX ADMIN — SODIUM CHLORIDE: 9 INJECTION, SOLUTION INTRAVENOUS at 10:05

## 2020-07-24 RX ADMIN — ACETAMINOPHEN 650 MG: 325 TABLET ORAL at 02:36

## 2020-07-24 ASSESSMENT — PAIN SCALES - GENERAL
PAINLEVEL_OUTOF10: 3
PAINLEVEL_OUTOF10: 0

## 2020-07-24 NOTE — PROGRESS NOTES
Updated patient's  on his wife's status, plan of care, and completed MRI checklist. Checklist has been faxed to MRI and a copy has been placed in the chart.

## 2020-07-24 NOTE — CONSULTS
[facility]  Cardiology Inpatient Consult Service                                                                                          Pt Name: Bobby Alexander  Age: 62 y.o. Sex: female  : 1955  Location: [facility]/5870-    Referring Physician: Rubina Lindo MD      Reason for Consult:       Reason for Consultation/Chief Complaint: Elevated troponin, EKG abnormalities      HPI:      Bobby Alexander is a 62 y.o. female with a past medical history of hypertension, Crohn's disease presented to [facility] with generalized weakness, fatigue, multiple falls. Stroke alert was called when patient had acute onset of right sided weakness, left gaze deviation, aphasia and stopped following commands. Patient was given tPA, and transferred to [facility] for thrombectomy. CTA showed M2 branch tapering with nonvisualization of M3 branches suggestive of vessel thrombus. Cerebral angiogram demonstrated that the thrombus had partially lysed and moved distally and thus was not amenable to intervention. Patient admitted to ICU post op. Cardiology is consulted for elevated troponin and EKG changes. Due to limiting exposure to COVID-19 and minimizing use of PPE, note was completed solely using EMR and from personal conversations with primary medical team and/or consultants involved in case. Patient was not interviewed or examined. I have personally reviewed the reports and images of labs, radiological studies, cardiac studies including ECG's and telemetry, current and old medical records. The note was completed using EMR and Dragon dictation system. Every effort was made to ensure accuracy; however, inadvertent computerized transcription errors may be present. CODES 49306 (up to 30 min), 53465 (>30 min). I have spent >30 minutes reviewing EMR as above and formulating my assessment and plan.      Histories     Past Medical History:   has a past medical history of Anxiety, Arthritis, Blood transfusion reaction, Crohn's disease ([facility] [address] Depression, GERD (gastroesophageal reflux disease), Hiatal hernia, Hx of blood clots, Hypertension, MRSA (methicillin resistant staph aureus) culture positive, Neuropathy, Pneumonia, Sinus headache, SOB (shortness of breath), and VRE (vancomycin resistant enterococcus) culture positive. Surgical History:   has a past surgical history that includes Hysterectomy; knee surgery (Bilateral); Hand Carpectomy (Bilateral); hernia repair; Abdomen surgery; bladder suspension; fracture surgery; Heel spur surgery; Tonsillectomy; Colonoscopy; Upper gastrointestinal endoscopy (6/14/13); Foot surgery (Left, 6/25/14); Foot surgery (6/3/15); Colonoscopy (9/14/15); Foot surgery (Left, 08/05/2002); joint replacement; Sigmoidoscopy (01/15/2018); Abdominal adhesion surgery (06/08/2018); Colonoscopy (08/07/2018); colostomy (N/A, 10/8/2018); pr secd clos surg wnd exten/complic (N/A, 19/27/6006); Leg Surgery (Right); Colonoscopy (06/07/2019); Colonoscopy (N/A, 6/7/2019); Colonoscopy (N/A, 6/7/2019); Small intestine surgery (N/A, 7/17/2019); and proctosigmoidoscopy (N/A, 7/17/2019). Social History:   reports that she has been smoking cigarettes and e-cigarettes. She has a 10.00 pack-year smoking history. She has never used smokeless tobacco. She reports current alcohol use of about 2.0 standard drinks of alcohol per week. She reports that she does not use drugs. Family History:  No evidence for sudden cardiac death or premature CAD      Medications:       Home Medications  Were reviewed and are listed in nursing record. and/or listed below  Prior to Admission medications    Medication Sig Start Date End Date Taking?  Authorizing Provider   ferrous gluconate (FERGON) 324 (38 Fe) MG tablet Take 324 mg by mouth daily (with breakfast)   Yes Historical Provider, MD   sucralfate (CARAFATE) 1 GM tablet Take 1 g by mouth 4 times daily   Yes Historical Provider, MD   hydrOXYzine (ATARAX) 10 MG tablet Take 10 mg by mouth 3 times daily as needed for Itching   Yes Historical Provider, MD   fexofenadine (ALLEGRA) 180 MG tablet Take 180 mg by mouth daily   Yes Historical Provider, MD   ondansetron (ZOFRAN ODT) 4 MG disintegrating tablet Take 1 tablet by mouth every 8 hours as needed for Nausea 7/15/20  Yes Roland & LYNN Albright   busPIRone (BUSPAR) 10 MG tablet Take 10 mg by mouth 3 times daily as needed   Yes Historical Provider, MD   balsalazide (COLAZAL) 750 MG capsule Take 3,000 mg by mouth daily Take 4 capsules (total 3000 mg) daily each morning. Yes Historical Provider, MD   omeprazole (PRILOSEC) 20 MG delayed release capsule Take 20 mg by mouth Daily    Yes Historical Provider, MD   baclofen (LIORESAL) 10 MG tablet Take 1 tablet by mouth 3 times daily 6/30/18  Yes Angelito Ambriz MD   atenolol (TENORMIN) 100 MG tablet Take 1 tablet by mouth daily 6/30/18  Yes Angelito Ambriz MD   aspirin-acetaminophen-caffeine (EXCEDRIN MIGRAINE) 804-396-74 MG per tablet Take 2 tablets by mouth every 6 hours as needed for Headaches    Yes Historical Provider, MD   DULoxetine (CYMBALTA) 60 MG extended release capsule Take 60 mg by mouth 2 times daily    Yes Historical Provider, MD   nystatin (MYCOSTATIN) 360117 UNIT/GM cream Apply topically 2 times daily as needed for Dry Skin Apply topically 2 times daily.     Historical Provider, MD   mupirocin Patel Brady) 2 % ointment Apply daily 8/5/19   Shelly Morales DPM          Inpatient Medications:   atenolol  100 mg Oral Daily    atorvastatin  40 mg Oral Nightly       IV drips:   sodium chloride 75 mL/hr at 07/24/20 1005       PRN:  acetaminophen, perflutren lipid microspheres, labetalol, hydrALAZINE, acetaminophen    Allergy:     Ace inhibitors and Skelaxin [metaxalone]       Review of Systems:       Due to limiting exposure to COVID-19 and minimizing use of PPE, note was completed solely using EMR and from personal conversations with primary medical last 72 hours. No results for input(s): CHOL, HDL, LDLCALC, TRIG in the last 72 hours.]    Lab Results   Component Value Date    TROPONINI 0.08 (H) 07/24/2020         Imaging:     I personally reviewed imaging studies including CXR, Stress test, TTE/BOUBACAR. Last ECG (if available) - EKG:  I have reviewed EKG with the following interpretation  Sinus rhythm with PACs with aberrant conduction  Diffuse T wave inversions in inferior and anterolateral leads  Prolonged QT    Telemetry:  Sinus rhythm    Last Stress (if available):  2/6/2018  -There is a small anterior apical defect that is likely due to breast attenuation and some movement artifact, rather than ischemia.   -Wall motion is normal with EF=57%. -Overall, low risk scan. Last TTE/BOUBACAR(if available):  6/26/2018  -Normal left ventricle size, wall thickness, and systolic function with an   estimated ejection fraction of 50-55%.  -No regional wall motion abnormalities are seen. -Mildly dilated left atrium. Last Cath (if available): -    Last CMR  (if available): -      Assessment / Plan:     Elevated troponin/T wave abnormalities and prolonged QT on EKG/hypertension  EKG changes and troponin elevation likely related to ischemic CVA. T wave abnormalities are not regional and are diffuse, and the QT prolongation is present, both of which can be seen with ischemic CVA. Troponin stable, there are not ST changes seen. Patient has expressive aphasia, no CP reported on chart review  - Stop trending troponins  - Continue statin, antiplatelet therapy for stroke to be started per neurosurgery  - Patient undergoing permissive hypertension per primary team, start BP meds as tolerated per neurosurgery recs  - No further imaging or acute intervention at this time    Tobacco use was discussed with the patient and educated on the negative effects. I have asked the patient to not utilize these agents.     Thank you for allowing to us to participate in the bijan or Dianne BENTON Krishna. Further evaluation will be based upon the patient's clinical course and testing results. I have spent 40 minutes of face to face time with the patient with more than 50% spent counseling and coordinating care. All questions and concerns were addressed to the patient/family. Alternatives to my treatment were discussed. The note was completed using EMR. Every effort was made to ensure accuracy; however, inadvertent computerized transcription errors may be present. I have personally reviewed the reports and images of labs, radiological studies, cardiac studies including ECG's and telemetry, current and old medical records. Electronically signed by Romina Turner MD on 7/24/2020 at 2:02 PM    Patient will be staffed and discussed with Meghana Rico.  Gordon Taylor MD, [facility], [facility]

## 2020-07-24 NOTE — PROGRESS NOTES
Diet NPO Effective Now Exceptions are: Ice Chips, Sips with Meds  Code Status: Full Code    PT/OT Eval Status: in progress    Dispo - inpatient.      This chart was likely completed using voice recognition technology and may contain unintended grammatical , phraseology,and/or punctuation errors      Nena Barr MD

## 2020-07-24 NOTE — DISCHARGE SUMMARY
INTERNAL MEDICINE DEPARTMENT AT [address] [place] [address]  DISCHARGE SUMMARY    Patient ID: Corona Tay                                             Discharge Date: 7/30/2020   Patient's PCP: Patrick Sena MD                                          Discharge Physician: Bakari Mora MD  Admit Date: 7/23/2020   Admitting Physician: Lanny Sena MD    PROBLEMS DURING HOSPITALIZATION:  Patient Active Problem List   Diagnosis    Essential hypertension, benign    Atherosclerosis of native arteries of extremities with intermittent claudication, bilateral legs ([address]    Anemia    Hyponatremia    Crohn's colitis ([address]    Hypomagnesemia    DVT (deep venous thrombosis) ([address]    PAD (peripheral artery disease) ([address]    Leg swelling    Venous insufficiency    Chronic venous hypertension (idiopathic) with inflammation of bilateral lower extremity    Open wound of left foot with complication    Hypokalemia    Acute cerebrovascular accident (CVA) ([address]    Abnormal ECG       DISCHARGE DIAGNOSES:  1. Left MCA ischemic stroke  2. Acute metabolic encephalopathy, possibly due to electrolyte abnormalities and CVA  3. SIADH  4, Newly diagnosed systolic heart failure      Hospital Course: This is a 62 y.o female with PMHx of HTN, arthritis, crohn's disease who presented from [facility] with after CVA s/p tPA. On 7/15/20, patient originally p/w generalized weakness, fatigue, decreased oral intake and multiple falls, and was found to have multiple electrolyte abnormalities including hyponatremia 125, hypokalemia 2.4, hypocalcemia 6.8, hypomagnesemia 0.4, AG 18. Patient was admitted and was being treated for her electrolyte imbalance. stroke alert was called when patient began to have left gaze deviation, mute, not following commands, though spontaneous movement of the LUE but no effort against gravity on the RUE, left face droop; last seen normal was 1115. Initial NIHSS score of 24.  CT head w/o contrast was negative for blood, CTA with contrast revealed left M2 occulsion. Spoke with  Stroke team and decision was made to give IV tPA (given 1253) and transfer to [facility] for endovascular therapy.     Patient was taken to cath lab where neurosurgery performed diagnostic cerebral angiogram. The left M2 branch thromboembolus seen on the CTA had partially lysed and moved distally and was now a subocclusive M3 branch thromboembolus. Therefore, no mechanical thrombectomy was performed. Patient was admitted to the [facility] ICU for further management and further workup. During her hospital stay, her Na+ levels decreased significantly, ranging 123-126, as a result of chronically being on cymbalta. Nephrology was consulted for suspected SIADH and was put on fluid restriction and urea PO along with holding her cymbalta. But her Na levels were consistently low and she was given tolvapton. After day 2 on tolvapton, her Na levels improved. Neurologically, she still has moderate expressive aphasia which is why it is difficult to assess orientation. Her motor functions have significantly improved and now she is able to lift her legs and arms b/l against gravity and is able to resist/apply force with her feet. Her EKG was abnormal, with diffuse T wave inversions and tachycardia. She had an echo which showed an EF of 40%. Cardiology was consulted. Heart failure medication were started and a per subQ cardiac rhythm monitor was implanted. Patient to follow-up with cardiology outpatient for ischemic work-up. Physical Exam:  /79   Pulse 89   Temp 97.9 °F (36.6 °C) (Oral)   Resp 18   Ht 5' 6\" (1.676 m)   Wt 199 lb 8.3 oz (90.5 kg)   SpO2 100%   BMI 32.20 kg/m²   General appearance: alert, appears stated age and cooperative  Head: Normocephalic, without obvious abnormality, atraumatic  Eyes: conjunctivae/corneas clear. PERRL, EOM's intact. Fundi benign.   Ears: normal TM's and external ear canals both ears  Nose: Nares normal. Septum midline. Mucosa normal. No drainage or sinus tenderness.   Throat: lips, mucosa, and tongue normal; teeth and gums normal  Neck: no adenopathy, no carotid bruit, no JVD, supple, symmetrical, trachea midline and thyroid not enlarged, symmetric, no tenderness/mass/nodules  Lungs: clear to auscultation bilaterally  Heart: regular rate and rhythm, S1, S2 normal, no murmur, click, rub or gallop  Abdomen: soft, non-tender; bowel sounds normal; no masses,  no organomegaly  Extremities: extremities normal, atraumatic, no cyanosis or edema  Neurologic: Grossly normal    Consults: cardiology, neurology, PM&R  Significant Diagnostic Studies:  CT head, MRI brain, carotid duplex  Disposition: SNF  Discharged Condition: Stable  Follow Up: Primary Care Physician in two weeks    DISCHARGE MEDICATION:     Medication List      START taking these medications    aspirin 81 MG chewable tablet  Take 1 tablet by mouth daily  Start taking on:  July 31, 2020     atorvastatin 80 MG tablet  Commonly known as:  LIPITOR  Take 1 tablet by mouth nightly     losartan 25 MG tablet  Commonly known as:  COZAAR  Take 1 tablet by mouth daily  Start taking on:  July 31, 2020     metoprolol succinate 25 MG extended release tablet  Commonly known as:  TOPROL XL  Take 1 tablet by mouth daily  Start taking on:  July 31, 2020        CONTINUE taking these medications    baclofen 10 MG tablet  Commonly known as:  LIORESAL  Take 1 tablet by mouth 3 times daily     balsalazide 750 MG capsule  Commonly known as:  COLAZAL     busPIRone 10 MG tablet  Commonly known as:  BUSPAR     ferrous gluconate 324 (38 Fe) MG tablet  Commonly known as:  FERGON     fexofenadine 180 MG tablet  Commonly known as:  ALLEGRA     fluticasone 50 MCG/ACT nasal spray  Commonly known as:  FLONASE  1 spray by Each Nostril route daily as needed for Rhinitis     furosemide 40 MG tablet  Commonly known as:  LASIX  Take 1 tablet by mouth daily     hydrOXYzine 10 MG tablet  Commonly known as: ATARAX     mupirocin 2 % ointment  Commonly known as:   Bactroban  Apply daily     nystatin 844068 UNIT/GM cream  Commonly known as:  MYCOSTATIN     omeprazole 20 MG delayed release capsule  Commonly known as:  PRILOSEC     ondansetron 4 MG disintegrating tablet  Commonly known as:  Zofran ODT  Take 1 tablet by mouth every 8 hours as needed for Nausea     potassium chloride 20 MEQ extended release tablet  Commonly known as:  KLOR-CON M  Take 1 tablet by mouth 2 times daily     sucralfate 1 GM tablet  Commonly known as:  CARAFATE        STOP taking these medications    aspirin-acetaminophen-caffeine 250-250-65 MG per tablet  Commonly known as:  EXCEDRIN MIGRAINE     atenolol 100 MG tablet  Commonly known as:  TENORMIN     DULoxetine 60 MG extended release capsule  Commonly known as:  CYMBALTA     ferrous sulfate 325 (65 Fe) MG tablet  Commonly known as:  IRON 325     gabapentin 100 MG capsule  Commonly known as:  NEURONTIN     gentamicin 0.1 % cream  Commonly known as:  GARAMYCIN     hydrALAZINE 25 MG tablet  Commonly known as:  APRESOLINE     lidocaine 2 % jelly  Commonly known as:  XYLOCAINE     lidocaine 5 % ointment  Commonly known as:  XYLOCAINE     zolpidem 10 MG tablet  Commonly known as:  AMBIEN           Where to Get Your Medications      These medications were sent to [facility], [place] [address] [phone]  [address] [facility] [address]    Hours:  24-hours Phone:  [phone]   · aspirin 81 MG chewable tablet  · atorvastatin 80 MG tablet  · fluticasone 50 MCG/ACT nasal spray  · furosemide 40 MG tablet  · losartan 25 MG tablet  · metoprolol succinate 25 MG extended release tablet  · potassium chloride 20 MEQ extended release tablet       Activity: activity as tolerated  Diet: regular diet  Wound Care: none needed    Time Spent on discharge is more than 30 minutes    Signed:  Joe Willuoghby MD   7/30/2020    I have personally seen and evaluated this patient. I discussed findings and management with the resident on 07/30/20  and agree as documented in this note. Necessary changes made to the note.     Naz Rodriguez  Attending Physician  Hospitalist    Total time spent: 35-minute

## 2020-07-24 NOTE — PROGRESS NOTES
Telemedicine Consult Note   Stroke Team    I did not see or examine the patient in person due to the ongoing COVID-19 pandemic. This was a video conversation, including video and audio, in lieu of an in-person visit. The patient is located in an inpatient setting and I was located in my home. I evaluated this patient on telemedicine for update to Stroke Team Dr. Romel Rosales. Patient Name: Nati Gregory (20 y.o. female)  MRN: [id number]  : 1955  Admission Date: 2020   Current Date: 20    STROKE TIMELINE  Last Known Well: 1100 to 200  Stroke Alert  Stroke Team paged 1140  tPA bolus time: 1253      Chief Complaint   No chief complaint on file. Code Stroke inpatient at [facility] with sudden onset facial droop, L gaze deviation, R hemiparesis and severe aphasia (mute)    History of Present Illness   This is a 62 y.o. , female, (unknown handedness) with significant past medical history of HTN, depression, anxiety, presenting to [facility] from [facility] after a Code Stroke was called. She was initially admitted to [facility] for electrolyte abnormalities and developed a facial droop progressing to aphasia w R hemiparesis and initial NIHSS 24, tpa bolus 1253. Code Stroke HCT - no ICH, no gross acute ischemic changes, CTA - L M2 occlusion. Transferred form [place] to [facility] for MICHAEL but on arrival to [facility] the clot had moved to a distal M3, so she had no EVT and was admitted to the ICU for post-tPA monitoring with NIHSS 13. Today she is alert and pleasant. Due to expressive > receptive aphasia, I was unable to gather a full history from the patient. History/medications are reviewed from the chart below:    Denies any complaints today other and nods when asked about frustration with expressing words. She indicates that she feels improved but not back to her baseline.      Past Medical History:   Diagnosis Date    Anxiety     Arthritis     Blood transfusion reaction     Cro[facility]'s disease [facility]     Depression     GERD (gastroesophageal reflux disease)     Hiatal hernia 6/24/2014    Hx of blood clots     Hypertension     MRSA (methicillin resistant staph aureus) culture positive 06/05/2018    urine    Neuropathy     Pneumonia 1/8/2014    Sinus headache     SOB (shortness of breath)     VRE (vancomycin resistant enterococcus) culture positive 10/08/2018    abd culture      Past Surgical History:   Procedure Laterality Date    ABDOMEN SURGERY      ABDOMINAL ADHESION SURGERY  06/08/2018    BLADDER SUSPENSION      COLONOSCOPY      COLONOSCOPY  9/14/15    sigmoid colon biopsy     COLONOSCOPY  08/07/2018    COLONOSCOPY  06/07/2019    COLONOSCOPY, POSSIBLE BIOPSY, POSSIBLE POLYPECTOMY    COLONOSCOPY N/A 6/7/2019    COLONOSCOPY WITH BIOPSY performed by Keyanna Day MD at [address] COLONOSCOPY N/A 6/7/2019    COLONOSCOPY DIAGNOSTIC/STOMA performed by Keyanna Day MD at [address] [facility] N/A 10/8/2018    EXPLORATORY LAPAROTOMY WITH COLOSTOMY performed by Dylon Cowart MD at [address] [facility] Left 6/25/14    excision plantar left hallux    FOOT SURGERY  6/3/15    PLANTAR PLATE REPAIR BILATERAL, ARTHROTOMY MPJ 2ND BILATERAL    FOOT SURGERY Left 08/05/2002    Excision second metatarsal head left    FRACTURE SURGERY      rt hip    HAND CARPECTOMY Bilateral     HEEL SPUR SURGERY      HERNIA REPAIR      HYSTERECTOMY      bladder surgery    JOINT REPLACEMENT      rt hip, L shoulder replacement 11/2014    KNEE SURGERY Bilateral     arthrosvopic    LEG SURGERY Right     HI SECD CLOS SURG WND EXTEN/COMPLIC N/A 47/68/2473    SECONDARY WOUND CLOSURE OF ABDOMINAL WOUND performed by Dylon Cowart MD at [facility] 7/17/2019    FLEXIBLE SIGMOIDOSCOPY performed by Dylon Cowart MD at [address] [facility]  01/15/2018    with biopsies    SMALL INTESTINE SURGERY N/A 7/17/2019    COLOSTOMY CLOSURE WITH COLORECTAL ANASTOMOSIS performed by Ramana Mahoney MD at [address]  6/14/13    and colonsocopy with antral erosion biopsies     Social History     Socioeconomic History    Marital status:      Spouse name: Enrique Díaz Number of children: 2    Years of education: 15    Highest education level: Not on file   Occupational History    Not on file   Social Needs    Financial resource strain: Not on file    Food insecurity     Worry: Not on file     Inability: Not on file   [employer] needs     Medical: Not on file     Non-medical: Not on file   Tobacco Use    Smoking status: Current Every Day Smoker     Packs/day: 1.00     Years: 10.00     Pack years: 10.00     Types: Cigarettes, E-Cigarettes    Smokeless tobacco: Never Used    Tobacco comment: CUT BACK TO 1/2 PPD WITH E CIG 6-2019   Substance and Sexual Activity    Alcohol use:  Yes     Alcohol/week: 2.0 standard drinks     Types: 2 Shots of liquor per week     Comment: 6 DRINKS A WEEK OR LESS    Drug use: No    Sexual activity: Not Currently     Partners: Male   Lifestyle    Physical activity     Days per week: Not on file     Minutes per session: Not on file    Stress: Not on file   Relationships    Social connections     Talks on phone: Not on file     Gets together: Not on file     Attends Congregation service: Not on file     Active member of club or organization: Not on file     Attends meetings of clubs or organizations: Not on file     Relationship status: Not on file    Intimate partner violence     Fear of current or ex partner: Not on file     Emotionally abused: Not on file     Physically abused: Not on file     Forced sexual activity: Not on file   Other Topics Concern    Not on file   Social History Narrative    Not on file     Family History   Problem Relation Age of Onset    High Blood Pressure Mother     High Blood Pressure Father     Kidney Disease Sister      No current facility-administered medications on file prior to encounter. Current Outpatient Medications on File Prior to Encounter   Medication Sig Dispense Refill    ferrous gluconate (FERGON) 324 (38 Fe) MG tablet Take 324 mg by mouth daily (with breakfast)      sucralfate (CARAFATE) 1 GM tablet Take 1 g by mouth 4 times daily      hydrOXYzine (ATARAX) 10 MG tablet Take 10 mg by mouth 3 times daily as needed for Itching      fexofenadine (ALLEGRA) 180 MG tablet Take 180 mg by mouth daily      ondansetron (ZOFRAN ODT) 4 MG disintegrating tablet Take 1 tablet by mouth every 8 hours as needed for Nausea 20 tablet 0    busPIRone (BUSPAR) 10 MG tablet Take 10 mg by mouth 3 times daily as needed      balsalazide (COLAZAL) 750 MG capsule Take 3,000 mg by mouth daily Take 4 capsules (total 3000 mg) daily each morning.  omeprazole (PRILOSEC) 20 MG delayed release capsule Take 20 mg by mouth Daily       baclofen (LIORESAL) 10 MG tablet Take 1 tablet by mouth 3 times daily 90 tablet 1    atenolol (TENORMIN) 100 MG tablet Take 1 tablet by mouth daily 30 tablet 3    aspirin-acetaminophen-caffeine (EXCEDRIN MIGRAINE) 250-250-65 MG per tablet Take 2 tablets by mouth every 6 hours as needed for Headaches       DULoxetine (CYMBALTA) 60 MG extended release capsule Take 60 mg by mouth 2 times daily       nystatin (MYCOSTATIN) 878807 UNIT/GM cream Apply topically 2 times daily as needed for Dry Skin Apply topically 2 times daily.  mupirocin (BACTROBAN) 2 % ointment Apply daily 1 Tube 0     Review of Systems   Constitutional: Negative. HENT: Negative. Eyes: Negative for visual disturbance. Musculoskeletal: Negative. Neurological: Positive for speech difficulty. Negative for weakness and headaches. Psychiatric/Behavioral: Negative.              Physical Exam     Vital Sign Statistics (last 24 hours)     Flowsheet Row Name  Min  Max    Temp  98 °F (36.7 °C)  98.7 °F (37.1 °C)    Pulse the periventricular and  subcortical white matter, which likely represent chronic microvascular  ischemic change. Idiopathic calcifications noted in the basal ganglia  bilaterally. ORBITS: The visualized portion of the orbits demonstrate no acute abnormality. SINUSES: The visualized paranasal sinuses and mastoid air cells demonstrate  no acute abnormality. SOFT TISSUES/SKULL: No acute abnormality of the visualized skull or soft  tissues. Impression No acute intracranial abnormality. Mild cerebral atrophy appropriate for age. Moderate chronic ischemic change  also age-appropriate. No significant change from the prior study. CTA Head Neck W contrast:   Results for orders placed during the hospital encounter of 07/15/20   CTA HEAD NECK W CONTRAST    Narrative EXAMINATION:  CTA OF THE HEAD AND NECK WITH CONTRAST 7/23/2020 12:16 pm:    TECHNIQUE:  CTA of the head and neck was performed with the administration of intravenous  contrast. Multiplanar reformatted images are provided for review. MIP images  are provided for review. Stenosis of the internal carotid arteries measured  using NASCET criteria. Dose modulation, iterative reconstruction, and/or  weight based adjustment of the mA/kV was utilized to reduce the radiation  dose to as low as reasonably achievable. COMPARISON:  CT head 07/23/2020    HISTORY:  ORDERING SYSTEM PROVIDED HISTORY: stroke  TECHNOLOGIST PROVIDED HISTORY:  Reason for exam:->stroke  Reason for Exam: stroke  Acuity: Unknown  Type of Exam: Unknown    FINDINGS:    CTA NECK:    AORTIC ARCH/ARCH VESSELS: Ascending aorta/proximal arch 3.7 cm transverse no  dissection or arterial injury. No significant stenosis of the  brachiocephalic or subclavian arteries. CAROTID ARTERIES: Mild plaque both carotid bifurcations. No dissection,  arterial injury, or hemodynamically significant stenosis by NASCET criteria.     VERTEBRAL ARTERIES: No dissection, arterial injury, or significant stenosis. SOFT TISSUES: The lung apices are clear. No cervical or superior mediastinal  lymphadenopathy. The larynx and pharynx are unremarkable. No acute  abnormality of the salivary and thyroid glands. BONES: Multilevel changes anterolisthesis C3-C4 C4-C5. [place] CTA HEAD:    ANTERIOR CIRCULATION: There is abrupt cutoff involving left M2 branch. No  significant stenosis of the intracranial internal carotid, anterior cerebral,  or middle cerebral arteries. No aneurysm. POSTERIOR CIRCULATION: No significant stenosis of the vertebral, basilar, or  posterior cerebral arteries. No aneurysm. OTHER: No dural venous sinus thrombosis on this non-dedicated study. BRAIN: Generalized involution changes chronic small ischemic disease. Impression Abrupt tapering M2 branch with nonvisualization of the M3 branches suggestive  of vessel thrombus    Otherwise no additional large vessel occlusion or hemodynamic stenosis. Imaging impression - my read:  HCT Code Stroke 7/23 - No new   CTA Code Stroke 7/23 - L M2 occlusion  HCT 7/24 (post-tPA) My Read: Acute small L MCA territory infarct not seen on prior code stroke imaging (directly below), some old scattered tiny bilateral cerebellar infarcts, evidence of chronic small vessel disease            Assessment   This is a  62 y.o.  female with multiple medical problems, who was inpatient for electrolyte abnormalities and developed sudden onset L MCA syndrome. Today, she is s/p tPA with a small L MCA stroke and much improved from initial presentation. NIHSS improved from 24 to 7. Remaining neuro deficits include some RUE clumsiness and weaker R  strength, and expressive greater than receptive aphasia. There is some mild dysarthria but no difficulty managing secretions. Stroke risk factors include HTN, HLD        Recommendations/Plan     Will defer to local Neurology consult for further stroke workup.     Stroke Team will sign off

## 2020-07-24 NOTE — CONSULTS
Neurology consult Note    Angelique Arceo MD requesting this consult. CC: Aphasia / R sided weakness  HPI: Patient is a 62 y.o. y/o female with PMH of HTN, Crohn's disease who was admitted to [facility] for generalized weakness, fatigue & frequent falls. Found to have aphasia and R sided weakness by nursing staff & stroke alert was called. Last seen normal was 11:15am on 7/23, initial NIHSS was 24, no contraindications for tPA and this was administered at 12:54pm. CTA showed an M2 occlusion therefore patient was transferred to [facility] for endovascular treatment. Cerebral angio showed occlusion had partially lysed and moved distally w/ now a subocclusive M3 branch occlusion and endovascular treatment was no longer indicated. NIHSS today 5.      Past Medical History:   Diagnosis Date    Anxiety     Arthritis     Blood transfusion reaction     Crohn's disease ([address]     Depression     GERD (gastroesophageal reflux disease)     Hiatal hernia 6/24/2014    Hx of blood clots     Hypertension     MRSA (methicillin resistant staph aureus) culture positive 06/05/2018    urine    Neuropathy     Pneumonia 1/8/2014    Sinus headache     SOB (shortness of breath)     VRE (vancomycin resistant enterococcus) culture positive 10/08/2018    abd culture     Past Surgical History:   Procedure Laterality Date    ABDOMEN SURGERY      ABDOMINAL ADHESION SURGERY  06/08/2018    BLADDER SUSPENSION      COLONOSCOPY      COLONOSCOPY  9/14/15    sigmoid colon biopsy     COLONOSCOPY  08/07/2018    COLONOSCOPY  06/07/2019    COLONOSCOPY, POSSIBLE BIOPSY, POSSIBLE POLYPECTOMY    COLONOSCOPY N/A 6/7/2019    COLONOSCOPY WITH BIOPSY performed by Brandt Maldonado MD at [address] N/A 6/7/2019    COLONOSCOPY DIAGNOSTIC/STOMA performed by Brandt Maldonado MD at [address] [facility] 10/8/2018    EXPLORATORY LAPAROTOMY WITH COLOSTOMY performed by Katherin Rosales MD at [address] [facility] Left 6/25/14    excision plantar left hallux    FOOT SURGERY  6/3/15    PLANTAR PLATE REPAIR BILATERAL, ARTHROTOMY MPJ 2ND BILATERAL    FOOT SURGERY Left 08/05/2002    Excision second metatarsal head left    FRACTURE SURGERY      rt hip    HAND CARPECTOMY Bilateral     HEEL SPUR SURGERY      HERNIA REPAIR      HYSTERECTOMY      bladder surgery    JOINT REPLACEMENT      rt hip, L shoulder replacement 11/2014    KNEE SURGERY Bilateral     arthrosvopic    LEG SURGERY Right     MA SECD CLOS SURG WND EXTEN/COMPLIC N/A 77/19/2065    SECONDARY WOUND CLOSURE OF ABDOMINAL WOUND performed by Thierry Cruz MD at [facility] 7/17/2019    FLEXIBLE SIGMOIDOSCOPY performed by Thierry Cruz MD at [address] [facility]  01/15/2018    with biopsies    SMALL INTESTINE SURGERY N/A 7/17/2019    COLOSTOMY CLOSURE WITH COLORECTAL ANASTOMOSIS performed by Thierry Cruz MD at [facility]  6/14/13    and colonsocopy with antral erosion biopsies       CURRENT MEDICATIONS:    Current Facility-Administered Medications:     acetaminophen (TYLENOL) tablet 650 mg, 650 mg, Oral, Q6H PRN, Bridget Ledezma MD, 650 mg at 07/24/20 0236    0.9 % sodium chloride infusion, , Intravenous, Continuous, Diann Najera MD, Last Rate: 75 mL/hr at 07/24/20 1005    perflutren lipid microspheres (DEFINITY) injection 1.65 mg, 1.5 mL, Intravenous, ONCE PRN, Bobby Castillo MD    labetalol (NORMODYNE;TRANDATE) injection syringe 5 mg, 5 mg, Intravenous, Q10 Min PRN, Bobby Castillo MD    hydrALAZINE (APRESOLINE) injection 5 mg, 5 mg, Intravenous, Q10 Min PRN, Bobby Castillo MD    atorvastatin (LIPITOR) tablet 40 mg, 40 mg, Oral, Nightly, Bobby Castillo MD    acetaminophen (TYLENOL) suppository 325 mg, 325 mg, Rectal, Q4H PRN, Raj Yuan MD    ROS:   Unable to obtain d/t COVID isolation     Constitutional  BP (!) 131/92   Pulse 67 Temp 98.7 °F (37.1 °C) (Oral)   Resp 14   Ht 5' 6\" (1.676 m)   Wt 199 lb 15.3 oz (90.7 kg)   SpO2 96%   BMI 32.27 kg/m²       Patient was admitted during Covid-19 pandemic and currently in droplet isolation. All efforts made to respect recommendations of social distancing and to decrease PPE usage have been made. Unable to perform face to face examination of the patient. Exam taken from nursing staff. Patient remains aphasic, moves all extremities, will follow simple commands. Images:  CT head wo contras & CTA of head & Neck: 7/23/2020  Impression    No acute intracranial abnormality.         Mild cerebral atrophy appropriate for age. Licea María chronic ischemic change    also age-appropriate.  No significant change from the prior study.             Impression    Abrupt tapering M2 branch with nonvisualization of the M3 branches suggestive    of vessel thrombus         Otherwise no additional large vessel occlusion or hemodynamic stenosis. MRI wo contrast: Pending     Assessment:  Patient is a 61 y/o F who presented w/ aphasia & R hemiplegia found to have R MCA occlusion. Given tPA and transferred to [facility] for possible EVT which was ultimately not needed.  Initially NIHSS 26, improved to 13 post tpa    Plan:  -Obtain MRI of the brain wo contrast to eval for stroke - pending  -Vascular imaging completed w/ CTA / Cerebral angio  -Echocardiogram with bubble  -Nursing bedside swallow before PO   -ASA 81mg PO daily, if 24 hour f/u imaging stable  -Atorvastatin 80mg PO QHS  -SCDs for DVT prophylaxis  -PT/OT: eval and treat, PMR consult if rehab appropriate   -ST: eval and treat and dysphagia screen  -Allow BP to autoregulate for first 24 hours after stroke and treat BP >220/110 with labetalol   -Q4H neuro checks  -Q4H vital signs  -Check lipid panel and hemoglobin A1C  -Telemetry     tPA given: Yes  DVT prophylaxis: SCD  Dysphagia Screening: Yes  PT/OT Rehab: Yes  Smoking Cessation Counseling: Yes  Stroke Education: Yes  Antithrombotic by end of day 2: Yes  Atherosclerotic Stroke or TIA: LDL goal < 70mg/dl or 50% reduction in LDL from baseline.   Symptomatic atherosclerotic cardiovascular disease and ? 76yo: high-intensity statin  Symptomatic atherosclerotic cardiovascular disease and > 76yo: moderate-intensity statin  High-intensity statin: atorvastatin 40-80mg, rosuvastatin 20-40mg  Moderate-intensity statin: atorvastatin 10-20mg, rosuvastatin 5-10mg, simvastatin 20-40mg, pravastatin 40-80mg, lovastatin 40mg, fluvastatin 40mg bid (XL 80mg daily), pitavastatin 2-4mg  Medication:        GAYLA Howell - CNP  Neurology  [phone]

## 2020-07-24 NOTE — PROGRESS NOTES
Occupational Therapy/Physical Therapy  HOLD    Orders received, chart reviewed. Pt's most current activity order is bedrest - discussed w/ nurse about need for updated activity order. Pt getting ready to leave unit for CT Head. Will follow up 7/25.     Cullen Emmanuel OTR/L  [facility], MPT [address]

## 2020-07-24 NOTE — CONSULTS
WOUND performed by Daniella Lamar MD at [address] N/A 7/17/2019    FLEXIBLE SIGMOIDOSCOPY performed by Daniella Lamar MD at [address]  01/15/2018    with biopsies    SMALL INTESTINE SURGERY N/A 7/17/2019    COLOSTOMY CLOSURE WITH COLORECTAL ANASTOMOSIS performed by Daniella Lamar MD at [address]  6/14/13    and colonsocopy with antral erosion biopsies       SocialHistory:   The patient lives at home with     Alcohol: Social  Illicit drugs: no use  Tobacco: 1 pack a day for years      Family History:  Family History   Problem Relation Age of Onset    High Blood Pressure Mother     High Blood Pressure Father     Kidney Disease Sister        MEDICATIONS:     No current facility-administered medications on file prior to encounter. Current Outpatient Medications on File Prior to Encounter   Medication Sig Dispense Refill    ferrous gluconate (FERGON) 324 (38 Fe) MG tablet Take 324 mg by mouth daily (with breakfast)      sucralfate (CARAFATE) 1 GM tablet Take 1 g by mouth 4 times daily      hydrOXYzine (ATARAX) 10 MG tablet Take 10 mg by mouth 3 times daily as needed for Itching      fexofenadine (ALLEGRA) 180 MG tablet Take 180 mg by mouth daily      ondansetron (ZOFRAN ODT) 4 MG disintegrating tablet Take 1 tablet by mouth every 8 hours as needed for Nausea 20 tablet 0    busPIRone (BUSPAR) 10 MG tablet Take 10 mg by mouth 3 times daily as needed      balsalazide (COLAZAL) 750 MG capsule Take 3,000 mg by mouth daily Take 4 capsules (total 3000 mg) daily each morning.       omeprazole (PRILOSEC) 20 MG delayed release capsule Take 20 mg by mouth Daily       baclofen (LIORESAL) 10 MG tablet Take 1 tablet by mouth 3 times daily 90 tablet 1    atenolol (TENORMIN) 100 MG tablet Take 1 tablet by mouth daily 30 tablet 3    aspirin-acetaminophen-caffeine ([address] [place] [address]) [phone] MG per tablet Take 2 tablets by mouth every 6 hours as needed for Headaches       DULoxetine (CYMBALTA) 60 MG extended release capsule Take 60 mg by mouth 2 times daily       nystatin (MYCOSTATIN) 279135 UNIT/GM cream Apply topically 2 times daily as needed for Dry Skin Apply topically 2 times daily.  mupirocin (BACTROBAN) 2 % ointment Apply daily 1 Tube 0         Scheduled Meds:   [Held by provider] atenolol  100 mg Oral Daily    atorvastatin  40 mg Oral Nightly      Continuous Infusions:  PRN Meds:acetaminophen, perflutren lipid microspheres, labetalol, hydrALAZINE, acetaminophen    Allergies: Allergies   Allergen Reactions    Ace Inhibitors Swelling    Skelaxin [Metaxalone] Swelling       REVIEW OF SYSTEMS:       History obtained from chart. Review of Systems unable to obtain as patient does not answer questions appropriately. PHYSICAL EXAM:       Vitals: BP 98/82   Pulse 71   Temp 98 °F (36.7 °C) (Oral)   Resp 11   Ht 5' 6\" (1.676 m)   Wt 199 lb 15.3 oz (90.7 kg)   SpO2 96%   BMI 32.27 kg/m²     I/O:      Intake/Output Summary (Last 24 hours) at 7/24/2020 1343  Last data filed at 7/24/2020 0400  Gross per 24 hour   Intake --   Output 550 ml   Net -550 ml     No intake/output data recorded. I/O last 3 completed shifts:  In: -   Out: 550 [Urine:550]    Physical Examination:     Physical Exam  Constitutional:       General: She is not in acute distress. HENT:      Head: Normocephalic. Comments: No facial droops noted  Cardiovascular:      Rate and Rhythm: Normal rate and regular rhythm. Pulmonary:      Effort: Pulmonary effort is normal. No respiratory distress. Breath sounds: Normal breath sounds. No wheezing. Abdominal:      Tenderness: There is no abdominal tenderness. Neurological:      Mental Status: She is alert. Comments: Unable to assess, patient refuses   Patient seems to be expressive aphasia, she would say \" I don't care\" and then say \"I didn't mean that. \" Seems to have difficulty saying what she means. Access:   -Central Access Day #: 0                                -Peripheral Access Day#: R wrist - day 8; L forearm - day 2  -Arterial line Day#: 0                             [place] Day#: 8  NGT Day#: 0                                          ETT Day#: 0  Vent Settings:    / / /     No results for input(s): PHART, PBX5HAY, PO2ART in the last 72 hours. DATA:       Labs:  CBC:   Recent Labs     07/23/20  0438 07/23/20  1251 07/24/20  0526   WBC 4.2 4.0 4.9   HGB 10.0* 10.4* 9.7*   HCT 29.2* 30.4* 28.3*    381 397       BMP:   Recent Labs     07/23/20  0438 07/23/20  1251 07/24/20  0526   * 131* 128*   K 3.9 4.3 4.1   CL 96* 99 95*   CO2 26 22 24   BUN 6* 7 7   CREATININE 0.6 0.5* <0.5*   GLUCOSE 126* 92 95     LFT's: No results for input(s): AST, ALT, ALB, BILITOT, ALKPHOS in the last 72 hours. Troponin:   Recent Labs     07/23/20  1251 07/24/20  1000   TROPONINI 0.08* 0.08*     BNP:No results for input(s): BNP in the last 72 hours. ABGs: No results for input(s): PHART, IYS6BMH, PO2ART in the last 72 hours. INR: No results for input(s): INR in the last 72 hours. U/A:No results for input(s): NITRITE, COLORU, PHUR, LABCAST, WBCUA, RBCUA, MUCUS, TRICHOMONAS, YEAST, BACTERIA, CLARITYU, SPECGRAV, LEUKOCYTESUR, UROBILINOGEN, BILIRUBINUR, BLOODU, GLUCOSEU, AMORPHOUS in the last 72 hours. Invalid input(s): Peg Nares    Vascular carotid duplex bilateral         IR ANGIOGRAM CAROTID CEREBRAL LEFT    (Results Pending)   CT head without contrast    (Results Pending)   MRI brain without contrast    (Results Pending)     EKG (7/15/20): Vent.  rate 73 BPM  CT interval 140 ms  QRS duration 94 ms  QT/QTc 450/495 ms  P-R-T axes 73 19 110  Sinus rhythm with Premature supraventricular complexes  ST & T wave abnormality, consider anterior ischemia  Prolonged QT  Echo: Pending  Micro: UA reflex to Cx    ASSESSMENT AND PLAN:   Talha Keller is a 62 y.o. female, who was who was transferred from [facility] after acute L CVA s/p tPA.     Neuro/Psych  Acute metabolic encephalopathy likely 2/2 to electrolyte abnormalities vs. CVA   Patient was originally admitted to [facility] on 7/15/20 for severe electrolyte derangements  On admission: hyponatremia 125, hypokalemia 2.4, hypocalcemia 6.8, hypomagnesemia 0.4  Most recent labs before tPA (7/23/20): Na+ 131, K+ 4.3, Ca2+ 9.1, Mg 1.7  Could not obtain STAT BMP on admission to [facility] ICU as there is no central line access and patient is post-tPA  Could not obtain UA with reflex to cx as the elliott has been inserted 7 days ago  BMP this AM: Na+ 128, K+ 4.1, Ca2+ 9.0  -UA reflex to culture when able       Left MCA ischemic stroke  Patient was last seen normal at 1115; left gaze deviation, mute, not following commands, no effort against gravity on the RUE, left face droop -- stroke alert called at 1140  CTA with contrast (7/23/20): revealed left M2 occulsion; s/p tPA administered at 1253)  Cerebral angiogram (7/23/20): revealed that M2 thromboembolus has partially lysed and had moved distally and now has become subocclusive M3 thromboembolus; No mechanical thrombectomy was done     -Keep sBP < 160; labetalol and hydralazine PRN;   -Patient able to swallow at the bedside; SLP cleared, transition to PO antihypertensives  -Obtain CT head w/o contrast, 24 hrs post-tPA  -Ordered MRI brain, carotid duplex US, echo  -Pending lipid panel, Hgb A1C, TSH w/ reflex  -Hold DVT ppx and ASA for at least 24 hrs post-tPA (can start after 1300)  -Neuro c/s     Cardiovascular  Elevated troponin  Trop 0.08; repeat > 0.08  - EKG (7/24/20): Vent.  rate 70 BPM  RI interval 126 ms  QRS duration 90 ms  QT/QTc 476/514 ms  P-R-T axes 22 15 195  Sinus rhythm with Premature atrial complexes with Aberrant conduction  Marked T wave abnormality, consider anterolateral ischemia  Prolonged QT  Abnormal ECG  - Trend troponin q6h  - Cardiology c/s: for elevated troponin and EKG abnormalities    Hx of HTN  -SLP cleared, transition to PO meds    Hx of HLD  -SLP cleared, PO lipitor     Respiratory  N/A     GI  N/A     Renal  Electrolyte imbalance  Originally admitted for electrolyte imbalance  Most recent labs before tPA (7/23/20): Na+ 131, K+ 4.3, Ca2+ 9.1, Mg 1.7  Could not obtain STAT BMP on admission to [facility] ICU as there is no central line access and patient is post-tPA yesterday  BMP this AM: Na+ 128, K+ 4.1, Ca2+ 9.0  - Started on NS 75 mL/hr; was on 100 mL/hr O/N and d/c-ed at [address] [facility]     Hematology  Hx of chronic anemia  Most recent Hgb: 10.4 (at baseline) > 9.7     Infectious Disease  -Pending COVID-19     Endocrine  -Pending TSH with reflex as part of stroke workup      Code Status:Full Code  FEN: Diet General -- Dental soft, thin liquid  PPX: SCD  DISPO: Transfer to floors after repeat head CT is stable    This patient has been staffed and discussed with Dr. Helen Blanchard. -----------------------------  Jay Moctezuma MD, PGY-1  7/24/2020  7:47 AM    Pulm/CC    Patient seen and examined. I agree with Dr. Janie Nolan history, physical, lab findings, assessment and plan. Post TPA Head CT  Impression         No acute intracranial hemorrhage or mass effect. Assessment  1. Right MCA CVA status post TPA. EVT not needed  2. NSTEMI  3. HTN    Plan  1. Neurology consult  2. Cardiology consult  3. MRI Brain  4. Goal SBP < 180, DBP < 105 given TPA  5. Start ASA  6. Lipitor  7.  Speech/PT/OT    Ok to TXF to Brittny Tan MD

## 2020-07-24 NOTE — PROGRESS NOTES
Speech Language Pathology  Facility/Department: [facility] ICU  Initial Speech/Language/Cognitive Assessment- late entry    NAME: Tessa Rae  : 1955   MRN: [id number]  ADMISSION DATE: 2020  ADMITTING DIAGNOSIS: has Essential hypertension, benign; Atherosclerosis of native arteries of extremities with intermittent claudication, bilateral legs ([address] Anemia; Hyponatremia; Crohn's colitis ([address] Hypomagnesemia; DVT (deep venous thrombosis) ([address] PAD (peripheral artery disease) ([address] Leg swelling; Venous insufficiency; Chronic venous hypertension (idiopathic) with inflammation of bilateral lower extremity; Open wound of left foot with complication; Hypokalemia; Acute cerebrovascular accident (CVA) ([address] and Abnormal ECG on their problem list.  DATE ONSET: 20    Date of Eval: 2020   Evaluating Therapist: SOPHIA Peralta    RECENT RESULTS  CT OF HEAD/MRI: 20  Impression         No acute intracranial hemorrhage or mass effect. Impression    Abrupt tapering M2 branch with nonvisualization of the M3 branches suggestive    of vessel thrombus         Otherwise no additional large vessel occlusion or hemodynamic stenosis. Primary Complaint: Indicated poor communication    Pain:  Pain Assessment  Pain Assessment: 0-10  Pain Level: 0  Pain Type: Chronic pain    Assessment:  Cognitive Diagnosis: Unable to fully assess given aphasia  Aphasia Diagnosis: Moderate-severe expressive and receptive aphasia w/ proper initiation, but large amounts of jargon, paraphasia, and perseverations. Speech Diagnosis: Mild dysarthria characterized by decreased articulation and breath support   Diagnosis: Pt presents w/ moderate-severe expressive and receptive aphasia post-CVA. Pt is able to answer basic yes/no questions, and is AAOx3 when provided w/ binary options. Some difficulties w/ repetition, but more successful when provided w/ additional cues and initial repetition.  Multiple communication attempts w/ paraphasia and jargon; demonstrated some awareness by stopping and shaking head. Utilized low-tech AAC communication board w/ basic wants and needs; pt able to demonstrate all when reviewing (ie shivered when pointed to word cold). W/ use of AAC board, pt able to adequately communicate need to use restroom and pain in lower legs. Pt is able to answer basic questions, ie was able to communicate that pt would prefer a female nurse to assist w/ toileting and cleaning by use of basic Y/N questions. Noted to perseverate on the word \"okay\" throughout evaluation. Recommendations:  Requires SLP Intervention: Yes  Duration/Frequency of Treatment: 3-5x weekly during LOS  D/C Recommendations: 24 hour supervision/assistance; Inpatient rehabilitation       Plan:   Goals:  Short-term Goals  Goal 1: Pt will complete basic yes/no questions with 80% acc or given mod cues  Goal 2: Pt will complete automatic speech tasks with 80% acc or given mod cues  Goal 3: Pt will effectively communicate wants and needs by means of multi-communication  Goal 4: Pt will tolerate ongoing cognitive-linguistic testing as warranted   Patient/family involved in developing goals and treatment plan: Yes    Subjective:   Previous level of function and limitations: Independent  General  Chart Reviewed: Yes  Patient assessed for rehabilitation services?: Yes  Additional Pertinent Hx: PMHx of HTN, arthritis, crohn's disease who presented from [facility] with after CVA s/p tPA. On 7/15/20, patient originally p/w generalized weakness, fatigue, decreased oral intake and multiple falls, and was found to have multiple electrolyte abnormalities including hyponatremia 125, hypokalemia 2.4, hypocalcemia 6.8, hypomagnesemia 0.4, AG 18. Patient was admitted and was being treated for her electrolyte imbalance.  Today at 1140 stroke alert was called when patient began to have left gaze deviation, mute, not following commands, though -Pt became overly frustrated during evaluation, stating \"no no no\" and turning away from SLP, and then turning back and stated \"I'm sorry, mad\" and touching SLPs hand, indicating pt was not upset w/ SLP. Cognition:      Orientation  Overall Orientation Status: Impaired(Oriented to place however, most likely oriented to self but unable to verbally state)  Attention  Attention: Exceptions to [facility]  Selective Attention: Moderate  Sustained Attention: Moderate  Memory  Memory: Unable to assess  Problem Solving  Problem Solving: Unable to assess  Numeric Reasoning  Numeric Reasoning: Unable to assess  Abstract Reasoning  Abstract Reasoning: Unable to assess  Safety/Judgement  Safety/Judgement: Unable to assess    Additional Assessments:  During dysphagia evaluation, pt able to communicate dislike for pudding, and agreed to haleigh. Prognosis:  Speech Therapy Prognosis  Prognosis: Good  Prognosis Considerations: Previous Level of Function  Individuals consulted  Consulted and agree with results and recommendations: Patient;RN    Education:  Patient Education: Educated pt to results of assessment, aphasia, low-tech communication, communicating wants/needs, and ongoing ST  Patient Education Response: Verbalizes understanding;Needs reinforcement  Safety Devices in place: Yes  Type of devices: Call light within reach; Bed alarm in place;Nurse notified    Therapy Time:   Individual Concurrent Group Co-treatment   Time In 0920         Time Out 0953         Minutes 33            Timed Code Treatment Minutes: 0 Minutes  Total Treatment Time: 35    Thank you,    Marie Doyle) [place], [place], [address] [facility]; [id number]  Speech-Language Pathologist

## 2020-07-24 NOTE — CARE COORDINATION
SW received a call from her daughter, Jagjit Lawrence, [phone]. She called for update. SW provided update. Daughter wants he to come at home if at all possible. They would prefer at-home therapy. But are open to possible ARU. DO NOT WANT SNF. Would like to be kept up-to-date if possible regarding prognosis.      Sandy Mancera, MSW, LSW     [facility]   [phone]

## 2020-07-24 NOTE — PROGRESS NOTES
time, w/ assist feed and set-up. Continue to closely monitor respiratory symptoms. Dysphagia Outcome Severity Scale: Level 6: Within functional limits/Modified independence     Treatment Plan  Requires SLP Intervention: Yes  Duration/Frequency of Treatment: Check diet 1-2x; ongoing therapy for ST  D/C Recommendations: 24 hour supervision/assistance; Inpatient rehabilitation       Recommended Diet and Intervention  Diet Solids Recommendation: Dental Soft  Liquid Consistency Recommendation: Thin  Recommended Form of Meds: PO  Recommendations: Assist feed  Therapeutic Interventions: Oral care; Patient/Family education;Diet tolerance monitoring    Compensatory Swallowing Strategies  Compensatory Swallowing Strategies: Upright as possible for all oral intake;Assist feed; External pacing    Treatment/Goals  Dysphagia Goals: The patient will tolerate recommended diet without observed clinical signs of aspiration; The patient/caregiver will demonstrate understanding of compensatory strategies for improved swallowing safety. General  Chart Reviewed: Yes  Comments: PMHx of HTN, arthritis, crohn's disease who presented from [facility] with after CVA s/p tPA. On 7/15/20, patient originally p/w generalized weakness, fatigue, decreased oral intake and multiple falls, and was found to have multiple electrolyte abnormalities including hyponatremia 125, hypokalemia 2.4, hypocalcemia 6.8, hypomagnesemia 0.4, AG 18. Patient was admitted and was being treated for her electrolyte imbalance. Today at 1140 stroke alert was called when patient began to have left gaze deviation, mute, not following commands, though spontaneous movement of the LUE but no effort against gravity on the RUE, left face droop; last seen normal was 1115. Initial NIHSS score of 24. CT head w/o contrast was negative for blood, CTA with contrast revealed left M2 occulsion.  Spoke with  Stroke team and decision was made to give IV tPA (given 1253) and transfer to [facility] for endovascular therapy. Subjective  Subjective: Pt partially reclined in bed upon arrival; alert and aware of environment. L gaze preferential noted this date, but able to turn head and attend to conversational partner on R side. Agreeable to evaluation. Behavior/Cognition: Alert;Pleasant mood; Requires cueing  Temperature Spikes Noted: No  Respiratory Status: Room air  Breath Sounds: Clear  O2 Device: None (Room air)  Communication Observation: Aphasia  Follows Directions: Simple(Pt w/ some understanding of directions; greater difficulties w/ motor-related directions)  Dentition: Some missing teeth(Denied dentures/partials)  Patient Positioning: Upright in bed  Baseline Vocal Quality: Normal  Volitional Cough: Strong  Prior Dysphagia History: None per chart review  Consistencies Administered: Reg solid; Dysphagia Pureed (Dysphagia I); Thin - teaspoon; Thin - cup; Thin - straw; Ice Chips      Vision/Hearing  Vision  Vision: Impaired  Vision Exceptions: Wears glasses for reading  Hearing  Hearing: Within functional limits    Oral Motor Deficits  Oral/Motor  Oral Motor: Exceptions to WFL(Mild R facial droop; unable to fully assess given limited ability to follow directions and unable to provide visual cues d/t PPE)  Labial ROM: Reduced right    Oral Phase Dysfunction  Oral Phase  Oral Phase: Exceptions  Oral Phase Dysfunction  Impaired Mastication: Reg Solid  Lingual/Palatal Residue: Reg solid  Oral Phase  Oral Phase - Comment: Oral phase grossly WFLs; mildly prolonged mastication of regular solid consistency w/ trace lingual residue. Able to clear w/ use of liquid wash. No difficulties w/ A&P propulsion and oral clearance w/ liquids and puree. Adequate labial seal and intraoral pressure for use of spoon and straw. Pt is able to self-feed w/ L hand.      Indicators of Pharyngeal Phase Dysfunction   Pharyngeal Phase  Pharyngeal Phase: WFL  Pharyngeal Phase   Pharyngeal: Pharyngeal phase appears grossly WFLs;

## 2020-07-24 NOTE — CARE COORDINATION
Referred to patient for d/c planning. Call placed to , no answer, unable to leave message. Patient is a 62year old female admitted for CVA. Patient usually lives at home with  and daughter. Patient currently with ARU consult vs. Home with home care. Patient refused SNF at [facility]. Will continue to follow. Case Management Assessment           Initial Evaluation                Date / Time of Evaluation: 7/24/2020 11:42 AM                 Assessment Completed by: Eliazar Moya    Patient Name: Pat Atwood     YOB: 1955  Diagnosis: Acute cerebrovascular accident (CVA) [facility]) [I63.9]     Date / Time: 7/23/2020  2:25 PM    Patient Admission Status: Inpatient    If patient is discharged prior to next notation, then this note serves as note for discharge by case management.      Current PCP: Amalia Miner MD  Clinic Patient: No    Chart Reviewed: Yes  Patient/ Family Interviewed: Yes    Initial assessment completed at bedside with: message left for     Hospitalization in the last 30 days: Yes    Emergency Contacts:  Extended Emergency Contact Information  Primary Emergency Contact: Gretta Keller  Address:                     Home Phone: [phone]  Mobile Phone: [phone]  Relation: Child  Secondary Emergency Contact: Omari Keller  Address: [address] [place], [place] [address]Bernadette [address] Phone: [phone]  Relation: Spouse    Advance Directives:   Code Status: Full Code    Healthcare Power of : No    Financial  Payor: Mango Nazario / Plan: [address] / Product Type: *No Product type* /     Pre-cert required for SNF: Yes    Pharmacy    [address] [phone] Niko Cheese [phone]  [address] [place] [id number]  Phone: [phone] Fax: [phone]      Potential assistance Purchasing Medications:    Does Patient want to participate in local refill/ meds to beds program?:      Meds To Beds General Rules:  1. Can ONLY be done Monday- Friday between 8:30am-5pm  2. Prescription(s) must be in pharmacy by 3pm to be filled same day  3. Copy of patient's insurance/ prescription drug card and patient face sheet must be sent along with the prescription(s)  4. Cost of Rx cannot be added to hospital bill. If financial assistance is needed, please contact unit  or ;  or  CANNOT provide pharmacy voucher for patients co-pays  5.  Patients can then  the prescription on their way out of the hospital at discharge, or pharmacy can deliver to the bedside if staff is available. (payment due at time of pick-up or delivery - cash, check, or card accepted)     Able to afford home medications/ co-pay costs: Yes    ADLS  Support Systems:      PT AM-PAC:   /24  OT AM-PAC:   /24    [place]: home with  and daughter  Steps:     Plans to RETURN to current housing: Yes  Barriers to RETURNING to current housing: medical complications and weakness    Home Care Information  Currently ACTIVE with [address]: No  Home Care Agency: Not Applicable    Currently ACTIVE with [facility]: No      Durable Medical Equipment  DME Provider:   Equipment: wheelchair, cane and walker    Home Oxygen and [address] prior to admission: No  [address]: Not Applicable    Dialysis  Active with HD/PD prior to admission: No  Nephrologist:     HD Center:  Not Applicable    DISCHARGE PLAN:  Disposition: Inpatient Rehab: TBD Phone: TBD Fax: TBD    Transportation PLAN for discharge: family     Factors facilitating achievement of predicted outcomes: Family support, Cooperative and Pleasant    Barriers to discharge: Medical complications    Additional Case Management Notes: see above    The Plan for Transition of Care is related to the following treatment

## 2020-07-24 NOTE — PLAN OF CARE
Bedside swallow evaluation completed. Please refer to EMR.     Thank you,    Franklin Counter) [place], [place], [address] [facility], [place], [facility]; [phone]  Speech-Language Pathologist

## 2020-07-25 LAB
ANION GAP SERPL CALCULATED.3IONS-SCNC: 15 MMOL/L (ref 3–16)
BASOPHILS ABSOLUTE: 0 K/UL (ref 0–0.2)
BASOPHILS RELATIVE PERCENT: 0.7 %
BUN BLDV-MCNC: 5 MG/DL (ref 7–20)
CALCIUM SERPL-MCNC: 8.9 MG/DL (ref 8.3–10.6)
CHLORIDE BLD-SCNC: 92 MMOL/L (ref 99–110)
CO2: 20 MMOL/L (ref 21–32)
CREAT SERPL-MCNC: <0.5 MG/DL (ref 0.6–1.2)
EOSINOPHILS ABSOLUTE: 0.3 K/UL (ref 0–0.6)
EOSINOPHILS RELATIVE PERCENT: 4.1 %
GFR AFRICAN AMERICAN: >60
GFR NON-AFRICAN AMERICAN: >60
GLUCOSE BLD-MCNC: 79 MG/DL (ref 70–99)
HCT VFR BLD CALC: 27.4 % (ref 36–48)
HEMOGLOBIN: 9.4 G/DL (ref 12–16)
LYMPHOCYTES ABSOLUTE: 1.8 K/UL (ref 1–5.1)
LYMPHOCYTES RELATIVE PERCENT: 28.6 %
MCH RBC QN AUTO: 34.5 PG (ref 26–34)
MCHC RBC AUTO-ENTMCNC: 34.5 G/DL (ref 31–36)
MCV RBC AUTO: 100 FL (ref 80–100)
MONOCYTES ABSOLUTE: 0.8 K/UL (ref 0–1.3)
MONOCYTES RELATIVE PERCENT: 12.2 %
NEUTROPHILS ABSOLUTE: 3.5 K/UL (ref 1.7–7.7)
NEUTROPHILS RELATIVE PERCENT: 54.4 %
PDW BLD-RTO: 13.4 % (ref 12.4–15.4)
PLATELET # BLD: 329 K/UL (ref 135–450)
PMV BLD AUTO: 7.9 FL (ref 5–10.5)
POTASSIUM SERPL-SCNC: 4.4 MMOL/L (ref 3.5–5.1)
RBC # BLD: 2.74 M/UL (ref 4–5.2)
SODIUM BLD-SCNC: 127 MMOL/L (ref 136–145)
WBC # BLD: 6.5 K/UL (ref 4–11)

## 2020-07-25 PROCEDURE — 1200000000 HC SEMI PRIVATE

## 2020-07-25 PROCEDURE — 99999 PR OFFICE/OUTPT VISIT,PROCEDURE ONLY: CPT | Performed by: INTERNAL MEDICINE

## 2020-07-25 PROCEDURE — 85025 COMPLETE CBC W/AUTO DIFF WBC: CPT

## 2020-07-25 PROCEDURE — 36415 COLL VENOUS BLD VENIPUNCTURE: CPT

## 2020-07-25 PROCEDURE — 6370000000 HC RX 637 (ALT 250 FOR IP): Performed by: STUDENT IN AN ORGANIZED HEALTH CARE EDUCATION/TRAINING PROGRAM

## 2020-07-25 PROCEDURE — 80048 BASIC METABOLIC PNL TOTAL CA: CPT

## 2020-07-25 PROCEDURE — 2580000003 HC RX 258: Performed by: INTERNAL MEDICINE

## 2020-07-25 PROCEDURE — 6370000000 HC RX 637 (ALT 250 FOR IP): Performed by: INTERNAL MEDICINE

## 2020-07-25 RX ADMIN — ASPIRIN 81 MG: 81 TABLET, CHEWABLE ORAL at 09:17

## 2020-07-25 RX ADMIN — DULOXETINE HYDROCHLORIDE 60 MG: 60 CAPSULE, DELAYED RELEASE ORAL at 20:31

## 2020-07-25 RX ADMIN — SODIUM CHLORIDE: 9 INJECTION, SOLUTION INTRAVENOUS at 22:40

## 2020-07-25 RX ADMIN — ACETAMINOPHEN 650 MG: 325 TABLET ORAL at 09:17

## 2020-07-25 RX ADMIN — ACETAMINOPHEN 650 MG: 325 TABLET ORAL at 15:06

## 2020-07-25 RX ADMIN — ATORVASTATIN CALCIUM 40 MG: 40 TABLET, FILM COATED ORAL at 20:31

## 2020-07-25 RX ADMIN — DULOXETINE HYDROCHLORIDE 60 MG: 60 CAPSULE, DELAYED RELEASE ORAL at 09:17

## 2020-07-25 ASSESSMENT — PAIN DESCRIPTION - ORIENTATION: ORIENTATION: INNER

## 2020-07-25 ASSESSMENT — PAIN SCALES - GENERAL
PAINLEVEL_OUTOF10: 0
PAINLEVEL_OUTOF10: 4

## 2020-07-25 ASSESSMENT — PAIN DESCRIPTION - LOCATION: LOCATION: HEAD

## 2020-07-25 ASSESSMENT — PAIN DESCRIPTION - DESCRIPTORS: DESCRIPTORS: HEADACHE

## 2020-07-25 ASSESSMENT — PAIN DESCRIPTION - PAIN TYPE: TYPE: ACUTE PAIN

## 2020-07-25 ASSESSMENT — PAIN DESCRIPTION - FREQUENCY: FREQUENCY: INTERMITTENT

## 2020-07-25 NOTE — PROGRESS NOTES
Pt is much more responsive today than yesterday. Pt has been notified and educated from the Resident on her status and need for rehab. Pt expressed understanding. Will continue to monitor. Will continue to monitor.

## 2020-07-25 NOTE — PROGRESS NOTES
07/23/20  1251 07/24/20  0526 07/25/20  0419   WBC 4.0 4.9 6.5   HGB 10.4* 9.7* 9.4*   HCT 30.4* 28.3* 27.4*    397 329   .9* 99.3 100.0     Recent Labs     07/24/20  0526   TRIG 66   HDL 43     No results for input(s): PTT, INR in the last 72 hours. Invalid input(s): PT  Recent Labs     07/23/20  1251 07/24/20  1000   TROPONINI 0.08* 0.08*     No results for input(s): BNP in the last 72 hours. No results for input(s): NTPROBNP in the last 72 hours. No results for input(s): TSH in the last 72 hours. Imaging:   I personally reviewed imaging studies including CXR, Stress test, TTE/BOUBACAR. Assessment & Plan:     Abnormal ECG/elevated troponin   Stable. TTE once cardiac rule out. Depending on TTE findings she may need ischemic work-up. At that time will need to discuss with neuro regarding the need for dual antiplatelet therapy and heparin if PCI is required. We will continue to monitor      Due to limiting exposure to COVID-19 and minimizing use of PPE, note was completed solely using EMR and from personal conversations with primary medical team and/or consultants involved in case. Patient was not interviewed or examined. I have personally reviewed the reports and images of labs, radiological studies, cardiac studies including ECG's and telemetry, current and old medical records. The note was completed using EMR and Dragon dictation system. Every effort was made to ensure accuracy; however, inadvertent computerized transcription errors may be present. CODES [id number] (up to 30 min), 92078 (>30 min). I have spent 30 minutes reviewing EMR as above and formulating my assessment and plan. Thank you for allowing to us to participate in the care of Evon Flores. Please call our service with questions. All questions and concerns were addressed to the patient/family. Alternatives to my treatment were discussed. The note was completed using EMR.  Every effort was made to ensure accuracy; however, inadvertent computerized transcription errors may be present. I have personally reviewed the reports and images of labs, radiological studies, cardiac studies including ECG's and telemetry, current and old medical records. Jesse Gerardo MD, [facility]    7/25/2020 10:48 AM

## 2020-07-25 NOTE — PROGRESS NOTES
Internal Medicine PGY- 1 Resident Progress Note    PCP: Evelin Baxter MD    Date of Admission: 7/23/2020    Chief Complaint: CVA    HPI:  Patient is a 60-year-old female with history of hypertension, Crohn's disease who was admitted to [facility] for generalized weakness, fatigue & frequent falls. She had many electrolyte abnormalities. During her admission she was found to have aphasia and R sided weakness by nursing staff & stroke alert was called. Initial NIHSS was 24, no contraindications for tPA and this was administered at 12:54pm on 7/23/20. CTA showed an M2 occlusion therefore patient was transferred to [facility] for endovascular treatment. Cerebral angio showed occlusion had partially lysed and moved distally w/ now a subocclusive M3 branch occlusion and endovascular treatment was no longer indicated. Subjective: Unable to provide history due to expressive and receptive aphasia. Answered yes to a lot of questions. Discussed case with RN. Medications:  Reviewed    Infusion Medications    sodium chloride 75 mL/hr at 07/24/20 1005     Scheduled Medications    [Held by provider] atenolol  100 mg Oral Daily    DULoxetine  60 mg Oral BID    aspirin  81 mg Oral Daily    atorvastatin  40 mg Oral Nightly     PRN Meds: acetaminophen, perflutren lipid microspheres, labetalol, hydrALAZINE, acetaminophen      Intake/Output Summary (Last 24 hours) at 7/25/2020 0945  Last data filed at 7/25/2020 0554  Gross per 24 hour   Intake 1850 ml   Output 2350 ml   Net -500 ml       Physical Exam Performed:    BP (!) 122/103   Pulse 82   Temp 98.2 °F (36.8 °C) (Oral)   Resp 12   Ht 5' 6\" (1.676 m)   Wt 199 lb 8.3 oz (90.5 kg)   SpO2 95%   BMI 32.20 kg/m²     General appearance: No apparent distress, appears stated age and cooperative. HEENT: Pupils equal, round, and reactive to light. Respiratory:  Normal respiratory effort. Clear to auscultation, bilaterally without Rales/Wheezes/Rhonchi.   Cardiovascular:

## 2020-07-25 NOTE — PLAN OF CARE
Problem: Bleeding:  Goal: Will show no signs and symptoms of excessive bleeding  Description: Will show no signs and symptoms of excessive bleeding  Outcome: Met This Shift     Problem: ACTIVITY INTOLERANCE/IMPAIRED MOBILITY  Goal: Mobility/activity is maintained at optimum level for patient  Description: Post TPA protocol in place. Currently monitoring patient neurologically N02 per policy. Will continue to monitor.   Outcome: Met This Shift     Problem: Self-Care Deficit:  Goal: Ability to perform activities of daily living will improve  Description: Ability to perform activities of daily living will improve  Outcome: Met This Shift

## 2020-07-25 NOTE — PROGRESS NOTES
[facility] OR    SIGMOIDOSCOPY  01/15/2018    with biopsies    SMALL INTESTINE SURGERY N/A 7/17/2019    COLOSTOMY CLOSURE WITH COLORECTAL ANASTOMOSIS performed by Mina Garcia MD at [place] [id number]  6/14/13    and colonsocopy with antral erosion biopsies     Current Facility-Administered Medications:     acetaminophen (TYLENOL) tablet 650 mg, 650 mg, Oral, Q6H PRN, Eyal Shultz MD, 650 mg at 07/25/20 0917    0.9 % sodium chloride infusion, , Intravenous, Continuous, Marjorie Lynch MD, Last Rate: 125 mL/hr at 07/25/20 1115    [Held by provider] atenolol (TENORMIN) tablet 100 mg, 100 mg, Oral, Daily, Eyal Shultz MD    DULoxetine (CYMBALTA) extended release capsule 60 mg, 60 mg, Oral, BID, Eyal Shultz MD, 60 mg at 07/25/20 0375    aspirin chewable tablet 81 mg, 81 mg, Oral, Daily, Darshana Steele MD, 81 mg at 07/25/20 0832    perflutren lipid microspheres (DEFINITY) injection 1.65 mg, 1.5 mL, Intravenous, ONCE PRN, Eyal Shultz MD    labetalol (NORMODYNE;TRANDATE) injection syringe 5 mg, 5 mg, Intravenous, Q10 Min PRN, Eyal Shultz MD    hydrALAZINE (APRESOLINE) injection 5 mg, 5 mg, Intravenous, Q10 Min PRN, yEal Shultz MD    atorvastatin (LIPITOR) tablet 40 mg, 40 mg, Oral, Nightly, Eyal Shultz MD    acetaminophen (TYLENOL) suppository 325 mg, 325 mg, Rectal, Q4H PRN, Eyal Shultz MD    Exam  Blood pressure (!) 141/80, pulse 79, temperature 99 °F (37.2 °C), temperature source Oral, resp. rate 12, height 5' 6\" (1.676 m), weight 199 lb 8.3 oz (90.5 kg), SpO2 95 %, not currently breastfeeding. Due to the current efforts to prevent transmission of COVID-19 and also the need to preserve PPE for other caregivers, a face-to-face encounter with the patient was not performed. That being said, all relevant records and diagnostic tests were reviewed, including laboratory results and imaging.  Please reference any relevant

## 2020-07-26 LAB
ANION GAP SERPL CALCULATED.3IONS-SCNC: 12 MMOL/L (ref 3–16)
BASOPHILS ABSOLUTE: 0.1 K/UL (ref 0–0.2)
BASOPHILS RELATIVE PERCENT: 1.7 %
BUN BLDV-MCNC: 4 MG/DL (ref 7–20)
CALCIUM SERPL-MCNC: 8.9 MG/DL (ref 8.3–10.6)
CHLORIDE BLD-SCNC: 91 MMOL/L (ref 99–110)
CO2: 23 MMOL/L (ref 21–32)
CREAT SERPL-MCNC: <0.5 MG/DL (ref 0.6–1.2)
EOSINOPHILS ABSOLUTE: 0.3 K/UL (ref 0–0.6)
EOSINOPHILS RELATIVE PERCENT: 5.3 %
GFR AFRICAN AMERICAN: >60
GFR NON-AFRICAN AMERICAN: >60
GLUCOSE BLD-MCNC: 83 MG/DL (ref 70–99)
HCT VFR BLD CALC: 26.4 % (ref 36–48)
HEMOGLOBIN: 9.1 G/DL (ref 12–16)
LYMPHOCYTES ABSOLUTE: 1.7 K/UL (ref 1–5.1)
LYMPHOCYTES RELATIVE PERCENT: 26.7 %
MCH RBC QN AUTO: 33.7 PG (ref 26–34)
MCHC RBC AUTO-ENTMCNC: 34.6 G/DL (ref 31–36)
MCV RBC AUTO: 97.6 FL (ref 80–100)
MONOCYTES ABSOLUTE: 0.6 K/UL (ref 0–1.3)
MONOCYTES RELATIVE PERCENT: 9.2 %
NEUTROPHILS ABSOLUTE: 3.7 K/UL (ref 1.7–7.7)
NEUTROPHILS RELATIVE PERCENT: 57.1 %
OSMOLALITY URINE: 212 MOSM/KG (ref 390–1070)
PDW BLD-RTO: 12.9 % (ref 12.4–15.4)
PLATELET # BLD: 385 K/UL (ref 135–450)
PMV BLD AUTO: 7.7 FL (ref 5–10.5)
POTASSIUM SERPL-SCNC: 3.8 MMOL/L (ref 3.5–5.1)
RBC # BLD: 2.7 M/UL (ref 4–5.2)
SODIUM BLD-SCNC: 126 MMOL/L (ref 136–145)
SODIUM URINE: 94 MMOL/L
WBC # BLD: 6.4 K/UL (ref 4–11)

## 2020-07-26 PROCEDURE — 36415 COLL VENOUS BLD VENIPUNCTURE: CPT

## 2020-07-26 PROCEDURE — 82570 ASSAY OF URINE CREATININE: CPT

## 2020-07-26 PROCEDURE — 6370000000 HC RX 637 (ALT 250 FOR IP): Performed by: INTERNAL MEDICINE

## 2020-07-26 PROCEDURE — 83935 ASSAY OF URINE OSMOLALITY: CPT

## 2020-07-26 PROCEDURE — 84300 ASSAY OF URINE SODIUM: CPT

## 2020-07-26 PROCEDURE — 99999 PR OFFICE/OUTPT VISIT,PROCEDURE ONLY: CPT | Performed by: INTERNAL MEDICINE

## 2020-07-26 PROCEDURE — 80048 BASIC METABOLIC PNL TOTAL CA: CPT

## 2020-07-26 PROCEDURE — 6370000000 HC RX 637 (ALT 250 FOR IP): Performed by: STUDENT IN AN ORGANIZED HEALTH CARE EDUCATION/TRAINING PROGRAM

## 2020-07-26 PROCEDURE — 85025 COMPLETE CBC W/AUTO DIFF WBC: CPT

## 2020-07-26 PROCEDURE — 1200000000 HC SEMI PRIVATE

## 2020-07-26 RX ORDER — ATORVASTATIN CALCIUM 80 MG/1
80 TABLET, FILM COATED ORAL NIGHTLY
Status: DISCONTINUED | OUTPATIENT
Start: 2020-07-26 | End: 2020-07-30 | Stop reason: HOSPADM

## 2020-07-26 RX ORDER — ATENOLOL 50 MG/1
50 TABLET ORAL DAILY
Status: DISCONTINUED | OUTPATIENT
Start: 2020-07-26 | End: 2020-07-28

## 2020-07-26 RX ADMIN — ASPIRIN 81 MG: 81 TABLET, CHEWABLE ORAL at 08:23

## 2020-07-26 RX ADMIN — ACETAMINOPHEN 650 MG: 325 TABLET ORAL at 18:43

## 2020-07-26 RX ADMIN — DULOXETINE HYDROCHLORIDE 60 MG: 60 CAPSULE, DELAYED RELEASE ORAL at 08:23

## 2020-07-26 RX ADMIN — ACETAMINOPHEN 650 MG: 325 TABLET ORAL at 05:44

## 2020-07-26 RX ADMIN — ATORVASTATIN CALCIUM 80 MG: 80 TABLET, FILM COATED ORAL at 21:07

## 2020-07-26 RX ADMIN — ATENOLOL 50 MG: 50 TABLET ORAL at 11:59

## 2020-07-26 ASSESSMENT — PAIN SCALES - GENERAL
PAINLEVEL_OUTOF10: 0
PAINLEVEL_OUTOF10: 3
PAINLEVEL_OUTOF10: 3
PAINLEVEL_OUTOF10: 0

## 2020-07-26 NOTE — PROGRESS NOTES
Internal Medicine PGY- 2 Resident Progress Note    PCP: Evelin Baxter MD    Date of Admission: 7/23/2020    Chief Complaint:     Subjective: Pt has aphasia. Denied Cp, ab pain, n/v, f/c, ha.    Medications:  Reviewed    Infusion Medications    [Held by provider] sodium chloride 125 mL/hr at 07/25/20 2240     Scheduled Medications    [Held by provider] atenolol  100 mg Oral Daily    DULoxetine  60 mg Oral BID    aspirin  81 mg Oral Daily    atorvastatin  40 mg Oral Nightly     PRN Meds: acetaminophen, perflutren lipid microspheres, labetalol, hydrALAZINE, acetaminophen      Intake/Output Summary (Last 24 hours) at 7/26/2020 0854  Last data filed at 7/26/2020 0026  Gross per 24 hour   Intake 2834 ml   Output 3100 ml   Net -266 ml       Physical Exam Performed:    BP (!) 147/97   Pulse 79   Temp 98.5 °F (36.9 °C) (Oral)   Resp 18   Ht 5' 6\" (1.676 m)   Wt 199 lb 8.3 oz (90.5 kg)   SpO2 98%   BMI 32.20 kg/m²     General appearance: No apparent distress, appears stated age and cooperative. HEENT: Pupils equal, round, and reactive to light. Respiratory:  Normal respiratory effort. Clear to auscultation, bilaterally without Rales/Wheezes/Rhonchi. Cardiovascular: Regular rate and rhythm with normal S1/S2 without murmurs, rubs or gallops. Abdomen: Soft, non-tender, non-distended with normal bowel sounds. Musculoskeletal: No clubbing, cyanosis or edema bilaterally. Neurologic:  Aphasia present, RUE 3/5, LUE 5/5, LLE 4/4, RLE 4/5. Sensation unable to assess as she is not following commands. Unable to assess CN as pt is not following one step commands.   Peripheral Pulses: +2 palpable, equal bilaterally     Labs:   Recent Labs     07/24/20  0526 07/25/20 0419 07/26/20  0441   WBC 4.9 6.5 6.4   HGB 9.7* 9.4* 9.1*   HCT 28.3* 27.4* 26.4*    329 385     Recent Labs     07/24/20  0526 07/25/20  0419 07/26/20  0441   * 127* 126*   K 4.1 4.4 3.8   CL 95* 92* 91*   CO2 24 20* 23   BUN 7 5* 4* CREATININE <0.5* <0.5* <0.5*   CALCIUM 9.0 8.9 8.9     No results for input(s): AST, ALT, BILIDIR, BILITOT, ALKPHOS in the last 72 hours. No results for input(s): INR in the last 72 hours. Recent Labs     07/23/20  1251 07/24/20  1000   TROPONINI 0.08* 0.08*       Urinalysis:      Lab Results   Component Value Date    NITRU POSITIVE 12/17/2019    WBCUA 47 12/17/2019    BACTERIA 4+ 12/17/2019    RBCUA 10 12/17/2019    BLOODU TRACE 12/17/2019    SPECGRAV 1.014 12/17/2019    GLUCOSEU Negative 12/17/2019       Radiology:  MRI brain without contrast   Final Result      1. Moderate volume acute left MCA territory infarct with small volume petechial hemorrhage in the left parietal lobe. CT head without contrast   Final Result      No acute intracranial hemorrhage or mass effect. Vascular carotid duplex bilateral         IR ANGIOGRAM CAROTID CEREBRAL LEFT    (Results Pending)         Assessment/Plan:    Acute Left MCA ischemic stroke:   -s/p TPA and cerebral angiogram which revealed M2 thromboembolism is partially lysed and has moved distally and now has become subocclusive M3 thromboembolic.  No mechanical thrombectomy was done.  -Neuro and Neurosurgery following and recs appreciated  -Brain MRI showed moderate volume acute L MCA infarct w/ small volume petechial hemorrhage in L parietal lobe  -s/p Carotid Duplex with b/l mild atherosclerosis  -Continue ASA, Statin  - BP < 140/80 goal  -HgbA1c, Lipid panel, TSH all unremarkbale.  -Pending ECHO with bubble study  -PT/OT consulted  -Inpatient rehab consulted  -Tele    NSTEMI:  - EKG with T wave inversions in multiple leads, new compared to prior EKG  - Trend Trop  - Repeat EKG   - ECHO pending    Hyponatremia 2/2 SIADH  - Fluid restrict  - f/u Na q12hr  - Hold Cymbalta    HTN:  - Restart home atenolol at 50mg, inc if needed  - Stop IVF     Depression:  - Hold Cymbalta d/t hypoNa    DVT Prophylaxis: Holding d/t bleed.  SCD  Diet: DIET DENTAL SOFT;  Code Status: Full Code    Discussed the patient with MD Stefano Browning MD  Internal Medicine Resident PGY-3  Contact via [employer]  +

## 2020-07-26 NOTE — PROGRESS NOTES
[facility]  Cardiology Inpatient Consult Service  Daily Progress Note        Admit Date:  7/23/2020    Referring Physician: Jamil Dickerson MD    Reason for Consultation/Chief Complaint:   Abnormal ECG/elevated troponin     Subjective: Interval history:  BRENDAN    Medications:   atorvastatin  80 mg Oral Nightly    atenolol  50 mg Oral Daily    DULoxetine  60 mg Oral BID    aspirin  81 mg Oral Daily       IV drips:      PRN:  acetaminophen, perflutren lipid microspheres, labetalol, hydrALAZINE, acetaminophen      Objective:     Vitals:    07/25/20 2027 07/26/20 0000 07/26/20 0400 07/26/20 0800   BP: (!) 147/103 (!) 139/100 (!) 145/95 (!) 147/97   Pulse: 78 83 84 79   Resp: 16 16  18   Temp: 98 °F (36.7 °C) 98.1 °F (36.7 °C)  98.5 °F (36.9 °C)   TempSrc: Oral Oral  Oral   SpO2: 98% 100%  98%   Weight:       Height:           Intake/Output Summary (Last 24 hours) at 7/26/2020 0921  Last data filed at 7/26/2020 0026  Gross per 24 hour   Intake 2834 ml   Output 2600 ml   Net 234 ml     I/O last 3 completed shifts: In: 2834 [I.V.:2834]  Out: 3100 [Urine:3100]  Wt Readings from Last 3 Encounters:   07/25/20 199 lb 8.3 oz (90.5 kg)   07/23/20 200 lb 4.8 oz (90.9 kg)   07/07/20 205 lb (93 kg)       Admit Wt: Weight: 200 lb 4.8 oz (90.9 kg)   Todays Wt: Weight: 199 lb 8.3 oz (90.5 kg)    TELEMETRY: Sinus     Physical Exam:   COVID rule out      Objective    Labs:   Recent Labs     07/24/20  0526 07/25/20  0419 07/26/20  0441   * 127* 126*   K 4.1 4.4 3.8   BUN 7 5* 4*   CREATININE <0.5* <0.5* <0.5*   CL 95* 92* 91*   CO2 24 20* 23   GLUCOSE 95 79 83   CALCIUM 9.0 8.9 8.9     Recent Labs     07/24/20  0526 07/25/20  0419 07/26/20  0441   WBC 4.9 6.5 6.4   HGB 9.7* 9.4* 9.1*   HCT 28.3* 27.4* 26.4*    329 385   MCV 99.3 100.0 97.6     Recent Labs     07/24/20  0526   TRIG 66   HDL 43     No results for input(s): PTT, INR in the last 72 hours.     Invalid input(s): PT  Recent Labs

## 2020-07-27 LAB
ANION GAP SERPL CALCULATED.3IONS-SCNC: 12 MMOL/L (ref 3–16)
BASOPHILS ABSOLUTE: 0.1 K/UL (ref 0–0.2)
BASOPHILS RELATIVE PERCENT: 1.9 %
BUN BLDV-MCNC: 3 MG/DL (ref 7–20)
CALCIUM SERPL-MCNC: 9.2 MG/DL (ref 8.3–10.6)
CHLORIDE BLD-SCNC: 91 MMOL/L (ref 99–110)
CO2: 24 MMOL/L (ref 21–32)
CREAT SERPL-MCNC: <0.5 MG/DL (ref 0.6–1.2)
EKG ATRIAL RATE: 78 BPM
EKG DIAGNOSIS: NORMAL
EKG P AXIS: 31 DEGREES
EKG P-R INTERVAL: 148 MS
EKG Q-T INTERVAL: 480 MS
EKG QRS DURATION: 96 MS
EKG QTC CALCULATION (BAZETT): 547 MS
EKG R AXIS: 18 DEGREES
EKG T AXIS: 228 DEGREES
EKG VENTRICULAR RATE: 78 BPM
EOSINOPHILS ABSOLUTE: 0.4 K/UL (ref 0–0.6)
EOSINOPHILS RELATIVE PERCENT: 7.2 %
GFR AFRICAN AMERICAN: >60
GFR NON-AFRICAN AMERICAN: >60
GLUCOSE BLD-MCNC: 83 MG/DL (ref 70–99)
HCT VFR BLD CALC: 25.6 % (ref 36–48)
HEMOGLOBIN: 8.8 G/DL (ref 12–16)
LV EF: 40 %
LVEF MODALITY: NORMAL
LYMPHOCYTES ABSOLUTE: 1.6 K/UL (ref 1–5.1)
LYMPHOCYTES RELATIVE PERCENT: 31.4 %
MCH RBC QN AUTO: 33.7 PG (ref 26–34)
MCHC RBC AUTO-ENTMCNC: 34.5 G/DL (ref 31–36)
MCV RBC AUTO: 97.6 FL (ref 80–100)
MONOCYTES ABSOLUTE: 0.4 K/UL (ref 0–1.3)
MONOCYTES RELATIVE PERCENT: 7.3 %
NEUTROPHILS ABSOLUTE: 2.7 K/UL (ref 1.7–7.7)
NEUTROPHILS RELATIVE PERCENT: 52.2 %
PDW BLD-RTO: 13 % (ref 12.4–15.4)
PLATELET # BLD: 412 K/UL (ref 135–450)
PMV BLD AUTO: 7.7 FL (ref 5–10.5)
POTASSIUM SERPL-SCNC: 3.6 MMOL/L (ref 3.5–5.1)
RBC # BLD: 2.62 M/UL (ref 4–5.2)
REPORT: NORMAL
SARS-COV-2: NOT DETECTED
SODIUM BLD-SCNC: 123 MMOL/L (ref 136–145)
SODIUM BLD-SCNC: 127 MMOL/L (ref 136–145)
THIS TEST SENT TO: NORMAL
WBC # BLD: 5.2 K/UL (ref 4–11)

## 2020-07-27 PROCEDURE — 6370000000 HC RX 637 (ALT 250 FOR IP): Performed by: STUDENT IN AN ORGANIZED HEALTH CARE EDUCATION/TRAINING PROGRAM

## 2020-07-27 PROCEDURE — 92507 TX SP LANG VOICE COMM INDIV: CPT

## 2020-07-27 PROCEDURE — 97530 THERAPEUTIC ACTIVITIES: CPT

## 2020-07-27 PROCEDURE — 85025 COMPLETE CBC W/AUTO DIFF WBC: CPT

## 2020-07-27 PROCEDURE — 80048 BASIC METABOLIC PNL TOTAL CA: CPT

## 2020-07-27 PROCEDURE — 97535 SELF CARE MNGMENT TRAINING: CPT

## 2020-07-27 PROCEDURE — 97166 OT EVAL MOD COMPLEX 45 MIN: CPT

## 2020-07-27 PROCEDURE — 97163 PT EVAL HIGH COMPLEX 45 MIN: CPT

## 2020-07-27 PROCEDURE — 93010 ELECTROCARDIOGRAM REPORT: CPT | Performed by: INTERNAL MEDICINE

## 2020-07-27 PROCEDURE — 84295 ASSAY OF SERUM SODIUM: CPT

## 2020-07-27 PROCEDURE — 93005 ELECTROCARDIOGRAM TRACING: CPT | Performed by: STUDENT IN AN ORGANIZED HEALTH CARE EDUCATION/TRAINING PROGRAM

## 2020-07-27 PROCEDURE — 1200000000 HC SEMI PRIVATE

## 2020-07-27 PROCEDURE — 36415 COLL VENOUS BLD VENIPUNCTURE: CPT

## 2020-07-27 PROCEDURE — C8929 TTE W OR WO FOL WCON,DOPPLER: HCPCS

## 2020-07-27 PROCEDURE — 6370000000 HC RX 637 (ALT 250 FOR IP): Performed by: INTERNAL MEDICINE

## 2020-07-27 PROCEDURE — 97116 GAIT TRAINING THERAPY: CPT

## 2020-07-27 RX ADMIN — ATORVASTATIN CALCIUM 80 MG: 80 TABLET, FILM COATED ORAL at 22:31

## 2020-07-27 RX ADMIN — Medication 15 G: at 22:31

## 2020-07-27 RX ADMIN — ATENOLOL 50 MG: 50 TABLET ORAL at 09:32

## 2020-07-27 RX ADMIN — ACETAMINOPHEN 650 MG: 325 TABLET ORAL at 14:42

## 2020-07-27 RX ADMIN — ASPIRIN 81 MG: 81 TABLET, CHEWABLE ORAL at 09:32

## 2020-07-27 ASSESSMENT — PAIN SCALES - GENERAL
PAINLEVEL_OUTOF10: 0
PAINLEVEL_OUTOF10: 8
PAINLEVEL_OUTOF10: 0

## 2020-07-27 ASSESSMENT — PAIN SCALES - PAIN ASSESSMENT IN ADVANCED DEMENTIA (PAINAD)
NEGVOCALIZATION: 0
BODYLANGUAGE: 0
BREATHING: 0
FACIALEXPRESSION: 0
TOTALSCORE: 0
CONSOLABILITY: 0

## 2020-07-27 NOTE — PROGRESS NOTES
Occupational Therapy   Occupational Therapy Initial Assessment and Treatment  Late Entry for 933  Date: 2020   Patient Name: Tirso Beach  MRN: [id number]     : 1955    Date of Service: 2020    Discharge Recommendations:  Tirso Beach scored a 15/24 on the AM-PAC ADL Inpatient form. Current research shows that an AM-PAC score of 17 or less is typically not associated with a discharge to the patient's home setting. Based on the patient's AM-PAC score and their current ADL deficits, it is recommended that the patient have 5-7 sessions per week of Occupational Therapy at d/c to increase the patient's independence. At this time, this patient demonstrates the endurance, and/or tolerance for 3 hours of therapy each day, with a treatment frequency of 5-7x/wk. Please see assessment section for further patient specific details. If patient discharges prior to next session this note will serve as a discharge summary. Please see below for the latest assessment towards goals. OT Equipment Recommendations  Equipment Needed: No(defer recommendations to discharge facility)    Assessment   Performance deficits / Impairments: Decreased functional mobility ; Decreased ADL status; Decreased endurance;Decreased fine motor control;Decreased coordination;Decreased balance  Assessment: Pt is functioning well-below baseline. Needing hands on assist w/ transfers, ADLs, and functional mobility. Pt is a high risk for falling at this time. Demonstrating impaired RUE FM/GM coordination - and- both expressive and receptive aphasia. Pt is pleasant and cooperative - will benefit from continued IP OT at discharge. Continue per POC.   Treatment Diagnosis: impaired ADLs/transfers, RUE FM/GM coordination deficits s/p CVA    OT Education: OT Role;Plan of Care;Transfer Training  Patient Education: continue to teach prn  Barriers to Learning: aphasia  REQUIRES OT FOLLOW UP: Yes  Activity Tolerance  Activity Tolerance: Patient limited by fatigue;Patient Tolerated treatment well  Activity Tolerance: HR up to 150s on exertion  Safety Devices  Safety Devices in place: Yes  Type of devices: Chair alarm in place; Left in chair;Call light within reach;Nurse notified           Patient Diagnosis(es): There were no encounter diagnoses. has a past medical history of Anxiety, Arthritis, Blood transfusion reaction, Crohn's disease ([address] Depression, GERD (gastroesophageal reflux disease), Hiatal hernia, Hx of blood clots, Hypertension, MRSA (methicillin resistant staph aureus) culture positive, Neuropathy, Pneumonia, Sinus headache, SOB (shortness of breath), and VRE (vancomycin resistant enterococcus) culture positive. has a past surgical history that includes Hysterectomy; knee surgery (Bilateral); Hand Carpectomy (Bilateral); hernia repair; Abdomen surgery; bladder suspension; fracture surgery; Heel spur surgery; Tonsillectomy; Colonoscopy; Upper gastrointestinal endoscopy (6/14/13); Foot surgery (Left, 6/25/14); Foot surgery (6/3/15); Colonoscopy (9/14/15); Foot surgery (Left, 08/05/2002); joint replacement; Sigmoidoscopy (01/15/2018); Abdominal adhesion surgery (06/08/2018); Colonoscopy (08/07/2018); colostomy (N/A, 10/8/2018); pr secd clos surg wnd exten/complic (N/A, 69/58/7129); Leg Surgery (Right); Colonoscopy (06/07/2019); Colonoscopy (N/A, 6/7/2019); Colonoscopy (N/A, 6/7/2019); Small intestine surgery (N/A, 7/17/2019); and proctosigmoidoscopy (N/A, 7/17/2019). Treatment Diagnosis: impaired ADLs/transfers, RUE FM/GM coordination deficits s/p CVA      Restrictions  Position Activity Restriction  Other position/activity restrictions: Up w/ Assist    Subjective   General  Chart Reviewed: Yes  Patient assessed for rehabilitation services?: Yes  Additional Pertinent Hx: 62 y.o. F who presented from [facility], [place] with after CVA s/p tPA.    Hospital Course: Code stroke was called on 7/23 for left gaze deviation, not following commands - received TPA and transferred to [facility] for mechanical thrombectomy; Carotid Dopplers: <50% ICA stenosis bilaterally; MRI Brain: Moderate volume acute left MCA territory infarct with small volume petechial hemorrhage in the left parietal lobe. PMH:PMH: HTN, blood clots, colostomy & reversal, R hip fx, R CHETAN, severe arthritis bilat knees, gets injections  Family / Caregiver Present: No  Referring Practitioner: Dr. Clyde Serrano  Diagnosis: Acute CVA    Subjective  Subjective: In bed on entry. Indicating she is wanting to use the toilet. \"Come on Dianne. Come on Dianne. \" Significant expressive aphasia; also receptive aphasia.     Pain: denies    Social/Functional History  Social/Functional History  Lives With: Family( & daughter)  Type of Home: House  Home Layout: One level  Home Access: Stairs to enter without rails, Stairs to enter with rails  Entrance Stairs - Number of Steps: 1 DEON  Entrance Stairs - Rails: Left  Bathroom Shower/Tub: Tub/Shower unit(Pt takes sponge baths, doesn't get in shower)  Bathroom Toilet: Handicap height(near living room; BSC next to bed)  Bathroom Equipment: Toilet raiser, Commode  Home Equipment: Cane, Wheelchair-manual, Rolling walker, Long-handled shoehorn, Sock aid, Reacher(SPC, 3-pronged cane, transport w/c)  ADL Assistance: Needs assistance(I sponge bath w/extra time; gets help w/back; sometimes needs help donning depends, wears flip flops; independent toileting; gets assist w/hair)  Homemaking Responsibilities: Yes(Family performs all, but pt folds clothes)  Ambulation Assistance: Independent(pushes sturdy kitchen cart, carries items on it; uses cane house to car, then transport chair in community)  Transfer Assistance: Needs assistance(occasional, especially last week or so)  Active : No  Patient's  Info: Dtr takes to   [facility]: Folding clothes, time with dogs  Additional Comments: Dtr &   work;  planning to take leave of absence. Pt has adjustable bed. At least 2 falls past 6 mos. Ambulates short distances, down hallway at home, depending on how much pain pt is having. Home info obtained from previous PT note. Pt has difficulty providing answers today (7/27) due to aphasia. Objective   Vision: Impaired  Vision Exceptions: Wears glasses for reading  Hearing: Within functional limits      Orientation  Overall Orientation Status: (responds to name; expressive aphasia - ? orientation (inconsistent replies to yes/no, inconsistent replies to options))        Balance  Sitting Balance: (Supervision - static; SB/CGA - dynamic)  Standing Balance: Minimal assistance    Functional Mobility  Functional - Mobility Device: Rolling Walker  Activity: To/from bathroom  Assist Level: (Min A to toilet; Mod A toilet to bedside chair)  Functional Mobility Comments: Note: increased assist w/ fatigue; initial assist for R hand positioning on walker - cues to maintain ; Toilet Transfers  Toilet - Technique: Ambulating(using wh walker)  Equipment Used: Standard toilet(w/ bar to R)  Toilet Transfer: Maximum assistance    ADL  Feeding: Setup(clumsy w/ R hand)  LE Dressing: Maximum assistance(depends - assist to thread both feet and assist to pullup R side)  Toileting: Maximum assistance(using R hand - attempted to wipe frontal region (decreased functional  on wet wipe); full assist required to wipe bottom after BM)  Additional Comments: Note: decreased motor control RUE observed and R-neglect     Tone RUE  RUE Tone: Normotonic  Tone LUE  LUE Tone: Normotonic  Coordination  Movements Are Fluid And Coordinated: No  Coordination and Movement description: Fine motor impairments;Decreased speed;Decreased accuracy; Right UE; Ataxia  Quality of Movement Other  Comment: assist for placement of R hand on walker; inconsistent w/ functional /reach of RUE        Bed mobility  Supine to Sit: Minimal assistance     Transfers  Sit to stand: (Mod A from bed; Min A from chair; Max A from toilet w/ bar to R)  Stand to sit: (Min A onto chair; Mod A onto toilet)        Cognition  Overall Cognitive Status: Exceptions  Arousal/Alertness: Delayed responses to stimuli  Following Commands: Inconsistently follows commands(responds better to demonstration - inconsistent; decreased abiltity to follow verbal directions)  Insights: Decreased awareness of deficits  Sequencing: Requires cues for some  Cognition Comment: significant expressive aphasia; receptive aphasia as well  Perception  Unilateral Attention: Cues to attend to right side of body(at times - inattention)               LUE AROM (degrees)  LUE AROM : WFL  Left Hand AROM (degrees)  Left Hand AROM: WFL  RUE AROM (degrees)  RUE AROM : WFL  Right Hand AROM (degrees)  Right Hand AROM: WFL  LUE Strength  Gross LUE Strength: WFL  LUE Strength Comment: strength appearing functional - decreased abliity to formally follow directions for MMT  RUE Strength  Gross RUE Strength: WFL  RUE Strength Comment: strength appearing functional - decreased abliity to formally follow directions for MMT        Hand Dominance  Hand Dominance: Right         Pt seen by OT for eval and treat.  Treatment included: bed mobility, functional transfer/mobility, ADL, pt education         Plan   Plan  Times per week: 5-7  Times per day: Daily  Current Treatment Recommendations: Strengthening, Balance Training, Functional Mobility Training, Endurance Training, Safety Education & Training, Neuromuscular Re-education, Patient/Caregiver Education & Training, Self-Care / ADL                                                      AM-PAC Score        AM-[facility] Inpatient Daily Activity Raw Score: 15 (07/27/20 1057)  AM-PAC Inpatient ADL T-Scale Score : 34.69 (07/27/20 1057)  ADL Inpatient CMS 0-100% Score: 56.46 (07/27/20 1057)  ADL Inpatient CMS G-Code Modifier : CK (07/27/20 1057)    Goals  Short term goals  Time Frame for Short term goals: Discharge  Short term goal 1: toilet transfer w/ Mod A  Short term goal 2: LB dressing w/ Min A  Short term goal 3: grooming activities w/ setup and SBA  Short term goal 4: toileting hygiene w/ Mod A  Patient Goals   Patient goals : no goal stated       Therapy Time   Individual Concurrent Group Co-treatment   Time In 7106         Time Out 0933         Minutes 55           Timed Code Treatment Minutes:   40    Total Treatment Minutes:  Eli Gomez [address] 1428 OTR/L #[id number]

## 2020-07-27 NOTE — PROGRESS NOTES
Speech Language Pathology  Facility/Department: [address] [place] [address] N ICU  Daily Treatment Note  NAME: Keenan Montano  : 1955  MRN: [id number]    Date of Eval: 2020  Evaluating Therapist: David Sepulveda    Patient Diagnosis(es): has Essential hypertension, benign; Atherosclerosis of native arteries of extremities with intermittent claudication, bilateral legs ([address] Anemia; Hyponatremia; Crohn's colitis ([address] Hypomagnesemia; DVT (deep venous thrombosis) ([address] PAD (peripheral artery disease) ([address] Leg swelling; Venous insufficiency; Chronic venous hypertension (idiopathic) with inflammation of bilateral lower extremity; Open wound of left foot with complication; Hypokalemia; Acute cerebrovascular accident (CVA) ([address] and Abnormal ECG on their problem list.  Onset Date: 2020    RECENT RESULTS  CT OF HEAD/MRI: 20  Impression         No acute intracranial hemorrhage or mass effect.       Impression    Abrupt tapering M2 branch with nonvisualization of the M3 branches suggestive    of vessel thrombus         Otherwise no additional large vessel occlusion or hemodynamic stenosis.       Primary Complaint: Indicated poor communication    Pain:  Pain Assessment  Pain Assessment: 0-10  Pain Level: 8  Patient's Stated Pain Goal: No pain  Pain Type: Acute pain  Pain Location: Head  Pain Orientation: Inner  Pain Descriptors: Headache  Pain Frequency: Intermittent    Subjective: Cleared by RN to see pt. Received pt alert and pleasant, resting in bed on room air. Pt declined PO trials, and states she is not having any trouble swallowing. Goals:  Short-term Goals  Goal 1: Pt will complete basic yes/no questions with 80% acc or given mod cues  : Pt required intermittent mod cues for basic yes/no questions.  Cont goal.  Goal 2: Pt will complete automatic speech tasks with 80% acc or given mod cues  : Pt required mod-max verbal cues for accuracy with automatics (counting, days of the week, months, antonyms). Paraphasias and verbal perseveration noted. Cont goal.  Goal 3: Pt will effectively communicate wants and needs by means of multi-communication  7/27: Pt able to communicate desire for an extra blanket, increased TV volume, and refusal of PO. Benefits from simplified questions. Cont goal.  Goal 4: Pt will tolerate ongoing cognitive-linguistic testing as warranted   Patient/family involved in developing goals and treatment plan: Yes    Patient/Family/Caregiver Education:   Educated pt regarding aphasia, stroke recovery, therapy purpose/rationale. Pt stated comprehension. Plan:  Continued daily Speech/Language treatment with goals per current plan of care. Dawson Ruggiero M.A., [address] [facility]  Speech-Language Pathologist    This document will serve as a discharge summary if pt discharges before next treatment.

## 2020-07-27 NOTE — CONSULTS
performed by Radha Ordoñez MD at [address] N/A 10/8/2018    EXPLORATORY LAPAROTOMY WITH COLOSTOMY performed by Jakub Mohr MD at [address] Left 6/25/14    excision plantar left hallux    FOOT SURGERY  6/3/15    PLANTAR PLATE REPAIR BILATERAL, ARTHROTOMY MPJ 2ND BILATERAL    FOOT SURGERY Left 08/05/2002    Excision second metatarsal head left    FRACTURE SURGERY      rt hip    HAND CARPECTOMY Bilateral     HEEL SPUR SURGERY      HERNIA REPAIR      HYSTERECTOMY      bladder surgery    JOINT REPLACEMENT      rt hip, L shoulder replacement 11/2014    KNEE SURGERY Bilateral     arthrosvopic    LEG SURGERY Right     [place] SECD CLOS SURG WND EXTEN/COMPLIC N/A 99/63/5957    SECONDARY WOUND CLOSURE OF ABDOMINAL WOUND performed by Jakub Mohr MD at [facility] 7/17/2019    FLEXIBLE SIGMOIDOSCOPY performed by Jakub Mohr MD at [address] [facility] [address]  01/15/2018    with biopsies    SMALL INTESTINE SURGERY N/A 7/17/2019    COLOSTOMY CLOSURE WITH COLORECTAL ANASTOMOSIS performed by Jakub Mohr MD at [place] [address]  6/14/13    and colonsocopy with antral erosion biopsies       Family History   Problem Relation Age of Onset    High Blood Pressure Mother     High Blood Pressure Father     Kidney Disease Sister         reports that she has been smoking cigarettes and e-cigarettes. She has a 10.00 pack-year smoking history. She has never used smokeless tobacco. She reports current alcohol use of about 2.0 standard drinks of alcohol per week. She reports that she does not use drugs.     Allergies:  Ace inhibitors and Skelaxin [metaxalone]    Current Medications:    atorvastatin (LIPITOR) tablet 80 mg, Nightly  atenolol (TENORMIN) tablet 50 mg, Daily  acetaminophen (TYLENOL) tablet 650 mg, Q6H PRN  [Held by provider] DULoxetine (CYMBALTA) extended release capsule 60 mg, BID  aspirin chewable tablet 81 mg, Daily  perflutren lipid microspheres (DEFINITY) injection 1.65 mg, ONCE PRN  labetalol (NORMODYNE;TRANDATE) injection syringe 5 mg, Q10 Min PRN  hydrALAZINE (APRESOLINE) injection 5 mg, Q10 Min PRN  acetaminophen (TYLENOL) suppository 325 mg, Q4H PRN        Review of Systems:   14 point ROS obtained but were negative except mentioned in HPI      Physical exam:     Vitals:  BP (!) 130/93   Pulse 69   Temp 98.3 °F (36.8 °C) (Oral)   Resp 20   Ht 5' 6\" (1.676 m)   Wt 199 lb 8.3 oz (90.5 kg)   SpO2 95%   BMI 32.20 kg/m²     Constitutional:  OAA, aphasic  Skin: no rash, turgor wnl  Heent:  eomi, mmm  Neck: no bruits or jvd noted  Cardiovascular:  S1, S2 without m/r/g  Respiratory: CTA B without w/r/r  Abdomen:  +bs, soft, nt, nd  Ext: no lower extremity edema  Psychiatric: mood and affect appropriate  Musculoskeletal:  Rom, muscular strength intact    Data:   Labs:  CBC:   Recent Labs     07/25/20 0419 07/26/20 0441 07/27/20  0454   WBC 6.5 6.4 5.2   HGB 9.4* 9.1* 8.8*    385 412     BMP:    Recent Labs     07/25/20 0419 07/26/20 0441 07/27/20  0454   * 126* 127*   K 4.4 3.8 3.6   CL 92* 91* 91*   CO2 20* 23 24   BUN 5* 4* 3*   CREATININE <0.5* <0.5* <0.5*   GLUCOSE 79 83 83     Ca/Mg/Phos:   Recent Labs     07/25/20 0419 07/26/20 0441 07/27/20  0454   CALCIUM 8.9 8.9 9.2     Hepatic: No results for input(s): AST, ALT, ALB, BILITOT, ALKPHOS in the last 72 hours. Troponin: No results for input(s): TROPONINI in the last 72 hours. BNP: No results for input(s): BNP in the last 72 hours. Lipids: No results for input(s): CHOL, TRIG, HDL, LDLCALC, LABVLDL in the last 72 hours. ABGs: No results for input(s): PHART, PO2ART, KBE8DUR in the last 72 hours. INR: No results for input(s): INR in the last 72 hours.   UA:No results for input(s): Constance Dupree, GLUCOSEU, [facility], BLOODU, [address] [facility], PROTEINU, UROBILINOGEN, NITRU, LEUKOCYTESUR, LABMICR, URINETYPE in the last 72 hours. Urine Microscopic: No results for input(s): LABCAST, BACTERIA, COMU, HYALCAST, WBCUA, RBCUA, EPIU in the last 72 hours. Urine Culture: No results for input(s): LABURIN in the last 72 hours. Urine Chemistry:   Recent Labs     07/26/20  1009   NAUR 94             IMAGING:  MRI brain without contrast   Final Result      1. Moderate volume acute left MCA territory infarct with small volume petechial hemorrhage in the left parietal lobe. CT head without contrast   Final Result      No acute intracranial hemorrhage or mass effect. Vascular carotid duplex bilateral         IR ANGIOGRAM CAROTID CEREBRAL LEFT    (Results Pending)       Assessment/Plan   1. Euvolemic hypotonic hyponatremia  - Fluid restriction  - Chronically low, has been on many psychiatric medications in the past and on diuretics at home  - Hold cymbalta  - Likely SIADH   - Goal >130    2. HTN  - Keep SPB < 140    3.  Anemia  - Monitor for signs of bleeding since received tPA    4. Acid- base/ Electrolyte imbalance   - Replace lytes      Thank you for allowing us to participate in care of [address] free to contact me   [facility] [address]  Office : [phone]  Fax :[phone]      Pt seen and examined   Na wrose   Ur studies reviewed - has SIADH with poor solute intake   PO Urea   Inc Solute intake   Fluid restriction

## 2020-07-27 NOTE — PROGRESS NOTES
Per day shift RN, the patient removed her IV at [phone]. Multiple attempts were made to replace IV without success. Resident made aware. No need for IV placement at this time. Will continue to monitor.

## 2020-07-27 NOTE — PROGRESS NOTES
Neurology Progress Note  Seen for acute ischemic stroke    Updates:  No major changes    ROS:   Unable to assess given aphasia    Exam:  Blood pressure 132/87, pulse 96, temperature 98.4 °F (36.9 °C), temperature source Oral, resp. rate 18, height 5' 6\" (1.676 m), weight 199 lb 8.3 oz (90.5 kg), SpO2 99 %, not currently breastfeeding. Constitutional    Vital signs: BP, HR, and RR reviewed   General Alert, no distress, well-nourished  Eyes: Unable to visualize the fundi  Cardiovascular: pulses symmetric in all 4 extremities. No peripheral edema. Psychiatric: no  psychotic behavior noted. Neurologic  Mental status: Eyes open spontaneously   orientation Limited exam given aphasia   General fund of knowledge Limited exam given aphasia    Memory Limited exam given aphasia   Attention Poor   Language Expressive and receptive aphasia, she is able to speak in short sentences   Comprehension Does not follow verbal commands, mimics most commands well  Cranial nerves:   CN2: No clear blink to threat right and left  CN 3,4,6: Tracks right and left  CN5: facial sensation symmetric   CN7:face appears symmetric without dysarthria, limited exam given aphasia  CN8: hearing grossly intact  CN9: Limited exam given aphasia   CN11: trap full strength on shoulder shrug  CN12: Limited exam given aphasia   Strength: Antigravity for at least 10 seconds throughout  Sensory: light touch intact in all 4 extremities  Cerebellar/coordination: Limited exam given aphasia  Tone: normal in all 4 extremities  Gait: Limited exam given aphasia      Labs:  LDL 55  HgA1c 5.0  TSH: 1.37       Studies:  MRI brain w/o 7/24/20   Acute L MCA infarct w/ small volume petechial hemorrhage in the L parietal lobe.         CT head w/o 7/24/20  No acute intracranial hemorrhage or mass effect.       CTA head/neck 7/24/20  Abrupt tapering M2 branch with nonvisualization of the M3 branches suggestive    of vessel thrombus         Impression:  1. Acute ischemic   2. Right MCA occlusion  3. Hypertension    Patient is a 61 y/o F who presented w/ aphasia & R hemiplegia found to have L MCA occlusion. Given tPA and transferred to [facility] for possible EVT which was ultimately not needed. Initially NIHSS 26, improved to 13 post tpa. Cerebral angio showed occlusion had partially lysed and moved distally w/ now a subocclusive M3 branch occlusion and endovascular treatment was no longer indicated. 24 hour CT was w/out any evidence of hemorrhage. MRI suggested a small amount of hemorrhage in the L parietal region. Per cardiology, patient may need ischemic work up(PCI). Clinically stable.      Recommendations:  - TTE w/ bubble  - Continue aspirin and high intensity statin  - Continue on telemetry while admitted to observe for atrial fibrillation. If afib not observed during admission, then she will need a LOOP recorder placed prior to discharge   - PT/OT/ST, rehab as able  - Will discuss possibility of PCI with attending neurologist on afternoon rounds and provide further recommendations   - Maintain good secondary stroke prevention; LDL goal 70 and below, A1C goal 7.0 and below, BP goal eventually 140/90, however, this should be achieved slowly over the next few days while avoiding marked fluctuations in BP and hypotension      ADDENDUM:  Discussed possible need for PCI with attending neurologist. If cardiology feels that PCI is indicated, ok from a neuro stanpoint for patient to receive DAPT.         A copy of this note was provided for MD Boni Parikh [address] Neurology   [phone]

## 2020-07-27 NOTE — PLAN OF CARE
Problem: Falls - Risk of:  Goal: Will remain free from falls  Description: Will remain free from falls. Bed locked in lowest position, alarm on, monitor in room on, and 3/4 rails up. Nonskid socks on. Call light and bedside table within reach. No falls this shift. Will continue to monitor with hourly rounding. Outcome: Ongoing  Goal: Absence of physical injury  Description: Absence of physical injury  Outcome: Ongoing     Problem: Skin Integrity:  Goal: Will show no infection signs and symptoms  Description: Will show no infection signs and symptoms  Outcome: Ongoing  Goal: Absence of new skin breakdown  Description: Absence of new skin breakdown  Outcome: Ongoing     Problem: Bleeding:  Goal: Will show no signs and symptoms of excessive bleeding  Description: Will show no signs and symptoms of excessive bleeding  Outcome: Ongoing     Problem: ACTIVITY INTOLERANCE/IMPAIRED MOBILITY  Goal: Mobility/activity is maintained at optimum level for patient  Description: Post TPA protocol in place. Currently monitoring patient neurologically H02 per policy. Will continue to monitor. Outcome: Ongoing     Problem: Verbal Communication - Impaired:  Goal: Functional communication will improve  Description: Pts verbal communication is impaired. Functional communication will improve. Will continue to monitor.   Outcome: Ongoing  Goal: Ability to interact with others will improve  Description: Ability to interact with others will improve  Outcome: Ongoing  Goal: Ability to establish a method of communication will improve  Description: Ability to establish a method of communication will improve  Outcome: Ongoing     Problem: Mobility - Impaired:  Goal: Able to ambulate independently  Description: Able to ambulate independently  Outcome: Ongoing  Goal: Able to ambulate with minimal assistance  Description: Able to ambulate with minimal assistance  Outcome: Ongoing  Goal: Ability to appropriately use an adaptive device for ambulation will improve  Description: Ability to appropriately use an adaptive device for ambulation will improve  Outcome: Ongoing  Goal: Able to verbalize acceptance of life and situations over which he or she has no control  Description: Absence of contracture deformity  Outcome: Ongoing     Problem: Self-Care Deficit:  Goal: Ability to perform activities of daily living will improve  Description: Ability to perform activities of daily living will improve  Outcome: Ongoing  Goal: Able to perform ADL with assistance  Description: Able to perform ADL with assistance  Outcome: Ongoing  Goal: Ability to communicate needs accurately will improve - ADL needs  Description: Ability to communicate needs accurately will improve - ADL needs  Outcome: Ongoing  Goal: Able to use self-care assistive device appropriately  Description: Able to use self-care assistive device appropriately  Outcome: Ongoing

## 2020-07-27 NOTE — PROGRESS NOTES
Patient admitted to  from ICU. Patient alert and oriented but unable to answer LOC questions due to expressive aphasia. Skin assessment complete with [facility] RN. Patient has skin tear to L hand, skin tear to toe, and redness/bruising to R hip. Patient educated to on fall precautions. Call light within reach, bed alarm on.  Will continue to monitor

## 2020-07-27 NOTE — PLAN OF CARE
Problem: Falls - Risk of:  Goal: Will remain free from falls  Description: Will remain free from falls. Bed locked in lowest position, alarm on, monitor in room on, and 3/4 rails up. Nonskid socks on. Call light and bedside table within reach. No falls this shift. Will continue to monitor with hourly rounding.    7/27/2020 1251 by Vesta Melton RN  Outcome: Ongoing     Problem: Falls - Risk of:  Goal: Absence of physical injury  Description: Absence of physical injury  7/27/2020 1251 by Vesta Melton RN  Outcome: Ongoing     Problem: Skin Integrity:  Goal: Will show no infection signs and symptoms  Description: Will show no infection signs and symptoms  7/27/2020 1251 by Vesta Melton RN  Outcome: Ongoing     Problem: Skin Integrity:  Goal: Absence of new skin breakdown  Description: Absence of new skin breakdown  7/27/2020 1251 by Vesta Melton RN  Outcome: Ongoing       Problem: Verbal Communication - Impaired:  Goal: Ability to interact with others will improve  Description: Ability to interact with others will improve  7/27/2020 1251 by Vesta Melton RN  Outcome: Ongoing     Problem: Mobility - Impaired:  Goal: Able to ambulate with minimal assistance  Description: Able to ambulate with minimal assistance  7/27/2020 1251 by Vesta Melton RN  Outcome: Ongoing    Problem: Mobility - Impaired:  Goal: Ability to appropriately use an adaptive device for ambulation will improve  Description: Ability to appropriately use an adaptive device for ambulation will improve  7/27/2020 1251 by Vesta Melton RN  Outcome: Ongoing   Pt used standard walker to ambulate

## 2020-07-27 NOTE — PROGRESS NOTES
Internal Medicine PGY- 1 Resident Progress Note    PCP: Nancy Sanchez MD    Date of Admission: 7/23/2020    Chief Complaint: CVA    HPI:  Patient is a 70-year-old female with history of hypertension, Crohn's disease who was admitted to [facility] for generalized weakness, fatigue & frequent falls. She had many electrolyte abnormalities. During her admission she was found to have aphasia and R sided weakness by nursing staff & stroke alert was called. Initial NIHSS was 24, no contraindications for tPA and this was administered at 12:54pm on 7/23/20. CTA showed an M2 occlusion therefore patient was transferred to [address] [place] [address] for endovascular treatment. Cerebral angio showed occlusion had partially lysed and moved distally w/ now a subocclusive M3 branch occlusion and endovascular treatment was no longer indicated. Subjective: Denies any discomfort. Discussed case with RN. No acute events overnight. Medications:  Reviewed    Infusion Medications     Scheduled Medications    atorvastatin  80 mg Oral Nightly    atenolol  50 mg Oral Daily    [Held by provider] DULoxetine  60 mg Oral BID    aspirin  81 mg Oral Daily     PRN Meds: acetaminophen, perflutren lipid microspheres, labetalol, hydrALAZINE, acetaminophen      Intake/Output Summary (Last 24 hours) at 7/27/2020 1013  Last data filed at 7/27/2020 0400  Gross per 24 hour   Intake 120 ml   Output 1800 ml   Net -1680 ml       Physical Exam Performed:    /87   Pulse 129   Temp 97.2 °F (36.2 °C) (Oral)   Resp 18   Ht 5' 6\" (1.676 m)   Wt 199 lb 8.3 oz (90.5 kg)   SpO2 99%   BMI 32.20 kg/m²     General appearance: No apparent distress, appears stated age and cooperative. HEENT: Pupils equal, round, and reactive to light. Respiratory:  Normal respiratory effort. Clear to auscultation, bilaterally without Rales/Wheezes/Rhonchi. Cardiovascular: Regular rate and rhythm with normal S1/S2 without murmurs, rubs or gallops.   Abdomen: Soft, non-tender, non-distended with normal bowel sounds. Musculoskeletal: No clubbing, cyanosis or edema bilaterally. Neurologic:  Patient likely has mixed expressive and receptive aphasia. Psychiatric: Alert and oriented, thought content appropriate, normal insight  Peripheral Pulses: +2 palpable, equal bilaterally     Labs:   Recent Labs     07/25/20 0419 07/26/20 0441 07/27/20  0454   WBC 6.5 6.4 5.2   HGB 9.4* 9.1* 8.8*   HCT 27.4* 26.4* 25.6*    385 412     Recent Labs     07/25/20 0419 07/26/20  0441 07/27/20  0454   * 126* 127*   K 4.4 3.8 3.6   CL 92* 91* 91*   CO2 20* 23 24   BUN 5* 4* 3*   CREATININE <0.5* <0.5* <0.5*   CALCIUM 8.9 8.9 9.2     No results for input(s): AST, ALT, BILIDIR, BILITOT, ALKPHOS in the last 72 hours. No results for input(s): INR in the last 72 hours. No results for input(s): Livia Formosa in the last 72 hours. Urinalysis:      Lab Results   Component Value Date    NITRU POSITIVE 12/17/2019    WBCUA 47 12/17/2019    BACTERIA 4+ 12/17/2019    RBCUA 10 12/17/2019    BLOODU TRACE 12/17/2019    SPECGRAV 1.014 12/17/2019    GLUCOSEU Negative 12/17/2019       Radiology:  MRI brain without contrast   Final Result      1. Moderate volume acute left MCA territory infarct with small volume petechial hemorrhage in the left parietal lobe. CT head without contrast   Final Result      No acute intracranial hemorrhage or mass effect. Vascular carotid duplex bilateral         IR ANGIOGRAM CAROTID CEREBRAL LEFT    (Results Pending)         Assessment/Plan:    Active Hospital Problems    Diagnosis Date Noted    Abnormal ECG [R94.31]     Acute cerebrovascular accident (CVA) ([facility] [address] [I63.9] 07/23/2020     Acute Left MCA ischemic stroke  -s/p TPA and cerebral angiogram which revealed M2 thromboembolism is partially lysed and has moved distally and now has become subocclusive M3 thromboembolic.   No mechanical thrombectomy was done.  -Neuro and Neurosurgery following and recs appreciated  -Brain MRI showed moderate volume acute L MCA infarct w/ small volume petechial hemorrhage in L parietal lobe  -s/p Carotid Duplex with b/l mild atherosclerosis  -Continue ASA, statin  -HgbA1c, Lipid panel, TSH all unremarkbale.  -Pending ECHO with bubble study  -PT/OT consulted and just examined the patient with report pending  -Inpatient rehab consulted    NSTEMI  -EKG with T wave inversions in multiple leads, new compared to prior EKG  -Trop . 08-->.08  -Cardio on case and following. Recs appreciated.  May need ischemic workup pending ECHO  -ECHO pending    Hyponatremia 2/2 SIADH  -Na 127 stable  -Fluid restrict  -f/u Na q12hr  -Hold Cymbalta  -Will consult Nephrology for further eval and recs    HTN  -continue to monitor  -home Atenolol  -continue current regimen    Depression  -Cymbalta held in setting of hyponatremia    DVT Prophylaxis:    Diet: DIET DENTAL SOFT;  Code Status: Full Code    Discussed the patient with MD Irene Peña MD  Internal Medicine Resident PGY-1  Contact via [employer]

## 2020-07-27 NOTE — PROGRESS NOTES
.4 Eyes Admission Assessment     I agree as the admission nurse that 2 RN's have performed a thorough Head to Toe Skin Assessment on the patient. ALL assessment sites listed below have been assessed on admission. Areas assessed by both nurses:   [x]   Head, Face, and Ears   [x]   Shoulders, Back, and Chest  [x]   Arms, Elbows, and Hands   [x]   Coccyx, Sacrum, and Ischum  [x]   Legs, Feet, and Heels        Does the Patient have Skin Breakdown?   Yes a wound was noted on the Admission Assessment and an LDA was Initiated documentation include the Shweta-wound, Wound Assessment, Measurements, Dressing Treatment, Drainage, and Color\",   Skin Tear to L hand,   Abrasion to toe  Redness/Bruising to R hip         Nelson Prevention initiated:  Yes   Wound Care Orders initiated:  No      WOC nurse consulted for Pressure Injury (Stage 3,4, Unstageable, DTI, NWPT, and Complex wounds):  No      Nurse 1 eSignature: Electronically signed by Karen Davila RN on 7/27/20 at 4:14 PM EDT    **SHARE this note so that the co-signing nurse is able to place an eSignature**    Nurse 2 eSignature: Electronically signed by Osorio Gibson RN on 7/27/20 at 4:15 PM EDT

## 2020-07-27 NOTE — PROGRESS NOTES
FOLLOW UP: Yes       Patient Diagnosis(es): There were no encounter diagnoses. has a past medical history of Anxiety, Arthritis, Blood transfusion reaction, Crohn's disease ([address] Depression, GERD (gastroesophageal reflux disease), Hiatal hernia, Hx of blood clots, Hypertension, MRSA (methicillin resistant staph aureus) culture positive, Neuropathy, Pneumonia, Sinus headache, SOB (shortness of breath), and VRE (vancomycin resistant enterococcus) culture positive. has a past surgical history that includes Hysterectomy; knee surgery (Bilateral); Hand Carpectomy (Bilateral); hernia repair; Abdomen surgery; bladder suspension; fracture surgery; Heel spur surgery; Tonsillectomy; Colonoscopy; Upper gastrointestinal endoscopy (6/14/13); Foot surgery (Left, 6/25/14); Foot surgery (6/3/15); Colonoscopy (9/14/15); Foot surgery (Left, 08/05/2002); joint replacement; Sigmoidoscopy (01/15/2018); Abdominal adhesion surgery (06/08/2018); Colonoscopy (08/07/2018); colostomy (N/A, 10/8/2018); pr secd clos surg wnd exten/complic (N/A, 55/49/5215); Leg Surgery (Right); Colonoscopy (06/07/2019); Colonoscopy (N/A, 6/7/2019); Colonoscopy (N/A, 6/7/2019); Small intestine surgery (N/A, 7/17/2019); and proctosigmoidoscopy (N/A, 7/17/2019). Restrictions  Position Activity Restriction  Other position/activity restrictions: Up w/ Assist  Vision/Hearing  Vision: Impaired  Vision Exceptions: Wears glasses for reading  Hearing: Within functional limits     Subjective  General  Chart Reviewed: Yes  Additional Pertinent Hx: Admit 7/15 to OSH with B knee pain, transfer to [facility] 7/23 after code stroke, found to have Left MCA ischemic stroke status post TPA and cerebral angiogram revealed M2 thromboembolism is partially lysed and has moved distally and now has become subocclusive M3 thromboembolic. No mechanical thrombectomy was done; neuro and neurosurgery following;  PMHx: HTN, h/o blood clots, arthritis, neuropathy, chrohn's, depression, anxiety, surgeries to B knees, R hip, L foot, and shoulder  Referring Practitioner: Perico Boyer MD  Diagnosis: CVA  Subjective  Subjective: Pt found supine in bed. Alert, smiling, appears agreeable to PT. Ready to get out of bed. Pain Screening  Patient Currently in Pain: (no indication of pain during mobility)       Orientation  Orientation  Overall Orientation Status: (appears oriented to self and situation, responds to name but unable to state last name)  Social/Functional History  Social/Functional History  Lives With: Family( & daughter)  Type of Home: House  Home Layout: One level  Home Access: Stairs to enter without rails, Stairs to enter with rails  Entrance Stairs - Number of Steps: 1 DEON  Entrance Stairs - Rails: Left  Bathroom Shower/Tub: Tub/Shower unit(Pt takes sponge baths, doesn't get in shower)  Bathroom Toilet: Handicap height(near living room; BSC next to bed)  Bathroom Equipment: Toilet raiser, Commode  Home Equipment: Joelle Peacemaker, Rolling walker, Long-handled shoehorn, Sock aid, Reacher(SPC, 3-pronged cane, transport w/c)  ADL Assistance: Needs assistance(I sponge bath w/extra time; gets help w/back; sometimes needs help donning depends, wears flip flops; independent toileting; gets assist w/hair)  Homemaking Responsibilities: Yes(Family performs all, but pt folds clothes)  Ambulation Assistance: Independent(pushes sturdy kitchen cart, carries items on it; uses cane house to car, then transport chair in community)  Transfer Assistance: Needs assistance(occasional, especially last week or so)  Active : No  Patient's  Info: Dtr takes to dr. Rivera: Folding clothes, time with dogs  Additional Comments: Dtr &   work;  planning to take leave of absence. Pt has adjustable bed. At least 2 falls past 6 mos. Ambulates short distances, down hallway at home, depending on how much pain pt is having. Home info obtained from previous PT note.   Pt has difficulty providing answers today (7/27) due to aphasia. Cognition        Objective          AROM RLE (degrees)  RLE AROM: WFL  RLE General AROM: difficulty following commands but ROM appears WFL per observation during functional mobility  AROM LLE (degrees)  LLE AROM : WFL  LLE General AROM: difficulty following commands but ROM appears WFL per observation during functional mobility           Bed mobility  Supine to Sit: Minimal assistance  Scooting: Minimal assistance(to EOB)  Transfers  Sit to Stand: Maximum Assistance(mod A x 1 from EOB x 2 trials; min A x 1 from chair x 2 trials; max A x 1 from toilet; max verbal and tactile cues for hand placement)  Stand to sit: Minimal Assistance(cues for hand placement and overall technique)  Comment: note decreased awareness of R hand/UE positioning during transfers  Ambulation  Ambulation?: Yes  Ambulation 1  Device: Rolling Walker  Assistance: Minimal assistance  Quality of Gait: slow, unsteady, effortful, flexed posture, small steps, chair follow initially  Distance: 2 ft; 8 ft; 10 ft x 2  Comments: increased fatigue noted during final amb trial from bathroom to chair - increased cues and assist for walker management provided; HR increased to high 140s during amb, improved with seated rest.  RN aware. Balance  Sitting - Static: Good  Sitting - Dynamic: Good  Standing - Static: Fair  Standing - Dynamic: Fair      Treatment included gait and transfer training, pt education.   Plan   Plan  Times per week: 5-7  Current Treatment Recommendations: Strengthening, Balance Training, Functional Mobility Training, Transfer Training, Gait Training, Patient/Caregiver Education & Training, Neuromuscular Re-education, Home Exercise Program  Safety Devices  Type of devices: Chair alarm in place, Nurse notified, Call light within reach, Left in chair, Gait belt    G-Code       OutComes Score                                                  AM-PAC Score  AM-PAC Inpatient Mobility Raw Score : 14 (07/27/20 1046)  AM-PAC Inpatient T-Scale Score : 38.1 (07/27/20 1046)  Mobility Inpatient CMS 0-100% Score: 61.29 (07/27/20 1046)  Mobility Inpatient CMS G-Code Modifier : CL (07/27/20 1046)          Goals  Short term goals  Time Frame for Short term goals: discharge  Short term goal 1: bed mobility with SBA  Short term goal 2: sit to/from stand with CGA  Short term goal 3: Pt will amb 20 ft with RW and CGA  Patient Goals   Patient goals : eventually return home       Therapy Time   Individual Concurrent Group Co-treatment   Time In 0840         Time Out 0933         Minutes 53                 Timed Code Treatment Minutes:  38    Total Treatment Minutes:  53    If patient is discharged prior to next treatment, this note will serve as the discharge summary.   Yvette Martino, PT, DPT  [phone]

## 2020-07-27 NOTE — PROGRESS NOTES
No changes overnight. Patient remains alert and seemingly oriented, but has severe expressive aphasia so there is difficulty assessing mentation. Able to follow commands and use simple words/phrases. Purewick in placed, no BM overnight. Patient has no needs at this time. Bed alarm on, call light within reach of patient, brakes set, bed in lowest position. Will continue to monitor.

## 2020-07-28 LAB
ANION GAP SERPL CALCULATED.3IONS-SCNC: 11 MMOL/L (ref 3–16)
BASOPHILS ABSOLUTE: 0.1 K/UL (ref 0–0.2)
BASOPHILS RELATIVE PERCENT: 2.4 %
BUN BLDV-MCNC: 14 MG/DL (ref 7–20)
CALCIUM SERPL-MCNC: 9.3 MG/DL (ref 8.3–10.6)
CHLORIDE BLD-SCNC: 91 MMOL/L (ref 99–110)
CO2: 24 MMOL/L (ref 21–32)
CORTISOL TOTAL: 6.5 UG/DL
CREAT SERPL-MCNC: <0.5 MG/DL (ref 0.6–1.2)
EOSINOPHILS ABSOLUTE: 0.4 K/UL (ref 0–0.6)
EOSINOPHILS RELATIVE PERCENT: 7.3 %
GFR AFRICAN AMERICAN: >60
GFR NON-AFRICAN AMERICAN: >60
GLUCOSE BLD-MCNC: 93 MG/DL (ref 70–99)
HCT VFR BLD CALC: 27.7 % (ref 36–48)
HEMOGLOBIN: 9.5 G/DL (ref 12–16)
LYMPHOCYTES ABSOLUTE: 1.7 K/UL (ref 1–5.1)
LYMPHOCYTES RELATIVE PERCENT: 30.1 %
MCH RBC QN AUTO: 33.4 PG (ref 26–34)
MCHC RBC AUTO-ENTMCNC: 34.4 G/DL (ref 31–36)
MCV RBC AUTO: 97.1 FL (ref 80–100)
MONOCYTES ABSOLUTE: 0.4 K/UL (ref 0–1.3)
MONOCYTES RELATIVE PERCENT: 7.3 %
NEUTROPHILS ABSOLUTE: 2.9 K/UL (ref 1.7–7.7)
NEUTROPHILS RELATIVE PERCENT: 52.9 %
PDW BLD-RTO: 13.2 % (ref 12.4–15.4)
PLATELET # BLD: 469 K/UL (ref 135–450)
PMV BLD AUTO: 7.8 FL (ref 5–10.5)
POTASSIUM SERPL-SCNC: 3.6 MMOL/L (ref 3.5–5.1)
RBC # BLD: 2.86 M/UL (ref 4–5.2)
SODIUM BLD-SCNC: 123 MMOL/L (ref 136–145)
SODIUM BLD-SCNC: 126 MMOL/L (ref 136–145)
TSH SERPL DL<=0.05 MIU/L-ACNC: 3.57 UIU/ML (ref 0.27–4.2)
WBC # BLD: 5.6 K/UL (ref 4–11)

## 2020-07-28 PROCEDURE — 85025 COMPLETE CBC W/AUTO DIFF WBC: CPT

## 2020-07-28 PROCEDURE — 97530 THERAPEUTIC ACTIVITIES: CPT

## 2020-07-28 PROCEDURE — 6370000000 HC RX 637 (ALT 250 FOR IP): Performed by: STUDENT IN AN ORGANIZED HEALTH CARE EDUCATION/TRAINING PROGRAM

## 2020-07-28 PROCEDURE — 99999 PR OFFICE/OUTPT VISIT,PROCEDURE ONLY: CPT | Performed by: INTERNAL MEDICINE

## 2020-07-28 PROCEDURE — 1200000000 HC SEMI PRIVATE

## 2020-07-28 PROCEDURE — 97535 SELF CARE MNGMENT TRAINING: CPT

## 2020-07-28 PROCEDURE — 84295 ASSAY OF SERUM SODIUM: CPT

## 2020-07-28 PROCEDURE — 99222 1ST HOSP IP/OBS MODERATE 55: CPT | Performed by: INTERNAL MEDICINE

## 2020-07-28 PROCEDURE — 36415 COLL VENOUS BLD VENIPUNCTURE: CPT

## 2020-07-28 PROCEDURE — 82533 TOTAL CORTISOL: CPT

## 2020-07-28 PROCEDURE — 6370000000 HC RX 637 (ALT 250 FOR IP): Performed by: INTERNAL MEDICINE

## 2020-07-28 PROCEDURE — 80048 BASIC METABOLIC PNL TOTAL CA: CPT

## 2020-07-28 PROCEDURE — 84443 ASSAY THYROID STIM HORMONE: CPT

## 2020-07-28 RX ORDER — METOPROLOL SUCCINATE 25 MG/1
25 TABLET, EXTENDED RELEASE ORAL DAILY
Status: DISCONTINUED | OUTPATIENT
Start: 2020-07-29 | End: 2020-07-30 | Stop reason: HOSPADM

## 2020-07-28 RX ORDER — TOLVAPTAN 15 MG/1
7.5 TABLET ORAL ONCE
Status: COMPLETED | OUTPATIENT
Start: 2020-07-28 | End: 2020-07-29

## 2020-07-28 RX ADMIN — ACETAMINOPHEN 650 MG: 325 TABLET ORAL at 23:05

## 2020-07-28 RX ADMIN — ATENOLOL 50 MG: 50 TABLET ORAL at 09:14

## 2020-07-28 RX ADMIN — ATORVASTATIN CALCIUM 80 MG: 80 TABLET, FILM COATED ORAL at 20:31

## 2020-07-28 RX ADMIN — Medication 15 G: at 09:17

## 2020-07-28 RX ADMIN — ASPIRIN 81 MG: 81 TABLET, CHEWABLE ORAL at 09:14

## 2020-07-28 ASSESSMENT — PAIN SCALES - PAIN ASSESSMENT IN ADVANCED DEMENTIA (PAINAD)
BODYLANGUAGE: 0
NEGVOCALIZATION: 0
FACIALEXPRESSION: 0
TOTALSCORE: 0
CONSOLABILITY: 0
BREATHING: 0

## 2020-07-28 ASSESSMENT — PAIN SCALES - GENERAL
PAINLEVEL_OUTOF10: 0
PAINLEVEL_OUTOF10: 3

## 2020-07-28 NOTE — PROGRESS NOTES
Patient is alert, but oriented only to self. Patient is able to follow commands well. Patient's NIHSS score is a 5 due to expressive aphasia. Neuro checks remain unchanged. Patient up to bathroom with gait belt and walker this shift several times. VSS. Will continue to monitor.

## 2020-07-28 NOTE — CARE COORDINATION
Lorene Torres [phone], [facility] had called for status update from an earlier referral.

## 2020-07-28 NOTE — PLAN OF CARE
Problem: Falls - Risk of:  Goal: Will remain free from falls  Description: Will remain free from falls. Bed locked in lowest position, alarm on, monitor in room on, and 3/4 rails up. Nonskid socks on. Call light and bedside table within reach. No falls this shift. Will continue to monitor with hourly rounding. 7/28/2020 1059 by Debbie Perea RN  Outcome: Ongoing  7/28/2020 0036 by Segundo Zimmer RN  Outcome: Ongoing  Note: Fall precautions in place. Bed is in lowest position, wheels locked and alarm on. Non-skid socks on. Call light and bedside table within reach. Pt calls out appropriately. Pt is up x 1 assist with a gait belt and walker. Patient is on camera for fall precautions. Will continue to assess and monitor.       Problem: Skin Integrity:  Goal: Will show no infection signs and symptoms  Description: Will show no infection signs and symptoms  Outcome: Ongoing

## 2020-07-28 NOTE — PLAN OF CARE
Problem: Falls - Risk of:  Goal: Will remain free from falls  Description: Will remain free from falls. 7/28/2020 0036 by Otis Balderas RN  Outcome: Ongoing  Note: Fall precautions in place. Bed is in lowest position, wheels locked and alarm on. Non-skid socks on. Call light and bedside table within reach. Pt calls out appropriately. Pt is up x 1 assist with a gait belt and walker. Patient is on camera for fall precautions. Will continue to assess and monitor. Problem: Verbal Communication - Impaired:  Goal: Functional communication will improve  Description: Functional communication will improve. Will continue to monitor. Outcome: Ongoing  Note: Patient has expressive aphasia due to CVA. It can be hard to communicate with the patient due to the amount of guessing that has to be done to understand needs and wants. She does not want to use the paper used to communicate needs and wants. Will continue to monitor and assess. Problem: Mobility - Impaired:  Goal: Able to ambulate with minimal assistance  Description: Able to ambulate with minimal assistance  7/28/2020 0036 by Otis Balderas RN  Outcome: Ongoing  Note: Patient uses a gait belt and a walker easily with moderate assistance. She has to be guided to the bathroom / toilet or she will urinate on the floor. Will continue to monitor and assess. Problem: Pain:  Goal: Pain level will decrease  Description: Pain level will decrease  Outcome: Ongoing  Note: Patient uses the PAINAD scale with the patient scoring a 0 on all areas.

## 2020-07-28 NOTE — PROGRESS NOTES
Occupational Therapy  Facility/Department: [facility] 5T ORTHO/NEURO  Daily Treatment Note  NAME: Peggy Alejandra  : 1955  MRN: [id number]    Date of Service: 2020     Assessment: Pt. showing progress. Requiring less assistance with sit>stand transfer from EOB to RW. Showing impulsivity and poor safety awareness. Requires max verbal cues/ visual cues/ hand over hand assistance with attending to RUE/ R side. Demonstrating impaired RUE FM/GM coordination. Will benefit from continued therapy services to work on safety awareness, functional mobility, ADLs and coordination. Discharge Recommendations: Peggy Alejandra scored a 16/24 on the AM-PAC ADL Inpatient form. Current research shows that an AM-PAC score of 17 or less is typically not associated with a discharge to the patient's home setting. Based on the patient's AM-PAC score and their current ADL deficits, it is recommended that the patient have 5-7 sessions per week of Occupational Therapy at d/c to increase the patient's independence. At this time, this patient demonstrates the endurance, and/or tolerance for 3 hours of therapy each day, with a treatment frequency of 5-7x/wk. Please see assessment section for further patient specific details. If patient discharges prior to next session this note will serve as a discharge summary. Please see below for the latest assessment towards goals. Assessment   Activity Tolerance  Activity Tolerance: Patient Tolerated treatment well  Safety Devices  Safety Devices in place: Yes  Type of devices: Nurse notified; Left in chair;Chair alarm in place;Call light within reach           Restrictions  Position Activity Restriction  Other position/activity restrictions: Up w/ Assist  Subjective   General  Chart Reviewed: Yes  Patient assessed for rehabilitation services?: Yes  Additional Pertinent Hx: 62 y.o. F who [facility] from [facility] with after CVA s/p tPA.    Hospital Course: Code stroke was called on 7/23 for left gaze deviation, not following commands - received TPA and transferred to [address] [place] [address] for mechanical thrombectomy; Carotid Dopplers: <50% ICA stenosis bilaterally; MRI Brain: Moderate volume acute left MCA territory infarct with small volume petechial hemorrhage in the left parietal lobe. PMH:PMH: HTN, blood clots, colostomy & reversal, R hip fx, R CHETAN, severe arthritis bilat knees, gets injections  Family / Caregiver Present: No  Referring Practitioner: Dr. Jaelyn Jung  Diagnosis: Acute CVA  Subjective  Subjective: Pt. in bed. Family present. \"I have to go bad\" Indicating wanting to go to the bathroom. Orientation     Objective    ADL  Grooming: Moderate assistance(at sink; Max visual and verbal cues for ADL supplies at R side and fully complete ADL task.)  LE Dressing: Minimal assistance(Ind for donning socks in bed. Min A for clothing management to pull up depends while standing at toilet)  Toileting: Moderate assistance(pt had bowel accident in brief, partially aware, requires assist for sanitary clothing management; Able to do partial joyce care, but needed A for thorough joyce care)        Balance  Sitting Balance: Supervision(SBA/CGA Dynamic. SPVN Static)  Standing Balance: Contact guard assistance  Standing Balance  Time: 30 sec + 2 min + 30 sec  Activity: amb to/from bathroom, and joyce care at toilet  Comment: Impulsive and needs vc's for attention to R side and pace with ambulation, assist to place R hand on walker initially)    Functional Mobility  Functional - Mobility Device: Rolling Walker  Activity: To/from bathroom  Assist Level: Contact guard assistance  Functional Mobility Comments: Impulsive, vc's to slow down to attend to R side    Toilet Transfers  Toilet - Technique: Ambulating  Equipment Used: Standard toilet  Toilet Transfer: Moderate assistance, max cues   Toilet Transfers Comments: Mod A sit>stand. Min A stand>sit. Cognition  Arousal/Alertness: Delayed responses to stimuli  Following Commands: Inconsistently follows commands  Attention Span: Difficulty dividing attention; Attends with cues to redirect  Safety Judgement: Decreased awareness of need for assistance;Decreased awareness of need for safety  Insights: Decreased awareness of deficits  Sequencing: Requires cues for all  Cognition Comment: significant expressive aphasia; receptive aphasia as well               Plan   Plan  Times per week: 5-7  Times per day: Daily  Current Treatment Recommendations: Strengthening, Balance Training, Functional Mobility Training, Endurance Training, Safety Education & Training, Neuromuscular Re-education, Patient/Caregiver Education & Training, Self-Care / ADL    AM-PAC Score             Goals  Short term goals  Time Frame for Short term goals: Discharge  Short term goal 1: toilet transfer w/ Mod A-met 7/28; toilet transfer CGA without cues - Not met  Short term goal 2: LB dressing w/ Min A- met 7/28; LB dressing with SBA - Not met  Short term goal 3: grooming activities w/ setup and SBA - Not met  Short term goal 4: toileting hygiene w/ Mod A- met 7/28; Complete toileting SBA without cues - Not met  Short term goal 5: Mod A for LB bathing/dressing with AE as needed- Partially met 7/28 (for dressing)  Patient Goals   Patient goals : no goal stated       Therapy Time   Individual Concurrent Group Co-treatment   Time In 1343         Time Out 1434         Minutes 51           Timed Code Tx Min: 51  Total Tx Min: 46    Therapist was present, directed the patient's care, made skilled judgment, and was responsible for assessment and treatment of the patient. If patient is discharged prior to next treatment, this not will serve as the discharge summary.      Pastora Reardon, [address], OTR/L, 1426

## 2020-07-28 NOTE — PROGRESS NOTES
Neurology Progress Note  Seen for acute ischemic stroke     Updates:  More spontaneous speech today    ROS:   Unable to assess given aphasia     Exam:  Blood pressure 110/75, pulse 102, temperature 98 °F (36.7 °C), temperature source Oral, resp. rate 20, height 5' 6\" (1.676 m), weight 199 lb 8.3 oz (90.5 kg), SpO2 96 %, not currently breastfeeding. Constitutional                          Vital signs: BP, HR, and RR reviewed            General Alert, no distress, well-nourished  Eyes: Unable to visualize the fundi  Cardiovascular: pulses symmetric in all 4 extremities. No peripheral edema. Psychiatric: no  psychotic behavior noted. Neurologic  Mental status: Eyes open spontaneously   orientation Limited exam given aphasia              General fund of knowledge Limited exam given aphasia               Memory Limited exam given aphasia              Attention Poor              Language Expressive and receptive aphasia, she is able to speak in short sentences              Comprehension Follows a few verbal commands, mimics most commands well  Cranial nerves:   CN2: No clear blink to threat right and left, limited exam given aphasia   CN 3,4,6: Tracks right and left  CN5: facial sensation symmetric   CN7:face appears symmetric without dysarthria, limited exam given aphasia  CN8: hearing grossly intact  CN9: Limited exam given aphasia   CN11: trap full strength on shoulder shrug  CN12: Limited exam given aphasia   Strength: Antigravity for at least 10 seconds throughout  Sensory: light touch intact in all 4 extremities  Tone: normal in all 4 extremities  Gait: Limited exam given aphasia        Labs:  LDL 55  HgA1c 5.0  TSH: 1.37        Studies:  MRI brain w/o 7/24/20   Acute L MCA infarct w/ small volume petechial hemorrhage in the L parietal lobe.          CT head w/o 7/24/20  No acute intracranial hemorrhage or mass effect.       CTA head/neck 7/24/20  Abrupt tapering M2 branch with nonvisualization of the M3 branches suggestive    of vessel thrombus       ECHO w/ bubble  EF 40%   The left atrium is normal in size. A bubble study was performed and fails to   show evidence of right to left shunting.     Impression:  1. Acute ischemic stroke  2. Right MCA occlusion  3. Hypertension     Patient is a 61 y/o F who presented w/ aphasia & R hemiplegia found to have L MCA occlusion. Given tPA and transferred to [facility] for possible EVT which was ultimately not needed. Initially NIHSS 26, improved to 13 post tpa. Cerebral angio showed occlusion had partially lysed and moved distally w/ now a subocclusive M3 branch occlusion and endovascular treatment was no longer indicated.      24 hour CT was w/out any evidence of hemorrhage.  MRI suggested a small amount of hemorrhage in the L parietal region.       Per cardiology, patient may need ischemic work up(PCI).    Clinically stable.      Recommendations:  - Continue aspirin and high intensity statin  -  If cardiology feels that PCI is indicated, ok from a neuro stanpoint for patient to receive DAPT.  - LOOP placement( EP consulted)  - PT/OT/ST, rehab as able  - Maintain good secondary stroke prevention; LDL goal 70 and below, A1C goal 7.0 and below, BP goal eventually 140/90, however, this should be achieved slowly over the next few days while avoiding marked fluctuations in BP and hypotension  - Follow up with Neurology in 3 months  - Will sign off. No further recommendations.  Please call with questions          A copy of this note was provided for Dr Georgette Mann MD      [facility] [address] Neurology   [phone]

## 2020-07-28 NOTE — PROGRESS NOTES
Patient is alert, but oriented only to self. She has expressive aphasia, so it is hard to assess her orientation. She was not able to tell me how old she was, nor was she able to tell me her date of birth. However, patient is able to follow commands well. Patient's NIHSS score is a 2 due to expressive aphasia. Neuro checks remain unchanged. Patient is able to walk to the bathroom with a gait belt and walker. She has urinated twice this shift and has had one BM. She can be incontinent of urine and stool, but is able to communicate when she has to use the bathroom. Patient is on a 1200 mL fluid restriction, she has only had one cup of water this shift. Vital signs have been stable. SBP has stayed below the goal of 160. Patient is on Telemetry, she has been NSR. Will continue to assess and monitor.

## 2020-07-28 NOTE — PROGRESS NOTES
PGY-1 Resident Progress Note  [facility]    Admit Date: 7/23/2020       CC: CVA    Subjective:  No acute events overnight. Discussed case with RN. Patient still with expressive aphasia. No Cough,Nausea,Vomiting, HA, fatigue, CP, dyspnea, abdominal pain, constipation/diarrhea, and urinary symptoms. HPI:   Patient is a 60-year-old female with history of hypertension, Crohn's disease who was admitted to [facility] for generalized weakness, fatigue & frequent falls. She had many electrolyte abnormalities. During her admission she was found to have aphasia and R sided weakness by nursing staff & stroke alert was called. Initial NIHSS was 24, no contraindications for tPA and this was administered at 12:54pm on 7/23/20. CTA showed an M2 occlusion therefore patient was transferred to [facility] for endovascular treatment. Cerebral angio showed occlusion had partially lysed and moved distally w/ now a subocclusive M3 branch occlusion and endovascular treatment was no longer indicated. Family Hx:   Family History   Problem Relation Age of Onset    High Blood Pressure Mother     High Blood Pressure Father     Kidney Disease Sister        Scheduled Medications:    [START ON 7/29/2020] metoprolol succinate  25 mg Oral Daily    urea  15 g Oral Daily    atorvastatin  80 mg Oral Nightly    [Held by provider] DULoxetine  60 mg Oral BID    aspirin  81 mg Oral Daily      PRN Medications: acetaminophen, perflutren lipid microspheres, labetalol, hydrALAZINE, acetaminophen  Diet: DIET DENTAL SOFT; Daily Fluid Restriction: 1200 ml  Dietary Nutrition Supplements: Low Calorie High Protein Supplement      PHYSICAL EXAM:  /72   Pulse 83   Temp 98.1 °F (36.7 °C) (Oral)   Resp 18   Ht 5' 6\" (1.676 m)   Wt 199 lb 8.3 oz (90.5 kg)   SpO2 99%   BMI 32.20 kg/m²   No results for input(s): POCGLU in the last 72 hours.     Intake/Output Summary (Last 24 hours) at 7/28/2020 1640  Last data filed at 7/28/2020 1158  Gross per 24 hour   Intake 300 ml   Output 1900 ml   Net -1600 ml       Physical Exam  Constitutional:  OAA, aphasic  Skin: no rash, turgor wnl  Heent:  eomi, mmm  Neck: no bruits or jvd noted  Cardiovascular:  Tachycardic, S1, S2 without m/r/g  Respiratory: CTA B without w/r/r  Abdomen:  +bs, soft, nt, nd  Ext: +1 pitting edema bilaterally  Psychiatric: mood and affect appropriate  Musculoskeletal: 4/5 strength in R hip flexion/extension, 5/5 strength in L hip flexion/extension. Pt would not try plantarflexion/dorsiflexion bilaterally, and refused to move R arm off bed.       LABS:  Recent Labs     07/26/20 0441 07/27/20  0454 07/28/20  0500   WBC 6.4 5.2 5.6   HGB 9.1* 8.8* 9.5*   HCT 26.4* 25.6* 27.7*    412 469*                                                                    Recent Labs     07/26/20 0441 07/27/20 0454 07/27/20  1813 07/28/20  0500   * 127* 123* 126*   K 3.8 3.6  --  3.6   CL 91* 91*  --  91*   CO2 23 24  --  24   BUN 4* 3*  --  14   CREATININE <0.5* <0.5*  --  <0.5*   GLUCOSE 83 83  --  93     No results for input(s): AST, ALT, ALB, BILITOT, ALKPHOS in the last 72 hours. No results for input(s): TROPONINI in the last 72 hours. No results for input(s): BNP in the last 72 hours. No results for input(s): CHOL, HDL in the last 72 hours. Invalid input(s): LDLCALCU  No results for input(s): INR in the last 72 hours. Imaging:          MRI brain without contrast   Final Result      1. Moderate volume acute left MCA territory infarct with small volume petechial hemorrhage in the left parietal lobe. CT head without contrast   Final Result      No acute intracranial hemorrhage or mass effect.          Vascular carotid duplex bilateral   Final Result      IR ANGIOGRAM CAROTID CEREBRAL LEFT    (Results Pending)         Assessment & Plan:    Bro Hwang is a 62 y.o. female with PMHx of HTN, chron's, p/w weakness, fatigue, and frequent falls, who later developed stroke like symptoms and was found to have M2 occlusion at OSH and was given tPA on 7/23. 1. Acute left MCA stroke s/p TPA.    - Continue aspirin, statin. - Follow neurology recs     2. EKG changes/newly diagnosed systolic heart failure  - EKG with diffuse T wave inversion   - Echo with an EF of 40% no PFO   - Will need ischemic work-up. - Cardiology recommended: Continue fluid map of 40-45. Possible CAD versus stress-induced cardiomyopathy. Also, to start heart failure treatment. Given recent stroke will defer ischemic work-up as an outpatient (May need DAPT if PCI is performed).      3. Hyponatremia suspected secondary to SIADH. - Fluid restriction  - Hold Cymbalta. - Continue urea. - Follow nephrology recs.     4. Hypertension  - Continue atenolol 50 mg. Code Status: Full Code  FEN:none; DIET DENTAL SOFT; Daily Fluid Restriction: 1200 ml  Dietary Nutrition Supplements: Low Calorie High Protein Supplement        This patient will be discussed with attending, Juan Felix MD.    Feli Leger M.D.    Internal Medicine Resident, PGY-1  Contact via [employer]  7/28/2020, 4:40 PM

## 2020-07-28 NOTE — CARE COORDINATION
I spoke with patient regarding rehab and she is agreeable but is still aphasic so sometimes it is hard to communicate. I asked if it was okay to discuss this with her daughter and she said it was ok. I called Maggie Marina [phone] and she does want her mother to go to an ARU and would like [facility] at [facility] since it is closer to where they live. I spoke with Deven Quezada at [facility] and they are able to accept and started precert today. Will continue to follow.      Electronically signed by Alem Jensen RN on 7/28/2020 at 3:16 PM  [phone]

## 2020-07-28 NOTE — PROGRESS NOTES
Nephrology Note                                                                                                                                                                                                                                                                                                                                                               Office : [phone]     Fax :[phone]              Patient's Name: Corinne Sep  9:22 AM  7/28/2020    Reason for Consult:  Hyponatremia  Requesting Physician:  Topher Main MD      Chief Complaint:  CVA     History of Present Ilness:    Corinne Sep is a 62 y.o. female with PMHx of HTN, chron's, p/w weakness, fatigue, and frequent falls, and found to have M2 occlusion at OSH and was given tPA on 7/23. Patient takes diuretics and psychiatric medications at home. She has chronic hyponatremia. Patient aphasic so most history taken by chart review. 7/28:   Pt feeling better today, she reports she is making plenty of urine and drinking less water than usual. She still has aphasia, but based on previous notes it sounds like she is talking better. She was able to move her L and R hip into flexion and extension with 4/5 strength. She was not able to dorsiflex/plantar flex her feet bilaterally, kept trying to flex/extend hip instead. Pt would not lift her R arm from the bed. She was complaining of feeling cold today.          Past Medical History:   Diagnosis Date    Anxiety     Arthritis     Blood transfusion reaction     Crohn's disease ([address]     Depression     GERD (gastroesophageal reflux disease)     Hiatal hernia 6/24/2014    Hx of blood clots     Hypertension     MRSA (methicillin resistant staph aureus) culture positive 06/05/2018    urine    Neuropathy     Pneumonia 1/8/2014    Sinus headache     SOB (shortness of breath)     VRE (vancomycin resistant enterococcus) culture positive 10/08/2018    abd culture       Past Surgical History:   Procedure Laterality Date    ABDOMEN SURGERY      ABDOMINAL ADHESION SURGERY  06/08/2018    BLADDER SUSPENSION      COLONOSCOPY      COLONOSCOPY  9/14/15    sigmoid colon biopsy     COLONOSCOPY  08/07/2018    COLONOSCOPY  06/07/2019    COLONOSCOPY, POSSIBLE BIOPSY, POSSIBLE POLYPECTOMY    COLONOSCOPY N/A 6/7/2019    COLONOSCOPY WITH BIOPSY performed by Ivy Magaña MD at [address] COLONOSCOPY N/A 6/7/2019    COLONOSCOPY DIAGNOSTIC/STOMA performed by Ivy Magaña MD at [address] N/A 10/8/2018    EXPLORATORY LAPAROTOMY WITH COLOSTOMY performed by Michael Barreto MD at [address] Left 6/25/14    excision plantar left hallux    FOOT SURGERY  6/3/15    PLANTAR PLATE REPAIR BILATERAL, ARTHROTOMY MPJ 2ND BILATERAL    FOOT SURGERY Left 08/05/2002    Excision second metatarsal head left    FRACTURE SURGERY      rt hip    HAND CARPECTOMY Bilateral     HEEL SPUR SURGERY      HERNIA REPAIR      HYSTERECTOMY      bladder surgery    JOINT REPLACEMENT      rt hip, L shoulder replacement 11/2014    KNEE SURGERY Bilateral     arthrosvopic    LEG SURGERY Right     MO SECD CLOS SURG WND EXTEN/COMPLIC N/A 82/70/6272    SECONDARY WOUND CLOSURE OF ABDOMINAL WOUND performed by Michael Barreto MD at [facility] 7/17/2019    FLEXIBLE SIGMOIDOSCOPY performed by Michael Barreto MD at [address] [facility]  01/15/2018    with biopsies    SMALL INTESTINE SURGERY N/A 7/17/2019    COLOSTOMY CLOSURE WITH COLORECTAL ANASTOMOSIS performed by Michael Barreto MD at [place] [address]  6/14/13    and colonsocopy with antral erosion biopsies       Family History   Problem Relation Age of Onset    High Blood Pressure Mother     High Blood Pressure Father     Kidney Disease Sister         reports that she has been smoking cigarettes and e-cigarettes.  She has a 10.00 pack-year smoking history. She has never used smokeless tobacco. She reports current alcohol use of about 2.0 standard drinks of alcohol per week. She reports that she does not use drugs. Allergies:  Ace inhibitors and Skelaxin [metaxalone]    Current Medications:    urea (URE-NA) packet 15 g, Daily  atorvastatin (LIPITOR) tablet 80 mg, Nightly  atenolol (TENORMIN) tablet 50 mg, Daily  acetaminophen (TYLENOL) tablet 650 mg, Q6H PRN  [Held by provider] DULoxetine (CYMBALTA) extended release capsule 60 mg, BID  aspirin chewable tablet 81 mg, Daily  perflutren lipid microspheres (DEFINITY) injection 1.65 mg, ONCE PRN  labetalol (NORMODYNE;TRANDATE) injection syringe 5 mg, Q10 Min PRN  hydrALAZINE (APRESOLINE) injection 5 mg, Q10 Min PRN  acetaminophen (TYLENOL) suppository 325 mg, Q4H PRN            Physical exam:     Vitals:  BP (!) 132/94   Pulse 108   Temp 97.3 °F (36.3 °C) (Oral)   Resp 18   Ht 5' 6\" (1.676 m)   Wt 199 lb 8.3 oz (90.5 kg)   SpO2 99%   BMI 32.20 kg/m²     Constitutional:  OAA, aphasic  Skin: no rash, turgor wnl  Heent:  eomi, mmm  Neck: no bruits or jvd noted  Cardiovascular:  Tachycardic, S1, S2 without m/r/g  Respiratory: CTA B without w/r/r  Abdomen:  +bs, soft, nt, nd  Ext: +1 pitting edema bilaterally  Psychiatric: mood and affect appropriate  Musculoskeletal: 4/5 strength in R hip flexion/extension, 5/5 strength in L hip flexion/extension. Pt would not try plantarflexion/dorsiflexion bilaterally, and refused to move R arm off bed.     Data:   Labs:  CBC:   Recent Labs     07/26/20  0441 07/27/20  0454 07/28/20  0500   WBC 6.4 5.2 5.6   HGB 9.1* 8.8* 9.5*    412 469*     BMP:    Recent Labs     07/26/20  0441 07/27/20  0454 07/27/20  1813 07/28/20  0500   * 127* 123* 126*   K 3.8 3.6  --  3.6   CL 91* 91*  --  91*   CO2 23 24  --  24   BUN 4* 3*  --  14   CREATININE <0.5* <0.5*  --  <0.5*   GLUCOSE 83 83  --  93     Ca/Mg/Phos:   Recent Labs     07/26/20  0442 07/27/20  0454 07/28/20  0500   CALCIUM 8.9 9.2 9.3     Hepatic: No results for input(s): AST, ALT, ALB, BILITOT, ALKPHOS in the last 72 hours. Troponin: No results for input(s): TROPONINI in the last 72 hours. BNP: No results for input(s): BNP in the last 72 hours. Lipids: No results for input(s): CHOL, TRIG, HDL, LDLCALC, LABVLDL in the last 72 hours. ABGs: No results for input(s): PHART, PO2ART, UJQ6MKS in the last 72 hours. INR: No results for input(s): INR in the last 72 hours. UA:No results for input(s): Richardine Lav, GLUCOSEU, BILIRUBINUR, Gennette Pavy, BLOODU, PHUR, PROTEINU, UROBILINOGEN, NITRU, LEUKOCYTESUR, LABMICR, URINETYPE in the last 72 hours. Urine Microscopic: No results for input(s): LABCAST, BACTERIA, COMU, HYALCAST, WBCUA, RBCUA, EPIU in the last 72 hours. Urine Culture: No results for input(s): LABURIN in the last 72 hours. Urine Chemistry:   Recent Labs     07/26/20  1009   NAUR 94             IMAGING:  MRI brain without contrast   Final Result      1. Moderate volume acute left MCA territory infarct with small volume petechial hemorrhage in the left parietal lobe. CT head without contrast   Final Result      No acute intracranial hemorrhage or mass effect. Vascular carotid duplex bilateral   Final Result      IR ANGIOGRAM CAROTID CEREBRAL LEFT    (Results Pending)       Assessment/Plan   1. Euvolemic hypotonic hyponatremia - sec to CVA and SSRi use   - --->126  - Fluid restriction  - Chronically low, has been on many psychiatric medications in the past and on diuretics at home  - Hold cymbalta  - Likely SIADH   - Goal >130      2. HTN  - Keep SPB < 140  - /94 today, stable and at goal.    3. Normocytic anemia  - Monitor for signs of bleeding since received tPA  - Hb 9.5, HCT 27.7, stable & similar to prior labs    4. Acid- base/ Electrolyte imbalance   - Replace lytes     5.  Ac CVA - L MCA stroke   - monitor       Thank you for allowing us to participate in care of [address] [place], [address] free to contact me   [facility] [address]  Office : [phone]  Fax :[phone]      Pt has SIADH   Cont PO urea  FLuid restriction   CVA - lead to inc ADH   Monitor NA     May need samsca if SOD drops     Shyam Harrell MD

## 2020-07-28 NOTE — PROGRESS NOTES
I have personally seen and evaluated this patient. I discussed findings and management with the resident on 07/28/20  and agree as documented in this note.     Patient still with expressive aphasia. Denies any chest pain, shortness of breath, nausea, vomiting, fever, chills.  No acute event reported overnight.       Assessment and plan:  #Acute left MCA stroke status post TPA.  Cerebral angiogram.  Continue aspirin, statin. Follow neurology recs     #EKG changes/newly diagnosed systolic heart failure  EKG with diffuse T wave inversion   Echo with an EF of 40% no PFO   Will need ischemic work-up. Await cardiology recs     #Hyponatremia suspected secondary to SIADH. Fluid restriction  1800 cc daily. Hold Cymbalta it may be contributing.   Continue urea  Follow nephrology recs     #Hypertension  Continue atenolol 50 mg.     Gaby Lima  Attending Physician  Hospitalist

## 2020-07-28 NOTE — PROGRESS NOTES
[facility]  Cardiology Inpatient Consult Service  Daily Progress Note        Admit Date:  7/23/2020    Referring Physician: Veronica Domingo MD    Reason for Consultation/Chief Complaint:   Abnormal ECG/elevated troponin     Subjective: Interval history:  BRENDAN  TTE showed EF of 40 to 45%  Medications:   [START ON 7/29/2020] metoprolol succinate  25 mg Oral Daily    urea  15 g Oral Daily    atorvastatin  80 mg Oral Nightly    [Held by provider] DULoxetine  60 mg Oral BID    aspirin  81 mg Oral Daily       IV drips:      PRN:  acetaminophen, perflutren lipid microspheres, labetalol, hydrALAZINE, acetaminophen      Objective:     Vitals:    07/27/20 2306 07/28/20 0420 07/28/20 0715 07/28/20 1154   BP: 135/87 111/76 (!) 132/94 110/75   Pulse: 73 103 108 102   Resp: 19 20 18 20   Temp: 97.5 °F (36.4 °C) 98.5 °F (36.9 °C) 97.3 °F (36.3 °C) 98 °F (36.7 °C)   TempSrc: Oral Oral Oral Oral   SpO2: 100% 95% 99% 96%   Weight:       Height:           Intake/Output Summary (Last 24 hours) at 7/28/2020 1522  Last data filed at 7/28/2020 1158  Gross per 24 hour   Intake 300 ml   Output 1900 ml   Net -1600 ml     I/O last 3 completed shifts: In: 300 [P.O.:290; I.V.:10]  Out: 1900 [Urine:1900]  Wt Readings from Last 3 Encounters:   07/25/20 199 lb 8.3 oz (90.5 kg)   07/23/20 200 lb 4.8 oz (90.9 kg)   07/07/20 205 lb (93 kg)       Admit Wt: Weight: 200 lb 4.8 oz (90.9 kg)   Todays Wt: Weight: 199 lb 8.3 oz (90.5 kg)    TELEMETRY: Sinus     Physical Exam:   COVID rule out      Objective:  Vital signs: (most recent): Blood pressure 110/75, pulse 102, temperature 98 °F (36.7 °C), temperature source Oral, resp. rate 20, height 5' 6\" (1.676 m), weight 199 lb 8.3 oz (90.5 kg), SpO2 96 %, not currently breastfeeding.         Labs:   Recent Labs     07/26/20  0441 07/27/20  0454 07/27/20  1813 07/28/20  0500   * 127* 123* 126*   K 3.8 3.6  --  3.6   BUN 4* 3*  --  14   CREATININE <0.5* <0.5*  -- <0.5*   CL 91* 91*  --  91*   CO2 23 24  --  24   GLUCOSE 83 83  --  93   CALCIUM 8.9 9.2  --  9.3     Recent Labs     07/26/20  0441 07/27/20  0454 07/28/20  0500   WBC 6.4 5.2 5.6   HGB 9.1* 8.8* 9.5*   HCT 26.4* 25.6* 27.7*    412 469*   MCV 97.6 97.6 97.1     No results for input(s): CHOLTOT, TRIG, HDL in the last 72 hours. Invalid input(s): LIPIDCOMM, CHOLHDL, VLDCHOL, LDL  No results for input(s): PTT, INR in the last 72 hours. Invalid input(s): PT  No results for input(s): CKTOTAL, CKMB, CKMBINDEX, TROPONINI in the last 72 hours. No results for input(s): BNP in the last 72 hours. No results for input(s): NTPROBNP in the last 72 hours. No results for input(s): TSH in the last 72 hours. Imaging:   I personally reviewed imaging studies including CXR, Stress test, TTE/BOUBACAR. Assessment & Plan:     Abnormal ECG/elevated troponin   -Stable, telemetry okay  -Continue fluid map of 40-45. Possible CAD versus stress-induced cardiomyopathy.  -Start heart failure treatment. -Given recent stroke will defer ischemic work-up as an outpatient (May need DAPT if PCI is performed). Due to limiting exposure to COVID-19 and minimizing use of PPE, note was completed solely using EMR and from personal conversations with primary medical team and/or consultants involved in case. Patient was not interviewed or examined. I have personally reviewed the reports and images of labs, radiological studies, cardiac studies including ECG's and telemetry, current and old medical records. The note was completed using EMR and Dragon dictation system. Every effort was made to ensure accuracy; however, inadvertent computerized transcription errors may be present. CODES 06104 (up to 30 min), 20141 (>30 min). I have spent 20 minutes reviewing EMR as above and formulating my assessment and plan. Thank you for allowing to us to participate in the care of Evon Flores.  Please call our service with questions. Jesse Gonsales MD, [facility]    7/28/2020 3:22 PM

## 2020-07-29 LAB
ANION GAP SERPL CALCULATED.3IONS-SCNC: 10 MMOL/L (ref 3–16)
BASOPHILS ABSOLUTE: 0 K/UL (ref 0–0.2)
BASOPHILS RELATIVE PERCENT: 0.6 %
BUN BLDV-MCNC: 12 MG/DL (ref 7–20)
CALCIUM SERPL-MCNC: 9.4 MG/DL (ref 8.3–10.6)
CHLORIDE BLD-SCNC: 89 MMOL/L (ref 99–110)
CO2: 25 MMOL/L (ref 21–32)
CREAT SERPL-MCNC: <0.5 MG/DL (ref 0.6–1.2)
CREATININE URINE: 14 MG/DL (ref 28–259)
EOSINOPHILS ABSOLUTE: 0.5 K/UL (ref 0–0.6)
EOSINOPHILS RELATIVE PERCENT: 7.1 %
GFR AFRICAN AMERICAN: >60
GFR NON-AFRICAN AMERICAN: >60
GLUCOSE BLD-MCNC: 100 MG/DL (ref 70–99)
HCT VFR BLD CALC: 26.2 % (ref 36–48)
HEMOGLOBIN: 9.2 G/DL (ref 12–16)
LYMPHOCYTES ABSOLUTE: 1.7 K/UL (ref 1–5.1)
LYMPHOCYTES RELATIVE PERCENT: 26.7 %
MAGNESIUM: 1.4 MG/DL (ref 1.8–2.4)
MCH RBC QN AUTO: 34.2 PG (ref 26–34)
MCHC RBC AUTO-ENTMCNC: 35 G/DL (ref 31–36)
MCV RBC AUTO: 97.6 FL (ref 80–100)
MONOCYTES ABSOLUTE: 0.6 K/UL (ref 0–1.3)
MONOCYTES RELATIVE PERCENT: 8.8 %
NEUTROPHILS ABSOLUTE: 3.6 K/UL (ref 1.7–7.7)
NEUTROPHILS RELATIVE PERCENT: 56.8 %
PDW BLD-RTO: 13.1 % (ref 12.4–15.4)
PLATELET # BLD: 520 K/UL (ref 135–450)
PMV BLD AUTO: 7.5 FL (ref 5–10.5)
POTASSIUM SERPL-SCNC: 3.4 MMOL/L (ref 3.5–5.1)
RBC # BLD: 2.69 M/UL (ref 4–5.2)
SODIUM BLD-SCNC: 124 MMOL/L (ref 136–145)
SODIUM BLD-SCNC: 126 MMOL/L (ref 136–145)
WBC # BLD: 6.4 K/UL (ref 4–11)

## 2020-07-29 PROCEDURE — 97116 GAIT TRAINING THERAPY: CPT

## 2020-07-29 PROCEDURE — 6360000002 HC RX W HCPCS

## 2020-07-29 PROCEDURE — 97530 THERAPEUTIC ACTIVITIES: CPT

## 2020-07-29 PROCEDURE — 99152 MOD SED SAME PHYS/QHP 5/>YRS: CPT

## 2020-07-29 PROCEDURE — 36415 COLL VENOUS BLD VENIPUNCTURE: CPT

## 2020-07-29 PROCEDURE — 6370000000 HC RX 637 (ALT 250 FOR IP): Performed by: STUDENT IN AN ORGANIZED HEALTH CARE EDUCATION/TRAINING PROGRAM

## 2020-07-29 PROCEDURE — 6370000000 HC RX 637 (ALT 250 FOR IP)

## 2020-07-29 PROCEDURE — 80048 BASIC METABOLIC PNL TOTAL CA: CPT

## 2020-07-29 PROCEDURE — 33285 INSJ SUBQ CAR RHYTHM MNTR: CPT

## 2020-07-29 PROCEDURE — 6370000000 HC RX 637 (ALT 250 FOR IP): Performed by: INTERNAL MEDICINE

## 2020-07-29 PROCEDURE — C1764 EVENT RECORDER, CARDIAC: HCPCS

## 2020-07-29 PROCEDURE — 0JH632Z INSERTION OF MONITORING DEVICE INTO CHEST SUBCUTANEOUS TISSUE AND FASCIA, PERCUTANEOUS APPROACH: ICD-10-PCS | Performed by: INTERNAL MEDICINE

## 2020-07-29 PROCEDURE — 84295 ASSAY OF SERUM SODIUM: CPT

## 2020-07-29 PROCEDURE — 33285 INSJ SUBQ CAR RHYTHM MNTR: CPT | Performed by: INTERNAL MEDICINE

## 2020-07-29 PROCEDURE — 2060000000 HC ICU INTERMEDIATE R&B

## 2020-07-29 PROCEDURE — 83735 ASSAY OF MAGNESIUM: CPT

## 2020-07-29 PROCEDURE — 97535 SELF CARE MNGMENT TRAINING: CPT

## 2020-07-29 PROCEDURE — 85025 COMPLETE CBC W/AUTO DIFF WBC: CPT

## 2020-07-29 RX ORDER — TOLVAPTAN 15 MG/1
7.5 TABLET ORAL ONCE
Status: COMPLETED | OUTPATIENT
Start: 2020-07-29 | End: 2020-07-29

## 2020-07-29 RX ORDER — LOSARTAN POTASSIUM 25 MG/1
25 TABLET ORAL DAILY
Status: DISCONTINUED | OUTPATIENT
Start: 2020-07-29 | End: 2020-07-30 | Stop reason: HOSPADM

## 2020-07-29 RX ORDER — POTASSIUM CHLORIDE 20 MEQ/1
40 TABLET, EXTENDED RELEASE ORAL ONCE
Status: COMPLETED | OUTPATIENT
Start: 2020-07-29 | End: 2020-07-29

## 2020-07-29 RX ORDER — MAGNESIUM SULFATE IN WATER 40 MG/ML
4 INJECTION, SOLUTION INTRAVENOUS ONCE
Status: COMPLETED | OUTPATIENT
Start: 2020-07-29 | End: 2020-07-29

## 2020-07-29 RX ADMIN — MAGNESIUM SULFATE HEPTAHYDRATE 4 G: 40 INJECTION, SOLUTION INTRAVENOUS at 14:57

## 2020-07-29 RX ADMIN — TOLVAPTAN 7.5 MG: 15 TABLET ORAL at 02:03

## 2020-07-29 RX ADMIN — TOLVAPTAN 7.5 MG: 15 TABLET ORAL at 09:54

## 2020-07-29 RX ADMIN — ASPIRIN 81 MG: 81 TABLET, CHEWABLE ORAL at 09:54

## 2020-07-29 RX ADMIN — ACETAMINOPHEN 650 MG: 325 TABLET ORAL at 19:36

## 2020-07-29 RX ADMIN — METOPROLOL SUCCINATE 25 MG: 25 TABLET, EXTENDED RELEASE ORAL at 09:54

## 2020-07-29 RX ADMIN — ATORVASTATIN CALCIUM 80 MG: 80 TABLET, FILM COATED ORAL at 19:36

## 2020-07-29 RX ADMIN — POTASSIUM CHLORIDE 40 MEQ: 20 TABLET, EXTENDED RELEASE ORAL at 09:54

## 2020-07-29 RX ADMIN — LOSARTAN POTASSIUM 25 MG: 25 TABLET, FILM COATED ORAL at 17:56

## 2020-07-29 ASSESSMENT — PAIN SCALES - GENERAL
PAINLEVEL_OUTOF10: 0
PAINLEVEL_OUTOF10: 3

## 2020-07-29 NOTE — PLAN OF CARE
Problem: Falls - Risk of:  Goal: Will remain free from falls  Description: Will remain free from falls. Bed locked in lowest position, alarm on, monitor in room on, and 3/4 rails up. Nonskid socks on. Call light and bedside table within reach. No falls this shift. Will continue to monitor with hourly rounding. Outcome: Ongoing     Problem: Skin Integrity:  Goal: Will show no infection signs and symptoms  Description: Will show no infection signs and symptoms  Outcome: Ongoing     Problem: ACTIVITY INTOLERANCE/IMPAIRED MOBILITY  Goal: Mobility/activity is maintained at optimum level for patient  Description: Post TPA protocol in place. Currently monitoring patient neurologically Q62 per policy. Will continue to monitor.   Outcome: Ongoing

## 2020-07-29 NOTE — PROGRESS NOTES
Physical Therapy  Facility/Department: [facility] 5T ORTHO/NEURO  Daily Treatment Note  NAME: Angela Mcneal  : 1955  MRN: [id number]    Date of Service: 2020    Discharge Recommendations:  Angela Mcneal scored a 16/24 on the AM-PAC short mobility form. Current research shows that an AM-PAC score of 17 or less is typically not associated with a discharge to the patient's home setting. Based on the patient's AM-PAC score and their current functional mobility deficits, it is recommended that the patient have 5-7 sessions per week of Physical Therapy at d/c to increase the patient's independence. At this time, this patient demonstrates the endurance, and/or tolerance for 3 hours of therapy each day, with a treatment frequency of 5-7x/wk. Please see assessment section for further patient specific details. If patient discharges prior to next session this note will serve as a discharge summary. Please see below for the latest assessment towards goals. Patient would benefit from continued therapy after discharge   PT Equipment Recommendations  Equipment Needed: (defer)    Assessment   Body structures, Functions, Activity limitations: Decreased functional mobility ; Decreased strength  Assessment: Pt is 62 y.o. female admit with acute CVA, received tPA. Pt demos symmetrical strength B LEs, decreased coordination and awareness of R UE. Pt moved fairly well overall today considering hospital course. Demos good effort and participation. Pt is below her functional baseline. Significant expressive aphasia noted. Pt able to follow commands although not consistently. Would benefit from continued IP PT. Discussed with MD and . will follow.   Treatment Diagnosis: decreased strength, balance, functional mobility  Prognosis: Good  PT Education: Transfer Training;General Safety;Gait Training  REQUIRES PT FOLLOW UP: Yes  Activity Tolerance  Activity Tolerance: Patient Tolerated treatment well;Patient limited by fatigue     Patient Diagnosis(es): There were no encounter diagnoses. has a past medical history of Anxiety, Arthritis, Blood transfusion reaction, Crohn's disease ([address] Depression, GERD (gastroesophageal reflux disease), Hiatal hernia, Hx of blood clots, Hypertension, MRSA (methicillin resistant staph aureus) culture positive, Neuropathy, Pneumonia, Sinus headache, SOB (shortness of breath), and VRE (vancomycin resistant enterococcus) culture positive. has a past surgical history that includes Hysterectomy; knee surgery (Bilateral); Hand Carpectomy (Bilateral); hernia repair; Abdomen surgery; bladder suspension; fracture surgery; Heel spur surgery; Tonsillectomy; Colonoscopy; Upper gastrointestinal endoscopy (6/14/13); Foot surgery (Left, 6/25/14); Foot surgery (6/3/15); Colonoscopy (9/14/15); Foot surgery (Left, 08/05/2002); joint replacement; Sigmoidoscopy (01/15/2018); Abdominal adhesion surgery (06/08/2018); Colonoscopy (08/07/2018); colostomy (N/A, 10/8/2018); pr secd clos surg wnd exten/complic (N/A, 93/57/4550); Leg Surgery (Right); Colonoscopy (06/07/2019); Colonoscopy (N/A, 6/7/2019); Colonoscopy (N/A, 6/7/2019); Small intestine surgery (N/A, 7/17/2019); and proctosigmoidoscopy (N/A, 7/17/2019). Restrictions  Position Activity Restriction  Other position/activity restrictions: Up w/ Assist  Subjective   General  Chart Reviewed: Yes  Additional Pertinent Hx: Admit 7/15 to OSH with B knee pain, transfer to [facility] 7/23 after code stroke, found to have Left MCA ischemic stroke status post TPA and cerebral angiogram revealed M2 thromboembolism is partially lysed and has moved distally and now has become subocclusive M3 thromboembolic. No mechanical thrombectomy was done; neuro and neurosurgery following;  PMHx: HTN, h/o blood clots, arthritis, neuropathy, chrohn's, depression, anxiety, surgeries to B knees, R hip, L foot, and shoulder  Family / Caregiver Present: (daughter arrived at end of session)  Referring Practitioner: To Chauhan MD  Subjective  Subjective: Pt sitting up in bed with one leg over the edge as if trying to get OOB. Agreeable to PT. Asking for water. Pt remains aphasic. Objective   Bed mobility  Supine to Sit: Stand by assistance  Sit to Supine: Stand by assistance  Scooting: Stand by assistance  Transfers  Sit to Stand: Minimal Assistance(verbal cues required)  Stand to sit: Minimal Assistance(verbal cues required)  Ambulation  Ambulation?: Yes  Ambulation 1  Surface: level tile  Device: Rolling Walker  Assistance: Minimal assistance  Quality of Gait: slow, unsteady, effortful, flexed posture  Gait Deviations: Decreased step length;Decreased step height  Distance: 20 ft, 50 ft  Comments: pt becoming fatigued toward end of 2nd walk, becoming increasingly flexed & unsafe with fatigue. \"plopped\" unsafely onto bed.                                                                                     AM-PAC Score  AM-PAC Inpatient Mobility Raw Score : 16 (07/29/20 1744)  AM-PAC Inpatient T-Scale Score : 40.78 (07/29/20 1744)  Mobility Inpatient CMS 0-100% Score: 54.16 (07/29/20 1744)  Mobility Inpatient CMS G-Code Modifier : CK (07/29/20 1744)          Goals  Short term goals  Time Frame for Short term goals: discharge  Short term goal 1: bed mobility with SBA (met 7/29)  Short term goal 2: sit to/from stand with CGA  Short term goal 3: Pt will amb 20 ft with RW and CGA  Patient Goals   Patient goals : eventually return home    Plan    Plan  Times per week: 5-7  Current Treatment Recommendations: Strengthening, Balance Training, Functional Mobility Training, Transfer Training, Gait Training, Patient/Caregiver Education & Training, Neuromuscular Re-education, Home Exercise Program  Safety Devices  Type of devices: Bed alarm in place, Call light within reach, Left in bed, Nurse notified(pt declined sitting in chair.)     Therapy Time   Individual Concurrent Group Co-treatment   Time In 1530         Time Out 1615         Minutes 1601 [address] [place], PT

## 2020-07-29 NOTE — PROGRESS NOTES
PGY-1 Resident Progress Note  [facility]    Admit Date: 7/23/2020       CC: CVA    Subjective:  No acute events overnight. Patient was more alert and verbal this AM. Has moderate expressive aphasia and gets frustrated as she says she wants to go home. She is able to flex/ext hips bilaterally 4/5 strength as well as plantarflex/dorsiflex ankle bilaterally 4/5. She was able to lift both her arms over head and her legs against gravity. But she was not able to apply pressure or resist against pressure. Her heart beat sounds irregular, breath sounds equal and CTA bilaterally  Pt denies F/C/N/V, HA, fatigue, CP, dyspnea, abdominal pain, constipation/diarrhea, and urinary symptoms. Hospital Course:   Patient is a 60-year-old female with history of hypertension, Crohn's disease who was admitted to [facility] for generalized weakness, fatigue & frequent falls. She had many electrolyte abnormalities. During her admission she was found to have aphasia and R sided weakness by nursing staff & stroke alert was called. Initial NIHSS was 24, no contraindications for tPA and this was administered at 12:54pm on 7/23/20. CTA showed an M2 occlusion therefore patient was transferred to [facility] for endovascular treatment.  Cerebral angio showed occlusion had partially lysed and moved distally w/ now a subocclusive M3 branch occlusion and endovascular treatment was no longer indicated.     Family Hx:   Family History   Problem Relation Age of Onset    High Blood Pressure Mother     High Blood Pressure Father     Kidney Disease Sister        Scheduled Medications:    tolvaptan  7.5 mg Oral Once    metoprolol succinate  25 mg Oral Daily    atorvastatin  80 mg Oral Nightly    [Held by provider] DULoxetine  60 mg Oral BID    aspirin  81 mg Oral Daily      PRN Medications: acetaminophen, perflutren lipid microspheres, labetalol, hydrALAZINE, acetaminophen  Diet: Diet NPO Effective Now Exceptions are: Ice Chips, Sips with Meds    Continuous Infusions:    PHYSICAL EXAM:  /84   Pulse 113   Temp 98.4 °F (36.9 °C) (Oral)   Resp 18   Ht 5' 6\" (1.676 m)   Wt 199 lb 8.3 oz (90.5 kg)   SpO2 96%   BMI 32.20 kg/m²   No results for input(s): POCGLU in the last 72 hours. Intake/Output Summary (Last 24 hours) at 7/29/2020 0802  Last data filed at 7/29/2020 0720  Gross per 24 hour   Intake --   Output 4000 ml   Net -4000 ml       Physical Exam   Skin: no rash, turgor wnl  Heent:  eomi, mmm  Neck: no bruits or jvd noted  Cardiovascular:  Tachycardic, irregular rhythm, S1, S2 without m/r/g  Respiratory: CTA B without w/r/r  Abdomen:  +bs, soft, nt, nd  Ext: +1 pitting edema bilaterally  Psychiatric: mood and affect appropriate  Musculoskeletal: 4/5 strength in hip flexion/extension bilaterally, 4/5 strength in plantarflexion/extension in ankles bilaterally. Pt lifted both arms above her head. Could also raise her legs against gravity. LABS:  Recent Labs     07/27/20  0454 07/28/20  0500 07/29/20  0456   WBC 5.2 5.6 6.4   HGB 8.8* 9.5* 9.2*   HCT 25.6* 27.7* 26.2*    469* 520*                                                                    Recent Labs     07/27/20  0454  07/28/20  0500 07/28/20  1838 07/29/20  0456   *   < > 126* 123* 124*   K 3.6  --  3.6  --  3.4*   CL 91*  --  91*  --  89*   CO2 24  --  24  --  25   BUN 3*  --  14  --  12   CREATININE <0.5*  --  <0.5*  --  <0.5*   GLUCOSE 83  --  93  --  100*    < > = values in this interval not displayed. No results for input(s): AST, ALT, ALB, BILITOT, ALKPHOS in the last 72 hours. No results for input(s): TROPONINI in the last 72 hours. No results for input(s): BNP in the last 72 hours. No results for input(s): CHOL, HDL in the last 72 hours. Invalid input(s): LDLCALCU  No results for input(s): INR in the last 72 hours. Imaging:          MRI brain without contrast   Final Result      1.   Moderate volume acute left MCA territory infarct with small volume petechial hemorrhage in the left parietal lobe. CT head without contrast   Final Result      No acute intracranial hemorrhage or mass effect. Vascular carotid duplex bilateral   Final Result      IR ANGIOGRAM CAROTID CEREBRAL LEFT    (Results Pending)         Assessment & Plan:    Leonidas Otero is a 62 y.o. female with PMHx of HTN, chron's, p/w weakness, fatigue, and frequent falls, who later developed stroke like symptoms and was found to have M2 occlusion at OSH and was given tPA on 7/23. 1. Euvolemic hypotonic hyponatremia - sec to CVA and cymbalta use   - SIADH  - Monitor Na  - Chronically low Na, has been on many psychiatric medications in the past and on diuretics at home  - Hold cymbalta  - Goal > 127-128,  - Continue fluid restriction   - Tolvaptan 7.5mg PO     2. HTN   - Keep SPB < 140  - BP stable and at goal  - Losartan 25 mg PO daily     3.  Acid- base/ Electrolyte imbalance   - potassium 3.4, repleted with KLOR -CON M  ER 40 mEq, oral today  - Mg sulphate 4 gm IV at 25 ml/hr   - Monitor daily K+ and Mg      4. Normocytic anemia   - Monitor for signs of bleeding since received tPA    5. Newly diagnosed systolic heart failure  - EKG with diffuse T wave inversion   - Echo with an EF of 40% no PFO   - Will need ischemic work-up   - AL insertion subq cardiac rhythm monitor   - Metoprolol succinate 25 mg PO  - Losartan 25 mg PO    6. Acute left MCA stroke s/p TPA.    - Continue aspirin, statin. - Follow neurology recs    Code Status: Full Code  FEN: none; Diet NPO Effective Now Exceptions are: Ice Chips, Sips with Meds    DISPO:wards    This patient will be discussed with attending, Mley Vaughan MD.    Lia Shepard M.D.    Internal Medicine Resident, PGY-1  Contact via [email or web address]  7/29/2020, 8:02 AM

## 2020-07-29 NOTE — PROGRESS NOTES
Nephrology Note                                                                                                                                                                                                                                                                                                                                                               Office : [phone]     Fax :[phone]              Patient's Name: Lexi Will  9:34 AM  7/29/2020    Reason for Consult:  Hyponatremia  Requesting Physician:  Lenore Cabot, MD      Chief Complaint:  CVA     History of Present Ilness:    Lexi Will is a 62 y.o. female with PMHx of HTN, chron's, p/w weakness, fatigue, and frequent falls, and found to have M2 occlusion at OSH and was given tPA on 7/23. Patient takes diuretics and psychiatric medications at home. She has chronic hyponatremia. Patient aphasic so most history taken by chart review. 7/29:  Pt is awake and has more spontaneous speech today, still has expressive aphasia and gets frustrated when she can't communicate what she wants to. Pt seems frustrated and wants to go home. She was eating ice chips when I entered the room. She states that she is making plenty of urine. Able to flex/ext hips bilaterally 4/5 strength as well as plantarflex/dorsiflex ankle bilaterally 4/5. Today she lifted both arms over head, but would not let me test them with resistance. Her heart beat sounds irregular, breath sounds equal and CTA bilaterally, no abdominal pain or tenderness. 7/28:   Pt feeling better today, she reports she is making plenty of urine and drinking less water than usual. She still has aphasia, but based on previous notes it sounds like she is talking better. She was able to move her L and R hip into flexion and extension with 4/5 strength. She was not able to dorsiflex/plantar flex her feet bilaterally, kept trying to flex/extend hip instead.  Pt would not lift her R arm from the bed. She was complaining of feeling cold today.        Past Medical History:   Diagnosis Date    Anxiety     Arthritis     Blood transfusion reaction     Crohn's disease ([address]     Depression     GERD (gastroesophageal reflux disease)     Hiatal hernia 6/24/2014    Hx of blood clots     Hypertension     MRSA (methicillin resistant staph aureus) culture positive 06/05/2018    urine    Neuropathy     Pneumonia 1/8/2014    Sinus headache     SOB (shortness of breath)     VRE (vancomycin resistant enterococcus) culture positive 10/08/2018    abd culture       Past Surgical History:   Procedure Laterality Date    ABDOMEN SURGERY      ABDOMINAL ADHESION SURGERY  06/08/2018    BLADDER SUSPENSION      COLONOSCOPY      COLONOSCOPY  9/14/15    sigmoid colon biopsy     COLONOSCOPY  08/07/2018    COLONOSCOPY  06/07/2019    COLONOSCOPY, POSSIBLE BIOPSY, POSSIBLE POLYPECTOMY    COLONOSCOPY N/A 6/7/2019    COLONOSCOPY WITH BIOPSY performed by Dylan Miller MD at [address] N/A 6/7/2019    COLONOSCOPY DIAGNOSTIC/STOMA performed by Dylan Miller MD at [address] 10/8/2018    EXPLORATORY LAPAROTOMY WITH COLOSTOMY performed by Berenice Boykin MD at [facility] Left 6/25/14    excision plantar left hallux    FOOT SURGERY  6/3/15    PLANTAR PLATE REPAIR BILATERAL, ARTHROTOMY MPJ 2ND BILATERAL    FOOT SURGERY Left 08/05/2002    Excision second metatarsal head left    FRACTURE SURGERY      rt hip    HAND CARPECTOMY Bilateral     HEEL SPUR SURGERY      HERNIA REPAIR      HYSTERECTOMY      bladder surgery    JOINT REPLACEMENT      rt hip, L shoulder replacement 11/2014    KNEE SURGERY Bilateral     arthrosvopic    LEG SURGERY Right     DE SECD CLOS SURG WND EXTEN/COMPLIC N/A 69/02/3970    SECONDARY WOUND CLOSURE OF ABDOMINAL WOUND performed by Berenice Boykin MD at [facility] 7/17/2019    FLEXIBLE SIGMOIDOSCOPY performed by Suraj Villasenor MD at [address]  01/15/2018    with biopsies    SMALL INTESTINE SURGERY N/A 7/17/2019    COLOSTOMY CLOSURE WITH COLORECTAL ANASTOMOSIS performed by Suraj Villasenor MD at [place] [address]  6/14/13    and colonsocopy with antral erosion biopsies       Family History   Problem Relation Age of Onset    High Blood Pressure Mother     High Blood Pressure Father     Kidney Disease Sister         reports that she has been smoking cigarettes and e-cigarettes. She has a 10.00 pack-year smoking history. She has never used smokeless tobacco. She reports current alcohol use of about 2.0 standard drinks of alcohol per week. She reports that she does not use drugs.     Allergies:  Ace inhibitors and Skelaxin [metaxalone]    Current Medications:    tolvaptan (SAMSCA) tablet 7.5 mg, Once  losartan (COZAAR) tablet 25 mg, Daily  potassium chloride (KLOR-CON M) extended release tablet 40 mEq, Once  metoprolol succinate (TOPROL XL) extended release tablet 25 mg, Daily  atorvastatin (LIPITOR) tablet 80 mg, Nightly  acetaminophen (TYLENOL) tablet 650 mg, Q6H PRN  [Held by provider] DULoxetine (CYMBALTA) extended release capsule 60 mg, BID  aspirin chewable tablet 81 mg, Daily  perflutren lipid microspheres (DEFINITY) injection 1.65 mg, ONCE PRN  labetalol (NORMODYNE;TRANDATE) injection syringe 5 mg, Q10 Min PRN  hydrALAZINE (APRESOLINE) injection 5 mg, Q10 Min PRN  acetaminophen (TYLENOL) suppository 325 mg, Q4H PRN        Physical exam:     Vitals:  /84   Pulse 113   Temp 98.4 °F (36.9 °C) (Oral)   Resp 18   Ht 5' 6\" (1.676 m)   Wt 199 lb 8.3 oz (90.5 kg)   SpO2 96%   BMI 32.20 kg/m²     Constitutional:  OAA, aphasic  Skin: no rash, turgor wnl  Heent:  eomi, mmm  Neck: no bruits or jvd noted  Cardiovascular:  Tachycardic, irregular rhythm, S1, S2 without m/r/g  Respiratory: CTA B without w/r/r  Abdomen:  +bs, soft, nt, nd  Ext: +1 pitting edema bilaterally  Psychiatric: mood and affect appropriate  Musculoskeletal: 4/5 strength in hip flexion/extension bilaterally, 4/5 strength in plantarflexion/extension in ankles bilaterally. Pt lifted both arms above her head, would not let me test vs resistance. Data:   Labs:  CBC:   Recent Labs     07/27/20  0454 07/28/20  0500 07/29/20  0456   WBC 5.2 5.6 6.4   HGB 8.8* 9.5* 9.2*    469* 520*     BMP:    Recent Labs     07/27/20  0454  07/28/20  0500 07/28/20  1838 07/29/20  0456   *   < > 126* 123* 124*   K 3.6  --  3.6  --  3.4*   CL 91*  --  91*  --  89*   CO2 24  --  24  --  25   BUN 3*  --  14  --  12   CREATININE <0.5*  --  <0.5*  --  <0.5*   GLUCOSE 83  --  93  --  100*    < > = values in this interval not displayed. Ca/Mg/Phos:   Recent Labs     07/27/20  0454 07/28/20  0500 07/29/20  0456   CALCIUM 9.2 9.3 9.4     Hepatic: No results for input(s): AST, ALT, ALB, BILITOT, ALKPHOS in the last 72 hours. Troponin: No results for input(s): TROPONINI in the last 72 hours. BNP: No results for input(s): BNP in the last 72 hours. Lipids: No results for input(s): CHOL, TRIG, HDL, LDLCALC, LABVLDL in the last 72 hours. ABGs: No results for input(s): PHART, PO2ART, VSE5BTF in the last 72 hours. INR: No results for input(s): INR in the last 72 hours. UA:No results for input(s): Orma Baldy, GLUCOSEU, BILIRUBINUR, Linard Drums, BLOODU, PHUR, PROTEINU, UROBILINOGEN, NITRU, LEUKOCYTESUR, LABMICR, URINETYPE in the last 72 hours. Urine Microscopic: No results for input(s): LABCAST, BACTERIA, COMU, HYALCAST, WBCUA, RBCUA, EPIU in the last 72 hours. Urine Culture: No results for input(s): LABURIN in the last 72 hours. Urine Chemistry:   Recent Labs     07/26/20  1009   NAUR 94         IMAGING:  MRI brain without contrast   Final Result      1.   Moderate volume acute left MCA territory infarct with small volume petechial hemorrhage in the left parietal lobe. CT head without contrast   Final Result      No acute intracranial hemorrhage or mass effect. Vascular carotid duplex bilateral   Final Result      IR ANGIOGRAM CAROTID CEREBRAL LEFT    (Results Pending)       Assessment/Plan   1. Euvolemic hypotonic hyponatremia - sec to CVA and cymbalta use   - SIADH  - Na 126 --->124, continue to monitor Na   - Continue fluid restriction and PO urea  - Started tolvaptan 7.5 mg PO once today  - Chronically low, has been on many psychiatric medications in the past and on diuretics at home  - Hold cymbalta  - Goal > 127-128, then nephro will sign off    2. HTN  - Keep SPB < 140  - BP stable and at goal  - continue home medication norvasc    3. Normocytic anemia  - Monitor for signs of bleeding since received tPA  - Hb stable & similar to prior labs    4. Acid- base/ Electrolyte imbalance   - potassium 3.6 --> 3.4, repleted with klor-con M ER 40 meQ today    5. Acute CVA - M2 occlusion L MCA stroke   - monitor  - neuro signed off, follow neuro recs    6. EKG changes/newly diagnosed systolic HF  - ECHO 97% EF, no PFO  - cardio on board, CAD vs stress induced cardiomyopathy, rec starting HF treatment, they will do ischemic workup outpatient    7.  Possible new onset irregular heart rate  - pt on tele    Thank you for allowing us to participate in care of [address], [address] free to contact me   [facility] [address]  Office : [phone]  Fax :[phone]         Pt seen and examined     SAMSCA x 1   UO is good   FLuid restriction   Ur studies - SIADH     Elda Santiago MD

## 2020-07-29 NOTE — CONSULTS
[address]                [address]                                  CONSULTATION    PATIENT NAME: Barron Riggs                :        1955  MED REC NO:   [id number]                          ROOM:       8316  ACCOUNT NO:   [de-identified]                           ADMIT DATE: 2020  PROVIDER:     Andreea Dotson MD    CONSULT DATE:  2020    CARDIAC ELECTROPHYSIOLOGY CONSULTATION    REASON FOR CONSULTATION:  Stroke. HISTORY OF PRESENT ILLNESS:  The patient is a 81-year-old woman with  history of hypertension and irritable bowel disease, who is admitted as  a transfer from [facility] on 2020 for CVA. She  presented with left facial droop and right-sided weakness. Head CT  demonstrated left middle cerebral artery occlusion. She received TPA  and then was brought to [facility] for endovascular therapy. On  subsequent evaluation the embolus had partially lysed and mechanical  thrombectomy was not performed. Based on the multifocal nature of her  CVA, there is some question as to whether she may have had silent atrial  fibrillation as an etiology for the CVAs. We are asked to see her for  ILR implantation. PAST MEDICAL HISTORY:  1. Hypertension. 2.  Inflammatory bowel disease. 3.  Osteoarthritis. 4.  Neuropathy. MEDICATIONS:  At the time of admission include atenolol 100 mg p.o.  daily, baclofen 10 mg p.o. t.i.d., _____ 3 gm p.o. daily, BuSpar 10 mg  p.o. t.i.d., Cymbalta 60 mg p.o. b.i.d., Allegra 180 mg p.o. daily,  Carafate 1 gm p.o. q.i.d. ALLERGIES:  Include ACE INHIBITORS and SKELAXIN. SOCIAL HISTORY:  The patient is a current everyday cigarette smoker. She does consume about two standard drinks per day. FAMILY HISTORY:  Remarkable for hypertension in both parents. There is  history of renal failure in one female sibling.   There is no known  family history of cardiac rhythm disturbance, syncope or sudden cardiac  death. REVIEW OF SYSTEMS:  Demonstrates no recent change in appetite or change  in weight. The patient has not had recent fever or chills. She does  not describe chest pain. She has not had near syncope or syncope. All  other components of 14-system review are negative. PHYSICAL EXAMINATION:  VITAL SIGNS:  Blood pressure is 128/86, heart rate is 80 beats per  minute and regular. The patient is not febrile. GENERAL:  She is awake, alert, oriented and in no discomfort. HEENT:  Demonstrates normocephalic and atraumatic head. There is no  scleral icterus. Pupils are round and reactive. NECK:  Supple without thyromegaly. LUNGS:  Clear to auscultation. CARDIOVASCULAR:  Reveals a regular rhythm. The apical impulse is  normal.  S1 and S2 are normal.  There is no audible murmur or gallop. The jugular venous pressure does not appear elevated. ABDOMEN:  Mildly obese, soft, nontender. EXTREMITIES:  Demonstrate no pitting pretibial dependent edema. There  is no cyanosis. There is no evidence for chronic venous stasis. SKIN:  Otherwise warm and dry without skin rash. DIAGNOSTIC DATA:  12-lead ECG from 07/27/2020 demonstrates sinus rhythm  at 78 beats per minute. PAC is identified. There are profound  symmetric T-wave inversions throughout. IMPRESSION:  1. Acute multifocal CVA. 2.  Hypertension. 3.  Abnormal ECG. 4.  Elevated troponin. The patient presents with acute CVA. Imaging studies demonstrate  multifocal involvement. There is some risk for unidentified atrial  fibrillation in this setting. Neurology has requested ILR for long-term  rhythm monitoring to exclude this possibility. If atrial fibrillation  is identified, this would result in a change in her medical therapy. These issues were reviewed with the patient in detail and she agrees  that implantable monitor is likely to provide benefit.   We reviewed the  procedure and procedure related complications and she understands all  the issues adequately and wishes to proceed. All questions were  addressed. RECOMMENDATIONS:  Proceed with ILR implantation. Thanks for the opportunity to assist in the care of the patient. Please  contact me if you have any questions regarding her evaluation.         Karli Jorgensen MD    D: 07/28/2020 20:38:29       T: 07/28/2020 20:43:37     [email or web address]  Job#: [id number]     Doc#: [id number]    CC:

## 2020-07-29 NOTE — CARE COORDINATION
I received a call from [facility] in admissions for [facility] and they are able to accept this patient. I messaged the MD and she is not medically ready today. Will notify [facility] when she is to arrange transport.      Electronically signed by Shanell Ulrich RN on 7/29/2020 at 5:51 PM  [phone]

## 2020-07-29 NOTE — PROGRESS NOTES
Patient is alert to self, VSS. patient has moderate expressive aphasia so it is difficult to assess if she knows where she is or what year it is. Patient has an NIH of 5. Patient is up x2 with a walker and gaitbelt. Patient had two episodes of INC this shift so pure wick in place. Patient is resting in bed at this time with all fall precautions in place. Will continue to monitor.

## 2020-07-29 NOTE — PROCEDURES
After informed consent was obtained, the pt was brought to the EP lab in the fasting state for ILR implantation. The left chest was prepped and draped in sterile fashion. A Medtronic [id number] ILR SN # [id number] was implanted in the 4th left parasternal ICS SQ. R wave sensing was documented at 1.51 mV. The wound was closed with 3-0 vicryl suture and steri strips and covered with a dry sterile dressing. The pt tolerated the procedure well. There were no apparent complications.

## 2020-07-29 NOTE — PROGRESS NOTES
Occupational Therapy  Facility/Department: [facility] 5T ORTHO/NEURO  Daily Treatment Note  NAME: Mary Ellen Moncada  : 1955  MRN: [id number]    Date of Service: 2020    Discharge Recommendations:  Mary Ellen Moncada scored a 16/24 on the AM-PAC ADL Inpatient form. Current research shows that an AM-PAC score of 17 or less is typically not associated with a discharge to the patient's home setting. Based on the patient's AM-PAC score and their current ADL deficits, it is recommended that the patient have 5-7 sessions per week of Occupational Therapy at d/c to increase the patient's independence. At this time, this patient demonstrates the endurance, and/or tolerance for 3 hours of therapy each day, with a treatment frequency of 5-7x/wk. Please see assessment section for further patient specific details. If patient discharges prior to next session this note will serve as a discharge summary. Please see below for the latest assessment towards goals. OT Equipment Recommendations  Other: defer to next setting    Assessment   Performance deficits / Impairments: Decreased functional mobility ; Decreased ADL status; Decreased endurance;Decreased fine motor control;Decreased coordination;Decreased balance  Assessment: Pt progressing with therapy, continues to require max vcs for safety/sequencing/initiation. Pt will benefit from continued inpt therapy at d/c. ontinue with POC. Treatment Diagnosis: impaired ADLs/transfers, RUE FM/GM coordination deficits s/p CVA  Prognosis: Fair;Good  History: Pt 63 yo, lives w/spouse & dtr, gets occasional assistance w/ADLs, ambulation w/kitchen cart inside, SPC to car, at least 2 recent falls.  PMH: HTN, blood clots, colostomy & reversal, R hip fx, R CHETAN, severe arthritis bilat knees, gets injections  Exam: functional mobility/transfers/ADLs  Assistance / Modification: CGA with vcs  OT Education: OT Role;Plan of Care;Transfer Training  Patient Education: continue to teach bilaterally; MRI Brain: Moderate volume acute left MCA territory infarct with small volume petechial hemorrhage in the left parietal lobe. PMH:PMH: HTN, blood clots, colostomy & reversal, R hip fx, R CHETAN, severe arthritis bilat knees, gets injections  Family / Caregiver Present: No  Referring Practitioner: Dr. Eusebia Scherer  Diagnosis: Acute CVA  Subjective  Subjective: Pt supine in bed with HOB raised, pt pleasant and agreeable to therapy session. Vcs for safety and hand placement throughout therapy session. General Comment  Comments: Pt supine to sit Supervision. Pt sit EOB Supervision, pt sit to stand CGA, pt ambulated with rw CGA ~12 ft to toilet in bathroom. Pt toilet transfer CGA, pt toileted (Mod A for brief up/down). Pt in stance at [facility] ~3 min at [facility] for oral care requiring vcs as pt squirted toothpaste on hand and in mouth, pt began brushing teeth with fingers, pt encouraged to use toothbrush and eventuallly followed vcs for use. Pt seated at sink on BSC continued grooming with washing face, bathed UB -setup/SBA, and bathed LB - CGA in stance for buttocks joyce area (pt declined to wash feet). Pt donned new brief at [address] A to pull up in back and vcs for sequencing. Pt ambulated to EOB CGA with rw and stand to sit CGA with vcs. Pt sit to supine SBA. Transport present. Vital Signs  Patient Currently in Pain: Denies   Orientation  Orientation  Overall Orientation Status: (not formally assessed)  Objective    ADL  Grooming: Contact guard assistance; Increased time to complete;Verbal cueing;Setup(CGA in stance for oral care with max vcs for use of toothbrush, setup to open toothpaste cap)  UE Bathing: Setup;Verbal cueing;Stand by assistance; Increased time to complete  LE Bathing: Contact guard assistance; Increased time to complete;Verbal cueing;Setup  LE Dressing: Minimal assistance(brief only to pull up in back)  Toileting:  Moderate assistance(for brief up/down)        Balance  Sitting Balance: Supervision  Standing Balance: Contact guard assistance  Standing Balance  Time: ~6 min total (3 min max)  Activity: amb to/from bathroom, toilet transfer/toileting, LB ADLs, in stance at sink for oral care  Functional Mobility  Functional - Mobility Device: Rolling Walker  Activity: To/from bathroom  Assist Level: Contact guard assistance  Toilet Transfers  Toilet - Technique: Ambulating  Equipment Used: Standard toilet  Toilet Transfer: Contact guard assistance  Toilet Transfers Comments: heavy use of grab bar  Bed mobility  Supine to Sit: Supervision  Scooting: Stand by assistance  Transfers  Sit to stand: Contact guard assistance  Stand to sit: Contact guard assistance                       Cognition  Arousal/Alertness: Delayed responses to stimuli  Following Commands: Inconsistently follows commands; Follows one step commands with repetition  Attention Span: Difficulty dividing attention; Attends with cues to redirect  Safety Judgement: Decreased awareness of need for assistance;Decreased awareness of need for safety  Problem Solving: Assistance required to correct errors made;Decreased awareness of errors;Assistance required to identify errors made  Insights: Not aware of deficits  Initiation: Requires cues for all  Sequencing: Requires cues for all  Cognition Comment: significant expressive aphasia; receptive aphasia as well                                         Plan   Plan  Times per week: 5-7  Times per day: Daily  Current Treatment Recommendations: Strengthening, Balance Training, Functional Mobility Training, Endurance Training, Safety Education & Training, Neuromuscular Re-education, Patient/Caregiver Education & Training, Self-Care / ADL  G-Code     OutComes Score                                                  AM-PAC Score        [facility] Inpatient Daily Activity Raw Score: 16 (07/29/20 1517)  AM-PAC Inpatient ADL T-Scale Score : 35.96 (07/29/20 1517)  ADL Inpatient CMS 0-100% Score: 53.32 (07/29/20 1517)  ADL Inpatient CMS

## 2020-07-29 NOTE — PLAN OF CARE
Problem: Falls - Risk of:  Goal: Will remain free from falls  Description:   7/29/2020 1158 by Anita Kevin RN  Outcome: Ongoing  Fall precautions in place, bed is in lowest position, wheels locked, alarm on, non skid socks on. Call light and bedside table within reach. Will continue to assess and monitor. Problem: Pain:  Goal: Pain level will decrease  Description: Pain level will decrease  Outcome: Ongoing  Patient denies pain at this time, patient exhibits no objective or subjective characteristics of pain, will continue to monitor.

## 2020-07-30 VITALS
SYSTOLIC BLOOD PRESSURE: 119 MMHG | BODY MASS INDEX: 32.06 KG/M2 | WEIGHT: 199.52 LBS | RESPIRATION RATE: 18 BRPM | TEMPERATURE: 97.9 F | DIASTOLIC BLOOD PRESSURE: 79 MMHG | HEART RATE: 89 BPM | OXYGEN SATURATION: 100 % | HEIGHT: 66 IN

## 2020-07-30 LAB
ANION GAP SERPL CALCULATED.3IONS-SCNC: 14 MMOL/L (ref 3–16)
BASOPHILS ABSOLUTE: 0.1 K/UL (ref 0–0.2)
BASOPHILS RELATIVE PERCENT: 1.8 %
BUN BLDV-MCNC: 7 MG/DL (ref 7–20)
CALCIUM SERPL-MCNC: 10.4 MG/DL (ref 8.3–10.6)
CHLORIDE BLD-SCNC: 95 MMOL/L (ref 99–110)
CO2: 24 MMOL/L (ref 21–32)
CREAT SERPL-MCNC: 0.5 MG/DL (ref 0.6–1.2)
EKG ATRIAL RATE: 111 BPM
EKG DIAGNOSIS: NORMAL
EKG P-R INTERVAL: 144 MS
EKG Q-T INTERVAL: 374 MS
EKG QRS DURATION: 92 MS
EKG QTC CALCULATION (BAZETT): 508 MS
EKG R AXIS: 25 DEGREES
EKG T AXIS: 187 DEGREES
EKG VENTRICULAR RATE: 111 BPM
EOSINOPHILS ABSOLUTE: 0.3 K/UL (ref 0–0.6)
EOSINOPHILS RELATIVE PERCENT: 4.4 %
GFR AFRICAN AMERICAN: >60
GFR NON-AFRICAN AMERICAN: >60
GLUCOSE BLD-MCNC: 120 MG/DL (ref 70–99)
HCT VFR BLD CALC: 31.9 % (ref 36–48)
HEMOGLOBIN: 11 G/DL (ref 12–16)
LYMPHOCYTES ABSOLUTE: 2.2 K/UL (ref 1–5.1)
LYMPHOCYTES RELATIVE PERCENT: 28.7 %
MAGNESIUM: 2.3 MG/DL (ref 1.8–2.4)
MCH RBC QN AUTO: 33.9 PG (ref 26–34)
MCHC RBC AUTO-ENTMCNC: 34.6 G/DL (ref 31–36)
MCV RBC AUTO: 97.9 FL (ref 80–100)
MONOCYTES ABSOLUTE: 0.4 K/UL (ref 0–1.3)
MONOCYTES RELATIVE PERCENT: 4.9 %
NEUTROPHILS ABSOLUTE: 4.5 K/UL (ref 1.7–7.7)
NEUTROPHILS RELATIVE PERCENT: 60.2 %
PDW BLD-RTO: 13.3 % (ref 12.4–15.4)
PLATELET # BLD: 656 K/UL (ref 135–450)
PMV BLD AUTO: 7.7 FL (ref 5–10.5)
POTASSIUM SERPL-SCNC: 3.5 MMOL/L (ref 3.5–5.1)
RBC # BLD: 3.26 M/UL (ref 4–5.2)
SODIUM BLD-SCNC: 133 MMOL/L (ref 136–145)
TROPONIN: <0.01 NG/ML
WBC # BLD: 7.5 K/UL (ref 4–11)

## 2020-07-30 PROCEDURE — 2500000003 HC RX 250 WO HCPCS: Performed by: NURSE PRACTITIONER

## 2020-07-30 PROCEDURE — 97530 THERAPEUTIC ACTIVITIES: CPT

## 2020-07-30 PROCEDURE — 83735 ASSAY OF MAGNESIUM: CPT

## 2020-07-30 PROCEDURE — 6370000000 HC RX 637 (ALT 250 FOR IP): Performed by: NURSE PRACTITIONER

## 2020-07-30 PROCEDURE — 85025 COMPLETE CBC W/AUTO DIFF WBC: CPT

## 2020-07-30 PROCEDURE — 84484 ASSAY OF TROPONIN QUANT: CPT

## 2020-07-30 PROCEDURE — 36415 COLL VENOUS BLD VENIPUNCTURE: CPT

## 2020-07-30 PROCEDURE — 93010 ELECTROCARDIOGRAM REPORT: CPT | Performed by: INTERNAL MEDICINE

## 2020-07-30 PROCEDURE — 80048 BASIC METABOLIC PNL TOTAL CA: CPT

## 2020-07-30 PROCEDURE — 97116 GAIT TRAINING THERAPY: CPT

## 2020-07-30 PROCEDURE — 93005 ELECTROCARDIOGRAM TRACING: CPT | Performed by: NURSE PRACTITIONER

## 2020-07-30 PROCEDURE — 92507 TX SP LANG VOICE COMM INDIV: CPT

## 2020-07-30 PROCEDURE — 6370000000 HC RX 637 (ALT 250 FOR IP): Performed by: INTERNAL MEDICINE

## 2020-07-30 PROCEDURE — 6370000000 HC RX 637 (ALT 250 FOR IP): Performed by: STUDENT IN AN ORGANIZED HEALTH CARE EDUCATION/TRAINING PROGRAM

## 2020-07-30 PROCEDURE — 6370000000 HC RX 637 (ALT 250 FOR IP)

## 2020-07-30 PROCEDURE — 97535 SELF CARE MNGMENT TRAINING: CPT

## 2020-07-30 PROCEDURE — 99232 SBSQ HOSP IP/OBS MODERATE 35: CPT | Performed by: NURSE PRACTITIONER

## 2020-07-30 RX ORDER — FUROSEMIDE 40 MG/1
40 TABLET ORAL DAILY
Qty: 60 TABLET | Refills: 3 | Status: ON HOLD | OUTPATIENT
Start: 2020-07-30 | End: 2021-04-30 | Stop reason: HOSPADM

## 2020-07-30 RX ORDER — POTASSIUM CHLORIDE 20 MEQ/1
20 TABLET, EXTENDED RELEASE ORAL 2 TIMES DAILY
Qty: 60 TABLET | Refills: 3 | Status: SHIPPED | OUTPATIENT
Start: 2020-07-30

## 2020-07-30 RX ORDER — POTASSIUM CHLORIDE 20 MEQ/1
40 TABLET, EXTENDED RELEASE ORAL ONCE
Status: COMPLETED | OUTPATIENT
Start: 2020-07-30 | End: 2020-07-30

## 2020-07-30 RX ORDER — METOPROLOL SUCCINATE 25 MG/1
25 TABLET, EXTENDED RELEASE ORAL DAILY
Qty: 30 TABLET | Refills: 3 | Status: SHIPPED | OUTPATIENT
Start: 2020-07-31 | End: 2021-01-21 | Stop reason: DRUGHIGH

## 2020-07-30 RX ORDER — ATORVASTATIN CALCIUM 80 MG/1
80 TABLET, FILM COATED ORAL NIGHTLY
Qty: 30 TABLET | Refills: 3 | Status: SHIPPED | OUTPATIENT
Start: 2020-07-30

## 2020-07-30 RX ORDER — ONDANSETRON 2 MG/ML
4 INJECTION INTRAMUSCULAR; INTRAVENOUS EVERY 6 HOURS PRN
Status: DISCONTINUED | OUTPATIENT
Start: 2020-07-30 | End: 2020-07-30

## 2020-07-30 RX ORDER — PANTOPRAZOLE SODIUM 40 MG/1
40 TABLET, DELAYED RELEASE ORAL
Status: DISCONTINUED | OUTPATIENT
Start: 2020-07-30 | End: 2020-07-30 | Stop reason: HOSPADM

## 2020-07-30 RX ORDER — ASPIRIN 81 MG/1
81 TABLET, CHEWABLE ORAL DAILY
Qty: 30 TABLET | Refills: 3 | Status: SHIPPED | OUTPATIENT
Start: 2020-07-31 | End: 2020-10-01 | Stop reason: ALTCHOICE

## 2020-07-30 RX ORDER — LOSARTAN POTASSIUM 25 MG/1
25 TABLET ORAL DAILY
Qty: 30 TABLET | Refills: 3 | Status: SHIPPED | OUTPATIENT
Start: 2020-07-31 | End: 2021-01-21 | Stop reason: ALTCHOICE

## 2020-07-30 RX ORDER — FLUTICASONE PROPIONATE 50 MCG
1 SPRAY, SUSPENSION (ML) NASAL DAILY PRN
Qty: 1 BOTTLE | Refills: 3 | Status: SHIPPED | OUTPATIENT
Start: 2020-07-30 | End: 2021-08-03 | Stop reason: ALTCHOICE

## 2020-07-30 RX ORDER — LANOLIN ALCOHOL/MO/W.PET/CERES
400 CREAM (GRAM) TOPICAL DAILY
Status: DISCONTINUED | OUTPATIENT
Start: 2020-07-30 | End: 2020-07-30 | Stop reason: HOSPADM

## 2020-07-30 RX ORDER — MAGNESIUM SULFATE IN WATER 40 MG/ML
4 INJECTION, SOLUTION INTRAVENOUS ONCE
Status: DISCONTINUED | OUTPATIENT
Start: 2020-07-30 | End: 2020-07-30

## 2020-07-30 RX ADMIN — METOPROLOL SUCCINATE 25 MG: 25 TABLET, EXTENDED RELEASE ORAL at 08:16

## 2020-07-30 RX ADMIN — FAMOTIDINE 20 MG: 10 INJECTION INTRAVENOUS at 09:37

## 2020-07-30 RX ADMIN — PANTOPRAZOLE SODIUM 40 MG: 40 TABLET, DELAYED RELEASE ORAL at 11:29

## 2020-07-30 RX ADMIN — LOSARTAN POTASSIUM 25 MG: 25 TABLET, FILM COATED ORAL at 08:16

## 2020-07-30 RX ADMIN — POTASSIUM CHLORIDE 40 MEQ: 20 TABLET, EXTENDED RELEASE ORAL at 08:21

## 2020-07-30 RX ADMIN — ASPIRIN 81 MG: 81 TABLET, CHEWABLE ORAL at 08:16

## 2020-07-30 RX ADMIN — Medication 400 MG: at 08:16

## 2020-07-30 ASSESSMENT — PAIN SCALES - PAIN ASSESSMENT IN ADVANCED DEMENTIA (PAINAD)
CONSOLABILITY: 0
BODYLANGUAGE: 0
BREATHING: 0
TOTALSCORE: 0
NEGVOCALIZATION: 0
FACIALEXPRESSION: 0

## 2020-07-30 ASSESSMENT — PAIN DESCRIPTION - PROGRESSION: CLINICAL_PROGRESSION: GRADUALLY WORSENING

## 2020-07-30 ASSESSMENT — PAIN SCALES - GENERAL: PAINLEVEL_OUTOF10: 0

## 2020-07-30 ASSESSMENT — PAIN DESCRIPTION - FREQUENCY: FREQUENCY: INTERMITTENT

## 2020-07-30 ASSESSMENT — PAIN DESCRIPTION - DESCRIPTORS: DESCRIPTORS: HEADACHE

## 2020-07-30 ASSESSMENT — PAIN DESCRIPTION - LOCATION: LOCATION: HEAD

## 2020-07-30 ASSESSMENT — PAIN DESCRIPTION - ONSET: ONSET: GRADUAL

## 2020-07-30 ASSESSMENT — PAIN DESCRIPTION - PAIN TYPE: TYPE: ACUTE PAIN

## 2020-07-30 NOTE — CARE COORDINATION
Case Management Assessment            Discharge Note                    Date / Time of Note: 7/30/2020 10:30 AM                  Discharge Note Completed by: Shahbaz Mims to see if this patient is ready for d/c and she does have a d/c order in place. Her daughter is able to transport her and will take her at noon today. Orders faxed and Barbaraiveth Son in admissions has been informed as well as Denzel Centeno RN. Merlinda Cons, daughter will bring in clothing to transport. Patient Name: Corinne Sep   YOB: 1955  Diagnosis: Acute cerebrovascular accident (CVA) [facility]) [I63.9]   Date / Time: 7/23/2020  2:25 PM    Current PCP: Topher Main MD  Clinic patient: No    Hospitalization in the last 30 days: No    Advance Directives:  Code Status: Full Code  [place] DNR form completed and on chart: No    Financial:  Payor: Mango Nazario / Plan: Mango Nazario / Product Type: *No Product type* /      Pharmacy:    [address], [address] [place] [address] [phone] [facility] [place] [phone]  [address] [place] [id number]  Phone: [phone] Fax: [phone]      Assistance purchasing medications?:    Assistance provided by Case Management: None at this time    Does patient want to participate in local refill/ meds to beds program?:      Meds To Beds General Rules:  1. Can ONLY be done Monday- Friday between 8:30am-5pm  2. Prescription(s) must be in pharmacy by 3pm to be filled same day  3. Copy of patient's insurance/ prescription drug card and patient face sheet must be sent along with the prescription(s)  4. Cost of Rx cannot be added to hospital bill. If financial assistance is needed, please contact unit  or ;  or  CANNOT provide pharmacy voucher for patients co-pays  5.  Patients can then  the prescription on their way out of the hospital at discharge, or pharmacy can deliver to the bedside if staff is available. (payment due at time of pick-up or delivery - cash, check, or card accepted)     Able to afford home medications/ co-pay costs: Yes    ADLS:  Current PT AM-PAC Score: 16 /24  Current OT AM-PAC Score: 16 /24      DISCHARGE Disposition: Inpatient Rehab: [facility] Phone: [phone] Fax: [phone]    LOC at discharge: Not Applicable  MARIBEL Completed: Yes    Notification completed in HENS/PAS?:  Not Applicable    IMM Completed:   Not Indicated    Transportation:  Transportation PLAN for discharge: family   Mode of Transport: Private Car    The Plan for Transition of Care is related to the following treatment goals of Acute cerebrovascular accident (CVA) ([address] [I63.9]    The Patient and/or patient representative Lilian Floyd and her family were provided with a choice of provider and agrees with the discharge plan Yes    Freedom of choice list was provided with basic dialogue that supports the patient's individualized plan of care/goals and shares the quality data associated with the providers.  Yes    Care Transitions patient: No    Home Eagle RN  [facility] [employer]  Case Management Department  Ph: [phone]  IJK:[phone]

## 2020-07-30 NOTE — PROGRESS NOTES
Occupational Therapy  Facility/Department: [facility] 5T ORTHO/NEURO  Daily Treatment Note  NAME: Mirian Correia  : 1955  MRN: [id number]    Date of Service: 2020     Assessment: Pt. progressing well. Continues to require max vc's for attention to RUE. And required vc's to transition LUE from sink to walker this session. CGA for functional mobility with RW and transfers. Will benefit from continued therapy services. Discharge Recommendations: Mirian Correia scored a 16/24 on the AM-PAC ADL Inpatient form. Current research shows that an AM-PAC score of 17 or less is typically not associated with a discharge to the patient's home setting. Based on the patient's AM-PAC score and their current ADL deficits, it is recommended that the patient have 5-7 sessions per week of Occupational Therapy at d/c to increase the patient's independence. At this time, this patient demonstrates the endurance, and/or tolerance for 3 hours of therapy each day, with a treatment frequency of 5-7x/wk. Please see assessment section for further patient specific details. If patient discharges prior to next session this note will serve as a discharge summary. Please see below for the latest assessment towards goals. Assessment   Activity Tolerance  Activity Tolerance: Patient Tolerated treatment well  Safety Devices  Type of devices: (Left in chair with PT present)         Restrictions  Position Activity Restriction  Other position/activity restrictions: Up w/ Assist  Subjective   General  Chart Reviewed: Yes  Patient assessed for rehabilitation services?: Yes  Additional Pertinent Hx: 62 y.o. F who presented from [facility] with after CVA s/p tPA.    Hospital Course: Code stroke was called on  for left gaze deviation, not following commands - received TPA and transferred to [facility] for mechanical thrombectomy; Carotid Dopplers: <50% ICA stenosis bilaterally; MRI Brain: Moderate volume acute left MCA

## 2020-07-30 NOTE — PLAN OF CARE
Problem: Falls - Risk of:  Goal: Will remain free from falls  Description: Will remain free from falls. Bed locked in lowest position, alarm on, monitor in room on, and 3/4 rails up. Nonskid socks on. Call light and bedside table within reach. No falls this shift. Will continue to monitor with hourly rounding.    7/29/2020 [id number] by Sage Penaloza RN  Outcome: Ongoing

## 2020-07-30 NOTE — PROGRESS NOTES
Physical Therapy  Daily Treatment Note    Discharge Recommendations: Jesse Escoto scored a 16/24 on the AM-PAC short mobility form. Current research shows that an AM-PAC score of 17 or less is typically not associated with a discharge to the patient's home setting. Based on the patient's AM-PAC score and their current functional mobility deficits, it is recommended that the patient have 5-7 sessions per week of Physical Therapy at d/c to increase the patient's independence. At this time, this patient demonstrates the endurance, and/or tolerance for 3 hours of therapy each day, with a treatment frequency of 5-7x/wk. Please see assessment section for further patient specific details. Assessment:  Pt impulsive at times. Needing cues throughout for safety. Pt having difficulty communicating at times. Limited gait tolerance today. Pt is below baseline for mobility s/p CVA. Would benefit from continued IP PT at D/C to maximize strength and independence prior to going home. Plan is for ARU. Equipment Needs: Defer to next level of care    Chart Reviewed: Yes     Other position/activity restrictions: Up w/ Assist   Additional Pertinent Hx: Admit 7/15 to OSH with B knee pain, transfer to [facility] 7/23 after code stroke, found to have Left MCA ischemic stroke status post TPA and cerebral angiogram revealed M2 thromboembolism is partially lysed and has moved distally and now has become subocclusive M3 thromboembolic. No mechanical thrombectomy was done; neuro and neurosurgery following; PMHx: HTN, h/o blood clots, arthritis, neuropathy, chrohn's, depression, anxiety, surgeries to B knees, R hip, L foot, and shoulder      Diagnosis: CVA   Treatment Diagnosis: decreased strength, balance, functional mobility    Subjective: Pt in bed initially. Agreeable to working with therapy. RN reports pt is leaving for ARU later today.      Pain: Denies    Objective:    Bed mobility  Supine to sit: CGA, HOB up with use of rail    Transfers  Sit to stand: CGA from bed; CGA from raised toilet seat; Min assist x 1 from chair  Stand to sit: CGA  Other: Needing cues for safe hand placement for transfers. Ambulation  Assistance Level: CGA to Min assist  Assistive device: Wheeled walker  Distance: 10 ft, 4 ft, 14 ft, 30 ft in room. Pt declined to walk in [place] this session with c/o fatigue. Quality of gait: Step-through pattern; cues to keep feet within walker space as pt pushes walker too far ahead; flexed posture with downward gaze     Exercises  10 reps B LAQ. Attempted other LE ex in sitting, but pt not following verbal instructions or demonstration. Balance  Sat EOB with SBA  Sat on commode with SBA  Static stance with walker CGA  Ambulation with wheeled walker CGA to Min assist    Patient Education  Role of PT. Calling for assist with needs. Use of call button. Expressed understanding. Safety Devices  Pt left with needs in reach. In chair (reclined) with chair alarm on. RN updated. AM-PAC score  AM-PAC Inpatient Mobility Raw Score : 16  AM-PAC Inpatient T-Scale Score : 40.78  Mobility Inpatient CMS 0-100% Score: 54.16  Mobility Inpatient CMS G-Code Modifier : CK    Goals: (as determined and assessed by primary PT)  Time Frame for Short term goals: discharge  Short term goal 1: bed mobility with SBA (met 7/29)   Short term goal 2: sit to/from stand with CGA   Short term goal 3: Pt will amb 20 ft with RW and CGA      Plan:  Times per week: 5-7;    Current Treatment Recommendations: Strengthening, Balance Training, Functional Mobility Training, Transfer Training, Gait Training, Patient/Caregiver Education & Training, Neuromuscular Re-education, Home Exercise Program    Therapy Time    Individual  Concurrent  Group  Co-treatment    Time In  1042            Time Out  1112            Minutes  30              Timed Code Treatment Minutes: 30  Total Treatment Minutes: 30    Anticipate D/C to [facility] today.    Will

## 2020-07-30 NOTE — PROGRESS NOTES
Speech Language Pathology  Facility/Department: [facility] ICU  Daily Treatment Note  NAME: Dayan Tim  : 1955  MRN: [id number]    Date of Eval: 2020  Evaluating Therapist: Rolf Santana    Patient Diagnosis(es): has Essential hypertension, benign; Atherosclerosis of native arteries of extremities with intermittent claudication, bilateral legs ([address] Anemia; Hyponatremia; Crohn's colitis ([address] Hypomagnesemia; DVT (deep venous thrombosis) ([address] PAD (peripheral artery disease) ([address] Leg swelling; Venous insufficiency; Chronic venous hypertension (idiopathic) with inflammation of bilateral lower extremity; Open wound of left foot with complication; Hypokalemia; Acute cerebrovascular accident (CVA) ([address] and Abnormal ECG on their problem list.  Onset Date: 2020    RECENT RESULTS  CT OF HEAD/MRI: 20  Impression         No acute intracranial hemorrhage or mass effect.       Impression    Abrupt tapering M2 branch with nonvisualization of the M3 branches suggestive    of vessel thrombus         Otherwise no additional large vessel occlusion or hemodynamic stenosis.       Primary Complaint: Indicated poor communication    Pain:  Pain Assessment  Pain Assessment: 0-10  Pain Level:  \"no\" when questioned    Subjective: Cleared by RN to see pt. Received pt alert and pleasant, resting in bed on room air. Pt declined PO trials, and states she is not having any trouble swallowing. Goals:  Short-term Goals  Goal 1: Pt will complete basic yes/no questions with 80% acc or given mod cues  : Pt required intermittent mod cues for basic yes/no questions. Cont goal.  :  Pt required mod cues for basic yes/no questions. Perservative \"no\" response noted. Cont goal  Goal 2: Pt will complete automatic speech tasks with 80% acc or given mod cues  : Pt required mod-max verbal cues for accuracy with automatics (counting, days of the week, months, antonyms).  Paraphasias and verbal perseveration noted. Cont goal.  7/30:  Pt able to complete 1-20 with min cues for initial number. Able to complete 4/7 days given initial day of week. Repeated attempts required more cueing. Able to sing Happy Birthday with paraphasic errors noted with 50% accuracy singing with clinician. Cont goal.      Goal 3: Pt will effectively communicate wants and needs by means of multi-communication  7/27: Pt able to communicate desire for an extra blanket, increased TV volume, and refusal of PO. Benefits from simplified questions. Cont goal.  7/30:  Pt educated to use gestures at times of reduced word recall/verbal production. Pt pointed to head when asked \"what you say or do if you had a headache\";  but was unable to produce accurate verbal response. Pt with some automatic responses with paraphasias and jargon. Pt able to state first name or portion of last name (but unable to repeat upon command). Difficulty with following 1 step directions even with model (make a fist, touch your nose). Reduced eye contact noted and reduced awareness of speech errors. Cont goal  Goal 4: Pt will tolerate ongoing cognitive-linguistic testing as warranted   7/30:  Not formally addressed. However, when asked if at home, pt immediately began crying and saying \"I want to go\" awareness suspected. Able to state name. Comfort given with crying. Cont goal    Patient/family involved in developing goals and treatment plan: Yes      Patient/Family/Caregiver Education:   Educated pt regarding aphasia, stroke recovery, therapy purpose/rationale. Pt stated comprehension. Plan:  Continued daily Speech/Language treatment with goals per current plan of care. Followup speech therapy following discharge recommended    D/W nursing, Pepe Chambers. Treatment time: [address] MA CCC/SLP [facility]    This document will serve as a discharge summary if pt discharges before next treatment.

## 2020-07-30 NOTE — PROGRESS NOTES
Speech Language Pathology  Dysphagia- no session    Attempted to see pt for dysphagia; however, pt declined any po at this time. Pt had emesis earlier this am per nursing, Denzel Centeno. Nursing reported no problems with swallowing at this time.       Crown Pointsarbjit Magana MA CCC/SLP [phone]  Pager [phone]

## 2020-07-30 NOTE — PROGRESS NOTES
Patient is alert to self, VSS. patient has moderate expressive aphasia so it is difficult to assess if she knows where she is or what year it is. Patient has an NIH of 5. Patient is up x1 with a walker and gaitbelt. Patient had two episodes of INC this shift so pure wick in place. Patient is resting in bed at this time with all fall precautions in place. Will continue to monitor.

## 2020-07-30 NOTE — PROGRESS NOTES
Electrophysiology - PROGRESS NOTE    Admit Date: 7/23/2020     Chief Complaint: CVA     Interval History:   Patient seen and examined and notes reviewed. Patient is being followed for CVA. Patient been admitted to [facility] with complaints of a left facial droop and right-sided weakness, CT had demonstrated left middle artery occlusion, received TPA and then transferred to [facility] on 7/23/2020, in follow-up neurology evaluation showed the embolus a partially lysed a mechanical thrombectomy was not performed, there was some question is whether may she may have silent atrial fibrillation as an etiology for the CVA and a loop was implanted on 7/29/2020. This morning she complains of nausea and vomiting. EKG was done that showed her QTc is 508, Zofran Compazine and promethazine will be contraindicated with a prolonged QTC. She denies chest pain or shortness of breath. Left chest dressing is clean dry and intact.     In: 480 [P.O.:480]  Out: 900    Wt Readings from Last 2 Encounters:   07/25/20 199 lb 8.3 oz (90.5 kg)   07/23/20 200 lb 4.8 oz (90.9 kg)         Data:   Scheduled Meds:   Scheduled Meds:   potassium chloride  40 mEq Oral Once    magnesium oxide  400 mg Oral Daily    losartan  25 mg Oral Daily    metoprolol succinate  25 mg Oral Daily    atorvastatin  80 mg Oral Nightly    [Held by provider] DULoxetine  60 mg Oral BID    aspirin  81 mg Oral Daily     Continuous Infusions:  PRN Meds:.acetaminophen, perflutren lipid microspheres, labetalol, hydrALAZINE, acetaminophen  Continuous Infusions:    Intake/Output Summary (Last 24 hours) at 7/30/2020 0805  Last data filed at 7/30/2020 0610  Gross per 24 hour   Intake 480 ml   Output 1300 ml   Net -820 ml       CBC:   Lab Results   Component Value Date    WBC 7.5 07/30/2020    HGB 11.0 07/30/2020     07/30/2020     BMP:  Lab Results   Component Value Date     07/30/2020    K 3.5 Hypokalemia     Acute cerebrovascular accident (CVA) ([facility]     Abnormal ECG        Assessment & Plan:      1. CVA  2. CMP  3. Prolonged QTC     63 y/o woman with a h/o HTN, IBS who was transferred from [facility] on 7/23/2020 status post CVA (left facial droop and right-sided weakness), CT demonstrated left middle cerebral artery occlusion, received TPA, on f/u evaluation the embolus had partially lysed and mechanical thrombectomy was not performed, echo showed an EF 40 to 45%, ischemic work-up deferred as outpatient d/t recent CVA.      CVA  - S/p ILR  - L chest drsg CDI  - F/u outpatient to assess for AF as a cause  - Instructions on chart  - Appmts made    Prolonged QTc  - QTc 508  - Avoid QTc prolonging drugs - Cymbalta on hold  - IV pepcid x 1 then protonix QD    CMP  - EF 40-45%  - NYHA class II  - QRS 92  - On Toprol XL 25 mg, BP soft at time limiting addition of ACE/ARB    Debra Pock [address]

## 2020-07-30 NOTE — DISCHARGE INSTR - COC
Continuity of Care Form    Patient Name: Tirso Beach   :  1955  MRN:  [id number]    Admit date:  2020  Discharge date:  2020    Code Status Order: Full Code   Advance Directives:     Admitting Physician:  Citlalli Melara MD  PCP: Brenda Grijalva MD    Discharging Nurse: [facility] Unit/Room#: 2887/0687-76  Discharging Unit Phone Number: [phone]    Emergency Contact:   Extended Emergency Contact Information  Primary Emergency Contact: KrishnaGretta  Address:                     Home Phone: [phone]  Mobile Phone: [phone]  Relation: Child  Secondary Emergency Contact: Omari Keller  Address: [address] [place] [address] Phone: [phone]  Relation: Spouse    Past Surgical History:  Past Surgical History:   Procedure Laterality Date    ABDOMEN SURGERY      ABDOMINAL ADHESION SURGERY  2018    BLADDER SUSPENSION      COLONOSCOPY      COLONOSCOPY  9/14/15    sigmoid colon biopsy     COLONOSCOPY  2018    COLONOSCOPY  2019    COLONOSCOPY, POSSIBLE BIOPSY, POSSIBLE POLYPECTOMY    COLONOSCOPY N/A 2019    COLONOSCOPY WITH BIOPSY performed by Tamiko Oliveira MD at [facility] 2019    COLONOSCOPY DIAGNOSTIC/STOMA performed by Tamiko Oliveira MD at [address] [place] [address] 10/8/2018    EXPLORATORY LAPAROTOMY WITH COLOSTOMY performed by Wild Loja MD at [address] [facility] Left 14    excision plantar left hallux    FOOT SURGERY  6/3/15    PLANTAR PLATE REPAIR BILATERAL, ARTHROTOMY MPJ 2ND BILATERAL    FOOT SURGERY Left 2002    Excision second metatarsal head left    FRACTURE SURGERY      rt hip    HAND CARPECTOMY Bilateral     HEEL SPUR SURGERY      HERNIA REPAIR      HYSTERECTOMY      bladder surgery    JOINT REPLACEMENT      rt hip, L shoulder replacement 2014    KNEE SURGERY Bilateral     arthrosvopic  LEG SURGERY Right     AL SECD CLOS SURG WND EXTEN/COMPLIC N/A 30/94/1912    SECONDARY WOUND CLOSURE OF ABDOMINAL WOUND performed by Stacey Ty MD at [address] N/A 7/17/2019    FLEXIBLE SIGMOIDOSCOPY performed by Stacey Ty MD at [address]  01/15/2018    with biopsies    SMALL INTESTINE SURGERY N/A 7/17/2019    COLOSTOMY CLOSURE WITH COLORECTAL ANASTOMOSIS performed by Stacey Ty MD at [address] [facility] [address]  6/14/13    and colonsocopy with antral erosion biopsies       Immunization History:   Immunization History   Administered Date(s) Administered    PPD Test 01/16/2018    Tdap (Boostrix, Adacel) 07/15/2020       Active Problems:  Patient Active Problem List   Diagnosis Code    Essential hypertension, benign I10    Atherosclerosis of native arteries of extremities with intermittent claudication, bilateral legs ([facility]) I70.213    Anemia D64.9    Hyponatremia E87.1    Crohn's colitis ([facility] K50.10    Hypomagnesemia E83.42    DVT (deep venous thrombosis) ([facility]) I82.409    PAD (peripheral artery disease) ([facility]) I73.9    Leg swelling M79.89    Venous insufficiency I87.2    Chronic venous hypertension (idiopathic) with inflammation of bilateral lower extremity I87.323    Open wound of left foot with complication O88.076W    Hypokalemia E87.6    Acute cerebrovascular accident (CVA) ([facility] I63.9    Abnormal ECG R94.31       Isolation/Infection:   Isolation          No Isolation        Patient Infection Status     Infection Onset Added Last Indicated Last Indicated By Review Planned Expiration Resolved Resolved By    None active    Resolved    COVID-19 Rule Out 07/24/20 07/24/20 07/24/20 COVID-19 (Ordered)   07/27/20 Rule-Out Test Resulted    VRE  10/12/18 10/12/18 Reanna Red RN   10/25/19 Lola Callahan RN    10/8/18; abd culture    MRSA  06/08/18 06/08/18 Reanna Red, RN ml   Output 1300 ml   Net -700 ml     I/O last 3 completed shifts: In: 480 [P.O.:480]  Out: 2200 [Urine:2200]    Safety Concerns: At Risk for Falls    Impairments/Disabilities:      Speech    Nutrition Therapy:  Current Nutrition Therapy:   - Oral Diet:  Dental Soft    Routes of Feeding: Oral  Liquids: Thin Liquids  Daily Fluid Restriction: yes - amount 1200 ml  Last Modified Barium Swallow with Video (Video Swallowing Test): not done    Treatments at the Time of Hospital Discharge:   Respiratory Treatments: RA  Oxygen Therapy:  is not on home oxygen therapy.   Ventilator:    - No ventilator support    Rehab Therapies: Physical Therapy, Occupational Therapy and Speech/Language Therapy  Weight Bearing Status/Restrictions: No weight bearing restirctions  Other Medical Equipment (for information only, NOT a DME order):  walker  Other Treatments: NA    Patient's personal belongings (please select all that are sent with patient):  None    RN SIGNATURE:  Electronically signed by Kasi Manuel RN on 7/30/20 at 10:29 AM EDT    CASE MANAGEMENT/SOCIAL WORK SECTION    Inpatient Status Date: 07/23/20    Readmission Risk Assessment Score:  Readmission Risk              Risk of Unplanned Readmission:        16           Discharging to Facility/ Agency   · Name: [facility]  · Address:[address]  · Fax:[phone]      / signature: Electronically signed by Kasi Manuel RN on 7/30/20 at 10:25 AM EDT    PHYSICIAN SECTION    Prognosis: Guarded    Condition at Discharge: Stable    Rehab Potential (if transferring to Rehab): Guarded    Recommended Labs or Other Treatments After Discharge: PT/OT, Speech therapy, monitor vital signs and electrolytes    Physician Certification: I certify the above information and transfer of Bobby Alexander  is necessary for the continuing treatment of the diagnosis listed and that she requires Man Mcmahan for

## 2020-07-31 PROBLEM — Z45.09 ENCOUNTER FOR LOOP RECORDER CHECK: Status: ACTIVE | Noted: 2020-07-31

## 2020-07-31 NOTE — PROGRESS NOTES
Nephrology Note                                                                                                                                                                                                                                                                                                                                                               Office : [phone]     Fax :[phone]              Patient's Name: Corinne Sep  10:51 PM  7/30/2020    Reason for Consult:  Hyponatremia  Requesting Physician:  Topher Main MD      Chief Complaint:  CVA     History of Present Ilness:    Corinne Sep is a 62 y.o. female with PMHx of HTN, chron's, p/w weakness, fatigue, and frequent falls, and found to have M2 occlusion at OSH and was given tPA on 7/23. Patient takes diuretics and psychiatric medications at home. She has chronic hyponatremia. Patient aphasic so most history taken by chart review.     7/30     NA better   Good UO   No Diarrhea       Past Medical History:   Diagnosis Date    Anxiety     Arthritis     Blood transfusion reaction     Crohn's disease ([address]     Depression     GERD (gastroesophageal reflux disease)     Hiatal hernia 6/24/2014    Hx of blood clots     Hypertension     MRSA (methicillin resistant staph aureus) culture positive 06/05/2018    urine    Neuropathy     Pneumonia 1/8/2014    Sinus headache     SOB (shortness of breath)     VRE (vancomycin resistant enterococcus) culture positive 10/08/2018    abd culture       Past Surgical History:   Procedure Laterality Date    ABDOMEN SURGERY      ABDOMINAL ADHESION SURGERY  06/08/2018    BLADDER SUSPENSION      COLONOSCOPY      COLONOSCOPY  9/14/15    sigmoid colon biopsy     COLONOSCOPY  08/07/2018    COLONOSCOPY  06/07/2019    COLONOSCOPY, POSSIBLE BIOPSY, POSSIBLE POLYPECTOMY    COLONOSCOPY N/A 6/7/2019    COLONOSCOPY WITH BIOPSY performed by Jones Munoz MD at [facility]  (Oral)   Resp 18   Ht 5' 6\" (1.676 m)   Wt 199 lb 8.3 oz (90.5 kg)   SpO2 100%   BMI 32.20 kg/m²     Constitutional:  OAA, aphasic  Skin: no rash, turgor wnl  Heent:  eomi, mmm  Neck: no bruits or jvd noted  Cardiovascular:  Tachycardic, irregular rhythm, S1, S2 without m/r/g  Respiratory: CTA B without w/r/r  Abdomen:  +bs, soft, nt, nd  Ext: +1 pitting edema bilaterally  Psychiatric: mood and affect appropriate  Musculoskeletal: 4/5 strength in hip flexion/extension bilaterally, 4/5 strength in plantarflexion/extension in ankles bilaterally. Pt lifted both arms above her head, would not let me test vs resistance. Data:   Labs:  CBC:   Recent Labs     07/28/20  0500 07/29/20 0456 07/30/20  0528   WBC 5.6 6.4 7.5   HGB 9.5* 9.2* 11.0*   * 520* 656*     BMP:    Recent Labs     07/28/20  0500  07/29/20  0456 07/29/20  1909 07/30/20  0528   *   < > 124* 126* 133*   K 3.6  --  3.4*  --  3.5   CL 91*  --  89*  --  95*   CO2 24  --  25  --  24   BUN 14  --  12  --  7   CREATININE <0.5*  --  <0.5*  --  0.5*   GLUCOSE 93  --  100*  --  120*    < > = values in this interval not displayed. Ca/Mg/Phos:   Recent Labs     07/28/20  0500 07/29/20 0456 07/30/20  0528   CALCIUM 9.3 9.4 10.4   MG  --  1.40* 2.30     Hepatic: No results for input(s): AST, ALT, ALB, BILITOT, ALKPHOS in the last 72 hours. Troponin:   Recent Labs     07/30/20  0946   TROPONINI <0.01     BNP: No results for input(s): BNP in the last 72 hours. Lipids: No results for input(s): CHOL, TRIG, HDL, LDLCALC, LABVLDL in the last 72 hours. ABGs: No results for input(s): PHART, PO2ART, [id number] in the last 72 hours. INR: No results for input(s): INR in the last 72 hours. UA:No results for input(s): Kim Finger, GLUCOSEU, BILIRUBINUR, Onetha Ovilla, BLOODU, PHUR, PROTEINU, UROBILINOGEN, NITRU, LEUKOCYTESUR, LABMICR, URINETYPE in the last 72 hours.    Urine Microscopic: No results for input(s): LABCAST, BACTERIA, COMU, HYALCAST, Cristiane Portillo, RADHA in the last 72 hours. Urine Culture: No results for input(s): LABURIN in the last 72 hours. Urine Chemistry:   No results for input(s): Josefine Co, PROTEINUR, NAUR in the last 72 hours. IMAGING:  MRI brain without contrast   Final Result      1. Moderate volume acute left MCA territory infarct with small volume petechial hemorrhage in the left parietal lobe. CT head without contrast   Final Result      No acute intracranial hemorrhage or mass effect. Vascular carotid duplex bilateral   Final Result      IR ANGIOGRAM CAROTID CEREBRAL LEFT    (Results Pending)       Assessment/Plan   1. Euvolemic hypotonic hyponatremia - sec to CVA and cymbalta use   - SIADH  - Na 126 --->124--->133, continue to monitor Na   - Continue fluid restriction   - Chronically low, has been on many psychiatric medications in the past and on diuretics at home  - Hold cymbalta      2. HTN  - Keep SPB < 140  - BP stable and at goal  - continue home medication norvasc    3. Normocytic anemia  - Monitor for signs of bleeding since received tPA  - Hb stable & similar to prior labs    4. Acid- base/ Electrolyte imbalance   - potassium 3.6 --> 3.4, repleted with klor-con M ER 40 meQ today    5. Acute CVA - M2 occlusion L MCA stroke   - monitor  - neuro signed off, follow neuro recs    6. EKG changes/newly diagnosed systolic HF  - ECHO 83% EF, no PFO  - cardio on board, CAD vs stress induced cardiomyopathy, rec starting HF treatment, they will do ischemic workup outpatient    7.  Possible new onset irregular heart rate  - pt on tele    Thank you for allowing us to participate in care of [address] free to contact me   [facility] [address]  Office : [phone]  Fax :[phone]

## 2020-08-06 NOTE — CONSULTS
CC: R-sided weakness and aphasia    Mrs. Keller is unable to provide a history because of aphasia. Hx is obtained from the medical record and medical personnel. HPI: Ms. Merlin Mcdowell is a 61 yo F with a hx of HTN, Crohn's disease, anxiety/depression, hiatal hernia with GERD, and arthritis who was initially admitted to an OSH on 7/15 with generalized fatigue and electrolyte abnormalities. This morning, 7/23, she was found to have R-sided weakness and aphasia around 11:35. LKW: 11:15. Initial NIHSS: 24. CTH was negative for hemorrhage or large distribution infarct but did reveal a hyperdense distal L MCA. ASPECTS: 10. CTA revealed a L M2 occlusion. She was administered tPA and then transferred to [facility] for mechanical thrombectomy. PMH:  - HTN  - Crohn's disease  - Anxiety/depression  - Hiatal hernia with GERD  - Arthritis    PSH:  - Colectomy with colostomy creation and then subsequent colostomy reversal  - Surgery for abdominal adhesions  - Hysterectomy  - Bladder suspension  - Hernia repair  - ORIF R hip with R hip replacement  - L shoulder replacement  - Multiple L foot surgeries  - Bilateral carpal tunnel releases  - Heel spur surgery  - Bilateral knee scope  - Tonsillectomy    Outpatient meds:  - Balsalazide  - Baclofen  - Atenolol 100 mg qDay  - Duloxetine  - Omeprazole  - Buspirone PRN  - Fe gluconate  - Sucralfate  - Hydroxyzine PRN  - Fexofenadine  - Zofran PRN  - Excedrin migraine PRN    All:  - ACE inhibitors  - Skelaxin    FH:  - Mother: HTN  - Father: HTN  - Sister: Kidney disease    SH: . Lives at home. Current smoker. Occasional EtOH. No illicit drug use    ROS: Unable to be obtained because of the patient's aphasia    Exam:  - Vital signs reviewed in the IR suite    - Gen: No acute distress. Lying supine on the interventional table at [facility]  - Cardiac: Regular rate and rhythm  - Pulm: Nonlabored  - Abd: Nondistended  - Ext: 2+ peripheral pulses  - Neuro: Awake and alert. Severe aphasia. Primarily responds with one- or two-word answers with perseveration. Severe dysarthria. Severe receptive aphasia. Unable to follow commands, but will mimic. PERRL. L gaze preference, but will cross midline. Blinks to threat on the L. Does not consistently blink to threat on the R. Mild R facial droop. No drift in L arm or leg. Some effort against gravity in R arm and leg. Sensation grossly intact in all four extremities. Extinction to 1 modality. NIHSS: 16    Labs:  - Sodium: 131  - BUN/Cr: 7/0.5  - WBC: 4.0  - Hgb: 10.4  - Platelets: 884  - Troponin: 0.08    Imaging:  I personally reviewed and independently interpreted the following imaging:  - CTH: Negative for hemorrhage or large distribution infarct. Hyperdense distal L MCA. ASPECTS: 10  - CTA: L M2 occlusion. A/P: 61 yo F with a hx of HTN, Crohn's disease, anxiety/depression, hiatal hernia with GERD, and arthritis who was initially admitted to an OSH on 7/15 with generalized fatigue and electrolyte abnormalities. This morning, 7/23, she was found to have R-sided weakness and aphasia around 11:35. LKW: 11:15. Initial NIHSS: 24. CTH was negative for hemorrhage or large distribution infarct but did reveal a hyperdense distal L MCA. ASPECTS: 10. CTA revealed a L M2 occlusion. She was administered tPA and then transferred to [facility] for mechanical thrombectomy. I had a discussion with the patient's daughter (she was listed first on the contact list) because the patient was unable to provide informed consent as a result of her aphasia. We discussed that her mother was having an ischemic stroke as a result of a large vessel occlusion. We discussed treatment options for large vessel occlusions including so-called benign neglect, conservative management with serial imaging, medical management, IV tPA, and mechanical thrombectomy.  I explained that I would not recommend neglect or conservative management because of the symptomatic nature of the large vessel occlusion, and that I would also not recommend medical management alone because of the data showing the superiority of mechanical thrombectomy over medical management. I explained that I would recommend proceeding with mechanical thrombectomy. We discussed the specific risks of mechanical thrombectomy. The risks of the procedure included, but were not limited to: infection at the groin puncture site; bleeding at the groin puncture site; vessel injury or rupture with possible resultant new or worsening neurologic deficit; inability to access the site of occlusion with possible resultant new or worsening neurologic deficit; inability to recanalize the occluded vessel with possible resultant new or worsening neurologic deficit; stroke in a new vascular distribution with possible resultant new or worsening neurologic deficit; reperfusion hemorrhage with possible resultant new or worsening neurologic deficit; contrast-induced kidney injury or failure; radiation exposure; coma; and death. All of the patient's daughter's questions were answered to her satisfaction. The patient's daughter voiced an understanding of the discussion, the rationale for treatment, the treatment options, and the rationale for my treatment recommendation. She also voiced an understanding of the risks of the procedure as they were described to her and provided informed consent for the procedure on her mother's behalf.     - Proceed with a diagnostic cerebral angiogram and possible mechanical thrombectomy  - Admit to the ICU post-procedure

## 2020-08-07 ENCOUNTER — NURSE ONLY (OUTPATIENT)
Dept: CARDIOLOGY CLINIC | Age: 65
End: 2020-08-07

## 2020-08-07 ENCOUNTER — NURSE ONLY (OUTPATIENT)
Dept: CARDIOLOGY CLINIC | Age: 65
End: 2020-08-07
Payer: MEDICAID

## 2020-08-07 PROCEDURE — 93291 INTERROG DEV EVAL SCRMS IP: CPT | Performed by: INTERNAL MEDICINE

## 2020-08-07 NOTE — PROGRESS NOTES
1 week wound check, implanted on 7/29/2020 by LORIE:    Wound is clean, dry, and intact. Attempted to remove 1 stitch, however, was unsuccessful and pt was becoming uncomfortable. NP NADINE was asked to evaluate but it was decided it would be best if the implanting provider would attempt to remove. ELLE Brunson. came into the device room and also tried to remove remaining stitch with a new sterile suture removal kit but was unsuccessful. LORIE was called to assess and was able to successfully remove remaining stitch. LORIE wound looks good, no bleeding to report. Pt has proper follow up scheduled.

## 2020-08-07 NOTE — PROCEDURES
distribution infarct but revealed a distal left middle cerebral artery hyperdensity. ASPECTS: 10. A CT angiogram demonstrated a large vessel occlusion with occlusion within the proximal aspect of a left M2 branch. The patient was given IV tPA. She was then transferred to [facility] and taken directly to the neurointerventional suite. The risks, benefits, and alternatives to mechanical thrombectomy were discussed with the patient's daughter because the patient was unable to provide informed consent as a result of her aphasia. The risks included, but are not limited to: bleeding, infection, vessel injury or rupture, contrast reaction or allergy, contrast-induced renal injury or failure, radiation exposure, alopecia, myocardial infarction, worsening stroke burden or stroke in a new vascular territory, new or worsening numbness, new or worsening weakness or possibly paralysis, new or worsening loss of speech, loss of vision, inability to recanalize the occluded vessel, treatment failure, need for additional treatment, coma, and even possibly death. The patient's daughter voiced an understanding of the risks as they were described to her and provided consent for the procedure on the patient's behalf. DESCRIPTION OF THE PROCEDURE: Mrs. Erlin Dang was brought to the angiography suite and placed in a supine position on the angiography table. A formal time out was performed with all team members present and everyone was in agreement. Moderate sedation was induced under physician guidance. Both groins were prepped and draped in the usual sterile fashion. Local anesthesia was injected and using a modified Seldinger technique, a 5 Serbian introducer sheath was introduced into the right common femoral artery and then quickly exchanged for a 7 Western Sophia Shuttle sheath which was advanced into the descending aorta.  A triaxial system of the 7F Shuttle sheath over a Select catheter and a Glidewire was advanced into the had partially lysed and migrated distally and was now a subocclusive M3 branch thromboembolus and so mechanical thrombectomy was not performed. 2. Tortuous left common carotid artery.

## 2020-08-07 NOTE — PROGRESS NOTES
Device interrogation by company representative. See interrogation for further details. Post op check for ILR implanted 7/29/20. RN to check incision. Initial report showed no data, no parameters set. Pt had to be manually entered into carelink by device tech. (it was not in cath lab). ILRE for CVA. No AFib recorded to date. Presenting rhythm shows sinus w/ ectopy. Parameters set. Follow up 1 month via carelink. See PACEART report under Cardiology tab.

## 2020-09-08 ENCOUNTER — TELEPHONE (OUTPATIENT)
Dept: CARDIOLOGY CLINIC | Age: 65
End: 2020-09-08

## 2020-09-08 NOTE — TELEPHONE ENCOUNTER
Pt missed her 1 month implanted loop recorder check and ov w/FW. Implanted on 7/29/20 by [facility] for TIA. Please call pt to reschedule. She may still be in house at [facility]. Thanks.

## 2020-09-16 ENCOUNTER — TELEPHONE (OUTPATIENT)
Dept: CARDIOLOGY CLINIC | Age: 65
End: 2020-09-16

## 2020-09-16 ENCOUNTER — NURSE ONLY (OUTPATIENT)
Dept: CARDIOLOGY CLINIC | Age: 65
End: 2020-09-16

## 2020-09-16 NOTE — PROGRESS NOTES
Remote interrogation of implanted cardiac event monitor shows normal function. ILR for CVA. (first AF recording 9/8/20). Last interrogation 8/7. She missed her 1 month ov and carelink was disconnected. Quick Look Data From:   23-Aug-2020 00:05 to 16-Sep-2020 00:05.  (tachycardia recorded, svt/at)  Most TACHY recordings show T-wave oversensing (TWOS). Tachy (ID# 14), 15-Aug-2020-pAT x 5 sec. AF (ID# 9), 12-Aug-2020-appears to be sinus tach w/ ectopy and over/undersensing. AF (ID# 7), 12-Aug-2020-irregular svt. No symptom episodes recorded. 16-Sep-2020 00:05  - CareAlert: Tachy  - CareAlert: AF  AFib w/ rvr on 9/8/20 x 57 min. She is on toprol xl, is not on AC. DR Leslie Numbers please review and advise. See PACEART report under Cardiology tab.       OV NPFW 9/30

## 2020-09-16 NOTE — TELEPHONE ENCOUNTER
Remote interrogation of implanted cardiac event monitor shows normal function. ILR for CVA. (first AF recording 9/8/20). Last interrogation 8/7. She missed her 1 month ov and carelink was disconnected. Quick Look Data From:   23-Aug-2020 00:05 to 16-Sep-2020 00:05.  (tachycardia recorded, svt/at)  Most TACHY recordings show T-wave oversensing (TWOS). Tachy (ID# 14), 15-Aug-2020-pAT x 5 sec. AF (ID# 9), 12-Aug-2020-appears to be sinus tach w/ ectopy and over/undersensing. AF (ID# 7), 12-Aug-2020-irregular svt. No symptom episodes recorded. 16-Sep-2020 00:05  - CareAlert: Tachy  - CareAlert: AF  AFib w/ rvr on 9/8/20 x 57 min. She is on toprol xl, is not on AC. DR Linda Cunningham please review and advise. See PACEART report under Cardiology tab.

## 2020-09-24 NOTE — PROGRESS NOTES
Yajaira Marquez MD   9/23/2020  8:10 PM       She has some PAT and some oversensing. Some are irregular and could represent AF, though not conclusive. Is she on [facility]?

## 2020-10-01 ENCOUNTER — OFFICE VISIT (OUTPATIENT)
Dept: CARDIOLOGY CLINIC | Age: 65
End: 2020-10-01
Payer: MEDICAID

## 2020-10-01 ENCOUNTER — TELEPHONE (OUTPATIENT)
Dept: CARDIOLOGY CLINIC | Age: 65
End: 2020-10-01

## 2020-10-01 ENCOUNTER — NURSE ONLY (OUTPATIENT)
Dept: CARDIOLOGY CLINIC | Age: 65
End: 2020-10-01
Payer: MEDICAID

## 2020-10-01 VITALS
DIASTOLIC BLOOD PRESSURE: 90 MMHG | WEIGHT: 180 LBS | BODY MASS INDEX: 28.93 KG/M2 | SYSTOLIC BLOOD PRESSURE: 150 MMHG | HEART RATE: 60 BPM | HEIGHT: 66 IN | TEMPERATURE: 97.1 F

## 2020-10-01 PROCEDURE — 93285 PRGRMG DEV EVAL SCRMS IP: CPT | Performed by: INTERNAL MEDICINE

## 2020-10-01 PROCEDURE — 93000 ELECTROCARDIOGRAM COMPLETE: CPT | Performed by: NURSE PRACTITIONER

## 2020-10-01 PROCEDURE — 4004F PT TOBACCO SCREEN RCVD TLK: CPT | Performed by: NURSE PRACTITIONER

## 2020-10-01 PROCEDURE — G8484 FLU IMMUNIZE NO ADMIN: HCPCS | Performed by: NURSE PRACTITIONER

## 2020-10-01 PROCEDURE — G8417 CALC BMI ABV UP PARAM F/U: HCPCS | Performed by: NURSE PRACTITIONER

## 2020-10-01 PROCEDURE — 3017F COLORECTAL CA SCREEN DOC REV: CPT | Performed by: NURSE PRACTITIONER

## 2020-10-01 PROCEDURE — G8427 DOCREV CUR MEDS BY ELIG CLIN: HCPCS | Performed by: NURSE PRACTITIONER

## 2020-10-01 PROCEDURE — 99215 OFFICE O/P EST HI 40 MIN: CPT | Performed by: NURSE PRACTITIONER

## 2020-10-01 NOTE — TELEPHONE ENCOUNTER
Called and spoke with daughter Arcelia Giles. Reviewed plan of care for today. Daughter is going to follow-up and make sure that she has a stress test scheduled.

## 2020-10-01 NOTE — PROGRESS NOTES
Patient comes in for 1 month interrogation of their implanted loop recorder. Implanted: 7/29/20 by LORIE   DX: CVA    Presenting rhythm today is normal sinus rhythm. 1 AF episode detected since 9/16/20. However, appears to be Sinus Tach w/Ectopy and possible Undersensing. Sensitivity increased from 0.035 mV to 0.025 mV. Pt remains on Metoprolol. Pt will see NPFW today. We will continue to follow the patient remotely.

## 2020-10-01 NOTE — TELEPHONE ENCOUNTER
Yaakov Patel had a 56 appt with pt today and daughter called to speak with her if she has a few minutes to spare. Mom recently suffered from a stroke and cant remember and her dad that brought her has dementia. She requested a transcript of appt. I explained they would get a AVS and she wanted more details.  She said if Hersmalathi Martinez has time a call would be nice       Please advise [phone]

## 2020-10-01 NOTE — PROGRESS NOTES
[address]   Electrophysiology  Office Visit  Date: 10/1/2020    Chief Complaint   Patient presents with    Atrial Fibrillation    Tachycardia    Palpitations    Cardiomyopathy       Cardiac HX: Cameron Fuller is a 60 y.o. woman with a h/o HTN, IBS who was transferred from [facility] on 7/23/2020 status post CVA (left facial droop and right-sided weakness), CT demonstrated left middle cerebral artery occlusion, received TPA, on f/u evaluation the embolus had partially lysed and mechanical thrombectomy was not performed, echo showed an EF 40 to 45%, ischemic work-up deferred as outpatient d/t recent CVA. Interval History/HPI: Patient had been originally admitted to [facility] with complaints of a left facial droop and right-sided weakness, CT had demonstrated left middle artery occlusion, received TPA and then transferred to [facility] on 7/23/2020, and follow-up for neurology evaluation showed the embolus is a partially lysed and a mechanical thrombectomy was not performed, there was a question of whether or not she may have silent atrial fibrillation as an etiology for the CVA and a loop recorder was implanted on 7/29/2020. She is here today for follow-up for her loop recorder management and to assess for atrial fibrillation as a cause of her recent stroke. Review of her device today shows 22 AF episodes with rates up to 200 bpm, the longest episode lasting 1 hour and 31 minutes. Patient states that she occasionally can feel heart racing and palpitations. It does not make her dizzy or lightheaded. She has no other symptoms with it. She does have expressive aphasia after her stroke and has a difficult time getting her symptoms across today. She is not currently on oral anticoagulation. He is on Toprol XL 25 mg daily heart rate in the office today is 60 bpm and her EKG is normal sinus with PACs. She did have an echo done during her hospitalization in July that showed an EF of 40 to 45%.   She has not had any ischemic work-up. She denies chest pain, shortness of breath, PND, orthopnea or lower extremity edema. Her blood pressure is elevated in the office today. Home medications:   Current Outpatient Medications on File Prior to Visit   Medication Sig Dispense Refill    atorvastatin (LIPITOR) 80 MG tablet Take 1 tablet by mouth nightly 30 tablet 3    baclofen (LIORESAL) 10 MG tablet Take 1 tablet by mouth 3 times daily 90 tablet 1    losartan (COZAAR) 25 MG tablet Take 1 tablet by mouth daily 30 tablet 3    metoprolol succinate (TOPROL XL) 25 MG extended release tablet Take 1 tablet by mouth daily 30 tablet 3    fluticasone (FLONASE) 50 MCG/ACT nasal spray 1 spray by Each Nostril route daily as needed for Rhinitis 1 Bottle 3    furosemide (LASIX) 40 MG tablet Take 1 tablet by mouth daily 60 tablet 3    potassium chloride (KLOR-CON M) 20 MEQ extended release tablet Take 1 tablet by mouth 2 times daily 60 tablet 3    ferrous gluconate (FERGON) 324 (38 Fe) MG tablet Take 324 mg by mouth daily (with breakfast)      sucralfate (CARAFATE) 1 GM tablet Take 1 g by mouth 4 times daily      hydrOXYzine (ATARAX) 10 MG tablet Take 10 mg by mouth 3 times daily as needed for Itching      fexofenadine (ALLEGRA) 180 MG tablet Take 180 mg by mouth daily      ondansetron (ZOFRAN ODT) 4 MG disintegrating tablet Take 1 tablet by mouth every 8 hours as needed for Nausea 20 tablet 0    busPIRone (BUSPAR) 10 MG tablet Take 10 mg by mouth 3 times daily as needed      nystatin (MYCOSTATIN) 868117 UNIT/GM cream Apply topically 2 times daily as needed for Dry Skin Apply topically 2 times daily.  balsalazide (COLAZAL) 750 MG capsule Take 3,000 mg by mouth daily Take 4 capsules (total 3000 mg) daily each morning.       mupirocin (BACTROBAN) 2 % ointment Apply daily 1 Tube 0    omeprazole (PRILOSEC) 20 MG delayed release capsule Take 20 mg by mouth Daily        No current facility-administered medications on file prior to visit.         Past Medical History:   Diagnosis Date    Anxiety     Arthritis     Blood transfusion reaction     Crohn's disease ([address]     Depression     GERD (gastroesophageal reflux disease)     Hiatal hernia 6/24/2014    Hx of blood clots     Hypertension     MRSA (methicillin resistant staph aureus) culture positive 06/05/2018    urine    Neuropathy     Pneumonia 1/8/2014    Sinus headache     SOB (shortness of breath)     VRE (vancomycin resistant enterococcus) culture positive 10/08/2018    abd culture        Past Surgical History:   Procedure Laterality Date    ABDOMEN SURGERY      ABDOMINAL ADHESION SURGERY  06/08/2018    BLADDER SUSPENSION      COLONOSCOPY      COLONOSCOPY  9/14/15    sigmoid colon biopsy     COLONOSCOPY  08/07/2018    COLONOSCOPY  06/07/2019    COLONOSCOPY, POSSIBLE BIOPSY, POSSIBLE POLYPECTOMY    COLONOSCOPY N/A 6/7/2019    COLONOSCOPY WITH BIOPSY performed by Lena Andrea MD at [address] COLONOSCOPY N/A 6/7/2019    COLONOSCOPY DIAGNOSTIC/STOMA performed by Lena Andrea MD at [address] [place] [address] 10/8/2018    EXPLORATORY LAPAROTOMY WITH COLOSTOMY performed by Bessie Funes MD at [address] [facility] Left 6/25/14    excision plantar left hallux    FOOT SURGERY  6/3/15    PLANTAR PLATE REPAIR BILATERAL, ARTHROTOMY MPJ 2ND BILATERAL    FOOT SURGERY Left 08/05/2002    Excision second metatarsal head left    FRACTURE SURGERY      rt hip    HAND CARPECTOMY Bilateral     HEEL SPUR SURGERY      HERNIA REPAIR      HYSTERECTOMY      bladder surgery    JOINT REPLACEMENT      rt hip, L shoulder replacement 11/2014    KNEE SURGERY Bilateral     arthrosvopic    LEG SURGERY Right     MT SECD CLOS SURG WND EXTEN/COMPLIC N/A 37/10/9552    SECONDARY WOUND CLOSURE OF ABDOMINAL WOUND performed by Bessie Funes MD at [facility] 7/17/2019    FLEXIBLE SIGMOIDOSCOPY performed by Negro Gregory MD at [address]  01/15/2018    with biopsies    SMALL INTESTINE SURGERY N/A 7/17/2019    COLOSTOMY CLOSURE WITH COLORECTAL ANASTOMOSIS performed by Negro Gregory MD at [address]  6/14/13    and colonsocopy with antral erosion biopsies       Allergies   Allergen Reactions    Ace Inhibitors Swelling    Skelaxin [Metaxalone] Swelling       Social History:  Reviewed. reports that she has been smoking cigarettes and e-cigarettes. She has a 10.00 pack-year smoking history. She has never used smokeless tobacco. She reports current alcohol use of about 2.0 standard drinks of alcohol per week. She reports that she does not use drugs. Family History:  Reviewed. family history includes High Blood Pressure in her father and mother; Kidney Disease in her sister. Review of System:    · Constitutional: No fevers, chills. · Eyes: No visual changes or diplopia. No scleral icterus. · ENT: No Headaches. No mouth sores or sore throat. · Cardiovascular: No for chest pain, Yes for dyspnea on exertion, Yes for palpitations or No for loss of consciousness. No cough, hemoptysis, No for pleuritic pain, or phlebitis. · Respiratory: No for cough or wheezing. No hematemesis. · Gastrointestinal: No abdominal pain, blood in stools. · Genitourinary: No dysuria, or hematuria. · Musculoskeletal: Yes gait disturbance,    · Integumentary: No rash or pruritis. · Neurological: No headache, change in muscle strength, numbness or tingling. · Psychiatric: No anxiety, or depression. · Endocrine: No temperature intolerance. No excessive thirst, fluid intake, or urination. · Hem/Lymph: No abnormal bruising or bleeding, blood clots or swollen lymph nodes. · Allergic/Immunologic: No nasal congestion or hives.     Physical Examination:  Vitals:    10/01/20 1045   BP: (!) 150/90   Pulse:    Temp:          Wt Readings from Last 3 Encounters:   10/01/20 180 lb (81.6 kg)   07/25/20 199 lb 8.3 oz (90.5 kg)   07/23/20 200 lb 4.8 oz (90.9 kg)       · Constitutional: Oriented. No distress. · Head: Normocephalic and atraumatic. · Mouth/Throat: Oropharynx is clear and moist.   · Eyes: Conjunctivae clear without jaunduice. PERRL. · Neck: Neck supple. No rigidity. No JVD present. · Cardiovascular: Normal rate, regular rhythm, S1&S2. · Pulmonary/Chest: Bilateral respiratory sounds. No wheezes, No rhonchi. Abdominal: Soft. Bowel sounds present. No distension, No tenderness. · Musculoskeletal: No tenderness. No edema    · Lymphadenopathy: Has no cervical adenopathy. · Neurological: Alert and oriented. Cranial nerve appears intact, No Gross deficit   · Skin: Skin is warm and dry. No rash noted. · Psychiatric: Has a normal mood, affect and behavior     Labs:  Reviewed. No results for input(s): NA, K, CL, CO2, PHOS, BUN, CREATININE in the last 72 hours. Invalid input(s): CA,  TSH  No results for input(s): WBC, HGB, HCT, MCV, PLT in the last 72 hours. Lab Results   Component Value Date    TROPONINI <0.01 07/30/2020     Lab Results   Component Value Date    BNP 59 01/08/2014     Lab Results   Component Value Date    PROTIME 31.3 11/27/2018    PROTIME 13.5 10/16/2018    PROTIME 15.2 09/26/2018    INR 2.75 11/27/2018    INR 1.18 10/16/2018    INR 1.33 09/26/2018     Lab Results   Component Value Date    CHOL 111 07/24/2020    HDL 43 07/24/2020    TRIG 66 07/24/2020       ECG: Personally reviewed: NSR, PACs, HR 60, , QRS 84, QTc 430    ECHO: 7/27/2020  Summary   Left ventricular cavity size is normal. There is mild left ventricular   hypertrophy. Overall left ventricular systolic function appears mildly   reduced with LVEF estimated at 40% . There is mild global hypokinesis. Normal diastolic function. Mild tricuspid regurgitation.  Estimated pulmonary   artery systolic pressure is at 38 mmHg assuming a right atrial pressure of 8 mmHg. IVC size is normal (<2.1 cm) but collapses < 50% with respiration   consistent with elevated RA pressure (8 mmHg). A bubble study was performed   and fails to show evidence of right to left shunting. Stress Test: 2/6/2018  Summary     -There is a small anterior apical defect that is likely due to breast     attenuation and some movement artifact, rather than ischemia.     -Wall motion is normal with EF=57%.     -Overall, low risk scan     Cardiac Angiography:n/a    Problem List:   Patient Active Problem List    Diagnosis Date Noted    [employer] LINQ 7/29/20 Dr Doc vAitia 07/31/2020    Abnormal ECG     Acute cerebrovascular accident (CVA) ([address] 07/23/2020    Hypokalemia 07/15/2020    Open wound of left foot with complication 57/32/3418    PAD (peripheral artery disease) ([address] 05/17/2019    Leg swelling 05/17/2019    Venous insufficiency 05/17/2019    Chronic venous hypertension (idiopathic) with inflammation of bilateral lower extremity 05/17/2019    DVT (deep venous thrombosis) ([address] 07/03/2018    Hypomagnesemia 01/08/2018    Crohn's colitis ([address] 01/06/2018    Anemia 01/05/2018    Hyponatremia 01/05/2018    Atherosclerosis of native arteries of extremities with intermittent claudication, bilateral legs ([address] 01/19/2016    Essential hypertension, benign 09/13/2015        Assessment:   1. Paroxysmal atrial fibrillation (HCC)    2. Cerebrovascular accident (CVA), unspecified mechanism ([address]    3. Status post placement of implantable loop recorder    4. Essential hypertension    5.  Dilated cardiomyopathy ([address]         Cardiac HX: Makayla Brian is a 60 y.o. woman with a h/o HTN, DVT, IBS who was transferred from [facility] on 7/23/2020 status post CVA (left facial droop and right-sided weakness), CT demonstrated left middle cerebral artery occlusion, received TPA, on f/u evaluation the embolus had partially lysed and mechanical thrombectomy was not performed, echo showed an EF 40 to 45%, ischemic work-up deferred as outpatient d/t recent CVA. [id number] 5. TSH 3.57 (7/28/20). PAF/ILR/s/p CVA  - pAF on ILR with rates up to 180 bpm lasting up to 1.5 hours in length  - Will have pt see Dr. Skelton November to discuss AAD   - Will put on Xarelto 20 mg QD -discussed optional oral anticoagulation agents with patient and  along with risks and side effects of the medication. Patient has been on Xarelto before for a DVT and would like to be restarted on that.     CMP  - EF 40-45%  - NYHA class II/III  - QRS 84  - On Toprol XL 25 mg, allergic to ACE  - Will add Hydralazine 10 mg TID  - Will order a Lexiscan - ischemic eval  - ECG ordered and results personally reviewed     HTN  - Not well controlled  - Adding hydralazine - will up titrate for BP    Prolonged QTc in hospital  - QTc 430  - Avoid QTc prolonging drugs    EF of 40-45%  No ACEi for systolic HF d/t allergy  No known CAD  Anticoagulation for AF and heart failure  No Tobacco us. All questions and concerns were addressed to the patient/family. Alternatives to my treatment were discussed. The note was completed using EMR. Every effort was made to ensure accuracy; however, inadvertent computerized transcription errors may be present. Patient received education regarding their diagnosis, treatment and medications while in the office today. Britney Lopez CNP  [address]      I  have spent > 45 minutes of face to face time directly with the patient/family with more than 50% spent counseling and coordinating care for this patient. , including treatment options and the side effects of medications

## 2020-10-13 ENCOUNTER — HOSPITAL ENCOUNTER (OUTPATIENT)
Dept: NON INVASIVE DIAGNOSTICS | Age: 65
Discharge: HOME | End: 2020-10-13
Payer: MEDICAID

## 2020-10-13 ENCOUNTER — TELEPHONE (OUTPATIENT)
Dept: CARDIOLOGY CLINIC | Age: 65
End: 2020-10-13

## 2020-10-13 LAB
LV EF: 45 %
LVEF MODALITY: NORMAL

## 2020-10-13 PROCEDURE — 78452 HT MUSCLE IMAGE SPECT MULT: CPT | Performed by: INTERNAL MEDICINE

## 2020-10-13 PROCEDURE — A9502 TC99M TETROFOSMIN: HCPCS | Performed by: INTERNAL MEDICINE

## 2020-10-13 PROCEDURE — 3430000000 HC RX DIAGNOSTIC RADIOPHARMACEUTICAL: Performed by: INTERNAL MEDICINE

## 2020-10-13 PROCEDURE — 6360000002 HC RX W HCPCS: Performed by: INTERNAL MEDICINE

## 2020-10-13 PROCEDURE — 93017 CV STRESS TEST TRACING ONLY: CPT | Performed by: INTERNAL MEDICINE

## 2020-10-13 RX ADMIN — TETROFOSMIN 30 MILLICURIE: 1.38 INJECTION, POWDER, LYOPHILIZED, FOR SOLUTION INTRAVENOUS at 08:52

## 2020-10-13 RX ADMIN — REGADENOSON 0.4 MG: 0.08 INJECTION, SOLUTION INTRAVENOUS at 08:42

## 2020-10-13 RX ADMIN — TETROFOSMIN 10 MILLICURIE: 1.38 INJECTION, POWDER, LYOPHILIZED, FOR SOLUTION INTRAVENOUS at 07:35

## 2020-10-13 NOTE — PROGRESS NOTES
Instructed on Lexiscan Stress Test Procedure including possible side effects/ adverse reactions. Patient verbalizes  understanding and denies having any questions. See [facility].   Cindy Raygoza RN

## 2020-10-13 NOTE — TELEPHONE ENCOUNTER
Patient went to [facility] today for a stress test and was told she couldn't have it done because Dr. Edward Kussmaul was not in network. Patient and her daughter would like a call to find out what is the problem. Please call  Daughter Arcelia Giles and advise # [phone].

## 2020-10-14 NOTE — PROGRESS NOTES
[facility]   Cardiac Consultation    Referring Provider:  Loran Meckel, MD     Chief Complaint   Patient presents with    Follow-up     New onset afib     HPI:  Iris Mckinnon is a 60 y.o. female with a PMH of HTN and IBS. Admitted on 7/23/20 s/p CVA (presenting with left facial droop and right-sided weakness). CT demonstrated left middle cerebral artery occlusion. She received TPA and on f/u evaluation the embolus had partially lysed and mechanical thrombectomy was not performed. S/p ILR (7/29/20) for surveillance for AF. Echo (7/27/20) showed EF 40-45%. AF RVR was seen on her device on 9/8/20 lasting 57 min. She was subsequently started on Xarelto (10/1/20). Currently on Xarelto 20 mg daily, losartan 25 mg daily, and Toprol 25 mg daily. She is here today to review Myoview and for management of AF, presenting in SR. Myoview (10/13/20) showed possible reversible apical defect suggestive of ischemia, EF 45%. Since the stroke, her gait has improved. However, she still is struggling with expressive aphasia, undergoing speech therapy, but this has somewhat improved. Otherwise, denies complaints of palpitations, dizziness, CP, SOB, orthopnea, presyncope, or syncope. Past Medical History:   has a past medical history of Anxiety, Arthritis, Blood transfusion reaction, Crohn's disease ([facility] Depression, GERD (gastroesophageal reflux disease), Hiatal hernia, Hx of blood clots, Hypertension, MRSA (methicillin resistant staph aureus) culture positive, Neuropathy, Pneumonia, Sinus headache, SOB (shortness of breath), and VRE (vancomycin resistant enterococcus) culture positive. Surgical History:   has a past surgical history that includes Hysterectomy; knee surgery (Bilateral); Hand Carpectomy (Bilateral); hernia repair; Abdomen surgery; bladder suspension; fracture surgery; Heel spur surgery; Tonsillectomy; Colonoscopy; Upper gastrointestinal endoscopy (6/14/13);  Foot surgery (Left, 6/25/14); Foot surgery (6/3/15); Colonoscopy (9/14/15); Foot surgery (Left, 08/05/2002); joint replacement; Sigmoidoscopy (01/15/2018); Abdominal adhesion surgery (06/08/2018); Colonoscopy (08/07/2018); colostomy (N/A, 10/8/2018); pr secd clos surg wnd exten/complic (N/A, 60/92/9792); Leg Surgery (Right); Colonoscopy (06/07/2019); Colonoscopy (N/A, 6/7/2019); Colonoscopy (N/A, 6/7/2019); Small intestine surgery (N/A, 7/17/2019); and proctosigmoidoscopy (N/A, 7/17/2019). Social History:   reports that she has been smoking cigarettes and e-cigarettes. She has a 10.00 pack-year smoking history. She has never used smokeless tobacco. She reports current alcohol use of about 2.0 standard drinks of alcohol per week. She reports that she does not use drugs. Family History:  family history includes High Blood Pressure in her father and mother; Kidney Disease in her sister.      Home Medications:  Outpatient Encounter Medications as of 10/15/2020   Medication Sig Dispense Refill    rivaroxaban (XARELTO) 20 MG TABS tablet Take 1 tablet by mouth daily (with breakfast) 30 tablet 1    atorvastatin (LIPITOR) 80 MG tablet Take 1 tablet by mouth nightly 30 tablet 3    losartan (COZAAR) 25 MG tablet Take 1 tablet by mouth daily (Patient taking differently: Take 50 mg by mouth daily ) 30 tablet 3    metoprolol succinate (TOPROL XL) 25 MG extended release tablet Take 1 tablet by mouth daily 30 tablet 3    fluticasone (FLONASE) 50 MCG/ACT nasal spray 1 spray by Each Nostril route daily as needed for Rhinitis 1 Bottle 3    furosemide (LASIX) 40 MG tablet Take 1 tablet by mouth daily (Patient taking differently: Take 40 mg by mouth daily Pt takes two  To three times a week) 60 tablet 3    potassium chloride (KLOR-CON M) 20 MEQ extended release tablet Take 1 tablet by mouth 2 times daily 60 tablet 3    ferrous gluconate (FERGON) 324 (38 Fe) MG tablet Take 324 mg by mouth daily (with breakfast)      sucralfate (CARAFATE) 1 GM tablet Take 1 g by mouth 4 times daily      hydrOXYzine (ATARAX) 10 MG tablet Take 10 mg by mouth 3 times daily as needed for Itching      fexofenadine (ALLEGRA) 180 MG tablet Take 180 mg by mouth daily      ondansetron (ZOFRAN ODT) 4 MG disintegrating tablet Take 1 tablet by mouth every 8 hours as needed for Nausea 20 tablet 0    busPIRone (BUSPAR) 10 MG tablet Take 10 mg by mouth 3 times daily as needed      nystatin (MYCOSTATIN) 386569 UNIT/GM cream Apply topically 2 times daily as needed for Dry Skin Apply topically 2 times daily.  balsalazide (COLAZAL) 750 MG capsule Take 3,000 mg by mouth daily Take 4 capsules (total 3000 mg) daily each morning.  mupirocin (BACTROBAN) 2 % ointment Apply daily 1 Tube 0    omeprazole (PRILOSEC) 20 MG delayed release capsule Take 20 mg by mouth Daily       baclofen (LIORESAL) 10 MG tablet Take 1 tablet by mouth 3 times daily 90 tablet 1     No facility-administered encounter medications on file as of 10/15/2020. Allergies:  Ace inhibitors and Skelaxin [metaxalone]     Review of Systems   Constitutional: Negative. HENT: Negative. Eyes: Negative. Respiratory: Negative. Cardiovascular: Negative. Gastrointestinal: Negative. Genitourinary: Negative. Musculoskeletal: Negative. Skin: Negative. Neurological: Negative. Hematological: Negative. Psychiatric/Behavioral: Negative. /80   Pulse 64   Temp 97.3 °F (36.3 °C)   Wt 183 lb (83 kg)   BMI 29.54 kg/m²     ECG 10/15/20, personally reviewed    Echo 7/27/20  Summary   Left ventricular cavity size is normal. There is mild left ventricular   hypertrophy. Overall left ventricular systolic function appears mildly   reduced with LVEF estimated at 40% . There is mild global hypokinesis. Normal diastolic function. Mild tricuspid regurgitation. Estimated pulmonary   artery systolic pressure is at 38 mmHg assuming a right atrial pressure of 8   mmHg.  IVC size is normal (<2.1 cm) but collapses < 50% with respiration   consistent with elevated RA pressure (8 mmHg). A bubble study was performed   and fails to show evidence of right to left shunting. Myoview 10/13/20  Summary     -There is patchy myocardial uptake of tracer making interpretation     difficult.     -There does appear to be reversible apical defect that is suggestive of     ischemia.     -There is mild LV dysfunction with EF=45%.  -Intermediate risk study. Objective:  Physical Exam   Constitutional: She is oriented to person, place, and time. She appears well-developed and well-nourished. HENT:   Head: Normocephalic and atraumatic. Eyes: Pupils are equal, round, and reactive to light. Neck: Normal range of motion. Cardiovascular: Normal rate, regular rhythm and normal heart sounds. Pulmonary/Chest: Effort normal and breath sounds normal.   Abdominal: Soft. No tenderness. Musculoskeletal: Normal range of motion. She exhibits no edema. Neurological: She is alert and oriented to person, place, and time. Skin: Skin is warm and dry. Psychiatric: She has a normal mood and affect. Assessment:  1. PAF- episode highly suspicious for AF on 9/8/20 lasting 57 min. Now on Xarelto for stroke prevention. [id number] score 5.     2. CVA- 7/23/20. S/p ILR   3. HTN   4. Dilated CMP- EF 40% (7/27/20). On Toprol and losartan   5. Abnormal GXT- Will refer for LHC         Plan:  1. Refer to interventional cardiology for LHC  2. No change in current medications  3. Follow up in     I, Joseph Jaime RN, am scribing for and in the presence of Dr. Rom Maharaj. 10/15/20 12:42 PM  ELLE Qiu Dr. Vernel Hy, personally performed the services described in this documentation as scribed by Joseph Jaime RN in my presence, and it is both accurate and complete.       Rom Maharaj M.D.

## 2020-10-15 ENCOUNTER — NURSE ONLY (OUTPATIENT)
Dept: CARDIOLOGY CLINIC | Age: 65
End: 2020-10-15
Payer: MEDICAID

## 2020-10-15 ENCOUNTER — OFFICE VISIT (OUTPATIENT)
Dept: CARDIOLOGY CLINIC | Age: 65
End: 2020-10-15
Payer: MEDICAID

## 2020-10-15 VITALS
TEMPERATURE: 97.3 F | WEIGHT: 183 LBS | SYSTOLIC BLOOD PRESSURE: 125 MMHG | BODY MASS INDEX: 29.54 KG/M2 | HEART RATE: 64 BPM | DIASTOLIC BLOOD PRESSURE: 80 MMHG

## 2020-10-15 PROCEDURE — 4004F PT TOBACCO SCREEN RCVD TLK: CPT | Performed by: INTERNAL MEDICINE

## 2020-10-15 PROCEDURE — 93000 ELECTROCARDIOGRAM COMPLETE: CPT | Performed by: INTERNAL MEDICINE

## 2020-10-15 PROCEDURE — 93291 INTERROG DEV EVAL SCRMS IP: CPT | Performed by: INTERNAL MEDICINE

## 2020-10-15 PROCEDURE — G8417 CALC BMI ABV UP PARAM F/U: HCPCS | Performed by: INTERNAL MEDICINE

## 2020-10-15 PROCEDURE — G8484 FLU IMMUNIZE NO ADMIN: HCPCS | Performed by: INTERNAL MEDICINE

## 2020-10-15 PROCEDURE — 99214 OFFICE O/P EST MOD 30 MIN: CPT | Performed by: INTERNAL MEDICINE

## 2020-10-15 PROCEDURE — G8427 DOCREV CUR MEDS BY ELIG CLIN: HCPCS | Performed by: INTERNAL MEDICINE

## 2020-10-15 PROCEDURE — 3017F COLORECTAL CA SCREEN DOC REV: CPT | Performed by: INTERNAL MEDICINE

## 2020-10-19 NOTE — PROGRESS NOTES
ILR interrogation by [employer] company rep. Implanted for stroke on 8/7/20. Interrogation today shows no new arrhythmias/episodes since 10/1/2020. Pt currently on Xarelto, Metoprolol  Pt will see LORIE today. Follow up in 1 month via [employer].

## 2020-10-23 ENCOUNTER — OFFICE VISIT (OUTPATIENT)
Dept: PRIMARY CARE CLINIC | Age: 65
End: 2020-10-23
Payer: MEDICAID

## 2020-10-23 PROCEDURE — G8417 CALC BMI ABV UP PARAM F/U: HCPCS | Performed by: NURSE PRACTITIONER

## 2020-10-23 PROCEDURE — G8428 CUR MEDS NOT DOCUMENT: HCPCS | Performed by: NURSE PRACTITIONER

## 2020-10-23 PROCEDURE — 99211 OFF/OP EST MAY X REQ PHY/QHP: CPT | Performed by: NURSE PRACTITIONER

## 2020-10-24 LAB — SARS-COV-2, PCR: NOT DETECTED

## 2020-10-26 NOTE — RESULT ENCOUNTER NOTE

## 2020-10-26 NOTE — PRE-PROCEDURE INSTRUCTIONS
Attempted to call patient about procedure. No answer. Voicemail left. Told to be here at 0830 for procedure at 1000. NPO after midnight, but can take morning medication with sips of water. Office should have told them when and if they should stop any blood thinners. Told to have a responsible adult be with the patient to take them home and stay with them afterwards, if they are not admitted to hospital afterwards. And if available bring current list of medications.

## 2020-10-27 ENCOUNTER — HOSPITAL ENCOUNTER (OUTPATIENT)
Dept: CARDIAC CATH/INVASIVE PROCEDURES | Age: 65
Discharge: HOME | End: 2020-10-29
Payer: MEDICAID

## 2020-10-27 VITALS — BODY MASS INDEX: 27.94 KG/M2 | TEMPERATURE: 98.1 F | HEIGHT: 67 IN | WEIGHT: 178 LBS

## 2020-10-27 LAB
ANION GAP SERPL CALCULATED.3IONS-SCNC: 11 MMOL/L (ref 3–16)
BUN BLDV-MCNC: 13 MG/DL (ref 7–20)
CALCIUM SERPL-MCNC: 9.4 MG/DL (ref 8.3–10.6)
CHLORIDE BLD-SCNC: 103 MMOL/L (ref 99–110)
CO2: 23 MMOL/L (ref 21–32)
CREAT SERPL-MCNC: 0.6 MG/DL (ref 0.6–1.2)
EKG ATRIAL RATE: 55 BPM
EKG DIAGNOSIS: NORMAL
EKG P AXIS: 30 DEGREES
EKG P-R INTERVAL: 136 MS
EKG Q-T INTERVAL: 422 MS
EKG QRS DURATION: 88 MS
EKG QTC CALCULATION (BAZETT): 403 MS
EKG R AXIS: 24 DEGREES
EKG T AXIS: 51 DEGREES
EKG VENTRICULAR RATE: 55 BPM
GFR AFRICAN AMERICAN: >60
GFR NON-AFRICAN AMERICAN: >60
GLUCOSE BLD-MCNC: 100 MG/DL (ref 70–99)
HCT VFR BLD CALC: 37.5 % (ref 36–48)
HEMOGLOBIN: 12.6 G/DL (ref 12–16)
INR BLD: 0.98 (ref 0.86–1.14)
LEFT VENTRICULAR EJECTION FRACTION MODE: NORMAL
LV EF: 50 %
MCH RBC QN AUTO: 31.3 PG (ref 26–34)
MCHC RBC AUTO-ENTMCNC: 33.5 G/DL (ref 31–36)
MCV RBC AUTO: 93.4 FL (ref 80–100)
PDW BLD-RTO: 15.9 % (ref 12.4–15.4)
PLATELET # BLD: 278 K/UL (ref 135–450)
PMV BLD AUTO: 7.2 FL (ref 5–10.5)
POTASSIUM SERPL-SCNC: 4.4 MMOL/L (ref 3.5–5.1)
PROTHROMBIN TIME: 11.4 SEC (ref 10–13.2)
RBC # BLD: 4.02 M/UL (ref 4–5.2)
SODIUM BLD-SCNC: 137 MMOL/L (ref 136–145)
WBC # BLD: 5.2 K/UL (ref 4–11)

## 2020-10-27 PROCEDURE — 93567 NJX CAR CTH SPRVLV AORTGRPHY: CPT

## 2020-10-27 PROCEDURE — C1769 GUIDE WIRE: HCPCS

## 2020-10-27 PROCEDURE — 6360000002 HC RX W HCPCS

## 2020-10-27 PROCEDURE — C1887 CATHETER, GUIDING: HCPCS

## 2020-10-27 PROCEDURE — 85610 PROTHROMBIN TIME: CPT

## 2020-10-27 PROCEDURE — 93010 ELECTROCARDIOGRAM REPORT: CPT | Performed by: INTERNAL MEDICINE

## 2020-10-27 PROCEDURE — 93567 NJX CAR CTH SPRVLV AORTGRPHY: CPT | Performed by: INTERNAL MEDICINE

## 2020-10-27 PROCEDURE — 85027 COMPLETE CBC AUTOMATED: CPT

## 2020-10-27 PROCEDURE — 99217 PR OBSERVATION CARE DISCHARGE MANAGEMENT: CPT | Performed by: NURSE PRACTITIONER

## 2020-10-27 PROCEDURE — C1894 INTRO/SHEATH, NON-LASER: HCPCS

## 2020-10-27 PROCEDURE — 2500000003 HC RX 250 WO HCPCS

## 2020-10-27 PROCEDURE — 80048 BASIC METABOLIC PNL TOTAL CA: CPT

## 2020-10-27 PROCEDURE — 99153 MOD SED SAME PHYS/QHP EA: CPT

## 2020-10-27 PROCEDURE — 93458 L HRT ARTERY/VENTRICLE ANGIO: CPT

## 2020-10-27 PROCEDURE — 99024 POSTOP FOLLOW-UP VISIT: CPT | Performed by: INTERNAL MEDICINE

## 2020-10-27 PROCEDURE — 99152 MOD SED SAME PHYS/QHP 5/>YRS: CPT

## 2020-10-27 PROCEDURE — 93005 ELECTROCARDIOGRAM TRACING: CPT | Performed by: INTERNAL MEDICINE

## 2020-10-27 PROCEDURE — 93458 L HRT ARTERY/VENTRICLE ANGIO: CPT | Performed by: INTERNAL MEDICINE

## 2020-10-27 PROCEDURE — 6360000004 HC RX CONTRAST MEDICATION: Performed by: INTERNAL MEDICINE

## 2020-10-27 PROCEDURE — 2709999900 HC NON-CHARGEABLE SUPPLY

## 2020-10-27 RX ORDER — ACETAMINOPHEN 325 MG/1
650 TABLET ORAL EVERY 4 HOURS PRN
Status: DISCONTINUED | OUTPATIENT
Start: 2020-10-27 | End: 2020-10-30 | Stop reason: HOSPADM

## 2020-10-27 RX ORDER — SODIUM CHLORIDE 9 MG/ML
INJECTION, SOLUTION INTRAVENOUS CONTINUOUS
Status: ACTIVE | OUTPATIENT
Start: 2020-10-27 | End: 2020-10-27

## 2020-10-27 RX ADMIN — IOHEXOL 200 ML: 350 INJECTION, SOLUTION INTRAVENOUS at 11:34

## 2020-10-27 NOTE — PROCEDURES
[facility] CATH    Lucy Left Krishna   60 y.o., female  1955      10/27/2020      Procedure  Selective Coronary Angiography  Cardiac Catheterization for Coronary Anatomy  Left Heart Catheterization  Left Ventriculogram  Aortic root injection  Ultrasound for right radial access  Arterial Access Right Radial Artery after a negative Jamey test  TR Band  CPT code 62773  CPT code 48729  CPT code 17829  CPT code 01918  Indication:Cardiac cath to rule out ischemic CAD, Possible angioplasty, The procedure and risks described to patient including risk of CVA, MI, bleeding, emergency surgery, death, patient treated for atrial fibrillation. Stress nuclear suggested anteroseptal reversible ischemia., Consent signed or positive stress test  Unspecified Angina  Anesthesia: Moderate sedation with Versed and Fentanyl IV  Anesthesia Start time 1102  Anesthesia end time 1132  Estimated blood loss :minimal  Abnormal Stress Test      Procedure:  After written informed consent was obtained, the patient was   brought to the cardiac catheterization suite, where patient was prepped and   draped in the usual sterile fashion. Local anesthesia was achieved in the   right wrist with 2% lidocaine. A 5-St Lucian hemostasis sheath was placed into   the right radial artery. The pre cocktail of heparin, verapamil and nitroglycerin was injected into the sheath. A 5-St Lucian Evan catheter was introduced; used to engage the left main   coronary artery. Radiographic  images were obtained. The catheter was pulled back then rotated. The Evan catheter was introduced  to engage the right coronary   artery. Radiographic  images were obtained. The catheter was removed and exchanged over an exchange length 0.35 soft guide wire. A 5-Malawian angled pigtail catheter was then positioned in the left ventricle. Left ventriculogram was performed. After these images were obtained. , the   catheter was flushed, pulled back across the aortic valve revealing no gradient. The catheter was removed. After the LV gram and identifying that there was l aortic root dilatation we proceeded with an aortic aortogram.      The hemostasis sheath was removed and hemostasis was achieved using a TR Band     There were no complications. Patient tolerated the procedure well. The   patient was transferred to the holding area in stable condition. Contrast consumed 180 cc  Flouro time 4.5 minutes  Radiation exposure 482.68 mGy    Results:  Left ventricular pressure 9 mmHg  Aortic pressure 101 mmHg    Coronary anatomy:   The left main coronary artery is normal.     Left anterior descending artery is normal there is a small area of calcific plaque in the mid vessel. But does not translate into narrowing. Circumflex artery is normal.    The right coronary artery is a dominant vessel and normal.     Left ventriculogram shows ejection fraction of 45 -50%. Mild global hypokinesia      Impression:  Trivial coronary artery plaquing calcific. No stenoses identified. Mild dilatation of the left ventricle mild cardiomyopathy. No opportunities or need for intervention.   Plan:  Primary prevention  Medical management      This note was likely completed using voice recognition technology and may contain unintended errors  Renetta Pascual M.D., Veronica Bernheim; Gene Sparks MD   Please cc this note to Dr. Wai Elliott

## 2020-10-27 NOTE — DISCHARGE SUMMARY
[address] Discharge Note      Patient ID: Vince Jones 60 y.o. 1955    Admission Date: 10/27/2020     Discharge Date:  10/27/2020    Reason for Admission:  Principal Diagnosis: LV dysfunction  Secondary Diagnosis: pAF, HTN, abnormal stress test.     Procedures:  Coronary angiogram.     Brief Summary of Patient's Course:  60 y.o. with HTN, LV dysfunction (EF 40-45%), pAF, CVA (right sided weakness and expressive aphasia) and an abnormal stress test who presented for a coronary angiogram.  The LM was normal. The LAD is normal with a small area of calcific plaque in the mid vessel but no significant obstruction. The LCX was normal. The RCA was dominant and normal. The LVEF was 45-50% with mild global hypokinesia. A TR band was uses to obtain hemostasis of the right radial arteriotomy. She tolerated the procedure well. She was monitored in the holding bay and discharged home. Continue primary prevention and medical management. Discharge Condition:  Good     Discharge Instructions: Activity Limitations:  No heavy lifting. Diet:  low fat and low sodium    Follow Up Instructions: To office in: Follow up with Lashell Mi NP in 1 week and Dr Ramana Plaza in 4 weeks  Instructed Re: Discharge medications, activity and dietary restrictions and follow up appointments were discussed. Discharge Medications:   Kristine Darenmanuel   Home Medication Instructions GTO:[id number]    Printed on:10/27/20 0551   Medication Information                      atorvastatin (LIPITOR) 80 MG tablet  Take 1 tablet by mouth nightly             baclofen (LIORESAL) 10 MG tablet  Take 1 tablet by mouth 3 times daily             balsalazide (COLAZAL) 750 MG capsule  Take 3,000 mg by mouth daily Take 4 capsules (total 3000 mg) daily each morning.              busPIRone (BUSPAR) 10 MG tablet  Take 10 mg by mouth 3 times daily as needed             ferrous gluconate (FERGON) 324 (38 Fe) MG tablet  Take 324 mg by mouth daily (with breakfast)             fexofenadine (ALLEGRA) 180 MG tablet  Take 180 mg by mouth daily             fluticasone (FLONASE) 50 MCG/ACT nasal spray  1 spray by Each Nostril route daily as needed for Rhinitis             furosemide (LASIX) 40 MG tablet  Take 1 tablet by mouth daily             hydrOXYzine (ATARAX) 10 MG tablet  Take 10 mg by mouth 3 times daily as needed for Itching             losartan (COZAAR) 25 MG tablet  Take 1 tablet by mouth daily             metoprolol succinate (TOPROL XL) 25 MG extended release tablet  Take 1 tablet by mouth daily             mupirocin (BACTROBAN) 2 % ointment  Apply daily             nystatin (MYCOSTATIN) 122405 UNIT/GM cream  Apply topically 2 times daily as needed for Dry Skin Apply topically 2 times daily. omeprazole (PRILOSEC) 20 MG delayed release capsule  Take 20 mg by mouth Daily              ondansetron (ZOFRAN ODT) 4 MG disintegrating tablet  Take 1 tablet by mouth every 8 hours as needed for Nausea             potassium chloride (KLOR-CON M) 20 MEQ extended release tablet  Take 1 tablet by mouth 2 times daily             rivaroxaban (XARELTO) 20 MG TABS tablet  Take 1 tablet by mouth daily (with breakfast)  Hold for 48 hours.  Resume 10/30/2020           sucralfate (CARAFATE) 1 GM tablet  Take 1 g by mouth 4 times daily                                        Attending Physician: Dr. Jorge Spann   Time Spent on Discharge: greater than 30 minutes

## 2020-11-03 ENCOUNTER — VIRTUAL VISIT (OUTPATIENT)
Dept: CARDIOLOGY CLINIC | Age: 65
End: 2020-11-03
Payer: MEDICAID

## 2020-11-03 PROBLEM — R93.89 ABNORMAL ANGIOGRAM: Status: ACTIVE | Noted: 2020-11-03

## 2020-11-03 PROCEDURE — 99214 OFFICE O/P EST MOD 30 MIN: CPT | Performed by: NURSE PRACTITIONER

## 2020-11-03 NOTE — PROGRESS NOTES
hypokinesis.   Normal diastolic function. Mild tricuspid regurgitation. Estimated pulmonary   artery systolic pressure is at 38 mmHg assuming a right atrial pressure of 8   mmHg. IVC size is normal (<2.1 cm) but collapses < 50% with respiration   consistent with elevated RA pressure (8 mmHg). A bubble study was performed   and fails to show evidence of right to left shunting.     I have reviewed notes / any lab work EKGs stress test, angiograms, & images reviewed  I  have spent >20  minutes with pt on phone or on video visit with the patient with more than 50% spent counseling and coordinating care this patient. Objective: There were no vitals taken for this visit. No intake or output data in the 24 hours ending 11/03/20 1606  Wt Readings from Last 3 Encounters:   10/27/20 178 lb (80.7 kg)   10/15/20 183 lb (83 kg)   10/01/20 180 lb (81.6 kg)       Physical Exam:   There were no vitals taken for this visit. BP Readings from Last 3 Encounters:   10/15/20 125/80   10/01/20 (!) 150/90   07/30/20 119/79     Pulse Readings from Last 3 Encounters:   10/15/20 64   10/01/20 60   07/30/20 89     No intake or output data in the 24 hours ending 11/03/20 1606  Wt Readings from Last 2 Encounters:   10/27/20 178 lb (80.7 kg)   10/15/20 183 lb (83 kg)     Constitutional: She is oriented to person, place, and time / in NAD   Vitals /wt per patient at home     Reviewed  available lab work,  EKGs, images, [facility]       Assessment    LOOP recorder in place remote CVA  CT demonstrated left middle cerebral artery occlusion. She received TPA and on f/u evaluation the embolus had partially lysed and mechanical thrombectomy was not performed. S/p ILR (7/29/20) for surveillance for AF. Echo (7/27/20) showed EF 40-45%. AF RVR was seen on her device on 9/8/20 lasting 57 min. She was subsequently started on Xarelto (10/1/20). Currently on Xarelto 20 mg daily, losartan 25 mg daily, and Toprol 25 mg daily.   today b/p 133/90 wt 162.2 /temp 97.9   No c/o cp/sOB/edema/fevers/ rare palpitations     LOOP carelink 10/15/20 No observations based on current interrogation. [facility] 10/27/20  / right arm [facility] site no complications   Trivial coronary artery plaquing calcific. No stenoses identified. Mild dilatation of the left ventricle mild cardiomyopathy. No opportunities or need for intervention. EF 45-50%   Plan:  Primary prevention  Medical management    Echo 7/27/20 Left ventricular cavity size is normal. There is mild left ventricular   hypertrophy. Overall left ventricular systolic function appears mildly   reduced with LVEF estimated at 40% . There is mild global hypokinesis.   Normal diastolic function. Mild tricuspid regurgitation. Estimated pulmonary   artery systolic pressure is at 38 mmHg assuming a right atrial pressure of 8   mmHg. IVC size is normal (<2.1 cm) but collapses < 50% with respiration   consistent with elevated RA pressure (8 mmHg). A bubble study was performed   and fails to show evidence of right to left shunting. Plan: Fu with PATIENCE Del Valle as planned   Dec 1st  1pm dwight Yuan APRROLANDA, CVNP

## 2020-11-04 ENCOUNTER — NURSE ONLY (OUTPATIENT)
Dept: CARDIOLOGY CLINIC | Age: 65
End: 2020-11-04
Payer: MEDICAID

## 2020-11-04 PROCEDURE — 93298 REM INTERROG DEV EVAL SCRMS: CPT | Performed by: INTERNAL MEDICINE

## 2020-11-04 PROCEDURE — G2066 INTER DEVC REMOTE 30D: HCPCS | Performed by: INTERNAL MEDICINE

## 2020-12-01 ENCOUNTER — HOSPITAL ENCOUNTER (OUTPATIENT)
Dept: WOUND CARE | Age: 65
Discharge: HOME | End: 2020-12-01
Payer: MEDICAID

## 2020-12-01 PROBLEM — I70.25 ATHEROSCLEROSIS OF NATIVE ARTERIES OF THE EXTREMITIES WITH ULCERATION (HCC): Status: ACTIVE | Noted: 2020-12-01

## 2020-12-01 PROBLEM — L97.522 ULCER OF LEFT FOOT, WITH FAT LAYER EXPOSED (HCC): Status: ACTIVE | Noted: 2020-12-01

## 2020-12-01 PROCEDURE — 99211 OFF/OP EST MAY X REQ PHY/QHP: CPT

## 2020-12-01 PROCEDURE — 99204 OFFICE O/P NEW MOD 45 MIN: CPT | Performed by: EMERGENCY MEDICINE

## 2020-12-01 RX ORDER — GINSENG 100 MG
CAPSULE ORAL ONCE
Status: CANCELLED | OUTPATIENT
Start: 2020-12-01

## 2020-12-01 RX ORDER — CLOBETASOL PROPIONATE 0.5 MG/G
OINTMENT TOPICAL ONCE
Status: CANCELLED | OUTPATIENT
Start: 2020-12-01

## 2020-12-01 RX ORDER — BACITRACIN, NEOMYCIN, POLYMYXIN B 400; 3.5; 5 [USP'U]/G; MG/G; [USP'U]/G
OINTMENT TOPICAL ONCE
Status: CANCELLED | OUTPATIENT
Start: 2020-12-01

## 2020-12-01 RX ORDER — LIDOCAINE 40 MG/G
CREAM TOPICAL ONCE
Status: DISCONTINUED | OUTPATIENT
Start: 2020-12-01 | End: 2020-12-02 | Stop reason: HOSPADM

## 2020-12-01 RX ORDER — LIDOCAINE 40 MG/G
CREAM TOPICAL ONCE
Status: CANCELLED | OUTPATIENT
Start: 2020-12-01

## 2020-12-01 RX ORDER — LIDOCAINE HYDROCHLORIDE 40 MG/ML
SOLUTION TOPICAL ONCE
Status: CANCELLED | OUTPATIENT
Start: 2020-12-01

## 2020-12-01 RX ORDER — BACITRACIN ZINC AND POLYMYXIN B SULFATE 500; 1000 [USP'U]/G; [USP'U]/G
OINTMENT TOPICAL ONCE
Status: CANCELLED | OUTPATIENT
Start: 2020-12-01

## 2020-12-01 RX ORDER — GENTAMICIN SULFATE 1 MG/G
OINTMENT TOPICAL ONCE
Status: CANCELLED | OUTPATIENT
Start: 2020-12-01

## 2020-12-01 RX ORDER — BETAMETHASONE DIPROPIONATE 0.05 %
OINTMENT (GRAM) TOPICAL ONCE
Status: CANCELLED | OUTPATIENT
Start: 2020-12-01

## 2020-12-01 RX ORDER — LIDOCAINE 50 MG/G
OINTMENT TOPICAL ONCE
Status: CANCELLED | OUTPATIENT
Start: 2020-12-01

## 2020-12-01 NOTE — PROGRESS NOTES
Sent Bilateral Reports from MRI and Xrays of feet to [facility] of [address]

## 2020-12-01 NOTE — H&P
Virtual Visit Via [facility]   History and Physical Note    Kinza Christianson [facility]  MEDICAL RECORD NUMBER:  [id number]  AGE: 60 y.o. GENDER: female  : 1955  EPISODE DATE:  2020    Subjective:     Chief Complaint   Patient presents with    Wound Check     Virtual visit left foot     Visit for left 3rd toe nonhealing wound. Consent obtained to communicate via Virtual Visit:  Yes  Patient and her daughter understand that billing is done in the same manner as if this is an in person visit. Location of patient: Home  Location of provider: [facility] wound center    HISTORY of PRESENT ILLNESS HPI     Alanna Aguilar is a 60 y.o. female who presents today via Virtual Visit wound/ulcer evaluation. History of Wound Context: 25-year-old female with a history of hypertension, LV dysfunction (EF of 40 to 45%), paroxysmal atrial fibrillation, CVA with right-sided weakness and expressive aphasia, LOOP,  who has had a nonhealing left 3rd toe wound since more than a year. Recommendations were for amputation prior to stroke in 2020. But patient has too many comorbidities to allow for any kind of surgical intervention including amputation. She has chronic edema which is making healing very difficult. Has worsening pain related to the edema and wound. Recently referred to a pain specialist by her PCP. Has been using triple antibiotic ointment with some progress.      Wound/Ulcer Pain Timing/Severity: constant, moderate  Quality of pain: sharp, aching, burning, throbbing  Severity:  7 / 10   Modifying Factors: Pain worsens with walking, Pain worsens with Elevation, worsening edema and Pain is relieved/improved with rest  Associated Signs/Symptoms: edema and pain    Ulcer Identification:  Ulcer Type: suspected arterial    Contributing Factors: decreased mobility and none    Acute Wound: N/A not an acute wound    PAST MEDICAL HISTORY        Diagnosis Date    Anxiety     Arthritis     Blood transfusion reaction     Crohn's disease ([facility] [address]     Depression     GERD (gastroesophageal reflux disease)     Hiatal hernia 6/24/2014    Hx of blood clots     Hypertension     MRSA (methicillin resistant staph aureus) culture positive 06/05/2018    urine    Neuropathy     Pneumonia 1/8/2014    Sinus headache     SOB (shortness of breath)     VRE (vancomycin resistant enterococcus) culture positive 10/08/2018    abd culture       PAST SURGICAL HISTORY    Past Surgical History:   Procedure Laterality Date    ABDOMEN SURGERY      ABDOMINAL ADHESION SURGERY  06/08/2018    BLADDER SUSPENSION      COLONOSCOPY      COLONOSCOPY  9/14/15    sigmoid colon biopsy     COLONOSCOPY  08/07/2018    COLONOSCOPY  06/07/2019    COLONOSCOPY, POSSIBLE BIOPSY, POSSIBLE POLYPECTOMY    COLONOSCOPY N/A 6/7/2019    COLONOSCOPY WITH BIOPSY performed by Kerry Estes MD at [address] COLONOSCOPY N/A 6/7/2019    COLONOSCOPY DIAGNOSTIC/STOMA performed by Kerry Estes MD at [address] [place] [address] 10/8/2018    EXPLORATORY LAPAROTOMY WITH COLOSTOMY performed by Marilyn Martin MD at [facility] Left 6/25/14    excision plantar left hallux    FOOT SURGERY  6/3/15    PLANTAR PLATE REPAIR BILATERAL, ARTHROTOMY MPJ 2ND BILATERAL    FOOT SURGERY Left 08/05/2002    Excision second metatarsal head left    FRACTURE SURGERY      rt hip    HAND CARPECTOMY Bilateral     HEEL SPUR SURGERY      HERNIA REPAIR      HYSTERECTOMY      bladder surgery    JOINT REPLACEMENT      rt hip, L shoulder replacement 11/2014    KNEE SURGERY Bilateral     arthrosvopic    LEG SURGERY Right     [place] SE[facility] CLOS SURG WND EXTEN/COMPLIC N/A 56/04/2119    SECONDARY WOUND CLOSURE OF ABDOMINAL WOUND performed by Marilyn Martin MD at [address] N/A 7/17/2019    FLEXIBLE SIGMOIDOSCOPY performed by Marilyn Martin MD at [address] [facility] 01/15/2018    with biopsies    SMALL INTESTINE SURGERY N/A 7/17/2019    COLOSTOMY CLOSURE WITH COLORECTAL ANASTOMOSIS performed by Alley Vinson MD at [place] [address]  6/14/13    and colonsocopy with antral erosion biopsies       FAMILY HISTORY    Family History   Problem Relation Age of Onset    High Blood Pressure Mother     High Blood Pressure Father     Kidney Disease Sister        SOCIAL HISTORY    Social History     Tobacco Use    Smoking status: Current Every Day Smoker     Packs/day: 1.00     Years: 10.00     Pack years: 10.00     Types: Cigarettes, E-Cigarettes    Smokeless tobacco: Never Used    Tobacco comment: CUT BACK TO 1/2 PPD WITH E CIG 6-2019   Substance Use Topics    Alcohol use:  Yes     Alcohol/week: 2.0 standard drinks     Types: 2 Shots of liquor per week     Comment: 6 DRINKS A WEEK OR LESS    Drug use: No       ALLERGIES    Allergies   Allergen Reactions    Ace Inhibitors Swelling    Skelaxin [Metaxalone] Swelling       MEDICATIONS    Current Outpatient Medications on File Prior to Encounter   Medication Sig Dispense Refill    rivaroxaban (XARELTO) 20 MG TABS tablet Take 1 tablet by mouth daily (with breakfast) 30 tablet 1    atorvastatin (LIPITOR) 80 MG tablet Take 1 tablet by mouth nightly 30 tablet 3    losartan (COZAAR) 25 MG tablet Take 1 tablet by mouth daily (Patient taking differently: Take 50 mg by mouth daily ) 30 tablet 3    metoprolol succinate (TOPROL XL) 25 MG extended release tablet Take 1 tablet by mouth daily 30 tablet 3    fluticasone (FLONASE) 50 MCG/ACT nasal spray 1 spray by Each Nostril route daily as needed for Rhinitis 1 Bottle 3    furosemide (LASIX) 40 MG tablet Take 1 tablet by mouth daily (Patient taking differently: Take 40 mg by mouth daily Pt takes two  To three times a week) 60 tablet 3    potassium chloride (KLOR-CON M) 20 MEQ extended release tablet Take 1 tablet by mouth 2 times daily 60 tablet 3    ferrous gluconate (FERGON) 324 (38 Fe) MG tablet Take 324 mg by mouth daily (with breakfast)      sucralfate (CARAFATE) 1 GM tablet Take 1 g by mouth 4 times daily      hydrOXYzine (ATARAX) 10 MG tablet Take 10 mg by mouth 3 times daily as needed for Itching      fexofenadine (ALLEGRA) 180 MG tablet Take 180 mg by mouth daily      ondansetron (ZOFRAN ODT) 4 MG disintegrating tablet Take 1 tablet by mouth every 8 hours as needed for Nausea 20 tablet 0    busPIRone (BUSPAR) 10 MG tablet Take 10 mg by mouth 3 times daily as needed      nystatin (MYCOSTATIN) 240000 UNIT/GM cream Apply topically 2 times daily as needed for Dry Skin Apply topically 2 times daily.  balsalazide (COLAZAL) 750 MG capsule Take 3,000 mg by mouth daily Take 4 capsules (total 3000 mg) daily each morning.  mupirocin (BACTROBAN) 2 % ointment Apply daily 1 Tube 0    omeprazole (PRILOSEC) 20 MG delayed release capsule Take 20 mg by mouth Daily       baclofen (LIORESAL) 10 MG tablet Take 1 tablet by mouth 3 times daily 90 tablet 1     No current facility-administered medications on file prior to encounter. REVIEW OF SYSTEMS    A comprehensive review of systems was negative. Objective:     PHYSICAL EXAM done with N  Vitals: Temperature 98.1, blood pressure 136/84, weight of 167 pounds, pulse of 92, pulse ox 100% on room air  Limited due to virtual visit platform.    General Appearance: alert and oriented to person, place and time, well-developed and well nourished and in no acute distress  Head: normocephalic and atraumatic  ENT: oropharynx clear and moist with normal mucous membranes  Pulmonary/Chest: clear to auscultation bilaterally- no wheezes, rales or rhonchi, normal air movement, no respiratory distress  Cardiovascular: normal rate and normal S1 and S2  Abdomen: soft, non-tender and non-distended  Extremities: no cyanosis, no clubbing and 2++ edema Left foot    Assessment:      Problem List Items Addressed This Visit     Open wound of left foot with complication - Primary    Relevant Medications    lidocaine (LMX) 4 % cream    Ulcer of left foot, with fat layer exposed ([address]        Left third toe nonpressure ulcer with fat layers exposed at severity. Chronic wound, present for over a year. Left lower extremity edema, chronic  Suspected venous stasis and arterial disease    Wound/Ulcer #: 1  Per home health nursing, measurements were reviewed  Wound 12/01/20 Toe (Comment  which one) Anterior; Left #1 3rd (Active)   Number of days: 0          Diabetic/Pressure/Non Pressure Ulcers only:  Ulcer: Non-Pressure ulcer, fat layer exposed       Plan:     Recommend continue with the triple antibiotic ointment. Added silver alginate over top with bulky gauze. We have gauze in between toes and wrap with Kerlix from toes, including all toes to knee followed by Ace wrap. Patient to follow-up in the office next week. CT angiogram ordered. Please see attached Discharge Instructions    Based upon this virtual visit it is required to have an in-person visit next time. Written patient dismissal instructions available to Patient/POA       Discharge Instructions         [facility], [address] [place] [address]  Telephone: [phone]     Discharge Instructions:  Keep weight off wounds and reposition every 2 hours if applicable. Avoid standing for long periods of time. If wound(s) is on your lower extremity, elevate legs to the level of the heart or above for 30 minutes 4-5 times a day and/or when sitting. Do not get wounds wet in bath or shower unless otherwise instructed by your physician. If your wound is on you foot or leg, you may purchase a cast bag. Please ask at the pharmacy. When taking antibiotics take entire prescription as ordered by MD do not stop taking until medicine is all gone. Exercise as tolerated. No Smoking.  Smoking prohibits wound healing. If Vascular testing is ordered, please call [address] [place] [address] [phone]) to schedule. Vascular tests ordered by Wound Care Physicians may take up to 2 hours to complete. Please keep that in mind when scheduling. If Vascular testing is scheduled, please bring supplies to replace your dressing after testing is done. The vascular department does not stock supplies. Wound: With each dressing change, rinse wounds with 0.9% Saline. (May use wound wash or soft contact solution. Both can be purchased at a local drug store). If unable to obtain saline, may use a gentle soap and water. Dressing care: Important dietary reminders:  1. Increase Protein intake for optimal wound healing  2. No added salt to reduce any swelling  3. If diabetic, good glucose control  4. If you smoke, smoking affects wound healing, we ask that you refrain from smoking. Follow up with Dr Yuly Ferguson In 1 week in the wound care center. Call [phone] for any questions or concerns. Your  is [address] [place] [address]. Information: Should you experience any significant changes in your wound(s) or have questions about your wound care, please contact the [employer] at [phone] Monday  - Thursday 8:00 am - 4:00 pm and Friday 8:00 am - 1:00pm. If you need help with your wound outside these hours and cannot wait until we are again available, contact your PCP or go to the hospital emergency room. PLEASE NOTE: IF YOU ARE UNABLE TO OBTAIN WOUND SUPPLIES, CONTINUE TO USE THE SUPPLIES YOU HAVE AVAILABLE UNTIL YOU ARE ABLE TO REACH US. IT IS MOST IMPORTANT TO KEEP THE WOUND COVERED AT ALL TIMES. Dianne Keller AGE: 60 y.o. GENDER: female  : 1955 being evaluated by a Virtual Visit (video visit) encounter to address concerns as mentioned above. A caregiver was present when appropriate.  Due to this being a TeleHealth encounter (During EDNGP-81 public health emergency), evaluation of the following organ systems was limited: Vitals/Constitutional/EENT/Resp/CV/GI//MS/Neuro/Skin/Heme-Lymph-Imm. Pursuant to the emergency declaration under the [address] [place] [address], [address] [place] [address] authority and the [employer] Act, this Virtual Visit was conducted with patient's (and/or legal guardian's) consent, to reduce the patient's risk of exposure to COVID-19 and provide necessary medical care. The patient (and/or legal guardian) has also been advised to contact this office for worsening conditions or problems, and seek emergency medical treatment and/or call 911 if deemed necessary. Services were provided through a video synchronous discussion virtually to substitute for in-person clinic visit. Patient was located at their individual homes. Provider was located in [place] [address].     Electronically signed by Oralia Goodman MD on 12/1/2020 at 2:10 PM

## 2020-12-01 NOTE — PROGRESS NOTES
[address], [address] [place] [address] Road  Telephone: [phone]        [address] Fax # [phone]        Discharge Instructions         [address], [address] [place] [address] Road  Telephone: [phone]     Discharge Instructions:  Keep weight off wounds and reposition every 2 hours if applicable. Avoid standing for long periods of time. If wound(s) is on your lower extremity, elevate legs to the level of the heart or above for 30 minutes 4-5 times a day and/or when sitting. Do not get wounds wet in bath or shower unless otherwise instructed by your physician. If your wound is on you foot or leg, you may purchase a cast bag. Please ask at the pharmacy. When taking antibiotics take entire prescription as ordered by MD do not stop taking until medicine is all gone. Exercise as tolerated. No Smoking. Smoking prohibits wound healing. If Vascular testing is ordered, please call [address] [place] [address] [phone]) to schedule. Vascular tests ordered by Wound Care Physicians may take up to 2 hours to complete. Please keep that in mind when scheduling. If Vascular testing is scheduled, please bring supplies to replace your dressing after testing is done. The vascular department does not stock supplies. Wound: Left Third Toe    With each dressing change, rinse wounds with 0.9% Saline. (May use wound wash or soft contact solution. Both can be purchased at a local drug store). If unable to obtain saline, may use a gentle soap and water. Dressing care: Apply Anti-biotic ointment, Silver Alginate, Dry dressing. Weave Kerlix between toes- Change every other day. Important dietary reminders:  1. Increase Protein intake for optimal wound healing  2. No added salt to reduce any swelling  3. If diabetic, good glucose control  4.  If you smoke, smoking affects wound healing, we ask that you refrain from smoking. Follow up with Dr Margot Brian In 1 week in the wound care center. Call [phone] for any questions or concerns. Your  is [address] [place] [address]. Information: Should you experience any significant changes in your wound(s) or have questions about your wound care, please contact the [place] [address] at [phone] Monday  - Thursday 8:00 am - 4:00 pm and Friday 8:00 am - 1:00pm. If you need help with your wound outside these hours and cannot wait until we are again available, contact your PCP or go to the hospital emergency room. PLEASE NOTE: IF YOU ARE UNABLE TO OBTAIN WOUND SUPPLIES, CONTINUE TO USE THE SUPPLIES YOU HAVE AVAILABLE UNTIL YOU ARE ABLE TO REACH US. IT IS MOST IMPORTANT TO KEEP THE WOUND COVERED AT ALL TIMES. Skilled nurse to evaluate and treat for wound care. Change dressing as ordered  once a day on Apply Anti-biotic ointment, Silver Alginate, Dry dressing. Weave Kerlix between toes- Change every other day. using clean technique. Patient/Family/caregiver may change dressings as needed as instructed when Home Care unavailable. WOUNDS REQUIRING DRESSING Changes:     Wound 12/01/20 Toe (Comment  which one) Anterior; Left #1 3rd (Active)   Number of days: 0          Patient seen and treated on 12/1/2020    By Dr Audrey Banks [id number]   (provider/NPI)

## 2020-12-02 NOTE — PLAN OF CARE
Virtual Visit Wound Care  Clinic Level of Care   NAME:  Rivka Pathak OF BIRTH:  1955 GENDER: female  MEDICAL RECORD NUMBER:  [id number]   DATE:  12/1/2020     Patient Type Points   No documentation completed by nursing staff. []   0   Nursing staff documented in the navigator for an ESTABLISHED patient including Episode, Patient ID, Chief Complaint, Travel Screen, Allergies, Latex Allergy, Home Medication, History, Psychosocial Screen, C-SSRS Screen, Fall Risk, Nutritional Screen, Advanced Directive, Education and Plan of Care, and Discharge Instructions. The Functional Screening tab is only required if the patient's status changes. []   1   Nursing staff documented in the navigator for a NEW patient including Patient ID, Chief Complaint, Travel Screen, Allergies, Latex Allergy, Home Medication, History, Psychosocial Screen, C-SSRS Screen, Fall Risk, Nutritional Screen, Advanced Directive, Functional Screen, Education and Plan of Care, and Discharge Instructions. []   2   Nursing staff documented in the navigator for a CONSULT patient including Episode, Patient ID, Chief Complaint, Travel Screen, Allergies, Latex Allergy, Home Medication, History, Psychosocial Screen, C-SSRS Screen, Fall Risk, Nutritional Screen, Advanced Directive, Functional Screen, Education and Plan of Care, and Discharge Instructions. []   2     Wound Description Points   Unable to obtain image of Wound. For example, patient/caregiver is instructed not to remove dressing, is unable to correctly position smart phone, no smart phone is available, patient is unable to maintain connectivity or the patient's wound is healed. []   0   1-3 wound images annotated. Images of the wound(s) is obtained and annotated along with completed description in [address] [place] [address]. []   1   4-5 wound images annotated. Images of the wound(s) is obtained and annotated along with completed description in [address] [place] [address]. []   2   Greater than 6 wound images annotated. Images of the wound(s) is obtained and annotated along with completed description in [address] [place] [address]. []   3     Education Points   No Education completed by nursing staff.    []   0   Patient/caregiver is educated on 1-4 topics. Nursing staff identifies learner, confirms understanding of information (verbal, demonstration, written) and documents details. May include Discharge Instructions/AVS, available documents in My Chart, or Web-based learning.  []   1   Patient/caregiver is educated on 5-9 topics. Nursing staff identifies learner, confirms understanding of information (verbal, demonstration, written) and documents details. May include Discharge Instructions/AVS, available documents in My Chart, or Web-based learning. []   2   Patient/caregiver is educated on 10 or more topics. Nursing staff identifies learner, confirms understanding of information (verbal, demonstration, written) and documents details. May include Discharge Instructions/AVS, available documents in My Chart, or Web-based learning. []   3     Follow-up Virtual Visit Points   No contact with outside resources made. [x]   0   Nursing staff contacts 1-2 outside resource. For example, telephone call made to home health, primary care provider, pharmacy, or DME. May include filling out forms and writing letters, arranging transportation, communication with insurance , vendors, etc.  Discharge, instructions and/or After Visit Summary given to patient/caregiver and instructions completed. [x]   1   Nursing staff contacts 3-4 outside resource. For example, telephone calls made to home health, primary care provider, pharmacy, or DME. May include filling out forms and writing letters, arranging transportation, communication with insurance , vendors, etc.  Discharge, instructions and/or After Visit Summary given to patient/caregiver and instructions completed. []   2   Nursing contacts 5 or more outside resource.  For example, telephone calls made to home health, primary care providers, pharmacy, or DME. May include filling out forms and writing letters, arranging transportation, communication with insurance , vendors, etc.  Discharge, instructions and/or After Visit Summary given to patient/caregiver and instructions completed.    []   3     Is this the Patient's First Visit to the [address] [place] [address] Road  No    Is this Patient Established @ [facility]  Yes             Virtual Visit Clinical Level of Care Wound Care      Points  1-3  Level 1 [x]     Points  4-5  Level 2 []     Points  6-7  Level 3 []     Points  8-9  Level 4 []     Points  10-11  Level 5 []       Electronically signed by Madalyn Galeazzi, RN on 12/2/2020 at @NO

## 2020-12-07 NOTE — TELEPHONE ENCOUNTER
Requested Prescriptions     Pending Prescriptions Disp Refills    rivaroxaban (XARELTO) 20 MG TABS tablet 90 tablet 3     Sig: Take 1 tablet by mouth daily (with breakfast)            Last Office Visit: 10/15/2020     Next Office Visit: 12/9/2020

## 2020-12-08 ENCOUNTER — HOSPITAL ENCOUNTER (OUTPATIENT)
Dept: WOUND CARE | Age: 65
Discharge: HOME | End: 2020-12-08
Payer: MEDICAID

## 2020-12-09 ENCOUNTER — NURSE ONLY (OUTPATIENT)
Dept: CARDIOLOGY CLINIC | Age: 65
End: 2020-12-09
Payer: MEDICAID

## 2020-12-09 PROCEDURE — 93298 REM INTERROG DEV EVAL SCRMS: CPT | Performed by: INTERNAL MEDICINE

## 2020-12-09 PROCEDURE — G2066 INTER DEVC REMOTE 30D: HCPCS | Performed by: INTERNAL MEDICINE

## 2020-12-09 NOTE — PROGRESS NOTES
ILR for CVA.  (first AF recording 9/8/20). Hx pAT/AF-rvr. (oac, toprol xl). AT/AF burden 1.1% since 11/3/20. Svt/af-rvr recorded. Average v rates 70-90 bpm.  No symptom episodes recorded. Remote interrogation of implanted cardiac event monitor shows normal function. Follow up 1 month via carelink.

## 2021-01-12 ENCOUNTER — HOSPITAL ENCOUNTER (OUTPATIENT)
Dept: WOUND CARE | Age: 66
Discharge: HOME | End: 2021-01-12
Payer: MEDICAID

## 2021-01-12 ENCOUNTER — HOSPITAL ENCOUNTER (OUTPATIENT)
Age: 66
Discharge: HOME | End: 2021-01-12
Payer: MEDICAID

## 2021-01-12 ENCOUNTER — HOSPITAL ENCOUNTER (OUTPATIENT)
Dept: CT IMAGING | Age: 66
Discharge: HOME | End: 2021-01-12
Payer: MEDICAID

## 2021-01-12 VITALS
SYSTOLIC BLOOD PRESSURE: 151 MMHG | HEART RATE: 79 BPM | DIASTOLIC BLOOD PRESSURE: 91 MMHG | RESPIRATION RATE: 18 BRPM | TEMPERATURE: 97.3 F

## 2021-01-12 DIAGNOSIS — S91.302A OPEN WOUND OF LEFT FOOT WITH COMPLICATION, INITIAL ENCOUNTER: ICD-10-CM

## 2021-01-12 DIAGNOSIS — I70.25 ATHEROSCLEROSIS OF NATIVE ARTERIES OF THE EXTREMITIES WITH ULCERATION (HCC): ICD-10-CM

## 2021-01-12 DIAGNOSIS — S91.302A OPEN WOUND OF LEFT FOOT WITH COMPLICATION, INITIAL ENCOUNTER: Primary | ICD-10-CM

## 2021-01-12 DIAGNOSIS — L97.522 ULCER OF LEFT FOOT, WITH FAT LAYER EXPOSED (HCC): ICD-10-CM

## 2021-01-12 DIAGNOSIS — I70.213 ATHEROSCLEROSIS OF NATIVE ARTERIES OF EXTREMITIES WITH INTERMITTENT CLAUDICATION, BILATERAL LEGS (HCC): ICD-10-CM

## 2021-01-12 PROBLEM — E11.621 DIABETIC ULCER OF LEFT FOOT WITH BONE INVOLVEMENT WITHOUT EVIDENCE OF NECROSIS (HCC): Status: ACTIVE | Noted: 2020-12-01

## 2021-01-12 PROBLEM — L97.526 DIABETIC ULCER OF LEFT FOOT WITH BONE INVOLVEMENT WITHOUT EVIDENCE OF NECROSIS (HCC): Status: ACTIVE | Noted: 2020-12-01

## 2021-01-12 LAB
ANION GAP SERPL CALCULATED.3IONS-SCNC: 7 MMOL/L (ref 3–16)
BUN BLDV-MCNC: 11 MG/DL (ref 7–20)
CALCIUM SERPL-MCNC: 9.9 MG/DL (ref 8.3–10.6)
CHLORIDE BLD-SCNC: 101 MMOL/L (ref 99–110)
CO2: 26 MMOL/L (ref 21–32)
CREAT SERPL-MCNC: <0.5 MG/DL (ref 0.6–1.2)
GFR AFRICAN AMERICAN: >60
GFR NON-AFRICAN AMERICAN: >60
GLUCOSE BLD-MCNC: 96 MG/DL (ref 70–99)
POTASSIUM SERPL-SCNC: 4.4 MMOL/L (ref 3.5–5.1)
SODIUM BLD-SCNC: 134 MMOL/L (ref 136–145)

## 2021-01-12 PROCEDURE — 11720 DEBRIDE NAIL 1-5: CPT

## 2021-01-12 PROCEDURE — 99213 OFFICE O/P EST LOW 20 MIN: CPT

## 2021-01-12 PROCEDURE — 6360000004 HC RX CONTRAST MEDICATION: Performed by: EMERGENCY MEDICINE

## 2021-01-12 PROCEDURE — 11044 DBRDMT BONE 1ST 20 SQ CM/<: CPT | Performed by: EMERGENCY MEDICINE

## 2021-01-12 PROCEDURE — 11730 AVULSION NAIL PLATE SIMPLE 1: CPT | Performed by: EMERGENCY MEDICINE

## 2021-01-12 PROCEDURE — 36415 COLL VENOUS BLD VENIPUNCTURE: CPT

## 2021-01-12 PROCEDURE — 75635 CT ANGIO ABDOMINAL ARTERIES: CPT

## 2021-01-12 PROCEDURE — 11042 DBRDMT SUBQ TIS 1ST 20SQCM/<: CPT

## 2021-01-12 PROCEDURE — 11720 DEBRIDE NAIL 1-5: CPT | Performed by: EMERGENCY MEDICINE

## 2021-01-12 PROCEDURE — 80048 BASIC METABOLIC PNL TOTAL CA: CPT

## 2021-01-12 RX ORDER — BACITRACIN ZINC AND POLYMYXIN B SULFATE 500; 1000 [USP'U]/G; [USP'U]/G
OINTMENT TOPICAL ONCE
Status: CANCELLED | OUTPATIENT
Start: 2021-01-12 | End: 2021-01-12

## 2021-01-12 RX ORDER — LIDOCAINE 50 MG/G
OINTMENT TOPICAL ONCE
Status: CANCELLED | OUTPATIENT
Start: 2021-01-12 | End: 2021-01-12

## 2021-01-12 RX ORDER — LIDOCAINE HYDROCHLORIDE 40 MG/ML
SOLUTION TOPICAL ONCE
Status: CANCELLED | OUTPATIENT
Start: 2021-01-12 | End: 2021-01-12

## 2021-01-12 RX ORDER — LIDOCAINE 40 MG/G
CREAM TOPICAL ONCE
Status: DISCONTINUED | OUTPATIENT
Start: 2021-01-12 | End: 2021-01-13 | Stop reason: HOSPADM

## 2021-01-12 RX ORDER — GINSENG 100 MG
CAPSULE ORAL ONCE
Status: CANCELLED | OUTPATIENT
Start: 2021-01-12 | End: 2021-01-12

## 2021-01-12 RX ORDER — BETAMETHASONE DIPROPIONATE 0.05 %
OINTMENT (GRAM) TOPICAL ONCE
Status: CANCELLED | OUTPATIENT
Start: 2021-01-12 | End: 2021-01-12

## 2021-01-12 RX ORDER — CLOBETASOL PROPIONATE 0.5 MG/G
OINTMENT TOPICAL ONCE
Status: CANCELLED | OUTPATIENT
Start: 2021-01-12 | End: 2021-01-12

## 2021-01-12 RX ORDER — BACITRACIN, NEOMYCIN, POLYMYXIN B 400; 3.5; 5 [USP'U]/G; MG/G; [USP'U]/G
OINTMENT TOPICAL ONCE
Status: CANCELLED | OUTPATIENT
Start: 2021-01-12 | End: 2021-01-12

## 2021-01-12 RX ORDER — LIDOCAINE 40 MG/G
CREAM TOPICAL ONCE
Status: CANCELLED | OUTPATIENT
Start: 2021-01-12 | End: 2021-01-12

## 2021-01-12 RX ORDER — GENTAMICIN SULFATE 1 MG/G
OINTMENT TOPICAL ONCE
Status: CANCELLED | OUTPATIENT
Start: 2021-01-12 | End: 2021-01-12

## 2021-01-12 RX ADMIN — IOPAMIDOL 100 ML: 755 INJECTION, SOLUTION INTRAVENOUS at 18:01

## 2021-01-12 NOTE — PLAN OF CARE
Discharge instructions given. Patient verbalized understanding. Return to [facility] in 1 week.   Called/faxed orders to [facility]

## 2021-01-12 NOTE — PROGRESS NOTES
[address]   Progress Note       Sarita Finley [facility]  MEDICAL RECORD NUMBER:  [id number]  AGE: 60 y.o. GENDER: female  : 1955  EPISODE DATE:  2021    Subjective:     Chief Complaint   Patient presents with    Wound Check     left lower extremity      Patient presenting as a follow-up visit regarding a wound on her left foot. She was last evaluated on 2020 as a virtual visit. She has been lost to follow-up since then. Patient without any new wound care issues. Symptoms or pertinent wound history since last visit: Imaging previously ordered still not done. No new wound care issues. Denies any other constitutional symptoms otherwise. Has been tolerating wound care dressings without problems. Patient feels that wound is stable, the same. Assessment:     32-year-old female with multiple comorbid conditions as outlined who has had a nonhealing left third toe wound for more than a year. Recommendations for amputation prior to his stroke in 2020. Unfortunately the patient was found to be a poor surgical candidate due to the many comorbid conditions. She also has chronic edema making healing very difficult.     Left third toe nonpressure ulcer, severity bone involvement without necrosis  Left third toe nail plate avulsion  Severe peripheral vascular disease  Chronic toe gangrene  Venous insufficiency    Patient Active Problem List   Diagnosis Code    Atherosclerosis of native arteries of extremities with intermittent claudication, bilateral legs ([facility]) I70.213    Anemia D64.9    Hyponatremia E87.1    Crohn's colitis ([facility] K50.10    Hypomagnesemia E83.42    DVT (deep venous thrombosis) ([facility]) I82.409    PAD (peripheral artery disease) ([facility]) I73.9    Leg swelling M79.89    Venous insufficiency I87.2    Chronic venous hypertension (idiopathic) with inflammation of bilateral lower extremity I87.323  Open wound of left foot with complication H91.330Q    Hypokalemia E87.6    Acute cerebrovascular accident (CVA) ([facility]) I63.9    Abnormal ECG R94.31    [employer] LINQ 7/29/20 Dr Kayla Llanos Z45.09    LV dysfunction I51.9    PAF (paroxysmal atrial fibrillation) ([facility]) I48.0    Essential hypertension I10    S/P coronary angiogram Z98.890    History of cardioembolic cerebrovascular accident (CVA) Z86.73    Abnormal angiogram R93.89    Diabetic ulcer of left foot with bone involvement without evidence of necrosis ([address] E11.621, L97.526    Atherosclerosis of native arteries of the extremities with ulceration ([facility] [address] I70.25       Comorbid conditions affecting wound healing and/or addressed today: Hypertension, LV dysfunction, paroxysmal atrial fibrillation, 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 Education and discussion held with patient regarding these disease processes including issues related to the wound(s). Wound evaluation:   The eschar is no longer present on the left third toe wound. However the toe is partially detached distally. There is definitely laxity noted at the DIP joint of the toe. Overlying soft fibrous nonviable tissue, bone exposed. . Nail plate barely attached. Left fourth toe nail plate very long noted. Bilateral lower extremity edema with significant pain. Vascular skin changes and inflammation noted. Plan:     1. Wound care: Debridement done and entire nail plate removed from left third toe (see note below). Left fourth toenail debrided. Please see attached Discharge Instructions for specific wound treatment plan  2. Offloading and edema control: Elevation at all times possible. 3. Infection: No signs of acute infection.   Continue to monitor 4. Circulation: Known peripheral arterial disease. Repeat arterial studies planned for today. Depending on results, will likely need to be referred to vascular surgeon. Patient requested referral now so I did place a referral to Dr. Felisha Pino. 5. Nutrition: Encourage protein emphasis with meals. Procedure Note  Indications:  Based on my examination of this patient's wound(s)/ulcer(s) today, debridement is required to promote healing and evaluate the wound base. Performed by: Alejandra Georges MD  Consent obtained:  Yes  Time out taken:  Yes  Pain Control: Anesthetic  Anesthetic: 4% Lidocaine Cream     Debridement: Excisional Debridement  Using curette the wound(s)/ulcer(s) was/were debrided down through and including the removal of bone. Left 3rd toe nailplate removed. Left fourth toe nail plate debrided. Devitalized Tissue Debrided:  fibrin, biofilm, slough, necrotic/eschar, exudate and callus    Pre Debridement Measurements:  Are located in the [place]  Documentation Flow Sheet    Wound/Ulcer #: 1    Post Debridement Measurements:  Wound/Ulcer Descriptions are Pre Debridement except measurements:    Wound 12/01/20 Toe (Comment  which one) Anterior; Left #1 3rd (Active)   Wound Image   01/12/21 1428   Wound Etiology Other 01/12/21 1428   Wound Cleansed Cleansed with saline 01/12/21 1428   Wound Length (cm) 0.4 cm 01/12/21 1428   Wound Width (cm) 0.6 cm 01/12/21 1428   Wound Depth (cm) 0.1 cm 01/12/21 1428   Wound Surface Area (cm^2) 0.24 cm^2 01/12/21 1428   Wound Volume (cm^3) 0.02 cm^3 01/12/21 1428   Post-Procedure Length (cm) 0.4 cm 01/12/21 1506   Post-Procedure Width (cm) 0.6 cm 01/12/21 1506   Post-Procedure Depth (cm) 0.1 cm 01/12/21 1506   Post-Procedure Surface Area (cm^2) 0.24 cm^2 01/12/21 1506   Post-Procedure Volume (cm^3) 0.02 cm^3 01/12/21 1506   Wound Assessment Bleeding 01/12/21 1506   Drainage Amount Moderate 01/12/21 1506   Drainage Description Serosanguinous 01/12/21 1506 Odor None 01/12/21 1428   Shweta-wound Assessment Intact 01/12/21 1428   Margins Attached edges 01/12/21 1428   Wound Thickness Description not for Pressure Injury Full thickness 01/12/21 1428   Number of days: 42          Percent of Wound(s)/Ulcer(s) Debrided: 100%  Total Surface Area Debrided:  0.24 sq cm   Diabetic/Pressure/Non Pressure Ulcers only:  Ulcer: Diabetic ulcer, bone involvement without necrosis   Estimated Blood Loss:  Minimal  Hemostasis Achieved:  by pressure  Procedural Pain:  3  / 10   Post Procedural Pain:  3 / 10   Response to treatment:  Well tolerated by patient., With complaints of pain. HISTORY of PRESENT ILLNESS HPI     Corey Wolf is a 60 y.o. female who presents today for wound/ulcer evaluation. 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 Recommendations were for amputation prior to stroke in June 2020. But patient has too many comorbidities to allow for any kind of surgical intervention including amputation. She has chronic edema which is making healing very difficult. Has worsening pain related to the edema and wound. Recently referred to a pain specialist by her PCP. Has been using triple antibiotic ointment with some progress.      Wound/Ulcer Pain Timing/Severity: constant, moderate  Quality of pain: sharp, aching, burning, throbbing  Severity:  7 / 10   Modifying Factors: Pain worsens with walking, Pain worsens with Elevation, worsening edema and Pain is relieved/improved with rest  Associated Signs/Symptoms: edema and pain     Ulcer Identification:  Ulcer Type: suspected arterial     Contributing Factors: decreased mobility and none     Acute Wound: N/A not an acute wound    Objective: COLONOSCOPY DIAGNOSTIC/STOMA performed by Ailin Albarado MD at [address] N/A 10/8/2018    EXPLORATORY LAPAROTOMY WITH COLOSTOMY performed by Adolph Jenkins MD at [address] Left 6/25/14    excision plantar left hallux    FOOT SURGERY  6/3/15    PLANTAR PLATE REPAIR BILATERAL, ARTHROTOMY MPJ 2ND BILATERAL    FOOT SURGERY Left 08/05/2002    Excision second metatarsal head left    FRACTURE SURGERY      rt hip    HAND CARPECTOMY Bilateral     HEEL SPUR SURGERY      HERNIA REPAIR      HYSTERECTOMY      bladder surgery    JOINT REPLACEMENT      rt hip, L shoulder replacement 11/2014    KNEE SURGERY Bilateral     arthrosvopic    LEG SURGERY Right     UT SECD CLOS SURG WND EXTEN/COMPLIC N/A 67/07/7021    SECONDARY WOUND CLOSURE OF ABDOMINAL WOUND performed by Adolph Jenkins MD at [facility] 7/17/2019    FLEXIBLE SIGMOIDOSCOPY performed by Adolph Jenkins MD at [place] [address]  01/15/2018    with biopsies    SMALL INTESTINE SURGERY N/A 7/17/2019    COLOSTOMY CLOSURE WITH COLORECTAL ANASTOMOSIS performed by Adolph Jenkins MD at [place] [address]  6/14/13    and colonsocopy with antral erosion biopsies       FAMILY HISTORY    Family History   Problem Relation Age of Onset    High Blood Pressure Mother     High Blood Pressure Father     Kidney Disease Sister        SOCIAL HISTORY    Social History     Tobacco Use    Smoking status: Current Every Day Smoker     Packs/day: 1.00     Years: 10.00     Pack years: 10.00     Types: Cigarettes, E-Cigarettes    Smokeless tobacco: Never Used    Tobacco comment: CUT BACK TO 1/2 PPD WITH E CIG 6-2019   Substance Use Topics    Alcohol use:  Yes     Alcohol/week: 2.0 standard drinks     Types: 2 Shots of liquor per week     Comment: 6 DRINKS A WEEK OR LESS    Drug use: No       ALLERGIES    Allergies   Allergen Reactions  Ace Inhibitors Swelling    Skelaxin [Metaxalone] Swelling       MEDICATIONS    Current Outpatient Medications on File Prior to Encounter   Medication Sig Dispense Refill    rivaroxaban (XARELTO) 20 MG TABS tablet Take 1 tablet by mouth daily (with breakfast) 90 tablet 3    atorvastatin (LIPITOR) 80 MG tablet Take 1 tablet by mouth nightly 30 tablet 3    losartan (COZAAR) 25 MG tablet Take 1 tablet by mouth daily (Patient taking differently: Take 50 mg by mouth daily ) 30 tablet 3    metoprolol succinate (TOPROL XL) 25 MG extended release tablet Take 1 tablet by mouth daily 30 tablet 3    fluticasone (FLONASE) 50 MCG/ACT nasal spray 1 spray by Each Nostril route daily as needed for Rhinitis 1 Bottle 3    furosemide (LASIX) 40 MG tablet Take 1 tablet by mouth daily (Patient taking differently: Take 40 mg by mouth daily Pt takes two  To three times a week) 60 tablet 3    potassium chloride (KLOR-CON M) 20 MEQ extended release tablet Take 1 tablet by mouth 2 times daily 60 tablet 3    ferrous gluconate (FERGON) 324 (38 Fe) MG tablet Take 324 mg by mouth daily (with breakfast)      sucralfate (CARAFATE) 1 GM tablet Take 1 g by mouth 4 times daily      hydrOXYzine (ATARAX) 10 MG tablet Take 10 mg by mouth 3 times daily as needed for Itching      fexofenadine (ALLEGRA) 180 MG tablet Take 180 mg by mouth daily      ondansetron (ZOFRAN ODT) 4 MG disintegrating tablet Take 1 tablet by mouth every 8 hours as needed for Nausea 20 tablet 0    busPIRone (BUSPAR) 10 MG tablet Take 10 mg by mouth 3 times daily as needed      nystatin (MYCOSTATIN) 092001 UNIT/GM cream Apply topically 2 times daily as needed for Dry Skin Apply topically 2 times daily.  balsalazide (COLAZAL) 750 MG capsule Take 3,000 mg by mouth daily Take 4 capsules (total 3000 mg) daily each morning.       mupirocin (BACTROBAN) 2 % ointment Apply daily 1 Tube 0  omeprazole (PRILOSEC) 20 MG delayed release capsule Take 20 mg by mouth Daily       baclofen (LIORESAL) 10 MG tablet Take 1 tablet by mouth 3 times daily 90 tablet 1     No current facility-administered medications on file prior to encounter. REVIEW OF SYSTEMS  A comprehensive review of systems was negative except for: pain at site of wound    Written patient dismissal instructions given to patient and signed by patient or POA. Discharge Instructions         [facility], [address] [place] [address] Road  Telephone: [phone]     Discharge Instructions:  Keep weight off wounds and reposition every 2 hours if applicable. Avoid standing for long periods of time. If wound(s) is on your lower extremity, elevate legs to the level of the heart or above for 30 minutes 4-5 times a day and/or when sitting. Do not get wounds wet in bath or shower unless otherwise instructed by your physician. If your wound is on you foot or leg, you may purchase a cast bag. Please ask at the pharmacy. When taking antibiotics take entire prescription as ordered by MD do not stop taking until medicine is all gone. Exercise as tolerated. No Smoking. Smoking prohibits wound healing. If Vascular testing is ordered, please call [address] [place] [address] ([phone]) to schedule. Vascular tests ordered by Wound Care Physicians may take up to 2 hours to complete. Please keep that in mind when scheduling. If Vascular testing is scheduled, please bring supplies to replace your dressing after testing is done. The vascular department does not stock supplies. Wound:  3rd Toe Left foot    With each dressing change, rinse wounds with 0.9% Saline. (May use wound wash or soft contact solution. Both can be purchased at a local drug store). If unable to obtain saline, may use a gentle soap and water. Dressing care:Apply Cutimed Sorbact and Alginate, dry dressing ( 2x2, kerlix), Apply 2 x 2s between toes. Change Daily. Apply 1 Tubi  covering Toes to below knee. Referral to  Daniel    Important dietary reminders:  1. Increase Protein intake for optimal wound healing  2. No added salt to reduce any swelling  3. If diabetic, good glucose control  4. If you smoke, smoking affects wound healing, we ask that you refrain from smoking. Follow up with Dr Chaitanya Del Real In 1 week in the wound care center. Call [phone] for any questions or concerns. Your  is [address]: [address] Information: Should you experience any significant changes in your wound(s) or have questions about your wound care, [facility] at [phone] Monday  - Thursday 8:00 am - 4:00 pm and Friday 8:00 am - 1:00pm. If you need help with your wound outside these hours and cannot wait until we are again available, contact your PCP or go to the hospital emergency room. PLEASE NOTE: IF YOU ARE UNABLE TO OBTAIN WOUND SUPPLIES, CONTINUE TO USE THE SUPPLIES YOU HAVE AVAILABLE UNTIL YOU ARE ABLE TO REACH US. IT IS MOST IMPORTANT TO KEEP THE WOUND COVERED AT ALL TIMES.                 Electronically signed by Peyton Xavier MD on 1/12/2021 at 3:44 PM

## 2021-01-18 NOTE — PROGRESS NOTES
Returned call from patient's daughter and left message. Told her the doctor would need to discuss results of test and to make an appointment.

## 2021-01-19 NOTE — PROGRESS NOTES
[address]   Electrophysiology  Office Visit  Date: 1/21/2021    Chief Complaint   Patient presents with    Cerebrovascular Accident    Atrial Fibrillation       Cardiac HX: Didi Quevedo is a 60 y.o. woman with a h/o HTN, IBS who was transferred from [facility] on 7/23/2020, s/p CVA (left facial droop and right-sided weakness), CT demonstrated left middle cerebral artery occlusion, received TPA, on f/u evaluation the embolus had partially lysed and mechanical thrombectomy was not performed, echo showed an EF 40 to 45%, ischemic work-up deferred as outpatient d/t recent CVA, s/p ILR (7/29/2020, Dr. Dyana Madrigal), AF noted on device and placed on Xarelto 20 mg QD. Interval History/HPI: Patient is here for follow-up for paroxysmal atrial fibrillation, ILR management and NICMP. Patient originally presented with a CVA on 7/23/2020 and underwent an ILR. A. fib with RVR was noted on her device on 9/8/2020 lasting 57 minutes and she was placed on Xarelto.       Home medications:   Current Outpatient Medications on File Prior to Visit   Medication Sig Dispense Refill    rivaroxaban (XARELTO) 20 MG TABS tablet Take 1 tablet by mouth daily (with breakfast) 90 tablet 3    atorvastatin (LIPITOR) 80 MG tablet Take 1 tablet by mouth nightly 30 tablet 3    fluticasone (FLONASE) 50 MCG/ACT nasal spray 1 spray by Each Nostril route daily as needed for Rhinitis 1 Bottle 3    furosemide (LASIX) 40 MG tablet Take 1 tablet by mouth daily (Patient taking differently: Take 40 mg by mouth daily Pt takes two  To three times a week) 60 tablet 3    potassium chloride (KLOR-CON M) 20 MEQ extended release tablet Take 1 tablet by mouth 2 times daily 60 tablet 3    ferrous gluconate (FERGON) 324 (38 Fe) MG tablet Take 324 mg by mouth daily (with breakfast)      sucralfate (CARAFATE) 1 GM tablet Take 1 g by mouth 4 times daily      hydrOXYzine (ATARAX) 10 MG tablet Take 10 mg by mouth 3 times daily as needed for Itching  fexofenadine (ALLEGRA) 180 MG tablet Take 180 mg by mouth daily      ondansetron (ZOFRAN ODT) 4 MG disintegrating tablet Take 1 tablet by mouth every 8 hours as needed for Nausea 20 tablet 0    busPIRone (BUSPAR) 10 MG tablet Take 10 mg by mouth 3 times daily as needed      nystatin (MYCOSTATIN) 108837 UNIT/GM cream Apply topically 2 times daily as needed for Dry Skin Apply topically 2 times daily.  balsalazide (COLAZAL) 750 MG capsule Take 3,000 mg by mouth daily Take 4 capsules (total 3000 mg) daily each morning.  mupirocin (BACTROBAN) 2 % ointment Apply daily 1 Tube 0    omeprazole (PRILOSEC) 20 MG delayed release capsule Take 20 mg by mouth Daily       baclofen (LIORESAL) 10 MG tablet Take 1 tablet by mouth 3 times daily 90 tablet 1     No current facility-administered medications on file prior to visit.         Past Medical History:   Diagnosis Date    Anxiety     Arthritis     Blood transfusion reaction     Crohn's disease ([address]     Depression     GERD (gastroesophageal reflux disease)     Hiatal hernia 6/24/2014    Hx of blood clots     Hypertension     MRSA (methicillin resistant staph aureus) culture positive 06/05/2018    urine    Neuropathy     Pneumonia 1/8/2014    Sinus headache     SOB (shortness of breath)     VRE (vancomycin resistant enterococcus) culture positive 10/08/2018    abd culture        Past Surgical History:   Procedure Laterality Date    ABDOMEN SURGERY      ABDOMINAL ADHESION SURGERY  06/08/2018    BLADDER SUSPENSION      COLONOSCOPY      COLONOSCOPY  9/14/15    sigmoid colon biopsy     COLONOSCOPY  08/07/2018    COLONOSCOPY  06/07/2019    COLONOSCOPY, POSSIBLE BIOPSY, POSSIBLE POLYPECTOMY    COLONOSCOPY N/A 6/7/2019    COLONOSCOPY WITH BIOPSY performed by Hafsa Morgan MD at [address] [facility] 6/7/2019    COLONOSCOPY DIAGNOSTIC/STOMA performed by Hafsa Morgan MD at [address] [place] [address] N/A 10/8/2018 EXPLORATORY LAPAROTOMY WITH COLOSTOMY performed by Aida Ospina MD at [address] Left 6/25/14    excision plantar left hallux    FOOT SURGERY  6/3/15    PLANTAR PLATE REPAIR BILATERAL, ARTHROTOMY MPJ 2ND BILATERAL    FOOT SURGERY Left 08/05/2002    Excision second metatarsal head left    FRACTURE SURGERY      rt hip    HAND CARPECTOMY Bilateral     HEEL SPUR SURGERY      HERNIA REPAIR      HYSTERECTOMY      bladder surgery    JOINT REPLACEMENT      rt hip, L shoulder replacement 11/2014    KNEE SURGERY Bilateral     arthrosvopic    LEG SURGERY Right     IN SECD CLOS SURG WND EXTEN/COMPLIC N/A 71/53/1304    SECONDARY WOUND CLOSURE OF ABDOMINAL WOUND performed by Aida Ospina MD at [facility] 7/17/2019    FLEXIBLE SIGMOIDOSCOPY performed by Aida Ospina MD at [address] [facility]  01/15/2018    with biopsies    SMALL INTESTINE SURGERY N/A 7/17/2019    COLOSTOMY CLOSURE WITH COLORECTAL ANASTOMOSIS performed by Aida Ospina MD at [address] [facility]  6/14/13    and colonsocopy with antral erosion biopsies       Allergies   Allergen Reactions    Ace Inhibitors Swelling    Skelaxin [Metaxalone] Swelling       Social History:  Reviewed. reports that she has been smoking cigarettes and e-cigarettes. She has a 10.00 pack-year smoking history. She has never used smokeless tobacco. She reports current alcohol use of about 2.0 standard drinks of alcohol per week. She reports that she does not use drugs. Family History:  Reviewed. family history includes High Blood Pressure in her father and mother; Kidney Disease in her sister. Review of System:    · Constitutional: No fevers, chills. · Eyes: No visual changes or diplopia. No scleral icterus. · ENT: No Headaches. No mouth sores or sore throat. · Cardiovascular: No for chest pain, Yes for dyspnea on exertion, Yes for palpitations or No for loss of consciousness. No cough, hemoptysis, No for pleuritic pain, or phlebitis. · Respiratory: No for cough or wheezing. No hematemesis. · Gastrointestinal: No abdominal pain, blood in stools. · Genitourinary: No dysuria, or hematuria. · Musculoskeletal: Yes gait disturbance,    · Integumentary: No rash or pruritis. · Neurological: No headache, change in muscle strength, numbness or tingling. · Psychiatric: No anxiety, or depression. · Endocrine: No temperature intolerance. No excessive thirst, fluid intake, or urination. · Hem/Lymph: No abnormal bruising or bleeding, blood clots or swollen lymph nodes. · Allergic/Immunologic: No nasal congestion or hives. Physical Examination:  Vitals:    01/21/21 1103   BP: 134/84   Pulse: 83   Temp: 97.1 °F (36.2 °C)         Wt Readings from Last 3 Encounters:   01/21/21 172 lb (78 kg)   10/27/20 178 lb (80.7 kg)   10/15/20 183 lb (83 kg)       · Constitutional: Oriented. No distress. · Head: Normocephalic and atraumatic. · Mouth/Throat: Oropharynx is clear and moist.   · Eyes: Conjunctivae clear without jaunduice. PERRL. · Neck: Neck supple. No rigidity. No JVD present. · Cardiovascular: Normal rate, regular rhythm, S1&S2. · Pulmonary/Chest: Bilateral respiratory sounds. No wheezes, No rhonchi. Abdominal: Soft. Bowel sounds present. No distension, No tenderness. · Musculoskeletal: No tenderness. No edema    · Lymphadenopathy: Has no cervical adenopathy. · Neurological: Alert and oriented. Cranial nerve appears intact, No Gross deficit   · Skin: Skin is warm and dry. No rash noted. · Psychiatric: Has a normal mood, affect and behavior     Labs:  Reviewed. No results for input(s): NA, K, CL, CO2, PHOS, BUN, CREATININE in the last 72 hours. Invalid input(s): CA,  TSH  No results for input(s): WBC, HGB, HCT, MCV, PLT in the last 72 hours. Lab Results   Component Value Date    TROPONINI <0.01 07/30/2020     Lab Results   Component Value Date    BNP 59 01/08/2014     Lab Results   Component Value Date    PROTIME 11.4 10/27/2020    PROTIME 31.3 11/27/2018    PROTIME 13.5 10/16/2018    INR 0.98 10/27/2020    INR 2.75 11/27/2018    INR 1.18 10/16/2018     Lab Results   Component Value Date    CHOL 111 07/24/2020    HDL 43 07/24/2020    TRIG 66 07/24/2020       ECG: Personally reviewed: NSR, HR 83, , , QTc 424    ECHO: 7/27/2020  Summary   Left ventricular cavity size is normal. There is mild left ventricular   hypertrophy. Overall left ventricular systolic function appears mildly   reduced with LVEF estimated at 40% . There is mild global hypokinesis. Normal diastolic function. Mild tricuspid regurgitation. Estimated pulmonary   artery systolic pressure is at 38 mmHg assuming a right atrial pressure of 8   mmHg. IVC size is normal (<2.1 cm) but collapses < 50% with respiration   consistent with elevated RA pressure (8 mmHg). A bubble study was performed   and fails to show evidence of right to left shunting. Stress Test: 2/6/2018  Summary     -There is a small anterior apical defect that is likely due to breast     attenuation and some movement artifact, rather than ischemia.     -Wall motion is normal with EF=57%.     -Overall, low risk scan     Cardiac Angiography: 10/27/2020  Results:  Left ventricular pressure 9 mmHg  Aortic pressure 101 mmHg   Coronary anatomy:   The left main coronary artery is normal.    Left anterior descending artery is normal there is a small area of calcific plaque in the mid vessel. But does not translate into narrowing.   Circumflex artery is normal.   The right coronary artery is a dominant vessel and normal.    Left ventriculogram shows ejection fraction of 45 -50%. Mild global hypokinesia   Impression:  Trivial coronary artery plaquing calcific. No stenoses identified. Mild dilatation of the left ventricle mild cardiomyopathy. No opportunities or need for intervention. Plan:  Primary prevention  Medical management        Problem List:   Patient Active Problem List    Diagnosis Date Noted    Nail avulsion of toe, initial encounter     Diabetic ulcer of left foot with bone involvement without evidence of necrosis ([address] 12/01/2020    Atherosclerosis of native arteries of the extremities with ulceration ([address] 12/01/2020    Abnormal angiogram 11/03/2020    LV dysfunction     PAF (paroxysmal atrial fibrillation) ([address]     Essential hypertension     S/P coronary angiogram     History of cardioembolic cerebrovascular accident (CVA)     [employer] LINQ 7/29/20 Dr Brenda Calderón 07/31/2020    Abnormal ECG     Acute cerebrovascular accident (CVA) ([address] 07/23/2020    Hypokalemia 07/15/2020    Open wound of left foot with complication 73/94/4121    PAD (peripheral artery disease) ([address] 05/17/2019    Leg swelling 05/17/2019    Venous insufficiency 05/17/2019    Chronic venous hypertension (idiopathic) with inflammation of bilateral lower extremity 05/17/2019    DVT (deep venous thrombosis) ([address] 07/03/2018    Hypomagnesemia 01/08/2018    Crohn's colitis ([address] 01/06/2018    Anemia 01/05/2018    Hyponatremia 01/05/2018    Atherosclerosis of native arteries of extremities with intermittent claudication, bilateral legs ([address] 01/19/2016        Assessment:   1. PAF (paroxysmal atrial fibrillation) ([address]    2. Cerebrovascular accident (CVA), unspecified mechanism ([address]    3. Status post placement of implantable loop recorder    4.  Benign essential HTN Cardiac HX: Heather Baxter is a 60 y.o. woman with a h/o HTN, IBS, s/p CVA 7/23/2020 (left facial droop and right-sided weakness), CT demonstrated left middle cerebral artery occlusion, received TPA, on f/u evaluation the embolus had partially lysed and mechanical thrombectomy was not performed, echo showed an EF 40 to 45%, ischemic work-up deferred as outpatient d/t recent CVA, s/p ILR (7/29/2020, Dr. Brenda Calderón), AF noted on device and placed on Xarelto 20 mg QD. [id number] 5. TSH 3.57 (7/28/20). PAF/ILR/s/p CVA  - S/p ILR 8/7/2020  - Device check today shows 40 tachycardic episodes, 78 AF episodes with a total burden of 1.1%. HRs are elevated in AF  - On Toprol XL 25 mg QD - will increase to 50 mg QD  - On Xarelto 20 mg- no s/s bleeding -continue  - Will arrange an appmt for patient to see Dr. Georgette Stewart to discuss RFA for AF vs AAD      CMP  - EF 40-45%  - NYHA class II/III  - QRS 84  - On Toprol XL 25 mg, allergic to ACE, increase Toprol to 50 mg QD  - LHC showed no CAD  - Follows with Dr. Itzel Reyes  - ECG ordered and results personally reviewed     HTN  - Fairly well controlled  - Not taking losartan   - Patient to monitor at home    Prolonged QTc in hospital  - QTc 424  - Avoid QTc prolonging drugs    EF of 40-45%  No ACEi for systolic HF d/t allergy  No known CAD  Anticoagulation for AF and heart failure  No Tobacco us. All questions and concerns were addressed to the patient/family. Alternatives to my treatment were discussed. The note was completed using EMR. Every effort was made to ensure accuracy; however, inadvertent computerized transcription errors may be present. Patient received education regarding their diagnosis, treatment and medications while in the office today.       David Stubbs [address]

## 2021-01-21 ENCOUNTER — NURSE ONLY (OUTPATIENT)
Dept: CARDIOLOGY CLINIC | Age: 66
End: 2021-01-21
Payer: MEDICAID

## 2021-01-21 ENCOUNTER — OFFICE VISIT (OUTPATIENT)
Dept: CARDIOLOGY CLINIC | Age: 66
End: 2021-01-21
Payer: MEDICAID

## 2021-01-21 VITALS
DIASTOLIC BLOOD PRESSURE: 84 MMHG | BODY MASS INDEX: 27 KG/M2 | WEIGHT: 172 LBS | TEMPERATURE: 97.1 F | HEART RATE: 83 BPM | SYSTOLIC BLOOD PRESSURE: 134 MMHG | HEIGHT: 67 IN

## 2021-01-21 DIAGNOSIS — I63.9 CEREBROVASCULAR ACCIDENT (CVA), UNSPECIFIED MECHANISM (HCC): ICD-10-CM

## 2021-01-21 DIAGNOSIS — I48.0 PAF (PAROXYSMAL ATRIAL FIBRILLATION) (HCC): Primary | ICD-10-CM

## 2021-01-21 DIAGNOSIS — Z95.818 STATUS POST PLACEMENT OF IMPLANTABLE LOOP RECORDER: ICD-10-CM

## 2021-01-21 DIAGNOSIS — Z45.09 ENCOUNTER FOR LOOP RECORDER CHECK: ICD-10-CM

## 2021-01-21 DIAGNOSIS — I10 BENIGN ESSENTIAL HTN: ICD-10-CM

## 2021-01-21 PROCEDURE — 93000 ELECTROCARDIOGRAM COMPLETE: CPT | Performed by: NURSE PRACTITIONER

## 2021-01-21 PROCEDURE — G8484 FLU IMMUNIZE NO ADMIN: HCPCS | Performed by: NURSE PRACTITIONER

## 2021-01-21 PROCEDURE — G8417 CALC BMI ABV UP PARAM F/U: HCPCS | Performed by: NURSE PRACTITIONER

## 2021-01-21 PROCEDURE — 93291 INTERROG DEV EVAL SCRMS IP: CPT | Performed by: INTERNAL MEDICINE

## 2021-01-21 PROCEDURE — G8427 DOCREV CUR MEDS BY ELIG CLIN: HCPCS | Performed by: NURSE PRACTITIONER

## 2021-01-21 PROCEDURE — 3017F COLORECTAL CA SCREEN DOC REV: CPT | Performed by: NURSE PRACTITIONER

## 2021-01-21 PROCEDURE — 99214 OFFICE O/P EST MOD 30 MIN: CPT | Performed by: NURSE PRACTITIONER

## 2021-01-21 PROCEDURE — 4004F PT TOBACCO SCREEN RCVD TLK: CPT | Performed by: NURSE PRACTITIONER

## 2021-01-21 RX ORDER — METOPROLOL SUCCINATE 50 MG/1
50 TABLET, EXTENDED RELEASE ORAL DAILY
Qty: 30 TABLET | Refills: 5 | Status: SHIPPED | OUTPATIENT
Start: 2021-01-21

## 2021-01-21 NOTE — PROGRESS NOTES
Patient comes into the office today for a device check and ov w/NPFW. Patient accompanied by daughter. ILR for CVA  First AFIB recording 9/8/20    Interrogation of MDBERNARDO LINQ shows normal device function. Presenting NSR @ 80-85 bpm  40 Tachy episodes in total.  However,  Only 2 episodes since last remote on 12/9/2020. Recent 1/17/2021 x2 mins 36 secs, 214/158 bpm.    Noise seen. Some QRS complexes not tracked d/t amplitude. Appears ST/SVT. Ep to review. AT/AF burden 1.1 %  (burden 0.9% 11/4/2020)  OBSERVATIONS (1)  · Maximum number of auto-detected episodes have been recorded. Oldest episodes may not have ECG. Patient explained December was very stressful. Experienced family death. Patient currently takes xarelto, metoprolol, and furosemide. No changes made during this session. See P[employer] report under the Cardiology tab. Patient will follow up in 1 month via [employer].

## 2021-01-26 ENCOUNTER — NURSE ONLY (OUTPATIENT)
Dept: CARDIOLOGY CLINIC | Age: 66
End: 2021-01-26

## 2021-02-04 ENCOUNTER — INITIAL CONSULT (OUTPATIENT)
Dept: SURGERY | Age: 66
End: 2021-02-04
Payer: MEDICAID

## 2021-02-04 VITALS — HEIGHT: 67 IN | BODY MASS INDEX: 26.94 KG/M2 | DIASTOLIC BLOOD PRESSURE: 79 MMHG | SYSTOLIC BLOOD PRESSURE: 125 MMHG

## 2021-02-04 DIAGNOSIS — M79.604 LEG PAIN, BILATERAL: Primary | ICD-10-CM

## 2021-02-04 DIAGNOSIS — M79.605 LEG PAIN, BILATERAL: Primary | ICD-10-CM

## 2021-02-04 DIAGNOSIS — I77.6 VASCULITIS (HCC): ICD-10-CM

## 2021-02-04 PROCEDURE — 99214 OFFICE O/P EST MOD 30 MIN: CPT | Performed by: STUDENT IN AN ORGANIZED HEALTH CARE EDUCATION/TRAINING PROGRAM

## 2021-02-04 PROCEDURE — 3017F COLORECTAL CA SCREEN DOC REV: CPT | Performed by: STUDENT IN AN ORGANIZED HEALTH CARE EDUCATION/TRAINING PROGRAM

## 2021-02-04 PROCEDURE — G8484 FLU IMMUNIZE NO ADMIN: HCPCS | Performed by: STUDENT IN AN ORGANIZED HEALTH CARE EDUCATION/TRAINING PROGRAM

## 2021-02-04 PROCEDURE — 4004F PT TOBACCO SCREEN RCVD TLK: CPT | Performed by: STUDENT IN AN ORGANIZED HEALTH CARE EDUCATION/TRAINING PROGRAM

## 2021-02-04 PROCEDURE — G8417 CALC BMI ABV UP PARAM F/U: HCPCS | Performed by: STUDENT IN AN ORGANIZED HEALTH CARE EDUCATION/TRAINING PROGRAM

## 2021-02-04 PROCEDURE — G8427 DOCREV CUR MEDS BY ELIG CLIN: HCPCS | Performed by: STUDENT IN AN ORGANIZED HEALTH CARE EDUCATION/TRAINING PROGRAM

## 2021-02-04 ASSESSMENT — ENCOUNTER SYMPTOMS
SHORTNESS OF BREATH: 0
BACK PAIN: 0
ABDOMINAL PAIN: 0

## 2021-02-04 NOTE — PROGRESS NOTES
DIAGNOSIS: Leg pain, bilateral  VASCULAR SURGERY HX: None    PULSE EXAMINATION  Femoral:palp  Dorsalis Pedis: non palp  Posterior Tibial: non palp  Radial: palp  Dianne Keller (:  1955) is a 60 y.o. female,New patient, here for evaluation of the following chief complaint(s):  Surgical Consult (referred by wound care for open wound on both feet, hurt to touch. edema in both feet & legs. )      ASSESSMENT/PLAN:  1. Leg pain, bilateral  -     Yen Heredia MD, Pain Management, [facility] [place]  CTA reviewed - occlusion of AT on left; otherwise no large vessel occlusion. I suspect that the patient has more or less some component of a small or medium vessel vasculitis. She could have Buerger's disease. Suggest smoking cessation. Will refer her to pain physician at her request to see if there is any possibility of performing ankle blocks. I do not see a role for endovascular therapy or performing any further tests as she likely has small vessel occlusive disease. This may improve with cessation of tobacco and nicotine products. Continue with regular and diligent wound care. Would hold off on any toe amputations at this point as there is a concern for healing. Once she has stopped all tobacco, this could be considered. Greater than 50% of my time was spent counseling the patient/family about the condition's pathology and proposed/planned treatment. Return if symptoms worsen or fail to improve.     SUBJECTIVE/OBJECTIVE: This is a 60year old female patient who presents for evaluation of severe bilateral leg pain. The pain starts from the calves down. She does have a left third toe dry ulceration. She denies any purulence. She denies any fever or chills. The pain has been ongoing for many years. She admits to significant swelling as well which she manages with wraps and compression. She has multiple deformities in her toes, some of which have been improved with surgery. She states that she used to have a high instep but now has flat feet bilaterally. Her ambulation is limited. She is supposed to take lasix but finds it difficult given the significant amount of urination that occurs on diuretics. She denies any chest pain or shortness of breath. She has no significant renal insufficiency. She takes narcotic medication daily with little effect. She denies back, hip or thigh pain. She states that she does have the need for bilateral knee replacement. Review of Systems   Constitutional: Positive for activity change. Negative for chills and fever. HENT: Negative for congestion. Eyes: Negative for visual disturbance. Respiratory: Negative for shortness of breath. Cardiovascular: Positive for leg swelling. Negative for chest pain. Gastrointestinal: Negative for abdominal pain. Musculoskeletal: Positive for arthralgias, gait problem, joint swelling and myalgias. Negative for back pain. Skin: Positive for wound. Neurological: Positive for weakness. Hematological: Does not bruise/bleed easily. Psychiatric/Behavioral: Negative for agitation. Physical Exam  HENT:      Head: Normocephalic and atraumatic. Nose: Nose normal.   Eyes:      Extraocular Movements: Extraocular movements intact. Cardiovascular:      Rate and Rhythm: Normal rate and regular rhythm. Pulmonary:      Effort: Pulmonary effort is normal. No respiratory distress.    Musculoskeletal: General: Tenderness and deformity (flat feet, right 3rd toe with severe valgus deformity) present. Right lower leg: Edema present. Left lower leg: Edema present. Skin:     General: Skin is warm and dry. Capillary Refill: Capillary refill takes 2 to 3 seconds. Comments: Left third toe distal gangrene-dry, multiple incisions over dorsum of foot and toes suggestive of corrective surgery  Tense, brawny skin, non pitting edema bilaterally   Neurological:      General: No focal deficit present. Mental Status: She is alert. Psychiatric:         Mood and Affect: Mood normal.         Behavior: Behavior normal.         Thought Content: Thought content normal.         Judgment: Judgment normal.             An electronic signature was used to authenticate this note.     --DO Kristina Escobedo DO, [address] [facility]

## 2021-02-23 PROCEDURE — 93298 REM INTERROG DEV EVAL SCRMS: CPT | Performed by: INTERNAL MEDICINE

## 2021-02-23 PROCEDURE — G2066 INTER DEVC REMOTE 30D: HCPCS | Performed by: INTERNAL MEDICINE

## 2021-02-24 ENCOUNTER — NURSE ONLY (OUTPATIENT)
Dept: CARDIOLOGY CLINIC | Age: 66
End: 2021-02-24
Payer: MEDICAID

## 2021-02-24 DIAGNOSIS — I48.0 PAF (PAROXYSMAL ATRIAL FIBRILLATION) (HCC): ICD-10-CM

## 2021-02-24 DIAGNOSIS — Z86.73 HISTORY OF CARDIOEMBOLIC CEREBROVASCULAR ACCIDENT (CVA): ICD-10-CM

## 2021-02-24 DIAGNOSIS — Z45.09 ENCOUNTER FOR LOOP RECORDER CHECK: ICD-10-CM

## 2021-02-24 DIAGNOSIS — I63.9 ACUTE CEREBROVASCULAR ACCIDENT (CVA) (HCC): ICD-10-CM

## 2021-02-24 NOTE — PROGRESS NOTES
Remote interrogation of implanted cardiac event monitor shows normal function. ILR for CVA. New AF recorded in 9/2020. [address] and toprol xl. Hx: pAT/AF-rvr  AT/AF burden since 12/9/20- 0.0%  Since last interrogation 1/24/21:  . Observations Summary 09-Dec-2020 00:05 to 17-Feb-2021 00:05  - Maximum Episode Count Met, most recent: 21-Jan-2021  SVT/AF-RVR recorded. Recent on 1/23/21 x 3 hrs 22 mins. Max V rate of 333 ms. KAI PERKINS to review. See Paceart report under the Cardiology tab. Follow up 1 month via [employer].

## 2021-03-30 PROCEDURE — G2066 INTER DEVC REMOTE 30D: HCPCS | Performed by: INTERNAL MEDICINE

## 2021-03-30 PROCEDURE — 93298 REM INTERROG DEV EVAL SCRMS: CPT | Performed by: INTERNAL MEDICINE

## 2021-03-31 ENCOUNTER — NURSE ONLY (OUTPATIENT)
Dept: CARDIOLOGY CLINIC | Age: 66
End: 2021-03-31
Payer: MEDICAID

## 2021-03-31 DIAGNOSIS — Z45.09 ENCOUNTER FOR LOOP RECORDER CHECK: ICD-10-CM

## 2021-03-31 DIAGNOSIS — I63.9 ACUTE CEREBROVASCULAR ACCIDENT (CVA) (HCC): ICD-10-CM

## 2021-03-31 NOTE — PROGRESS NOTES
Remote interrogation of implanted cardiac event monitor shows normal function. ILR for CVA. New AF recorded in 9/2020. [address] and toprol xl. Hx: pAT/AF-rvr. No AFib recorded. 4 tachy  Events all on 2/18/21-? For AT/svt, rate consistent @ 158 bpm, longest 100 sec. No symptoms associated. Average v rates 60-80 bpm.  Follow up 1 month via carelink.     OV UL 4/22 -discuss RFA/psvt

## 2021-04-07 ENCOUNTER — HOSPITAL ENCOUNTER (INPATIENT)
Age: 66
LOS: 2 days | Discharge: HOME HEALTH | DRG: 463 | End: 2021-04-10
Attending: STUDENT IN AN ORGANIZED HEALTH CARE EDUCATION/TRAINING PROGRAM | Admitting: INTERNAL MEDICINE
Payer: MEDICAID

## 2021-04-07 ENCOUNTER — APPOINTMENT (OUTPATIENT)
Dept: CT IMAGING | Age: 66
DRG: 463 | End: 2021-04-07
Payer: MEDICAID

## 2021-04-07 ENCOUNTER — APPOINTMENT (OUTPATIENT)
Dept: GENERAL RADIOLOGY | Age: 66
DRG: 463 | End: 2021-04-07
Payer: MEDICAID

## 2021-04-07 DIAGNOSIS — R31.9 URINARY TRACT INFECTION WITH HEMATURIA, SITE UNSPECIFIED: ICD-10-CM

## 2021-04-07 DIAGNOSIS — R53.83 OTHER FATIGUE: Primary | ICD-10-CM

## 2021-04-07 DIAGNOSIS — S91.302A OPEN WOUND OF LEFT FOOT WITH COMPLICATION, INITIAL ENCOUNTER: ICD-10-CM

## 2021-04-07 DIAGNOSIS — R31.9 HEMATURIA, UNSPECIFIED TYPE: ICD-10-CM

## 2021-04-07 DIAGNOSIS — R11.2 NAUSEA AND VOMITING, INTRACTABILITY OF VOMITING NOT SPECIFIED, UNSPECIFIED VOMITING TYPE: ICD-10-CM

## 2021-04-07 DIAGNOSIS — N39.0 URINARY TRACT INFECTION WITH HEMATURIA, SITE UNSPECIFIED: ICD-10-CM

## 2021-04-07 DIAGNOSIS — I96 NECROTIC TOES (HCC): ICD-10-CM

## 2021-04-07 PROCEDURE — 70450 CT HEAD/BRAIN W/O DYE: CPT

## 2021-04-07 PROCEDURE — 99284 EMERGENCY DEPT VISIT MOD MDM: CPT

## 2021-04-07 RX ORDER — 0.9 % SODIUM CHLORIDE 0.9 %
500 INTRAVENOUS SOLUTION INTRAVENOUS ONCE
Status: COMPLETED | OUTPATIENT
Start: 2021-04-08 | End: 2021-04-08

## 2021-04-07 RX ORDER — ONDANSETRON 2 MG/ML
4 INJECTION INTRAMUSCULAR; INTRAVENOUS EVERY 6 HOURS PRN
Status: DISCONTINUED | OUTPATIENT
Start: 2021-04-07 | End: 2021-04-08 | Stop reason: HOSPADM

## 2021-04-07 ASSESSMENT — ENCOUNTER SYMPTOMS
VOMITING: 1
COUGH: 0
SHORTNESS OF BREATH: 0
ABDOMINAL PAIN: 0
SINUS PRESSURE: 0
BLOOD IN STOOL: 1
NAUSEA: 0
PHOTOPHOBIA: 0
DIARRHEA: 1
SORE THROAT: 0

## 2021-04-07 ASSESSMENT — PAIN DESCRIPTION - LOCATION: LOCATION: ABDOMEN

## 2021-04-08 ENCOUNTER — APPOINTMENT (OUTPATIENT)
Dept: GENERAL RADIOLOGY | Age: 66
DRG: 463 | End: 2021-04-08
Payer: MEDICAID

## 2021-04-08 PROBLEM — N39.0 UTI (URINARY TRACT INFECTION): Status: ACTIVE | Noted: 2021-04-08

## 2021-04-08 LAB
ALBUMIN SERPL-MCNC: 3.5 G/DL (ref 3.4–5)
ALP BLD-CCNC: 70 U/L (ref 40–129)
ALT SERPL-CCNC: <5 U/L (ref 10–40)
ANION GAP SERPL CALCULATED.3IONS-SCNC: 12 MMOL/L (ref 3–16)
AST SERPL-CCNC: 15 U/L (ref 15–37)
BACTERIA: ABNORMAL /HPF
BASOPHILS ABSOLUTE: 0.1 K/UL (ref 0–0.2)
BASOPHILS RELATIVE PERCENT: 1 %
BILIRUB SERPL-MCNC: 0.3 MG/DL (ref 0–1)
BILIRUBIN DIRECT: <0.2 MG/DL (ref 0–0.3)
BILIRUBIN URINE: NEGATIVE
BILIRUBIN, INDIRECT: ABNORMAL MG/DL (ref 0–1)
BLOOD, URINE: ABNORMAL
BUN BLDV-MCNC: 8 MG/DL (ref 7–20)
CALCIUM SERPL-MCNC: 9.1 MG/DL (ref 8.3–10.6)
CHLORIDE BLD-SCNC: 100 MMOL/L (ref 99–110)
CLARITY: ABNORMAL
CO2: 21 MMOL/L (ref 21–32)
COLOR: YELLOW
COMMENT UA: ABNORMAL
CREAT SERPL-MCNC: 0.5 MG/DL (ref 0.6–1.2)
EKG ATRIAL RATE: 69 BPM
EKG DIAGNOSIS: NORMAL
EKG P AXIS: 2 DEGREES
EKG P-R INTERVAL: 124 MS
EKG Q-T INTERVAL: 378 MS
EKG QRS DURATION: 86 MS
EKG QTC CALCULATION (BAZETT): 405 MS
EKG R AXIS: 16 DEGREES
EKG T AXIS: 34 DEGREES
EKG VENTRICULAR RATE: 69 BPM
EOSINOPHILS ABSOLUTE: 0.8 K/UL (ref 0–0.6)
EOSINOPHILS RELATIVE PERCENT: 14.4 %
EPITHELIAL CELLS, UA: 4 /HPF (ref 0–5)
GFR AFRICAN AMERICAN: >60
GFR NON-AFRICAN AMERICAN: >60
GLUCOSE BLD-MCNC: 85 MG/DL (ref 70–99)
GLUCOSE URINE: NEGATIVE MG/DL
HCT VFR BLD CALC: 31.5 % (ref 36–48)
HEMOGLOBIN: 10.3 G/DL (ref 12–16)
HYALINE CASTS: 3 /LPF (ref 0–8)
KETONES, URINE: ABNORMAL MG/DL
LACTIC ACID, SEPSIS: 1.7 MMOL/L (ref 0.4–1.9)
LEUKOCYTE ESTERASE, URINE: ABNORMAL
LIPASE: 25 U/L (ref 13–60)
LYMPHOCYTES ABSOLUTE: 1.6 K/UL (ref 1–5.1)
LYMPHOCYTES RELATIVE PERCENT: 29.1 %
MCH RBC QN AUTO: 30.3 PG (ref 26–34)
MCHC RBC AUTO-ENTMCNC: 32.7 G/DL (ref 31–36)
MCV RBC AUTO: 92.6 FL (ref 80–100)
MICROSCOPIC EXAMINATION: YES
MONOCYTES ABSOLUTE: 0.4 K/UL (ref 0–1.3)
MONOCYTES RELATIVE PERCENT: 6.7 %
NEUTROPHILS ABSOLUTE: 2.7 K/UL (ref 1.7–7.7)
NEUTROPHILS RELATIVE PERCENT: 48.8 %
NITRITE, URINE: NEGATIVE
PDW BLD-RTO: 14.4 % (ref 12.4–15.4)
PH UA: 5.5 (ref 5–8)
PLATELET # BLD: 276 K/UL (ref 135–450)
PMV BLD AUTO: 7.9 FL (ref 5–10.5)
POTASSIUM REFLEX MAGNESIUM: 4.2 MMOL/L (ref 3.5–5.1)
PRO-BNP: 685 PG/ML (ref 0–124)
PROTEIN UA: NEGATIVE MG/DL
RBC # BLD: 3.41 M/UL (ref 4–5.2)
RBC UA: ABNORMAL /HPF (ref 0–4)
SODIUM BLD-SCNC: 133 MMOL/L (ref 136–145)
SPECIFIC GRAVITY UA: 1.01 (ref 1–1.03)
TOTAL PROTEIN: 7 G/DL (ref 6.4–8.2)
TROPONIN: <0.01 NG/ML
URINE TYPE: ABNORMAL
UROBILINOGEN, URINE: 0.2 E.U./DL
WBC # BLD: 5.5 K/UL (ref 4–11)
WBC UA: ABNORMAL /HPF (ref 0–5)

## 2021-04-08 PROCEDURE — 6370000000 HC RX 637 (ALT 250 FOR IP): Performed by: INTERNAL MEDICINE

## 2021-04-08 PROCEDURE — 83690 ASSAY OF LIPASE: CPT

## 2021-04-08 PROCEDURE — 96366 THER/PROPH/DIAG IV INF ADDON: CPT

## 2021-04-08 PROCEDURE — 85025 COMPLETE CBC W/AUTO DIFF WBC: CPT

## 2021-04-08 PROCEDURE — 71045 X-RAY EXAM CHEST 1 VIEW: CPT

## 2021-04-08 PROCEDURE — 96374 THER/PROPH/DIAG INJ IV PUSH: CPT

## 2021-04-08 PROCEDURE — 51702 INSERT TEMP BLADDER CATH: CPT

## 2021-04-08 PROCEDURE — 92526 ORAL FUNCTION THERAPY: CPT

## 2021-04-08 PROCEDURE — 96365 THER/PROPH/DIAG IV INF INIT: CPT

## 2021-04-08 PROCEDURE — U0005 INFEC AGEN DETEC AMPLI PROBE: HCPCS

## 2021-04-08 PROCEDURE — 1200000000 HC SEMI PRIVATE

## 2021-04-08 PROCEDURE — 84484 ASSAY OF TROPONIN QUANT: CPT

## 2021-04-08 PROCEDURE — 87040 BLOOD CULTURE FOR BACTERIA: CPT

## 2021-04-08 PROCEDURE — G0378 HOSPITAL OBSERVATION PER HR: HCPCS

## 2021-04-08 PROCEDURE — 96361 HYDRATE IV INFUSION ADD-ON: CPT

## 2021-04-08 PROCEDURE — 83605 ASSAY OF LACTIC ACID: CPT

## 2021-04-08 PROCEDURE — 96375 TX/PRO/DX INJ NEW DRUG ADDON: CPT

## 2021-04-08 PROCEDURE — 83880 ASSAY OF NATRIURETIC PEPTIDE: CPT

## 2021-04-08 PROCEDURE — 80048 BASIC METABOLIC PNL TOTAL CA: CPT

## 2021-04-08 PROCEDURE — 81001 URINALYSIS AUTO W/SCOPE: CPT

## 2021-04-08 PROCEDURE — 6360000002 HC RX W HCPCS: Performed by: STUDENT IN AN ORGANIZED HEALTH CARE EDUCATION/TRAINING PROGRAM

## 2021-04-08 PROCEDURE — U0003 INFECTIOUS AGENT DETECTION BY NUCLEIC ACID (DNA OR RNA); SEVERE ACUTE RESPIRATORY SYNDROME CORONAVIRUS 2 (SARS-COV-2) (CORONAVIRUS DISEASE [COVID-19]), AMPLIFIED PROBE TECHNIQUE, MAKING USE OF HIGH THROUGHPUT TECHNOLOGIES AS DESCRIBED BY CMS-2020-01-R: HCPCS

## 2021-04-08 PROCEDURE — 2580000003 HC RX 258: Performed by: INTERNAL MEDICINE

## 2021-04-08 PROCEDURE — 2580000003 HC RX 258: Performed by: STUDENT IN AN ORGANIZED HEALTH CARE EDUCATION/TRAINING PROGRAM

## 2021-04-08 PROCEDURE — 36415 COLL VENOUS BLD VENIPUNCTURE: CPT

## 2021-04-08 PROCEDURE — 92610 EVALUATE SWALLOWING FUNCTION: CPT

## 2021-04-08 PROCEDURE — 93010 ELECTROCARDIOGRAM REPORT: CPT | Performed by: INTERNAL MEDICINE

## 2021-04-08 PROCEDURE — 87150 DNA/RNA AMPLIFIED PROBE: CPT

## 2021-04-08 PROCEDURE — 93005 ELECTROCARDIOGRAM TRACING: CPT | Performed by: STUDENT IN AN ORGANIZED HEALTH CARE EDUCATION/TRAINING PROGRAM

## 2021-04-08 PROCEDURE — 80076 HEPATIC FUNCTION PANEL: CPT

## 2021-04-08 PROCEDURE — 6360000002 HC RX W HCPCS: Performed by: INTERNAL MEDICINE

## 2021-04-08 RX ORDER — HYDROCODONE BITARTRATE AND ACETAMINOPHEN 5; 325 MG/1; MG/1
1 TABLET ORAL EVERY 4 HOURS PRN
Status: DISCONTINUED | OUTPATIENT
Start: 2021-04-08 | End: 2021-04-08

## 2021-04-08 RX ORDER — ATORVASTATIN CALCIUM 80 MG/1
80 TABLET, FILM COATED ORAL NIGHTLY
Status: DISCONTINUED | OUTPATIENT
Start: 2021-04-08 | End: 2021-04-10 | Stop reason: HOSPADM

## 2021-04-08 RX ORDER — PANTOPRAZOLE SODIUM 40 MG/1
40 TABLET, DELAYED RELEASE ORAL
Status: DISCONTINUED | OUTPATIENT
Start: 2021-04-08 | End: 2021-04-10 | Stop reason: HOSPADM

## 2021-04-08 RX ORDER — POTASSIUM CHLORIDE 20 MEQ/1
20 TABLET, EXTENDED RELEASE ORAL 2 TIMES DAILY
Status: DISCONTINUED | OUTPATIENT
Start: 2021-04-08 | End: 2021-04-10 | Stop reason: HOSPADM

## 2021-04-08 RX ORDER — METOPROLOL SUCCINATE 50 MG/1
50 TABLET, EXTENDED RELEASE ORAL DAILY
Status: DISCONTINUED | OUTPATIENT
Start: 2021-04-08 | End: 2021-04-10 | Stop reason: HOSPADM

## 2021-04-08 RX ORDER — FERROUS GLUCONATE 324(37.5)
324 TABLET ORAL
Status: DISCONTINUED | OUTPATIENT
Start: 2021-04-08 | End: 2021-04-10 | Stop reason: HOSPADM

## 2021-04-08 RX ORDER — CETIRIZINE HYDROCHLORIDE 10 MG/1
10 TABLET ORAL DAILY
Status: DISCONTINUED | OUTPATIENT
Start: 2021-04-08 | End: 2021-04-10 | Stop reason: HOSPADM

## 2021-04-08 RX ORDER — BALSALAZIDE DISODIUM 750 MG/1
3000 CAPSULE ORAL DAILY
Status: DISCONTINUED | OUTPATIENT
Start: 2021-04-08 | End: 2021-04-10 | Stop reason: HOSPADM

## 2021-04-08 RX ORDER — HYDROXYZINE HYDROCHLORIDE 10 MG/1
10 TABLET, FILM COATED ORAL 3 TIMES DAILY PRN
Status: DISCONTINUED | OUTPATIENT
Start: 2021-04-08 | End: 2021-04-10 | Stop reason: HOSPADM

## 2021-04-08 RX ORDER — HYDROMORPHONE HYDROCHLORIDE 1 MG/ML
0.5 INJECTION, SOLUTION INTRAMUSCULAR; INTRAVENOUS; SUBCUTANEOUS EVERY 4 HOURS PRN
Status: DISCONTINUED | OUTPATIENT
Start: 2021-04-08 | End: 2021-04-08 | Stop reason: HOSPADM

## 2021-04-08 RX ORDER — HYDROMORPHONE HYDROCHLORIDE 2 MG/1
2 TABLET ORAL EVERY 6 HOURS PRN
Status: DISCONTINUED | OUTPATIENT
Start: 2021-04-08 | End: 2021-04-10 | Stop reason: HOSPADM

## 2021-04-08 RX ORDER — SODIUM CHLORIDE 9 MG/ML
INJECTION, SOLUTION INTRAVENOUS CONTINUOUS
Status: DISCONTINUED | OUTPATIENT
Start: 2021-04-08 | End: 2021-04-08

## 2021-04-08 RX ORDER — FLUTICASONE PROPIONATE 50 MCG
1 SPRAY, SUSPENSION (ML) NASAL DAILY PRN
Status: DISCONTINUED | OUTPATIENT
Start: 2021-04-08 | End: 2021-04-10 | Stop reason: HOSPADM

## 2021-04-08 RX ORDER — ONDANSETRON 4 MG/1
4 TABLET, ORALLY DISINTEGRATING ORAL EVERY 8 HOURS PRN
Status: DISCONTINUED | OUTPATIENT
Start: 2021-04-08 | End: 2021-04-10 | Stop reason: HOSPADM

## 2021-04-08 RX ORDER — FUROSEMIDE 10 MG/ML
40 INJECTION INTRAMUSCULAR; INTRAVENOUS ONCE
Status: COMPLETED | OUTPATIENT
Start: 2021-04-08 | End: 2021-04-08

## 2021-04-08 RX ORDER — FUROSEMIDE 40 MG/1
40 TABLET ORAL DAILY
Status: DISCONTINUED | OUTPATIENT
Start: 2021-04-08 | End: 2021-04-10 | Stop reason: HOSPADM

## 2021-04-08 RX ORDER — BUSPIRONE HYDROCHLORIDE 5 MG/1
10 TABLET ORAL 3 TIMES DAILY
Status: DISCONTINUED | OUTPATIENT
Start: 2021-04-08 | End: 2021-04-10 | Stop reason: HOSPADM

## 2021-04-08 RX ORDER — BACLOFEN 10 MG/1
10 TABLET ORAL 3 TIMES DAILY
Status: DISCONTINUED | OUTPATIENT
Start: 2021-04-08 | End: 2021-04-10 | Stop reason: HOSPADM

## 2021-04-08 RX ORDER — NYSTATIN 100000 U/G
CREAM TOPICAL 2 TIMES DAILY
Status: DISCONTINUED | OUTPATIENT
Start: 2021-04-08 | End: 2021-04-10 | Stop reason: HOSPADM

## 2021-04-08 RX ORDER — FENTANYL CITRATE 50 UG/ML
25 INJECTION, SOLUTION INTRAMUSCULAR; INTRAVENOUS
Status: DISCONTINUED | OUTPATIENT
Start: 2021-04-08 | End: 2021-04-08

## 2021-04-08 RX ORDER — SUCRALFATE 1 G/1
1 TABLET ORAL 4 TIMES DAILY
Status: DISCONTINUED | OUTPATIENT
Start: 2021-04-08 | End: 2021-04-10 | Stop reason: HOSPADM

## 2021-04-08 RX ADMIN — HYDROMORPHONE HYDROCHLORIDE 2 MG: 2 TABLET ORAL at 16:36

## 2021-04-08 RX ADMIN — CEFEPIME HYDROCHLORIDE 2000 MG: 2 INJECTION, POWDER, FOR SOLUTION INTRAVENOUS at 03:36

## 2021-04-08 RX ADMIN — MUPIROCIN: 20 OINTMENT TOPICAL at 14:38

## 2021-04-08 RX ADMIN — ONDANSETRON 4 MG: 2 INJECTION INTRAMUSCULAR; INTRAVENOUS at 00:47

## 2021-04-08 RX ADMIN — HYDROCODONE BITARTRATE AND ACETAMINOPHEN 1 TABLET: 5; 325 TABLET ORAL at 10:25

## 2021-04-08 RX ADMIN — FERROUS GLUCONATE TAB 324 MG (37.5 MG ELEMENTAL IRON) 324 MG: 324 (37.5 FE) TAB at 09:18

## 2021-04-08 RX ADMIN — FUROSEMIDE 40 MG: 40 TABLET ORAL at 09:11

## 2021-04-08 RX ADMIN — SUCRALFATE 1 G: 1 TABLET ORAL at 20:29

## 2021-04-08 RX ADMIN — BACLOFEN 10 MG: 10 TABLET ORAL at 20:28

## 2021-04-08 RX ADMIN — BUSPIRONE HYDROCHLORIDE 10 MG: 5 TABLET ORAL at 20:29

## 2021-04-08 RX ADMIN — CEFEPIME HYDROCHLORIDE 2000 MG: 2 INJECTION, POWDER, FOR SOLUTION INTRAVENOUS at 15:22

## 2021-04-08 RX ADMIN — BUSPIRONE HYDROCHLORIDE 10 MG: 5 TABLET ORAL at 13:09

## 2021-04-08 RX ADMIN — BUSPIRONE HYDROCHLORIDE 10 MG: 5 TABLET ORAL at 09:11

## 2021-04-08 RX ADMIN — BALSALAZIDE DISODIUM 3000 MG: 750 CAPSULE ORAL at 09:17

## 2021-04-08 RX ADMIN — FUROSEMIDE 40 MG: 10 INJECTION, SOLUTION INTRAMUSCULAR; INTRAVENOUS at 03:33

## 2021-04-08 RX ADMIN — PANTOPRAZOLE SODIUM 40 MG: 40 TABLET, DELAYED RELEASE ORAL at 09:18

## 2021-04-08 RX ADMIN — HYDROMORPHONE HYDROCHLORIDE 2 MG: 2 TABLET ORAL at 22:32

## 2021-04-08 RX ADMIN — FENTANYL CITRATE 25 MCG: 50 INJECTION, SOLUTION INTRAMUSCULAR; INTRAVENOUS at 01:05

## 2021-04-08 RX ADMIN — SODIUM CHLORIDE: 9 INJECTION, SOLUTION INTRAVENOUS at 06:49

## 2021-04-08 RX ADMIN — BACLOFEN 10 MG: 10 TABLET ORAL at 09:11

## 2021-04-08 RX ADMIN — METOPROLOL SUCCINATE 50 MG: 50 TABLET, EXTENDED RELEASE ORAL at 09:11

## 2021-04-08 RX ADMIN — HYDROMORPHONE HYDROCHLORIDE 0.5 MG: 1 INJECTION, SOLUTION INTRAMUSCULAR; INTRAVENOUS; SUBCUTANEOUS at 03:30

## 2021-04-08 RX ADMIN — BACLOFEN 10 MG: 10 TABLET ORAL at 13:09

## 2021-04-08 RX ADMIN — SUCRALFATE 1 G: 1 TABLET ORAL at 09:11

## 2021-04-08 RX ADMIN — NYSTATIN: 100000 CREAM TOPICAL at 20:29

## 2021-04-08 RX ADMIN — SODIUM CHLORIDE 500 ML: 9 INJECTION, SOLUTION INTRAVENOUS at 00:46

## 2021-04-08 RX ADMIN — SUCRALFATE 1 G: 1 TABLET ORAL at 12:42

## 2021-04-08 RX ADMIN — ATORVASTATIN CALCIUM 80 MG: 80 TABLET, FILM COATED ORAL at 20:28

## 2021-04-08 RX ADMIN — RIVAROXABAN 20 MG: 20 TABLET, FILM COATED ORAL at 09:18

## 2021-04-08 RX ADMIN — MUPIROCIN: 20 OINTMENT TOPICAL at 20:29

## 2021-04-08 RX ADMIN — POTASSIUM CHLORIDE 20 MEQ: 1500 TABLET, EXTENDED RELEASE ORAL at 20:28

## 2021-04-08 RX ADMIN — SUCRALFATE 1 G: 1 TABLET ORAL at 16:36

## 2021-04-08 RX ADMIN — POTASSIUM CHLORIDE 20 MEQ: 1500 TABLET, EXTENDED RELEASE ORAL at 09:11

## 2021-04-08 RX ADMIN — CETIRIZINE HYDROCHLORIDE 10 MG: 10 TABLET, FILM COATED ORAL at 09:11

## 2021-04-08 RX ADMIN — HYDROCODONE BITARTRATE AND ACETAMINOPHEN 1 TABLET: 5; 325 TABLET ORAL at 14:38

## 2021-04-08 RX ADMIN — NYSTATIN: 100000 CREAM TOPICAL at 14:38

## 2021-04-08 ASSESSMENT — PAIN SCALES - GENERAL
PAINLEVEL_OUTOF10: 8
PAINLEVEL_OUTOF10: 10
PAINLEVEL_OUTOF10: 10

## 2021-04-08 ASSESSMENT — PAIN DESCRIPTION - PROGRESSION: CLINICAL_PROGRESSION: NOT CHANGED

## 2021-04-08 ASSESSMENT — PAIN DESCRIPTION - PAIN TYPE
TYPE: ACUTE PAIN
TYPE: ACUTE PAIN
TYPE: CHRONIC PAIN
TYPE: ACUTE PAIN

## 2021-04-08 ASSESSMENT — PAIN DESCRIPTION - ORIENTATION
ORIENTATION: RIGHT
ORIENTATION: RIGHT
ORIENTATION: RIGHT;LEFT
ORIENTATION: RIGHT;LEFT

## 2021-04-08 ASSESSMENT — PAIN DESCRIPTION - LOCATION
LOCATION: ANKLE;KNEE;LEG
LOCATION: LEG
LOCATION: LEG

## 2021-04-08 ASSESSMENT — PAIN DESCRIPTION - DESCRIPTORS
DESCRIPTORS: ACHING
DESCRIPTORS: ACHING;CONSTANT

## 2021-04-08 ASSESSMENT — PAIN DESCRIPTION - ONSET: ONSET: ON-GOING

## 2021-04-08 ASSESSMENT — PAIN DESCRIPTION - FREQUENCY: FREQUENCY: CONTINUOUS

## 2021-04-08 NOTE — PROGRESS NOTES
4 Eyes Skin Assessment     NAME:  Dianne Keller  YOB: 1955  MEDICAL RECORD NUMBER:  [id number]    The patient is being assess for  Admission    I agree that 2 RN's have performed a thorough Head to Toe Skin Assessment on the patient. ALL assessment sites listed below have been assessed. Areas assessed by both nurses:    Head, Face, Ears, Shoulders, Back, Chest, Arms, Elbows, Hands, Sacrum. Buttock, Coccyx, Ischium and Legs. Feet and Heels        Does the Patient have a Wound? Yes wound(s) were present on assessment.  LDA wound assessment was Initiated and completed        Nelson Prevention initiated:  Yes   Wound Care Orders initiated:  No    Pressure Injury (Stage 3,4, Unstageable, DTI, NWPT, and Complex wounds) if present place consult order under [de-identified] No    New and Established Ostomies if present place consult order under : No      Nurse 1 eSignature: Electronically signed by Oksana Gaspar RN on 4/8/21 at 7:38 AM EDT    **SHARE this note so that the co-signing nurse is able to place an eSignature**    Nurse 2 eSignature: Electronically signed by Hillary Cheney RN on 4/8/21 at 9:33 AM EDT

## 2021-04-08 NOTE — PROGRESS NOTES
bolus control and A-P propulsion with all textures. Prolonged mastication with delayed oral clearing noted with solids. Timely swallow initiation with mild reduced laryngeal excursion and intermittent delayed pharyngeal clearing noted with all textures. Prolonged mastication and delayed oral clearing appears due in part to poor dentition. No overt signs of aspiration noted with any texture. However, precautions should be followed due to reduced bolus control, mastication and A-P propulsion and due to current lethargic state. Dietary Recommendations: Dental soft diet with thin liquids/meds as tolerated    Strategies: 90 degree positioning with all p.o. intake; small bites/sips    Treatment/Goals: Speech therapy for dysphagia tx 1-2 sessions to endure diet tolerance     ST.) Pt will tolerate recommended diet without s/s of aspiration     Oral motor Exam:  Dentition: natural dentition; missing teeth  Labial/Facial: mild asymmetry at rest  Lingual: Mild reduced ROM and coordination for alternating movements  Voice:weak    Oral Phase:   Reduced mastication  Reduced A-P propulsion  Apparent premature bolus loss to pharynx with liquids  Delayed oral clearing with solids    Pharyngeal Phase:  Decreased laryngeal elevation via palpation   Apparent delayed/decreased pharyngeal clearing  No overt signs of aspiration noted    Patient/Family Education:Education, results and recommendations given to the Pt and nurse, who verbalized understanding    Timed Code Treatment: 0 min    Total Treatment Time: 30 min    Discharge Recommendations: Speech Therapy for Speech/Dysphagia treatment at discharge. If patient discharges prior to next session this note will serve as a discharge summary.       Salcedo Freeze [facility][id number]

## 2021-04-08 NOTE — PROGRESS NOTES
Patient assessment completed and morning medications given. Vitals are stable. Patient c/o 10/10 pain in cecilia legs. Legs swollen, warm, and painful to touch. Requested pain medication from MD. Patient denies any further needs. Patient alert and oriented x4. No signs of confusion. Will continue to monitor.

## 2021-04-08 NOTE — ED NOTES
Bed: 21  Expected date:   Expected time:   Means of arrival:   Comments:  [id number],Third Floor, RN  04/07/21 1741

## 2021-04-08 NOTE — PROGRESS NOTES
Admission completed, see doc flowsheets. Pt currently has no complaints at this time. Call light within reach, will continue to monitor.  Judy Andrade

## 2021-04-08 NOTE — ED PROVIDER NOTES
[facility]      Pt Name: Nano Wise  MRN: [id number]  [place] 1955  Date of evaluation: 4/7/2021  Provider: Kyle Shabazz MD    CHIEF COMPLAINT       Chief Complaint   Patient presents with    Nausea     Patient presents with abdominal pain and emesis x4 days. She also endorses weakness and fatigue.  Abdominal Pain         HISTORY OF PRESENT ILLNESS   (Location/Symptom, Timing/Onset, Context/Setting, Quality, Duration, Modifying Factors, Severity)  Note limiting factors. Nano Wise is a 60 y.o. female who presents to the emergency department with her daughter who is her caretaker. Daughter provides majority of information as patient has a history of aphasia from a stroke about 1 year ago. Daughter states that the patient has been confused over the last 24 hours. Specifically she has been asking random questions over and over again. She is usually conversant normally. She also endorses that the patient is having difficulty ambulating due to leg pain over the last week or so. She also has been unable to keep down fluids over the last 4 days and the daughter is concerned she is dehydrated. She is also noting increased bilateral leg swelling for several weeks. The patient also has a known left third necrotic toe which is having some purulent discharge. They deny any falls or syncopal episodes. Daughter is endorsing foul-smelling stool with occasional darkness. HPI    Nursing Notes were reviewed. REVIEW OF SYSTEMS    (2-9 systems for level 4, 10 or more for level 5)     Review of Systems   Constitutional: Positive for activity change, appetite change and chills. Negative for fever. HENT: Negative for sinus pressure and sore throat. Eyes: Negative for photophobia and visual disturbance. Respiratory: Negative for cough and shortness of breath. Cardiovascular: Negative for chest pain and leg swelling. FOOT SURGERY Left 08/05/2002    Excision second metatarsal head left    FRACTURE SURGERY      rt hip    HAND CARPECTOMY Bilateral     HEEL SPUR SURGERY      HERNIA REPAIR      HYSTERECTOMY      bladder surgery    JOINT REPLACEMENT      rt hip, L shoulder replacement 11/2014    KNEE SURGERY Bilateral     arthrosvopic    LEG SURGERY Right     MD SECD CLOS SURG WND EXTEN/COMPLIC N/A 25/20/0044    SECONDARY WOUND CLOSURE OF ABDOMINAL WOUND performed by Lashell Villar MD at [facility] 7/17/2019    FLEXIBLE SIGMOIDOSCOPY performed by Lashell Villar MD at [address] [facility]  01/15/2018    with biopsies    SMALL INTESTINE SURGERY N/A 7/17/2019    COLOSTOMY CLOSURE WITH COLORECTAL ANASTOMOSIS performed by Lahsell Villar MD at [place] [address]  6/14/13    and colonsocopy with antral erosion biopsies         CURRENT MEDICATIONS       Previous Medications    ATORVASTATIN (LIPITOR) 80 MG TABLET    Take 1 tablet by mouth nightly    BACLOFEN (LIORESAL) 10 MG TABLET    Take 1 tablet by mouth 3 times daily    BALSALAZIDE (COLAZAL) 750 MG CAPSULE    Take 3,000 mg by mouth daily Take 4 capsules (total 3000 mg) daily each morning.     BUSPIRONE (BUSPAR) 10 MG TABLET    Take 10 mg by mouth 3 times daily as needed    FERROUS GLUCONATE (FERGON) 324 (38 FE) MG TABLET    Take 324 mg by mouth daily (with breakfast)    FEXOFENADINE (ALLEGRA) 180 MG TABLET    Take 180 mg by mouth daily    FLUTICASONE (FLONASE) 50 MCG/ACT NASAL SPRAY    1 spray by Each Nostril route daily as needed for Rhinitis    FUROSEMIDE (LASIX) 40 MG TABLET    Take 1 tablet by mouth daily    HYDROXYZINE (ATARAX) 10 MG TABLET    Take 10 mg by mouth 3 times daily as needed for Itching    METOPROLOL SUCCINATE (TOPROL XL) 50 MG EXTENDED RELEASE TABLET    Take 1 tablet by mouth daily    MUPIROCIN (BACTROBAN) 2 % OINTMENT    Apply daily    NYSTATIN (MYCOSTATIN) Gets together: Not on file     Attends [employer] service: Not on file     Active member of club or organization: Not on file     Attends meetings of clubs or organizations: Not on file     Relationship status: Not on file    Intimate partner violence     Fear of current or ex partner: Not on file     Emotionally abused: Not on file     Physically abused: Not on file     Forced sexual activity: Not on file   Other Topics Concern    Not on file   Social History Narrative    Not on file       SCREENINGS                        PHYSICAL EXAM    (up to 7 for level 4, 8 or more for level 5)     ED Triage Vitals [04/07/21 2246]   BP Temp Temp Source Pulse Resp SpO2 Height Weight   (!) 155/109 99.1 °F (37.3 °C) Oral 73 16 96 % -- --       Physical Exam  Constitutional:       Appearance: Normal appearance. HENT:      Head: Normocephalic and atraumatic. Eyes:      Conjunctiva/sclera: Conjunctivae normal.   Neck:      Musculoskeletal: Normal range of motion. No neck rigidity. Cardiovascular:      Rate and Rhythm: Normal rate and regular rhythm. Pulses: Normal pulses. Heart sounds: Normal heart sounds. Pulmonary:      Effort: Pulmonary effort is normal.      Breath sounds: Normal breath sounds. Abdominal:      General: Abdomen is flat. There is no distension. Palpations: Abdomen is soft. There is no mass. Tenderness: There is no abdominal tenderness. Skin:     General: Skin is warm and dry. Capillary Refill: Capillary refill takes less than 2 seconds. Comments: Necrotic left third digit with some purulent discharge   Neurological:      Mental Status: She is alert. Comments: Positive expressive aphasia. Oriented to person and place, minimally to situation. moves all 4 extremities to command. Weakness in bilateral lower extremities.    Psychiatric:         Mood and Affect: Mood normal.         Behavior: Behavior normal.         DIAGNOSTIC RESULTS     EKG: All EKG's are interpreted by the Emergency Department Physician who either signs or Co-signs this chart in the absence of a cardiologist.  The Ekg interpreted by me in the absence of a cardiologist shows. normal sinus rhythm with a rate of 69  Axis is   Normal  QTc is  normal  Intervals and Durations are unremarkable. No specific ST-T wave changes appreciated. No evidence of acute ischemia. No significant change from prior EKG dated 1/21/2021        RADIOLOGY:   Non-plain film images such as CT, Ultrasound and MRI are read by the radiologist. Plain radiographic images are visualized and preliminarily interpreted by the emergency physician with the below findings:      Interpretation per the Radiologist below, if available at the time of this note:    XR CHEST PORTABLE   Final Result   Stable chest x-ray. No acute disease. Large retrocardiac hiatal hernia. CT HEAD WO CONTRAST   Final Result   Multiple old left MCA infarcts. Mild atrophy and mild chronic microischemic disease throughout the deep white   matter with no acute abnormality seen. Basal ganglia calcifications bilaterally.                LABS:  Labs Reviewed   CBC WITH AUTO DIFFERENTIAL - Abnormal; Notable for the following components:       Result Value    RBC 3.41 (*)     Hemoglobin 10.3 (*)     Hematocrit 31.5 (*)     Eosinophils Absolute 0.8 (*)     All other components within normal limits    Narrative:     Performed at:  [facility]  [address] [facility], [address]   Phone [phone]   BASIC METABOLIC PANEL W/ REFLEX TO MG FOR LOW K - Abnormal; Notable for the following components:    Sodium 133 (*)     CREATININE 0.5 (*)     All other components within normal limits    Narrative:     Performed at:  [facility]  [address] [facility], [address]   Phone [phone]   BRAIN NATRIURETIC PEPTIDE - Abnormal; Notable for the following components:    Pro- (*) All other components within normal limits    Narrative:     Performed at:  [facility] [address] [employer]   Phone [phone]   HEPATIC FUNCTION PANEL - Abnormal; Notable for the following components:    ALT <5 (*)     All other components within normal limits    Narrative:     Performed at:  [facility] [address] [place], [employer]   Phone [phone] - Abnormal; Notable for the following components:    Clarity, UA CLOUDY (*)     Ketones, Urine TRACE (*)     Blood, Urine LARGE (*)     Leukocyte Esterase, Urine MODERATE (*)     All other components within normal limits    Narrative:     Performed at:  [facility] [address] [employer]   Phone [phone]   MICROSCOPIC URINALYSIS - Abnormal; Notable for the following components:    WBC, UA 21-50 (*)     RBC, UA 21-50 (*)     Bacteria, UA 2+ (*)     All other components within normal limits    Narrative:     Performed at:  [facility] [address], [employer]   Phone [phone]   CULTURE, BLOOD 1   CULTURE, BLOOD 2   C DIFF TOXIN/ANTIGEN   TROPONIN    Narrative:     Performed at:  [facility] [address] [place], [employer]   Phone [phone]   LIPASE    Narrative:     Performed at:  [facility] [address] [place], [employer]   Phone [phone]   LACTATE, SEPSIS    Narrative:     Performed at:  [facility] [address] [email or web address]   Phone [phone]   BLOOD OCCULT STOOL DIAGNOSTIC   COVID-19   COVID-19   COVID-19       All other labs were within normal range or not returned as of this dictation.     EMERGENCY DEPARTMENT COURSE and DIFFERENTIAL DIAGNOSIS/MDM:   Vitals:    Vitals:    04/07/21 2246   BP: (!) 155/109 Pulse: 73   Resp: 16   Temp: 99.1 °F (37.3 °C)   TempSrc: Oral   SpO2: 96%       Course and MDM:  Patient is 50-year-old female brought in by her daughter for nausea and vomiting, fatigue and increased confusion as well as difficulty getting around her home. On my exam she is hemodynamically stable. She does have expressive aphasia which appears to be at her baseline but no new neurologic deficits. She has a soft nontender tender abdominal exam.  Low suspicion for acute stroke. No signs of bleed or mass noted on CT scan of the head. Laboratory studies are fairly unremarkable as above. She is not septic but appears to have UTI with hematuria. Cefepime was started for treatment. She is also having some purulent discharge from her third left toe with known history of necrosis failure. Daughter would like to revisit the option of surgical amputation as it is causing her significant pain. She does appear fluid overloaded as well and Lasix was started in the emergency room. She is not  having chest pain, troponin elevation or ischemic changes on EKG. For the above reasons I feel she warrants admission and her daughter is agreeable. PROCEDURES:  Unless otherwise noted below, none     Procedures        FINAL IMPRESSION      1. Other fatigue    2. Necrotic toes ([address]    3. Nausea and vomiting, intractability of vomiting not specified, unspecified vomiting type    4. Hematuria, unspecified type    5. Urinary tract infection with hematuria, site unspecified          DISPOSITION/PLAN   DISPOSITION Decision To Admit 04/08/2021 12:41:16 AM      PATIENT REFERRED TO:  No follow-up provider specified. DISCHARGE MEDICATIONS:  New Prescriptions    No medications on file     Controlled Substances Monitoring:     No flowsheet data found.     (Please note that portions of this note were completed with a voice recognition program.  Efforts were made to edit the dictations but occasionally words are mis-transcribed.)    Nahun Curiel MD (electronically signed)  Attending Emergency Physician         Dwight Mota MD  04/08/21 9635

## 2021-04-09 ENCOUNTER — APPOINTMENT (OUTPATIENT)
Dept: GENERAL RADIOLOGY | Age: 66
DRG: 463 | End: 2021-04-09
Payer: MEDICAID

## 2021-04-09 PROBLEM — L97.526 DIABETIC ULCER OF LEFT FOOT WITH BONE INVOLVEMENT WITHOUT EVIDENCE OF NECROSIS (HCC): Status: RESOLVED | Noted: 2020-12-01 | Resolved: 2021-04-09

## 2021-04-09 PROBLEM — I69.30 LATE EFFECT OF ISCHEMIC CEREBRAL STROKE: Status: ACTIVE | Noted: 2021-04-09

## 2021-04-09 PROBLEM — E11.621 DIABETIC ULCER OF LEFT FOOT WITH BONE INVOLVEMENT WITHOUT EVIDENCE OF NECROSIS (HCC): Status: RESOLVED | Noted: 2020-12-01 | Resolved: 2021-04-09

## 2021-04-09 PROBLEM — E87.6 HYPOKALEMIA: Status: RESOLVED | Noted: 2020-07-15 | Resolved: 2021-04-09

## 2021-04-09 PROBLEM — E83.42 HYPOMAGNESEMIA: Status: RESOLVED | Noted: 2018-01-08 | Resolved: 2021-04-09

## 2021-04-09 PROBLEM — E87.1 HYPONATREMIA: Status: RESOLVED | Noted: 2018-01-05 | Resolved: 2021-04-09

## 2021-04-09 PROBLEM — I73.9 PAD (PERIPHERAL ARTERY DISEASE) (HCC): Status: RESOLVED | Noted: 2019-05-17 | Resolved: 2021-04-09

## 2021-04-09 PROBLEM — I63.9 ACUTE CEREBROVASCULAR ACCIDENT (CVA) (HCC): Status: RESOLVED | Noted: 2020-07-23 | Resolved: 2021-04-09

## 2021-04-09 PROBLEM — R10.30 LOWER ABDOMINAL PAIN: Status: ACTIVE | Noted: 2021-04-09

## 2021-04-09 PROBLEM — I69.319 COGNITIVE DEFICIT AS LATE EFFECT OF CEREBROVASCULAR ACCIDENT (CVA): Status: ACTIVE | Noted: 2021-04-09

## 2021-04-09 LAB
ANION GAP SERPL CALCULATED.3IONS-SCNC: 10 MMOL/L (ref 3–16)
BUN BLDV-MCNC: 5 MG/DL (ref 7–20)
CALCIUM SERPL-MCNC: 9 MG/DL (ref 8.3–10.6)
CHLORIDE BLD-SCNC: 99 MMOL/L (ref 99–110)
CO2: 26 MMOL/L (ref 21–32)
CREAT SERPL-MCNC: <0.5 MG/DL (ref 0.6–1.2)
GFR AFRICAN AMERICAN: >60
GFR NON-AFRICAN AMERICAN: >60
GLUCOSE BLD-MCNC: 88 MG/DL (ref 70–99)
OCCULT BLOOD DIAGNOSTIC: NORMAL
POTASSIUM SERPL-SCNC: 3.8 MMOL/L (ref 3.5–5.1)
PROCALCITONIN: 0.02 NG/ML (ref 0–0.15)
REPORT: NORMAL
SARS-COV-2: NOT DETECTED
SODIUM BLD-SCNC: 135 MMOL/L (ref 136–145)

## 2021-04-09 PROCEDURE — 93970 EXTREMITY STUDY: CPT

## 2021-04-09 PROCEDURE — 73630 X-RAY EXAM OF FOOT: CPT

## 2021-04-09 PROCEDURE — G0378 HOSPITAL OBSERVATION PER HR: HCPCS

## 2021-04-09 PROCEDURE — 6370000000 HC RX 637 (ALT 250 FOR IP): Performed by: INTERNAL MEDICINE

## 2021-04-09 PROCEDURE — 1200000000 HC SEMI PRIVATE

## 2021-04-09 PROCEDURE — 36415 COLL VENOUS BLD VENIPUNCTURE: CPT

## 2021-04-09 PROCEDURE — G0328 FECAL BLOOD SCRN IMMUNOASSAY: HCPCS

## 2021-04-09 PROCEDURE — 99222 1ST HOSP IP/OBS MODERATE 55: CPT | Performed by: SURGERY

## 2021-04-09 PROCEDURE — 6360000002 HC RX W HCPCS: Performed by: INTERNAL MEDICINE

## 2021-04-09 PROCEDURE — 96366 THER/PROPH/DIAG IV INF ADDON: CPT

## 2021-04-09 PROCEDURE — 80048 BASIC METABOLIC PNL TOTAL CA: CPT

## 2021-04-09 PROCEDURE — 84145 PROCALCITONIN (PCT): CPT

## 2021-04-09 PROCEDURE — 2580000003 HC RX 258: Performed by: INTERNAL MEDICINE

## 2021-04-09 RX ORDER — ACETAMINOPHEN 325 MG/1
650 TABLET ORAL EVERY 4 HOURS PRN
Status: DISCONTINUED | OUTPATIENT
Start: 2021-04-09 | End: 2021-04-10 | Stop reason: HOSPADM

## 2021-04-09 RX ADMIN — POTASSIUM CHLORIDE 20 MEQ: 1500 TABLET, EXTENDED RELEASE ORAL at 21:01

## 2021-04-09 RX ADMIN — MUPIROCIN: 20 OINTMENT TOPICAL at 10:03

## 2021-04-09 RX ADMIN — BUSPIRONE HYDROCHLORIDE 10 MG: 5 TABLET ORAL at 14:54

## 2021-04-09 RX ADMIN — PANTOPRAZOLE SODIUM 40 MG: 40 TABLET, DELAYED RELEASE ORAL at 05:59

## 2021-04-09 RX ADMIN — ATORVASTATIN CALCIUM 80 MG: 80 TABLET, FILM COATED ORAL at 21:01

## 2021-04-09 RX ADMIN — BUSPIRONE HYDROCHLORIDE 10 MG: 5 TABLET ORAL at 08:44

## 2021-04-09 RX ADMIN — HYDROMORPHONE HYDROCHLORIDE 2 MG: 2 TABLET ORAL at 18:24

## 2021-04-09 RX ADMIN — BUSPIRONE HYDROCHLORIDE 10 MG: 5 TABLET ORAL at 21:00

## 2021-04-09 RX ADMIN — CEFEPIME HYDROCHLORIDE 2000 MG: 2 INJECTION, POWDER, FOR SOLUTION INTRAVENOUS at 14:50

## 2021-04-09 RX ADMIN — CETIRIZINE HYDROCHLORIDE 10 MG: 10 TABLET, FILM COATED ORAL at 08:44

## 2021-04-09 RX ADMIN — HYDROMORPHONE HYDROCHLORIDE 2 MG: 2 TABLET ORAL at 04:39

## 2021-04-09 RX ADMIN — NYSTATIN: 100000 CREAM TOPICAL at 21:01

## 2021-04-09 RX ADMIN — NYSTATIN: 100000 CREAM TOPICAL at 10:03

## 2021-04-09 RX ADMIN — SUCRALFATE 1 G: 1 TABLET ORAL at 12:06

## 2021-04-09 RX ADMIN — RIVAROXABAN 20 MG: 20 TABLET, FILM COATED ORAL at 08:44

## 2021-04-09 RX ADMIN — HYDROMORPHONE HYDROCHLORIDE 2 MG: 2 TABLET ORAL at 12:06

## 2021-04-09 RX ADMIN — POTASSIUM CHLORIDE 20 MEQ: 1500 TABLET, EXTENDED RELEASE ORAL at 08:43

## 2021-04-09 RX ADMIN — BALSALAZIDE DISODIUM 3000 MG: 750 CAPSULE ORAL at 08:44

## 2021-04-09 RX ADMIN — BACLOFEN 10 MG: 10 TABLET ORAL at 21:01

## 2021-04-09 RX ADMIN — SUCRALFATE 1 G: 1 TABLET ORAL at 21:01

## 2021-04-09 RX ADMIN — FERROUS GLUCONATE TAB 324 MG (37.5 MG ELEMENTAL IRON) 324 MG: 324 (37.5 FE) TAB at 08:44

## 2021-04-09 RX ADMIN — BACLOFEN 10 MG: 10 TABLET ORAL at 08:43

## 2021-04-09 RX ADMIN — METOPROLOL SUCCINATE 50 MG: 50 TABLET, EXTENDED RELEASE ORAL at 08:44

## 2021-04-09 RX ADMIN — CEFEPIME HYDROCHLORIDE 2000 MG: 2 INJECTION, POWDER, FOR SOLUTION INTRAVENOUS at 03:14

## 2021-04-09 RX ADMIN — SUCRALFATE 1 G: 1 TABLET ORAL at 17:37

## 2021-04-09 RX ADMIN — MUPIROCIN: 20 OINTMENT TOPICAL at 21:01

## 2021-04-09 RX ADMIN — SUCRALFATE 1 G: 1 TABLET ORAL at 08:44

## 2021-04-09 RX ADMIN — FUROSEMIDE 40 MG: 40 TABLET ORAL at 08:44

## 2021-04-09 RX ADMIN — BACLOFEN 10 MG: 10 TABLET ORAL at 14:54

## 2021-04-09 RX ADMIN — ACETAMINOPHEN 650 MG: 325 TABLET ORAL at 17:41

## 2021-04-09 ASSESSMENT — PAIN DESCRIPTION - LOCATION: LOCATION: LEG

## 2021-04-09 ASSESSMENT — PAIN SCALES - GENERAL
PAINLEVEL_OUTOF10: 10
PAINLEVEL_OUTOF10: 7

## 2021-04-09 ASSESSMENT — PAIN DESCRIPTION - ORIENTATION: ORIENTATION: RIGHT;LEFT

## 2021-04-09 ASSESSMENT — PAIN DESCRIPTION - PAIN TYPE: TYPE: ACUTE PAIN

## 2021-04-09 NOTE — PLAN OF CARE
Problem: Falls - Risk of:  Goal: Will remain free from falls  Description: Will remain free from falls  4/8/2021 2323 by Carl Scott RN  Outcome: Ongoing    Goal: Absence of physical injury  Description: Absence of physical injury  4/8/2021 2323 by Carl Scott RN  Outcome: Ongoing       Problem: Pain:  Goal: Pain level will decrease  Description: Pain level will decrease  4/8/2021 2323 by Carl Scott RN  Outcome: Ongoing    Goal: Control of acute pain  Description: Control of acute pain  4/8/2021 2323 by Carl Scott RN  Outcome: Ongoing    Goal: Control of chronic pain  Description: Control of chronic pain  4/8/2021 2323 by Carl Scott RN  Outcome: Ongoing       Problem: Skin Integrity:  Goal: Will show no infection signs and symptoms  Description: Will show no infection signs and symptoms  4/8/2021 2323 by Carl Scott RN  Outcome: Ongoing    Goal: Absence of new skin breakdown  Description: Absence of new skin breakdown  4/8/2021 2323 by Carl Scott RN  Outcome: Ongoing

## 2021-04-09 NOTE — PLAN OF CARE
Problem: Falls - Risk of:  Goal: Will remain free from falls  Description: Will remain free from falls  Outcome: Met This Shift  Goal: Absence of physical injury  Description: Absence of physical injury  Outcome: Met This Shift  Note: Fall precautions in place       Problem: Skin Integrity:  Goal: Will show no infection signs and symptoms  Description: Will show no infection signs and symptoms  Outcome: Met This Shift  Goal: Absence of new skin breakdown  Description: Absence of new skin breakdown  Outcome: Met This Shift  Note: Repositioned often. Patient able to move self     Problem: Pain:  Goal: Pain level will decrease  Description: Pain level will decrease  Outcome: Ongoing  Goal: Control of acute pain  Description: Control of acute pain  Outcome: Ongoing  Goal: Control of chronic pain  Description: Control of chronic pain  Outcome: Ongoing  Note: Patient states she is stilling having pain in her legs as well as a headache. PRN dilaudid given.

## 2021-04-09 NOTE — PROGRESS NOTES
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

## 2021-04-09 NOTE — CONSULTS
BILATERAL, ARTHROTOMY MPJ 2ND BILATERAL    FOOT SURGERY Left 08/05/2002     Excision second metatarsal head left    FRACTURE SURGERY         rt hip    HAND CARPECTOMY Bilateral      HEEL SPUR SURGERY        HERNIA REPAIR        HYSTERECTOMY         bladder surgery    JOINT REPLACEMENT         rt hip, L shoulder replacement 11/2014    KNEE SURGERY Bilateral       arthrosvopic    LEG SURGERY Right      MN SECD CLOS SURG WND EXTEN/COMPLIC N/A 86/06/8624     SECONDARY WOUND CLOSURE OF ABDOMINAL WOUND performed by Maureen Phalen, MD at [address] PROCTOSIGMOIDOSCOPY N/A 7/17/2019     FLEXIBLE SIGMOIDOSCOPY performed by Maureen Phalen, MD at [facility]   01/15/2018     with biopsies    SMALL INTESTINE SURGERY N/A 7/17/2019     COLOSTOMY CLOSURE WITH COLORECTAL ANASTOMOSIS performed by Maureen Phalen, MD at [address] [facility]   6/14/13     and colonsocopy with antral erosion biopsies         Family History         Family History   Problem Relation Age of Onset    High Blood Pressure Mother      High Blood Pressure Father      Kidney Disease Sister           Social History   Social History            Socioeconomic History    Marital status:        Spouse name: [facility] Number of children: 2    Years of education: 15    Highest education level: None   Occupational History    None   Social Needs    Financial resource strain: None    Food insecurity       Worry: None       Inability: None    Transportation needs       Medical: None       Non-medical: None   Tobacco Use    Smoking status: Current Every Day Smoker       Packs/day: 1.00       Years: 10.00       Pack years: 10.00       Types: Cigarettes, E-Cigarettes    Smokeless tobacco: Never Used    Tobacco comment: CUT BACK TO 1/2 PPD WITH E CIG 6-2019   Substance and Sexual Activity    Alcohol use:  Yes       Alcohol/week: 2.0 standard drinks       Types: 2 (ZYRTEC) tablet 10 mg  10 mg Oral Daily Milagros Hickman MD   10 mg at 04/08/21 0911    atorvastatin (LIPITOR) tablet 80 mg  80 mg Oral Nightly Milagros Hickman MD   80 mg at 04/08/21 2028    fluticasone (FLONASE) 50 MCG/ACT nasal spray 1 spray  1 spray Each Nostril Daily PRN Milagros Hickman MD        furosemide (LASIX) tablet 40 mg  40 mg Oral Daily Milagros Hickman MD   40 mg at 04/08/21 0911    potassium chloride (KLOR-CON M) extended release tablet 20 mEq  20 mEq Oral BID Milagros Hickman MD   20 mEq at 04/08/21 2028    rivaroxaban (XARELTO) tablet 20 mg  20 mg Oral Daily with breakfast Milagros Hickman MD   20 mg at 04/08/21 0831    metoprolol succinate (TOPROL XL) extended release tablet 50 mg  50 mg Oral Daily Milagros Hickman MD   50 mg at 04/08/21 0911    cefepime (MAXIPIME) 2000 mg IVPB minibag  2,000 mg Intravenous Q12H Milagros Hickman MD   Stopped at 04/09/21 0344    HYDROmorphone (DILAUDID) tablet 2 mg  2 mg Oral Q6H PRN Milagros Hickman MD   2 mg at 04/09/21 0677         No current facility-administered medications on file prior to encounter.              Current Outpatient Medications on File Prior to Encounter   Medication Sig Dispense Refill    metoprolol succinate (TOPROL XL) 50 MG extended release tablet Take 1 tablet by mouth daily 30 tablet 5    rivaroxaban (XARELTO) 20 MG TABS tablet Take 1 tablet by mouth daily (with breakfast) 90 tablet 3    atorvastatin (LIPITOR) 80 MG tablet Take 1 tablet by mouth nightly 30 tablet 3    fluticasone (FLONASE) 50 MCG/ACT nasal spray 1 spray by Each Nostril route daily as needed for Rhinitis 1 Bottle 3    furosemide (LASIX) 40 MG tablet Take 1 tablet by mouth daily (Patient taking differently: Take 40 mg by mouth daily Pt takes two  To three times a week) 60 tablet 3    potassium chloride (KLOR-CON M) 20 MEQ extended release tablet Take 1 tablet by mouth 2 times daily 60 tablet 3    ferrous gluconate (FERGON) 324 (38 Fe) MG tablet Take 324 mg by mouth daily (with breakfast)        sucralfate (CARAFATE) 1 GM tablet Take 1 g by mouth 4 times daily        hydrOXYzine (ATARAX) 10 MG tablet Take 10 mg by mouth 3 times daily as needed for Itching        fexofenadine (ALLEGRA) 180 MG tablet Take 180 mg by mouth daily        ondansetron (ZOFRAN ODT) 4 MG disintegrating tablet Take 1 tablet by mouth every 8 hours as needed for Nausea 20 tablet 0    busPIRone (BUSPAR) 10 MG tablet Take 10 mg by mouth 3 times daily as needed        nystatin (MYCOSTATIN) 879079 UNIT/GM cream Apply topically 2 times daily as needed for Dry Skin Apply topically 2 times daily.        balsalazide (COLAZAL) 750 MG capsule Take 3,000 mg by mouth daily Take 4 capsules (total 3000 mg) daily each morning.        mupirocin (BACTROBAN) 2 % ointment Apply daily 1 Tube 0    omeprazole (PRILOSEC) 20 MG delayed release capsule Take 20 mg by mouth Daily         baclofen (LIORESAL) 10 MG tablet Take 1 tablet by mouth 3 times daily 90 tablet 1           Allergies   Allergen Reactions    Ace Inhibitors Swelling    Skelaxin [Metaxalone] Swelling          ROS: Denies nausea, vomiting, fevers, chills. Objective:    BP (!) 140/91   Pulse 70   Temp 97.9 °F (36.6 °C) (Temporal)   Resp 15   Ht 5' 7\" (1.702 m)   Wt 194 lb 14.2 oz (88.4 kg) Comment: after re-zeroing bed  SpO2 95%   BMI 30.52 kg/m²   In: 250 [P.O.:250]  Out: 2375 [Urine:2375]     Exam:  Vascular: Vascular status Impaired  palpable pedal pulses, right DP1/4 and PT1/4, left DP1/4 and PT1/4. Fayrene Seguin growthAbsent  both lower extremities and feet.  Skin temperature is warm to warm from pretibial area to distal digits bilateral.  Exam is negative for pallor, cyanosis or signs of acute vascular compromise bilaterally.  Positive for rubor. exam is positive for edema bilateral lower extremity.  Varicosities Present bilateral lower extremity.    Neuro: Neurologic status normal bilateral with epicritic Present , proprioceptive Present, vibratory sensation Present and protopathic Present.  DTRs Present bilateral Achilles. There were no reproducible neuritic symptoms on exam bilateral feet/ankles. Derm: No Ulceration to bilateral feet.  Ecchymosis Absent  bilateral feet/foot. No open ulcerations b/l LE.   eschar noted to 3rd toe L foot with no SOI. Eschar does not appear gangrenous in nature. Musculoskeletal: Pain with palpation of the foot at distal toes she complains of pain.  4/5 muscle strength in/eversion and dorsi/plantarflexion bilateral feet.  No gross instability noted. Crossover toes noted b/l. Data:    CBC with Differential:    Lab Results   Component Value Date    WBC 5.5 04/08/2021    RBC 3.41 04/08/2021    HGB 10.3 04/08/2021    HCT 31.5 04/08/2021     04/08/2021    MCV 92.6 04/08/2021    MCH 30.3 04/08/2021    MCHC 32.7 04/08/2021    RDW 14.4 04/08/2021    SEGSPCT 56.4 06/15/2013    BANDSPCT 2 11/27/2018    METASPCT 1 07/31/2019    LYMPHOPCT 29.1 04/08/2021    MONOPCT 6.7 04/08/2021    MYELOPCT 1 01/10/2018    BASOPCT 1.0 04/08/2021    MONOSABS 0.4 04/08/2021    LYMPHSABS 1.6 04/08/2021    EOSABS 0.8 04/08/2021    BASOSABS 0.1 04/08/2021    DIFFTYPE Auto 06/15/2013     CMP:    Lab Results   Component Value Date     04/08/2021    K 4.2 04/08/2021     04/08/2021    CO2 21 04/08/2021    BUN 8 04/08/2021    CREATININE 0.5 04/08/2021    GFRAA >60 04/08/2021    GFRAA >60 06/14/2013    AGRATIO 1.1 12/17/2019    LABGLOM >60 04/08/2021    GLUCOSE 85 04/08/2021    PROT 7.0 04/08/2021    LABALBU 3.5 04/08/2021    CALCIUM 9.1 04/08/2021    BILITOT 0.3 04/08/2021    ALKPHOS 70 04/08/2021    AST 15 04/08/2021    ALT <5 04/08/2021        Assessment:  Edema b/l LE. Pain to toes  Eschar/wound L 3rd toe. Plan:  Pt examined and evaluated. Notes reviewed. Will order xray of L foot. Abx per primary  WBAT b/l. Betadine to toe and b/l ace wraps to legs. Will follow on xray. No apparent overt infection b/l LE. Podiatry not planning any surgery at this time.         Tremaine Guido DPM  4/9/2021

## 2021-04-09 NOTE — CONSULTS
HEEL SPUR SURGERY      HERNIA REPAIR      HYSTERECTOMY      bladder surgery    JOINT REPLACEMENT      rt hip, L shoulder replacement 11/2014    KNEE SURGERY Bilateral     arthrosvopic    LEG SURGERY Right     [place] SECD CLOS SURG WND EXTEN/COMPLIC N/A 24/54/7067    SECONDARY WOUND CLOSURE OF ABDOMINAL WOUND performed by Tommy Segovia MD at [address] N/A 7/17/2019    FLEXIBLE SIGMOIDOSCOPY performed by Tommy Segovia MD at [address] [facility]  01/15/2018    with biopsies    SMALL INTESTINE SURGERY N/A 7/17/2019    COLOSTOMY CLOSURE WITH COLORECTAL ANASTOMOSIS performed by Tommy Segovia MD at [place] [address]  6/14/13    and colonsocopy with antral erosion biopsies     Family History   Problem Relation Age of Onset    High Blood Pressure Mother     High Blood Pressure Father     Kidney Disease Sister      Social History     Socioeconomic History    Marital status:      Spouse name: Casandra Nolasco Number of children: 2    Years of education: 15    Highest education level: None   Occupational History    None   Social Needs    Financial resource strain: None    Food insecurity     Worry: None     Inability: None    Transportation needs     Medical: None     Non-medical: None   Tobacco Use    Smoking status: Current Every Day Smoker     Packs/day: 1.00     Years: 10.00     Pack years: 10.00     Types: Cigarettes, E-Cigarettes    Smokeless tobacco: Never Used    Tobacco comment: CUT BACK TO 1/2 PPD WITH E CIG 6-2019   Substance and Sexual Activity    Alcohol use:  Yes     Alcohol/week: 2.0 standard drinks     Types: 2 Shots of liquor per week     Comment: 6 DRINKS A WEEK OR LESS    Drug use: No    Sexual activity: Not Currently     Partners: Male   Lifestyle    Physical activity     Days per week: None     Minutes per session: None    Stress: None   Relationships    Social connections     Talks on phone: None     Gets together: None     Attends Anabaptism service: None     Active member of club or organization: None     Attends meetings of clubs or organizations: None     Relationship status: None    Intimate partner violence     Fear of current or ex partner: None     Emotionally abused: None     Physically abused: None     Forced sexual activity: None   Other Topics Concern    None   Social History Narrative    None     Current Facility-Administered Medications   Medication Dose Route Frequency Provider Last Rate Last Admin    baclofen (LIORESAL) tablet 10 mg  10 mg Oral TID Akil Tolentino MD   10 mg at 04/08/21 2028    pantoprazole (PROTONIX) tablet 40 mg  40 mg Oral QAM AC Akil Tolentino MD   40 mg at 04/09/21 0559    mupirocin (BACTROBAN) 2 % ointment   Topical BID Akil Tolentino MD   Given at 04/08/21 2029    busPIRone (BUSPAR) tablet 10 mg  10 mg Oral TID Akil Tolentino MD   10 mg at 04/08/21 2029    nystatin (MYCOSTATIN) cream   Topical BID Akil Tolentino MD   Given at 04/08/21 2029    balsalazide (COLAZAL) capsule 3,000 mg  3,000 mg Oral Daily Akil Tolentino MD   3,000 mg at 04/08/21 0917    ondansetron (ZOFRAN-ODT) disintegrating tablet 4 mg  4 mg Oral Q8H PRN Akil Tolentino MD        ferrous gluconate 324 (37.5 Fe) MG tablet 324 mg  324 mg Oral Daily with breakfast Akil Tolentino MD   324 mg at 04/08/21 1894    sucralfate (CARAFATE) tablet 1 g  1 g Oral 4x Daily Akil Tolentino MD   1 g at 04/08/21 2029    hydrOXYzine (ATARAX) tablet 10 mg  10 mg Oral TID PRN Akil Tolentino MD        cetirizine (ZYRTEC) tablet 10 mg  10 mg Oral Daily Akil Tolentino MD   10 mg at 04/08/21 0911    atorvastatin (LIPITOR) tablet 80 mg  80 mg Oral Nightly Akil Tolentino MD   80 mg at 04/08/21 2028    fluticasone (FLONASE) 50 MCG/ACT nasal spray 1 spray  1 spray Each Nostril Daily PRN Akil Tolentino MD        furosemide (LASIX) tablet 40 mg  40 mg Oral Daily Akil Tolentino MD 40 mg at 04/08/21 0911    potassium chloride (KLOR-CON M) extended release tablet 20 mEq  20 mEq Oral BID Curt Heredia MD   20 mEq at 04/08/21 2028    rivaroxaban (XARELTO) tablet 20 mg  20 mg Oral Daily with breakfast Curt Heredia MD   20 mg at 04/08/21 0830    metoprolol succinate (TOPROL XL) extended release tablet 50 mg  50 mg Oral Daily Curt Heredia MD   50 mg at 04/08/21 0911    cefepime (MAXIPIME) 2000 mg IVPB minibag  2,000 mg Intravenous Q12H Curt Heredia MD   Stopped at 04/09/21 0344    HYDROmorphone (DILAUDID) tablet 2 mg  2 mg Oral Q6H PRN Curt Heredia MD   2 mg at 04/09/21 0439     No current facility-administered medications on file prior to encounter.       Current Outpatient Medications on File Prior to Encounter   Medication Sig Dispense Refill    metoprolol succinate (TOPROL XL) 50 MG extended release tablet Take 1 tablet by mouth daily 30 tablet 5    rivaroxaban (XARELTO) 20 MG TABS tablet Take 1 tablet by mouth daily (with breakfast) 90 tablet 3    atorvastatin (LIPITOR) 80 MG tablet Take 1 tablet by mouth nightly 30 tablet 3    fluticasone (FLONASE) 50 MCG/ACT nasal spray 1 spray by Each Nostril route daily as needed for Rhinitis 1 Bottle 3    furosemide (LASIX) 40 MG tablet Take 1 tablet by mouth daily (Patient taking differently: Take 40 mg by mouth daily Pt takes two  To three times a week) 60 tablet 3    potassium chloride (KLOR-CON M) 20 MEQ extended release tablet Take 1 tablet by mouth 2 times daily 60 tablet 3    ferrous gluconate (FERGON) 324 (38 Fe) MG tablet Take 324 mg by mouth daily (with breakfast)      sucralfate (CARAFATE) 1 GM tablet Take 1 g by mouth 4 times daily      hydrOXYzine (ATARAX) 10 MG tablet Take 10 mg by mouth 3 times daily as needed for Itching      fexofenadine (ALLEGRA) 180 MG tablet Take 180 mg by mouth daily      ondansetron (ZOFRAN ODT) 4 MG disintegrating tablet Take 1 tablet by mouth every 8 hours as needed for Nausea 20 tablet 0    busPIRone (BUSPAR) 10 MG tablet Take 10 mg by mouth 3 times daily as needed      nystatin (MYCOSTATIN) 368152 UNIT/GM cream Apply topically 2 times daily as needed for Dry Skin Apply topically 2 times daily.  balsalazide (COLAZAL) 750 MG capsule Take 3,000 mg by mouth daily Take 4 capsules (total 3000 mg) daily each morning.  mupirocin (BACTROBAN) 2 % ointment Apply daily 1 Tube 0    omeprazole (PRILOSEC) 20 MG delayed release capsule Take 20 mg by mouth Daily       baclofen (LIORESAL) 10 MG tablet Take 1 tablet by mouth 3 times daily 90 tablet 1     Allergies   Allergen Reactions    Ace Inhibitors Swelling    Skelaxin [Metaxalone] Swelling       Review of Systems  Pertinent items are noted in HPI. Limited by pt aphasia     Objective:     /81   Pulse 76   Temp 99 °F (37.2 °C) (Temporal)   Resp 16   Ht 5' 7\" (1.702 m)   Wt 194 lb 14.2 oz (88.4 kg) Comment: after re-zeroing bed  SpO2 93%   BMI 30.52 kg/m²     General:  alert, appears stated age and cooperative   Skin:  normal   Eyes: conjunctivae/corneas clear. PERRL, EOM's intact. Fundi benign. Mouth: MMM no lesions, poor dentition   Lymph Nodes:  Cervical, supraclavicular, and axillary nodes normal.   Lungs:  clear to auscultation bilaterally   Heart:  regular rate and rhythm, S1, S2 normal, no murmur, click, rub or gallop   Abdomen: soft, non-tender; bowel sounds normal; no masses,  no organomegaly; well healed scars   CVA:  absent   Genitourinary: defer exam   Extremities:  1+ pitting edema BLE  L foot with healed surgical scars - tender L 3rd toe with scabbing (no panda gangrene or drainage)  B feet deformed with crossed toes.     Pulses:   R bruit  L bruit   2   carotid 2    2   brachial 2    2   radial 2    2   femoral 2    2   popliteal 2    1   posterior tibial 2    2   dorsalis pedis 1    na   bypass graft na      Excellent strong multiphasic doppler signals     Neurologic:  negative   Psychiatric:  non focal Assessment:     No evidence of significant bilateral lower extremity arterial disease. No signs of arterial ischemia. L foot changes podiatric in nature. Rec:     No further vascular diagnostics or interventions planned. Would suggest podiatric referral for L foot evaluation and management. Will see as needed - please call for any further problems or questions. Thanks for this consultation.     Milagros Lynn

## 2021-04-09 NOTE — PROGRESS NOTES
Assessment completed, see doc flowsheets. Pt is A&Ox3, mild slur speech with expressive aphasia Lung sounds are clear/diminished. VSS. Tele on. Medication given per [facility]. Patient has no needs at this time. Call light within in reach, will continue to monitor.

## 2021-04-09 NOTE — PROGRESS NOTES
Nutrition Assessment     Type and Reason for Visit: Initial, Wound, Positive Nutrition Screen    Nutrition Recommendations/Plan:   No nutrition rec's @ this time     Nutrition Assessment:  Pt is eating % of meals on dental soft diet. Pt has increased nutrient needs d/t skin breakdown, however po intake adequately promoting healing @ this time. No wt loss per hx. Remains at low nutrition risk as long as intake consistently greater than 50% of meals. Malnutrition Assessment:  Malnutrition Status: No malnutrition    Nutrition Related Findings: LBM 4/8; +2 nonpitting edema BLE; Na 133      Current Nutrition Therapies:    DIET DENTAL SOFT;    Anthropometric Measures:  · Height: 5' 7\" (170.2 cm)  · Current Body Wt: 194 lb (88 kg)   · BMI: 30.4    Nutrition Diagnosis:   · Increased nutrient needs related to increase demand for energy/nutrients as evidenced by wounds      Nutrition Interventions:   Food and/or Nutrient Delivery:  Continue Current Diet  Nutrition Education/Counseling:  Education not indicated   Coordination of Nutrition Care:  Continue to monitor while inpatient    Goals:  po intake at least 50% of meals       Nutrition Monitoring and Evaluation:   Behavioral-Environmental Outcomes:  None Identified   Food/Nutrient Intake Outcomes:  Food and Nutrient Intake  Physical Signs/Symptoms Outcomes:  Skin, Weight     Discharge Planning:     Too soon to determine     Electronically signed by Maci Vega RD, LD on 4/9/21 at 12:38 PM EDT    Contact: [phone]

## 2021-04-09 NOTE — CARE COORDINATION
Patient has wound care consult. Patient seen by vascular surgery today, who found no vascular issues. Podiatry also consulted. Patient known to podiatry service. Xray of left foot  ordered by podiatry. Betadine was ordered for left toe and bilateral ace wraps to legs. Will continue to follow this admission. Please consult [phone] [facility] nurse if needed.  Maame Pizano, CLAUDIAN, RN  [facility]

## 2021-04-10 VITALS
SYSTOLIC BLOOD PRESSURE: 151 MMHG | HEART RATE: 71 BPM | BODY MASS INDEX: 30.45 KG/M2 | HEIGHT: 67 IN | TEMPERATURE: 98.4 F | RESPIRATION RATE: 18 BRPM | WEIGHT: 194 LBS | OXYGEN SATURATION: 96 % | DIASTOLIC BLOOD PRESSURE: 95 MMHG

## 2021-04-10 LAB
ANION GAP SERPL CALCULATED.3IONS-SCNC: 10 MMOL/L (ref 3–16)
BUN BLDV-MCNC: 4 MG/DL (ref 7–20)
CALCIUM SERPL-MCNC: 8.9 MG/DL (ref 8.3–10.6)
CHLORIDE BLD-SCNC: 99 MMOL/L (ref 99–110)
CO2: 25 MMOL/L (ref 21–32)
CREAT SERPL-MCNC: <0.5 MG/DL (ref 0.6–1.2)
GFR AFRICAN AMERICAN: >60
GFR NON-AFRICAN AMERICAN: >60
GLUCOSE BLD-MCNC: 97 MG/DL (ref 70–99)
POTASSIUM SERPL-SCNC: 3.8 MMOL/L (ref 3.5–5.1)
SODIUM BLD-SCNC: 134 MMOL/L (ref 136–145)

## 2021-04-10 PROCEDURE — 80048 BASIC METABOLIC PNL TOTAL CA: CPT

## 2021-04-10 PROCEDURE — 36415 COLL VENOUS BLD VENIPUNCTURE: CPT

## 2021-04-10 PROCEDURE — 96366 THER/PROPH/DIAG IV INF ADDON: CPT

## 2021-04-10 PROCEDURE — 6370000000 HC RX 637 (ALT 250 FOR IP): Performed by: INTERNAL MEDICINE

## 2021-04-10 PROCEDURE — G0378 HOSPITAL OBSERVATION PER HR: HCPCS

## 2021-04-10 PROCEDURE — 2580000003 HC RX 258: Performed by: INTERNAL MEDICINE

## 2021-04-10 PROCEDURE — 6360000002 HC RX W HCPCS: Performed by: INTERNAL MEDICINE

## 2021-04-10 RX ORDER — CEPHALEXIN 250 MG/1
250 CAPSULE ORAL 4 TIMES DAILY
Qty: 40 CAPSULE | Refills: 0 | Status: SHIPPED | OUTPATIENT
Start: 2021-04-10 | End: 2021-04-20

## 2021-04-10 RX ORDER — ACETAMINOPHEN, ASPIRIN AND CAFFEINE 250; 250; 65 MG/1; MG/1; MG/1
1 TABLET, FILM COATED ORAL EVERY 6 HOURS PRN
Status: DISCONTINUED | OUTPATIENT
Start: 2021-04-10 | End: 2021-04-10 | Stop reason: HOSPADM

## 2021-04-10 RX ORDER — HYDROMORPHONE HYDROCHLORIDE 2 MG/1
2 TABLET ORAL EVERY 6 HOURS PRN
Qty: 12 TABLET | Refills: 0 | Status: SHIPPED | OUTPATIENT
Start: 2021-04-10 | End: 2021-04-10 | Stop reason: HOSPADM

## 2021-04-10 RX ORDER — CEPHALEXIN 250 MG/1
250 CAPSULE ORAL EVERY 6 HOURS SCHEDULED
Status: DISCONTINUED | OUTPATIENT
Start: 2021-04-10 | End: 2021-04-10 | Stop reason: HOSPADM

## 2021-04-10 RX ORDER — ACETAMINOPHEN, ASPIRIN AND CAFFEINE 250; 250; 65 MG/1; MG/1; MG/1
1 TABLET, FILM COATED ORAL EVERY 6 HOURS PRN
Qty: 90 TABLET | Refills: 3 | Status: SHIPPED | OUTPATIENT
Start: 2021-04-10 | End: 2021-04-10 | Stop reason: HOSPADM

## 2021-04-10 RX ADMIN — FERROUS GLUCONATE TAB 324 MG (37.5 MG ELEMENTAL IRON) 324 MG: 324 (37.5 FE) TAB at 08:27

## 2021-04-10 RX ADMIN — ACETAMINOPHEN 650 MG: 325 TABLET ORAL at 12:16

## 2021-04-10 RX ADMIN — FUROSEMIDE 40 MG: 40 TABLET ORAL at 08:26

## 2021-04-10 RX ADMIN — SUCRALFATE 1 G: 1 TABLET ORAL at 12:14

## 2021-04-10 RX ADMIN — MUPIROCIN: 20 OINTMENT TOPICAL at 08:28

## 2021-04-10 RX ADMIN — HYDROMORPHONE HYDROCHLORIDE 2 MG: 2 TABLET ORAL at 18:55

## 2021-04-10 RX ADMIN — BALSALAZIDE DISODIUM 3000 MG: 750 CAPSULE ORAL at 08:26

## 2021-04-10 RX ADMIN — BACLOFEN 10 MG: 10 TABLET ORAL at 14:36

## 2021-04-10 RX ADMIN — CETIRIZINE HYDROCHLORIDE 10 MG: 10 TABLET, FILM COATED ORAL at 08:27

## 2021-04-10 RX ADMIN — ACETAMINOPHEN 650 MG: 325 TABLET ORAL at 01:09

## 2021-04-10 RX ADMIN — CEFEPIME HYDROCHLORIDE 2000 MG: 2 INJECTION, POWDER, FOR SOLUTION INTRAVENOUS at 04:00

## 2021-04-10 RX ADMIN — HYDROMORPHONE HYDROCHLORIDE 2 MG: 2 TABLET ORAL at 06:13

## 2021-04-10 RX ADMIN — SUCRALFATE 1 G: 1 TABLET ORAL at 08:27

## 2021-04-10 RX ADMIN — RIVAROXABAN 20 MG: 20 TABLET, FILM COATED ORAL at 08:26

## 2021-04-10 RX ADMIN — HYDROMORPHONE HYDROCHLORIDE 2 MG: 2 TABLET ORAL at 00:28

## 2021-04-10 RX ADMIN — POTASSIUM CHLORIDE 20 MEQ: 1500 TABLET, EXTENDED RELEASE ORAL at 08:27

## 2021-04-10 RX ADMIN — METOPROLOL SUCCINATE 50 MG: 50 TABLET, EXTENDED RELEASE ORAL at 08:27

## 2021-04-10 RX ADMIN — BACLOFEN 10 MG: 10 TABLET ORAL at 08:27

## 2021-04-10 RX ADMIN — NYSTATIN: 100000 CREAM TOPICAL at 08:28

## 2021-04-10 RX ADMIN — PANTOPRAZOLE SODIUM 40 MG: 40 TABLET, DELAYED RELEASE ORAL at 06:13

## 2021-04-10 RX ADMIN — CEPHALEXIN 250 MG: 250 CAPSULE ORAL at 17:53

## 2021-04-10 RX ADMIN — SUCRALFATE 1 G: 1 TABLET ORAL at 17:40

## 2021-04-10 RX ADMIN — ACETAMINOPHEN, ASPIRIN AND CAFFEINE 1 TABLET: 250; 250; 65 TABLET, FILM COATED ORAL at 14:36

## 2021-04-10 RX ADMIN — CEFEPIME HYDROCHLORIDE 2000 MG: 2 INJECTION, POWDER, FOR SOLUTION INTRAVENOUS at 14:36

## 2021-04-10 RX ADMIN — BUSPIRONE HYDROCHLORIDE 10 MG: 5 TABLET ORAL at 14:36

## 2021-04-10 RX ADMIN — BUSPIRONE HYDROCHLORIDE 10 MG: 5 TABLET ORAL at 08:26

## 2021-04-10 RX ADMIN — HYDROMORPHONE HYDROCHLORIDE 2 MG: 2 TABLET ORAL at 12:14

## 2021-04-10 ASSESSMENT — PAIN DESCRIPTION - LOCATION
LOCATION: LEG
LOCATION: HEAD
LOCATION: HEAD

## 2021-04-10 ASSESSMENT — PAIN SCALES - GENERAL
PAINLEVEL_OUTOF10: 0
PAINLEVEL_OUTOF10: 10
PAINLEVEL_OUTOF10: 8

## 2021-04-10 ASSESSMENT — PAIN DESCRIPTION - ORIENTATION: ORIENTATION: RIGHT;LEFT

## 2021-04-10 ASSESSMENT — PAIN DESCRIPTION - PAIN TYPE
TYPE: CHRONIC PAIN
TYPE: ACUTE PAIN
TYPE: ACUTE PAIN

## 2021-04-10 NOTE — DISCHARGE INSTR - COC
Continuity of Care Form    Patient Name: Tyler Cortes   :  1955  MRN:  [id number]    Admit date:  2021  Discharge date:  04/10/2021    Code Status Order: Full Code   Advance Directives:   Advance Care Flowsheet Documentation       Date/Time Healthcare Directive Type of Healthcare Directive Copy in [address] Agent's Name Healthcare Agent's Phone Number    [phone]  No, patient does not have an advance directive for healthcare treatment -- -- -- -- --            Admitting Physician:  Milagros Hickman MD  PCP: Sidney Cortes MD    Discharging Nurse: [facility] Unit/Room#: I2P-7723/9449-55  Discharging Unit Phone Number: [phone]    Emergency Contact:   Extended Emergency Contact Information  Primary Emergency Contact: Gretta Keller  Address:                     Home Phone: [phone]  Mobile Phone: [phone]  Relation: Child  Secondary Emergency Contact: Omari Keller  Address: [address] [place] [address] Bernadette Barmbila [address] Phone: [phone]  Relation: Spouse    Past Surgical History:  Past Surgical History:   Procedure Laterality Date    ABDOMEN SURGERY      ABDOMINAL ADHESION SURGERY  2018    BLADDER SUSPENSION      COLONOSCOPY      COLONOSCOPY  9/14/15    sigmoid colon biopsy     COLONOSCOPY  2018    COLONOSCOPY  2019    COLONOSCOPY, POSSIBLE BIOPSY, POSSIBLE POLYPECTOMY    COLONOSCOPY N/A 2019    COLONOSCOPY WITH BIOPSY performed by Teodoro Judge MD at [facility] 2019    COLONOSCOPY DIAGNOSTIC/STOMA performed by Teodoro Judge MD at [address] [facility] 10/8/2018    [facility] performed by Tommy Segovia MD at [address] [facility] Left 14    excision plantar left hallux    FOOT SURGERY  6/3/15    PLANTAR PLATE REPAIR BILATERAL, ARTHROTOMY MPJ 2ND BILATERAL    FOOT SURGERY Left 08/05/2002    Excision second metatarsal head left    FRACTURE SURGERY      rt hip    HAND CARPECTOMY Bilateral     HEEL SPUR SURGERY      HERNIA REPAIR      HYSTERECTOMY      bladder surgery    JOINT REPLACEMENT      rt hip, L shoulder replacement 11/2014    KNEE SURGERY Bilateral     arthrosvopic    LEG SURGERY Right     UT SECD CLOS SURG WND EXTEN/COMPLIC N/A 05/82/0110    SECONDARY WOUND CLOSURE OF ABDOMINAL WOUND performed by Fern Kyle MD at [address] N/A 7/17/2019    FLEXIBLE SIGMOIDOSCOPY performed by Fern Kyle MD at [address]  01/15/2018    with biopsies    SMALL INTESTINE SURGERY N/A 7/17/2019    COLOSTOMY CLOSURE WITH COLORECTAL ANASTOMOSIS performed by Fern Kyle MD at [place] [address]  6/14/13    and colonsocopy with antral erosion biopsies       Immunization History:   Immunization History   Administered Date(s) Administered    PPD Test 01/16/2018    Tdap (Boostrix, Adacel) 07/15/2020       Active Problems:  Patient Active Problem List   Diagnosis Code    Anemia D64.9    Crohn's colitis ([facility] [address] K50.10    DVT (deep venous thrombosis) (formerly [facility]) I82.409    Leg swelling M79.89    Venous insufficiency I87.2    Chronic venous hypertension (idiopathic) with inflammation of bilateral lower extremity I87.323    Open wound of left foot with complication U99.579Q    [employer] LINQ 7/29/20 Dr Easton Boles Z45.09    LV dysfunction I51.9    PAF (paroxysmal atrial fibrillation) (formerly [facility]) I48.0    Essential hypertension I10    S/P coronary angiogram Z98.890    Abnormal angiogram R93.89    Atherosclerosis of native arteries of the extremities with ulceration ([facility] [address] I70.25    Nail avulsion of toe, initial encounter S80.46A    UTI (urinary tract infection) N39.0    Lower abdominal pain R10.30    Late effect of ischemic cerebral stroke I69.30    Cognitive deficit as late effect of cerebrovascular accident (CVA) I69.319       Isolation/Infection:   Isolation            No Isolation          Unreconciled Outside Infections       Enable clinical decision support by reconciling outside information with the patient's chart. .      Infection Onset Last Indicated Last Received Source    No mapped external infections found      . Unmapped Infections        C. difficile 20 [facility]                  Patient Infection Status       Infection Onset Added Last Indicated Last Indicated By Review Planned Expiration Resolved Resolved By    C-diff Rule Out 21 Clostridium difficile toxin/antigen (Ordered)        Resolved    COVID-19 Rule Out 21 COVID-19 (Ordered)   21 Rule-Out Test Resulted    COVID-19 Rule Out 20 COVID-19 (Ordered)   20     COVID-19 Rule Out 20 COVID-19 (Ordered)   20 Rule-Out Test Resulted    VRE  10/12/18 10/12/18 Tenzin Abel Callahan RN   10/25/19 Tenzin Abel Callahna RN    10/8/18; abd culture    MRSA  18 Tenzin Abel Callahan RN   10/25/19 Penny Paredes RN    18; urine            Nurse Assessment:  Last Vital Signs: BP (!) 151/95   Pulse 71   Temp 98.4 °F (36.9 °C) (Temporal)   Resp 18   Ht 5' 7\" (1.702 m)   Wt 194 lb 0.1 oz (88 kg)   SpO2 96%   BMI 30.39 kg/m²     Last documented pain score (0-10 scale): Pain Level: 10  Last Weight:   Wt Readings from Last 1 Encounters:   04/10/21 194 lb 0.1 oz (88 kg)     Mental Status:  oriented and alert    IV Access:  - None    Nursing Mobility/ADLs:  Walking   Assisted  Transfer  Assisted  Bathing  Assisted  Dressing  Assisted  Toileting  Assisted  Feeding  Independent  Med Admin  Independent  Med Delivery   whole    Wound Care Documentation and Therapy:        Elimination:  Continence:   · Bowel:  Yes  · Bladder: Yes  Urinary Catheter: Removal Date 4/10/21 Colostomy/Ileostomy/Ileal Conduit: No       Date of Last BM: u/k    Intake/Output Summary (Last 24 hours) at 4/10/2021 1539  Last data filed at 4/10/2021 1152  Gross per 24 hour   Intake --   Output 3850 ml   Net -3850 ml     I/O last 3 completed shifts:  In: -   Out: [address] [Urine:3850]    Safety Concerns: At Risk for Falls    Impairments/Disabilities:      None    Nutrition Therapy:  Current Nutrition Therapy:   - Oral Diet:  Dental Soft    Routes of Feeding: Oral  Liquids: No Restrictions  Daily Fluid Restriction: no  Last Modified Barium Swallow with Video (Video Swallowing Test): not done    Treatments at the Time of Hospital Discharge:   Respiratory Treatments: n/a  Oxygen Therapy:  is not on home oxygen therapy.   Ventilator:    - No ventilator support    Rehab Therapies: Physical Therapy, Occupational Therapy and Speech/Language Therapy  Weight Bearing Status/Restrictions: No weight bearing restirctions  Other Medical Equipment (for information only, NOT a DME order):  wheelchair, walker and bedside commode  Other Treatments: wound care    Patient's personal belongings (please select all that are sent with patient):  Yvette    RN SIGNATURE:  Electronically signed by Aranza Parikh RN on 4/10/21 at 4:05 PM EDT    CASE MANAGEMENT/SOCIAL WORK SECTION    Inpatient Status Date: ***    Readmission Risk Assessment Score:  Readmission Risk              Risk of Unplanned Readmission:        15           Discharging to Facility/ Agency   · Name:   · Address:  · Phone:  · Fax:    Dialysis Facility (if applicable)   · Name:  · Address:  · Dialysis Schedule:  · Phone:  · Fax:    / signature: {Esignature:[id number]:::0}    PHYSICIAN SECTION    Prognosis: Guarded    Condition at Discharge: Stable    Rehab Potential (if transferring to Rehab): Guarded    Recommended Labs or Other Treatments After Discharge: wound care home health     Physician Certification: I certify the above information and transfer of Raun Darion  is necessary for the continuing treatment of the diagnosis listed and that she requires [address] for less 30 days.      Update Admission H&P: No change in H&P    PHYSICIAN SIGNATURE:  Electronically signed by Ange Cunningham MD on 4/10/21 at 3:39 PM EDT

## 2021-04-10 NOTE — PROGRESS NOTES
Department of Internal Medicine  General Internal Medicine   Progress Note      SUBJECTIVE: feeling better moderate pain  Denies angina some what confused     History obtained from chart review and the patient  General ROS: positive for  - fatigue and malaise  negative for - chills, fever or night sweats  Psychological ROS: negative  Ophthalmic ROS: negative  Respiratory ROS: no cough, shortness of breath, or wheezing  Cardiovascular ROS: no chest pain or dyspnea on exertion  Gastrointestinal ROS: positive for -  Heartburn and dyspepsia   negative for - hematemesis, melena or swallowing difficulty/pain  Genito-Urinary ROS: some  dysuria,  No trouble voiding, or hematuria  Musculoskeletal ROS: chronic pain   Neurological ROS: no TIA or stroke symptoms    OBJECTIVE      Medications      Current Facility-Administered Medications: aspirin-acetaminophen-caffeine (EXCEDRIN MIGRAINE) per tablet 1 tablet, 1 tablet, Oral, Q6H PRN  acetaminophen (TYLENOL) tablet 650 mg, 650 mg, Oral, Q4H PRN  baclofen (LIORESAL) tablet 10 mg, 10 mg, Oral, TID  pantoprazole (PROTONIX) tablet 40 mg, 40 mg, Oral, QAM AC  mupirocin (BACTROBAN) 2 % ointment, , Topical, BID  busPIRone (BUSPAR) tablet 10 mg, 10 mg, Oral, TID  nystatin (MYCOSTATIN) cream, , Topical, BID  balsalazide (COLAZAL) capsule 3,000 mg, 3,000 mg, Oral, Daily  ondansetron (ZOFRAN-ODT) disintegrating tablet 4 mg, 4 mg, Oral, Q8H PRN  ferrous gluconate 324 (37.5 Fe) MG tablet 324 mg, 324 mg, Oral, Daily with breakfast  sucralfate (CARAFATE) tablet 1 g, 1 g, Oral, 4x Daily  hydrOXYzine (ATARAX) tablet 10 mg, 10 mg, Oral, TID PRN  cetirizine (ZYRTEC) tablet 10 mg, 10 mg, Oral, Daily  atorvastatin (LIPITOR) tablet 80 mg, 80 mg, Oral, Nightly  fluticasone (FLONASE) 50 MCG/ACT nasal spray 1 spray, 1 spray, Each Nostril, Daily PRN  furosemide (LASIX) tablet 40 mg, 40 mg, Oral, Daily  potassium chloride (KLOR-CON M) extended release tablet 20 mEq, 20 mEq, Oral, BID  rivaroxaban (XARELTO) tablet 20 mg, 20 mg, Oral, Daily with breakfast  metoprolol succinate (TOPROL XL) extended release tablet 50 mg, 50 mg, Oral, Daily  cefepime (MAXIPIME) 2000 mg IVPB minibag, 2,000 mg, Intravenous, Q12H  HYDROmorphone (DILAUDID) tablet 2 mg, 2 mg, Oral, Q6H PRN    Physical      VITALS:  BP (!) 151/95   Pulse 71   Temp 98.4 °F (36.9 °C) (Temporal)   Resp 18   Ht 5' 7\" (1.702 m)   Wt 194 lb 0.1 oz (88 kg)   SpO2 96%   BMI 30.39 kg/m²   TEMPERATURE:  Current - Temp: 98.4 °F (36.9 °C);  Max - Temp  Av.6 °F (37 °C)  Min: 98.3 °F (36.8 °C)  Max: 99 °F (37.2 °C)  RESPIRATIONS RANGE: Resp  Av  Min: 16  Max: 20  PULSE RANGE: Pulse  Av.1  Min: 67  Max: 74  BLOOD PRESSURE RANGE:  Systolic (23LKI), QJO:173 , Min:126 , YJ   ; Diastolic (29UZQ), WTB:94, Min:84, Max:98    PULSE OXIMETRY RANGE: SpO2  Av.8 %  Min: 92 %  Max: 96 %  24HR INTAKE/OUTPUT:      Intake/Output Summary (Last 24 hours) at 4/10/2021 1527  Last data filed at 4/10/2021 1152  Gross per 24 hour   Intake --   Output 3850 ml   Net -3850 ml     CONSTITUTIONAL:  awake, alert, cooperative, no apparent distress, and appears stated age  EYES:  Unremarkable   NECK:  No JVD  and supple, symmetrical, trachea midline  BACK:  Unremarkable  and symmetric  LUNGS:  No increased work of breathing, good air exchange, clear to auscultation bilaterally, no crackles or wheezing  CARDIOVASCULAR:  normal apical pulses, regular rate and rhythm, normal S1 and S2 and no S3  ABDOMEN:  Soft BS hypoactive , no distention , has colostomy in left sigmoid region   MUSCULOSKELETAL:  Toes deformed , cecilia ulcer on dorsum of both feet  With infection   NEUROLOGIC:  No acute focal deficit   SKIN:  Warm and dry  and no bruising or bleeding    Data      No results found for: PHART, PO2ART, VJL6TGI, ZYG4NMH, BEART, I5HZIZSD    Lab Results   Component Value Date     04/10/2021    K 3.8 04/10/2021    K 4.2 2021    CL 99 04/10/2021    CO2 25 04/10/2021 BUN 4 04/10/2021    CREATININE <0.5 04/10/2021    GLUCOSE 97 04/10/2021    CALCIUM 8.9 04/10/2021     Lab Results   Component Value Date    WBC 5.5 04/08/2021    HGB 10.3 04/08/2021    HCT 31.5 04/08/2021    MCV 92.6 04/08/2021     04/08/2021         Lab Results   Component Value Date    INR 0.98 10/27/2020    PROTIME 11.4 10/27/2020       ASSESSMENT AND PLAN     Active Hospital Problems    Diagnosis Date Noted    Lower abdominal pain [R10.30] 04/09/2021    Late effect of ischemic cerebral stroke [I69.30] 04/09/2021    Cognitive deficit as late effect of cerebrovascular accident (CVA) [I69.319] 04/09/2021    UTI (urinary tract infection) [N39.0] 04/08/2021    Atherosclerosis of native arteries of the extremities with ulceration ([facility] [address] [I70.25] 12/01/2020    LV dysfunction [I51.9]     PAF (paroxysmal atrial fibrillation) ([facility] [address] [I48.0]     Essential hypertension [I10]     Open wound of left foot with complication [J03.017S] 64/81/5774    Leg swelling [M79.89] 05/17/2019    Venous insufficiency [I87.2] 05/17/2019    Chronic venous hypertension (idiopathic) with inflammation of bilateral lower extremity [I87.323] 05/17/2019    DVT (deep venous thrombosis) ([facility] [address] [facility] [I82.409] 07/03/2018    Crohn's colitis ([facility] [address] [K50.10] 01/06/2018    Anemia [D64.9] 01/05/2018        wound care    may transition to PO antibiotics    may need [address]    Patient does not have PAD recent OLEG was wnl , Vascular surgery has signed off

## 2021-04-10 NOTE — PROGRESS NOTES
Spoke w/ Javier Pete. Notified that patient's daughter would like home care. Patient has [address] home care per CM. They will be notified. Told daughter that patient will be discharged today. Will work on paperwork and gather patient belongings.

## 2021-04-10 NOTE — PROGRESS NOTES
Patient's daughter coming later to pick patient up. Paperwork completed. Gathered patient belongings. Daughter requesting pain med for patient to go home with. Daughter states she doesn't want patient taking fentanyl because it makes her extremely itchy. Requested pain med from Dr. Gonsalo Barksdale. Dr. Gonsalo Barksdale not prescribing another pain analgesic because patient has fentanyl and oxycodone at home. Will notify patient's daughter.

## 2021-04-10 NOTE — CARE COORDINATION
Patient active with [facility] prior to admission. JAYCEE spoke with [facility] [address] regarding patient's discharge plan. [facility] will accept patient. Luke will retrieve information/documentation need via [employer].    JAYCEE informed patient's RN, Rom Bacon.     Electronically signed by ASHLEE Alarcon on 4/10/2021 at 4:22 PM

## 2021-04-10 NOTE — PLAN OF CARE
Problem: Falls - Risk of:  Goal: Will remain free from falls  Description: Will remain free from falls  4/10/2021 1050 by Josefa Conroy RN  Outcome: Ongoing  4/9/2021 2220 by Ericka Ding RN  Outcome: Ongoing     Problem: Pain:  Goal: Control of acute pain  Description: Control of acute pain  4/10/2021 1050 by Josefa Conroy RN  Outcome: Ongoing  4/9/2021 2220 by Ericka Ding RN  Outcome: Ongoing     Problem: Skin Integrity:  Goal: Will show no infection signs and symptoms  Description: Will show no infection signs and symptoms  4/10/2021 1050 by Josefa Conroy RN  Outcome: Ongoing  4/9/2021 2220 by Ericka Ding RN  Outcome: Ongoing

## 2021-04-10 NOTE — PLAN OF CARE
Problem: Falls - Risk of:  Goal: Will remain free from falls  Description: Will remain free from falls  4/9/2021 2220 by Britany Grant RN  Outcome: Ongoing    Goal: Absence of physical injury  Description: Absence of physical injury  4/9/2021 2220 by Britany Grant RN  Outcome: Ongoing         Problem: Pain:  Goal: Pain level will decrease  Description: Pain level will decrease  4/9/2021 2220 by Britany Grant RN  Outcome: Ongoing    Goal: Control of acute pain  Description: Control of acute pain  4/9/2021 2220 by Britany Grant RN  Outcome: Ongoing    Goal: Control of chronic pain  Description: Control of chronic pain  4/9/2021 2220 by Britany Grant RN  Outcome: Ongoing         problem: Skin Integrity:  Goal: Will show no infection signs and symptoms  Description: Will show no infection signs and symptoms  4/9/2021 2220 by Britany Grant RN  Outcome: Ongoing    Goal: Absence of new skin breakdown  Description: Absence of new skin breakdown  4/9/2021 2220 by Britany Grant RN  Outcome: Ongoing

## 2021-04-10 NOTE — H&P
surgery, bladder suspension  procedure, fracture surgery, heel spur surgery, tonsillectomy,  colonoscopy, joint replacement, leg surgery, upper GI endoscopy,  multiple foot surgeries, sigmoidoscopy, abdominal adhesion surgery,  colostomy, small intestinal surgery, proctosigmoidoscopy    MEDICATIONS:  The patient is on atorvastatin, baclofen, BuSpar,  fexofenadine, fluticasone, furosemide, metoprolol, mupirocin, Nystatin,  omeprazole, ondansetron, potassium chloride, Xarelto, sucralfate. ALLERGIES:  The patient is allergic to ACE INHIBITOR and SKELAXIN. FAMILY HISTORY:  Both her parents are  because of natural  causes. Mother had high blood pressure. Father also had high blood  pressure. One sister has renal insufficiency. SOCIAL HISTORY:  She is . She has two children. She was a  private duty nurse, licensed practical nurse. History of half a pack to  one pack a day of smoking. She may still be doing some smoking. No  history of alcohol usage of any significance. No history of substance  abuse. She is institutionalized for care home care. REVIEW OF SYSTEMS:  Negative for loss of consciousness. No convulsion. Does have low grade fever. No dysphagia. The patient is nonambulatory. Denies any exertional angina. Does have resting and exertional  shortness of breath. Does admit to abdominal pain. No hematemesis. No  melena. Does have some dysuria and urinary frequency. The patient is  nonambulatory. No intermittent claudication. Does have lower extremity  neurotrophic changes and ulceration. PHYSICAL EXAMINATION:  GENERAL:  Alert, awake, oriented x0, moderately distressed somewhat  demented 61-year-old black American lady. VITAL SIGNS:  Her temperature 99.1, blood pressure 155/109, respirations  16, heart rate 73. O2 sat 96% on room air. HEENT:  Oral mucosa dry. SKIN:  Warm and dry. NECK:  Supple. Faint carotid bruit. No jugular venous distention.   No Treat her with IV hydration, IV antibiotics,  cefepime, urine culture and sensitivity.     As always, it is a pleasure to take care of your patients at [address] [place] [address] Ladariuscholo Ansari MD    D: 04/09/2021 17:25:33       T: 04/09/2021 17:34:51     [email or web address]  Job#: [id number]     Doc#: [id number]    CC:  Raffi Mann MD

## 2021-04-10 NOTE — PROGRESS NOTES
Assessment completed, see doc flowsheets. Pt is A&Ox4. Lung sounds are clear/diminished. VSS. Tele on. Medication given per [facility]. Patient has no needs at this time. Call light within in reach, will continue to monitor.

## 2021-04-11 LAB
CULTURE, BLOOD 2: ABNORMAL
CULTURE, BLOOD 2: ABNORMAL
ORGANISM: ABNORMAL
ORGANISM: ABNORMAL

## 2021-04-12 LAB — BLOOD CULTURE, ROUTINE: NORMAL

## 2021-04-22 ENCOUNTER — TELEPHONE (OUTPATIENT)
Dept: CARDIOLOGY CLINIC | Age: 66
End: 2021-04-22

## 2021-04-22 NOTE — TELEPHONE ENCOUNTER
The patient had a New patient appointment today 04/22/21 with Dr. Ronit Mahoney with device check, that she was unable to make. According to the patient's daughter Merlinda Cons the patient just got out of the hospital yesterday and is unable to come in for a further visit. Merlinda Cons is requesting a virtual visit with Dr. Ronit Mahoney. Please call Merlinda Cons back at [phone] to advise.

## 2021-04-22 NOTE — TELEPHONE ENCOUNTER
Pt's daughter states pt is very hard to move, waiting on motorized wheelchair. Rescheduled OV with UL for 5/27 at 1600 with device check prior. Daughter agreeable.

## 2021-04-27 ENCOUNTER — APPOINTMENT (OUTPATIENT)
Dept: GENERAL RADIOLOGY | Age: 66
DRG: 720 | End: 2021-04-27
Payer: MEDICAID

## 2021-04-27 ENCOUNTER — HOSPITAL ENCOUNTER (INPATIENT)
Age: 66
LOS: 3 days | Discharge: HOME HEALTH | DRG: 720 | End: 2021-04-30
Attending: EMERGENCY MEDICINE | Admitting: INTERNAL MEDICINE
Payer: MEDICAID

## 2021-04-27 ENCOUNTER — APPOINTMENT (OUTPATIENT)
Dept: CT IMAGING | Age: 66
DRG: 720 | End: 2021-04-27
Payer: MEDICAID

## 2021-04-27 DIAGNOSIS — N30.00 ACUTE CYSTITIS WITHOUT HEMATURIA: ICD-10-CM

## 2021-04-27 DIAGNOSIS — R41.82 ALTERED MENTAL STATUS, UNSPECIFIED ALTERED MENTAL STATUS TYPE: Primary | ICD-10-CM

## 2021-04-27 DIAGNOSIS — E87.20 LACTIC ACIDOSIS: ICD-10-CM

## 2021-04-27 DIAGNOSIS — E87.1 HYPONATREMIA: ICD-10-CM

## 2021-04-27 PROBLEM — N39.0 SEPSIS SECONDARY TO UTI (HCC): Status: ACTIVE | Noted: 2021-04-27

## 2021-04-27 PROBLEM — A41.9 SEPSIS SECONDARY TO UTI (HCC): Status: ACTIVE | Noted: 2021-04-27

## 2021-04-27 PROBLEM — Z20.822 SUSPECTED COVID-19 VIRUS INFECTION: Status: ACTIVE | Noted: 2021-04-27

## 2021-04-27 PROBLEM — G93.41 ENCEPHALOPATHY, METABOLIC: Status: ACTIVE | Noted: 2021-04-27

## 2021-04-27 LAB
A/G RATIO: 0.9 (ref 1.1–2.2)
ALBUMIN SERPL-MCNC: 3.7 G/DL (ref 3.4–5)
ALP BLD-CCNC: 88 U/L (ref 40–129)
ALT SERPL-CCNC: 8 U/L (ref 10–40)
ANION GAP SERPL CALCULATED.3IONS-SCNC: 18 MMOL/L (ref 3–16)
AST SERPL-CCNC: 30 U/L (ref 15–37)
BACTERIA: ABNORMAL /HPF
BASOPHILS ABSOLUTE: 0 K/UL (ref 0–0.2)
BASOPHILS RELATIVE PERCENT: 0.7 %
BILIRUB SERPL-MCNC: 0.3 MG/DL (ref 0–1)
BILIRUBIN URINE: NEGATIVE
BLOOD, URINE: ABNORMAL
BUN BLDV-MCNC: 8 MG/DL (ref 7–20)
CALCIUM SERPL-MCNC: 9.2 MG/DL (ref 8.3–10.6)
CHLORIDE BLD-SCNC: 88 MMOL/L (ref 99–110)
CLARITY: ABNORMAL
CO2: 17 MMOL/L (ref 21–32)
COLOR: YELLOW
CREAT SERPL-MCNC: <0.5 MG/DL (ref 0.6–1.2)
EOSINOPHILS ABSOLUTE: 0.5 K/UL (ref 0–0.6)
EOSINOPHILS RELATIVE PERCENT: 7 %
EPITHELIAL CELLS, UA: 0 /HPF (ref 0–5)
ETHANOL: 12 MG/DL (ref 0–0.08)
GFR AFRICAN AMERICAN: >60
GFR NON-AFRICAN AMERICAN: >60
GLOBULIN: 4.3 G/DL
GLUCOSE BLD-MCNC: 101 MG/DL (ref 70–99)
GLUCOSE URINE: NEGATIVE MG/DL
HCT VFR BLD CALC: 33.8 % (ref 36–48)
HEMOGLOBIN: 11.1 G/DL (ref 12–16)
HYALINE CASTS: 0 /LPF (ref 0–8)
INR BLD: 1.62 (ref 0.86–1.14)
KETONES, URINE: NEGATIVE MG/DL
LACTIC ACID, SEPSIS: 2 MMOL/L (ref 0.4–1.9)
LACTIC ACID, SEPSIS: 5.7 MMOL/L (ref 0.4–1.9)
LEUKOCYTE ESTERASE, URINE: ABNORMAL
LYMPHOCYTES ABSOLUTE: 1.5 K/UL (ref 1–5.1)
LYMPHOCYTES RELATIVE PERCENT: 23.9 %
MCH RBC QN AUTO: 29.8 PG (ref 26–34)
MCHC RBC AUTO-ENTMCNC: 32.7 G/DL (ref 31–36)
MCV RBC AUTO: 91.1 FL (ref 80–100)
MICROSCOPIC EXAMINATION: YES
MONOCYTES ABSOLUTE: 0.5 K/UL (ref 0–1.3)
MONOCYTES RELATIVE PERCENT: 7.5 %
NEUTROPHILS ABSOLUTE: 3.9 K/UL (ref 1.7–7.7)
NEUTROPHILS RELATIVE PERCENT: 60.9 %
NITRITE, URINE: NEGATIVE
PDW BLD-RTO: 14.5 % (ref 12.4–15.4)
PH UA: 6.5 (ref 5–8)
PLATELET # BLD: 433 K/UL (ref 135–450)
PMV BLD AUTO: 6.3 FL (ref 5–10.5)
POTASSIUM REFLEX MAGNESIUM: 4.3 MMOL/L (ref 3.5–5.1)
PRO-BNP: 817 PG/ML (ref 0–124)
PROTEIN UA: ABNORMAL MG/DL
PROTHROMBIN TIME: 18.9 SEC (ref 10–13.2)
RBC # BLD: 3.71 M/UL (ref 4–5.2)
RBC UA: 3 /HPF (ref 0–4)
SODIUM BLD-SCNC: 123 MMOL/L (ref 136–145)
SPECIFIC GRAVITY UA: 1.01 (ref 1–1.03)
TOTAL PROTEIN: 8 G/DL (ref 6.4–8.2)
TROPONIN: <0.01 NG/ML
URINE REFLEX TO CULTURE: YES
URINE TYPE: ABNORMAL
UROBILINOGEN, URINE: 0.2 E.U./DL
WBC # BLD: 6.5 K/UL (ref 4–11)
WBC UA: 66 /HPF (ref 0–5)

## 2021-04-27 PROCEDURE — 85025 COMPLETE CBC W/AUTO DIFF WBC: CPT

## 2021-04-27 PROCEDURE — 96374 THER/PROPH/DIAG INJ IV PUSH: CPT

## 2021-04-27 PROCEDURE — 6360000002 HC RX W HCPCS: Performed by: EMERGENCY MEDICINE

## 2021-04-27 PROCEDURE — 87186 SC STD MICRODIL/AGAR DIL: CPT

## 2021-04-27 PROCEDURE — 80053 COMPREHEN METABOLIC PANEL: CPT

## 2021-04-27 PROCEDURE — 6360000002 HC RX W HCPCS: Performed by: PHYSICIAN ASSISTANT

## 2021-04-27 PROCEDURE — 81001 URINALYSIS AUTO W/SCOPE: CPT

## 2021-04-27 PROCEDURE — 6370000000 HC RX 637 (ALT 250 FOR IP): Performed by: PHYSICIAN ASSISTANT

## 2021-04-27 PROCEDURE — 70450 CT HEAD/BRAIN W/O DYE: CPT

## 2021-04-27 PROCEDURE — 2580000003 HC RX 258: Performed by: PHYSICIAN ASSISTANT

## 2021-04-27 PROCEDURE — 82077 ASSAY SPEC XCP UR&BREATH IA: CPT

## 2021-04-27 PROCEDURE — 87077 CULTURE AEROBIC IDENTIFY: CPT

## 2021-04-27 PROCEDURE — 85610 PROTHROMBIN TIME: CPT

## 2021-04-27 PROCEDURE — 2580000003 HC RX 258: Performed by: EMERGENCY MEDICINE

## 2021-04-27 PROCEDURE — 87150 DNA/RNA AMPLIFIED PROBE: CPT

## 2021-04-27 PROCEDURE — 71045 X-RAY EXAM CHEST 1 VIEW: CPT

## 2021-04-27 PROCEDURE — U0003 INFECTIOUS AGENT DETECTION BY NUCLEIC ACID (DNA OR RNA); SEVERE ACUTE RESPIRATORY SYNDROME CORONAVIRUS 2 (SARS-COV-2) (CORONAVIRUS DISEASE [COVID-19]), AMPLIFIED PROBE TECHNIQUE, MAKING USE OF HIGH THROUGHPUT TECHNOLOGIES AS DESCRIBED BY CMS-2020-01-R: HCPCS

## 2021-04-27 PROCEDURE — 36415 COLL VENOUS BLD VENIPUNCTURE: CPT

## 2021-04-27 PROCEDURE — U0005 INFEC AGEN DETEC AMPLI PROBE: HCPCS

## 2021-04-27 PROCEDURE — 1200000000 HC SEMI PRIVATE

## 2021-04-27 PROCEDURE — 84484 ASSAY OF TROPONIN QUANT: CPT

## 2021-04-27 PROCEDURE — 83605 ASSAY OF LACTIC ACID: CPT

## 2021-04-27 PROCEDURE — 87086 URINE CULTURE/COLONY COUNT: CPT

## 2021-04-27 PROCEDURE — 87040 BLOOD CULTURE FOR BACTERIA: CPT

## 2021-04-27 PROCEDURE — 83880 ASSAY OF NATRIURETIC PEPTIDE: CPT

## 2021-04-27 PROCEDURE — 99284 EMERGENCY DEPT VISIT MOD MDM: CPT

## 2021-04-27 RX ORDER — HYDRALAZINE HYDROCHLORIDE 20 MG/ML
10 INJECTION INTRAMUSCULAR; INTRAVENOUS EVERY 6 HOURS PRN
Status: DISCONTINUED | OUTPATIENT
Start: 2021-04-27 | End: 2021-04-30 | Stop reason: HOSPADM

## 2021-04-27 RX ORDER — OMEPRAZOLE 10 MG/1
20 CAPSULE, DELAYED RELEASE ORAL DAILY
Status: DISCONTINUED | OUTPATIENT
Start: 2021-04-28 | End: 2021-04-27 | Stop reason: CLARIF

## 2021-04-27 RX ORDER — SODIUM CHLORIDE 0.9 % (FLUSH) 0.9 %
5-40 SYRINGE (ML) INJECTION PRN
Status: DISCONTINUED | OUTPATIENT
Start: 2021-04-27 | End: 2021-04-30 | Stop reason: HOSPADM

## 2021-04-27 RX ORDER — BALSALAZIDE DISODIUM 750 MG/1
3000 CAPSULE ORAL DAILY
Status: DISCONTINUED | OUTPATIENT
Start: 2021-04-28 | End: 2021-04-30 | Stop reason: HOSPADM

## 2021-04-27 RX ORDER — PROMETHAZINE HYDROCHLORIDE 25 MG/1
12.5 TABLET ORAL EVERY 6 HOURS PRN
Status: DISCONTINUED | OUTPATIENT
Start: 2021-04-27 | End: 2021-04-30 | Stop reason: HOSPADM

## 2021-04-27 RX ORDER — POTASSIUM CHLORIDE 20 MEQ/1
40 TABLET, EXTENDED RELEASE ORAL PRN
Status: DISCONTINUED | OUTPATIENT
Start: 2021-04-27 | End: 2021-04-30 | Stop reason: HOSPADM

## 2021-04-27 RX ORDER — HYDROXYZINE HYDROCHLORIDE 10 MG/1
10 TABLET, FILM COATED ORAL 3 TIMES DAILY PRN
Status: DISCONTINUED | OUTPATIENT
Start: 2021-04-27 | End: 2021-04-30 | Stop reason: HOSPADM

## 2021-04-27 RX ORDER — BACLOFEN 10 MG/1
10 TABLET ORAL 3 TIMES DAILY
Status: DISCONTINUED | OUTPATIENT
Start: 2021-04-27 | End: 2021-04-30 | Stop reason: HOSPADM

## 2021-04-27 RX ORDER — ATORVASTATIN CALCIUM 80 MG/1
80 TABLET, FILM COATED ORAL NIGHTLY
Status: DISCONTINUED | OUTPATIENT
Start: 2021-04-27 | End: 2021-04-30 | Stop reason: HOSPADM

## 2021-04-27 RX ORDER — FLUTICASONE PROPIONATE 50 MCG
1 SPRAY, SUSPENSION (ML) NASAL DAILY PRN
Status: DISCONTINUED | OUTPATIENT
Start: 2021-04-27 | End: 2021-04-30 | Stop reason: HOSPADM

## 2021-04-27 RX ORDER — CETIRIZINE HYDROCHLORIDE 10 MG/1
10 TABLET ORAL DAILY
Status: DISCONTINUED | OUTPATIENT
Start: 2021-04-28 | End: 2021-04-30 | Stop reason: HOSPADM

## 2021-04-27 RX ORDER — 0.9 % SODIUM CHLORIDE 0.9 %
500 INTRAVENOUS SOLUTION INTRAVENOUS PRN
Status: DISCONTINUED | OUTPATIENT
Start: 2021-04-27 | End: 2021-04-30 | Stop reason: HOSPADM

## 2021-04-27 RX ORDER — ACETAMINOPHEN 650 MG/1
650 SUPPOSITORY RECTAL EVERY 6 HOURS PRN
Status: DISCONTINUED | OUTPATIENT
Start: 2021-04-27 | End: 2021-04-30 | Stop reason: HOSPADM

## 2021-04-27 RX ORDER — SODIUM CHLORIDE 0.9 % (FLUSH) 0.9 %
5-40 SYRINGE (ML) INJECTION EVERY 12 HOURS SCHEDULED
Status: DISCONTINUED | OUTPATIENT
Start: 2021-04-27 | End: 2021-04-30 | Stop reason: HOSPADM

## 2021-04-27 RX ORDER — BUSPIRONE HYDROCHLORIDE 5 MG/1
10 TABLET ORAL 3 TIMES DAILY
Status: DISCONTINUED | OUTPATIENT
Start: 2021-04-27 | End: 2021-04-30 | Stop reason: HOSPADM

## 2021-04-27 RX ORDER — ONDANSETRON 2 MG/ML
4 INJECTION INTRAMUSCULAR; INTRAVENOUS EVERY 6 HOURS PRN
Status: DISCONTINUED | OUTPATIENT
Start: 2021-04-27 | End: 2021-04-30 | Stop reason: HOSPADM

## 2021-04-27 RX ORDER — SUCRALFATE 1 G/1
1 TABLET ORAL 4 TIMES DAILY
Status: DISCONTINUED | OUTPATIENT
Start: 2021-04-27 | End: 2021-04-28

## 2021-04-27 RX ORDER — FERROUS GLUCONATE 324(37.5)
324 TABLET ORAL
Status: DISCONTINUED | OUTPATIENT
Start: 2021-04-28 | End: 2021-04-30 | Stop reason: HOSPADM

## 2021-04-27 RX ORDER — ACETAMINOPHEN 325 MG/1
650 TABLET ORAL EVERY 6 HOURS PRN
Status: DISCONTINUED | OUTPATIENT
Start: 2021-04-27 | End: 2021-04-30 | Stop reason: HOSPADM

## 2021-04-27 RX ORDER — 0.9 % SODIUM CHLORIDE 0.9 %
1000 INTRAVENOUS SOLUTION INTRAVENOUS ONCE
Status: COMPLETED | OUTPATIENT
Start: 2021-04-27 | End: 2021-04-27

## 2021-04-27 RX ORDER — SODIUM CHLORIDE 9 MG/ML
INJECTION, SOLUTION INTRAVENOUS CONTINUOUS
Status: DISCONTINUED | OUTPATIENT
Start: 2021-04-27 | End: 2021-04-28

## 2021-04-27 RX ORDER — ACETAMINOPHEN 325 MG/1
650 TABLET ORAL ONCE
Status: COMPLETED | OUTPATIENT
Start: 2021-04-27 | End: 2021-04-27

## 2021-04-27 RX ORDER — FEXOFENADINE HCL 180 MG/1
180 TABLET ORAL DAILY
Status: DISCONTINUED | OUTPATIENT
Start: 2021-04-28 | End: 2021-04-27 | Stop reason: CLARIF

## 2021-04-27 RX ORDER — DIPHENHYDRAMINE HYDROCHLORIDE 50 MG/ML
12.5 INJECTION INTRAMUSCULAR; INTRAVENOUS ONCE
Status: COMPLETED | OUTPATIENT
Start: 2021-04-27 | End: 2021-04-27

## 2021-04-27 RX ORDER — POTASSIUM CHLORIDE 20 MEQ/1
20 TABLET, EXTENDED RELEASE ORAL 2 TIMES DAILY
Status: DISCONTINUED | OUTPATIENT
Start: 2021-04-27 | End: 2021-04-30 | Stop reason: HOSPADM

## 2021-04-27 RX ORDER — POTASSIUM CHLORIDE 7.45 MG/ML
10 INJECTION INTRAVENOUS PRN
Status: DISCONTINUED | OUTPATIENT
Start: 2021-04-27 | End: 2021-04-30 | Stop reason: HOSPADM

## 2021-04-27 RX ORDER — PANTOPRAZOLE SODIUM 40 MG/1
40 TABLET, DELAYED RELEASE ORAL
Status: DISCONTINUED | OUTPATIENT
Start: 2021-04-28 | End: 2021-04-30 | Stop reason: HOSPADM

## 2021-04-27 RX ORDER — METOPROLOL SUCCINATE 50 MG/1
50 TABLET, EXTENDED RELEASE ORAL DAILY
Status: DISCONTINUED | OUTPATIENT
Start: 2021-04-28 | End: 2021-04-30 | Stop reason: HOSPADM

## 2021-04-27 RX ORDER — SODIUM CHLORIDE 9 MG/ML
25 INJECTION, SOLUTION INTRAVENOUS PRN
Status: DISCONTINUED | OUTPATIENT
Start: 2021-04-27 | End: 2021-04-30 | Stop reason: HOSPADM

## 2021-04-27 RX ORDER — ONDANSETRON 4 MG/1
4 TABLET, ORALLY DISINTEGRATING ORAL EVERY 8 HOURS PRN
Status: DISCONTINUED | OUTPATIENT
Start: 2021-04-27 | End: 2021-04-30 | Stop reason: HOSPADM

## 2021-04-27 RX ADMIN — CEFEPIME HYDROCHLORIDE 2000 MG: 2 INJECTION, POWDER, FOR SOLUTION INTRAVENOUS at 21:30

## 2021-04-27 RX ADMIN — SODIUM CHLORIDE 1000 ML: 9 INJECTION, SOLUTION INTRAVENOUS at 17:54

## 2021-04-27 RX ADMIN — ACETAMINOPHEN 650 MG: 325 TABLET ORAL at 16:59

## 2021-04-27 RX ADMIN — DIPHENHYDRAMINE HYDROCHLORIDE 12.5 MG: 50 INJECTION, SOLUTION INTRAMUSCULAR; INTRAVENOUS at 19:43

## 2021-04-27 RX ADMIN — SODIUM CHLORIDE 1000 ML: 9 INJECTION, SOLUTION INTRAVENOUS at 17:53

## 2021-04-27 ASSESSMENT — PAIN DESCRIPTION - ORIENTATION: ORIENTATION: LEFT;RIGHT

## 2021-04-27 ASSESSMENT — PAIN DESCRIPTION - LOCATION: LOCATION: LEG

## 2021-04-27 ASSESSMENT — PAIN DESCRIPTION - FREQUENCY: FREQUENCY: CONTINUOUS

## 2021-04-27 ASSESSMENT — ENCOUNTER SYMPTOMS
SHORTNESS OF BREATH: 0
ABDOMINAL PAIN: 0
DIARRHEA: 0
BACK PAIN: 0
NAUSEA: 0
VOMITING: 0
CONSTIPATION: 0

## 2021-04-27 ASSESSMENT — PAIN DESCRIPTION - PAIN TYPE: TYPE: ACUTE PAIN

## 2021-04-27 NOTE — ED PROVIDER NOTES
[facility]        Pt Name: Mitesh aGy  MRN: [id number]  [place] 1955  Date of evaluation: 4/27/2021  Provider: Claudell Dawley, PA  PCP: Carolyn Valadez MD  ED Attending: Samuel Brooks MD     I have seen and evaluated this patient with my supervising physician Samuel Brooks MD.    [address] [place] [address]       Chief Complaint   Patient presents with    Altered Mental Status     [facility] from home due to chest pain. Per mary kay, daughter called 911 for pt chest pain. per mary kay, pt will not talk about her symptoms. per mary kay, possible ETOH       HISTORY OF PRESENT ILLNESS   (Location, Timing/Onset, Context/Setting, Quality, Duration, Modifying Factors, Severity, Associated Signs and Symptoms)  Note limiting factors. Mitesh Gay is a 60 y.o. female who presents to the emergency department with complaints of brought in for evaluation for possible chest pain. On arrival patient states that she was at home when she started shivering. She was unsure why she is shivering. According to mary kay daughter called 911 for chest pain. Patient actually denies any chest pain. She also denies shortness of breath. Patient is alert to person and place only. Patient states that she did receive 1 dose of Covid vaccine. No known exposures to Covid. I did personally take patient's temperature at bedside and she does have an oral temperature of 100 °F.  She is not tachycardic has normal respirations. Otherwise patient is an unreliable historian. She denies any urinary symptoms including urgency, frequency, dysuria or hematuria. Denies vomiting or diarrhea. Denies any chest pain or shortness of breath to myself. Denies cough. Nursing Notes were all reviewed and agreed with or any disagreements were addressed in the HPI.     REVIEW OF SYSTEMS    (2-9 systems for level 4, 10 or more for level 5)     Review of Systems second metatarsal head left    FRACTURE SURGERY      rt hip    HAND CARPECTOMY Bilateral     HEEL SPUR SURGERY      HERNIA REPAIR      HYSTERECTOMY      bladder surgery    JOINT REPLACEMENT      rt hip, L shoulder replacement 11/2014    KNEE SURGERY Bilateral     arthrosvopic    LEG SURGERY Right     RI SECD CLOS SURG WND EXTEN/COMPLIC N/A 10/59/5946    SECONDARY WOUND CLOSURE OF ABDOMINAL WOUND performed by Janis Ledezma MD at [facility] 7/17/2019    FLEXIBLE SIGMOIDOSCOPY performed by Janis Ledezma MD at [address] [facility]  01/15/2018    with biopsies    SMALL INTESTINE SURGERY N/A 7/17/2019    COLOSTOMY CLOSURE WITH COLORECTAL ANASTOMOSIS performed by Janis Ledezma MD at [place] [address]  6/14/13    and colonsocopy with antral erosion biopsies         CURRENTMEDICATIONS       Previous Medications    ATORVASTATIN (LIPITOR) 80 MG TABLET    Take 1 tablet by mouth nightly    BACLOFEN (LIORESAL) 10 MG TABLET    Take 1 tablet by mouth 3 times daily    BALSALAZIDE (COLAZAL) 750 MG CAPSULE    Take 3,000 mg by mouth daily Take 4 capsules (total 3000 mg) daily each morning.     BUSPIRONE (BUSPAR) 10 MG TABLET    Take 10 mg by mouth 3 times daily as needed    FERROUS GLUCONATE (FERGON) 324 (38 FE) MG TABLET    Take 324 mg by mouth daily (with breakfast)    FEXOFENADINE (ALLEGRA) 180 MG TABLET    Take 180 mg by mouth daily    FLUTICASONE (FLONASE) 50 MCG/ACT NASAL SPRAY    1 spray by Each Nostril route daily as needed for Rhinitis    FUROSEMIDE (LASIX) 40 MG TABLET    Take 1 tablet by mouth daily    HYDROXYZINE (ATARAX) 10 MG TABLET    Take 10 mg by mouth 3 times daily as needed for Itching    METOPROLOL SUCCINATE (TOPROL XL) 50 MG EXTENDED RELEASE TABLET    Take 1 tablet by mouth daily    MUPIROCIN (BACTROBAN) 2 % OINTMENT    Apply daily    NYSTATIN (MYCOSTATIN) 044685 UNIT/GM CREAM    Apply topically 2 times daily as needed for Dry Skin Apply topically 2 times daily. OMEPRAZOLE (PRILOSEC) 20 MG DELAYED RELEASE CAPSULE    Take 20 mg by mouth Daily     ONDANSETRON (ZOFRAN ODT) 4 MG DISINTEGRATING TABLET    Take 1 tablet by mouth every 8 hours as needed for Nausea    POTASSIUM CHLORIDE (KLOR-CON M) 20 MEQ EXTENDED RELEASE TABLET    Take 1 tablet by mouth 2 times daily    RIVAROXABAN (XARELTO) 20 MG TABS TABLET    Take 1 tablet by mouth daily (with breakfast)    SUCRALFATE (CARAFATE) 1 GM TABLET    Take 1 g by mouth 4 times daily         ALLERGIES     Ace inhibitors and Skelaxin [metaxalone]    FAMILYHISTORY       Family History   Problem Relation Age of Onset    High Blood Pressure Mother     High Blood Pressure Father     Kidney Disease Sister           SOCIAL HISTORY       Social History     Tobacco Use    Smoking status: Current Every Day Smoker     Packs/day: 1.00     Years: 10.00     Pack years: 10.00     Types: Cigarettes, E-Cigarettes    Smokeless tobacco: Never Used    Tobacco comment: CUT BACK TO 1/2 PPD WITH E CIG 6-2019   Substance Use Topics    Alcohol use: Yes     Alcohol/week: 2.0 standard drinks     Types: 2 Shots of liquor per week     Comment: 6 DRINKS A WEEK OR LESS    Drug use: No       SCREENINGS             PHYSICAL EXAM    (up to 7 for level 4, 8 or more for level 5)     ED Triage Vitals [04/27/21 1606]   BP Temp Temp Source Pulse Resp SpO2 Height Weight   (!) 122/59 98.2 °F (36.8 °C) Oral 89 20 97 % 5' 7\" (1.702 m) 194 lb (88 kg)       Physical Exam  Vitals signs and nursing note reviewed. Constitutional:       Appearance: Normal appearance. She is well-developed. She is not toxic-appearing or diaphoretic. HENT:      Head: Normocephalic. Right Ear: External ear normal.      Left Ear: External ear normal.      Nose: Nose normal.   Eyes:      General:         Right eye: No discharge. Left eye: No discharge.       Conjunctiva/sclera: Conjunctivae normal.   Neck: Musculoskeletal: Normal range of motion and neck supple. Cardiovascular:      Rate and Rhythm: Normal rate and regular rhythm. Heart sounds: Murmur present. No friction rub. No gallop. Pulmonary:      Effort: Pulmonary effort is normal. No respiratory distress. Breath sounds: Normal breath sounds. No wheezing or rales. Musculoskeletal: Normal range of motion. Skin:     General: Skin is warm and dry. Coloration: Skin is not pale. Neurological:      Mental Status: She is alert and oriented to person, place, and time. Psychiatric:         Behavior: Behavior normal. Behavior is cooperative. DIAGNOSTIC RESULTS   LABS:    Labs Reviewed   CBC WITH AUTO DIFFERENTIAL - Abnormal; Notable for the following components:       Result Value    RBC 3.71 (*)     Hemoglobin 11.1 (*)     Hematocrit 33.8 (*)     All other components within normal limits    Narrative:     Performed at:  [facility] [address], [place], [address]   Phone [phone]   COMPREHENSIVE METABOLIC PANEL W/ REFLEX TO MG FOR LOW K - Abnormal; Notable for the following components:    Sodium 123 (*)     Chloride 88 (*)     CO2 17 (*)     Anion Gap 18 (*)     Glucose 101 (*)     CREATININE <0.5 (*)     Albumin/Globulin Ratio 0.9 (*)     ALT 8 (*)     All other components within normal limits    Narrative:     Performed at:  [facility] [address], [place], [address]   Phone 21 [phone] - Abnormal; Notable for the following components:    Pro- (*)     All other components within normal limits    Narrative:     Performed at:  [facility] [address], [place], [address]   Phone [phone]   LACTATE, SEPSIS - Abnormal; Notable for the following components:    Lactic Acid, Sepsis 5.7 (*)     All other components within normal limits    Narrative:     CALL  [facility] tel. administration of intravenous contrast. Dose modulation, iterative reconstruction, and/or weight based adjustment of the mA/kV was utilized to reduce the radiation dose to as low as reasonably achievable. COMPARISON: CT head 04/07/2021. HISTORY: ORDERING SYSTEM PROVIDED HISTORY: AMS TECHNOLOGIST PROVIDED HISTORY: If patient is on cardiac monitor and/or pulse ox, they may be taken off cardiac monitor and pulse ox, left on O2 if currently on. All monitors reattached when patient returns to room. Has a \"code stroke\" or \"stroke alert\" been called? ->No Reason for exam:->AMS Decision Support Exception->Emergency Medical Condition (MA) Reason for Exam: AMS Acuity: Acute Type of Exam: Initial FINDINGS: BRAIN/VENTRICLES: Motion and artifact degraded exam.  No acute hemorrhage. Periventricular and subcortical hypoattenuation is nonspecific and may be related to microvascular disease. Encephalomalacia in the left frontal and parietal lobes again visualized. Encephalomalacia in the cortical right frontal lobe again visualized. Chronic lacunar infarct in the left basal ganglia. Artifact partially obscures the marti. Artifact partially obscures the inferior cerebellum. Deryl Lazaro white differentiation appears maintained given artifact near the skull base and through the posterior fossa. Cerebral volume loss and prominence of the ventricles again visualized. There is no midline shift. Basal cisterns appear patent. ORBITS: Visualized orbits appear unremarkable on this unenhanced exam. SINUSES: Visualized paranasal sinuses appear clear. Visualized mastoid air cells appear clear. SOFT TISSUES/SKULL: No depressed calvarial fracture. Motion and artifact degraded exam.  No acute hemorrhage or midline shift. Aspects 10.      Xr Chest Portable    Result Date: 4/27/2021  EXAMINATION: ONE XRAY VIEW OF THE CHEST 4/27/2021 5:09 pm COMPARISON: April 8, 2021 HISTORY: ORDERING SYSTEM PROVIDED HISTORY: fever TECHNOLOGIST PROVIDED HISTORY: Reason for exam:->fever Reason for Exam: Altered Mental Status [place]. Per mary kay, daughter called 911 for pt chest pain. per mary kay, pt will not talk about her symptoms. per mary kay, possible ETOH) Acuity: Acute Type of Exam: Initial FINDINGS: The cardiomediastinal silhouette is of normal size. There are increased lung markings, may be related to bronchitis versus mild pulmonary vascular congestion. There is mild left pleural effusion. There is no pneumothorax. There is no acute osseous abnormality. Large hiatal hernia. Increased lung markings, may be related to bronchitis versus mild pulmonary vascular congestion. Mild left pleural effusion. Large hiatal hernia. PROCEDURES   Unless otherwise noted below, none     Procedures    CRITICAL CARE TIME   N/A    CONSULTS:  None      EMERGENCY DEPARTMENT COURSE and DIFFERENTIAL DIAGNOSIS/MDM:   Vitals:    Vitals:    04/27/21 1606 04/27/21 1625 04/27/21 1730   BP: (!) 122/59  134/83   Pulse: 89  80   Resp: 20  17   Temp: 98.2 °F (36.8 °C) 100 °F (37.8 °C)    TempSrc: Oral Oral    SpO2: 97%  97%   Weight: 194 lb (88 kg)     Height: 5' 7\" (1.702 m)         Patient was given the following medications:  Medications   diphenhydrAMINE (BENADRYL) injection 12.5 mg (has no administration in time range)   0.9 % sodium chloride bolus (0 mLs Intravenous Stopped 4/27/21 1909)   acetaminophen (TYLENOL) tablet 650 mg (650 mg Oral Given 4/27/21 1659)   0.9 % sodium chloride bolus (0 mLs Intravenous Stopped 4/27/21 1909)         Yeison Flores is a 60 y.o. female who presents to the emergency department with complaints of brought in for evaluation for possible chest pain. On arrival patient states that she was at home when she started shivering. She was unsure why she is shivering. According to mary kay daughter called 911 for chest pain. Patient actually denies any chest pain. She also denies shortness of breath.   Patient is alert to person and place only. Patient states that she did receive 1 dose of Covid vaccine. No known exposures to Covid. I did personally take patient's temperature at bedside and she does have an oral temperature of 100 °F.  She is not tachycardic has normal respirations. Otherwise patient is an unreliable historian. She denies any urinary symptoms including urgency, frequency, dysuria or hematuria. Denies vomiting or diarrhea. Denies any chest pain or shortness of breath to myself. Denies cough. Initial exam patient appears slightly confused. CT imaging of head shows no evidence of acute intracranial abnormality. Chest x-ray shows mild pulmonary vascular congestion. Patient does have an elevated lactic acid of 5.7. Low temperature of 100. White count however is normal at 6.5. Hemoglobin stable at 11.1. Platelets are normal.  INR normal.  She does have significant hyponatremia at 123. Chloride of 88. CO2 of 17. Normal renal function. BNP of 817. Alcohol level of 12. Please see attending physician note for final disposition but she will ultimately be admitted for altered mental status, hyponatremia and possible septic shock. Patient also with several lesions all over her skin, open scabs. Unknown etiology. Patient did complain of itching, given IV Benadryl. The patient tolerated their visit well. They were seen and evaluated by the attending physician, who agreed with the assessment and plan. I have discussed the findings of today's workup with the patient and addressed the patient's questions and concerns. FINAL IMPRESSION      1. Altered mental status, unspecified altered mental status type    2. Hyponatremia          DISPOSITION/PLAN   DISPOSITION        PATIENT REFERREDTO:  No follow-up provider specified.     DISCHARGE MEDICATIONS:  New Prescriptions    No medications on file       DISCONTINUED MEDICATIONS:  Discontinued Medications    No medications on file              (Please note that

## 2021-04-27 NOTE — ED PROVIDER NOTES
abnormal inversions  Non-specific T wave changes: not present  Prior EKG comparison: EKG dated 4/8/21 is significantly different due to anterior TWI less pronounced    MDM:  Diagnostic considerations included stroke, TIA, intracranial bleed, trauma, infection/sepsis, medication side effect, intoxication/withdrawal, metabolic/electrolyte abnormalities      ED course was notable for acute encephalopathy with hyponatremia with a UTI. She does have a lactic acidosis although I suspect that this is multifactorial from volume depletion. Her alcohol level is only 12 so this does not really explain her encephalopathy. She was given antibiotics and fluids, Tylenol for her low-grade fever and will be admitted to the hospital.  She is also given diphenhydramine because she complained of some itching. CT is nonacute of the head although a little bit limited. SEP-1 CORE MEASURE DATA    Classification: exclude from core measure    Repeat lactate level: improving    Exclusion criteria: the patient is NOT to be included for sepsis due to: Alternative explanation for abnormal labs, specifically lactate appears to be related to dehydration/alcohol use and other metabolic derangement and not sepsis. Despite UTI, has normal WBC and BP and HR. Do not feel her lactate represents occult septic shock.       During the patient's ED course, the patient was given:  Medications   cefepime (MAXIPIME) 2000 mg IVPB minibag (2,000 mg Intravenous New Bag 4/27/21 2130)   sodium chloride 0.9 % 7,680 mL with folic acid 1 mg, adult multi-vitamin with vitamin k 10 mL, thiamine 300 mg (has no administration in time range)   0.9 % sodium chloride bolus (0 mLs Intravenous Stopped 4/27/21 1909)   acetaminophen (TYLENOL) tablet 650 mg (650 mg Oral Given 4/27/21 1659)   0.9 % sodium chloride bolus (0 mLs Intravenous Stopped 4/27/21 1909)   diphenhydrAMINE (BENADRYL) injection 12.5 mg (12.5 mg Intravenous Given 4/27/21 1943)        CLINICAL IMPRESSION  1. Altered mental status, unspecified altered mental status type    2. Hyponatremia    3. Acute cystitis without hematuria    4. Lactic acidosis        DISPOSITION  Dianne Keller was admitted in fair condition. The plan is to admit to the hospital at this time under the PCP's admitting service. Dr. Anthony Lucio accepted the patient and will take over the patient's care. The total critical care time spent while evaluating and treating this patient was 36 minutes. This excludes time spent doing separately billable procedures. This includes time at the bedside, data interpretation, medication management, obtaining critical history from collateral sources if the patient is unable to provide it directly, and physician consultation. Specifics of interventions taken and potentially life-threatening diagnostic considerations are listed above in the medical decision making. This chart was created using Dragon dictation software. Efforts were made by me to ensure accuracy, however some errors may be present due to limitations of this technology.             Mimi Abdalla MD  04/27/21 1739

## 2021-04-27 NOTE — LETTER
[facility] Emergency Department      [address] [place] [address], [address]            PROOF OF PRESENCE      To Whom It May Concern:    Luis Izaguirre  was present in the Emergency Department at [facility] on 4/27/21.                                      Sincerely,        [facility]

## 2021-04-28 LAB
A/G RATIO: 0.9 (ref 1.1–2.2)
ALBUMIN SERPL-MCNC: 3.2 G/DL (ref 3.4–5)
ALP BLD-CCNC: 64 U/L (ref 40–129)
ALT SERPL-CCNC: <5 U/L (ref 10–40)
ANION GAP SERPL CALCULATED.3IONS-SCNC: 9 MMOL/L (ref 3–16)
AST SERPL-CCNC: 22 U/L (ref 15–37)
BILIRUB SERPL-MCNC: 0.4 MG/DL (ref 0–1)
BILIRUBIN URINE: NEGATIVE
BLOOD, URINE: ABNORMAL
BUN BLDV-MCNC: 6 MG/DL (ref 7–20)
CALCIUM SERPL-MCNC: 8.6 MG/DL (ref 8.3–10.6)
CHLORIDE BLD-SCNC: 100 MMOL/L (ref 99–110)
CLARITY: CLEAR
CO2: 24 MMOL/L (ref 21–32)
COLOR: YELLOW
CREAT SERPL-MCNC: <0.5 MG/DL (ref 0.6–1.2)
D DIMER: 535 NG/ML DDU (ref 0–229)
EKG ATRIAL RATE: 86 BPM
EKG DIAGNOSIS: NORMAL
EKG P AXIS: 33 DEGREES
EKG P-R INTERVAL: 162 MS
EKG Q-T INTERVAL: 396 MS
EKG QRS DURATION: 90 MS
EKG QTC CALCULATION (BAZETT): 473 MS
EKG R AXIS: 26 DEGREES
EKG T AXIS: 53 DEGREES
EKG VENTRICULAR RATE: 86 BPM
EPITHELIAL CELLS, UA: 1 /HPF (ref 0–5)
GFR AFRICAN AMERICAN: >60
GFR NON-AFRICAN AMERICAN: >60
GLOBULIN: 3.7 G/DL
GLUCOSE BLD-MCNC: 98 MG/DL (ref 70–99)
GLUCOSE URINE: NEGATIVE MG/DL
HYALINE CASTS: 0 /LPF (ref 0–8)
KETONES, URINE: NEGATIVE MG/DL
LACTIC ACID: 0.9 MMOL/L (ref 0.4–2)
LEUKOCYTE ESTERASE, URINE: ABNORMAL
MAGNESIUM: 0.7 MG/DL (ref 1.8–2.4)
MICROSCOPIC EXAMINATION: YES
NITRITE, URINE: NEGATIVE
PH UA: 7 (ref 5–8)
POTASSIUM REFLEX MAGNESIUM: 4 MMOL/L (ref 3.5–5.1)
PROTEIN UA: NEGATIVE MG/DL
RBC UA: 1 /HPF (ref 0–4)
REPORT: NORMAL
SARS-COV-2: NOT DETECTED
SODIUM BLD-SCNC: 124 MMOL/L (ref 136–145)
SODIUM BLD-SCNC: 130 MMOL/L (ref 136–145)
SODIUM BLD-SCNC: 133 MMOL/L (ref 136–145)
SPECIFIC GRAVITY UA: <1.005 (ref 1–1.03)
TOTAL PROTEIN: 6.9 G/DL (ref 6.4–8.2)
URINE TYPE: ABNORMAL
UROBILINOGEN, URINE: 0.2 E.U./DL
WBC UA: 15 /HPF (ref 0–5)

## 2021-04-28 PROCEDURE — 83605 ASSAY OF LACTIC ACID: CPT

## 2021-04-28 PROCEDURE — 2580000003 HC RX 258: Performed by: INTERNAL MEDICINE

## 2021-04-28 PROCEDURE — 1200000000 HC SEMI PRIVATE

## 2021-04-28 PROCEDURE — 6360000002 HC RX W HCPCS: Performed by: INTERNAL MEDICINE

## 2021-04-28 PROCEDURE — 85379 FIBRIN DEGRADATION QUANT: CPT

## 2021-04-28 PROCEDURE — 6370000000 HC RX 637 (ALT 250 FOR IP): Performed by: INTERNAL MEDICINE

## 2021-04-28 PROCEDURE — 81001 URINALYSIS AUTO W/SCOPE: CPT

## 2021-04-28 PROCEDURE — 84295 ASSAY OF SERUM SODIUM: CPT

## 2021-04-28 PROCEDURE — 36415 COLL VENOUS BLD VENIPUNCTURE: CPT

## 2021-04-28 PROCEDURE — 80053 COMPREHEN METABOLIC PANEL: CPT

## 2021-04-28 PROCEDURE — 83735 ASSAY OF MAGNESIUM: CPT

## 2021-04-28 PROCEDURE — 2500000003 HC RX 250 WO HCPCS: Performed by: EMERGENCY MEDICINE

## 2021-04-28 PROCEDURE — 2580000003 HC RX 258: Performed by: EMERGENCY MEDICINE

## 2021-04-28 PROCEDURE — 6360000002 HC RX W HCPCS: Performed by: EMERGENCY MEDICINE

## 2021-04-28 RX ORDER — MAGNESIUM SULFATE IN WATER 40 MG/ML
4000 INJECTION, SOLUTION INTRAVENOUS ONCE
Status: COMPLETED | OUTPATIENT
Start: 2021-04-28 | End: 2021-04-28

## 2021-04-28 RX ORDER — LORAZEPAM 1 MG/1
3 TABLET ORAL
Status: DISCONTINUED | OUTPATIENT
Start: 2021-04-28 | End: 2021-04-30 | Stop reason: HOSPADM

## 2021-04-28 RX ORDER — LORAZEPAM 2 MG/ML
3 INJECTION INTRAMUSCULAR
Status: DISCONTINUED | OUTPATIENT
Start: 2021-04-28 | End: 2021-04-30 | Stop reason: HOSPADM

## 2021-04-28 RX ORDER — DEXTROSE MONOHYDRATE 50 MG/ML
INJECTION, SOLUTION INTRAVENOUS CONTINUOUS
Status: DISCONTINUED | OUTPATIENT
Start: 2021-04-28 | End: 2021-04-28

## 2021-04-28 RX ORDER — LORAZEPAM 2 MG/ML
4 INJECTION INTRAMUSCULAR
Status: DISCONTINUED | OUTPATIENT
Start: 2021-04-28 | End: 2021-04-30 | Stop reason: HOSPADM

## 2021-04-28 RX ORDER — LORAZEPAM 2 MG/ML
2 INJECTION INTRAMUSCULAR
Status: DISCONTINUED | OUTPATIENT
Start: 2021-04-28 | End: 2021-04-30 | Stop reason: HOSPADM

## 2021-04-28 RX ORDER — LORAZEPAM 2 MG/ML
1 INJECTION INTRAMUSCULAR
Status: DISCONTINUED | OUTPATIENT
Start: 2021-04-28 | End: 2021-04-30 | Stop reason: HOSPADM

## 2021-04-28 RX ORDER — SODIUM CHLORIDE 9 MG/ML
INJECTION, SOLUTION INTRAVENOUS
Status: DISPENSED
Start: 2021-04-28 | End: 2021-04-29

## 2021-04-28 RX ORDER — LORAZEPAM 1 MG/1
4 TABLET ORAL
Status: DISCONTINUED | OUTPATIENT
Start: 2021-04-28 | End: 2021-04-30 | Stop reason: HOSPADM

## 2021-04-28 RX ORDER — OXYCODONE HYDROCHLORIDE 15 MG/1
15 TABLET ORAL EVERY 6 HOURS PRN
Status: DISCONTINUED | OUTPATIENT
Start: 2021-04-28 | End: 2021-04-30 | Stop reason: HOSPADM

## 2021-04-28 RX ORDER — LORAZEPAM 1 MG/1
2 TABLET ORAL
Status: DISCONTINUED | OUTPATIENT
Start: 2021-04-28 | End: 2021-04-30 | Stop reason: HOSPADM

## 2021-04-28 RX ORDER — LORAZEPAM 1 MG/1
1 TABLET ORAL
Status: DISCONTINUED | OUTPATIENT
Start: 2021-04-28 | End: 2021-04-30 | Stop reason: HOSPADM

## 2021-04-28 RX ORDER — OXYCODONE HYDROCHLORIDE 15 MG/1
15 TABLET ORAL EVERY 6 HOURS PRN
COMMUNITY
End: 2021-05-27 | Stop reason: SINTOL

## 2021-04-28 RX ORDER — MAGNESIUM SULFATE IN WATER 40 MG/ML
2000 INJECTION, SOLUTION INTRAVENOUS ONCE
Status: COMPLETED | OUTPATIENT
Start: 2021-04-28 | End: 2021-04-28

## 2021-04-28 RX ADMIN — LORAZEPAM 1 MG: 1 TABLET ORAL at 15:40

## 2021-04-28 RX ADMIN — ATORVASTATIN CALCIUM 80 MG: 80 TABLET, FILM COATED ORAL at 21:42

## 2021-04-28 RX ADMIN — BUSPIRONE HYDROCHLORIDE 10 MG: 5 TABLET ORAL at 01:29

## 2021-04-28 RX ADMIN — POTASSIUM CHLORIDE 20 MEQ: 1500 TABLET, EXTENDED RELEASE ORAL at 10:51

## 2021-04-28 RX ADMIN — FOLIC ACID: 5 INJECTION, SOLUTION INTRAMUSCULAR; INTRAVENOUS; SUBCUTANEOUS at 01:33

## 2021-04-28 RX ADMIN — Medication 10 ML: at 21:45

## 2021-04-28 RX ADMIN — OXYCODONE HYDROCHLORIDE 15 MG: 15 TABLET ORAL at 16:57

## 2021-04-28 RX ADMIN — POTASSIUM CHLORIDE 20 MEQ: 1500 TABLET, EXTENDED RELEASE ORAL at 21:42

## 2021-04-28 RX ADMIN — HYDROXYZINE HYDROCHLORIDE 10 MG: 10 TABLET, FILM COATED ORAL at 21:42

## 2021-04-28 RX ADMIN — BUSPIRONE HYDROCHLORIDE 10 MG: 5 TABLET ORAL at 21:42

## 2021-04-28 RX ADMIN — BACLOFEN 10 MG: 10 TABLET ORAL at 15:18

## 2021-04-28 RX ADMIN — ACETAMINOPHEN 650 MG: 325 TABLET ORAL at 16:58

## 2021-04-28 RX ADMIN — BUSPIRONE HYDROCHLORIDE 10 MG: 5 TABLET ORAL at 15:18

## 2021-04-28 RX ADMIN — CETIRIZINE HYDROCHLORIDE 10 MG: 10 TABLET, FILM COATED ORAL at 10:52

## 2021-04-28 RX ADMIN — BACLOFEN 10 MG: 10 TABLET ORAL at 01:29

## 2021-04-28 RX ADMIN — BACLOFEN 10 MG: 10 TABLET ORAL at 21:42

## 2021-04-28 RX ADMIN — SUCRALFATE 1 G: 1 TABLET ORAL at 10:53

## 2021-04-28 RX ADMIN — ATORVASTATIN CALCIUM 80 MG: 80 TABLET, FILM COATED ORAL at 01:29

## 2021-04-28 RX ADMIN — SODIUM CHLORIDE 25 ML: 9 INJECTION, SOLUTION INTRAVENOUS at 16:55

## 2021-04-28 RX ADMIN — Medication 10 ML: at 01:30

## 2021-04-28 RX ADMIN — DEXTROSE MONOHYDRATE: 50 INJECTION, SOLUTION INTRAVENOUS at 11:25

## 2021-04-28 RX ADMIN — RIVAROXABAN 20 MG: 20 TABLET, FILM COATED ORAL at 10:51

## 2021-04-28 RX ADMIN — POTASSIUM CHLORIDE 20 MEQ: 1500 TABLET, EXTENDED RELEASE ORAL at 01:29

## 2021-04-28 RX ADMIN — BUSPIRONE HYDROCHLORIDE 10 MG: 5 TABLET ORAL at 10:52

## 2021-04-28 RX ADMIN — CEFEPIME HYDROCHLORIDE 1000 MG: 1 INJECTION, POWDER, FOR SOLUTION INTRAMUSCULAR; INTRAVENOUS at 21:44

## 2021-04-28 RX ADMIN — MAGNESIUM SULFATE HEPTAHYDRATE 4000 MG: 40 INJECTION, SOLUTION INTRAVENOUS at 16:57

## 2021-04-28 RX ADMIN — SUCRALFATE 1 G: 1 TABLET ORAL at 01:29

## 2021-04-28 RX ADMIN — MAGNESIUM SULFATE HEPTAHYDRATE 2000 MG: 40 INJECTION, SOLUTION INTRAVENOUS at 21:34

## 2021-04-28 RX ADMIN — METOPROLOL SUCCINATE 50 MG: 50 TABLET, EXTENDED RELEASE ORAL at 10:51

## 2021-04-28 RX ADMIN — PANTOPRAZOLE SODIUM 40 MG: 40 TABLET, DELAYED RELEASE ORAL at 06:00

## 2021-04-28 RX ADMIN — BACLOFEN 10 MG: 10 TABLET ORAL at 10:53

## 2021-04-28 RX ADMIN — CEFEPIME HYDROCHLORIDE 1000 MG: 1 INJECTION, POWDER, FOR SOLUTION INTRAMUSCULAR; INTRAVENOUS at 11:26

## 2021-04-28 RX ADMIN — FERROUS GLUCONATE TAB 324 MG (37.5 MG ELEMENTAL IRON) 324 MG: 324 (37.5 FE) TAB at 10:51

## 2021-04-28 RX ADMIN — BALSALAZIDE DISODIUM 3000 MG: 750 CAPSULE ORAL at 10:54

## 2021-04-28 ASSESSMENT — PAIN - FUNCTIONAL ASSESSMENT: PAIN_FUNCTIONAL_ASSESSMENT: ACTIVITIES ARE NOT PREVENTED

## 2021-04-28 ASSESSMENT — PAIN DESCRIPTION - FREQUENCY: FREQUENCY: CONTINUOUS

## 2021-04-28 ASSESSMENT — PAIN DESCRIPTION - LOCATION
LOCATION: LEG
LOCATION: LEG

## 2021-04-28 ASSESSMENT — PAIN SCALES - GENERAL
PAINLEVEL_OUTOF10: 8
PAINLEVEL_OUTOF10: 8
PAINLEVEL_OUTOF10: 0

## 2021-04-28 ASSESSMENT — PAIN DESCRIPTION - ORIENTATION
ORIENTATION: RIGHT;LEFT
ORIENTATION: LEFT;RIGHT
ORIENTATION: RIGHT;LEFT

## 2021-04-28 ASSESSMENT — PAIN DESCRIPTION - DESCRIPTORS: DESCRIPTORS: ACHING

## 2021-04-28 ASSESSMENT — PAIN DESCRIPTION - PAIN TYPE: TYPE: ACUTE PAIN

## 2021-04-28 ASSESSMENT — PAIN DESCRIPTION - PROGRESSION: CLINICAL_PROGRESSION: NOT CHANGED

## 2021-04-28 ASSESSMENT — PAIN DESCRIPTION - ONSET: ONSET: ON-GOING

## 2021-04-28 NOTE — PROGRESS NOTES
Progress Note - Dr. Bonilla Cabrera - Internal Medicine  PCP: Tino Thomas MD [address] / [place] [phone]    Hospital Day: 1  Code Status: Full Code  Current Diet: DIET GENERAL;        CC: follow up on medical issues    Subjective:   Yeison Flores is a 60 y.o. female. She denies problems    Sleeping this am  When awoken, no complaints  Na better, 132    She denies chest pain, denies shortness of breath, denies nausea,  denies emesis. 10 system Review of Systems is reviewed with patient, and pertinent positives are noted in HPI above . Otherwise, Review of systems is negative. I have reviewed the patient's medical and social history in detail and updated the computerized patient record. To recap: She  has a past medical history of Anxiety, Arthritis, Blood transfusion reaction, Crohn's disease ([address] Depression, GERD (gastroesophageal reflux disease), Hiatal hernia, Hx of blood clots, Hypertension, MRSA (methicillin resistant staph aureus) culture positive, Neuropathy, Pneumonia, Sinus headache, SOB (shortness of breath), and VRE (vancomycin resistant enterococcus) culture positive. . She  has a past surgical history that includes Hysterectomy; knee surgery (Bilateral); Hand Carpectomy (Bilateral); hernia repair; Abdomen surgery; bladder suspension; fracture surgery; Heel spur surgery; Tonsillectomy; Colonoscopy; Upper gastrointestinal endoscopy (6/14/13); Foot surgery (Left, 6/25/14); Foot surgery (6/3/15); Colonoscopy (9/14/15); Foot surgery (Left, 08/05/2002); joint replacement; Sigmoidoscopy (01/15/2018); Abdominal adhesion surgery (06/08/2018); Colonoscopy (08/07/2018); colostomy (N/A, 10/8/2018); pr secd clos surg wnd exten/complic (N/A, 61/63/9664); Leg Surgery (Right); Colonoscopy (06/07/2019); Colonoscopy (N/A, 6/7/2019); Colonoscopy (N/A, 6/7/2019); Small intestine surgery (N/A, 7/17/2019); and proctosigmoidoscopy (N/A, 7/17/2019). . She  reports that she has been smoking cigarettes and e-cigarettes. She has a 10.00 pack-year smoking history. She has never used smokeless tobacco. She reports current alcohol use of about 2.0 standard drinks of alcohol per week. She reports that she does not use drugs. .        Active Hospital Problems    Diagnosis Date Noted    Sepsis secondary to UTI ([facility] [address] [A41.9, N39.0] 04/27/2021    Hyponatremia [E87.1] 04/27/2021    Encephalopathy, metabolic [G00.50] 41/00/8919    Suspected COVID-19 virus infection [Z20.822] 04/27/2021    Essential hypertension [I10]     Anemia [D64.9] 01/05/2018       Current Facility-Administered Medications: baclofen (LIORESAL) tablet 10 mg, 10 mg, Oral, TID  busPIRone (BUSPAR) tablet 10 mg, 10 mg, Oral, TID  balsalazide (COLAZAL) capsule 3,000 mg, 3,000 mg, Oral, Daily  ondansetron (ZOFRAN-ODT) disintegrating tablet 4 mg, 4 mg, Oral, Q8H PRN  ferrous gluconate 324 (37.5 Fe) MG tablet 324 mg, 324 mg, Oral, Daily with breakfast  sucralfate (CARAFATE) tablet 1 g, 1 g, Oral, 4x Daily  hydrOXYzine (ATARAX) tablet 10 mg, 10 mg, Oral, TID PRN  atorvastatin (LIPITOR) tablet 80 mg, 80 mg, Oral, Nightly  fluticasone (FLONASE) 50 MCG/ACT nasal spray 1 spray, 1 spray, Each Nostril, Daily PRN  potassium chloride (KLOR-CON M) extended release tablet 20 mEq, 20 mEq, Oral, BID  rivaroxaban (XARELTO) tablet 20 mg, 20 mg, Oral, Daily with breakfast  metoprolol succinate (TOPROL XL) extended release tablet 50 mg, 50 mg, Oral, Daily  0.9 % sodium chloride infusion, , Intravenous, Continuous  sodium chloride flush 0.9 % injection 5-40 mL, 5-40 mL, Intravenous, 2 times per day  sodium chloride flush 0.9 % injection 5-40 mL, 5-40 mL, Intravenous, PRN  0.9 % sodium chloride infusion, 25 mL, Intravenous, PRN  promethazine (PHENERGAN) tablet 12.5 mg, 12.5 mg, Oral, Q6H PRN **OR** ondansetron (ZOFRAN) injection 4 mg, 4 mg, Intravenous, Q6H PRN  acetaminophen (TYLENOL) tablet 650 mg, 650 mg, Oral, Q6H PRN **OR** acetaminophen (TYLENOL) suppository 650 mg, 650 mg, Rectal, Q6H PRN  magnesium hydroxide (MILK OF MAGNESIA) 400 MG/5ML suspension 30 mL, 30 mL, Oral, Daily PRN  cefepime (MAXIPIME) 1000 mg IVPB minibag, 1,000 mg, Intravenous, Q12H  hydrALAZINE (APRESOLINE) injection 10 mg, 10 mg, Intravenous, Q6H PRN  0.9 % sodium chloride bolus, 500 mL, Intravenous, PRN  potassium chloride (KLOR-CON M) extended release tablet 40 mEq, 40 mEq, Oral, PRN **OR** potassium bicarb-citric acid (EFFER-K) effervescent tablet 40 mEq, 40 mEq, Oral, PRN **OR** potassium chloride 10 mEq/100 mL IVPB (Peripheral Line), 10 mEq, Intravenous, PRN  cetirizine (ZYRTEC) tablet 10 mg, 10 mg, Oral, Daily  pantoprazole (PROTONIX) tablet 40 mg, 40 mg, Oral, QAM AC         Objective:  /77   Pulse 67   Temp 98.8 °F (37.1 °C) (Temporal)   Resp 15   Ht 5' 7\" (1.702 m)   Wt 194 lb 14.2 oz (88.4 kg)   SpO2 95%   BMI 30.52 kg/m²      Patient Vitals for the past 24 hrs:   BP Temp Temp src Pulse Resp SpO2 Height Weight   04/28/21 0400 131/77 98.8 °F (37.1 °C) Temporal 67 15 95 % -- 194 lb 14.2 oz (88.4 kg)   04/28/21 0131 -- -- -- -- 15 93 % -- --   04/28/21 0112 122/72 98.3 °F (36.8 °C) Temporal 62 16 95 % -- --   04/27/21 2300 (!) 146/85 97.4 °F (36.3 °C) Temporal 74 20 99 % -- 194 lb 14.2 oz (88.4 kg)   04/27/21 2130 (!) 144/84 -- -- 78 21 -- -- --   04/27/21 2100 (!) 154/71 -- -- 84 22 100 % -- --   04/27/21 2030 132/76 -- -- 72 18 97 % -- --   04/27/21 2000 (!) 147/87 -- -- 70 17 98 % -- --   04/27/21 1930 (!) 146/90 -- -- 72 15 97 % -- --   04/27/21 1900 (!) 137/97 -- -- 81 -- -- -- --   04/27/21 1730 134/83 -- -- 80 17 97 % -- --   04/27/21 1625 -- 100 °F (37.8 °C) Oral -- -- -- -- --   04/27/21 1606 (!) 122/59 98.2 °F (36.8 °C) Oral 89 20 97 % 5' 7\" (1.702 m) 194 lb (88 kg)     Patient Vitals for the past 96 hrs (Last 3 readings):   Weight   04/28/21 0400 194 lb 14.2 oz (88.4 kg)   04/27/21 2300 194 lb 14.2 oz (88.4 kg)   04/27/21 1606 194 lb (88 kg)           Intake/Output Summary (Last 24 hours) at 4/28/2021 0889  Last data filed at 4/28/2021 0602  Gross per 24 hour   Intake 240 ml   Output 2100 ml   Net -1860 ml         Physical Exam:   /77   Pulse 67   Temp 98.8 °F (37.1 °C) (Temporal)   Resp 15   Ht 5' 7\" (1.702 m)   Wt 194 lb 14.2 oz (88.4 kg)   SpO2 95%   BMI 30.52 kg/m²   General appearance: alert, appears stated age and cooperative  Head: Normocephalic, without obvious abnormality, atraumatic  Lungs: clear to auscultation bilaterally  Heart: regular rate and rhythm, S1, S2 normal, no murmur, click, rub or gallop  Abdomen: soft, non-tender; bowel sounds normal; no masses,  no organomegaly  Extremities: extremities normal, atraumatic, no cyanosis or edema    Labs:  Lab Results   Component Value Date    WBC 6.5 04/27/2021    HGB 11.1 (L) 04/27/2021    HCT 33.8 (L) 04/27/2021     04/27/2021    CHOL 111 07/24/2020    TRIG 66 07/24/2020    HDL 43 07/24/2020    ALT <5 (L) 04/28/2021    AST 22 04/28/2021     (L) 04/28/2021    K 4.0 04/28/2021     04/28/2021    CREATININE <0.5 (L) 04/28/2021    BUN 6 (L) 04/28/2021    CO2 24 04/28/2021    TSH 3.57 07/28/2020    INR 1.62 (H) 04/27/2021    LABA1C 5.0 07/24/2020    LABMICR YES 04/28/2021     Lab Results   Component Value Date    TROPONINI <0.01 04/27/2021       Recent Imaging Results are Reviewed:  Xr Foot Left (min 3 Views)    Result Date: 4/10/2021  EXAMINATION: THREE XRAY VIEWS OF THE LEFT FOOT 4/9/2021 8:06 pm COMPARISON: None. HISTORY: ORDERING SYSTEM PROVIDED HISTORY: previous toe ulcers TECHNOLOGIST PROVIDED HISTORY: Reason for exam:->previous toe ulcers Reason for Exam: PREVIOUS TOE ULCERS---LEFT Acuity: Unknown Type of Exam: Unknown FINDINGS: There are numerous chronic deformities of the 2nd and 3rd digits. There are apparent new areas of lucency involving the 2nd proximal phalanx as well as the mid and distal 3rd phalanges. There is also a questionable nondisplaced fracture of the 4th proximal phalanx.  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 There is diffuse osteopenia. Extensive deformity in multiple areas of abnormal lucency in the digits as above. Findings are somewhat suspicious for osteomyelitis. If there is clinical concern for osteomyelitis further evaluation with MRI is suggested. Possible nondisplaced fracture of the 4th proximal phalanx. Ct Head Wo Contrast    Result Date: 4/27/2021  EXAMINATION: CT OF THE HEAD WITHOUT CONTRAST  4/27/2021 5:01 pm TECHNIQUE: CT of the head was performed without the administration of intravenous contrast. Dose modulation, iterative reconstruction, and/or weight based adjustment of the mA/kV was utilized to reduce the radiation dose to as low as reasonably achievable. COMPARISON: CT head 04/07/2021. HISTORY: ORDERING SYSTEM PROVIDED HISTORY: AMS TECHNOLOGIST PROVIDED HISTORY: If patient is on cardiac monitor and/or pulse ox, they may be taken off cardiac monitor and pulse ox, left on O2 if currently on. All monitors reattached when patient returns to room. Has a \"code stroke\" or \"stroke alert\" been called? ->No Reason for exam:->AMS Decision Support Exception->Emergency Medical Condition (MA) Reason for Exam: AMS Acuity: Acute Type of Exam: Initial FINDINGS: BRAIN/VENTRICLES: Motion and artifact degraded exam.  No acute hemorrhage. Periventricular and subcortical hypoattenuation is nonspecific and may be related to microvascular disease. Encephalomalacia in the left frontal and parietal lobes again visualized. Encephalomalacia in the cortical right frontal lobe again visualized. Chronic lacunar infarct in the left basal ganglia. Artifact partially obscures the marti. Artifact partially obscures the inferior cerebellum. Dale Parlor white differentiation appears maintained given artifact near the skull base and through the posterior fossa. Cerebral volume loss and prominence of the ventricles again visualized. There is no midline shift.  Basal retrocardiac hiatal hernia. Vl Extremity Venous Bilateral    Result Date: 4/9/2021  Lower Extremities DVT Study  Demographics   Patient Name       Bryan BENTON   Date of Study      04/09/2021       Gender              Female   Patient Number     [id number]       Date of Birth       1955   Visit Number       [id number]        Age                 60 year(s)   Accession Number   [id number]       Room Number         4285   Corporate ID       [id number]         Sonographer         Leah Zamudio,                                                          [address]   Ordering Physician Rosemary, [address] [place] [address] Vascular                                      Physician           Radha Ag MD,                                                          [address] [facility], [address] [facility]  Procedure Type of Study:   Veins:Lower Extremities DVT Study, VASC EXTREMITY VENOUS DUPLEX BILATERAL. Vascular Sonographer Report  Additional Indications:Pain swelling Impressions Right Impression There is no evidence of deep or superficial venous thrombosis involving the right lower extremity. Left Impression There is no evidence of deep or superficial venous thrombosis involving the left lower extremity. Conclusions   Summary   Normal venous duplex study of the bilateral lower extremities. There is no  evidence of deep or superficial venous thrombosis. Signature   ------------------------------------------------------------------  Electronically signed by Radha Ag MD, [address] [facility], [address]  (Interpreting physician) on 04/09/2021 at 01:56 PM  ------------------------------------------------------------------  Patient Status:Routine. [address] [place] [address] Vascular Lab. Technical Quality:Adequate visualization. Velocities are measured in cm/s ; Diameters are measured in mm Right Lower Extremities DVT Study Measurements Right 2D Measurements +------------------------+----------+---------------+----------+ ! Location !Yes       !Yes            ! None      ! +------------------------+----------+---------------+----------+ Right Doppler Measurements +------------------------+------+------+------------+ ! Location                ! Signal!Reflux! Reflux (sec)! +------------------------+------+------+------------+ ! Sapheno Femoral Junction! Phasic!      !            ! +------------------------+------+------+------------+ ! Common Femoral          !Phasic!      !            ! +------------------------+------+------+------------+ ! Prox Femoral            !Phasic!      !            ! +------------------------+------+------+------------+ ! Deep Femoral            !Phasic!      !            ! +------------------------+------+------+------------+ ! Popliteal               !Phasic!      !            ! +------------------------+------+------+------------+ Left Lower Extremities DVT Study Measurements Left 2D Measurements +------------------------+----------+---------------+----------+ ! Location                ! Visualized! Compressibility! Thrombosis! +------------------------+----------+---------------+----------+ ! Sapheno Femoral Junction! Yes       ! Yes            ! None      ! +------------------------+----------+---------------+----------+ ! GSV Thigh               ! Yes       ! Yes            ! None      ! +------------------------+----------+---------------+----------+ ! Common Femoral          !Yes       ! Yes            ! None      ! +------------------------+----------+---------------+----------+ ! Prox Femoral            !Yes       ! Yes            ! None      ! +------------------------+----------+---------------+----------+ ! Mid Femoral             !Yes       ! Yes            ! None      ! +------------------------+----------+---------------+----------+ ! Dist Femoral            !Yes       ! Yes            ! None      ! +------------------------+----------+---------------+----------+ ! Deep Femoral            !Yes       ! Yes            ! None      ! +------------------------+----------+---------------+----------+ ! Popliteal               !Yes       ! Yes            ! None      ! +------------------------+----------+---------------+----------+ ! GSV Below Knee          ! Yes       ! Yes            ! None      ! +------------------------+----------+---------------+----------+ ! Gastroc                 ! Yes       ! Yes            ! None      ! +------------------------+----------+---------------+----------+ ! Soleal                  !Yes       ! Yes            ! None      ! +------------------------+----------+---------------+----------+ ! PTV                     ! Yes       ! Yes            ! None      ! +------------------------+----------+---------------+----------+ ! Peroneal                !Yes       ! Yes            ! None      ! +------------------------+----------+---------------+----------+ ! GSV Calf                ! Yes       ! Yes            ! None      ! +------------------------+----------+---------------+----------+ ! SSV                     ! Yes       ! Yes            ! None      ! +------------------------+----------+---------------+----------+ Left Doppler Measurements +------------------------+------+------+------------+ ! Location                ! Signal!Reflux! Reflux (sec)! +------------------------+------+------+------------+ ! Sapheno Femoral Junction! Phasic!      !            ! +------------------------+------+------+------------+ ! Common Femoral          !Phasic!      !            ! +------------------------+------+------+------------+ ! Prox Femoral            !Phasic!      !            ! +------------------------+------+------+------------+ ! Deep Femoral            !Phasic!      !            ! +------------------------+------+------+------------+ ! Popliteal               !Phasic!      !            ! +------------------------+------+------+------------+      Assessment and Plan:  Principal Problem:    Sepsis secondary to UTI ([facility] [address] -Established problem. Stable. Plan: cont iv abx, fluids. Repeat labs ordered  Active Problems:    Anemia -Established problem. Stable.  hgb 11.1  Plan: No indication for transfusion. Cont to monitor h/h to assess progression of anemia. Recommend ferrous sulfate or MVI as outpatient. Essential hypertension -Established problem. Stable. Plan: cont same meds    Hyponatremia -Established problem. Improving. Plan: cont fluids for now    Encephalopathy, metabolic -Established problem. Stable. Plan: cont to bring Na back to normal    Suspected COVID-19 virus infection -Established problem. Stable.     Plan: cont in droplet plus until covid status known            Kiah Vasile  4/28/2021

## 2021-04-28 NOTE — PROGRESS NOTES
Pt transferred to 3A from 5T. A&o x2, reoriented to time and situation. resp even/unlabored on ra, no sob. No cp/pressure, tele reading NSR 80s. Temp 101, medicated with tylenol. Pt c/o cecilia leg pain, medicated per orders. IVF and IV mag infusing. purewick in place. Observed small scabbed areas over BUE. Fall precautions in place, bed alarm in place.  Bed low, call bell in reach, instructed to call for assist.

## 2021-04-28 NOTE — CONSULTS
[facility]                     [facility], [address]                                  CONSULTATION    PATIENT NAME: Seven Barksdale                :        1955  MED REC NO:   [id number]                          ROOM:       4769  ACCOUNT NO:   [de-identified]                           ADMIT DATE: 2021  PROVIDER:     Alley Pradhan MD    RENAL CONSULTATION    CONSULT DATE:  2021    CONSULTING PHYSICIAN:  Alley Pradhan MD    REFERRING PROVIDER:  Edith Munoz MD    REASON FOR CONSULTATION:  Acute on chronic hyponatremia, metabolic  acidosis. HISTORY OF PRESENT ILLNESS:  The patient is a 81-year-old female with  history of hyponatremia, best serum sodium around 134, history of  Crohn's disease, hypokalemia, hypertension, hiatal hernia, sigmoid  colectomy with colostomy, UTIs, GERD who was admitted to the hospital  yesterday secondary to mental status change. There is a concern for  alcohol intoxication. I am currently asked to see the patient because  her serum sodium on presentation was 123 and in a span of about 11 hours  has increased to 133. Her best serum sodium is around 134 as mentioned. She has been receiving normal saline. She has also been receiving  banana bag. Her lowest systolic blood pressure was in the 120s range. She was supposed to take Lasix three times a day as an outpatient. No  neurologic deficits. No seizures. Denies any nausea, vomiting or  diarrhea. No regular NSAID use. PAST MEDICAL HISTORY:  Includes hyponatremia, hypokalemia, hypertension,  Crohn's disease, hiatal hernia, sigmoid colectomy with colostomy and  mobilization of splenic flexure, urinary tract infections, insomnia. ALLERGIES:  Includes ACE INHIBITORS and SKELAXIN.     MEDICATIONS PRIOR TO ADMISSION:  Includes metoprolol XL, rivaroxaban,  atorvastatin, fluticasone, furosemide, potassium chloride, ferrous  gluconate, sucralfate, hydroxyzine, fexofenadine, ondansetron,  buspirone, nystatin cream, balsalazide, mupirocin, omeprazole, baclofen. SOCIAL HISTORY:  History of alcohol use. Positive smoking. Denies any  drug abuse. FAMILY HISTORY:  Includes hypertension and kidney disease. REVIEW OF SYSTEMS:  GENERAL:  Denies any malaise. SKIN:  No skin rash, itching or discharge. NEUROLOGIC:  No headaches. No focal deficits. Episodes of confusion. No seizures. CARDIOVASCULAR:  No chest pain or palpitations. PULMONARY:  No shortness of breath. No coughing, no hemoptysis. GI:  No abdominal pain. No nausea, no vomiting, no diarrhea or  constipation. RENAL:  Denies any gross hematuria or change in urine output. ENDOCRINE:  No history of diabetes. No heat or cold intolerance. ID:  No fever or chills. MUSCULOSKELETAL:  No active joint pains. Chronic leg edema. PHYSICAL EXAMINATION:  VITAL SIGNS:  Blood pressure 146/93, pulse 95, respirations 18,  temperature 98.7, saturating 97%. Weight listed at 88.4 kg. Her best  weight recently is around 78.9 kg. Urine output 2100 mL, negative 1.1  liters since admission. GENERAL:  Appears well. HEENT:  Anicteric sclerae. Clear conjunctivae. SKIN:  Anicteric. No rash. CHEST:  Clear. HEART:  Regular. ABDOMEN:  Soft, nontender. No rebound or involuntary guarding. EXTREMITIES:  Shows 2+ edema. LABORATORY DATA:  Sodium 133, potassium 4, chloride 100, bicarb 24, BUN  6, creatinine less than 0.5, glucose 98, calcium 8.6. Albumin 3.2. WBC  6.5, hemoglobin 11.1, hematocrit 33.8, platelet count 455,971. Urinalysis, specific gravity less than 1.055, pH is 7, protein is trace,  blood is trace, leukocyte esterase is large to moderate, wbc 15-66. Urine culture is pending. DIAGNOSTIC DATA:  Chest x-ray shows increased lung markings, mild  pulmonary vascular congestion, mild left pleural effusion, large hiatal  hernia.   CT scan of the head shows motion artifact degraded exam.  No  acute

## 2021-04-28 NOTE — PROGRESS NOTES
Called and updated patients daughter, admission completed and medication reconciliation reviewed with her. She is asking about potential resources for home care, phone number for 5T given to daughter.

## 2021-04-28 NOTE — CONSULTS
Renal Consult  Full Note Dictated [id number]  A/P  1. Acute on Chronic Hyponatremia-faster Na correction. Goal Na today around 131. D5W. Follow Na. No neurologic symptoms. Baseline Na around 134.   2.   Low HCO3-better  3. Chronic Edema-follow for now. 4.   Anemia  5. H/O Crohn's Disease  6. H/O Alcohol Abuse  7.    Mental Status Change    Thank you

## 2021-04-28 NOTE — PROGRESS NOTES
Physical/Occupational Therapy  Dianne Keller    Attempted to see pt for PT/OT evaluation. Chart reviewed. Patient with critical low magnesium level. Discussed with nursing. Patient not appropriate for PT/OT evaluations at this time. Will hold therapy and will follow up with pt tomorrow as medically appropriate.  Thanks, Lynette Paez, PT, DPT JOSSE DuvallR/MEDARDO [id number]

## 2021-04-28 NOTE — ED NOTES
Pt comes in today from home by [facility] due to chest pain. Upon arrival, pt states that she was at home when she started to shiver. Pt is alert to person and place only. Pt is febrile. No tachycardia alerted. Other vitals stable. Pt poor historian. Denies any other symptoms. Call light within reach. Will continue to monitor.       Favio Chicas RN  04/27/21 2026

## 2021-04-28 NOTE — H&P
History and Physical  Dr. Romayne La  4/27/2021    PCP: Shirley Hernandez MD    Cc:   Chief Complaint   Patient presents with    Altered Mental Status     [facility] from home due to chest pain. Per mary kay, daughter called 911 for pt chest pain. per mary kay, pt will not talk about her symptoms. per mary aky, possible ETOH       HPI:  Matt Llamas is a 60 y.o. female who has a past medical history of Anxiety, Arthritis, Blood transfusion reaction, Crohn's disease ([facility] [address] Depression, GERD (gastroesophageal reflux disease), Hiatal hernia, Hx of blood clots, Hypertension, MRSA (methicillin resistant staph aureus) culture positive, Neuropathy, Pneumonia, Sinus headache, SOB (shortness of breath), and VRE (vancomycin resistant enterococcus) culture positive. Patient presents with Sepsis secondary to UTI [facility]). HPI     60 y.o. female who presents to the emergency department with complaints of brought in for evaluation for possible chest pain. On arrival patient states that she was at home when she started shivering. She was unsure why she is shivering. According to mary kay daughter called 911 for chest pain. Patient actually denies any chest pain. She also denies shortness of breath. Patient is alert to person and place only. Patient states that she did receive 1 dose of Covid vaccine. No known exposures to Covid. I did personally take patient's temperature at bedside and she does have an oral temperature of 100 °F.  She is not tachycardic has normal respirations. Otherwise patient is an unreliable historian. Found to have low Na  Review of old chart shows multiple similar admits with metabolic encephalopathy due to low sodium    Renal consulted    Problem list of hospitalization thus far:   Active Hospital Problems    Diagnosis    Sepsis secondary to UTI ([facility] [address] [A41.9, N39.0]    Hyponatremia [E87.1]    Encephalopathy, metabolic [N61.49]    Suspected COVID-19 virus infection [Z20.822]    Essential hypertension [I10]    Anemia [D64.9]         Review of Systems: (1 system for EPF, 2-9 for detailed, 10+ for comprehensive)  Review of Systems   Unable to perform ROS: Mental status change           Past Medical History:   Past Medical History:   Diagnosis Date    Anxiety     Arthritis     Blood transfusion reaction     Crohn's disease ([address]     Depression     GERD (gastroesophageal reflux disease)     Hiatal hernia 6/24/2014    Hx of blood clots     Hypertension     MRSA (methicillin resistant staph aureus) culture positive 06/05/2018    urine    Neuropathy     Pneumonia 1/8/2014    Sinus headache     SOB (shortness of breath)     VRE (vancomycin resistant enterococcus) culture positive 10/08/2018    abd culture       Past Surgical History:   Past Surgical History:   Procedure Laterality Date    ABDOMEN SURGERY      ABDOMINAL ADHESION SURGERY  06/08/2018    BLADDER SUSPENSION      COLONOSCOPY      COLONOSCOPY  9/14/15    sigmoid colon biopsy     COLONOSCOPY  08/07/2018    COLONOSCOPY  06/07/2019    COLONOSCOPY, POSSIBLE BIOPSY, POSSIBLE POLYPECTOMY    COLONOSCOPY N/A 6/7/2019    COLONOSCOPY WITH BIOPSY performed by Azeb Ferguson MD at [address] N/A 6/7/2019    COLONOSCOPY DIAGNOSTIC/STOMA performed by Azeb Ferguson MD at [address] [facility] 10/8/2018    EXPLORATORY LAPAROTOMY WITH COLOSTOMY performed by Loyd Keith MD at [address] [facility] [address] Left 6/25/14    excision plantar left hallux    FOOT SURGERY  6/3/15    PLANTAR PLATE REPAIR BILATERAL, ARTHROTOMY MPJ 2ND BILATERAL    FOOT SURGERY Left 08/05/2002    Excision second metatarsal head left    FRACTURE SURGERY      rt hip    HAND CARPECTOMY Bilateral     HEEL SPUR SURGERY      HERNIA REPAIR      HYSTERECTOMY      bladder surgery    JOINT REPLACEMENT      rt hip, L shoulder replacement 11/2014    KNEE SURGERY Bilateral     arthrosvopic    LEG SURGERY Right     DE SECD CLOS SURG WND EXTEN/COMPLIC N/A 99/39/1452    SECONDARY WOUND CLOSURE OF ABDOMINAL WOUND performed by Génesis Badillo MD at [facility] PROCTOSIGMOIDOSCOPY N/A 7/17/2019    FLEXIBLE SIGMOIDOSCOPY performed by Génesis Badillo MD at [address]  01/15/2018    with biopsies    SMALL INTESTINE SURGERY N/A 7/17/2019    COLOSTOMY CLOSURE WITH COLORECTAL ANASTOMOSIS performed by Génesis Badillo MD at [address]  6/14/13    and colonsocopy with antral erosion biopsies       Social History:   Social History     Tobacco History     Smoking Status  Current Every Day Smoker Smoking Frequency  1 pack/day for 10 years (10 pk yrs) Smoking Tobacco Type  Cigarettes, E-Cigarettes    Smokeless Tobacco Use  Never Used    Tobacco Comment  CUT BACK TO 1/2 PPD WITH E CIG 6-2019          Alcohol History     Alcohol Use Status  Yes Drinks/Week  2 Shots of liquor, 0 Standard drinks or equivalent per week Amount  2.0 standard drinks of alcohol/wk Comment  6 DRINKS A WEEK OR LESS          Drug Use     Drug Use Status  No          Sexual Activity     Sexually Active  Not Currently Partners  Male                Fam History:   Family History   Problem Relation Age of Onset    High Blood Pressure Mother     High Blood Pressure Father     Kidney Disease Sister        PFSH: The above PMHx, PSHx, SocHx, FamHx has been reviewed by myself. (1 area for detailed, 2-3 for comprehensive)      Code Status: Prior    [facility]  Prior to Admission medications    Medication Sig Start Date End Date Taking?  Authorizing Provider   metoprolol succinate (TOPROL XL) 50 MG extended release tablet Take 1 tablet by mouth daily 1/21/21   GAYLA Espinosa CNP   rivaroxaban (XARELTO) 20 MG TABS tablet Take 1 tablet by mouth daily (with breakfast) 12/7/20   GAYLA Espinosa CNP   atorvastatin (LIPITOR) 80 MG tablet Take 1 tablet by mouth nightly 7/30/20   Lucinda Barahona MD   fluticasone (FLONASE) 50 MCG/ACT nasal spray 1 spray by Each Nostril route daily as needed for Rhinitis 7/30/20   Lucinda Baarhona MD   furosemide (LASIX) 40 MG tablet Take 1 tablet by mouth daily  Patient taking differently: Take 40 mg by mouth daily Pt takes two  To three times a week 7/30/20   Humaira June MD   potassium chloride (KLOR-CON M) 20 MEQ extended release tablet Take 1 tablet by mouth 2 times daily 7/30/20   Humaira June MD   ferrous gluconate (FERGON) 324 (38 Fe) MG tablet Take 324 mg by mouth daily (with breakfast)    Historical Provider, MD   sucralfate (CARAFATE) 1 GM tablet Take 1 g by mouth 4 times daily    Historical Provider, MD   hydrOXYzine (ATARAX) 10 MG tablet Take 10 mg by mouth 3 times daily as needed for Itching    Historical Provider, MD   fexofenadine (ALLEGRA) 180 MG tablet Take 180 mg by mouth daily    Historical Provider, MD   ondansetron (ZOFRAN ODT) 4 MG disintegrating tablet Take 1 tablet by mouth every 8 hours as needed for Nausea 7/15/20   LYNN Montague   busPIRone (BUSPAR) 10 MG tablet Take 10 mg by mouth 3 times daily as needed    Historical Provider, MD   nystatin (MYCOSTATIN) 493679 UNIT/GM cream Apply topically 2 times daily as needed for Dry Skin Apply topically 2 times daily. Historical Provider, MD   balsalazide (COLAZAL) 750 MG capsule Take 3,000 mg by mouth daily Take 4 capsules (total 3000 mg) daily each morning.     Historical Provider, MD   mupirocin Estevan Drown) 2 % ointment Apply daily 8/5/19   Ange Jonas DPM   omeprazole (PRILOSEC) 20 MG delayed release capsule Take 20 mg by mouth Daily     Historical Provider, MD   baclofen (LIORESAL) 10 MG tablet Take 1 tablet by mouth 3 times daily 6/30/18   Sandy Rodriguez MD         Allergies   Allergen Reactions    Ace Inhibitors Swelling    Skelaxin [Metaxalone] Swelling             EXAM: (2-7 system for EPF/Detailed, ?8 for they may be taken off cardiac monitor and pulse ox, left on O2 if currently on. All monitors reattached when patient returns to room. Has a \"code stroke\" or \"stroke alert\" been called? ->No Reason for exam:->AMS Decision Support Exception->Emergency Medical Condition (MA) Reason for Exam: AMS Acuity: Acute Type of Exam: Initial FINDINGS: BRAIN/VENTRICLES: Motion and artifact degraded exam.  No acute hemorrhage. Periventricular and subcortical hypoattenuation is nonspecific and may be related to microvascular disease. Encephalomalacia in the left frontal and parietal lobes again visualized. Encephalomalacia in the cortical right frontal lobe again visualized. Chronic lacunar infarct in the left basal ganglia. Artifact partially obscures the marti. Artifact partially obscures the inferior cerebellum. Idelia De La Fuente white differentiation appears maintained given artifact near the skull base and through the posterior fossa. Cerebral volume loss and prominence of the ventricles again visualized. There is no midline shift. Basal cisterns appear patent. ORBITS: Visualized orbits appear unremarkable on this unenhanced exam. SINUSES: Visualized paranasal sinuses appear clear. Visualized mastoid air cells appear clear. SOFT TISSUES/SKULL: No depressed calvarial fracture. Motion and artifact degraded exam.  No acute hemorrhage or midline shift. Aspects 10. Ct Head Wo Contrast    Result Date: 4/8/2021  EXAMINATION: CT OF THE HEAD WITHOUT CONTRAST  4/7/2021 11:49 pm TECHNIQUE: CT of the head was performed without the administration of intravenous contrast. Dose modulation, iterative reconstruction, and/or weight based adjustment of the mA/kV was utilized to reduce the radiation dose to as low as reasonably achievable. COMPARISON: 07/24/2020 HISTORY: ORDERING SYSTEM PROVIDED HISTORY: AMS TECHNOLOGIST PROVIDED HISTORY: Reason for exam:->AMS Has a \"code stroke\" or \"stroke alert\" been called? ->No Decision Support Exception->Emergency Medical Condition (MA) Reason for Exam: AMS Acuity: Acute Type of Exam: Initial Relevant Medical/Surgical History: Nausea (Patient presents with abdominal pain and emesis x4 days. She also endorses weakness and fatigue.) FINDINGS: BRAIN/VENTRICLES: The ventricles are mildly enlarged with diffuse mild prominence of the cortical sulci. There is focal low density and atrophy along the cortex of the left frontal parietal and left parietooccipital regions with diffuse atrophy throughout the area extending inferiorly consistent with old infarcts. There is mild periventricular low density bilaterally. No intracranial hemorrhage or edema is seen. There is no extra-axial fluid collection or mass. There are basal ganglia calcifications bilaterally. The midline structures unremarkable. ORBITS: The visualized portion of the orbits demonstrate no acute abnormality. SINUSES: The visualized paranasal sinuses and mastoid air cells demonstrate no acute abnormality. SOFT TISSUES/SKULL:  No acute abnormality of the visualized skull or soft tissues. 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 Mild atrophy and mild chronic microischemic disease throughout the deep white matter with no acute abnormality seen. Basal ganglia calcifications bilaterally. Xr Chest Portable    Result Date: 4/27/2021  EXAMINATION: ONE XRAY VIEW OF THE CHEST 4/27/2021 5:09 pm COMPARISON: April 8, 2021 HISTORY: ORDERING SYSTEM PROVIDED HISTORY: fever TECHNOLOGIST PROVIDED HISTORY: Reason for exam:->fever Reason for Exam: Altered Mental Status ([place] due to chest pain. Per mary kay, daughter called 911 for pt chest pain. per squad, pt will not talk about her symptoms. per squad, possible ETOH) Acuity: Acute Type of Exam: Initial FINDINGS: The cardiomediastinal silhouette is of normal size. There are increased lung markings, may be related to bronchitis versus mild pulmonary vascular congestion.   There is mild left pleural Vascular Sonographer Report  Additional Indications:Pain swelling Impressions Right Impression There is no evidence of deep or superficial venous thrombosis involving the right lower extremity. Left Impression There is no evidence of deep or superficial venous thrombosis involving the left lower extremity. Conclusions   Summary   Normal venous duplex study of the bilateral lower extremities. There is no  evidence of deep or superficial venous thrombosis. Signature   ------------------------------------------------------------------  Electronically signed by Sofia Dunlap MD, [facility] [place], [address]  (Interpreting physician) on 04/09/2021 at 01:56 PM  ------------------------------------------------------------------  Patient Status:Routine. [address] [place] [address] - Vascular Lab. Technical Quality:Adequate visualization. Velocities are measured in cm/s ; Diameters are measured in mm Right Lower Extremities DVT Study Measurements Right 2D Measurements +------------------------+----------+---------------+----------+ ! Location                ! Visualized! Compressibility! Thrombosis! +------------------------+----------+---------------+----------+ ! Sapheno Femoral Junction! Yes       ! Yes            ! None      ! +------------------------+----------+---------------+----------+ ! GSV Thigh               ! Yes       ! Yes            ! None      ! +------------------------+----------+---------------+----------+ ! Common Femoral          !Yes       ! Yes            ! None      ! +------------------------+----------+---------------+----------+ ! Prox Femoral            !Yes       ! Yes            ! None      ! +------------------------+----------+---------------+----------+ ! Mid Femoral             !Yes       ! Yes            ! None      ! +------------------------+----------+---------------+----------+ ! Dist Femoral            !Yes       ! Yes            ! None      ! +------------------------+----------+---------------+----------+ ! Deep Femoral            !Yes       ! Yes            ! None      ! +------------------------+----------+---------------+----------+ ! Popliteal               !Yes       ! Yes            ! None      ! +------------------------+----------+---------------+----------+ ! GSV Below Knee          ! Yes       ! Yes            ! None      ! +------------------------+----------+---------------+----------+ ! Gastroc                 ! Yes       ! Yes            ! None      ! +------------------------+----------+---------------+----------+ ! Soleal                  !Yes       ! Yes            ! None      ! +------------------------+----------+---------------+----------+ ! PTV                     ! Yes       ! Yes            ! None      ! +------------------------+----------+---------------+----------+ ! Peroneal                !Yes       ! Yes            ! None      ! +------------------------+----------+---------------+----------+ ! GSV Calf                ! Yes       ! Yes            ! None      ! +------------------------+----------+---------------+----------+ ! SSV                     ! Yes       ! Yes            ! None      ! +------------------------+----------+---------------+----------+ Right Doppler Measurements +------------------------+------+------+------------+ ! Location                ! Signal!Reflux! Reflux (sec)! +------------------------+------+------+------------+ ! Sapheno Femoral Junction! Phasic!      !            ! +------------------------+------+------+------------+ ! Common Femoral          !Phasic!      !            ! +------------------------+------+------+------------+ ! Prox Femoral            !Phasic!      !            ! +------------------------+------+------+------------+ ! Deep Femoral            !Phasic!      !            ! +------------------------+------+------+------------+ ! Popliteal               !Phasic!      !            ! +------------------------+------+------+------------+ Left Lower Extremities DVT Study Measurements Left 2D Measurements +------------------------+----------+---------------+----------+ ! Location                ! Visualized! Compressibility! Thrombosis! +------------------------+----------+---------------+----------+ ! Sapheno Femoral Junction! Yes       ! Yes            ! None      ! +------------------------+----------+---------------+----------+ ! GSV Thigh               ! Yes       ! Yes            ! None      ! +------------------------+----------+---------------+----------+ ! Common Femoral          !Yes       ! Yes            ! None      ! +------------------------+----------+---------------+----------+ ! Prox Femoral            !Yes       ! Yes            ! None      ! +------------------------+----------+---------------+----------+ ! Mid Femoral             !Yes       ! Yes            ! None      ! +------------------------+----------+---------------+----------+ ! Dist Femoral            !Yes       ! Yes            ! None      ! +------------------------+----------+---------------+----------+ ! Deep Femoral            !Yes       ! Yes            ! None      ! +------------------------+----------+---------------+----------+ ! Popliteal               !Yes       ! Yes            ! None      ! +------------------------+----------+---------------+----------+ ! GSV Below Knee          ! Yes       ! Yes            ! None      ! +------------------------+----------+---------------+----------+ ! Gastroc                 ! Yes       ! Yes            ! None      ! +------------------------+----------+---------------+----------+ ! Soleal                  !Yes       ! Yes            ! None      ! +------------------------+----------+---------------+----------+ ! PTV                     ! Yes       ! Yes            ! None      ! +------------------------+----------+---------------+----------+ ! Peroneal                !Yes       ! Yes            ! None      ! +------------------------+----------+---------------+----------+ ! GSV Calf                ! Yes       ! Yes not think she requires 3% nacl    Encephalopathy, metabolic -Established problem. Stable. Plan: replete Na, monitor. tx uti    Suspected COVID-19 virus infection -Established problem. Stable. Plan: admit, place in droplet plus isolation          Diagnoses as listed above, designated as new or established and plan outlined for each. Data Reviewed:   (1) Lab tests were reviewed or ordered. (1) Radiology tests were reviewed or ordered. (1) Medical test (Echo, EKG, PFT/tracie) were ordered. (1)History was not obtained from someone other than patient  (1) Old records  were reviewed - see HPI/MDM for pertinent details if review done. (2) Case wasdiscussed with another health care provider: Dr Earline Martinez  (2) Imaging was viewed by myself. (2) EKG  was viewed by myself. The patient isbeing placed in inpatient status with the expectation of requiring a hospital stay spanning at least two midnights for care and treatment of the problems noted in the problem list.  This determination is also based on thepatients comorbidities and past medical history, the severity and timing of the signs and symptoms upon presentation.         Electronically signed by: Yung Barker 4/27/2021

## 2021-04-28 NOTE — PROGRESS NOTES
4 Eyes Skin Assessment     NAME:  Dianne Keller  YOB: 1955  MEDICAL RECORD NUMBER:  [id number]    The patient is being assess for  Admission    I agree that 2 RN's have performed a thorough Head to Toe Skin Assessment on the patient. ALL assessment sites listed below have been assessed. Areas assessed by both nurses:    Head, Face, Ears, Shoulders, Back, Chest, Arms, Elbows, Hands, Sacrum. Buttock, Coccyx, Ischium and Legs. Feet and Heels        Does the Patient have a Wound? Other skin scabs   Scattered scabs observed on arms and trunk. Per pt, she scratched her skin d/t pruritis. Bruising on left thing observed.  Mepilex border applied onto sacrum   Nelson Prevention initiated:  Yes   Wound Care Orders initiated:  No    Pressure Injury (Stage 3,4, Unstageable, DTI, NWPT, and Complex wounds) if present place consult order under [de-identified] No    New and Established Ostomies if present place consult order under : No      Nurse 1 eSignature: Electronically signed by Ibeth Isidro RN on 4/28/21 at 6:16 AM EDT    **SHARE this note so that the co-signing nurse is able to place an eSignature**    Nurse 2 eSignature: Electronically signed by Mitch Gregory RN on 4/28/21 at 6:57 AM EDT

## 2021-04-28 NOTE — PROGRESS NOTES
Notified Dr. Dottie Almanzar about most recent sodium level and critical magnesium level of 0.7.   Received order to give 6 grams IV magnesium

## 2021-04-28 NOTE — PROGRESS NOTES
Uneventful night. Pt is alert and oriented to place and herself. VSS, RA, NSR, denies any pain. Purewick in place, Total urine output 2100 ml. Scattered scabs observed in both arms and trunk.

## 2021-04-28 NOTE — PROGRESS NOTES
Notified Dr. Marisol Eldridge that patient is reporting severe pain in her legs and has a headache and that patient takes oxycodone 15 mg at home. Received order for 15 mg oxycodone q6 hrs.

## 2021-04-28 NOTE — ED NOTES
Report given to ELLE Avina. No further questions. Pt transported to floor via bed with all belongings.       Marylen Skill, RN  04/27/21 [id number]

## 2021-04-29 PROBLEM — F10.10 ALCOHOL ABUSE: Status: ACTIVE | Noted: 2021-04-29

## 2021-04-29 LAB
ANION GAP SERPL CALCULATED.3IONS-SCNC: 10 MMOL/L (ref 3–16)
BASOPHILS ABSOLUTE: 0.1 K/UL (ref 0–0.2)
BASOPHILS RELATIVE PERCENT: 0.7 %
BUN BLDV-MCNC: 4 MG/DL (ref 7–20)
CALCIUM SERPL-MCNC: 9.1 MG/DL (ref 8.3–10.6)
CHLORIDE BLD-SCNC: 95 MMOL/L (ref 99–110)
CO2: 24 MMOL/L (ref 21–32)
CREAT SERPL-MCNC: <0.5 MG/DL (ref 0.6–1.2)
EOSINOPHILS ABSOLUTE: 0.5 K/UL (ref 0–0.6)
EOSINOPHILS RELATIVE PERCENT: 5.9 %
GFR AFRICAN AMERICAN: >60
GFR NON-AFRICAN AMERICAN: >60
GLUCOSE BLD-MCNC: 100 MG/DL (ref 70–99)
HCT VFR BLD CALC: 31.2 % (ref 36–48)
HEMOGLOBIN: 10.4 G/DL (ref 12–16)
LYMPHOCYTES ABSOLUTE: 1.6 K/UL (ref 1–5.1)
LYMPHOCYTES RELATIVE PERCENT: 21.1 %
MAGNESIUM: 1.7 MG/DL (ref 1.8–2.4)
MCH RBC QN AUTO: 29.9 PG (ref 26–34)
MCHC RBC AUTO-ENTMCNC: 33.5 G/DL (ref 31–36)
MCV RBC AUTO: 89.3 FL (ref 80–100)
MONOCYTES ABSOLUTE: 0.5 K/UL (ref 0–1.3)
MONOCYTES RELATIVE PERCENT: 6.9 %
NEUTROPHILS ABSOLUTE: 5 K/UL (ref 1.7–7.7)
NEUTROPHILS RELATIVE PERCENT: 65.4 %
ORGANISM: ABNORMAL
OSMOLALITY URINE: 440 MOSM/KG (ref 390–1070)
PDW BLD-RTO: 14.4 % (ref 12.4–15.4)
PLATELET # BLD: 381 K/UL (ref 135–450)
PMV BLD AUTO: 6.5 FL (ref 5–10.5)
POTASSIUM SERPL-SCNC: 3.8 MMOL/L (ref 3.5–5.1)
RBC # BLD: 3.49 M/UL (ref 4–5.2)
SODIUM BLD-SCNC: 126 MMOL/L (ref 136–145)
SODIUM BLD-SCNC: 129 MMOL/L (ref 136–145)
SODIUM URINE: 102 MMOL/L
URINE CULTURE, ROUTINE: ABNORMAL
WBC # BLD: 7.7 K/UL (ref 4–11)

## 2021-04-29 PROCEDURE — 84295 ASSAY OF SERUM SODIUM: CPT

## 2021-04-29 PROCEDURE — 36415 COLL VENOUS BLD VENIPUNCTURE: CPT

## 2021-04-29 PROCEDURE — 2580000003 HC RX 258: Performed by: INTERNAL MEDICINE

## 2021-04-29 PROCEDURE — 97162 PT EVAL MOD COMPLEX 30 MIN: CPT

## 2021-04-29 PROCEDURE — 83735 ASSAY OF MAGNESIUM: CPT

## 2021-04-29 PROCEDURE — 97165 OT EVAL LOW COMPLEX 30 MIN: CPT

## 2021-04-29 PROCEDURE — 83935 ASSAY OF URINE OSMOLALITY: CPT

## 2021-04-29 PROCEDURE — 6370000000 HC RX 637 (ALT 250 FOR IP): Performed by: INTERNAL MEDICINE

## 2021-04-29 PROCEDURE — 85025 COMPLETE CBC W/AUTO DIFF WBC: CPT

## 2021-04-29 PROCEDURE — 6360000002 HC RX W HCPCS: Performed by: INTERNAL MEDICINE

## 2021-04-29 PROCEDURE — 84300 ASSAY OF URINE SODIUM: CPT

## 2021-04-29 PROCEDURE — 80048 BASIC METABOLIC PNL TOTAL CA: CPT

## 2021-04-29 PROCEDURE — 97530 THERAPEUTIC ACTIVITIES: CPT

## 2021-04-29 PROCEDURE — 1200000000 HC SEMI PRIVATE

## 2021-04-29 RX ORDER — SODIUM CHLORIDE 9 MG/ML
INJECTION, SOLUTION INTRAVENOUS CONTINUOUS
Status: DISCONTINUED | OUTPATIENT
Start: 2021-04-29 | End: 2021-04-29

## 2021-04-29 RX ORDER — MAGNESIUM SULFATE IN WATER 40 MG/ML
2000 INJECTION, SOLUTION INTRAVENOUS ONCE
Status: COMPLETED | OUTPATIENT
Start: 2021-04-29 | End: 2021-04-29

## 2021-04-29 RX ORDER — SODIUM CHLORIDE 1000 MG
2 TABLET, SOLUBLE MISCELLANEOUS
Status: DISCONTINUED | OUTPATIENT
Start: 2021-04-29 | End: 2021-04-30 | Stop reason: HOSPADM

## 2021-04-29 RX ORDER — MAGNESIUM SULFATE IN WATER 40 MG/ML
2000 INJECTION, SOLUTION INTRAVENOUS ONCE
Status: COMPLETED | OUTPATIENT
Start: 2021-04-29 | End: 2021-04-30

## 2021-04-29 RX ADMIN — HYDRALAZINE HYDROCHLORIDE 10 MG: 20 INJECTION INTRAMUSCULAR; INTRAVENOUS at 04:35

## 2021-04-29 RX ADMIN — METOPROLOL SUCCINATE 50 MG: 50 TABLET, EXTENDED RELEASE ORAL at 08:40

## 2021-04-29 RX ADMIN — BACLOFEN 10 MG: 10 TABLET ORAL at 08:40

## 2021-04-29 RX ADMIN — Medication 10 ML: at 08:41

## 2021-04-29 RX ADMIN — PANTOPRAZOLE SODIUM 40 MG: 40 TABLET, DELAYED RELEASE ORAL at 06:17

## 2021-04-29 RX ADMIN — BUSPIRONE HYDROCHLORIDE 10 MG: 5 TABLET ORAL at 08:41

## 2021-04-29 RX ADMIN — Medication 10 ML: at 20:52

## 2021-04-29 RX ADMIN — CETIRIZINE HYDROCHLORIDE 10 MG: 10 TABLET, FILM COATED ORAL at 08:40

## 2021-04-29 RX ADMIN — ACETAMINOPHEN 650 MG: 325 TABLET ORAL at 21:04

## 2021-04-29 RX ADMIN — POTASSIUM CHLORIDE 20 MEQ: 1500 TABLET, EXTENDED RELEASE ORAL at 20:52

## 2021-04-29 RX ADMIN — BACLOFEN 10 MG: 10 TABLET ORAL at 13:56

## 2021-04-29 RX ADMIN — LORAZEPAM 2 MG: 1 TABLET ORAL at 06:17

## 2021-04-29 RX ADMIN — ACETAMINOPHEN 650 MG: 325 TABLET ORAL at 06:22

## 2021-04-29 RX ADMIN — ATORVASTATIN CALCIUM 80 MG: 80 TABLET, FILM COATED ORAL at 20:52

## 2021-04-29 RX ADMIN — BUSPIRONE HYDROCHLORIDE 10 MG: 5 TABLET ORAL at 13:56

## 2021-04-29 RX ADMIN — SODIUM CHLORIDE: 9 INJECTION, SOLUTION INTRAVENOUS at 09:40

## 2021-04-29 RX ADMIN — MAGNESIUM SULFATE HEPTAHYDRATE 2000 MG: 40 INJECTION, SOLUTION INTRAVENOUS at 13:55

## 2021-04-29 RX ADMIN — MAGNESIUM SULFATE HEPTAHYDRATE 2000 MG: 40 INJECTION, SOLUTION INTRAVENOUS at 22:20

## 2021-04-29 RX ADMIN — OXYCODONE HYDROCHLORIDE 15 MG: 15 TABLET ORAL at 22:20

## 2021-04-29 RX ADMIN — BUSPIRONE HYDROCHLORIDE 10 MG: 5 TABLET ORAL at 20:52

## 2021-04-29 RX ADMIN — SODIUM CHLORIDE 25 ML: 9 INJECTION, SOLUTION INTRAVENOUS at 21:01

## 2021-04-29 RX ADMIN — Medication 2 G: at 20:51

## 2021-04-29 RX ADMIN — OXYCODONE HYDROCHLORIDE 15 MG: 15 TABLET ORAL at 06:22

## 2021-04-29 RX ADMIN — SODIUM CHLORIDE 25 ML: 9 INJECTION, SOLUTION INTRAVENOUS at 22:18

## 2021-04-29 RX ADMIN — CEFEPIME HYDROCHLORIDE 1000 MG: 1 INJECTION, POWDER, FOR SOLUTION INTRAMUSCULAR; INTRAVENOUS at 21:02

## 2021-04-29 RX ADMIN — FERROUS GLUCONATE TAB 324 MG (37.5 MG ELEMENTAL IRON) 324 MG: 324 (37.5 FE) TAB at 08:40

## 2021-04-29 RX ADMIN — POTASSIUM CHLORIDE 20 MEQ: 1500 TABLET, EXTENDED RELEASE ORAL at 08:41

## 2021-04-29 RX ADMIN — BALSALAZIDE DISODIUM 3000 MG: 750 CAPSULE ORAL at 08:48

## 2021-04-29 RX ADMIN — RIVAROXABAN 20 MG: 20 TABLET, FILM COATED ORAL at 08:40

## 2021-04-29 RX ADMIN — HYDROXYZINE HYDROCHLORIDE 10 MG: 10 TABLET, FILM COATED ORAL at 06:22

## 2021-04-29 RX ADMIN — CEFEPIME HYDROCHLORIDE 1000 MG: 1 INJECTION, POWDER, FOR SOLUTION INTRAMUSCULAR; INTRAVENOUS at 08:59

## 2021-04-29 RX ADMIN — BACLOFEN 10 MG: 10 TABLET ORAL at 20:51

## 2021-04-29 ASSESSMENT — PAIN SCALES - GENERAL
PAINLEVEL_OUTOF10: 0
PAINLEVEL_OUTOF10: 10
PAINLEVEL_OUTOF10: 0
PAINLEVEL_OUTOF10: 0

## 2021-04-29 ASSESSMENT — ENCOUNTER SYMPTOMS
NAUSEA: 0
EYE REDNESS: 0
CHEST TIGHTNESS: 0
ABDOMINAL DISTENTION: 0
COUGH: 0
DIARRHEA: 0
BLOOD IN STOOL: 0
EYE DISCHARGE: 0
VOMITING: 0
SHORTNESS OF BREATH: 0
ABDOMINAL PAIN: 0
FACIAL SWELLING: 0
CONSTIPATION: 0
PHOTOPHOBIA: 0

## 2021-04-29 ASSESSMENT — PAIN DESCRIPTION - DESCRIPTORS: DESCRIPTORS: ACHING;HEADACHE

## 2021-04-29 ASSESSMENT — PAIN DESCRIPTION - FREQUENCY: FREQUENCY: CONTINUOUS

## 2021-04-29 ASSESSMENT — PAIN DESCRIPTION - ORIENTATION: ORIENTATION: RIGHT;LEFT

## 2021-04-29 ASSESSMENT — PAIN DESCRIPTION - LOCATION: LOCATION: HEAD;LEG

## 2021-04-29 ASSESSMENT — PAIN SCALES - WONG BAKER: WONGBAKER_NUMERICALRESPONSE: 0

## 2021-04-29 NOTE — PROGRESS NOTES
D: Urine Sodium 102 mmol/L, Blood sodium 126, Magnesium 1.70. A: Dr. Ashleigh sandoval served per his request.    R: Report passed off to night shift ELLE Maki. Phone number for Dr Jose Guadalupe Dudley is [phone] if no response.

## 2021-04-29 NOTE — PROGRESS NOTES
Occupational Therapy   Occupational Therapy Initial Assessment  Date: 2021   Patient Name: Danny Tafoya  MRN: [id number]     : 1955    Date of Service: 2021    Discharge Recommendations:  Danny Tafoya scored a 15/24 on the AM-PAC ADL Inpatient form. Current research shows that an AM-PAC score of 18 or greater is typically associated with a discharge to the patient's home setting. Based on the patient's AM-PAC score, and their current ADL deficits, it is recommended that the patient have 2-3 sessions per week of Occupational Therapy at d/c to increase the patient's independence. At this time, this patient demonstrates the endurance and safety to discharge home with home with 24 hour supervision/assist  (home vs OP services) and a follow up treatment frequency of 2-3x/wk. Please see assessment section for further patient specific details. If patient discharges prior to next session this note will serve as a discharge summary. Please see below for the latest assessment towards goals. HOME HEALTH CARE: LEVEL 1 STANDARD    - Initial home health evaluation to occur within 24-48 hours, in patient home   - Therapy to evaluate with goal of regaining prior level of functioning   - Therapy to evaluate if patient has [address] needs for personal care       OT Equipment Recommendations  Equipment Needed: No    Assessment   Performance deficits / Impairments: Decreased functional mobility ; Decreased ADL status; Decreased balance  Assessment: pt close to baseline and would benefit from ongoing skilled OT services in order to return to PLOF  Treatment Diagnosis: UTI, sepsis  Prognosis: Good  Decision Making: Low Complexity  History: pt lives with family, assist from family for ADL/IADL tasks  Assistance / Modification: assist of 1  Patient Education: eval, POC, discharge, orientation-pt is not independent with education  Barriers to Learning: cognitive  REQUIRES OT FOLLOW UP: Yes  Activity Tolerance  Activity Tolerance: Treatment limited secondary to decreased cognition  Safety Devices  Safety Devices in place: Yes  Type of devices: All fall risk precautions in place; Left in chair;Nurse notified;Call light within reach; Patient at risk for falls; Chair alarm in place           Patient Diagnosis(es): The primary encounter diagnosis was Altered mental status, unspecified altered mental status type. Diagnoses of Hyponatremia, Acute cystitis without hematuria, and Lactic acidosis were also pertinent to this visit. has a past medical history of Anxiety, Arthritis, Blood transfusion reaction, Crohn's disease ([address] Depression, GERD (gastroesophageal reflux disease), Hiatal hernia, Hx of blood clots, Hypertension, MRSA (methicillin resistant staph aureus) culture positive, Neuropathy, Pneumonia, Sinus headache, SOB (shortness of breath), and VRE (vancomycin resistant enterococcus) culture positive. has a past surgical history that includes Hysterectomy; knee surgery (Bilateral); Hand Carpectomy (Bilateral); hernia repair; Abdomen surgery; bladder suspension; fracture surgery; Heel spur surgery; Tonsillectomy; Colonoscopy; Upper gastrointestinal endoscopy (6/14/13); Foot surgery (Left, 6/25/14); Foot surgery (6/3/15); Colonoscopy (9/14/15); Foot surgery (Left, 08/05/2002); joint replacement; Sigmoidoscopy (01/15/2018); Abdominal adhesion surgery (06/08/2018); Colonoscopy (08/07/2018); colostomy (N/A, 10/8/2018); pr secd clos surg wnd exten/complic (N/A, 86/70/0226); Leg Surgery (Right); Colonoscopy (06/07/2019); Colonoscopy (N/A, 6/7/2019); Colonoscopy (N/A, 6/7/2019); Small intestine surgery (N/A, 7/17/2019); and proctosigmoidoscopy (N/A, 7/17/2019).     Treatment Diagnosis: UTI, sepsis      Restrictions  Restrictions/Precautions  Restrictions/Precautions: Fall Risk, Up as Tolerated(high fall risk, seizure precautions, however not present upon arrival)  Required Braces or Orthoses?: No  Position Activity Restriction  Other position/activity restrictions: Alina Olguin is a 60 y.o. female who presents to the emergency department with complaints of brought in for evaluation for possible chest pain. On arrival patient states that she was at home when she started shivering. She was unsure why she is shivering. According to mary kay daughter called 911 for chest pain. Patient actually denies any chest pain. She also denies shortness of breath. Patient is alert to person and place only. Patient states that she did receive 1 dose of Covid vaccine. No known exposures to Covid. I did personally take patient's temperature at bedside and she does have an oral temperature of 100 °F.  She is not tachycardic has normal respirations    Subjective   General  Chart Reviewed: Yes  Family / Caregiver Present: No  Diagnosis: sepsis, UTI  Subjective  Subjective: pt lethargic throughout eval, given ativan this AM. pt agreeable to OT eval with time, increased time for all tasks. pt denies pain.  noted to itch skin throughout eval  Patient Currently in Pain: Denies  Pain Assessment  Pain Assessment: 0-10  Pain Level: 0  Hammer-Baker Pain Rating: No hurt  Response to Pain Intervention: Confused  Vital Signs  Patient Currently in Pain: Denies  Social/Functional History  Social/Functional History  Lives With: Family, Daughter, Spouse(lives with brother, , daughter)  Type of Home: House  Home Layout: One level  Bathroom Shower/Tub: Tub/Shower unit  Bathroom Toilet: Standard  Bathroom Equipment: Grab bars in shower, Shower chair  Home Equipment: [address], Rolling walker, Cane, Sock aid, Long-handled shoehorn  Receives Help From: Family  ADL Assistance: Needs assistance(sponge bath iwth increased time, PRN assist for lower body dressing, typcially wears flip flops)  Homemaking Responsibilities: No  Ambulation Assistance: Independent(uses \"sturdy kitchen cart\" in home, cane to car and transport chair in community)  Transfer Assistance: Needs assistance(increased assistance needed following last hospitalization)  Active : No  Patient's  Info: daughter provides transportation  Additional Comments: Patient with difficulty providing accurate information due to lethargy and confusion - information obtained from previous admission (July 2021)       Objective        Orientation  Overall Orientation Status: Impaired  Orientation Level: Oriented to person  Observation/Palpation  Posture: Fair  Observation: rounded shoulders, noted small open areas on back and R shoulder. pt itching excessively  Balance  Sitting Balance: Stand by assistance  Standing Balance: Contact guard assistance(with RW)  Standing Balance  Time: ~1 minute  Activity: transfer EOB>recliner  Comment: increased time for transfer  ADL  Feeding: Setup  UE Dressing: Dependent/Total  Additional Comments: pt declined ADLs, brief dry, offered commode to pt, pt declined  Tone RUE  RUE Tone: Normotonic  Tone LUE  LUE Tone: Normotonic  Coordination  Coordination and Movement description: Fine motor impairments;Right UE;Left UE;Decreased speed     Bed mobility  Supine to Sit: Minimal assistance  Scooting: Contact guard assistance(increased time)  Comment: increased time, max encouragement, lethargyic throughout and confused. pt given ativan this AM  Transfers  Sit Pivot Transfers: Contact guard assistance  Sit to stand: Minimal assistance  Stand to sit: Contact guard assistance     Cognition  Overall Cognitive Status: Exceptions  Arousal/Alertness: Delayed responses to stimuli  Following Commands: Inconsistently follows commands; Follows one step commands with increased time; Follows one step commands with repetition  Attention Span: Attends with cues to redirect; Difficulty attending to directions; Difficulty dividing attention  Memory: Decreased short term memory;Decreased recall of recent events;Decreased recall of precautions  Safety Judgement: Decreased awareness of need for safety;Decreased awareness of need for assistance  Problem Solving: Decreased awareness of errors;Assistance required to implement solutions;Assistance required to identify errors made  Insights: Not aware of deficits  Initiation: Requires cues for all  Sequencing: Requires cues for some  Perception  Overall Perceptual Status: Impaired  Initiation: Cues to initiate tasks                        Plan   Plan  Times per week: 3-5  Current Treatment Recommendations: Functional Mobility Training, Strengthening, Balance Training, Self-Care / ADL               AM-PAC Score        AM-[facility] Inpatient Daily Activity Raw Score: 15 (04/29/21 1034)  AM-PAC Inpatient ADL T-Scale Score : 34.69 (04/29/21 1034)  ADL Inpatient CMS 0-100% Score: 56.46 (04/29/21 1034)  ADL Inpatient CMS G-Code Modifier : CK (04/29/21 1034)    Goals  Short term goals  Time Frame for Short term goals: discharge  Short term goal 1: bed mobility supervision  Short term goal 2: functional ADL transfer supervision  Short term goal 3: functional mobility short distance with CGA and use of RW  Short term goal 4: UB/LB ADLs [place]       Therapy Time   Individual Concurrent Group Co-treatment   Time In 0820         Time Out 0849         Minutes 29           Timed Code Treatment Minutes:  14 Minutes    Total Treatment Minutes:  29 minutes      Edie Callahan OTR/L [address] [facility] [address], OT

## 2021-04-29 NOTE — PROGRESS NOTES
around 133 to 134. Rate of sodium correction now acceptable. Goal sodium  today would be around 131-134. NSS. Follow Na this afternoon. 2.  Low serum bicarbonate. No anion gap. Significantly better. Continue to follow. 3.  History of alcohol abuse. Follow urine osmolarity. At risk for  beer potomania. Discussed with the patient risks. 4.  Renal function within normal limits. 5.  Chronic edema. No need for diuretics at this time. 6.  The patient currently has no neurologic deficits. 7.  Pyuria, currently on cefepime. 8.  Hypomagnesemia-replace  9. Dispo- okay for D/C once Na 130 and above. Discussed importance of solute intake.      Abdirizak Serrato MD

## 2021-04-29 NOTE — PLAN OF CARE
Pt's sodium level 124. Notified Dr. Negin Hussein. Messaged Dr. Negin Hussein the [facility] nephrologist (Dr. Josefa Cr) note, \"Acute on Chronic Hyponatremia-faster Na correction. Goal Na today around 131. D5W. Follow Na. No neurologic symptoms. Baseline Na around 134. \" Order from Dr. Negin Hussein to notify if sodium is less than 120 or higher than 130 on recheck. Next sodium level is with morning labs. Will follow up if sodium is outside of this range.

## 2021-04-29 NOTE — PLAN OF CARE
Problem: Falls - Risk of:  Goal: Will remain free from falls  Outcome: Ongoing  Goal: Absence of physical injury  Outcome: Ongoing     Problem: Pain:  Goal: Pain level will decrease  Outcome: Ongoing  Goal: Control of acute pain  Outcome: Ongoing  Goal: Control of chronic pain  Outcome: Ongoing     Problem: Musculor/Skeletal Functional Status  Goal: Highest potential functional level  Outcome: Ongoing  Goal: Absence of falls  Outcome: Ongoing

## 2021-04-29 NOTE — PROGRESS NOTES
Progress Note - Dr. Twan Mcwilliams - Internal Medicine  PCP: Eli Brown MD [address] / [place] Mares [phone]    Hospital Day: 2  Code Status: Full Code  Current Diet: DIET GENERAL;        CC: follow up on medical issues    Subjective:   Kiki Anguiano is a 60 y.o. female. She denies problems    Doing ok  Na [address]  Less confused than yesterday  No evidence of withdrawal sx    She denies chest pain, denies shortness of breath, denies nausea,  denies emesis. 10 system Review of Systems is reviewed with patient, and pertinent positives are noted in HPI above . Otherwise, Review of systems is negative. I have reviewed the patient's medical and social history in detail and updated the computerized patient record. To recap: She  has a past medical history of Anxiety, Arthritis, Blood transfusion reaction, Crohn's disease ([address] Depression, GERD (gastroesophageal reflux disease), Hiatal hernia, Hx of blood clots, Hypertension, MRSA (methicillin resistant staph aureus) culture positive, Neuropathy, Pneumonia, Sinus headache, SOB (shortness of breath), and VRE (vancomycin resistant enterococcus) culture positive. . She  has a past surgical history that includes Hysterectomy; knee surgery (Bilateral); Hand Carpectomy (Bilateral); hernia repair; Abdomen surgery; bladder suspension; fracture surgery; Heel spur surgery; Tonsillectomy; Colonoscopy; Upper gastrointestinal endoscopy (6/14/13); Foot surgery (Left, 6/25/14); Foot surgery (6/3/15); Colonoscopy (9/14/15); Foot surgery (Left, 08/05/2002); joint replacement; Sigmoidoscopy (01/15/2018); Abdominal adhesion surgery (06/08/2018); Colonoscopy (08/07/2018); colostomy (N/A, 10/8/2018); pr secd clos surg wnd exten/complic (N/A, 25/23/3849); Leg Surgery (Right); Colonoscopy (06/07/2019); Colonoscopy (N/A, 6/7/2019); Colonoscopy (N/A, 6/7/2019); Small intestine surgery (N/A, 7/17/2019); and proctosigmoidoscopy (N/A, 7/17/2019). . She  reports that she has been mg, Oral, Daily  sodium chloride flush 0.9 % injection 5-40 mL, 5-40 mL, Intravenous, 2 times per day  sodium chloride flush 0.9 % injection 5-40 mL, 5-40 mL, Intravenous, PRN  0.9 % sodium chloride infusion, 25 mL, Intravenous, PRN  promethazine (PHENERGAN) tablet 12.5 mg, 12.5 mg, Oral, Q6H PRN **OR** ondansetron (ZOFRAN) injection 4 mg, 4 mg, Intravenous, Q6H PRN  acetaminophen (TYLENOL) tablet 650 mg, 650 mg, Oral, Q6H PRN **OR** acetaminophen (TYLENOL) suppository 650 mg, 650 mg, Rectal, Q6H PRN  magnesium hydroxide (MILK OF MAGNESIA) 400 MG/5ML suspension 30 mL, 30 mL, Oral, Daily PRN  cefepime (MAXIPIME) 1000 mg IVPB minibag, 1,000 mg, Intravenous, Q12H  hydrALAZINE (APRESOLINE) injection 10 mg, 10 mg, Intravenous, Q6H PRN  0.9 % sodium chloride bolus, 500 mL, Intravenous, PRN  potassium chloride (KLOR-CON M) extended release tablet 40 mEq, 40 mEq, Oral, PRN **OR** potassium bicarb-citric acid (EFFER-K) effervescent tablet 40 mEq, 40 mEq, Oral, PRN **OR** potassium chloride 10 mEq/100 mL IVPB (Peripheral Line), 10 mEq, Intravenous, PRN  cetirizine (ZYRTEC) tablet 10 mg, 10 mg, Oral, Daily  pantoprazole (PROTONIX) tablet 40 mg, 40 mg, Oral, QAM AC         Objective:  BP (!) 162/92   Pulse 92   Temp 99.7 °F (37.6 °C) (Oral)   Resp 20   Ht 5' 7\" (1.702 m)   Wt 194 lb 14.2 oz (88.4 kg)   SpO2 93%   BMI 30.52 kg/m²      Patient Vitals for the past 24 hrs:   BP Temp Temp src Pulse Resp SpO2   04/29/21 0430 (!) 162/92 99.7 °F (37.6 °C) Oral 92 20 93 %   04/28/21 2359 (!) 152/91 98.3 °F (36.8 °C) Oral 82 18 92 %   04/28/21 2130 (!) 150/83 98.9 °F (37.2 °C) Oral 89 18 92 %   04/28/21 1639 (!) 145/89 101 °F (38.3 °C) Oral 84 18 95 %   04/28/21 1513 (!) 154/89 98.7 °F (37.1 °C) Oral 85 18 96 %   04/28/21 1247 (!) 144/96 98.6 °F (37 °C) Oral 87 18 --   04/28/21 1030 (!) 146/93 98.7 °F (37.1 °C) Temporal 95 18 97 %     Patient Vitals for the past 96 hrs (Last 3 readings):   Weight   04/28/21 0400 194 lb 14.2 oz (88.4 kg)   04/27/21 2300 194 lb 14.2 oz (88.4 kg)   04/27/21 1606 194 lb (88 kg)           Intake/Output Summary (Last 24 hours) at 4/29/2021 0718  Last data filed at 4/29/2021 3217  Gross per 24 hour   Intake 1584.04 ml   Output 1350 ml   Net 234.04 ml         Physical Exam:   BP (!) 162/92   Pulse 92   Temp 99.7 °F (37.6 °C) (Oral)   Resp 20   Ht 5' 7\" (1.702 m)   Wt 194 lb 14.2 oz (88.4 kg)   SpO2 93%   BMI 30.52 kg/m²   General appearance: alert, appears stated age and cooperative  Head: Normocephalic, without obvious abnormality, atraumatic  Lungs: clear to auscultation bilaterally  Heart: regular rate and rhythm, S1, S2 normal, no murmur, click, rub or gallop  Abdomen: soft, non-tender; bowel sounds normal; no masses,  no organomegaly  Extremities: extremities normal, atraumatic, no cyanosis or edema    Labs:  Lab Results   Component Value Date    WBC 7.7 04/29/2021    HGB 10.4 (L) 04/29/2021    HCT 31.2 (L) 04/29/2021     04/29/2021    CHOL 111 07/24/2020    TRIG 66 07/24/2020    HDL 43 07/24/2020    ALT <5 (L) 04/28/2021    AST 22 04/28/2021     (L) 04/29/2021    K 3.8 04/29/2021    CL 95 (L) 04/29/2021    CREATININE <0.5 (L) 04/29/2021    BUN 4 (L) 04/29/2021    CO2 24 04/29/2021    TSH 3.57 07/28/2020    INR 1.62 (H) 04/27/2021    LABA1C 5.0 07/24/2020    LABMICR YES 04/28/2021     Lab Results   Component Value Date    TROPONINI <0.01 04/27/2021       Recent Imaging Results are Reviewed:  Xr Foot Left (min 3 Views)    Result Date: 4/10/2021  EXAMINATION: THREE XRAY VIEWS OF THE LEFT FOOT 4/9/2021 8:06 pm COMPARISON: None. HISTORY: ORDERING SYSTEM PROVIDED HISTORY: previous toe ulcers TECHNOLOGIST PROVIDED HISTORY: Reason for exam:->previous toe ulcers Reason for Exam: PREVIOUS TOE ULCERS---LEFT Acuity: Unknown Type of Exam: Unknown FINDINGS: There are numerous chronic deformities of the 2nd and 3rd digits.   There are apparent new areas of lucency involving the 2nd proximal phalanx as posterior fossa. Cerebral volume loss and prominence of the ventricles again visualized. There is no midline shift. Basal cisterns appear patent. ORBITS: Visualized orbits appear unremarkable on this unenhanced exam. SINUSES: Visualized paranasal sinuses appear clear. Visualized mastoid air cells appear clear. SOFT TISSUES/SKULL: No depressed calvarial fracture. Motion and artifact degraded exam.  No acute hemorrhage or midline shift. Aspects 10. Ct Head Wo Contrast    Result Date: 4/8/2021  EXAMINATION: CT OF THE HEAD WITHOUT CONTRAST  4/7/2021 11:49 pm TECHNIQUE: CT of the head was performed without the administration of intravenous contrast. Dose modulation, iterative reconstruction, and/or weight based adjustment of the mA/kV was utilized to reduce the radiation dose to as low as reasonably achievable. COMPARISON: 07/24/2020 HISTORY: ORDERING SYSTEM PROVIDED HISTORY: AMS TECHNOLOGIST PROVIDED HISTORY: Reason for exam:->AMS Has a \"code stroke\" or \"stroke alert\" been called? ->No Decision Support Exception->Emergency Medical Condition (MA) Reason for Exam: AMS Acuity: Acute Type of Exam: Initial Relevant Medical/Surgical History: Nausea (Patient presents with abdominal pain and emesis x4 days. She also endorses weakness and fatigue.) FINDINGS: BRAIN/VENTRICLES: The ventricles are mildly enlarged with diffuse mild prominence of the cortical sulci. There is focal low density and atrophy along the cortex of the left frontal parietal and left parietooccipital regions with diffuse atrophy throughout the area extending inferiorly consistent with old infarcts. There is mild periventricular low density bilaterally. No intracranial hemorrhage or edema is seen. There is no extra-axial fluid collection or mass. There are basal ganglia calcifications bilaterally. The midline structures unremarkable. ORBITS: The visualized portion of the orbits demonstrate no acute abnormality.  SINUSES: The visualized paranasal sinuses and mastoid air cells demonstrate no acute abnormality. SOFT TISSUES/SKULL:  No acute abnormality of the visualized skull or soft tissues. 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 Mild atrophy and mild chronic microischemic disease throughout the deep white matter with no acute abnormality seen. Basal ganglia calcifications bilaterally. Xr Chest Portable    Result Date: 4/27/2021  EXAMINATION: ONE XRAY VIEW OF THE CHEST 4/27/2021 5:09 pm COMPARISON: April 8, 2021 HISTORY: ORDERING SYSTEM PROVIDED HISTORY: fever TECHNOLOGIST PROVIDED HISTORY: Reason for exam:->fever Reason for Exam: Altered Mental Status ([place] due to chest pain. Per squad, daughter called 911 for pt chest pain. per squad, pt will not talk about her symptoms. per squad, possible ETOH) Acuity: Acute Type of Exam: Initial FINDINGS: The cardiomediastinal silhouette is of normal size. There are increased lung markings, may be related to bronchitis versus mild pulmonary vascular congestion. There is mild left pleural effusion. There is no pneumothorax. There is no acute osseous abnormality. Large hiatal hernia. Increased lung markings, may be related to bronchitis versus mild pulmonary vascular congestion. Mild left pleural effusion. Large hiatal hernia. Xr Chest Portable    Result Date: 4/8/2021  EXAMINATION: ONE XRAY VIEW OF THE CHEST 4/8/2021 12:06 am COMPARISON: 09/26/2018 and CTA abdomen 01/12/2021 HISTORY: ORDERING SYSTEM PROVIDED HISTORY: fever chills TECHNOLOGIST PROVIDED HISTORY: Reason for exam:->fever chills Reason for Exam: fever and chills Acuity: Acute Type of Exam: Initial Relevant Medical/Surgical History: previous hypertension and pneumonia FINDINGS: There is an implanted cardiac event monitor. The heart size is upper limits of normal.  Thoracic aorta is elongated and tortuous. Retrocardiac opacity consistent with large hiatal hernia as noted on prior studies. Lungs are clear.   No pneumothorax or pleural fluid. There is a left shoulder prosthesis. No acute bone finding. Stable chest x-ray. No acute disease. Large retrocardiac hiatal hernia. Vl Extremity Venous Bilateral    Result Date: 4/9/2021  Lower Extremities DVT Study  Demographics   Patient Name       Luke BENTON   Date of Study      04/09/2021       Gender              Female   Patient Number     [id number]       Date of Birth       1955   Visit Number       [id number]        Age                 60 year(s)   Accession Number   [id number]       Room Number         [facility]   Corporate ID       [id number]         Sonographer         Jada Zamudio,                                                          [address] [place] [address]   Ordering Physician Rosemary, [facility]                                      Physician           Raquel Luis MD,                                                          [facility], [address]   Procedure Type of Study:   Veins:Lower Extremities DVT Study, VASC EXTREMITY VENOUS DUPLEX BILATERAL. Vascular Sonographer Report  Additional Indications:Pain swelling Impressions Right Impression There is no evidence of deep or superficial venous thrombosis involving the right lower extremity. Left Impression There is no evidence of deep or superficial venous thrombosis involving the left lower extremity. Conclusions   Summary   Normal venous duplex study of the bilateral lower extremities. There is no  evidence of deep or superficial venous thrombosis. Signature   ------------------------------------------------------------------  Electronically signed by Raquel Luis MD, [facility], [address]  (Interpreting physician) on 04/09/2021 at 01:56 PM  ------------------------------------------------------------------  Patient Status:Routine. [address] [place] [address] Vascular Lab. Technical Quality:Adequate visualization.  Velocities are measured in cm/s ; Diameters are measured in mm Right Lower Extremities DVT Study Measurements Right 2D Measurements +------------------------+----------+---------------+----------+ ! Location                ! Visualized! Compressibility! Thrombosis! +------------------------+----------+---------------+----------+ ! Sapheno Femoral Junction! Yes       ! Yes            ! None      ! +------------------------+----------+---------------+----------+ ! GSV Thigh               ! Yes       ! Yes            ! None      ! +------------------------+----------+---------------+----------+ ! Common Femoral          !Yes       ! Yes            ! None      ! +------------------------+----------+---------------+----------+ ! Prox Femoral            !Yes       ! Yes            ! None      ! +------------------------+----------+---------------+----------+ ! Mid Femoral             !Yes       ! Yes            ! None      ! +------------------------+----------+---------------+----------+ ! Dist Femoral            !Yes       ! Yes            ! None      ! +------------------------+----------+---------------+----------+ ! Deep Femoral            !Yes       ! Yes            ! None      ! +------------------------+----------+---------------+----------+ ! Popliteal               !Yes       ! Yes            ! None      ! +------------------------+----------+---------------+----------+ ! GSV Below Knee          ! Yes       ! Yes            ! None      ! +------------------------+----------+---------------+----------+ ! Gastroc                 ! Yes       ! Yes            ! None      ! +------------------------+----------+---------------+----------+ ! Soleal                  !Yes       ! Yes            ! None      ! +------------------------+----------+---------------+----------+ ! PTV                     ! Yes       ! Yes            ! None      ! +------------------------+----------+---------------+----------+ ! Fatou                !Yes       ! Yes            ! None      ! +------------------------+----------+---------------+----------+ ! GEORGES Calf !Yes       !Yes            ! None      ! +------------------------+----------+---------------+----------+ ! SSV                     ! Yes       ! Yes            ! None      ! +------------------------+----------+---------------+----------+ Right Doppler Measurements +------------------------+------+------+------------+ ! Location                ! Signal!Reflux! Reflux (sec)! +------------------------+------+------+------------+ ! Sapheno Femoral Junction! Phasic!      !            ! +------------------------+------+------+------------+ ! Common Femoral          !Phasic!      !            ! +------------------------+------+------+------------+ ! Prox Femoral            !Phasic!      !            ! +------------------------+------+------+------------+ ! Deep Femoral            !Phasic!      !            ! +------------------------+------+------+------------+ ! Popliteal               !Phasic!      !            ! +------------------------+------+------+------------+ Left Lower Extremities DVT Study Measurements Left 2D Measurements +------------------------+----------+---------------+----------+ ! Location                ! Visualized! Compressibility! Thrombosis! +------------------------+----------+---------------+----------+ ! Sapheno Femoral Junction! Yes       ! Yes            ! None      ! +------------------------+----------+---------------+----------+ ! GSV Thigh               ! Yes       ! Yes            ! None      ! +------------------------+----------+---------------+----------+ ! Common Femoral          !Yes       ! Yes            ! None      ! +------------------------+----------+---------------+----------+ ! Prox Femoral            !Yes       ! Yes            ! None      ! +------------------------+----------+---------------+----------+ ! Mid Femoral             !Yes       ! Yes            ! None      ! +------------------------+----------+---------------+----------+ ! Dist Femoral            !Yes       ! Yes            ! None      !

## 2021-04-29 NOTE — PROGRESS NOTES
understanding  Barriers to Learning: cognitive this date  REQUIRES PT FOLLOW UP: Yes  Activity Tolerance  Activity Tolerance: Patient Tolerated treatment well       Patient Diagnosis(es): The primary encounter diagnosis was Altered mental status, unspecified altered mental status type. Diagnoses of Hyponatremia, Acute cystitis without hematuria, and Lactic acidosis were also pertinent to this visit. has a past medical history of Anxiety, Arthritis, Blood transfusion reaction, Crohn's disease ([address] Depression, GERD (gastroesophageal reflux disease), Hiatal hernia, Hx of blood clots, Hypertension, MRSA (methicillin resistant staph aureus) culture positive, Neuropathy, Pneumonia, Sinus headache, SOB (shortness of breath), and VRE (vancomycin resistant enterococcus) culture positive. has a past surgical history that includes Hysterectomy; knee surgery (Bilateral); Hand Carpectomy (Bilateral); hernia repair; Abdomen surgery; bladder suspension; fracture surgery; Heel spur surgery; Tonsillectomy; Colonoscopy; Upper gastrointestinal endoscopy (6/14/13); Foot surgery (Left, 6/25/14); Foot surgery (6/3/15); Colonoscopy (9/14/15); Foot surgery (Left, 08/05/2002); joint replacement; Sigmoidoscopy (01/15/2018); Abdominal adhesion surgery (06/08/2018); Colonoscopy (08/07/2018); colostomy (N/A, 10/8/2018); pr secd clos surg wnd exten/complic (N/A, 54/90/0015); Leg Surgery (Right); Colonoscopy (06/07/2019); Colonoscopy (N/A, 6/7/2019); Colonoscopy (N/A, 6/7/2019); Small intestine surgery (N/A, 7/17/2019); and proctosigmoidoscopy (N/A, 7/17/2019).     Restrictions  Restrictions/Precautions  Restrictions/Precautions: Fall Risk, Up as Tolerated(high fall risk, seizure precautions, however not present upon arrival)  Required Braces or Orthoses?: No  Position Activity Restriction  Other position/activity restrictions: Alondra Ospina is a 60 y.o. female who presents to the emergency department with complaints of brought in for evaluation for possible chest pain. On arrival patient states that she was at home when she started shivering. She was unsure why she is shivering. According to carissaad daughter called 911 for chest pain. Patient actually denies any chest pain. She also denies shortness of breath. Patient is alert to person and place only. Patient states that she did receive 1 dose of Covid vaccine. No known exposures to Covid. I did personally take patient's temperature at bedside and she does have an oral temperature of 100 °F.  She is not tachycardic has normal respirations  Vision/Hearing  Vision: Impaired  Vision Exceptions: Wears glasses for reading  Hearing: Within functional limits     Subjective  General  Chart Reviewed: Yes  Family / Caregiver Present: No  Diagnosis: sepsis due to UTI  Follows Commands: Impaired  Other (Comment): delayed responses, increased time  General Comment  Comments: supine in bed upon arrival with alarm on  Subjective  Subjective: Denied pain. Lethargic initially, but able to continue with therapy once alert. Nursing reported ativan administered last night  Pain Screening  Patient Currently in Pain: Denies          Orientation  Orientation  Overall Orientation Status: Impaired  Orientation Level: Oriented to person;Disoriented to time;Disoriented to place; Disoriented to situation  Social/Functional History  Social/Functional History  Lives With: Family, Daughter, Spouse(lives with brother, , daughter)  Type of Home: House  Home Layout: One level  Bathroom Shower/Tub: Tub/Shower unit  Bathroom Toilet: Standard  Bathroom Equipment: Grab bars in shower, Shower chair  Home Equipment: [employer], Rolling walker, Cane, Sock aid, Long-handled shoehorn  Receives Help From: Family  ADL Assistance: Needs assistance(sponge bath iwth increased time, PRN assist for lower body dressing, typcially wears flip flops)  Homemaking Responsibilities: No  Ambulation Assistance: Independent(uses \"sturdy kitchen cart\" in home, cane to car and transport chair in community)  Transfer Assistance: Needs assistance(increased assistance needed following last hospitalization)  Active : No  Patient's  Info: daughter provides transportation  Additional Comments: Patient with difficulty providing accurate information due to lethargy and confusion - information obtained from previous admission (July 2021)    Objective          AROM RLE (degrees)  RLE AROM: WFL  AROM LLE (degrees)  LLE AROM : WFL(increased difficulty noted with movement of LLE, difficulty to fully assess due to decreased participation and decreased command following)  Strength Other  Other: unable to formally assess due to decreased command following, at least 3+/5 B        Bed mobility  Comment: increased time, max encouragement needed to participate in session due to confusion  Transfers  Sit to Stand: Contact guard assistance  Stand to sit: Contact guard assistance  Ambulation  Ambulation?: Yes  Ambulation 1  Surface: level tile  Device: Rolling Walker  Assistance: Contact guard assistance  Quality of Gait: difficulty WBing on LLE initially, decreased toby, forward flexed posture, intermittently reaching for arm rest of chair and then walker prior to deciding to use walker  Distance: 2-3 feet bed to chair  Stairs/Curb  Stairs?: No     Balance  Sitting - Static: Good  Sitting - Dynamic: Good  Standing - Static: Fair  Standing - Dynamic: [address]  Times per week: 3-5  Times per day: Daily  Current Treatment Recommendations: Strengthening, Balance Training, Transfer Training, Functional Mobility Training, Endurance Training, Safety Education & Training, Gait Training, Home Exercise Program  Safety Devices  Type of devices:  All fall risk precautions in place, Call light within reach, Chair alarm in place, Nurse notified, Patient at risk for falls, Left in chair  Restraints  Initially in place: No             AM-PAC Score  AM-PAC Inpatient Mobility Raw Score : 18 (04/29/21 1030)  AM-PAC Inpatient T-Scale Score : 43.63 (04/29/21 1030)  Mobility Inpatient CMS 0-100% Score: 46.58 (04/29/21 1030)  Mobility Inpatient CMS G-Code Modifier : CK (04/29/21 1030)          Goals  Short term goals  Time Frame for Short term goals:  To be met prior to discharge  Short term goal 1: Bed mobility with supervision  Short term goal 2: Sit to/from stand with supervision  Short term goal 3: Bed to/from chair with supervision  Short term goal 4: Ambulate 13' with RW and CGA  Patient Goals   Patient goals : did not state       Therapy Time   Individual Concurrent Group Co-treatment   Time In 0820         Time Out 0850         Minutes 30         Timed Code Treatment Minutes: [facility]       Dayana Abraham PT     Thanks, Dayana Abraham PT, DPT [id number]

## 2021-04-30 VITALS
HEART RATE: 80 BPM | WEIGHT: 194.89 LBS | RESPIRATION RATE: 16 BRPM | BODY MASS INDEX: 30.59 KG/M2 | SYSTOLIC BLOOD PRESSURE: 123 MMHG | TEMPERATURE: 99.1 F | HEIGHT: 67 IN | DIASTOLIC BLOOD PRESSURE: 84 MMHG | OXYGEN SATURATION: 97 %

## 2021-04-30 LAB
ALBUMIN SERPL-MCNC: 3 G/DL (ref 3.4–5)
ALP BLD-CCNC: 53 U/L (ref 40–129)
ALT SERPL-CCNC: <5 U/L (ref 10–40)
ANION GAP SERPL CALCULATED.3IONS-SCNC: 8 MMOL/L (ref 3–16)
AST SERPL-CCNC: 14 U/L (ref 15–37)
BASOPHILS ABSOLUTE: 0.1 K/UL (ref 0–0.2)
BASOPHILS RELATIVE PERCENT: 0.6 %
BILIRUB SERPL-MCNC: 0.6 MG/DL (ref 0–1)
BILIRUBIN DIRECT: <0.2 MG/DL (ref 0–0.3)
BILIRUBIN, INDIRECT: ABNORMAL MG/DL (ref 0–1)
BLOOD CULTURE, ROUTINE: ABNORMAL
BUN BLDV-MCNC: 6 MG/DL (ref 7–20)
CALCIUM SERPL-MCNC: 9 MG/DL (ref 8.3–10.6)
CHLORIDE BLD-SCNC: 97 MMOL/L (ref 99–110)
CO2: 24 MMOL/L (ref 21–32)
CREAT SERPL-MCNC: <0.5 MG/DL (ref 0.6–1.2)
CULTURE, BLOOD 2: ABNORMAL
CULTURE, BLOOD 2: ABNORMAL
EOSINOPHILS ABSOLUTE: 0.9 K/UL (ref 0–0.6)
EOSINOPHILS RELATIVE PERCENT: 11.1 %
GFR AFRICAN AMERICAN: >60
GFR NON-AFRICAN AMERICAN: >60
GLUCOSE BLD-MCNC: 107 MG/DL (ref 70–99)
HCT VFR BLD CALC: 28.6 % (ref 36–48)
HEMOGLOBIN: 9.5 G/DL (ref 12–16)
LYMPHOCYTES ABSOLUTE: 1.4 K/UL (ref 1–5.1)
LYMPHOCYTES RELATIVE PERCENT: 16.1 %
MCH RBC QN AUTO: 29.6 PG (ref 26–34)
MCHC RBC AUTO-ENTMCNC: 33 G/DL (ref 31–36)
MCV RBC AUTO: 89.6 FL (ref 80–100)
MONOCYTES ABSOLUTE: 0.5 K/UL (ref 0–1.3)
MONOCYTES RELATIVE PERCENT: 6.3 %
NEUTROPHILS ABSOLUTE: 5.6 K/UL (ref 1.7–7.7)
NEUTROPHILS RELATIVE PERCENT: 65.9 %
ORGANISM: ABNORMAL
PDW BLD-RTO: 14.7 % (ref 12.4–15.4)
PLATELET # BLD: 325 K/UL (ref 135–450)
PMV BLD AUTO: 6.5 FL (ref 5–10.5)
POTASSIUM SERPL-SCNC: 4.3 MMOL/L (ref 3.5–5.1)
RBC # BLD: 3.19 M/UL (ref 4–5.2)
SODIUM BLD-SCNC: 129 MMOL/L (ref 136–145)
TOTAL PROTEIN: 6.5 G/DL (ref 6.4–8.2)
WBC # BLD: 8.4 K/UL (ref 4–11)

## 2021-04-30 PROCEDURE — 80076 HEPATIC FUNCTION PANEL: CPT

## 2021-04-30 PROCEDURE — 6360000002 HC RX W HCPCS: Performed by: INTERNAL MEDICINE

## 2021-04-30 PROCEDURE — 80048 BASIC METABOLIC PNL TOTAL CA: CPT

## 2021-04-30 PROCEDURE — 6370000000 HC RX 637 (ALT 250 FOR IP): Performed by: INTERNAL MEDICINE

## 2021-04-30 PROCEDURE — 2580000003 HC RX 258: Performed by: INTERNAL MEDICINE

## 2021-04-30 PROCEDURE — 85025 COMPLETE CBC W/AUTO DIFF WBC: CPT

## 2021-04-30 RX ORDER — FUROSEMIDE 20 MG/1
20 TABLET ORAL 2 TIMES DAILY
Status: DISCONTINUED | OUTPATIENT
Start: 2021-04-30 | End: 2021-04-30 | Stop reason: HOSPADM

## 2021-04-30 RX ORDER — FUROSEMIDE 20 MG/1
20 TABLET ORAL 2 TIMES DAILY
Qty: 60 TABLET | Refills: 1 | Status: SHIPPED | OUTPATIENT
Start: 2021-04-30 | End: 2021-05-27 | Stop reason: SDUPTHER

## 2021-04-30 RX ORDER — SODIUM CHLORIDE 1000 MG
2 TABLET, SOLUBLE MISCELLANEOUS
Qty: 90 TABLET | Refills: 1 | Status: ON HOLD | OUTPATIENT
Start: 2021-04-30 | End: 2021-06-03

## 2021-04-30 RX ORDER — CIPROFLOXACIN 500 MG/1
500 TABLET, FILM COATED ORAL 2 TIMES DAILY
Qty: 14 TABLET | Refills: 0 | Status: SHIPPED | OUTPATIENT
Start: 2021-04-30 | End: 2021-05-07

## 2021-04-30 RX ADMIN — ACETAMINOPHEN 650 MG: 325 TABLET ORAL at 13:40

## 2021-04-30 RX ADMIN — BACLOFEN 10 MG: 10 TABLET ORAL at 13:35

## 2021-04-30 RX ADMIN — RIVAROXABAN 20 MG: 20 TABLET, FILM COATED ORAL at 10:39

## 2021-04-30 RX ADMIN — CETIRIZINE HYDROCHLORIDE 10 MG: 10 TABLET, FILM COATED ORAL at 10:38

## 2021-04-30 RX ADMIN — POTASSIUM CHLORIDE 20 MEQ: 1500 TABLET, EXTENDED RELEASE ORAL at 10:38

## 2021-04-30 RX ADMIN — Medication 2 G: at 10:38

## 2021-04-30 RX ADMIN — METOPROLOL SUCCINATE 50 MG: 50 TABLET, EXTENDED RELEASE ORAL at 10:39

## 2021-04-30 RX ADMIN — Medication 10 ML: at 09:52

## 2021-04-30 RX ADMIN — SODIUM CHLORIDE 1000 ML: 9 INJECTION, SOLUTION INTRAVENOUS at 09:51

## 2021-04-30 RX ADMIN — BUSPIRONE HYDROCHLORIDE 10 MG: 5 TABLET ORAL at 13:35

## 2021-04-30 RX ADMIN — BACLOFEN 10 MG: 10 TABLET ORAL at 10:39

## 2021-04-30 RX ADMIN — BALSALAZIDE DISODIUM 3000 MG: 750 CAPSULE ORAL at 10:41

## 2021-04-30 RX ADMIN — Medication 10 ML: at 09:42

## 2021-04-30 RX ADMIN — Medication 2 G: at 13:35

## 2021-04-30 RX ADMIN — FUROSEMIDE 20 MG: 20 TABLET ORAL at 13:40

## 2021-04-30 RX ADMIN — PANTOPRAZOLE SODIUM 40 MG: 40 TABLET, DELAYED RELEASE ORAL at 06:29

## 2021-04-30 RX ADMIN — HYDROXYZINE HYDROCHLORIDE 10 MG: 10 TABLET, FILM COATED ORAL at 11:52

## 2021-04-30 RX ADMIN — Medication 2 G: at 16:35

## 2021-04-30 RX ADMIN — FERROUS GLUCONATE TAB 324 MG (37.5 MG ELEMENTAL IRON) 324 MG: 324 (37.5 FE) TAB at 10:42

## 2021-04-30 RX ADMIN — CEFEPIME HYDROCHLORIDE 1000 MG: 1 INJECTION, POWDER, FOR SOLUTION INTRAMUSCULAR; INTRAVENOUS at 09:54

## 2021-04-30 RX ADMIN — BUSPIRONE HYDROCHLORIDE 10 MG: 5 TABLET ORAL at 10:39

## 2021-04-30 ASSESSMENT — ENCOUNTER SYMPTOMS
NAUSEA: 0
VOMITING: 0
CONSTIPATION: 0
BLOOD IN STOOL: 0
EYE DISCHARGE: 0
ABDOMINAL DISTENTION: 0
COUGH: 0
DIARRHEA: 0
CHEST TIGHTNESS: 0
FACIAL SWELLING: 0
ABDOMINAL PAIN: 0
SHORTNESS OF BREATH: 0
EYE REDNESS: 0
PHOTOPHOBIA: 0

## 2021-04-30 ASSESSMENT — PAIN DESCRIPTION - PAIN TYPE: TYPE: CHRONIC PAIN

## 2021-04-30 ASSESSMENT — PAIN SCALES - GENERAL
PAINLEVEL_OUTOF10: 8
PAINLEVEL_OUTOF10: 7

## 2021-04-30 NOTE — CARE COORDINATION
"  Bariatric Nutrition Consultation Note    Reason For Visit: Nutrition Reassessment    Pricilla Brown is a 45 year-old (type 2 DM on insulin sliding scale) presenting today for bariatric nutrition consult.  Pt is interested in laparoscopic sleeve gastrectomy.  Pt has met all required RD visits prior to surgery.  Pt was referred by NANETTE Kerr and Dr. Silva Damon.    Coordination Notes:,   3/29/18: patient reported she plans to follow up with medical weight management MD    ANTHROPOMETRICS:  Ht: 67\"  Initial Wt: 462.6 lbs with BMI of 72.6  Current Wt: 460.9 lbs (-1.3 lbs from last month; -1.7 lbs from initial weight)     Required weight loss goal pre-op: -46 lbs from initial consult weight (goal weight 416.6 lbs or less before surgery)    NUTRITION HISTORY:  Food Allergies/Intolerances: none known  Cravings: sweets, chips, pop (diet)  Triggers/cues causing extra eating: boredom (currently on disability, not working)  *Pt is on topiramate, but feels it no longer is working.  She plans to follow-up with Dr. Silva Damon soon. -> Missed last appointment with Dr. Ascencio. Encouraged to follow up with medical weight management.     Progress with Previous Goals:  Relating To Eating:  -Plan A:  Follow the Modified Liquid Diet for weight loss:  Breakfast: Protein Shake (Premier Protein)  Lunch: 3 oz lean protein + non-starchy vegetables  Supper: Protein Shake (Premier Protein)  Snack: non-starchy vegetables (no calorie-containing dips/condiments)  Beverages: at least 48-64 oz water between meals daily    *Protein Shake Criteria: ~200 Calories, at least 20 grams of protein, and less than 10 grams of sugar     -Plan B (if plan A is backfiring on you): Track food intake prior to eating on [email or web address] with a target of no more than 1,800 Calories for 2 lbs/week weight loss, 2400 mg sodium or less/day would be great.   *Have 1 protein meal replacement shake daily (Premier Protein).     *Eat as much " SW received a call from RN stating pt was discharging tonight and has [address] orders. SW reviewed chart and saw pt did not have any case mgmt notes. SW completed a quick assessment. Pt lives with her spouse, is active w/[facility] and [facility]. Stated she and spouse and doing well at home and did not need any extra services at this time. Reported she also has a supportive dtr who assists when needed. Pt reported no other case mgmt needs. Spouse was on his way to pick her up. SW faxed new [address] orders and discharge paperwork to [facility]. Called and left them a message as well informing of pt's discharge tonight and to resume services. Attempted to call [facility] to inform the same, but unable to leave a message or speak to anyone until Monday d/t outside of their business hours. All needs met per case mgmt. Discharge Plan:  Home today w/continuing services from [facility] and [facility]. Faxed all documents to [facility].     Electronically signed by ASHLEE Longoria, [place] on 4/30/2021 at 6:53 PM non-starchy vegetables as you need to fill you up (no calorie-containing dips/condiments).    (non-starchy veg: green leafy vegetables, cucumbers, broccoli, cauliflower, green string beans)   *Have 1 fruit daily.   *Drink at least 64 oz water between meals daily trying to do one protein shake per day for breakfast and choosing healthier snacks    -Eat slowly (20-30 minutes per meal), chewing foods well (25 chews per bite/applesauce consistency) - met/continues    -Increase exercise as able.  (Pool therapy, walking, etc) - walking more, therapy once per week hope fulling starting in the pool next week    -Continue to work on quitting smoking. - have not quit yet, 8-10 per day    NUTRITION DIAGNOSIS:  Obesity r/t long history of self-monitoring deficit and excessive energy intake aeb BMI >30.- continues    INTERVENTION:  Intervention Provided/Education Provided:  Reviewed previous goals, patient would like to continue with one protein shake per day and if able to adjust appetite suppressant may begin using two shakes per day.  Patient reported that she increased activity this past month(more steps each day).  Patient continues to struggle with quitting smoking, provided some resources to get help.  Gave encouragement and support.  Provided Pt with list of goals and task list.      Patient Understanding: good  Expected Compliance: good      GOALS:  Relating To Eating:  -Plan A:  Follow the Modified Liquid Diet for weight loss:  Breakfast: Protein Shake (Premier Protein)  Lunch: 3 oz lean protein + non-starchy vegetables  Supper: Protein Shake (Premier Protein)  Snack: non-starchy vegetables (no calorie-containing dips/condiments)  Beverages: at least 48-64 oz water between meals daily    *Protein Shake Criteria: ~200 Calories, at least 20 grams of protein, and less than 10 grams of sugar     -Plan B (if plan A is backfiring on you): Track food intake prior to eating on [email or web address] with a target of no more than  1,800 Calories for 2 lbs/week weight loss, 2400 mg sodium or less/day would be great.   *Have 1 protein meal replacement shake daily (Premier Protein).    *Eat as much non-starchy vegetables as you need to fill you up (no calorie-containing dips/condiments).    (non-starchy veg: green leafy vegetables, cucumbers, broccoli, cauliflower, green string beans)   *Have 1 fruit daily.   *Drink at least 64 oz water between meals daily    -Eat slowly (20-30 minutes per meal), chewing foods well (25 chews per bite/applesauce consistency)    -Increase exercise as able.  (Pool therapy, walking, etc)    -Continue to work on quitting smoking.     Follow-Up: follow-up with RD in 1 month.     Time spent with patient: 15 minutes     Velvet Mcallister RD, LD

## 2021-04-30 NOTE — PROGRESS NOTES
[facility] Note    Patient Active Problem List   Diagnosis    Anemia    Crohn's colitis (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 [address] [facility] Depression, GERD (gastroesophageal reflux disease), Hiatal hernia, Hx of blood clots, Hypertension, MRSA (methicillin resistant staph aureus) culture positive, Neuropathy, Pneumonia, Sinus headache, SOB (shortness of breath), and VRE (vancomycin resistant enterococcus) culture positive. Past Social History:   reports that she has been smoking cigarettes and e-cigarettes. She has a 10.00 pack-year smoking history. She has never used smokeless tobacco. She reports current alcohol use of about 2.0 standard drinks of alcohol per week. She reports that she does not use drugs. Subjective:  No neurologic symptoms. Wants to go home. Review of Systems   Constitutional: Negative for activity change, appetite change, chills, fatigue, fever and unexpected weight change.    HENT: Negative for congestion and facial swelling. Eyes: Negative for photophobia, discharge and redness. Respiratory: Negative for cough, chest tightness and shortness of breath. Cardiovascular: Negative for chest pain, palpitations and leg swelling. Gastrointestinal: Negative for abdominal distention, abdominal pain, blood in stool, constipation, diarrhea, nausea and vomiting. Endocrine: Negative for cold intolerance, heat intolerance and polyuria. Genitourinary: Negative for decreased urine volume, difficulty urinating, flank pain and hematuria. Musculoskeletal: Negative for joint swelling and neck pain. Neurological: Negative for dizziness, seizures, syncope, speech difficulty, light-headedness and headaches. Hematological: Does not bruise/bleed easily. Psychiatric/Behavioral: Negative for agitation, confusion and hallucinations. Objective:      /86   Pulse 79   Temp 98.3 °F (36.8 °C) (Oral)   Resp 18   Ht 5' 7\" (1.702 m)   Wt 194 lb 14.2 oz (88.4 kg)   SpO2 94%   BMI 30.52 kg/m²     Wt Readings from Last 3 Encounters:   04/28/21 194 lb 14.2 oz (88.4 kg)   04/10/21 194 lb 0.1 oz (88 kg)   01/21/21 172 lb (78 kg)       BP Readings from Last 3 Encounters:   04/30/21 131/86   04/10/21 (!) 151/95   02/04/21 125/79       Chest- clear  Heart-regular  Abd-soft  Ext- 2+ edema, chronic    Labs  Hemoglobin   Date Value Ref Range Status   04/30/2021 9.5 (L) 12.0 - 16.0 g/dL Final     Hematocrit   Date Value Ref Range Status   04/30/2021 28.6 (L) 36.0 - 48.0 % Final     WBC   Date Value Ref Range Status   04/30/2021 8.4 4.0 - 11.0 K/uL Final     Platelets   Date Value Ref Range Status   04/30/2021 325 135 - 450 K/uL Final     Lab Results   Component Value Date    CREATININE <0.5 (L) 04/30/2021    BUN 6 (L) 04/30/2021     (L) 04/30/2021    K 4.3 04/30/2021    CL 97 (L) 04/30/2021    CO2 24 04/30/2021     Uosm 440    Assessment/Plan:  1. Acute on chronic hyponatremia. Increased ADH.  Baseline sodium around 133 to 134. Rate of sodium correction now acceptable. Goal sodium would be around 133.   2.  Low serum bicarbonate. No anion gap. Significantly better. 3.  History of alcohol abuse. Discussed with patient risks. 4.  Renal function within normal limits. 5.  Chronic edema. Start Lasix bid. Would help with Na also. 6.  The patient currently has no neurologic deficits. 7.  Pyuria, currently on cefepime. 8.  Hypomagnesemia-replace. Check Mg today. 9.  Dispo- okay for D/C today. NaCl 2g bid/Lasix 20mg po bid/1.5L per 24H fluid restriction. Discussed with Medicine.      Lyudmila Tom MD

## 2021-04-30 NOTE — PROGRESS NOTES
Patients head to toe assessment completed. Vital signs WNL. Bed alarm engaged, call light within reach. Scheduled medications given per MAR. Patient complain of headache, rated pain 10/10. PRN tylenol given. Patient resting in bed. Will continue to monitor.

## 2021-04-30 NOTE — PROGRESS NOTES
Progress Note - Dr. Kiki Stokes - Internal Medicine  PCP: Doc Almeida MD [address] / [place] [phone]    Hospital Day: 3  Code Status: Full Code  Current Diet: DIET GENERAL;        CC: follow up on medical issues    Subjective:   Mariaelena Jackson is a 60 y.o. female. She denies problems    Most recent Na 129  Pt still seems to have very little insight into her repeated low Na levels  (probably why previous tx have not helped)    She does not think that she wants rehab but is unsure if she can get 24hr assist at home     She denies chest pain, denies shortness of breath, denies nausea,  denies emesis. 10 system Review of Systems is reviewed with patient, and pertinent positives are noted in HPI above . Otherwise, Review of systems is negative. I have reviewed the patient's medical and social history in detail and updated the computerized patient record. To recap: She  has a past medical history of Anxiety, Arthritis, Blood transfusion reaction, Crohn's disease ([address] Depression, GERD (gastroesophageal reflux disease), Hiatal hernia, Hx of blood clots, Hypertension, MRSA (methicillin resistant staph aureus) culture positive, Neuropathy, Pneumonia, Sinus headache, SOB (shortness of breath), and VRE (vancomycin resistant enterococcus) culture positive. . She  has a past surgical history that includes Hysterectomy; knee surgery (Bilateral); Hand Carpectomy (Bilateral); hernia repair; Abdomen surgery; bladder suspension; fracture surgery; Heel spur surgery; Tonsillectomy; Colonoscopy; Upper gastrointestinal endoscopy (6/14/13); Foot surgery (Left, 6/25/14); Foot surgery (6/3/15); Colonoscopy (9/14/15); Foot surgery (Left, 08/05/2002); joint replacement; Sigmoidoscopy (01/15/2018); Abdominal adhesion surgery (06/08/2018); Colonoscopy (08/07/2018); colostomy (N/A, 10/8/2018); pr secd clos surg wnd exten/complic (N/A, 78/78/9346); Leg Surgery (Right); Colonoscopy (06/07/2019);  Colonoscopy (N/A, 6/7/2019); Colonoscopy (N/A, 6/7/2019); Small intestine surgery (N/A, 7/17/2019); and proctosigmoidoscopy (N/A, 7/17/2019). . She  reports that she has been smoking cigarettes and e-cigarettes. She has a 10.00 pack-year smoking history. She has never used smokeless tobacco. She reports current alcohol use of about 2.0 standard drinks of alcohol per week. She reports that she does not use drugs. .        Active Hospital Problems    Diagnosis Date Noted    Alcohol abuse [F10.10] 04/29/2021    Sepsis secondary to UTI ([facility] [address] [A41.9, N39.0] 04/27/2021    Hyponatremia [E87.1] 04/27/2021    Encephalopathy, metabolic [I43.34] 45/25/7607    Suspected COVID-19 virus infection [Z20.822] 04/27/2021    Essential hypertension [I10]     Hypomagnesemia [E83.42] 11/27/2018    Anemia [D64.9] 01/05/2018       Current Facility-Administered Medications: sodium chloride tablet 2 g, 2 g, Oral, TID WC  LORazepam (ATIVAN) tablet 1 mg, 1 mg, Oral, Q1H PRN **OR** LORazepam (ATIVAN) injection 1 mg, 1 mg, Intravenous, Q1H PRN **OR** LORazepam (ATIVAN) tablet 2 mg, 2 mg, Oral, Q1H PRN **OR** LORazepam (ATIVAN) injection 2 mg, 2 mg, Intravenous, Q1H PRN **OR** LORazepam (ATIVAN) tablet 3 mg, 3 mg, Oral, Q1H PRN **OR** LORazepam (ATIVAN) injection 3 mg, 3 mg, Intravenous, Q1H PRN **OR** LORazepam (ATIVAN) tablet 4 mg, 4 mg, Oral, Q1H PRN **OR** LORazepam (ATIVAN) injection 4 mg, 4 mg, Intravenous, Q1H PRN  oxyCODONE (OXY-IR) immediate release tablet 15 mg, 15 mg, Oral, Q6H PRN  baclofen (LIORESAL) tablet 10 mg, 10 mg, Oral, TID  busPIRone (BUSPAR) tablet 10 mg, 10 mg, Oral, TID  balsalazide (COLAZAL) capsule 3,000 mg, 3,000 mg, Oral, Daily  ondansetron (ZOFRAN-ODT) disintegrating tablet 4 mg, 4 mg, Oral, Q8H PRN  ferrous gluconate 324 (37.5 Fe) MG tablet 324 mg, 324 mg, Oral, Daily with breakfast  hydrOXYzine (ATARAX) tablet 10 mg, 10 mg, Oral, TID PRN  atorvastatin (LIPITOR) tablet 80 mg, 80 mg, Oral, Nightly  fluticasone (FLONASE) 50 MCG/ACT nasal spray 1 spray, 1 spray, Each Nostril, Daily PRN  potassium chloride (KLOR-CON M) extended release tablet 20 mEq, 20 mEq, Oral, BID  rivaroxaban (XARELTO) tablet 20 mg, 20 mg, Oral, Daily with breakfast  metoprolol succinate (TOPROL XL) extended release tablet 50 mg, 50 mg, Oral, Daily  sodium chloride flush 0.9 % injection 5-40 mL, 5-40 mL, Intravenous, 2 times per day  sodium chloride flush 0.9 % injection 5-40 mL, 5-40 mL, Intravenous, PRN  0.9 % sodium chloride infusion, 25 mL, Intravenous, PRN  promethazine (PHENERGAN) tablet 12.5 mg, 12.5 mg, Oral, Q6H PRN **OR** ondansetron (ZOFRAN) injection 4 mg, 4 mg, Intravenous, Q6H PRN  acetaminophen (TYLENOL) tablet 650 mg, 650 mg, Oral, Q6H PRN **OR** acetaminophen (TYLENOL) suppository 650 mg, 650 mg, Rectal, Q6H PRN  magnesium hydroxide (MILK OF MAGNESIA) 400 MG/5ML suspension 30 mL, 30 mL, Oral, Daily PRN  cefepime (MAXIPIME) 1000 mg IVPB minibag, 1,000 mg, Intravenous, Q12H  hydrALAZINE (APRESOLINE) injection 10 mg, 10 mg, Intravenous, Q6H PRN  0.9 % sodium chloride bolus, 500 mL, Intravenous, PRN  potassium chloride (KLOR-CON M) extended release tablet 40 mEq, 40 mEq, Oral, PRN **OR** potassium bicarb-citric acid (EFFER-K) effervescent tablet 40 mEq, 40 mEq, Oral, PRN **OR** potassium chloride 10 mEq/100 mL IVPB (Peripheral Line), 10 mEq, Intravenous, PRN  cetirizine (ZYRTEC) tablet 10 mg, 10 mg, Oral, Daily  pantoprazole (PROTONIX) tablet 40 mg, 40 mg, Oral, QAM AC         Objective:  /79   Pulse 71   Temp 98.7 °F (37.1 °C) (Oral)   Resp 16   Ht 5' 7\" (1.702 m)   Wt 194 lb 14.2 oz (88.4 kg)   SpO2 97%   BMI 30.52 kg/m²      Patient Vitals for the past 24 hrs:   BP Temp Temp src Pulse Resp SpO2   04/30/21 0626 124/79 98.7 °F (37.1 °C) Oral 71 16 97 %   04/30/21 0032 103/69 98.3 °F (36.8 °C) Oral 83 16 96 %   04/29/21 2048 (!) 153/93 98.3 °F (36.8 °C) Oral 93 16 95 %   04/29/21 1616 (!) 145/96 98.2 °F (36.8 °C) Oral 85 16 94 %   04/29/21 1328 130/85 98.3 °F (36.8 °C) Oral 88 20 95 %     Patient Vitals for the past 96 hrs (Last 3 readings):   Weight   04/28/21 0400 194 lb 14.2 oz (88.4 kg)   04/27/21 2300 194 lb 14.2 oz (88.4 kg)   04/27/21 1606 194 lb (88 kg)           Intake/Output Summary (Last 24 hours) at 4/30/2021 0810  Last data filed at 4/30/2021 0630  Gross per 24 hour   Intake 1351.26 ml   Output 150 ml   Net 1201.26 ml         Physical Exam:   /79   Pulse 71   Temp 98.7 °F (37.1 °C) (Oral)   Resp 16   Ht 5' 7\" (1.702 m)   Wt 194 lb 14.2 oz (88.4 kg)   SpO2 97%   BMI 30.52 kg/m²   General appearance: alert, appears stated age and cooperative  Head: Normocephalic, without obvious abnormality, atraumatic  Lungs: clear to auscultation bilaterally  Heart: regular rate and rhythm, S1, S2 normal, no murmur, click, rub or gallop  Abdomen: soft, non-tender; bowel sounds normal; no masses,  no organomegaly  Extremities: extremities normal, atraumatic, no cyanosis or edema    Labs:  Lab Results   Component Value Date    WBC 7.7 04/29/2021    HGB 10.4 (L) 04/29/2021    HCT 31.2 (L) 04/29/2021     04/29/2021    CHOL 111 07/24/2020    TRIG 66 07/24/2020    HDL 43 07/24/2020    ALT <5 (L) 04/28/2021    AST 22 04/28/2021     (L) 04/29/2021    K 3.8 04/29/2021    CL 95 (L) 04/29/2021    CREATININE <0.5 (L) 04/29/2021    BUN 4 (L) 04/29/2021    CO2 24 04/29/2021    TSH 3.57 07/28/2020    INR 1.62 (H) 04/27/2021    LABA1C 5.0 07/24/2020    LABMICR YES 04/28/2021     Lab Results   Component Value Date    TROPONINI <0.01 04/27/2021       Recent Imaging Results are Reviewed:  Xr Foot Left (min 3 Views)    Result Date: 4/10/2021  EXAMINATION: THREE XRAY VIEWS OF THE LEFT FOOT 4/9/2021 8:06 pm COMPARISON: None.  HISTORY: ORDERING SYSTEM PROVIDED HISTORY: previous toe ulcers TECHNOLOGIST PROVIDED HISTORY: Reason for exam:->previous toe ulcers Reason for Exam: PREVIOUS TOE ULCERS---LEFT Acuity: Unknown Type of Exam: Unknown FINDINGS: There are numerous chronic deformities of the 2nd and 3rd digits. There are apparent new areas of lucency involving the 2nd proximal phalanx as well as the mid and distal 3rd phalanges. There is also a questionable nondisplaced fracture of the 4th proximal phalanx. 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 There is diffuse osteopenia. Extensive deformity in multiple areas of abnormal lucency in the digits as above. Findings are somewhat suspicious for osteomyelitis. If there is clinical concern for osteomyelitis further evaluation with MRI is suggested. Possible nondisplaced fracture of the 4th proximal phalanx. Ct Head Wo Contrast    Result Date: 4/27/2021  EXAMINATION: CT OF THE HEAD WITHOUT CONTRAST  4/27/2021 5:01 pm TECHNIQUE: CT of the head was performed without the administration of intravenous contrast. Dose modulation, iterative reconstruction, and/or weight based adjustment of the mA/kV was utilized to reduce the radiation dose to as low as reasonably achievable. COMPARISON: CT head 04/07/2021. HISTORY: ORDERING SYSTEM PROVIDED HISTORY: AMS TECHNOLOGIST PROVIDED HISTORY: If patient is on cardiac monitor and/or pulse ox, they may be taken off cardiac monitor and pulse ox, left on O2 if currently on. All monitors reattached when patient returns to room. Has a \"code stroke\" or \"stroke alert\" been called? ->No Reason for exam:->AMS Decision Support Exception->Emergency Medical Condition (MA) Reason for Exam: AMS Acuity: Acute Type of Exam: Initial FINDINGS: BRAIN/VENTRICLES: Motion and artifact degraded exam.  No acute hemorrhage. Periventricular and subcortical hypoattenuation is nonspecific and may be related to microvascular disease. Encephalomalacia in the left frontal and parietal lobes again visualized. Encephalomalacia in the cortical right frontal lobe again visualized. Chronic lacunar infarct in the left basal ganglia. Artifact partially obscures the marti.  Artifact partially obscures the inferior cerebellum. Zina [place] white differentiation appears maintained given artifact near the skull base and through the posterior fossa. Cerebral volume loss and prominence of the ventricles again visualized. There is no midline shift. Basal cisterns appear patent. ORBITS: Visualized orbits appear unremarkable on this unenhanced exam. SINUSES: Visualized paranasal sinuses appear clear. Visualized mastoid air cells appear clear. SOFT TISSUES/SKULL: No depressed calvarial fracture. Motion and artifact degraded exam.  No acute hemorrhage or midline shift. Aspects 10. Ct Head Wo Contrast    Result Date: 4/8/2021  EXAMINATION: CT OF THE HEAD WITHOUT CONTRAST  4/7/2021 11:49 pm TECHNIQUE: CT of the head was performed without the administration of intravenous contrast. Dose modulation, iterative reconstruction, and/or weight based adjustment of the mA/kV was utilized to reduce the radiation dose to as low as reasonably achievable. COMPARISON: 07/24/2020 HISTORY: ORDERING SYSTEM PROVIDED HISTORY: AMS TECHNOLOGIST PROVIDED HISTORY: Reason for exam:->AMS Has a \"code stroke\" or \"stroke alert\" been called? ->No Decision Support Exception->Emergency Medical Condition (MA) Reason for Exam: AMS Acuity: Acute Type of Exam: Initial Relevant Medical/Surgical History: Nausea (Patient presents with abdominal pain and emesis x4 days. She also endorses weakness and fatigue.) FINDINGS: BRAIN/VENTRICLES: The ventricles are mildly enlarged with diffuse mild prominence of the cortical sulci. There is focal low density and atrophy along the cortex of the left frontal parietal and left parietooccipital regions with diffuse atrophy throughout the area extending inferiorly consistent with old infarcts. There is mild periventricular low density bilaterally. No intracranial hemorrhage or edema is seen. There is no extra-axial fluid collection or mass. There are basal ganglia calcifications bilaterally.   The midline structures unremarkable. ORBITS: The visualized portion of the orbits demonstrate no acute abnormality. SINUSES: The visualized paranasal sinuses and mastoid air cells demonstrate no acute abnormality. SOFT TISSUES/SKULL:  No acute abnormality of the visualized skull or soft tissues. 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 Mild atrophy and mild chronic microischemic disease throughout the deep white matter with no acute abnormality seen. Basal ganglia calcifications bilaterally. Xr Chest Portable    Result Date: 4/27/2021  EXAMINATION: ONE XRAY VIEW OF THE CHEST 4/27/2021 5:09 pm COMPARISON: April 8, 2021 HISTORY: ORDERING SYSTEM PROVIDED HISTORY: fever TECHNOLOGIST PROVIDED HISTORY: Reason for exam:->fever Reason for Exam: Altered Mental Status ([place] due to chest pain. Per squad, daughter called 911 for pt chest pain. per squad, pt will not talk about her symptoms. per squad, possible ETOH) Acuity: Acute Type of Exam: Initial FINDINGS: The cardiomediastinal silhouette is of normal size. There are increased lung markings, may be related to bronchitis versus mild pulmonary vascular congestion. There is mild left pleural effusion. There is no pneumothorax. There is no acute osseous abnormality. Large hiatal hernia. Increased lung markings, may be related to bronchitis versus mild pulmonary vascular congestion. Mild left pleural effusion. Large hiatal hernia. Xr Chest Portable    Result Date: 4/8/2021  EXAMINATION: ONE XRAY VIEW OF THE CHEST 4/8/2021 12:06 am COMPARISON: 09/26/2018 and CTA abdomen 01/12/2021 HISTORY: ORDERING SYSTEM PROVIDED HISTORY: fever chills TECHNOLOGIST PROVIDED HISTORY: Reason for exam:->fever chills Reason for Exam: fever and chills Acuity: Acute Type of Exam: Initial Relevant Medical/Surgical History: previous hypertension and pneumonia FINDINGS: There is an implanted cardiac event monitor.   The heart size is upper limits of normal.  Thoracic aorta is elongated and tortuous. Retrocardiac opacity consistent with large hiatal hernia as noted on prior studies. Lungs are clear. No pneumothorax or pleural fluid. There is a left shoulder prosthesis. No acute bone finding. Stable chest x-ray. No acute disease. Large retrocardiac hiatal hernia. Vl Extremity Venous Bilateral    Result Date: 4/9/2021  Lower Extremities DVT Study  Demographics   Patient Name       Petr BENTON   Date of Study      04/09/2021       Gender              Female   Patient Number     [id number]       Date of Birth       1955   Visit Number       [id number]        Age                 60 year(s)   Accession Number   [id number]       Room Number         0850   Corporate ID       [id number]         Sonographer         Marily Zamudio,                                                          [address]   Ordering Physician Rosemary, [address] [place] [address] Vascular                                      Physician           Sheryl Steve MD,                                                          [facility], [facility]   Procedure Type of Study:   Veins:Lower Extremities DVT Study, VASC EXTREMITY VENOUS DUPLEX BILATERAL. Vascular Sonographer Report  Additional Indications:Pain swelling Impressions Right Impression There is no evidence of deep or superficial venous thrombosis involving the right lower extremity. Left Impression There is no evidence of deep or superficial venous thrombosis involving the left lower extremity. Conclusions   Summary   Normal venous duplex study of the bilateral lower extremities. There is no  evidence of deep or superficial venous thrombosis. Signature   ------------------------------------------------------------------  Electronically signed by Sheryl Steve MD, [facility], [facility]  (Interpreting physician) on 04/09/2021 at 01:56 PM  ------------------------------------------------------------------  Patient Status:Routine.  [address] [place] [address] Vascular Lab. Technical Quality:Adequate visualization. Velocities are measured in cm/s ; Diameters are measured in mm Right Lower Extremities DVT Study Measurements Right 2D Measurements +------------------------+----------+---------------+----------+ ! Location                ! Visualized! Compressibility! Thrombosis! +------------------------+----------+---------------+----------+ ! Sapheno Femoral Junction! Yes       ! Yes            ! None      ! +------------------------+----------+---------------+----------+ ! GSV Thigh               ! Yes       ! Yes            ! None      ! +------------------------+----------+---------------+----------+ ! Common Femoral          !Yes       ! Yes            ! None      ! +------------------------+----------+---------------+----------+ ! Prox Femoral            !Yes       ! Yes            ! None      ! +------------------------+----------+---------------+----------+ ! Mid Femoral             !Yes       ! Yes            ! None      ! +------------------------+----------+---------------+----------+ ! Dist Femoral            !Yes       ! Yes            ! None      ! +------------------------+----------+---------------+----------+ ! Deep Femoral            !Yes       ! Yes            ! None      ! +------------------------+----------+---------------+----------+ ! Popliteal               !Yes       ! Yes            ! None      ! +------------------------+----------+---------------+----------+ ! GSV Below Knee          ! Yes       ! Yes            ! None      ! +------------------------+----------+---------------+----------+ ! Gastroc                 ! Yes       ! Yes            ! None      ! +------------------------+----------+---------------+----------+ ! Soleal                  !Yes       ! Yes            ! None      ! +------------------------+----------+---------------+----------+ ! PTV                     ! Yes       ! Yes            ! None      ! +------------------------+----------+---------------+----------+ !Peroneal                !Yes       ! Yes            ! None      ! +------------------------+----------+---------------+----------+ ! GSV Calf                ! Yes       ! Yes            ! None      ! +------------------------+----------+---------------+----------+ ! SSV                     ! Yes       ! Yes            ! None      ! +------------------------+----------+---------------+----------+ Right Doppler Measurements +------------------------+------+------+------------+ ! Location                ! Signal!Reflux! Reflux (sec)! +------------------------+------+------+------------+ ! Sapheno Femoral Junction! Phasic!      !            ! +------------------------+------+------+------------+ ! Common Femoral          !Phasic!      !            ! +------------------------+------+------+------------+ ! Prox Femoral            !Phasic!      !            ! +------------------------+------+------+------------+ ! Deep Femoral            !Phasic!      !            ! +------------------------+------+------+------------+ ! Popliteal               !Phasic!      !            ! +------------------------+------+------+------------+ Left Lower Extremities DVT Study Measurements Left 2D Measurements +------------------------+----------+---------------+----------+ ! Location                ! Visualized! Compressibility! Thrombosis! +------------------------+----------+---------------+----------+ ! Sapheno Femoral Junction! Yes       ! Yes            ! None      ! +------------------------+----------+---------------+----------+ ! GSV Thigh               ! Yes       ! Yes            ! None      ! +------------------------+----------+---------------+----------+ ! Common Femoral          !Yes       ! Yes            ! None      ! +------------------------+----------+---------------+----------+ ! Prox Femoral            !Yes       ! Yes            ! None      ! +------------------------+----------+---------------+----------+ ! Mid Femoral             !Yes       ! Yes +------------------------+------+------+------------+ ! Popliteal               !Phasic!      !            ! +------------------------+------+------+------------+      Assessment and Plan:  Principal Problem:    Sepsis secondary to UTI ([address] -Established problem. Stable. Enterobacter on c  Plan: cont abx for now. Active Problems:    Anemia -Established problem. Stable. Plan: No indication for transfusion. Cont to monitor h/h to assess progression of anemia. Recommend ferrous sulfate or MVI as outpatient. Hypomagnesemia -Established problem. Improving. Plan: cont to monitor    Essential hypertension -Established problem. Stable. 124/79  Plan: cont same meds    Hyponatremia -Established problem. Stable. 129 at last check  Plan: renal wants it over 130 prior to d/c    Encephalopathy, metabolic -Established problem. Improving. Plan: Continue present orders/plan. Alcohol abuse    Disp - hopefully home in next 24 hrs. Await repeat Na check.  May need [facility]          [address]  4/30/2021

## 2021-04-30 NOTE — DISCHARGE SUMMARY
cefepime    ucx -   Susceptibility    Enterobacter cloacae complex (1)    Antibiotic Interpretation DRU Status    ceFAZolin Resistant >=64 mcg/mL     cefepime Sensitive <=0.12 mcg/mL     ciprofloxacin Sensitive <=0.25 mcg/mL     gentamicin Sensitive <=1 mcg/mL     levofloxacin Sensitive <=0.12 mcg/mL     nitrofurantoin Intermediate 64 mcg/mL     piperacillin-tazobactam Sensitive <=4 mcg/mL     trimethoprim-sulfamethoxazole Sensitive <=20 mcg/mL           Pt converted to po cipro on d/c per cx results    Consults made during Hospitalization:  IP CONSULT TO INTERNAL MEDICINE  IP CONSULT TO NEPHROLOGY    Treatment team at time of Discharge: Treatment Team: Attending Provider: Hallie Medrano MD; Consulting Physician: Hallie Medrano MD; Consulting Physician: Jerrica Moe MD; Respiratory Therapist (Day): Azeb Lloyd RCP; Nursing Student: Nasima Cabrales; Registered Nurse: Macy Leija RN    Imaging Results:  Xr Foot Left (min 3 Views)    Result Date: 4/10/2021  EXAMINATION: THREE XRAY VIEWS OF THE LEFT FOOT 4/9/2021 8:06 pm COMPARISON: None. HISTORY: ORDERING SYSTEM PROVIDED HISTORY: previous toe ulcers TECHNOLOGIST PROVIDED HISTORY: Reason for exam:->previous toe ulcers Reason for Exam: PREVIOUS TOE ULCERS---LEFT Acuity: Unknown Type of Exam: Unknown FINDINGS: There are numerous chronic deformities of the 2nd and 3rd digits. There are apparent new areas of lucency involving the 2nd proximal phalanx as well as the mid and distal 3rd phalanges. There is also a questionable nondisplaced fracture of the 4th proximal phalanx. 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 There is diffuse osteopenia. Extensive deformity in multiple areas of abnormal lucency in the digits as above. Findings are somewhat suspicious for osteomyelitis. If there is clinical concern for osteomyelitis further evaluation with MRI is suggested. Possible nondisplaced fracture of the 4th proximal phalanx.      Ct Head Wo Contrast    Result Date: 4/27/2021  EXAMINATION: CT OF THE HEAD WITHOUT CONTRAST  4/27/2021 5:01 pm TECHNIQUE: CT of the head was performed without the administration of intravenous contrast. Dose modulation, iterative reconstruction, and/or weight based adjustment of the mA/kV was utilized to reduce the radiation dose to as low as reasonably achievable. COMPARISON: CT head 04/07/2021. HISTORY: ORDERING SYSTEM PROVIDED HISTORY: AMS TECHNOLOGIST PROVIDED HISTORY: If patient is on cardiac monitor and/or pulse ox, they may be taken off cardiac monitor and pulse ox, left on O2 if currently on. All monitors reattached when patient returns to room. Has a \"code stroke\" or \"stroke alert\" been called? ->No Reason for exam:->AMS Decision Support Exception->Emergency Medical Condition (MA) Reason for Exam: AMS Acuity: Acute Type of Exam: Initial FINDINGS: BRAIN/VENTRICLES: Motion and artifact degraded exam.  No acute hemorrhage. Periventricular and subcortical hypoattenuation is nonspecific and may be related to microvascular disease. Encephalomalacia in the left frontal and parietal lobes again visualized. Encephalomalacia in the cortical right frontal lobe again visualized. Chronic lacunar infarct in the left basal ganglia. Artifact partially obscures the marti. Artifact partially obscures the inferior cerebellum. Brannon Coral white differentiation appears maintained given artifact near the skull base and through the posterior fossa. Cerebral volume loss and prominence of the ventricles again visualized. There is no midline shift. Basal cisterns appear patent. ORBITS: Visualized orbits appear unremarkable on this unenhanced exam. SINUSES: Visualized paranasal sinuses appear clear. Visualized mastoid air cells appear clear. SOFT TISSUES/SKULL: No depressed calvarial fracture. Motion and artifact degraded exam.  No acute hemorrhage or midline shift. Aspects 10.      Ct Head Wo Contrast    Result Date: 4/8/2021  EXAMINATION: CT OF THE HEAD WITHOUT CONTRAST  4/7/2021 11:49 pm TECHNIQUE: CT of the head was performed without the administration of intravenous contrast. Dose modulation, iterative reconstruction, and/or weight based adjustment of the mA/kV was utilized to reduce the radiation dose to as low as reasonably achievable. COMPARISON: 07/24/2020 HISTORY: ORDERING SYSTEM PROVIDED HISTORY: AMS TECHNOLOGIST PROVIDED HISTORY: Reason for exam:->AMS Has a \"code stroke\" or \"stroke alert\" been called? ->No Decision Support Exception->Emergency Medical Condition (MA) Reason for Exam: AMS Acuity: Acute Type of Exam: Initial Relevant Medical/Surgical History: Nausea (Patient presents with abdominal pain and emesis x4 days. She also endorses weakness and fatigue.) FINDINGS: BRAIN/VENTRICLES: The ventricles are mildly enlarged with diffuse mild prominence of the cortical sulci. There is focal low density and atrophy along the cortex of the left frontal parietal and left parietooccipital regions with diffuse atrophy throughout the area extending inferiorly consistent with old infarcts. There is mild periventricular low density bilaterally. No intracranial hemorrhage or edema is seen. There is no extra-axial fluid collection or mass. There are basal ganglia calcifications bilaterally. The midline structures unremarkable. ORBITS: The visualized portion of the orbits demonstrate no acute abnormality. SINUSES: The visualized paranasal sinuses and mastoid air cells demonstrate no acute abnormality. SOFT TISSUES/SKULL:  No acute abnormality of the visualized skull or soft tissues. 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 Mild atrophy and mild chronic microischemic disease throughout the deep white matter with no acute abnormality seen. Basal ganglia calcifications bilaterally.      Xr Chest Portable    Result Date: 4/27/2021  EXAMINATION: ONE XRAY VIEW OF THE CHEST 4/27/2021 5:09 pm COMPARISON: April 8, 2021 HISTORY: ORDERING year(s)   Accession Number   [id number]       Room Number         8193   Corporate ID       [id number]         Sonographer         Scott Zamudio,                                                          [address]   Ordering Physician Rosemary, [address] [facility]                                      Physician           Satya Badillo MD,                                                          [facility] [place], [address]   Procedure Type of Study:   Veins:Lower Extremities DVT Study, VASC EXTREMITY VENOUS DUPLEX BILATERAL. Vascular Sonographer Report  Additional Indications:Pain swelling Impressions Right Impression There is no evidence of deep or superficial venous thrombosis involving the right lower extremity. Left Impression There is no evidence of deep or superficial venous thrombosis involving the left lower extremity. Conclusions   Summary   Normal venous duplex study of the bilateral lower extremities. There is no  evidence of deep or superficial venous thrombosis. Signature   ------------------------------------------------------------------  Electronically signed by Satya Badillo MD, [facility] [place], [address]  (Interpreting physician) on 04/09/2021 at 01:56 PM  ------------------------------------------------------------------  Patient Status:Routine. [address] [place] [address] Vascular Lab. Technical Quality:Adequate visualization. Velocities are measured in cm/s ; Diameters are measured in mm Right Lower Extremities DVT Study Measurements Right 2D Measurements +------------------------+----------+---------------+----------+ ! Location                ! Visualized! Compressibility! Thrombosis! +------------------------+----------+---------------+----------+ ! Sapheno Femoral Junction! Yes       ! Yes            ! None      ! +------------------------+----------+---------------+----------+ ! GSV Thigh               ! Yes       ! Yes            ! None      ! +------------------------+----------+---------------+----------+ ! Common Femoral          !Yes       ! Yes            ! None      ! +------------------------+----------+---------------+----------+ ! Prox Femoral            !Yes       ! Yes            ! None      ! +------------------------+----------+---------------+----------+ ! Mid Femoral             !Yes       ! Yes            ! None      ! +------------------------+----------+---------------+----------+ ! Dist Femoral            !Yes       ! Yes            ! None      ! +------------------------+----------+---------------+----------+ ! Deep Femoral            !Yes       ! Yes            ! None      ! +------------------------+----------+---------------+----------+ ! Popliteal               !Yes       ! Yes            ! None      ! +------------------------+----------+---------------+----------+ ! GSV Below Knee          ! Yes       ! Yes            ! None      ! +------------------------+----------+---------------+----------+ ! Gastroc                 ! Yes       ! Yes            ! None      ! +------------------------+----------+---------------+----------+ ! Soleal                  !Yes       ! Yes            ! None      ! +------------------------+----------+---------------+----------+ ! PTV                     ! Yes       ! Yes            ! None      ! +------------------------+----------+---------------+----------+ ! Peroneal                !Yes       ! Yes            ! None      ! +------------------------+----------+---------------+----------+ ! GSV Calf                ! Yes       ! Yes            ! None      ! +------------------------+----------+---------------+----------+ ! SSV                     ! Yes       ! Yes            ! None      ! +------------------------+----------+---------------+----------+ Right Doppler Measurements +------------------------+------+------+------------+ ! Location                ! Signal!Reflux! Reflux (sec)!  +------------------------+------+------+------------+ !Sapheno Femoral Junction! Phasic!      !            ! +------------------------+------+------+------------+ ! Common Femoral          !Phasic!      !            ! +------------------------+------+------+------------+ ! Prox Femoral            !Phasic!      !            ! +------------------------+------+------+------------+ ! Deep Femoral            !Phasic!      !            ! +------------------------+------+------+------------+ ! Popliteal               !Phasic!      !            ! +------------------------+------+------+------------+ Left Lower Extremities DVT Study Measurements Left 2D Measurements +------------------------+----------+---------------+----------+ ! Location                ! Visualized! Compressibility! Thrombosis! +------------------------+----------+---------------+----------+ ! Sapheno Femoral Junction! Yes       ! Yes            ! None      ! +------------------------+----------+---------------+----------+ ! GSV Thigh               ! Yes       ! Yes            ! None      ! +------------------------+----------+---------------+----------+ ! Common Femoral          !Yes       ! Yes            ! None      ! +------------------------+----------+---------------+----------+ ! Prox Femoral            !Yes       ! Yes            ! None      ! +------------------------+----------+---------------+----------+ ! Mid Femoral             !Yes       ! Yes            ! None      ! +------------------------+----------+---------------+----------+ ! Dist Femoral            !Yes       ! Yes            ! None      ! +------------------------+----------+---------------+----------+ ! Deep Femoral            !Yes       ! Yes            ! None      ! +------------------------+----------+---------------+----------+ ! Popliteal               !Yes       ! Yes            ! None      ! +------------------------+----------+---------------+----------+ ! GSV Below Knee          ! Yes       ! Yes            ! None      ! +------------------------+----------+---------------+----------+ ! Gastroc                 ! Yes       ! Yes            ! None      ! +------------------------+----------+---------------+----------+ ! Soleal                  !Yes       ! Yes            ! None      ! +------------------------+----------+---------------+----------+ ! PTV                     ! Yes       ! Yes            ! None      ! +------------------------+----------+---------------+----------+ ! Peroneal                !Yes       ! Yes            ! None      ! +------------------------+----------+---------------+----------+ ! GSV Calf                ! Yes       ! Yes            ! None      ! +------------------------+----------+---------------+----------+ ! SSV                     ! Yes       ! Yes            ! None      ! +------------------------+----------+---------------+----------+ Left Doppler Measurements +------------------------+------+------+------------+ ! Location                ! Signal!Reflux! Reflux (sec)! +------------------------+------+------+------------+ ! Sapheno Femoral Junction! Phasic!      !            ! +------------------------+------+------+------------+ ! Common Femoral          !Phasic!      !            ! +------------------------+------+------+------------+ ! Prox Femoral            !Phasic!      !            ! +------------------------+------+------+------------+ ! Deep Femoral            !Phasic!      !            ! +------------------------+------+------+------------+ ! Popliteal               !Phasic!      !            ! +------------------------+------+------+------------+        Discharge Exam:  See exam from progress note earlier today    Disposition: home    Condition: stable    Discharge Medications:   Jose Ji   Home Medication Instructions PI    Printed on:21 2132   Medication Information                      atorvastatin (LIPITOR) 80 MG tablet  Take 1 tablet by mouth nightly             baclofen (LIORESAL) 10 MG tablet  Take 1 tablet by mouth 3 times daily             balsalazide (COLAZAL) 750 MG capsule  Take 3,000 mg by mouth daily Take 4 capsules (total 3000 mg) daily each morning. busPIRone (BUSPAR) 10 MG tablet  Take 10 mg by mouth 3 times daily as needed             ciprofloxacin (CIPRO) 500 MG tablet  Take 1 tablet by mouth 2 times daily for 7 days             ferrous gluconate (FERGON) 324 (38 Fe) MG tablet  Take 324 mg by mouth daily (with breakfast)             fexofenadine (ALLEGRA) 180 MG tablet  Take 180 mg by mouth daily             fluticasone (FLONASE) 50 MCG/ACT nasal spray  1 spray by Each Nostril route daily as needed for Rhinitis             furosemide (LASIX) 20 MG tablet  Take 1 tablet by mouth 2 times daily             hydrOXYzine (ATARAX) 10 MG tablet  Take 10 mg by mouth 3 times daily as needed for Itching             metoprolol succinate (TOPROL XL) 50 MG extended release tablet  Take 1 tablet by mouth daily             nystatin (MYCOSTATIN) 395996 UNIT/GM cream  Apply topically 2 times daily as needed for Dry Skin Apply topically 2 times daily. omeprazole (PRILOSEC) 20 MG delayed release capsule  Take 20 mg by mouth Daily              ondansetron (ZOFRAN ODT) 4 MG disintegrating tablet  Take 1 tablet by mouth every 8 hours as needed for Nausea             oxyCODONE (OXY-IR) 15 MG immediate release tablet  Take 15 mg by mouth every 6 hours as needed for Pain.             potassium chloride (KLOR-CON M) 20 MEQ extended release tablet  Take 1 tablet by mouth 2 times daily             rivaroxaban (XARELTO) 20 MG TABS tablet  Take 1 tablet by mouth daily (with breakfast)             sodium chloride 1 g tablet  Take 2 tablets by mouth 3 times daily (with meals)                 Allergies: Allergies   Allergen Reactions    Ace Inhibitors Swelling    Skelaxin [Metaxalone] Swelling       Follow up Instructions:   Follow-up with PCP: Jie Méndez MD in 2 wk .      Total time spent on day of discharge including face-to-face visit, examination, documentation, counseling, preparation of discharge plans and followup, and discharge medicine reconciliation and presciptions is 25 minutes    Signed:  Judith Gibson MD  4/30/2021

## 2021-04-30 NOTE — PROGRESS NOTES
CLINICAL PHARMACY NOTE: MEDS TO [address] Select Patient?: No  Total # of Prescriptions Filled: 3   The following medications were delivered to the patient:  · Cipro 500 mg  · Sodium chloride 1 [gm  · Lasix 20 mg  Total # of Interventions Completed: 0  Time Spent (min): 30    Additional Documentation:    Delivered to Patient  Miranda Boudreaux CPhT

## 2021-04-30 NOTE — PROGRESS NOTES
Data- discharge order received, pt verbalized agreement to discharge, needs for [facility] with [facility] health care for further treatment and/or new Durable Medical E f MARIBEL reviewed and signed by MD, to be completed by RN. Action- AVS prepared, discharge instructions prepared and given to pt's , medication information packet given r/t NEW or CHANGED prescriptions, pt verbalized understanding further self-review. D/C instruction summary: Diet- general, Activity- stand by, follow up with Primary Care Physician Perfecto Rooney -[phone] appointment reviewed and complete, immunizations reviewed and complete, medications prescriptions to be filled and complete. Inpatient surgical procedural reviewed: [facility] Records Contact information provided to above agencies used. Response- Case Management/ reported faxing completed MARIBEL and AVS to needed HHC/DME services stated above. Pt belongings gathered, IV removed, pt dressed with Nurse. Disposition is home with HHC/DME as stated above, transported with RN, taken to lobby via w/c with WHEELCHAIR, no complications.

## 2021-04-30 NOTE — DISCHARGE INSTR - COC
Continuity of Care Form    Patient Name: Edwin Orozco   :  1955  MRN:  [id number]    Admit date:  2021  Discharge date:  21    Code Status Order: Full Code   Advance Directives:   Advance Care Flowsheet Documentation       Date/Time Healthcare Directive Type of Healthcare Directive Copy in [address] Agent's Name Healthcare Agent's Phone Number    21 0148  No, patient does not have an advance directive for healthcare treatment -- -- -- -- --            Admitting Physician:  Raffi Mann MD  PCP: Angelique Freeman MD    Discharging Nurse: [facility] Unit/Room#: [id number]-[id number]/8839-39  Discharging Unit Phone Number: [phone]    Emergency Contact:   Extended Emergency Contact Information  Primary Emergency Contact: Gretta Keller  Address:                     Home Phone: [phone]  Mobile Phone: [phone]  Relation: Child  Secondary Emergency Contact: Omari Keller  Address: [address] [place] [address] Bernadette Brambila [address] Phone: [phone]  Relation: Spouse    Past Surgical History:  Past Surgical History:   Procedure Laterality Date    ABDOMEN SURGERY      ABDOMINAL ADHESION SURGERY  2018    BLADDER SUSPENSION      COLONOSCOPY      COLONOSCOPY  9/14/15    sigmoid colon biopsy     COLONOSCOPY  2018    COLONOSCOPY  2019    COLONOSCOPY, POSSIBLE BIOPSY, POSSIBLE POLYPECTOMY    COLONOSCOPY N/A 2019    COLONOSCOPY WITH BIOPSY performed by Rand Quiles MD at [facility] 2019    COLONOSCOPY DIAGNOSTIC/STOMA performed by Rand Quiles MD at [address] [place] [address] 10/8/2018    EXPLORATORY LAPAROTOMY WITH COLOSTOMY performed by Natasha Rg MD at [facility] Left 14    excision plantar left hallux    FOOT SURGERY  6/3/15    PLANTAR PLATE REPAIR BILATERAL, ARTHROTOMY MPJ 2ND BILATERAL stroke I69.30    Cognitive deficit as late effect of cerebrovascular accident (CVA) I69.319    Sepsis secondary to UTI ([facility] [address] A41.9, N39.0    Hyponatremia E87.1    Encephalopathy, metabolic R84.13    Suspected COVID-19 virus infection Z20.822    Alcohol abuse F10.10       Isolation/Infection:   Isolation            No Isolation          Patient Infection Status       Infection Onset Added Last Indicated Last Indicated By Review Planned Expiration Resolved Resolved By    None active    Resolved    COVID-19 Rule Out 21 COVID-19 (Ordered)   21 Rule-Out Test Resulted    COVID-19 Rule Out 21 COVID-19 (Ordered)   21 Rule-Out Test Resulted    C-diff Rule Out 21 Clostridium difficile toxin/antigen (Ordered)   21 Damien Brown RN    COVID-19 Rule Out 20 COVID-19 (Ordered)   20     COVID-19 Rule Out 20 COVID-19 (Ordered)   20 Rule-Out Test Resulted    VRE  10/12/18 10/12/18 Vinny Callahan RN   10/25/19 Vinny Callahan RN    10/8/18; abd culture    MRSA  18 Vinny Callahan RN   10/25/19 Vinny Callahan RN    18; urine            Nurse Assessment:  Last Vital Signs: /86   Pulse 79   Temp 98.3 °F (36.8 °C) (Oral)   Resp 18   Ht 5' 7\" (1.702 m)   Wt 194 lb 14.2 oz (88.4 kg)   SpO2 94%   BMI 30.52 kg/m²     Last documented pain score (0-10 scale): Pain Level: 7  Last Weight:   Wt Readings from Last 1 Encounters:   21 194 lb 14.2 oz (88.4 kg)     Mental Status:  alert    IV Access:  - None    Nursing Mobility/ADLs:  Walking   Assisted  Transfer  Assisted  Bathing  Assisted  Dressing  Assisted  Toileting  Assisted  Feeding  Assisted  Med Admin  Assisted  Med Delivery   whole    Wound Care Documentation and Therapy:        Elimination:  Continence:   · Bowel: No  · Bladder: No  Urinary Catheter: None   Colostomy/Ileostomy/Ileal Conduit: No       Date of Last BM: n/a    Intake/Output Summary (Last 24 hours) at 4/30/2021 1223  Last data filed at 4/30/2021 1100  Gross per 24 hour   Intake 2700.83 ml   Output 150 ml   Net 2550.83 ml     I/O last 3 completed shifts: In: 1351.3 [P.O.:480; I.V.:821.3; IV Piggyback:50]  Out: 150 [Urine:150]    Safety Concerns:     None    Impairments/Disabilities:      None    Nutrition Therapy:  Current Nutrition Therapy:   - Oral Diet:  General    Routes of Feeding: Oral  Liquids: No Restrictions  Daily Fluid Restriction: yes - amount 1500  Last Modified Barium Swallow with Video (Video Swallowing Test): not done    Treatments at the Time of Hospital Discharge:   Respiratory Treatments: n/a  Oxygen Therapy:  is not on home oxygen therapy. Ventilator:    - No ventilator support    Rehab Therapies: Physical Therapy and Occupational Therapy  Weight Bearing Status/Restrictions: left leg, right leg partial weight bearing status.   Other Medical Equipment (for information only, NOT a DME order):  n/a  Other Treatments: n/a    Patient's personal belongings (please select all that are sent with patient):  None    RN SIGNATURE:  Electronically signed by Venkata Da Silva RN on 4/30/21 at 6:30 PM EDT    CASE MANAGEMENT/SOCIAL WORK SECTION    Inpatient Status Date: 32 APR 2020    Readmission Risk Assessment Score:  Readmission Risk              Risk of Unplanned Readmission:        25           Discharging to Facility/ Agency   Name: [address] [place] [address]  Phone [phone]  · Fax [phone]        / signature: Neta Galeazzi, BSN, RN        PHYSICIAN SECTION    Prognosis: Poor    Condition at Discharge: Stable    Rehab Potential (if transferring to Rehab): Good    Recommended Labs or Other Treatments After Discharge: ***    Physician Certification: I certify the above information and transfer of Nish Soto  is necessary for the continuing treatment of the diagnosis listed and that she requires Home Care for greater 30 days.      Update Admission H&P: No change in H&P    PHYSICIAN SIGNATURE:  Electronically signed by Annette Valero MD on 4/30/21 at 12:23 PM ED

## 2021-05-05 ENCOUNTER — NURSE ONLY (OUTPATIENT)
Dept: CARDIOLOGY CLINIC | Age: 66
End: 2021-05-05
Payer: MEDICAID

## 2021-05-05 DIAGNOSIS — Z45.09 ENCOUNTER FOR LOOP RECORDER CHECK: ICD-10-CM

## 2021-05-05 DIAGNOSIS — I48.0 PAF (PAROXYSMAL ATRIAL FIBRILLATION) (HCC): ICD-10-CM

## 2021-05-05 PROCEDURE — 93298 REM INTERROG DEV EVAL SCRMS: CPT | Performed by: INTERNAL MEDICINE

## 2021-05-05 PROCEDURE — G2066 INTER DEVC REMOTE 30D: HCPCS | Performed by: INTERNAL MEDICINE

## 2021-05-08 PROBLEM — N39.0 UTI (URINARY TRACT INFECTION): Status: RESOLVED | Noted: 2021-04-08 | Resolved: 2021-05-08

## 2021-05-18 NOTE — DISCHARGE SUMMARY
[facility]                     [facility], [address]                               DISCHARGE SUMMARY    PATIENT NAME: Alem Manriquez                :        1955  MED REC NO:   [id number]                          ROOM:       5909  ACCOUNT NO:   [de-identified]                           ADMIT DATE: 2021  PROVIDER:     Orlando Garsia MD                  DISCHARGE DATE:  04/10/2021    FINAL DIAGNOSES:  1. Abdominal pain. 2.  Urinary tract infection. 3.  Leg ulcers. 4.  Altered mental status. 5.  Tobacco abuse. 6.  COPD.  7.  Osteoarthritis. 8.  Hypertension. 9.  Crohn's disease. 10.  Peripheral neuropathy. 11.  Long-term oral anticoagulation. DISCHARGE MEDICATIONS:  1. Keflex 250 mg four times a day for 10 days. 2.  Dilaudid 2 mg every 6 hours p.r.n. for pain, was discontinued. 3.  Fioricet was discontinued. 4.  Metoprolol succinate 50 mg once a day. 5.  Rivaroxaban 20 mg once a day. 6.  Lipitor 80 mg once a day. 7.  Fluticasone nasal spray each nostril daily. 8.  Potassium chloride 20 mEq two times a day. 9.  Lasix 40 mg in the morning. 10.  Ferrous sulfate 324 once a day. 11.  Hydroxyzine 10 mg three times a day. 12.  Allegra 180 mg once a day. 13.  Sucralfate 1 gm q.i.d. 14.  Zofran orally disintegrating tablet every 8 hours as needed. 15.  Mycostatin powder b.i.d.  16.  Bactroban ointment as directed. 17.  Prilosec 20 mg once a day. 18.  Baclofen 10 mg p.o. t.i.d.    HOSPITAL COURSE:  This elderly woman with history of neuropathy, came to  [facility] with abdominal pain, was found to have urinary  tract infection. The patient also has a leg ulcer not resulting from  diabetes, but more like a venous stasis ulcers. Her vital signs were  stable. Blood pressure was 155/109, temperature 99.1. Lungs were  clear. Abdomen was benign.   Sodium was 133, potassium 4.2, chloride  100, CO2 of 21, BUN 8, creatinine 0.5. Lactic acid level 1.7. ProBNP  level 685. Troponin less than 0.01. White blood cell count was 5.5,  hemoglobin/hematocrit was 10.3 and 31.5, platelet count 356. Urinalysis  shows 21-50 wbc's, 4+ bacteria, moderate amount of leukocyte esterase. Blood cultures were drawn, gram-positive cocci in cluster resembling  Staph, although it was Staph epidermidis. CT scan of the head shows  multiple old left middle cerebral artery infarction, mild atrophy, mild  chronic microscopic changes throughout the deep white matter with no  acute abnormality, basal ganglia calcification present bilaterally. Chest x-ray shows large retrocardiac hiatal hernia. The patient was  treated with IV antibiotics for abdominal pain, was given IV cefepime  and subsequently switched on oral cephalexin. Cultures were obtained,  they were negative. COVID-19 was not detected. The patient was  discharged in stable condition. Ongoing care provider will be Dr. Saritha Hernandez.         Regina Stout MD    D: 05/17/2021 22:08:43       T: 05/17/2021 22:12:06     [email or web address]  Job#: [id number]     Doc#: [id number]

## 2021-05-19 ENCOUNTER — TELEPHONE (OUTPATIENT)
Dept: CARDIOLOGY CLINIC | Age: 66
End: 2021-05-19

## 2021-05-25 NOTE — PROGRESS NOTES
aureus) culture positive 06/05/2018    urine    Neuropathy     Pneumonia 1/8/2014    Sinus headache     SOB (shortness of breath)     VRE (vancomycin resistant enterococcus) culture positive 10/08/2018    abd culture        Past Surgical History:   Procedure Laterality Date    ABDOMEN SURGERY      ABDOMINAL ADHESION SURGERY  06/08/2018    BLADDER SUSPENSION      COLONOSCOPY      COLONOSCOPY  9/14/15    sigmoid colon biopsy     COLONOSCOPY  08/07/2018    COLONOSCOPY  06/07/2019    COLONOSCOPY, POSSIBLE BIOPSY, POSSIBLE POLYPECTOMY    COLONOSCOPY N/A 6/7/2019    COLONOSCOPY WITH BIOPSY performed by Manny Pollock MD at [address] COLONOSCOPY N/A 6/7/2019    COLONOSCOPY DIAGNOSTIC/STOMA performed by Manny Pollock MD at [address] 10/8/2018    EXPLORATORY LAPAROTOMY WITH COLOSTOMY performed by Charmayne Nares, MD at [address] [facility] Left 6/25/14    excision plantar left hallux    FOOT SURGERY  6/3/15    PLANTAR PLATE REPAIR BILATERAL, ARTHROTOMY MPJ 2ND BILATERAL    FOOT SURGERY Left 08/05/2002    Excision second metatarsal head left    FRACTURE SURGERY      rt hip    HAND CARPECTOMY Bilateral     HEEL SPUR SURGERY      HERNIA REPAIR      HYSTERECTOMY      bladder surgery    JOINT REPLACEMENT      rt hip, L shoulder replacement 11/2014    KNEE SURGERY Bilateral     arthrosvopic    LEG SURGERY Right     OH SECD CLOS SURG WND EXTEN/COMPLIC N/A 80/69/2058    SECONDARY WOUND CLOSURE OF ABDOMINAL WOUND performed by Charmayne Nares, MD at [facility] 7/17/2019    FLEXIBLE SIGMOIDOSCOPY performed by Charmayne Nares, MD at [address] [facility]  01/15/2018    with biopsies    SMALL INTESTINE SURGERY N/A 7/17/2019    COLOSTOMY CLOSURE WITH COLORECTAL ANASTOMOSIS performed by Charmayne Nares, MD at [place] [address]  6/14/13    and colonsocopy with antral erosion biopsies       Allergies: Allergies   Allergen Reactions    Ace Inhibitors Swelling    Skelaxin [Metaxalone] Swelling       Medication:   Prior to Admission medications    Medication Sig Start Date End Date Taking? Authorizing Provider   furosemide (LASIX) 20 MG tablet Take 1 tablet by mouth 2 times daily 5/27/21  Yes Shagufta Morales MD   sodium chloride 1 g tablet Take 2 tablets by mouth 3 times daily (with meals) 4/30/21  Yes Sonia Colin MD   metoprolol succinate (TOPROL XL) 50 MG extended release tablet Take 1 tablet by mouth daily 1/21/21  Yes GAYLA Dimas CNP   rivaroxaban (XARELTO) 20 MG TABS tablet Take 1 tablet by mouth daily (with breakfast) 12/7/20  Yes GAYLA Dimas CNP   atorvastatin (LIPITOR) 80 MG tablet Take 1 tablet by mouth nightly 7/30/20  Yes Ab Fernandez MD   fluticasone (FLONASE) 50 MCG/ACT nasal spray 1 spray by Each Nostril route daily as needed for Rhinitis 7/30/20  Yes Ab Fernandez MD   potassium chloride (KLOR-CON M) 20 MEQ extended release tablet Take 1 tablet by mouth 2 times daily 7/30/20  Yes Kvng Patel MD   ferrous gluconate (FERGON) 324 (38 Fe) MG tablet Take 324 mg by mouth daily (with breakfast)   Yes Historical Provider, MD   hydrOXYzine (ATARAX) 10 MG tablet Take 10 mg by mouth 3 times daily as needed for Itching   Yes Historical Provider, MD   fexofenadine (ALLEGRA) 180 MG tablet Take 180 mg by mouth daily   Yes Historical Provider, MD   ondansetron (ZOFRAN ODT) 4 MG disintegrating tablet Take 1 tablet by mouth every 8 hours as needed for Nausea 7/15/20  Yes LYNN Diehl   busPIRone (BUSPAR) 10 MG tablet Take 10 mg by mouth 3 times daily as needed   Yes Historical Provider, MD   nystatin (MYCOSTATIN) 043872 UNIT/GM cream Apply topically 2 times daily as needed for Dry Skin Apply topically 2 times daily.    Yes Historical Provider, MD   balsalazide (COLAZAL) 750 MG capsule Take 3,000 mg by mouth daily Take 4 capsules (total 3000 mg) daily each morning. Yes Historical Provider, MD   omeprazole (PRILOSEC) 20 MG delayed release capsule Take 20 mg by mouth Daily    Yes Historical Provider, MD   baclofen (LIORESAL) 10 MG tablet Take 1 tablet by mouth 3 times daily 6/30/18  Yes Faraz Henriquez MD       Social History:   reports that she has been smoking cigarettes and e-cigarettes. She has a 10.00 pack-year smoking history. She has never used smokeless tobacco. She reports current alcohol use of about 2.0 standard drinks of alcohol per week. She reports that she does not use drugs. Family History:  family history includes High Blood Pressure in her father and mother; Kidney Disease in her sister. Reviewed. Denies family history of sudden cardiac death, arrhythmia, premature CAD    Review of System:    · General ROS: negative for - chills, fever   · Psychological ROS: negative for - anxiety or depression  · Ophthalmic ROS: negative for - eye pain or loss of vision  · ENT ROS: negative for - epistaxis, headaches, nasal discharge, sore throat   · Allergy and Immunology ROS: negative for - hives, nasal congestion   · Hematological and Lymphatic ROS: negative for - bleeding problems, blood clots, bruising or jaundice  · Endocrine ROS: negative for - skin changes, temperature intolerance or unexpected weight changes  · Respiratory ROS: negative for - cough, hemoptysis, pleuritic pain, SOB, sputum changes or wheezing  · Cardiovascular ROS: Per HPI.    · Gastrointestinal ROS: negative for - abdominal pain, blood in stools, diarrhea, hematemesis, melena, nausea/vomiting or swallowing difficulty/pain  · Genito-Urinary ROS: negative for - dysuria or incontinence  · Musculoskeletal ROS: negative for - joint swelling or muscle pain  · Neurological ROS: negative for - confusion, dizziness, gait disturbance, headaches, numbness/tingling, seizures, speech problems, tremors, visual changes or weakness  · Dermatological ROS: negative for - rash PHYSICAL EXAMINATION:  Vital Signs: (As obtained by patient/caregiver or practitioner observation)    There were no vitals filed for this visit. Constitutional: [] Appears well-developed and well-nourished [] No apparent distress      [x] Abnormal-in mild distress secondary to extensive pedal edema and multiple wounds all are on the body  Mental status  [x] Alert and awake  [] Oriented to person/place/time [x]Able to follow commands      Eyes:  EOM    [x]  Normal  [] Abnormal-  Sclera  [x]  Normal  [] Abnormal -         Discharge [x]  None visible  [] Abnormal -    HENT:   [x] Normocephalic, atraumatic. [] Abnormal   [x] Mouth/Throat: Mucous membranes are moist.     External Ears [x] Normal  [] Abnormal-     Neck: [] No visualized mass     Pulmonary/Chest: [x] Respiratory effort normal.  [x] No visualized signs of difficulty breathing or respiratory distress        [] Abnormal-      Musculoskeletal:   [x] Normal gait with no signs of ataxia         [x] Normal range of motion of neck        [] Abnormal-       Neurological:        [x] No Facial Asymmetry (Cranial nerve 7 motor function) (limited exam to video visit)          [] No gaze palsy        [] Abnormal-         Skin:        [] No significant exanthematous lesions or discoloration noted on facial skin         [x] Abnormal-multiple cutaneous lesions noted in the anterior wall of the chest and back of the chest and also on the lower extremities           Psychiatric:       [x] Normal Affect [] No Hallucinations        [] Abnormal-     Other pertinent observable physical exam findings-     Labs:  Reviewed. Studies:   1. Event monitor:  na    2. Echo 7/27/20:   Summary   Left ventricular cavity size is normal. There is mild left ventricular   hypertrophy. Overall left ventricular systolic function appears mildly   reduced with LVEF estimated at 40% . There is mild global hypokinesis. Normal diastolic function. Mild tricuspid regurgitation.  Estimated pulmonary   artery systolic pressure is at 38 mmHg assuming a right atrial pressure of 8   mmHg. IVC size is normal (<2.1 cm) but collapses < 50% with respiration   consistent with elevated RA pressure (8 mmHg). A bubble study was performed   and fails to show evidence of right to left shunting. 3. Stress Test 10/13/20:    Summary    -There is patchy myocardial uptake of tracer making interpretation    difficult.    -There does appear to be reversible apical defect that is suggestive of    ischemia.    -There is mild LV dysfunction with EF=45%.  -Intermediate risk study. 4. Cath 10/27/20:   Impression:  Trivial coronary artery plaquing calcific. No stenoses identified. Mild dilatation of the left ventricle mild cardiomyopathy. No opportunities or need for intervention. The MCOT, echocardiogram, stress test, and coronary angiography/PCI were reviewed by myself and used for my plan of care. Procedures:  1.  none    ASSESSMENT/PLAN:  1.  PAF, on Xarelto  2. CVA  3. S/p ILR implant 7/2020  4. HTN, stable on Toprol      From a heart rhythm standpoint, she is reasonably stable  Extensive skin lesions  Bipedal edema and secondary to worsening skin tightening, probably she have more blisters also  Resume Lasix 20 mg BID    I can clearly see the Tachycardia daily on her daughter. I encouraged her to touch base with the primary care provider and seek for more help through the . Probably, patient will be benefited with higher level of care, at least with SNF. Follow up in 3 months with EP NP    Time spent 25 min    Thank you for allowing me to participate in the care of Evon Flores. All questions and concerns were addressed to the patient/family. Alternatives to my treatment were discussed. Cece Gusman RN, am scribing for and in the presence of Dr. Silvano Cowden.  05/27/21 4:45 PM  Fish Jacobson RN    I, Lilo Johnston MD, personally performed the services prescribed in this documentation as scribed by Ms. Wai King RN in my presence and it is both accurate and complete. Dennys Dinh is a 60 y.o. female being evaluated by a Virtual Visit (video visit) encounter to address concerns as mentioned above. A caregiver was present when appropriate. Due to this being a TeleHealth encounter (During FFCRD-16 public health emergency), evaluation of the following organ systems was limited: Vitals/Constitutional/EENT/Resp/CV/GI//MS/Neuro/Skin/Heme-Lymph-Imm. Pursuant to the emergency declaration under the [address] [place] [address], [address] [place] [address] authority and the [employer] and [employer] Act, this Virtual Visit was conducted with patient's (and/or legal guardian's) consent, to reduce the patient's risk of exposure to COVID-19 and provide necessary medical care. The patient (and/or legal guardian) has also been advised to contact this office for worsening conditions or problems, and seek emergency medical treatment and/or call 911 if deemed necessary. Services were provided through a video synchronous discussion virtually to substitute for in-person clinic visit. Patient and provider were located at their individual distinct locations. [facility]  Cardiac Electrophysiology  [address] [facility] [phone]  [facility]        An electronic signature was used to authenticate this note.

## 2021-05-27 ENCOUNTER — VIRTUAL VISIT (OUTPATIENT)
Dept: CARDIOLOGY CLINIC | Age: 66
End: 2021-05-27
Payer: MEDICAID

## 2021-05-27 ENCOUNTER — NURSE ONLY (OUTPATIENT)
Dept: CARDIOLOGY CLINIC | Age: 66
End: 2021-05-27

## 2021-05-27 DIAGNOSIS — I63.9 CEREBROVASCULAR ACCIDENT (CVA), UNSPECIFIED MECHANISM (HCC): ICD-10-CM

## 2021-05-27 DIAGNOSIS — Z45.09 ENCOUNTER FOR LOOP RECORDER CHECK: ICD-10-CM

## 2021-05-27 DIAGNOSIS — I48.0 PAF (PAROXYSMAL ATRIAL FIBRILLATION) (HCC): Primary | ICD-10-CM

## 2021-05-27 DIAGNOSIS — Z95.818 STATUS POST PLACEMENT OF IMPLANTABLE LOOP RECORDER: ICD-10-CM

## 2021-05-27 DIAGNOSIS — I10 ESSENTIAL HYPERTENSION: ICD-10-CM

## 2021-05-27 PROCEDURE — 99213 OFFICE O/P EST LOW 20 MIN: CPT | Performed by: INTERNAL MEDICINE

## 2021-05-27 RX ORDER — FUROSEMIDE 20 MG/1
20 TABLET ORAL 2 TIMES DAILY
Qty: 60 TABLET | Refills: 5 | Status: SHIPPED | OUTPATIENT
Start: 2021-05-27 | End: 2021-08-03 | Stop reason: DRUGHIGH

## 2021-05-27 NOTE — PROGRESS NOTES
Manual transmission received for VV today w/UL. Recent F hospitalization on 4/27/21-Sepsis secondary to UTI. Remote transmission of implanted heart monitor shows normal cardiac device function. ILR for CVA. New AF recorded 9/8/2020. Started on Xarelto 10/1/2020. Toprol XL 50 mg. Hx: pAT/AF w/rvr.  0 symptom recordings activated. Current ECG @ 0451 today appears pAT but hard to discern d/t noise. Since 5/25/21:  -1 AF episode on 5/26/21 x 2 mins that appears pAT. -130 bpm.  -1 recent tachy episode on 5/24/21 x 25 secs. Appears pAT/SVT. Max v rate 158 bpm.    AF burden 0.1%  Longest AF episode recently detected occurred on 4/11/21 x 4 mins. ECG detail appears bigeminal appearance  into pAT/NSVT w/PVCs. -580 ms. Follow up in 1 month via [facility].

## 2021-06-02 ENCOUNTER — HOSPITAL ENCOUNTER (INPATIENT)
Age: 66
LOS: 2 days | Discharge: HOME HEALTH | DRG: 383 | End: 2021-06-04
Attending: EMERGENCY MEDICINE | Admitting: INTERNAL MEDICINE
Payer: MEDICAID

## 2021-06-02 DIAGNOSIS — L08.9 LOCAL INFECTION OF SKIN AND SUBCUTANEOUS TISSUE: Primary | ICD-10-CM

## 2021-06-02 PROBLEM — L03.115 BILATERAL CELLULITIS OF LOWER LEG: Status: ACTIVE | Noted: 2021-06-02

## 2021-06-02 PROBLEM — L03.116 CELLULITIS OF BOTH FEET: Status: ACTIVE | Noted: 2021-06-02

## 2021-06-02 PROBLEM — L03.115 CELLULITIS OF BOTH FEET: Status: ACTIVE | Noted: 2021-06-02

## 2021-06-02 PROBLEM — L03.116 BILATERAL CELLULITIS OF LOWER LEG: Status: ACTIVE | Noted: 2021-06-02

## 2021-06-02 LAB
A/G RATIO: 0.8 (ref 1.1–2.2)
ALBUMIN SERPL-MCNC: 3.2 G/DL (ref 3.4–5)
ALP BLD-CCNC: 56 U/L (ref 40–129)
ALT SERPL-CCNC: <5 U/L (ref 10–40)
ANION GAP SERPL CALCULATED.3IONS-SCNC: 14 MMOL/L (ref 3–16)
AST SERPL-CCNC: 17 U/L (ref 15–37)
BASOPHILS ABSOLUTE: 0.1 K/UL (ref 0–0.2)
BASOPHILS RELATIVE PERCENT: 0.8 %
BILIRUB SERPL-MCNC: 0.4 MG/DL (ref 0–1)
BUN BLDV-MCNC: 12 MG/DL (ref 7–20)
C-REACTIVE PROTEIN: 90.9 MG/L (ref 0–5.1)
CALCIUM SERPL-MCNC: 7.7 MG/DL (ref 8.3–10.6)
CHLORIDE BLD-SCNC: 89 MMOL/L (ref 99–110)
CO2: 25 MMOL/L (ref 21–32)
CREAT SERPL-MCNC: 0.7 MG/DL (ref 0.6–1.2)
EOSINOPHILS ABSOLUTE: 1.4 K/UL (ref 0–0.6)
EOSINOPHILS RELATIVE PERCENT: 20.1 %
GFR AFRICAN AMERICAN: >60
GFR NON-AFRICAN AMERICAN: >60
GLOBULIN: 4 G/DL
GLUCOSE BLD-MCNC: 90 MG/DL (ref 70–99)
HCT VFR BLD CALC: 30 % (ref 36–48)
HEMOGLOBIN: 10 G/DL (ref 12–16)
LACTIC ACID, SEPSIS: 1.7 MMOL/L (ref 0.4–1.9)
LIPASE: 12 U/L (ref 13–60)
LYMPHOCYTES ABSOLUTE: 1.6 K/UL (ref 1–5.1)
LYMPHOCYTES RELATIVE PERCENT: 23.3 %
MCH RBC QN AUTO: 30.1 PG (ref 26–34)
MCHC RBC AUTO-ENTMCNC: 33.5 G/DL (ref 31–36)
MCV RBC AUTO: 89.9 FL (ref 80–100)
MONOCYTES ABSOLUTE: 0.6 K/UL (ref 0–1.3)
MONOCYTES RELATIVE PERCENT: 8.4 %
NEUTROPHILS ABSOLUTE: 3.2 K/UL (ref 1.7–7.7)
NEUTROPHILS RELATIVE PERCENT: 47.4 %
PDW BLD-RTO: 15.9 % (ref 12.4–15.4)
PLATELET # BLD: 432 K/UL (ref 135–450)
PMV BLD AUTO: 7.1 FL (ref 5–10.5)
POTASSIUM SERPL-SCNC: 3.9 MMOL/L (ref 3.5–5.1)
RBC # BLD: 3.34 M/UL (ref 4–5.2)
SODIUM BLD-SCNC: 128 MMOL/L (ref 136–145)
TOTAL CK: 206 U/L (ref 26–192)
TOTAL PROTEIN: 7.2 G/DL (ref 6.4–8.2)
WBC # BLD: 6.8 K/UL (ref 4–11)

## 2021-06-02 PROCEDURE — G0378 HOSPITAL OBSERVATION PER HR: HCPCS

## 2021-06-02 PROCEDURE — 99285 EMERGENCY DEPT VISIT HI MDM: CPT

## 2021-06-02 PROCEDURE — 80053 COMPREHEN METABOLIC PANEL: CPT

## 2021-06-02 PROCEDURE — 96365 THER/PROPH/DIAG IV INF INIT: CPT

## 2021-06-02 PROCEDURE — 85652 RBC SED RATE AUTOMATED: CPT

## 2021-06-02 PROCEDURE — 1200000000 HC SEMI PRIVATE

## 2021-06-02 PROCEDURE — 87040 BLOOD CULTURE FOR BACTERIA: CPT

## 2021-06-02 PROCEDURE — 83605 ASSAY OF LACTIC ACID: CPT

## 2021-06-02 PROCEDURE — 2580000003 HC RX 258: Performed by: PHYSICIAN ASSISTANT

## 2021-06-02 PROCEDURE — 96366 THER/PROPH/DIAG IV INF ADDON: CPT

## 2021-06-02 PROCEDURE — 36415 COLL VENOUS BLD VENIPUNCTURE: CPT

## 2021-06-02 PROCEDURE — 6360000002 HC RX W HCPCS: Performed by: PHYSICIAN ASSISTANT

## 2021-06-02 PROCEDURE — 82550 ASSAY OF CK (CPK): CPT

## 2021-06-02 PROCEDURE — 86140 C-REACTIVE PROTEIN: CPT

## 2021-06-02 PROCEDURE — 96375 TX/PRO/DX INJ NEW DRUG ADDON: CPT

## 2021-06-02 PROCEDURE — 96367 TX/PROPH/DG ADDL SEQ IV INF: CPT

## 2021-06-02 PROCEDURE — 83690 ASSAY OF LIPASE: CPT

## 2021-06-02 PROCEDURE — 85025 COMPLETE CBC W/AUTO DIFF WBC: CPT

## 2021-06-02 RX ORDER — METHYLPREDNISOLONE SODIUM SUCCINATE 125 MG/2ML
125 INJECTION, POWDER, LYOPHILIZED, FOR SOLUTION INTRAMUSCULAR; INTRAVENOUS ONCE
Status: COMPLETED | OUTPATIENT
Start: 2021-06-02 | End: 2021-06-02

## 2021-06-02 RX ORDER — VANCOMYCIN HYDROCHLORIDE 1 G/200ML
1000 INJECTION, SOLUTION INTRAVENOUS ONCE
Status: DISCONTINUED | OUTPATIENT
Start: 2021-06-02 | End: 2021-06-02 | Stop reason: CLARIF

## 2021-06-02 RX ADMIN — METHYLPREDNISOLONE SODIUM SUCCINATE 125 MG: 125 INJECTION, POWDER, FOR SOLUTION INTRAMUSCULAR; INTRAVENOUS at 22:57

## 2021-06-02 RX ADMIN — VANCOMYCIN HYDROCHLORIDE 1250 MG: 10 INJECTION, POWDER, LYOPHILIZED, FOR SOLUTION INTRAVENOUS at 23:02

## 2021-06-02 RX ADMIN — CEFEPIME HYDROCHLORIDE 1000 MG: 1 INJECTION, POWDER, FOR SOLUTION INTRAMUSCULAR; INTRAVENOUS at 22:17

## 2021-06-02 ASSESSMENT — ENCOUNTER SYMPTOMS
RHINORRHEA: 0
WHEEZING: 0
SHORTNESS OF BREATH: 0
COUGH: 0
NAUSEA: 0
VOMITING: 0
ABDOMINAL PAIN: 0
DIARRHEA: 0

## 2021-06-02 ASSESSMENT — PAIN SCALES - GENERAL: PAINLEVEL_OUTOF10: 10

## 2021-06-03 PROBLEM — B99.9 INFECTION REQUIRING CONTACT ISOLATION PRECAUTIONS: Status: ACTIVE | Noted: 2021-04-27

## 2021-06-03 LAB
A/G RATIO: 0.8 (ref 1.1–2.2)
ALBUMIN SERPL-MCNC: 2.9 G/DL (ref 3.4–5)
ALP BLD-CCNC: 50 U/L (ref 40–129)
ALT SERPL-CCNC: <5 U/L (ref 10–40)
AMPHETAMINE SCREEN, URINE: ABNORMAL
ANION GAP SERPL CALCULATED.3IONS-SCNC: 14 MMOL/L (ref 3–16)
ANISOCYTOSIS: ABNORMAL
AST SERPL-CCNC: 14 U/L (ref 15–37)
BANDED NEUTROPHILS RELATIVE PERCENT: 1 % (ref 0–7)
BARBITURATE SCREEN URINE: ABNORMAL
BASOPHILS ABSOLUTE: 0 K/UL (ref 0–0.2)
BASOPHILS RELATIVE PERCENT: 0 %
BENZODIAZEPINE SCREEN, URINE: ABNORMAL
BILIRUB SERPL-MCNC: 0.3 MG/DL (ref 0–1)
BUN BLDV-MCNC: 12 MG/DL (ref 7–20)
CALCIUM SERPL-MCNC: 7.6 MG/DL (ref 8.3–10.6)
CANNABINOID SCREEN URINE: ABNORMAL
CHLORIDE BLD-SCNC: 95 MMOL/L (ref 99–110)
CO2: 23 MMOL/L (ref 21–32)
COCAINE METABOLITE SCREEN URINE: ABNORMAL
CREAT SERPL-MCNC: 0.5 MG/DL (ref 0.6–1.2)
EOSINOPHILS ABSOLUTE: 0.1 K/UL (ref 0–0.6)
EOSINOPHILS RELATIVE PERCENT: 1 %
GFR AFRICAN AMERICAN: >60
GFR NON-AFRICAN AMERICAN: >60
GLOBULIN: 3.5 G/DL
GLUCOSE BLD-MCNC: 131 MG/DL (ref 70–99)
HCT VFR BLD CALC: 28.2 % (ref 36–48)
HEMOGLOBIN: 9.5 G/DL (ref 12–16)
LYMPHOCYTES ABSOLUTE: 1.3 K/UL (ref 1–5.1)
LYMPHOCYTES RELATIVE PERCENT: 20 %
Lab: ABNORMAL
MCH RBC QN AUTO: 30.1 PG (ref 26–34)
MCHC RBC AUTO-ENTMCNC: 33.6 G/DL (ref 31–36)
MCV RBC AUTO: 89.4 FL (ref 80–100)
METHADONE SCREEN, URINE: ABNORMAL
MONOCYTES ABSOLUTE: 0 K/UL (ref 0–1.3)
MONOCYTES RELATIVE PERCENT: 0 %
NEUTROPHILS ABSOLUTE: 5.1 K/UL (ref 1.7–7.7)
NEUTROPHILS RELATIVE PERCENT: 78 %
OPIATE SCREEN URINE: ABNORMAL
OXYCODONE URINE: POSITIVE
PDW BLD-RTO: 15.9 % (ref 12.4–15.4)
PH UA: 5
PHENCYCLIDINE SCREEN URINE: ABNORMAL
PLATELET # BLD: 431 K/UL (ref 135–450)
PLATELET SLIDE REVIEW: ADEQUATE
PMV BLD AUTO: 6.8 FL (ref 5–10.5)
POLYCHROMASIA: ABNORMAL
POTASSIUM REFLEX MAGNESIUM: 4 MMOL/L (ref 3.5–5.1)
PROPOXYPHENE SCREEN: ABNORMAL
RBC # BLD: 3.16 M/UL (ref 4–5.2)
REASON FOR REJECTION: NORMAL
REJECTED TEST: NORMAL
SEDIMENTATION RATE, ERYTHROCYTE: 33 MM/HR (ref 0–30)
SLIDE REVIEW: ABNORMAL
SODIUM BLD-SCNC: 132 MMOL/L (ref 136–145)
TOTAL PROTEIN: 6.4 G/DL (ref 6.4–8.2)
WBC # BLD: 6.5 K/UL (ref 4–11)

## 2021-06-03 PROCEDURE — 87081 CULTURE SCREEN ONLY: CPT

## 2021-06-03 PROCEDURE — 88312 SPECIAL STAINS GROUP 1: CPT

## 2021-06-03 PROCEDURE — 85025 COMPLETE CBC W/AUTO DIFF WBC: CPT

## 2021-06-03 PROCEDURE — 6370000000 HC RX 637 (ALT 250 FOR IP): Performed by: INTERNAL MEDICINE

## 2021-06-03 PROCEDURE — 86702 HIV-2 ANTIBODY: CPT

## 2021-06-03 PROCEDURE — 11104 PUNCH BX SKIN SINGLE LESION: CPT | Performed by: SURGERY

## 2021-06-03 PROCEDURE — 87205 SMEAR GRAM STAIN: CPT

## 2021-06-03 PROCEDURE — 87186 SC STD MICRODIL/AGAR DIL: CPT

## 2021-06-03 PROCEDURE — 80307 DRUG TEST PRSMV CHEM ANLYZR: CPT

## 2021-06-03 PROCEDURE — 6360000002 HC RX W HCPCS: Performed by: INTERNAL MEDICINE

## 2021-06-03 PROCEDURE — 99223 1ST HOSP IP/OBS HIGH 75: CPT | Performed by: INTERNAL MEDICINE

## 2021-06-03 PROCEDURE — 88305 TISSUE EXAM BY PATHOLOGIST: CPT

## 2021-06-03 PROCEDURE — APPNB30 APP NON BILLABLE TIME 0-30 MINS: Performed by: NURSE PRACTITIONER

## 2021-06-03 PROCEDURE — 2580000003 HC RX 258: Performed by: INTERNAL MEDICINE

## 2021-06-03 PROCEDURE — 96376 TX/PRO/DX INJ SAME DRUG ADON: CPT

## 2021-06-03 PROCEDURE — 1200000000 HC SEMI PRIVATE

## 2021-06-03 PROCEDURE — 87070 CULTURE OTHR SPECIMN AEROBIC: CPT

## 2021-06-03 PROCEDURE — 96366 THER/PROPH/DIAG IV INF ADDON: CPT

## 2021-06-03 PROCEDURE — G0378 HOSPITAL OBSERVATION PER HR: HCPCS

## 2021-06-03 PROCEDURE — 96375 TX/PRO/DX INJ NEW DRUG ADDON: CPT

## 2021-06-03 PROCEDURE — 36415 COLL VENOUS BLD VENIPUNCTURE: CPT

## 2021-06-03 PROCEDURE — 86701 HIV-1ANTIBODY: CPT

## 2021-06-03 PROCEDURE — 87077 CULTURE AEROBIC IDENTIFY: CPT

## 2021-06-03 PROCEDURE — 86403 PARTICLE AGGLUT ANTBDY SCRN: CPT

## 2021-06-03 PROCEDURE — 80053 COMPREHEN METABOLIC PANEL: CPT

## 2021-06-03 PROCEDURE — 94760 N-INVAS EAR/PLS OXIMETRY 1: CPT

## 2021-06-03 PROCEDURE — 0JBK3ZX EXCISION OF LEFT HAND SUBCUTANEOUS TISSUE AND FASCIA, PERCUTANEOUS APPROACH, DIAGNOSTIC: ICD-10-PCS | Performed by: SURGERY

## 2021-06-03 PROCEDURE — 87390 HIV-1 AG IA: CPT

## 2021-06-03 RX ORDER — METOPROLOL SUCCINATE 50 MG/1
50 TABLET, EXTENDED RELEASE ORAL DAILY
Status: DISCONTINUED | OUTPATIENT
Start: 2021-06-03 | End: 2021-06-04 | Stop reason: HOSPADM

## 2021-06-03 RX ORDER — POTASSIUM CHLORIDE 7.45 MG/ML
10 INJECTION INTRAVENOUS PRN
Status: DISCONTINUED | OUTPATIENT
Start: 2021-06-03 | End: 2021-06-03 | Stop reason: SDUPTHER

## 2021-06-03 RX ORDER — METHYLPREDNISOLONE SODIUM SUCCINATE 40 MG/ML
40 INJECTION, POWDER, LYOPHILIZED, FOR SOLUTION INTRAMUSCULAR; INTRAVENOUS EVERY 12 HOURS
Status: DISCONTINUED | OUTPATIENT
Start: 2021-06-03 | End: 2021-06-04 | Stop reason: HOSPADM

## 2021-06-03 RX ORDER — OMEPRAZOLE 10 MG/1
20 CAPSULE, DELAYED RELEASE ORAL DAILY
Status: DISCONTINUED | OUTPATIENT
Start: 2021-06-03 | End: 2021-06-03 | Stop reason: CLARIF

## 2021-06-03 RX ORDER — POTASSIUM CHLORIDE 20 MEQ/1
20 TABLET, EXTENDED RELEASE ORAL 2 TIMES DAILY
Status: DISCONTINUED | OUTPATIENT
Start: 2021-06-03 | End: 2021-06-04 | Stop reason: HOSPADM

## 2021-06-03 RX ORDER — FUROSEMIDE 20 MG/1
20 TABLET ORAL 2 TIMES DAILY
Status: DISCONTINUED | OUTPATIENT
Start: 2021-06-03 | End: 2021-06-04 | Stop reason: HOSPADM

## 2021-06-03 RX ORDER — ACETAMINOPHEN 650 MG/1
650 SUPPOSITORY RECTAL EVERY 6 HOURS PRN
Status: DISCONTINUED | OUTPATIENT
Start: 2021-06-03 | End: 2021-06-04 | Stop reason: HOSPADM

## 2021-06-03 RX ORDER — TRIAMCINOLONE ACETONIDE 1 MG/G
CREAM TOPICAL 2 TIMES DAILY
Status: DISCONTINUED | OUTPATIENT
Start: 2021-06-03 | End: 2021-06-04 | Stop reason: HOSPADM

## 2021-06-03 RX ORDER — POTASSIUM CHLORIDE 7.45 MG/ML
10 INJECTION INTRAVENOUS PRN
Status: DISCONTINUED | OUTPATIENT
Start: 2021-06-03 | End: 2021-06-04 | Stop reason: HOSPADM

## 2021-06-03 RX ORDER — ONDANSETRON 4 MG/1
4 TABLET, ORALLY DISINTEGRATING ORAL EVERY 8 HOURS PRN
Status: DISCONTINUED | OUTPATIENT
Start: 2021-06-03 | End: 2021-06-03 | Stop reason: SDUPTHER

## 2021-06-03 RX ORDER — SODIUM CHLORIDE 1000 MG
1 TABLET, SOLUBLE MISCELLANEOUS
Status: DISCONTINUED | OUTPATIENT
Start: 2021-06-03 | End: 2021-06-04 | Stop reason: HOSPADM

## 2021-06-03 RX ORDER — FERROUS GLUCONATE 324(37.5)
324 TABLET ORAL
Status: DISCONTINUED | OUTPATIENT
Start: 2021-06-03 | End: 2021-06-04 | Stop reason: HOSPADM

## 2021-06-03 RX ORDER — BACLOFEN 10 MG/1
10 TABLET ORAL 3 TIMES DAILY
Status: DISCONTINUED | OUTPATIENT
Start: 2021-06-03 | End: 2021-06-04 | Stop reason: HOSPADM

## 2021-06-03 RX ORDER — SODIUM CHLORIDE 9 MG/ML
25 INJECTION, SOLUTION INTRAVENOUS PRN
Status: DISCONTINUED | OUTPATIENT
Start: 2021-06-03 | End: 2021-06-04 | Stop reason: HOSPADM

## 2021-06-03 RX ORDER — BALSALAZIDE DISODIUM 750 MG/1
3000 CAPSULE ORAL DAILY
Status: DISCONTINUED | OUTPATIENT
Start: 2021-06-03 | End: 2021-06-04 | Stop reason: HOSPADM

## 2021-06-03 RX ORDER — POTASSIUM CHLORIDE 20 MEQ/1
40 TABLET, EXTENDED RELEASE ORAL PRN
Status: DISCONTINUED | OUTPATIENT
Start: 2021-06-03 | End: 2021-06-03 | Stop reason: SDUPTHER

## 2021-06-03 RX ORDER — HYDROXYZINE HYDROCHLORIDE 10 MG/1
10 TABLET, FILM COATED ORAL 3 TIMES DAILY PRN
Status: DISCONTINUED | OUTPATIENT
Start: 2021-06-03 | End: 2021-06-04 | Stop reason: HOSPADM

## 2021-06-03 RX ORDER — DIPHENHYDRAMINE HYDROCHLORIDE, ZINC ACETATE 2; .1 G/100G; G/100G
CREAM TOPICAL 3 TIMES DAILY PRN
Status: DISCONTINUED | OUTPATIENT
Start: 2021-06-03 | End: 2021-06-04 | Stop reason: HOSPADM

## 2021-06-03 RX ORDER — PREDNISONE 20 MG/1
20 TABLET ORAL DAILY
Status: DISCONTINUED | OUTPATIENT
Start: 2021-06-03 | End: 2021-06-03

## 2021-06-03 RX ORDER — ATORVASTATIN CALCIUM 80 MG/1
80 TABLET, FILM COATED ORAL NIGHTLY
Status: DISCONTINUED | OUTPATIENT
Start: 2021-06-03 | End: 2021-06-04 | Stop reason: HOSPADM

## 2021-06-03 RX ORDER — PANTOPRAZOLE SODIUM 40 MG/1
40 TABLET, DELAYED RELEASE ORAL
Status: DISCONTINUED | OUTPATIENT
Start: 2021-06-03 | End: 2021-06-04 | Stop reason: HOSPADM

## 2021-06-03 RX ORDER — FLUTICASONE PROPIONATE 50 MCG
1 SPRAY, SUSPENSION (ML) NASAL DAILY PRN
Status: DISCONTINUED | OUTPATIENT
Start: 2021-06-03 | End: 2021-06-03 | Stop reason: ALTCHOICE

## 2021-06-03 RX ORDER — ONDANSETRON 2 MG/ML
4 INJECTION INTRAMUSCULAR; INTRAVENOUS EVERY 6 HOURS PRN
Status: DISCONTINUED | OUTPATIENT
Start: 2021-06-03 | End: 2021-06-04 | Stop reason: HOSPADM

## 2021-06-03 RX ORDER — SODIUM CHLORIDE 0.9 % (FLUSH) 0.9 %
10 SYRINGE (ML) INJECTION PRN
Status: DISCONTINUED | OUTPATIENT
Start: 2021-06-03 | End: 2021-06-04 | Stop reason: HOSPADM

## 2021-06-03 RX ORDER — HYDROCODONE BITARTRATE AND ACETAMINOPHEN 5; 325 MG/1; MG/1
1 TABLET ORAL EVERY 6 HOURS PRN
Status: DISCONTINUED | OUTPATIENT
Start: 2021-06-03 | End: 2021-06-04 | Stop reason: HOSPADM

## 2021-06-03 RX ORDER — BUSPIRONE HYDROCHLORIDE 5 MG/1
10 TABLET ORAL 3 TIMES DAILY
Status: DISCONTINUED | OUTPATIENT
Start: 2021-06-03 | End: 2021-06-04 | Stop reason: HOSPADM

## 2021-06-03 RX ORDER — HYDROMORPHONE HYDROCHLORIDE 1 MG/ML
1 INJECTION, SOLUTION INTRAMUSCULAR; INTRAVENOUS; SUBCUTANEOUS EVERY 4 HOURS PRN
Status: DISCONTINUED | OUTPATIENT
Start: 2021-06-03 | End: 2021-06-04 | Stop reason: HOSPADM

## 2021-06-03 RX ORDER — ACETAMINOPHEN 325 MG/1
650 TABLET ORAL EVERY 6 HOURS PRN
Status: DISCONTINUED | OUTPATIENT
Start: 2021-06-03 | End: 2021-06-04 | Stop reason: HOSPADM

## 2021-06-03 RX ORDER — ONDANSETRON 4 MG/1
4 TABLET, ORALLY DISINTEGRATING ORAL EVERY 8 HOURS PRN
Status: DISCONTINUED | OUTPATIENT
Start: 2021-06-03 | End: 2021-06-04 | Stop reason: HOSPADM

## 2021-06-03 RX ORDER — 0.9 % SODIUM CHLORIDE 0.9 %
500 INTRAVENOUS SOLUTION INTRAVENOUS PRN
Status: DISCONTINUED | OUTPATIENT
Start: 2021-06-03 | End: 2021-06-04 | Stop reason: HOSPADM

## 2021-06-03 RX ORDER — HYDROMORPHONE HYDROCHLORIDE 1 MG/ML
0.5 INJECTION, SOLUTION INTRAMUSCULAR; INTRAVENOUS; SUBCUTANEOUS
Status: DISCONTINUED | OUTPATIENT
Start: 2021-06-03 | End: 2021-06-03 | Stop reason: SDUPTHER

## 2021-06-03 RX ORDER — SODIUM CHLORIDE 1000 MG
2 TABLET, SOLUBLE MISCELLANEOUS
Status: DISCONTINUED | OUTPATIENT
Start: 2021-06-03 | End: 2021-06-03 | Stop reason: ALTCHOICE

## 2021-06-03 RX ORDER — FEXOFENADINE HCL 180 MG/1
180 TABLET ORAL DAILY
Status: DISCONTINUED | OUTPATIENT
Start: 2021-06-03 | End: 2021-06-03 | Stop reason: ALTCHOICE

## 2021-06-03 RX ORDER — SODIUM CHLORIDE 0.9 % (FLUSH) 0.9 %
5-40 SYRINGE (ML) INJECTION EVERY 12 HOURS SCHEDULED
Status: DISCONTINUED | OUTPATIENT
Start: 2021-06-03 | End: 2021-06-04 | Stop reason: HOSPADM

## 2021-06-03 RX ORDER — SODIUM CHLORIDE 9 MG/ML
INJECTION, SOLUTION INTRAVENOUS CONTINUOUS
Status: DISCONTINUED | OUTPATIENT
Start: 2021-06-03 | End: 2021-06-03

## 2021-06-03 RX ORDER — LIDOCAINE HYDROCHLORIDE AND EPINEPHRINE 10; 10 MG/ML; UG/ML
20 INJECTION, SOLUTION INFILTRATION; PERINEURAL ONCE
Status: DISCONTINUED | OUTPATIENT
Start: 2021-06-03 | End: 2021-06-04 | Stop reason: HOSPADM

## 2021-06-03 RX ORDER — TRIAMCINOLONE ACETONIDE 1 MG/G
CREAM TOPICAL 2 TIMES DAILY
Status: DISCONTINUED | OUTPATIENT
Start: 2021-06-03 | End: 2021-06-03

## 2021-06-03 RX ORDER — HYDRALAZINE HYDROCHLORIDE 20 MG/ML
10 INJECTION INTRAMUSCULAR; INTRAVENOUS EVERY 6 HOURS PRN
Status: DISCONTINUED | OUTPATIENT
Start: 2021-06-03 | End: 2021-06-04 | Stop reason: HOSPADM

## 2021-06-03 RX ORDER — POTASSIUM CHLORIDE 20 MEQ/1
40 TABLET, EXTENDED RELEASE ORAL PRN
Status: DISCONTINUED | OUTPATIENT
Start: 2021-06-03 | End: 2021-06-04 | Stop reason: HOSPADM

## 2021-06-03 RX ADMIN — FUROSEMIDE 20 MG: 20 TABLET ORAL at 09:04

## 2021-06-03 RX ADMIN — FERROUS GLUCONATE TAB 324 MG (37.5 MG ELEMENTAL IRON) 324 MG: 324 (37.5 FE) TAB at 09:04

## 2021-06-03 RX ADMIN — BUSPIRONE HYDROCHLORIDE 10 MG: 5 TABLET ORAL at 20:16

## 2021-06-03 RX ADMIN — BALSALAZIDE DISODIUM 3000 MG: 750 CAPSULE ORAL at 09:11

## 2021-06-03 RX ADMIN — Medication 1000 MG: at 09:04

## 2021-06-03 RX ADMIN — HYDROMORPHONE HYDROCHLORIDE 0.5 MG: 1 INJECTION, SOLUTION INTRAMUSCULAR; INTRAVENOUS; SUBCUTANEOUS at 06:11

## 2021-06-03 RX ADMIN — CEFEPIME HYDROCHLORIDE 2000 MG: 2 INJECTION, POWDER, FOR SOLUTION INTRAVENOUS at 23:30

## 2021-06-03 RX ADMIN — BACLOFEN 10 MG: 10 TABLET ORAL at 14:06

## 2021-06-03 RX ADMIN — BACLOFEN 10 MG: 10 TABLET ORAL at 20:16

## 2021-06-03 RX ADMIN — TRIAMCINOLONE ACETONIDE: 1 CREAM TOPICAL at 20:15

## 2021-06-03 RX ADMIN — HYDROMORPHONE HYDROCHLORIDE 1 MG: 1 INJECTION, SOLUTION INTRAMUSCULAR; INTRAVENOUS; SUBCUTANEOUS at 11:34

## 2021-06-03 RX ADMIN — PREDNISONE 20 MG: 20 TABLET ORAL at 09:04

## 2021-06-03 RX ADMIN — BACLOFEN 10 MG: 10 TABLET ORAL at 09:04

## 2021-06-03 RX ADMIN — METHYLPREDNISOLONE SODIUM SUCCINATE 40 MG: 40 INJECTION, POWDER, FOR SOLUTION INTRAMUSCULAR; INTRAVENOUS at 20:15

## 2021-06-03 RX ADMIN — HYDROCODONE BITARTRATE AND ACETAMINOPHEN 1 TABLET: 5; 325 TABLET ORAL at 11:27

## 2021-06-03 RX ADMIN — Medication 1 G: at 11:27

## 2021-06-03 RX ADMIN — HYDROMORPHONE HYDROCHLORIDE 0.5 MG: 1 INJECTION, SOLUTION INTRAMUSCULAR; INTRAVENOUS; SUBCUTANEOUS at 01:02

## 2021-06-03 RX ADMIN — PANTOPRAZOLE SODIUM 40 MG: 40 TABLET, DELAYED RELEASE ORAL at 06:11

## 2021-06-03 RX ADMIN — HYDROXYZINE HYDROCHLORIDE 10 MG: 10 TABLET, FILM COATED ORAL at 22:13

## 2021-06-03 RX ADMIN — HYDROCODONE BITARTRATE AND ACETAMINOPHEN 1 TABLET: 5; 325 TABLET ORAL at 22:13

## 2021-06-03 RX ADMIN — BUSPIRONE HYDROCHLORIDE 10 MG: 5 TABLET ORAL at 09:04

## 2021-06-03 RX ADMIN — Medication 10 ML: at 20:17

## 2021-06-03 RX ADMIN — HYDROMORPHONE HYDROCHLORIDE 1 MG: 1 INJECTION, SOLUTION INTRAMUSCULAR; INTRAVENOUS; SUBCUTANEOUS at 20:16

## 2021-06-03 RX ADMIN — POTASSIUM CHLORIDE 20 MEQ: 1500 TABLET, EXTENDED RELEASE ORAL at 20:16

## 2021-06-03 RX ADMIN — Medication 1 G: at 16:14

## 2021-06-03 RX ADMIN — METHYLPREDNISOLONE SODIUM SUCCINATE 40 MG: 40 INJECTION, POWDER, FOR SOLUTION INTRAMUSCULAR; INTRAVENOUS at 11:34

## 2021-06-03 RX ADMIN — RIVAROXABAN 20 MG: 20 TABLET, FILM COATED ORAL at 09:04

## 2021-06-03 RX ADMIN — BUSPIRONE HYDROCHLORIDE 10 MG: 5 TABLET ORAL at 14:06

## 2021-06-03 RX ADMIN — METOPROLOL SUCCINATE 50 MG: 50 TABLET, EXTENDED RELEASE ORAL at 09:05

## 2021-06-03 RX ADMIN — VANCOMYCIN HYDROCHLORIDE 1250 MG: 10 INJECTION, POWDER, LYOPHILIZED, FOR SOLUTION INTRAVENOUS at 14:08

## 2021-06-03 RX ADMIN — SODIUM CHLORIDE: 9 INJECTION, SOLUTION INTRAVENOUS at 02:27

## 2021-06-03 RX ADMIN — HYDROCODONE BITARTRATE AND ACETAMINOPHEN 1 TABLET: 5; 325 TABLET ORAL at 16:14

## 2021-06-03 RX ADMIN — FUROSEMIDE 20 MG: 20 TABLET ORAL at 16:14

## 2021-06-03 RX ADMIN — POTASSIUM CHLORIDE 20 MEQ: 1500 TABLET, EXTENDED RELEASE ORAL at 09:04

## 2021-06-03 RX ADMIN — ATORVASTATIN CALCIUM 80 MG: 80 TABLET, FILM COATED ORAL at 20:16

## 2021-06-03 RX ADMIN — HYDROMORPHONE HYDROCHLORIDE 1 MG: 1 INJECTION, SOLUTION INTRAMUSCULAR; INTRAVENOUS; SUBCUTANEOUS at 16:17

## 2021-06-03 RX ADMIN — CEFEPIME HYDROCHLORIDE 2000 MG: 2 INJECTION, POWDER, FOR SOLUTION INTRAVENOUS at 11:30

## 2021-06-03 ASSESSMENT — PAIN DESCRIPTION - DESCRIPTORS: DESCRIPTORS: BURNING

## 2021-06-03 ASSESSMENT — ENCOUNTER SYMPTOMS
EYE REDNESS: 0
EYE DISCHARGE: 0
COLOR CHANGE: 0
CHOKING: 0
TROUBLE SWALLOWING: 0
EYE PAIN: 0
CHEST TIGHTNESS: 0
ABDOMINAL PAIN: 0
PHOTOPHOBIA: 0
DIARRHEA: 0
APNEA: 0
BLOOD IN STOOL: 0
COLOR CHANGE: 1
EYE ITCHING: 0
FACIAL SWELLING: 0
RHINORRHEA: 0
NAUSEA: 0
VOMITING: 0
COUGH: 0
SHORTNESS OF BREATH: 0
STRIDOR: 0

## 2021-06-03 ASSESSMENT — PAIN DESCRIPTION - LOCATION
LOCATION: GENERALIZED
LOCATION: GENERALIZED

## 2021-06-03 ASSESSMENT — PAIN SCALES - GENERAL
PAINLEVEL_OUTOF10: 10
PAINLEVEL_OUTOF10: 0
PAINLEVEL_OUTOF10: 8
PAINLEVEL_OUTOF10: 10

## 2021-06-03 ASSESSMENT — PAIN DESCRIPTION - FREQUENCY: FREQUENCY: CONTINUOUS

## 2021-06-03 ASSESSMENT — PAIN DESCRIPTION - PAIN TYPE
TYPE: ACUTE PAIN
TYPE: ACUTE PAIN

## 2021-06-03 NOTE — PROGRESS NOTES
Clinical Pharmacy Note: Pharmacy to Dose Vancomycin    Sudhir Robles is a 60 y.o. female started on Vancomycin; consult received from Dr. Isaac Arechiga to manage therapy. Also receiving the following antibiotics: ceftriaxone. Goal Trough Level: 15-20 mcg/mL    Assessment/Plan:  Initiate vancomycin 1250 mg IV every 12 hours. A vancomycin trough has been ordered for Jakub@Epoch. Changes in regimen will be determined based on culture results, renal function, and clinical response. Pharmacy will continue to monitor and adjust regimen as necessary. Allergies:  Ace inhibitors and Skelaxin [metaxalone]         Recent Labs     06/02/21  2144   CREATININE 0.7       Recent Labs     06/02/21  2144   WBC 6.8       Ht Readings from Last 1 Encounters:   06/02/21 5' 7\" (1.702 m)        Wt Readings from Last 1 Encounters:   06/02/21 186 lb (84.4 kg)         Estimated Creatinine Clearance: 91 mL/min (based on SCr of 0.7 mg/dL).       Thank you for the consult,    Micheal CaD

## 2021-06-03 NOTE — PROGRESS NOTES
Patient up to the unit from the ED. Vital signs stable. Patient has generalized open blisters. Dry dressing applied. Patient reporting a pin level of \"50/10\". Dr. Chantal Quiles notified and orders for dilaudid placed. [place] in place. Patient stated that her  is also admitted into [facility] at this time. Fall precautions initiated. Education provided regarding using the call light for assistance out of bed. Patient verbalized understanding. Orientation to room provided.

## 2021-06-03 NOTE — CARE COORDINATION
[facility] Nurse  Consult Note       NAME:  Philip Skaggs  MEDICAL RECORD NUMBER:  [id number]  AGE: 60 y.o.    GENDER: female  : 1955  TODAY'S DATE:  6/3/2021    Subjective   Reason for WOCN Evaluation and Assessment: blisters and lesions       Philip Skaggs is a 60 y.o. female referred by:   [x] Physician  [x] Nursing  [] Other:     Wound Identification:  Wound Type: undetermined  Contributing Factors: none    Wound History: present on admission  Current Wound Care Treatment:  Foam dressings, roll guaze    Patient Goal of Care:  [x] Wound Healing  [] Odor Control  [] Palliative Care  [] Pain Control   [] Other:         PAST MEDICAL HISTORY        Diagnosis Date    Anxiety     Arthritis     Blood transfusion reaction     Crohn's disease ([address]     Depression     GERD (gastroesophageal reflux disease)     Hiatal hernia 2014    Hx of blood clots     Hypertension     MRSA (methicillin resistant staph aureus) culture positive 2018    urine    Neuropathy     Pneumonia 2014    Sinus headache     SOB (shortness of breath)     VRE (vancomycin resistant enterococcus) culture positive 10/08/2018    abd culture       PAST SURGICAL HISTORY    Past Surgical History:   Procedure Laterality Date    ABDOMEN SURGERY      ABDOMINAL ADHESION SURGERY  2018    BLADDER SUSPENSION      COLONOSCOPY      COLONOSCOPY  9/14/15    sigmoid colon biopsy     COLONOSCOPY  2018    COLONOSCOPY  2019    COLONOSCOPY, POSSIBLE BIOPSY, POSSIBLE POLYPECTOMY    COLONOSCOPY N/A 2019    COLONOSCOPY WITH BIOPSY performed by Josh Gustafson MD at [address] [facility] 2019    COLONOSCOPY DIAGNOSTIC/STOMA performed by Josh Gustafson MD at [address] [place] [address] 10/8/2018    EXPLORATORY LAPAROTOMY WITH COLOSTOMY performed by Cj Bowden MD at [address] [facility] 14    excision plantar left hallux    FOOT SURGERY  6/3/15    PLANTAR PLATE REPAIR BILATERAL, ARTHROTOMY MPJ 2ND BILATERAL    FOOT SURGERY Left 08/05/2002    Excision second metatarsal head left    FRACTURE SURGERY      rt hip    HAND CARPECTOMY Bilateral     HEEL SPUR SURGERY      HERNIA REPAIR      HYSTERECTOMY      bladder surgery    JOINT REPLACEMENT      rt hip, L shoulder replacement 11/2014    KNEE SURGERY Bilateral     arthrosvopic    LEG SURGERY Right     [place] SECD CLOS SURG WND EXTEN/COMPLIC N/A 18/40/4009    SECONDARY WOUND CLOSURE OF ABDOMINAL WOUND performed by Mina Prader, MD at [facility] 7/17/2019    FLEXIBLE SIGMOIDOSCOPY performed by Mina Prader, MD at [address] [facility] [address]  01/15/2018    with biopsies    SMALL INTESTINE SURGERY N/A 7/17/2019    COLOSTOMY CLOSURE WITH COLORECTAL ANASTOMOSIS performed by Mina Prader, MD at [address]  6/14/13    and colonsocopy with antral erosion biopsies       FAMILY HISTORY    Family History   Problem Relation Age of Onset    High Blood Pressure Mother     High Blood Pressure Father     Kidney Disease Sister        SOCIAL HISTORY    Social History     Tobacco Use    Smoking status: Current Every Day Smoker     Packs/day: 1.00     Years: 10.00     Pack years: 10.00     Types: Cigarettes, E-Cigarettes    Smokeless tobacco: Never Used    Tobacco comment: CUT BACK TO 1/2 PPD WITH E CIG 6-2019   Vaping Use    Vaping Use: Every day    Start date: 3/28/2019    Substances: Always (LOWEST DOSE)   Substance Use Topics    Alcohol use: Yes     Alcohol/week: 2.0 standard drinks     Types: 2 Shots of liquor per week     Comment: 6 DRINKS A WEEK OR LESS    Drug use: No       ALLERGIES    Allergies   Allergen Reactions    Ace Inhibitors Swelling    Skelaxin [Metaxalone] Swelling       MEDICATIONS    No current facility-administered medications on file prior to encounter.      Current Outpatient Medications on File Prior to Encounter   Medication Sig Dispense Refill    furosemide (LASIX) 20 MG tablet Take 1 tablet by mouth 2 times daily (Patient taking differently: Take 40 mg by mouth 2 times daily ) 60 tablet 5    metoprolol succinate (TOPROL XL) 50 MG extended release tablet Take 1 tablet by mouth daily 30 tablet 5    rivaroxaban (XARELTO) 20 MG TABS tablet Take 1 tablet by mouth daily (with breakfast) 90 tablet 3    atorvastatin (LIPITOR) 80 MG tablet Take 1 tablet by mouth nightly 30 tablet 3    potassium chloride (KLOR-CON M) 20 MEQ extended release tablet Take 1 tablet by mouth 2 times daily 60 tablet 3    ferrous gluconate (FERGON) 324 (38 Fe) MG tablet Take 324 mg by mouth daily (with breakfast)      hydrOXYzine (ATARAX) 10 MG tablet Take 10 mg by mouth 3 times daily as needed for Itching      ondansetron (ZOFRAN ODT) 4 MG disintegrating tablet Take 1 tablet by mouth every 8 hours as needed for Nausea 20 tablet 0    busPIRone (BUSPAR) 10 MG tablet Take 10 mg by mouth 3 times daily as needed      balsalazide (COLAZAL) 750 MG capsule Take 3,000 mg by mouth daily Take 4 capsules (total 3000 mg) daily each morning.  omeprazole (PRILOSEC) 20 MG delayed release capsule Take 20 mg by mouth Daily       baclofen (LIORESAL) 10 MG tablet Take 1 tablet by mouth 3 times daily 90 tablet 1    fluticasone (FLONASE) 50 MCG/ACT nasal spray 1 spray by Each Nostril route daily as needed for Rhinitis 1 Bottle 3    nystatin (MYCOSTATIN) 694996 UNIT/GM cream Apply topically 2 times daily as needed for Dry Skin Apply topically 2 times daily.          Objective    /70   Pulse 74   Temp 97.4 °F (36.3 °C) (Oral)   Resp 16   Ht 5' 7\" (1.702 m)   Wt 186 lb (84.4 kg)   SpO2 94%   BMI 29.13 kg/m²     LABS:  WBC:    Lab Results   Component Value Date    WBC 6.5 06/03/2021     H/H:    Lab Results   Component Value Date    HGB 9.5 06/03/2021    HCT 28.2 06/03/2021     PTT:    Lab Results with c/o of blisters and lesions. Patient has dry lesions on back, buttocks, arms, bilateral lower extremities and feet. Patient also has blisters on arms, hands and lower extremities. Patient has a history of Crohn's disease. Spoke to patient's daughter who reports patient was going to a pain clinic for the last three months and was given several different opioid medications and that two weeks ago she started having itching, blistering and lesions all over her body. Patient is on Vistaril for itiching. Patient also reports the lesions are very painful. Pain medication has been ordered. Request kenalog cream and benadryl cream for lesion, no adhesives to skin. Response to treatment: well tolerated by patient    Pain Assessment:  Severity:  0 / 10  Quality of pain: N/A  Wound Pain Timing/Severity: none  Premedicated: No    Plan   Plan of Care:    Give pain medications as ordered  Apply Kenalog cream and benadryl cream to lesions twice daily. Cover open areas with non adherent dressings secure with roll gauze. NO ADHESIVES ON SKIN    Specialty Bed Required : No   [] Low Air Loss   [] Pressure Redistribution  [] Fluid Immersion  [] Bariatric  [] Total Pressure Relief  [] Other:     Current Diet: ADULT DIET;  Regular  Dietician consult:  No    Discharge Plan:  Placement for patient upon discharge: home with support    Patient appropriate for Outpatient [address]: Yes    Referrals:  [x]   [x] [facility]  [] Supplies  [] Other    Patient/Caregiver Teaching:  Level of patient/caregiver understanding able to:   [x] Indicates understanding       [x] Needs reinforcement  [] Unsuccessful      [] Verbal Understanding  [] Demonstrated understanding       [] No evidence of learning  [] Refused teaching         [] N/A       Electronically signed by DEVYN Palmer, RN  Wound/Ostomy Care on 6/3/2021 at 12:18 PM

## 2021-06-03 NOTE — PROGRESS NOTES
4 Eyes Skin Assessment     NAME:  Dianne Keller  YOB: 1955  MEDICAL RECORD NUMBER:  [id number]    The patient is being assess for  Admission    I agree that 2 RN's have performed a thorough Head to Toe Skin Assessment on the patient. ALL assessment sites listed below have been assessed. Areas assessed by both nurses:    Head, Face, Ears, Shoulders, Back, Chest, Arms, Elbows, Hands, Sacrum. Buttock, Coccyx, Ischium and Legs. Feet and Heels        Does the Patient have a Wound? Yes wound(s) were present on assessment.  LDA wound assessment was Initiated and completed        Nelson Prevention initiated:  Yes   Wound Care Orders initiated:  Yes    Pressure Injury (Stage 3,4, Unstageable, DTI, NWPT, and Complex wounds) if present place consult order under [de-identified] Yes    New and Established Ostomies if present place consult order under : No      Nurse 1 eSignature: Electronically signed by Vivi Ordoñez RN on 6/3/21 at 4:30 AM EDT    **SHARE this note so that the co-signing nurse is able to place an eSignature**    Nurse 2 eSignature: Electronically signed by Carlota Fishman RN on 6/3/21 at 4:32 AM EDT

## 2021-06-03 NOTE — CONSULTS
Infectious Diseases   Consult Note        Admission Date: 6/2/2021  Hospital Day: Hospital Day: 2   Attending: Aisha Graham MD  Date of service: 6/3/21     Reason for admission: Bilateral cellulitis of lower leg [L03.116, L03.115]    Chief complaint/ Reason for consult: Multiple bilateral leg wounds    Microbiology:        I have reviewed allavailable micro lab data and cultures    · Blood culture (2/2) - collected on 6/2/2021: In process      Antibiotics and immunizations:       Current antibiotics: All antibiotics and their doses were reviewed by me    Recent Abx Admin                   cefTRIAXone (ROCEPHIN) 1000 mg in sterile water 10 mL IV syringe (mg) 1,000 mg Given 06/03/21 0904    vancomycin (VANCOCIN) 1,250 mg in dextrose 5 % 250 mL IVPB (mg) 1,250 mg New Bag 06/02/21 2302    cefepime (MAXIPIME) 1000 mg IVPB minibag (mg) 1,000 mg New Bag 06/02/21 2217                  Immunization History: All immunization history was reviewed by me today. Immunization History   Administered Date(s) Administered    PPD Test 01/16/2018    Tdap (Boostrix, Adacel) 07/15/2020       Known drug allergies: All allergies were reviewed and updated    Allergies   Allergen Reactions    Ace Inhibitors Swelling    Skelaxin [Metaxalone] Swelling       Social history:     Social History:  All social andepidemiologic history was reviewed and updated by me today as needed. · Tobacco use:   reports that she has been smoking cigarettes and e-cigarettes. She has a 10.00 pack-year smoking history. She has never used smokeless tobacco.  · Alcohol use:   reports current alcohol use of about 2.0 standard drinks of alcohol per week. · Currently lives in: [address] [place] [address] Road  ·  reports no history of drug use.      COVID VACCINATION AND LAB RESULT RECORDS:     Internal Administration   First Dose      Second Dose           Last COVID Lab SARS-CoV-2 (no units)   Date Value   04/27/2021 Not Detected     SARS-CoV-2, PCR (no units)   Date Value   10/23/2020 Not Detected            Assessment:     The patient is a 60 y.o. old female who  has a past medical history of Anxiety, Arthritis, Blood transfusion reaction, Crohn's disease ([address] Depression, GERD (gastroesophageal reflux disease), Hiatal hernia (6/24/2014), blood clots, Hypertension, MRSA (methicillin resistant staph aureus) culture positive (06/05/2018), Neuropathy, Pneumonia (1/8/2014), Sinus headache, SOB (shortness of breath), and VRE (vancomycin resistant enterococcus) culture positive (10/08/2018). with following problems:    · Multiple wounds involving bilateral upper extremities, left foot, right arm and the left buttock area, secondary infection suspected  · Chronic pruritus  · Trichotillomania  · Hyponatremia  · Crohn's disease  · GERD  · Rhabdomyolysis with elevated CK of 206  · History of depression  · Essential hypertension  · History of MRSA  · History of VRE  · Overweight due to excess calorie intake : Body mass index is 29.13 kg/m². Discussion:      The patient is afebrile. White cell count is 6500. He has multiple bilateral superficial leg wounds and wounds involving the left foot, right arm and left buttock area. She has a bullous lesion on the right foot. He received IV vancomycin and IV cefepime and is currently on IV vancomycin and IV ceftriaxone    Last hepatitis C screen was in October 2018 and was negative. Plan:     Diagnostic Workup:    · Agree with blood cultures  · Will order bilateral upper extremity wound cultures  · Follow-up on leg wound culture  · Urine tox screen  · Nasal MRSA screening culture  · We will order baseline HAV screen  · Continue to follow fever curve, WBC count and blood cultures  · Follow up on liverand renal functions closely    Antimicrobials:    · Will continue empiric IV vancomycin  Check Vancomycin trough before the 5th dose.    Target vancomycin trough level of 15-20  mg/L or vancomycin area under curve antimicrobial agent toxicity. Thank you for involving me in the care of your patient. I will continue to follow. If you have any additional questions, please do not hesitate to contact me. Subjective:     Presenting complaint in ER:     Chief Complaint   Patient presents with    Wound Check    Dressing Change        HPI: Rodolfo Penaloza is a 60 y.o. female patient, who was seen at the request of Dr. Ban Gonzales MD.    History was obtained from chart review and the patient. The patient was admitted on 6/2/2021. I have been consulted to see the patient for above mentioned reason(s). The patient has multiple medical comorbidities, and presented to the ER for concerns for multiple wounds on different parts of her body. The patient reportedly has chronic wounds and chronic pruritus on her body and tends to scratch herself. She has history of stroke and has difficulty word finding. She was having increasing pain in skin wounds and hence came to the ER and was admitted from there. Blood cultures and wound cultures were ordered. I have been asked for my opinion for management for this patient. Past Medical History: All past medical history reviewed today. Past Medical History:   Diagnosis Date    Anxiety     Arthritis     Blood transfusion reaction     [address] [address]     Depression     GERD (gastroesophageal reflux disease)     Hiatal hernia 6/24/2014    Hx of blood clots     Hypertension     MRSA (methicillin resistant staph aureus) culture positive 06/05/2018    urine    Neuropathy     Pneumonia 1/8/2014    Sinus headache     SOB (shortness of breath)     VRE (vancomycin resistant enterococcus) culture positive 10/08/2018    abd culture         Past Surgical History: All pastsurgical history was reviewed today.     Past Surgical History:   Procedure Laterality Date    ABDOMEN SURGERY      ABDOMINAL ADHESION SURGERY  06/08/2018    BLADDER SUSPENSION      COLONOSCOPY      COLONOSCOPY  9/14/15    sigmoid colon biopsy     COLONOSCOPY  08/07/2018    COLONOSCOPY  06/07/2019    COLONOSCOPY, POSSIBLE BIOPSY, POSSIBLE POLYPECTOMY    COLONOSCOPY N/A 6/7/2019    COLONOSCOPY WITH BIOPSY performed by Rolf Solo MD at [address] COLONOSCOPY N/A 6/7/2019    COLONOSCOPY DIAGNOSTIC/STOMA performed by Rolf Solo MD at [address] [facility] [address] N/A 10/8/2018    EXPLORATORY LAPAROTOMY WITH COLOSTOMY performed by Elvia Ferguson MD at [address] 6/25/14    excision plantar left hallux    FOOT SURGERY  6/3/15    PLANTAR PLATE REPAIR BILATERAL, ARTHROTOMY MPJ 2ND BILATERAL    FOOT SURGERY Left 08/05/2002    Excision second metatarsal head left    FRACTURE SURGERY      rt hip    HAND CARPECTOMY Bilateral     HEEL SPUR SURGERY      HERNIA REPAIR      HYSTERECTOMY      bladder surgery    JOINT REPLACEMENT      rt hip, L shoulder replacement 11/2014    KNEE SURGERY Bilateral     arthrosvopic    LEG SURGERY Right     [place] SECD CLOS SURG WND EXTEN/COMPLIC N/A 85/41/9425    SECONDARY WOUND CLOSURE OF ABDOMINAL WOUND performed by Elvia Ferguson MD at [facility] 7/17/2019    FLEXIBLE SIGMOIDOSCOPY performed by Elvia Ferguson MD at [address] [place] [address]  01/15/2018    with biopsies    SMALL INTESTINE SURGERY N/A 7/17/2019    COLOSTOMY CLOSURE WITH COLORECTAL ANASTOMOSIS performed by Elvia Ferguson MD at [place] [address]  6/14/13    and colonsocopy with antral erosion biopsies         Family History: All family history was reviewed today. Problem Relation Age of Onset    High Blood Pressure Mother     High Blood Pressure Father     Kidney Disease Sister          Medications: All current and past medications were reviewed.     Medications Prior to Admission: furosemide (LASIX) 20 MG tablet, Take 1 tablet by mouth 2 times daily (Patient taking differently: Take 40 mg by mouth 2 times daily )  metoprolol succinate (TOPROL XL) 50 MG extended release tablet, Take 1 tablet by mouth daily  rivaroxaban (XARELTO) 20 MG TABS tablet, Take 1 tablet by mouth daily (with breakfast)  atorvastatin (LIPITOR) 80 MG tablet, Take 1 tablet by mouth nightly  potassium chloride (KLOR-CON M) 20 MEQ extended release tablet, Take 1 tablet by mouth 2 times daily  ferrous gluconate (FERGON) 324 (38 Fe) MG tablet, Take 324 mg by mouth daily (with breakfast)  hydrOXYzine (ATARAX) 10 MG tablet, Take 10 mg by mouth 3 times daily as needed for Itching  ondansetron (ZOFRAN ODT) 4 MG disintegrating tablet, Take 1 tablet by mouth every 8 hours as needed for Nausea  busPIRone (BUSPAR) 10 MG tablet, Take 10 mg by mouth 3 times daily as needed  balsalazide (COLAZAL) 750 MG capsule, Take 3,000 mg by mouth daily Take 4 capsules (total 3000 mg) daily each morning. omeprazole (PRILOSEC) 20 MG delayed release capsule, Take 20 mg by mouth Daily   baclofen (LIORESAL) 10 MG tablet, Take 1 tablet by mouth 3 times daily  [DISCONTINUED] sodium chloride 1 g tablet, Take 2 tablets by mouth 3 times daily (with meals)  fluticasone (FLONASE) 50 MCG/ACT nasal spray, 1 spray by Each Nostril route daily as needed for Rhinitis  [DISCONTINUED] fexofenadine (ALLEGRA) 180 MG tablet, Take 180 mg by mouth daily  nystatin (MYCOSTATIN) 369694 UNIT/GM cream, Apply topically 2 times daily as needed for Dry Skin Apply topically 2 times daily.      baclofen  10 mg Oral TID    busPIRone  10 mg Oral TID    balsalazide  3,000 mg Oral Daily    ferrous gluconate  324 mg Oral Daily with breakfast    atorvastatin  80 mg Oral Nightly    potassium chloride  20 mEq Oral BID    rivaroxaban  20 mg Oral Daily with breakfast    metoprolol succinate  50 mg Oral Daily    furosemide  20 mg Oral BID    sodium chloride flush  5-40 mL Intravenous 2 times per day    vancomycin  1,250 mg Intravenous Q12H    pantoprazole  40 mg Oral QAM AC    methylPREDNISolone  40 mg Intravenous Q12H    lidocaine-EPINEPHrine  20 mL Intradermal Once    sodium chloride  1 g Oral TID WC    cefepime  2,000 mg Intravenous Q12H          REVIEW OF SYSTEMS:       Review of Systems   Constitutional: Negative for chills, diaphoresis and unexpected weight change. HENT: Negative for congestion, ear discharge, ear pain, facial swelling, hearing loss, rhinorrhea and trouble swallowing. Eyes: Negative for photophobia, discharge, redness and visual disturbance. Respiratory: Negative for apnea, cough, choking, chest tightness, shortness of breath and stridor. Cardiovascular: Negative for chest pain and palpitations. Gastrointestinal: Negative for abdominal pain, blood in stool, diarrhea and nausea. Endocrine: Negative for polydipsia, polyphagia and polyuria. Genitourinary: Negative for difficulty urinating, dysuria, frequency, hematuria, menstrual problem and vaginal discharge. Musculoskeletal: Negative for arthralgias, joint swelling, myalgias and neck stiffness. Skin: Negative for color change and rash. Multiple bilateral upper and lower extremity wounds   Allergic/Immunologic: Negative for immunocompromised state. Neurological: Negative for dizziness, seizures, speech difficulty, light-headedness and headaches. Hematological: Negative for adenopathy. Psychiatric/Behavioral: Negative for agitation, hallucinations and suicidal ideas. Objective:       PHYSICAL EXAM:      Vitals:   Vitals:    06/03/21 0015 06/03/21 0321 06/03/21 0438 06/03/21 0900   BP: 123/82  107/73 107/69   Pulse: 102  78 105   Resp: 16 16 16 16   Temp: 98.2 °F (36.8 °C)  97.4 °F (36.3 °C) 97.6 °F (36.4 °C)   TempSrc: Oral  Temporal Oral   SpO2: 99% 93% 95% 96%   Weight:       Height:           Physical Exam  Vitals and nursing note reviewed. Constitutional:       General: She is not in acute distress. Appearance: She is well-developed. She is not diaphoretic. HENT:      Head: Normocephalic. Right Ear: External ear normal.      Left Ear: External ear normal.      Nose: Nose normal.   Eyes:      General: No scleral icterus. Right eye: No discharge. Left eye: No discharge. Conjunctiva/sclera: Conjunctivae normal.      Pupils: Pupils are equal, round, and reactive to light. Cardiovascular:      Rate and Rhythm: Normal rate and regular rhythm. Heart sounds: No murmur heard. No friction rub. Pulmonary:      Effort: Pulmonary effort is normal.      Breath sounds: No stridor. No wheezing or rales. Chest:      Chest wall: No tenderness. Abdominal:      Palpations: Abdomen is soft. There is no mass. Tenderness: There is no abdominal tenderness. There is no guarding or rebound. Musculoskeletal:         General: No tenderness. Cervical back: Normal range of motion and neck supple. Lymphadenopathy:      Cervical: No cervical adenopathy. Skin:     General: Skin is warm and dry. Findings: No erythema or rash. Comments: Multiple erythematous wounds noted on bilateral arms and left leg, right lower leg area, buttock area. Bullous lesion on the right foot area. Neurological:      Mental Status: She is alert and oriented to person, place, and time. Motor: No abnormal muscle tone. Psychiatric:         Judgment: Judgment normal.                                           Lines: All vascular access sites are healthy with no local erythema, discharge or tenderness. Intake and output:     I/O last 3 completed shifts:  In: -   Out: 900 [Urine:900]    Lab Data:   All available labs were reviewed by me today.      CBC:   Recent Labs     06/02/21  2144 06/03/21  0645   WBC 6.8 6.5   RBC 3.34* 3.16*   HGB 10.0* 9.5*   HCT 30.0* 28.2*    431   MCV 89.9 89.4   MCH 30.1 30.1   MCHC 33.5 33.6   RDW 15.9* 15.9*   BANDSPCT  --  1        BMP:  Recent Labs contact isolation precautions B99.9    Hyponatremia E87.1    Encephalopathy, metabolic G98.08    Suspected COVID-19 virus infection Z20.822    Alcohol abuse F10.10    Cellulitis of both feet L03.115, L03. 116    Bilateral cellulitis of lower leg L03.116, L03.115    Multiple wounds T07. Marylene Jewett Trichotillomania F63.3    Gastroesophageal reflux disease without esophagitis K21.9    Non-traumatic rhabdomyolysis M62.82    Overweight (BMI 25.0-29. 9) E66.3    History of infection with vancomycin resistant Enterococcus (VRE) Z86.19    History of MRSA infection Z86.14    Screening for HIV (human immunodeficiency virus) Z11.4         Please note that this chart was generated using Dragon dictation software. Although every effort was made to ensure the accuracy of this automated transcription, some errors in transcription may have occurred inadvertently. If you may need any clarification, please do not hesitate to contact me through EPIC or at the phone number provided below with my electronic signature. Any pictures or media included in this note were obtained after taking informed verbal consent from the patient and with their approval to include those in the patient's medical record.       Akash Lopez MD, MPH  6/3/21, 11:07 AM EDT   [facility] Infectious Disease   [address] [place] [address], [address] [place] [address]  Office: [phone]  Fax: [phone]  Clinic days:  Tuesday & Thursday

## 2021-06-03 NOTE — FLOWSHEET NOTE
06/03/21 1722   Encounter Summary   Services provided to: Patient   Referral/Consult From: Nurse   Continue Visiting   (Refused ACP conversation 6/3 TJR)   Complexity of Encounter [address]   (Refused)    responded to referral for ACP conversation with patient. Patient states she did not want to talk about this at all because she has a lot going on. I told her she could call outpatient spiritual care if she wants to complete docs at a later time.

## 2021-06-03 NOTE — PROGRESS NOTES
Progress Note - Dr. Lucina Friedman - Internal Medicine  PCP: Carolyn Valadez MD [address] / [place] [phone]    Hospital Day: 1  Code Status: Full Code  Current Diet: ADULT DIET; Regular        CC: follow up on medical issues    Subjective:   Mitesh Gay is a 60 y.o. female. She admits to problems    Still with generalized pain, a few new blisters                    She denies chest pain, denies shortness of breath, denies nausea,  denies emesis. 10 system Review of Systems is reviewed with patient, and pertinent positives are noted in HPI above . Otherwise, Review of systems is negative. I have reviewed the patient's medical and social history in detail and updated the computerized patient record. To recap: She  has a past medical history of Anxiety, Arthritis, Blood transfusion reaction, Crohn's disease ([address] Depression, GERD (gastroesophageal reflux disease), Hiatal hernia, Hx of blood clots, Hypertension, MRSA (methicillin resistant staph aureus) culture positive, Neuropathy, Pneumonia, Sinus headache, SOB (shortness of breath), and VRE (vancomycin resistant enterococcus) culture positive. . She  has a past surgical history that includes Hysterectomy; knee surgery (Bilateral); Hand Carpectomy (Bilateral); hernia repair; Abdomen surgery; bladder suspension; fracture surgery; Heel spur surgery; Tonsillectomy; Colonoscopy; Upper gastrointestinal endoscopy (6/14/13); Foot surgery (Left, 6/25/14); Foot surgery (6/3/15); Colonoscopy (9/14/15); Foot surgery (Left, 08/05/2002); joint replacement; Sigmoidoscopy (01/15/2018); Abdominal adhesion surgery (06/08/2018); Colonoscopy (08/07/2018); colostomy (N/A, 10/8/2018); pr secd clos surg wnd exten/complic (N/A, 97/64/6288); Leg Surgery (Right); Colonoscopy (06/07/2019); Colonoscopy (N/A, 6/7/2019); Colonoscopy (N/A, 6/7/2019); Small intestine surgery (N/A, 7/17/2019); and proctosigmoidoscopy (N/A, 7/17/2019). . She  reports that she has been smoking cigarettes and e-cigarettes. She has a 10.00 pack-year smoking history. She has never used smokeless tobacco. She reports current alcohol use of about 2.0 standard drinks of alcohol per week. She reports that she does not use drugs. .        Active Hospital Problems    Diagnosis Date Noted    Cellulitis of both feet [L03.115, L03.116] 06/02/2021    Bilateral cellulitis of lower leg [L03.116, L03.115] 06/02/2021    Hyponatremia [E87.1] 04/27/2021    Essential hypertension [I10]     Cro[address]'s colitis ([facility] [address] [K50.10] 01/06/2018    Anemia [D64.9] 01/05/2018       Current Facility-Administered Medications: baclofen (LIORESAL) tablet 10 mg, 10 mg, Oral, TID  busPIRone (BUSPAR) tablet 10 mg, 10 mg, Oral, TID  balsalazide (COLAZAL) capsule 3,000 mg, 3,000 mg, Oral, Daily  ferrous gluconate 324 (37.5 Fe) MG tablet 324 mg, 324 mg, Oral, Daily with breakfast  hydrOXYzine (ATARAX) tablet 10 mg, 10 mg, Oral, TID PRN  atorvastatin (LIPITOR) tablet 80 mg, 80 mg, Oral, Nightly  potassium chloride (KLOR-CON M) extended release tablet 20 mEq, 20 mEq, Oral, BID  rivaroxaban (XARELTO) tablet 20 mg, 20 mg, Oral, Daily with breakfast  metoprolol succinate (TOPROL XL) extended release tablet 50 mg, 50 mg, Oral, Daily  furosemide (LASIX) tablet 20 mg, 20 mg, Oral, BID  0.9 % sodium chloride infusion, , Intravenous, Continuous  sodium chloride flush 0.9 % injection 5-40 mL, 5-40 mL, Intravenous, 2 times per day  sodium chloride flush 0.9 % injection 10 mL, 10 mL, Intravenous, PRN  0.9 % sodium chloride infusion, 25 mL, Intravenous, PRN  potassium chloride (KLOR-CON M) extended release tablet 40 mEq, 40 mEq, Oral, PRN **OR** potassium bicarb-citric acid (EFFER-K) effervescent tablet 40 mEq, 40 mEq, Oral, PRN **OR** potassium chloride 10 mEq/100 mL IVPB (Peripheral Line), 10 mEq, Intravenous, PRN  ondansetron (ZOFRAN-ODT) disintegrating tablet 4 mg, 4 mg, Oral, Q8H PRN **OR** ondansetron (ZOFRAN) injection 4 mg, 4 mg, Intravenous, Q6H PRN  magnesium hydroxide (MILK OF MAGNESIA) 400 MG/5ML suspension 30 mL, 30 mL, Oral, Daily PRN  acetaminophen (TYLENOL) tablet 650 mg, 650 mg, Oral, Q6H PRN **OR** acetaminophen (TYLENOL) suppository 650 mg, 650 mg, Rectal, Q6H PRN  cefTRIAXone (ROCEPHIN) 1000 mg in sterile water 10 mL IV syringe, 1,000 mg, Intravenous, Q24H  vancomycin (VANCOCIN) 1,250 mg in dextrose 5 % 250 mL IVPB, 1,250 mg, Intravenous, Q12H  hydrALAZINE (APRESOLINE) injection 10 mg, 10 mg, Intravenous, Q6H PRN  0.9 % sodium chloride bolus, 500 mL, Intravenous, PRN  HYDROmorphone HCl PF (DILAUDID) injection 0.5 mg, 0.5 mg, Intravenous, Q3H PRN  pantoprazole (PROTONIX) tablet 40 mg, 40 mg, Oral, QAM AC         Objective:  /73   Pulse 78   Temp 97.4 °F (36.3 °C) (Temporal)   Resp 16   Ht 5' 7\" (1.702 m)   Wt 186 lb (84.4 kg)   SpO2 95%   BMI 29.13 kg/m²      Patient Vitals for the past 24 hrs:   BP Temp Temp src Pulse Resp SpO2 Height Weight   06/03/21 0438 107/73 97.4 °F (36.3 °C) Temporal 78 16 95 % -- --   06/03/21 0321 -- -- -- -- 16 93 % -- --   06/03/21 0015 123/82 98.2 °F (36.8 °C) Oral 102 16 99 % -- --   06/02/21 2306 (!) 97/45 -- -- 94 18 98 % -- --   06/02/21 2215 111/70 -- -- 100 18 99 % -- --   06/02/21 2031 -- -- -- 100 -- -- -- --   06/02/21 2025 120/89 98.1 °F (36.7 °C) Oral 110 18 98 % 5' 7\" (1.702 m) 186 lb (84.4 kg)     Patient Vitals for the past 96 hrs (Last 3 readings):   Weight   06/02/21 2025 186 lb (84.4 kg)           Intake/Output Summary (Last 24 hours) at 6/3/2021 0830  Last data filed at 6/3/2021 0438  Gross per 24 hour   Intake --   Output 900 ml   Net -900 ml         Physical Exam:   /73   Pulse 78   Temp 97.4 °F (36.3 °C) (Temporal)   Resp 16   Ht 5' 7\" (1.702 m)   Wt 186 lb (84.4 kg)   SpO2 95%   BMI 29.13 kg/m²   General appearance: alert, appears stated age and cooperative  Head: Normocephalic, without obvious abnormality, atraumatic  Lungs: clear to auscultation bilaterally  Heart:

## 2021-06-03 NOTE — H&P
History and Physical  Dr. Lucina Friedman  6/2/2021    PCP: Carolyn Valadez MD    Cc:   Chief Complaint   Patient presents with   [place] Wound Check    Dressing Change       HPI:  Mitesh Gay is a 60 y.o. female who has a past medical history of Anxiety, Arthritis, Blood transfusion reaction, [facility] Depression, GERD (gastroesophageal reflux disease), Hiatal hernia, Hx of blood clots, Hypertension, MRSA (methicillin resistant staph aureus) culture positive, Neuropathy, Pneumonia, Sinus headache, SOB (shortness of breath), and VRE (vancomycin resistant enterococcus) culture positive. Patient presents with Cellulitis of both feet. HPI      60 y.o. female who presents with a Chief Complaint of skin eruption. Upon further chart review, patient has been seen for chronic pruritus in the past and takes Vistaril for this. Patient also has some older chronic wounds for which she sees a wound care clinic. States that came out and evaluated her today and told her she needed to come straight to the ER. Patient does report blistering to hands, feet, extremities that she reports is new over the past 1 to 2 weeks. She denies any new medication or medication changes. No new soaps lotions detergents. No history of similar symptoms. Pt has multiple fluid filled blisters scattered on many areas of skin. Some etythema to legs  To be admitted for further eval, iv abx    Problem list of hospitalization thus far: Active Hospital Problems    Diagnosis     Cellulitis of both feet [L03.115, T00.928]     Hyponatremia [E87.1]     Essential hypertension [I10]     Cro[facility]'s colitis ([facility] [K50.10]     Anemia [D64.9]          Review of Systems: (1 system for EPF, 2-9 for detailed, 10+ for comprehensive)  Review of Systems   Constitutional: Negative for chills and fever. HENT: Negative for ear pain and tinnitus. Eyes: Negative for pain and itching. Respiratory: Negative for cough and shortness of breath. Cardiovascular: Negative for chest pain and leg swelling. Gastrointestinal: Negative for nausea and vomiting. Endocrine: Negative for polydipsia and polyphagia. Genitourinary: Negative for frequency and urgency. Musculoskeletal: Negative for gait problem and neck pain. Skin: Positive for color change and wound. Allergic/Immunologic: Negative for food allergies. Neurological: Negative for dizziness and headaches. Hematological: Negative for adenopathy. Psychiatric/Behavioral: Negative for agitation and dysphoric mood.            Past Medical History:   Past Medical History:   Diagnosis Date    Anxiety     Arthritis     Blood transfusion reaction     Crohn's disease ([address]     Depression     GERD (gastroesophageal reflux disease)     Hiatal hernia 6/24/2014    Hx of blood clots     Hypertension     MRSA (methicillin resistant staph aureus) culture positive 06/05/2018    urine    Neuropathy     Pneumonia 1/8/2014    Sinus headache     SOB (shortness of breath)     VRE (vancomycin resistant enterococcus) culture positive 10/08/2018    abd culture       Past Surgical History:   Past Surgical History:   Procedure Laterality Date    ABDOMEN SURGERY      ABDOMINAL ADHESION SURGERY  06/08/2018    BLADDER SUSPENSION      COLONOSCOPY      COLONOSCOPY  9/14/15    sigmoid colon biopsy     COLONOSCOPY  08/07/2018    COLONOSCOPY  06/07/2019    COLONOSCOPY, POSSIBLE BIOPSY, POSSIBLE POLYPECTOMY    COLONOSCOPY N/A 6/7/2019    COLONOSCOPY WITH BIOPSY performed by Santo Trinidad MD at [facility] 6/7/2019    COLONOSCOPY DIAGNOSTIC/STOMA performed by Santo Trinidad MD at [address] [facility] 10/8/2018    EXPLORATORY LAPAROTOMY WITH COLOSTOMY performed by Marcelo Quezada MD at [address] Left 6/25/14    excision plantar left hallux    FOOT SURGERY  6/3/15    PLANTAR PLATE REPAIR BILATERAL, ARTHROTOMY MPJ 2ND BILATERAL    FOOT SURGERY Left 08/05/2002    Excision second metatarsal head left    FRACTURE SURGERY      rt hip    HAND CARPECTOMY Bilateral     HEEL SPUR SURGERY      HERNIA REPAIR      HYSTERECTOMY      bladder surgery    JOINT REPLACEMENT      rt hip, L shoulder replacement 11/2014    KNEE SURGERY Bilateral     arthrosvopic    LEG SURGERY Right     OK SECD CLOS SURG WND EXTEN/COMPLIC N/A 99/57/9813    SECONDARY WOUND CLOSURE OF ABDOMINAL WOUND performed by Faraz Gonzalez MD at [address] N/A 7/17/2019    FLEXIBLE SIGMOIDOSCOPY performed by Faraz Gonzalez MD at [address]  01/15/2018    with biopsies    SMALL INTESTINE SURGERY N/A 7/17/2019    COLOSTOMY CLOSURE WITH COLORECTAL ANASTOMOSIS performed by Faraz Gonzalez MD at [place] [address]  6/14/13    and colonsocopy with antral erosion biopsies       Social History:   Social History     Tobacco History     Smoking Status  Current Every Day Smoker Smoking Frequency  1 pack/day for 10 years (10 pk yrs) Smoking Tobacco Type  Cigarettes, E-Cigarettes    Smokeless Tobacco Use  Never Used    Tobacco Comment  CUT BACK TO 1/2 PPD WITH E CIG 6-2019          Alcohol History     Alcohol Use Status  Yes Drinks/Week  2 Shots of liquor, 0 Standard drinks or equivalent per week Amount  2.0 standard drinks of alcohol/wk Comment  6 DRINKS A WEEK OR LESS          Drug Use     Drug Use Status  No          Sexual Activity     Sexually Active  Not Currently Partners  Male                Fam History:   Family History   Problem Relation Age of Onset    High Blood Pressure Mother     High Blood Pressure Father     Kidney Disease Sister        PFSH: The above PMHx, PSHx, SocHx, FamHx has been reviewed by myself.  (1 area for detailed, 2-3 for comprehensive)      Code Status: Prior    [facility]  Prior to Admission medications    Medication Sig Start Date End Date Taking? Authorizing Provider   furosemide (LASIX) 20 MG tablet Take 1 tablet by mouth 2 times daily 5/27/21   Mary Milan MD   sodium chloride 1 g tablet Take 2 tablets by mouth 3 times daily (with meals) 4/30/21   Marcia Harrison MD   metoprolol succinate (TOPROL XL) 50 MG extended release tablet Take 1 tablet by mouth daily 1/21/21   Mini Gum, APRN - CNP   rivaroxaban (XARELTO) 20 MG TABS tablet Take 1 tablet by mouth daily (with breakfast) 12/7/20   Mini Gum, APRN - CNP   atorvastatin (LIPITOR) 80 MG tablet Take 1 tablet by mouth nightly 7/30/20   Cindi Hidalgo MD   fluticasone (FLONASE) 50 MCG/ACT nasal spray 1 spray by Each Nostril route daily as needed for Rhinitis 7/30/20   Cindi Hidalgo MD   potassium chloride (KLOR-CON M) 20 MEQ extended release tablet Take 1 tablet by mouth 2 times daily 7/30/20   Leela Lipscomb MD   ferrous gluconate (FERGON) 324 (38 Fe) MG tablet Take 324 mg by mouth daily (with breakfast)    Historical Provider, MD   hydrOXYzine (ATARAX) 10 MG tablet Take 10 mg by mouth 3 times daily as needed for Itching    Historical Provider, MD   fexofenadine (ALLEGRA) 180 MG tablet Take 180 mg by mouth daily    Historical Provider, MD   ondansetron (ZOFRAN ODT) 4 MG disintegrating tablet Take 1 tablet by mouth every 8 hours as needed for Nausea 7/15/20   LYNN Gupta   busPIRone (BUSPAR) 10 MG tablet Take 10 mg by mouth 3 times daily as needed    Historical Provider, MD   nystatin (MYCOSTATIN) 296701 UNIT/GM cream Apply topically 2 times daily as needed for Dry Skin Apply topically 2 times daily. Historical Provider, MD   balsalazide (COLAZAL) 750 MG capsule Take 3,000 mg by mouth daily Take 4 capsules (total 3000 mg) daily each morning.     Historical Provider, MD   omeprazole (PRILOSEC) 20 MG delayed release capsule Take 20 mg by mouth Daily     Historical Provider, MD   baclofen (LIORESAL) 10 MG tablet Take 1 tablet by mouth 3 times daily 6/30/18   Tish Williamson MD         Allergies   Allergen Reactions    Ace Inhibitors Swelling    Skelaxin [Metaxalone] Swelling             EXAM: (2-7 system for EPF/Detailed, ?8 for Comprehensive)  /70   Pulse 100   Temp 98.1 °F (36.7 °C) (Oral)   Resp 18   Ht 5' 7\" (1.702 m)   Wt 186 lb (84.4 kg)   SpO2 99%   BMI 29.13 kg/m²   Constitutional: vitals as above: alert, appears stated age and cooperative  Psychiatric: normal insight and judgment, oriented to person, place, time, and general circumstances  Head: Normocephalic, without obvious abnormality, atraumatic  Eyes:lids and lashes normal, conjunctivae and sclerae normal and pupils equal, round, reactive to light and accomodation  EMNT: external ears normal, lips normal  Neck: no adenopathy, supple, symmetrical, trachea midline and thyroid not enlarged, symmetric, no tenderness/mass/nodules   Respiratory: clear to auscultation and percussion bilaterally with normal respiratory effort  Cardiovascular: normal rate, regular rhythm, normal S1 and S2 and no carotid bruits  Gastrointestinal: soft, non-tender, non-distended, normal bowel sounds, no masses or organomegaly  Lymphatic:   Extremities: no edema, no clubbing  Skin:No rashes or nodules noted.   Neurologic:    LABS:  Labs Reviewed   CBC WITH AUTO DIFFERENTIAL - Abnormal; Notable for the following components:       Result Value    RBC 3.34 (*)     Hemoglobin 10.0 (*)     Hematocrit 30.0 (*)     RDW 15.9 (*)     Eosinophils Absolute 1.4 (*)     All other components within normal limits    Narrative:     Performed at:  [facility] [address], [facility], [address]   Phone [phone]   COMPREHENSIVE METABOLIC PANEL - Abnormal; Notable for the following components:    Sodium 128 (*)     Chloride 89 (*)     Calcium 7.7 (*)     Albumin 3.2 (*)     Albumin/Globulin Ratio 0.8 (*)     ALT <5 (*)     All other components within normal limits    Narrative:     Performed at:  [facility]  [address] [facility],  [place], [address]   Phone [phone]   LIPASE - Abnormal; Notable for the following components:    Lipase 12.0 (*)     All other components within normal limits    Narrative:     Performed at:  [facility]  [address] [facility],  [place], [address]   Phone [phone]   CULTURE, BLOOD 1   CULTURE, BLOOD 2   LACTATE, SEPSIS    Narrative:     Performed at:  [facility]  [address] [facility],  [place], [address]   Phone [phone]   LACTATE, SEPSIS   CK   SEDIMENTATION RATE   C-REACTIVE PROTEIN         IMAGING:  Imaging results from the ER have been reviewed in the computerized chart. No results found. MEDICAL DECISION MAKING:    Principal Problem:    Cellulitis of both feet -Established problem. Stable. Pt with rash, fluid filled blisters. ddx - cellulitis +/- bullous pemphigoid? Plan: admit, empiric iv abx, steroids. May need derm eval  Active Problems:    Anemia -Established problem. Stable. Plan: Continue present orders/plan. Crohn's colitis ([facility] [address] -Established problem. Stable. chronic  Plan: cont home meds    Essential hypertension -Established problem. Stable. 111/70  Plan: Pt home BP meds reviewed and will be continued. Will monitor labs to assess Creat/K for possible complications of medications. Hyponatremia -Established problem. Stable. Chronic issue  Plan: consult neph          Diagnoses as listed above, designated as new or established and plan outlined for each. Data Reviewed:   (1) Lab tests were reviewed or ordered. (1) Radiology tests were reviewed or ordered. (1) Medical test (Echo, EKG, PFT/tracie) were ordered. (1)History was not obtained from someone other than patient  (1) Old records  were reviewed - see HPI/MDM for pertinent details if review done.   (2) Case wasdiscussed with another health care provider: Shahana BAILEY  (2) Imaging was not viewed by myself. (2) EKG  was not viewed by myself. The patient isbeing placed in inpatient status with the expectation of requiring a hospital stay spanning at least two midnights for care and treatment of the problems noted in the problem list.  This determination is also based on thepatients comorbidities and past medical history, the severity and timing of the signs and symptoms upon presentation.         Electronically signed by: Faraz Henriquez MD 6/2/2021

## 2021-06-03 NOTE — ED PROVIDER NOTES
[facility] [facility]        Pt Name: Adriana Keen  MRN: [id number]  [place] 1955  Date of evaluation: 6/2/2021  Provider: Tracie Peña PA-C  PCP: Marcus Burrell MD  Note Started: 9:22 PM EDT        I have seen and evaluated this patient with my supervising physician Jesi Adams MD.    [address] [place] [address]       Chief Complaint   Patient presents with   Raymundo Nakai Wound Check    Dressing Change       HISTORY OF PRESENT ILLNESS   (Location, Timing/Onset, Context/Setting, Quality, Duration, Modifying Factors, Severity, Associated Signs and Symptoms)  Note limiting factors. Adriana Keen is a 60 y.o. female who presents with a Chief Complaint of skin eruption. Upon further chart review, patient has been seen for chronic pruritus in the past and takes Vistaril for this. Patient also has some older chronic wounds for which she sees a wound care clinic. States that came out and evaluated her today and told her she needed to come straight to the ER. Patient does report blistering to hands, feet, extremities that she reports is new over the past 1 to 2 weeks. She denies any new medication or medication changes. No new soaps lotions detergents. No history of similar symptoms. States that it is itchy but also extremely painful. She denies fevers or chills. Patient does have history of prior CVA and has difficulty with word finding, therefore, history is limited and difficult. No additional information able to obtained at this time. Nursing Notes were all reviewed and agreed with or any disagreements were addressed in the HPI. REVIEW OF SYSTEMS    (2-9 systems for level 4, 10 or more for level 5)     Review of Systems   Constitutional: Negative for appetite change, chills and fever. HENT: Negative for congestion and rhinorrhea. Respiratory: Negative for cough, shortness of breath and wheezing.     Cardiovascular: Negative for chest pain. Gastrointestinal: Negative for abdominal pain, diarrhea, nausea and vomiting. Genitourinary: Negative for difficulty urinating, dysuria and hematuria. Musculoskeletal: Negative for neck pain and neck stiffness. Skin: Positive for rash and wound. Neurological: Negative for headaches. Positives and Pertinent negatives as per HPI. Except as noted above in the ROS, all other systems were reviewed and negative.        PAST MEDICAL HISTORY     Past Medical History:   Diagnosis Date    Anxiety     Arthritis     Blood transfusion reaction     Crohn's disease ([address]     Depression     GERD (gastroesophageal reflux disease)     Hiatal hernia 6/24/2014    Hx of blood clots     Hypertension     MRSA (methicillin resistant staph aureus) culture positive 06/05/2018    urine    Neuropathy     Pneumonia 1/8/2014    Sinus headache     SOB (shortness of breath)     VRE (vancomycin resistant enterococcus) culture positive 10/08/2018    abd culture         SURGICAL HISTORY     Past Surgical History:   Procedure Laterality Date    ABDOMEN SURGERY      ABDOMINAL ADHESION SURGERY  06/08/2018    BLADDER SUSPENSION      COLONOSCOPY      COLONOSCOPY  9/14/15    sigmoid colon biopsy     COLONOSCOPY  08/07/2018    COLONOSCOPY  06/07/2019    COLONOSCOPY, POSSIBLE BIOPSY, POSSIBLE POLYPECTOMY    COLONOSCOPY N/A 6/7/2019    COLONOSCOPY WITH BIOPSY performed by Raafl Maradiaga MD at [address] N/A 6/7/2019    COLONOSCOPY DIAGNOSTIC/STOMA performed by Rafal Maradiaga MD at [address] N/A 10/8/2018    EXPLORATORY LAPAROTOMY WITH COLOSTOMY performed by Janis Ledezma MD at [address] Left 6/25/14    excision plantar left hallux    FOOT SURGERY  6/3/15    PLANTAR PLATE REPAIR BILATERAL, ARTHROTOMY MPJ 2ND BILATERAL    FOOT SURGERY Left 08/05/2002    Excision second metatarsal head left    FRACTURE SURGERY      rt hip    HAND CARPECTOMY Bilateral     HEEL SPUR SURGERY      HERNIA REPAIR      HYSTERECTOMY      bladder surgery    JOINT REPLACEMENT      rt hip, L shoulder replacement 11/2014    KNEE SURGERY Bilateral     arthrosvopic    LEG SURGERY Right     MI SECD CLOS SURG WND EXTEN/COMPLIC N/A 77/12/1856    SECONDARY WOUND CLOSURE OF ABDOMINAL WOUND performed by More Garber MD at [facility] 7/17/2019    FLEXIBLE SIGMOIDOSCOPY performed by More Garber MD at [facility]  01/15/2018    with biopsies    SMALL INTESTINE SURGERY N/A 7/17/2019    COLOSTOMY CLOSURE WITH COLORECTAL ANASTOMOSIS performed by More Garber MD at [place] [address]  6/14/13    and colonsocopy with antral erosion biopsies         CURRENTMEDICATIONS       Previous Medications    ATORVASTATIN (LIPITOR) 80 MG TABLET    Take 1 tablet by mouth nightly    BACLOFEN (LIORESAL) 10 MG TABLET    Take 1 tablet by mouth 3 times daily    BALSALAZIDE (COLAZAL) 750 MG CAPSULE    Take 3,000 mg by mouth daily Take 4 capsules (total 3000 mg) daily each morning. BUSPIRONE (BUSPAR) 10 MG TABLET    Take 10 mg by mouth 3 times daily as needed    FERROUS GLUCONATE (FERGON) 324 (38 FE) MG TABLET    Take 324 mg by mouth daily (with breakfast)    FEXOFENADINE (ALLEGRA) 180 MG TABLET    Take 180 mg by mouth daily    FLUTICASONE (FLONASE) 50 MCG/ACT NASAL SPRAY    1 spray by Each Nostril route daily as needed for Rhinitis    FUROSEMIDE (LASIX) 20 MG TABLET    Take 1 tablet by mouth 2 times daily    HYDROXYZINE (ATARAX) 10 MG TABLET    Take 10 mg by mouth 3 times daily as needed for Itching    METOPROLOL SUCCINATE (TOPROL XL) 50 MG EXTENDED RELEASE TABLET    Take 1 tablet by mouth daily    NYSTATIN (MYCOSTATIN) 475548 UNIT/GM CREAM    Apply topically 2 times daily as needed for Dry Skin Apply topically 2 times daily.     OMEPRAZOLE (PRILOSEC) 20 MG DELAYED RELEASE CAPSULE    Take 20 mg by mouth Daily     ONDANSETRON (ZOFRAN ODT) 4 MG DISINTEGRATING TABLET    Take 1 tablet by mouth every 8 hours as needed for Nausea    POTASSIUM CHLORIDE (KLOR-CON M) 20 MEQ EXTENDED RELEASE TABLET    Take 1 tablet by mouth 2 times daily    RIVAROXABAN (XARELTO) 20 MG TABS TABLET    Take 1 tablet by mouth daily (with breakfast)    SODIUM CHLORIDE 1 G TABLET    Take 2 tablets by mouth 3 times daily (with meals)         ALLERGIES     Ace inhibitors and Skelaxin [metaxalone]    FAMILYHISTORY       Family History   Problem Relation Age of Onset    High Blood Pressure Mother     High Blood Pressure Father     Kidney Disease Sister           SOCIAL HISTORY       Social History     Tobacco Use    Smoking status: Current Every Day Smoker     Packs/day: 1.00     Years: 10.00     Pack years: 10.00     Types: Cigarettes, E-Cigarettes    Smokeless tobacco: Never Used    Tobacco comment: CUT BACK TO 1/2 PPD WITH E CIG 6-2019   Vaping Use    Vaping Use: Every day    Start date: 3/28/2019    Substances: Always (LOWEST DOSE)   Substance Use Topics    Alcohol use: Yes     Alcohol/week: 2.0 standard drinks     Types: 2 Shots of liquor per week     Comment: 6 DRINKS A WEEK OR LESS    Drug use: No       SCREENINGS    [place] Coma Scale  Eye Opening: Spontaneous  Best Verbal Response: Oriented  Best Motor Response: Obeys commands  [place] Coma Scale Score: 15        PHYSICAL EXAM    (up to 7 for level 4, 8 or more for level 5)     ED Triage Vitals [06/02/21 2025]   BP Temp Temp Source Pulse Resp SpO2 Height Weight   120/89 98.1 °F (36.7 °C) Oral 110 18 98 % 5' 7\" (1.702 m) 186 lb (84.4 kg)       Physical Exam  Vitals and nursing note reviewed. Constitutional:       General: She is not in acute distress. Appearance: She is well-developed. She is not ill-appearing, toxic-appearing or diaphoretic. HENT:      Head: Normocephalic and atraumatic.       Right Ear: External ear normal.      Left Ear: External ear normal.      Nose: Nose normal.   Eyes:      General:         Right eye: No discharge. Left eye: No discharge. Cardiovascular:      Rate and Rhythm: Normal rate and regular rhythm. Heart sounds: Normal heart sounds. Pulmonary:      Effort: Pulmonary effort is normal. No respiratory distress. Breath sounds: Normal breath sounds. Abdominal:      General: There is no distension. Palpations: Abdomen is soft. Tenderness: There is no abdominal tenderness. Musculoskeletal:         General: Normal range of motion. Cervical back: Normal range of motion and neck supple. Skin:     General: Skin is warm and dry. Neurological:      Mental Status: She is alert and oriented to person, place, and time.    Psychiatric:         Behavior: Behavior normal.         DIAGNOSTIC RESULTS   LABS:    Labs Reviewed   CBC WITH AUTO DIFFERENTIAL - Abnormal; Notable for the following components:       Result Value    RBC 3.34 (*)     Hemoglobin 10.0 (*)     Hematocrit 30.0 (*)     RDW 15.9 (*)     Eosinophils Absolute 1.4 (*)     All other components within normal limits    Narrative:     Performed at:  [facility] [address], [place], [address]   Phone [phone]   COMPREHENSIVE METABOLIC PANEL - Abnormal; Notable for the following components:    Sodium 128 (*)     Chloride 89 (*)     Calcium 7.7 (*)     Albumin 3.2 (*)     Albumin/Globulin Ratio 0.8 (*)     ALT <5 (*)     All other components within normal limits    Narrative:     Performed at:  [facility] [address], [place], [address]   Phone [phone]   LIPASE - Abnormal; Notable for the following components:    Lipase 12.0 (*)     All other components within normal limits    Narrative:     Performed at:  [facility] [address], [place], [address]   Phone [phone]   CK - Abnormal; Notable for the following components: Total  (*)     All other components within normal limits    Narrative:     Performed at:  [facility]  [address],  [place], [address]   Phone [phone]   C-REACTIVE PROTEIN - Abnormal; Notable for the following components:    CRP 90.9 (*)     All other components within normal limits    Narrative:     Performed at:  [facility]  [address],  [place], [address]   Phone [phone]   CULTURE, BLOOD 1   CULTURE, BLOOD 2   LACTATE, SEPSIS    Narrative:     Performed at:  [facility]  [address],  [place], [address]   Phone [phone]   LACTATE, SEPSIS   SEDIMENTATION RATE       All other labs were within normal range or not returned as of this dictation. EKG: All EKG's are interpreted by the Emergency Department Physician in the absence of a cardiologist.  Please see their note for interpretation of EKG. RADIOLOGY:   Non-plain film images such as CT, Ultrasound and MRI are read by the radiologist. Plain radiographic images are visualized and preliminarily interpreted by the ED Provider with the below findings:        Interpretation per the Radiologist below, if available at the time of this note:    No orders to display     No results found.         PROCEDURES   Unless otherwise noted below, none     Procedures    CRITICAL CARE TIME   N/A    CONSULTS:  IP CONSULT TO INTERNAL MEDICINE  IP CONSULT TO INFECTIOUS DISEASES  PHARMACY TO DOSE VANCOMYCIN  IP CONSULT TO NEPHROLOGY      EMERGENCY DEPARTMENT COURSE and DIFFERENTIAL DIAGNOSIS/MDM:   Vitals:    Vitals:    06/02/21 2025 06/02/21 2031 06/02/21 2215   BP: 120/89  111/70   Pulse: 110 100 100   Resp: 18  18   Temp: 98.1 °F (36.7 °C)     TempSrc: Oral     SpO2: 98%  99%   Weight: 186 lb (84.4 kg)     Height: 5' 7\" (1.702 m)         Patient was given the following medications:  Medications   vancomycin dictations but occasionally words are mis-transcribed.)    Sowmya Nguyen PA-C (electronically signed)           [place], [place]  06/02/21 0367

## 2021-06-03 NOTE — ED PROVIDER NOTES
This patient was seen by the Mid-Level Provider. I have seen and examined the patient, agree with the workup, evaluation, management and diagnosis. Care plan has been discussed. My assessment reveals a 44-year-old female who presents with some blisters. This is a 44-year-old female with history of pruritus who constantly picks at herself who states that over the last several weeks has developed some blisters. The patient points to all around her body and mostly her feet. She describes this is painful. She denies any fevers or chills. She denies any other complaints. Radiology results:    No orders to display         LABS:    Labs Reviewed   CULTURE, BLOOD 1   CULTURE, BLOOD 2   CBC WITH AUTO DIFFERENTIAL   COMPREHENSIVE METABOLIC PANEL   LIPASE   LACTATE, SEPSIS   LACTATE, SEPSIS               Exam:    Well-nourished female. The patient had multiple areas of excoriation that appear to be mostly chronic including her trunk and back and arms. However, the patient also had significant areas of blisters or bulla around her feet torso legs and arms. It is not clear whether this is secondary to bullous pemphigus or a superinfection from picking at her chronic pruritus. Medical decision making:     Patient's work-up is still pending and she will be admitted for further care. She is in stable condition. FINAL IMPRESSION:    1.  Local infection of skin and subcutaneous tissue           Vale Holcomb MD  06/02/21 5821

## 2021-06-04 ENCOUNTER — TELEPHONE (OUTPATIENT)
Dept: INFECTIOUS DISEASES | Age: 66
End: 2021-06-04

## 2021-06-04 VITALS
BODY MASS INDEX: 31.08 KG/M2 | DIASTOLIC BLOOD PRESSURE: 88 MMHG | WEIGHT: 198 LBS | HEART RATE: 102 BPM | OXYGEN SATURATION: 97 % | SYSTOLIC BLOOD PRESSURE: 104 MMHG | TEMPERATURE: 97.7 F | HEIGHT: 67 IN | RESPIRATION RATE: 16 BRPM

## 2021-06-04 LAB
ANION GAP SERPL CALCULATED.3IONS-SCNC: 14 MMOL/L (ref 3–16)
BASOPHILS ABSOLUTE: 0 K/UL (ref 0–0.2)
BASOPHILS RELATIVE PERCENT: 0.3 %
BUN BLDV-MCNC: 11 MG/DL (ref 7–20)
CALCIUM SERPL-MCNC: 7.4 MG/DL (ref 8.3–10.6)
CHLORIDE BLD-SCNC: 97 MMOL/L (ref 99–110)
CO2: 23 MMOL/L (ref 21–32)
CREAT SERPL-MCNC: <0.5 MG/DL (ref 0.6–1.2)
EOSINOPHILS ABSOLUTE: 0 K/UL (ref 0–0.6)
EOSINOPHILS RELATIVE PERCENT: 0 %
GFR AFRICAN AMERICAN: >60
GFR NON-AFRICAN AMERICAN: >60
GLUCOSE BLD-MCNC: 125 MG/DL (ref 70–99)
HCT VFR BLD CALC: 25.9 % (ref 36–48)
HEMOGLOBIN: 8.8 G/DL (ref 12–16)
HIV AG/AB: NORMAL
HIV ANTIGEN: NORMAL
HIV-1 ANTIBODY: NORMAL
HIV-2 AB: NORMAL
LYMPHOCYTES ABSOLUTE: 1.1 K/UL (ref 1–5.1)
LYMPHOCYTES RELATIVE PERCENT: 10.8 %
MCH RBC QN AUTO: 30.6 PG (ref 26–34)
MCHC RBC AUTO-ENTMCNC: 33.9 G/DL (ref 31–36)
MCV RBC AUTO: 90.1 FL (ref 80–100)
MONOCYTES ABSOLUTE: 0.6 K/UL (ref 0–1.3)
MONOCYTES RELATIVE PERCENT: 6 %
NEUTROPHILS ABSOLUTE: 8.6 K/UL (ref 1.7–7.7)
NEUTROPHILS RELATIVE PERCENT: 82.9 %
PDW BLD-RTO: 15.9 % (ref 12.4–15.4)
PLATELET # BLD: 429 K/UL (ref 135–450)
PMV BLD AUTO: 6.8 FL (ref 5–10.5)
POTASSIUM SERPL-SCNC: 4 MMOL/L (ref 3.5–5.1)
RBC # BLD: 2.88 M/UL (ref 4–5.2)
SODIUM BLD-SCNC: 134 MMOL/L (ref 136–145)
VANCOMYCIN TROUGH: 17 UG/ML (ref 10–20)
WBC # BLD: 10.4 K/UL (ref 4–11)

## 2021-06-04 PROCEDURE — 2580000003 HC RX 258: Performed by: INTERNAL MEDICINE

## 2021-06-04 PROCEDURE — 80048 BASIC METABOLIC PNL TOTAL CA: CPT

## 2021-06-04 PROCEDURE — 80202 ASSAY OF VANCOMYCIN: CPT

## 2021-06-04 PROCEDURE — 96376 TX/PRO/DX INJ SAME DRUG ADON: CPT

## 2021-06-04 PROCEDURE — 97530 THERAPEUTIC ACTIVITIES: CPT

## 2021-06-04 PROCEDURE — 97166 OT EVAL MOD COMPLEX 45 MIN: CPT

## 2021-06-04 PROCEDURE — 36415 COLL VENOUS BLD VENIPUNCTURE: CPT

## 2021-06-04 PROCEDURE — 6370000000 HC RX 637 (ALT 250 FOR IP): Performed by: INTERNAL MEDICINE

## 2021-06-04 PROCEDURE — 6360000002 HC RX W HCPCS: Performed by: INTERNAL MEDICINE

## 2021-06-04 PROCEDURE — 96366 THER/PROPH/DIAG IV INF ADDON: CPT

## 2021-06-04 PROCEDURE — G0378 HOSPITAL OBSERVATION PER HR: HCPCS

## 2021-06-04 PROCEDURE — 85025 COMPLETE CBC W/AUTO DIFF WBC: CPT

## 2021-06-04 PROCEDURE — 97162 PT EVAL MOD COMPLEX 30 MIN: CPT

## 2021-06-04 RX ORDER — HYDROCODONE BITARTRATE AND ACETAMINOPHEN 5; 325 MG/1; MG/1
2 TABLET ORAL EVERY 6 HOURS PRN
Qty: 56 TABLET | Refills: 0 | Status: SHIPPED | OUTPATIENT
Start: 2021-06-04 | End: 2021-06-11

## 2021-06-04 RX ORDER — HYDROMORPHONE HYDROCHLORIDE 1 MG/ML
1 INJECTION, SOLUTION INTRAMUSCULAR; INTRAVENOUS; SUBCUTANEOUS ONCE
Status: COMPLETED | OUTPATIENT
Start: 2021-06-04 | End: 2021-06-04

## 2021-06-04 RX ORDER — PREDNISONE 10 MG/1
TABLET ORAL
Qty: 40 TABLET | Refills: 0 | Status: SHIPPED | OUTPATIENT
Start: 2021-06-04 | End: 2021-06-14

## 2021-06-04 RX ORDER — AMOXICILLIN AND CLAVULANATE POTASSIUM 875; 125 MG/1; MG/1
1 TABLET, FILM COATED ORAL 2 TIMES DAILY
Qty: 14 TABLET | Refills: 0 | Status: SHIPPED | OUTPATIENT
Start: 2021-06-04 | End: 2021-06-11

## 2021-06-04 RX ORDER — LINEZOLID 600 MG/1
600 TABLET, FILM COATED ORAL 2 TIMES DAILY
Qty: 28 TABLET | Refills: 0 | Status: SHIPPED | OUTPATIENT
Start: 2021-06-04 | End: 2021-06-18

## 2021-06-04 RX ORDER — LACTOBACILLUS RHAMNOSUS GG 10B CELL
1 CAPSULE ORAL 2 TIMES DAILY WITH MEALS
Status: DISCONTINUED | OUTPATIENT
Start: 2021-06-04 | End: 2021-06-04 | Stop reason: HOSPADM

## 2021-06-04 RX ADMIN — BUSPIRONE HYDROCHLORIDE 10 MG: 5 TABLET ORAL at 13:48

## 2021-06-04 RX ADMIN — CEFEPIME HYDROCHLORIDE 2000 MG: 2 INJECTION, POWDER, FOR SOLUTION INTRAVENOUS at 13:50

## 2021-06-04 RX ADMIN — Medication 10 ML: at 09:03

## 2021-06-04 RX ADMIN — VANCOMYCIN HYDROCHLORIDE 1250 MG: 10 INJECTION, POWDER, LYOPHILIZED, FOR SOLUTION INTRAVENOUS at 00:52

## 2021-06-04 RX ADMIN — BACLOFEN 10 MG: 10 TABLET ORAL at 13:48

## 2021-06-04 RX ADMIN — HYDROMORPHONE HYDROCHLORIDE 1 MG: 1 INJECTION, SOLUTION INTRAMUSCULAR; INTRAVENOUS; SUBCUTANEOUS at 00:33

## 2021-06-04 RX ADMIN — BALSALAZIDE DISODIUM 3000 MG: 750 CAPSULE ORAL at 10:56

## 2021-06-04 RX ADMIN — RIVAROXABAN 20 MG: 20 TABLET, FILM COATED ORAL at 10:53

## 2021-06-04 RX ADMIN — HYDROMORPHONE HYDROCHLORIDE 1 MG: 1 INJECTION, SOLUTION INTRAMUSCULAR; INTRAVENOUS; SUBCUTANEOUS at 05:16

## 2021-06-04 RX ADMIN — Medication 1 G: at 10:52

## 2021-06-04 RX ADMIN — METOPROLOL SUCCINATE 50 MG: 50 TABLET, EXTENDED RELEASE ORAL at 10:55

## 2021-06-04 RX ADMIN — Medication 1 G: at 12:46

## 2021-06-04 RX ADMIN — FUROSEMIDE 20 MG: 20 TABLET ORAL at 10:54

## 2021-06-04 RX ADMIN — HYDROCODONE BITARTRATE AND ACETAMINOPHEN 1 TABLET: 5; 325 TABLET ORAL at 12:46

## 2021-06-04 RX ADMIN — POTASSIUM CHLORIDE 20 MEQ: 1500 TABLET, EXTENDED RELEASE ORAL at 10:49

## 2021-06-04 RX ADMIN — BACLOFEN 10 MG: 10 TABLET ORAL at 10:55

## 2021-06-04 RX ADMIN — BUSPIRONE HYDROCHLORIDE 10 MG: 5 TABLET ORAL at 10:52

## 2021-06-04 RX ADMIN — FERROUS GLUCONATE TAB 324 MG (37.5 MG ELEMENTAL IRON) 324 MG: 324 (37.5 FE) TAB at 10:51

## 2021-06-04 RX ADMIN — PANTOPRAZOLE SODIUM 40 MG: 40 TABLET, DELAYED RELEASE ORAL at 05:16

## 2021-06-04 RX ADMIN — VANCOMYCIN HYDROCHLORIDE 1250 MG: 10 INJECTION, POWDER, LYOPHILIZED, FOR SOLUTION INTRAVENOUS at 11:40

## 2021-06-04 RX ADMIN — TRIAMCINOLONE ACETONIDE: 1 CREAM TOPICAL at 14:29

## 2021-06-04 RX ADMIN — METHYLPREDNISOLONE SODIUM SUCCINATE 40 MG: 40 INJECTION, POWDER, FOR SOLUTION INTRAMUSCULAR; INTRAVENOUS at 10:59

## 2021-06-04 RX ADMIN — HYDROMORPHONE HYDROCHLORIDE 1 MG: 1 INJECTION, SOLUTION INTRAMUSCULAR; INTRAVENOUS; SUBCUTANEOUS at 13:47

## 2021-06-04 RX ADMIN — HYDROMORPHONE HYDROCHLORIDE 1 MG: 1 INJECTION, SOLUTION INTRAMUSCULAR; INTRAVENOUS; SUBCUTANEOUS at 09:03

## 2021-06-04 ASSESSMENT — PAIN DESCRIPTION - DESCRIPTORS
DESCRIPTORS: BURNING

## 2021-06-04 ASSESSMENT — PAIN SCALES - GENERAL
PAINLEVEL_OUTOF10: 10
PAINLEVEL_OUTOF10: 10
PAINLEVEL_OUTOF10: 9
PAINLEVEL_OUTOF10: 10

## 2021-06-04 ASSESSMENT — PAIN DESCRIPTION - PAIN TYPE
TYPE: ACUTE PAIN

## 2021-06-04 ASSESSMENT — ENCOUNTER SYMPTOMS
FACIAL SWELLING: 0
DIARRHEA: 0
BLOOD IN STOOL: 0
ABDOMINAL PAIN: 0
EYE REDNESS: 0
ABDOMINAL DISTENTION: 0
PHOTOPHOBIA: 0
CHEST TIGHTNESS: 0
EYE DISCHARGE: 0
NAUSEA: 0
SHORTNESS OF BREATH: 0
COUGH: 0
CONSTIPATION: 0
VOMITING: 0

## 2021-06-04 ASSESSMENT — PAIN DESCRIPTION - FREQUENCY
FREQUENCY: CONTINUOUS

## 2021-06-04 ASSESSMENT — PAIN DESCRIPTION - LOCATION
LOCATION: GENERALIZED

## 2021-06-04 NOTE — PROGRESS NOTES
Drsg to [address] [place] [address] changed. Cream applied. Held off on BLE drsg because transport is here to take pt home so her daughter can take her to . Pt aware of plan.  Electronically signed by Flash Garcia RN on 6/4/2021 at 2:42 PM

## 2021-06-04 NOTE — CARE COORDINATION
Social Work Discharge Summary    Date of Discharge:6/4/21  Disposition: Home  Level of Care: Home Care resumption     Home Healthcare:  Name: [address]  Phone: [phone]  Fax: [phone]    This SW spoke with (patient/family), who is in agreement with discharge plan. Transportation:   The patient will be transported via:  [facility]     Patient will be picked up at (2 PM) via ([facility]). Daughter aware.     Octavio Ache MSW, [address]

## 2021-06-04 NOTE — DISCHARGE SUMMARY
[facility] -- Physician Discharge Summary     Matt Llamas  1955  MRN: [id number]    Admit Date: 6/2/2021  Discharge Date: 6/4/2021  2:33 PM    Attending MD: No att. providers found  Discharging MD: Yusra Ramírez MD  PCP: Shirley Hernandez MD [address] / [place] [phone]    Admission Diagnosis: Bilateral cellulitis of lower leg [L03.116, R13.396]  DISCHARGE DIAGNOSIS: same    Full Hospital Problem List:  Active Hospital Problems    Diagnosis Date Noted    Infection due to acinetobacter baumannii [A49.8]     Multiple wounds [T07. 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 9) [E66.3]     History of infection with vancomycin resistant Enterococcus (VRE) [Z86.19]     History of MRSA infection [Z86.14]     Screening for HIV (human immunodeficiency virus) [Z11.4]     Cellulitis of both feet [L03.115, L03.116] 06/02/2021    Bilateral cellulitis of lower leg [L03.116, L03.115] 06/02/2021    Hyponatremia [E87.1] 04/27/2021    Infection requiring contact isolation precautions [B99.9] 04/27/2021    Essential hypertension [I10]     Local infection of skin and subcutaneous tissue [L08.9] 07/27/2019    MRSA (methicillin resistant Staphylococcus aureus) infection [A49.02] 06/08/2018    Crohn's colitis ([facility] [K50.10] 01/06/2018    Anemia [D64.9] 01/05/2018           Hospital Course:      60 y. o. female who presents with a Chief Complaint of skin eruption. Ha Yuan further chart review, patient has been seen for chronic pruritus in the past and takes Vistaril for this.  Patient also has some older chronic wounds for which she sees a wound care clinic.  States that came out and evaluated her today and told her she needed to come straight to the ER.  Patient does report blistering to hands, feet, extremities that she reports is new over the past 1 to 2 weeks. Eduardo Henriquez denies any new medication or medication changes.  No new soaps lotions detergents.  No history of similar symptoms.     Pt has multiple fluid filled blisters scattered on many areas of skin. Some etythema to legs  To be admitted for further eval, iv abx     She has severe bullous disease  We have no dermatology here  Attempt made to transfer to  - they will not accept any transfers  [facility] [place] called - they do not have derm    Pt cont on iv abx, steroids  COnverted to po steroids, abx    She is eager for discharge  She wants to present to [facility] ER so that they can evaluate (delay for outpt derm appt is too long)        She Is discharged home  Po augmentin to cover Acinetobacter baumannii, zyvox for MRSA        Consults made during Hospitalization:  IP CONSULT TO INTERNAL MEDICINE  IP CONSULT TO INFECTIOUS DISEASES  PHARMACY TO DOSE VANCOMYCIN  IP CONSULT TO NEPHROLOGY  IP CONSULT TO [address] [place] [address]    Treatment team at time of Discharge: Treatment Team: Consulting Physician: Judith Gibson MD; Consulting Physician: Dennise Blunt MD; Consulting Physician: Francisca Garcia MD; Respiratory Therapist (Day): Anthony Cohen RCP; Nursing Student: Nkechi Mccord; Nursing Student: Sascha Coto; Nursing Student: Eliecer Costello; Registered Nurse: Patrice Jang RN    Imaging Results:  No results found.       Discharge Exam:  See progress note from day of discharge    Disposition: home    Condition: stable    Discharge Medications:   Lei Plan   Home Medication Instructions [id number]    Printed on:06/04/21 8213   Medication Information                      amoxicillin-clavulanate (AUGMENTIN) 875-125 MG per tablet  Take 1 tablet by mouth 2 times daily for 7 days             atorvastatin (LIPITOR) 80 MG tablet  Take 1 tablet by mouth nightly             baclofen (LIORESAL) 10 MG tablet  Take 1 tablet by mouth 3 times daily             balsalazide (COLAZAL) 750 MG capsule  Take 3,000 mg by mouth daily Take 4 capsules (total 3000 mg) daily each morning. busPIRone (BUSPAR) 10 MG tablet  Take 10 mg by mouth 3 times daily as needed             ferrous gluconate (FERGON) 324 (38 Fe) MG tablet  Take 324 mg by mouth daily (with breakfast)             fluticasone (FLONASE) 50 MCG/ACT nasal spray  1 spray by Each Nostril route daily as needed for Rhinitis             furosemide (LASIX) 20 MG tablet  Take 1 tablet by mouth 2 times daily             HYDROcodone-acetaminophen (NORCO) 5-325 MG per tablet  Take 2 tablets by mouth every 6 hours as needed for Pain for up to 7 days. hydrOXYzine (ATARAX) 10 MG tablet  Take 10 mg by mouth 3 times daily as needed for Itching             linezolid (ZYVOX) 600 MG tablet  Take 1 tablet by mouth 2 times daily for 14 days             metoprolol succinate (TOPROL XL) 50 MG extended release tablet  Take 1 tablet by mouth daily             nystatin (MYCOSTATIN) 479394 UNIT/GM cream  Apply topically 2 times daily as needed for Dry Skin Apply topically 2 times daily. omeprazole (PRILOSEC) 20 MG delayed release capsule  Take 20 mg by mouth Daily              ondansetron (ZOFRAN ODT) 4 MG disintegrating tablet  Take 1 tablet by mouth every 8 hours as needed for Nausea             potassium chloride (KLOR-CON M) 20 MEQ extended release tablet  Take 1 tablet by mouth 2 times daily             predniSONE (DELTASONE) 10 MG tablet  4 tab daily x 4d, then 3 tab daily x 4d, then 2 tab daily x 4d, then 1 tab daily x 4d             rivaroxaban (XARELTO) 20 MG TABS tablet  Take 1 tablet by mouth daily (with breakfast)                 Allergies: Allergies   Allergen Reactions    Ace Inhibitors Swelling    Skelaxin [Metaxalone] Swelling       Follow up Instructions: Follow-up with PCP: Shirley Hernandez MD in 2 wk .       Total time spent on day of discharge including face-to-face visit, examination, documentation, counseling, preparation of discharge plans and followup, and discharge medicine reconciliation and presciptions is 35 minutes    Signed:  Isaac Arechiga MD  6/4/2021

## 2021-06-04 NOTE — PROGRESS NOTES
Physical Therapy    Facility/Department: [address] ONCOLOGY  Initial Assessment    NAME: Zuhair Keller  : 1955  MRN: [id number]    Date of Service: 2021    Discharge Recommendations: Mariaelena Jackson scored a  on the AM-PAC short mobility form. Current research shows that an AM-PAC score of 17 or less is typically not associated with a discharge to the patient's home setting. Based on the patient's AM-PAC score and their current functional mobility deficits, it is recommended that the patient have 3-5 sessions per week of Physical Therapy at d/c to increase the patient's independence. Please see assessment section for further patient specific details. If patient discharges prior to next session this note will serve as a discharge summary. Please see below for the latest assessment towards goals. 3-5 sessions per week   Assessment   Body structures, Functions, Activity limitations: Decreased functional mobility ; Decreased endurance;Decreased ADL status; Decreased balance; Increased pain;Decreased strength  Assessment: Pt is a 60 y.o. female who arrived with complaints of painful skin eruption. Pt presents with max 10/10 pain, despite recent pain meds, causing increasing difficulty with functional mobility. Pt requires min A x2 to complete supine <> sit secondary to increased pain with mobility. Pt would benefit from additional PT services to increase patient's strength, balance and endurance to improve patient's funcitoanl mobility. Prognosis: Fair  Decision Making: Medium Complexity  Exam: funcitonal mobility  Clinical Presentation: evolving  PT Education: Goals;Transfer Training;Functional Mobility Training;Gait Training  REQUIRES PT FOLLOW UP: Yes  Activity Tolerance  Activity Tolerance: Patient limited by pain;Treatment limited secondary to medical complications (free text)  Activity Tolerance: Patient's painful blisters limits patient's ability to change positions.        Patient Diagnosis(es): The encounter diagnosis was Local infection of skin and subcutaneous tissue. has a past medical history of Anxiety, Arthritis, Atrial fibrillation/flutter ([facility] [address] Blood transfusion reaction, Crohn's disease ([facility] [address] Depression, GERD (gastroesophageal reflux disease), Hiatal hernia, Hx of blood clots, Hypertension, MRSA (methicillin resistant staph aureus) culture positive, Neuropathy, Pneumonia, Sinus headache, SOB (shortness of breath), and VRE (vancomycin resistant enterococcus) culture positive. has a past surgical history that includes Hysterectomy; knee surgery (Bilateral); Hand Carpectomy (Bilateral); hernia repair; Abdomen surgery; bladder suspension; fracture surgery; Heel spur surgery; Tonsillectomy; Colonoscopy; Upper gastrointestinal endoscopy (6/14/13); Foot surgery (Left, 6/25/14); Foot surgery (6/3/15); Colonoscopy (9/14/15); Foot surgery (Left, 08/05/2002); joint replacement; Sigmoidoscopy (01/15/2018); Abdominal adhesion surgery (06/08/2018); Colonoscopy (08/07/2018); colostomy (N/A, 10/8/2018); pr secd clos surg wnd exten/complic (N/A, 45/85/7379); Leg Surgery (Right); Colonoscopy (06/07/2019); Colonoscopy (N/A, 6/7/2019); Colonoscopy (N/A, 6/7/2019); Small intestine surgery (N/A, 7/17/2019); and proctosigmoidoscopy (N/A, 7/17/2019). Restrictions  Restrictions/Precautions  Restrictions/Precautions: Fall Risk (High fall risk)  Required Braces or Orthoses?: No  Position Activity Restriction  Other position/activity restrictions: Matt Llamas is a 60 y.o. female who presents with a Chief Complaint of skin eruption. Upon further chart review, patient has been seen for chronic pruritus in the past and takes Vistaril for this. Patient also has some older chronic wounds for which she sees a wound care clinic. States that came out and evaluated her today and told her she needed to come straight to the ER.   Patient does report blistering to hands, feet, extremities that she reports is new over the past 1 to 2 weeks. She denies any new medication or medication changes. No new soaps lotions detergents. No history of similar symptoms. States that it is itchy but also extremely painful. She denies fevers or chills. Patient does have history of prior CVA and has difficulty with word finding, therefore, history is limited and difficult. No additional information able to obtained at this time. Vision/Hearing  Vision: Impaired  Vision Exceptions: Wears glasses for reading  Hearing: Exceptions to [facility]  Hearing Exceptions: Hard of hearing/hearing concerns     Subjective  General  Chart Reviewed: Yes  Patient assessed for rehabilitation services?: Yes  Family / Caregiver Present: No  Follows Commands: Within Functional Limits  General Comment  Comments: Pt in bed upon arrival, agreeable to PT/OT evaluation and more willing to participate; difficulty with word expression. Subjective  Subjective: Pt reports 10/10 pain this morning, nurse administered meds ~1 hour prior to arrival for eval.  Pain Screening  Patient Currently in Pain: Yes     Pre Treatment Pain Screening  Intervention List: Patient able to continue with treatment;Nurse/physician notified    Orientation  Orientation  Overall Orientation Status: Impaired  Orientation Level: Oriented to person;Disoriented to time;Disoriented to place; Disoriented to situation  Social/Functional History  Social/Functional History  Lives With: Daughter, Spouse  Type of Home: House  Home Layout: One level  Home Access: Stairs to enter with rails  Entrance Stairs - Number of Steps: 2 DEON  Bathroom Shower/Tub: Tub/Shower unit (Sponge bathes)  Bathroom Toilet: Bedside commode  Bathroom Accessibility: Not accessible  Home Equipment: [address] Help From: Family  ADL Assistance: Independent  Homemaking Responsibilities: No  Ambulation Assistance: Independent (with WC)  Transfer Assistance: Needs assistance (Daughter helps)  Active : No  Patient's  Info: daughter provides transportation  Occupation: Retired  Additional Comments: Denies recent falls  Cognition   Cognition  Overall Cognitive Status: Exceptions  Arousal/Alertness: Appropriate responses to stimuli  Following Commands: Follows one step commands with repetition; Follows one step commands with increased time  Attention Span: Attends with cues to redirect  Memory: Decreased recall of recent events  Safety Judgement: Decreased awareness of need for assistance  Problem Solving: Assistance required to generate solutions  Insights: Decreased awareness of deficits  Initiation: Does not require cues  Sequencing: Does not require cues    Objective     Observation/Palpation  Observation: Bandages on bilateral feet/distal lower leg, R posterolateral back/hip. Scar: Hyperpigmentation and scars all over extremities, trunk and face. AROM RLE (degrees)  RLE General AROM: Pt's LE ROM is difficult to assess d/t patient extreme pain with motion. AROM LLE (degrees)  LLE General AROM: Pt's LE ROM is difficult to assess d/t patient extreme pain with motion. Strength RLE  Comment: Difficulty assessing. Pt requires assistance with LE's to sit EOB, but unable to determine if it is weakness or pain that is the limiting factor. MMT not formally tested. Strength LLE  Comment: Difficulty assessing. Pt requires assistance with LE's to sit EOB, but unable to determine if it is weakness or pain that is the limiting factor. MMT not formally tested. Tone RLE  RLE Tone: Normotonic  Tone LLE  LLE Tone: Normotonic  Motor Control  Gross Motor?: WFL  Sensation  Overall Sensation Status: WFL  Bed mobility  Supine to Sit: Minimal assistance;2 Person assistance (For LE's)  Sit to Supine: Minimal assistance;2 Person assistance  Scooting: Contact guard assistance  Comment: Session limited to bed mobility to EOB secondary to pain with movement.   Transfers  Comment: Unable to assess this

## 2021-06-04 NOTE — DISCHARGE INSTR - COC
Continuity of Care Form    Patient Name: Mitesh Gay   :  1955  MRN:  [id number]    Admit date:  2021  Discharge date:  ***    Code Status Order: Full Code   Advance Directives:   Advance Care Flowsheet Documentation       Date/Time Healthcare Directive Type of Healthcare Directive Copy in [address] Agent's Name Healthcare Agent's Phone Number    21 0027  No, patient does not have an advance directive for healthcare treatment -- -- -- -- --            Admitting Physician:  Perez Winslow MD  PCP: Carolyn Valadez MD    Discharging Nurse: [facility] Unit/Room#: 6TQ-4782/2368-01  Discharging Unit Phone Number: ***    Emergency Contact:   Extended Emergency Contact Information  Primary Emergency Contact: Gretta Keller  Address:                     Home Phone: [phone]  Mobile Phone: [phone]  Relation: Child  Secondary Emergency Contact: Omari Keller  Address: [address] [place] [address] [address] [facility] Phone: [phone]  Relation: Spouse    Past Surgical History:  Past Surgical History:   Procedure Laterality Date    ABDOMEN SURGERY      ABDOMINAL ADHESION SURGERY  2018    BLADDER SUSPENSION      COLONOSCOPY      COLONOSCOPY  9/14/15    sigmoid colon biopsy     COLONOSCOPY  2018    COLONOSCOPY  2019    COLONOSCOPY, POSSIBLE BIOPSY, POSSIBLE POLYPECTOMY    COLONOSCOPY N/A 2019    COLONOSCOPY WITH BIOPSY performed by Eugenia Nova MD at [address] [place] [address] N/A 2019    COLONOSCOPY DIAGNOSTIC/STOMA performed by Eugenia Nova MD at [facility] 10/8/2018    EXPLORATORY LAPAROTOMY WITH COLOSTOMY performed by Anthony Richards MD at [facility] Left 14    excision plantar left hallux    FOOT SURGERY  6/3/15    PLANTAR PLATE REPAIR BILATERAL, ARTHROTOMY MPJ 2ND BILATERAL    FOOT SURGERY Left 2002 cerebrovascular accident (CVA) I69.319    Infection requiring contact isolation precautions B99.9    Hyponatremia E87.1    Encephalopathy, metabolic N15.08    Suspected COVID-19 virus infection Z20.822    Alcohol abuse F10.10    Cellulitis of both feet L03.115, L03.116    Bilateral cellulitis of lower leg L03.116, L03.115    Multiple wounds T07. 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 9) E66.3    History of infection with vancomycin resistant Enterococcus (VRE) Z86.19    History of MRSA infection Z86.14    Screening for HIV (human immunodeficiency virus) Z11.4       Isolation/Infection:   Isolation            No Isolation          Unreconciled Outside Infections       Enable clinical decision support by reconciling outside information with the patient's chart.     .      Infection Onset Last Indicated Last Received Source    C-diff (Clostridium difficile) 20 [facility]          Patient Infection Status       Infection Onset Added Last Indicated Last Indicated By Review Planned Expiration Resolved Resolved By    None active    Resolved    COVID-19 Rule Out 21 COVID-19 (Ordered)   21 Rule-Out Test Resulted    COVID-19 Rule Out 21 COVID-19 (Ordered)   21 Rule-Out Test Resulted    C-diff Rule Out 21 Clostridium difficile toxin/antigen (Ordered)   21 Valbryant Townsend RN    COVID-19 Rule Out 20 COVID-19 (Ordered)   20     COVID-19 Rule Out 20 COVID-19 (Ordered)   20 Rule-Out Test Resulted    VRE  10/12/18 10/12/18 Mike Gutiérrez, ELLE   10/25/19 Mike Gutiérrez, ELLE    10/8/18; abd culture    MRSA  18 Mike Gutiérrez RN   10/25/19 Mike Gutiérrez RN    18; urine            Nurse Assessment:  Last Vital Signs: BP 104/88   Pulse 102   Temp 97.7 °F (36.5 °C) (Oral)   Resp 16   Ht 5' 7\" (1.702 m)   Wt 198 lb (89.8 kg)   SpO2 97%   BMI 31.01 kg/m²     Last documented pain score (0-10 scale): Pain Level: 9  Last Weight:   Wt Readings from Last 1 Encounters:   06/04/21 198 lb (89.8 kg)     Mental Status:  {IP PT MENTAL STATUS:20030:::0}    IV Access:  { MARIBEL IV ACCESS:017053679:::0}    Nursing Mobility/ADLs:  Walking   {CHP DME ADLs:807926878:::0}  Transfer  {CHP DME ADLs:531250325:::0}  Bathing  {CHP DME ADLs:734390907:::0}  Dressing  {CHP DME ADLs:502756423:::0}  Toileting  {CHP DME ADLs:952863457:::0}  Feeding  {CHP DME ADLs:866798462:::0}  Med Admin  {CHP DME ADLs:756888268:::0}  Med Delivery   { MARIBEL MED Delivery:915506972:::0}    Wound Care Documentation and Therapy:        Elimination:  Continence: Bowel: {YES / XO:70115}  Bladder: {YES / CB:07855}  Urinary Catheter: {Urinary Catheter:220647462:::0}   Colostomy/Ileostomy/Ileal Conduit: {YES / VK:35277}       Date of Last BM: ***    Intake/Output Summary (Last 24 hours) at 6/4/2021 1227  Last data filed at 6/3/2021 2014  Gross per 24 hour   Intake 240 ml   Output 1300 ml   Net -1060 ml     I/O last 3 completed shifts:   In: 18 [P.O.:480]  Out: 2200 [Urine:2200]    Safety Concerns:     [employer] Safety Concerns:604305618:::0}    Impairments/Disabilities:      [employer] Impairments/Disabilities:710377087:::0}    Nutrition Therapy:  Current Nutrition Therapy:   [employer] Diet List:865985582:::0}    Routes of Feeding: {CHP DME Other Feedings:430504625:::0}  Liquids: {Slp liquid thickness:67677}  Daily Fluid Restriction: {CHP DME Yes amt example:617785801:::0}  Last Modified Barium Swallow with Video (Video Swallowing Test): {Done Not Done SYAT:945636974:::8}    Treatments at the Time of Hospital Discharge:   Respiratory Treatments: ***  Oxygen Therapy:  {Therapy; copd oxygen:78225:::0}  Ventilator:    { CC Vent List:443389464:::0}    Rehab Therapies: {THERAPEUTIC INTERVENTION:0867922356}  Weight Bearing Status/Restrictions: Mei WRIGHT Weight Bearin:::0}  Other Medical Equipment (for information only, NOT a DME order):  {EQUIPMENT:190943664}  Other Treatments: ***    Patient's personal belongings (please select all that are sent with patient):  {CHP DME Belongings:251408778:::0}    RN SIGNATURE:  {Esignature:709695870:::0}    CASE MANAGEMENT/SOCIAL WORK SECTION    Inpatient Status Date: ***    Readmission Risk Assessment Score:  Readmission Risk              Risk of Unplanned Readmission:  30           Discharging to Facility/ Agency   Name:   Address:  Phone:  Fax:    Dialysis Facility (if applicable)   Name:  Address:  Dialysis Schedule:  Phone:  Fax:    / signature: {Esignature:708584809:::0}    PHYSICIAN SECTION    Prognosis: Guarded    Condition at Discharge: Stable    Rehab Potential (if transferring to Rehab): Good    Recommended Labs or Other Treatments After Discharge: ***    Physician Certification: I certify the above information and transfer of Jeannine Hoyt  is necessary for the continuing treatment of the diagnosis listed and that she requires Home Care for greater 30 days.      Update Admission H&P: No change in H&P    PHYSICIAN SIGNATURE:  Electronically signed by Tish Williamson MD on 21 at 12:27 PM EDT

## 2021-06-04 NOTE — CARE COORDINATION
Discharge Planning Assessment    SW discharge planner met with patient/family member to discuss reason for admission, current living situation, and potential needs at the time of discharge. Attempted to call daughter Pily Brooks, and spouse Eder Harmon. SW spoke with patient at bedside. Demographics/Insurance verified: Yes  Current type of dwelling: House  Patient from ECF/SW confirmed with: N/A  Living arrangements: With spouse, and daughter  Steps to enter home / inside home: None  Hx of fall/s: None reported   Level of function/support: Requires assistance. Patient reports she has good support at home. PCP: Karley Horne: [facility] on [address]  Medication compliance issues: None reported  DME: Case Ferguson, Wheelchair    Active with any community resources/agencies/skilled home care/oxygen/dialysis: [facility] and [facility] (JERAMY [address] Pages [phone]). Patient would like to return home and resume these services. Financial issues that could impact healthcare: None reported     Transportation at time of d/c (Discussed w/pt/family that on the day of discharge home, tentative time of discharge will be between 10am and noon): Family     SW discussed and provided facilities of choice if transition to a skilled nursing facility is required a the time of discharge. Tentative discharge plan: Likely home w/ resumption of services. Per chart, patient declined OT eval. Unable to transfer to outside hospital for inpt derm.     Teofilo Felix MSW, [address]

## 2021-06-04 NOTE — PROGRESS NOTES
Spoke to pt's daughter, Keith Saini, regarding discharge. Pt will be discharged to home and pt's daughter will transport her to [facility] for dermatological care.  Electronically signed by Chuyita Bravo RN on 6/4/2021 at 11:13 AM

## 2021-06-04 NOTE — TELEPHONE ENCOUNTER
The patient was discharged today by primary team without  before she could be seen by me today. I was notified of the discharge after patient had already left. Her wound cultures are growing MRSA and Acinetobacter. Sensitivities are pending. Blood cultures are negative so far from 6-21. I called the patient today at multiple numbers listed in EPIC and was able to ultimately reach her daughter. The patient's daughter asked me to call  ER. I called  ER at [phone]. I talked with Dr. Jae Muñoz at [facility] ER who is taking care of the patient there  and notified Dr. Junior Dorantes that her wound culture have grown MRSA and acinetobacter and have advised  to involve the ID team there  for her antibiotic recommendations. I have advised the patient to be empirically started on vancomycin and meropenem while waiting for the sensitivities. The ER physician would also involve the dermatology team at the [facility] in her care.     Further management will be per physicians at [facility]-MD MANJIT, MPH  6/4/2021, 5:41 PM  [facility] Infectious Disease   [address] [place] [address], [address] [place] [address]  Office: [phone]  Fax: [phone]  Clinic days:  Tuesday & Thursday

## 2021-06-04 NOTE — PROGRESS NOTES
[facility] Note    Patient Active Problem List   Diagnosis    Anemia    Crohn's colitis ([facility] [address]    DVT (deep venous thrombosis) (HCC)    Hypomagnesemia    Leg swelling    Venous insufficiency    Chronic venous hypertension (idiopathic) with inflammation of bilateral lower extremity    Local infection of skin and subcutaneous tissue    Open wound of left foot with complication    [employer]Q 7/29/20 Dr Yousif Meng    LV dysfunction    PAF (paroxysmal atrial fibrillation) ([facility] [address]    Essential hypertension    S/P coronary angiogram    Abnormal angiogram    Atherosclerosis of native arteries of the extremities with ulceration (HCC)    Nail avulsion of toe, initial encounter    Lower abdominal pain    Late effect of ischemic cerebral stroke    Cognitive deficit as late effect of cerebrovascular accident (CVA)    Infection requiring contact isolation precautions    Hyponatremia    Encephalopathy, metabolic    Suspected BMNOY-57 virus infection    Alcohol abuse    Cellulitis of both feet    Bilateral cellulitis of lower leg    Multiple wounds    Trichotillomania    Gastroesophageal reflux disease without esophagitis    Non-traumatic rhabdomyolysis    Overweight (BMI 25.0-29. 9)    History of infection with vancomycin resistant Enterococcus (VRE)    History of MRSA infection    Screening for HIV (human immunodeficiency virus)       Past Medical History:   has a past medical history of Anxiety, Arthritis, Atrial fibrillation/flutter ([facility] [address] Blood transfusion reaction, Crohn's disease ([facility] [address] Depression, GERD (gastroesophageal reflux disease), Hiatal hernia, Hx of blood clots, Hypertension, MRSA (methicillin resistant staph aureus) culture positive, Neuropathy, Pneumonia, Sinus headache, SOB (shortness of breath), and VRE (vancomycin resistant enterococcus) culture positive.     Past Social History:   reports that she has been smoking cigarettes and e-cigarettes. She has a 10.00 pack-year smoking history. She has never used smokeless tobacco. She reports current alcohol use of about 2.0 standard drinks of alcohol per week. She reports that she does not use drugs. Subjective:  Was not accepted for transfer to . No complaints today. No neurologic symptoms. Review of Systems   Constitutional: Negative for activity change, appetite change, chills, fatigue, fever and unexpected weight change. HENT: Negative for congestion and facial swelling. Eyes: Negative for photophobia, discharge and redness. Respiratory: Negative for cough, chest tightness and shortness of breath. Cardiovascular: Positive for leg swelling. Negative for chest pain and palpitations. Gastrointestinal: Negative for abdominal distention, abdominal pain, blood in stool, constipation, diarrhea, nausea and vomiting. Endocrine: Negative for cold intolerance, heat intolerance and polyuria. Genitourinary: Negative for decreased urine volume, difficulty urinating, flank pain and hematuria. Musculoskeletal: Negative for joint swelling and neck pain. Skin: Positive for rash. Neurological: Negative for dizziness, seizures, syncope, speech difficulty, light-headedness and headaches. Hematological: Does not bruise/bleed easily. Psychiatric/Behavioral: Negative for agitation, confusion and hallucinations.        Objective:      /85   Pulse 90   Temp 97.1 °F (36.2 °C) (Oral)   Resp 16   Ht 5' 7\" (1.702 m)   Wt 198 lb (89.8 kg)   SpO2 98%   BMI 31.01 kg/m²     Wt Readings from Last 3 Encounters:   06/04/21 198 lb (89.8 kg)   04/28/21 194 lb 14.2 oz (88.4 kg)   04/10/21 194 lb 0.1 oz (88 kg)       BP Readings from Last 3 Encounters:   06/04/21 116/85   04/30/21 123/84   04/10/21 (!) 151/95     Chest- clear  Heart-regular  Abd-soft  Ext- 1+ edema    Labs  Hemoglobin   Date Value Ref Range Status   06/04/2021 8.8 (L) 12.0 - 16.0 g/dL Final     Hematocrit Date Value Ref Range Status   06/04/2021 25.9 (L) 36.0 - 48.0 % Final     WBC   Date Value Ref Range Status   06/04/2021 10.4 4.0 - 11.0 K/uL Final     Platelets   Date Value Ref Range Status   06/04/2021 429 135 - 450 K/uL Final     Lab Results   Component Value Date    CREATININE <0.5 (L) 06/04/2021    BUN 11 06/04/2021     (L) 06/04/2021    K 4.0 06/04/2021    CL 97 (L) 06/04/2021    CO2 23 06/04/2021       Assessment/Plan:  1. Acute-on-chronic hyponatremia with episodes of hypotension. She may  not have been taking her salt tablets. Sodium level currently around  goal.  Continue NaCl tablets 1 gm p.o. three times a day. Continue Lasix 20 mg p.o. b.i.d. Discussed risk of alcohol use. 2.  Renal function within normal limits. 3.  Relative hypotension, better. 4.  Anemia. Follow hemoglobin. Management as per Medicine. 5.  Multiple skin blisters and lesions, as per Medicine. Currently on  antibiotics. 6.  History of Crohn's disease. The patient is currently on  sulfasalazine. 7.  History of alcohol use. 8.  History of sigmoid colectomy with colostomy and mobilization of  splenic flexure. 9.  Okay for D/C from renal perspective once okay with Medicine.       Ben Sauceda MD

## 2021-06-04 NOTE — PROGRESS NOTES
Progress Note - Dr. Femi Morales - Internal Medicine  PCP: Megan Chakraborty MD [address] / [place] [phone]    Hospital Day: 2  Code Status: Full Code  Current Diet: ADULT DIET; Regular        CC: follow up on medical issues    Subjective:   Elver Marquis is a 60 y.o. female. She denies problems    Maybe feels a little better  Could not transfer; uc not taking trnasfers today    She denies chest pain, denies shortness of breath, denies nausea,  denies emesis. 10 system Review of Systems is reviewed with patient, and pertinent positives are noted in HPI above . Otherwise, Review of systems is negative. I have reviewed the patient's medical and social history in detail and updated the computerized patient record. To recap: She  has a past medical history of Anxiety, Arthritis, Blood transfusion reaction, Crohn's disease ([address] Depression, GERD (gastroesophageal reflux disease), Hiatal hernia, Hx of blood clots, Hypertension, MRSA (methicillin resistant staph aureus) culture positive, Neuropathy, Pneumonia, Sinus headache, SOB (shortness of breath), and VRE (vancomycin resistant enterococcus) culture positive. . She  has a past surgical history that includes Hysterectomy; knee surgery (Bilateral); Hand Carpectomy (Bilateral); hernia repair; Abdomen surgery; bladder suspension; fracture surgery; Heel spur surgery; Tonsillectomy; Colonoscopy; Upper gastrointestinal endoscopy (6/14/13); Foot surgery (Left, 6/25/14); Foot surgery (6/3/15); Colonoscopy (9/14/15); Foot surgery (Left, 08/05/2002); joint replacement; Sigmoidoscopy (01/15/2018); Abdominal adhesion surgery (06/08/2018); Colonoscopy (08/07/2018); colostomy (N/A, 10/8/2018); pr secd clos surg wnd exten/complic (N/A, 68/37/4534); Leg Surgery (Right); Colonoscopy (06/07/2019); Colonoscopy (N/A, 6/7/2019); Colonoscopy (N/A, 6/7/2019); Small intestine surgery (N/A, 7/17/2019); and proctosigmoidoscopy (N/A, 7/17/2019). . She  reports that she has been smoking cigarettes and e-cigarettes. She has a 10.00 pack-year smoking history. She has never used smokeless tobacco. She reports current alcohol use of about 2.0 standard drinks of alcohol per week. She reports that she does not use drugs. .        Active Hospital Problems    Diagnosis Date Noted    Multiple wounds [T07. 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 9) [E66.3]     History of infection with vancomycin resistant Enterococcus (VRE) [Z86.19]     History of MRSA infection [Z86.14]     Screening for HIV (human immunodeficiency virus) [Z11.4]     Cellulitis of both feet [L03.115, L03.116] 06/02/2021    Bilateral cellulitis of lower leg [L03.116, L03.115] 06/02/2021    Hyponatremia [E87.1] 04/27/2021    Infection requiring contact isolation precautions [B99.9] 04/27/2021    Essential hypertension [I10]     Local infection of skin and subcutaneous tissue [L08.9] 07/27/2019    Cro[facility]'s colitis ([facility] [K50.10] 01/06/2018    Anemia [D64.9] 01/05/2018       Current Facility-Administered Medications: baclofen (LIORESAL) tablet 10 mg, 10 mg, Oral, TID  busPIRone (BUSPAR) tablet 10 mg, 10 mg, Oral, TID  balsalazide (COLAZAL) capsule 3,000 mg, 3,000 mg, Oral, Daily  ferrous gluconate 324 (37.5 Fe) MG tablet 324 mg, 324 mg, Oral, Daily with breakfast  hydrOXYzine (ATARAX) tablet 10 mg, 10 mg, Oral, TID PRN  atorvastatin (LIPITOR) tablet 80 mg, 80 mg, Oral, Nightly  potassium chloride (KLOR-CON M) extended release tablet 20 mEq, 20 mEq, Oral, BID  rivaroxaban (XARELTO) tablet 20 mg, 20 mg, Oral, Daily with breakfast  metoprolol succinate (TOPROL XL) extended release tablet 50 mg, 50 mg, Oral, Daily  furosemide (LASIX) tablet 20 mg, 20 mg, Oral, BID  sodium chloride flush 0.9 % injection 5-40 mL, 5-40 mL, Intravenous, 2 times per day  sodium chloride flush 0.9 % injection 10 mL, 10 mL, Intravenous, PRN  0.9 % sodium chloride infusion, 25 mL, Intravenous, PRN  potassium chloride (KLOR-CON M) extended release tablet 40 mEq, 40 mEq, Oral, PRN **OR** potassium bicarb-citric acid (EFFER-K) effervescent tablet 40 mEq, 40 mEq, Oral, PRN **OR** potassium chloride 10 mEq/100 mL IVPB (Peripheral Line), 10 mEq, Intravenous, PRN  ondansetron (ZOFRAN-ODT) disintegrating tablet 4 mg, 4 mg, Oral, Q8H PRN **OR** ondansetron (ZOFRAN) injection 4 mg, 4 mg, Intravenous, Q6H PRN  magnesium hydroxide (MILK OF MAGNESIA) 400 MG/5ML suspension 30 mL, 30 mL, Oral, Daily PRN  acetaminophen (TYLENOL) tablet 650 mg, 650 mg, Oral, Q6H PRN **OR** acetaminophen (TYLENOL) suppository 650 mg, 650 mg, Rectal, Q6H PRN  vancomycin (VANCOCIN) 1,250 mg in dextrose 5 % 250 mL IVPB, 1,250 mg, Intravenous, Q12H  hydrALAZINE (APRESOLINE) injection 10 mg, 10 mg, Intravenous, Q6H PRN  0.9 % sodium chloride bolus, 500 mL, Intravenous, PRN  pantoprazole (PROTONIX) tablet 40 mg, 40 mg, Oral, QAM AC  HYDROmorphone HCl PF (DILAUDID) injection 1 mg, 1 mg, Intravenous, Q4H PRN  methylPREDNISolone sodium (SOLU-MEDROL) injection 40 mg, 40 mg, Intravenous, Q12H  HYDROcodone-acetaminophen (NORCO) 5-325 MG per tablet 1 tablet, 1 tablet, Oral, Q6H PRN  lidocaine-EPINEPHrine 1 %-1:[id number] injection 20 mL, 20 mL, Intradermal, Once  sodium chloride tablet 1 g, 1 g, Oral, TID WC  cefepime (MAXIPIME) 2000 mg IVPB minibag, 2,000 mg, Intravenous, Q12H  diphenhydrAMINE-zinc acetate cream, , Topical, TID PRN  triamcinolone (KENALOG) 0.1 % cream, , Topical, BID         Objective:  /79   Pulse 66   Temp 97.6 °F (36.4 °C) (Oral)   Resp 16   Ht 5' 7\" (1.702 m)   Wt 198 lb (89.8 kg)   SpO2 97%   BMI 31.01 kg/m²      Patient Vitals for the past 24 hrs:   BP Temp Temp src Pulse Resp SpO2 Weight   06/04/21 0515 124/79 97.6 °F (36.4 °C) Oral 66 16 97 % 198 lb (89.8 kg)   06/03/21 2208 112/74 97.9 °F (36.6 °C) Oral 74 16 94 % --   06/03/21 2014 121/78 99.3 °F (37.4 °C) Oral 92 16 -- --   06/03/21 1617 115/74 97.4 °F (36.3 °C) Oral 82 16 98 % --   06/03/21 1203 -- -- -- -- 16 94 % --   06/03/21 1134 110/70 97.4 °F (36.3 °C) Oral 74 16 94 % --   06/03/21 0900 107/69 97.6 °F (36.4 °C) Oral 105 16 96 % --     Patient Vitals for the past 96 hrs (Last 3 readings):   Weight   06/04/21 0515 198 lb (89.8 kg)   06/02/21 2025 186 lb (84.4 kg)           Intake/Output Summary (Last 24 hours) at 6/4/2021 0854  Last data filed at 6/3/2021 2014  Gross per 24 hour   Intake 360 ml   Output 2200 ml   Net -1840 ml         Physical Exam:   /85   Pulse 90   Temp 97.1 °F (36.2 °C) (Oral)   Resp 16   Ht 5' 7\" (1.702 m)   Wt 198 lb (89.8 kg)   SpO2 98%   BMI 31.01 kg/m²   General appearance: alert, appears stated age and cooperative  Head: Normocephalic, without obvious abnormality, atraumatic  Lungs: clear to auscultation bilaterally  Heart: regular rate and rhythm, S1, S2 normal, no murmur, click, rub or gallop  Abdomen: soft, non-tender; bowel sounds normal; no masses,  no organomegaly  Skin: +blisters/bullae to arms, legs, chest    Labs:  Lab Results   Component Value Date    WBC 10.4 06/04/2021    HGB 8.8 (L) 06/04/2021    HCT 25.9 (L) 06/04/2021     06/04/2021    CHOL 111 07/24/2020    TRIG 66 07/24/2020    HDL 43 07/24/2020    ALT <5 (L) 06/03/2021    AST 14 (L) 06/03/2021     (L) 06/04/2021    K 4.0 06/04/2021    CL 97 (L) 06/04/2021    CREATININE <0.5 (L) 06/04/2021    BUN 11 06/04/2021    CO2 23 06/04/2021    TSH 3.57 07/28/2020    INR 1.62 (H) 04/27/2021    LABA1C 5.0 07/24/2020    LABMICR YES 04/28/2021     Lab Results   Component Value Date    CKTOTAL 206 (H) 06/02/2021    TROPONINI <0.01 04/27/2021       Recent Imaging Results are Reviewed:  No results found. Assessment and Plan:  Principal Problem:    Cellulitis of both feet  -Established problem. Stable. Plan: cont abx. Active Problems:    Anemia -Established problem. Stable.     Plan: No indication for transfusion. Cont to monitor h/h to assess progression of anemia. Recommend ferrous sulfate or MVI as outpatient. Crohn's colitis ([facility] -Established problem. Stable. Plan: cont on po meds    Essential hypertension -Established problem. Stable. 124/79  Plan: Continue present orders/plan. Hyponatremia -Established problem. Stable. Na 134  Plan: cont fluids, renal asked to see    Blisters of lower leg, hand - Established problem. Uncontrolled. Poss bullous pemphgoid? Poss pyoderma gangrenosum  Plan: po steroids, needs derm eval - we do not have here, cannot transfer to facility that has it      Disp - consider discharge.  Pt advised to present to UC if sx worsen as they will not take transfer            Filemon Diaz MD  6/4/2021

## 2021-06-04 NOTE — PROGRESS NOTES
Discharge instructions handed to and discussed with pt. New prescriptions x 3 handed to and discussed with pt. Pt verbalized understanding. All questions answered. Awaiting transportation.  Electronically signed by Gilles Boast, RN on 6/4/2021 at 1:55 PM

## 2021-06-04 NOTE — PROGRESS NOTES
Clinical Pharmacy Note: Pharmacy to Dose Vancomcyin    Vancomycin Day: 3  Current Dosin mg Q12H      Recent Labs     21  0543 21  0542   BUN 12 11       Recent Labs     21  0543 21  0542   CREATININE 0.5* <0.5*       Recent Labs     21  0645 21  0543   WBC 6.5 10.4         Intake/Output Summary (Last 24 hours) at 2021 1258  Last data filed at 6/3/2021 2014  Gross per 24 hour   Intake 240 ml   Output 1300 ml   Net -1060 ml         Ht Readings from Last 1 Encounters:   21 5' 7\" (1.702 m)        Wt Readings from Last 1 Encounters:   21 198 lb (89.8 kg)         Body mass index is 31.01 kg/m². CrCl cannot be calculated (This lab value cannot be used to calculate CrCl because it is not a number: <0.5). Trough: 17.0    Assessment/Plan:  Vancomycin level is therapeutic. Level was drawn appropriately in respect to last dose given. A vancomycin trough has been ordered for  @ 1030. Will Decrease vancomycin dose to 1000 mg Q12H to aim for a trough of 10-15 and to prevent accumulation. Changes in regimen will be determined based on culture results, renal function, and clinical response. Pharmacy will continue to monitor and adjust regimen as necessary.     Thank you for the consult,    Caridad Bello, PharmD  PGY1 Pharmacy Resident  [id number]    2021

## 2021-06-05 LAB
GRAM STAIN RESULT: ABNORMAL
MRSA CULTURE ONLY: ABNORMAL
ORGANISM: ABNORMAL
WOUND/ABSCESS: ABNORMAL
WOUND/ABSCESS: ABNORMAL

## 2021-06-06 LAB
BLOOD CULTURE, ROUTINE: NORMAL
CULTURE, BLOOD 2: NORMAL

## 2021-06-09 ENCOUNTER — NURSE ONLY (OUTPATIENT)
Dept: CARDIOLOGY CLINIC | Age: 66
End: 2021-06-09
Payer: MEDICAID

## 2021-06-09 DIAGNOSIS — Z45.09 ENCOUNTER FOR LOOP RECORDER CHECK: ICD-10-CM

## 2021-06-09 DIAGNOSIS — I48.0 PAF (PAROXYSMAL ATRIAL FIBRILLATION) (HCC): ICD-10-CM

## 2021-06-11 PROCEDURE — G2066 INTER DEVC REMOTE 30D: HCPCS | Performed by: INTERNAL MEDICINE

## 2021-06-11 PROCEDURE — 93298 REM INTERROG DEV EVAL SCRMS: CPT | Performed by: INTERNAL MEDICINE

## 2021-07-14 ENCOUNTER — NURSE ONLY (OUTPATIENT)
Dept: CARDIOLOGY CLINIC | Age: 66
End: 2021-07-14
Payer: MEDICAID

## 2021-07-14 DIAGNOSIS — Z45.09 ENCOUNTER FOR LOOP RECORDER CHECK: ICD-10-CM

## 2021-07-14 DIAGNOSIS — I48.0 PAF (PAROXYSMAL ATRIAL FIBRILLATION) (HCC): ICD-10-CM

## 2021-07-14 DIAGNOSIS — I69.319 COGNITIVE DEFICIT AS LATE EFFECT OF CEREBROVASCULAR ACCIDENT (CVA): ICD-10-CM

## 2021-07-14 PROCEDURE — 93298 REM INTERROG DEV EVAL SCRMS: CPT | Performed by: INTERNAL MEDICINE

## 2021-07-14 PROCEDURE — G2066 INTER DEVC REMOTE 30D: HCPCS | Performed by: INTERNAL MEDICINE

## 2021-07-14 NOTE — LETTER
[address] [facility] [phone]  [facility]  D[place] [address] [facility] [phone]    Pacemaker/Defibrillator Clinic    07/15/21      Eli Sullivan [address] [place] [id number]      Dear Guzman Leahy    This letter is to inform you that we received the transmission from your monitor at home that checks your implanted heart device. The next date your monitor will automatically transmit will be 8/18. If your report needs attention we will notify you. Your device and monitor are wireless and most transmit cellularly, but please periodically check your monitor is still plugged in to the electrical outlet. If you still use the telephone land line to send please ensure the connection to the phone elayne is secure. This will help to ensure successful automatic transmissions in the future. Also, the monitor needs to be close to you while sleeping at night. Please be aware that the remote device transmission sites are periodically monitored only during regular business hours during which simultaneous in-office device clinics are being run. If your transmission requires attention, we will contact you as soon as possible. **PLEASE NOTE** that our [facility] policy and processes are changing to ensure a more seamless approach for all parties involved, allowing more time for our nurses to address patient issues and concerns. We will no longer be sending letters for NORMAL remote transmissions. You will be contacted by phone if your transmission requires attention (as previously done), and letters will only be sent regarding monitor disconnections or missed transmissions if you are unable to be reached by phone. Please do not be alarmed by this new process, as we will continue to contact you if your transmission report requires attention. This will be your final \"remote received\" letter.   From this point forward, the [facility] will be utilizing the no news is good news approach. As always, please feel free to contact your nurse with any questions or concerns. Thank you.     Mirlande Casas

## 2021-07-14 NOTE — PROGRESS NOTES
ILR for CVA.  New AF recorded in 9/2020. Hx: pAT/AF-rvr.- OAC and toprol xl. Remote transmission received for patients ILR. Since 06.02.2021:  28 Tachy, EGMs show SVT, PAT, AF w RVR, and TWOS. 273 AF, 3.1% burden, EGMs show some AF and some NSR w ectopy. Intermittent undersensing and oversensing noted also. No symptom episodes recorded. EP physician will review.  See interrogation under the cardiology tab in the [address] [place] [address] [address] field for more details.  Will continue to monitor remotely.

## 2021-08-03 ENCOUNTER — APPOINTMENT (OUTPATIENT)
Dept: CT IMAGING | Age: 66
DRG: 720 | End: 2021-08-03
Payer: MEDICAID

## 2021-08-03 ENCOUNTER — HOSPITAL ENCOUNTER (INPATIENT)
Age: 66
LOS: 7 days | Discharge: HOME | DRG: 720 | End: 2021-08-10
Attending: EMERGENCY MEDICINE | Admitting: INTERNAL MEDICINE
Payer: MEDICAID

## 2021-08-03 DIAGNOSIS — N39.0 URINARY TRACT INFECTION WITHOUT HEMATURIA, SITE UNSPECIFIED: ICD-10-CM

## 2021-08-03 DIAGNOSIS — R41.82 ALTERED MENTAL STATUS, UNSPECIFIED ALTERED MENTAL STATUS TYPE: Primary | ICD-10-CM

## 2021-08-03 DIAGNOSIS — E83.42 HYPOMAGNESEMIA: ICD-10-CM

## 2021-08-03 PROBLEM — A41.9 SEPSIS SECONDARY TO UTI (HCC): Status: ACTIVE | Noted: 2021-08-03

## 2021-08-03 LAB
A/G RATIO: 1.1 (ref 1.1–2.2)
ALBUMIN SERPL-MCNC: 3.1 G/DL (ref 3.4–5)
ALP BLD-CCNC: 43 U/L (ref 40–129)
ALT SERPL-CCNC: <5 U/L (ref 10–40)
ANION GAP SERPL CALCULATED.3IONS-SCNC: 11 MMOL/L (ref 3–16)
AST SERPL-CCNC: 16 U/L (ref 15–37)
BACTERIA: ABNORMAL /HPF
BASOPHILS ABSOLUTE: 0 K/UL (ref 0–0.2)
BASOPHILS RELATIVE PERCENT: 0.5 %
BILIRUB SERPL-MCNC: 0.3 MG/DL (ref 0–1)
BILIRUBIN URINE: ABNORMAL
BLOOD, URINE: NEGATIVE
BUN BLDV-MCNC: 9 MG/DL (ref 7–20)
CALCIUM SERPL-MCNC: 6.8 MG/DL (ref 8.3–10.6)
CHLORIDE BLD-SCNC: 103 MMOL/L (ref 99–110)
CLARITY: ABNORMAL
CO2: 26 MMOL/L (ref 21–32)
COLOR: ABNORMAL
CREAT SERPL-MCNC: <0.5 MG/DL (ref 0.6–1.2)
EOSINOPHILS ABSOLUTE: 0 K/UL (ref 0–0.6)
EOSINOPHILS RELATIVE PERCENT: 0.3 %
EPITHELIAL CELLS, UA: 2 /HPF (ref 0–5)
GFR AFRICAN AMERICAN: >60
GFR NON-AFRICAN AMERICAN: >60
GLOBULIN: 2.8 G/DL
GLUCOSE BLD-MCNC: 96 MG/DL (ref 70–99)
GLUCOSE URINE: NEGATIVE MG/DL
HCT VFR BLD CALC: 30.1 % (ref 36–48)
HEMOGLOBIN: 10 G/DL (ref 12–16)
HYALINE CASTS: 9 /LPF (ref 0–8)
KETONES, URINE: NEGATIVE MG/DL
LEUKOCYTE ESTERASE, URINE: ABNORMAL
LIPASE: 32 U/L (ref 13–60)
LYMPHOCYTES ABSOLUTE: 1.3 K/UL (ref 1–5.1)
LYMPHOCYTES RELATIVE PERCENT: 12.6 %
MAGNESIUM: 0.8 MG/DL (ref 1.8–2.4)
MAGNESIUM: <0.2 MG/DL (ref 1.8–2.4)
MCH RBC QN AUTO: 30.3 PG (ref 26–34)
MCHC RBC AUTO-ENTMCNC: 33.4 G/DL (ref 31–36)
MCV RBC AUTO: 90.8 FL (ref 80–100)
MICROSCOPIC EXAMINATION: YES
MONOCYTES ABSOLUTE: 0.7 K/UL (ref 0–1.3)
MONOCYTES RELATIVE PERCENT: 7.2 %
NEUTROPHILS ABSOLUTE: 8.1 K/UL (ref 1.7–7.7)
NEUTROPHILS RELATIVE PERCENT: 79.4 %
NITRITE, URINE: POSITIVE
PDW BLD-RTO: 18.3 % (ref 12.4–15.4)
PH UA: 6 (ref 5–8)
PLATELET # BLD: 475 K/UL (ref 135–450)
PMV BLD AUTO: 8 FL (ref 5–10.5)
POTASSIUM REFLEX MAGNESIUM: 3.4 MMOL/L (ref 3.5–5.1)
PRO-BNP: 589 PG/ML (ref 0–124)
PROTEIN UA: 30 MG/DL
RBC # BLD: 3.31 M/UL (ref 4–5.2)
RBC UA: 3 /HPF (ref 0–4)
SODIUM BLD-SCNC: 140 MMOL/L (ref 136–145)
SPECIFIC GRAVITY UA: 1.02 (ref 1–1.03)
TOTAL PROTEIN: 5.9 G/DL (ref 6.4–8.2)
TROPONIN: <0.01 NG/ML
URINE REFLEX TO CULTURE: YES
URINE TYPE: ABNORMAL
UROBILINOGEN, URINE: 0.2 E.U./DL
WBC # BLD: 10.2 K/UL (ref 4–11)
WBC UA: 125 /HPF (ref 0–5)

## 2021-08-03 PROCEDURE — 87186 SC STD MICRODIL/AGAR DIL: CPT

## 2021-08-03 PROCEDURE — 6360000002 HC RX W HCPCS: Performed by: EMERGENCY MEDICINE

## 2021-08-03 PROCEDURE — 71260 CT THORAX DX C+: CPT

## 2021-08-03 PROCEDURE — 83735 ASSAY OF MAGNESIUM: CPT

## 2021-08-03 PROCEDURE — 96375 TX/PRO/DX INJ NEW DRUG ADDON: CPT

## 2021-08-03 PROCEDURE — 80053 COMPREHEN METABOLIC PANEL: CPT

## 2021-08-03 PROCEDURE — 51701 INSERT BLADDER CATHETER: CPT

## 2021-08-03 PROCEDURE — 6360000002 HC RX W HCPCS: Performed by: INTERNAL MEDICINE

## 2021-08-03 PROCEDURE — 70450 CT HEAD/BRAIN W/O DYE: CPT

## 2021-08-03 PROCEDURE — 6370000000 HC RX 637 (ALT 250 FOR IP): Performed by: INTERNAL MEDICINE

## 2021-08-03 PROCEDURE — 36415 COLL VENOUS BLD VENIPUNCTURE: CPT

## 2021-08-03 PROCEDURE — 2060000000 HC ICU INTERMEDIATE R&B

## 2021-08-03 PROCEDURE — 85025 COMPLETE CBC W/AUTO DIFF WBC: CPT

## 2021-08-03 PROCEDURE — 87086 URINE CULTURE/COLONY COUNT: CPT

## 2021-08-03 PROCEDURE — 94761 N-INVAS EAR/PLS OXIMETRY MLT: CPT

## 2021-08-03 PROCEDURE — 96365 THER/PROPH/DIAG IV INF INIT: CPT

## 2021-08-03 PROCEDURE — 83880 ASSAY OF NATRIURETIC PEPTIDE: CPT

## 2021-08-03 PROCEDURE — 99284 EMERGENCY DEPT VISIT MOD MDM: CPT

## 2021-08-03 PROCEDURE — 6360000004 HC RX CONTRAST MEDICATION: Performed by: EMERGENCY MEDICINE

## 2021-08-03 PROCEDURE — 87088 URINE BACTERIA CULTURE: CPT

## 2021-08-03 PROCEDURE — 93005 ELECTROCARDIOGRAM TRACING: CPT | Performed by: EMERGENCY MEDICINE

## 2021-08-03 PROCEDURE — 2580000003 HC RX 258: Performed by: INTERNAL MEDICINE

## 2021-08-03 PROCEDURE — 81001 URINALYSIS AUTO W/SCOPE: CPT

## 2021-08-03 PROCEDURE — 84484 ASSAY OF TROPONIN QUANT: CPT

## 2021-08-03 PROCEDURE — 83690 ASSAY OF LIPASE: CPT

## 2021-08-03 RX ORDER — SODIUM CHLORIDE 0.9 % (FLUSH) 0.9 %
5-40 SYRINGE (ML) INJECTION PRN
Status: DISCONTINUED | OUTPATIENT
Start: 2021-08-03 | End: 2021-08-10 | Stop reason: HOSPADM

## 2021-08-03 RX ORDER — SODIUM CHLORIDE 9 MG/ML
25 INJECTION, SOLUTION INTRAVENOUS PRN
Status: DISCONTINUED | OUTPATIENT
Start: 2021-08-03 | End: 2021-08-10 | Stop reason: HOSPADM

## 2021-08-03 RX ORDER — HYDROXYZINE HYDROCHLORIDE 10 MG/1
10 TABLET, FILM COATED ORAL 3 TIMES DAILY PRN
Status: DISCONTINUED | OUTPATIENT
Start: 2021-08-03 | End: 2021-08-10 | Stop reason: HOSPADM

## 2021-08-03 RX ORDER — POTASSIUM CHLORIDE 20 MEQ/1
40 TABLET, EXTENDED RELEASE ORAL PRN
Status: DISCONTINUED | OUTPATIENT
Start: 2021-08-03 | End: 2021-08-10 | Stop reason: HOSPADM

## 2021-08-03 RX ORDER — METOPROLOL SUCCINATE 50 MG/1
50 TABLET, EXTENDED RELEASE ORAL DAILY
Status: DISCONTINUED | OUTPATIENT
Start: 2021-08-04 | End: 2021-08-10 | Stop reason: HOSPADM

## 2021-08-03 RX ORDER — MAGNESIUM SULFATE IN WATER 40 MG/ML
2000 INJECTION, SOLUTION INTRAVENOUS ONCE
Status: COMPLETED | OUTPATIENT
Start: 2021-08-04 | End: 2021-08-04

## 2021-08-03 RX ORDER — FERROUS GLUCONATE 324(37.5)
972 TABLET ORAL
Status: DISCONTINUED | OUTPATIENT
Start: 2021-08-04 | End: 2021-08-10 | Stop reason: HOSPADM

## 2021-08-03 RX ORDER — ACETAMINOPHEN 325 MG/1
650 TABLET ORAL EVERY 6 HOURS PRN
Status: DISCONTINUED | OUTPATIENT
Start: 2021-08-03 | End: 2021-08-10 | Stop reason: HOSPADM

## 2021-08-03 RX ORDER — PREDNISONE 20 MG/1
40 TABLET ORAL DAILY
COMMUNITY

## 2021-08-03 RX ORDER — LEVOFLOXACIN 5 MG/ML
750 INJECTION, SOLUTION INTRAVENOUS EVERY 24 HOURS
Status: COMPLETED | OUTPATIENT
Start: 2021-08-03 | End: 2021-08-07

## 2021-08-03 RX ORDER — PANTOPRAZOLE SODIUM 40 MG/1
40 TABLET, DELAYED RELEASE ORAL DAILY
COMMUNITY

## 2021-08-03 RX ORDER — MYCOPHENOLATE MOFETIL 250 MG/1
1000 CAPSULE ORAL 2 TIMES DAILY
Status: DISCONTINUED | OUTPATIENT
Start: 2021-08-03 | End: 2021-08-10 | Stop reason: HOSPADM

## 2021-08-03 RX ORDER — ONDANSETRON 2 MG/ML
4 INJECTION INTRAMUSCULAR; INTRAVENOUS EVERY 6 HOURS PRN
Status: DISCONTINUED | OUTPATIENT
Start: 2021-08-03 | End: 2021-08-10 | Stop reason: HOSPADM

## 2021-08-03 RX ORDER — OXYCODONE HYDROCHLORIDE 5 MG/1
5 TABLET ORAL EVERY 4 HOURS PRN
COMMUNITY

## 2021-08-03 RX ORDER — ESCITALOPRAM OXALATE 10 MG/1
20 TABLET ORAL DAILY
Status: DISCONTINUED | OUTPATIENT
Start: 2021-08-04 | End: 2021-08-10 | Stop reason: HOSPADM

## 2021-08-03 RX ORDER — POTASSIUM CHLORIDE 7.45 MG/ML
10 INJECTION INTRAVENOUS PRN
Status: DISCONTINUED | OUTPATIENT
Start: 2021-08-03 | End: 2021-08-10 | Stop reason: HOSPADM

## 2021-08-03 RX ORDER — SODIUM CHLORIDE 0.9 % (FLUSH) 0.9 %
5-40 SYRINGE (ML) INJECTION EVERY 12 HOURS SCHEDULED
Status: DISCONTINUED | OUTPATIENT
Start: 2021-08-03 | End: 2021-08-10 | Stop reason: HOSPADM

## 2021-08-03 RX ORDER — FUROSEMIDE 20 MG/1
20 TABLET ORAL DAILY
Status: DISCONTINUED | OUTPATIENT
Start: 2021-08-04 | End: 2021-08-05

## 2021-08-03 RX ORDER — PREDNISONE 20 MG/1
40 TABLET ORAL DAILY
Status: DISCONTINUED | OUTPATIENT
Start: 2021-08-04 | End: 2021-08-10 | Stop reason: HOSPADM

## 2021-08-03 RX ORDER — ERGOCALCIFEROL 1.25 MG/1
50000 CAPSULE ORAL WEEKLY
Status: DISCONTINUED | OUTPATIENT
Start: 2021-08-09 | End: 2021-08-10 | Stop reason: HOSPADM

## 2021-08-03 RX ORDER — MYCOPHENOLATE MOFETIL 500 MG/1
1000 TABLET ORAL 2 TIMES DAILY
COMMUNITY

## 2021-08-03 RX ORDER — ESCITALOPRAM OXALATE 20 MG/1
20 TABLET ORAL DAILY
COMMUNITY

## 2021-08-03 RX ORDER — BACLOFEN 10 MG/1
10 TABLET ORAL 3 TIMES DAILY PRN
Status: DISCONTINUED | OUTPATIENT
Start: 2021-08-03 | End: 2021-08-10 | Stop reason: HOSPADM

## 2021-08-03 RX ORDER — MAGNESIUM SULFATE IN WATER 40 MG/ML
2000 INJECTION, SOLUTION INTRAVENOUS ONCE
Status: COMPLETED | OUTPATIENT
Start: 2021-08-03 | End: 2021-08-03

## 2021-08-03 RX ORDER — ONDANSETRON 4 MG/1
4 TABLET, ORALLY DISINTEGRATING ORAL EVERY 8 HOURS PRN
Status: DISCONTINUED | OUTPATIENT
Start: 2021-08-03 | End: 2021-08-10 | Stop reason: HOSPADM

## 2021-08-03 RX ORDER — CALCIUM GLUCONATE 20 MG/ML
1000 INJECTION, SOLUTION INTRAVENOUS ONCE
Status: COMPLETED | OUTPATIENT
Start: 2021-08-03 | End: 2021-08-04

## 2021-08-03 RX ORDER — BACLOFEN 10 MG/1
10 TABLET ORAL 3 TIMES DAILY PRN
COMMUNITY

## 2021-08-03 RX ORDER — PANTOPRAZOLE SODIUM 40 MG/1
40 TABLET, DELAYED RELEASE ORAL DAILY
Status: DISCONTINUED | OUTPATIENT
Start: 2021-08-04 | End: 2021-08-05

## 2021-08-03 RX ORDER — BUSPIRONE HYDROCHLORIDE 5 MG/1
10 TABLET ORAL 3 TIMES DAILY
Status: DISCONTINUED | OUTPATIENT
Start: 2021-08-03 | End: 2021-08-10 | Stop reason: HOSPADM

## 2021-08-03 RX ORDER — LEVOFLOXACIN 5 MG/ML
500 INJECTION, SOLUTION INTRAVENOUS ONCE
Status: DISCONTINUED | OUTPATIENT
Start: 2021-08-03 | End: 2021-08-03

## 2021-08-03 RX ORDER — MECLIZINE HCL 12.5 MG/1
TABLET ORAL
Status: DISCONTINUED
Start: 2021-08-03 | End: 2021-08-03

## 2021-08-03 RX ORDER — 0.9 % SODIUM CHLORIDE 0.9 %
500 INTRAVENOUS SOLUTION INTRAVENOUS PRN
Status: DISCONTINUED | OUTPATIENT
Start: 2021-08-03 | End: 2021-08-10 | Stop reason: HOSPADM

## 2021-08-03 RX ORDER — POTASSIUM CHLORIDE 20 MEQ/1
20 TABLET, EXTENDED RELEASE ORAL 2 TIMES DAILY
Status: DISCONTINUED | OUTPATIENT
Start: 2021-08-03 | End: 2021-08-07

## 2021-08-03 RX ORDER — OXYCODONE HYDROCHLORIDE 5 MG/1
5 TABLET ORAL EVERY 4 HOURS PRN
Status: DISCONTINUED | OUTPATIENT
Start: 2021-08-03 | End: 2021-08-10 | Stop reason: HOSPADM

## 2021-08-03 RX ORDER — ACETAMINOPHEN 650 MG/1
650 SUPPOSITORY RECTAL EVERY 6 HOURS PRN
Status: DISCONTINUED | OUTPATIENT
Start: 2021-08-03 | End: 2021-08-10 | Stop reason: HOSPADM

## 2021-08-03 RX ORDER — SODIUM CHLORIDE 9 MG/ML
INJECTION, SOLUTION INTRAVENOUS CONTINUOUS
Status: DISCONTINUED | OUTPATIENT
Start: 2021-08-03 | End: 2021-08-05

## 2021-08-03 RX ORDER — ONDANSETRON 4 MG/1
4 TABLET, ORALLY DISINTEGRATING ORAL EVERY 8 HOURS PRN
Status: DISCONTINUED | OUTPATIENT
Start: 2021-08-03 | End: 2021-08-03

## 2021-08-03 RX ORDER — FUROSEMIDE 20 MG/1
20-40 TABLET ORAL DAILY
Status: ON HOLD | COMMUNITY
End: 2021-08-10 | Stop reason: HOSPADM

## 2021-08-03 RX ORDER — HYDRALAZINE HYDROCHLORIDE 20 MG/ML
10 INJECTION INTRAMUSCULAR; INTRAVENOUS EVERY 6 HOURS PRN
Status: DISCONTINUED | OUTPATIENT
Start: 2021-08-03 | End: 2021-08-10 | Stop reason: HOSPADM

## 2021-08-03 RX ORDER — BALSALAZIDE DISODIUM 750 MG/1
3000 CAPSULE ORAL DAILY
Status: DISCONTINUED | OUTPATIENT
Start: 2021-08-03 | End: 2021-08-03 | Stop reason: ALTCHOICE

## 2021-08-03 RX ORDER — ATORVASTATIN CALCIUM 80 MG/1
80 TABLET, FILM COATED ORAL NIGHTLY
Status: DISCONTINUED | OUTPATIENT
Start: 2021-08-03 | End: 2021-08-03 | Stop reason: ALTCHOICE

## 2021-08-03 RX ADMIN — OXYCODONE 5 MG: 5 TABLET ORAL at 21:57

## 2021-08-03 RX ADMIN — LEVOFLOXACIN 750 MG: 5 INJECTION, SOLUTION INTRAVENOUS at 21:53

## 2021-08-03 RX ADMIN — ENOXAPARIN SODIUM 40 MG: 40 INJECTION SUBCUTANEOUS at 21:53

## 2021-08-03 RX ADMIN — SODIUM CHLORIDE 500 ML: 9 INJECTION, SOLUTION INTRAVENOUS at 20:26

## 2021-08-03 RX ADMIN — BUSPIRONE HYDROCHLORIDE 10 MG: 5 TABLET ORAL at 21:54

## 2021-08-03 RX ADMIN — POTASSIUM CHLORIDE 20 MEQ: 20 TABLET, EXTENDED RELEASE ORAL at 21:54

## 2021-08-03 RX ADMIN — Medication 1000 MG: at 18:07

## 2021-08-03 RX ADMIN — MYCOPHENOLATE MOFETIL 1000 MG: 250 CAPSULE ORAL at 21:53

## 2021-08-03 RX ADMIN — BACLOFEN 10 MG: 10 TABLET ORAL at 21:58

## 2021-08-03 RX ADMIN — IOPAMIDOL 75 ML: 755 INJECTION, SOLUTION INTRAVENOUS at 17:19

## 2021-08-03 RX ADMIN — MAGNESIUM SULFATE HEPTAHYDRATE 2000 MG: 40 INJECTION, SOLUTION INTRAVENOUS at 18:07

## 2021-08-03 ASSESSMENT — PAIN DESCRIPTION - PAIN TYPE: TYPE: ACUTE PAIN

## 2021-08-03 ASSESSMENT — PAIN SCALES - GENERAL
PAINLEVEL_OUTOF10: 10
PAINLEVEL_OUTOF10: 10

## 2021-08-03 ASSESSMENT — PAIN DESCRIPTION - LOCATION: LOCATION: NECK

## 2021-08-03 NOTE — ED PROVIDER NOTES
[address] PROVIDER NOTE    Patient Identification  Pt Name: Nely Fisher  MRN: [id number]  [place] 1955  Date of evaluation: 8/3/2021  Provider: Alonso Patel MD  PCP: Angelito Pascual MD    Chief Complaint  Altered Mental Status (Patient brought in by [facility]. Home health nurse said pt had a change in mental status, son reported her mental status was 'normal'. Patient very slow to answer questions. ) and Shortness of Breath (Patient also complains of being SOB with chest pains)      HPI  History provided by patient   This is a 60 y.o. female who presents to the ED for concern for mental status change per her home health nurse. On EMS arrival, apparently her son reported that her mental status was in fact normal.  She does have history of stroke and expressive aphasia. The patient does complain of chest discomfort and shortness of breath. She replies \"I do not know \"2 most other questions including characterization of the pain, when it began, exacerbating/remitting factors, orientation questions and my review of symptoms. We were able to get on the phone with the patient's daughter. States that she has been more confused than normal and having difficulty answering questions. Has had an episode of a 'glazed' look over her face x 7 min of unclear etiology.     ROS  Cannot assess    I have reviewed the following nursing documentation:  Allergies: Ace inhibitors and Skelaxin [metaxalone]    Past medical history:   Past Medical History:   Diagnosis Date    Anxiety     Arthritis     Atrial fibrillation/flutter ([address]     Blood transfusion reaction     Cro[facility]'s disease [facility] [address]     Depression     GERD (gastroesophageal reflux disease)     Hiatal hernia 06/24/2014    Hx of blood clots     Hypertension     MRSA (methicillin resistant staph aureus) culture positive 06/05/2018    urine    MRSA (methicillin resistant staph aureus) culture positive 06/03/2021 wound and colonization    Neuropathy     Pneumonia 01/08/2014    Sinus headache     SOB (shortness of breath)     VRE (vancomycin resistant enterococcus) culture positive 10/08/2018    abd culture     Past surgical history:   Past Surgical History:   Procedure Laterality Date    ABDOMEN SURGERY      ABDOMINAL ADHESION SURGERY  06/08/2018    BLADDER SUSPENSION      COLONOSCOPY      COLONOSCOPY  9/14/15    sigmoid colon biopsy     COLONOSCOPY  08/07/2018    COLONOSCOPY  06/07/2019    COLONOSCOPY, POSSIBLE BIOPSY, POSSIBLE POLYPECTOMY    COLONOSCOPY N/A 6/7/2019    COLONOSCOPY WITH BIOPSY performed by Orlando Bacon MD at [address] COLONOSCOPY N/A 6/7/2019    COLONOSCOPY DIAGNOSTIC/STOMA performed by Orlando Bacon MD at [address] [facility] 10/8/2018    EXPLORATORY LAPAROTOMY WITH COLOSTOMY performed by Jackelin Rodriguez MD at [address] Left 6/25/14    excision plantar left hallux    FOOT SURGERY  6/3/15    PLANTAR PLATE REPAIR BILATERAL, ARTHROTOMY MPJ 2ND BILATERAL    FOOT SURGERY Left 08/05/2002    Excision second metatarsal head left    FRACTURE SURGERY      rt hip    HAND CARPECTOMY Bilateral     HEEL SPUR SURGERY      HERNIA REPAIR      HYSTERECTOMY      bladder surgery    JOINT REPLACEMENT      rt hip, L shoulder replacement 11/2014    KNEE SURGERY Bilateral     arthrosvopic    LEG SURGERY Right     HI SECD CLOS SURG WND EXTEN/COMPLIC N/A 88/05/0665    SECONDARY WOUND CLOSURE OF ABDOMINAL WOUND performed by Jackelin Rodriguez MD at [facility] 7/17/2019    FLEXIBLE SIGMOIDOSCOPY performed by Jackelin Rodriguez MD at [address] [facility]  01/15/2018    with biopsies   [address]  N/A 7/17/2019    COLOSTOMY CLOSURE WITH COLORECTAL ANASTOMOSIS performed by Jackelin Rodriguez MD at [place] [address]  6/14/13    and colonsocopy with antral erosion biopsies       Home medications:   Previous Medications    ATORVASTATIN (LIPITOR) 80 MG TABLET    Take 1 tablet by mouth nightly    BACLOFEN (LIORESAL) 10 MG TABLET    Take 10 mg by mouth 3 times daily as needed    BALSALAZIDE (COLAZAL) 750 MG CAPSULE    Take 3,000 mg by mouth daily Take 4 capsules (total 3000 mg) daily each morning. BUSPIRONE (BUSPAR) 10 MG TABLET    Take 10 mg by mouth 3 times daily as needed    CHOLECALCIFEROL (VITAMIN D3 ULTRA POTENCY) 1.25 MG (48213 UT) TABS    Take 50,000 Units by mouth once a week    ESCITALOPRAM (LEXAPRO) 20 MG TABLET    Take 20 mg by mouth daily    FERROUS GLUCONATE (FERGON) 324 (38 FE) MG TABLET    Take 3 tablets by mouth daily (with breakfast)     FUROSEMIDE (LASIX) 20 MG TABLET    Take 20-40 mg by mouth daily    HYDROXYZINE (ATARAX) 10 MG TABLET    Take 10 mg by mouth 3 times daily as needed for Itching    METOPROLOL SUCCINATE (TOPROL XL) 50 MG EXTENDED RELEASE TABLET    Take 1 tablet by mouth daily    MYCOPHENOLATE (CELLCEPT) 500 MG TABLET    Take 1,000 mg by mouth 2 times daily    ONDANSETRON (ZOFRAN ODT) 4 MG DISINTEGRATING TABLET    Take 1 tablet by mouth every 8 hours as needed for Nausea    OXYCODONE (ROXICODONE) 5 MG IMMEDIATE RELEASE TABLET    Take 5 mg by mouth every 4 hours as needed for Pain. PANTOPRAZOLE (PROTONIX) 40 MG TABLET    Take 40 mg by mouth daily    POTASSIUM CHLORIDE (KLOR-CON M) 20 MEQ EXTENDED RELEASE TABLET    Take 1 tablet by mouth 2 times daily    PREDNISONE (DELTASONE) 20 MG TABLET    Take 40 mg by mouth daily    RIVAROXABAN (XARELTO) 20 MG TABS TABLET    Take 1 tablet by mouth daily (with breakfast)       Social history:  reports that she has been smoking cigarettes and e-cigarettes. She has a 10.00 pack-year smoking history. She has never used smokeless tobacco. She reports current alcohol use of about 2.0 standard drinks of alcohol per week. She reports that she does not use drugs.     Family history:    Family History Problem Relation Age of Onset    High Blood Pressure Mother     High Blood Pressure Father     Kidney Disease Sister          Exam  ED Triage Vitals [08/03/21 1540]   BP Temp Temp Source Pulse Resp SpO2 Height Weight   (!) 140/96 98.7 °F (37.1 °C) Oral 87 18 96 % 5' 7\" (1.702 m) 198 lb (89.8 kg)     Nursing note and vitals reviewed. Constitutional: In no acute distress  HENT:      Head: Normocephalic      Ears: External ears normal.      Nose: Nose normal.     Mouth: Membrane mucosa moist   Eyes: No discharge. Neck: Supple. Trachea midline. Cardiovascular: Regular rate. Warm extremities  Pulmonary/Chest: Effort normal. No respiratory distress. Abdominal: Soft. No distension. Nontender  : Deferred  Rectal: Deferred   Musculoskeletal: Moves all extremities. No gross deformity. Neurological: Does not answer orientation questions. Appears to have expressive and receptive aphasia. Will follow commands but sometimes need to repeat questions/commands. Moving all 4 limbs. Skin: Warm and dry. Multiple wounds on her feet/ankles  Psychiatric: Normal mood and affect. Behavior is normal.    Procedures      EKG  The Ekg interpreted by me in the absence of a cardiologist shows. A fib rhythm. No specific ST changes appreciated. HR 86  New rhythm compared to 4/21 studyt    Radiology  CT CHEST PULMONARY EMBOLISM W CONTRAST   Final Result   1. No acute pulmonary embolism to the segmental arteries. 2. Aneurysmal dilatation of the thoracic aorta measuring up to 4.3 cm. No   thoracic aortic dissection given motion artifact. Follow-up recommended to   assess size stability. 3. Other findings as described. CT HEAD FINDINGS:   BRAIN/VENTRICLES: No acute hemorrhage. Periventricular and subcortical   hypoattenuation is nonspecific and may be related to microvascular disease. Encephalomalacia in the left cerebellum. Encephalomalacia in the left   frontal and parietal lobes.       Artifact partially obscures the marti. Artifact partially obscures the inferior cerebellum. Carol [place] white differentiation appears maintained given artifact near the skull   base and through the posterior fossa. Cerebral volume loss and mild   prominence of the ventricles again visualized. There is no midline shift. Basal cisterns appear patent. ORBITS: Visualized orbits appear unremarkable on this unenhanced exam.      SINUSES: Visualized paranasal sinuses appear clear. Visualized mastoid air   cells appear clear. SOFT TISSUES/SKULL: No depressed calvarial fracture. Posterior fusion defect   of C1. IMPRESSION:   No acute hemorrhage or midline shift. CT HEAD WO CONTRAST   Final Result   1. No acute pulmonary embolism to the segmental arteries. 2. Aneurysmal dilatation of the thoracic aorta measuring up to 4.3 cm. No   thoracic aortic dissection given motion artifact. Follow-up recommended to   assess size stability. 3. Other findings as described. CT HEAD FINDINGS:   BRAIN/VENTRICLES: No acute hemorrhage. Periventricular and subcortical   hypoattenuation is nonspecific and may be related to microvascular disease. Encephalomalacia in the left cerebellum. Encephalomalacia in the left   frontal and parietal lobes. Artifact partially obscures the marti. Artifact partially obscures the inferior cerebellum. Carol [place] white differentiation appears maintained given artifact near the skull   base and through the posterior fossa. Cerebral volume loss and mild   prominence of the ventricles again visualized. There is no midline shift. Basal cisterns appear patent. ORBITS: Visualized orbits appear unremarkable on this unenhanced exam.      SINUSES: Visualized paranasal sinuses appear clear. Visualized mastoid air   cells appear clear. SOFT TISSUES/SKULL: No depressed calvarial fracture. Posterior fusion defect   of C1. IMPRESSION:   No acute hemorrhage or midline shift. Labs  Results for orders placed or performed during the hospital encounter of 08/03/21   Comprehensive Metabolic Panel w/ Reflex to MG   Result Value Ref Range    Sodium 140 136 - 145 mmol/L    Potassium reflex Magnesium 3.4 (L) 3.5 - 5.1 mmol/L    Chloride 103 99 - 110 mmol/L    CO2 26 21 - 32 mmol/L    Anion Gap 11 3 - 16    Glucose 96 70 - 99 mg/dL    BUN 9 7 - 20 mg/dL    CREATININE <0.5 (L) 0.6 - 1.2 mg/dL    GFR Non-African American >60 >60    GFR African American >60 >60    Calcium 6.8 (L) 8.3 - 10.6 mg/dL    Total Protein 5.9 (L) 6.4 - 8.2 g/dL    Albumin 3.1 (L) 3.4 - 5.0 g/dL    Albumin/Globulin Ratio 1.1 1.1 - 2.2    Total Bilirubin 0.3 0.0 - 1.0 mg/dL    Alkaline Phosphatase 43 40 - 129 U/L    ALT <5 (L) 10 - 40 U/L    AST 16 15 - 37 U/L    Globulin 2.8 g/dL   Troponin   Result Value Ref Range    Troponin <0.01 <0.01 ng/mL   Lipase   Result Value Ref Range    Lipase 32.0 13.0 - 60.0 U/L   Urinalysis Reflex to Culture    Specimen: Urine, clean catch   Result Value Ref Range    Color, UA DK YELLOW Straw/Yellow    Clarity, UA CLOUDY (A) Clear    Glucose, Ur Negative Negative mg/dL    Bilirubin Urine SMALL (A) Negative    Ketones, Urine Negative Negative mg/dL    Specific Gravity, UA 1.025 1.005 - 1.030    Blood, Urine Negative Negative    pH, UA 6.0 5.0 - 8.0    Protein, UA 30 (A) Negative mg/dL    Urobilinogen, Urine 0.2 <2.0 E.U./dL    Nitrite, Urine POSITIVE (A) Negative    Leukocyte Esterase, Urine MODERATE (A) Negative    Microscopic Examination YES     Urine Type NotGiven     Urine Reflex to Culture Yes    Brain Natriuretic Peptide   Result Value Ref Range    Pro- (H) 0 - 124 pg/mL   Magnesium   Result Value Ref Range    Magnesium <0.20 (LL) 1.80 - 2.40 mg/dL   Microscopic Urinalysis   Result Value Ref Range    Bacteria, UA 4+ (A) None Seen /HPF    Hyaline Casts, UA 9 (H) 0 - 8 /LPF    WBC,  (H) 0 - 5 /HPF    RBC, UA 3 0 - 4 /HPF    Epithelial Cells, UA 2 0 - 5 /HPF Screenings           MDM and ED Course  This is a 60 y.o. female who presents to the ED for reported altered mental status per patient's home health nurse however per her son, is at baseline mental status. The patient complains of chest discomfort. Unlikely ACS. Will obtain EKG and troponin. Did have essentially clean cardiac cath within the past year. Obtaining CT chest with contrast to evaluate for infection, pulmonary embolism, dissection although my pretest probability for this is low. Will obtain CT head to evaluate for intracranial bleed. History from the patient is limited secondary to history of stroke with aphasia. Patient does have wounds on her feet which do not appear cellulitic at this time or grossly infected. Unclear etiology for AMS. May have UTI.    -----------    Found UTI. Initially was given Rocephin. On further review, patient has history of drug-resistant urinary tract infection amenable to Levaquin therefore this was ordered. Also found to have severe hypomagnesemia. We will replete this as well as hypocalcemia. Will admit to [facility]. The total critical care time spent while evaluating and treating this patient was at least 35 minutes. This excludes time spent doing separately billable procedures. This includes time at the bedside, data interpretation, medication management, obtaining critical history from collateral sources if the patient is unable to provide it directly, and physician consultation. Specifics of interventions taken and potentially life-threatening diagnostic considerations are listed above in the medical decision making. [unfilled]    Final Impression  1. Altered mental status, unspecified altered mental status type    2. Urinary tract infection without hematuria, site unspecified    3. Hypomagnesemia        Blood pressure (!) 128/91, pulse 72, temperature 98.7 °F (37.1 °C), temperature source Oral, resp.  rate 22, height 5' 7\" (1.702 m), weight 198 lb (89.8 kg), SpO2 97 %, not currently breastfeeding. Disposition:  DISPOSITION Decision To Admit 08/03/2021 05:28:12 PM      Patient Referrals:  No follow-up provider specified. Discharge Medications:  New Prescriptions    No medications on file       Discontinued Medications:  Discontinued Medications    BACLOFEN (LIORESAL) 10 MG TABLET    Take 1 tablet by mouth 3 times daily    FLUTICASONE (FLONASE) 50 MCG/ACT NASAL SPRAY    1 spray by Each Nostril route daily as needed for Rhinitis    FUROSEMIDE (LASIX) 20 MG TABLET    Take 1 tablet by mouth 2 times daily    NYSTATIN (MYCOSTATIN) 625335 UNIT/GM CREAM    Apply topically 2 times daily as needed for Dry Skin Apply topically 2 times daily. OMEPRAZOLE (PRILOSEC) 20 MG DELAYED RELEASE CAPSULE    Take 20 mg by mouth Daily        This chart was generated using the [address] [place] [address] [address] [employer] system. I created this record but it may contain dictation errors given the limitations of this technology.         Denys Ribeiro MD  08/03/21 [id number]

## 2021-08-03 NOTE — ED NOTES
Patient brought in via [facility]. Per EMS the home health nurse noticed the patient had a change in mental status. Patients son was present at home and said the patient was acting 'normal'. Patient very slow to respond and unable to answer triage questions about current medications due to current mental status. Dr. Bessie Haddad at bedside to evaluate patient. Patient currently resting in bed, side rails up x2, lowest position, call light in reach with bed alarm on.      Rusty Bautista RN  08/03/21 [address] [facility], RN  08/03/21 6397

## 2021-08-03 NOTE — ED NOTES
Report given to Boyd Balderas RN. No furher questions at this time. Pt transported to floor with on tele, mag running and with all belongings.       Zahraa Soto RN  08/1955

## 2021-08-03 NOTE — ED NOTES
This RN writing called daughter Madelyn Banda. Per daughter, pt was grabbing at chest and stating her chest hurts. Daughter thinks something wrong with loop recorder. Also states that she is very slow at answering questions and has a glaze for 7 mins that is unlike her.       Gordon Garcias RN  08/03/21 6921

## 2021-08-03 NOTE — H&P
History and Physical  Dr. Harpreet Campuzano  8/3/2021    PCP: Maxine Nolasco MD    Cc:   Chief Complaint   Patient presents with    Altered Mental Status     Patient brought in by [facility]. Home health nurse said pt had a change in mental status, son reported her mental status was 'normal'. Patient very slow to answer questions.  Shortness of Breath     Patient also complains of being SOB with chest pains       HPI:  Marcelino Mckeon is a 60 y.o. female who has a past medical history of Anxiety, Arthritis, Atrial fibrillation/flutter ([address] Blood transfusion reaction, Crohn's disease ([address] Depression, GERD (gastroesophageal reflux disease), Hiatal hernia, Hx of blood clots, Hypertension, MRSA (methicillin resistant staph aureus) culture positive, MRSA (methicillin resistant staph aureus) culture positive, Neuropathy, Pneumonia, Sinus headache, SOB (shortness of breath), and VRE (vancomycin resistant enterococcus) culture positive. Patient presents with Hypomagnesemia. HPI     60 y.o. female who presents to the ED for concern for mental status change per her home health nurse. On EMS arrival to ER, apparently her son reported that her mental status was in fact normal.  She does have history of stroke and expressive aphasia.     CT Head in ER does not show acute findings (reviewed by self on computer)      We were able to get on the phone with the patient's daughter. States that she has been more confused than normal and having difficulty answering questions. Has had an episode of a 'glazed' look over her face x 7 min of unclear etiology. Cannot get further hx from patient at this time, nor ROS    Problem list of hospitalization thus far:   Active Hospital Problems    Diagnosis     UTI (urinary tract infection) [N39.0]     Sepsis secondary to UTI ([address] [A41.9, N39.0]     Cognitive deficit as late effect of cerebrovascular accident (CVA) [I69.319]     Essential hypertension [I10]     Hypomagnesemia Drug Use Status  No          Sexual Activity     Sexually Active  Not Currently Partners  Male                Fam History:   Family History   Problem Relation Age of Onset    High Blood Pressure Mother     High Blood Pressure Father     Kidney Disease Sister        PFSH: The above PMHx, PSHx, SocHx, FamHx has been reviewed by myself. (1 area for detailed, 2-3 for comprehensive)      Code Status: Prior    Meds - following list of home medications fromelectronic chart has been reviewed by myself  Prior to Admission medications    Medication Sig Start Date End Date Taking? Authorizing Provider   escitalopram (LEXAPRO) 20 MG tablet Take 20 mg by mouth daily   Yes Historical Provider, MD   oxyCODONE (ROXICODONE) 5 MG immediate release tablet Take 5 mg by mouth every 4 hours as needed for Pain.    Yes Historical Provider, MD   furosemide (LASIX) 20 MG tablet Take 20-40 mg by mouth daily   Yes Historical Provider, MD   mycophenolate (CELLCEPT) 500 MG tablet Take 1,000 mg by mouth 2 times daily   Yes Historical Provider, MD   baclofen (LIORESAL) 10 MG tablet Take 10 mg by mouth 3 times daily as needed   Yes Historical Provider, MD   predniSONE (DELTASONE) 20 MG tablet Take 40 mg by mouth daily   Yes Historical Provider, MD   Cholecalciferol (VITAMIN D3 ULTRA POTENCY) 1.25 MG (25608 UT) TABS Take 50,000 Units by mouth once a week   Yes Historical Provider, MD   pantoprazole (PROTONIX) 40 MG tablet Take 40 mg by mouth daily   Yes Historical Provider, MD   metoprolol succinate (TOPROL XL) 50 MG extended release tablet Take 1 tablet by mouth daily 1/21/21  Yes GAYLA Whiting CNP   rivaroxaban (XARELTO) 20 MG TABS tablet Take 1 tablet by mouth daily (with breakfast) 12/7/20  Yes GAYLA Whiting CNP   potassium chloride (KLOR-CON M) 20 MEQ extended release tablet Take 1 tablet by mouth 2 times daily 7/30/20  Yes Wendy James MD   ferrous gluconate (FERGON) 324 (38 Fe) MG tablet Take 3 tablets by mouth daily (with breakfast)    Yes Historical Provider, MD   hydrOXYzine (ATARAX) 10 MG tablet Take 10 mg by mouth 3 times daily as needed for Itching   Yes Historical Provider, MD   ondansetron (ZOFRAN ODT) 4 MG disintegrating tablet Take 1 tablet by mouth every 8 hours as needed for Nausea 7/15/20  Yes LYNN Vallecillo   busPIRone (BUSPAR) 10 MG tablet Take 10 mg by mouth 3 times daily as needed   Yes Historical Provider, MD   atorvastatin (LIPITOR) 80 MG tablet Take 1 tablet by mouth nightly 7/30/20   Lane Valadez MD   balsalazide (COLAZAL) 750 MG capsule Take 3,000 mg by mouth daily Take 4 capsules (total 3000 mg) daily each morning. Historical Provider, MD         Allergies   Allergen Reactions    Ace Inhibitors Swelling    Skelaxin [Metaxalone] Swelling             EXAM: (2-7 system for EPF/Detailed, ?8 for Comprehensive)  BP (!) 128/91   Pulse 72   Temp 98.7 °F (37.1 °C) (Oral)   Resp 22   Ht 5' 7\" (1.702 m)   Wt 198 lb (89.8 kg)   SpO2 97%   BMI 31.01 kg/m²   Constitutional: vitals as above: appears stated age and cooperative  Head: NC/AT. Eyes:lids and lashes normal and conjunctivae and sclerae normal  EMNT: nares midline, lips normal  Neck: no adenopathy, supple, symmetrical, trachea midline and thyroid not enlarged, symmetric, no tenderness/mass/nodules   Respiratory: clear to auscultation and percussion bilaterally with normal respiratory effort  Cardiovascular: normal rate, regular rhythm, normal S1 and S2 and no carotid bruits  Gastrointestinal: soft, non-tender, non-distended, normal bowel sounds, no masses or organomegaly  Lymphatic:   Extremities: no edema, no clubbing  Skin:No rashes or nodules noted.   Neurologic:    LABS:  Labs Reviewed   COMPREHENSIVE METABOLIC PANEL W/ REFLEX TO MG FOR LOW K - Abnormal; Notable for the following components:       Result Value    Potassium reflex Magnesium 3.4 (*)     CREATININE <0.5 (*)     Calcium 6.8 (*)     Total Protein 5.9 (*)     Albumin 3.1 (*)     ALT <5 (*)     All other components within normal limits    Narrative:     Performed at:  [facility] [address] [facility] [employer] [facility] [employer]   Phone [phone]   URINE RT REFLEX TO CULTURE - Abnormal; Notable for the following components:    Clarity, UA CLOUDY (*)     Bilirubin Urine SMALL (*)     Protein, UA 30 (*)     Nitrite, Urine POSITIVE (*)     Leukocyte Esterase, Urine MODERATE (*)     All other components within normal limits    Narrative:     Performed at:  [facility] [address] [employer] [place]   Phone [phone]   BRAIN NATRIURETIC PEPTIDE - Abnormal; Notable for the following components:    Pro- (*)     All other components within normal limits    Narrative:     Performed at:  [facility] [address] [employer] [place]   Phone [phone]   MAGNESIUM - Abnormal; Notable for the following components:    Magnesium <0.20 (*)     All other components within normal limits    Narrative:     Performed at:  [facility] [address] [facility] [employer], [facility] [employer]   Phone [phone]   MICROSCOPIC URINALYSIS - Abnormal; Notable for the following components:    Bacteria, UA 4+ (*)     Hyaline Casts, UA 9 (*)     WBC,  (*)     All other components within normal limits    Narrative:     Performed at:  [facility] [address] [employer] [place]   Phone [phone]   CULTURE, URINE   TROPONIN    Narrative:     Performed at:  [facility] [address] [facility] [employer]  [place]   Phone [phone]   LIPASE    Narrative:     Performed at:  [facility] [address] [email or web address] [employer]   Phone [phone]   CBC WITH AUTO DIFFERENTIAL         IMAGING:  Imaging results from the ER have been reviewed in the computerized chart.   CT HEAD WO CONTRAST    Result Date: 8/3/2021  EXAMINATION: CTA OF THE CHEST; CT OF THE HEAD WITHOUT CONTRAST 8/3/2021 5:20 pm; 8/3/2021 5:12 pm TECHNIQUE: CTA of the chest was performed after the administration of intravenous contrast.  Multiplanar reformatted images are provided for review. MIP images are provided for review. Dose modulation, iterative reconstruction, and/or weight based adjustment of the mA/kV was utilized to reduce the radiation dose to as low as reasonably achievable.; CT of the head was performed without the administration of intravenous contrast. Dose modulation, iterative reconstruction, and/or weight based adjustment of the mA/kV was utilized to reduce the radiation dose to as low as reasonably achievable. COMPARISON: CT head 04/07/2021. HISTORY: ORDERING SYSTEM PROVIDED HISTORY: chest pain TECHNOLOGIST PROVIDED HISTORY: Reason for exam:->chest pain Decision Support Exception - unselect if not a suspected or confirmed emergency medical condition->Emergency Medical Condition (MA) Reason for Exam: Chest pain. Shortness of Breath (Patient also complains of being SOB with chest pains). Acuity: Acute Type of Exam: Initial; ORDERING SYSTEM PROVIDED HISTORY: reported AMS TECHNOLOGIST PROVIDED HISTORY: Has a \"code stroke\" or \"stroke alert\" been called? ->No Reason for exam:->reported AMS Decision Support Exception - unselect if not a suspected or confirmed emergency medical condition->Emergency Medical Condition (MA) Reason for Exam: Reported AMS. Altered Mental Status (Patient brought in by [facility]. Home health nurse said pt had a change in mental status, son reported her mental status was 'normal'. Patient very slow to answer questions. ). Acuity: Acute Type of Exam: Initial CTA CHEST FINDINGS: Pulmonary Arteries: Pulmonary arteries are enlarged. Benedetta Ou No filling defect is identified in the pulmonary arteries to the level of thesegmental arteries. Mediastinum: Heart is upper normal limits in size.  No 5:20 pm; 8/3/2021 5:12 pm TECHNIQUE: CTA of the chest was performed after the administration of intravenous contrast.  Multiplanar reformatted images are provided for review. MIP images are provided for review. Dose modulation, iterative reconstruction, and/or weight based adjustment of the mA/kV was utilized to reduce the radiation dose to as low as reasonably achievable.; CT of the head was performed without the administration of intravenous contrast. Dose modulation, iterative reconstruction, and/or weight based adjustment of the mA/kV was utilized to reduce the radiation dose to as low as reasonably achievable. COMPARISON: CT head 04/07/2021. HISTORY: ORDERING SYSTEM PROVIDED HISTORY: chest pain TECHNOLOGIST PROVIDED HISTORY: Reason for exam:->chest pain Decision Support Exception - unselect if not a suspected or confirmed emergency medical condition->Emergency Medical Condition (MA) Reason for Exam: Chest pain. Shortness of Breath (Patient also complains of being SOB with chest pains). Acuity: Acute Type of Exam: Initial; ORDERING SYSTEM PROVIDED HISTORY: reported AMS TECHNOLOGIST PROVIDED HISTORY: Has a \"code stroke\" or \"stroke alert\" been called? ->No Reason for exam:->reported AMS Decision Support Exception - unselect if not a suspected or confirmed emergency medical condition->Emergency Medical Condition (MA) Reason for Exam: Reported AMS. Altered Mental Status (Patient brought in by [facility]. Home health nurse said pt had a change in mental status, son reported her mental status was 'normal'. Patient very slow to answer questions. ). Acuity: Acute Type of Exam: Initial CTA CHEST FINDINGS: Pulmonary Arteries: Pulmonary arteries are enlarged. Sean Arcenio No filling defect is identified in the pulmonary arteries to the level of thesegmental arteries. Mediastinum: Heart is upper normal limits in size. No pericardial effusion. Aneurysmal dilatation of the ascending aorta measuring 4.3 cm aortic arch measures 3.7 cm. Descending thoracic aorta measures 3.5 cm. Suboptimal evaluation of the aortic root due to motion artifact. No luminal defect is appreciated in the visualized thoracic aorta given motion artifact. Coronary artery calcifications noted. Lungs/pleura: Central airways are patent. Lungs are clear. No pleural effusion. No pneumothorax. Scattered atelectasis. Soft Tissues/Bones: No adenopathy. Scattered degenerative changes noted in the visualized spine without spondylolisthesis. Upper Abdomen: Large hiatal hernia. 1. No acute pulmonary embolism to the segmental arteries. 2. Aneurysmal dilatation of the thoracic aorta measuring up to 4.3 cm. No thoracic aortic dissection given motion artifact. Follow-up recommended to assess size stability. 3. Other findings as described. CT HEAD FINDINGS: BRAIN/VENTRICLES: No acute hemorrhage. Periventricular and subcortical hypoattenuation is nonspecific and may be related to microvascular disease. Encephalomalacia in the left cerebellum. Encephalomalacia in the left frontal and parietal lobes. Artifact partially obscures the marti. Artifact partially obscures the inferior cerebellum. [place] white differentiation appears maintained given artifact near the skull base and through the posterior fossa. Cerebral volume loss and mild prominence of the ventricles again visualized. There is no midline shift. Basal cisterns appear patent. ORBITS: Visualized orbits appear unremarkable on this unenhanced exam. SINUSES: Visualized paranasal sinuses appear clear. Visualized mastoid air cells appear clear. SOFT TISSUES/SKULL: No depressed calvarial fracture. Posterior fusion defect of C1. IMPRESSION: No acute hemorrhage or midline shift. EKG: from ER, interpreted by slf, atrial fib at 86. No acute st elevation. MEDICAL DECISION MAKING:    Principal Problem:    Hypomagnesemia -New Problem to me. Profoundly low Mg  Plan: order iv mag sulfate, repeat levels.  Goal Mg 2. 0  Active Problems:    Anemia -Established problem. Stable. Plan: No indication for transfusion. Cont to monitor h/h to assess progression of anemia. Recommend ferrous sulfate or MVI as outpatient. Essential hypertension -Established problem. Stable. 136/84  Plan: Pt home BP meds reviewed and will be continued. IV Hydralazine ordered for control of extremely high blood pressures   Will monitor labs to assess Creat/K for possible complications of medications. Cognitive deficit as late effect of cerebrovascular accident (CVA) -Established problem. Stable. Pt with acute spell of disorientation. Ct head does not show infarct  Plan: monitor. Ask neuro to see as perhaps this represents sz?    UTI (urinary tract infection) -Established problem. Stable. Plan: empiric abx. Await cx    Sepsis secondary to UTI [facility]) -Established problem. Stable. Plan: Continue present orders/plan. As above. Diagnoses as listed above, designated as new or established and plan outlined for each. Data Reviewed:   (1) Lab tests were reviewed or ordered. (1) Radiology tests were reviewed or ordered. (1) Medical test (Echo, EKG, PFT/tracie) were ordered. (1)History was not obtained from someone other than patient  (1) Old records  were reviewed - see HPI/MDM for pertinent details if review done. (2) Case wasdiscussed with another health care provider: Dr Marcus Blanca  (2) Imaging was viewed by myself. (2) EKG  was viewed by myself. The patient isbeing placed in inpatient status with the expectation of requiring a hospital stay spanning at least two midnights for care and treatment of the problems noted in the problem list.  This determination is also based on thepatients comorbidities and past medical history, the severity and timing of the signs and symptoms upon presentation.         Electronically signed by: Jasper Renteria MD 8/3/2021

## 2021-08-03 NOTE — ED NOTES
Pharmacy Medication History Note      List of current medications patient is taking is complete. Source of information: Chivo Frank 20 made to medication list:  Medications flagged for removal (include reason, ex. noncompliance):  none    Medications removed (include reason, ex. therapy complete or physician discontinued): Omeprazole- alternate therapy  Nystatin- therapy completed  Flonase- therapy completed    Medications added/doses adjusted:  Baclofen adjusted to 10 mg TID PRN  Escitalopram 20 mg daily  Furosemide adjusted to 20-40 mg daily    Other notes (ex. Recent course of antibiotics, Coumadin dosing):  Denies use of other OTC or herbal medications. Last dose times updated. Tod Meng, PharmD  ED Pharmacist [id number]  8/3/2021    No current facility-administered medications on file prior to encounter. Current Outpatient Medications on File Prior to Encounter   Medication Sig Dispense Refill    escitalopram (LEXAPRO) 20 MG tablet Take 20 mg by mouth daily      oxyCODONE (ROXICODONE) 5 MG immediate release tablet Take 5 mg by mouth every 4 hours as needed for Pain.       furosemide (LASIX) 20 MG tablet Take 20-40 mg by mouth daily      mycophenolate (CELLCEPT) 500 MG tablet Take 1,000 mg by mouth 2 times daily      baclofen (LIORESAL) 10 MG tablet Take 10 mg by mouth 3 times daily as needed      predniSONE (DELTASONE) 20 MG tablet Take 40 mg by mouth daily      Cholecalciferol (VITAMIN D3 ULTRA POTENCY) 1.25 MG (08163 UT) TABS Take 50,000 Units by mouth once a week      pantoprazole (PROTONIX) 40 MG tablet Take 40 mg by mouth daily      metoprolol succinate (TOPROL XL) 50 MG extended release tablet Take 1 tablet by mouth daily 30 tablet 5    rivaroxaban (XARELTO) 20 MG TABS tablet Take 1 tablet by mouth daily (with breakfast) 90 tablet 3    potassium chloride (KLOR-CON M) 20 MEQ extended release tablet Take 1 tablet by mouth 2 times daily 60 tablet 3    ferrous gluconate (FERGON) 324 (38 Fe) MG tablet Take 3 tablets by mouth daily (with breakfast)       hydrOXYzine (ATARAX) 10 MG tablet Take 10 mg by mouth 3 times daily as needed for Itching      ondansetron (ZOFRAN ODT) 4 MG disintegrating tablet Take 1 tablet by mouth every 8 hours as needed for Nausea 20 tablet 0    busPIRone (BUSPAR) 10 MG tablet Take 10 mg by mouth 3 times daily as needed      [DISCONTINUED] furosemide (LASIX) 20 MG tablet Take 1 tablet by mouth 2 times daily (Patient taking differently: Take 40 mg by mouth 2 times daily ) 60 tablet 5    atorvastatin (LIPITOR) 80 MG tablet Take 1 tablet by mouth nightly 30 tablet 3    [DISCONTINUED] fluticasone (FLONASE) 50 MCG/ACT nasal spray 1 spray by Each Nostril route daily as needed for Rhinitis 1 Bottle 3    balsalazide (COLAZAL) 750 MG capsule Take 3,000 mg by mouth daily Take 4 capsules (total 3000 mg) daily each morning. [DISCONTINUED] nystatin (MYCOSTATIN) 618645 UNIT/GM cream Apply topically 2 times daily as needed for Dry Skin Apply topically 2 times daily.       [DISCONTINUED] omeprazole (PRILOSEC) 20 MG delayed release capsule Take 20 mg by mouth Daily       [DISCONTINUED] baclofen (LIORESAL) 10 MG tablet Take 1 tablet by mouth 3 times daily 90 tablet 1

## 2021-08-04 LAB
A/G RATIO: 1.2 (ref 1.1–2.2)
ALBUMIN SERPL-MCNC: 3.1 G/DL (ref 3.4–5)
ALP BLD-CCNC: 40 U/L (ref 40–129)
ALT SERPL-CCNC: <5 U/L (ref 10–40)
ANION GAP SERPL CALCULATED.3IONS-SCNC: 12 MMOL/L (ref 3–16)
AST SERPL-CCNC: 12 U/L (ref 15–37)
BASOPHILS ABSOLUTE: 0.1 K/UL (ref 0–0.2)
BASOPHILS RELATIVE PERCENT: 0.7 %
BILIRUB SERPL-MCNC: 0.3 MG/DL (ref 0–1)
BUN BLDV-MCNC: 9 MG/DL (ref 7–20)
CALCIUM SERPL-MCNC: 6.8 MG/DL (ref 8.3–10.6)
CHLORIDE BLD-SCNC: 100 MMOL/L (ref 99–110)
CO2: 25 MMOL/L (ref 21–32)
CREAT SERPL-MCNC: <0.5 MG/DL (ref 0.6–1.2)
EKG ATRIAL RATE: 86 BPM
EKG DIAGNOSIS: NORMAL
EKG Q-T INTERVAL: 372 MS
EKG QRS DURATION: 82 MS
EKG QTC CALCULATION (BAZETT): 445 MS
EKG R AXIS: 30 DEGREES
EKG T AXIS: 50 DEGREES
EKG VENTRICULAR RATE: 86 BPM
EOSINOPHILS ABSOLUTE: 0 K/UL (ref 0–0.6)
EOSINOPHILS RELATIVE PERCENT: 0 %
GFR AFRICAN AMERICAN: >60
GFR NON-AFRICAN AMERICAN: >60
GLOBULIN: 2.6 G/DL
GLUCOSE BLD-MCNC: 88 MG/DL (ref 70–99)
HCT VFR BLD CALC: 27.5 % (ref 36–48)
HEMOGLOBIN: 9.2 G/DL (ref 12–16)
LYMPHOCYTES ABSOLUTE: 2 K/UL (ref 1–5.1)
LYMPHOCYTES RELATIVE PERCENT: 21.9 %
MAGNESIUM: 1.1 MG/DL (ref 1.8–2.4)
MCH RBC QN AUTO: 30 PG (ref 26–34)
MCHC RBC AUTO-ENTMCNC: 33.4 G/DL (ref 31–36)
MCV RBC AUTO: 89.8 FL (ref 80–100)
MONOCYTES ABSOLUTE: 0.6 K/UL (ref 0–1.3)
MONOCYTES RELATIVE PERCENT: 6.7 %
NEUTROPHILS ABSOLUTE: 6.5 K/UL (ref 1.7–7.7)
NEUTROPHILS RELATIVE PERCENT: 70.7 %
PDW BLD-RTO: 18.1 % (ref 12.4–15.4)
PLATELET # BLD: 447 K/UL (ref 135–450)
PMV BLD AUTO: 6.8 FL (ref 5–10.5)
POTASSIUM REFLEX MAGNESIUM: 3.4 MMOL/L (ref 3.5–5.1)
RBC # BLD: 3.06 M/UL (ref 4–5.2)
SODIUM BLD-SCNC: 137 MMOL/L (ref 136–145)
TOTAL PROTEIN: 5.7 G/DL (ref 6.4–8.2)
WBC # BLD: 9.2 K/UL (ref 4–11)

## 2021-08-04 PROCEDURE — 85025 COMPLETE CBC W/AUTO DIFF WBC: CPT

## 2021-08-04 PROCEDURE — 99222 1ST HOSP IP/OBS MODERATE 55: CPT | Performed by: PSYCHIATRY & NEUROLOGY

## 2021-08-04 PROCEDURE — 83735 ASSAY OF MAGNESIUM: CPT

## 2021-08-04 PROCEDURE — 2060000000 HC ICU INTERMEDIATE R&B

## 2021-08-04 PROCEDURE — 93010 ELECTROCARDIOGRAM REPORT: CPT | Performed by: INTERNAL MEDICINE

## 2021-08-04 PROCEDURE — 94760 N-INVAS EAR/PLS OXIMETRY 1: CPT

## 2021-08-04 PROCEDURE — 6360000002 HC RX W HCPCS: Performed by: INTERNAL MEDICINE

## 2021-08-04 PROCEDURE — 6370000000 HC RX 637 (ALT 250 FOR IP): Performed by: INTERNAL MEDICINE

## 2021-08-04 PROCEDURE — 36415 COLL VENOUS BLD VENIPUNCTURE: CPT

## 2021-08-04 PROCEDURE — 2500000003 HC RX 250 WO HCPCS: Performed by: EMERGENCY MEDICINE

## 2021-08-04 PROCEDURE — 80053 COMPREHEN METABOLIC PANEL: CPT

## 2021-08-04 PROCEDURE — 2580000003 HC RX 258: Performed by: INTERNAL MEDICINE

## 2021-08-04 RX ORDER — MAGNESIUM SULFATE IN WATER 40 MG/ML
2000 INJECTION, SOLUTION INTRAVENOUS PRN
Status: DISCONTINUED | OUTPATIENT
Start: 2021-08-04 | End: 2021-08-10 | Stop reason: HOSPADM

## 2021-08-04 RX ORDER — MAGNESIUM SULFATE IN WATER 40 MG/ML
4000 INJECTION, SOLUTION INTRAVENOUS ONCE
Status: COMPLETED | OUTPATIENT
Start: 2021-08-04 | End: 2021-08-04

## 2021-08-04 RX ORDER — CLOBETASOL PROPIONATE 0.5 MG/G
OINTMENT TOPICAL 2 TIMES DAILY
Status: DISCONTINUED | OUTPATIENT
Start: 2021-08-04 | End: 2021-08-10 | Stop reason: HOSPADM

## 2021-08-04 RX ADMIN — METOPROLOL SUCCINATE 50 MG: 50 TABLET, EXTENDED RELEASE ORAL at 10:05

## 2021-08-04 RX ADMIN — OXYCODONE 5 MG: 5 TABLET ORAL at 05:54

## 2021-08-04 RX ADMIN — BUSPIRONE HYDROCHLORIDE 10 MG: 5 TABLET ORAL at 10:03

## 2021-08-04 RX ADMIN — SODIUM CHLORIDE: 9 INJECTION, SOLUTION INTRAVENOUS at 06:42

## 2021-08-04 RX ADMIN — FUROSEMIDE 20 MG: 20 TABLET ORAL at 10:05

## 2021-08-04 RX ADMIN — MYCOPHENOLATE MOFETIL 1000 MG: 250 CAPSULE ORAL at 20:55

## 2021-08-04 RX ADMIN — BACLOFEN 10 MG: 10 TABLET ORAL at 13:03

## 2021-08-04 RX ADMIN — CALCIUM GLUCONATE 1000 MG: 20 INJECTION, SOLUTION INTRAVENOUS at 00:15

## 2021-08-04 RX ADMIN — MAGNESIUM SULFATE HEPTAHYDRATE 2000 MG: 40 INJECTION, SOLUTION INTRAVENOUS at 00:04

## 2021-08-04 RX ADMIN — OXYCODONE 5 MG: 5 TABLET ORAL at 01:47

## 2021-08-04 RX ADMIN — RIVAROXABAN 20 MG: 20 TABLET, FILM COATED ORAL at 10:05

## 2021-08-04 RX ADMIN — POTASSIUM CHLORIDE 20 MEQ: 20 TABLET, EXTENDED RELEASE ORAL at 10:05

## 2021-08-04 RX ADMIN — LEVOFLOXACIN 750 MG: 5 INJECTION, SOLUTION INTRAVENOUS at 20:17

## 2021-08-04 RX ADMIN — POTASSIUM CHLORIDE 40 MEQ: 20 TABLET, EXTENDED RELEASE ORAL at 06:37

## 2021-08-04 RX ADMIN — ESCITALOPRAM OXALATE 20 MG: 10 TABLET ORAL at 10:06

## 2021-08-04 RX ADMIN — MAGNESIUM SULFATE HEPTAHYDRATE 4000 MG: 40 INJECTION, SOLUTION INTRAVENOUS at 10:11

## 2021-08-04 RX ADMIN — Medication 10 ML: at 10:12

## 2021-08-04 RX ADMIN — OXYCODONE 5 MG: 5 TABLET ORAL at 14:51

## 2021-08-04 RX ADMIN — MYCOPHENOLATE MOFETIL 1000 MG: 250 CAPSULE ORAL at 13:03

## 2021-08-04 RX ADMIN — PANTOPRAZOLE SODIUM 40 MG: 40 TABLET, DELAYED RELEASE ORAL at 10:03

## 2021-08-04 RX ADMIN — BUSPIRONE HYDROCHLORIDE 10 MG: 5 TABLET ORAL at 20:55

## 2021-08-04 RX ADMIN — OXYCODONE 5 MG: 5 TABLET ORAL at 10:05

## 2021-08-04 RX ADMIN — ACETAMINOPHEN 650 MG: 325 TABLET ORAL at 05:21

## 2021-08-04 RX ADMIN — OXYCODONE 5 MG: 5 TABLET ORAL at 20:55

## 2021-08-04 RX ADMIN — BUSPIRONE HYDROCHLORIDE 10 MG: 5 TABLET ORAL at 14:51

## 2021-08-04 RX ADMIN — SODIUM CHLORIDE: 9 INJECTION, SOLUTION INTRAVENOUS at 00:13

## 2021-08-04 RX ADMIN — POTASSIUM CHLORIDE 20 MEQ: 20 TABLET, EXTENDED RELEASE ORAL at 20:55

## 2021-08-04 RX ADMIN — FERROUS GLUCONATE TAB 324 MG (37.5 MG ELEMENTAL IRON) 972 MG: 324 (37.5 FE) TAB at 10:05

## 2021-08-04 RX ADMIN — Medication 10 ML: at 20:55

## 2021-08-04 RX ADMIN — HYDROXYZINE HYDROCHLORIDE 10 MG: 10 TABLET ORAL at 13:03

## 2021-08-04 ASSESSMENT — PAIN DESCRIPTION - LOCATION
LOCATION: GENERALIZED

## 2021-08-04 ASSESSMENT — PAIN DESCRIPTION - PROGRESSION
CLINICAL_PROGRESSION: NOT CHANGED
CLINICAL_PROGRESSION: GRADUALLY IMPROVING
CLINICAL_PROGRESSION: GRADUALLY WORSENING

## 2021-08-04 ASSESSMENT — PAIN SCALES - GENERAL
PAINLEVEL_OUTOF10: 8
PAINLEVEL_OUTOF10: 6
PAINLEVEL_OUTOF10: 7
PAINLEVEL_OUTOF10: 9
PAINLEVEL_OUTOF10: 10
PAINLEVEL_OUTOF10: 9
PAINLEVEL_OUTOF10: 8
PAINLEVEL_OUTOF10: 8
PAINLEVEL_OUTOF10: 6
PAINLEVEL_OUTOF10: 6
PAINLEVEL_OUTOF10: 8
PAINLEVEL_OUTOF10: 0
PAINLEVEL_OUTOF10: 0
PAINLEVEL_OUTOF10: 8

## 2021-08-04 ASSESSMENT — PAIN DESCRIPTION - PAIN TYPE
TYPE: ACUTE PAIN

## 2021-08-04 ASSESSMENT — ENCOUNTER SYMPTOMS
EYE REDNESS: 0
EYE PAIN: 0
NAUSEA: 0
VOMITING: 0
CHEST TIGHTNESS: 0
SHORTNESS OF BREATH: 0

## 2021-08-04 ASSESSMENT — PAIN - FUNCTIONAL ASSESSMENT
PAIN_FUNCTIONAL_ASSESSMENT: PREVENTS OR INTERFERES SOME ACTIVE ACTIVITIES AND ADLS

## 2021-08-04 ASSESSMENT — PAIN DESCRIPTION - FREQUENCY
FREQUENCY: CONTINUOUS

## 2021-08-04 ASSESSMENT — PAIN DESCRIPTION - ONSET
ONSET: ON-GOING
ONSET: ON-GOING

## 2021-08-04 ASSESSMENT — PAIN DESCRIPTION - DESCRIPTORS
DESCRIPTORS: CONSTANT
DESCRIPTORS: CONSTANT;SHARP
DESCRIPTORS: CONSTANT;SHARP

## 2021-08-04 NOTE — PLAN OF CARE
Problem: Pain:  Goal: Pain level will decrease  Description: Pain level will decrease  Outcome: Ongoing     Problem: Skin Integrity:  Goal: Will show no infection signs and symptoms  Description: Will show no infection signs and symptoms  Outcome: Ongoing     Problem: Falls - Risk of:  Goal: Will remain free from falls  Description: Will remain free from falls  Outcome: Ongoing  Goal: Absence of physical injury  Description: Absence of physical injury  Outcome: Ongoing   Pt pain is hard to manage at this point she is on more pain medication at home. Pt skin has numerous open sores and the patient states that she \"pops\" her blisters and wants it done here. Pt educated on the unhygienic issues with that request. Pt remains free of injury and falls. WCTM.

## 2021-08-04 NOTE — FLOWSHEET NOTE
Occupational/Physical Therapy  Dianne Keller    Orders received and chart reviewed. Attempted to see pt for OT/PT evaluation, however, pt adamantly refusing therapy stating \"i'm tired, i'm not doing it, not today\". therapist attempted to educate pt on importance of OOB activity but pt interrupting therapist stating \"I'm not doing it, sorry, bye\". Will continue to monitor pt and check back at a later time as schedule permits.      Thanks,    Bhavik Sifuentes, OTR/L [id number]  Janessa Chery, PT, DPT #[id number]

## 2021-08-04 NOTE — CONSULTS
In patient Neurology consult        [facility] Neurology      Nicho Akers MD      Dianne Keller  1955    Date of Service: 8/4/2021    Referring Physician: Maribel Moody MD      Reason for the consult and CC: Acute confusion and possible seizure    History was obtained from chart review and discussion with the patient. The patient is currently by herself. She is not a good historian. HPI:   The patient is a 60y.o.  years old female with multiple medical problems who was admitted to the hospital yesterday with acute confusion and breathing difficulties. She was observed by home health nurse to be confused and not following direction on day of admission. Degree was severe. Unclear duration. No triggers or other associated symptoms. No passing out, weakness or numbness or tingling. She was spacing out for few minutes according to report. Patient was admitted for medical management. Today she is awake and alert. She feels back to her baseline. Initial CT head showed no acute findings. There was concern for possible seizure-like activity. She denies any tongue biting or bladder incontinence or prior history of seizures. No headache or chest pain. The patient was placed on antibiotics for UTI and was diagnosed with metabolic encephalopathy. Other review of system was unremarkable.     Family History   Problem Relation Age of Onset    High Blood Pressure Mother     High Blood Pressure Father     Kidney Disease Sister      Past Surgical History:   Procedure Laterality Date    ABDOMEN SURGERY      ABDOMINAL ADHESION SURGERY  06/08/2018    BLADDER SUSPENSION      COLONOSCOPY      COLONOSCOPY  9/14/15    sigmoid colon biopsy     COLONOSCOPY  08/07/2018    COLONOSCOPY  06/07/2019    COLONOSCOPY, POSSIBLE BIOPSY, POSSIBLE POLYPECTOMY    COLONOSCOPY N/A 6/7/2019    COLONOSCOPY WITH BIOPSY performed by Juan Garcia MD at [address] N/A 6/7/2019    COLONOSCOPY DIAGNOSTIC/STOMA performed by Shi Brady MD at [address] N/A 10/8/2018    EXPLORATORY LAPAROTOMY WITH COLOSTOMY performed by Alonso Pascual MD at [address] Left 6/25/14    excision plantar left hallux    FOOT SURGERY  6/3/15    PLANTAR PLATE REPAIR BILATERAL, ARTHROTOMY MPJ 2ND BILATERAL    FOOT SURGERY Left 08/05/2002    Excision second metatarsal head left    FRACTURE SURGERY      rt hip    HAND CARPECTOMY Bilateral     HEEL SPUR SURGERY      HERNIA REPAIR      HYSTERECTOMY      bladder surgery    JOINT REPLACEMENT      rt hip, L shoulder replacement 11/2014    KNEE SURGERY Bilateral     arthrosvopic    LEG SURGERY Right     WA SECD CLOS SURG WND EXTEN/COMPLIC N/A 23/85/1358    SECONDARY WOUND CLOSURE OF ABDOMINAL WOUND performed by Alonso Pascual MD at [address] N/A 7/17/2019    FLEXIBLE SIGMOIDOSCOPY performed by Alonso Pascual MD at [address] [facility]  01/15/2018    with biopsies    SMALL INTESTINE SURGERY N/A 7/17/2019    COLOSTOMY CLOSURE WITH COLORECTAL ANASTOMOSIS performed by Alonso Pascual MD at [place] [address]  6/14/13    and colonsocopy with antral erosion biopsies        Past Medical History:   Diagnosis Date    Anxiety     Arthritis     [address] fibrillation/flutter ([facility] [address]     Blood transfusion reaction     Crohn's disease ([facility] [address]     Depression     GERD (gastroesophageal reflux disease)     Hiatal hernia 06/24/2014    Hx of blood clots     Hypertension     MRSA (methicillin resistant staph aureus) culture positive 06/05/2018    urine    MRSA (methicillin resistant staph aureus) culture positive 06/03/2021    wound and colonization    Neuropathy     Pneumonia 01/08/2014    Sinus headache     SOB (shortness of breath)     VRE (vancomycin resistant enterococcus) culture positive 10/08/2018    abd culture     Social History Tobacco Use    Smoking status: Current Every Day Smoker     Packs/day: 1.00     Years: 10.00     Pack years: 10.00     Types: Cigarettes, E-Cigarettes    Smokeless tobacco: Never Used    Tobacco comment: CUT BACK TO 1/2 PPD WITH E CIG 6-2019   Vaping Use    Vaping Use: Every day    Start date: 3/28/2019    Substances: Always (LOWEST DOSE)   Substance Use Topics    Alcohol use:  Yes     Alcohol/week: 2.0 standard drinks     Types: 2 Shots of liquor per week     Comment: 6 DRINKS A WEEK OR LESS    Drug use: No     Allergies   Allergen Reactions    Ace Inhibitors Swelling    Skelaxin [Metaxalone] Swelling     Current Facility-Administered Medications   Medication Dose Route Frequency Provider Last Rate Last Admin    magnesium sulfate 2000 mg in 50 mL IVPB premix  2,000 mg Intravenous PRN Miriam Christian MD        clobetasol (TEMOVATE) 0.05 % ointment   Topical BID Miriam Christian MD        busPIRone (BUSPAR) tablet 10 mg  10 mg Oral TID Miriam Christian MD   10 mg at 08/04/21 1003    ferrous gluconate 324 (37.5 Fe) MG tablet 972 mg  972 mg Oral Daily with breakfast Miriam Christian MD   972 mg at 08/04/21 1005    hydrOXYzine (ATARAX) tablet 10 mg  10 mg Oral TID PRN Miriam Christian MD   10 mg at 08/04/21 1303    potassium chloride (KLOR-CON M) extended release tablet 20 mEq  20 mEq Oral BID Miriam Christian MD   20 mEq at 08/04/21 1005    rivaroxaban (XARELTO) tablet 20 mg  20 mg Oral Daily with breakfast Miriam Christian MD   20 mg at 08/04/21 1005    metoprolol succinate (TOPROL XL) extended release tablet 50 mg  50 mg Oral Daily Miriam Christian MD   50 mg at 08/04/21 1005    escitalopram (LEXAPRO) tablet 20 mg  20 mg Oral Daily Miriam Christian MD   20 mg at 08/04/21 1006    oxyCODONE (ROXICODONE) immediate release tablet 5 mg  5 mg Oral Q4H PRN Miriam Christian MD   5 mg at 08/04/21 1005    furosemide (LASIX) tablet 20 mg  20 mg Oral Daily Miriam Christian MD   20 mg at 08/04/21 1005    mycophenolate (CELLCEPT) capsule 1,000 mg  1,000 mg Oral BID Will Mercado MD   1,000 mg at 08/04/21 1303    baclofen (LIORESAL) tablet 10 mg  10 mg Oral TID PRN Will Mercado MD   10 mg at 08/04/21 1303    predniSONE (DELTASONE) tablet 40 mg  40 mg Oral Daily Will Mercado MD        [START ON 8/9/2021] vitamin D (ERGOCALCIFEROL) capsule 50,000 Units  50,000 Units Oral Weekly Will Mercado MD        pantoprazole (PROTONIX) tablet 40 mg  40 mg Oral Daily Will Mercado MD   40 mg at 08/04/21 1003    0.9 % sodium chloride infusion   Intravenous Continuous Will Mercado MD 75 mL/hr at 08/04/21 0642 New Bag at 08/04/21 0642    sodium chloride flush 0.9 % injection 5-40 mL  5-40 mL Intravenous 2 times per day Will Mercado MD   10 mL at 08/04/21 1012    sodium chloride flush 0.9 % injection 5-40 mL  5-40 mL Intravenous PRN Will Mercado MD        0.9 % sodium chloride infusion  25 mL Intravenous PRN Will Mercado MD        ondansetron (ZOFRAN-ODT) disintegrating tablet 4 mg  4 mg Oral Q8H PRN Will Mercado MD        Or    ondansetron [facility]) injection 4 mg  4 mg Intravenous Q6H PRN Will Mercado MD        acetaminophen (TYLENOL) tablet 650 mg  650 mg Oral Q6H PRN Will Mercado MD   650 mg at 08/04/21 6550    Or    acetaminophen (TYLENOL) suppository 650 mg  650 mg Rectal Q6H PRN Will Mercado MD        magnesium hydroxide (MILK OF MAGNESIA) 400 MG/5ML suspension 30 mL  30 mL Oral Daily PRN Will Mercado MD        levoFLOXacin [facility]) 750 MG/150ML infusion 750 mg  750 mg Intravenous Q24H Will Mercado MD   Stopped at 08/03/21 2323    hydrALAZINE (APRESOLINE) injection 10 mg  10 mg Intravenous Q6H PRN Will Mercado MD        potassium chloride [facility]) extended release tablet 40 mEq  40 mEq Oral PRN Will Mercado MD   40 mEq at 08/04/21 0095    Or    potassium bicarb-citric acid (EFFER-K) effervescent tablet 40 mEq  40 mEq Oral PRN Will Mercado, MD        Or    potassium chloride 10 mEq/100 mL IVPB (Peripheral Line)  10 mEq Intravenous PRN Maribel Moody MD        0.9 % sodium chloride bolus  500 mL Intravenous PRN Maribel Moody MD 75 mL/hr at 08/04/21 0007 Rate Change at 08/04/21 0007       ROS : A 10-14 system review of constitutional, cardiovascular, respiratory, eyes, musculoskeletal, endocrine, GI, ENT, skin, hematological, genitourinary, psychiatric and neurologic systems was obtained and updated today and is unremarkable except as mentioned in my HPI      Exam:     Constitutional:   Vitals:    08/04/21 0745 08/04/21 0813 08/04/21 0905 08/04/21 1255   BP: 122/82   121/82   Pulse: 72   70   Resp: 18  18 18   Temp: 98.2 °F (36.8 °C)   98.1 °F (36.7 °C)   TempSrc: Oral   Oral   SpO2: 100%  98% 97%   Weight:  191 lb 6.4 oz (86.8 kg)     Height:           General appearance:  Normal development and appear in no acute distress. Eye:  Fundus of the eye: Funduscopic examination cannot be performed due to COVID19 restrictions and precautions. Neck: supple  Cardiovascular: No lower leg edema with good pulsation. Mental Status:   AAO x3 today  Poor immediate recall  Intact remote memory  Poor fund of knowledge  Language is fluent  Good attention  Cranial Nerves:   II: Visual fields: Full. Pupils: equal, round, reactive to light  III,IV,VI: Extra Ocular Movements are intact. No nystagmus  V: Facial sensation is intact   VII: Facial strength and movements: intact and symmetric  VIII: Hearing: Intact  IX: Palate elevation is symmetric  XI: Shoulder shrug is intact  XII: Tongue movements are normal  Musculoskeletal: No focal weakness. Tone: Normal tone. Reflexes: 2+ in the upper extremity and 1 in the lower extremity   Planters: flexor bilaterally. Coordination: no pronator drift, no dysmetria with FNF in upper extremities. Normal REM. Sensation: normal to all modalities in both arms and legs.   Gait/Posture: Not tested due to confusion poor compliance with the patient    Data:  LABS:   Lab Results   Component Value Date     08/04/2021    K 3.4 08/04/2021     08/04/2021    CO2 25 08/04/2021    BUN 9 08/04/2021    CREATININE <0.5 08/04/2021    GFRAA >60 08/04/2021    GFRAA >60 06/14/2013    LABGLOM >60 08/04/2021    GLUCOSE 88 08/04/2021    PHOS 2.6 07/19/2020    MG 1.10 08/04/2021    CALCIUM 6.8 08/04/2021     Lab Results   Component Value Date    WBC 9.2 08/04/2021    RBC 3.06 08/04/2021    HGB 9.2 08/04/2021    HCT 27.5 08/04/2021    MCV 89.8 08/04/2021    RDW 18.1 08/04/2021     08/04/2021     Lab Results   Component Value Date    INR 1.62 (H) 04/27/2021    PROTIME 18.9 (H) 04/27/2021       Neuroimaging were independently reviewed by me and discussed results with the patient  Reviewed notes from different physicians  Reviewed lab and blood testing    Impression:  Acute encephalopathy likely acute metabolic encephalopathy. Less likely seizure based on semiology. No risk for epilepsy. UTI  Anemia  Hypertension  History of remote stroke      Recommendation:  Continue current supportive care  Antibiotics  Hydration  PT OT  Speech evaluation  Follow CBC  She is on Xarelto  Continue home blood pressure medications  Telemetry  PPI  Neurochecks  She is on different immunosuppressive agents including CellCept and prednisone  Insulin sliding scale  Nothing to add from neurology at this point  We will follow from periphery  Please call for questions. Thank you for referring such patient. If you have any questions regarding my consult note, please don't hesitate to call me. Bessie Metcalf MD  [phone]    This dictation was generated by voice recognition computer software.  Although all attempts are made to edit the dictation for accuracy, there may be errors in the  transcription that are not intended

## 2021-08-04 NOTE — PROGRESS NOTES
Pt set up with purewick in place of the indwelling cath. Pt was educated on the UTI and how indwelling would not be best for her at this time. Pt nodded and showed understanding.

## 2021-08-04 NOTE — PROGRESS NOTES
Progress Note - Dr. Sandra Barajas - Internal Medicine  PCP: Matt Abraham MD [address] / [facility] [phone]    Hospital Day: 1  Code Status: Full Code  Current Diet: ADULT DIET; Regular        CC: follow up on medical issues    Subjective:   Raymundo Fletcher is a 60 y.o. female. She denies problems    Doing ok  Mag still low  Third replacement dose ordered this am    She denies chest pain, denies shortness of breath, denies nausea,  denies emesis. 10 system Review of Systems is reviewed with patient, and pertinent positives are noted in HPI above . Otherwise, Review of systems is negative. I have reviewed the patient's medical and social history in detail and updated the computerized patient record. To recap: She  has a past medical history of Anxiety, Arthritis, Atrial fibrillation/flutter ([address] Blood transfusion reaction, Crohn's disease ([address] Depression, GERD (gastroesophageal reflux disease), Hiatal hernia, Hx of blood clots, Hypertension, MRSA (methicillin resistant staph aureus) culture positive, MRSA (methicillin resistant staph aureus) culture positive, Neuropathy, Pneumonia, Sinus headache, SOB (shortness of breath), and VRE (vancomycin resistant enterococcus) culture positive. . She  has a past surgical history that includes Hysterectomy; knee surgery (Bilateral); Hand Carpectomy (Bilateral); hernia repair; Abdomen surgery; bladder suspension; fracture surgery; Heel spur surgery; Tonsillectomy; Colonoscopy; Upper gastrointestinal endoscopy (6/14/13); Foot surgery (Left, 6/25/14); Foot surgery (6/3/15); Colonoscopy (9/14/15); Foot surgery (Left, 08/05/2002); joint replacement; Sigmoidoscopy (01/15/2018); Abdominal adhesion surgery (06/08/2018); Colonoscopy (08/07/2018); colostomy (N/A, 10/8/2018); pr secd clos surg wnd exten/complic (N/A, 62/12/7837); Leg Surgery (Right); Colonoscopy (06/07/2019); Colonoscopy (N/A, 6/7/2019); Colonoscopy (N/A, 6/7/2019);  Small intestine surgery (N/A, 7/17/2019); and proctosigmoidoscopy (N/A, 7/17/2019). . She  reports that she has been smoking cigarettes and e-cigarettes. She has a 10.00 pack-year smoking history. She has never used smokeless tobacco. She reports current alcohol use of about 2.0 standard drinks of alcohol per week. She reports that she does not use drugs. .        Active Hospital Problems    Diagnosis Date Noted    UTI (urinary tract infection) [N39.0] 08/03/2021    Sepsis secondary to UTI ([address] [A41.9, N39.0] 08/03/2021    Cognitive deficit as late effect of cerebrovascular accident (CVA) [I69.319] 04/09/2021    Essential hypertension [I10]     Hypomagnesemia [E83.42] 11/27/2018    Anemia [D64.9] 01/05/2018       Current Facility-Administered Medications: magnesium sulfate 2000 mg in 50 mL IVPB premix, 2,000 mg, Intravenous, Daily PRN  busPIRone (BUSPAR) tablet 10 mg, 10 mg, Oral, TID  ferrous gluconate 324 (37.5 Fe) MG tablet 972 mg, 972 mg, Oral, Daily with breakfast  hydrOXYzine (ATARAX) tablet 10 mg, 10 mg, Oral, TID PRN  potassium chloride (KLOR-CON M) extended release tablet 20 mEq, 20 mEq, Oral, BID  rivaroxaban (XARELTO) tablet 20 mg, 20 mg, Oral, Daily with breakfast  metoprolol succinate (TOPROL XL) extended release tablet 50 mg, 50 mg, Oral, Daily  escitalopram (LEXAPRO) tablet 20 mg, 20 mg, Oral, Daily  oxyCODONE (ROXICODONE) immediate release tablet 5 mg, 5 mg, Oral, Q4H PRN  furosemide (LASIX) tablet 20 mg, 20 mg, Oral, Daily  mycophenolate (CELLCEPT) capsule 1,000 mg, 1,000 mg, Oral, BID  baclofen (LIORESAL) tablet 10 mg, 10 mg, Oral, TID PRN  predniSONE (DELTASONE) tablet 40 mg, 40 mg, Oral, Daily  [START ON 8/9/2021] vitamin D (ERGOCALCIFEROL) capsule 50,000 Units, 50,000 Units, Oral, Weekly  pantoprazole (PROTONIX) tablet 40 mg, 40 mg, Oral, Daily  0.9 % sodium chloride infusion, , Intravenous, Continuous  sodium chloride flush 0.9 % injection 5-40 mL, 5-40 mL, Intravenous, 2 times per day  sodium chloride flush 0.9 % injection 5-40 mL, 5-40 mL, Intravenous, PRN  0.9 % sodium chloride infusion, 25 mL, Intravenous, PRN  enoxaparin (LOVENOX) injection 40 mg, 40 mg, Subcutaneous, Daily  ondansetron (ZOFRAN-ODT) disintegrating tablet 4 mg, 4 mg, Oral, Q8H PRN **OR** ondansetron (ZOFRAN) injection 4 mg, 4 mg, Intravenous, Q6H PRN  acetaminophen (TYLENOL) tablet 650 mg, 650 mg, Oral, Q6H PRN **OR** acetaminophen (TYLENOL) suppository 650 mg, 650 mg, Rectal, Q6H PRN  magnesium hydroxide (MILK OF MAGNESIA) 400 MG/5ML suspension 30 mL, 30 mL, Oral, Daily PRN  levoFLOXacin (LEVAQUIN) 750 MG/150ML infusion 750 mg, 750 mg, Intravenous, Q24H  hydrALAZINE (APRESOLINE) injection 10 mg, 10 mg, Intravenous, Q6H PRN  potassium chloride (KLOR-CON M) extended release tablet 40 mEq, 40 mEq, Oral, PRN **OR** potassium bicarb-citric acid (EFFER-K) effervescent tablet 40 mEq, 40 mEq, Oral, PRN **OR** potassium chloride 10 mEq/100 mL IVPB (Peripheral Line), 10 mEq, Intravenous, PRN  0.9 % sodium chloride bolus, 500 mL, Intravenous, PRN         Objective:  /82   Pulse 72   Temp 98.2 °F (36.8 °C) (Oral)   Resp 18   Ht 5' 7\" (1.702 m)   Wt 191 lb 6.4 oz (86.8 kg)   SpO2 95%   BMI 29.98 kg/m²      Patient Vitals for the past 24 hrs:   BP Temp Temp src Pulse Resp SpO2 Height Weight   08/04/21 0813 -- -- -- -- -- -- -- 191 lb 6.4 oz (86.8 kg)   08/04/21 0745 122/82 98.2 °F (36.8 °C) Oral 72 18 -- -- --   08/04/21 0520 132/80 98.4 °F (36.9 °C) Oral 73 18 95 % -- --   08/03/21 2102 -- -- -- -- -- 97 % -- --   08/03/21 2015 136/84 97.8 °F (36.6 °C) Oral 62 18 97 % 5' 7\" (1.702 m) 191 lb 11.2 oz (87 kg)   08/03/21 1930 (!) 143/94 -- -- 63 18 -- -- --   08/03/21 1900 (!) 133/91 -- -- 67 21 -- -- --   08/03/21 1845 (!) 139/96 -- -- 68 22 -- -- --   08/03/21 1815 (!) 140/91 -- -- (!) 49 20 -- -- --   08/03/21 1800 (!) 139/90 -- -- 72 22 96 % -- --   08/03/21 1715 (!) 149/95 -- -- 73 23 99 % -- --   08/03/21 1700 (!) 134/95 -- -- 72 21 97 % -- -- 08/03/21 1645 (!) 128/91 -- -- 72 22 97 % -- --   08/03/21 1630 (!) 140/93 -- -- 74 17 97 % -- --   08/03/21 1615 (!) 138/96 -- -- 73 18 97 % -- --   08/03/21 1600 (!) 143/91 -- -- 75 19 98 % -- --   08/03/21 1545 (!) 145/101 -- -- 78 22 95 % -- --   08/03/21 1540 (!) 140/96 98.7 °F (37.1 °C) Oral 87 18 96 % 5' 7\" (1.702 m) 198 lb (89.8 kg)     Patient Vitals for the past 96 hrs (Last 3 readings):   Weight   08/04/21 0813 191 lb 6.4 oz (86.8 kg)   08/03/21 2015 191 lb 11.2 oz (87 kg)   08/03/21 1540 198 lb (89.8 kg)           Intake/Output Summary (Last 24 hours) at 8/4/2021 6159  Last data filed at 8/4/2021 0555  Gross per 24 hour   Intake 1179.96 ml   Output 600 ml   Net 579.96 ml         Physical Exam:   /82   Pulse 72   Temp 98.2 °F (36.8 °C) (Oral)   Resp 18   Ht 5' 7\" (1.702 m)   Wt 191 lb 6.4 oz (86.8 kg)   SpO2 95%   BMI 29.98 kg/m²   General appearance: alert, appears stated age and cooperative  Head: Normocephalic, without obvious abnormality, atraumatic  Lungs: clear to auscultation bilaterally  Heart: regular rate and rhythm, S1, S2 normal, no murmur, click, rub or gallop  Abdomen: soft, non-tender; bowel sounds normal; no masses,  no organomegaly  Extremities: extremities normal, atraumatic, no cyanosis or edema    Labs:  Lab Results   Component Value Date    WBC 9.2 08/04/2021    HGB 9.2 (L) 08/04/2021    HCT 27.5 (L) 08/04/2021     08/04/2021    CHOL 111 07/24/2020    TRIG 66 07/24/2020    HDL 43 07/24/2020    ALT <5 (L) 08/04/2021    AST 12 (L) 08/04/2021     08/04/2021    K 3.4 (L) 08/04/2021     08/04/2021    CREATININE <0.5 (L) 08/04/2021    BUN 9 08/04/2021    CO2 25 08/04/2021    TSH 3.57 07/28/2020    INR 1.62 (H) 04/27/2021    LABA1C 5.0 07/24/2020    LABMICR YES 08/03/2021     Lab Results   Component Value Date    CKTOTAL 206 (H) 06/02/2021    TROPONINI <0.01 08/03/2021       Recent Imaging Results are Reviewed:  CT HEAD WO CONTRAST    Result Date: 8/3/2021  EXAMINATION: CTA OF THE CHEST; CT OF THE HEAD WITHOUT CONTRAST 8/3/2021 5:20 pm; 8/3/2021 5:12 pm TECHNIQUE: CTA of the chest was performed after the administration of intravenous contrast.  Multiplanar reformatted images are provided for review. MIP images are provided for review. Dose modulation, iterative reconstruction, and/or weight based adjustment of the mA/kV was utilized to reduce the radiation dose to as low as reasonably achievable.; CT of the head was performed without the administration of intravenous contrast. Dose modulation, iterative reconstruction, and/or weight based adjustment of the mA/kV was utilized to reduce the radiation dose to as low as reasonably achievable. COMPARISON: CT head 04/07/2021. HISTORY: ORDERING SYSTEM PROVIDED HISTORY: chest pain TECHNOLOGIST PROVIDED HISTORY: Reason for exam:->chest pain Decision Support Exception - unselect if not a suspected or confirmed emergency medical condition->Emergency Medical Condition (MA) Reason for Exam: Chest pain. Shortness of Breath (Patient also complains of being SOB with chest pains). Acuity: Acute Type of Exam: Initial; ORDERING SYSTEM PROVIDED HISTORY: reported AMS TECHNOLOGIST PROVIDED HISTORY: Has a \"code stroke\" or \"stroke alert\" been called? ->No Reason for exam:->reported AMS Decision Support Exception - unselect if not a suspected or confirmed emergency medical condition->Emergency Medical Condition (MA) Reason for Exam: Reported AMS. Altered Mental Status (Patient brought in by [facility]. Home health nurse said pt had a change in mental status, son reported her mental status was 'normal'. Patient very slow to answer questions. ). Acuity: Acute Type of Exam: Initial CTA CHEST FINDINGS: Pulmonary Arteries: Pulmonary arteries are enlarged. Sean Arcenio No filling defect is identified in the pulmonary arteries to the level of thesegmental arteries. Mediastinum: Heart is upper normal limits in size. No pericardial effusion. Aneurysmal dilatation of the ascending aorta measuring 4.3 cm aortic arch measures 3.7 cm. Descending thoracic aorta measures 3.5 cm. Suboptimal evaluation of the aortic root due to motion artifact. No luminal defect is appreciated in the visualized thoracic aorta given motion artifact. Coronary artery calcifications noted. Lungs/pleura: Central airways are patent. Lungs are clear. No pleural effusion. No pneumothorax. Scattered atelectasis. Soft Tissues/Bones: No adenopathy. Scattered degenerative changes noted in the visualized spine without spondylolisthesis. Upper Abdomen: Large hiatal hernia. 1. No acute pulmonary embolism to the segmental arteries. 2. Aneurysmal dilatation of the thoracic aorta measuring up to 4.3 cm. No thoracic aortic dissection given motion artifact. Follow-up recommended to assess size stability. 3. Other findings as described. CT HEAD FINDINGS: BRAIN/VENTRICLES: No acute hemorrhage. Periventricular and subcortical hypoattenuation is nonspecific and may be related to microvascular disease. Encephalomalacia in the left cerebellum. Encephalomalacia in the left frontal and parietal lobes. Artifact partially obscures the marti. Artifact partially obscures the inferior cerebellum. Birda Carmine white differentiation appears maintained given artifact near the skull base and through the posterior fossa. Cerebral volume loss and mild prominence of the ventricles again visualized. There is no midline shift. Basal cisterns appear patent. ORBITS: Visualized orbits appear unremarkable on this unenhanced exam. SINUSES: Visualized paranasal sinuses appear clear. Visualized mastoid air cells appear clear. SOFT TISSUES/SKULL: No depressed calvarial fracture. Posterior fusion defect of C1. IMPRESSION: No acute hemorrhage or midline shift.      CT CHEST PULMONARY EMBOLISM W CONTRAST    Result Date: 8/3/2021  EXAMINATION: CTA OF THE CHEST; CT OF THE HEAD WITHOUT CONTRAST 8/3/2021 5:20 pm; 8/3/2021 thoracic aorta measures 3.5 cm. Suboptimal evaluation of the aortic root due to motion artifact. No luminal defect is appreciated in the visualized thoracic aorta given motion artifact. Coronary artery calcifications noted. Lungs/pleura: Central airways are patent. Lungs are clear. No pleural effusion. No pneumothorax. Scattered atelectasis. Soft Tissues/Bones: No adenopathy. Scattered degenerative changes noted in the visualized spine without spondylolisthesis. Upper Abdomen: Large hiatal hernia. 1. No acute pulmonary embolism to the segmental arteries. 2. Aneurysmal dilatation of the thoracic aorta measuring up to 4.3 cm. No thoracic aortic dissection given motion artifact. Follow-up recommended to assess size stability. 3. Other findings as described. CT HEAD FINDINGS: BRAIN/VENTRICLES: No acute hemorrhage. Periventricular and subcortical hypoattenuation is nonspecific and may be related to microvascular disease. Encephalomalacia in the left cerebellum. Encephalomalacia in the left frontal and parietal lobes. Artifact partially obscures the marti. Artifact partially obscures the inferior cerebellum. Amelia Global white differentiation appears maintained given artifact near the skull base and through the posterior fossa. Cerebral volume loss and mild prominence of the ventricles again visualized. There is no midline shift. Basal cisterns appear patent. ORBITS: Visualized orbits appear unremarkable on this unenhanced exam. SINUSES: Visualized paranasal sinuses appear clear. Visualized mastoid air cells appear clear. SOFT TISSUES/SKULL: No depressed calvarial fracture. Posterior fusion defect of C1. IMPRESSION: No acute hemorrhage or midline shift. Assessment and Plan:  Principal Problem:    Hypomagnesemia - Established problem. Uncontrolled. Still low - unclear why so low  Plan: 2gm iv mag sulfate ordered; renal consulted  Active Problems:    Anemia -Established problem. Stable.     Plan: No indication for transfusion. Cont to monitor h/h to assess progression of anemia. Recommend ferrous sulfate or MVI as outpatient. Essential hypertension -Established problem. Stable. 122/82  Plan: Continue present orders/plan. Cognitive deficit as late effect of cerebrovascular accident (CVA) - Established problem. Stable. No new issues  Plan: Continue present orders/plan. UTI (urinary tract infection) -Established problem. Stable. Plan: cont empiric iv abx. Await cx    Sepsis secondary to UTI [facility])  Plan: Continue present orders/plan.               Betzy Hein MD  8/4/2021

## 2021-08-04 NOTE — CARE COORDINATION
DCPA attmepted with pt. Pt confused and ask SW to contact family. All numbers listed contacted and no answer. SW to follow.     Yaniv Felix MSW, [address]

## 2021-08-04 NOTE — PROGRESS NOTES
Daughter called per pt request and updated on plan of care, room number, and all questions answered.

## 2021-08-04 NOTE — PROGRESS NOTES
4 Eyes Skin Assessment     NAME:  Dianne Keller  YOB: 1955  MEDICAL RECORD NUMBER:  [id number]    The patient is being assess for  Admission    I agree that 2 RN's have performed a thorough Head to Toe Skin Assessment on the patient. ALL assessment sites listed below have been assessed. Areas assessed by both nurses:    Head, Face, Ears, Shoulders, Back, Chest, Arms, Elbows, Hands, Sacrum. Buttock, Coccyx, Ischium and Legs. Feet and Heels        Does the Patient have a Wound? Yes wound(s) were present on assessment.  LDA wound assessment was Initiated and completed        Nelson Prevention initiated:  Yes   Wound Care Orders initiated:  Yes    Pressure Injury (Stage 3,4, Unstageable, DTI, NWPT, and Complex wounds) if present place consult order under [de-identified] No    New and Established Ostomies if present place consult order under : NA      Nurse 1 eSignature: Electronically signed by Dimitris Smith RN on 8/3/21 at 10:40 PM EDT    **SHARE this note so that the co-signing nurse is able to place an eSignature**    Nurse 2 eSignature: Electronically signed by Santos Lopez RN on 8/3/21 at 10:47 PM EDT

## 2021-08-04 NOTE — PROGRESS NOTES
Pt to floor with mag completed. STAT order put in for mag level to be rechecked prior to any more magnesium being given or Dr. Kaya Georges being called. Pt vitals in and fluids started.

## 2021-08-04 NOTE — PLAN OF CARE
Problem: Skin Integrity:  Intervention: Wound dressing application  Note: Pt refused for me to apply ointment and redress wounds. Legs wrapped in petroleum gauze and kerlex. Pt has open areas generalized on body. Has been getting tx from [facility] dermatology. Pain controlled on current regimen. Pt refusing ointment and dressing changes for my shift. Problem: Falls - Risk of:  Goal: Will remain free from falls  Description: Will remain free from falls  Note: Fall precautions in place, bed alarm on, nonskid foot wear applied, bed in lowest position, and call light within reach. Will continue to monitor.

## 2021-08-05 LAB
ANION GAP SERPL CALCULATED.3IONS-SCNC: 8 MMOL/L (ref 3–16)
BASOPHILS ABSOLUTE: 0.1 K/UL (ref 0–0.2)
BASOPHILS RELATIVE PERCENT: 0.7 %
BUN BLDV-MCNC: 6 MG/DL (ref 7–20)
CALCIUM SERPL-MCNC: 7 MG/DL (ref 8.3–10.6)
CHLORIDE BLD-SCNC: 104 MMOL/L (ref 99–110)
CO2: 25 MMOL/L (ref 21–32)
CREAT SERPL-MCNC: <0.5 MG/DL (ref 0.6–1.2)
CREATININE URINE: 8.1 MG/DL (ref 28–259)
EOSINOPHILS ABSOLUTE: 0.1 K/UL (ref 0–0.6)
EOSINOPHILS RELATIVE PERCENT: 0.7 %
GFR AFRICAN AMERICAN: >60
GFR NON-AFRICAN AMERICAN: >60
GLUCOSE BLD-MCNC: 81 MG/DL (ref 70–99)
HCT VFR BLD CALC: 27.4 % (ref 36–48)
HEMOGLOBIN: 9.3 G/DL (ref 12–16)
LYMPHOCYTES ABSOLUTE: 2.5 K/UL (ref 1–5.1)
LYMPHOCYTES RELATIVE PERCENT: 30.3 %
MAGNESIUM URINE: 3.7 MG/DL (ref 4.1–13.8)
MAGNESIUM: 1.4 MG/DL (ref 1.8–2.4)
MAGNESIUM: 1.6 MG/DL (ref 1.8–2.4)
MAGNESIUM: 2.4 MG/DL (ref 1.8–2.4)
MCH RBC QN AUTO: 30.8 PG (ref 26–34)
MCHC RBC AUTO-ENTMCNC: 33.8 G/DL (ref 31–36)
MCV RBC AUTO: 90.9 FL (ref 80–100)
MONOCYTES ABSOLUTE: 0.6 K/UL (ref 0–1.3)
MONOCYTES RELATIVE PERCENT: 6.7 %
NEUTROPHILS ABSOLUTE: 5.1 K/UL (ref 1.7–7.7)
NEUTROPHILS RELATIVE PERCENT: 61.6 %
ORGANISM: ABNORMAL
PDW BLD-RTO: 18.6 % (ref 12.4–15.4)
PLATELET # BLD: 388 K/UL (ref 135–450)
PMV BLD AUTO: 7.1 FL (ref 5–10.5)
POTASSIUM SERPL-SCNC: 4 MMOL/L (ref 3.5–5.1)
RBC # BLD: 3.01 M/UL (ref 4–5.2)
SODIUM BLD-SCNC: 137 MMOL/L (ref 136–145)
SODIUM URINE: 121 MMOL/L
URINE CULTURE, ROUTINE: ABNORMAL
WBC # BLD: 8.3 K/UL (ref 4–11)

## 2021-08-05 PROCEDURE — 6370000000 HC RX 637 (ALT 250 FOR IP): Performed by: INTERNAL MEDICINE

## 2021-08-05 PROCEDURE — 2580000003 HC RX 258: Performed by: INTERNAL MEDICINE

## 2021-08-05 PROCEDURE — 82570 ASSAY OF URINE CREATININE: CPT

## 2021-08-05 PROCEDURE — 6360000002 HC RX W HCPCS: Performed by: INTERNAL MEDICINE

## 2021-08-05 PROCEDURE — 36415 COLL VENOUS BLD VENIPUNCTURE: CPT

## 2021-08-05 PROCEDURE — 84300 ASSAY OF URINE SODIUM: CPT

## 2021-08-05 PROCEDURE — 85025 COMPLETE CBC W/AUTO DIFF WBC: CPT

## 2021-08-05 PROCEDURE — 93005 ELECTROCARDIOGRAM TRACING: CPT | Performed by: INTERNAL MEDICINE

## 2021-08-05 PROCEDURE — 83735 ASSAY OF MAGNESIUM: CPT

## 2021-08-05 PROCEDURE — 2060000000 HC ICU INTERMEDIATE R&B

## 2021-08-05 PROCEDURE — 80048 BASIC METABOLIC PNL TOTAL CA: CPT

## 2021-08-05 RX ORDER — MAGNESIUM SULFATE IN WATER 40 MG/ML
4000 INJECTION, SOLUTION INTRAVENOUS ONCE
Status: COMPLETED | OUTPATIENT
Start: 2021-08-05 | End: 2021-08-05

## 2021-08-05 RX ORDER — FAMOTIDINE 20 MG/1
20 TABLET, FILM COATED ORAL DAILY
Status: DISCONTINUED | OUTPATIENT
Start: 2021-08-05 | End: 2021-08-10 | Stop reason: HOSPADM

## 2021-08-05 RX ORDER — LANOLIN ALCOHOL/MO/W.PET/CERES
400 CREAM (GRAM) TOPICAL 3 TIMES DAILY
Status: DISCONTINUED | OUTPATIENT
Start: 2021-08-05 | End: 2021-08-08

## 2021-08-05 RX ADMIN — ESCITALOPRAM OXALATE 20 MG: 10 TABLET ORAL at 10:02

## 2021-08-05 RX ADMIN — POTASSIUM CHLORIDE 20 MEQ: 20 TABLET, EXTENDED RELEASE ORAL at 20:57

## 2021-08-05 RX ADMIN — FERROUS GLUCONATE TAB 324 MG (37.5 MG ELEMENTAL IRON) 972 MG: 324 (37.5 FE) TAB at 10:07

## 2021-08-05 RX ADMIN — FUROSEMIDE 20 MG: 20 TABLET ORAL at 10:01

## 2021-08-05 RX ADMIN — POTASSIUM CHLORIDE 20 MEQ: 20 TABLET, EXTENDED RELEASE ORAL at 10:01

## 2021-08-05 RX ADMIN — SODIUM CHLORIDE 25 ML: 9 INJECTION, SOLUTION INTRAVENOUS at 20:57

## 2021-08-05 RX ADMIN — Medication 400 MG: at 14:44

## 2021-08-05 RX ADMIN — RIVAROXABAN 20 MG: 20 TABLET, FILM COATED ORAL at 10:02

## 2021-08-05 RX ADMIN — MAGNESIUM SULFATE HEPTAHYDRATE 4000 MG: 40 INJECTION, SOLUTION INTRAVENOUS at 14:44

## 2021-08-05 RX ADMIN — METOPROLOL SUCCINATE 50 MG: 50 TABLET, EXTENDED RELEASE ORAL at 10:02

## 2021-08-05 RX ADMIN — MAGNESIUM SULFATE HEPTAHYDRATE 2000 MG: 40 INJECTION, SOLUTION INTRAVENOUS at 05:31

## 2021-08-05 RX ADMIN — BUSPIRONE HYDROCHLORIDE 10 MG: 5 TABLET ORAL at 10:01

## 2021-08-05 RX ADMIN — PREDNISONE 40 MG: 20 TABLET ORAL at 10:02

## 2021-08-05 RX ADMIN — BUSPIRONE HYDROCHLORIDE 10 MG: 5 TABLET ORAL at 14:44

## 2021-08-05 RX ADMIN — MYCOPHENOLATE MOFETIL 1000 MG: 250 CAPSULE ORAL at 10:02

## 2021-08-05 RX ADMIN — FAMOTIDINE 20 MG: 20 TABLET ORAL at 14:44

## 2021-08-05 RX ADMIN — BUSPIRONE HYDROCHLORIDE 10 MG: 5 TABLET ORAL at 20:57

## 2021-08-05 RX ADMIN — Medication 10 ML: at 20:58

## 2021-08-05 RX ADMIN — LEVOFLOXACIN 750 MG: 5 INJECTION, SOLUTION INTRAVENOUS at 20:57

## 2021-08-05 RX ADMIN — PANTOPRAZOLE SODIUM 40 MG: 40 TABLET, DELAYED RELEASE ORAL at 10:01

## 2021-08-05 RX ADMIN — CLOBETASOL PROPIONATE: 0.5 OINTMENT TOPICAL at 10:17

## 2021-08-05 RX ADMIN — OXYCODONE 5 MG: 5 TABLET ORAL at 02:31

## 2021-08-05 RX ADMIN — Medication 400 MG: at 20:58

## 2021-08-05 RX ADMIN — OXYCODONE 5 MG: 5 TABLET ORAL at 20:58

## 2021-08-05 RX ADMIN — MYCOPHENOLATE MOFETIL 1000 MG: 250 CAPSULE ORAL at 20:57

## 2021-08-05 ASSESSMENT — PAIN SCALES - GENERAL
PAINLEVEL_OUTOF10: 0
PAINLEVEL_OUTOF10: 10
PAINLEVEL_OUTOF10: 7
PAINLEVEL_OUTOF10: 10
PAINLEVEL_OUTOF10: 10
PAINLEVEL_OUTOF10: 8

## 2021-08-05 ASSESSMENT — PAIN DESCRIPTION - PAIN TYPE
TYPE: ACUTE PAIN;CHRONIC PAIN
TYPE: ACUTE PAIN;CHRONIC PAIN
TYPE: CHRONIC PAIN

## 2021-08-05 ASSESSMENT — PAIN DESCRIPTION - LOCATION: LOCATION: GENERALIZED

## 2021-08-05 NOTE — DISCHARGE INSTR - COC
Continuity of Care Form    Patient Name: Priya Butler   :  1955  MRN:  [id number]    Admit date:  8/3/2021  Discharge date:  8/10/2021    Code Status Order: Full Code   Advance Directives:      Admitting Physician:  Shae Cooper MD  PCP: Vazquez Euceda MD    Discharging Nurse: A.  THE [facility] Unit/Room#: 0QW-6255/8773-77  Discharging Unit Phone Number: [phone]    Emergency Contact:   Extended Emergency Contact Information  Primary Emergency Contact: KrishnaGretta  Address:                     Home Phone: [phone]  Mobile Phone: [phone]  Relation: Child  Secondary Emergency Contact: Omari Keller  Address: [address] [place] [address] [place] [address] Phone: [phone]  Relation: Spouse    Past Surgical History:  Past Surgical History:   Procedure Laterality Date    ABDOMEN SURGERY      ABDOMINAL ADHESION SURGERY  2018    BLADDER SUSPENSION      COLONOSCOPY      COLONOSCOPY  9/14/15    sigmoid colon biopsy     COLONOSCOPY  2018    COLONOSCOPY  2019    COLONOSCOPY, POSSIBLE BIOPSY, POSSIBLE POLYPECTOMY    COLONOSCOPY N/A 2019    COLONOSCOPY WITH BIOPSY performed by Ann Sarabia MD at [address] COLONOSCOPY N/A 2019    COLONOSCOPY DIAGNOSTIC/STOMA performed by Ann Sarabia MD at [address] [place] [address] 10/8/2018    EXPLORATORY LAPAROTOMY WITH COLOSTOMY performed by Carolyn Wild MD at [address] [facility] Left 14    excision plantar left hallux    FOOT SURGERY  6/3/15    PLANTAR PLATE REPAIR BILATERAL, ARTHROTOMY MPJ 2ND BILATERAL    FOOT SURGERY Left 2002    Excision second metatarsal head left    FRACTURE SURGERY      rt hip    HAND CARPECTOMY Bilateral     HEEL SPUR SURGERY      HERNIA REPAIR      HYSTERECTOMY      bladder surgery    JOINT REPLACEMENT      rt hip, L shoulder replacement 2014    KNEE SURGERY Bilateral arthrosvopic    LEG SURGERY Right     TN SECD CLOS SURG WND EXTEN/COMPLIC N/A 79/07/3149    SECONDARY WOUND CLOSURE OF ABDOMINAL WOUND performed by Bessie Funes MD at [address] N/A 7/17/2019    FLEXIBLE SIGMOIDOSCOPY performed by Bessie Funes MD at [address]  01/15/2018    with biopsies    SMALL INTESTINE SURGERY N/A 7/17/2019    COLOSTOMY CLOSURE WITH COLORECTAL ANASTOMOSIS performed by Bessie Funes MD at [place] [address]  6/14/13    and colonsocopy with antral erosion biopsies       Immunization History:   Immunization History   Administered Date(s) Administered    PPD Test 01/16/2018    Tdap (Boostrix, Adacel) 07/15/2020       Active Problems:  Patient Active Problem List   Diagnosis Code    Anemia D64.9    Crohn's colitis ([facility] K50.10    MRSA (methicillin resistant Staphylococcus aureus) infection A49.02    DVT (deep venous thrombosis) ([facility]) I82.409    Hypomagnesemia E83.42    Leg swelling M79.89    Venous insufficiency I87.2    Chronic venous hypertension (idiopathic) with inflammation of bilateral lower extremity I87.323    Local infection of skin and subcutaneous tissue L08.9    Open wound of left foot with complication H74.980D    Medtronic LINQ 7/29/20 Dr Zuhair Read Z45.09    LV dysfunction I51.9    PAF (paroxysmal atrial fibrillation) ([facility]) I48.0    HTN (hypertension), benign I10    S/P coronary angiogram Z98.890    Abnormal angiogram R93.89    Atherosclerosis of native arteries of the extremities with ulceration ([facility] I70.25    Nail avulsion of toe, initial encounter S91.209A    Lower abdominal pain R10.30    Late effect of ischemic cerebral stroke I69.30    Cognitive deficit as late effect of cerebrovascular accident (CVA) I69.319    Infection requiring contact isolation precautions B99.9    Hyponatremia E87.1    Encephalopathy, metabolic N89.82    Suspected COVID-19 virus infection Z20.822    Alcohol abuse F10.10    Cellulitis of both feet L03.115, L03. 116    Bilateral cellulitis of lower leg L03.116, L03.115    Multiple wounds T07. 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 9) E66.3    History of infection with vancomycin resistant Enterococcus (VRE) Z86.19    History of MRSA infection Z86.14    Infection due to acinetobacter baumannii A49.8    UTI (urinary tract infection) N39.0    Sepsis secondary to UTI ([address] A41.9, N39.0    Altered mental status R41.82       Isolation/Infection:   Isolation            Contact          Patient Infection Status       Infection Onset Added Last Indicated Last Indicated By Review Planned Expiration Resolved Resolved By    MRSA 21 Culture, MRSA, Screening        Resolved    COVID-19 Rule Out 21 COVID-19 (Ordered)   21 Rule-Out Test Resulted    COVID-19 Rule Out 21 COVID-19 (Ordered)   21 Rule-Out Test Resulted    C-diff Rule Out 21 Clostridium difficile toxin/antigen (Ordered)   21 Matheus Heredia RN    COVID-19 Rule Out 20 COVID-19 (Ordered)   20     COVID-19 Rule Out 20 COVID-19 (Ordered)   20 Rule-Out Test Resulted    VRE  10/12/18 10/12/18 Pooja Callahan RN   10/25/19 Pooja Callahan RN    10/8/18; abd culture    MRSA  18 Pooja Callahan RN   10/25/19 Radha Dupree RN    18; urine            Nurse Assessment:  Last Vital Signs: /84   Pulse 68   Temp 97.4 °F (36.3 °C) (Oral)   Resp 18   Ht 5' 7\" (1.702 m)   Wt 191 lb 6.4 oz (86.8 kg)   SpO2 98%   BMI 29.98 kg/m²     Last documented pain score (0-10 scale): Pain Level: 10  Last Weight:   Wt Readings from Last 1 Encounters:   21 191 lb 6.4 dory (86.8 kg)     Mental Status:  oriented and alert    IV Access:  - None    Nursing Mobility/ADLs:  Walking   Assisted  Transfer  Assisted  Bathing  Assisted  Dressing  Assisted  Toileting  Assisted  Feeding  Independent  Med Admin  Assisted  Med Delivery   whole    Wound Care Documentation and Therapy:  Wound 08/03/21 bullous pemphigoid lesions, upper/ lower extremities, back/ abdomen (Active)   Dressing Status Clean;Dry; Intact 08/05/21 0442   Dressing/Treatment Petroleum impregnated gauze; Pharmaceutical agent (see MAR); Roll gauze 08/05/21 0442   Wound Assessment Erythema;Pink/red;Ruptured blister;Superficial 08/05/21 0442   Number of days: 2        Elimination:  Continence:   · Bowel: Yes  · Bladder: Yes  Urinary Catheter: None   Colostomy/Ileostomy/Ileal Conduit: No       Date of Last BM: 8/09/2021      Intake/Output Summary (Last 24 hours) at 8/5/2021 1710  Last data filed at 8/5/2021 0900  Gross per 24 hour   Intake 840 ml   Output 1500 ml   Net -660 ml     I/O last 3 completed shifts: In: 840 [P.O.:840]  Out: 1500 [Urine:1500]    Safety Concerns: At Risk for Falls    Impairments/Disabilities:      Language Barrier - aphasia    Nutrition Therapy:  Current Nutrition Therapy:   - Oral Diet:  General    Routes of Feeding: Oral  Liquids: Thin Liquids  Daily Fluid Restriction: no  Last Modified Barium Swallow with Video (Video Swallowing Test): not done    Treatments at the Time of Hospital Discharge:   Respiratory Treatments: ***  Oxygen Therapy:  is not on home oxygen therapy.   Ventilator:    - No ventilator support    Rehab Therapies: Physical Therapy and Occupational Therapy  Weight Bearing Status/Restrictions: No weight bearing restirctions  Other Medical Equipment (for information only, NOT a DME order):  walker and hospital bed  Other Treatments:     Patient's personal belongings (please select all that are sent with patient):  None    RN SIGNATURE:  Electronically signed by Claudio Ye RN on 8/10/21 at 9:22 AM EDT    CASE MANAGEMENT/SOCIAL WORK SECTION    Inpatient Status Date: ***    Readmission Risk Assessment Score:  Readmission Risk              Risk of Unplanned Readmission:  30           Discharging to Facility/ Agency   · Name: [facility]  · Anahy Coats ([facility] [address] [phone]). · Phone:  · Fax:    Dialysis Facility (if applicable)   · Name:  · Address:  · Dialysis Schedule:  · Phone:  · Fax:    / signature: Electronically signed by CHEYENNE Albright on 8/5/21 at 5:10 PM EDT    PHYSICIAN SECTION    Prognosis: Fair    Condition at Discharge: Stable    Rehab Potential (if transferring to Rehab): Fair    Recommended Labs or Other Treatments After Discharge:      Physician Certification: I certify the above information and transfer of Courtney Stubbs  is necessary for the continuing treatment of the diagnosis listed and that she requires Home Care for greater 30 days.      Update Admission H&P: No change in H&P    PHYSICIAN SIGNATURE:  Electronically signed by Delmy Woodruff MD on 8/10/21 at 8:22 AM EDT

## 2021-08-05 NOTE — CONSULTS
[address]                     [facility], [address]                                  CONSULTATION    PATIENT NAME: Sadaf Buck                :        1955  MED REC NO:   [id number]                          ROOM:       8094  ACCOUNT NO:   [de-identified]                           ADMIT DATE: 2021  PROVIDER:     Donna Burciaga MD    RENAL CONSULTATION    CONSULT DATE:  2021    CONSULTING PHYSICIAN:  Donna Burciaga MD    REFERRING PROVIDER:  Trevon Ackerman MD    REASON FOR CONSULTATION:  History of acute on chronic hyponatremia,  hypokalemia. HISTORY OF PRESENT ILLNESS:  The patient is a 60-year-old female with  history of recurrent acute on chronic hyponatremia, best serum sodium  around 134, Crohn's disease, hypokalemia, hypertension, hiatal hernia,  sigmoid colectomy with colostomy, history of UTI, GERD, who is admitted  to the hospital at this time secondary to altered mental status and  shortness of breath. Her diagnosis is severe hypomagnesemia. There are  concerns for UTI. I am asked to see the patient because her potassium  is 3.4 with a serum magnesium of 0.8. Sodium is normal at 140 at this  time. With replacement, magnesium has gone up to 1.4. She denies any  vomiting or diarrhea. She said she has not been drinking too much  alcohol at this time. No other complaints. PAST MEDICAL HISTORY:  Includes hyponatremia, hypokalemia, Crohn's  disease, status post sigmoid colectomy with colostomy, hypertension,  hiatal hernia, UTI, GERD. ALLERGIES:  Includes ACE INHIBITORS and SKELAXIN. MEDICATIONS:  Currently includes buspirone, clobetasol, escitalopram,  ferrous gluconate, furosemide, levofloxacin, metoprolol, CellCept,  pantoprazole, potassium chloride, prednisone, rivaroxaban, vitamin D,  normal saline. SOCIAL HISTORY:  Positive smoking. History of alcohol abuse, but  according to her she has been cutting off.   No drug abuse.    FAMILY HISTORY:  Includes hypertension and kidney disease. REVIEW OF SYSTEMS:  GENERAL:  Denies any malaise. SKIN:  No skin rash, itching or discharge. NEUROLOGIC:  No headaches. No focal deficits. No seizures. CARDIOVASCULAR:  No chest pain or palpitations. PULMONARY:  No shortness of breath. No coughing, no hemoptysis. GI:  Denies any abdominal pain. Denies any nausea. Denies any  vomiting. No diarrhea. RENAL:  Denies any gross hematuria or change in urine output. ENDOCRINE:  No history of diabetes. No heat or cold intolerance. ID:  No fever or chills. MUSCULOSKELETAL:  Denies any active joint pains. PHYSICAL EXAMINATION:  VITAL SIGNS:  Blood pressure 142/94, pulse 70, respirations 16,  temperature 97.6, saturating 92%. Weight listed at 86.8 kg. Urine  output recorded at 4300 mL. GENERAL:  Appears well. HEENT:  Anicteric sclerae. Clear conjunctivae. SKIN:  Anicteric. No rash. CHEST:  Clear. Rhonchi bilaterally. HEART:  Regular. ABDOMEN:  Soft, nontender. No rebound or involuntary guarding. EXTREMITIES:  Shows 1+ edema. LABORATORY DATA:  Sodium 137, potassium 4, chloride 104, bicarb 25, BUN  6, creatinine less than 0.5, glucose 81, calcium 7, magnesium 1.4. Albumin 3.1. WBC 8.3, hemoglobin 9.3, hematocrit 27.4, platelet count  528,459. Urinalysis, specific gravity 1.025, pH is 6, bilirubin is  small, blood is negative, protein is 30, nitrite is positive, leukocyte  esterase is moderate, wbc 125. DIAGNOSTIC DATA:  CT scan of the head without contrast shows no acute  hemorrhage or midline shift. CTA of the chest shows no acute pulmonary  embolism, aneurysmal dilatation of the thoracic aorta. ASSESSMENT AND PLAN:  1. History of acute on chronic hyponatremia. Underlying history of  alcohol abuse. This is currently within normal limits. 2.  Hypokalemia in the setting of hypomagnesemia. This has improved. 3.  Severe hypomagnesemia.   History of alcohol abuse and PPI use. Check  urine magnesium. Switch from PPI to H2 antagonist.  We will continue to  replace IV and p.o.  4.  Hypocalcemia, check ionized calcium and PTH. 5.  History of alcohol abuse. 6.  Anemia. Follow hemoglobin. 7.  Pyuria, currently on levofloxacin. Thank you very much for this consult. We will follow with you.         Caitlyn Hernandez MD    D: 08/05/2021 11:55:51       T: 08/05/2021 12:01:43     [id number]  Job#: [id number]     Doc#: [id number]    CC:

## 2021-08-05 NOTE — PROGRESS NOTES
Physical/Occupational Therapy  Dianne Keller  PT/OT orders noted and appreciated. Pt supine in darkened room upon arrival. Upon introduction of this writer, OT student, and SPTA, pt refusing participation, citing significant headache pain that she did not rate. Pt stating, \"Today is not a good day\" and \"When I have a headache like this, it's bad\". When asked if pt would like this writer to check with RN if anything would be available for pain relief, pt stating \"No.\" Attempting to educate pt on purpose of therapy evaluations and risks of prolonged bedrest, pt often talking over therapist and continuing to refuse. PT/OT will attempt to follow-up with pt later this date as schedule allows to complete evaluations.    Thank you,  Layne Yeh, PT, DPT, [id number]  Maxine Oneill, [place], OTR/L [phone]

## 2021-08-05 NOTE — PROGRESS NOTES
Comprehensive Nutrition Assessment    Type and Reason for Visit:  Initial, Wound    Nutrition Recommendations/Plan:   Ensure High Protein BID    Nutrition Assessment:  Pt with increased nutrient needs for wound healing. Noted pt has chronic blisters with open areas that have been treated prior to admission. Pt with acute confusion per MD. RN reports pt is not eating well, with 25% or less of meals consumed. Pt has lost 7 lb/ 3.5% in past 1- 2 months. Will offer Ensure supplements BID & encourage pt to drink to help promote healing. Malnutrition Assessment:  Malnutrition Status:  No malnutrition      Estimated Daily Nutrient Needs:  Energy (kcal):  4287- 2150(20-25 kcal/86kg); Weight Used for Energy Requirements:  Current     Protein (g):  65- 75g (1.3-1.5g/50kg); Weight Used for Protein Requirements:  Ideal        Fluid (ml/day):   ; Method Used for Fluid Requirements:  1 ml/kcal      Nutrition Related Findings:  -2.6L; +2 edema BLE; +BS      Wounds:  Open Wounds       Current Nutrition Therapies:    ADULT DIET; Regular    Anthropometric Measures:  · Height: 5' 7\" (170.2 cm)  · Current Body Weight: 191 lb (86.6 kg)   · Admission Body Weight:      · Usual Body Weight: 198 lb (89.8 kg) (1-2 months ago)     · Ideal Body Weight: 135 lbs; % Ideal Body Weight 141.5 %   · BMI: 29.9  · Adjusted Body Weight:  ; No Adjustment   · Adjusted BMI:      · BMI Categories: Overweight (BMI 25.0-29. 9)       Nutrition Diagnosis:   · Inadequate oral intake related to inadequate protein-energy intake as evidenced by intake 0-25%, poor intake prior to admission    · Increased nutrient needs related to increase demand for energy/nutrients as evidenced by wounds      Nutrition Interventions:   Food and/or Nutrient Delivery:  Continue Current Diet, Start Oral Nutrition Supplement  Nutrition Education/Counseling:  Education not indicated   Coordination of Nutrition Care:  Continue to monitor while inpatient    Goals:  po intake at least 50% of meals & supplements       Nutrition Monitoring and Evaluation:   Behavioral-Environmental Outcomes:  None Identified   Food/Nutrient Intake Outcomes:  Food and Nutrient Intake, Supplement Intake  Physical Signs/Symptoms Outcomes:  Skin, Weight     Discharge Planning:     Too soon to determine     Electronically signed by Robert Brady RD, LD on 8/5/21 at 11:35 AM EDT    Contact: [phone]

## 2021-08-06 LAB
ANION GAP SERPL CALCULATED.3IONS-SCNC: 9 MMOL/L (ref 3–16)
BUN BLDV-MCNC: 6 MG/DL (ref 7–20)
CALCIUM SERPL-MCNC: 8.3 MG/DL (ref 8.3–10.6)
CHLORIDE BLD-SCNC: 100 MMOL/L (ref 99–110)
CO2: 22 MMOL/L (ref 21–32)
CREAT SERPL-MCNC: <0.5 MG/DL (ref 0.6–1.2)
EKG ATRIAL RATE: 67 BPM
EKG DIAGNOSIS: NORMAL
EKG P AXIS: 45 DEGREES
EKG P-R INTERVAL: 134 MS
EKG Q-T INTERVAL: 428 MS
EKG QRS DURATION: 84 MS
EKG QTC CALCULATION (BAZETT): 452 MS
EKG R AXIS: 29 DEGREES
EKG T AXIS: 60 DEGREES
EKG VENTRICULAR RATE: 67 BPM
GFR AFRICAN AMERICAN: >60
GFR NON-AFRICAN AMERICAN: >60
GLUCOSE BLD-MCNC: 241 MG/DL (ref 70–99)
MAGNESIUM: 1.6 MG/DL (ref 1.8–2.4)
MAGNESIUM: 1.8 MG/DL (ref 1.8–2.4)
POTASSIUM SERPL-SCNC: 4.3 MMOL/L (ref 3.5–5.1)
SODIUM BLD-SCNC: 131 MMOL/L (ref 136–145)

## 2021-08-06 PROCEDURE — 2580000003 HC RX 258: Performed by: INTERNAL MEDICINE

## 2021-08-06 PROCEDURE — 80048 BASIC METABOLIC PNL TOTAL CA: CPT

## 2021-08-06 PROCEDURE — 36415 COLL VENOUS BLD VENIPUNCTURE: CPT

## 2021-08-06 PROCEDURE — 2060000000 HC ICU INTERMEDIATE R&B

## 2021-08-06 PROCEDURE — 6360000002 HC RX W HCPCS: Performed by: INTERNAL MEDICINE

## 2021-08-06 PROCEDURE — 6370000000 HC RX 637 (ALT 250 FOR IP): Performed by: INTERNAL MEDICINE

## 2021-08-06 PROCEDURE — 93010 ELECTROCARDIOGRAM REPORT: CPT | Performed by: INTERNAL MEDICINE

## 2021-08-06 PROCEDURE — 83735 ASSAY OF MAGNESIUM: CPT

## 2021-08-06 RX ORDER — SPIRONOLACTONE 25 MG/1
25 TABLET ORAL DAILY
Status: DISCONTINUED | OUTPATIENT
Start: 2021-08-06 | End: 2021-08-09

## 2021-08-06 RX ORDER — MAGNESIUM SULFATE IN WATER 40 MG/ML
2000 INJECTION, SOLUTION INTRAVENOUS ONCE
Status: COMPLETED | OUTPATIENT
Start: 2021-08-06 | End: 2021-08-06

## 2021-08-06 RX ORDER — MAGNESIUM SULFATE IN WATER 40 MG/ML
2000 INJECTION, SOLUTION INTRAVENOUS ONCE
Status: DISCONTINUED | OUTPATIENT
Start: 2021-08-06 | End: 2021-08-06

## 2021-08-06 RX ADMIN — Medication 10 ML: at 21:41

## 2021-08-06 RX ADMIN — Medication 10 ML: at 09:00

## 2021-08-06 RX ADMIN — BUSPIRONE HYDROCHLORIDE 10 MG: 5 TABLET ORAL at 08:58

## 2021-08-06 RX ADMIN — POTASSIUM CHLORIDE 20 MEQ: 20 TABLET, EXTENDED RELEASE ORAL at 08:58

## 2021-08-06 RX ADMIN — POTASSIUM CHLORIDE 20 MEQ: 20 TABLET, EXTENDED RELEASE ORAL at 21:40

## 2021-08-06 RX ADMIN — ESCITALOPRAM OXALATE 20 MG: 10 TABLET ORAL at 08:59

## 2021-08-06 RX ADMIN — MYCOPHENOLATE MOFETIL 1000 MG: 250 CAPSULE ORAL at 21:40

## 2021-08-06 RX ADMIN — Medication 400 MG: at 08:59

## 2021-08-06 RX ADMIN — SPIRONOLACTONE 25 MG: 25 TABLET ORAL at 16:44

## 2021-08-06 RX ADMIN — Medication 400 MG: at 16:38

## 2021-08-06 RX ADMIN — OXYCODONE 5 MG: 5 TABLET ORAL at 08:57

## 2021-08-06 RX ADMIN — BUSPIRONE HYDROCHLORIDE 10 MG: 5 TABLET ORAL at 16:38

## 2021-08-06 RX ADMIN — Medication 400 MG: at 21:40

## 2021-08-06 RX ADMIN — LEVOFLOXACIN 750 MG: 5 INJECTION, SOLUTION INTRAVENOUS at 21:57

## 2021-08-06 RX ADMIN — BUSPIRONE HYDROCHLORIDE 10 MG: 5 TABLET ORAL at 21:40

## 2021-08-06 RX ADMIN — MAGNESIUM SULFATE HEPTAHYDRATE 2000 MG: 40 INJECTION, SOLUTION INTRAVENOUS at 16:32

## 2021-08-06 RX ADMIN — PREDNISONE 40 MG: 20 TABLET ORAL at 08:59

## 2021-08-06 RX ADMIN — MYCOPHENOLATE MOFETIL 1000 MG: 250 CAPSULE ORAL at 08:59

## 2021-08-06 RX ADMIN — SODIUM CHLORIDE 25 ML: 9 INJECTION, SOLUTION INTRAVENOUS at 21:51

## 2021-08-06 RX ADMIN — FERROUS GLUCONATE TAB 324 MG (37.5 MG ELEMENTAL IRON) 972 MG: 324 (37.5 FE) TAB at 08:58

## 2021-08-06 RX ADMIN — Medication 10 ML: at 23:55

## 2021-08-06 RX ADMIN — METOPROLOL SUCCINATE 50 MG: 50 TABLET, EXTENDED RELEASE ORAL at 09:00

## 2021-08-06 RX ADMIN — FAMOTIDINE 20 MG: 20 TABLET ORAL at 08:58

## 2021-08-06 RX ADMIN — RIVAROXABAN 20 MG: 20 TABLET, FILM COATED ORAL at 08:00

## 2021-08-06 ASSESSMENT — PAIN DESCRIPTION - LOCATION
LOCATION: HEAD
LOCATION: GENERALIZED

## 2021-08-06 ASSESSMENT — PAIN DESCRIPTION - PAIN TYPE
TYPE: ACUTE PAIN;CHRONIC PAIN
TYPE: ACUTE PAIN

## 2021-08-06 ASSESSMENT — PAIN DESCRIPTION - PROGRESSION
CLINICAL_PROGRESSION: NOT CHANGED

## 2021-08-06 ASSESSMENT — ENCOUNTER SYMPTOMS
ABDOMINAL PAIN: 0
FACIAL SWELLING: 0
ABDOMINAL DISTENTION: 0
BLOOD IN STOOL: 0
CONSTIPATION: 0
EYE DISCHARGE: 0
NAUSEA: 0
DIARRHEA: 0
EYE REDNESS: 0
SHORTNESS OF BREATH: 0
PHOTOPHOBIA: 0
COUGH: 0
VOMITING: 0
CHEST TIGHTNESS: 0

## 2021-08-06 ASSESSMENT — PAIN SCALES - GENERAL
PAINLEVEL_OUTOF10: 0
PAINLEVEL_OUTOF10: 0
PAINLEVEL_OUTOF10: 10
PAINLEVEL_OUTOF10: 10

## 2021-08-06 ASSESSMENT — PAIN DESCRIPTION - FREQUENCY: FREQUENCY: CONTINUOUS

## 2021-08-06 ASSESSMENT — PAIN - FUNCTIONAL ASSESSMENT: PAIN_FUNCTIONAL_ASSESSMENT: PREVENTS OR INTERFERES SOME ACTIVE ACTIVITIES AND ADLS

## 2021-08-06 ASSESSMENT — PAIN DESCRIPTION - DESCRIPTORS: DESCRIPTORS: CONSTANT;SHARP

## 2021-08-06 ASSESSMENT — PAIN DESCRIPTION - ONSET: ONSET: ON-GOING

## 2021-08-06 NOTE — PROGRESS NOTES
Physical/Occupational Therapy Attempt  PT/OT orders received, chart reviewed. Pt declines therapy at this time stating she will be going down for a line placement soon. Pt educated on importance of continued mobility and that therapy can work with her until transport arrives. Pt continues to decline stating she doesn't want to get up right now and she has been moving around fine. Per pt she would like her home therapy resumed through [facility] and states working with therapy here will just mess it up. Will follow-up as pt status and schedule allow. No charge.   Evon Sanchez, PT, DPT #[id number]  Maddy Hyde, OTR/L [id number]

## 2021-08-06 NOTE — CARE COORDINATION
Attempted to reach patient's spouse, and daughter to notify them patient is declining therapy and will not qualify for SNF  nor skilled home care. Informed family Possible discharge home tomorrow per Nephrology  Left one message but other phone numbers did not take a message.

## 2021-08-06 NOTE — PROGRESS NOTES
[facility] Note    Patient Active Problem List   Diagnosis    Anemia    Crohn's colitis ([address]    MRSA (methicillin resistant Staphylococcus aureus) infection    [facility]    Hypomagnesemia    Leg swelling    Venous insufficiency    Chronic venous hypertension (idiopathic) with inflammation of bilateral lower extremity    Local infection of skin and subcutaneous tissue    Open wound of left foot with complication    Medtronic LINQ 7/29/20 Dr Rui Mack    LV dysfunction    PAF (paroxysmal atrial fibrillation) ([facility])    HTN (hypertension), benign    S/P coronary angiogram    Abnormal angiogram    Atherosclerosis of native arteries of the extremities with ulceration ([address]    Nail avulsion of toe, initial encounter    Lower abdominal pain    Late effect of ischemic cerebral stroke    Cognitive deficit as late effect of cerebrovascular accident (CVA)    Infection requiring contact isolation precautions    Hyponatremia    Encephalopathy, metabolic    Suspected [id number] virus infection    Alcohol abuse    Cellulitis of both feet    Bilateral cellulitis of lower leg    Multiple wounds    Trichotillomania    Gastroesophageal reflux disease without esophagitis    Non-traumatic rhabdomyolysis    Overweight (BMI 25.0-29. 9)    History of infection with vancomycin resistant Enterococcus (VRE)    History of MRSA infection    Infection due to acinetobacter baumannii    UTI (urinary tract infection)    Sepsis secondary to UTI ([address]    Altered mental status       Past Medical History:   has a past medical history of Anxiety, Arthritis, Atrial fibrillation/flutter ([address] Blood transfusion reaction, Crohn's disease ([address] Depression, GERD (gastroesophageal reflux disease), Hiatal hernia, Hx of blood clots, Hypertension, MRSA (methicillin resistant staph aureus) culture positive, MRSA (methicillin resistant staph aureus) culture positive, Neuropathy, Pneumonia, Sinus headache, SOB (shortness of breath), and VRE (vancomycin resistant enterococcus) culture positive. Past Social History:   reports that she has been smoking cigarettes and e-cigarettes. She has a 10.00 pack-year smoking history. She has never used smokeless tobacco. She reports current alcohol use of about 2.0 standard drinks of alcohol per week. She reports that she does not use drugs. Subjective:  No complaints. Multiple skin lesions. Review of Systems   Constitutional: Negative for activity change, appetite change, chills, fatigue, fever and unexpected weight change. HENT: Negative for congestion and facial swelling. Eyes: Negative for photophobia, discharge and redness. Respiratory: Negative for cough, chest tightness and shortness of breath. Cardiovascular: Positive for leg swelling. Negative for chest pain and palpitations. Gastrointestinal: Negative for abdominal distention, abdominal pain, blood in stool, constipation, diarrhea, nausea and vomiting. Endocrine: Negative for cold intolerance, heat intolerance and polyuria. Genitourinary: Negative for decreased urine volume, difficulty urinating, flank pain and hematuria. Musculoskeletal: Negative for joint swelling and neck pain. Skin: Positive for rash. Neurological: Negative for dizziness, seizures, syncope, speech difficulty, light-headedness and headaches. Hematological: Does not bruise/bleed easily. Psychiatric/Behavioral: Negative for agitation, confusion and hallucinations.        Objective:      /75   Pulse 64   Temp 97.4 °F (36.3 °C) (Oral)   Resp 16   Ht 5' 7\" (1.702 m)   Wt 203 lb 6.4 oz (92.3 kg)   SpO2 97%   BMI 31.86 kg/m²     Wt Readings from Last 3 Encounters:   08/06/21 203 lb 6.4 oz (92.3 kg)   06/04/21 198 lb (89.8 kg)   04/28/21 194 lb 14.2 oz (88.4 kg)       BP Readings from Last 3 Encounters:   08/06/21 117/75   06/04/21 104/88   04/30/21 123/84 Skin-multiple lesions  Chest- clear  Heart-regular  Abd-soft  Ext- 1+ edema    Labs  Hemoglobin   Date Value Ref Range Status   08/05/2021 9.3 (L) 12.0 - 16.0 g/dL Final     Hematocrit   Date Value Ref Range Status   08/05/2021 27.4 (L) 36.0 - 48.0 % Final     WBC   Date Value Ref Range Status   08/05/2021 8.3 4.0 - 11.0 K/uL Final     Platelets   Date Value Ref Range Status   08/05/2021 388 135 - 450 K/uL Final     Lab Results   Component Value Date    CREATININE <0.5 (L) 08/06/2021    BUN 6 (L) 08/06/2021     (L) 08/06/2021    K 4.3 08/06/2021     08/06/2021    CO2 22 08/06/2021     UMg 3.7  U Crea 8  FeMg -29     Assessment/Plan:  1. History of acute on chronic hyponatremia. Underlying history of alcohol abuse. This is currently within normal limits. 2.  Hypokalemia in the setting of hypomagnesemia. This has improved. 3.  Severe hypomagnesemia. History of alcohol abuse and PPI use. Renal Mg wasting also. Switched from PPI to H2 antagonist.  We will continue to replace IV and p.o. Add SPLT for renal Mg conservation. Follow bp.   4.  Hypocalcemia, check ionized calcium and PTH. Better. 5.  History of alcohol abuse. 6.  Anemia. Follow hemoglobin. 7.  Pyuria, currently on levofloxacin. 8.  Dispo-hopefully tomorrow.      Alyson Slaughter MD

## 2021-08-07 PROBLEM — G93.41 ENCEPHALOPATHY, METABOLIC: Status: RESOLVED | Noted: 2021-04-27 | Resolved: 2021-08-07

## 2021-08-07 PROBLEM — L03.116 BILATERAL CELLULITIS OF LOWER LEG: Status: RESOLVED | Noted: 2021-06-02 | Resolved: 2021-08-07

## 2021-08-07 PROBLEM — L03.115 BILATERAL CELLULITIS OF LOWER LEG: Status: RESOLVED | Noted: 2021-06-02 | Resolved: 2021-08-07

## 2021-08-07 LAB
ALBUMIN SERPL-MCNC: 2.9 G/DL (ref 3.4–5)
ANION GAP SERPL CALCULATED.3IONS-SCNC: 7 MMOL/L (ref 3–16)
BUN BLDV-MCNC: 8 MG/DL (ref 7–20)
CALCIUM IONIZED: 1.13 MMOL/L (ref 1.12–1.32)
CALCIUM SERPL-MCNC: 8.9 MG/DL (ref 8.3–10.6)
CHLORIDE BLD-SCNC: 102 MMOL/L (ref 99–110)
CO2: 25 MMOL/L (ref 21–32)
CREAT SERPL-MCNC: <0.5 MG/DL (ref 0.6–1.2)
GFR AFRICAN AMERICAN: >60
GFR NON-AFRICAN AMERICAN: >60
GLUCOSE BLD-MCNC: 88 MG/DL (ref 70–99)
MAGNESIUM: 1.7 MG/DL (ref 1.8–2.4)
PARATHYROID HORMONE INTACT: 63.9 PG/ML (ref 14–72)
PH VENOUS: 7.57 (ref 7.35–7.45)
PHOSPHORUS: 1.3 MG/DL (ref 2.5–4.9)
POTASSIUM SERPL-SCNC: 5.4 MMOL/L (ref 3.5–5.1)
SODIUM BLD-SCNC: 134 MMOL/L (ref 136–145)

## 2021-08-07 PROCEDURE — 6370000000 HC RX 637 (ALT 250 FOR IP): Performed by: INTERNAL MEDICINE

## 2021-08-07 PROCEDURE — 2580000003 HC RX 258: Performed by: INTERNAL MEDICINE

## 2021-08-07 PROCEDURE — 83735 ASSAY OF MAGNESIUM: CPT

## 2021-08-07 PROCEDURE — 80069 RENAL FUNCTION PANEL: CPT

## 2021-08-07 PROCEDURE — 82330 ASSAY OF CALCIUM: CPT

## 2021-08-07 PROCEDURE — 36415 COLL VENOUS BLD VENIPUNCTURE: CPT

## 2021-08-07 PROCEDURE — 83970 ASSAY OF PARATHORMONE: CPT

## 2021-08-07 PROCEDURE — 6360000002 HC RX W HCPCS: Performed by: INTERNAL MEDICINE

## 2021-08-07 PROCEDURE — 2060000000 HC ICU INTERMEDIATE R&B

## 2021-08-07 RX ADMIN — PREDNISONE 40 MG: 20 TABLET ORAL at 10:33

## 2021-08-07 RX ADMIN — Medication 10 ML: at 21:33

## 2021-08-07 RX ADMIN — SODIUM CHLORIDE 25 ML: 9 INJECTION, SOLUTION INTRAVENOUS at 21:44

## 2021-08-07 RX ADMIN — BUSPIRONE HYDROCHLORIDE 10 MG: 5 TABLET ORAL at 15:19

## 2021-08-07 RX ADMIN — Medication 400 MG: at 15:19

## 2021-08-07 RX ADMIN — FERROUS GLUCONATE TAB 324 MG (37.5 MG ELEMENTAL IRON) 972 MG: 324 (37.5 FE) TAB at 10:32

## 2021-08-07 RX ADMIN — Medication 400 MG: at 21:34

## 2021-08-07 RX ADMIN — Medication 400 MG: at 10:32

## 2021-08-07 RX ADMIN — Medication 10 ML: at 10:35

## 2021-08-07 RX ADMIN — ACETAMINOPHEN 650 MG: 325 TABLET ORAL at 08:50

## 2021-08-07 RX ADMIN — RIVAROXABAN 20 MG: 20 TABLET, FILM COATED ORAL at 10:33

## 2021-08-07 RX ADMIN — MYCOPHENOLATE MOFETIL 1000 MG: 250 CAPSULE ORAL at 10:34

## 2021-08-07 RX ADMIN — ESCITALOPRAM OXALATE 20 MG: 10 TABLET ORAL at 10:32

## 2021-08-07 RX ADMIN — LEVOFLOXACIN 750 MG: 5 INJECTION, SOLUTION INTRAVENOUS at 21:48

## 2021-08-07 RX ADMIN — BUSPIRONE HYDROCHLORIDE 10 MG: 5 TABLET ORAL at 10:32

## 2021-08-07 RX ADMIN — FAMOTIDINE 20 MG: 20 TABLET ORAL at 10:33

## 2021-08-07 RX ADMIN — METOPROLOL SUCCINATE 50 MG: 50 TABLET, EXTENDED RELEASE ORAL at 10:32

## 2021-08-07 RX ADMIN — MYCOPHENOLATE MOFETIL 1000 MG: 250 CAPSULE ORAL at 21:33

## 2021-08-07 RX ADMIN — BUSPIRONE HYDROCHLORIDE 10 MG: 5 TABLET ORAL at 21:34

## 2021-08-07 ASSESSMENT — ENCOUNTER SYMPTOMS
COUGH: 0
CHEST TIGHTNESS: 0
FACIAL SWELLING: 0
ABDOMINAL DISTENTION: 0
CONSTIPATION: 0
EYE REDNESS: 0
ABDOMINAL PAIN: 0
EYE DISCHARGE: 0
VOMITING: 0
NAUSEA: 0
DIARRHEA: 0
BLOOD IN STOOL: 0
PHOTOPHOBIA: 0
SHORTNESS OF BREATH: 0

## 2021-08-07 ASSESSMENT — PAIN SCALES - GENERAL
PAINLEVEL_OUTOF10: 0
PAINLEVEL_OUTOF10: 0
PAINLEVEL_OUTOF10: 4
PAINLEVEL_OUTOF10: 10

## 2021-08-07 ASSESSMENT — PAIN DESCRIPTION - DESCRIPTORS: DESCRIPTORS: HEADACHE

## 2021-08-07 ASSESSMENT — PAIN DESCRIPTION - LOCATION: LOCATION: HEAD

## 2021-08-07 ASSESSMENT — PAIN DESCRIPTION - ONSET: ONSET: SUDDEN

## 2021-08-07 ASSESSMENT — PAIN DESCRIPTION - FREQUENCY: FREQUENCY: INTERMITTENT

## 2021-08-07 ASSESSMENT — PAIN DESCRIPTION - PAIN TYPE: TYPE: ACUTE PAIN

## 2021-08-07 NOTE — PROGRESS NOTES
Attempted to assess and give AM meds, pt stated \"do we have to do this right now\", complained of a headache, I stated I had to assess her. Said she will not take morning medications at this time. I will reattempt later. Daily potassium held due to K of 5.4, per nephrology.

## 2021-08-07 NOTE — PROGRESS NOTES
Encephalopathy, metabolic    Suspected QKRBE-04 virus infection    Alcohol abuse    Cellulitis of both feet    Bilateral cellulitis of lower leg    Multiple wounds    Trichotillomania    Gastroesophageal reflux disease without esophagitis    Non-traumatic rhabdomyolysis    Overweight (BMI 25.0-29. 9)    History of infection with vancomycin resistant Enterococcus (VRE)    History of MRSA infection    Infection due to acinetobacter baumannii    UTI (urinary tract infection)    Sepsis secondary to UTI ([facility] [address]    Altered mental status       Past Medical History:   has a past medical history of Anxiety, Arthritis, Atrial fibrillation/flutter ([facility] [address] Blood transfusion reaction, Crohn's disease ([facility] [address] Depression, GERD (gastroesophageal reflux disease), Hiatal hernia, Hx of blood clots, Hypertension, MRSA (methicillin resistant staph aureus) culture positive, MRSA (methicillin resistant staph aureus) culture positive, Neuropathy, Pneumonia, Sinus headache, SOB (shortness of breath), and VRE (vancomycin resistant enterococcus) culture positive. Past Social History:   reports that she has been smoking cigarettes and e-cigarettes. She has a 10.00 pack-year smoking history. She has never used smokeless tobacco. She reports current alcohol use of about 2.0 standard drinks of alcohol per week. She reports that she does not use drugs. Subjective:  No complaints. Multiple skin lesions. Review of Systems   Constitutional: Negative for activity change, appetite change, chills, fatigue, fever and unexpected weight change. HENT: Negative for congestion and facial swelling. Eyes: Negative for photophobia, discharge and redness. Respiratory: Negative for cough, chest tightness and shortness of breath. Cardiovascular: Positive for leg swelling. Negative for chest pain and palpitations.    Gastrointestinal: Negative for abdominal distention, abdominal pain, blood in stool, constipation, diarrhea, nausea and vomiting. Endocrine: Negative for cold intolerance, heat intolerance and polyuria. Genitourinary: Negative for decreased urine volume, difficulty urinating, flank pain and hematuria. Musculoskeletal: Negative for joint swelling and neck pain. Skin: Positive for rash. Neurological: Negative for dizziness, seizures, syncope, speech difficulty, light-headedness and headaches. Hematological: Does not bruise/bleed easily. Psychiatric/Behavioral: Negative for agitation, confusion and hallucinations. Objective:      BP (!) 147/99   Pulse 75   Temp 99 °F (37.2 °C) (Oral)   Resp 18   Ht 5' 7\" (1.702 m)   Wt 203 lb 6.4 oz (92.3 kg)   SpO2 96%   BMI 31.86 kg/m²     Wt Readings from Last 3 Encounters:   08/07/21 203 lb 6.4 oz (92.3 kg)   06/04/21 198 lb (89.8 kg)   04/28/21 194 lb 14.2 oz (88.4 kg)       BP Readings from Last 3 Encounters:   08/07/21 (!) 147/99   06/04/21 104/88   04/30/21 123/84     Skin-multiple lesions  Chest- clear  Heart-regular  Abd-soft  Ext- 1+ edema    Labs  Hemoglobin   Date Value Ref Range Status   08/05/2021 9.3 (L) 12.0 - 16.0 g/dL Final     Hematocrit   Date Value Ref Range Status   08/05/2021 27.4 (L) 36.0 - 48.0 % Final     WBC   Date Value Ref Range Status   08/05/2021 8.3 4.0 - 11.0 K/uL Final     Platelets   Date Value Ref Range Status   08/05/2021 388 135 - 450 K/uL Final     Lab Results   Component Value Date    CREATININE <0.5 (L) 08/07/2021    BUN 8 08/07/2021     (L) 08/07/2021    K 5.4 (H) 08/07/2021     08/07/2021    CO2 25 08/07/2021     UMg 3.7  U Crea 8  FeMg -29     Assessment/Plan:  1. History of acute on chronic hyponatremia. Underlying history of alcohol abuse. This is currently within normal limits. 2.  Hypokalemia in the setting of hypomagnesemia. This has improved. 3.  Severe hypomagnesemia. History of alcohol abuse and PPI use. Renal Mg wasting also.   Switched from PPI to H2 antagonist.  We will continue to replace IV and p.o.  Add SPLT for renal Mg conservation. Follow bp.   4.  Hypocalcemia, check ionized calcium and PTH. Better. 5.  History of alcohol abuse. 6.  Anemia. Follow hemoglobin. 7.  Pyuria, currently on levofloxacin. 8.  Dispo-hopefully tomorrow.      Humaira Chairez MD

## 2021-08-07 NOTE — PLAN OF CARE
Problem: Pain:  Goal: Pain level will decrease  Description: Pain level will decrease  8/7/2021 1612 by Priscilla Ambriz RN  Outcome: Ongoing     Problem: Skin Integrity:  Goal: Will show no infection signs and symptoms  Description: Will show no infection signs and symptoms  8/7/2021 1612 by Priscilla Ambriz RN  Outcome: Ongoing     Problem: Falls - Risk of:  Goal: Will remain free from falls  Description: Will remain free from falls  8/7/2021 1612 by Priscilla Ambriz RN  Outcome: Ongoing

## 2021-08-07 NOTE — PROGRESS NOTES
Physical Therapy  Patient declined getting out of bed, states:  \"Not today. \"    Thang Baxter, [address] [facility], DPT, ATC-R [id number]  Yosi aPige, [address] [facility] José Manuel Martinez, OTR/L [id number]

## 2021-08-07 NOTE — PROGRESS NOTES
Department of Internal Medicine  General Internal Medicine   Progress Note      SUBJECTIVE: moderate pain , denies SOB  Looks somnolent     History obtained from chart review and the patient  General ROS: positive for  - fatigue and malaise  negative for - chills, fever or night sweats  Psychological ROS: negative  Ophthalmic ROS: negative  Respiratory ROS: no cough, shortness of breath, or wheezing  Cardiovascular ROS: no chest pain or dyspnea on exertion  Gastrointestinal ROS: positive for -  Heartburn and dyspepsia   negative for - hematemesis, melena or swallowing difficulty/pain  Genito-Urinary ROS: some  dysuria,  No trouble voiding, or hematuria  Musculoskeletal ROS: chronic pain   Neurological ROS: no TIA or stroke symptoms    OBJECTIVE      Medications      Current Facility-Administered Medications: [Held by provider] spironolactone (ALDACTONE) tablet 25 mg, 25 mg, Oral, Daily  magnesium oxide (MAG-OX) tablet 400 mg, 400 mg, Oral, TID  famotidine (PEPCID) tablet 20 mg, 20 mg, Oral, Daily  magnesium sulfate 2000 mg in 50 mL IVPB premix, 2,000 mg, Intravenous, PRN  clobetasol (TEMOVATE) 0.05 % ointment, , Topical, BID  busPIRone (BUSPAR) tablet 10 mg, 10 mg, Oral, TID  ferrous gluconate 324 (37.5 Fe) MG tablet 972 mg, 972 mg, Oral, Daily with breakfast  hydrOXYzine (ATARAX) tablet 10 mg, 10 mg, Oral, TID PRN  rivaroxaban (XARELTO) tablet 20 mg, 20 mg, Oral, Daily with breakfast  metoprolol succinate (TOPROL XL) extended release tablet 50 mg, 50 mg, Oral, Daily  escitalopram (LEXAPRO) tablet 20 mg, 20 mg, Oral, Daily  oxyCODONE (ROXICODONE) immediate release tablet 5 mg, 5 mg, Oral, Q4H PRN  mycophenolate (CELLCEPT) capsule 1,000 mg, 1,000 mg, Oral, BID  baclofen (LIORESAL) tablet 10 mg, 10 mg, Oral, TID PRN  predniSONE (DELTASONE) tablet 40 mg, 40 mg, Oral, Daily  [START ON 8/9/2021] vitamin D (ERGOCALCIFEROL) capsule 50,000 Units, 50,000 Units, Oral, Weekly  sodium chloride flush 0.9 % injection 5-40 mL, 5-40 mL, Intravenous, 2 times per day  sodium chloride flush 0.9 % injection 5-40 mL, 5-40 mL, Intravenous, PRN  0.9 % sodium chloride infusion, 25 mL, Intravenous, PRN  ondansetron (ZOFRAN-ODT) disintegrating tablet 4 mg, 4 mg, Oral, Q8H PRN **OR** ondansetron (ZOFRAN) injection 4 mg, 4 mg, Intravenous, Q6H PRN  acetaminophen (TYLENOL) tablet 650 mg, 650 mg, Oral, Q6H PRN **OR** acetaminophen (TYLENOL) suppository 650 mg, 650 mg, Rectal, Q6H PRN  magnesium hydroxide (MILK OF MAGNESIA) 400 MG/5ML suspension 30 mL, 30 mL, Oral, Daily PRN  levoFLOXacin (LEVAQUIN) 750 MG/150ML infusion 750 mg, 750 mg, Intravenous, Q24H  hydrALAZINE (APRESOLINE) injection 10 mg, 10 mg, Intravenous, Q6H PRN  potassium chloride (KLOR-CON M) extended release tablet 40 mEq, 40 mEq, Oral, PRN **OR** potassium bicarb-citric acid (EFFER-K) effervescent tablet 40 mEq, 40 mEq, Oral, PRN **OR** potassium chloride 10 mEq/100 mL IVPB (Peripheral Line), 10 mEq, Intravenous, PRN  0.9 % sodium chloride bolus, 500 mL, Intravenous, PRN    Physical      VITALS:  /77   Pulse 68   Temp 98.6 °F (37 °C) (Oral)   Resp 16   Ht 5' 7\" (1.702 m)   Wt 203 lb 6.4 oz (92.3 kg)   SpO2 95%   BMI 31.86 kg/m²   TEMPERATURE:  Current - Temp: 98.6 °F (37 °C);  Max - Temp  Av.2 °F (36.8 °C)  Min: 97.4 °F (36.3 °C)  Max: 99 °F (37.2 °C)  RESPIRATIONS RANGE: Resp  Av.1  Min: 14  Max: 18  PULSE RANGE: Pulse  Av.6  Min: 57  Max: 75  BLOOD PRESSURE RANGE:  Systolic (45UOR), PAF:173 , Min:112 , RFZ:844   ; Diastolic (78HVP), DGD:91, Min:75, Max:99    PULSE OXIMETRY RANGE: SpO2  Av.1 %  Min: 95 %  Max: 100 %  24HR INTAKE/OUTPUT:      Intake/Output Summary (Last 24 hours) at 2021 1255  Last data filed at 2021 1208  Gross per 24 hour   Intake 1338.52 ml   Output 2900 ml   Net -1561.48 ml     CONSTITUTIONAL:  awake, alert, cooperative, no apparent distress, and appears stated age  EYES:  Unremarkable   NECK:  No JVD  and supple, symmetrical, Cognitive deficit as late effect of cerebrovascular accident (CVA)    Infection requiring contact isolation precautions    Hyponatremia    Suspected COVID-19 virus infection    Alcohol abuse    Cellulitis of both feet    Multiple wounds    Trichotillomania    Gastroesophageal reflux disease without esophagitis    Non-traumatic rhabdomyolysis    Overweight (BMI 25.0-29. 9)    History of infection with vancomycin resistant Enterococcus (VRE)    History of MRSA infection    UTI (urinary tract infection)    Sepsis secondary to UTI ([facility] [address]    Altered mental status        electrolytes monitoring and correction    magnessium supplementation adequate discussed with nursing staff    wound care consult    ct IV Levaquin    Oral anticoagulation

## 2021-08-07 NOTE — PLAN OF CARE
Problem: Pain:  Goal: Pain level will decrease  Description: Pain level will decrease  Outcome: Ongoing     Problem: Skin Integrity:  Goal: Will show no infection signs and symptoms  Description: Will show no infection signs and symptoms  Outcome: Ongoing     Problem: Falls - Risk of:  Goal: Will remain free from falls  Description: Will remain free from falls  Outcome: Ongoing     Problem: Nutrition  Goal: Optimal nutrition therapy  Outcome: Ongoing

## 2021-08-07 NOTE — PLAN OF CARE
Problem: Cardiovascular  Goal: Hemodynamic stability  Outcome: Ongoing     Vitals:    08/07/21 0054   BP: 130/83   Pulse: 61   Resp: 14   Temp: 98.4 °F (36.9 °C)   SpO2: 100%     Pt awake in bed, watching TV, and denies any needs at this time. VSS. See all flowsheets. Will continue to monitor.

## 2021-08-07 NOTE — PROGRESS NOTES
Department of Internal Medicine  General Internal Medicine   Progress Note      SUBJECTIVE: looks more alert pain is under control     History obtained from chart review and the patient  General ROS: positive for  - fatigue and malaise  negative for - chills, fever or night sweats  Psychological ROS: negative  Ophthalmic ROS: negative  Respiratory ROS: no cough, shortness of breath, or wheezing  Cardiovascular ROS: no chest pain or dyspnea on exertion  Gastrointestinal ROS: positive for -  Heartburn and dyspepsia   negative for - hematemesis, melena or swallowing difficulty/pain  Genito-Urinary ROS: some  dysuria,  No trouble voiding, or hematuria  Musculoskeletal ROS: chronic pain   Neurological ROS: no TIA or stroke symptoms    OBJECTIVE      Medications      Current Facility-Administered Medications: [Held by provider] spironolactone (ALDACTONE) tablet 25 mg, 25 mg, Oral, Daily  magnesium oxide (MAG-OX) tablet 400 mg, 400 mg, Oral, TID  famotidine (PEPCID) tablet 20 mg, 20 mg, Oral, Daily  magnesium sulfate 2000 mg in 50 mL IVPB premix, 2,000 mg, Intravenous, PRN  clobetasol (TEMOVATE) 0.05 % ointment, , Topical, BID  busPIRone (BUSPAR) tablet 10 mg, 10 mg, Oral, TID  ferrous gluconate 324 (37.5 Fe) MG tablet 972 mg, 972 mg, Oral, Daily with breakfast  hydrOXYzine (ATARAX) tablet 10 mg, 10 mg, Oral, TID PRN  rivaroxaban (XARELTO) tablet 20 mg, 20 mg, Oral, Daily with breakfast  metoprolol succinate (TOPROL XL) extended release tablet 50 mg, 50 mg, Oral, Daily  escitalopram (LEXAPRO) tablet 20 mg, 20 mg, Oral, Daily  oxyCODONE (ROXICODONE) immediate release tablet 5 mg, 5 mg, Oral, Q4H PRN  mycophenolate (CELLCEPT) capsule 1,000 mg, 1,000 mg, Oral, BID  baclofen (LIORESAL) tablet 10 mg, 10 mg, Oral, TID PRN  predniSONE (DELTASONE) tablet 40 mg, 40 mg, Oral, Daily  [START ON 8/9/2021] vitamin D (ERGOCALCIFEROL) capsule 50,000 Units, 50,000 Units, Oral, Weekly  sodium chloride flush 0.9 % injection 5-40 mL, 5-40 mL, Intravenous, 2 times per day  sodium chloride flush 0.9 % injection 5-40 mL, 5-40 mL, Intravenous, PRN  0.9 % sodium chloride infusion, 25 mL, Intravenous, PRN  ondansetron (ZOFRAN-ODT) disintegrating tablet 4 mg, 4 mg, Oral, Q8H PRN **OR** ondansetron (ZOFRAN) injection 4 mg, 4 mg, Intravenous, Q6H PRN  acetaminophen (TYLENOL) tablet 650 mg, 650 mg, Oral, Q6H PRN **OR** acetaminophen (TYLENOL) suppository 650 mg, 650 mg, Rectal, Q6H PRN  magnesium hydroxide (MILK OF MAGNESIA) 400 MG/5ML suspension 30 mL, 30 mL, Oral, Daily PRN  levoFLOXacin (LEVAQUIN) 750 MG/150ML infusion 750 mg, 750 mg, Intravenous, Q24H  hydrALAZINE (APRESOLINE) injection 10 mg, 10 mg, Intravenous, Q6H PRN  potassium chloride (KLOR-CON M) extended release tablet 40 mEq, 40 mEq, Oral, PRN **OR** potassium bicarb-citric acid (EFFER-K) effervescent tablet 40 mEq, 40 mEq, Oral, PRN **OR** potassium chloride 10 mEq/100 mL IVPB (Peripheral Line), 10 mEq, Intravenous, PRN  0.9 % sodium chloride bolus, 500 mL, Intravenous, PRN    Physical      VITALS:  /77   Pulse 68   Temp 98.6 °F (37 °C) (Oral)   Resp 16   Ht 5' 7\" (1.702 m)   Wt 203 lb 6.4 oz (92.3 kg)   SpO2 95%   BMI 31.86 kg/m²   TEMPERATURE:  Current - Temp: 98.6 °F (37 °C);  Max - Temp  Av.2 °F (36.8 °C)  Min: 97.4 °F (36.3 °C)  Max: 99 °F (37.2 °C)  RESPIRATIONS RANGE: Resp  Av.1  Min: 14  Max: 18  PULSE RANGE: Pulse  Av.6  Min: 62  Max: 75  BLOOD PRESSURE RANGE:  Systolic (92AVG), CUH:502 , Min:112 , KYQ:164   ; Diastolic (01BNQ), QTB:78, Min:75, Max:99    PULSE OXIMETRY RANGE: SpO2  Av.1 %  Min: 95 %  Max: 100 %  24HR INTAKE/OUTPUT:      Intake/Output Summary (Last 24 hours) at 2021 1259  Last data filed at 2021 1208  Gross per 24 hour   Intake 1338.52 ml   Output 2900 ml   Net -1561.48 ml     CONSTITUTIONAL:  awake, alert, cooperative, no apparent distress, and appears stated age  EYES:  Unremarkable   NECK:  No JVD  and supple, symmetrical, trachea midline  BACK:  Unremarkable  and symmetric  LUNGS:  No increased work of breathing, good air exchange, clear to auscultation bilaterally, no crackles or wheezing  CARDIOVASCULAR:  normal apical pulses, regular rate and rhythm, normal S1 and S2 and no S3  ABDOMEN:  Soft BS hypoactive , no distention , has colostomy in left sigmoid region   MUSCULOSKELETAL:  Toes deformed , cecilia ulcer on dorsum of both feet  With infection   NEUROLOGIC:  No acute focal deficit   SKIN:  Warm and dry  and no bruising or bleeding    Data      No results found for: Lemon Antonio, [id number]    Lab Results   Component Value Date     08/07/2021    K 5.4 08/07/2021    K 3.4 08/04/2021     08/07/2021    CO2 25 08/07/2021    BUN 8 08/07/2021    CREATININE <0.5 08/07/2021    GLUCOSE 88 08/07/2021    CALCIUM 8.9 08/07/2021     Lab Results   Component Value Date    WBC 8.3 08/05/2021    HGB 9.3 08/05/2021    HCT 27.4 08/05/2021    MCV 90.9 08/05/2021     08/05/2021         Lab Results   Component Value Date    INR 1.62 (H) 04/27/2021    PROTIME 18.9 (H) 04/27/2021       ASSESSMENT AND PLAN     Patient Active Problem List   Diagnosis    Anemia    [facility]'s [facility] [address]    MRSA (methicillin resistant Staphylococcus aureus) infection    [facility] thrombosis) [facility]    Hypomagnesemia    Leg swelling    Venous insufficiency    Chronic venous hypertension (idiopathic) with inflammation of bilateral lower extremity    Local infection of skin and subcutaneous tissue    Open wound of left foot with complication    [employer] 7/29/20 Dr Miguel Garcia    LV dysfunction    PAF (paroxysmal atrial fibrillation) ([facility]    HTN (hypertension), benign    S/P coronary angiogram    Abnormal angiogram    Atherosclerosis of native arteries of the extremities with ulceration ([facility])    Nail avulsion of toe, initial encounter    Lower abdominal pain    Late effect of ischemic cerebral stroke    Cognitive deficit as late effect of cerebrovascular accident (CVA)    Infection requiring contact isolation precautions    Hyponatremia    Suspected COVID-19 virus infection    Alcohol abuse    Cellulitis of both feet    Multiple wounds    Trichotillomania    Gastroesophageal reflux disease without esophagitis    Non-traumatic rhabdomyolysis    Overweight (BMI 25.0-29. 9)    History of infection with vancomycin resistant Enterococcus (VRE)    History of MRSA infection    UTI (urinary tract infection)    Sepsis secondary to UTI ([facility] [address]    Altered mental status        electrolytes monitoring and replacement    Nephrology co-management   Wound care  Showed improvement

## 2021-08-08 LAB
ANION GAP SERPL CALCULATED.3IONS-SCNC: 8 MMOL/L (ref 3–16)
BUN BLDV-MCNC: 10 MG/DL (ref 7–20)
CALCIUM SERPL-MCNC: 10.4 MG/DL (ref 8.3–10.6)
CHLORIDE BLD-SCNC: 98 MMOL/L (ref 99–110)
CO2: 29 MMOL/L (ref 21–32)
CREAT SERPL-MCNC: 0.5 MG/DL (ref 0.6–1.2)
GFR AFRICAN AMERICAN: >60
GFR NON-AFRICAN AMERICAN: >60
GLUCOSE BLD-MCNC: 84 MG/DL (ref 70–99)
MAGNESIUM: 1.5 MG/DL (ref 1.8–2.4)
POTASSIUM SERPL-SCNC: 4.8 MMOL/L (ref 3.5–5.1)
SODIUM BLD-SCNC: 135 MMOL/L (ref 136–145)

## 2021-08-08 PROCEDURE — 2060000000 HC ICU INTERMEDIATE R&B

## 2021-08-08 PROCEDURE — 6370000000 HC RX 637 (ALT 250 FOR IP): Performed by: INTERNAL MEDICINE

## 2021-08-08 PROCEDURE — 6360000002 HC RX W HCPCS: Performed by: INTERNAL MEDICINE

## 2021-08-08 PROCEDURE — 83735 ASSAY OF MAGNESIUM: CPT

## 2021-08-08 PROCEDURE — 80048 BASIC METABOLIC PNL TOTAL CA: CPT

## 2021-08-08 PROCEDURE — 36415 COLL VENOUS BLD VENIPUNCTURE: CPT

## 2021-08-08 PROCEDURE — 2580000003 HC RX 258: Performed by: INTERNAL MEDICINE

## 2021-08-08 RX ORDER — MAGNESIUM SULFATE IN WATER 40 MG/ML
2000 INJECTION, SOLUTION INTRAVENOUS ONCE
Status: DISCONTINUED | OUTPATIENT
Start: 2021-08-08 | End: 2021-08-08

## 2021-08-08 RX ADMIN — MAGNESIUM SULFATE HEPTAHYDRATE 2000 MG: 40 INJECTION, SOLUTION INTRAVENOUS at 11:49

## 2021-08-08 RX ADMIN — FAMOTIDINE 20 MG: 20 TABLET ORAL at 09:49

## 2021-08-08 RX ADMIN — Medication 10 ML: at 22:24

## 2021-08-08 RX ADMIN — METOPROLOL SUCCINATE 50 MG: 50 TABLET, EXTENDED RELEASE ORAL at 09:49

## 2021-08-08 RX ADMIN — MYCOPHENOLATE MOFETIL 1000 MG: 250 CAPSULE ORAL at 22:23

## 2021-08-08 RX ADMIN — Medication 400 MG: at 09:49

## 2021-08-08 RX ADMIN — BUSPIRONE HYDROCHLORIDE 10 MG: 5 TABLET ORAL at 14:00

## 2021-08-08 RX ADMIN — MYCOPHENOLATE MOFETIL 1000 MG: 250 CAPSULE ORAL at 09:48

## 2021-08-08 RX ADMIN — PREDNISONE 40 MG: 20 TABLET ORAL at 09:49

## 2021-08-08 RX ADMIN — ESCITALOPRAM OXALATE 20 MG: 10 TABLET ORAL at 09:49

## 2021-08-08 RX ADMIN — RIVAROXABAN 20 MG: 20 TABLET, FILM COATED ORAL at 09:49

## 2021-08-08 RX ADMIN — BUSPIRONE HYDROCHLORIDE 10 MG: 5 TABLET ORAL at 22:24

## 2021-08-08 RX ADMIN — ACETAMINOPHEN 650 MG: 325 TABLET ORAL at 23:12

## 2021-08-08 RX ADMIN — ACETAMINOPHEN 650 MG: 325 TABLET ORAL at 11:48

## 2021-08-08 RX ADMIN — CLOBETASOL PROPIONATE: 0.5 OINTMENT TOPICAL at 22:39

## 2021-08-08 RX ADMIN — Medication 10 ML: at 09:50

## 2021-08-08 RX ADMIN — BACLOFEN 10 MG: 10 TABLET ORAL at 22:24

## 2021-08-08 RX ADMIN — BUSPIRONE HYDROCHLORIDE 10 MG: 5 TABLET ORAL at 09:49

## 2021-08-08 RX ADMIN — FERROUS GLUCONATE TAB 324 MG (37.5 MG ELEMENTAL IRON) 972 MG: 324 (37.5 FE) TAB at 09:49

## 2021-08-08 ASSESSMENT — PAIN SCALES - GENERAL
PAINLEVEL_OUTOF10: 0
PAINLEVEL_OUTOF10: 4
PAINLEVEL_OUTOF10: 0
PAINLEVEL_OUTOF10: 0
PAINLEVEL_OUTOF10: 7

## 2021-08-08 ASSESSMENT — ENCOUNTER SYMPTOMS
EYE REDNESS: 0
VOMITING: 0
COUGH: 0
EYE DISCHARGE: 0
ABDOMINAL DISTENTION: 0
PHOTOPHOBIA: 0
DIARRHEA: 0
CONSTIPATION: 0
FACIAL SWELLING: 0
ABDOMINAL PAIN: 0
BLOOD IN STOOL: 0
CHEST TIGHTNESS: 0
NAUSEA: 0
SHORTNESS OF BREATH: 0

## 2021-08-08 ASSESSMENT — PAIN DESCRIPTION - LOCATION: LOCATION: HEAD

## 2021-08-08 ASSESSMENT — PAIN DESCRIPTION - DESCRIPTORS: DESCRIPTORS: HEADACHE

## 2021-08-08 ASSESSMENT — PAIN DESCRIPTION - PAIN TYPE: TYPE: ACUTE PAIN

## 2021-08-08 NOTE — PROGRESS NOTES
Physical Therapy  Patient has refused therapy evaluation for 3 consecutive days. Therefore, will sign off.   Beau Isabel, [facility], DPT, ATC-R [id number]

## 2021-08-08 NOTE — PLAN OF CARE
Problem: Pain:  Goal: Pain level will decrease  Description: Pain level will decrease  Outcome: Ongoing     Problem: Skin Integrity:  Goal: Will show no infection signs and symptoms  Description: Will show no infection signs and symptoms  Outcome: Ongoing     Problem: Falls - Risk of:  Goal: Will remain free from falls  Description: Will remain free from falls  Outcome: Ongoing

## 2021-08-08 NOTE — PROGRESS NOTES
[facility] Note    Treatment plan update. Multiple electrolyte imbalance. Hyperkalemia-worsening.  -Hold potassium supplement.  -Hold spironolactone for 24 hours. Labs are pending today. Replace magnesium sulfate 2 g    Hyponatremia-acute on chronic problem.  -Sodium is 134.  -Continue current low sodium management-fluid restriction.-Salt tablet. -  Anemia-stable. -Hemoglobin is 9.3. Hypocalcemia-improved. Albumin is 2.9-liver dysfunction and also possible protein calorie malnutrition. Volume status stable. Hypertension-continue to monitor-trending upwards-. Medications reviewed. Continue inpatient hospital stay. High complexity due to worsening multiple electrolyte imbalance. Portions of this note have been copied forward.   I have reviewed and updated the HPI, history, medications, allergies, review of systems, physical exam, date, assessment, and plan of the note so that it reflects the evaluation and management of the patient by me    -          Patient Active Problem List   Diagnosis    Anemia    Crohn's colitis ([address]    MRSA (methicillin resistant Staphylococcus aureus) infection    DVT (deep venous thrombosis) ([address]    Hypomagnesemia    Leg swelling    Venous insufficiency    Chronic venous hypertension (idiopathic) with inflammation of bilateral lower extremity    Local infection of skin and subcutaneous tissue    Open wound of left foot with complication    Medtronic LINQ 7/29/20 Dr Caitlyn Ybarra dysfunction    PAF (paroxysmal atrial fibrillation) ([address]    HTN (hypertension), benign    S/P coronary angiogram    Abnormal angiogram    Atherosclerosis of native arteries of the extremities with ulceration ([address]    Nail avulsion of toe, initial encounter    Lower abdominal pain    Late effect of ischemic cerebral stroke    Cognitive deficit as late effect of cerebrovascular accident (CVA)    Infection requiring contact isolation precautions    Hyponatremia    Suspected COVID-19 virus infection    Alcohol abuse    Cellulitis of both feet    Multiple wounds    Trichotillomania    Gastroesophageal reflux disease without esophagitis    Non-traumatic rhabdomyolysis    Overweight (BMI 25.0-29. 9)    History of infection with vancomycin resistant Enterococcus (VRE)    History of MRSA infection    UTI (urinary tract infection)    Sepsis secondary to UTI ([facility]    Altered mental status       Past Medical History:   has a past medical history of Anxiety, Arthritis, Atrial fibrillation/flutter ([facility] [address] Blood transfusion reaction, Crohn's disease ([facility] [address] Depression, GERD (gastroesophageal reflux disease), Hiatal hernia, Hx of blood clots, Hypertension, MRSA (methicillin resistant staph aureus) culture positive, MRSA (methicillin resistant staph aureus) culture positive, Neuropathy, Pneumonia, Sinus headache, SOB (shortness of breath), and VRE (vancomycin resistant enterococcus) culture positive. Past Social History:   reports that she has been smoking cigarettes and e-cigarettes. She has a 10.00 pack-year smoking history. She has never used smokeless tobacco. She reports current alcohol use of about 2.0 standard drinks of alcohol per week. She reports that she does not use drugs. Subjective:  No complaints. Multiple skin lesions. Review of Systems   Constitutional: Negative for activity change, appetite change, chills, fatigue, fever and unexpected weight change. HENT: Negative for congestion and facial swelling. Eyes: Negative for photophobia, discharge and redness. Respiratory: Negative for cough, chest tightness and shortness of breath. Cardiovascular: Positive for leg swelling. Negative for chest pain and palpitations. Gastrointestinal: Negative for abdominal distention, abdominal pain, blood in stool, constipation, diarrhea, nausea and vomiting.    Endocrine: Negative for cold intolerance, heat intolerance and polyuria. Genitourinary: Negative for decreased urine volume, difficulty urinating, flank pain and hematuria. Musculoskeletal: Negative for joint swelling and neck pain. Skin: Positive for rash. Neurological: Negative for dizziness, seizures, syncope, speech difficulty, light-headedness and headaches. Hematological: Does not bruise/bleed easily. Psychiatric/Behavioral: Negative for agitation, confusion and hallucinations. Objective:      /88   Pulse 75   Temp 98.4 °F (36.9 °C) (Oral)   Resp 16   Ht 5' 7\" (1.702 m)   Wt 192 lb 12.8 oz (87.5 kg)   SpO2 97%   BMI 30.20 kg/m²     Wt Readings from Last 3 Encounters:   08/08/21 192 lb 12.8 oz (87.5 kg)   06/04/21 198 lb (89.8 kg)   04/28/21 194 lb 14.2 oz (88.4 kg)       BP Readings from Last 3 Encounters:   08/08/21 131/88   06/04/21 104/88   04/30/21 123/84     Skin-multiple lesions  Chest- clear  Heart-regular  Abd-soft  Ext- 1+ edema    Labs  Hemoglobin   Date Value Ref Range Status   08/05/2021 9.3 (L) 12.0 - 16.0 g/dL Final     Hematocrit   Date Value Ref Range Status   08/05/2021 27.4 (L) 36.0 - 48.0 % Final     WBC   Date Value Ref Range Status   08/05/2021 8.3 4.0 - 11.0 K/uL Final     Platelets   Date Value Ref Range Status   08/05/2021 388 135 - 450 K/uL Final     Lab Results   Component Value Date    CREATININE <0.5 (L) 08/07/2021    BUN 8 08/07/2021     (L) 08/07/2021    K 5.4 (H) 08/07/2021     08/07/2021    CO2 25 08/07/2021     UMg 3.7  U Crea 8  FeMg -29     Assessment/Plan:  1. History of acute on chronic hyponatremia. Underlying history of alcohol abuse. This is currently within normal limits. 2.  Hypokalemia in the setting of hypomagnesemia. This has improved. 3.  Severe hypomagnesemia. History of alcohol abuse and PPI use. Renal Mg wasting also. Switched from PPI to H2 antagonist.  We will continue to replace IV and p.o. Add SPLT for renal Mg conservation.   Follow bp.   4. Hypocalcemia, check ionized calcium and PTH. Better. 5.  History of alcohol abuse. 6.  Anemia. Follow hemoglobin. 7.  Pyuria, currently on levofloxacin. 8.  Dispo-hopefully tomorrow.      Te Rosa MD

## 2021-08-08 NOTE — PLAN OF CARE
Problem: Falls - Risk of:  Goal: Will remain free from falls  Description: Will remain free from falls  Outcome: Ongoing     Problem: Cardiovascular  Goal: Hemodynamic stability  Outcome: Ongoing     Vitals:    08/08/21 0008   BP: 139/89   Pulse: 67   Resp: 16   Temp: 97.1 °F (36.2 °C)   SpO2: 98%     Pt was asleep overnight, awake with RN at bedside. VSS. Pt up to [facility] but was already incontinent of urine and did not urinate in [facility]. Linens changed and pt cleaned;see all flowsheets. Pt is a high fall risk. Pt remains free from falls, throughout night. Bed alarm remains in place, door open. Pt encouraged to use call light for needs throughout night; call light is within reach. Bed lock is in lowest position. Will continue to monitor throughout night.

## 2021-08-08 NOTE — PROGRESS NOTES
Department of Internal Medicine  General Internal Medicine   Progress Note      SUBJECTIVE: looks more alert pain is under control     History obtained from chart review and the patient  General ROS: positive for  - fatigue and malaise  negative for - chills, fever or night sweats  Psychological ROS: negative  Ophthalmic ROS: negative  Respiratory ROS: no cough, shortness of breath, or wheezing  Cardiovascular ROS: no chest pain or dyspnea on exertion  Gastrointestinal ROS: positive for -  Heartburn and dyspepsia   negative for - hematemesis, melena or swallowing difficulty/pain  Genito-Urinary ROS: some  dysuria,  No trouble voiding, or hematuria  Musculoskeletal ROS: chronic pain   Neurological ROS: no TIA or stroke symptoms    OBJECTIVE      Medications      Current Facility-Administered Medications: [Held by provider] spironolactone (ALDACTONE) tablet 25 mg, 25 mg, Oral, Daily  famotidine (PEPCID) tablet 20 mg, 20 mg, Oral, Daily  magnesium sulfate 2000 mg in 50 mL IVPB premix, 2,000 mg, Intravenous, PRN  clobetasol (TEMOVATE) 0.05 % ointment, , Topical, BID  busPIRone (BUSPAR) tablet 10 mg, 10 mg, Oral, TID  ferrous gluconate 324 (37.5 Fe) MG tablet 972 mg, 972 mg, Oral, Daily with breakfast  hydrOXYzine (ATARAX) tablet 10 mg, 10 mg, Oral, TID PRN  rivaroxaban (XARELTO) tablet 20 mg, 20 mg, Oral, Daily with breakfast  metoprolol succinate (TOPROL XL) extended release tablet 50 mg, 50 mg, Oral, Daily  escitalopram (LEXAPRO) tablet 20 mg, 20 mg, Oral, Daily  oxyCODONE (ROXICODONE) immediate release tablet 5 mg, 5 mg, Oral, Q4H PRN  mycophenolate (CELLCEPT) capsule 1,000 mg, 1,000 mg, Oral, BID  baclofen (LIORESAL) tablet 10 mg, 10 mg, Oral, TID PRN  predniSONE (DELTASONE) tablet 40 mg, 40 mg, Oral, Daily  [START ON 8/9/2021] vitamin D (ERGOCALCIFEROL) capsule 50,000 Units, 50,000 Units, Oral, Weekly  sodium chloride flush 0.9 % injection 5-40 mL, 5-40 mL, Intravenous, 2 times per day  sodium chloride flush 0.9 % injection 5-40 mL, 5-40 mL, Intravenous, PRN  0.9 % sodium chloride infusion, 25 mL, Intravenous, PRN  ondansetron (ZOFRAN-ODT) disintegrating tablet 4 mg, 4 mg, Oral, Q8H PRN **OR** ondansetron (ZOFRAN) injection 4 mg, 4 mg, Intravenous, Q6H PRN  acetaminophen (TYLENOL) tablet 650 mg, 650 mg, Oral, Q6H PRN **OR** acetaminophen (TYLENOL) suppository 650 mg, 650 mg, Rectal, Q6H PRN  magnesium hydroxide (MILK OF MAGNESIA) 400 MG/5ML suspension 30 mL, 30 mL, Oral, Daily PRN  hydrALAZINE (APRESOLINE) injection 10 mg, 10 mg, Intravenous, Q6H PRN  potassium chloride (KLOR-CON M) extended release tablet 40 mEq, 40 mEq, Oral, PRN **OR** potassium bicarb-citric acid (EFFER-K) effervescent tablet 40 mEq, 40 mEq, Oral, PRN **OR** potassium chloride 10 mEq/100 mL IVPB (Peripheral Line), 10 mEq, Intravenous, PRN  0.9 % sodium chloride bolus, 500 mL, Intravenous, PRN    Physical      VITALS:  /77   Pulse 82   Temp 98.2 °F (36.8 °C) (Oral)   Resp 14   Ht 5' 7\" (1.702 m)   Wt 192 lb 12.8 oz (87.5 kg)   SpO2 97%   BMI 30.20 kg/m²   TEMPERATURE:  Current - Temp: 98.2 °F (36.8 °C);  Max - Temp  Av.3 °F (36.8 °C)  Min: 97.1 °F (36.2 °C)  Max: 99 °F (37.2 °C)  RESPIRATIONS RANGE: Resp  Av  Min: 14  Max: 18  PULSE RANGE: Pulse  Av.4  Min: 57  Max: 82  BLOOD PRESSURE RANGE:  Systolic (46SET), FHD:546 , Min:116 , WNP:452   ; Diastolic (56VBC), UCK:56, Min:74, Max:92    PULSE OXIMETRY RANGE: SpO2  Av.7 %  Min: 94 %  Max: 98 %  24HR INTAKE/OUTPUT:      Intake/Output Summary (Last 24 hours) at 2021 1433  Last data filed at 2021 1326  Gross per 24 hour   Intake 926.47 ml   Output 675 ml   Net 251.47 ml     CONSTITUTIONAL:  awake, alert, cooperative, no apparent distress, and appears stated age  EYES:  Unremarkable   NECK:  No JVD  and supple, symmetrical, trachea midline  BACK:  Unremarkable  and symmetric  LUNGS:  No increased work of breathing, good air exchange, clear to auscultation bilaterally, no crackles or wheezing  CARDIOVASCULAR:  normal apical pulses, regular rate and rhythm, normal S1 and S2 and no S3  ABDOMEN:  Soft BS hypoactive , no distention , has colostomy in left sigmoid region   MUSCULOSKELETAL:  Toes deformed , cecilia ulcer on dorsum of both feet  With infection   NEUROLOGIC:  No acute focal deficit   SKIN:  Warm and dry  and no bruising or bleeding    Data      No results found for: Drucella Pall, [id number]    Lab Results   Component Value Date     08/08/2021    K 4.8 08/08/2021    K 3.4 08/04/2021    CL 98 08/08/2021    CO2 29 08/08/2021    BUN 10 08/08/2021    CREATININE 0.5 08/08/2021    GLUCOSE 84 08/08/2021    CALCIUM 10.4 08/08/2021     Lab Results   Component Value Date    WBC 8.3 08/05/2021    HGB 9.3 08/05/2021    HCT 27.4 08/05/2021    MCV 90.9 08/05/2021     08/05/2021         Lab Results   Component Value Date    INR 1.62 (H) 04/27/2021    PROTIME 18.9 (H) 04/27/2021       ASSESSMENT AND PLAN     Patient Active Problem List   Diagnosis    Anemia    Crohn's colitis ([address]    MRSA (methicillin resistant Staphylococcus aureus) infection    DVT (deep venous thrombosis) ([facility])    Hypomagnesemia    Leg swelling    Venous insufficiency    Chronic venous hypertension (idiopathic) with inflammation of bilateral lower extremity    Local infection of skin and subcutaneous tissue    Open wound of left foot with complication    [employer] LINQ 7/29/20 Dr Michael Began dysfunction    PAF (paroxysmal atrial fibrillation) ([address]    HTN (hypertension), benign    S/P coronary angiogram    Abnormal angiogram    Atherosclerosis of native arteries of the extremities with ulceration ([address]    Nail avulsion of toe, initial encounter    Lower abdominal pain    Late effect of ischemic cerebral stroke    Cognitive deficit as late effect of cerebrovascular accident (CVA)    Infection requiring contact isolation precautions    Hyponatremia    Suspected COVID-19 virus infection    Alcohol abuse    Cellulitis of both feet    Multiple wounds    Trichotillomania    Gastroesophageal reflux disease without esophagitis    Non-traumatic rhabdomyolysis    Overweight (BMI 25.0-29. 9)    History of infection with vancomycin resistant Enterococcus (VRE)    History of MRSA infection    UTI (urinary tract infection)    Sepsis [facility] to UTI [facility] [address]    Altered mental status        she has comorbidities including imfamatory bowel disease , she is on immunosupression with cellcept and is on anticoagulations with Xarelto   K still high , renal function  Adequate

## 2021-08-09 LAB
ALBUMIN SERPL-MCNC: 3.1 G/DL (ref 3.4–5)
ANION GAP SERPL CALCULATED.3IONS-SCNC: 9 MMOL/L (ref 3–16)
BUN BLDV-MCNC: 14 MG/DL (ref 7–20)
CALCIUM SERPL-MCNC: 10.2 MG/DL (ref 8.3–10.6)
CHLORIDE BLD-SCNC: 97 MMOL/L (ref 99–110)
CO2: 29 MMOL/L (ref 21–32)
CREAT SERPL-MCNC: 0.6 MG/DL (ref 0.6–1.2)
GFR AFRICAN AMERICAN: >60
GFR NON-AFRICAN AMERICAN: >60
GLUCOSE BLD-MCNC: 86 MG/DL (ref 70–99)
MAGNESIUM: 1.5 MG/DL (ref 1.8–2.4)
PHOSPHORUS: 3 MG/DL (ref 2.5–4.9)
POTASSIUM SERPL-SCNC: 5.1 MMOL/L (ref 3.5–5.1)
SODIUM BLD-SCNC: 135 MMOL/L (ref 136–145)

## 2021-08-09 PROCEDURE — 6370000000 HC RX 637 (ALT 250 FOR IP): Performed by: INTERNAL MEDICINE

## 2021-08-09 PROCEDURE — 6360000002 HC RX W HCPCS: Performed by: INTERNAL MEDICINE

## 2021-08-09 PROCEDURE — 80069 RENAL FUNCTION PANEL: CPT

## 2021-08-09 PROCEDURE — 97530 THERAPEUTIC ACTIVITIES: CPT

## 2021-08-09 PROCEDURE — 36415 COLL VENOUS BLD VENIPUNCTURE: CPT

## 2021-08-09 PROCEDURE — 83735 ASSAY OF MAGNESIUM: CPT

## 2021-08-09 PROCEDURE — 97161 PT EVAL LOW COMPLEX 20 MIN: CPT

## 2021-08-09 PROCEDURE — 2060000000 HC ICU INTERMEDIATE R&B

## 2021-08-09 PROCEDURE — 2580000003 HC RX 258: Performed by: INTERNAL MEDICINE

## 2021-08-09 PROCEDURE — 97165 OT EVAL LOW COMPLEX 30 MIN: CPT

## 2021-08-09 RX ORDER — LANOLIN ALCOHOL/MO/W.PET/CERES
400 CREAM (GRAM) TOPICAL 2 TIMES DAILY
Status: DISCONTINUED | OUTPATIENT
Start: 2021-08-09 | End: 2021-08-10 | Stop reason: HOSPADM

## 2021-08-09 RX ORDER — MAGNESIUM SULFATE IN WATER 40 MG/ML
4000 INJECTION, SOLUTION INTRAVENOUS ONCE
Status: COMPLETED | OUTPATIENT
Start: 2021-08-09 | End: 2021-08-09

## 2021-08-09 RX ADMIN — METOPROLOL SUCCINATE 50 MG: 50 TABLET, EXTENDED RELEASE ORAL at 09:30

## 2021-08-09 RX ADMIN — ERGOCALCIFEROL 50000 UNITS: 1.25 CAPSULE ORAL at 09:29

## 2021-08-09 RX ADMIN — MYCOPHENOLATE MOFETIL 1000 MG: 250 CAPSULE ORAL at 22:19

## 2021-08-09 RX ADMIN — HYDROXYZINE HYDROCHLORIDE 10 MG: 10 TABLET ORAL at 22:19

## 2021-08-09 RX ADMIN — RIVAROXABAN 20 MG: 20 TABLET, FILM COATED ORAL at 13:27

## 2021-08-09 RX ADMIN — ACETAMINOPHEN 650 MG: 325 TABLET ORAL at 14:34

## 2021-08-09 RX ADMIN — MYCOPHENOLATE MOFETIL 1000 MG: 250 CAPSULE ORAL at 09:30

## 2021-08-09 RX ADMIN — MAGNESIUM SULFATE HEPTAHYDRATE 4000 MG: 40 INJECTION, SOLUTION INTRAVENOUS at 09:22

## 2021-08-09 RX ADMIN — OXYCODONE 5 MG: 5 TABLET ORAL at 22:07

## 2021-08-09 RX ADMIN — Medication 400 MG: at 09:30

## 2021-08-09 RX ADMIN — FAMOTIDINE 20 MG: 20 TABLET ORAL at 09:29

## 2021-08-09 RX ADMIN — BACLOFEN 10 MG: 10 TABLET ORAL at 22:07

## 2021-08-09 RX ADMIN — BUSPIRONE HYDROCHLORIDE 10 MG: 5 TABLET ORAL at 22:07

## 2021-08-09 RX ADMIN — CLOBETASOL PROPIONATE: 0.5 OINTMENT TOPICAL at 22:09

## 2021-08-09 RX ADMIN — BUSPIRONE HYDROCHLORIDE 10 MG: 5 TABLET ORAL at 09:29

## 2021-08-09 RX ADMIN — FERROUS GLUCONATE TAB 324 MG (37.5 MG ELEMENTAL IRON) 972 MG: 324 (37.5 FE) TAB at 13:28

## 2021-08-09 RX ADMIN — ESCITALOPRAM OXALATE 20 MG: 10 TABLET ORAL at 09:30

## 2021-08-09 RX ADMIN — PREDNISONE 40 MG: 20 TABLET ORAL at 09:29

## 2021-08-09 RX ADMIN — BUSPIRONE HYDROCHLORIDE 10 MG: 5 TABLET ORAL at 13:27

## 2021-08-09 RX ADMIN — Medication 10 ML: at 22:07

## 2021-08-09 RX ADMIN — Medication 400 MG: at 22:20

## 2021-08-09 RX ADMIN — CLOBETASOL PROPIONATE: 0.5 OINTMENT TOPICAL at 09:36

## 2021-08-09 ASSESSMENT — ENCOUNTER SYMPTOMS
EYE DISCHARGE: 0
CHEST TIGHTNESS: 0
DIARRHEA: 0
CONSTIPATION: 0
PHOTOPHOBIA: 0
EYE REDNESS: 0
BLOOD IN STOOL: 0
NAUSEA: 0
ABDOMINAL PAIN: 0
COUGH: 0
SHORTNESS OF BREATH: 0
VOMITING: 0
ABDOMINAL DISTENTION: 0
FACIAL SWELLING: 0

## 2021-08-09 ASSESSMENT — PAIN SCALES - GENERAL
PAINLEVEL_OUTOF10: 2
PAINLEVEL_OUTOF10: 6
PAINLEVEL_OUTOF10: 0

## 2021-08-09 NOTE — PROGRESS NOTES
Department of Internal Medicine  General Internal Medicine   Progress Note      SUBJECTIVE: over all doing better  No distress bowels moved  Pain is under control     History obtained from chart review and the patient  General ROS: positive for  - fatigue and malaise  negative for - chills, fever or night sweats  Psychological ROS: negative  Ophthalmic ROS: negative  Respiratory ROS: no cough, shortness of breath, or wheezing  Cardiovascular ROS: no chest pain or dyspnea on exertion  Gastrointestinal ROS: positive for -  Heartburn and dyspepsia   negative for - hematemesis, melena or swallowing difficulty/pain  Genito-Urinary ROS: some  dysuria,  No trouble voiding, or hematuria  Musculoskeletal ROS: chronic pain   Neurological ROS: no TIA or stroke symptoms    OBJECTIVE      Medications      Current Facility-Administered Medications: [Held by provider] spironolactone (ALDACTONE) tablet 25 mg, 25 mg, Oral, Daily  famotidine (PEPCID) tablet 20 mg, 20 mg, Oral, Daily  magnesium sulfate 2000 mg in 50 mL IVPB premix, 2,000 mg, Intravenous, PRN  clobetasol (TEMOVATE) 0.05 % ointment, , Topical, BID  busPIRone (BUSPAR) tablet 10 mg, 10 mg, Oral, TID  ferrous gluconate 324 (37.5 Fe) MG tablet 972 mg, 972 mg, Oral, Daily with breakfast  hydrOXYzine (ATARAX) tablet 10 mg, 10 mg, Oral, TID PRN  rivaroxaban (XARELTO) tablet 20 mg, 20 mg, Oral, Daily with breakfast  metoprolol succinate (TOPROL XL) extended release tablet 50 mg, 50 mg, Oral, Daily  escitalopram (LEXAPRO) tablet 20 mg, 20 mg, Oral, Daily  oxyCODONE (ROXICODONE) immediate release tablet 5 mg, 5 mg, Oral, Q4H PRN  mycophenolate (CELLCEPT) capsule 1,000 mg, 1,000 mg, Oral, BID  baclofen (LIORESAL) tablet 10 mg, 10 mg, Oral, TID PRN  predniSONE (DELTASONE) tablet 40 mg, 40 mg, Oral, Daily  [START ON 8/9/2021] vitamin D (ERGOCALCIFEROL) capsule 50,000 Units, 50,000 Units, Oral, Weekly  sodium chloride flush 0.9 % injection 5-40 mL, 5-40 mL, Intravenous, 2 times per day  sodium chloride flush 0.9 % injection 5-40 mL, 5-40 mL, Intravenous, PRN  0.9 % sodium chloride infusion, 25 mL, Intravenous, PRN  ondansetron (ZOFRAN-ODT) disintegrating tablet 4 mg, 4 mg, Oral, Q8H PRN **OR** ondansetron (ZOFRAN) injection 4 mg, 4 mg, Intravenous, Q6H PRN  acetaminophen (TYLENOL) tablet 650 mg, 650 mg, Oral, Q6H PRN **OR** acetaminophen (TYLENOL) suppository 650 mg, 650 mg, Rectal, Q6H PRN  magnesium hydroxide (MILK OF MAGNESIA) 400 MG/5ML suspension 30 mL, 30 mL, Oral, Daily PRN  hydrALAZINE (APRESOLINE) injection 10 mg, 10 mg, Intravenous, Q6H PRN  potassium chloride (KLOR-CON M) extended release tablet 40 mEq, 40 mEq, Oral, PRN **OR** potassium bicarb-citric acid (EFFER-K) effervescent tablet 40 mEq, 40 mEq, Oral, PRN **OR** potassium chloride 10 mEq/100 mL IVPB (Peripheral Line), 10 mEq, Intravenous, PRN  0.9 % sodium chloride bolus, 500 mL, Intravenous, PRN    Physical      VITALS:  /87   Pulse 67   Temp 99 °F (37.2 °C) (Oral)   Resp 15   Ht 5' 7\" (1.702 m)   Wt 192 lb 12.8 oz (87.5 kg)   SpO2 97%   BMI 30.20 kg/m²   TEMPERATURE:  Current - Temp: 99 °F (37.2 °C);  Max - Temp  Av.2 °F (36.8 °C)  Min: 97.1 °F (36.2 °C)  Max: 99 °F (37.2 °C)  RESPIRATIONS RANGE: Resp  Avg: 15  Min: 14  Max: 16  PULSE RANGE: Pulse  Av.3  Min: 57  Max: 82  BLOOD PRESSURE RANGE:  Systolic (25TQW), VNK:875 , Min:116 , VWR:899   ; Diastolic (58GSK), BWQ:79, Min:77, Max:92    PULSE OXIMETRY RANGE: SpO2  Av.2 %  Min: 97 %  Max: 98 %  24HR INTAKE/OUTPUT:      Intake/Output Summary (Last 24 hours) at 2021 2213  Last data filed at 2021 1855  Gross per 24 hour   Intake 916.47 ml   Output 825 ml   Net 91.47 ml     CONSTITUTIONAL:  awake, alert, cooperative, no apparent distress, and appears stated age  EYES:  Unremarkable   NECK:  No JVD  and supple, symmetrical, trachea midline  BACK:  Unremarkable  and symmetric  LUNGS:  No increased work of breathing, good air exchange, clear to auscultation bilaterally, no crackles or wheezing  CARDIOVASCULAR:  normal apical pulses, regular rate and rhythm, normal S1 and S2 and no S3  ABDOMEN:  Soft BS hypoactive , no distention , has colostomy in left sigmoid region   MUSCULOSKELETAL:  Toes deformed , cecilia ulcer on dorsum of both feet  With infection   NEUROLOGIC:  No acute focal deficit   SKIN:  Warm and dry  and no bruising or bleeding    Data      No results found for: Franchester Distel, [id number]    Lab Results   Component Value Date     08/08/2021    K 4.8 08/08/2021    K 3.4 08/04/2021    CL 98 08/08/2021    CO2 29 08/08/2021    BUN 10 08/08/2021    CREATININE 0.5 08/08/2021    GLUCOSE 84 08/08/2021    CALCIUM 10.4 08/08/2021     Lab Results   Component Value Date    WBC 8.3 08/05/2021    HGB 9.3 08/05/2021    HCT 27.4 08/05/2021    MCV 90.9 08/05/2021     08/05/2021         Lab Results   Component Value Date    INR 1.62 (H) 04/27/2021    PROTIME 18.9 (H) 04/27/2021       ASSESSMENT AND PLAN     Patient Active Problem List   Diagnosis    Anemia    [facility]'s [facility] [address]    MRSA (methicillin resistant Staphylococcus aureus) infection    DVT (deep venous thrombosis) ([facility])    Hypomagnesemia    Leg swelling    Venous insufficiency    Chronic venous hypertension (idiopathic) with inflammation of bilateral lower extremity    Local infection of skin and subcutaneous tissue    Open wound of left foot with complication    [employer] LINQ 7/29/20 Dr Jonathan Garcias    LV dysfunction    PAF (paroxysmal atrial fibrillation) ([facility])    HTN (hypertension), benign    S/P coronary angiogram    Abnormal angiogram    Atherosclerosis of native arteries of the extremities with ulceration ([address]    Nail avulsion of toe, initial encounter    Lower abdominal pain    Late effect of ischemic cerebral stroke    Cognitive deficit as late effect of cerebrovascular accident (CVA)    Infection requiring contact isolation precautions    Hyponatremia    Suspected COVID-19 virus infection    Alcohol abuse    Cellulitis of both feet    Multiple wounds    Trichotillomania    Gastroesophageal reflux disease without esophagitis    Non-traumatic rhabdomyolysis    Overweight (BMI 25.0-29. 9)    History of infection with vancomycin resistant Enterococcus (VRE)    History of MRSA infection    UTI (urinary tract infection)    Sepsis secondary to UTI ([facility] [address]    Altered mental status        ct  Present management , Nephrology consult noted , potassium is not as critical  Sodium and Magnesium remains on the low side usually    but BUN /Cr are totally normal

## 2021-08-09 NOTE — PROGRESS NOTES
Occupational Therapy   Occupational Therapy Initial Assessment  Date: 2021   Patient Name: Rosette Martel  MRN: [id number]     : 1955    Date of Service: 2021    Discharge Recommendations:  Rosette Martel scored a 18/24 on the AM-PAC ADL Inpatient form. Current research shows that an AM-PAC score of 18 or greater is typically associated with a discharge to the patient's home setting. Based on the patient's AM-PAC score, and their current ADL deficits, it is recommended that the patient have 2-3 sessions per week of Occupational Therapy at d/c to increase the patient's independence. At this time, this patient demonstrates the endurance and safety to discharge home with home services and a follow up treatment frequency of 2-3x/wk. Please see assessment section for further patient specific details. HOME HEALTH CARE: LEVEL 3 SAFETY     - Initial home health evaluation to occur within 24-48 hours, in patient home   - Therapy evaluations in home within 24-48 hours of discharge; including DME and home safety   - Frontload therapy 5 days, then 3x a week   - Therapy to evaluate if patient has [address] needs for personal care   -  evaluation within 24-48 hours, includes evaluation of resources and insurance to determine [place], [place], LTC, and Medicaid options     If patient discharges prior to next session this note will serve as a discharge summary. Please see below for the latest assessment towards goals. OT Equipment Recommendations  Equipment Needed: No    Assessment   Performance deficits / Impairments: Decreased functional mobility ; Decreased ADL status; Decreased endurance;Decreased cognition  Assessment: Pt is below her baseline level of occupational function, based on the above deficits associated with sepsis secondary to UTI. Pt would benefit from continued skilled acute OT services to address these deficits.   Treatment Diagnosis: Decreased ADL status, functional mobility, endurance, and cognition associated with sepsis secondary to UTI  Prognosis: Good  Decision Making: Medium Complexity  History: As above  Exam: As above  Assistance / Modification: As above  OT Education: Plan of Care;Transfer Training;Orientation;OT Role  Patient Education: Discharge recommendation. Pt verbalized & demonstrated understanding, but needs reinforcement. Barriers to Learning: Cognition  REQUIRES OT FOLLOW UP: Yes  Activity Tolerance  Activity Tolerance: Patient Tolerated treatment well  Safety Devices  Safety Devices in place: Yes  Type of devices: All fall risk precautions in place;Call light within reach; Left in bed;Nurse notified;Gait belt;Patient at risk for falls; Bed alarm in place           Patient Diagnosis(es): The primary encounter diagnosis was Altered mental status, unspecified altered mental status type. Diagnoses of Urinary tract infection without hematuria, site unspecified and Hypomagnesemia were also pertinent to this visit. has a past medical history of Anxiety, Arthritis, Atrial fibrillation/flutter ([facility] [address] Blood transfusion reaction, Crohn's disease ([facility] [address] Depression, GERD (gastroesophageal reflux disease), Hiatal hernia, Hx of blood clots, Hypertension, MRSA (methicillin resistant staph aureus) culture positive, MRSA (methicillin resistant staph aureus) culture positive, Neuropathy, Pneumonia, Sinus headache, SOB (shortness of breath), and VRE (vancomycin resistant enterococcus) culture positive. has a past surgical history that includes Hysterectomy; knee surgery (Bilateral); Hand Carpectomy (Bilateral); hernia repair; Abdomen surgery; bladder suspension; fracture surgery; Heel spur surgery; Tonsillectomy; Colonoscopy; Upper gastrointestinal endoscopy (6/14/13); Foot surgery (Left, 6/25/14); Foot surgery (6/3/15); Colonoscopy (9/14/15); Foot surgery (Left, 08/05/2002); joint replacement; Sigmoidoscopy (01/15/2018);  Abdominal adhesion surgery (06/08/2018); Colonoscopy (08/07/2018); colostomy (N/A, 10/8/2018); pr secd clos surg wnd exten/complic (N/A, 11/18/9479); Leg Surgery (Right); Colonoscopy (06/07/2019); Colonoscopy (N/A, 6/7/2019); Colonoscopy (N/A, 6/7/2019); Small intestine surgery (N/A, 7/17/2019); and proctosigmoidoscopy (N/A, 7/17/2019). Treatment Diagnosis: Decreased ADL status, functional mobility, endurance, and cognition associated with sepsis secondary to UTI      Restrictions  Restrictions/Precautions  Restrictions/Precautions: Fall Risk (high fall risk)  Required Braces or Orthoses?: No  Position Activity Restriction  Other position/activity restrictions: Patient brought in via [address] [place] [address]. Per EMS the home health nurse noticed the patient had a change in mental status. Patients son was present at home and said the patient was acting 'normal'. Patient very slow to respond and unable to answer triage questions about current medications due to current mental status. Dr. Iveth Story at bedside to evaluate patient. Patient currently resting in bed, side rails up x2, lowest position, call light in reach with bed alarm on. Subjective   General  Chart Reviewed: Yes  Patient assessed for rehabilitation services?: Yes  Family / Caregiver Present: No  Diagnosis: Sepsis secondary to UTI  Subjective  Subjective: Pt supine in bed on therapy arrival. Pt just back to bed after toileting on BSC, per pt & RN, PCA. Pt agreeable to OT eval. Denies pain.   Patient Currently in Pain: Denies  Pre Treatment Pain Screening  Intervention List: Patient able to continue with treatment  Vital Signs  Patient Currently in Pain: Denies  Oxygen Therapy  O2 Device: None (Room air)  Social/Functional History  Social/Functional History  Lives With: Spouse, Daughter  Type of Home: House  Home Layout: One level  Home Access: Stairs to enter with rails  Entrance Stairs - Number of Steps: 2STE  Bathroom Shower/Tub: Tub/Shower unit (Pt takes sponge bathes)  Bathroom Toilet: Bedside commode (toilet so low, so uses BSC)  Bathroom Accessibility: Accessible  Home Equipment: Marky Butts walker (Pt uses both)  Receives Help From: Family  ADL Assistance: Independent  Homemaking Assistance: Needs assistance  Homemaking Responsibilities: No (family does)  Ambulation Assistance: Independent (uses 8RB or w/c)  Active : No  Patient's  Info: Dtr provides transportation  Occupation: Retired  Leisure & Hobbies: TV  Additional Comments: Pt reports a fall, but very vague on details, when it occurred. - pt states her daughter is at home to help her when she needs it       Objective   Vision: Within Functional Limits  Hearing: Within functional limits    Orientation  Overall Orientation Status: Impaired  Orientation Level: Disoriented to time;Oriented to situation;Oriented to person;Oriented to place (Pt knows it's August; not year. Refused to state situation; \"These questions are annoying. \")  Observation/Palpation  Posture: Fair  Balance  Standing Balance: Contact guard assistance (RW)  Standing Balance  Time: ~15 sec, ~2.5 min  Activity: transfer EOB to chair; functional mobility ~20 ft from chair to other side of bed  Functional Mobility  Functional - Mobility Device: Rolling Walker  Activity: Other  Assist Level: Contact guard assistance  ADL  LE Dressing: Independent (socks; able to reach B feet.)  Additional Comments: Pt declined further ADLs.   Tone RUE  RUE Tone: Normotonic  Tone LUE  LUE Tone: Normotonic  Coordination  Movements Are Fluid And Coordinated: No (unable to follow commands for finger-thumb opposition)     Bed mobility  Bridging: Independent  Supine to Sit: Stand by assistance (HOB elevated)  Sit to Supine: Stand by assistance (HOB elevated)  Scooting: Stand by assistance  Transfers  Stand Step Transfers: Contact guard assistance (RW)  Sit to stand: Contact guard assistance  Stand to sit: Contact guard assistance     Cognition  Overall Cognitive Status: Exceptions  Arousal/Alertness: Appropriate responses to stimuli  Following Commands: Follows one step commands with increased time  Attention Span: Appears intact  Safety Judgement:  (refused gait belt)  Problem Solving: Decreased awareness of errors  Insights: Decreased awareness of deficits  Initiation: Does not require cues  Sequencing: Requires cues for some  Cognition Comment: Pt stuttering, having word-finding issues at times.   Perception  Overall Perceptual Status: Impaired  Motor Planning:  (diffculty following some examples/commands such as finger-thumb opposition.)     Sensation  Overall Sensation Status: WFL        LUE AROM (degrees)  LUE AROM : WFL  Left Hand AROM (degrees)  Left Hand AROM: WFL  RUE AROM (degrees)  RUE AROM : WFL  Right Hand AROM (degrees)  Right Hand AROM: WFL  LUE Strength  Gross LUE Strength: WFL  RUE Strength  Gross RUE Strength: WFL                   Plan   Plan  Times per week: 3-5  Times per day: Daily  Current Treatment Recommendations: Safety Education & Training, Self-Care / ADL, Endurance Training, Functional Mobility Training, Patient/Caregiver Education & Training, Cognitive Reorientation             AM-PAC Score        -[facility] Inpatient Daily Activity Raw Score: 18 (08/09/21 1714)  AM-PAC Inpatient ADL T-Scale Score : 38.66 (08/09/21 1714)  ADL Inpatient CMS 0-100% Score: 46.65 (08/09/21 1714)  ADL Inpatient CMS G-Code Modifier : CK (08/09/21 1714)    Goals  Short term goals  Time Frame for Short term goals: Discharge  Short term goal 1: Supervision for functional transfers to [facility], chair, EOB w/RW  Short term goal 2: Supervision for functional mobility for ADL activity w/RW  Short term goal 3: S/U for UB bathing/dressing  Short term goal 4: Supervision for LB bathing/dressing  Short term goal 5: Pt to tolerate standing 3-5 min for ADL activity/functional mobility       Therapy Time   Individual Concurrent Group Co-treatment   Time In 1450         Time Out 7815 Minutes 47               Timed Code Treatment Minutes:  32 min    Total Treatment Minutes:  52 min  ]  C/ Timothy 66, OT   Rsoa Humphreys., OTR/MEDARDO, [id number]

## 2021-08-09 NOTE — PROGRESS NOTES
Physical Therapy    Facility/Department: [address]  Initial Assessment    NAME: Amparo Peralta  : 1955  MRN: [id number]    Date of Service: 2021    Discharge Recommendations:  Amparo Peralta scored a 18/24 on the AM-PAC short mobility form. Current research shows that an AM-PAC score of 18 or greater is typically associated with a discharge to the patient's home setting. Based on the patient's AM-PAC score and their current functional mobility deficits, it is recommended that the patient have 2-3 sessions per week of Physical Therapy at d/c to increase the patient's independence. At this time, this patient demonstrates the endurance and safety to discharge home with home and a follow up treatment frequency of 2-3x/wk. Please see assessment section for further patient specific details. If patient discharges prior to next session this note will serve as a discharge summary. Please see below for the latest assessment towards goals. HOME HEALTH CARE: LEVEL 1 STANDARD  - Initial home health evaluation to occur within 24-48 hours, in patient home   - Therapy to evaluate with goal of regaining prior level of functioning   - Therapy to evaluate if patient has [address] needs for personal care    2-3 sessions per week   PT Equipment Recommendations  Equipment Needed: No    Assessment   Body structures, Functions, Activity limitations: Decreased functional mobility ; Decreased cognition;Decreased strength;Decreased balance  Assessment: Pt presents with the above. Pt was slightly agitated throughout session but completed simple commands. Pt will benefit from skilled PT to promote baseline functional mobiltiy and independence.   Treatment Diagnosis: decreased functioanl mobility associated with sepsis secnodary to UTI  Prognosis: Good  Decision Making: Low Complexity  PT Education: Goals;Transfer Training;Equipment;PT Role;Energy Conservation;Plan of Care;General Safety  Patient Education: pt verbalized understanding  Barriers to Learning: none  REQUIRES PT FOLLOW UP: Yes  Activity Tolerance  Activity Tolerance: Patient limited by cognitive status; Patient Tolerated treatment well;Treatment limited secondary to agitation  Activity Tolerance: pt presents with the above limitations       Patient Diagnosis(es): The primary encounter diagnosis was Altered mental status, unspecified altered mental status type. Diagnoses of Urinary tract infection without hematuria, site unspecified and Hypomagnesemia were also pertinent to this visit. has a past medical history of Anxiety, Arthritis, Atrial fibrillation/flutter ([facility] [address] Blood transfusion reaction, Crohn's disease ([address] Depression, GERD (gastroesophageal reflux disease), Hiatal hernia, Hx of blood clots, Hypertension, MRSA (methicillin resistant staph aureus) culture positive, MRSA (methicillin resistant staph aureus) culture positive, Neuropathy, Pneumonia, Sinus headache, SOB (shortness of breath), and VRE (vancomycin resistant enterococcus) culture positive. has a past surgical history that includes Hysterectomy; knee surgery (Bilateral); Hand Carpectomy (Bilateral); hernia repair; Abdomen surgery; bladder suspension; fracture surgery; Heel spur surgery; Tonsillectomy; Colonoscopy; Upper gastrointestinal endoscopy (6/14/13); Foot surgery (Left, 6/25/14); Foot surgery (6/3/15); Colonoscopy (9/14/15); Foot surgery (Left, 08/05/2002); joint replacement; Sigmoidoscopy (01/15/2018); Abdominal adhesion surgery (06/08/2018); Colonoscopy (08/07/2018); colostomy (N/A, 10/8/2018); pr secd clos surg wnd exten/complic (N/A, 92/31/9906); Leg Surgery (Right); Colonoscopy (06/07/2019); Colonoscopy (N/A, 6/7/2019); Colonoscopy (N/A, 6/7/2019); Small intestine surgery (N/A, 7/17/2019); and proctosigmoidoscopy (N/A, 7/17/2019).     Restrictions  Restrictions/Precautions  Restrictions/Precautions: Fall Risk  Required Braces or Orthoses?: No  Position Activity Restriction  Other position/activity restrictions: Patient brought in via [facility]. Per EMS the home health nurse noticed the patient had a change in mental status. Patients son was present at home and said the patient was acting 'normal'. Patient very slow to respond and unable to answer triage questions about current medications due to current mental status. Dr. Earline Jeffries at bedside to evaluate patient. Patient currently resting in bed, side rails up x2, lowest position, call light in reach with bed alarm on.   Vision/Hearing  Vision: Within Functional Limits  Hearing: Within functional limits     Subjective  General  Chart Reviewed: Yes  Patient assessed for rehabilitation services?: Yes  Family / Caregiver Present: No  Diagnosis: sepsis secondary to UTI  Follows Commands: Within Functional Limits  Other (Comment): pt supine in bed upon arrival  General Comment  Comments: pt agreeable to therapy  Subjective  Subjective: pt states she is not going to do much but will get it over with  Pain Screening  Patient Currently in Pain: Denies  Vital Signs  Patient Currently in Pain: Denies       Orientation  Orientation  Overall Orientation Status: Impaired  Orientation Level: Oriented to place;Oriented to situation;Oriented to person;Disoriented to time  Social/Functional History  Social/Functional History  Lives With: Spouse, Daughter  Type of Home: House  Home Layout: One level  Home Access: Stairs to enter with rails  Entrance Stairs - Number of Steps: 2STE  Bathroom Shower/Tub: Tub/Shower unit  Bathroom Toilet: Bedside commode  Bathroom Accessibility: Accessible  Home Equipment: [employer], Rolling walker  Receives Help From: Family  ADL Assistance: Independent  Homemaking Assistance: Needs assistance  Homemaking Responsibilities: No (family does)  Ambulation Assistance: Independent  Active : No  Patient's  Info: Dtr provides transportation  Occupation: Retired  Leisure & Hobbies: TV  Additional Comments: Pt reports a fall, but very vague on details, when it occurred. - pt states her daughter is at home to help her when she needs it  Cognition   Cognition  Overall Cognitive Status: Exceptions  Arousal/Alertness: Appropriate responses to stimuli  Following Commands: Follows one step commands with increased time  Attention Span: Appears intact  Problem Solving: Decreased awareness of errors  Insights: Decreased awareness of deficits  Initiation: Does not require cues  Sequencing: Requires cues for some  Cognition Comment: Pt stuttering, having word-finding issues at times. Objective     Observation/Palpation  Posture: Fair    AROM RLE (degrees)  RLE AROM: WFL  AROM LLE (degrees)  LLE AROM : WFL  AROM RUE (degrees)  RUE AROM : WFL  AROM LUE (degrees)  LUE AROM : WFL  Strength RLE  Strength RLE: WFL  Strength LLE  Strength LLE: WFL  Strength RUE  Strength RUE: WFL  Strength LUE  Strength LUE: WFL     Sensation  Overall Sensation Status: WFL  Bed mobility  Bridging: Independent  Supine to Sit: Stand by assistance  Sit to Supine: Stand by assistance  Scooting: Stand by assistance  Transfers  Sit to Stand: Contact guard assistance  Stand to sit: Contact guard assistance  Bed to Chair: Contact guard assistance  Comment: with rolling walker- pt refused to use gait belt  Ambulation  Ambulation?: Yes  More Ambulation?: No  Ambulation 1  Surface: level tile  Device: Rolling Walker  Assistance: Contact guard assistance  Gait Deviations: Increased FRANTZ; Decreased step length; Slow Dee Dee  Distance: 10ft in room from chair to bed with rolling walker and CGA  Comments: pt presents with extreme valgus of the L knee when standing/ambulating- states she is getting surgery for it  Stairs/Curb  Stairs?: No     Balance  Posture: Good  Sitting - Static: Good  Sitting - Dynamic: Good  Standing - Static: Fair  Standing - Dynamic: [address]  Times per week: 3-5  Times per day: Daily  Current Treatment Recommendations: Strengthening, ROM, Balance Training, Gait Training, Functional Mobility Training, Endurance Training  Safety Devices  Type of devices: All fall risk precautions in place, Patient at risk for falls, Bed alarm in place, Left in bed, Call light within reach, Nurse notified  Restraints  Initially in place: No    AM-PAC Score  AM-PAC Inpatient Mobility Raw Score : 18 (08/09/21 1625)  AM-PAC Inpatient T-Scale Score : 43.63 (08/09/21 1625)  Mobility Inpatient CMS 0-100% Score: 46.58 (08/09/21 1625)  Mobility Inpatient CMS G-Code Modifier : CK (08/09/21 1625)     Goals  Short term goals  Time Frame for Short term goals: prior to discharge  Short term goal 1: pt will perform bed mobility independently  Short term goal 2: pt will perform sit to/from stand with SBA  Short term goal 3: pt will ambulate 50ft with rolling walker and SBA  Patient Goals   Patient goals : return home       Therapy Time   Individual Concurrent Group Co-treatment   Time In 0250         Time Out 0335         Minutes 45         Timed Code Treatment Minutes: Yahaira 30, SPT   Debi Velasco, PT Therapist observed and directed the above evaluation.  Thanks, Debi Velasco, [address] [facility], DPT [id number]

## 2021-08-09 NOTE — PROGRESS NOTES
Progress Note - Dr. Clifton Atkins - Internal Medicine  PCP: Mitzi Grimm MD [address] / Kaycee Gutierrez [id number]    Hospital Day: 6  Code Status: Full Code  Current Diet: ADULT DIET; Regular  Adult Oral Nutrition Supplement; Low Calorie/High Protein Oral Supplement        CC: follow up on medical issues    Subjective:   Yuridia Joseph is a 60 y.o. female. She denies problems    Mag still low  Pt refusing pt/ot still - so many times that they have signed off    She denies chest pain, denies shortness of breath, denies nausea,  denies emesis. 10 system Review of Systems is reviewed with patient, and pertinent positives are noted in HPI above . Otherwise, Review of systems is negative. I have reviewed the patient's medical and social history in detail and updated the computerized patient record. To recap: She  has a past medical history of Anxiety, Arthritis, Atrial fibrillation/flutter ([address] Blood transfusion reaction, Crohn's disease ([address] Depression, GERD (gastroesophageal reflux disease), Hiatal hernia, Hx of blood clots, Hypertension, MRSA (methicillin resistant staph aureus) culture positive, MRSA (methicillin resistant staph aureus) culture positive, Neuropathy, Pneumonia, Sinus headache, SOB (shortness of breath), and VRE (vancomycin resistant enterococcus) culture positive. . She  has a past surgical history that includes Hysterectomy; knee surgery (Bilateral); Hand Carpectomy (Bilateral); hernia repair; Abdomen surgery; bladder suspension; fracture surgery; Heel spur surgery; Tonsillectomy; Colonoscopy; Upper gastrointestinal endoscopy (6/14/13); Foot surgery (Left, 6/25/14); Foot surgery (6/3/15); Colonoscopy (9/14/15); Foot surgery (Left, 08/05/2002); joint replacement; Sigmoidoscopy (01/15/2018); Abdominal adhesion surgery (06/08/2018); Colonoscopy (08/07/2018); colostomy (N/A, 10/8/2018); pr secd clos surg wnd exten/complic (N/A, 97/08/7480); Leg Surgery (Right);  Colonoscopy (06/07/2019); Colonoscopy (N/A, 6/7/2019); Colonoscopy (N/A, 6/7/2019); Small intestine surgery (N/A, 7/17/2019); and proctosigmoidoscopy (N/A, 7/17/2019). . She  reports that she has been smoking cigarettes and e-cigarettes. She has a 10.00 pack-year smoking history. She has never used smokeless tobacco. She reports current alcohol use of about 2.0 standard drinks of alcohol per week. She reports that she does not use drugs. .        Active Hospital Problems    Diagnosis Date Noted    Altered mental status [R41.82]     UTI (urinary tract infection) [N39.0] 08/03/2021    Sepsis secondary to UTI ([facility] [A41.9, N39.0] 08/03/2021    Gastroesophageal reflux disease without esophagitis [K21.9]     History of MRSA infection [Z86.14]     Hyponatremia [E87.1] 04/27/2021    Cognitive deficit as late effect of cerebrovascular accident (CVA) [I69.319] 04/09/2021    Late effect of ischemic cerebral stroke [I69.30] 04/09/2021    Atherosclerosis of native arteries of the extremities with ulceration ([facility] [I70.25] 12/01/2020    HTN (hypertension), benign [I10]     Chronic venous hypertension (idiopathic) with inflammation of bilateral lower extremity [I87.323] 05/17/2019    Hypomagnesemia [E83.42] 11/27/2018    DVT (deep venous thrombosis) ([facility]) [I82.409] 07/03/2018    Anemia [D64.9] 01/05/2018       Current Facility-Administered Medications: [Held by provider] spironolactone (ALDACTONE) tablet 25 mg, 25 mg, Oral, Daily  famotidine (PEPCID) tablet 20 mg, 20 mg, Oral, Daily  magnesium sulfate 2000 mg in 50 mL IVPB premix, 2,000 mg, Intravenous, PRN  clobetasol (TEMOVATE) 0.05 % ointment, , Topical, BID  busPIRone (BUSPAR) tablet 10 mg, 10 mg, Oral, TID  ferrous gluconate 324 (37.5 Fe) MG tablet 972 mg, 972 mg, Oral, Daily with breakfast  hydrOXYzine (ATARAX) tablet 10 mg, 10 mg, Oral, TID PRN  rivaroxaban (XARELTO) tablet 20 mg, 20 mg, Oral, Daily with breakfast  metoprolol succinate (TOPROL XL) extended release tablet 50 mg, 50 mg, (Last 3 readings):   Weight   08/08/21 0624 192 lb 12.8 oz (87.5 kg)   08/07/21 0752 203 lb 6.4 oz (92.3 kg)   08/06/21 0821 203 lb 6.4 oz (92.3 kg)           Intake/Output Summary (Last 24 hours) at 8/9/2021 0827  Last data filed at 8/8/2021 2328  Gross per 24 hour   Intake 970 ml   Output 1175 ml   Net -205 ml         Physical Exam:   /86   Pulse 64   Temp 98.4 °F (36.9 °C) (Oral)   Resp 16   Ht 5' 7\" (1.702 m)   Wt 192 lb 12.8 oz (87.5 kg)   SpO2 99%   BMI 30.20 kg/m²   General appearance: alert, appears stated age and cooperative  Head: Normocephalic, without obvious abnormality, atraumatic  Lungs: clear to auscultation bilaterally  Heart: regular rate and rhythm, S1, S2 normal, no murmur, click, rub or gallop  Abdomen: soft, non-tender; bowel sounds normal; no masses,  no organomegaly  Extremities: extremities normal, atraumatic, no cyanosis or edema    Labs:  Lab Results   Component Value Date    WBC 8.3 08/05/2021    HGB 9.3 (L) 08/05/2021    HCT 27.4 (L) 08/05/2021     08/05/2021    CHOL 111 07/24/2020    TRIG 66 07/24/2020    HDL 43 07/24/2020    ALT <5 (L) 08/04/2021    AST 12 (L) 08/04/2021     (L) 08/08/2021    K 4.8 08/08/2021    CL 98 (L) 08/08/2021    CREATININE 0.5 (L) 08/08/2021    BUN 10 08/08/2021    CO2 29 08/08/2021    TSH 3.57 07/28/2020    INR 1.62 (H) 04/27/2021    LABA1C 5.0 07/24/2020    LABMICR YES 08/03/2021     Lab Results   Component Value Date    CKTOTAL 206 (H) 06/02/2021    TROPONINI <0.01 08/03/2021       Recent Imaging Results are Reviewed:  CT HEAD WO CONTRAST    Result Date: 8/3/2021  EXAMINATION: CTA OF THE CHEST; CT OF THE HEAD WITHOUT CONTRAST 8/3/2021 5:20 pm; 8/3/2021 5:12 pm TECHNIQUE: CTA of the chest was performed after the administration of intravenous contrast.  Multiplanar reformatted images are provided for review. MIP images are provided for review.  Dose modulation, iterative reconstruction, and/or weight based adjustment of the mA/kV was pleural effusion. No pneumothorax. Scattered atelectasis. Soft Tissues/Bones: No adenopathy. Scattered degenerative changes noted in the visualized spine without spondylolisthesis. Upper Abdomen: Large hiatal hernia. 1. No acute pulmonary embolism to the segmental arteries. 2. Aneurysmal dilatation of the thoracic aorta measuring up to 4.3 cm. No thoracic aortic dissection given motion artifact. Follow-up recommended to assess size stability. 3. Other findings as described. CT HEAD FINDINGS: BRAIN/VENTRICLES: No acute hemorrhage. Periventricular and subcortical hypoattenuation is nonspecific and may be related to microvascular disease. Encephalomalacia in the left cerebellum. Encephalomalacia in the left frontal and parietal lobes. Artifact partially obscures the marti. Artifact partially obscures the inferior cerebellum. Randal Fern white differentiation appears maintained given artifact near the skull base and through the posterior fossa. Cerebral volume loss and mild prominence of the ventricles again visualized. There is no midline shift. Basal cisterns appear patent. ORBITS: Visualized orbits appear unremarkable on this unenhanced exam. SINUSES: Visualized paranasal sinuses appear clear. Visualized mastoid air cells appear clear. SOFT TISSUES/SKULL: No depressed calvarial fracture. Posterior fusion defect of C1. IMPRESSION: No acute hemorrhage or midline shift. CT CHEST PULMONARY EMBOLISM W CONTRAST    Result Date: 8/3/2021  EXAMINATION: CTA OF THE CHEST; CT OF THE HEAD WITHOUT CONTRAST 8/3/2021 5:20 pm; 8/3/2021 5:12 pm TECHNIQUE: CTA of the chest was performed after the administration of intravenous contrast.  Multiplanar reformatted images are provided for review. MIP images are provided for review.  Dose modulation, iterative reconstruction, and/or weight based adjustment of the mA/kV was utilized to reduce the radiation dose to as low as reasonably achievable.; CT of the head was performed without degenerative changes noted in the visualized spine without spondylolisthesis. Upper Abdomen: Large hiatal hernia. 1. No acute pulmonary embolism to the segmental arteries. 2. Aneurysmal dilatation of the thoracic aorta measuring up to 4.3 cm. No thoracic aortic dissection given motion artifact. Follow-up recommended to assess size stability. 3. Other findings as described. CT HEAD FINDINGS: BRAIN/VENTRICLES: No acute hemorrhage. Periventricular and subcortical hypoattenuation is nonspecific and may be related to microvascular disease. Encephalomalacia in the left cerebellum. Encephalomalacia in the left frontal and parietal lobes. Artifact partially obscures the marti. Artifact partially obscures the inferior cerebellum. [place] white differentiation appears maintained given artifact near the skull base and through the posterior fossa. Cerebral volume loss and mild prominence of the ventricles again visualized. There is no midline shift. Basal cisterns appear patent. ORBITS: Visualized orbits appear unremarkable on this unenhanced exam. SINUSES: Visualized paranasal sinuses appear clear. Visualized mastoid air cells appear clear. SOFT TISSUES/SKULL: No depressed calvarial fracture. Posterior fusion defect of C1. IMPRESSION: No acute hemorrhage or midline shift. Assessment and Plan:  Principal Problem:    Hypomagnesemia -Established problem. Stable. Still low  Plan: iv mag sulfate ordered. Repeat labs in am  Active Problems:    Anemia    DVT (deep venous thrombosis) (HCC)    Chronic venous hypertension (idiopathic) with inflammation of bilateral lower extremity    HTN (hypertension), benign -Established problem. Stable. 131/86  Plan: Continue present orders/plan. Atherosclerosis of native arteries of the extremities with ulceration ([facility]    Late effect of ischemic cerebral stroke    Cognitive deficit as late effect of cerebrovascular accident (CVA)    Hyponatremia -Established problem. Stable.   Na 135    UTI (urinary tract infection)    Sepsis secondary to UTI ([facility]    Altered mental status    Disp - plan on home d/c as pt has refused pt/ot eval.  Home whenever ok with Renal. Will need po mag supplementation?           Will Mercado MD  8/9/2021

## 2021-08-09 NOTE — PROGRESS NOTES
Nutrition Assessment     Type and Reason for Visit: Reassess    Nutrition Recommendations/Plan:   Continue current diet and supplement     Nutrition Assessment:  Regular diet continues and pt has been consuming % at most meals over past few days. Continues to take Ensure High Protein. Will continue to offer supplment to help provide additional protein. Malnutrition Assessment:  Malnutrition Status: No malnutrition    Estimated Daily Nutrient Needs:  Energy (kcal): 8725- 2150(20-25 kcal/86kg); Weight Used for Energy Requirements:   (continue with 86 kg)     Protein (g): 65- 75g (1.3-1.5g/50kg); Weight Used for Protein Requirements:  Ideal        Fluid (ml/day):  ; Weight Used for Fluid Requirements:  1 ml/kcal      Nutrition Related Findings: +2 BLE edema; Na+ 135, Mg 1.50 (replacing); expressive aphasia      Current Nutrition Therapies:    ADULT DIET; Regular  Adult Oral Nutrition Supplement;  Low Calorie/High Protein Oral Supplement    Anthropometric Measures:  · Height: 5' 7\" (170.2 cm)  · Current Body Wt: 192 lb (87.1 kg)   · BMI: 30.1    Nutrition Diagnosis:   No nutrition diagnosis at this time     Nutrition Interventions:   Food and/or Nutrient Delivery:  Continue Current Diet, Continue Oral Nutrition Supplement  Nutrition Education/Counseling:  Education not indicated   Coordination of Nutrition Care:  Continue to monitor while inpatient    Goals:  po intake at least 50% of meals & supplements       Nutrition Monitoring and Evaluation:   Behavioral-Environmental Outcomes:  None Identified   Food/Nutrient Intake Outcomes:  Food and Nutrient Intake, Supplement Intake  Physical Signs/Symptoms Outcomes:  Skin     Discharge Planning:    Continue current diet, Continue Oral Nutrition Supplement     Electronically signed by María Molina RD, THOMAS on 8/9/21 at 11:41 AM EDT  Contact: [phone]

## 2021-08-09 NOTE — PROGRESS NOTES
enterococcus) culture positive. Past Social History:   reports that she has been smoking cigarettes and e-cigarettes. She has a 10.00 pack-year smoking history. She has never used smokeless tobacco. She reports current alcohol use of about 2.0 standard drinks of alcohol per week. She reports that she does not use drugs. Subjective:  No complaints. Multiple skin lesions. Wants to be discharged. Review of Systems   Constitutional: Negative for activity change, appetite change, chills, fatigue, fever and unexpected weight change. HENT: Negative for congestion and facial swelling. Eyes: Negative for photophobia, discharge and redness. Respiratory: Negative for cough, chest tightness and shortness of breath. Cardiovascular: Positive for leg swelling. Negative for chest pain and palpitations. Gastrointestinal: Negative for abdominal distention, abdominal pain, blood in stool, constipation, diarrhea, nausea and vomiting. Endocrine: Negative for cold intolerance, heat intolerance and polyuria. Genitourinary: Negative for decreased urine volume, difficulty urinating, flank pain and hematuria. Musculoskeletal: Negative for joint swelling and neck pain. Skin: Positive for rash. Neurological: Negative for dizziness, seizures, syncope, speech difficulty, light-headedness and headaches. Hematological: Does not bruise/bleed easily. Psychiatric/Behavioral: Negative for agitation, confusion and hallucinations.        Objective:      BP (!) 148/82 Comment: RN notified  Pulse 67   Temp 99 °F (37.2 °C) (Oral)   Resp 16   Ht 5' 7\" (1.702 m)   Wt 192 lb 12.8 oz (87.5 kg)   SpO2 97%   BMI 30.20 kg/m²     Wt Readings from Last 3 Encounters:   08/08/21 192 lb 12.8 oz (87.5 kg)   06/04/21 198 lb (89.8 kg)   04/28/21 194 lb 14.2 oz (88.4 kg)       BP Readings from Last 3 Encounters:   08/09/21 (!) 148/82   06/04/21 104/88   04/30/21 123/84     Skin-multiple lesions  Chest- clear  Heart-regular  Abd-soft  Ext- 1+ edema    Labs  Hemoglobin   Date Value Ref Range Status   08/05/2021 9.3 (L) 12.0 - 16.0 g/dL Final     Hematocrit   Date Value Ref Range Status   08/05/2021 27.4 (L) 36.0 - 48.0 % Final     WBC   Date Value Ref Range Status   08/05/2021 8.3 4.0 - 11.0 K/uL Final     Platelets   Date Value Ref Range Status   08/05/2021 388 135 - 450 K/uL Final     Lab Results   Component Value Date    CREATININE 0.6 08/09/2021    BUN 14 08/09/2021     (L) 08/09/2021    K 5.1 08/09/2021    CL 97 (L) 08/09/2021    CO2 29 08/09/2021     UMg 3.7  U Crea 8  FeMg -29     Assessment/Plan:  1. History of acute on chronic hyponatremia. Underlying history of alcohol abuse. This is currently within normal limits. 2.  Hypokalemia in the setting of hypomagnesemia. This has improved. Not on supplements. Higher limits. 3.  Severe hypomagnesemia. History of alcohol abuse and PPI use. Renal Mg wasting also. Switched from PPI to H2 antagonist.  Replace IV and p.o. Stop  SPLT with higher K.     4.  Hypocalcemia, check ionized calcium and PTH. Better. 5.  History of alcohol abuse. 6.  Anemia. Follow hemoglobin. 7.  Pyuria, received levofloxacin. 8.  Dispo-IV and po Mg and then okay for D/C.      Elizabeth Madsen MD

## 2021-08-10 VITALS
TEMPERATURE: 97.4 F | HEART RATE: 82 BPM | SYSTOLIC BLOOD PRESSURE: 111 MMHG | OXYGEN SATURATION: 97 % | WEIGHT: 192.8 LBS | RESPIRATION RATE: 18 BRPM | DIASTOLIC BLOOD PRESSURE: 72 MMHG | HEIGHT: 67 IN | BODY MASS INDEX: 30.26 KG/M2

## 2021-08-10 LAB
ANION GAP SERPL CALCULATED.3IONS-SCNC: 8 MMOL/L (ref 3–16)
BASOPHILS ABSOLUTE: 0.1 K/UL (ref 0–0.2)
BASOPHILS RELATIVE PERCENT: 0.8 %
BUN BLDV-MCNC: 17 MG/DL (ref 7–20)
CALCIUM SERPL-MCNC: 9.3 MG/DL (ref 8.3–10.6)
CHLORIDE BLD-SCNC: 98 MMOL/L (ref 99–110)
CO2: 29 MMOL/L (ref 21–32)
CREAT SERPL-MCNC: <0.5 MG/DL (ref 0.6–1.2)
EOSINOPHILS ABSOLUTE: 0.1 K/UL (ref 0–0.6)
EOSINOPHILS RELATIVE PERCENT: 0.6 %
GFR AFRICAN AMERICAN: >60
GFR NON-AFRICAN AMERICAN: >60
GLUCOSE BLD-MCNC: 82 MG/DL (ref 70–99)
HCT VFR BLD CALC: 31.3 % (ref 36–48)
HEMOGLOBIN: 10.3 G/DL (ref 12–16)
LYMPHOCYTES ABSOLUTE: 3.1 K/UL (ref 1–5.1)
LYMPHOCYTES RELATIVE PERCENT: 22.2 %
MAGNESIUM: 1.8 MG/DL (ref 1.8–2.4)
MCH RBC QN AUTO: 29.9 PG (ref 26–34)
MCHC RBC AUTO-ENTMCNC: 33.1 G/DL (ref 31–36)
MCV RBC AUTO: 90.4 FL (ref 80–100)
MONOCYTES ABSOLUTE: 1.1 K/UL (ref 0–1.3)
MONOCYTES RELATIVE PERCENT: 7.8 %
NEUTROPHILS ABSOLUTE: 9.6 K/UL (ref 1.7–7.7)
NEUTROPHILS RELATIVE PERCENT: 68.6 %
PDW BLD-RTO: 18.1 % (ref 12.4–15.4)
PLATELET # BLD: 357 K/UL (ref 135–450)
PMV BLD AUTO: 6.9 FL (ref 5–10.5)
POTASSIUM SERPL-SCNC: 4.8 MMOL/L (ref 3.5–5.1)
RBC # BLD: 3.46 M/UL (ref 4–5.2)
SODIUM BLD-SCNC: 135 MMOL/L (ref 136–145)
WBC # BLD: 14 K/UL (ref 4–11)

## 2021-08-10 PROCEDURE — 97530 THERAPEUTIC ACTIVITIES: CPT

## 2021-08-10 PROCEDURE — 2580000003 HC RX 258: Performed by: INTERNAL MEDICINE

## 2021-08-10 PROCEDURE — 6370000000 HC RX 637 (ALT 250 FOR IP): Performed by: INTERNAL MEDICINE

## 2021-08-10 PROCEDURE — 80048 BASIC METABOLIC PNL TOTAL CA: CPT

## 2021-08-10 PROCEDURE — 36415 COLL VENOUS BLD VENIPUNCTURE: CPT

## 2021-08-10 PROCEDURE — 85025 COMPLETE CBC W/AUTO DIFF WBC: CPT

## 2021-08-10 PROCEDURE — 83735 ASSAY OF MAGNESIUM: CPT

## 2021-08-10 PROCEDURE — 97110 THERAPEUTIC EXERCISES: CPT

## 2021-08-10 PROCEDURE — 97116 GAIT TRAINING THERAPY: CPT

## 2021-08-10 RX ORDER — LANOLIN ALCOHOL/MO/W.PET/CERES
400 CREAM (GRAM) TOPICAL 2 TIMES DAILY
Qty: 30 TABLET | Refills: 1 | Status: SHIPPED | OUTPATIENT
Start: 2021-08-10

## 2021-08-10 RX ADMIN — Medication 10 ML: at 09:44

## 2021-08-10 RX ADMIN — PREDNISONE 40 MG: 20 TABLET ORAL at 09:32

## 2021-08-10 RX ADMIN — BUSPIRONE HYDROCHLORIDE 10 MG: 5 TABLET ORAL at 09:32

## 2021-08-10 RX ADMIN — BUSPIRONE HYDROCHLORIDE 10 MG: 5 TABLET ORAL at 16:14

## 2021-08-10 RX ADMIN — Medication 400 MG: at 09:32

## 2021-08-10 RX ADMIN — OXYCODONE 5 MG: 5 TABLET ORAL at 09:38

## 2021-08-10 RX ADMIN — METOPROLOL SUCCINATE 50 MG: 50 TABLET, EXTENDED RELEASE ORAL at 09:32

## 2021-08-10 RX ADMIN — FAMOTIDINE 20 MG: 20 TABLET ORAL at 09:32

## 2021-08-10 RX ADMIN — RIVAROXABAN 20 MG: 20 TABLET, FILM COATED ORAL at 09:36

## 2021-08-10 RX ADMIN — ESCITALOPRAM OXALATE 20 MG: 10 TABLET ORAL at 09:32

## 2021-08-10 RX ADMIN — OXYCODONE 5 MG: 5 TABLET ORAL at 17:35

## 2021-08-10 ASSESSMENT — PAIN SCALES - GENERAL
PAINLEVEL_OUTOF10: 8
PAINLEVEL_OUTOF10: 0

## 2021-08-10 ASSESSMENT — ENCOUNTER SYMPTOMS
DIARRHEA: 0
CONSTIPATION: 0
FACIAL SWELLING: 0
PHOTOPHOBIA: 0
ABDOMINAL DISTENTION: 0
COUGH: 0
NAUSEA: 0
SHORTNESS OF BREATH: 0
CHEST TIGHTNESS: 0
EYE DISCHARGE: 0
EYE REDNESS: 0
BLOOD IN STOOL: 0
VOMITING: 0
ABDOMINAL PAIN: 0

## 2021-08-10 NOTE — PROGRESS NOTES
Physical Therapy  Facility/Department: [address]  Daily Treatment Note  NAME: Tracee Hayes  : 1955  MRN: [id number]    Date of Service: 8/10/2021    Discharge Recommendations:  Tracee Hayes scored a 18/24 on the AM-PAC short mobility form. Current research shows that an AM-PAC score of 18 or greater is typically associated with a discharge to the patient's home setting. Based on the patient's AM-PAC score and their current functional mobility deficits, it is recommended that the patient have 2-3 sessions per week of Physical Therapy at d/c to increase the patient's independence. At this time, this patient demonstrates the endurance and safety to discharge home with HHPT and a follow up treatment frequency of 2-3x/wk. Please see assessment section for further patient specific details. If patient discharges prior to next session this note will serve as a discharge summary. Please see below for the latest assessment towards goals. HOME HEALTH CARE: LEVEL 1 STANDARD    - Initial home health evaluation to occur within 24-48 hours, in patient home   - Therapy to evaluate with goal of regaining prior level of functioning   - Therapy to evaluate if patient has [id number] needs for personal care    2-3 sessions per week, S Level 1   PT Equipment Recommendations  Equipment Needed: No    Assessment   Body structures, Functions, Activity limitations: Decreased functional mobility ; Decreased cognition;Decreased strength;Decreased balance  Assessment: Pt requires encouragement to participate with PT but is apreciative at end of session. Pt limited by diziness and pain during out of bed mobility. Pt would benefit from continued skilled PT to progress towards PLOF and independence.   Treatment Diagnosis: decreased functioanl mobility associated with sepsis secnodary to UTI  Prognosis: Good  Decision Making: Low Complexity  PT Education: Goals;Transfer Training;Equipment;PT Role;Energy Conservation;Plan of Care;General Safety  Patient Education: Pt educated on benefits of mobility (including helping increase strength and decrease dizziness) and effects of imobility, pt educated on benefits of ankle pumps- pt vebalizes understanding  Barriers to Learning: none  REQUIRES PT FOLLOW UP: Yes  Activity Tolerance  Activity Tolerance: Patient limited by cognitive status; Patient Tolerated treatment well;Patient limited by pain;Treatment limited secondary to medical complications (free text); Treatment limited secondary to agitation  Activity Tolerance: Pt limited in out of bed mobility by diziness and knee pain     Patient Diagnosis(es): The primary encounter diagnosis was Altered mental status, unspecified altered mental status type. Diagnoses of Urinary tract infection without hematuria, site unspecified and Hypomagnesemia were also pertinent to this visit. has a past medical history of Anxiety, Arthritis, Atrial fibrillation/flutter ([address] Blood transfusion reaction, Crohn's disease ([address] Depression, GERD (gastroesophageal reflux disease), Hiatal hernia, Hx of blood clots, Hypertension, MRSA (methicillin resistant staph aureus) culture positive, MRSA (methicillin resistant staph aureus) culture positive, Neuropathy, Pneumonia, Sinus headache, SOB (shortness of breath), and VRE (vancomycin resistant enterococcus) culture positive. has a past surgical history that includes Hysterectomy; knee surgery (Bilateral); Hand Carpectomy (Bilateral); hernia repair; Abdomen surgery; bladder suspension; fracture surgery; Heel spur surgery; Tonsillectomy; Colonoscopy; Upper gastrointestinal endoscopy (6/14/13); Foot surgery (Left, 6/25/14); Foot surgery (6/3/15); Colonoscopy (9/14/15); Foot surgery (Left, 08/05/2002); joint replacement; Sigmoidoscopy (01/15/2018); Abdominal adhesion surgery (06/08/2018);  Colonoscopy (08/07/2018); colostomy (N/A, 10/8/2018); pr secd clos surg wnd exten/complic (N/A, 41/64/9512); Leg Surgery (Right); Colonoscopy (06/07/2019); Colonoscopy (N/A, 6/7/2019); Colonoscopy (N/A, 6/7/2019); Small intestine surgery (N/A, 7/17/2019); and proctosigmoidoscopy (N/A, 7/17/2019). Restrictions  Restrictions/Precautions  Restrictions/Precautions: Fall Risk (high fall risk)  Required Braces or Orthoses?: No  Position Activity Restriction  Other position/activity restrictions: Patient brought in via [facility]. Per EMS the home health nurse noticed the patient had a change in mental status. Patients son was present at home and said the patient was acting 'normal'. Patient very slow to respond and unable to answer triage questions about current medications due to current mental status. Dr. En Colin at bedside to evaluate patient. Patient currently resting in bed, side rails up x2, lowest position, call light in reach with bed alarm on. Subjective   General  Chart Reviewed: Yes  Family / Caregiver Present: No  Subjective  Subjective: Pt supine in bed upon approach and originally agreeing to therapy with RN present with encouragement from RN, however after RN leaves pt refuses saying \"I'm sorry I can't do this at 10am\". With encouragement from PT pt is agreeable to do exercises EOB and is agreeable to ambulation after exercises EOB. Pt expresses apreciation at end of session and apoligizes for being resistant at beginning of session. Pt reports headache throughout session which RN is aware of. Pt reports knee pain with ambulation (not rated) RN alerted. Orientation     Cognition      Objective   Bed mobility  Supine to Sit: Stand by assistance  Sit to Supine: Stand by assistance  Scooting: Stand by assistance  Comment: bed mobility completed with HOB flat and use of handrail  Transfers  Sit to Stand: Contact guard assistance (from EOB up to RW)  Stand to sit: Contact guard assistance (to EOB with RW)  Comment: Pt agreeable to gait belt this date. VC for hand placement.  Pt experiences lightheadedness during initial 30 sec stand at EOB however has a BP of 126/84 upon sitting EOB. Diziness resolves with extended rest brerak at EOB and pt is asymptomatic during second stand. Ambulation  Ambulation?: Yes  More Ambulation?: No  Ambulation 1  Surface: level tile  Device: Rolling Walker  Assistance: Contact guard assistance  Quality of Gait: L turnk lean during L stance phase with  stance time on L LE. Gait Deviations: Increased FRANTZ; Decreased step length; Slow Dee Dee  Distance: 10' in room from onse side of bed to the other  Comments: pt presents with extreme valgus of the L knee when standing/ambulating- states she is getting surgery for it. No LOB noted. Pt limited by increased knee pain during ambulation- not rated.      Balance  Posture: Good  Sitting - Static: Good (supervision)  Sitting - Dynamic: Good;- (SBA)  Standing - Static: Fair (SBA with UL UE support on RW)  Standing - Dynamic: Fair;- (CGA with RW during ambualtion)  Exercises  Hip Flexion: x15B alternating marches, VC for technique/increased clearance  Hip Abduction: seated hip abduction x15 B, VC to prevent flexion compensation  Knee Long Arc Quad: x15 B, increased difficulty and time to complete on L LE  Ankle Pumps: x20 B                        G-Code     OutComes Score                                                     AM-PAC Score  AM-PAC Inpatient Mobility Raw Score : 18 (08/10/21 1116)  AM-PAC Inpatient T-Scale Score : 43.63 (08/10/21 1116)  Mobility Inpatient CMS 0-100% Score: 46.58 (08/10/21 1116)  Mobility Inpatient CMS G-Code Modifier : CK (08/10/21 1116)          Goals  Short term goals  Time Frame for Short term goals: prior to discharge  Short term goal 1: pt will perform bed mobility independently  Short term goal 2: pt will perform sit to/from stand with SBA  Short term goal 3: pt will ambulate 50ft with rolling walker and SBA  Patient Goals   Patient goals : return home    Plan    Plan  Times per week: 3-5  Times per day: Daily  Current Treatment Recommendations: Strengthening, ROM, Balance Training, Gait Training, Functional Mobility Training, Endurance Training  Safety Devices  Type of devices:  All fall risk precautions in place, Patient at risk for falls, Bed alarm in place, Left in bed, Call light within reach, Nurse notified (Pt agreeable to sitting up for lunch later so sheets can be changed and she can eat sitting up, RN alerted)  Restraints  Initially in place: Yes (4 bed rails)     Therapy Time   Individual Concurrent Group Co-treatment   Time In 0947         Time Out 1025         Minutes 38         Timed Code Treatment Minutes: [address] [facility] [address], PT

## 2021-08-10 NOTE — PLAN OF CARE
Problem: Pain:  Goal: Pain level will decrease  Description: Pain level will decrease  Outcome: Ongoing     Problem: Skin Integrity:  Goal: Will show no infection signs and symptoms  Description: Will show no infection signs and symptoms  Outcome: Ongoing  Goal: Absence of new skin breakdown  Description: Absence of new skin breakdown  Outcome: Ongoing     Problem: Falls - Risk of:  Goal: Will remain free from falls  Description: Will remain free from falls  Outcome: Ongoing  Goal: Absence of physical injury  Description: Absence of physical injury  Outcome: Ongoing     Problem: Cardiovascular  Goal: Hemodynamic stability  Outcome: Ongoing   Pt pain medicated per MAR. Pt skin shows no change since previous shift. Pt remains free of falls and injury this hsift. Pt has shown no change in hemodynamic stability this shift. WCTM.

## 2021-08-10 NOTE — PROGRESS NOTES
Data- discharge order received, pt verbalized agreement to discharge, needs [facility]  , MARIBEL reviewed and signed by MD, to be completed by RN. Action- AVS prepared, discharge instructions prepared and given to pt and daughter, medication information packet given r/t NEW or CHANGED prescriptions, pt verbalized understanding further self-review. D/C instruction summary: Diet- general, Activity- as tolerated, follow up with Primary Care Physician Carla Nolasco -[phone] appointment in 2 weeks,medications prescriptions to be filled ellyn. Response- Case Management/ reported faxing completed MARIBEL and AVS to needed HHC/DME services stated above. Pt belongings gathered, IV removed, pt dressed. Disposition is home with HHC/DME as stated above, transported with belongings, taken to lobby via w/c with staff, no complications. 1. WEIGHT: Admit Weight: 198 lb (89.8 kg) (08/03/21 1540)        Today  Weight: 192 lb 12.8 oz (87.5 kg) (08/08/21 0624)       2.  O2 SAT.: SpO2: 97 % (08/10/21 0500)

## 2021-08-10 NOTE — PROGRESS NOTES
[facility] Note    Patient Active Problem List   Diagnosis    Anemia    [facility]'s colitis [facility]    MRSA (methicillin resistant Staphylococcus aureus) infection    DVT [facility] venous thrombosis) [facility]    Hypomagnesemia    Leg swelling    Venous insufficiency    Chronic venous hypertension (idiopathic) with inflammation of bilateral lower extremity    Local infection of skin and subcutaneous tissue    Open wound of left foot with complication    [employer] LINQ 7/29/20 Dr Morris Overcast    LV dysfunction    PAF (paroxysmal atrial fibrillation) ([facility])    HTN (hypertension), benign    S/P coronary angiogram    Abnormal angiogram    Atherosclerosis of native arteries of the extremities with ulceration ([facility]    Nail avulsion of toe, initial encounter    Lower abdominal pain    Late effect of ischemic cerebral stroke    Cognitive deficit as late effect of cerebrovascular accident (CVA)    Infection requiring contact isolation precautions    Hyponatremia    Suspected COVID-19 virus infection    Alcohol abuse    Cellulitis of both feet    Multiple wounds    Trichotillomania    Gastroesophageal reflux disease without esophagitis    Non-traumatic rhabdomyolysis    Overweight (BMI 25.0-29. 9)    History of infection with vancomycin resistant Enterococcus (VRE)    History of MRSA infection    UTI (urinary tract infection)    Sepsis secondary to UTI ([address]    Altered mental status       Past Medical History:   has a past medical history of Anxiety, Arthritis, Atrial fibrillation/flutter ([address] Blood transfusion reaction, Crohn's disease ([address] Depression, GERD (gastroesophageal reflux disease), Hiatal hernia, Hx of blood clots, Hypertension, MRSA (methicillin resistant staph aureus) culture positive, MRSA (methicillin resistant staph aureus) culture positive, Neuropathy, Pneumonia, Sinus headache, SOB (shortness of breath), and VRE (vancomycin resistant enterococcus) culture positive. Past Social History:   reports that she has been smoking cigarettes and e-cigarettes. She has a 10.00 pack-year smoking history. She has never used smokeless tobacco. She reports current alcohol use of about 2.0 standard drinks of alcohol per week. She reports that she does not use drugs. Subjective:  No complaints. Multiple skin lesions. For D/C today. Review of Systems   Constitutional: Negative for activity change, appetite change, chills, fatigue, fever and unexpected weight change. HENT: Negative for congestion and facial swelling. Eyes: Negative for photophobia, discharge and redness. Respiratory: Negative for cough, chest tightness and shortness of breath. Cardiovascular: Positive for leg swelling. Negative for chest pain and palpitations. Gastrointestinal: Negative for abdominal distention, abdominal pain, blood in stool, constipation, diarrhea, nausea and vomiting. Endocrine: Negative for cold intolerance, heat intolerance and polyuria. Genitourinary: Negative for decreased urine volume, difficulty urinating, flank pain and hematuria. Musculoskeletal: Negative for joint swelling and neck pain. Skin: Positive for rash. Neurological: Negative for dizziness, seizures, syncope, speech difficulty, light-headedness and headaches. Hematological: Does not bruise/bleed easily. Psychiatric/Behavioral: Negative for agitation, confusion and hallucinations.        Objective:      /72   Pulse 82   Temp 97.4 °F (36.3 °C) (Oral)   Resp 18   Ht 5' 7\" (1.702 m)   Wt 192 lb 12.8 oz (87.5 kg)   SpO2 97%   BMI 30.20 kg/m²     Wt Readings from Last 3 Encounters:   08/08/21 192 lb 12.8 oz (87.5 kg)   06/04/21 198 lb (89.8 kg)   04/28/21 194 lb 14.2 oz (88.4 kg)       BP Readings from Last 3 Encounters:   08/10/21 111/72   06/04/21 104/88   04/30/21 123/84     Skin-multiple lesions  Chest- clear  Heart-regular  Abd-soft  Ext- 1+ edema    Labs  Hemoglobin   Date Value Ref Range Status   08/10/2021 10.3 (L) 12.0 - 16.0 g/dL Final     Hematocrit   Date Value Ref Range Status   08/10/2021 31.3 (L) 36.0 - 48.0 % Final     WBC   Date Value Ref Range Status   08/10/2021 14.0 (H) 4.0 - 11.0 K/uL Final     Platelets   Date Value Ref Range Status   08/10/2021 357 135 - 450 K/uL Final     Lab Results   Component Value Date    CREATININE <0.5 (L) 08/10/2021    BUN 17 08/10/2021     (L) 08/10/2021    K 4.8 08/10/2021    CL 98 (L) 08/10/2021    CO2 29 08/10/2021     UMg 3.7  U Crea 8  FeMg -29     Assessment/Plan:  1. History of acute on chronic hyponatremia. Underlying history of alcohol abuse. This is currently within normal limits. 2.  Hypokalemia in the setting of hypomagnesemia. This has improved. Not on supplements. 3.  Severe hypomagnesemia. History of alcohol abuse and PPI use. Renal Mg wasting also. Switched from PPI to H2 antagonist.  Better. Stop  SPLT with higher K. Continue po Mg.   4.  Hypocalcemia, check ionized calcium and PTH. Better. 5.  History of alcohol abuse. 6.  Anemia. Follow hemoglobin. 7.  Pyuria, received levofloxacin. 8.  Dispo-okay for D/C from renal perspective.      Chetan Erwin MD

## 2021-08-10 NOTE — DISCHARGE SUMMARY
[facility] -- Physician Discharge Summary     Cameron Fuller  1955  MRN: [id number]    Admit Date: 8/3/2021  Discharge Date: No discharge date for patient encounter. Attending MD: Jimi Batista MD  Discharging MD: Jimi Batista MD  PCP: Sam Sena MD [address] / Taisha Giordano [place] [phone]    Admission Diagnosis: Sepsis secondary to UTI ([facility] [address] [A41.9, N39.0]  DISCHARGE DIAGNOSIS: same    Full Hospital Problem List:  Active Hospital Problems    Diagnosis Date Noted    Altered mental status [R41.82]     UTI (urinary tract infection) [N39.0] 08/03/2021    Sepsis secondary to UTI ([facility] [facility] [A41.9, N39.0] 08/03/2021    Gastroesophageal reflux disease without esophagitis [K21.9]     History of MRSA infection [Z86.14]     Hyponatremia [E87.1] 04/27/2021    Cognitive deficit as late effect of cerebrovascular accident (CVA) [I69.319] 04/09/2021    Late effect of ischemic cerebral stroke [I69.30] 04/09/2021    Atherosclerosis of native arteries of the extremities with ulceration ([facility] [facility] [I70.25] 12/01/2020    HTN (hypertension), benign [I10]     Chronic venous hypertension (idiopathic) with inflammation of bilateral lower extremity [I87.323] 05/17/2019    Hypomagnesemia [E83.42] 11/27/2018    DVT (deep venous thrombosis) ([facility] [address] [facility] [I82.409] 07/03/2018    Anemia [D64.9] 01/05/2018           Hospital Course:  60 y. o. female who presents to the ED for concern for mental status change per her home health nurse. [facility] arrival to ER, apparently her son reported that her mental status was in fact normal.  She does have history of stroke and expressive aphasia.     CT Head in ER does not show acute findings (reviewed by self on computer)      We were able to get on the phone with the patient's daughter. Rosa Starling that she has been more confused than normal and having difficulty answering questions.  Has had an episode of a 'glazed' look over her face x 7 min of unclear etiology.     Cannot get further hx from patient at this time, nor ROS  She is found to have UTI - thought to be cause  Treated with iv abx    Urine cx  Susceptibility    Escherichia coli (1)    Antibiotic Interpretation DRU Status    ampicillin Resistant >=32 mcg/mL     ceFAZolin Sensitive <=4 mcg/mL      NOTE: Cefazolin should only be used for uncomplicated UTI         for E.coli or Klebsiella pneumoniae. cefepime Sensitive <=0.12 mcg/mL     cefTRIAXone Sensitive <=0.25 mcg/mL     ciprofloxacin Sensitive <=0.25 mcg/mL     ertapenem Sensitive <=0.12 mcg/mL     gentamicin Sensitive <=1 mcg/mL     levofloxacin Sensitive <=0.12 mcg/mL     nitrofurantoin Sensitive <=16 mcg/mL     piperacillin-tazobactam Sensitive <=4 mcg/mL     trimethoprim-sulfamethoxazole Resistant >=320 mcg/mL           She does complete course of abx while here  Wbc normal at time of d/c    While here, also with chronic metabolic abnormalities  Pt with hyponatremia and hypomagnesmia  Renal consulted for management - electrolytes are repleted  Both are low normal at time of d/c  To go home with additional Mag Sulfate bid    To see PCP in 2 wk or renal for repeat labs  Consults made during Hospitalization:  IP CONSULT TO INTERNAL MEDICINE  IP CONSULT TO NEPHROLOGY  IP CONSULT TO [facility]    Treatment team at time of Discharge: Treatment Team: Attending Provider: Jimi Batista MD; Consulting Physician: Jimi Batista MD; Consulting Physician: Leonard Schaumann, MD; Utilization Reviewer: Theodore Lima RN; Registered Nurse: Maynor Todd RN; Physical Therapist: Veena Amado PT    Imaging Results:  CT HEAD WO CONTRAST    Result Date: 8/3/2021  EXAMINATION: CTA OF THE CHEST; CT OF THE HEAD WITHOUT CONTRAST 8/3/2021 5:20 pm; 8/3/2021 5:12 pm TECHNIQUE: CTA of the chest was performed after the administration of intravenous contrast.  Multiplanar reformatted images are provided for review.   MIP images are provided for review. Dose modulation, iterative reconstruction, and/or weight based adjustment of the mA/kV was utilized to reduce the radiation dose to as low as reasonably achievable.; CT of the head was performed without the administration of intravenous contrast. Dose modulation, iterative reconstruction, and/or weight based adjustment of the mA/kV was utilized to reduce the radiation dose to as low as reasonably achievable. COMPARISON: CT head 04/07/2021. HISTORY: ORDERING SYSTEM PROVIDED HISTORY: chest pain TECHNOLOGIST PROVIDED HISTORY: Reason for exam:->chest pain Decision Support Exception - unselect if not a suspected or confirmed emergency medical condition->Emergency Medical Condition (MA) Reason for Exam: Chest pain. Shortness of Breath (Patient also complains of being SOB with chest pains). Acuity: Acute Type of Exam: Initial; ORDERING SYSTEM PROVIDED HISTORY: reported AMS TECHNOLOGIST PROVIDED HISTORY: Has a \"code stroke\" or \"stroke alert\" been called? ->No Reason for exam:->reported AMS Decision Support Exception - unselect if not a suspected or confirmed emergency medical condition->Emergency Medical Condition (MA) Reason for Exam: Reported AMS. Altered Mental Status (Patient brought in by [facility]. Home health nurse said pt had a change in mental status, son reported her mental status was 'normal'. Patient very slow to answer questions. ). Acuity: Acute Type of Exam: Initial CTA CHEST FINDINGS: Pulmonary Arteries: Pulmonary arteries are enlarged. Lj Lucero No filling defect is identified in the pulmonary arteries to the level of thesegmental arteries. Mediastinum: Heart is upper normal limits in size. No pericardial effusion. Aneurysmal dilatation of the ascending aorta measuring 4.3 cm aortic arch measures 3.7 cm. Descending thoracic aorta measures 3.5 cm. Suboptimal evaluation of the aortic root due to motion artifact. No luminal defect is appreciated in the visualized thoracic aorta given motion artifact. Coronary artery calcifications noted. Lungs/pleura: Central airways are patent. Lungs are clear. No pleural effusion. No pneumothorax. Scattered atelectasis. Soft Tissues/Bones: No adenopathy. Scattered degenerative changes noted in the visualized spine without spondylolisthesis. Upper Abdomen: Large hiatal hernia. 1. No acute pulmonary embolism to the segmental arteries. 2. Aneurysmal dilatation of the thoracic aorta measuring up to 4.3 cm. No thoracic aortic dissection given motion artifact. Follow-up recommended to assess size stability. 3. Other findings as described. CT HEAD FINDINGS: BRAIN/VENTRICLES: No acute hemorrhage. Periventricular and subcortical hypoattenuation is nonspecific and may be related to microvascular disease. Encephalomalacia in the left cerebellum. Encephalomalacia in the left frontal and parietal lobes. Artifact partially obscures the marti. Artifact partially obscures the inferior cerebellum. Doristine Hermanns white differentiation appears maintained given artifact near the skull base and through the posterior fossa. Cerebral volume loss and mild prominence of the ventricles again visualized. There is no midline shift. Basal cisterns appear patent. ORBITS: Visualized orbits appear unremarkable on this unenhanced exam. SINUSES: Visualized paranasal sinuses appear clear. Visualized mastoid air cells appear clear. SOFT TISSUES/SKULL: No depressed calvarial fracture. Posterior fusion defect of C1. IMPRESSION: No acute hemorrhage or midline shift. CT CHEST PULMONARY EMBOLISM W CONTRAST    Result Date: 8/3/2021  EXAMINATION: CTA OF THE CHEST; CT OF THE HEAD WITHOUT CONTRAST 8/3/2021 5:20 pm; 8/3/2021 5:12 pm TECHNIQUE: CTA of the chest was performed after the administration of intravenous contrast.  Multiplanar reformatted images are provided for review. MIP images are provided for review.  Dose modulation, iterative reconstruction, and/or weight based adjustment of the mA/kV was utilized to reduce the radiation dose to as low as reasonably achievable.; CT of the head was performed without the administration of intravenous contrast. Dose modulation, iterative reconstruction, and/or weight based adjustment of the mA/kV was utilized to reduce the radiation dose to as low as reasonably achievable. COMPARISON: CT head 04/07/2021. HISTORY: ORDERING SYSTEM PROVIDED HISTORY: chest pain TECHNOLOGIST PROVIDED HISTORY: Reason for exam:->chest pain Decision Support Exception - unselect if not a suspected or confirmed emergency medical condition->Emergency Medical Condition (MA) Reason for Exam: Chest pain. Shortness of Breath (Patient also complains of being SOB with chest pains). Acuity: Acute Type of Exam: Initial; ORDERING SYSTEM PROVIDED HISTORY: reported AMS TECHNOLOGIST PROVIDED HISTORY: Has a \"code stroke\" or \"stroke alert\" been called? ->No Reason for exam:->reported AMS Decision Support Exception - unselect if not a suspected or confirmed emergency medical condition->Emergency Medical Condition (MA) Reason for Exam: Reported AMS. Altered Mental Status (Patient brought in by [facility]. Home health nurse said pt had a change in mental status, son reported her mental status was 'normal'. Patient very slow to answer questions. ). Acuity: Acute Type of Exam: Initial CTA CHEST FINDINGS: Pulmonary Arteries: Pulmonary arteries are enlarged. Shaaron Money No filling defect is identified in the pulmonary arteries to the level of thesegmental arteries. Mediastinum: Heart is upper normal limits in size. No pericardial effusion. Aneurysmal dilatation of the ascending aorta measuring 4.3 cm aortic arch measures 3.7 cm. Descending thoracic aorta measures 3.5 cm. Suboptimal evaluation of the aortic root due to motion artifact. No luminal defect is appreciated in the visualized thoracic aorta given motion artifact. Coronary artery calcifications noted. Lungs/pleura: Central airways are patent. Lungs are clear.   No pleural effusion. No pneumothorax. Scattered atelectasis. Soft Tissues/Bones: No adenopathy. Scattered degenerative changes noted in the visualized spine without spondylolisthesis. Upper Abdomen: Large hiatal hernia. 1. No acute pulmonary embolism to the segmental arteries. 2. Aneurysmal dilatation of the thoracic aorta measuring up to 4.3 cm. No thoracic aortic dissection given motion artifact. Follow-up recommended to assess size stability. 3. Other findings as described. CT HEAD FINDINGS: BRAIN/VENTRICLES: No acute hemorrhage. Periventricular and subcortical hypoattenuation is nonspecific and may be related to microvascular disease. Encephalomalacia in the left cerebellum. Encephalomalacia in the left frontal and parietal lobes. Artifact partially obscures the marti. Artifact partially obscures the inferior cerebellum. Irene Deem white differentiation appears maintained given artifact near the skull base and through the posterior fossa. Cerebral volume loss and mild prominence of the ventricles again visualized. There is no midline shift. Basal cisterns appear patent. ORBITS: Visualized orbits appear unremarkable on this unenhanced exam. SINUSES: Visualized paranasal sinuses appear clear. Visualized mastoid air cells appear clear. SOFT TISSUES/SKULL: No depressed calvarial fracture. Posterior fusion defect of C1. IMPRESSION: No acute hemorrhage or midline shift.          Discharge Exam:  /88   Pulse 56   Temp 98.7 °F (37.1 °C) (Oral)   Resp 15   Ht 5' 7\" (1.702 m)   Wt 192 lb 12.8 oz (87.5 kg)   SpO2 97%   BMI 30.20 kg/m²   General appearance: alert, appears stated age and cooperative  Head: Normocephalic, without obvious abnormality, atraumatic  Lungs: clear to auscultation bilaterally  Heart: regular rate and rhythm, S1, S2 normal, no murmur, click, rub or gallop  Abdomen: soft, non-tender; bowel sounds normal; no masses,  no organomegaly  Extremities: extremities normal, atraumatic, no cyanosis or edema    Disposition: home    Condition: stable    Discharge Medications:   Cassidy Cortes   Home Medication Instructions VIF:[id number]    Printed on:08/10/21 9030   Medication Information                      atorvastatin (LIPITOR) 80 MG tablet  Take 1 tablet by mouth nightly             baclofen (LIORESAL) 10 MG tablet  Take 10 mg by mouth 3 times daily as needed             balsalazide (COLAZAL) 750 MG capsule  Take 3,000 mg by mouth daily Take 4 capsules (total 3000 mg) daily each morning. busPIRone (BUSPAR) 10 MG tablet  Take 10 mg by mouth 3 times daily as needed             Cholecalciferol (VITAMIN D3 ULTRA POTENCY) 1.25 MG (21017 UT) TABS  Take 50,000 Units by mouth once a week             escitalopram (LEXAPRO) 20 MG tablet  Take 20 mg by mouth daily             ferrous gluconate (FERGON) 324 (38 Fe) MG tablet  Take 3 tablets by mouth daily (with breakfast)              hydrOXYzine (ATARAX) 10 MG tablet  Take 10 mg by mouth 3 times daily as needed for Itching             magnesium oxide (MAG-OX) 400 (240 Mg) MG tablet  Take 1 tablet by mouth 2 times daily             metoprolol succinate (TOPROL XL) 50 MG extended release tablet  Take 1 tablet by mouth daily             mycophenolate (CELLCEPT) 500 MG tablet  Take 1,000 mg by mouth 2 times daily             ondansetron (ZOFRAN ODT) 4 MG disintegrating tablet  Take 1 tablet by mouth every 8 hours as needed for Nausea             oxyCODONE (ROXICODONE) 5 MG immediate release tablet  Take 5 mg by mouth every 4 hours as needed for Pain.             pantoprazole (PROTONIX) 40 MG tablet  Take 40 mg by mouth daily             potassium chloride (KLOR-CON M) 20 MEQ extended release tablet  Take 1 tablet by mouth 2 times daily             predniSONE (DELTASONE) 20 MG tablet  Take 40 mg by mouth daily             rivaroxaban (XARELTO) 20 MG TABS tablet  Take 1 tablet by mouth daily (with breakfast)                 Allergies:   Allergies   Allergen Reactions    Ace Inhibitors Swelling    Skelaxin [Metaxalone] Swelling       Follow up Instructions: Follow-up with PCP: Shanon Ceballos MD in 2 wk .       Total time spent on day of discharge including face-to-face visit, examination, documentation, counseling, preparation of discharge plans and followup, and discharge medicine reconciliation and presciptions is 36 minutes    Signed:  Svetlana Wilkins MD  8/10/2021

## 2021-08-18 ENCOUNTER — NURSE ONLY (OUTPATIENT)
Dept: CARDIOLOGY CLINIC | Age: 66
End: 2021-08-18
Payer: MEDICAID

## 2021-08-18 DIAGNOSIS — I48.0 PAF (PAROXYSMAL ATRIAL FIBRILLATION) (HCC): ICD-10-CM

## 2021-08-18 DIAGNOSIS — I63.9 CEREBROVASCULAR ACCIDENT (CVA), UNSPECIFIED MECHANISM (HCC): ICD-10-CM

## 2021-08-18 DIAGNOSIS — Z45.09 ENCOUNTER FOR LOOP RECORDER CHECK: ICD-10-CM

## 2021-08-18 PROCEDURE — 93298 REM INTERROG DEV EVAL SCRMS: CPT | Performed by: INTERNAL MEDICINE

## 2021-08-18 PROCEDURE — G2066 INTER DEVC REMOTE 30D: HCPCS | Performed by: INTERNAL MEDICINE

## 2021-08-19 NOTE — PROGRESS NOTES
ILR for CVA.  New AF recorded in 9/2020. Hx: PAT/AF w RVR (Xarelto and Toprol XL). Remote transmission received for patients ILR. Since 07.11.2021:  3 Tachy, EGMs show SVT/PAT and AF w RVR. 462 AF, 4.8% burden, EGMs show some AF and some NSR w ectopy. Intermittent undersensing and oversensing noted also. No symptom episodes recorded. EP physician will review.  See interrogation under the cardiology tab in the [address] [place] [address] [address] field for more details.  Will continue to monitor remotely.

## 2021-09-02 PROBLEM — N39.0 UTI (URINARY TRACT INFECTION): Status: RESOLVED | Noted: 2021-08-03 | Resolved: 2021-09-02

## 2021-09-22 ENCOUNTER — NURSE ONLY (OUTPATIENT)
Dept: CARDIOLOGY CLINIC | Age: 66
End: 2021-09-22
Payer: MEDICAID

## 2021-09-22 DIAGNOSIS — Z45.09 ENCOUNTER FOR LOOP RECORDER CHECK: ICD-10-CM

## 2021-09-22 DIAGNOSIS — I48.0 PAF (PAROXYSMAL ATRIAL FIBRILLATION) (HCC): ICD-10-CM

## 2021-09-22 DIAGNOSIS — I69.30 LATE EFFECT OF ISCHEMIC CEREBRAL STROKE: ICD-10-CM

## 2021-09-22 PROCEDURE — 93298 REM INTERROG DEV EVAL SCRMS: CPT | Performed by: INTERNAL MEDICINE

## 2021-09-22 PROCEDURE — G2066 INTER DEVC REMOTE 30D: HCPCS | Performed by: INTERNAL MEDICINE

## 2021-09-22 NOTE — PROGRESS NOTES
ILR for CVA. New AF recorded in 9/2020. Hx: PAT/AF w RVR (Xarelto and Toprol XL). Remote transmission received for patients ILR. Presenting ECG shows ST/SVT. Since 08.18.21:  1 AF, 0.0% burden, w ECG illustrating what appears to be NSR w ectopy. EP physician will review.  See interrogation under the cardiology tab in the [facility] [address] field for more details.  Will continue to monitor remotely.

## 2021-10-10 ENCOUNTER — NURSE ONLY (OUTPATIENT)
Dept: CARDIOLOGY CLINIC | Age: 66
End: 2021-10-10

## 2021-10-11 NOTE — PROGRESS NOTES
[employer] transmission received 10.10.21 @ 12:49pm from [facility] for patient's ILR. ILR for CVA. New AF recorded in 9/2020. Hx: PAT/AF w RVR (Xarelto, Toprol XL). Since 08.03.21, remote Linq report shows:  6 Tachy, 74 AF, 1.8% burden; available ECGs illustrate AF w RVR at times (>200bpm) as well as some NSR w ectopy. Pt on Xashaq Toprol XL. EP physician to review. See PACEART report under Cardiology tab. Will continue to monitor remotely.

## 2021-10-12 ENCOUNTER — NURSE ONLY (OUTPATIENT)
Dept: CARDIOLOGY CLINIC | Age: 66
End: 2021-10-12

## 2021-10-13 NOTE — PROGRESS NOTES
Kend[facility] [address] transmission received 10.12.21 @ 5:17pm from [facility] for patient's ILR. ILR for CVA. New AF recorded in 9/2020. Hx: PAT/AF w RVR (Xarelto, Toprol XL). Since 10.10.21, remote Linq report shows:  8 Tachy, 11 AF, 58.2% burden; available ECGs illustrate AF w RVR at times (>200bpm) as well as some NSR w ectopy. Intermittent undersensing also noted. Pt on Xarelto, Toprol XL. EP physician to review. See PACEART report under Cardiology tab. Will continue to monitor remotely.

## 2021-10-27 ENCOUNTER — NURSE ONLY (OUTPATIENT)
Dept: CARDIOLOGY CLINIC | Age: 66
End: 2021-10-27
Payer: MEDICAID

## 2021-10-27 DIAGNOSIS — I48.0 PAF (PAROXYSMAL ATRIAL FIBRILLATION) (HCC): ICD-10-CM

## 2021-10-27 DIAGNOSIS — Z45.09 ENCOUNTER FOR LOOP RECORDER CHECK: ICD-10-CM

## 2021-10-28 PROCEDURE — G2066 INTER DEVC REMOTE 30D: HCPCS | Performed by: INTERNAL MEDICINE

## 2021-10-28 PROCEDURE — 93298 REM INTERROG DEV EVAL SCRMS: CPT | Performed by: INTERNAL MEDICINE

## 2021-10-28 NOTE — PROGRESS NOTES
Called patient again, messages left on both numbers with Liam  requesting he call back to schedule appt. Will send outreach letter as well. ./LR   Occupational Therapy   Occupational Therapy Initial Assessment  Date: 2021   Patient Name: Adriana Keen  MRN: [id number]     : 1955    Date of Service: 2021    Discharge Recommendations: Adriana Keen scored a 14/24 on the AM-PAC ADL Inpatient form. Current research shows that an AM-PAC score of 17 or less is typically not associated with a discharge to the patient's home setting. Based on the patient's AM-PAC score and their current ADL deficits, it is recommended that the patient have 3-5 sessions per week of Occupational Therapy at d/c to increase the patient's independence. Please see assessment section for further patient specific details. If patient discharges prior to next session this note will serve as a discharge summary. Please see below for the latest assessment towards goals. OT Equipment Recommendations  Equipment Needed: No  Other: Defer    Assessment   Performance deficits / Impairments: Decreased functional mobility ; Decreased ADL status; Decreased endurance;Decreased high-level IADLs;Decreased balance  Assessment: Pt is a 60 y.o. F presenting with wounds covering a signifigant portion of body with unknown etology. Pt is currently presenting below functional baseline and would benefit from continued acute OT to progress functional outcomes. Treatment Diagnosis: Cellulitis of both feet  Prognosis: Fair  OT Education: OT Role;Plan of Care  Patient Education: v/u, would benefit from reptition  REQUIRES OT FOLLOW UP: Yes  Activity Tolerance  Activity Tolerance: Patient limited by pain  Activity Tolerance: Pt tolerated sitting EOB 2 min with severe pain. Safety Devices  Safety Devices in place: Yes  Type of devices: Left in bed;Bed alarm in place;Call light within reach;Nurse notified           Patient Diagnosis(es): The encounter diagnosis was Local infection of skin and subcutaneous tissue.      has a past medical history of Anxiety, Arthritis, Atrial Report called to Luiz JIN on inpatient unit Ortho     fibrillation/flutter ([facility] [address] Blood transfusion reaction, Crohn's disease ([facility] [address] Depression, GERD (gastroesophageal reflux disease), Hiatal hernia, Hx of blood clots, Hypertension, MRSA (methicillin resistant staph aureus) culture positive, Neuropathy, Pneumonia, Sinus headache, SOB (shortness of breath), and VRE (vancomycin resistant enterococcus) culture positive. has a past surgical history that includes Hysterectomy; knee surgery (Bilateral); Hand Carpectomy (Bilateral); hernia repair; Abdomen surgery; bladder suspension; fracture surgery; Heel spur surgery; Tonsillectomy; Colonoscopy; Upper gastrointestinal endoscopy (6/14/13); Foot surgery (Left, 6/25/14); Foot surgery (6/3/15); Colonoscopy (9/14/15); Foot surgery (Left, 08/05/2002); joint replacement; Sigmoidoscopy (01/15/2018); Abdominal adhesion surgery (06/08/2018); Colonoscopy (08/07/2018); colostomy (N/A, 10/8/2018); pr secd clos surg wnd exten/complic (N/A, 95/60/4804); Leg Surgery (Right); Colonoscopy (06/07/2019); Colonoscopy (N/A, 6/7/2019); Colonoscopy (N/A, 6/7/2019); Small intestine surgery (N/A, 7/17/2019); and proctosigmoidoscopy (N/A, 7/17/2019). Treatment Diagnosis: Cellulitis of both feet      Restrictions  Restrictions/Precautions  Restrictions/Precautions: Fall Risk (High fall risk)  Required Braces or Orthoses?: No  Position Activity Restriction  Other position/activity restrictions: Laura Syed is a 60 y.o. female who presents with a Chief Complaint of skin eruption. Upon further chart review, patient has been seen for chronic pruritus in the past and takes Vistaril for this. Patient also has some older chronic wounds for which she sees a wound care clinic. States that came out and evaluated her today and told her she needed to come straight to the ER. Patient does report blistering to hands, feet, extremities that she reports is new over the past 1 to 2 weeks. She denies any new medication or medication changes.   No new soaps lotions detergents. No history of similar symptoms. States that it is itchy but also extremely painful. She denies fevers or chills. Patient does have history of prior CVA and has difficulty with word finding, therefore, history is limited and difficult. No additional information able to obtained at this time. Subjective   General  Chart Reviewed: Yes  Patient assessed for rehabilitation services?: Yes  Family / Caregiver Present: No  Diagnosis: Cellulitis of both feet  Subjective  Subjective: Pt met bedside, reporting that she is in signifigant pain, but motivated to participate. Patient Currently in Pain: Yes  Pain Assessment  Pain Assessment: 0-10  Pain Level: 10  Pre Treatment Pain Screening  Comments / Details: RN administer pain medication, attempted to see pt when pain medication would be most effecctive. Vital Signs  Patient Currently in Pain: Yes  Social/Functional History  Social/Functional History  Lives With: Daughter, Spouse  Type of Home: House  Home Layout: One level  Home Access: Stairs to enter with rails  Entrance Stairs - Number of Steps: 2 DEON  Bathroom Shower/Tub: Tub/Shower unit (Sponge bathes)  Bathroom Toilet: Bedside commode  Bathroom Accessibility: Not accessible  Home Equipment: [address] Help From: Family  ADL Assistance: Independent  Homemaking Responsibilities: No  Ambulation Assistance: Independent (with WC)  Transfer Assistance: Needs assistance (Daughter helps)  Active : No  Patient's  Info: daughter provides transportation  Occupation: Retired  Additional Comments: Denies recent falls       Objective        Orientation  Overall Orientation Status: Impaired  Orientation Level: Oriented to person;Oriented to situation;Disoriented to time;Disoriented to place  Observation/Palpation  Observation: Bandages on bilateral feet/distal lower leg, R posterolateral back/hip.   Scar: Hyperpigmentation and scars all over extremities, trunk and face.  Balance  Sitting Balance: Maximum assistance  Standing Balance: Unable to assess(comment)  Standing Balance  Comment: sitting EOB, assist provided to elevate LE to reduce pain. ADL  Feeding: Independent  Grooming: Maximum assistance  UE Bathing: Maximum assistance  LE Bathing: Dependent/Total  UE Dressing: Maximum assistance  LE Dressing: Dependent/Total  Toileting: Dependent/Total  Tone RUE  RUE Tone: Normotonic  Tone LUE  LUE Tone: Normotonic  Coordination  Movements Are Fluid And Coordinated: Yes     Bed mobility  Supine to Sit: Minimal assistance;2 Person assistance  Scooting: Contact guard assistance  Comment: Session limited to bed mobility to EOB 2/2 pt pain level with movement. Pt tearful from pain sitting EOB. Transfers  Sit to stand: Unable to assess  Stand to sit: Unable to assess  Vision - Basic Assessment  Prior Vision: Wears contacts  Cognition  Overall Cognitive Status: Exceptions  Arousal/Alertness: Appropriate responses to stimuli  Following Commands: Follows one step commands with repetition; Follows one step commands with increased time  Attention Span: Attends with cues to redirect  Memory: Decreased recall of recent events  Safety Judgement: Decreased awareness of need for assistance  Problem Solving: Assistance required to generate solutions  Insights: Decreased awareness of deficits  Initiation: Does not require cues  Sequencing: Does not require cues  Perception  Overall Perceptual Status: WFL     Sensation  Overall Sensation Status: WFL        LUE AROM (degrees)  LUE AROM : WFL  Left Hand AROM (degrees)  Left Hand AROM: WFL  RUE AROM (degrees)  RUE AROM : WFL  Right Hand AROM (degrees)  Right Hand AROM: WFL  LUE Strength  Gross LUE Strength: WFL  LUE Strength Comment: funtional for scooting in bed, did not formally assess  RUE Strength  Gross RUE Strength: WFL  RUE Strength Comment: funtional for scooting in bed, did not formally assess                   Plan   Plan  Times per week: 3-5x  Times per day: Daily  Current Treatment Recommendations: Endurance Training, Functional Mobility Training, Self-Care / ADL, Wheelchair Mobility Training, Balance Training    Goals  Short term goals  Time Frame for Short term goals: d/c  Short term goal 1: Pt will tolerate sitting EOB for 5 min to increase ability to participate in ADLs  Short term goal 2: Pt will complete UBD with Mod A  Short term goal 3: Pt will complete grooming Mod A  Short term goal 4: Pt will complete LBD with Mod A  Short term goal 5: Pt will tolerate sit to stand with Mod A  Long term goals  Time Frame for Long term goals : STGs=LTGs       Therapy Time   Individual Concurrent Group Co-treatment   Time In 1011         Time Out 1045         Minutes 34              Timed Code Treatment Minutes:   21    Total Treatment Minutes:  [address] Virgilio Mckeon, OT

## 2021-12-01 ENCOUNTER — NURSE ONLY (OUTPATIENT)
Dept: CARDIOLOGY CLINIC | Age: 66
End: 2021-12-01
Payer: MEDICAID

## 2021-12-01 DIAGNOSIS — I48.0 PAF (PAROXYSMAL ATRIAL FIBRILLATION) (HCC): ICD-10-CM

## 2021-12-01 DIAGNOSIS — I69.30 LATE EFFECT OF ISCHEMIC CEREBRAL STROKE: ICD-10-CM

## 2021-12-01 DIAGNOSIS — Z45.09 ENCOUNTER FOR LOOP RECORDER CHECK: ICD-10-CM

## 2021-12-01 PROCEDURE — 93298 REM INTERROG DEV EVAL SCRMS: CPT | Performed by: INTERNAL MEDICINE

## 2021-12-01 PROCEDURE — G2066 INTER DEVC REMOTE 30D: HCPCS | Performed by: INTERNAL MEDICINE

## 2021-12-01 NOTE — PROGRESS NOTES
ILR for CVA. New AF recorded in 9/2020. Hx: PAT/AF w RVR (Xarelto, Toprol XL). Remote transmission received for patients ILR. Since 10.26.21, 9 Tachy and 88 AF, 5.1% burden. Available ECGs illustrate SVT and AF w RVR at times (>200bpm). Intermittent undersensing also noted. Pt on Xarelto, Toprol XL. EP physician will review.  See interrogation under the cardiology tab in the [address] [place] [address] [address] field for more details.  Will continue to monitor remotely.

## 2022-02-10 ENCOUNTER — NURSE ONLY (OUTPATIENT)
Dept: CARDIOLOGY CLINIC | Age: 67
End: 2022-02-10
Payer: MEDICAID

## 2022-02-10 DIAGNOSIS — I63.9 CEREBROVASCULAR ACCIDENT (CVA), UNSPECIFIED MECHANISM (HCC): ICD-10-CM

## 2022-02-10 DIAGNOSIS — Z45.09 ENCOUNTER FOR LOOP RECORDER CHECK: ICD-10-CM

## 2022-02-11 PROCEDURE — 93298 REM INTERROG DEV EVAL SCRMS: CPT | Performed by: INTERNAL MEDICINE

## 2022-02-11 PROCEDURE — G2066 INTER DEVC REMOTE 30D: HCPCS | Performed by: INTERNAL MEDICINE

## 2022-02-11 NOTE — PROGRESS NOTES
ILR for CVA. New AF recorded in 9/2020. Hx: PAT/AF w RVR (Xarelto, Toprol XL). Remote transmission received for patients ILR. Since 11.11.21, 57 Tachy and 82 AF, 0.8% burden. Available ECG illustrates SVT. Hx AF w RVR at times as well as SVT; no additional ECGs available d/t to previous monitor disconnection as shown on trend data. Pt on Xarelto, Toprol XL. EP physician will review.  See interrogation under the cardiology tab in the [address] [place] [address] [address] field for more details.  Will continue to monitor remotely.

## 2022-03-23 ENCOUNTER — NURSE ONLY (OUTPATIENT)
Dept: CARDIOLOGY CLINIC | Age: 67
End: 2022-03-23
Payer: MEDICAID

## 2022-03-23 DIAGNOSIS — I48.0 PAF (PAROXYSMAL ATRIAL FIBRILLATION) (HCC): ICD-10-CM

## 2022-03-23 DIAGNOSIS — Z45.09 ENCOUNTER FOR LOOP RECORDER CHECK: ICD-10-CM

## 2022-03-23 DIAGNOSIS — I63.9 CEREBROVASCULAR ACCIDENT (CVA), UNSPECIFIED MECHANISM (HCC): ICD-10-CM

## 2022-03-23 NOTE — PROGRESS NOTES
ILR for CVA. New AF recorded in 9/2020. Hx: PAT/AF w RVR (Xarelto, Toprol XL). Remote transmission received for patients ILR. Since 02.11.22:  1 Tachy w ECG illustrating SVT, possible AF/L w RVR. 28 AF, 4.1% burden, w ECG available illustrating what appears to be true AF. Pt on Xarelto, Toprol XL. EP physician will review.  See interrogation under the cardiology tab in the [address] [place] [address] [address] field for more details.  Will continue to monitor remotely.

## 2022-03-24 PROCEDURE — G2066 INTER DEVC REMOTE 30D: HCPCS | Performed by: INTERNAL MEDICINE

## 2022-03-24 PROCEDURE — 93298 REM INTERROG DEV EVAL SCRMS: CPT | Performed by: INTERNAL MEDICINE

## 2022-03-29 NOTE — TELEPHONE ENCOUNTER
Called Paul regarding home portable o2. Spoke with Karolina. States she is unable to tell how max liter flow without seeing tank. States if pt's spouse can stop by here she can verify and also supply additional tank if needed.     CM called spouse and updated her of above       Patient's daughter called back regarding Dianne's appointment. Alisson will call her back tomorrow evening after she speaks with Dr. Lala Layton.

## 2022-04-28 ENCOUNTER — NURSE ONLY (OUTPATIENT)
Dept: CARDIOLOGY CLINIC | Age: 67
End: 2022-04-28
Payer: MEDICAID

## 2022-04-28 DIAGNOSIS — I69.319 COGNITIVE DEFICIT AS LATE EFFECT OF CEREBROVASCULAR ACCIDENT (CVA): ICD-10-CM

## 2022-04-28 DIAGNOSIS — Z45.09 ENCOUNTER FOR LOOP RECORDER CHECK: ICD-10-CM

## 2022-05-02 PROCEDURE — G2066 INTER DEVC REMOTE 30D: HCPCS | Performed by: INTERNAL MEDICINE

## 2022-05-02 PROCEDURE — 93298 REM INTERROG DEV EVAL SCRMS: CPT | Performed by: INTERNAL MEDICINE

## 2022-05-02 NOTE — PROGRESS NOTES
ILR for CVA. New AF recorded in 9/2020. Hx: PAT/AF w RVR (Xarelto, Toprol XL). Remote transmission received for patients ILR. Since 03.22.22:  1 Tachy w ECG illustrating SVT. Pt on Xarelto, Toprol XL. EP physician will review. See interrogation under the cardiology tab in the [address] [place] [address] [address] field for more details. Will continue to monitor remotely.

## 2022-06-08 ENCOUNTER — NURSE ONLY (OUTPATIENT)
Dept: CARDIOLOGY CLINIC | Age: 67
End: 2022-06-08
Payer: MEDICAID

## 2022-06-08 DIAGNOSIS — Z45.09 ENCOUNTER FOR LOOP RECORDER CHECK: ICD-10-CM

## 2022-06-08 DIAGNOSIS — I63.9 CEREBROVASCULAR ACCIDENT (CVA), UNSPECIFIED MECHANISM (HCC): ICD-10-CM

## 2022-06-10 PROCEDURE — G2066 INTER DEVC REMOTE 30D: HCPCS | Performed by: INTERNAL MEDICINE

## 2022-06-10 PROCEDURE — 93298 REM INTERROG DEV EVAL SCRMS: CPT | Performed by: INTERNAL MEDICINE

## 2022-06-10 NOTE — PROGRESS NOTES
ILR for CVA. New AF recorded in 9/2020. Hx: PAT/AF w RVR (Xarelto, Toprol XL). Remote transmission received for patients ILR. Since 04.27.22:  3 Tachy w ECG illustrating SVT. Pt on Xarelto, Toprol XL. EP physician will review. See interrogation under the cardiology tab in the [address] [place] [address] [address] field for more details. Will continue to monitor remotely.

## 2022-07-13 ENCOUNTER — APPOINTMENT (OUTPATIENT)
Dept: GENERAL RADIOLOGY | Age: 67
End: 2022-07-13
Payer: MEDICARE

## 2022-07-13 ENCOUNTER — HOSPITAL ENCOUNTER (EMERGENCY)
Age: 67
Discharge: HOME | End: 2022-07-14
Attending: EMERGENCY MEDICINE
Payer: MEDICARE

## 2022-07-13 ENCOUNTER — APPOINTMENT (OUTPATIENT)
Dept: CT IMAGING | Age: 67
End: 2022-07-13
Payer: MEDICARE

## 2022-07-13 DIAGNOSIS — F10.920 ACUTE ALCOHOLIC INTOXICATION WITHOUT COMPLICATION (HCC): Primary | ICD-10-CM

## 2022-07-13 LAB
A/G RATIO: 1.4 (ref 1.1–2.2)
ACETAMINOPHEN LEVEL: <5 UG/ML (ref 10–30)
ALBUMIN SERPL-MCNC: 4.1 G/DL (ref 3.4–5)
ALP BLD-CCNC: 67 U/L (ref 40–129)
ALT SERPL-CCNC: 11 U/L (ref 10–40)
AMPHETAMINE SCREEN, URINE: ABNORMAL
ANION GAP SERPL CALCULATED.3IONS-SCNC: 11 MMOL/L (ref 3–16)
AST SERPL-CCNC: 15 U/L (ref 15–37)
BACTERIA: NORMAL /HPF
BARBITURATE SCREEN URINE: ABNORMAL
BENZODIAZEPINE SCREEN, URINE: ABNORMAL
BILIRUB SERPL-MCNC: <0.2 MG/DL (ref 0–1)
BILIRUBIN URINE: NEGATIVE
BLOOD, URINE: NEGATIVE
BUN BLDV-MCNC: 13 MG/DL (ref 7–20)
CALCIUM SERPL-MCNC: 8.7 MG/DL (ref 8.3–10.6)
CANNABINOID SCREEN URINE: ABNORMAL
CHLORIDE BLD-SCNC: 102 MMOL/L (ref 99–110)
CLARITY: CLEAR
CO2: 23 MMOL/L (ref 21–32)
COCAINE METABOLITE SCREEN URINE: ABNORMAL
COLOR: YELLOW
CREAT SERPL-MCNC: 0.7 MG/DL (ref 0.6–1.2)
EPITHELIAL CELLS, UA: 2 /HPF (ref 0–5)
ETHANOL: 201 MG/DL (ref 0–0.08)
GFR AFRICAN AMERICAN: >60
GFR NON-AFRICAN AMERICAN: >60
GLUCOSE BLD-MCNC: 109 MG/DL (ref 70–99)
GLUCOSE URINE: NEGATIVE MG/DL
HYALINE CASTS: 0 /LPF (ref 0–8)
KETONES, URINE: NEGATIVE MG/DL
LACTIC ACID: 2.4 MMOL/L (ref 0.4–2)
LEUKOCYTE ESTERASE, URINE: ABNORMAL
LIPASE: 85 U/L (ref 13–60)
Lab: ABNORMAL
METHADONE SCREEN, URINE: ABNORMAL
MICROSCOPIC EXAMINATION: YES
NITRITE, URINE: NEGATIVE
OPIATE SCREEN URINE: ABNORMAL
OXYCODONE URINE: POSITIVE
PH UA: 5.5
PH UA: 5.5 (ref 5–8)
PHENCYCLIDINE SCREEN URINE: ABNORMAL
POTASSIUM REFLEX MAGNESIUM: 4 MMOL/L (ref 3.5–5.1)
PROPOXYPHENE SCREEN: ABNORMAL
PROTEIN UA: NEGATIVE MG/DL
RBC UA: 1 /HPF (ref 0–4)
SALICYLATE, SERUM: 2 MG/DL (ref 15–30)
SODIUM BLD-SCNC: 136 MMOL/L (ref 136–145)
SPECIFIC GRAVITY UA: 1.01 (ref 1–1.03)
TOTAL CK: 79 U/L (ref 26–192)
TOTAL PROTEIN: 7 G/DL (ref 6.4–8.2)
TROPONIN: <0.01 NG/ML
URINE TYPE: ABNORMAL
UROBILINOGEN, URINE: 0.2 E.U./DL
WBC UA: 2 /HPF (ref 0–5)

## 2022-07-13 PROCEDURE — 83690 ASSAY OF LIPASE: CPT

## 2022-07-13 PROCEDURE — 81001 URINALYSIS AUTO W/SCOPE: CPT

## 2022-07-13 PROCEDURE — 70450 CT HEAD/BRAIN W/O DYE: CPT

## 2022-07-13 PROCEDURE — 82077 ASSAY SPEC XCP UR&BREATH IA: CPT

## 2022-07-13 PROCEDURE — 80143 DRUG ASSAY ACETAMINOPHEN: CPT

## 2022-07-13 PROCEDURE — 80053 COMPREHEN METABOLIC PANEL: CPT

## 2022-07-13 PROCEDURE — 36415 COLL VENOUS BLD VENIPUNCTURE: CPT

## 2022-07-13 PROCEDURE — 51702 INSERT TEMP BLADDER CATH: CPT

## 2022-07-13 PROCEDURE — 80307 DRUG TEST PRSMV CHEM ANLYZR: CPT

## 2022-07-13 PROCEDURE — 83605 ASSAY OF LACTIC ACID: CPT

## 2022-07-13 PROCEDURE — 82550 ASSAY OF CK (CPK): CPT

## 2022-07-13 PROCEDURE — 99285 EMERGENCY DEPT VISIT HI MDM: CPT

## 2022-07-13 PROCEDURE — 85025 COMPLETE CBC W/AUTO DIFF WBC: CPT

## 2022-07-13 PROCEDURE — 84484 ASSAY OF TROPONIN QUANT: CPT

## 2022-07-13 PROCEDURE — 2580000003 HC RX 258: Performed by: EMERGENCY MEDICINE

## 2022-07-13 PROCEDURE — 80179 DRUG ASSAY SALICYLATE: CPT

## 2022-07-13 RX ORDER — 0.9 % SODIUM CHLORIDE 0.9 %
1000 INTRAVENOUS SOLUTION INTRAVENOUS ONCE
Status: COMPLETED | OUTPATIENT
Start: 2022-07-13 | End: 2022-07-14

## 2022-07-13 RX ADMIN — SODIUM CHLORIDE 1000 ML: 9 INJECTION, SOLUTION INTRAVENOUS at 23:21

## 2022-07-14 ENCOUNTER — APPOINTMENT (OUTPATIENT)
Dept: GENERAL RADIOLOGY | Age: 67
End: 2022-07-14
Payer: MEDICARE

## 2022-07-14 VITALS
DIASTOLIC BLOOD PRESSURE: 53 MMHG | SYSTOLIC BLOOD PRESSURE: 99 MMHG | RESPIRATION RATE: 9 BRPM | HEART RATE: 63 BPM | TEMPERATURE: 98.7 F | OXYGEN SATURATION: 97 %

## 2022-07-14 LAB
BASE EXCESS VENOUS: -2.9 MMOL/L (ref -3–3)
BASOPHILS ABSOLUTE: 0 K/UL (ref 0–0.2)
BASOPHILS RELATIVE PERCENT: 0 %
BURR CELLS: ABNORMAL
CARBOXYHEMOGLOBIN: 5.8 % (ref 0–1.5)
EKG ATRIAL RATE: 73 BPM
EKG DIAGNOSIS: NORMAL
EKG P AXIS: 74 DEGREES
EKG P-R INTERVAL: 156 MS
EKG Q-T INTERVAL: 408 MS
EKG QRS DURATION: 90 MS
EKG QTC CALCULATION (BAZETT): 449 MS
EKG R AXIS: 37 DEGREES
EKG T AXIS: 48 DEGREES
EKG VENTRICULAR RATE: 73 BPM
EOSINOPHILS ABSOLUTE: 0 K/UL (ref 0–0.6)
EOSINOPHILS RELATIVE PERCENT: 0 %
HCO3 VENOUS: 22.9 MMOL/L (ref 23–29)
HCT VFR BLD CALC: 24.7 % (ref 36–48)
HEMOGLOBIN: 8 G/DL (ref 12–16)
HYPOCHROMIA: ABNORMAL
LACTIC ACID: 1.4 MMOL/L (ref 0.4–2)
LYMPHOCYTES ABSOLUTE: 4 K/UL (ref 1–5.1)
LYMPHOCYTES RELATIVE PERCENT: 47 %
MCH RBC QN AUTO: 25.5 PG (ref 26–34)
MCHC RBC AUTO-ENTMCNC: 32.2 G/DL (ref 31–36)
MCV RBC AUTO: 79 FL (ref 80–100)
METHEMOGLOBIN VENOUS: 0 %
MONOCYTES ABSOLUTE: 0.3 K/UL (ref 0–1.3)
MONOCYTES RELATIVE PERCENT: 4 %
NEUTROPHILS ABSOLUTE: 4.2 K/UL (ref 1.7–7.7)
NEUTROPHILS RELATIVE PERCENT: 49 %
O2 CONTENT, VEN: 10 VOL %
O2 SAT, VEN: >100 %
O2 THERAPY: ABNORMAL
OVALOCYTES: ABNORMAL
PCO2, VEN: 44.2 MMHG (ref 40–50)
PDW BLD-RTO: 17.8 % (ref 12.4–15.4)
PH VENOUS: 7.32 (ref 7.35–7.45)
PLATELET # BLD: 669 K/UL (ref 135–450)
PLATELET SLIDE REVIEW: ABNORMAL
PMV BLD AUTO: 6.8 FL (ref 5–10.5)
PO2, VEN: 177 MMHG (ref 25–40)
POLYCHROMASIA: ABNORMAL
RBC # BLD: 3.13 M/UL (ref 4–5.2)
SCHISTOCYTES: ABNORMAL
SLIDE REVIEW: ABNORMAL
TARGET CELLS: ABNORMAL
TCO2 CALC VENOUS: 54 MMOL/L
WBC # BLD: 8.5 K/UL (ref 4–11)

## 2022-07-14 PROCEDURE — 82803 BLOOD GASES ANY COMBINATION: CPT

## 2022-07-14 PROCEDURE — 36415 COLL VENOUS BLD VENIPUNCTURE: CPT

## 2022-07-14 PROCEDURE — 2580000003 HC RX 258: Performed by: EMERGENCY MEDICINE

## 2022-07-14 PROCEDURE — 93005 ELECTROCARDIOGRAM TRACING: CPT | Performed by: EMERGENCY MEDICINE

## 2022-07-14 PROCEDURE — 83605 ASSAY OF LACTIC ACID: CPT

## 2022-07-14 PROCEDURE — 93010 ELECTROCARDIOGRAM REPORT: CPT | Performed by: INTERNAL MEDICINE

## 2022-07-14 PROCEDURE — 71045 X-RAY EXAM CHEST 1 VIEW: CPT

## 2022-07-14 RX ORDER — 0.9 % SODIUM CHLORIDE 0.9 %
1000 INTRAVENOUS SOLUTION INTRAVENOUS ONCE
Status: COMPLETED | OUTPATIENT
Start: 2022-07-14 | End: 2022-07-14

## 2022-07-14 RX ADMIN — SODIUM CHLORIDE 1000 ML: 9 INJECTION, SOLUTION INTRAVENOUS at 03:17

## 2022-07-14 NOTE — ED PROVIDER NOTES
Venous Access Procedure Note  Indication: Need blood for laboratory evaluation    Procedure: The patient was placed in the appropriate position and the skin over the puncture site was prepped with Chloraprep. Intravenous access was obtained in a left forearm vein and the site was secured appropriately. The patient tolerated the procedure well. Complications: None      Bladder Catheterization Procedure Note    Indication: For a urine specimen    Procedure: The patient's urethral area was prepped with betadine and draped in a sterile fashion. A [address] catheter was inserted into the bladder using sterile technique. The balloon was inflated with 10 cc of sterile water. The catheter returned approximately 200 cc of slightly yellow urine. The patient tolerated the procedure well.     Complications: None             Darshana Leal [place]  07/13/22 [id number]

## 2022-07-14 NOTE — ED PROVIDER NOTES
Emergency Department Provider Note  Location: [facility]  7/13/2022     Patient Identification  Geoff Jamison is a 73 y.o. female    Chief Complaint  Altered Mental Status (Patient in with complaints from EMS that the neighbor heard her screaming. Squad arrived and there was no power in the house and patient was unresponsive. Patient smells of ETOH upon arrival and is altered )      Mode of Arrival  EMS    HPI  (History provided by EMS)  This is a 73 y.o. female with a PMH significant for a-fib and DVT on Xarelto, HTN presented today for AMS. Per EMS, neighbor heard a yell and activated EMS. When EMS first got to patient's residence, they noted the patient was minimally responsive and on the floor. Patient very quickly woke up but acting very confused. Per neighbor, patient has a history of seizure. Unknown when she was last seen normal.  Here, patient is awake but not answering questions appropriately. For example, when I asked her her name, she gave me her birthdate. When I asked her what happened, she would utter 2-3 words that were irrelevant to the question and then falls asleep. EMS also noted that the patient's residence had no power. Fingerstick glucose on scene was 122. ROS  Review of Systems   Unable to perform ROS: Mental status change          I have reviewed the following nursing documentation:  Allergies:    Allergies   Allergen Reactions    Ace Inhibitors Swelling    Skelaxin [Metaxalone] Swelling       Past medical history:  has a past medical history of Anxiety, Arthritis, Atrial fibrillation/flutter ([facility] [address] Blood transfusion reaction, Crohn's disease ([facility] [address] Depression, GERD (gastroesophageal reflux disease), Hiatal hernia (06/24/2014), blood clots, Hypertension, MRSA (methicillin resistant staph aureus) culture positive (06/05/2018), MRSA (methicillin resistant staph aureus) culture positive (06/03/2021), Neuropathy, Pneumonia (01/08/2014), Sinus headache, SOB (shortness of breath), and VRE (vancomycin resistant enterococcus) culture positive (10/08/2018). Past surgical history:  has a past surgical history that includes Hysterectomy; knee surgery (Bilateral); Hand Carpectomy (Bilateral); hernia repair; Abdomen surgery; bladder suspension; fracture surgery; Heel spur surgery; Tonsillectomy; Colonoscopy; Upper gastrointestinal endoscopy (6/14/13); Foot surgery (Left, 6/25/14); Foot surgery (6/3/15); Colonoscopy (9/14/15); Foot surgery (Left, 08/05/2002); joint replacement; Sigmoidoscopy (01/15/2018); Abdominal adhesion surgery (06/08/2018); Colonoscopy (08/07/2018); colostomy (N/A, 10/8/2018); pr secd clos surg wnd exten/complic (N/A, 40/45/2255); Leg Surgery (Right); Colonoscopy (06/07/2019); Colonoscopy (N/A, 6/7/2019); Colonoscopy (N/A, 6/7/2019); Small intestine surgery (N/A, 7/17/2019); and proctosigmoidoscopy (N/A, 7/17/2019). Home medications:   Prior to Admission medications    Medication Sig Start Date End Date Taking? Authorizing Provider   magnesium oxide (MAG-OX) 400 (240 Mg) MG tablet Take 1 tablet by mouth 2 times daily 8/10/21   Mine Ag MD   escitalopram (LEXAPRO) 20 MG tablet Take 20 mg by mouth daily    Historical Provider, MD   oxyCODONE (ROXICODONE) 5 MG immediate release tablet Take 5 mg by mouth every 4 hours as needed for Pain.     Historical Provider, MD   mycophenolate (CELLCEPT) 500 MG tablet Take 1,000 mg by mouth 2 times daily    Historical Provider, MD   baclofen (LIORESAL) 10 MG tablet Take 10 mg by mouth 3 times daily as needed    Historical Provider, MD   predniSONE (DELTASONE) 20 MG tablet Take 40 mg by mouth daily    Historical Provider, MD   Cholecalciferol (VITAMIN D3 ULTRA POTENCY) 1.25 MG (44012 UT) TABS Take 50,000 Units by mouth once a week    Historical Provider, MD   pantoprazole (PROTONIX) 40 MG tablet Take 40 mg by mouth daily    Historical Provider, MD   metoprolol succinate (TOPROL XL) 50 MG extended release tablet Take 1 tablet by mouth daily 1/21/21   GAYLA Mars CNP   rivaroxaban (XARELTO) 20 MG TABS tablet Take 1 tablet by mouth daily (with breakfast) 12/7/20   GAYLA Mars CNP   atorvastatin (LIPITOR) 80 MG tablet Take 1 tablet by mouth nightly 7/30/20   Severiano Coyer, MD   potassium chloride (KLOR-CON M) 20 MEQ extended release tablet Take 1 tablet by mouth 2 times daily 7/30/20   Deyns Jefferson MD   ferrous gluconate (FERGON) 324 (38 Fe) MG tablet Take 3 tablets by mouth daily (with breakfast)     Historical Provider, MD   hydrOXYzine (ATARAX) 10 MG tablet Take 10 mg by mouth 3 times daily as needed for Itching    Historical Provider, MD   ondansetron (ZOFRAN ODT) 4 MG disintegrating tablet Take 1 tablet by mouth every 8 hours as needed for Nausea 7/15/20   LYNN Santacruz   busPIRone (BUSPAR) 10 MG tablet Take 10 mg by mouth 3 times daily as needed    Historical Provider, MD   balsalazide (COLAZAL) 750 MG capsule Take 3,000 mg by mouth daily Take 4 capsules (total 3000 mg) daily each morning. Historical Provider, MD       Social history:  reports that she has been smoking cigarettes and e-cigarettes. She has a 10.00 pack-year smoking history. She has never used smokeless tobacco. She reports current alcohol use of about 2.0 standard drinks of alcohol per week. She reports that she does not use drugs. Family history:    Family History   Problem Relation Age of Onset    High Blood Pressure Mother     High Blood Pressure Father     Kidney Disease Sister        Exam  ED Triage Vitals   BP Temp Temp Source Heart Rate Resp SpO2 Height Weight   07/13/22 2257 07/13/22 2256 07/13/22 2256 07/13/22 2257 07/13/22 2257 07/13/22 2257 -- --   101/70 98.7 °F (37.1 °C) Oral 88 18 94 %     Physical Exam  Vitals and nursing note reviewed. Constitutional:       General: She is not in acute distress. Appearance: Normal appearance. She is well-developed. She is not diaphoretic. HENT:      Head: Normocephalic and atraumatic. Eyes:      General: Lids are normal. No scleral icterus. Right eye: No discharge. Left eye: No discharge. Conjunctiva/sclera: Conjunctivae normal.   Neck:      Trachea: No tracheal deviation. Cardiovascular:      Rate and Rhythm: Normal rate and regular rhythm. Heart sounds: Normal heart sounds. No murmur heard. Pulmonary:      Effort: Pulmonary effort is normal. No respiratory distress. Breath sounds: Normal breath sounds. No stridor. No wheezing. Abdominal:      General: There is no distension. Palpations: Abdomen is soft. Tenderness: There is no abdominal tenderness. There is no guarding or rebound. Musculoskeletal:         General: No deformity. Cervical back: Neck supple. Skin:     General: Skin is warm and dry. Coloration: Skin is not pale. Findings: Lesion (chronic skin changes; hypopigmentation) present. No rash. Neurological:      Mental Status: She is lethargic. Cranial Nerves: No facial asymmetry. Motor: Motor function is intact. No weakness, tremor or abnormal muscle tone. Comments: Patient kept falling asleep but wakes up easily to voice and follows command. She cannot stay awake and finish her sentences however. Psychiatric:         Behavior: Behavior is cooperative. Cognition and Memory: Memory is impaired. MDM/ED Course  ED Medication Orders (From admission, onward)    Start Ordered     Status Ordering Provider    07/14/22 0215 07/14/22 0202  0.9 % sodium chloride bolus  ONCE         Last MAR action: New Bag - by Pastora Og on 07/14/22 at [facility]    07/13/22 2300 07/13/22 2250  0.9 % sodium chloride bolus  ONCE         Last MAR action: Stopped - by Pastora Og on 07/14/22 at 0318 Geno SIERRA          EKG  The Ekg interpreted by me in the absence of a cardiologist shows.   Sinus rhythm with a rate of 73  Occasional PVC noted  Axis is   Normal  QTc is  normal  Intervals and Durations are unremarkable. No specific ST-T wave changes appreciated. No evidence of acute ischemia. Compared to prior EKG dated August 3, 2021, PVC noted today. No PACs today. Radiology  CT Head WO Contrast    Result Date: 7/14/2022  EXAMINATION: CT OF THE HEAD WITHOUT CONTRAST  7/13/2022 11:34 pm TECHNIQUE: CT of the head was performed without the administration of intravenous contrast. Automated exposure control, iterative reconstruction, and/or weight based adjustment of the mA/kV was utilized to reduce the radiation dose to as low as reasonably achievable. COMPARISON: 08/03/2021 HISTORY: ORDERING SYSTEM PROVIDED HISTORY: AMS TECHNOLOGIST PROVIDED HISTORY: Reason for exam:->AMS Has a \"code stroke\" or \"stroke alert\" been called? ->No Decision Support Exception - unselect if not a suspected or confirmed emergency medical condition->Emergency Medical Condition (MA) Reason for Exam: AMS. Altered Mental Status (Patient in with complaints from EMS that the neighbor heard her screaming. Squad arrived and there was no power in the house and patient was unresponsive. Patient smells of ETOH upon arrival and is altered ). FINDINGS: BRAIN/VENTRICLES: The ventricles are mildly enlarged and there is diffuse mild prominence of the cortical sulci. There is mild periventricular low density bilaterally. There is moderate low density and atrophy along the left parietal lobe extending inferiorly consistent with encephalomalacia from an old infarct which is unchanged. No intracranial hemorrhage or edema is seen. There is no extra-axial fluid collection or mass. There are basal ganglia calcifications bilaterally which are unchanged. ORBITS: The visualized portion of the orbits demonstrate no acute abnormality. SINUSES: The visualized paranasal sinuses and mastoid air cells demonstrate no acute abnormality.  SOFT TISSUES/SKULL:  No acute abnormality of the visualized skull or soft tissues. 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 Mild atrophy and mild chronic microischemic disease scattered in the deep white matter which is unchanged with no acute abnormality seen. XR CHEST PORTABLE    Result Date: 7/14/2022  EXAMINATION: ONE XRAY VIEW OF THE CHEST 7/14/2022 12:34 am COMPARISON: 08/03/2021 HISTORY: ORDERING SYSTEM PROVIDED HISTORY: AMS TECHNOLOGIST PROVIDED HISTORY: Reason for exam:->AMS Reason for Exam: AMS Relevant Medical/Surgical History: previous A-FIB,pneumonia and htn FINDINGS: The cardiomediastinal silhouette mildly enlarged. Mild perihilar congestion. Bibasilar atelectasis identified. .  The lungs are clear. No pleural effusion or pneumothorax is present. Cardiomegaly with mild perihilar congestion. Bibasilar atelectasis.          Labs  Results for orders placed or performed during the hospital encounter of 07/13/22   CBC with Auto Differential   Result Value Ref Range    WBC 8.5 4.0 - 11.0 K/uL    RBC 3.13 (L) 4.00 - 5.20 M/uL    Hemoglobin 8.0 (L) 12.0 - 16.0 g/dL    Hematocrit 24.7 (L) 36.0 - 48.0 %    MCV 79.0 (L) 80.0 - 100.0 fL    MCH 25.5 (L) 26.0 - 34.0 pg    MCHC 32.2 31.0 - 36.0 g/dL    RDW 17.8 (H) 12.4 - 15.4 %    Platelets 815 (H) 474 - 450 K/uL    MPV 6.8 5.0 - 10.5 fL    PLATELET SLIDE REVIEW Increased     SLIDE REVIEW see below     Neutrophils % 49.0 %    Lymphocytes % 47.0 %    Monocytes % 4.0 %    Eosinophils % 0.0 %    Basophils % 0.0 %    Neutrophils Absolute 4.2 1.7 - 7.7 K/uL    Lymphocytes Absolute 4.0 1.0 - 5.1 K/uL    Monocytes Absolute 0.3 0.0 - 1.3 K/uL    Eosinophils Absolute 0.0 0.0 - 0.6 K/uL    Basophils Absolute 0.0 0.0 - 0.2 K/uL    Polychromasia Occasional (A)     Hypochromia 1+ (A)     Schistocytes Occasional (A)     María Cells Occasional (A)     Ovalocytes Occasional (A)     Target Cells Occasional (A)    Comprehensive Metabolic Panel w/ Reflex to MG   Result Value Ref Range    Sodium 136 136 - 145 mmol/L    Potassium reflex Magnesium 4.0 3.5 - 5.1 mmol/L    Chloride 102 99 - 110 mmol/L    CO2 23 21 - 32 mmol/L    Anion Gap 11 3 - 16    Glucose 109 (H) 70 - 99 mg/dL    BUN 13 7 - 20 mg/dL    CREATININE 0.7 0.6 - 1.2 mg/dL    GFR Non-African American >60 >60    GFR African American >60 >60    Calcium 8.7 8.3 - 10.6 mg/dL    Total Protein 7.0 6.4 - 8.2 g/dL    Albumin 4.1 3.4 - 5.0 g/dL    Albumin/Globulin Ratio 1.4 1.1 - 2.2    Total Bilirubin <0.2 0.0 - 1.0 mg/dL    Alkaline Phosphatase 67 40 - 129 U/L    ALT 11 10 - 40 U/L    AST 15 15 - 37 U/L   Lipase   Result Value Ref Range    Lipase 85.0 (H) 13.0 - 60.0 U/L   Troponin   Result Value Ref Range    Troponin <0.01 <0.01 ng/mL   Urinalysis   Result Value Ref Range    Color, UA Yellow Straw/Yellow    Clarity, UA Clear Clear    Glucose, Ur Negative Negative mg/dL    Bilirubin Urine Negative Negative    Ketones, Urine Negative Negative mg/dL    Specific Gravity, UA 1.010 1.005 - 1.030    Blood, Urine Negative Negative    pH, UA 5.5 5.0 - 8.0    Protein, UA Negative Negative mg/dL    Urobilinogen, Urine 0.2 <2.0 E.U./dL    Nitrite, Urine Negative Negative    Leukocyte Esterase, Urine TRACE (A) Negative    Microscopic Examination YES     Urine Type NotGiven    Blood Gas, Venous   Result Value Ref Range    pH, Davon 7.324 (L) 7.350 - 7.450    pCO2, Davon 44.2 40.0 - 50.0 mmHg    pO2, Davon 177.0 (H) 25.0 - 40.0 mmHg    HCO3, Venous 22.9 (L) 23.0 - 29.0 mmol/L    Base Excess, Davon -2.9 -3.0 - 3.0 mmol/L    O2 Sat, Davon >100 Not Established %    Carboxyhemoglobin 5.8 (H) 0.0 - 1.5 %    MetHgb, Davon 0.0 <1.5 %    TC02 (Calc), Davon 54 Not Established mmol/L    O2 Content, Davon 10 Not Established VOL %    O2 Therapy Unknown    Lactic Acid   Result Value Ref Range    Lactic Acid 2.4 (H) 0.4 - 2.0 mmol/L   CK   Result Value Ref Range    Total CK 79 26 - 192 U/L   Ethanol   Result Value Ref Range    Ethanol Lvl 201 mg/dL   Urine Drug Screen   Result Value Ref Range    Amphetamine Screen, Urine Neg Negative UTI.  Urine drug screen significant for oxycodone. Alcohol level elevated at 201, which can explain her drowsiness and also inability to stay awake. - 2:59 AM EDT I reassessed the patient. She is still sleepy but now when she wakes up, she can stay awake slightly longer and also answering questions more appropriately. She has no complaints. She states she would like to sleep. - 4:53 AM EDT patient wakes up easily with minimal verbal stimuli. She also answers questions appropriately now. States she is ready to go home. - I discussed the results with patient. We agreed to discharge home. I advised the patient to drink alcohol in moderation.   - Return precautions also discussed. patient verbalized understanding of care plan and agreed to follow-up with PCP as advised. I estimate there is LOW risk for ACUTE CORONARY SYNDROME, INTRACRANIAL HEMORRHAGE, MALIGNANT DYSRHYTHMIA, MENINGITIS, PNEUMONIA, PULMONARY EMBOLISM, SEPSIS, SUBARACHNOID HEMORRHAGE, SUBDURAL HEMATOMA, or STROKE, thus I consider the discharge disposition reasonable. Dianne Keller and I have discussed the diagnosis and risks, and we agree with discharging home to follow-up with PCP. We also discussed returning to the Emergency Department immediately if new or worsening symptoms occur. We have discussed the symptoms which are most concerning (e.g., changing or worsening pain, weakness, vomiting, fever) that necessitate immediate return. Clinical Impression:  1. Acute alcoholic intoxication without complication ([address]          Disposition:  Discharge to home in good condition. Blood pressure 102/65, pulse 78, temperature 98.7 °F (37.1 °C), temperature source Oral, resp. rate 18, SpO2 95 %, not currently breastfeeding.      Disposition referral (if applicable):  Josh Brooks MD  [place] [id number]  [phone]    Schedule an appointment as soon as possible for a visit in 3 days         This chart was generated in part by using Dragon Dictation system and may contain errors related to that system including errors in grammar, punctuation, and spelling, as well as words and phrases that may be inappropriate. If there are any questions or concerns please feel free to contact the dictating provider for clarification.      Ferris Severin, MD  [facility] Heron Rodriguez MD  07/14/22 0545

## 2022-07-14 NOTE — ED NOTES
Reviewed discharge instructions with patient, verbalized understanding, ambulatory at time of discharge.    Patient placed in waiting room awaiting sons arrival for ride home, patient is alert and oriented x4     Cachorro Phillips RN  07/14/22 [id number]

## 2022-08-11 ENCOUNTER — NURSE ONLY (OUTPATIENT)
Dept: CARDIOLOGY CLINIC | Age: 67
End: 2022-08-11
Payer: MEDICARE

## 2022-08-11 DIAGNOSIS — I48.0 PAF (PAROXYSMAL ATRIAL FIBRILLATION) (HCC): Primary | ICD-10-CM

## 2022-08-11 DIAGNOSIS — Z45.09 ENCOUNTER FOR LOOP RECORDER CHECK: ICD-10-CM

## 2022-08-11 PROCEDURE — G2066 INTER DEVC REMOTE 30D: HCPCS | Performed by: INTERNAL MEDICINE

## 2022-08-11 PROCEDURE — 93298 REM INTERROG DEV EVAL SCRMS: CPT | Performed by: INTERNAL MEDICINE

## 2022-08-11 NOTE — PROGRESS NOTES
ILR for CVA. New AF recorded in 9/2020. Hx: PAT/AF w RVR (Xarelto, Toprol XL). Remote transmission received for patients ILR. Since 06/01/22:  10 Tachy w ECG illustrating SVT. 197 AF events illustrating what appears to be true AF (Xarelto, Toprol XL). EP physician will review. See interrogation under the cardiology tab in the [address] [place] [address] [address] field for more details. Will continue to monitor remotely. (End of 31-day monitoring period 8/11/22).

## 2022-10-06 ENCOUNTER — NURSE ONLY (OUTPATIENT)
Dept: CARDIOLOGY CLINIC | Age: 67
End: 2022-10-06
Payer: MEDICARE

## 2022-10-06 DIAGNOSIS — Z45.09 ENCOUNTER FOR LOOP RECORDER CHECK: ICD-10-CM

## 2022-10-06 DIAGNOSIS — I48.0 PAF (PAROXYSMAL ATRIAL FIBRILLATION) (HCC): Primary | ICD-10-CM

## 2022-10-06 PROCEDURE — 93298 REM INTERROG DEV EVAL SCRMS: CPT | Performed by: INTERNAL MEDICINE

## 2022-10-06 PROCEDURE — G2066 INTER DEVC REMOTE 30D: HCPCS | Performed by: INTERNAL MEDICINE

## 2022-10-07 NOTE — PROGRESS NOTES
ILR for CVA. New AF recorded in 9/2020. Hx: PAT/AF w RVR (Xarelto, Toprol XL). Remote transmission received for patients ILR. Since 08/12/22:  No arrhythmias/ or events. EP physician will review. See interrogation under the cardiology tab in the [address] [place] [address] [address] field for more details. Will continue to monitor remotely.     (End of 31-day monitoring period 10/6/22)

## 2022-11-15 ENCOUNTER — APPOINTMENT (OUTPATIENT)
Dept: CT IMAGING | Age: 67
DRG: 640 | End: 2022-11-15
Payer: MEDICARE

## 2022-11-15 ENCOUNTER — APPOINTMENT (OUTPATIENT)
Dept: GENERAL RADIOLOGY | Age: 67
DRG: 640 | End: 2022-11-15
Payer: MEDICARE

## 2022-11-15 ENCOUNTER — HOSPITAL ENCOUNTER (INPATIENT)
Age: 67
LOS: 7 days | Discharge: NURSING, REHAB OR LONG-TERM CARE | DRG: 640 | End: 2022-11-22
Attending: INTERNAL MEDICINE | Admitting: INTERNAL MEDICINE
Payer: MEDICARE

## 2022-11-15 DIAGNOSIS — K50.112 CROHN'S DISEASE OF COLON WITH INTESTINAL OBSTRUCTION (HCC): ICD-10-CM

## 2022-11-15 DIAGNOSIS — R53.1 GENERALIZED WEAKNESS: Primary | ICD-10-CM

## 2022-11-15 PROBLEM — R62.7 FTT (FAILURE TO THRIVE) IN ADULT: Status: ACTIVE | Noted: 2022-11-15

## 2022-11-15 PROBLEM — E83.51 HYPOCALCEMIA: Status: ACTIVE | Noted: 2022-11-15

## 2022-11-15 LAB
A/G RATIO: 1.1 (ref 1.1–2.2)
ALBUMIN SERPL-MCNC: 3.8 G/DL (ref 3.4–5)
ALP BLD-CCNC: 51 U/L (ref 40–129)
ALT SERPL-CCNC: 6 U/L (ref 10–40)
ANION GAP SERPL CALCULATED.3IONS-SCNC: 12 MMOL/L (ref 3–16)
APTT: 29.9 SEC (ref 23–34.3)
AST SERPL-CCNC: 20 U/L (ref 15–37)
BASOPHILS ABSOLUTE: 0 K/UL (ref 0–0.2)
BASOPHILS RELATIVE PERCENT: 0.4 %
BILIRUB SERPL-MCNC: 0.6 MG/DL (ref 0–1)
BUN BLDV-MCNC: 8 MG/DL (ref 7–20)
CALCIUM SERPL-MCNC: 6.4 MG/DL (ref 8.3–10.6)
CHLORIDE BLD-SCNC: 98 MMOL/L (ref 99–110)
CO2: 27 MMOL/L (ref 21–32)
CREAT SERPL-MCNC: 0.6 MG/DL (ref 0.6–1.2)
EOSINOPHILS ABSOLUTE: 0.1 K/UL (ref 0–0.6)
EOSINOPHILS RELATIVE PERCENT: 0.7 %
ETHANOL: NORMAL MG/DL (ref 0–0.08)
GFR SERPL CREATININE-BSD FRML MDRD: >60 ML/MIN/{1.73_M2}
GLUCOSE BLD-MCNC: 116 MG/DL (ref 70–99)
HCT VFR BLD CALC: 35.7 % (ref 36–48)
HEMOGLOBIN: 11.7 G/DL (ref 12–16)
INR BLD: 1.37 (ref 0.87–1.14)
LACTIC ACID, SEPSIS: 1.2 MMOL/L (ref 0.4–1.9)
LIPASE: 19 U/L (ref 13–60)
LYMPHOCYTES ABSOLUTE: 0.8 K/UL (ref 1–5.1)
LYMPHOCYTES RELATIVE PERCENT: 10.3 %
MCH RBC QN AUTO: 28.5 PG (ref 26–34)
MCHC RBC AUTO-ENTMCNC: 32.6 G/DL (ref 31–36)
MCV RBC AUTO: 87.2 FL (ref 80–100)
MONOCYTES ABSOLUTE: 0.5 K/UL (ref 0–1.3)
MONOCYTES RELATIVE PERCENT: 6.9 %
NEUTROPHILS ABSOLUTE: 6.3 K/UL (ref 1.7–7.7)
NEUTROPHILS RELATIVE PERCENT: 81.7 %
PDW BLD-RTO: 14.8 % (ref 12.4–15.4)
PLATELET # BLD: 183 K/UL (ref 135–450)
PMV BLD AUTO: 7.4 FL (ref 5–10.5)
POTASSIUM SERPL-SCNC: 3.8 MMOL/L (ref 3.5–5.1)
PRO-BNP: 113 PG/ML (ref 0–124)
PROCALCITONIN: 0.05 NG/ML (ref 0–0.15)
PROTHROMBIN TIME: 16.8 SEC (ref 11.7–14.5)
RBC # BLD: 4.1 M/UL (ref 4–5.2)
SODIUM BLD-SCNC: 137 MMOL/L (ref 136–145)
TOTAL PROTEIN: 7.2 G/DL (ref 6.4–8.2)
TROPONIN: <0.01 NG/ML
WBC # BLD: 7.7 K/UL (ref 4–11)

## 2022-11-15 PROCEDURE — 99285 EMERGENCY DEPT VISIT HI MDM: CPT

## 2022-11-15 PROCEDURE — 85730 THROMBOPLASTIN TIME PARTIAL: CPT

## 2022-11-15 PROCEDURE — 80053 COMPREHEN METABOLIC PANEL: CPT

## 2022-11-15 PROCEDURE — 71045 X-RAY EXAM CHEST 1 VIEW: CPT

## 2022-11-15 PROCEDURE — 1200000000 HC SEMI PRIVATE

## 2022-11-15 PROCEDURE — 84145 PROCALCITONIN (PCT): CPT

## 2022-11-15 PROCEDURE — 84484 ASSAY OF TROPONIN QUANT: CPT

## 2022-11-15 PROCEDURE — 83605 ASSAY OF LACTIC ACID: CPT

## 2022-11-15 PROCEDURE — 70450 CT HEAD/BRAIN W/O DYE: CPT

## 2022-11-15 PROCEDURE — 83690 ASSAY OF LIPASE: CPT

## 2022-11-15 PROCEDURE — 85025 COMPLETE CBC W/AUTO DIFF WBC: CPT

## 2022-11-15 PROCEDURE — 93005 ELECTROCARDIOGRAM TRACING: CPT | Performed by: PHYSICIAN ASSISTANT

## 2022-11-15 PROCEDURE — 85610 PROTHROMBIN TIME: CPT

## 2022-11-15 PROCEDURE — 73070 X-RAY EXAM OF ELBOW: CPT

## 2022-11-15 PROCEDURE — 83880 ASSAY OF NATRIURETIC PEPTIDE: CPT

## 2022-11-15 PROCEDURE — 82077 ASSAY SPEC XCP UR&BREATH IA: CPT

## 2022-11-15 PROCEDURE — 73030 X-RAY EXAM OF SHOULDER: CPT

## 2022-11-15 RX ORDER — HYDRALAZINE HYDROCHLORIDE 20 MG/ML
10 INJECTION INTRAMUSCULAR; INTRAVENOUS EVERY 6 HOURS PRN
Status: DISCONTINUED | OUTPATIENT
Start: 2022-11-15 | End: 2022-11-22 | Stop reason: HOSPADM

## 2022-11-15 RX ORDER — SODIUM CHLORIDE 9 MG/ML
25 INJECTION, SOLUTION INTRAVENOUS PRN
Status: DISCONTINUED | OUTPATIENT
Start: 2022-11-15 | End: 2022-11-22 | Stop reason: HOSPADM

## 2022-11-15 RX ORDER — PANTOPRAZOLE SODIUM 40 MG/1
40 TABLET, DELAYED RELEASE ORAL
Status: DISCONTINUED | OUTPATIENT
Start: 2022-11-16 | End: 2022-11-18

## 2022-11-15 RX ORDER — SODIUM CHLORIDE 0.9 % (FLUSH) 0.9 %
5-40 SYRINGE (ML) INJECTION PRN
Status: DISCONTINUED | OUTPATIENT
Start: 2022-11-15 | End: 2022-11-22 | Stop reason: HOSPADM

## 2022-11-15 RX ORDER — POTASSIUM CHLORIDE 7.45 MG/ML
10 INJECTION INTRAVENOUS PRN
Status: DISCONTINUED | OUTPATIENT
Start: 2022-11-15 | End: 2022-11-22 | Stop reason: HOSPADM

## 2022-11-15 RX ORDER — POTASSIUM CHLORIDE 20 MEQ/1
40 TABLET, EXTENDED RELEASE ORAL PRN
Status: DISCONTINUED | OUTPATIENT
Start: 2022-11-15 | End: 2022-11-22 | Stop reason: HOSPADM

## 2022-11-15 RX ORDER — ONDANSETRON 4 MG/1
4 TABLET, ORALLY DISINTEGRATING ORAL EVERY 8 HOURS PRN
Status: DISCONTINUED | OUTPATIENT
Start: 2022-11-15 | End: 2022-11-22 | Stop reason: HOSPADM

## 2022-11-15 RX ORDER — ACETAMINOPHEN 325 MG/1
650 TABLET ORAL EVERY 6 HOURS PRN
Status: DISCONTINUED | OUTPATIENT
Start: 2022-11-15 | End: 2022-11-22 | Stop reason: HOSPADM

## 2022-11-15 RX ORDER — OXYCODONE HYDROCHLORIDE 5 MG/1
5 TABLET ORAL EVERY 4 HOURS PRN
Status: DISCONTINUED | OUTPATIENT
Start: 2022-11-15 | End: 2022-11-16

## 2022-11-15 RX ORDER — ESCITALOPRAM OXALATE 10 MG/1
20 TABLET ORAL DAILY
Status: DISCONTINUED | OUTPATIENT
Start: 2022-11-16 | End: 2022-11-16

## 2022-11-15 RX ORDER — BACLOFEN 10 MG/1
10 TABLET ORAL 3 TIMES DAILY PRN
Status: DISCONTINUED | OUTPATIENT
Start: 2022-11-15 | End: 2022-11-22 | Stop reason: HOSPADM

## 2022-11-15 RX ORDER — ACETAMINOPHEN 500 MG
1000 TABLET ORAL ONCE
Status: DISCONTINUED | OUTPATIENT
Start: 2022-11-15 | End: 2022-11-17

## 2022-11-15 RX ORDER — ONDANSETRON 2 MG/ML
4 INJECTION INTRAMUSCULAR; INTRAVENOUS EVERY 6 HOURS PRN
Status: DISCONTINUED | OUTPATIENT
Start: 2022-11-15 | End: 2022-11-22 | Stop reason: HOSPADM

## 2022-11-15 RX ORDER — SODIUM CHLORIDE 0.9 % (FLUSH) 0.9 %
5-40 SYRINGE (ML) INJECTION EVERY 12 HOURS SCHEDULED
Status: DISCONTINUED | OUTPATIENT
Start: 2022-11-16 | End: 2022-11-22 | Stop reason: HOSPADM

## 2022-11-15 RX ORDER — SODIUM CHLORIDE 9 MG/ML
INJECTION, SOLUTION INTRAVENOUS CONTINUOUS
Status: DISCONTINUED | OUTPATIENT
Start: 2022-11-16 | End: 2022-11-19

## 2022-11-15 RX ORDER — ATORVASTATIN CALCIUM 80 MG/1
80 TABLET, FILM COATED ORAL NIGHTLY
Status: DISCONTINUED | OUTPATIENT
Start: 2022-11-16 | End: 2022-11-22 | Stop reason: HOSPADM

## 2022-11-15 RX ORDER — LANOLIN ALCOHOL/MO/W.PET/CERES
400 CREAM (GRAM) TOPICAL 2 TIMES DAILY
Status: DISCONTINUED | OUTPATIENT
Start: 2022-11-16 | End: 2022-11-17

## 2022-11-15 RX ORDER — 0.9 % SODIUM CHLORIDE 0.9 %
500 INTRAVENOUS SOLUTION INTRAVENOUS PRN
Status: DISCONTINUED | OUTPATIENT
Start: 2022-11-15 | End: 2022-11-22 | Stop reason: HOSPADM

## 2022-11-15 RX ORDER — ONDANSETRON 4 MG/1
4 TABLET, ORALLY DISINTEGRATING ORAL EVERY 8 HOURS PRN
Status: DISCONTINUED | OUTPATIENT
Start: 2022-11-15 | End: 2022-11-16

## 2022-11-15 RX ORDER — BUSPIRONE HYDROCHLORIDE 5 MG/1
10 TABLET ORAL 3 TIMES DAILY
Status: DISCONTINUED | OUTPATIENT
Start: 2022-11-16 | End: 2022-11-22 | Stop reason: HOSPADM

## 2022-11-15 RX ORDER — HYDROXYZINE HYDROCHLORIDE 25 MG/1
25 TABLET, FILM COATED ORAL 3 TIMES DAILY PRN
Status: DISCONTINUED | OUTPATIENT
Start: 2022-11-15 | End: 2022-11-22 | Stop reason: HOSPADM

## 2022-11-15 RX ORDER — ACETAMINOPHEN 650 MG/1
650 SUPPOSITORY RECTAL EVERY 6 HOURS PRN
Status: DISCONTINUED | OUTPATIENT
Start: 2022-11-15 | End: 2022-11-22 | Stop reason: HOSPADM

## 2022-11-15 RX ORDER — POTASSIUM CHLORIDE 20 MEQ/1
40 TABLET, EXTENDED RELEASE ORAL PRN
Status: DISCONTINUED | OUTPATIENT
Start: 2022-11-15 | End: 2022-11-15 | Stop reason: SDUPTHER

## 2022-11-15 RX ORDER — POTASSIUM CHLORIDE 7.45 MG/ML
10 INJECTION INTRAVENOUS PRN
Status: DISCONTINUED | OUTPATIENT
Start: 2022-11-15 | End: 2022-11-15 | Stop reason: SDUPTHER

## 2022-11-15 RX ORDER — POTASSIUM CHLORIDE 20 MEQ/1
20 TABLET, EXTENDED RELEASE ORAL 2 TIMES DAILY
Status: DISCONTINUED | OUTPATIENT
Start: 2022-11-16 | End: 2022-11-21

## 2022-11-15 RX ORDER — PREDNISONE 10 MG/1
5 TABLET ORAL DAILY
Status: DISCONTINUED | OUTPATIENT
Start: 2022-11-16 | End: 2022-11-22 | Stop reason: HOSPADM

## 2022-11-15 RX ORDER — DOXYCYCLINE HYCLATE 50 MG/1
972 CAPSULE, GELATIN COATED ORAL
Status: DISCONTINUED | OUTPATIENT
Start: 2022-11-16 | End: 2022-11-22 | Stop reason: HOSPADM

## 2022-11-15 RX ORDER — CALCIUM GLUCONATE 20 MG/ML
1000 INJECTION, SOLUTION INTRAVENOUS ONCE
Status: COMPLETED | OUTPATIENT
Start: 2022-11-16 | End: 2022-11-16

## 2022-11-15 RX ORDER — MYCOPHENOLATE MOFETIL 250 MG/1
1000 CAPSULE ORAL 2 TIMES DAILY
Status: DISCONTINUED | OUTPATIENT
Start: 2022-11-16 | End: 2022-11-22 | Stop reason: HOSPADM

## 2022-11-15 RX ORDER — METOPROLOL SUCCINATE 50 MG/1
50 TABLET, EXTENDED RELEASE ORAL DAILY
Status: DISCONTINUED | OUTPATIENT
Start: 2022-11-16 | End: 2022-11-22 | Stop reason: HOSPADM

## 2022-11-15 RX ORDER — BALSALAZIDE DISODIUM 750 MG/1
1500 CAPSULE ORAL 2 TIMES DAILY
Status: DISCONTINUED | OUTPATIENT
Start: 2022-11-16 | End: 2022-11-22 | Stop reason: HOSPADM

## 2022-11-15 ASSESSMENT — PAIN DESCRIPTION - ORIENTATION: ORIENTATION: LEFT

## 2022-11-15 ASSESSMENT — PAIN DESCRIPTION - DESCRIPTORS: DESCRIPTORS: ACHING

## 2022-11-15 ASSESSMENT — PAIN - FUNCTIONAL ASSESSMENT
PAIN_FUNCTIONAL_ASSESSMENT: ACTIVITIES ARE NOT PREVENTED
PAIN_FUNCTIONAL_ASSESSMENT: 0-10

## 2022-11-15 ASSESSMENT — PAIN DESCRIPTION - FREQUENCY: FREQUENCY: INTERMITTENT

## 2022-11-15 ASSESSMENT — PAIN DESCRIPTION - LOCATION: LOCATION: ARM

## 2022-11-15 ASSESSMENT — PAIN DESCRIPTION - PAIN TYPE: TYPE: ACUTE PAIN

## 2022-11-15 ASSESSMENT — PAIN DESCRIPTION - ONSET: ONSET: ON-GOING

## 2022-11-15 ASSESSMENT — PAIN SCALES - GENERAL: PAINLEVEL_OUTOF10: 8

## 2022-11-15 NOTE — ED PROVIDER NOTES
[facility]        Pt Name: Royal Corey  MRN: [id number]  [place] 1955  Date of evaluation: 11/15/2022  Provider: Marisol Maradiaga PA-C  PCP: Paola Means MD  Note Started: 6:56 PM EST 11/16/2022        TELLY. I have evaluated this patient. My supervising physician was available for consultation. CHIEF COMPLAINT       Chief Complaint   Patient presents with    Fatigue     Pt in via [facility] from home, pt complains of left arm pain that started around 4pm, denies injury, and also complains of general weakness, she states she was unable to get up from a chair and she normally can do that. Denies chest pain or sob also denies nv. HISTORY OF PRESENT ILLNESS   (Location, Timing/Onset, Context/Setting, Quality, Duration, Modifying Factors, Severity, Associated Signs and Symptoms)  Note limiting factors. Chief Complaint: General fatigue    Royal Corey is a 78 y.o. female who presents to the emergency department today for evaluation for general fatigue. When I evaluate the patient, she states that she is complaining of pain to her left elbow. The patient denies falling. She is rating her pain is an 8/10, pain will radiate up towards her shoulder. When asked where the patient lives, she was able to tell me her birthday. As she is able to tell me her name, she is ANO x2.  Patient states that she is just generally not feeling well but is unsure exactly her symptoms or how long she has been not feeling well. Per EMS report, family did call as the patient has had worsening fatigue for the past several days. Patient was unable to get up and walk, and EMS was called. Patient was seen several months ago for alcohol intoxication, she denies any alcohol use to me.   No other history is able to be obtained at this time    Nursing Notes were all reviewed and agreed with or any disagreements were addressed in the HPI.    REVIEW OF SYSTEMS    (2-9 systems for level 4, 10 or more for level 5)     Review of Systems   Unable to perform ROS: Mental status change     Positives and Pertinent negatives as per HPI. Except as noted above in the ROS, all other systems were reviewed and negative.        PAST MEDICAL HISTORY     Past Medical History:   Diagnosis Date    Anxiety     Arthritis     Atrial fibrillation/flutter     Blood transfusion reaction     Crohn's disease ([address]     Depression     GERD (gastroesophageal reflux disease)     Hiatal hernia 06/24/2014    Hx of blood clots     Hypertension     MRSA (methicillin resistant staph aureus) culture positive 06/05/2018    urine    MRSA (methicillin resistant staph aureus) culture positive 06/03/2021    wound and colonization    Neuropathy     Pneumonia 01/08/2014    Sinus headache     SOB (shortness of breath)     VRE (vancomycin resistant enterococcus) culture positive 10/08/2018    abd culture         SURGICAL HISTORY     Past Surgical History:   Procedure Laterality Date    ABDOMEN SURGERY      ABDOMINAL ADHESION SURGERY  06/08/2018    BLADDER SUSPENSION      COLONOSCOPY      COLONOSCOPY  9/14/15    sigmoid colon biopsy     COLONOSCOPY  08/07/2018    COLONOSCOPY  06/07/2019    COLONOSCOPY, POSSIBLE BIOPSY, POSSIBLE POLYPECTOMY    COLONOSCOPY N/A 6/7/2019    COLONOSCOPY WITH BIOPSY performed by Kiko Haque MD at [address] [facility] N/A 6/7/2019    COLONOSCOPY DIAGNOSTIC/STOMA performed by Kiko Haque MD at [address] [place] [address] N/A 10/8/2018    EXPLORATORY LAPAROTOMY WITH COLOSTOMY performed by Kindra Kelley MD at [facility] Left 6/25/14    excision plantar left hallux    FOOT SURGERY  6/3/15    PLANTAR PLATE REPAIR BILATERAL, ARTHROTOMY MPJ 2ND BILATERAL    FOOT SURGERY Left 08/05/2002    Excision second metatarsal head left    FRACTURE SURGERY      rt hip    HAND CARPECTOMY Bilateral     HEEL SPUR SURGERY      HERNIA REPAIR HYSTERECTOMY (CERVIX STATUS UNKNOWN)      bladder surgery    JOINT REPLACEMENT      rt hip, L shoulder replacement 11/2014    KNEE SURGERY Bilateral     arthrosvopic    LEG SURGERY Right     KS SECD CLOS SURG WND EXTEN/COMPLIC N/A 27/36/1266    SECONDARY WOUND CLOSURE OF ABDOMINAL WOUND performed by Miguel Angel Galarza MD at [facility] N/A 7/17/2019    FLEXIBLE SIGMOIDOSCOPY performed by Miguel Angel Galarza MD at [address] [place] [address]  01/15/2018    with biopsies    SMALL INTESTINE SURGERY N/A 7/17/2019    COLOSTOMY CLOSURE WITH COLORECTAL ANASTOMOSIS performed [facility] [address]  6/14/13    and colonsocopy with antral erosion biopsies         CURRENTMEDICATIONS       Previous Medications    ATORVASTATIN (LIPITOR) 80 MG TABLET    Take 1 tablet by mouth nightly    BACLOFEN (LIORESAL) 10 MG TABLET    Take 10 mg by mouth 3 times daily as needed    BALSALAZIDE (COLAZAL) 750 MG CAPSULE    Take 3,000 mg by mouth daily Take 4 capsules (total 3000 mg) daily each morning.     BUSPIRONE (BUSPAR) 10 MG TABLET    Take 10 mg by mouth 3 times daily as needed    CHOLECALCIFEROL (VITAMIN D3 ULTRA POTENCY) 1.25 MG (55352 UT) TABS    Take 50,000 Units by mouth once a week    ESCITALOPRAM (LEXAPRO) 20 MG TABLET    Take 20 mg by mouth daily    FERROUS GLUCONATE (FERGON) 324 (38 FE) MG TABLET    Take 3 tablets by mouth daily (with breakfast)     HYDROXYZINE (ATARAX) 10 MG TABLET    Take 10 mg by mouth 3 times daily as needed for Itching    MAGNESIUM OXIDE (MAG-OX) 400 (240 MG) MG TABLET    Take 1 tablet by mouth 2 times daily    METOPROLOL SUCCINATE (TOPROL XL) 50 MG EXTENDED RELEASE TABLET    Take 1 tablet by mouth daily    MYCOPHENOLATE (CELLCEPT) 500 MG TABLET    Take 1,000 mg by mouth 2 times daily    ONDANSETRON (ZOFRAN ODT) 4 MG DISINTEGRATING TABLET    Take 1 tablet by mouth every 8 hours as needed for Nausea    OXYCODONE (ROXICODONE) 5 MG IMMEDIATE RELEASE TABLET    Take 5 mg by mouth every 4 hours as needed for Pain. PANTOPRAZOLE (PROTONIX) 40 MG TABLET    Take 40 mg by mouth daily    POTASSIUM CHLORIDE (KLOR-CON M) 20 MEQ EXTENDED RELEASE TABLET    Take 1 tablet by mouth 2 times daily    PREDNISONE (DELTASONE) 20 MG TABLET    Take 40 mg by mouth daily    RIVAROXABAN (XARELTO) 20 MG TABS TABLET    Take 1 tablet by mouth daily (with breakfast)         ALLERGIES     Ace inhibitors and Skelaxin [metaxalone]    FAMILYHISTORY       Family History   Problem Relation Age of Onset    High Blood Pressure Mother     High Blood Pressure Father     Kidney Disease Sister           SOCIAL HISTORY       Social History     Tobacco Use    Smoking status: Every Day     Packs/day: 1.00     Years: 10.00     Pack years: 10.00     Types: Cigarettes, E-Cigarettes    Smokeless tobacco: Never    Tobacco comments:     CUT BACK TO 1/2 PPD WITH E CIG 6-2019   Vaping Use    Vaping Use: Every day    Start date: 3/28/2019    Substances: Always (LOWEST DOSE)   Substance Use Topics    Alcohol use: Yes     Alcohol/week: 2.0 standard drinks     Types: 2 Shots of liquor per week     Comment: 6 DRINKS A WEEK OR LESS    Drug use: No       SCREENINGS             PHYSICAL EXAM    (up to 7 for level 4, 8 or more for level 5)     ED Triage Vitals [11/15/22 1824]   BP Temp Temp Source Heart Rate Resp SpO2 Height Weight   116/79 97.9 °F (36.6 °C) Oral 84 18 95 % -- 194 lb (88 kg)       Physical Exam  Vitals and nursing note reviewed. Constitutional:       Appearance: She is well-developed. She is not diaphoretic. HENT:      Head: Normocephalic and atraumatic. Right Ear: External ear normal.      Left Ear: External ear normal.      Nose: Nose normal.   Eyes:      General:         Right eye: No discharge. Left eye: No discharge. Neck:      Trachea: No tracheal deviation. Cardiovascular:      Rate and Rhythm: Normal rate and regular rhythm.    Pulmonary: Effort: Pulmonary effort is normal. No respiratory distress. Breath sounds: Normal breath sounds. No wheezing or rales. Abdominal:      General: Bowel sounds are normal. There is no distension. Palpations: Abdomen is soft. Tenderness: There is no abdominal tenderness. There is no guarding. Genitourinary:     Comments: Mild skin breakdown to bilateral inner thighs, and satellite lesions noted. Some small circular areas of skin breakdown noted to the right lateral thigh. No true ulcerations noted. Musculoskeletal:         General: Normal range of motion. Cervical back: Normal range of motion and neck supple. Comments: There is diffuse tenderness noted to the left shoulder and the left elbow, with mild edema noted to the left elbow. Limited range of motion secondary to pain. Radial pulses 2+. Normal sensation light touch. Neurovascular intact   Skin:     General: Skin is warm and dry. Neurological:      General: No focal deficit present. Mental Status: She is alert. She is confused. GCS: GCS eye subscore is 4. GCS verbal subscore is 5. GCS motor subscore is 6. Comments: ANO x2.   No focal deficit otherwise   Psychiatric:         Behavior: Behavior normal.       DIAGNOSTIC RESULTS   LABS:    Labs Reviewed   CBC WITH AUTO DIFFERENTIAL - Abnormal; Notable for the following components:       Result Value    Hemoglobin 11.7 (*)     Hematocrit 35.7 (*)     Lymphocytes Absolute 0.8 (*)     All other components within normal limits   COMPREHENSIVE METABOLIC PANEL - Abnormal; Notable for the following components:    Chloride 98 (*)     Glucose 116 (*)     Calcium 6.4 (*)     ALT 6 (*)     All other components within normal limits   PROTIME-INR - Abnormal; Notable for the following components:    Protime 16.8 (*)     INR 1.37 (*)     All other components within normal limits   LIPASE   TROPONIN   APTT   BRAIN NATRIURETIC PEPTIDE   ETHANOL   LACTATE, SEPSIS   PROCALCITONIN URINALYSIS WITH REFLEX TO CULTURE   LACTATE, SEPSIS   COMPREHENSIVE METABOLIC PANEL W/ REFLEX TO MG FOR LOW K   CBC WITH AUTO DIFFERENTIAL   BASIC METABOLIC PANEL       When ordered only abnormal lab results are displayed. All other labs were within normal range or not returned as of this dictation. EKG: When ordered, EKG's are interpreted by the Emergency Department Physician in the absence of a cardiologist.  Please see their note for interpretation of EKG. RADIOLOGY:   Non-plain film images such as CT, Ultrasound and MRI are read by the radiologist. Plain radiographic images are visualized and preliminarily interpreted by the ED Provider with the below findings:        Interpretation per the Radiologist below, if available at the time of this note:    CT HEAD WO CONTRAST   Final Result   No acute intracranial abnormality. XR SHOULDER LEFT (MIN 2 VIEWS)   Final Result   No acute bony or joint abnormality      Status post left shoulder arthroplasty, no hardware complications         XR ELBOW LEFT (2 VIEWS)   Final Result   No acute bony or joint abnormality      Status post left shoulder arthroplasty, no hardware complications         XR CHEST PORTABLE   Final Result   Bibasilar pulmonary opacities could represent atelectasis or infection         XR HIP BILATERAL W AP PELVIS (2 VIEWS)    (Results Pending)     No results found.         PROCEDURES   Unless otherwise noted below, none     Procedures    CRITICAL CARE TIME       CONSULTS:  IP CONSULT TO PRIMARY CARE PROVIDER      EMERGENCY DEPARTMENT COURSE and DIFFERENTIAL DIAGNOSIS/MDM:   Vitals:    Vitals:    11/15/22 2130 11/15/22 2159 11/15/22 2229 11/16/22 0018   BP: 109/78 116/78 117/74 116/78   Pulse: 71 83 75 85   Resp: 20 20 29 24   Temp:    97.1 °F (36.2 °C)   TempSrc:    Axillary   SpO2: 99%   94%   Weight:           Patient was given the following medications:  Medications   acetaminophen (TYLENOL) tablet 1,000 mg (1,000 mg Oral Not Given 11/15/22 8498)   busPIRone (BUSPAR) tablet 10 mg (has no administration in time range)   balsalazide (COLAZAL) capsule 3,000 mg (has no administration in time range)   ondansetron (ZOFRAN-ODT) disintegrating tablet 4 mg (has no administration in time range)   ferrous gluconate (FERGON) tablet 972 mg (has no administration in time range)   hydrOXYzine HCl (ATARAX) tablet 10 mg (has no administration in time range)   atorvastatin (LIPITOR) tablet 80 mg (has no administration in time range)   potassium chloride (KLOR-CON M) extended release tablet 20 mEq (has no administration in time range)   rivaroxaban (XARELTO) tablet 20 mg (has no administration in time range)   metoprolol succinate (TOPROL XL) extended release tablet 50 mg (has no administration in time range)   escitalopram (LEXAPRO) tablet 20 mg (has no administration in time range)   oxyCODONE (ROXICODONE) immediate release tablet 5 mg (has no administration in time range)   mycophenolate (CELLCEPT) tablet 1,000 mg (has no administration in time range)   baclofen (LIORESAL) tablet 10 mg (has no administration in time range)   predniSONE (DELTASONE) tablet 40 mg (has no administration in time range)   Cholecalciferol TABS 50,000 Units (has no administration in time range)   pantoprazole (PROTONIX) tablet 40 mg (has no administration in time range)   magnesium oxide (MAG-OX) tablet 400 mg (has no administration in time range)   0.9 % sodium chloride infusion (has no administration in time range)   sodium chloride flush 0.9 % injection 5-40 mL (has no administration in time range)   sodium chloride flush 0.9 % injection 5-40 mL (has no administration in time range)   0.9 % sodium chloride infusion (has no administration in time range)   potassium chloride (KLOR-CON M) extended release tablet 40 mEq (has no administration in time range)     Or   potassium bicarb-citric acid (EFFER-K) effervescent tablet 40 mEq (has no administration in time range)     Or potassium chloride 10 mEq/100 mL IVPB (Peripheral Line) (has no administration in time range)   ondansetron (ZOFRAN-ODT) disintegrating tablet 4 mg (has no administration in time range)     Or   ondansetron (ZOFRAN) injection 4 mg (has no administration in time range)   magnesium hydroxide (MILK OF MAGNESIA) 400 MG/5ML suspension 30 mL (has no administration in time range)   acetaminophen (TYLENOL) tablet 650 mg (has no administration in time range)     Or   acetaminophen (TYLENOL) suppository 650 mg (has no administration in time range)   hydrALAZINE (APRESOLINE) injection 10 mg (has no administration in time range)   0.9 % sodium chloride bolus (has no administration in time range)   calcium gluconate 1000 mg in sodium chloride 50 mL (has no administration in time range)         Is this patient to be included in the SEP-1 Core Measure due to severe sepsis or septic shock? No   Exclusion criteria - the patient is NOT to be included for SEP-1 Core Measure due to: Infection is not suspected    Briefly, this is a 61-year-old female who presents to the emergency department today for general weakness. Per EMS, family did call and she has been feeling weak for the past several days and was unable to get up from the chair. Patient tells me that she is complaining of pain to her left elbow and left SHOULDER although has no fall or injury. On examination she is alert and oriented x2, does appear to be mildly confused. Unable to give much history, vital signs stable. She does have tenderness to the left elbow and left shoulder. There are no focal deficits otherwise    EKG is documented by my attending, please see her note for further details. CBC shows no evidence of leukocytosis, mild anemia actually improved. Troponin is negative.   Ethanol level is negative, procalcitonin normal, lactic acid is normal.    Patient did refuse imaging of her hips, however otherwise imaging is negative    Due to the patient's increasing fatigue, and inability to ambulate, and overall poor hygiene noted, I feel that she would benefit from admission and likely placement. I did discuss this with Dr. Rolo Carolina, agrees to admit the patient, patient is otherwise up-to-date, agreeable with plan, stable for admission    FINAL IMPRESSION      1. Generalized weakness          DISPOSITION/PLAN   DISPOSITION Admitted 11/15/2022 10:08:03 PM      PATIENT REFERRED TO:  No follow-up provider specified.     DISCHARGE MEDICATIONS:  New Prescriptions    No medications on file       DISCONTINUED MEDICATIONS:  Discontinued Medications    No medications on file              (Please note that portions of this note were completed with a voice recognition program.  Efforts were made to edit the dictations but occasionally words are mis-transcribed.)    Damir Benavidez PA-C (electronically signed)            Damir Benavidez PA-C  11/16/22 0104

## 2022-11-15 NOTE — ED TRIAGE NOTES
Pt in via [facility] from home, pt complains of left arm pain that started around 4pm, denies injury, and also complains of general weakness, she states she was unable to get up from a chair and she normally can do that. Denies chest pain or sob also denies nv.

## 2022-11-16 ENCOUNTER — APPOINTMENT (OUTPATIENT)
Dept: GENERAL RADIOLOGY | Age: 67
DRG: 640 | End: 2022-11-16
Payer: MEDICARE

## 2022-11-16 PROBLEM — J96.01 ACUTE RESPIRATORY FAILURE WITH HYPOXEMIA (HCC): Status: ACTIVE | Noted: 2022-11-16

## 2022-11-16 LAB
A/G RATIO: 1.2 (ref 1.1–2.2)
ALBUMIN SERPL-MCNC: 3.4 G/DL (ref 3.4–5)
ALP BLD-CCNC: 41 U/L (ref 40–129)
ALT SERPL-CCNC: 6 U/L (ref 10–40)
ANION GAP SERPL CALCULATED.3IONS-SCNC: 12 MMOL/L (ref 3–16)
ANION GAP SERPL CALCULATED.3IONS-SCNC: 16 MMOL/L (ref 3–16)
AST SERPL-CCNC: 15 U/L (ref 15–37)
BACTERIA: ABNORMAL /HPF
BASOPHILS ABSOLUTE: 0.1 K/UL (ref 0–0.2)
BASOPHILS RELATIVE PERCENT: 0.9 %
BILIRUB SERPL-MCNC: 0.7 MG/DL (ref 0–1)
BILIRUBIN URINE: NEGATIVE
BLOOD, URINE: ABNORMAL
BUN BLDV-MCNC: 5 MG/DL (ref 7–20)
BUN BLDV-MCNC: 6 MG/DL (ref 7–20)
C-REACTIVE PROTEIN: 182.1 MG/L (ref 0–5.1)
CALCIUM SERPL-MCNC: 6.3 MG/DL (ref 8.3–10.6)
CALCIUM SERPL-MCNC: 6.4 MG/DL (ref 8.3–10.6)
CALCIUM URINE: 1.7 MG/DL (ref 6.8–21.3)
CHLORIDE BLD-SCNC: 99 MMOL/L (ref 99–110)
CHLORIDE BLD-SCNC: 99 MMOL/L (ref 99–110)
CHLORIDE URINE RANDOM: 62 MMOL/L
CLARITY: ABNORMAL
CO2: 18 MMOL/L (ref 21–32)
CO2: 22 MMOL/L (ref 21–32)
COLOR: ABNORMAL
CREAT SERPL-MCNC: <0.5 MG/DL (ref 0.6–1.2)
CREAT SERPL-MCNC: <0.5 MG/DL (ref 0.6–1.2)
CREATININE URINE: 114.1 MG/DL (ref 28–259)
EOSINOPHILS ABSOLUTE: 0.2 K/UL (ref 0–0.6)
EOSINOPHILS RELATIVE PERCENT: 2.1 %
EPITHELIAL CELLS, UA: 0 /HPF (ref 0–5)
GFR SERPL CREATININE-BSD FRML MDRD: >60 ML/MIN/{1.73_M2}
GFR SERPL CREATININE-BSD FRML MDRD: >60 ML/MIN/{1.73_M2}
GLUCOSE BLD-MCNC: 102 MG/DL (ref 70–99)
GLUCOSE BLD-MCNC: 78 MG/DL (ref 70–99)
GLUCOSE URINE: NEGATIVE MG/DL
HCT VFR BLD CALC: 33.5 % (ref 36–48)
HEMOGLOBIN: 11.1 G/DL (ref 12–16)
HYALINE CASTS: 0 /LPF (ref 0–8)
KETONES, URINE: 15 MG/DL
LEUKOCYTE ESTERASE, URINE: ABNORMAL
LYMPHOCYTES ABSOLUTE: 1.5 K/UL (ref 1–5.1)
LYMPHOCYTES RELATIVE PERCENT: 20.1 %
MAGNESIUM URINE: 3 MG/DL (ref 4.1–13.8)
MAGNESIUM: 0.3 MG/DL (ref 1.8–2.4)
MAGNESIUM: 1.5 MG/DL (ref 1.8–2.4)
MAGNESIUM: 1.5 MG/DL (ref 1.8–2.4)
MCH RBC QN AUTO: 29.3 PG (ref 26–34)
MCHC RBC AUTO-ENTMCNC: 33 G/DL (ref 31–36)
MCV RBC AUTO: 88.7 FL (ref 80–100)
MICROSCOPIC EXAMINATION: YES
MONOCYTES ABSOLUTE: 0.6 K/UL (ref 0–1.3)
MONOCYTES RELATIVE PERCENT: 8.4 %
NEUTROPHILS ABSOLUTE: 5.1 K/UL (ref 1.7–7.7)
NEUTROPHILS RELATIVE PERCENT: 68.5 %
NITRITE, URINE: POSITIVE
PDW BLD-RTO: 14.9 % (ref 12.4–15.4)
PH UA: 7.5 (ref 5–8)
PLATELET # BLD: 177 K/UL (ref 135–450)
PMV BLD AUTO: 7.8 FL (ref 5–10.5)
POTASSIUM REFLEX MAGNESIUM: 3.4 MMOL/L (ref 3.5–5.1)
POTASSIUM REFLEX MAGNESIUM: 4.5 MMOL/L (ref 3.5–5.1)
POTASSIUM, UR: 59.2 MMOL/L
PROCALCITONIN: 0.05 NG/ML (ref 0–0.15)
PROTEIN UA: 100 MG/DL
RBC # BLD: 3.78 M/UL (ref 4–5.2)
RBC UA: 35 /HPF (ref 0–4)
SARS-COV-2, NAAT: NOT DETECTED
SODIUM BLD-SCNC: 129 MMOL/L (ref 136–145)
SODIUM BLD-SCNC: 137 MMOL/L (ref 136–145)
SODIUM URINE: 41 MMOL/L
SPECIFIC GRAVITY UA: 1.01 (ref 1–1.03)
TOTAL PROTEIN: 6.2 G/DL (ref 6.4–8.2)
UREA NITROGEN, UR: 576.6 MG/DL (ref 800–1666)
URINE REFLEX TO CULTURE: YES
URINE TYPE: ABNORMAL
UROBILINOGEN, URINE: 1 E.U./DL
WBC # BLD: 7.5 K/UL (ref 4–11)
WBC UA: 33 /HPF (ref 0–5)

## 2022-11-16 PROCEDURE — 6360000002 HC RX W HCPCS: Performed by: INTERNAL MEDICINE

## 2022-11-16 PROCEDURE — 84133 ASSAY OF URINE POTASSIUM: CPT

## 2022-11-16 PROCEDURE — 85025 COMPLETE CBC W/AUTO DIFF WBC: CPT

## 2022-11-16 PROCEDURE — 6370000000 HC RX 637 (ALT 250 FOR IP): Performed by: INTERNAL MEDICINE

## 2022-11-16 PROCEDURE — 82570 ASSAY OF URINE CREATININE: CPT

## 2022-11-16 PROCEDURE — 2500000003 HC RX 250 WO HCPCS: Performed by: INTERNAL MEDICINE

## 2022-11-16 PROCEDURE — 84145 PROCALCITONIN (PCT): CPT

## 2022-11-16 PROCEDURE — 81001 URINALYSIS AUTO W/SCOPE: CPT

## 2022-11-16 PROCEDURE — 87635 SARS-COV-2 COVID-19 AMP PRB: CPT

## 2022-11-16 PROCEDURE — 2580000003 HC RX 258: Performed by: INTERNAL MEDICINE

## 2022-11-16 PROCEDURE — 82436 ASSAY OF URINE CHLORIDE: CPT

## 2022-11-16 PROCEDURE — 1200000000 HC SEMI PRIVATE

## 2022-11-16 PROCEDURE — 80053 COMPREHEN METABOLIC PANEL: CPT

## 2022-11-16 PROCEDURE — 87086 URINE CULTURE/COLONY COUNT: CPT

## 2022-11-16 PROCEDURE — 83735 ASSAY OF MAGNESIUM: CPT

## 2022-11-16 PROCEDURE — 84540 ASSAY OF URINE/UREA-N: CPT

## 2022-11-16 PROCEDURE — 6370000000 HC RX 637 (ALT 250 FOR IP): Performed by: PHYSICIAN ASSISTANT

## 2022-11-16 PROCEDURE — 84300 ASSAY OF URINE SODIUM: CPT

## 2022-11-16 PROCEDURE — 71045 X-RAY EXAM CHEST 1 VIEW: CPT

## 2022-11-16 PROCEDURE — 36415 COLL VENOUS BLD VENIPUNCTURE: CPT

## 2022-11-16 PROCEDURE — 82310 ASSAY OF CALCIUM: CPT

## 2022-11-16 PROCEDURE — 86140 C-REACTIVE PROTEIN: CPT

## 2022-11-16 RX ORDER — LACOSAMIDE 100 MG/1
200 TABLET ORAL 2 TIMES DAILY
Status: DISCONTINUED | OUTPATIENT
Start: 2022-11-16 | End: 2022-11-22 | Stop reason: HOSPADM

## 2022-11-16 RX ORDER — MAGNESIUM SULFATE IN WATER 40 MG/ML
2000 INJECTION, SOLUTION INTRAVENOUS PRN
Status: DISCONTINUED | OUTPATIENT
Start: 2022-11-16 | End: 2022-11-22 | Stop reason: HOSPADM

## 2022-11-16 RX ORDER — MAGNESIUM SULFATE IN WATER 40 MG/ML
4000 INJECTION, SOLUTION INTRAVENOUS ONCE
Status: COMPLETED | OUTPATIENT
Start: 2022-11-16 | End: 2022-11-16

## 2022-11-16 RX ORDER — NYSTATIN 100000 [USP'U]/G
POWDER TOPICAL 2 TIMES DAILY
COMMUNITY

## 2022-11-16 RX ORDER — AMLODIPINE BESYLATE 10 MG/1
10 TABLET ORAL DAILY
COMMUNITY

## 2022-11-16 RX ORDER — TRIAMCINOLONE ACETONIDE 1 MG/G
CREAM TOPICAL 2 TIMES DAILY
Status: DISCONTINUED | OUTPATIENT
Start: 2022-11-16 | End: 2022-11-22 | Stop reason: HOSPADM

## 2022-11-16 RX ORDER — LACOSAMIDE 100 MG/1
200 TABLET ORAL 2 TIMES DAILY
COMMUNITY

## 2022-11-16 RX ORDER — CALCIUM GLUCONATE 20 MG/ML
1000 INJECTION, SOLUTION INTRAVENOUS ONCE
Status: COMPLETED | OUTPATIENT
Start: 2022-11-16 | End: 2022-11-16

## 2022-11-16 RX ORDER — DUPILUMAB 300 MG/2ML
300 INJECTION, SOLUTION SUBCUTANEOUS
COMMUNITY

## 2022-11-16 RX ADMIN — POTASSIUM CHLORIDE 20 MEQ: 1500 TABLET, EXTENDED RELEASE ORAL at 08:57

## 2022-11-16 RX ADMIN — Medication 400 MG: at 20:51

## 2022-11-16 RX ADMIN — POTASSIUM CHLORIDE 20 MEQ: 1500 TABLET, EXTENDED RELEASE ORAL at 20:50

## 2022-11-16 RX ADMIN — PREDNISONE 5 MG: 10 TABLET ORAL at 08:57

## 2022-11-16 RX ADMIN — Medication 972 MG: at 10:02

## 2022-11-16 RX ADMIN — RIVAROXABAN 20 MG: 20 TABLET, FILM COATED ORAL at 08:57

## 2022-11-16 RX ADMIN — PANTOPRAZOLE SODIUM 40 MG: 40 TABLET, DELAYED RELEASE ORAL at 05:54

## 2022-11-16 RX ADMIN — LACOSAMIDE 200 MG: 100 TABLET, FILM COATED ORAL at 20:51

## 2022-11-16 RX ADMIN — Medication 10 ML: at 20:51

## 2022-11-16 RX ADMIN — MAGNESIUM SULFATE HEPTAHYDRATE 2000 MG: 40 INJECTION, SOLUTION INTRAVENOUS at 20:57

## 2022-11-16 RX ADMIN — CALCIUM GLUCONATE 1000 MG: 20 INJECTION, SOLUTION INTRAVENOUS at 11:43

## 2022-11-16 RX ADMIN — MAGNESIUM SULFATE HEPTAHYDRATE 4000 MG: 40 INJECTION, SOLUTION INTRAVENOUS at 05:58

## 2022-11-16 RX ADMIN — Medication 400 MG: at 08:57

## 2022-11-16 RX ADMIN — POTASSIUM BICARBONATE 40 MEQ: 782 TABLET, EFFERVESCENT ORAL at 10:01

## 2022-11-16 RX ADMIN — SODIUM CHLORIDE: 9 INJECTION, SOLUTION INTRAVENOUS at 02:25

## 2022-11-16 RX ADMIN — SODIUM CHLORIDE: 9 INJECTION, SOLUTION INTRAVENOUS at 14:00

## 2022-11-16 RX ADMIN — BUSPIRONE HYDROCHLORIDE 10 MG: 5 TABLET ORAL at 14:16

## 2022-11-16 RX ADMIN — BALSALAZIDE DISODIUM 1500 MG: 750 CAPSULE ORAL at 21:43

## 2022-11-16 RX ADMIN — CALCIUM GLUCONATE 1000 MG: 20 INJECTION, SOLUTION INTRAVENOUS at 02:25

## 2022-11-16 RX ADMIN — METOPROLOL SUCCINATE 50 MG: 50 TABLET, EXTENDED RELEASE ORAL at 08:58

## 2022-11-16 RX ADMIN — ACETAMINOPHEN 650 MG: 325 TABLET ORAL at 23:00

## 2022-11-16 RX ADMIN — MYCOPHENOLATE MOFETIL 1000 MG: 250 CAPSULE ORAL at 20:49

## 2022-11-16 RX ADMIN — BUSPIRONE HYDROCHLORIDE 10 MG: 5 TABLET ORAL at 10:02

## 2022-11-16 RX ADMIN — MYCOPHENOLATE MOFETIL 1000 MG: 250 CAPSULE ORAL at 10:02

## 2022-11-16 RX ADMIN — BACLOFEN 10 MG: 10 TABLET ORAL at 20:50

## 2022-11-16 RX ADMIN — LACOSAMIDE 200 MG: 100 TABLET, FILM COATED ORAL at 14:24

## 2022-11-16 RX ADMIN — BUSPIRONE HYDROCHLORIDE 10 MG: 5 TABLET ORAL at 20:50

## 2022-11-16 RX ADMIN — BALSALAZIDE DISODIUM 1500 MG: 750 CAPSULE ORAL at 10:02

## 2022-11-16 RX ADMIN — ATORVASTATIN CALCIUM 80 MG: 80 TABLET, FILM COATED ORAL at 20:50

## 2022-11-16 RX ADMIN — CEFTRIAXONE SODIUM 1000 MG: 1 INJECTION, POWDER, FOR SOLUTION INTRAMUSCULAR; INTRAVENOUS at 15:51

## 2022-11-16 ASSESSMENT — PAIN DESCRIPTION - DESCRIPTORS
DESCRIPTORS: ACHING

## 2022-11-16 ASSESSMENT — PAIN SCALES - GENERAL
PAINLEVEL_OUTOF10: 4
PAINLEVEL_OUTOF10: 5
PAINLEVEL_OUTOF10: 0
PAINLEVEL_OUTOF10: 2
PAINLEVEL_OUTOF10: 5
PAINLEVEL_OUTOF10: 0
PAINLEVEL_OUTOF10: 0
PAINLEVEL_OUTOF10: 2

## 2022-11-16 ASSESSMENT — PAIN DESCRIPTION - LOCATION
LOCATION: BACK
LOCATION: OTHER (COMMENT)
LOCATION: BACK

## 2022-11-16 ASSESSMENT — PAIN DESCRIPTION - ONSET
ONSET: ON-GOING
ONSET: ON-GOING

## 2022-11-16 ASSESSMENT — PAIN DESCRIPTION - ORIENTATION
ORIENTATION: LOWER

## 2022-11-16 ASSESSMENT — PAIN DESCRIPTION - PAIN TYPE
TYPE: CHRONIC PAIN
TYPE: CHRONIC PAIN

## 2022-11-16 ASSESSMENT — PAIN DESCRIPTION - FREQUENCY
FREQUENCY: INTERMITTENT
FREQUENCY: INTERMITTENT

## 2022-11-16 ASSESSMENT — PAIN SCALES - WONG BAKER: WONGBAKER_NUMERICALRESPONSE: 0

## 2022-11-16 NOTE — PROGRESS NOTES
Patient complained of pain 10/10 this AM but was unable to articulate location; offered tylenol initially and patient refused; MD notified.

## 2022-11-16 NOTE — ED PROVIDER NOTES
The Ekg interpreted by me in the absence of a cardiologist shows. Normal sinus rhythm with rate of 83  Axis normal  Qtc appropriate no specific ST or T wave abnormality. No significant change from prior 7-. I only performed EKG interpretation and was not otherwise involved in the care of this patient.        Brigida Sultana MD  11/15/22 9427

## 2022-11-16 NOTE — PROGRESS NOTES
Progress Note - Dr. Frederic Smith - Internal Medicine  PCP: Wili Lawrence MD [address] / [facility] [address] Day: 1  Code Status: Full Code  Current Diet: ADULT DIET; Regular        CC: follow up on medical issues    Subjective:   Ariella Corirgan is a 78 y.o. female. Pt seen and examined  Chart reviewed since last visit, labs and imaging below      Doing ok  Sleeping this am  Mag noted to be low, 0.4! Mag sulfate iv replacement ordered  No other issues  Has not yet been seen by therapy      Review of Systems: (1 system for EPF, 2-9 for detailed, 10+ for comprehensive)  Constitutional: Negative for chills and fever. HENT: Negative for dental problem, nosebleeds and rhinorrhea. Eyes: Negative for photophobia and visual disturbance. Respiratory: Negative for cough, chest tightness and shortness of breath. Cardiovascular: Negative for chest pain and leg swelling. Gastrointestinal: Negative for diarrhea, nausea and vomiting. Endocrine: Negative for polydipsia and polyphagia. Genitourinary: Negative for frequency, hematuria and urgency. Musculoskeletal: Negative for back pain and myalgias. Skin: Negative for rash. Allergic/Immunologic: Negative for food allergies. Neurological: Negative for dizziness, seizures, syncope and facial asymmetry. Hematological: Negative for adenopathy. Psychiatric/Behavioral: Negative for dysphoric mood. The patient is not nervous/anxious. I have reviewed the patient's medical and social history in detail and updated the computerized patient record.   To recap: She  has a past medical history of Anxiety, Arthritis, Atrial fibrillation/flutter, Blood transfusion reaction, Crohn's disease ([address] Depression, GERD (gastroesophageal reflux disease), Hiatal hernia, Hx of blood clots, Hypertension, MRSA (methicillin resistant staph aureus) culture positive, MRSA (methicillin resistant staph aureus) culture positive, Neuropathy, Pneumonia, Sinus headache, SOB (shortness of breath), and VRE (vancomycin resistant enterococcus) culture positive. . She  has a past surgical history that includes Hysterectomy; knee surgery (Bilateral); Hand Carpectomy (Bilateral); hernia repair; Abdomen surgery; bladder suspension; fracture surgery; Heel spur surgery; Tonsillectomy; Colonoscopy; Upper gastrointestinal endoscopy (6/14/13); Foot surgery (Left, 6/25/14); Foot surgery (6/3/15); Colonoscopy (9/14/15); Foot surgery (Left, 08/05/2002); joint replacement; Sigmoidoscopy (01/15/2018); Abdominal adhesion surgery (06/08/2018); Colonoscopy (08/07/2018); colostomy (N/A, 10/8/2018); pr secd clos surg wnd exten/complic (N/A, 91/69/0745); Leg Surgery (Right); Colonoscopy (06/07/2019); Colonoscopy (N/A, 6/7/2019); Colonoscopy (N/A, 6/7/2019); Small intestine surgery (N/A, 7/17/2019); and proctosigmoidoscopy (N/A, 7/17/2019). . She  reports that she has been smoking cigarettes and e-cigarettes. She has a 10.00 pack-year smoking history. She has never used smokeless tobacco. She reports current alcohol use of about 2.0 standard drinks per week. She reports that she does not use drugs. .        Active Hospital Problems    Diagnosis Date Noted    Hypocalcemia [E83.51] 11/15/2022     Priority: Medium    FTT (failure to thrive) in adult [R62.7] 11/15/2022     Priority: Medium    Altered mental status [R41.82]     Multiple wounds [T07. XXXA]     Hypomagnesemia [E83.42] 11/27/2018    Crohn's colitis ([facility] [address] [K50.10] 01/06/2018    Anemia [D64.9] 01/05/2018       Current Facility-Administered Medications: magnesium sulfate 4000 mg in 100 mL IVPB premix, 4,000 mg, IntraVENous, Once  acetaminophen (TYLENOL) tablet 1,000 mg, 1,000 mg, Oral, Once  busPIRone (BUSPAR) tablet 10 mg, 10 mg, Oral, TID  balsalazide (COLAZAL) capsule 3,000 mg, 3,000 mg, Oral, Daily  ferrous gluconate (FERGON) tablet 972 mg, 972 mg, Oral, Daily with breakfast  hydrOXYzine HCl (ATARAX) tablet 25 mg, 25 mg, Oral, TID PRN  atorvastatin (LIPITOR) tablet 80 mg, 80 mg, Oral, Nightly  potassium chloride (KLOR-CON M) extended release tablet 20 mEq, 20 mEq, Oral, BID  rivaroxaban (XARELTO) tablet 20 mg, 20 mg, Oral, Daily with breakfast  metoprolol succinate (TOPROL XL) extended release tablet 50 mg, 50 mg, Oral, Daily  mycophenolate (CELLCEPT) tablet 1,000 mg, 1,000 mg, Oral, BID  baclofen (LIORESAL) tablet 10 mg, 10 mg, Oral, TID PRN  predniSONE (DELTASONE) tablet 5 mg, 5 mg, Oral, Daily  pantoprazole (PROTONIX) tablet 40 mg, 40 mg, Oral, QAM AC  magnesium oxide (MAG-OX) tablet 400 mg, 400 mg, Oral, BID  0.9 % sodium chloride infusion, , IntraVENous, Continuous  sodium chloride flush 0.9 % injection 5-40 mL, 5-40 mL, IntraVENous, 2 times per day  sodium chloride flush 0.9 % injection 5-40 mL, 5-40 mL, IntraVENous, PRN  0.9 % sodium chloride infusion, 25 mL, IntraVENous, PRN  potassium chloride (KLOR-CON M) extended release tablet 40 mEq, 40 mEq, Oral, PRN **OR** potassium bicarb-citric acid (EFFER-K) effervescent tablet 40 mEq, 40 mEq, Oral, PRN **OR** potassium chloride 10 mEq/100 mL IVPB (Peripheral Line), 10 mEq, IntraVENous, PRN  ondansetron (ZOFRAN-ODT) disintegrating tablet 4 mg, 4 mg, Oral, Q8H PRN **OR** ondansetron (ZOFRAN) injection 4 mg, 4 mg, IntraVENous, Q6H PRN  magnesium hydroxide (MILK OF MAGNESIA) 400 MG/5ML suspension 30 mL, 30 mL, Oral, Daily PRN  acetaminophen (TYLENOL) tablet 650 mg, 650 mg, Oral, Q6H PRN **OR** acetaminophen (TYLENOL) suppository 650 mg, 650 mg, Rectal, Q6H PRN  hydrALAZINE (APRESOLINE) injection 10 mg, 10 mg, IntraVENous, Q6H PRN  0.9 % sodium chloride bolus, 500 mL, IntraVENous, PRN         Objective:  /80   Pulse 79   Temp 97.6 °F (36.4 °C) (Axillary)   Resp (!) 32   Wt 194 lb (88 kg)   SpO2 93%   BMI 30.38 kg/m²      Patient Vitals for the past 24 hrs:   BP Temp Temp src Pulse Resp SpO2 Weight   11/16/22 0600 -- -- -- 79 -- -- --   11/16/22 0400 115/80 97.6 °F (36.4 °C) Axillary 83 (!) 32 93 % --   11/16/22 0200 -- -- -- 75 -- -- --   11/16/22 0018 116/78 97.1 °F (36.2 °C) Axillary 85 24 94 % --   11/15/22 2229 117/74 -- -- 75 29 -- --   11/15/22 2159 116/78 -- -- 83 20 -- --   11/15/22 2130 109/78 -- -- 71 20 99 % --   11/15/22 2100 105/73 -- -- 73 20 -- --   11/15/22 1824 116/79 97.9 °F (36.6 °C) Oral 84 18 95 % 194 lb (88 kg)     Patient Vitals for the past 96 hrs (Last 3 readings):   Weight   11/15/22 1824 194 lb (88 kg)           Intake/Output Summary (Last 24 hours) at 11/16/2022 0805  Last data filed at 11/16/2022 0018  Gross per 24 hour   Intake 0 ml   Output --   Net 0 ml         Physical Exam: (2-7 system for EPF/Detailed, ?8 for Comprehensive)  /80   Pulse 79   Temp 97.6 °F (36.4 °C) (Axillary)   Resp (!) 32   Wt 194 lb (88 kg)   SpO2 93%   BMI 30.38 kg/m²   Constitutional: vitals as above: alert, appears stated age and cooperative    Psychiatric: normal insight and judgment, oriented to person, place, time, and general circumstances    Head: Normocephalic, without obvious abnormality, atraumatic    Eyes:lids and lashes normal, conjunctivae and sclerae normal and pupils equal, round, reactive to light and accomodation    EMNT: external ears normal, nares midline    Neck: no carotid bruit, supple, symmetrical, trachea midline and thyroid not enlarged, symmetric, no tenderness/mass/nodules     Respiratory: clear to auscultation and percussion bilaterally with normal respiratory effort    Cardiovascular: normal rate, regular rhythm, normal S1 and S2 and no murmurs    Gastrointestinal: soft, non-tender, non-distended, normal bowel sounds, no masses or organomegaly    Extremities: no clubbing, no edema    Skin:No rashes or nodules noted.     Neurologic:negative         Labs:  Lab Results   Component Value Date    WBC 7.5 11/16/2022    HGB 11.1 (L) 11/16/2022    HCT 33.5 (L) 11/16/2022     11/16/2022    CHOL 111 07/24/2020    TRIG 66 07/24/2020    HDL 43 07/24/2020    ALT 6 (L) 11/16/2022    AST 15 11/16/2022     11/16/2022    K 3.4 (L) 11/16/2022    CL 99 11/16/2022    CREATININE <0.5 (L) 11/16/2022    BUN 6 (L) 11/16/2022    CO2 22 11/16/2022    TSH 3.57 07/28/2020    INR 1.37 (H) 11/15/2022    LABA1C 5.0 07/24/2020    LABMICR YES 11/16/2022     Lab Results   Component Value Date    CKTOTAL 79 07/13/2022    TROPONINI <0.01 11/15/2022       Recent Imaging Results are Reviewed:  XR ELBOW LEFT (2 VIEWS)    Result Date: 11/15/2022  EXAMINATION: 2 XRAY VIEWS OF THE LEFT SHOULDER; 3 XRAY VIEWS OF THE LEFT ELBOW 11/15/2022 6:40 pm COMPARISON: None. HISTORY: ORDERING SYSTEM PROVIDED HISTORY: pain TECHNOLOGIST PROVIDED HISTORY: Reason for exam:->pain Reason for Exam: pain FINDINGS: Left shoulder: Status post shoulder arthroplasty. Anatomic alignment. No hardware complications. No acute fracture. Visualized lung unremarkable. Left elbow: Anatomic alignment. No fracture. Joint spaces appear normal.     No acute bony or joint abnormality Status post left shoulder arthroplasty, no hardware complications     CT HEAD WO CONTRAST    Result Date: 11/15/2022  EXAMINATION: CT OF THE HEAD WITHOUT CONTRAST  11/15/2022 6:59 pm TECHNIQUE: CT of the head was performed without the administration of intravenous contrast. Automated exposure control, iterative reconstruction, and/or weight based adjustment of the mA/kV was utilized to reduce the radiation dose to as low as reasonably achievable. COMPARISON: None. HISTORY: ORDERING SYSTEM PROVIDED HISTORY: confusion FINDINGS: BRAIN/VENTRICLES: There is no acute intracranial hemorrhage, mass effect or midline shift. No abnormal extra-axial fluid collection. The gray-white differentiation is maintained without evidence of an acute infarct. There is prominence of the ventricles and sulci due to global parenchymal volume loss.  There are nonspecific areas of hypoattenuation within the periventricular and subcortical white matter, which likely represent chronic microvascular ischemic change. ORBITS: The visualized portion of the orbits demonstrate no acute abnormality. SINUSES: The visualized paranasal sinuses and mastoid air cells demonstrate no acute abnormality. SOFT TISSUES/SKULL: No acute abnormality of the visualized skull or soft tissues. No acute intracranial abnormality. XR SHOULDER LEFT (MIN 2 VIEWS)    Result Date: 11/15/2022  EXAMINATION: 2 XRAY VIEWS OF THE LEFT SHOULDER; 3 XRAY VIEWS OF THE LEFT ELBOW 11/15/2022 6:40 pm COMPARISON: None. HISTORY: ORDERING SYSTEM PROVIDED HISTORY: pain TECHNOLOGIST PROVIDED HISTORY: Reason for exam:->pain Reason for Exam: pain FINDINGS: Left shoulder: Status post shoulder arthroplasty. Anatomic alignment. No hardware complications. No acute fracture. Visualized lung unremarkable. Left elbow: Anatomic alignment. No fracture. Joint spaces appear normal.     No acute bony or joint abnormality Status post left shoulder arthroplasty, no hardware complications     XR CHEST PORTABLE    Result Date: 11/15/2022  EXAMINATION: ONE XRAY VIEW OF THE CHEST 11/15/2022 6:40 pm COMPARISON: 07/14/2022 HISTORY: ORDERING SYSTEM PROVIDED HISTORY: confusion TECHNOLOGIST PROVIDED HISTORY: Reason for exam:->confusion Reason for Exam: confusion FINDINGS: Cardiomediastinal silhouette stable. Bibasilar pulmonary opacities. No pulmonary vascular congestion or edema. No pneumothorax. Bibasilar pulmonary opacities could represent atelectasis or infection       Lab Results   Component Value Date/Time    GLUCOSE 78 11/16/2022 03:39 AM     Lab Results   Component Value Date/Time    POCGLU 118 07/23/2020 11:32 AM     /80   Pulse 79   Temp 97.6 °F (36.4 °C) (Axillary)   Resp (!) 32   Wt 194 lb (88 kg)   SpO2 93%   BMI 30.38 kg/m²     Assessment and Plan:  Principal Problem:    Hypocalcemia -Established problem. Stable. Ca 6.3  Plan: cont replacement.  Ask renal to see  Active Problems:    FTT (failure to thrive) in adult -Established problem. Stable. Plan: pt/ot asked to see. Placement likely indicated    Anemia -Established problem. Stable. Plan: No indication for transfusion. Cont to monitor h/h to assess progression of anemia. Recommend ferrous sulfate or MVI as outpatient. Crohn's colitis ([facility] [address]  Plan: Continue present orders/plan. Hypomagnesemia -Established problem. Uncontrolled.   Plan: iv mag sulfate ordered    Multiple wounds    Altered mental status      (Please note that portions of this note were completed with a voice recognition program.  Efforts were made to edit the dictations but occasionally words are mis-transcribed.)        Keely Hinton MD  11/16/2022  '    Addendum - called for increased o2 requirment  Not sure what cause of acute resp failure is  Asked for abg, procal, crp, cxr, covid test    Will follow closely  T>32min total today

## 2022-11-16 NOTE — ED NOTES
patient resting in bed with bed locked and in lowest position. Yung light within reach.       Stacey Carrera, [facility]  11/15/22 [id number]

## 2022-11-16 NOTE — PROGRESS NOTES
Attempted to review patient's home meds with patient - states \"she's not doing that right now\" Unable to finish med req at this time - will try again later on

## 2022-11-16 NOTE — CONSULTS
Nona Fanning, MD Denver Six, MD Davis Perch, MD                  Office: [phone]                      Fax: [phone]             [facility]                   NEPHROLOGY INITIAL CONSULT NOTE:     PATIENT NAME: Cindy Jerez  : 1955  MRN: [id number]  REASON FOR CONSULT: For evaluation and management of multiple electrolyte abnormalities. (My recommendations will be communicated by way of shared medical record.)      RECOMMENDATIONS:   - Check urine electrolytes with creatinine to calculate fractional excretion of magnesium, calcium, potassium  -Aggressive magnesium , calcium and potassium repletion  -Follow urine culture empirical antibiotics  -Avoid PPI, alcohol,   - on Protonix now - will d/w GI - if can stop   - at higher risk for decompensation, needing closer monitoring. D/C plan from renal stand point:  - expect ~ 2-3 days f      IMPRESSION:       Admitted on:  11/15/2022  6:23 PM   For:  Hypocalcemia [E83.51]    ICD-10-CM    1. Generalized weakness  R53.1           Multiple electrolyte abnormalities  Hypocalcemia, corrected for hypoalbuminemia, moderate range  Hypokalemia  Severe hypomagnesemia, 0.3    History of alcohol abuse and PPI use    Glucose normal  Albumin normal  LFTs stable  Ethanol level normal less than  Anemia likely of chronic disease, mild    All likely due to possibility of GI loss, with history of Crohn's disease,    UA showing likely concern for urinary tract infection    Renal  function is stable without AARTI on CKD    H/O chronic hyponatremia. Underlying history of alcohol abuse- better no  CXR -   1. Worsening bibasilar airspace opacities which could represent atelectasis or pneumonia in the appropriate clinical setting. 2.  Mild cardiomegaly       Other major problems: Management per primary and other consulting teams.          Hospital Problems             Last Modified POA    * (Principal) Hypocalcemia 11/15/2022 Yes    FTT (failure to thrive) in adult 11/15/2022 Yes    Acute respiratory failure with hypoxemia ([facility] 11/16/2022 Yes    Anemia 11/15/2022 Yes    Crohn's colitis ([facility] 11/15/2022 Yes    Hypomagnesemia 11/16/2022 Yes    Multiple wounds 11/15/2022 Yes    Altered mental status 11/15/2022 Yes         : other supportive care :   - Check daily renal function panel with electrolytes-phosphorus  - Strict monitoring of I/Os, daily weight  - Renal feeds/diet  - Current medications reviewed. - Nephrotoxic medications have been discontinued. - Dose adjusted and appropriate. - Dose meds for eGFR <15 mL/min/1.73m2 during AARTI    - Avoid heavy opioids due to renal failure - may use very low dose dilaudid / fentanyl with close monitoring of CNS and respiratory depression. Please refer to the orders. High Complexity. Multiple complex problems. Discussed with patient and treatment team-    Time spent > 30 (~35) minutes. Thank you for allowing me to participate in this patient's care. Please do not hesitate to contact me anytime. We will follow along with you. Brianda Cartagena MD,  [facility] [address] [place] [address] [place]: [phone] or [employer]  Fax: [phone]        =======================================================================================   =======================================================================================      CHIEF COMPLAINT:   Chief Complaint   Patient presents with    Fatigue     Pt in via [facility] from home, pt complains of left arm pain that started around 4pm, denies injury, and also complains of general weakness, she states she was unable to get up from a chair and she normally can do that. Denies chest pain or sob also denies nv.       History Obtained From:  patient + treatment team + Electronic Medical Records    HPI: Ms. Charlotte Woodard is a 78 y.o. female with significant past medical history as below:   Past Medical History:   Diagnosis Date    Anxiety Arthritis     Atrial fibrillation/flutter     Blood transfusion reaction     Crohn's disease ([facility]     Depression     GERD (gastroesophageal reflux disease)     Hiatal hernia 06/24/2014    Hx of blood clots     Hypertension     MRSA (methicillin resistant staph aureus) culture positive 06/05/2018    urine    MRSA (methicillin resistant staph aureus) culture positive 06/03/2021    wound and colonization    Neuropathy     Pneumonia 01/08/2014    Sinus headache     SOB (shortness of breath)     VRE (vancomycin resistant enterococcus) culture positive 10/08/2018    abd culture      Presents with Fatigue (Pt in via [facility] from home, pt complains of left arm pain that started around 4pm, denies injury, and also complains of general weakness, she states she was unable to get up from a chair and she normally can do that. Denies chest pain or sob also denies nv. )    Admitted with Hypocalcemia [E83.51]   Found to have multiple electrolyte abnormalities such as severe hypomagnesemia, hypocalcemia, hypokalemia,, so we are called for that. No current active complaints. Patient denied chest pain / dizziness/lightheadedness/syncope/ SOB  Chronic stable leg edema. Regarding: multiple electrolyte abnormalities  Duration: acute   Location: kidneys  Severity: Severe   Timing: continous  Context (ex: related to condition):  , refer to my assessment / impression. Modifying factors (ex: medications, interventions):  , refer to my plan / recommendation. Associated signs & symptoms (ex: edema, SOB): As mentioned above in CC and HPI      Past medical, Surgical, Social, Family medical history reviewed by me.      PAST MEDICAL HISTORY:   Past Medical History:   Diagnosis Date    Anxiety     Arthritis     Atrial fibrillation/flutter     Blood transfusion reaction     [facility]     Depression     GERD (gastroesophageal reflux disease)     Hiatal hernia 06/24/2014    Hx of blood clots     Hypertension     MRSA (methicillin resistant staph aureus) culture positive 06/05/2018    urine    MRSA (methicillin resistant staph aureus) culture positive 06/03/2021    wound and colonization    Neuropathy     Pneumonia 01/08/2014    Sinus headache     SOB (shortness of breath)     VRE (vancomycin resistant enterococcus) culture positive 10/08/2018    abd culture     PAST SURGICAL HISTORY:   Past Surgical History:   Procedure Laterality Date    ABDOMEN SURGERY      ABDOMINAL ADHESION SURGERY  06/08/2018    BLADDER SUSPENSION      COLONOSCOPY      COLONOSCOPY  9/14/15    sigmoid colon biopsy     COLONOSCOPY  08/07/2018    COLONOSCOPY  06/07/2019    COLONOSCOPY, POSSIBLE BIOPSY, POSSIBLE POLYPECTOMY    COLONOSCOPY N/A 6/7/2019    COLONOSCOPY WITH BIOPSY performed by Christiana Matos MD at [address] [facility] N/A 6/7/2019    COLONOSCOPY DIAGNOSTIC/STOMA performed by Christiana Matos MD at [address] [place] [address] N/A 10/8/2018    EXPLORATORY LAPAROTOMY WITH COLOSTOMY performed by Carolyn Blanton MD at [address] [facility] Left 6/25/14    excision plantar left hallux    FOOT SURGERY  6/3/15    PLANTAR PLATE REPAIR BILATERAL, ARTHROTOMY MPJ 2ND BILATERAL    FOOT SURGERY Left 08/05/2002    Excision second metatarsal head left    FRACTURE SURGERY      rt hip    HAND CARPECTOMY Bilateral     HEEL SPUR SURGERY      HERNIA REPAIR      HYSTERECTOMY (CERVIX STATUS UNKNOWN)      bladder surgery    JOINT REPLACEMENT      rt hip, L shoulder replacement 11/2014    KNEE SURGERY Bilateral     arthrosvopic    LEG SURGERY Right     LA SECD CLOS SURG WND EXTEN/COMPLIC N/A 61/42/6356    SECONDARY WOUND CLOSURE OF ABDOMINAL WOUND performed by Carolyn Blanton MD at [address] [facility] N/A 7/17/2019    FLEXIBLE SIGMOIDOSCOPY performed by Carolyn Blanton MD at [address] [place] [address]  01/15/2018    with biopsies    SMALL INTESTINE SURGERY N/A 7/17/2019    COLOSTOMY CLOSURE WITH COLORECTAL ANASTOMOSIS performed by Eamon Arechiga MD at [address] [place] [address] Dr  6/14/13    and colonsocopy with antral erosion biopsies     FAMILY HISTORY:   Family History   Problem Relation Age of Onset    High Blood Pressure Mother     High Blood Pressure Father     Kidney Disease Sister      SOCIAL HISTORY:   Social History     Socioeconomic History    Marital status:      Spouse name: Hitesh Nova    Number of children: 2    Years of education: 13    Highest education level: None   Tobacco Use    Smoking status: Every Day     Packs/day: 1.00     Years: 10.00     Pack years: 10.00     Types: Cigarettes, E-Cigarettes    Smokeless tobacco: Never    Tobacco comments:     CUT BACK TO 1/2 PPD WITH E CIG 6-2019   Vaping Use    Vaping Use: Every day    Start date: 3/28/2019    Substances: Always (LOWEST DOSE)   Substance and Sexual Activity    Alcohol use: Yes     Alcohol/week: 2.0 standard drinks     Types: 2 Shots of liquor per week     Comment: 6 DRINKS A WEEK OR LESS    Drug use: No    Sexual activity: Not Currently     Partners: Male         MEDICATIONS: reviewed by me. Medications Prior to Admission:  No current facility-administered medications on file prior to encounter. Current Outpatient Medications on File Prior to Encounter   Medication Sig Dispense Refill    magnesium oxide (MAG-OX) 400 (240 Mg) MG tablet Take 1 tablet by mouth 2 times daily 30 tablet 1    escitalopram (LEXAPRO) 20 MG tablet Take 20 mg by mouth daily      oxyCODONE (ROXICODONE) 5 MG immediate release tablet Take 5 mg by mouth every 4 hours as needed for Pain.  (Patient not taking: Reported on 11/16/2022)      mycophenolate (CELLCEPT) 500 MG tablet Take 1,000 mg by mouth 2 times daily      baclofen (LIORESAL) 10 MG tablet Take 10 mg by mouth 3 times daily as needed      predniSONE (DELTASONE) 20 MG tablet Take 40 mg by mouth daily      Cholecalciferol (VITAMIN D3 ULTRA POTENCY) 1.25 MG (71913 UT) TABS Take 50,000 Units by mouth once a week      pantoprazole (PROTONIX) 40 MG tablet Take 40 mg by mouth daily      metoprolol succinate (TOPROL XL) 50 MG extended release tablet Take 1 tablet by mouth daily 30 tablet 5    rivaroxaban (XARELTO) 20 MG TABS tablet Take 1 tablet by mouth daily (with breakfast) 90 tablet 3    atorvastatin (LIPITOR) 80 MG tablet Take 1 tablet by mouth nightly 30 tablet 3    potassium chloride (KLOR-CON M) 20 MEQ extended release tablet Take 1 tablet by mouth 2 times daily 60 tablet 3    ferrous gluconate (FERGON) 324 (38 Fe) MG tablet Take 3 tablets by mouth daily (with breakfast)       hydrOXYzine (ATARAX) 10 MG tablet Take 10 mg by mouth 3 times daily as needed for Itching      ondansetron (ZOFRAN ODT) 4 MG disintegrating tablet Take 1 tablet by mouth every 8 hours as needed for Nausea 20 tablet 0    busPIRone (BUSPAR) 10 MG tablet Take 10 mg by mouth 3 times daily as needed      balsalazide (COLAZAL) 750 MG capsule Take 3,000 mg by mouth daily Take 4 capsules (total 3000 mg) daily each morning.            Current Facility-Administered Medications:     busPIRone (BUSPAR) tablet 10 mg, 10 mg, Oral, TID, Francisco Renteria MD, 10 mg at 11/16/22 1002    balsalazide (COLAZAL) capsule 1,500 mg, 1,500 mg, Oral, BID, Francisco Renteria MD, 1,500 mg at 11/16/22 1002    ferrous gluconate (FERGON) tablet 972 mg, 972 mg, Oral, Daily with breakfast, Francisco Renteria MD, 972 mg at 11/16/22 1002    hydrOXYzine HCl (ATARAX) tablet 25 mg, 25 mg, Oral, TID PRN, Francisco Renteria MD    atorvastatin (LIPITOR) tablet 80 mg, 80 mg, Oral, Nightly, Francisco Renteria MD    potassium chloride Zahra Bookman M) extended release tablet 20 mEq, 20 mEq, Oral, BID, Francisco Renteria MD, 20 mEq at 11/16/22 0857    rivaroxaban (XARELTO) tablet 20 mg, 20 mg, Oral, Daily with breakfast, Francisco Renteria MD, 20 mg at 11/16/22 0857    metoprolol succinate (TOPROL XL) extended release tablet 50 mg, 50 mg, Oral, Daily, Francisco Renteria MD, 50 mg at 11/16/22 0858    mycophenolate (CELLCEPT) capsule 1,000 mg, 1,000 mg, Oral, BID, Prateek Angeles MD, 1,000 mg at 11/16/22 1002    baclofen (LIORESAL) tablet 10 mg, 10 mg, Oral, TID PRN, Prateek Angeles MD    predniSONE (DELTASONE) tablet 5 mg, 5 mg, Oral, Daily, Prateek Angeles MD, 5 mg at 11/16/22 0857    pantoprazole (PROTONIX) tablet 40 mg, 40 mg, Oral, QAM AC, Prateek Angeles MD, 40 mg at 11/16/22 0554    magnesium oxide (MAG-OX) tablet 400 mg, 400 mg, Oral, BID, Prateek Angeles MD, 400 mg at 11/16/22 0857    0.9 % sodium chloride infusion, , IntraVENous, Continuous, Prateek Angeles MD, Last Rate: 75 mL/hr at 11/16/22 0225, New Bag at 11/16/22 0225    sodium chloride flush 0.9 % injection 5-40 mL, 5-40 mL, IntraVENous, 2 times per day, Prateek Angeles MD    sodium chloride flush 0.9 % injection 5-40 mL, 5-40 mL, IntraVENous, PRN, Prateek Angeles MD    0.9 % sodium chloride infusion, 25 mL, IntraVENous, PRN, Prateek Angeles MD    potassium chloride (KLOR-CON M) extended release tablet 40 mEq, 40 mEq, Oral, PRN **OR** potassium bicarb-citric acid (EFFER-K) effervescent tablet 40 mEq, 40 mEq, Oral, PRN, 40 mEq at 11/16/22 1001 **OR** potassium chloride 10 mEq/100 mL IVPB (Peripheral Line), 10 mEq, IntraVENous, PRN, Prateek Angeles MD    ondansetron (ZOFRAN-ODT) disintegrating tablet 4 mg, 4 mg, Oral, Q8H PRN **OR** ondansetron (ZOFRAN) injection 4 mg, 4 mg, IntraVENous, Q6H PRN, Prateek Angeles MD    magnesium hydroxide (MILK OF MAGNESIA) 400 MG/5ML suspension 30 mL, 30 mL, Oral, Daily PRN, Prateek Angeles MD    acetaminophen (TYLENOL) tablet 650 mg, 650 mg, Oral, Q6H PRN **OR** acetaminophen (TYLENOL) suppository 650 mg, 650 mg, Rectal, Q6H PRN, Prateek Angeles MD    hydrALAZINE (APRESOLINE) injection 10 mg, 10 mg, IntraVENous, Q6H PRN, Rip MD Karthik    0.9 % sodium chloride bolus, 500 mL, IntraVENous, PRN, Rip MD Karthik    Current Outpatient Medications:     magnesium oxide (MAG-OX) 400 (240 Mg) MG tablet, Take 1 tablet by mouth 2 times daily, Disp: 30 tablet, Rfl: 1    escitalopram (LEXAPRO) 20 MG tablet, Take 20 mg by mouth daily, Disp: , Rfl:     oxyCODONE (ROXICODONE) 5 MG immediate release tablet, Take 5 mg by mouth every 4 hours as needed for Pain. (Patient not taking: Reported on 11/16/2022), Disp: , Rfl:     mycophenolate (CELLCEPT) 500 MG tablet, Take 1,000 mg by mouth 2 times daily, Disp: , Rfl:     baclofen (LIORESAL) 10 MG tablet, Take 10 mg by mouth 3 times daily as needed, Disp: , Rfl:     predniSONE (DELTASONE) 20 MG tablet, Take 40 mg by mouth daily, Disp: , Rfl:     Cholecalciferol (VITAMIN D3 ULTRA POTENCY) 1.25 MG (45334 UT) TABS, Take 50,000 Units by mouth once a week, Disp: , Rfl:     pantoprazole (PROTONIX) 40 MG tablet, Take 40 mg by mouth daily, Disp: , Rfl:     metoprolol succinate (TOPROL XL) 50 MG extended release tablet, Take 1 tablet by mouth daily, Disp: 30 tablet, Rfl: 5    rivaroxaban (XARELTO) 20 MG TABS tablet, Take 1 tablet by mouth daily (with breakfast), Disp: 90 tablet, Rfl: 3    atorvastatin (LIPITOR) 80 MG tablet, Take 1 tablet by mouth nightly, Disp: 30 tablet, Rfl: 3    potassium chloride (KLOR-CON M) 20 MEQ extended release tablet, Take 1 tablet by mouth 2 times daily, Disp: 60 tablet, Rfl: 3    ferrous gluconate (FERGON) 324 (38 Fe) MG tablet, Take 3 tablets by mouth daily (with breakfast) , Disp: , Rfl:     hydrOXYzine (ATARAX) 10 MG tablet, Take 10 mg by mouth 3 times daily as needed for Itching, Disp: , Rfl:     ondansetron (ZOFRAN ODT) 4 MG disintegrating tablet, Take 1 tablet by mouth every 8 hours as needed for Nausea, Disp: 20 tablet, Rfl: 0    busPIRone (BUSPAR) 10 MG tablet, Take 10 mg by mouth 3 times daily as needed, Disp: , Rfl:     balsalazide (COLAZAL) 750 MG capsule, Take 3,000 mg by mouth daily Take 4 capsules (total 3000 mg) daily each morning., Disp: , Rfl:       Allergies reviewed by me:  Ace inhibitors and Skelaxin [metaxalone]    REVIEW OF SYSTEMS:  As mentioned in chief complaint and HPI , Subjective   All other 10-point review of systems: negative.        =======================================================================================     PHYSICAL EXAM:  Recent vital signs and recent I/Os reviewed by me. Wt Readings from Last 3 Encounters:   11/15/22 194 lb (88 kg)   08/08/21 192 lb 12.8 oz (87.5 kg)   06/04/21 198 lb (89.8 kg)     BP Readings from Last 3 Encounters:   11/16/22 111/64   07/14/22 (!) 99/53   08/10/21 111/72     Patient Vitals for the past 24 hrs:   BP Temp Temp src Pulse Resp SpO2 Weight   11/16/22 0916 -- -- -- -- -- 92 % --   11/16/22 0915 -- -- -- -- (!) 36 (!) 88 % --   11/16/22 0857 -- -- -- -- -- 93 % --   11/16/22 0845 111/64 97.4 °F (36.3 °C) Oral 85 25 91 % --   11/16/22 0600 -- -- -- 79 -- -- --   11/16/22 0400 115/80 97.6 °F (36.4 °C) Axillary 83 (!) 32 93 % --   11/16/22 0200 -- -- -- 75 -- -- --   11/16/22 0018 116/78 97.1 °F (36.2 °C) Axillary 85 24 94 % --   11/15/22 2229 117/74 -- -- 75 29 -- --   11/15/22 2159 116/78 -- -- 83 20 -- --   11/15/22 2130 109/78 -- -- 71 20 99 % --   11/15/22 2100 105/73 -- -- 73 20 -- --   11/15/22 1824 116/79 97.9 °F (36.6 °C) Oral 84 18 95 % 194 lb (88 kg)       Intake/Output Summary (Last 24 hours) at 11/16/2022 1029  Last data filed at 11/16/2022 0018  Gross per 24 hour   Intake 0 ml   Output --   Net 0 ml         Physical Exam  Vitals reviewed. Constitutional:       General: She is not in acute distress. Appearance: Normal appearance. She is obese. She is ill-appearing. HENT:      Head: Normocephalic and atraumatic. Right Ear: External ear normal.      Left Ear: External ear normal.      Nose: Nose normal.      Mouth/Throat:      Mouth: Mucous membranes are moist. Mucous membranes are not dry. Eyes:      General: No scleral icterus. Conjunctiva/sclera: Conjunctivae normal.   Neck:      Vascular: No JVD.    Cardiovascular: Rate and Rhythm: Normal rate and regular rhythm. Heart sounds: S1 normal and S2 normal.   Pulmonary:      Effort: Pulmonary effort is normal. No respiratory distress. Breath sounds: Rhonchi present. Abdominal:      General: Bowel sounds are normal. There is distension. Musculoskeletal:         General: Swelling present. No deformity. Cervical back: Normal range of motion and neck supple. Skin:     General: Skin is dry. Coloration: Skin is not jaundiced. Neurological:      Mental Status: She is alert and oriented to person, place, and time. Mental status is at baseline. Psychiatric:         Mood and Affect: Mood normal.         Behavior: Behavior normal.          =======================================================================================     DATA:  Diagnostic tests reviewed by me for today's visit:   (AS NEEDED FOR MY EVALUATION AND MANAGEMENT). Recent Labs     11/15/22  1921 11/16/22  0339   WBC 7.7 7.5   HCT 35.7* 33.5*    177     Iron Saturation:  No components found for: PERCENTFE  FERRITIN:    Lab Results   Component Value Date/Time    FERRITIN 67.5 09/27/2018 08:45 AM     IRON:    Lab Results   Component Value Date/Time    IRON 13 09/27/2018 08:45 AM     TIBC:    Lab Results   Component Value Date/Time    TIBC 190 09/27/2018 08:45 AM       Recent Labs     11/15/22  1921 11/16/22  0339    137   K 3.8 3.4*   CL 98* 99   CO2 27 22   BUN 8 6*   CREATININE 0.6 <0.5*     Recent Labs     11/15/22  1921 11/16/22  0339   CALCIUM 6.4* 6.3*   MG  --  0.30*     No results for input(s): PH, PCO2, PO2 in the last 72 hours.     Invalid input(s): Ryan Jayna    ABG:  No results found for: PH, PCO2, PO2, HCO3, BE, THGB, TCO2, O2SAT  VBG:    Lab Results   Component Value Date/Time    PHVEN 7.324 07/14/2022 12:07 AM    EKW2TNP 44.2 07/14/2022 12:07 AM    BEVEN -2.9 07/14/2022 12:07 AM    F0AFOJLZ >100 07/14/2022 12:07 AM       LDH:  No results found for: LDH  Uric Acid:    Lab Results   Component Value Date/Time    LABURIC 4.1 09/26/2018 02:56 PM       PT/INR:    Lab Results   Component Value Date/Time    PROTIME 16.8 11/15/2022 07:21 PM    INR 1.37 11/15/2022 07:21 PM     Warfarin PT/INR:  No components found for: PTPATWAR, PTINRWAR  PTT:    Lab Results   Component Value Date/Time    APTT 29.9 11/15/2022 07:21 PM   [APTT}  Last 3 Troponin:    Lab Results   Component Value Date/Time    TROPONINI <0.01 11/15/2022 07:21 PM    TROPONINI <0.01 07/13/2022 11:10 PM    TROPONINI <0.01 08/03/2021 04:11 PM       U/A:    Lab Results   Component Value Date/Time    COLORU DARK YELLOW 11/16/2022 02:36 AM    PROTEINU 100 11/16/2022 02:36 AM    PHUR 7.5 11/16/2022 02:36 AM    WBCUA 33 11/16/2022 02:36 AM    RBCUA 35 11/16/2022 02:36 AM    BACTERIA 4+ 11/16/2022 02:36 AM    CLARITYU TURBID 11/16/2022 02:36 AM    SPECGRAV 1.015 11/16/2022 02:36 AM    LEUKOCYTESUR LARGE 11/16/2022 02:36 AM    UROBILINOGEN 1.0 11/16/2022 02:36 AM    BILIRUBINUR Negative 11/16/2022 02:36 AM    BLOODU MODERATE 11/16/2022 02:36 AM    GLUCOSEU Negative 11/16/2022 02:36 AM     Microalbumen/Creatinine ratio:  No components found for: RUCREAT  24 Hour Urine for Protein:  No components found for: RAWUPRO, UHRS3, SUTX91KF, UTV3  24 Hour Urine for Creatinine Clearance:  No components found for: CREAT4, UHRS10, UTV10  Urine Toxicology:  No components found for: Vibha Platter, IBENZO, ICOCAINE, IMARTHC, IOPIATES, IPHENCYC    HgBA1c:    Lab Results   Component Value Date/Time    LABA1C 5.0 07/24/2020 05:26 AM     RPR:  No results found for: RPR  HIV:  No results found for: HIV  MARION:    Lab Results   Component Value Date/Time    MARION Negative 07/03/2018 05:10 AM     RF:  No results found for: RF  DSDNA:  No components found for: DNA  AMYLASE:    Lab Results   Component Value Date/Time    AMYLASE 48 08/02/2018 07:19 PM     LIPASE:    Lab Results   Component Value Date/Time    LIPASE 19.0 11/15/2022 07:21 PM Fibrinogen Level:  No components found for: FIB       BELOW MENTIONED RADIOLOGY STUDY RESULTS BY ME (AS NEEDED FOR MY EVALUATION AND MANAGEMENT). CT HEAD WO CONTRAST    Result Date: 11/15/2022  No acute intracranial abnormality. XR CHEST PORTABLE    Result Date: 11/16/2022    1. Worsening bibasilar airspace opacities which could represent atelectasis or pneumonia in the appropriate clinical setting. 2.  Mild cardiomegaly           This report was transcribed using voice recognition software, mainly. So please excuse brevity and/or typos. Every effort was made to ensure accuracy, however, inadvertent computerized transcription errors may be present. Please contact us, if any questions or clarifications are needed.

## 2022-11-16 NOTE — PLAN OF CARE
Problem: Skin/Tissue Integrity  Goal: Absence of new skin breakdown  Description: 1. Monitor for areas of redness and/or skin breakdown  2. Assess vascular access sites hourly  3. Every 4-6 hours minimum:  Change oxygen saturation probe site  4. Every 4-6 hours:  If on nasal continuous positive airway pressure, respiratory therapy assess nares and determine need for appliance change or resting period.   Outcome: Not Progressing

## 2022-11-16 NOTE — PLAN OF CARE
Problem: Skin/Tissue Integrity  Goal: Absence of new skin breakdown  Description: 1. Monitor for areas of redness and/or skin breakdown  2. Assess vascular access sites hourly  3. Every 4-6 hours minimum:  Change oxygen saturation probe site  4. Every 4-6 hours:  If on nasal continuous positive airway pressure, respiratory therapy assess nares and determine need for appliance change or resting period.   11/16/2022 0933 by Ellie Elam RN  Outcome: Progressing  11/16/2022 0401 by Lyn Fried RN  Outcome: Not Progressing

## 2022-11-16 NOTE — ED NOTES
Patient refusing hip xray sating her hips are not hurting her and it is not needed.  Rn notified MD .     Becky Millan RN  11/15/22 0576

## 2022-11-16 NOTE — ED NOTES
Zinc oinment applied to joyce area as well as buttocks. Skin tear noted to right upper thigh as well as joyce area.       Namrata Rider RN  11/15/22 [id number]

## 2022-11-16 NOTE — H&P
History and Physical  Dr. Aliza Sylvester  11/15/2022    PCP: Alec Asencio MD    Cc:   Chief Complaint   Patient presents with    Fatigue     Pt in via [facility] from home, pt complains of left arm pain that started around 4pm, denies injury, and also complains of general weakness, she states she was unable to get up from a chair and she normally can do that. Denies chest pain or sob also denies nv. HPI:  Trever Stiles is a 78 y.o. female who has a past medical history of Anxiety, Arthritis, Atrial fibrillation/flutter, Blood transfusion reaction, Crohn's disease ([facility] [address] Depression, GERD (gastroesophageal reflux disease), Hiatal hernia, Hx of blood clots, Hypertension, MRSA (methicillin resistant staph aureus) culture positive, MRSA (methicillin resistant staph aureus) culture positive, Neuropathy, Pneumonia, Sinus headache, SOB (shortness of breath), and VRE (vancomycin resistant enterococcus) culture positive. Patient presents with Hypocalcemia. HPI  (1-3 for expanded problem focused, ?4 for detailed/comprehensive)      78 y.o. female who presents to the emergency department today for evaluation for general fatigue. When I evaluate the patient, she states that she is complaining of pain to her left elbow. The patient denies falling. She is rating her pain is an 8/10, pain will radiate up towards her shoulder. When asked where the patient lives, she was able to tell me her birthday. As she is able to tell me her name, she is ANO x2.  Patient states that she is just generally not feeling well but is unsure exactly her symptoms or how long she has been not feeling well. Per EMS report, family did call as the patient has had worsening fatigue for the past several days. Patient was unable to get up and walk, and EMS was called. Patient was seen several months ago for alcohol intoxication, she denies any alcohol use to me.       As she cannot ambulate on her own and is not safe to be at home by herself, she will be admitted. Of note, her calcium is quite low here. Iv calcium replcaement ordered    Problem list of hospitalization thus far: Active Hospital Problems    Diagnosis     Hypocalcemia [E83.51]      Priority: Medium    FTT (failure to thrive) in adult [R62.7]      Priority: Medium    Altered mental status [R41.82]     Multiple wounds [T07. XXXA]     Crohn's colitis ([facility] [K50.10]     Anemia [D64.9]          Review of Systems: (1 system for EPF, 2-9 for detailed, 10+ for comprehensive)  Constitutional: Negative for chills and fever. +fatigue  HENT: Negative for dental problem, nosebleeds and rhinorrhea. Eyes: Negative for photophobia and visual disturbance. Respiratory: Negative for cough, chest tightness and shortness of breath. Cardiovascular: Negative for chest pain and leg swelling. Gastrointestinal: Negative for diarrhea, nausea and vomiting. Endocrine: Negative for polydipsia and polyphagia. Genitourinary: Negative for frequency, hematuria and urgency. Musculoskeletal: Negative for back pain and myalgias. Skin: Negative for rash. Allergic/Immunologic: Negative for food allergies. Neurological: Negative for dizziness, seizures, syncope and facial asymmetry. Hematological: Negative for adenopathy. Psychiatric/Behavioral: Negative for dysphoric mood. The patient is not nervous/anxious.              Past Medical History:   Past Medical History:   Diagnosis Date    Anxiety     Arthritis     Atrial fibrillation/flutter     Blood transfusion reaction     Crohn's disease ([facility]     Depression     GERD (gastroesophageal reflux disease)     Hiatal hernia 06/24/2014    Hx of blood clots     Hypertension     MRSA (methicillin resistant staph aureus) culture positive 06/05/2018    urine    MRSA (methicillin resistant staph aureus) culture positive 06/03/2021    wound and colonization    Neuropathy     Pneumonia 01/08/2014    Sinus headache     SOB (shortness of breath)     VRE (vancomycin resistant enterococcus) culture positive 10/08/2018    abd culture       Past Surgical History:   Past Surgical History:   Procedure Laterality Date    ABDOMEN SURGERY      ABDOMINAL ADHESION SURGERY  06/08/2018    BLADDER SUSPENSION      COLONOSCOPY      COLONOSCOPY  9/14/15    sigmoid colon biopsy     COLONOSCOPY  08/07/2018    COLONOSCOPY  06/07/2019    COLONOSCOPY, POSSIBLE BIOPSY, POSSIBLE POLYPECTOMY    COLONOSCOPY N/A 6/7/2019    COLONOSCOPY WITH BIOPSY performed by Elan Das MD at [address] [facility] N/A 6/7/2019    COLONOSCOPY DIAGNOSTIC/STOMA performed by Elan Das MD at [address] [place] [address] N/A 10/8/2018    EXPLORATORY LAPAROTOMY WITH COLOSTOMY performed by Victor Hugo Moralez MD at [address] [place] [id number] Left 6/25/14    excision plantar left hallux    FOOT SURGERY  6/3/15    PLANTAR PLATE REPAIR BILATERAL, ARTHROTOMY MPJ 2ND BILATERAL    FOOT SURGERY Left 08/05/2002    Excision second metatarsal head left    FRACTURE SURGERY      rt hip    HAND CARPECTOMY Bilateral     HEEL SPUR SURGERY      HERNIA REPAIR      HYSTERECTOMY (CERVIX STATUS UNKNOWN)      bladder surgery    JOINT REPLACEMENT      rt hip, L shoulder replacement 11/2014    KNEE SURGERY Bilateral     arthrosvopic    LEG SURGERY Right     ID SECD CLOS SURG WND EXTEN/COMPLIC N/A 83/63/1825    SECONDARY WOUND CLOSURE OF ABDOMINAL WOUND performed by Victor Hugo Moralez MD at [address] [facility] N/A 7/17/2019    FLEXIBLE SIGMOIDOSCOPY performed by Victor Hugo Moralez MD at [address] [place] [address]  01/15/2018    with biopsies    SMALL INTESTINE SURGERY N/A 7/17/2019    COLOSTOMY CLOSURE WITH COLORECTAL ANASTOMOSIS performed by Victor Hugo Moralez MD at [address] [place] [address]  6/14/13    and colonsocopy with antral erosion biopsies       Social History:   Social History       Tobacco History       Smoking Status  Every Day Smoking Frequency  1 pack/day for 10.00 years (10.00 pk-yrs) Smoking Tobacco Type  Cigarettes, E-Cigarettes      Smokeless Tobacco Use  Never      Tobacco Comments  CUT BACK TO 1/2 PPD WITH E CIG 6-2019              Alcohol History       Alcohol Use Status  Yes Drinks/Week  2 Shots of liquor, 0 Standard drinks or equivalent per week Amount  2.0 standard drinks of alcohol/wk Comment  6 DRINKS A WEEK OR LESS              Drug Use       Drug Use Status  No              Sexual Activity       Sexually Active  Not Currently Partners  Male                    Fam History:   Family History   Problem Relation Age of Onset    High Blood Pressure Mother     High Blood Pressure Father     Kidney Disease Sister        PFSH: The above PMHx, PSHx, SocHx, FamHx has been reviewed by myself. (1 area for detailed, 2-3 for comprehensive)      Code Status: Prior    Meds - following list of home medications fromelectronic chart has been reviewed by myself  Prior to Admission medications    Medication Sig Start Date End Date Taking? Authorizing Provider   magnesium oxide (MAG-OX) 400 (240 Mg) MG tablet Take 1 tablet by mouth 2 times daily 8/10/21   Phuc Peres MD   escitalopram (LEXAPRO) 20 MG tablet Take 20 mg by mouth daily    Historical Provider, MD   oxyCODONE (ROXICODONE) 5 MG immediate release tablet Take 5 mg by mouth every 4 hours as needed for Pain.     Historical Provider, MD   mycophenolate (CELLCEPT) 500 MG tablet Take 1,000 mg by mouth 2 times daily    Historical Provider, MD   baclofen (LIORESAL) 10 MG tablet Take 10 mg by mouth 3 times daily as needed    Historical Provider, MD   predniSONE (DELTASONE) 20 MG tablet Take 40 mg by mouth daily    Historical Provider, MD   Cholecalciferol (VITAMIN D3 ULTRA POTENCY) 1.25 MG (94171 UT) TABS Take 50,000 Units by mouth once a week    Historical Provider, MD   pantoprazole (PROTONIX) 40 MG tablet Take 40 mg by mouth daily    Historical Provider, MD   metoprolol succinate (TOPROL XL) 50 MG extended release tablet Take 1 tablet by mouth daily 1/21/21   Willo Meigs, APRN - CNP   rivaroxaban (XARELTO) 20 MG TABS tablet Take 1 tablet by mouth daily (with breakfast) 12/7/20   Willo Meigs, APRN - CNP   atorvastatin (LIPITOR) 80 MG tablet Take 1 tablet by mouth nightly 7/30/20   Germaine Greenberg MD   potassium chloride (KLOR-CON M) 20 MEQ extended release tablet Take 1 tablet by mouth 2 times daily 7/30/20   Dread Rogers MD   ferrous gluconate (FERGON) 324 (38 Fe) MG tablet Take 3 tablets by mouth daily (with breakfast)     Historical Provider, MD   hydrOXYzine (ATARAX) 10 MG tablet Take 10 mg by mouth 3 times daily as needed for Itching    Historical Provider, MD   ondansetron (ZOFRAN ODT) 4 MG disintegrating tablet Take 1 tablet by mouth every 8 hours as needed for Nausea 7/15/20   LYNN Vizcarra   busPIRone (BUSPAR) 10 MG tablet Take 10 mg by mouth 3 times daily as needed    Historical Provider, MD   balsalazide (COLAZAL) 750 MG capsule Take 3,000 mg by mouth daily Take 4 capsules (total 3000 mg) daily each morning.     Historical Provider, MD         Allergies   Allergen Reactions    Ace Inhibitors Swelling    Skelaxin [Metaxalone] Swelling             EXAM: (2-7 system for EPF/Detailed, ?8 for Comprehensive)  /78   Pulse 71   Temp 97.9 °F (36.6 °C) (Oral)   Resp 20   Wt 194 lb (88 kg)   SpO2 99%   BMI 30.38 kg/m²   Constitutional: vitals as above: alert, appears stated age and cooperative    Psychiatric: normal insight and judgment, oriented to person, place, time, and general circumstances    Head: Normocephalic, without obvious abnormality, atraumatic    Eyes:lids and lashes normal, conjunctivae and sclerae normal and pupils equal, round, reactive to light and accomodation    EMNT: external ears normal, nares midline    Neck: no carotid bruit, supple, symmetrical, trachea midline and thyroid not enlarged, symmetric, no tenderness/mass/nodules     Respiratory: clear to auscultation and percussion bilaterally with normal respiratory effort    Cardiovascular: normal rate, regular rhythm, normal S1 and S2 and no murmurs    Gastrointestinal: soft, non-tender, non-distended, normal bowel sounds, no masses or organomegaly    Extremities: no clubbing, no edema    Skin:No rashes or nodules noted. Neurologic:negative         LABS:  Labs Reviewed   CBC WITH AUTO DIFFERENTIAL - Abnormal; Notable for the following components:       Result Value    Hemoglobin 11.7 (*)     Hematocrit 35.7 (*)     Lymphocytes Absolute 0.8 (*)     All other components within normal limits   COMPREHENSIVE METABOLIC PANEL - Abnormal; Notable for the following components:    Chloride 98 (*)     Glucose 116 (*)     Calcium 6.4 (*)     ALT 6 (*)     All other components within normal limits   PROTIME-INR - Abnormal; Notable for the following components:    Protime 16.8 (*)     INR 1.37 (*)     All other components within normal limits   LIPASE   TROPONIN   APTT   BRAIN NATRIURETIC PEPTIDE   ETHANOL   LACTATE, SEPSIS   PROCALCITONIN   URINALYSIS WITH REFLEX TO CULTURE   LACTATE, SEPSIS         IMAGING:  Imaging results from the ER have been reviewed in the computerized chart. XR ELBOW LEFT (2 VIEWS)    Result Date: 11/15/2022  EXAMINATION: 2 XRAY VIEWS OF THE LEFT SHOULDER; 3 XRAY VIEWS OF THE LEFT ELBOW 11/15/2022 6:40 pm COMPARISON: None. HISTORY: ORDERING SYSTEM PROVIDED HISTORY: pain TECHNOLOGIST PROVIDED HISTORY: Reason for exam:->pain Reason for Exam: pain FINDINGS: Left shoulder: Status post shoulder arthroplasty. Anatomic alignment. No hardware complications. No acute fracture. Visualized lung unremarkable. Left elbow: Anatomic alignment. No fracture.   Joint spaces appear normal.     No acute bony or joint abnormality Status post left shoulder arthroplasty, no hardware complications     CT HEAD WO CONTRAST    Result Date: 11/15/2022  EXAMINATION: CT OF THE HEAD WITHOUT CONTRAST  11/15/2022 6:59 pm TECHNIQUE: CT of the head was performed without the administration of intravenous contrast. Automated exposure control, iterative reconstruction, and/or weight based adjustment of the mA/kV was utilized to reduce the radiation dose to as low as reasonably achievable. COMPARISON: None. HISTORY: ORDERING SYSTEM PROVIDED HISTORY: confusion FINDINGS: BRAIN/VENTRICLES: There is no acute intracranial hemorrhage, mass effect or midline shift. No abnormal extra-axial fluid collection. The gray-white differentiation is maintained without evidence of an acute infarct. There is prominence of the ventricles and sulci due to global parenchymal volume loss. There are nonspecific areas of hypoattenuation within the periventricular and subcortical white matter, which likely represent chronic microvascular ischemic change. ORBITS: The visualized portion of the orbits demonstrate no acute abnormality. SINUSES: The visualized paranasal sinuses and mastoid air cells demonstrate no acute abnormality. SOFT TISSUES/SKULL: No acute abnormality of the visualized skull or soft tissues. No acute intracranial abnormality. XR SHOULDER LEFT (MIN 2 VIEWS)    Result Date: 11/15/2022  EXAMINATION: 2 XRAY VIEWS OF THE LEFT SHOULDER; 3 XRAY VIEWS OF THE LEFT ELBOW 11/15/2022 6:40 pm COMPARISON: None. HISTORY: ORDERING SYSTEM PROVIDED HISTORY: pain TECHNOLOGIST PROVIDED HISTORY: Reason for exam:->pain Reason for Exam: pain FINDINGS: Left shoulder: Status post shoulder arthroplasty. Anatomic alignment. No hardware complications. No acute fracture. Visualized lung unremarkable. Left elbow: Anatomic alignment. No fracture.   Joint spaces appear normal.     No acute bony or joint abnormality Status post left shoulder arthroplasty, no hardware complications     XR CHEST PORTABLE    Result Date: 11/15/2022  EXAMINATION: ONE XRAY VIEW OF THE CHEST 11/15/2022 6:40 pm COMPARISON: 07/14/2022 HISTORY: ORDERING SYSTEM PROVIDED HISTORY: confusion TECHNOLOGIST PROVIDED HISTORY: Reason for exam:->confusion Reason for Exam: confusion FINDINGS: Cardiomediastinal silhouette stable. Bibasilar pulmonary opacities. No pulmonary vascular congestion or edema. No pneumothorax. Bibasilar pulmonary opacities could represent atelectasis or infection         EKG:   EKG from ER, reviewed by self - it shows normal sinus at 80  Old chart reviewed, EKG dated 7/14/22 is reviewed, there is not difference noted. Old study shows sinus at 68    Lab Results   Component Value Date/Time    GLUCOSE 116 11/15/2022 07:21 PM     Lab Results   Component Value Date/Time    POCGLU 118 07/23/2020 11:32 AM     /78   Pulse 71   Temp 97.9 °F (36.6 °C) (Oral)   Resp 20   Wt 194 lb (88 kg)   SpO2 99%   BMI 30.38 kg/m²     MEDICAL DECISION MAKING:    Principal Problem:    Hypocalcemia -New Problem to me. Plan: iv calcium ordered. Repeat labs ordered  Active Problems:    FTT (failure to thrive) in adult -Established problem. Stable. Plan: pt/ot to see. Pt likely will benefit from SNF/LTC    Anemia -Established problem. Stable. Plan: No indication for transfusion. Cont to monitor h/h to assess progression of anemia. Recommend ferrous sulfate or MVI as outpatient. Crohn's colitis ([facility] [address]  Plan: cont on home meds    Altered mental status  Plan: Continue present orders/plan. Diagnoses as listed above, designated as new or established and plan outlined for each. Data Reviewed:   (1) Lab tests were reviewed or ordered. (1) Radiology tests were reviewed or ordered. (1) Medical test (Echo, EKG, PFT/tracie) were ordered. (1)History was not obtained from someone other than patient  (1) Old records were reviewed - see HPI/MDM for pertinent details if review done. (2) Case was discussed with another health care provider: Perla BAILEY  (2) Imaging was viewed by myself. (2) EKG  was viewed by myself.        The patient is being placed in inpatient status with the expectation of requiring a hospital stay spanning at least two midnights for care and treatment of the problems noted in the problem list.  This determination is also based on thepatients comorbidities and past medical history, the severity and timing of the signs and symptoms upon presentation.     (Please note that portions of this note were completed with a voice recognition program.  Efforts were made to edit the dictations but occasionally words are mis-transcribed.)      Electronically signed by: Jennifer Abraham MD 11/15/2022

## 2022-11-16 NOTE — FLOWSHEET NOTE
11/16/22 0508   Treatment Team Notification   Reason for Communication Critical results   Type of Critical Result Laboratory   Critical Lab Result Type Electrolytes  (Magnesium 0.3)   Team Member Name Dr Aleks Reyes   Treatment Team Role Attending Provider   Method of Communication Call   Response See orders   Notification Time 3808

## 2022-11-16 NOTE — ED NOTES
Patient placed on purewick to limit moisture to wound areas.       Kaushal Shelley RN  11/15/22 6084

## 2022-11-16 NOTE — PROGRESS NOTES
Nephrology Consult received from dr. Duyen Holloway- full consult note to follow. Thank you for allowing us to participate in this patients care. Please do not hesitate to contact, if any questions or concerns. We will follow along with you. Naveen Stovall MD  [facility] [address] [place] [address]   [phone] or [address] [employer].

## 2022-11-16 NOTE — PROGRESS NOTES
Pharmacy Home Medication Reconciliation Note    A medication reconciliation has been completed for Viviane Keller 1955    Pharmacy: [facility]  provided by: patient, Rx fill history    The patient's home medication list is as follows:  Prior to Admission medications    Medication Sig Start Date End Date Taking? Authorizing Provider   lacosamide (VIMPAT) 100 MG TABS tablet Take 200 mg by mouth 2 times daily. Yes Historical Provider, MD   amLODIPine (NORVASC) 10 MG tablet Take 10 mg by mouth daily   Yes Historical Provider, MD   calcium-vitamin D (OSCAL-500) 500-200 MG-UNIT per tablet Take 1 tablet by mouth 2 times daily   Yes Historical Provider, MD   dupilumab (DUPIXENT) 300 MG/2ML SOPN injection Inject 300 mg into the skin   Yes Historical Provider, MD   nystatin (MYCOSTATIN) 954635 UNIT/GM powder Apply topically 2 times daily Apply topically 4 times daily. Yes Historical Provider, MD   triamcinolone (KENALOG) 0.1 % ointment Apply topically 2 times daily Apply topically 2 times daily. Yes Historical Provider, MD   magnesium oxide (MAG-OX) 400 (240 Mg) MG tablet Take 1 tablet by mouth 2 times daily 8/10/21   Charma Handler, MD   oxyCODONE (ROXICODONE) 5 MG immediate release tablet Take 5 mg by mouth every 4 hours as needed for Pain.   Patient not taking: No sig reported    Historical Provider, MD   mycophenolate (CELLCEPT) 500 MG tablet Take 1,000 mg by mouth 2 times daily    Historical Provider, MD   baclofen (LIORESAL) 10 MG tablet Take 10 mg by mouth 3 times daily as needed    Historical Provider, MD   predniSONE (DELTASONE) 5 MG tablet Take 5 mg by mouth daily    Historical Provider, MD   escitalopram (LEXAPRO) 20 MG tablet Take 20 mg by mouth daily  11/16/22  Historical Provider, MD   Cholecalciferol (VITAMIN D3 ULTRA POTENCY) 1.25 MG (73666 UT) TABS Take 50,000 Units by mouth once a week  11/16/22  Historical Provider, MD   pantoprazole (PROTONIX) 40 MG tablet Take 40 mg by mouth daily  11/16/22  Historical Provider, MD   metoprolol succinate (TOPROL XL) 50 MG extended release tablet Take 1 tablet by mouth daily 1/21/21   GAYLA Woodard CNP   rivaroxaban (XARELTO) 20 MG TABS tablet Take 1 tablet by mouth daily (with breakfast) 12/7/20   GAYLA Woodard CNP   atorvastatin (LIPITOR) 80 MG tablet Take 1 tablet by mouth nightly 7/30/20   Mary Saez MD   potassium chloride (KLOR-CON M) 20 MEQ extended release tablet Take 1 tablet by mouth 2 times daily 7/30/20   Luis Diaz MD   ferrous gluconate (FERGON) 324 (38 Fe) MG tablet Take 3 tablets by mouth daily (with breakfast)     Historical Provider, MD   hydrOXYzine HCl (ATARAX) 25 MG tablet Take 25 mg by mouth 3 times daily as needed for Itching    Historical Provider, MD   ondansetron (ZOFRAN ODT) 4 MG disintegrating tablet Take 1 tablet by mouth every 8 hours as needed for Nausea 7/15/20   LYNN Garcia   busPIRone (BUSPAR) 10 MG tablet Take 10 mg by mouth 3 times daily as needed    Historical Provider, MD   balsalazide (COLAZAL) 750 MG capsule Take 3,000 mg by mouth daily Take 4 capsules (total 3000 mg) daily each morning. Historical Provider, MD        Patient is no longer taking escitalopram, vitamin D 50,000 units, pantoprazole. Missing mediations that were added include: lacosamide 200mg BID, amlodipine, calcium + D, topical nystatin and triamcinolone. Timing of last doses updated.     Thank you,  Riley Raygoza, PharmD, BCCCP

## 2022-11-16 NOTE — PROGRESS NOTES
Patient seen in ED, room 29. Admission initiated but only completed through the Psychosocial assessment. At times patient able to follow conversation and respond appropriately and other times she was searching for words and unable to come up with an appropriate response. Her son is her closest relative, her mother is also living. Her  and daughter recently passes per her verbalization. iThe balance of the admission will need to be completed beginning with the Functional Screening and the 4 Eyes Assessment, Rights and Responsibilities, orientation to room, Plan of Care, education, white board, height and weight, pain assessment and head to toe assessment. Patient is alert and oriented to person and place, unsure about time and situation. Patient is being admitted for Hypocalcemia. Home Medication Reconciliation was completed by the pharmacist.  All questions answered.

## 2022-11-17 LAB
ALBUMIN SERPL-MCNC: 2.7 G/DL (ref 3.4–5)
ANION GAP SERPL CALCULATED.3IONS-SCNC: 12 MMOL/L (ref 3–16)
BASOPHILS ABSOLUTE: 0.1 K/UL (ref 0–0.2)
BASOPHILS RELATIVE PERCENT: 0.7 %
BUN BLDV-MCNC: 4 MG/DL (ref 7–20)
CALCIUM SERPL-MCNC: 6.5 MG/DL (ref 8.3–10.6)
CHLORIDE BLD-SCNC: 101 MMOL/L (ref 99–110)
CO2: 20 MMOL/L (ref 21–32)
CREAT SERPL-MCNC: <0.5 MG/DL (ref 0.6–1.2)
EKG ATRIAL RATE: 83 BPM
EKG DIAGNOSIS: NORMAL
EKG P AXIS: 82 DEGREES
EKG P-R INTERVAL: 150 MS
EKG Q-T INTERVAL: 376 MS
EKG QRS DURATION: 88 MS
EKG QTC CALCULATION (BAZETT): 441 MS
EKG R AXIS: 15 DEGREES
EKG T AXIS: 60 DEGREES
EKG VENTRICULAR RATE: 83 BPM
EOSINOPHILS ABSOLUTE: 0.2 K/UL (ref 0–0.6)
EOSINOPHILS RELATIVE PERCENT: 2.3 %
GFR SERPL CREATININE-BSD FRML MDRD: >60 ML/MIN/{1.73_M2}
GLUCOSE BLD-MCNC: 109 MG/DL (ref 70–99)
HCT VFR BLD CALC: 32.3 % (ref 36–48)
HEMOGLOBIN: 10.9 G/DL (ref 12–16)
LYMPHOCYTES ABSOLUTE: 1 K/UL (ref 1–5.1)
LYMPHOCYTES RELATIVE PERCENT: 10.6 %
MAGNESIUM: 1.4 MG/DL (ref 1.8–2.4)
MAGNESIUM: 1.9 MG/DL (ref 1.8–2.4)
MCH RBC QN AUTO: 29.5 PG (ref 26–34)
MCHC RBC AUTO-ENTMCNC: 33.9 G/DL (ref 31–36)
MCV RBC AUTO: 87.2 FL (ref 80–100)
MONOCYTES ABSOLUTE: 0.7 K/UL (ref 0–1.3)
MONOCYTES RELATIVE PERCENT: 7.4 %
NEUTROPHILS ABSOLUTE: 7.2 K/UL (ref 1.7–7.7)
NEUTROPHILS RELATIVE PERCENT: 79 %
PDW BLD-RTO: 14.4 % (ref 12.4–15.4)
PHOSPHORUS: 1.8 MG/DL (ref 2.5–4.9)
PLATELET # BLD: 231 K/UL (ref 135–450)
PMV BLD AUTO: 7.6 FL (ref 5–10.5)
POTASSIUM SERPL-SCNC: 3.6 MMOL/L (ref 3.5–5.1)
RBC # BLD: 3.7 M/UL (ref 4–5.2)
SODIUM BLD-SCNC: 133 MMOL/L (ref 136–145)
URINE CULTURE, ROUTINE: NORMAL
WBC # BLD: 9.1 K/UL (ref 4–11)

## 2022-11-17 PROCEDURE — 93010 ELECTROCARDIOGRAM REPORT: CPT | Performed by: INTERNAL MEDICINE

## 2022-11-17 PROCEDURE — 2500000003 HC RX 250 WO HCPCS: Performed by: INTERNAL MEDICINE

## 2022-11-17 PROCEDURE — 83735 ASSAY OF MAGNESIUM: CPT

## 2022-11-17 PROCEDURE — 85025 COMPLETE CBC W/AUTO DIFF WBC: CPT

## 2022-11-17 PROCEDURE — 6370000000 HC RX 637 (ALT 250 FOR IP): Performed by: INTERNAL MEDICINE

## 2022-11-17 PROCEDURE — 6360000002 HC RX W HCPCS: Performed by: INTERNAL MEDICINE

## 2022-11-17 PROCEDURE — 80069 RENAL FUNCTION PANEL: CPT

## 2022-11-17 PROCEDURE — 2580000003 HC RX 258: Performed by: INTERNAL MEDICINE

## 2022-11-17 PROCEDURE — 1200000000 HC SEMI PRIVATE

## 2022-11-17 PROCEDURE — 36415 COLL VENOUS BLD VENIPUNCTURE: CPT

## 2022-11-17 RX ORDER — LANOLIN ALCOHOL/MO/W.PET/CERES
400 CREAM (GRAM) TOPICAL 3 TIMES DAILY
Status: DISCONTINUED | OUTPATIENT
Start: 2022-11-17 | End: 2022-11-22 | Stop reason: HOSPADM

## 2022-11-17 RX ADMIN — SODIUM CHLORIDE: 9 INJECTION, SOLUTION INTRAVENOUS at 00:41

## 2022-11-17 RX ADMIN — Medication 972 MG: at 09:47

## 2022-11-17 RX ADMIN — POTASSIUM CHLORIDE 20 MEQ: 1500 TABLET, EXTENDED RELEASE ORAL at 21:44

## 2022-11-17 RX ADMIN — MYCOPHENOLATE MOFETIL 1000 MG: 250 CAPSULE ORAL at 08:54

## 2022-11-17 RX ADMIN — LACOSAMIDE 200 MG: 100 TABLET, FILM COATED ORAL at 21:59

## 2022-11-17 RX ADMIN — BUSPIRONE HYDROCHLORIDE 10 MG: 5 TABLET ORAL at 08:55

## 2022-11-17 RX ADMIN — BALSALAZIDE DISODIUM 1500 MG: 750 CAPSULE ORAL at 09:47

## 2022-11-17 RX ADMIN — SODIUM PHOSPHATE, MONOBASIC, MONOHYDRATE 15 MMOL: 276; 142 INJECTION, SOLUTION INTRAVENOUS at 11:29

## 2022-11-17 RX ADMIN — POTASSIUM CHLORIDE 20 MEQ: 1500 TABLET, EXTENDED RELEASE ORAL at 08:55

## 2022-11-17 RX ADMIN — MYCOPHENOLATE MOFETIL 1000 MG: 250 CAPSULE ORAL at 21:45

## 2022-11-17 RX ADMIN — Medication 400 MG: at 22:00

## 2022-11-17 RX ADMIN — Medication 10 ML: at 21:43

## 2022-11-17 RX ADMIN — METOPROLOL SUCCINATE 50 MG: 50 TABLET, EXTENDED RELEASE ORAL at 08:55

## 2022-11-17 RX ADMIN — TRIAMCINOLONE ACETONIDE: 1 CREAM TOPICAL at 21:43

## 2022-11-17 RX ADMIN — Medication 400 MG: at 08:56

## 2022-11-17 RX ADMIN — Medication 400 MG: at 15:57

## 2022-11-17 RX ADMIN — BALSALAZIDE DISODIUM 1500 MG: 750 CAPSULE ORAL at 21:44

## 2022-11-17 RX ADMIN — LACOSAMIDE 200 MG: 100 TABLET, FILM COATED ORAL at 08:54

## 2022-11-17 RX ADMIN — PANTOPRAZOLE SODIUM 40 MG: 40 TABLET, DELAYED RELEASE ORAL at 05:52

## 2022-11-17 RX ADMIN — RIVAROXABAN 20 MG: 20 TABLET, FILM COATED ORAL at 08:55

## 2022-11-17 RX ADMIN — CEFTRIAXONE SODIUM 1000 MG: 1 INJECTION, POWDER, FOR SOLUTION INTRAMUSCULAR; INTRAVENOUS at 16:00

## 2022-11-17 RX ADMIN — ATORVASTATIN CALCIUM 80 MG: 80 TABLET, FILM COATED ORAL at 21:44

## 2022-11-17 RX ADMIN — PREDNISONE 5 MG: 10 TABLET ORAL at 08:55

## 2022-11-17 RX ADMIN — BUSPIRONE HYDROCHLORIDE 10 MG: 5 TABLET ORAL at 21:45

## 2022-11-17 RX ADMIN — BUSPIRONE HYDROCHLORIDE 10 MG: 5 TABLET ORAL at 15:56

## 2022-11-17 ASSESSMENT — PAIN SCALES - GENERAL
PAINLEVEL_OUTOF10: 0

## 2022-11-17 ASSESSMENT — PAIN SCALES - WONG BAKER
WONGBAKER_NUMERICALRESPONSE: 0

## 2022-11-17 NOTE — PROGRESS NOTES
Nutrition Note    RECOMMENDATIONS  PO Diet: Continue current diet  ONS: Ordered Ensure once daily and Talha BID    NUTRITION ASSESSMENT   Pt triggered for wound consult. Note of abrasion, redness, scars, tear. Skin breakdown documented in RD assessment of prior admissions. On regular diet with documented intakes of % x2 yesterday. Wt hx in EMR indicates no significant wt changes. RD will order ONS to provide additional protein to promote wound healing and continue to monitor for adequate po intake. Nutrition Related Findings: NS at 75 mL/hr. Na 129. Mg 1.50 and replacement scheduled. LBM 11/16, BS+. Non-pitting generalized edema. Wounds: Skin Tears (no wounds documented in flowsheet.)  Nutrition Education:  Education not indicated   Nutrition Goals: PO intake 50% or greater, by next RD assessment     MALNUTRITION ASSESSMENT   Malnutrition Status: No malnutrition    NUTRITION DIAGNOSIS   Increased nutrient needs related to increase demand for energy/nutrients as evidenced by wounds    CURRENT NUTRITION THERAPIES  ADULT DIET; Regular     PO Intake: %   PO Supplement Intake:None Ordered    ANTHROPOMETRICS  Current Height: 5' 7\" (170.2 cm)  Current Weight: 194 lb (88 kg)    Ideal Body Weight (IBW): 135 lbs  (61 kg)        BMI: 30.4    COMPARATIVE STANDARDS  Energy (kcal):  4964-5358     Protein (g):         Fluid (mL/day):  9239-8112    The patient will be monitored per nutrition standards of care. Consult dietitian if additional nutrition interventions are needed prior to RD reassessment.      Scott Rothman MS, RD, LD    Contact: [phone]

## 2022-11-17 NOTE — PLAN OF CARE
Problem: Skin/Tissue Integrity  Goal: Absence of new skin breakdown  Description: 1. Monitor for areas of redness and/or skin breakdown  2. Assess vascular access sites hourly  3. Every 4-6 hours minimum:  Change oxygen saturation probe site  4. Every 4-6 hours:  If on nasal continuous positive airway pressure, respiratory therapy assess nares and determine need for appliance change or resting period.   11/17/2022 1114 by Larissa Butcher RN  Outcome: Progressing     Problem: Discharge Planning  Goal: Discharge to home or other facility with appropriate resources  11/17/2022 1114 by Larissa Butcher RN  Outcome: Progressing     Problem: Pain  Goal: Verbalizes/displays adequate comfort level or baseline comfort level  11/17/2022 1114 by Larissa Butcher RN  Outcome: Progressing  Flowsheets    Problem: Chronic Conditions and Co-morbidities  Goal: Patient's chronic conditions and co-morbidity symptoms are monitored and maintained or improved  Outcome: Progressing

## 2022-11-17 NOTE — PROGRESS NOTES
Niece, Mian Basilio, who is joint POA with patient's son, Marcelo Clifford, called requesting to speak with SW, regarding the care son is providing to patient and possible SNF placement. Order for SW consult with note including niece's number. Awake, alert, and oriented x4. Severe expressive aphasia, unable to articulate what she is trying to say. Gets very frustrated. Mag 0.3 on arrival to ER, replaced x1 in ER.  1900 Mag 1.5, replaced again with 2gm IV. Rocephin IV. Skin condition that causes blisters, scattered blisters in multiple stages noted with mepilex dressings placed on open blisters on bi thighs. Purewick in place. O2 @ 5L NC donned. SR on monitor. No dc plans at present.

## 2022-11-17 NOTE — PROGRESS NOTES
Occupational/Physical Therapy    Patient refusing evaluation immediately upon therapists' introduction. Encouragement to participate-patient refuses and states \"not today, tomorrow\". Will re-attempt 11/18 or as schedule allows this date.  Thank you,  Johny Mars OTR/L [address] [place] [address] [id number]

## 2022-11-17 NOTE — PROGRESS NOTES
MD Jamel Schultz MD Kirsten Shines, MD                  Office: [phone]                      Fax: [phone]             [facility]                   NEPHROLOGY INPATIENT PROGRESS NOTE:     PATIENT NAME: Jose Keller  : 1955  MRN: [id number]      RECOMMENDATIONS:   - reviewed urine electrolytes with creatinine to calculate fractional excretion of magnesium, calcium, potassium= less likely renal wasting, likely GI losses   -Aggressive magnesium , calcium and potassium repletion  - Increase Mag 400 BID to 400 TID   - K PO 20 mEq  - Calcium corrected w/ hypoalbuminemia - mild, so no need for daily repletion  -Follow urine culture empirical antibiotics  -Avoid PPI, alcohol,   - on Protonix now - defer to GI - if can stop   - at higher risk for decompensation, needing closer monitoring. D/C plan from renal stand point: improving       IMPRESSION:       Admitted on:  11/15/2022  6:23 PM   For:  Hypocalcemia [E83.51]  Generalized weakness [R53.1]      Multiple electrolyte abnormalities  Hypocalcemia, corrected for hypoalbuminemia, moderate range  Hypokalemia  Severe hypomagnesemia, 0.3    History of alcohol abuse and PPI use    Glucose normal  Albumin normal  LFTs stable  Ethanol level normal less than  Anemia likely of chronic disease, mild    All likely due to possibility of GI loss, with history of Crohn's disease,    UA showing likely concern for urinary tract infection    Renal  function is stable without AARTI on CKD    H/O chronic hyponatremia. Underlying history of alcohol abuse- better no  CXR -   1. Worsening bibasilar airspace opacities which could represent atelectasis or pneumonia in the appropriate clinical setting. 2.  Mild cardiomegaly       Other major problems: Management per primary and other consulting teams.          Hospital Problems             Last Modified POA    * (Principal) Hypocalcemia 11/15/2022 Yes    FTT (failure to thrive) in adult 11/15/2022 Yes    Acute respiratory failure with hypoxemia ([facility] [address] 11/16/2022 Yes    Anemia 11/15/2022 Yes    Crohn's colitis ([facility] [address] 11/15/2022 Yes    Hypomagnesemia 11/16/2022 Yes    Multiple wounds 11/15/2022 Yes    Altered mental status 11/15/2022 Yes            Please refer to the orders. High Complexity. Multiple complex problems. Discussed with patient and treatment team-    Time spent > 30 (~35) minutes. Thank you for allowing me to participate in this patient's care. Please do not hesitate to contact me anytime. We will follow along with you. Gladys Fuentes MD,  [facility] [address] [place] [address] [place]: [phone] or [address]  Fax: [phone]        =======================================================================================   =======================================================================================  Subjective / interval history:   Patient was seen comfortably resting in the bed,   Reported no active complaints,   Renal function stable  Lytes improving. *    Past medical, Surgical, Social, Family medical history reviewed by me. MEDICATIONS: reviewed by me. Medications Prior to Admission:  No current facility-administered medications on file prior to encounter. Current Outpatient Medications on File Prior to Encounter   Medication Sig Dispense Refill    lacosamide (VIMPAT) 100 MG TABS tablet Take 200 mg by mouth 2 times daily. amLODIPine (NORVASC) 10 MG tablet Take 10 mg by mouth daily      calcium-vitamin D (OSCAL-500) 500-200 MG-UNIT per tablet Take 1 tablet by mouth 2 times daily      dupilumab (DUPIXENT) 300 MG/2ML SOPN injection Inject 300 mg into the skin      nystatin (MYCOSTATIN) 622946 UNIT/GM powder Apply topically 2 times daily Apply topically 4 times daily. triamcinolone (KENALOG) 0.1 % ointment Apply topically 2 times daily Apply topically 2 times daily.       magnesium oxide (MAG-OX) 400 (240 Mg) MG tablet Take 1 tablet by mouth 2 times daily 30 tablet 1    oxyCODONE (ROXICODONE) 5 MG immediate release tablet Take 5 mg by mouth every 4 hours as needed for Pain. (Patient not taking: No sig reported)      mycophenolate (CELLCEPT) 500 MG tablet Take 1,000 mg by mouth 2 times daily      baclofen (LIORESAL) 10 MG tablet Take 10 mg by mouth 3 times daily as needed      predniSONE (DELTASONE) 5 MG tablet Take 5 mg by mouth daily      metoprolol succinate (TOPROL XL) 50 MG extended release tablet Take 1 tablet by mouth daily 30 tablet 5    rivaroxaban (XARELTO) 20 MG TABS tablet Take 1 tablet by mouth daily (with breakfast) 90 tablet 3    atorvastatin (LIPITOR) 80 MG tablet Take 1 tablet by mouth nightly 30 tablet 3    potassium chloride (KLOR-CON M) 20 MEQ extended release tablet Take 1 tablet by mouth 2 times daily 60 tablet 3    ferrous gluconate (FERGON) 324 (38 Fe) MG tablet Take 3 tablets by mouth daily (with breakfast)       hydrOXYzine HCl (ATARAX) 25 MG tablet Take 25 mg by mouth 3 times daily as needed for Itching      ondansetron (ZOFRAN ODT) 4 MG disintegrating tablet Take 1 tablet by mouth every 8 hours as needed for Nausea 20 tablet 0    busPIRone (BUSPAR) 10 MG tablet Take 10 mg by mouth 3 times daily as needed      balsalazide (COLAZAL) 750 MG capsule Take 3,000 mg by mouth daily Take 4 capsules (total 3000 mg) daily each morning.            Current Facility-Administered Medications:     magnesium sulfate 2000 mg in 50 mL IVPB premix, 2,000 mg, IntraVENous, PRN, Alissa Moralez MD, Stopped at 11/16/22 2206    sodium phosphate 10 mmol in sodium chloride 0.9 % 250 mL IVPB, 10 mmol, IntraVENous, PRN **OR** sodium phosphate 15 mmol in sodium chloride 0.9 % 250 mL IVPB, 15 mmol, IntraVENous, PRN **OR** sodium phosphate 20 mmol in sodium chloride 0.9 % 500 mL IVPB, 20 mmol, IntraVENous, PRN, Alissa Moralez MD    lacosamide (VIMPAT) tablet 200 mg, 200 mg, Oral, BID, Octavio Ernst MD, 200 mg at 11/17/22 6678 cefTRIAXone (ROCEPHIN) 1,000 mg in dextrose 5 % 50 mL IVPB mini-bag, 1,000 mg, IntraVENous, Q24H, Kami Cadena MD, Stopped at 11/16/22 1621    triamcinolone (KENALOG) 0.1 % cream, , Topical, BID, Kami Cadena MD    busPIRone (BUSPAR) tablet 10 mg, 10 mg, Oral, TID, Kami Cadena MD, 10 mg at 11/17/22 0855    balsalazide (COLAZAL) capsule 1,500 mg, 1,500 mg, Oral, BID, Kami Cadena MD, 1,500 mg at 11/17/22 0947    ferrous gluconate (FERGON) tablet 972 mg, 972 mg, Oral, Daily with breakfast, Kami Cadena MD, 972 mg at 11/17/22 0947    hydrOXYzine HCl (ATARAX) tablet 25 mg, 25 mg, Oral, TID PRN, Kami Cadena MD    atorvastatin (LIPITOR) tablet 80 mg, 80 mg, Oral, Nightly, Kami Cadena MD, 80 mg at 11/16/22 2050    potassium chloride (KLOR-CON M) extended release tablet 20 mEq, 20 mEq, Oral, BID, Kami Cadena MD, 20 mEq at 11/17/22 0855    rivaroxaban (XARELTO) tablet 20 mg, 20 mg, Oral, Daily with breakfast, Kami Cadena MD, 20 mg at 11/17/22 0855    metoprolol succinate (TOPROL XL) extended release tablet 50 mg, 50 mg, Oral, Daily, Kami Cadena MD, 50 mg at 11/17/22 4736    mycophenolate (CELLCEPT) capsule 1,000 mg, 1,000 mg, Oral, BID, Kami Cadena MD, 1,000 mg at 11/17/22 0854    baclofen (LIORESAL) tablet 10 mg, 10 mg, Oral, TID PRN, Kami Cadena MD, 10 mg at 11/16/22 2050    predniSONE (DELTASONE) tablet 5 mg, 5 mg, Oral, Daily, Kami Cadena MD, 5 mg at 11/17/22 0855    pantoprazole (PROTONIX) tablet 40 mg, 40 mg, Oral, QAM AC, Kami Cadena MD, 40 mg at 11/17/22 0552    magnesium oxide (MAG-OX) tablet 400 mg, 400 mg, Oral, BID, Kami Cadena MD, 400 mg at 11/17/22 0856    0.9 % sodium chloride infusion, , IntraVENous, Continuous, Kami Cadena MD, Last Rate: 75 mL/hr at 11/17/22 0041, New Bag at 11/17/22 0041    sodium chloride flush 0.9 % injection 5-40 mL, 5-40 mL, IntraVENous, 2 times per day, Kami Cadena MD, 10 mL at 11/16/22 2051    sodium chloride flush 0.9 % injection 5-40 mL, 5-40 mL, IntraVENous, PRN, Vito Astudillo MD    0.9 % sodium chloride infusion, 25 mL, IntraVENous, PRN, Vito Astudillo MD    potassium chloride (KLOR-CON M) extended release tablet 40 mEq, 40 mEq, Oral, PRN **OR** potassium bicarb-citric acid (EFFER-K) effervescent tablet 40 mEq, 40 mEq, Oral, PRN, 40 mEq at 11/16/22 1001 **OR** potassium chloride 10 mEq/100 mL IVPB (Peripheral Line), 10 mEq, IntraVENous, PRN, Vito Astudillo MD    ondansetron (ZOFRAN-ODT) disintegrating tablet 4 mg, 4 mg, Oral, Q8H PRN **OR** ondansetron (ZOFRAN) injection 4 mg, 4 mg, IntraVENous, Q6H PRN, Vito Astudillo MD    magnesium hydroxide (MILK OF MAGNESIA) 400 MG/5ML suspension 30 mL, 30 mL, Oral, Daily PRN, Vito Astudillo MD    acetaminophen (TYLENOL) tablet 650 mg, 650 mg, Oral, Q6H PRN, 650 mg at 11/16/22 2300 **OR** acetaminophen (TYLENOL) suppository 650 mg, 650 mg, Rectal, Q6H PRN, Vito Astudillo MD    hydrALAZINE (APRESOLINE) injection 10 mg, 10 mg, IntraVENous, Q6H PRN, Vito Astudillo MD    0.9 % sodium chloride bolus, 500 mL, IntraVENous, PRN, Vito Astudillo MD          REVIEW OF SYSTEMS:  As mentioned in chief complaint and HPI , Subjective     =======================================================================================     PHYSICAL EXAM:  Recent vital signs and recent I/Os reviewed by me.      Wt Readings from Last 3 Encounters:   11/15/22 194 lb (88 kg)   08/08/21 192 lb 12.8 oz (87.5 kg)   06/04/21 198 lb (89.8 kg)     BP Readings from Last 3 Encounters:   11/17/22 105/67   07/14/22 (!) 99/53   08/10/21 111/72     Patient Vitals for the past 24 hrs:   BP Temp Temp src Pulse Resp SpO2   11/17/22 0845 105/67 99.5 °F (37.5 °C) Oral 77 16 90 %   11/17/22 0423 104/60 99.3 °F (37.4 °C) Oral 67 16 94 %   11/17/22 0039 106/64 98.9 °F (37.2 °C) Oral 71 16 93 %   11/16/22 2005 109/67 100 °F (37.8 °C) Oral 77 18 94 %   11/16/22 1600 109/63 98.5 °F (36.9 °C) Oral 77 21 96 % 11/16/22 1245 114/75 97.5 °F (36.4 °C) Oral 91 25 91 %         Intake/Output Summary (Last 24 hours) at 11/17/2022 1038  Last data filed at 11/17/2022 0615  Gross per 24 hour   Intake 480 ml   Output 1200 ml   Net -720 ml           Physical Exam  Vitals reviewed. Constitutional:       General: She is not in acute distress. Appearance: Normal appearance. She is obese. She is ill-appearing. HENT:      Head: Normocephalic and atraumatic. Right Ear: External ear normal.      Left Ear: External ear normal.      Nose: Nose normal.      Mouth/Throat:      Mouth: Mucous membranes are moist. Mucous membranes are not dry. Eyes:      General: No scleral icterus. Conjunctiva/sclera: Conjunctivae normal.   Neck:      Vascular: No JVD. Cardiovascular:      Rate and Rhythm: Normal rate and regular rhythm. Heart sounds: S1 normal and S2 normal.   Pulmonary:      Effort: Pulmonary effort is normal. No respiratory distress. Breath sounds: Rhonchi present. Abdominal:      General: Bowel sounds are normal. There is distension. Musculoskeletal:         General: Swelling present. No deformity. Cervical back: Normal range of motion and neck supple. Skin:     General: Skin is dry. Coloration: Skin is not jaundiced. Neurological:      Mental Status: She is alert and oriented to person, place, and time. Mental status is at baseline. Psychiatric:         Mood and Affect: Mood normal.         Behavior: Behavior normal.          =======================================================================================     DATA:  Diagnostic tests reviewed by me for today's visit:   (AS NEEDED FOR MY EVALUATION AND MANAGEMENT).        Recent Labs     11/15/22  1921 11/16/22  0339 11/17/22  0638   WBC 7.7 7.5 9.1   HCT 35.7* 33.5* 32.3*    177 231       Iron Saturation:  No components found for: PERCENTFE  FERRITIN:    Lab Results   Component Value Date/Time    FERRITIN 67.5 09/27/2018 08:45 AM     IRON:    Lab Results   Component Value Date/Time    IRON 13 09/27/2018 08:45 AM     TIBC:    Lab Results   Component Value Date/Time    TIBC 190 09/27/2018 08:45 AM       Recent Labs     11/15/22  1921 11/16/22  0339 11/16/22 1913 11/17/22  0638    137 129* 133*   K 3.8 3.4* 4.5 3.6   CL 98* 99 99 101   CO2 27 22 18* 20*   BUN 8 6* 5* 4*   CREATININE 0.6 <0.5* <0.5* <0.5*       Recent Labs     11/15/22  1921 11/16/22  0339 11/16/22  1045 11/16/22 1913 11/17/22  0638   CALCIUM 6.4* 6.3*  --  6.4* 6.5*   MG  --  0.30* 1.50* 1.50* 1.90   PHOS  --   --   --   --  1.8*       No results for input(s): PH, PCO2, PO2 in the last 72 hours.     Invalid input(s): Carlos Whipple    ABG:  No results found for: PH, PCO2, PO2, HCO3, BE, THGB, TCO2, O2SAT  VBG:    Lab Results   Component Value Date/Time    PHVEN 7.324 07/14/2022 12:07 AM    DGI4AVI 44.2 07/14/2022 12:07 AM    BEVEN -2.9 07/14/2022 12:07 AM    [id number] >100 07/14/2022 12:07 AM       LDH:  No results found for: LDH  Uric Acid:    Lab Results   Component Value Date/Time    LABURIC 4.1 09/26/2018 02:56 PM       PT/INR:    Lab Results   Component Value Date/Time    PROTIME 16.8 11/15/2022 07:21 PM    INR 1.37 11/15/2022 07:21 PM     Warfarin PT/INR:  No components found for: PTPATWAR, PTINRWAR  PTT:    Lab Results   Component Value Date/Time    APTT 29.9 11/15/2022 07:21 PM   [APTT}  Last 3 Troponin:    Lab Results   Component Value Date/Time    TROPONINI <0.01 11/15/2022 07:21 PM    TROPONINI <0.01 07/13/2022 11:10 PM    TROPONINI <0.01 08/03/2021 04:11 PM       U/A:    Lab Results   Component Value Date/Time    COLORU DARK YELLOW 11/16/2022 02:36 AM    PROTEINU 100 11/16/2022 02:36 AM    PHUR 7.5 11/16/2022 02:36 AM    WBCUA 33 11/16/2022 02:36 AM    RBCUA 35 11/16/2022 02:36 AM    BACTERIA 4+ 11/16/2022 02:36 AM    CLARITYU TURBID 11/16/2022 02:36 AM    SPECGRAV 1.015 11/16/2022 02:36 AM    LEUKOCYTESUR LARGE 11/16/2022 02:36 AM    UROBILINOGEN 1.0 11/16/2022 02:36 AM    BILIRUBINUR Negative 11/16/2022 02:36 AM    BLOODU MODERATE 11/16/2022 02:36 AM    GLUCOSEU Negative 11/16/2022 02:36 AM     Microalbumen/Creatinine ratio:  No components found for: RUCREAT  24 Hour Urine for Protein:  No components found for: RAWUPRO, UHRS3, [id number], UTV3  24 Hour Urine for Creatinine Clearance:  No components found for: CREAT4, UHRS10, UTV10  Urine Toxicology:  No components found for: Amelia Specking, IBENZO, ICOCAINE, IMARTHC, IOPIATES, IPHENCYC    HgBA1c:    Lab Results   Component Value Date/Time    LABA1C 5.0 07/24/2020 05:26 AM     RPR:  No results found for: RPR  HIV:  No results found for: HIV  MARION:    Lab Results   Component Value Date/Time    MARION Negative 07/03/2018 05:10 AM     RF:  No results found for: RF  DSDNA:  No components found for: DNA  AMYLASE:    Lab Results   Component Value Date/Time    AMYLASE 48 08/02/2018 07:19 PM     LIPASE:    Lab Results   Component Value Date/Time    LIPASE 19.0 11/15/2022 07:21 PM     Fibrinogen Level:  No components found for: FIB       BELOW MENTIONED RADIOLOGY STUDY RESULTS BY ME (AS NEEDED FOR MY EVALUATION AND MANAGEMENT). CT HEAD WO CONTRAST    Result Date: 11/15/2022  No acute intracranial abnormality. XR CHEST PORTABLE    Result Date: 11/16/2022    1. Worsening bibasilar airspace opacities which could represent atelectasis or pneumonia in the appropriate clinical setting. 2.  Mild cardiomegaly           This report was transcribed using voice recognition software, mainly. So please excuse brevity and/or typos. Every effort was made to ensure accuracy, however, inadvertent computerized transcription errors may be present. Please contact us, if any questions or clarifications are needed.

## 2022-11-17 NOTE — CARE COORDINATION
[facility] Nurse  Consult Note       NAME:  Jill Gitelman  MEDICAL RECORD NUMBER:  [id number]  AGE: 78 y.o.    GENDER: female  : 1955  TODAY'S DATE:  2022    Subjective   Reason for WOCN Evaluation and Assessment: multiple wounds       Jill Gitelman is a 78 y.o. female referred by:   [x] Physician  [] Nursing  [] Other:     Wound Identification:  Wound Type:  bullous pemphigoid  Contributing Factors: none    Wound History: present on admission  Current Wound Care Treatment:  kenalog cream     Patient Goal of Care:  [x] Wound Healing  [] Odor Control  [] Palliative Care  [] Pain Control   [] Other:         PAST MEDICAL HISTORY        Diagnosis Date    Anxiety     Arthritis     Atrial fibrillation/flutter     Blood transfusion reaction     Crohn's disease ([address]     Depression     GERD (gastroesophageal reflux disease)     Hiatal hernia 2014    Hx of blood clots     Hypertension     MRSA (methicillin resistant staph aureus) culture positive 2018    urine    MRSA (methicillin resistant staph aureus) culture positive 2021    wound and colonization    Neuropathy     Pneumonia 2014    Sinus headache     SOB (shortness of breath)     VRE (vancomycin resistant enterococcus) culture positive 10/08/2018    abd culture       PAST SURGICAL HISTORY    Past Surgical History:   Procedure Laterality Date    ABDOMEN SURGERY      ABDOMINAL ADHESION SURGERY  2018    BLADDER SUSPENSION      COLONOSCOPY      COLONOSCOPY  9/14/15    sigmoid colon biopsy     COLONOSCOPY  2018    COLONOSCOPY  2019    COLONOSCOPY, POSSIBLE BIOPSY, POSSIBLE POLYPECTOMY    COLONOSCOPY N/A 2019    COLONOSCOPY WITH BIOPSY performed by Seven Mcdaniels MD at [address] [place] [address] N/A 2019    COLONOSCOPY DIAGNOSTIC/STOMA performed by Seven Mcdaniels MD at [facility] 10/8/2018    [address] [place] [address] performed by Emanuel Meza MD at [facility] Left 6/25/14    excision plantar left hallux    FOOT SURGERY  6/3/15    PLANTAR PLATE REPAIR BILATERAL, ARTHROTOMY MPJ 2ND BILATERAL    FOOT SURGERY Left 08/05/2002    Excision second metatarsal head left    FRACTURE SURGERY      rt hip    HAND CARPECTOMY Bilateral     HEEL SPUR SURGERY      HERNIA REPAIR      HYSTERECTOMY (CERVIX STATUS UNKNOWN)      bladder surgery    JOINT REPLACEMENT      rt hip, L shoulder replacement 11/2014    KNEE SURGERY Bilateral     arthrosvopic    LEG SURGERY Right     MO SECD CLOS SURG WND EXTEN/COMPLIC N/A 57/23/9065    SECONDARY WOUND CLOSURE OF ABDOMINAL WOUND performed by Emanuel Meza MD at [facility] N/A 7/17/2019    FLEXIBLE SIGMOIDOSCOPY performed by Emanuel Meza MD at [address] [place] [address]  01/15/2018    with biopsies    SMALL INTESTINE SURGERY N/A 7/17/2019    COLOSTOMY CLOSURE WITH COLORECTAL ANASTOMOSIS performed by Emanuel Meza MD at [place] [address]  6/14/13    and colonsocopy with antral erosion biopsies       FAMILY HISTORY    Family History   Problem Relation Age of Onset    High Blood Pressure Mother     High Blood Pressure Father     Kidney Disease Sister        SOCIAL HISTORY    Social History     Tobacco Use    Smoking status: Every Day     Packs/day: 1.00     Years: 10.00     Pack years: 10.00     Types: Cigarettes, E-Cigarettes    Smokeless tobacco: Never    Tobacco comments:     CUT BACK TO 1/2 PPD WITH E CIG 6-2019   Vaping Use    Vaping Use: Every day    Start date: 3/28/2019    Substances: Always (LOWEST DOSE)   Substance Use Topics    Alcohol use:  Yes     Alcohol/week: 2.0 standard drinks     Types: 2 Shots of liquor per week     Comment: 6 DRINKS A WEEK OR LESS    Drug use: No       ALLERGIES    Allergies   Allergen Reactions    Ace Inhibitors Swelling    Skelaxin [Metaxalone] Swelling MEDICATIONS    No current facility-administered medications on file prior to encounter. Current Outpatient Medications on File Prior to Encounter   Medication Sig Dispense Refill    lacosamide (VIMPAT) 100 MG TABS tablet Take 200 mg by mouth 2 times daily. amLODIPine (NORVASC) 10 MG tablet Take 10 mg by mouth daily      calcium-vitamin D (OSCAL-500) 500-200 MG-UNIT per tablet Take 1 tablet by mouth 2 times daily      dupilumab (DUPIXENT) 300 MG/2ML SOPN injection Inject 300 mg into the skin      nystatin (MYCOSTATIN) 145839 UNIT/GM powder Apply topically 2 times daily Apply topically 4 times daily. triamcinolone (KENALOG) 0.1 % ointment Apply topically 2 times daily Apply topically 2 times daily. magnesium oxide (MAG-OX) 400 (240 Mg) MG tablet Take 1 tablet by mouth 2 times daily 30 tablet 1    oxyCODONE (ROXICODONE) 5 MG immediate release tablet Take 5 mg by mouth every 4 hours as needed for Pain.  (Patient not taking: No sig reported)      mycophenolate (CELLCEPT) 500 MG tablet Take 1,000 mg by mouth 2 times daily      baclofen (LIORESAL) 10 MG tablet Take 10 mg by mouth 3 times daily as needed      predniSONE (DELTASONE) 5 MG tablet Take 5 mg by mouth daily      metoprolol succinate (TOPROL XL) 50 MG extended release tablet Take 1 tablet by mouth daily 30 tablet 5    rivaroxaban (XARELTO) 20 MG TABS tablet Take 1 tablet by mouth daily (with breakfast) 90 tablet 3    atorvastatin (LIPITOR) 80 MG tablet Take 1 tablet by mouth nightly 30 tablet 3    potassium chloride (KLOR-CON M) 20 MEQ extended release tablet Take 1 tablet by mouth 2 times daily 60 tablet 3    ferrous gluconate (FERGON) 324 (38 Fe) MG tablet Take 3 tablets by mouth daily (with breakfast)       hydrOXYzine HCl (ATARAX) 25 MG tablet Take 25 mg by mouth 3 times daily as needed for Itching      ondansetron (ZOFRAN ODT) 4 MG disintegrating tablet Take 1 tablet by mouth every 8 hours as needed for Nausea 20 tablet 0    busPIRone (BUSPAR) 10 MG tablet Take 10 mg by mouth 3 times daily as needed      balsalazide (COLAZAL) 750 MG capsule Take 3,000 mg by mouth daily Take 4 capsules (total 3000 mg) daily each morning.          Objective    /67   Pulse 82   Temp 97.3 °F (36.3 °C) (Oral)   Resp 16   Ht 5' 7\" (1.702 m)   Wt 194 lb (88 kg)   SpO2 90%   BMI 30.38 kg/m²     LABS:  WBC:    Lab Results   Component Value Date/Time    WBC 9.1 11/17/2022 06:38 AM     H/H:    Lab Results   Component Value Date/Time    HGB 10.9 11/17/2022 06:38 AM    HCT 32.3 11/17/2022 06:38 AM     PTT:    Lab Results   Component Value Date/Time    APTT 29.9 11/15/2022 07:21 PM   [APTT}  PT/INR:    Lab Results   Component Value Date/Time    PROTIME 16.8 11/15/2022 07:21 PM    INR 1.37 11/15/2022 07:21 PM     HgBA1c:    Lab Results   Component Value Date/Time    LABA1C 5.0 07/24/2020 05:26 AM       Assessment   Nelson Risk Score: Nelson Scale Score: 13    Patient Active Problem List   Diagnosis Code    Anemia D64.9    Crohn's colitis ([facility]) K50.10    MRSA (methicillin resistant Staphylococcus aureus) infection A49.02    DVT (deep venous thrombosis) ([facility]) I82.409    Hypomagnesemia E83.42    Leg swelling M79.89    Venous insufficiency I87.2    Chronic venous hypertension (idiopathic) with inflammation of bilateral lower extremity I87.323    Local infection of skin and subcutaneous tissue L08.9    Open wound of left foot with complication P44.033X    [employer] LINQ 7/29/20 Dr Anabela Dumont Z45.09    LV dysfunction I51.9    PAF (paroxysmal atrial fibrillation) ([facility]) I48.0    HTN (hypertension), benign I10    S/P coronary angiogram Z98.890    Abnormal angiogram R93.89    Atherosclerosis of native arteries of the extremities with ulceration ([facility]) I70.25    Nail avulsion of toe, initial encounter S91.209A    Lower abdominal pain R10.30    Late effect of ischemic cerebral stroke I69.30    Cognitive deficit as late effect of cerebrovascular accident (CVA) I69.319    Infection requiring contact isolation precautions B99.9    Hyponatremia E87.1    Suspected COVID-19 virus infection Z20.822    Alcohol abuse F10.10    Cellulitis of both feet L03.115, L03. 116    Multiple wounds T07. 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 9) E66.3    History of infection with vancomycin resistant Enterococcus (VRE) Z86.19    History of MRSA infection Z86.14    Sepsis secondary to UTI (HCC) A41.9, N39.0    Altered mental status R41.82    Hypocalcemia E83.51    FTT (failure to thrive) in adult R62.7    Acute respiratory failure with hypoxemia ([facility]) J96.01       Measurements:  Wound 08/03/21 bullous pemphigoid lesions, upper/ lower extremities, back/ abdomen (Active)   Number of days: 471      Patient admitted for hypocalcemia. Patient agreeable to visit. Patient has diagnosis of bullous pemphigoid. She goes to [address] [place] [address] dermatology. Currently she has been taking prednisone taper, Dupixent injections and using kenalog cream.  Today she has an open lesion to groin area and bullae noted on lower left leg. We will continue to use kenalog cream during this admission. Open lesion right inner thigh    Bullae lower left leg    Response to treatment:  Well tolerated by patient. Pain Assessment:  Severity:  0 / 10  Quality of pain: N/A  Wound Pain Timing/Severity: none  Premedicated: No    Plan   Plan of Care:    Kenalog cream to open lesions and blisters as prescribed. Specialty Bed Required : No   [] Low Air Loss   [] Pressure Redistribution  [] Fluid Immersion  [] Bariatric  [] Total Pressure Relief  [] Other:     Current Diet: ADULT DIET; Regular  ADULT ORAL NUTRITION SUPPLEMENT; Lunch; Standard High Calorie/High Protein Oral Supplement  ADULT ORAL NUTRITION SUPPLEMENT; Breakfast, Dinner;  Wound Healing Oral Supplement  Dietician consult:  Yes, patient has been seen by dietician    Discharge Plan:  Placement for patient upon discharge: home with support    Patient appropriate for Outpatient [address]: No, patient goes to [facility]    Referrals:  []   [] [facility]  [] Supplies  [] Other    Patient/Caregiver Teaching:  Level of patient/caregiver understanding able to:   [x] Indicates understanding       [] Needs reinforcement  [] Unsuccessful      [] Verbal Understanding  [] Demonstrated understanding       [] No evidence of learning  [] Refused teaching         [] N/A       Electronically signed by  DEVYN Bond, RN, [address] [place] [address]  [phone]  on 11/17/2022 at 4:24 PM

## 2022-11-17 NOTE — PROGRESS NOTES
Progress Note - Dr. Benny Hinojosa - Internal Medicine  PCP: Dixie Cox MD [address] / [facility] [address] Day: 2  Code Status: Full Code  Current Diet: ADULT DIET; Regular        CC: follow up on medical issues    Subjective:   Wild Damon is a 78 y.o. female. Pt seen and examined  Chart reviewed since last visit, labs and imaging below      Doing ok  Mag sulfate iv replacement ordered 11/16  Today - mag 1.9  Na better    No other issues  Has not yet been seen by therapy      Review of Systems: (1 system for EPF, 2-9 for detailed, 10+ for comprehensive)  Constitutional: Negative for chills and fever. HENT: Negative for dental problem, nosebleeds and rhinorrhea. Eyes: Negative for photophobia and visual disturbance. Respiratory: Negative for cough, chest tightness and shortness of breath. Cardiovascular: Negative for chest pain and leg swelling. Gastrointestinal: Negative for diarrhea, nausea and vomiting. Endocrine: Negative for polydipsia and polyphagia. Genitourinary: Negative for frequency, hematuria and urgency. Musculoskeletal: Negative for back pain and myalgias. Skin: Negative for rash. Allergic/Immunologic: Negative for food allergies. Neurological: Negative for dizziness, seizures, syncope and facial asymmetry. Hematological: Negative for adenopathy. Psychiatric/Behavioral: Negative for dysphoric mood. The patient is not nervous/anxious. I have reviewed the patient's medical and social history in detail and updated the computerized patient record.   To recap: She  has a past medical history of Anxiety, Arthritis, Atrial fibrillation/flutter, Blood transfusion reaction, Crohn's disease ([address] Depression, GERD (gastroesophageal reflux disease), Hiatal hernia, Hx of blood clots, Hypertension, MRSA (methicillin resistant staph aureus) culture positive, MRSA (methicillin resistant staph aureus) culture positive, Neuropathy, Pneumonia, Sinus headache, SOB (shortness of breath), and VRE (vancomycin resistant enterococcus) culture positive. . She  has a past surgical history that includes Hysterectomy; knee surgery (Bilateral); Hand Carpectomy (Bilateral); hernia repair; Abdomen surgery; bladder suspension; fracture surgery; Heel spur surgery; Tonsillectomy; Colonoscopy; Upper gastrointestinal endoscopy (6/14/13); Foot surgery (Left, 6/25/14); Foot surgery (6/3/15); Colonoscopy (9/14/15); Foot surgery (Left, 08/05/2002); joint replacement; Sigmoidoscopy (01/15/2018); Abdominal adhesion surgery (06/08/2018); Colonoscopy (08/07/2018); colostomy (N/A, 10/8/2018); pr secd clos surg wnd exten/complic (N/A, 34/46/4697); Leg Surgery (Right); Colonoscopy (06/07/2019); Colonoscopy (N/A, 6/7/2019); Colonoscopy (N/A, 6/7/2019); Small intestine surgery (N/A, 7/17/2019); and proctosigmoidoscopy (N/A, 7/17/2019). . She  reports that she has been smoking cigarettes and e-cigarettes. She has a 10.00 pack-year smoking history. She has never used smokeless tobacco. She reports current alcohol use of about 2.0 standard drinks per week. She reports that she does not use drugs. .        Active Hospital Problems    Diagnosis Date Noted    Acute respiratory failure with hypoxemia ([facility] [J96.01] 11/16/2022     Priority: Medium    Hypocalcemia [E83.51] 11/15/2022     Priority: Medium    FTT (failure to thrive) in adult [R62.7] 11/15/2022     Priority: Medium    Altered mental status [R41.82]     Multiple wounds [T07. XXXA]     Hypomagnesemia [E83.42] 11/27/2018    Crohn's colitis ([facility] [K50.10] 01/06/2018    Anemia [D64.9] 01/05/2018       Current Facility-Administered Medications: magnesium sulfate 2000 mg in 50 mL IVPB premix, 2,000 mg, IntraVENous, PRN  sodium phosphate 10 mmol in sodium chloride 0.9 % 250 mL IVPB, 10 mmol, IntraVENous, PRN **OR** sodium phosphate 15 mmol in sodium chloride 0.9 % 250 mL IVPB, 15 mmol, IntraVENous, PRN **OR** sodium phosphate 20 mmol in sodium chloride 0.9 % 500 mL IVPB, 20 mmol, IntraVENous, PRN  lacosamide (VIMPAT) tablet 200 mg, 200 mg, Oral, BID  cefTRIAXone (ROCEPHIN) 1,000 mg in dextrose 5 % 50 mL IVPB mini-bag, 1,000 mg, IntraVENous, Q24H  triamcinolone (KENALOG) 0.1 % cream, , Topical, BID  acetaminophen (TYLENOL) tablet 1,000 mg, 1,000 mg, Oral, Once  busPIRone (BUSPAR) tablet 10 mg, 10 mg, Oral, TID  balsalazide (COLAZAL) capsule 1,500 mg, 1,500 mg, Oral, BID  ferrous gluconate (FERGON) tablet 972 mg, 972 mg, Oral, Daily with breakfast  hydrOXYzine HCl (ATARAX) tablet 25 mg, 25 mg, Oral, TID PRN  atorvastatin (LIPITOR) tablet 80 mg, 80 mg, Oral, Nightly  potassium chloride (KLOR-CON M) extended release tablet 20 mEq, 20 mEq, Oral, BID  rivaroxaban (XARELTO) tablet 20 mg, 20 mg, Oral, Daily with breakfast  metoprolol succinate (TOPROL XL) extended release tablet 50 mg, 50 mg, Oral, Daily  mycophenolate (CELLCEPT) capsule 1,000 mg, 1,000 mg, Oral, BID  baclofen (LIORESAL) tablet 10 mg, 10 mg, Oral, TID PRN  predniSONE (DELTASONE) tablet 5 mg, 5 mg, Oral, Daily  pantoprazole (PROTONIX) tablet 40 mg, 40 mg, Oral, QAM AC  magnesium oxide (MAG-OX) tablet 400 mg, 400 mg, Oral, BID  0.9 % sodium chloride infusion, , IntraVENous, Continuous  sodium chloride flush 0.9 % injection 5-40 mL, 5-40 mL, IntraVENous, 2 times per day  sodium chloride flush 0.9 % injection 5-40 mL, 5-40 mL, IntraVENous, PRN  0.9 % sodium chloride infusion, 25 mL, IntraVENous, PRN  potassium chloride (KLOR-CON M) extended release tablet 40 mEq, 40 mEq, Oral, PRN **OR** potassium bicarb-citric acid (EFFER-K) effervescent tablet 40 mEq, 40 mEq, Oral, PRN **OR** potassium chloride 10 mEq/100 mL IVPB (Peripheral Line), 10 mEq, IntraVENous, PRN  ondansetron (ZOFRAN-ODT) disintegrating tablet 4 mg, 4 mg, Oral, Q8H PRN **OR** ondansetron (ZOFRAN) injection 4 mg, 4 mg, IntraVENous, Q6H PRN  magnesium hydroxide (MILK OF MAGNESIA) 400 MG/5ML suspension 30 mL, 30 mL, Oral, Daily PRN  acetaminophen (TYLENOL) tablet 650 mg, 650 mg, Oral, Q6H PRN **OR** acetaminophen (TYLENOL) suppository 650 mg, 650 mg, Rectal, Q6H PRN  hydrALAZINE (APRESOLINE) injection 10 mg, 10 mg, IntraVENous, Q6H PRN  0.9 % sodium chloride bolus, 500 mL, IntraVENous, PRN         Objective:  /60   Pulse 67   Temp 99.3 °F (37.4 °C) (Oral)   Resp 16   Wt 194 lb (88 kg)   SpO2 94%   BMI 30.38 kg/m²      Patient Vitals for the past 24 hrs:   BP Temp Temp src Pulse Resp SpO2   11/17/22 0423 104/60 99.3 °F (37.4 °C) Oral 67 16 94 %   11/17/22 0039 106/64 98.9 °F (37.2 °C) Oral 71 16 93 %   11/16/22 2005 109/67 100 °F (37.8 °C) Oral 77 18 94 %   11/16/22 1600 109/63 98.5 °F (36.9 °C) Oral 77 21 96 %   11/16/22 1245 114/75 97.5 °F (36.4 °C) Oral 91 25 91 %   11/16/22 0916 -- -- -- -- -- 92 %   11/16/22 0915 -- -- -- -- (!) 36 (!) 88 %   11/16/22 0857 -- -- -- -- -- 93 %   11/16/22 0845 111/64 97.4 °F (36.3 °C) Oral 85 25 91 %       Patient Vitals for the past 96 hrs (Last 3 readings):   Weight   11/15/22 1824 194 lb (88 kg)             Intake/Output Summary (Last 24 hours) at 11/17/2022 0842  Last data filed at 11/17/2022 0615  Gross per 24 hour   Intake 840 ml   Output 1200 ml   Net -360 ml           Physical Exam: (2-7 system for EPF/Detailed, ?8 for Comprehensive)  /60   Pulse 67   Temp 99.3 °F (37.4 °C) (Oral)   Resp 16   Wt 194 lb (88 kg)   SpO2 94%   BMI 30.38 kg/m²   Constitutional: vitals as above: alert, appears stated age and cooperative    Psychiatric: normal insight and judgment, oriented to person, place, time, and general circumstances    Head: Normocephalic, without obvious abnormality, atraumatic    Eyes:lids and lashes normal, conjunctivae and sclerae normal and pupils equal, round, reactive to light and accomodation    EMNT: external ears normal, nares midline    Neck: no carotid bruit, supple, symmetrical, trachea midline and thyroid not enlarged, symmetric, no tenderness/mass/nodules     Respiratory: clear to auscultation and percussion bilaterally with normal respiratory effort    Cardiovascular: normal rate, regular rhythm, normal S1 and S2 and no murmurs    Gastrointestinal: soft, non-tender, non-distended, normal bowel sounds, no masses or organomegaly    Extremities: no clubbing, no edema    Skin:No rashes or nodules noted. Neurologic:negative         Labs:  Lab Results   Component Value Date    WBC 9.1 11/17/2022    HGB 10.9 (L) 11/17/2022    HCT 32.3 (L) 11/17/2022     11/17/2022    CHOL 111 07/24/2020    TRIG 66 07/24/2020    HDL 43 07/24/2020    ALT 6 (L) 11/16/2022    AST 15 11/16/2022     (L) 11/17/2022    K 3.6 11/17/2022     11/17/2022    CREATININE <0.5 (L) 11/17/2022    BUN 4 (L) 11/17/2022    CO2 20 (L) 11/17/2022    TSH 3.57 07/28/2020    INR 1.37 (H) 11/15/2022    LABA1C 5.0 07/24/2020    LABMICR YES 11/16/2022     Lab Results   Component Value Date    CKTOTAL 79 07/13/2022    TROPONINI <0.01 11/15/2022       Recent Imaging Results are Reviewed:  XR ELBOW LEFT (2 VIEWS)    Result Date: 11/15/2022  EXAMINATION: 2 XRAY VIEWS OF THE LEFT SHOULDER; 3 XRAY VIEWS OF THE LEFT ELBOW 11/15/2022 6:40 pm COMPARISON: None. HISTORY: ORDERING SYSTEM PROVIDED HISTORY: pain TECHNOLOGIST PROVIDED HISTORY: Reason for exam:->pain Reason for Exam: pain FINDINGS: Left shoulder: Status post shoulder arthroplasty. Anatomic alignment. No hardware complications. No acute fracture. Visualized lung unremarkable. Left elbow: Anatomic alignment. No fracture.   Joint spaces appear normal.     No acute bony or joint abnormality Status post left shoulder arthroplasty, no hardware complications     CT HEAD WO CONTRAST    Result Date: 11/15/2022  EXAMINATION: CT OF THE HEAD WITHOUT CONTRAST  11/15/2022 6:59 pm TECHNIQUE: CT of the head was performed without the administration of intravenous contrast. Automated exposure control, iterative reconstruction, and/or weight based adjustment of the mA/kV was utilized to reduce the radiation dose to as low as reasonably achievable. COMPARISON: None. HISTORY: ORDERING SYSTEM PROVIDED HISTORY: confusion FINDINGS: BRAIN/VENTRICLES: There is no acute intracranial hemorrhage, mass effect or midline shift. No abnormal extra-axial fluid collection. The gray-white differentiation is maintained without evidence of an acute infarct. There is prominence of the ventricles and sulci due to global parenchymal volume loss. There are nonspecific areas of hypoattenuation within the periventricular and subcortical white matter, which likely represent chronic microvascular ischemic change. ORBITS: The visualized portion of the orbits demonstrate no acute abnormality. SINUSES: The visualized paranasal sinuses and mastoid air cells demonstrate no acute abnormality. SOFT TISSUES/SKULL: No acute abnormality of the visualized skull or soft tissues. No acute intracranial abnormality. XR SHOULDER LEFT (MIN 2 VIEWS)    Result Date: 11/15/2022  EXAMINATION: 2 XRAY VIEWS OF THE LEFT SHOULDER; 3 XRAY VIEWS OF THE LEFT ELBOW 11/15/2022 6:40 pm COMPARISON: None. HISTORY: ORDERING SYSTEM PROVIDED HISTORY: pain TECHNOLOGIST PROVIDED HISTORY: Reason for exam:->pain Reason for Exam: pain FINDINGS: Left shoulder: Status post shoulder arthroplasty. Anatomic alignment. No hardware complications. No acute fracture. Visualized lung unremarkable. Left elbow: Anatomic alignment. No fracture. Joint spaces appear normal.     No acute bony or joint abnormality Status post left shoulder arthroplasty, no hardware complications     XR CHEST PORTABLE    Result Date: 11/15/2022  EXAMINATION: ONE XRAY VIEW OF THE CHEST 11/15/2022 6:40 pm COMPARISON: 07/14/2022 HISTORY: ORDERING SYSTEM PROVIDED HISTORY: confusion TECHNOLOGIST PROVIDED HISTORY: Reason for exam:->confusion Reason for Exam: confusion FINDINGS: Cardiomediastinal silhouette stable.   Bibasilar pulmonary opacities. No pulmonary vascular congestion or edema. No pneumothorax. Bibasilar pulmonary opacities could represent atelectasis or infection       Lab Results   Component Value Date/Time    GLUCOSE 109 11/17/2022 06:38 AM     Lab Results   Component Value Date/Time    POCGLU 118 07/23/2020 11:32 AM     /60   Pulse 67   Temp 99.3 °F (37.4 °C) (Oral)   Resp 16   Wt 194 lb (88 kg)   SpO2 94%   BMI 30.38 kg/m²     Assessment and Plan:  Principal Problem:    Hypocalcemia -Established problem. Stable. Ca 6.5  Plan: cont replacement. renal on board; defer tx to them  Active Problems:    FTT (failure to thrive) in adult -Established problem. Stable. Plan: pt/ot asked to see. Placement likely indicated    Anemia -Established problem. Stable. Plan: No indication for transfusion. Cont to monitor h/h to assess progression of anemia. Recommend ferrous sulfate or MVI as outpatient. Crohn's colitis ([facility]  Plan: Continue present orders/plan. Hypomagnesemia -Established problem. Uncontrolled. Plan: iv mag sulfate ordered 11/16, this has improved things.  Cont to order Mag as needed    Multiple wounds    Altered mental status        (Please note that portions of this note were completed with a voice recognition program.  Efforts were made to edit the dictations but occasionally words are mis-transcribed.)        Patricia Mann MD  11/17/2022  '

## 2022-11-18 LAB
ALBUMIN SERPL-MCNC: 2.7 G/DL (ref 3.4–5)
ANION GAP SERPL CALCULATED.3IONS-SCNC: 9 MMOL/L (ref 3–16)
BASOPHILS ABSOLUTE: 0.1 K/UL (ref 0–0.2)
BASOPHILS RELATIVE PERCENT: 1 %
BUN BLDV-MCNC: 3 MG/DL (ref 7–20)
CALCIUM SERPL-MCNC: 6.4 MG/DL (ref 8.3–10.6)
CHLORIDE BLD-SCNC: 106 MMOL/L (ref 99–110)
CO2: 20 MMOL/L (ref 21–32)
CREAT SERPL-MCNC: <0.5 MG/DL (ref 0.6–1.2)
EOSINOPHILS ABSOLUTE: 0.2 K/UL (ref 0–0.6)
EOSINOPHILS RELATIVE PERCENT: 2.7 %
GFR SERPL CREATININE-BSD FRML MDRD: >60 ML/MIN/{1.73_M2}
GLUCOSE BLD-MCNC: 102 MG/DL (ref 70–99)
HCT VFR BLD CALC: 31.7 % (ref 36–48)
HEMOGLOBIN: 10.4 G/DL (ref 12–16)
LYMPHOCYTES ABSOLUTE: 1 K/UL (ref 1–5.1)
LYMPHOCYTES RELATIVE PERCENT: 12.5 %
MAGNESIUM: 1.3 MG/DL (ref 1.8–2.4)
MAGNESIUM: 1.6 MG/DL (ref 1.8–2.4)
MCH RBC QN AUTO: 28.9 PG (ref 26–34)
MCHC RBC AUTO-ENTMCNC: 33 G/DL (ref 31–36)
MCV RBC AUTO: 87.7 FL (ref 80–100)
MONOCYTES ABSOLUTE: 0.6 K/UL (ref 0–1.3)
MONOCYTES RELATIVE PERCENT: 7.1 %
NEUTROPHILS ABSOLUTE: 6.1 K/UL (ref 1.7–7.7)
NEUTROPHILS RELATIVE PERCENT: 76.7 %
PDW BLD-RTO: 14.3 % (ref 12.4–15.4)
PHOSPHORUS: 1.5 MG/DL (ref 2.5–4.9)
PLATELET # BLD: 245 K/UL (ref 135–450)
PMV BLD AUTO: 6.9 FL (ref 5–10.5)
POTASSIUM SERPL-SCNC: 3.8 MMOL/L (ref 3.5–5.1)
RBC # BLD: 3.61 M/UL (ref 4–5.2)
SODIUM BLD-SCNC: 135 MMOL/L (ref 136–145)
WBC # BLD: 7.9 K/UL (ref 4–11)

## 2022-11-18 PROCEDURE — 83735 ASSAY OF MAGNESIUM: CPT

## 2022-11-18 PROCEDURE — 85025 COMPLETE CBC W/AUTO DIFF WBC: CPT

## 2022-11-18 PROCEDURE — 97530 THERAPEUTIC ACTIVITIES: CPT

## 2022-11-18 PROCEDURE — 80069 RENAL FUNCTION PANEL: CPT

## 2022-11-18 PROCEDURE — 94760 N-INVAS EAR/PLS OXIMETRY 1: CPT

## 2022-11-18 PROCEDURE — 6370000000 HC RX 637 (ALT 250 FOR IP): Performed by: INTERNAL MEDICINE

## 2022-11-18 PROCEDURE — 2580000003 HC RX 258: Performed by: INTERNAL MEDICINE

## 2022-11-18 PROCEDURE — 97535 SELF CARE MNGMENT TRAINING: CPT

## 2022-11-18 PROCEDURE — 6360000002 HC RX W HCPCS: Performed by: INTERNAL MEDICINE

## 2022-11-18 PROCEDURE — 97163 PT EVAL HIGH COMPLEX 45 MIN: CPT

## 2022-11-18 PROCEDURE — 36415 COLL VENOUS BLD VENIPUNCTURE: CPT

## 2022-11-18 PROCEDURE — 1200000000 HC SEMI PRIVATE

## 2022-11-18 PROCEDURE — 97166 OT EVAL MOD COMPLEX 45 MIN: CPT

## 2022-11-18 RX ORDER — FAMOTIDINE 20 MG/1
20 TABLET, FILM COATED ORAL 2 TIMES DAILY
Status: DISCONTINUED | OUTPATIENT
Start: 2022-11-18 | End: 2022-11-22 | Stop reason: HOSPADM

## 2022-11-18 RX ORDER — MAGNESIUM SULFATE 1 G/100ML
2000 INJECTION INTRAVENOUS ONCE
Status: COMPLETED | OUTPATIENT
Start: 2022-11-18 | End: 2022-11-18

## 2022-11-18 RX ADMIN — BUSPIRONE HYDROCHLORIDE 10 MG: 5 TABLET ORAL at 09:40

## 2022-11-18 RX ADMIN — TRIAMCINOLONE ACETONIDE: 1 CREAM TOPICAL at 09:51

## 2022-11-18 RX ADMIN — RIVAROXABAN 20 MG: 20 TABLET, FILM COATED ORAL at 09:37

## 2022-11-18 RX ADMIN — LACOSAMIDE 200 MG: 100 TABLET, FILM COATED ORAL at 20:36

## 2022-11-18 RX ADMIN — TRIAMCINOLONE ACETONIDE: 1 CREAM TOPICAL at 20:36

## 2022-11-18 RX ADMIN — BUSPIRONE HYDROCHLORIDE 10 MG: 5 TABLET ORAL at 15:39

## 2022-11-18 RX ADMIN — MAGNESIUM SULFATE HEPTAHYDRATE 1000 MG: 1 INJECTION, SOLUTION INTRAVENOUS at 09:48

## 2022-11-18 RX ADMIN — BALSALAZIDE DISODIUM 1500 MG: 750 CAPSULE ORAL at 20:36

## 2022-11-18 RX ADMIN — MYCOPHENOLATE MOFETIL 1000 MG: 250 CAPSULE ORAL at 20:36

## 2022-11-18 RX ADMIN — LACOSAMIDE 200 MG: 100 TABLET, FILM COATED ORAL at 09:38

## 2022-11-18 RX ADMIN — BACLOFEN 10 MG: 10 TABLET ORAL at 04:30

## 2022-11-18 RX ADMIN — Medication 400 MG: at 09:38

## 2022-11-18 RX ADMIN — BACLOFEN 10 MG: 10 TABLET ORAL at 15:39

## 2022-11-18 RX ADMIN — Medication 400 MG: at 20:35

## 2022-11-18 RX ADMIN — METOPROLOL SUCCINATE 50 MG: 50 TABLET, EXTENDED RELEASE ORAL at 09:38

## 2022-11-18 RX ADMIN — MYCOPHENOLATE MOFETIL 1000 MG: 250 CAPSULE ORAL at 09:38

## 2022-11-18 RX ADMIN — POTASSIUM CHLORIDE 20 MEQ: 1500 TABLET, EXTENDED RELEASE ORAL at 20:35

## 2022-11-18 RX ADMIN — FAMOTIDINE 20 MG: 20 TABLET, FILM COATED ORAL at 09:38

## 2022-11-18 RX ADMIN — Medication 972 MG: at 09:40

## 2022-11-18 RX ADMIN — PREDNISONE 5 MG: 10 TABLET ORAL at 09:37

## 2022-11-18 RX ADMIN — ACETAMINOPHEN 650 MG: 325 TABLET ORAL at 04:27

## 2022-11-18 RX ADMIN — CEFTRIAXONE SODIUM 1000 MG: 1 INJECTION, POWDER, FOR SOLUTION INTRAMUSCULAR; INTRAVENOUS at 15:39

## 2022-11-18 RX ADMIN — Medication 400 MG: at 15:40

## 2022-11-18 RX ADMIN — BUSPIRONE HYDROCHLORIDE 10 MG: 5 TABLET ORAL at 20:36

## 2022-11-18 RX ADMIN — BALSALAZIDE DISODIUM 1500 MG: 750 CAPSULE ORAL at 09:40

## 2022-11-18 RX ADMIN — POTASSIUM CHLORIDE 20 MEQ: 1500 TABLET, EXTENDED RELEASE ORAL at 09:40

## 2022-11-18 RX ADMIN — FAMOTIDINE 20 MG: 20 TABLET, FILM COATED ORAL at 20:35

## 2022-11-18 RX ADMIN — Medication 10 ML: at 20:37

## 2022-11-18 RX ADMIN — ATORVASTATIN CALCIUM 80 MG: 80 TABLET, FILM COATED ORAL at 20:37

## 2022-11-18 ASSESSMENT — PAIN SCALES - GENERAL
PAINLEVEL_OUTOF10: 0
PAINLEVEL_OUTOF10: 8
PAINLEVEL_OUTOF10: 0
PAINLEVEL_OUTOF10: 8
PAINLEVEL_OUTOF10: 0
PAINLEVEL_OUTOF10: 8
PAINLEVEL_OUTOF10: 0

## 2022-11-18 ASSESSMENT — PAIN SCALES - WONG BAKER
WONGBAKER_NUMERICALRESPONSE: 0
WONGBAKER_NUMERICALRESPONSE: 6
WONGBAKER_NUMERICALRESPONSE: 6
WONGBAKER_NUMERICALRESPONSE: 8
WONGBAKER_NUMERICALRESPONSE: 6
WONGBAKER_NUMERICALRESPONSE: 0
WONGBAKER_NUMERICALRESPONSE: 6
WONGBAKER_NUMERICALRESPONSE: 0
WONGBAKER_NUMERICALRESPONSE: 0
WONGBAKER_NUMERICALRESPONSE: 6
WONGBAKER_NUMERICALRESPONSE: 6
WONGBAKER_NUMERICALRESPONSE: 0
WONGBAKER_NUMERICALRESPONSE: 0

## 2022-11-18 ASSESSMENT — PAIN DESCRIPTION - PAIN TYPE
TYPE: ACUTE PAIN
TYPE: ACUTE PAIN

## 2022-11-18 ASSESSMENT — PAIN DESCRIPTION - LOCATION
LOCATION: BACK;HEAD

## 2022-11-18 ASSESSMENT — PAIN - FUNCTIONAL ASSESSMENT
PAIN_FUNCTIONAL_ASSESSMENT: PREVENTS OR INTERFERES SOME ACTIVE ACTIVITIES AND ADLS
PAIN_FUNCTIONAL_ASSESSMENT: PREVENTS OR INTERFERES SOME ACTIVE ACTIVITIES AND ADLS

## 2022-11-18 ASSESSMENT — PAIN DESCRIPTION - DESCRIPTORS
DESCRIPTORS: ACHING

## 2022-11-18 ASSESSMENT — PAIN DESCRIPTION - FREQUENCY
FREQUENCY: INTERMITTENT
FREQUENCY: INTERMITTENT

## 2022-11-18 ASSESSMENT — PAIN DESCRIPTION - ORIENTATION
ORIENTATION: LOWER

## 2022-11-18 ASSESSMENT — PAIN DESCRIPTION - ONSET
ONSET: SUDDEN
ONSET: SUDDEN

## 2022-11-18 NOTE — PROGRESS NOTES
[facility]   Phone: [phone]    Occupational Therapy    [x] Initial Evaluation            [] Daily Treatment Note         [] Discharge Summary      Patient: Jenniffer Lauren   : 1955   MRN: [id number]   Date of Service:  2022    Admitting Diagnosis:  Hypocalcemia  Current Admission Summary: Per H&P \"80 y.o. female who presents to the emergency department today for evaluation for general fatigue. When I evaluate the patient, she states that she is complaining of pain to her left elbow. The patient denies falling. She is rating her pain is an 8/10, pain will radiate up towards her shoulder. When asked where the patient lives, she was able to tell me her birthday. As she is able to tell me her name, she is ANO x2.  Patient states that she is just generally not feeling well but is unsure exactly her symptoms or how long she has been not feeling well. \"  Past Medical History:  has a past medical history of Anxiety, Arthritis, Atrial fibrillation/flutter, Blood transfusion reaction, Crohn's disease ([address] Depression, GERD (gastroesophageal reflux disease), Hiatal hernia, Hx of blood clots, Hypertension, MRSA (methicillin resistant staph aureus) culture positive, MRSA (methicillin resistant staph aureus) culture positive, Neuropathy, Pneumonia, Sinus headache, SOB (shortness of breath), and VRE (vancomycin resistant enterococcus) culture positive. Past Surgical History:  has a past surgical history that includes Hysterectomy; knee surgery (Bilateral); Hand Carpectomy (Bilateral); hernia repair; Abdomen surgery; bladder suspension; fracture surgery; Heel spur surgery; Tonsillectomy; Colonoscopy; Upper gastrointestinal endoscopy (13); Foot surgery (Left, 14); Foot surgery (6/3/15); Colonoscopy (9/14/15); Foot surgery (Left, 2002); joint replacement; Sigmoidoscopy (01/15/2018); Abdominal adhesion surgery (2018);  Colonoscopy (08/07/2018); colostomy (N/A, 10/8/2018); pr secd clos surg wnd exten/complic (N/A, 41/01/8948); Leg Surgery (Right); Colonoscopy (06/07/2019); Colonoscopy (N/A, 6/7/2019); Colonoscopy (N/A, 6/7/2019); Small intestine surgery (N/A, 7/17/2019); and proctosigmoidoscopy (N/A, 7/17/2019). Discharge Recommendations: Zoila Zhang scored a 10/24 on the AM-PAC ADL Inpatient form. Current research shows that an AM-PAC score of 17 or less is typically not associated with a discharge to the patient's home setting. Based on the patient's AM-PAC score and their current ADL deficits, it is recommended that the patient have 3-5 sessions per week of Occupational Therapy at d/c to increase the patient's independence. Please see assessment section for further patient specific details. If patient discharges prior to next session this note will serve as a discharge summary. Please see below for the latest assessment towards goals. DME Required For Discharge: DME to be determined at next level of care    Precautions/Restrictions: high fall risk  Weight Bearing Restrictions: no restrictions  [] Right Upper Extremity  [] Left Upper Extremity [] Right Lower Extremity  [] Left Lower Extremity     Required Braces/Orthotics: no braces required   [] Right  [] Left  Positional Restrictions:no positional restrictions      Pre-Admission Information     Unable to obtain social hx at this time due to pt's cognitive status and expressive aphasia. Per chart review, pt lives with son who is requesting placement for pt. Examination     Vision:   Vision Gross Assessment: WFL  Hearing:   WFL  Perception:   WFL  Observation:   General Observation:  blisters on B LE, bruising and scabbing on B LE, B UE, chest, abdomen, back.  Open wounds on joyce-area and posterior thighs~nursing aware    Posture:   Rounded shoulders and forward head  Sensation:   Unable to formally test secondary to cognition  Proprioception:    Unable to formally test secondary to cognition  Tone:   Normotonic  Coordination Testing:   Unable to formally test secondary to cognition     ROM:   Unable to formally assess d/t increased pain with slightly movement of BUE  Strength:   Formal MMT held secondary to increased pain with slightly movement    Decision Making: medium complexity  Clinical Presentation: stable      Subjective    General: Pt supine in bed upon arrival, pleasant and agreeable to OT evaluation and treat   Pain: Patient does not rate upon questioning increased pain with movement of all extremities   Pain Interventions: RN notified and repositioned            Activities of Daily Living    Basic Activities of Daily Living  Grooming: maximum assistance  Grooming Comments: apply an pt reporting increased pain with BUE movement   Upper Extremity Bathing: maximum assistance  Lower Extremity Bathing: dependent   Bathing Equipment: none  Bathing Comments: assistx2 to bathe LB while rolling in bed  Upper Extremity Dressing: maximum assistance  Lower Extremity Dressing: dependent  Dressing Equipment: none  Dressing Comments: assistx2 while rolling in bed  Toileting: dependent. Toileting Equipment: none  Toileting Comments: incontinent of BM/Urine x2  General Comments: pt seated EOB to complete UB bathing/grooming, pt returned to supine and completed LB bathing/dressing with assistx2 while rolling in bed  Instrumental Activities of Daily Living  No IADL completed on this date. Functional Mobility    Bed Mobility  Supine to Sit: 2 person assistance with max(A)x2   Sit to Supine: 2 person assistance with max(A)x2   Scootin person assistance with max(A)x2   Comments: completed bed mobilityx2 and multiple rolls in bed   Transfers  No transfers completed on this date secondary to safety concerns . Comments: attempted a stand to chantell theody but unable to clear bottom from bed with max(A)x2.  Pt requesting to return supine  Functional Mobility:  Sitting Balance: stand by assistance, contact guard assistance. Sitting Balance Comment: seated EOB for 10 minutes   Standing Balance: attempted to stand at [address], unable to come to full stand. Standing Balance Comment: not able to stand this date     Other Therapeutic Interventions      Functional Outcomes  AM-PAC Inpatient Daily Activity Raw Score: 10      Cognition  Overall Cognitive Status: Impaired  Arousal/Alterness: appropriate responses to stimuli, delayed responses to stimuli  Following Commands: follows one step commands with repetition, follows one step commands with increased time  Attention Span: difficulty dividing attention  Memory: decreased recall of recent events, decreased short term memory  Safety Judgement: decreased awareness of need for assistance  Problem Solving: assistance required to generate solutions, assistance required to implement solutions, assistance required to identify errors made, assistance required to correct errors made  Insights: decreased awareness of deficits  Initiation: requires cues for some  Sequencing: requires cues for some  Comments: expressive aphasia   Orientation:    Pt refused to answer orientation question  Command Following:   accurately follows one step commands     Education    Barriers To Learning: cognition and emotional  Patient Education: patient educated on goals, OT role and benefits, discharge recommendations  Learning Assessment:  patient will require reinforcement due to cognitive deficits    Assessment    Activity Tolerance: Pt self limiting at times requiring extensive education and encouragement to participate/give best effort in therapy sessions. Pt sat EOB this date for 10 minutes and tolerated sitting unsupported well.  Pt returned to supine and assisted in ADLs where pt in increased pain with movement  Impairments Requiring Therapeutic Intervention: decreased functional mobility, decreased ADL status, decreased ROM, decreased strength, decreased safety awareness, decreased cognition, decreased endurance, decreased balance, increased pain  Prognosis: good and fair  Clinical Assessment: Pt is currently functioning below occupational baseline and demo the deficits listed above. Pt is currently requiring assistx2 for bed mobility and ADLs and was unable to move BUE d/t increased pain this date.  Pt would benefit from continued skilled OT services to address these deficits and increase IND, safety, and ease with all occupational pursuits   Safety Interventions: patient left in bed, bed alarm in place, call light within reach, and nurse notified       Plan    Frequency: 3-5 x/per week  Current Treatment Recommendations: strengthening, ROM, balance training, functional mobility training, transfer training, endurance training, and ADL/self-care training    Goals    Patient Goals: pt did not state     Short Term Goals:  Time Frame: by d/c  Patient will complete upper body ADL at minimal assistance   Patient will complete lower body ADL at maximum assistance   Patient will complete toileting at maximum assistance   Patient will complete functional transfers at maximum assistance        Therapy Session Time     Individual Group Co-treatment   Time In    1116   Time Out    1216   Minutes    60        Timed Code Treatment Minutes:  Timed Code Treatment Minutes: 45 Minutes    Total Treatment Minutes:  60 minutes total    Electronically Signed By: Polly Arnold OT, Polly Arnold, JOSSER/L [id number]

## 2022-11-18 NOTE — PROGRESS NOTES
MD Alexandro Yousif MD Deland [place], MD                  Office: [phone]                      Fax: [phone]             [facility]                   NEPHROLOGY INPATIENT PROGRESS NOTE:     PATIENT NAME: Billy Keller  : 1955  MRN: [id number]      RECOMMENDATIONS:   - reviewed urine electrolytes with creatinine to calculate fractional excretion of magnesium, calcium, potassium= less likely renal wasting, likely GI losses   -Aggressive magnesium , calcium and potassium repletion  - Increased Mag 400 BID to 400 TID  + give IV x1  - K PO 20 mEq - better  - Calcium corrected w/ hypoalbuminemia - mild, so no need for daily repletion  -Follow urine culture empirical antibiotics  -Avoid PPI, alcohol,   - on Protonix now - defer to GI - if can stop   - at higher risk for decompensation, needing closer monitoring. D/C plan from renal stand point: improving       IMPRESSION:       Admitted on:  11/15/2022  6:23 PM   For:  Hypocalcemia [E83.51]  Generalized weakness [R53.1]      Multiple electrolyte abnormalities  Hypocalcemia, corrected for hypoalbuminemia, moderate range  Hypokalemia  Severe hypomagnesemia, 0.3    History of alcohol abuse and PPI use    Glucose normal  Albumin normal  LFTs stable  Ethanol level normal less than  Anemia likely of chronic disease, mild    All likely due to possibility of GI loss, with history of Crohn's disease,    UA showing likely concern for urinary tract infection    Renal  function is stable without AARTI on CKD    H/O chronic hyponatremia. Underlying history of alcohol abuse- better no  CXR -   1. Worsening bibasilar airspace opacities which could represent atelectasis or pneumonia in the appropriate clinical setting. 2.  Mild cardiomegaly       Other major problems: Management per primary and other consulting teams.          Hospital Problems             Last Modified POA    * (Principal) Hypocalcemia 11/15/2022 Yes    FTT (failure to thrive) in adult 11/15/2022 Yes    Acute respiratory failure with hypoxemia ([facility] [address] 11/16/2022 Yes    Anemia 11/15/2022 Yes    Crohn's colitis ([facility] [address] 11/15/2022 Yes    Hypomagnesemia 11/16/2022 Yes    Multiple wounds 11/15/2022 Yes    Altered mental status 11/15/2022 Yes          Please refer to the orders. High Complexity. Multiple complex problems. Discussed with patient and treatment team-    Time spent > 30 (~35) minutes. Thank you for allowing me to participate in this patient's care. Please do not hesitate to contact me anytime. We will follow along with you. Moses Cohen MD,  [facility] [address] [place] [address] [place]: [phone] or [employer]  Fax: [phone]        =======================================================================================   =======================================================================================  Subjective / interval history:   Patient was seen comfortably resting in the bed,   Reported no active complaints,   Renal function stable  Lytes improving. *    Past medical, Surgical, Social, Family medical history reviewed by me. MEDICATIONS: reviewed by me. Medications Prior to Admission:  No current facility-administered medications on file prior to encounter. Current Outpatient Medications on File Prior to Encounter   Medication Sig Dispense Refill    lacosamide (VIMPAT) 100 MG TABS tablet Take 200 mg by mouth 2 times daily. amLODIPine (NORVASC) 10 MG tablet Take 10 mg by mouth daily      calcium-vitamin D (OSCAL-500) 500-200 MG-UNIT per tablet Take 1 tablet by mouth 2 times daily      dupilumab (DUPIXENT) 300 MG/2ML SOPN injection Inject 300 mg into the skin      nystatin (MYCOSTATIN) 968797 UNIT/GM powder Apply topically 2 times daily Apply topically 4 times daily. triamcinolone (KENALOG) 0.1 % ointment Apply topically 2 times daily Apply topically 2 times daily.       magnesium oxide (MAG-OX) 400 (240 Mg) MG tablet Take 1 tablet by mouth 2 times daily 30 tablet 1    oxyCODONE (ROXICODONE) 5 MG immediate release tablet Take 5 mg by mouth every 4 hours as needed for Pain. (Patient not taking: No sig reported)      mycophenolate (CELLCEPT) 500 MG tablet Take 1,000 mg by mouth 2 times daily      baclofen (LIORESAL) 10 MG tablet Take 10 mg by mouth 3 times daily as needed      predniSONE (DELTASONE) 5 MG tablet Take 5 mg by mouth daily      metoprolol succinate (TOPROL XL) 50 MG extended release tablet Take 1 tablet by mouth daily 30 tablet 5    rivaroxaban (XARELTO) 20 MG TABS tablet Take 1 tablet by mouth daily (with breakfast) 90 tablet 3    atorvastatin (LIPITOR) 80 MG tablet Take 1 tablet by mouth nightly 30 tablet 3    potassium chloride (KLOR-CON M) 20 MEQ extended release tablet Take 1 tablet by mouth 2 times daily 60 tablet 3    ferrous gluconate (FERGON) 324 (38 Fe) MG tablet Take 3 tablets by mouth daily (with breakfast)       hydrOXYzine HCl (ATARAX) 25 MG tablet Take 25 mg by mouth 3 times daily as needed for Itching      ondansetron (ZOFRAN ODT) 4 MG disintegrating tablet Take 1 tablet by mouth every 8 hours as needed for Nausea 20 tablet 0    busPIRone (BUSPAR) 10 MG tablet Take 10 mg by mouth 3 times daily as needed      balsalazide (COLAZAL) 750 MG capsule Take 3,000 mg by mouth daily Take 4 capsules (total 3000 mg) daily each morning.            Current Facility-Administered Medications:     famotidine (PEPCID) tablet 20 mg, 20 mg, Oral, BID, Prince Vidal MD, 20 mg at 11/18/22 8505    magnesium sulfate 1000 mg in dextrose 5% 100 mL IVPB, 2,000 mg, IntraVENous, Once, Prince Vidal MD, Stopped at 11/18/22 1048    magnesium oxide (MAG-OX) tablet 400 mg, 400 mg, Oral, TID, Yolanda Rangel MD, 400 mg at 11/18/22 0938    magnesium sulfate 2000 mg in 50 mL IVPB premix, 2,000 mg, IntraVENous, PRN, Yolanda Rangel MD, Stopped at 11/16/22 2206    sodium phosphate 10 mmol in sodium chloride 0.9 % 250 mL IVPB, 10 mmol, IntraVENous, PRN **OR** sodium phosphate 15 mmol in sodium chloride 0.9 % 250 mL IVPB, 15 mmol, IntraVENous, PRN, Stopped at 11/17/22 1600 **OR** sodium phosphate 20 mmol in sodium chloride 0.9 % 500 mL IVPB, 20 mmol, IntraVENous, PRN, Dayana Muniz MD    lacosamide (VIMPAT) tablet 200 mg, 200 mg, Oral, BID, Nayan Rivera MD, 200 mg at 11/18/22 2614    cefTRIAXone (ROCEPHIN) 1,000 mg in dextrose 5 % 50 mL IVPB mini-bag, 1,000 mg, IntraVENous, Q24H, Nayan Rivera MD, Stopped at 11/17/22 1645    triamcinolone (KENALOG) 0.1 % cream, , Topical, BID, Nayan Rivera MD, Given at 11/18/22 0951    busPIRone (BUSPAR) tablet 10 mg, 10 mg, Oral, TID, Nayan Rivera MD, 10 mg at 11/18/22 0940    balsalazide (COLAZAL) capsule 1,500 mg, 1,500 mg, Oral, BID, Nayan Rivera MD, 1,500 mg at 11/17/22 2144    ferrous gluconate (FERGON) tablet 972 mg, 972 mg, Oral, Daily with breakfast, Nayan Rivera MD, 972 mg at 11/17/22 0947    hydrOXYzine HCl (ATARAX) tablet 25 mg, 25 mg, Oral, TID PRN, Nayan Rivera MD    atorvastatin (LIPITOR) tablet 80 mg, 80 mg, Oral, Nightly, Nayan Rivera MD, 80 mg at 11/17/22 2144    potassium chloride (KLOR-CON M) extended release tablet 20 mEq, 20 mEq, Oral, BID, Nayan Rivera MD, 20 mEq at 11/18/22 0940    rivaroxaban (XARELTO) tablet 20 mg, 20 mg, Oral, Daily with breakfast, Nayan Rivera MD, 20 mg at 11/18/22 0196    metoprolol succinate (TOPROL XL) extended release tablet 50 mg, 50 mg, Oral, Daily, Nayan Rivera MD, 50 mg at 11/18/22 1143    mycophenolate (CELLCEPT) capsule 1,000 mg, 1,000 mg, Oral, BID, Nayan Rivera MD, 1,000 mg at 11/18/22 4845    baclofen (LIORESAL) tablet 10 mg, 10 mg, Oral, TID PRN, Nayan Rivera MD, 10 mg at 11/18/22 0430    predniSONE (DELTASONE) tablet 5 mg, 5 mg, Oral, Daily, Nayan Rivera MD, 5 mg at 11/18/22 0937    0.9 % sodium chloride infusion, , IntraVENous, Continuous, Nayan Rivera MD, Last Rate: 75 mL/hr at 11/17/22 0041, New Bag at 11/17/22 0041    sodium chloride flush 0.9 % injection 5-40 mL, 5-40 mL, IntraVENous, 2 times per day, Annie Baltazar MD, 10 mL at 11/17/22 2143    sodium chloride flush 0.9 % injection 5-40 mL, 5-40 mL, IntraVENous, PRN, Annie Baltazar MD    0.9 % sodium chloride infusion, 25 mL, IntraVENous, PRN, Annie Baltazar MD    potassium chloride (KLOR-CON M) extended release tablet 40 mEq, 40 mEq, Oral, PRN **OR** potassium bicarb-citric acid (EFFER-K) effervescent tablet 40 mEq, 40 mEq, Oral, PRN, 40 mEq at 11/16/22 1001 **OR** potassium chloride 10 mEq/100 mL IVPB (Peripheral Line), 10 mEq, IntraVENous, PRN, Annie Baltazar MD    ondansetron (ZOFRAN-ODT) disintegrating tablet 4 mg, 4 mg, Oral, Q8H PRN **OR** ondansetron (ZOFRAN) injection 4 mg, 4 mg, IntraVENous, Q6H PRN, Annie Baltazar MD    magnesium hydroxide (MILK OF MAGNESIA) 400 MG/5ML suspension 30 mL, 30 mL, Oral, Daily PRN, Annie Baltazar MD    acetaminophen (TYLENOL) tablet 650 mg, 650 mg, Oral, Q6H PRN, 650 mg at 11/18/22 0427 **OR** acetaminophen (TYLENOL) suppository 650 mg, 650 mg, Rectal, Q6H PRN, Annie Baltazar MD    hydrALAZINE (APRESOLINE) injection 10 mg, 10 mg, IntraVENous, Q6H PRN, Annie Baltazar MD    0.9 % sodium chloride bolus, 500 mL, IntraVENous, PRN, Annie Baltazar MD          REVIEW OF SYSTEMS:  As mentioned in chief complaint and HPI , Subjective     =======================================================================================     PHYSICAL EXAM:  Recent vital signs and recent I/Os reviewed by me.      Wt Readings from Last 3 Encounters:   11/15/22 194 lb (88 kg)   08/08/21 192 lb 12.8 oz (87.5 kg)   06/04/21 198 lb (89.8 kg)     BP Readings from Last 3 Encounters:   11/18/22 111/74   07/14/22 (!) 99/53   08/10/21 111/72     Patient Vitals for the past 24 hrs:   BP Temp Temp src Pulse Resp SpO2 Height   11/18/22 0850 111/74 98.1 °F (36.7 °C) Temporal 82 18 94 % --   11/18/22 0430 118/81 98.3 °F (36.8 °C) Oral 76 18 92 % --   11/18/22 0125 99/68 (!) 101.7 °F (38.7 °C) Axillary 86 16 92 % --   11/17/22 2010 108/71 98.2 °F (36.8 °C) Oral 78 16 91 % --   11/17/22 1545 107/67 97.3 °F (36.3 °C) Oral 82 16 90 % --   11/17/22 1209 -- -- -- -- -- -- 5' 7\" (1.702 m)   11/17/22 1115 98/67 98.1 °F (36.7 °C) Oral 85 16 90 % --         Intake/Output Summary (Last 24 hours) at 11/18/2022 1033  Last data filed at 11/18/2022 0431  Gross per 24 hour   Intake 480 ml   Output 2000 ml   Net -1520 ml           Physical Exam  Vitals reviewed. Constitutional:       General: She is not in acute distress. Appearance: Normal appearance. She is obese. She is ill-appearing. HENT:      Head: Normocephalic and atraumatic. Right Ear: External ear normal.      Left Ear: External ear normal.      Nose: Nose normal.      Mouth/Throat:      Mouth: Mucous membranes are moist. Mucous membranes are not dry. Eyes:      General: No scleral icterus. Conjunctiva/sclera: Conjunctivae normal.   Neck:      Vascular: No JVD. Cardiovascular:      Rate and Rhythm: Normal rate and regular rhythm. Heart sounds: S1 normal and S2 normal.   Pulmonary:      Effort: Pulmonary effort is normal. No respiratory distress. Breath sounds: Rhonchi present. Abdominal:      General: Bowel sounds are normal. There is distension. Musculoskeletal:         General: Swelling present. No deformity. Cervical back: Normal range of motion and neck supple. Skin:     General: Skin is dry. Coloration: Skin is not jaundiced. Neurological:      Mental Status: She is alert and oriented to person, place, and time. Mental status is at baseline. Psychiatric:         Mood and Affect: Mood normal.         Behavior: Behavior normal.          =======================================================================================     DATA:  Diagnostic tests reviewed by me for today's visit:   (AS NEEDED FOR MY EVALUATION AND MANAGEMENT). Recent Labs     11/15/22  1921 11/16/22  0339 11/17/22  0638 11/18/22  0714   WBC 7.7 7.5 9.1 7.9   HCT 35.7* 33.5* 32.3* 31.7*    177 231 245       Iron Saturation:  No components found for: PERCENTFE  FERRITIN:    Lab Results   Component Value Date/Time    FERRITIN 67.5 09/27/2018 08:45 AM     IRON:    Lab Results   Component Value Date/Time    IRON 13 09/27/2018 08:45 AM     TIBC:    Lab Results   Component Value Date/Time    TIBC 190 09/27/2018 08:45 AM       Recent Labs     11/15/22  1921 11/16/22  0339 11/16/22  1913 11/17/22  0638 11/18/22  0714    137 129* 133* 135*   K 3.8 3.4* 4.5 3.6 3.8   CL 98* 99 99 101 106   CO2 27 22 18* 20* 20*   BUN 8 6* 5* 4* 3*   CREATININE 0.6 <0.5* <0.5* <0.5* <0.5*       Recent Labs     11/15/22  1921 11/16/22  0339 11/16/22 0339 11/16/22  1045 11/16/22 1913 11/17/22 0638 11/17/22 2055 11/18/22  0714   CALCIUM 6.4* 6.3*  --   --  6.4* 6.5*  --  6.4*   MG  --  0.30*   < > 1.50* 1.50* 1.90 1.40* 1.30*   PHOS  --   --   --   --   --  1.8*  --  1.5*    < > = values in this interval not displayed. No results for input(s): PH, PCO2, PO2 in the last 72 hours.     Invalid input(s): [place]    ABG:  No results found for: PH, PCO2, PO2, HCO3, BE, THGB, TCO2, O2SAT  VBG:    Lab Results   Component Value Date/Time    PHVEN 7.324 07/14/2022 12:07 AM    PNK6ZEK 44.2 07/14/2022 12:07 AM    BEVEN -2.9 07/14/2022 12:07 AM    W2QCQUVU >100 07/14/2022 12:07 AM       LDH:  No results found for: LDH  Uric Acid:    Lab Results   Component Value Date/Time    LABURIC 4.1 09/26/2018 02:56 PM       PT/INR:    Lab Results   Component Value Date/Time    PROTIME 16.8 11/15/2022 07:21 PM    INR 1.37 11/15/2022 07:21 PM     Warfarin PT/INR:  No components found for: PTPATWAR, PTINRWAR  PTT:    Lab Results   Component Value Date/Time    APTT 29.9 11/15/2022 07:21 PM   [APTT}  Last 3 Troponin:    Lab Results   Component Value Date/Time    TROPONINI <0.01 11/15/2022 07:21 PM    TROPONINI <0.01 07/13/2022 11:10 PM    TROPONINI <0.01 08/03/2021 04:11 PM       U/A:    Lab Results   Component Value Date/Time    COLORU DARK YELLOW 11/16/2022 02:36 AM    PROTEINU 100 11/16/2022 02:36 AM    PHUR 7.5 11/16/2022 02:36 AM    WBCUA 33 11/16/2022 02:36 AM    RBCUA 35 11/16/2022 02:36 AM    BACTERIA 4+ 11/16/2022 02:36 AM    CLARITYU TURBID 11/16/2022 02:36 AM    SPECGRAV 1.015 11/16/2022 02:36 AM    LEUKOCYTESUR LARGE 11/16/2022 02:36 AM    UROBILINOGEN 1.0 11/16/2022 02:36 AM    BILIRUBINUR Negative 11/16/2022 02:36 AM    BLOODU MODERATE 11/16/2022 02:36 AM    GLUCOSEU Negative 11/16/2022 02:36 AM     Microalbumen/Creatinine ratio:  No components found for: RUCREAT  24 Hour Urine for Protein:  No components found for: RAWUPRO, UHRS3, VEJG11CN, UTV3  24 Hour Urine for Creatinine Clearance:  No components found for: CREAT4, UHRS10, UTV10  Urine Toxicology:  No components found for: Gordan Flatten, IBENZO, ICOCAINE, IMARTHC, IOPIATES, IPHENCYC    HgBA1c:    Lab Results   Component Value Date/Time    LABA1C 5.0 07/24/2020 05:26 AM     RPR:  No results found for: RPR  HIV:  No results found for: HIV  MARION:    Lab Results   Component Value Date/Time    MARION Negative 07/03/2018 05:10 AM     RF:  No results found for: RF  DSDNA:  No components found for: DNA  AMYLASE:    Lab Results   Component Value Date/Time    AMYLASE 48 08/02/2018 07:19 PM     LIPASE:    Lab Results   Component Value Date/Time    LIPASE 19.0 11/15/2022 07:21 PM     Fibrinogen Level:  No components found for: FIB       BELOW MENTIONED RADIOLOGY STUDY RESULTS BY ME (AS NEEDED FOR MY EVALUATION AND MANAGEMENT). CT HEAD WO CONTRAST    Result Date: 11/15/2022  No acute intracranial abnormality. XR CHEST PORTABLE    Result Date: 11/16/2022    1. Worsening bibasilar airspace opacities which could represent atelectasis or pneumonia in the appropriate clinical setting.  2.  Mild cardiomegaly           This report was transcribed using voice recognition software, mainly. So please excuse brevity and/or typos. Every effort was made to ensure accuracy, however, inadvertent computerized transcription errors may be present. Please contact us, if any questions or clarifications are needed.

## 2022-11-18 NOTE — PROGRESS NOTES
Physical [facility]   Phone: [phone]    Physical Therapy    [x] Initial Evaluation            [] Daily Treatment Note         [] Discharge Summary      Patient: Nehemias Holloway   : 1955   MRN: [id number]   Date of Service:  2022  Admitting Diagnosis: Hypocalcemia  Current Admission Summary: Nehemias Holloway is a 78 y.o. female who presents to the emergency department today for evaluation for general fatigue. When I evaluate the patient, she states that she is complaining of pain to her left elbow. The patient denies falling. She is rating her pain is an 8/10, pain will radiate up towards her shoulder. When asked where the patient lives, she was able to tell me her birthday. As she is able to tell me her name, she is ANO x2.  Patient states that she is just generally not feeling well but is unsure exactly her symptoms or how long she has been not feeling well. Per EMS report, family did call as the patient has had worsening fatigue for the past several days. Patient was unable to get up and walk, and EMS was called. Patient was seen several months ago for alcohol intoxication, she denies any alcohol use to me. No other history is able to be obtained at this time     Nursing Notes were all reviewed and agreed with or any disagreements were addressed in the HPI  Past Medical History:  has a past medical history of Anxiety, Arthritis, Atrial fibrillation/flutter, Blood transfusion reaction, Crohn's disease ([address] Depression, GERD (gastroesophageal reflux disease), Hiatal hernia, Hx of blood clots, Hypertension, MRSA (methicillin resistant staph aureus) culture positive, MRSA (methicillin resistant staph aureus) culture positive, Neuropathy, Pneumonia, Sinus headache, SOB (shortness of breath), and VRE (vancomycin resistant enterococcus) culture positive.   Past Surgical History:  has a past surgical history that includes Hysterectomy; knee surgery (Bilateral); Hand Carpectomy (Bilateral); hernia repair; Abdomen surgery; bladder suspension; fracture surgery; Heel spur surgery; Tonsillectomy; Colonoscopy; Upper gastrointestinal endoscopy (6/14/13); Foot surgery (Left, 6/25/14); Foot surgery (6/3/15); Colonoscopy (9/14/15); Foot surgery (Left, 08/05/2002); joint replacement; Sigmoidoscopy (01/15/2018); Abdominal adhesion surgery (06/08/2018); Colonoscopy (08/07/2018); colostomy (N/A, 10/8/2018); pr secd clos surg wnd exten/complic (N/A, 35/25/0266); Leg Surgery (Right); Colonoscopy (06/07/2019); Colonoscopy (N/A, 6/7/2019); Colonoscopy (N/A, 6/7/2019); Small intestine surgery (N/A, 7/17/2019); and proctosigmoidoscopy (N/A, 7/17/2019). Discharge Recommendations: Jenniffer Lauren scored a 6/24 on the AM-PAC short mobility form. Current research shows that an AM-PAC score of 17 or less is typically not associated with a discharge to the patient's home setting. Based on the patient's AM-PAC score and their current functional mobility deficits, it is recommended that the patient have 3-5 sessions per week of Physical Therapy at d/c to increase the patient's independence. Please see assessment section for further patient specific details. If patient discharges prior to next session this note will serve as a discharge summary. Please see below for the latest assessment towards goals. DME Required For Discharge: DME to be determined at next level of care  Precautions/Restrictions: high fall risk  Weight Bearing Restrictions: no restrictions  [] Right Upper Extremity  [] Left Upper Extremity [] Right Lower Extremity  [] Left Lower Extremity     Required Braces/Orthotics: no braces required   [] Right  [] Left  Positional Restrictions:no positional restrictions    Pre-Admission Information   Unable to obtain social hx at this time due to pt's cognitive status and expressive aphasia.  Per chart review, pt lives with son who is requesting placement for pt. Examination   Vision:   Unable to formally test secondary to cognition   Hearing:   Unable to formally test secondary to cognition  Observation:   General Observation:  blisters on B LE, bruising and scabbing on B LE, B UE, chest, abdomen, back. Open wounds on joyce-area and posterior thighs~nursing aware   Posture: Forward head, rounded shoulders   Sensation:   Unable to formally test secondary to cognition  ROM:   (B) LE AROM WFL  Strength:   Formal MMT held secondary to pt unable to follow commands   Therapist Clinical Decision Making (Complexity): high complexity  Clinical Presentation: evolving      Subjective  General: Pt supine in bed upon arrival. Pt agreeable to PT/OT eval with encouragement. Expressive aphasia noted   Pain: 10/10. Location: 10/10 \"whole body\"   Pain Interventions: RN notified       Functional Mobility  Bed Mobility  Supine to Sit: 2 person assistance with max A   Sit to Supine: 2 person assistance with max A   Rolling Left: 2 person assistance with max A   Rolling Right: 2 person assistance with max A   Scootin person assistance with max A   Comments: Several bouts of rolling to perform pericare and sheet change and pt incontinent of urine and stool   Transfers  No transfers completed on this date secondary to global weakness and pain . Comments: Attempted stand at [facility] however pt unable to complete due to significant UE and LE weakness  Attempted [place]ell steady transfer however pt unable to reach UE high enough to reach bar   Ambulation  Ambulation not tested on this date secondary to cannot complete transfers . Stair Mobility  Stair mobility not completed on this date. Comments:  Wheelchair Mobility:  No w/c mobility completed on this date.   Comments:  Balance  Static Sitting Balance: fair (-): maintains balance at CGA with use of UE support  Dynamic Sitting Balance: fair (-): maintains balance at CGA with use of UE support  Comments:Pt sat EOB ~10 mins with CGA     Other Therapeutic Interventions    Functional Outcomes  AM-PAC Inpatient Mobility Raw Score : 6              Cognition  Overall Cognitive Status: Impaired  Arousal/Alterness: inconsistent responses to stimuli  Following Commands: inconsistently follows commands  Attention Span: difficulty attending to directions, difficulty dividing attention  Memory: decreased recall of recent events, decreased short term memory, decreased long term memory  Safety Judgement: decreased awareness of need for assistance  Problem Solving: decreased awareness of errors  Insights: not aware of deficits  Initiation: requires cues for all  Sequencing: requires cues for all  Orientation:    Refuses to answer orientation questions   Command Following:   impaired    Education  Barriers To Learning: cognition, language, emotional, and physical  Patient Education: patient educated on goals, PT role and benefits, plan of care, general safety, discharge recommendations  Learning Assessment:  patient will require reinforcement due to cognitive deficits    Assessment  Activity Tolerance: Pt screams in pain with movement of UE and LE throughout session. Increased time to complete all activities. Pt incontinent of stool and urine during session. Pt easily agitated and highly confused   Impairments Requiring Therapeutic Intervention: decreased functional mobility, decreased ADL status, decreased ROM, decreased strength, decreased safety awareness, decreased cognition, decreased endurance, decreased sensation, decreased balance, increased pain, decreased posture  Prognosis: fair  Clinical Assessment: Pt is very confused this date and easily agitated, limiting evaluation. At this time, pt requires max A x2 to complete bed mobility and is unable to complete transfers at this time due to global weakness and pain. Pt incontinent of urine and stool, pericare provided with significant wounds noted around ojyce-area.  Pt would continue to benefit from skilled PT to prevent further decline in function and address above deficits. Safety Interventions: patient left in bed, bed alarm in place, call light within reach, gait belt, patient at risk for falls, and nurse notified    Plan  Frequency: 3-5 x/per week  Current Treatment Recommendations: strengthening, balance training, functional mobility training, transfer training, endurance training, and positioning    Goals  Patient Goals: none stated   Short Term Goals:  Time Frame: upon discharge   Patient will complete bed mobility at minimal assistance   Patient will complete transfers at maximum assistance   Patient to maintain sitting at EOB with stand by assistance for 15 minutes.     Therapy Session Time      Individual Group Co-treatment   Time In     8101   Time Out     1216   Minutes     60     Timed Code Treatment Minutes:   45  Total Treatment Minutes:  60       Electronically Signed By: Blade Galvez Do [facility], [id number] [id number]

## 2022-11-18 NOTE — PROGRESS NOTES
Progress Note - Dr. Arnaldo Kinney - Internal Medicine  PCP: Ama Choi MD [address] / [facility] [address] Day: 3  Code Status: Full Code  Current Diet: ADULT DIET; Regular  ADULT ORAL NUTRITION SUPPLEMENT; Lunch; Standard High Calorie/High Protein Oral Supplement  ADULT ORAL NUTRITION SUPPLEMENT; Breakfast, Dinner; Wound Healing Oral Supplement        CC: follow up on medical issues    Subjective:   Sascha Frazier is a 78 y.o. female. Pt seen and examined  Chart reviewed since last visit, labs and imaging below      Doing ok  Mag sulfate iv replacement ordered 11/16  Today - mag low again  More mgso4 ordered  Protonix stopped  Na better - 135      No other issues  Has not yet been seen by therapy as pt refused yest      Review of Systems: (1 system for EPF, 2-9 for detailed, 10+ for comprehensive)  Constitutional: Negative for chills and fever. HENT: Negative for dental problem, nosebleeds and rhinorrhea. Eyes: Negative for photophobia and visual disturbance. Respiratory: Negative for cough, chest tightness and shortness of breath. Cardiovascular: Negative for chest pain and leg swelling. Gastrointestinal: Negative for diarrhea, nausea and vomiting. Endocrine: Negative for polydipsia and polyphagia. Genitourinary: Negative for frequency, hematuria and urgency. Musculoskeletal: Negative for back pain and myalgias. Skin: Negative for rash. Allergic/Immunologic: Negative for food allergies. Neurological: Negative for dizziness, seizures, syncope and facial asymmetry. Hematological: Negative for adenopathy. Psychiatric/Behavioral: Negative for dysphoric mood. The patient is not nervous/anxious. I have reviewed the patient's medical and social history in detail and updated the computerized patient record.   To recap: She  has a past medical history of Anxiety, Arthritis, Atrial fibrillation/flutter, Blood transfusion reaction, [facility] disease ([facility] Depression, GERD (gastroesophageal reflux disease), Hiatal hernia, Hx of blood clots, Hypertension, MRSA (methicillin resistant staph aureus) culture positive, MRSA (methicillin resistant staph aureus) culture positive, Neuropathy, Pneumonia, Sinus headache, SOB (shortness of breath), and VRE (vancomycin resistant enterococcus) culture positive. . She  has a past surgical history that includes Hysterectomy; knee surgery (Bilateral); Hand Carpectomy (Bilateral); hernia repair; Abdomen surgery; bladder suspension; fracture surgery; Heel spur surgery; Tonsillectomy; Colonoscopy; Upper gastrointestinal endoscopy (6/14/13); Foot surgery (Left, 6/25/14); Foot surgery (6/3/15); Colonoscopy (9/14/15); Foot surgery (Left, 08/05/2002); joint replacement; Sigmoidoscopy (01/15/2018); Abdominal adhesion surgery (06/08/2018); Colonoscopy (08/07/2018); colostomy (N/A, 10/8/2018); pr secd clos surg wnd exten/complic (N/A, 39/26/7063); Leg Surgery (Right); Colonoscopy (06/07/2019); Colonoscopy (N/A, 6/7/2019); Colonoscopy (N/A, 6/7/2019); Small intestine surgery (N/A, 7/17/2019); and proctosigmoidoscopy (N/A, 7/17/2019). . She  reports that she has been smoking cigarettes and e-cigarettes. She has a 10.00 pack-year smoking history. She has never used smokeless tobacco. She reports current alcohol use of about 2.0 standard drinks per week. She reports that she does not use drugs. .        Active Hospital Problems    Diagnosis Date Noted    Acute respiratory failure with hypoxemia ([facility] [J96.01] 11/16/2022     Priority: Medium    Hypocalcemia [E83.51] 11/15/2022     Priority: Medium    FTT (failure to thrive) in adult [R62.7] 11/15/2022     Priority: Medium    Altered mental status [R41.82]     Multiple wounds [T07. XXXA]     Hypomagnesemia [E83.42] 11/27/2018    Crohn's colitis ([facility] [K50.10] 01/06/2018    Anemia [D64.9] 01/05/2018       Current Facility-Administered Medications: famotidine (PEPCID) tablet 20 mg, 20 mg, Oral, BID  magnesium oxide (MAG-OX) tablet 400 mg, 400 mg, Oral, TID  magnesium sulfate 2000 mg in 50 mL IVPB premix, 2,000 mg, IntraVENous, PRN  sodium phosphate 10 mmol in sodium chloride 0.9 % 250 mL IVPB, 10 mmol, IntraVENous, PRN **OR** sodium phosphate 15 mmol in sodium chloride 0.9 % 250 mL IVPB, 15 mmol, IntraVENous, PRN **OR** sodium phosphate 20 mmol in sodium chloride 0.9 % 500 mL IVPB, 20 mmol, IntraVENous, PRN  lacosamide (VIMPAT) tablet 200 mg, 200 mg, Oral, BID  cefTRIAXone (ROCEPHIN) 1,000 mg in dextrose 5 % 50 mL IVPB mini-bag, 1,000 mg, IntraVENous, Q24H  triamcinolone (KENALOG) 0.1 % cream, , Topical, BID  busPIRone (BUSPAR) tablet 10 mg, 10 mg, Oral, TID  balsalazide (COLAZAL) capsule 1,500 mg, 1,500 mg, Oral, BID  ferrous gluconate (FERGON) tablet 972 mg, 972 mg, Oral, Daily with breakfast  hydrOXYzine HCl (ATARAX) tablet 25 mg, 25 mg, Oral, TID PRN  atorvastatin (LIPITOR) tablet 80 mg, 80 mg, Oral, Nightly  potassium chloride (KLOR-CON M) extended release tablet 20 mEq, 20 mEq, Oral, BID  rivaroxaban (XARELTO) tablet 20 mg, 20 mg, Oral, Daily with breakfast  metoprolol succinate (TOPROL XL) extended release tablet 50 mg, 50 mg, Oral, Daily  mycophenolate (CELLCEPT) capsule 1,000 mg, 1,000 mg, Oral, BID  baclofen (LIORESAL) tablet 10 mg, 10 mg, Oral, TID PRN  predniSONE (DELTASONE) tablet 5 mg, 5 mg, Oral, Daily  0.9 % sodium chloride infusion, , IntraVENous, Continuous  sodium chloride flush 0.9 % injection 5-40 mL, 5-40 mL, IntraVENous, 2 times per day  sodium chloride flush 0.9 % injection 5-40 mL, 5-40 mL, IntraVENous, PRN  0.9 % sodium chloride infusion, 25 mL, IntraVENous, PRN  potassium chloride (KLOR-CON M) extended release tablet 40 mEq, 40 mEq, Oral, PRN **OR** potassium bicarb-citric acid (EFFER-K) effervescent tablet 40 mEq, 40 mEq, Oral, PRN **OR** potassium chloride 10 mEq/100 mL IVPB (Peripheral Line), 10 mEq, IntraVENous, PRN  ondansetron (ZOFRAN-ODT) disintegrating tablet 4 mg, 4 mg, Oral, Q8H PRN **OR** ondansetron (ZOFRAN) injection 4 mg, 4 mg, IntraVENous, Q6H PRN  magnesium hydroxide (MILK OF MAGNESIA) 400 MG/5ML suspension 30 mL, 30 mL, Oral, Daily PRN  acetaminophen (TYLENOL) tablet 650 mg, 650 mg, Oral, Q6H PRN **OR** acetaminophen (TYLENOL) suppository 650 mg, 650 mg, Rectal, Q6H PRN  hydrALAZINE (APRESOLINE) injection 10 mg, 10 mg, IntraVENous, Q6H PRN  0.9 % sodium chloride bolus, 500 mL, IntraVENous, PRN         Objective:  /81   Pulse 76   Temp 98.3 °F (36.8 °C) (Oral)   Resp 18   Ht 5' 7\" (1.702 m)   Wt 194 lb (88 kg)   SpO2 92%   BMI 30.38 kg/m²      Patient Vitals for the past 24 hrs:   BP Temp Temp src Pulse Resp SpO2 Height   11/18/22 0430 118/81 98.3 °F (36.8 °C) Oral 76 18 92 % --   11/18/22 0125 99/68 (!) 101.7 °F (38.7 °C) Axillary 86 16 92 % --   11/17/22 2010 108/71 98.2 °F (36.8 °C) Oral 78 16 91 % --   11/17/22 1545 107/67 97.3 °F (36.3 °C) Oral 82 16 90 % --   11/17/22 1209 -- -- -- -- -- -- 5' 7\" (1.702 m)   11/17/22 1115 98/67 98.1 °F (36.7 °C) Oral 85 16 90 % --   11/17/22 0845 105/67 99.5 °F (37.5 °C) Oral 77 16 90 % --       Patient Vitals for the past 96 hrs (Last 3 readings):   Weight   11/15/22 1824 194 lb (88 kg)             Intake/Output Summary (Last 24 hours) at 11/18/2022 0805  Last data filed at 11/18/2022 0431  Gross per 24 hour   Intake 480 ml   Output 2000 ml   Net -1520 ml           Physical Exam: (2-7 system for EPF/Detailed, ?8 for Comprehensive)  /81   Pulse 76   Temp 98.3 °F (36.8 °C) (Oral)   Resp 18   Ht 5' 7\" (1.702 m)   Wt 194 lb (88 kg)   SpO2 92%   BMI 30.38 kg/m²   Constitutional: vitals as above: alert, appears stated age and cooperative    Psychiatric: normal insight and judgment, oriented to person, place, time, and general circumstances    Head: Normocephalic, without obvious abnormality, atraumatic    Eyes:lids and lashes normal, conjunctivae and sclerae normal and pupils equal, round, reactive to light and accomodation    EMNT: external ears normal, nares midline    Neck: no carotid bruit, supple, symmetrical, trachea midline and thyroid not enlarged, symmetric, no tenderness/mass/nodules     Respiratory: clear to auscultation and percussion bilaterally with normal respiratory effort    Cardiovascular: normal rate, regular rhythm, normal S1 and S2 and no murmurs    Gastrointestinal: soft, non-tender, non-distended, normal bowel sounds, no masses or organomegaly    Extremities: no clubbing, no edema    Skin:No rashes or nodules noted. Neurologic:negative         Labs:  Lab Results   Component Value Date    WBC 7.9 11/18/2022    HGB 10.4 (L) 11/18/2022    HCT 31.7 (L) 11/18/2022     11/18/2022    CHOL 111 07/24/2020    TRIG 66 07/24/2020    HDL 43 07/24/2020    ALT 6 (L) 11/16/2022    AST 15 11/16/2022     (L) 11/18/2022    K 3.8 11/18/2022     11/18/2022    CREATININE <0.5 (L) 11/18/2022    BUN 3 (L) 11/18/2022    CO2 20 (L) 11/18/2022    TSH 3.57 07/28/2020    INR 1.37 (H) 11/15/2022    LABA1C 5.0 07/24/2020    LABMICR YES 11/16/2022     Lab Results   Component Value Date    CKTOTAL 79 07/13/2022    TROPONINI <0.01 11/15/2022       Recent Imaging Results are Reviewed:  XR ELBOW LEFT (2 VIEWS)    Result Date: 11/15/2022  EXAMINATION: 2 XRAY VIEWS OF THE LEFT SHOULDER; 3 XRAY VIEWS OF THE LEFT ELBOW 11/15/2022 6:40 pm COMPARISON: None. HISTORY: ORDERING SYSTEM PROVIDED HISTORY: pain TECHNOLOGIST PROVIDED HISTORY: Reason for exam:->pain Reason for Exam: pain FINDINGS: Left shoulder: Status post shoulder arthroplasty. Anatomic alignment. No hardware complications. No acute fracture. Visualized lung unremarkable. Left elbow: Anatomic alignment. No fracture.   Joint spaces appear normal.     No acute bony or joint abnormality Status post left shoulder arthroplasty, no hardware complications     CT HEAD WO CONTRAST    Result Date: 11/15/2022  EXAMINATION: CT OF THE HEAD WITHOUT CONTRAST 11/15/2022 6:59 pm TECHNIQUE: CT of the head was performed without the administration of intravenous contrast. Automated exposure control, iterative reconstruction, and/or weight based adjustment of the mA/kV was utilized to reduce the radiation dose to as low as reasonably achievable. COMPARISON: None. HISTORY: ORDERING SYSTEM PROVIDED HISTORY: confusion FINDINGS: BRAIN/VENTRICLES: There is no acute intracranial hemorrhage, mass effect or midline shift. No abnormal extra-axial fluid collection. The gray-white differentiation is maintained without evidence of an acute infarct. There is prominence of the ventricles and sulci due to global parenchymal volume loss. There are nonspecific areas of hypoattenuation within the periventricular and subcortical white matter, which likely represent chronic microvascular ischemic change. ORBITS: The visualized portion of the orbits demonstrate no acute abnormality. SINUSES: The visualized paranasal sinuses and mastoid air cells demonstrate no acute abnormality. SOFT TISSUES/SKULL: No acute abnormality of the visualized skull or soft tissues. No acute intracranial abnormality. XR SHOULDER LEFT (MIN 2 VIEWS)    Result Date: 11/15/2022  EXAMINATION: 2 XRAY VIEWS OF THE LEFT SHOULDER; 3 XRAY VIEWS OF THE LEFT ELBOW 11/15/2022 6:40 pm COMPARISON: None. HISTORY: ORDERING SYSTEM PROVIDED HISTORY: pain TECHNOLOGIST PROVIDED HISTORY: Reason for exam:->pain Reason for Exam: pain FINDINGS: Left shoulder: Status post shoulder arthroplasty. Anatomic alignment. No hardware complications. No acute fracture. Visualized lung unremarkable. Left elbow: Anatomic alignment. No fracture.   Joint spaces appear normal.     No acute bony or joint abnormality Status post left shoulder arthroplasty, no hardware complications     XR CHEST PORTABLE    Result Date: 11/15/2022  EXAMINATION: ONE XRAY VIEW OF THE CHEST 11/15/2022 6:40 pm COMPARISON: 07/14/2022 HISTORY: ORDERING SYSTEM PROVIDED HISTORY: confusion TECHNOLOGIST PROVIDED HISTORY: Reason for exam:->confusion Reason for Exam: confusion FINDINGS: Cardiomediastinal silhouette stable. Bibasilar pulmonary opacities. No pulmonary vascular congestion or edema. No pneumothorax. Bibasilar pulmonary opacities could represent atelectasis or infection       Lab Results   Component Value Date/Time    GLUCOSE 102 11/18/2022 07:14 AM     Lab Results   Component Value Date/Time    POCGLU 118 07/23/2020 11:32 AM     /81   Pulse 76   Temp 98.3 °F (36.8 °C) (Oral)   Resp 18   Ht 5' 7\" (1.702 m)   Wt 194 lb (88 kg)   SpO2 92%   BMI 30.38 kg/m²     Assessment and Plan:  Principal Problem:    Hypocalcemia -Established problem. Stable. Ca 6.4  Plan: cont replacement. renal on board; defer tx to them  Active Problems:    FTT (failure to thrive) in adult -Established problem. Stable. Plan: pt/ot asked to see. Placement likely indicated    Anemia -Established problem. Stable. Plan: No indication for transfusion. Cont to monitor h/h to assess progression of anemia. Recommend ferrous sulfate or MVI as outpatient. Cro[facility]'s colitis ([facility]  Plan: Continue present orders/plan. Hypomagnesemia -Established problem. Uncontrolled. 1.3 today  Plan: iv mag sulfate ordered 11/16, now low again. Cont to order Mag as needed    Multiple wounds    Altered mental status    Disp -await pt/ot.  Need to get electrolytes stable prior to d/c      (Please note that portions of this note were completed with a voice recognition program.  Efforts were made to edit the dictations but occasionally words are mis-transcribed.)        Papito Salter MD  11/18/2022  '

## 2022-11-18 NOTE — PROGRESS NOTES
Physician Progress Note      Sonia Benavides  [facility] #:                  [id number]  :                       1955  ADMIT DATE:       11/15/2022 6:23 PM  [address] [place] DATE:  RESPONDING  PROVIDER #:        Mai Merlos MD          QUERY TEXT:    Patient admitted with Hypocalcemia. Noted documentation of Acute respiratory   failure with hypoxemia PN notes  by Nephrology In order to support the   diagnosis of Acute respiratory failure with hypoxemia, please include   additional clinical indicators in your documentation. Or please document if   the diagnosis of Acute respiratory failure with hypoxemia has been ruled out   after further study. The medical record reflects the following:  Risk Factors: Hypocalcemia, Multiple electrolyte abnormalities  Clinical Indicators: RESP RATE 32, 36 , SPO2 88% , PN note  by   IM acute resp failure. Per pn - Respiratory: clear to auscultation and   percussion bilaterally with normal respiratory effort. Currently on 5l o2 to   maintain sats>90%. Treatment: increased o2 requirement, nasal cannula. Options provided:  -- Acute respiratory failure with hypoxemia present as evidenced by, Please   document evidence. -- Acute respiratory failure with hypoxemia was ruled out  -- Other - I will add my own diagnosis  -- Disagree - Not applicable / Not valid  -- Disagree - Clinically unable to determine / Unknown  -- Refer to Clinical Documentation Reviewer    PROVIDER RESPONSE TEXT:    Acute respiratory failure with hypoxemia was ruled out after study. Query created by:  Minh Del Cid on 2022 6:17 AM      Electronically signed by:  Mai Merlos MD 2022 8:05 AM

## 2022-11-18 NOTE — PROGRESS NOTES
Kenalog cream received from pharmacy. Applied to open blisters on bi inner thighs, bi buttocks regions, perianal regions. Bleeding, left open to air. Also applied zinc cream to periarea. Purewick changed, cale/cloudy UOP noted. Phos replaced 11/17/22. O2 @ 5L donned. SR on monitor. No dc plans at present. Family wants SNF placement.

## 2022-11-19 LAB
ALBUMIN SERPL-MCNC: 2.7 G/DL (ref 3.4–5)
ANION GAP SERPL CALCULATED.3IONS-SCNC: 11 MMOL/L (ref 3–16)
BANDED NEUTROPHILS RELATIVE PERCENT: 5 % (ref 0–7)
BASOPHILS ABSOLUTE: 0 K/UL (ref 0–0.2)
BASOPHILS RELATIVE PERCENT: 0 %
BUN BLDV-MCNC: 5 MG/DL (ref 7–20)
CALCIUM SERPL-MCNC: 7.4 MG/DL (ref 8.3–10.6)
CHLORIDE BLD-SCNC: 107 MMOL/L (ref 99–110)
CO2: 19 MMOL/L (ref 21–32)
CREAT SERPL-MCNC: <0.5 MG/DL (ref 0.6–1.2)
EOSINOPHILS ABSOLUTE: 0.3 K/UL (ref 0–0.6)
EOSINOPHILS RELATIVE PERCENT: 3 %
GFR SERPL CREATININE-BSD FRML MDRD: >60 ML/MIN/{1.73_M2}
GLUCOSE BLD-MCNC: 106 MG/DL (ref 70–99)
HCT VFR BLD CALC: 32.9 % (ref 36–48)
HEMOGLOBIN: 10.8 G/DL (ref 12–16)
LYMPHOCYTES ABSOLUTE: 1.5 K/UL (ref 1–5.1)
LYMPHOCYTES RELATIVE PERCENT: 18 %
MAGNESIUM: 1.9 MG/DL (ref 1.8–2.4)
MCH RBC QN AUTO: 29 PG (ref 26–34)
MCHC RBC AUTO-ENTMCNC: 32.7 G/DL (ref 31–36)
MCV RBC AUTO: 88.7 FL (ref 80–100)
MONOCYTES ABSOLUTE: 0.5 K/UL (ref 0–1.3)
MONOCYTES RELATIVE PERCENT: 6 %
NEUTROPHILS ABSOLUTE: 6.3 K/UL (ref 1.7–7.7)
NEUTROPHILS RELATIVE PERCENT: 68 %
PDW BLD-RTO: 14.5 % (ref 12.4–15.4)
PHOSPHORUS: 1.1 MG/DL (ref 2.5–4.9)
PLATELET # BLD: 346 K/UL (ref 135–450)
PMV BLD AUTO: 7.3 FL (ref 5–10.5)
POTASSIUM SERPL-SCNC: 4.4 MMOL/L (ref 3.5–5.1)
RBC # BLD: 3.72 M/UL (ref 4–5.2)
SODIUM BLD-SCNC: 137 MMOL/L (ref 136–145)
WBC # BLD: 8.6 K/UL (ref 4–11)

## 2022-11-19 PROCEDURE — 6360000002 HC RX W HCPCS: Performed by: INTERNAL MEDICINE

## 2022-11-19 PROCEDURE — 85025 COMPLETE CBC W/AUTO DIFF WBC: CPT

## 2022-11-19 PROCEDURE — 6370000000 HC RX 637 (ALT 250 FOR IP): Performed by: INTERNAL MEDICINE

## 2022-11-19 PROCEDURE — 83735 ASSAY OF MAGNESIUM: CPT

## 2022-11-19 PROCEDURE — 2500000003 HC RX 250 WO HCPCS: Performed by: INTERNAL MEDICINE

## 2022-11-19 PROCEDURE — 2580000003 HC RX 258: Performed by: INTERNAL MEDICINE

## 2022-11-19 PROCEDURE — 36415 COLL VENOUS BLD VENIPUNCTURE: CPT

## 2022-11-19 PROCEDURE — 80069 RENAL FUNCTION PANEL: CPT

## 2022-11-19 PROCEDURE — 1200000000 HC SEMI PRIVATE

## 2022-11-19 RX ORDER — CALCIUM GLUCONATE 20 MG/ML
2000 INJECTION, SOLUTION INTRAVENOUS ONCE
Status: COMPLETED | OUTPATIENT
Start: 2022-11-19 | End: 2022-11-19

## 2022-11-19 RX ORDER — FAMOTIDINE 20 MG/1
20 TABLET, FILM COATED ORAL 2 TIMES DAILY
Qty: 60 TABLET | Refills: 3
Start: 2022-11-19

## 2022-11-19 RX ORDER — MAGNESIUM SULFATE 1 G/100ML
1000 INJECTION INTRAVENOUS ONCE
Status: COMPLETED | OUTPATIENT
Start: 2022-11-19 | End: 2022-11-19

## 2022-11-19 RX ADMIN — POTASSIUM CHLORIDE 20 MEQ: 1500 TABLET, EXTENDED RELEASE ORAL at 09:22

## 2022-11-19 RX ADMIN — ACETAMINOPHEN 650 MG: 325 TABLET ORAL at 00:19

## 2022-11-19 RX ADMIN — ACETAMINOPHEN 650 MG: 325 TABLET ORAL at 20:41

## 2022-11-19 RX ADMIN — CEFTRIAXONE SODIUM 1000 MG: 1 INJECTION, POWDER, FOR SOLUTION INTRAMUSCULAR; INTRAVENOUS at 20:28

## 2022-11-19 RX ADMIN — Medication 972 MG: at 09:24

## 2022-11-19 RX ADMIN — PREDNISONE 5 MG: 10 TABLET ORAL at 09:23

## 2022-11-19 RX ADMIN — RIVAROXABAN 20 MG: 20 TABLET, FILM COATED ORAL at 09:31

## 2022-11-19 RX ADMIN — TRIAMCINOLONE ACETONIDE: 1 CREAM TOPICAL at 20:33

## 2022-11-19 RX ADMIN — MAGNESIUM SULFATE HEPTAHYDRATE 1000 MG: 1 INJECTION, SOLUTION INTRAVENOUS at 11:00

## 2022-11-19 RX ADMIN — Medication 400 MG: at 09:25

## 2022-11-19 RX ADMIN — MYCOPHENOLATE MOFETIL 1000 MG: 250 CAPSULE ORAL at 09:23

## 2022-11-19 RX ADMIN — POTASSIUM CHLORIDE 20 MEQ: 1500 TABLET, EXTENDED RELEASE ORAL at 20:33

## 2022-11-19 RX ADMIN — TRIAMCINOLONE ACETONIDE: 1 CREAM TOPICAL at 09:27

## 2022-11-19 RX ADMIN — ACETAMINOPHEN 650 MG: 325 TABLET ORAL at 10:58

## 2022-11-19 RX ADMIN — Medication 10 ML: at 09:24

## 2022-11-19 RX ADMIN — BUSPIRONE HYDROCHLORIDE 10 MG: 5 TABLET ORAL at 20:32

## 2022-11-19 RX ADMIN — MYCOPHENOLATE MOFETIL 1000 MG: 250 CAPSULE ORAL at 20:32

## 2022-11-19 RX ADMIN — LACOSAMIDE 200 MG: 100 TABLET, FILM COATED ORAL at 09:23

## 2022-11-19 RX ADMIN — Medication 400 MG: at 13:41

## 2022-11-19 RX ADMIN — CALCIUM GLUCONATE 2000 MG: 20 INJECTION, SOLUTION INTRAVENOUS at 17:45

## 2022-11-19 RX ADMIN — LACOSAMIDE 200 MG: 100 TABLET, FILM COATED ORAL at 20:32

## 2022-11-19 RX ADMIN — BUSPIRONE HYDROCHLORIDE 10 MG: 5 TABLET ORAL at 13:41

## 2022-11-19 RX ADMIN — MAGNESIUM SULFATE HEPTAHYDRATE 2000 MG: 40 INJECTION, SOLUTION INTRAVENOUS at 01:21

## 2022-11-19 RX ADMIN — SODIUM CHLORIDE: 9 INJECTION, SOLUTION INTRAVENOUS at 00:23

## 2022-11-19 RX ADMIN — METOPROLOL SUCCINATE 50 MG: 50 TABLET, EXTENDED RELEASE ORAL at 09:23

## 2022-11-19 RX ADMIN — FAMOTIDINE 20 MG: 20 TABLET, FILM COATED ORAL at 20:32

## 2022-11-19 RX ADMIN — BALSALAZIDE DISODIUM 1500 MG: 750 CAPSULE ORAL at 20:32

## 2022-11-19 RX ADMIN — FAMOTIDINE 20 MG: 20 TABLET, FILM COATED ORAL at 09:23

## 2022-11-19 RX ADMIN — BALSALAZIDE DISODIUM 1500 MG: 750 CAPSULE ORAL at 09:24

## 2022-11-19 RX ADMIN — Medication 400 MG: at 20:32

## 2022-11-19 RX ADMIN — ATORVASTATIN CALCIUM 80 MG: 80 TABLET, FILM COATED ORAL at 20:32

## 2022-11-19 RX ADMIN — BUSPIRONE HYDROCHLORIDE 10 MG: 5 TABLET ORAL at 09:23

## 2022-11-19 RX ADMIN — Medication 10 ML: at 20:32

## 2022-11-19 RX ADMIN — SODIUM PHOSPHATE, MONOBASIC, MONOHYDRATE 30 MMOL: 276; 142 INJECTION, SOLUTION INTRAVENOUS at 13:37

## 2022-11-19 ASSESSMENT — PAIN DESCRIPTION - LOCATION
LOCATION: GENERALIZED
LOCATION: GENERALIZED

## 2022-11-19 ASSESSMENT — PAIN SCALES - GENERAL
PAINLEVEL_OUTOF10: 10
PAINLEVEL_OUTOF10: 8
PAINLEVEL_OUTOF10: 5

## 2022-11-19 ASSESSMENT — PAIN DESCRIPTION - PAIN TYPE
TYPE: CHRONIC PAIN
TYPE: CHRONIC PAIN

## 2022-11-19 ASSESSMENT — PAIN DESCRIPTION - DESCRIPTORS
DESCRIPTORS: PATIENT UNABLE TO DESCRIBE
DESCRIPTORS: PATIENT UNABLE TO DESCRIBE

## 2022-11-19 NOTE — PROGRESS NOTES
MD Aleah Ramirez MD Vela Borer, MD                  Office: [phone]                      Fax: [phone]             [facility]                   NEPHROLOGY INPATIENT PROGRESS NOTE:     PATIENT NAME: Sarah Keller  : 1955  MRN: [id number]      RECOMMENDATIONS:   Check ionized Ca. Give 2g IV Ca  Replace Phosp IV  Stop NSS    - reviewed urine electrolytes with creatinine to calculate fractional excretion of magnesium, calcium, potassium= less likely renal wasting, likely GI losses   -Aggressive magnesium , calcium and potassium repletion  - Increased Mag 400 BID to 400 TID   - K PO 20 mEq bid       -Avoid PPI, alcohol,   - on Protonix now - defer to GI - if can stop   - at higher risk for decompensation, needing closer monitoring. D/C plan from renal stand point: improving       IMPRESSION:       Admitted on:  11/15/2022  6:23 PM   For:  Hypocalcemia [E83.51]  Generalized weakness [R53.1]      Multiple electrolyte abnormalities  Hypocalcemia, corrected for hypoalbuminemia, moderate range  Hypokalemia-better  Severe hypomagnesemia, 0.3    History of alcohol abuse and PPI use. Better  Hypocalcemia  Hypophosphatemia    Glucose normal  Hypoalbuminemia  LFTs stable    Anemia likely of chronic disease, mild    History of Crohn's disease,    UA showing likely concern for urinary tract infection    Renal  function is stable without AARTI on CKD    H/O chronic hyponatremia. Underlying history of alcohol abuse- better no  CXR -   1. Worsening bibasilar airspace opacities which could represent atelectasis or pneumonia in the appropriate clinical setting. 2.  Mild cardiomegaly       Other major problems: Management per primary and other consulting teams.          Hospital Problems             Last Modified POA    * (Principal) Hypocalcemia 11/15/2022 Yes    FTT (failure to thrive) in adult 11/15/2022 Yes    Acute respiratory failure with hypoxemia ([address] 2022 Yes Anemia 11/15/2022 Yes    Crohn's colitis ([address] 11/15/2022 Yes    Hypomagnesemia 11/16/2022 Yes    Multiple wounds 11/15/2022 Yes    Altered mental status 11/15/2022 Yes            Lorene Paula MD,  [facility] [address] [place] [address]  Office: [phone] or [employer]  Fax: [phone]        =======================================================================================   =======================================================================================  Subjective / interval history:   No complaints  Has diarrhea    Past medical, Surgical, Social, Family medical history reviewed by me. MEDICATIONS: reviewed by me. Medications Prior to Admission:  No current facility-administered medications on file prior to encounter. Current Outpatient Medications on File Prior to Encounter   Medication Sig Dispense Refill    lacosamide (VIMPAT) 100 MG TABS tablet Take 200 mg by mouth 2 times daily. amLODIPine (NORVASC) 10 MG tablet Take 10 mg by mouth daily      calcium-vitamin D (OSCAL-500) 500-200 MG-UNIT per tablet Take 1 tablet by mouth 2 times daily      dupilumab (DUPIXENT) 300 MG/2ML SOPN injection Inject 300 mg into the skin      nystatin (MYCOSTATIN) 789100 UNIT/GM powder Apply topically 2 times daily Apply topically 4 times daily. triamcinolone (KENALOG) 0.1 % ointment Apply topically 2 times daily Apply topically 2 times daily.       magnesium oxide (MAG-OX) 400 (240 Mg) MG tablet Take 1 tablet by mouth 2 times daily 30 tablet 1    mycophenolate (CELLCEPT) 500 MG tablet Take 1,000 mg by mouth 2 times daily      baclofen (LIORESAL) 10 MG tablet Take 10 mg by mouth 3 times daily as needed      predniSONE (DELTASONE) 5 MG tablet Take 5 mg by mouth daily      metoprolol succinate (TOPROL XL) 50 MG extended release tablet Take 1 tablet by mouth daily 30 tablet 5    rivaroxaban (XARELTO) 20 MG TABS tablet Take 1 tablet by mouth daily (with breakfast) 90 tablet 3 atorvastatin (LIPITOR) 80 MG tablet Take 1 tablet by mouth nightly 30 tablet 3    potassium chloride (KLOR-CON M) 20 MEQ extended release tablet Take 1 tablet by mouth 2 times daily 60 tablet 3    ferrous gluconate (FERGON) 324 (38 Fe) MG tablet Take 3 tablets by mouth daily (with breakfast)       hydrOXYzine HCl (ATARAX) 25 MG tablet Take 25 mg by mouth 3 times daily as needed for Itching      ondansetron (ZOFRAN ODT) 4 MG disintegrating tablet Take 1 tablet by mouth every 8 hours as needed for Nausea 20 tablet 0    busPIRone (BUSPAR) 10 MG tablet Take 10 mg by mouth 3 times daily as needed      balsalazide (COLAZAL) 750 MG capsule Take 3,000 mg by mouth daily Take 4 capsules (total 3000 mg) daily each morning.            Current Facility-Administered Medications:     sodium phosphate 30 mmol in sodium chloride 0.9 % 250 mL IVPB, 30 mmol, IntraVENous, Once, Julienne Fitzgerald MD    calcium gluconate 2,000 mg in dextrose 5 % 100 mL IVPB, 2,000 mg, IntraVENous, Once, Julienne Fitzgerald MD    famotidine (PEPCID) tablet 20 mg, 20 mg, Oral, BID, Maninder Schuster MD, 20 mg at 11/19/22 8909    magnesium oxide (MAG-OX) tablet 400 mg, 400 mg, Oral, TID, Aurora Larson MD, 400 mg at 11/19/22 0925    magnesium sulfate 2000 mg in 50 mL IVPB premix, 2,000 mg, IntraVENous, PRN, Aurora Larson MD, Stopped at 11/19/22 0240    sodium phosphate 10 mmol in sodium chloride 0.9 % 250 mL IVPB, 10 mmol, IntraVENous, PRN **OR** sodium phosphate 15 mmol in sodium chloride 0.9 % 250 mL IVPB, 15 mmol, IntraVENous, PRN, Stopped at 11/17/22 1600 **OR** sodium phosphate 20 mmol in sodium chloride 0.9 % 500 mL IVPB, 20 mmol, IntraVENous, PRN, Aurora Larson MD    lacosamide (VIMPAT) tablet 200 mg, 200 mg, Oral, BID, Maninder Schuster MD, 200 mg at 11/19/22 0923    cefTRIAXone (ROCEPHIN) 1,000 mg in dextrose 5 % 50 mL IVPB mini-bag, 1,000 mg, IntraVENous, Q24H, Maninder Schuster MD, Stopped at 11/18/22 3276    triamcinolone (KENALOG) 0.1 % cream, , Topical, BID, Charlotte Sam MD, Given at 11/19/22 3776    busPIRone (BUSPAR) tablet 10 mg, 10 mg, Oral, TID, Charlotte Sam MD, 10 mg at 11/19/22 2480    balsalazide (COLAZAL) capsule 1,500 mg, 1,500 mg, Oral, BID, Charlotte Sam MD, 1,500 mg at 11/19/22 9679    ferrous gluconate (FERGON) tablet 972 mg, 972 mg, Oral, Daily with breakfast, Charlotte Sam MD, 972 mg at 11/19/22 5239    hydrOXYzine HCl (ATARAX) tablet 25 mg, 25 mg, Oral, TID PRN, Charlotte Sam MD    atorvastatin (LIPITOR) tablet 80 mg, 80 mg, Oral, Nightly, Charlotte Sam MD, 80 mg at 11/18/22 2037    potassium chloride (KLOR-CON M) extended release tablet 20 mEq, 20 mEq, Oral, BID, Charlotte Sam MD, 20 mEq at 11/19/22 9888    rivaroxaban (XARELTO) tablet 20 mg, 20 mg, Oral, Daily with breakfast, Charlotte Sam MD, 20 mg at 11/19/22 0931    metoprolol succinate (TOPROL XL) extended release tablet 50 mg, 50 mg, Oral, Daily, Charlotte Sam MD, 50 mg at 11/19/22 1458    mycophenolate (CELLCEPT) capsule 1,000 mg, 1,000 mg, Oral, BID, Charlotte Sam MD, 1,000 mg at 11/19/22 3377    baclofen (LIORESAL) tablet 10 mg, 10 mg, Oral, TID PRN, Charlotte Sam MD, 10 mg at 11/18/22 1539    predniSONE (DELTASONE) tablet 5 mg, 5 mg, Oral, Daily, Charlotte Sam MD, 5 mg at 11/19/22 0923    0.9 % sodium chloride infusion, , IntraVENous, Continuous, Charlotte Sam MD, Last Rate: 75 mL/hr at 11/19/22 0023, New Bag at 11/19/22 0023    sodium chloride flush 0.9 % injection 5-40 mL, 5-40 mL, IntraVENous, 2 times per day, Charlotte Sam MD, 10 mL at 11/19/22 0924    sodium chloride flush 0.9 % injection 5-40 mL, 5-40 mL, IntraVENous, PRN, Charlotte Sam MD    0.9 % sodium chloride infusion, 25 mL, IntraVENous, PRN, Charlotte Sam MD    potassium chloride (KLOR-CON M) extended release tablet 40 mEq, 40 mEq, Oral, PRN **OR** potassium bicarb-citric acid (EFFER-K) effervescent tablet 40 mEq, 40 mEq, Oral, PRN, 40 mEq at 11/16/22 1001 **OR** potassium chloride 10 mEq/100 mL IVPB (Peripheral Line), 10 mEq, IntraVENous, PRN, Sandra Dasilva MD    ondansetron (ZOFRAN-ODT) disintegrating tablet 4 mg, 4 mg, Oral, Q8H PRN **OR** ondansetron (ZOFRAN) injection 4 mg, 4 mg, IntraVENous, Q6H PRN, Sandra Dasilva MD    magnesium hydroxide (MILK OF MAGNESIA) 400 MG/5ML suspension 30 mL, 30 mL, Oral, Daily PRN, Sandra Dasilva MD    acetaminophen (TYLENOL) tablet 650 mg, 650 mg, Oral, Q6H PRN, 650 mg at 11/19/22 1058 **OR** acetaminophen (TYLENOL) suppository 650 mg, 650 mg, Rectal, Q6H PRN, Sandra Dasilva MD    hydrALAZINE (APRESOLINE) injection 10 mg, 10 mg, IntraVENous, Q6H PRN, Sandra Dasilva MD    0.9 % sodium chloride bolus, 500 mL, IntraVENous, PRN, Sandra Dasilva MD          REVIEW OF SYSTEMS:  As mentioned in chief complaint and HPI , Subjective     =======================================================================================     PHYSICAL EXAM:  Recent vital signs and recent I/Os reviewed by me.      Wt Readings from Last 3 Encounters:   11/19/22 199 lb 15.3 oz (90.7 kg)   08/08/21 192 lb 12.8 oz (87.5 kg)   06/04/21 198 lb (89.8 kg)     BP Readings from Last 3 Encounters:   11/19/22 113/79   07/14/22 (!) 99/53   08/10/21 111/72     Patient Vitals for the past 24 hrs:   BP Temp Temp src Pulse Resp SpO2 Weight   11/19/22 1226 113/79 98 °F (36.7 °C) Oral 61 16 97 % --   11/19/22 0915 113/78 97.6 °F (36.4 °C) Oral 76 16 100 % --   11/19/22 0545 -- -- -- -- -- -- 199 lb 15.3 oz (90.7 kg)   11/19/22 0415 128/85 97.8 °F (36.6 °C) Oral 66 20 97 % --   11/19/22 0310 -- -- -- 68 -- -- --   11/18/22 2345 117/76 98.7 °F (37.1 °C) Oral 80 20 96 % --   11/18/22 2317 -- -- -- 73 -- -- --   11/18/22 1940 117/80 99.2 °F (37.3 °C) Oral 74 24 96 % --   11/18/22 1301 107/70 98.3 °F (36.8 °C) Oral 84 18 90 % --       Intake/Output Summary (Last 24 hours) at 11/19/2022 1236  Last data filed at 11/19/2022 0533  Gross per 24 hour   Intake 5545.42 ml Output 1870 ml   Net 3675.42 ml         Physical Exam  Vitals reviewed. Constitutional:       General: She is not in acute distress. Appearance: Normal appearance. She is obese. She is ill-appearing. HENT:      Head: Normocephalic and atraumatic. Right Ear: External ear normal.      Left Ear: External ear normal.      Nose: Nose normal.      Mouth/Throat:      Mouth: Mucous membranes are moist. Mucous membranes are not dry. Eyes:      General: No scleral icterus. Conjunctiva/sclera: Conjunctivae normal.   Neck:      Vascular: No JVD. Cardiovascular:      Rate and Rhythm: Normal rate and regular rhythm. Heart sounds: S1 normal and S2 normal.   Pulmonary:      Effort: Pulmonary effort is normal. No respiratory distress. Breath sounds: Rhonchi present. Abdominal:      General: Bowel sounds are normal. There is distension. Musculoskeletal:         General: Swelling present. No deformity. Cervical back: Normal range of motion and neck supple. Skin:     General: Skin is dry. Coloration: Skin is not jaundiced. Neurological:      Mental Status: She is alert and oriented to person, place, and time. Mental status is at baseline. Psychiatric:         Mood and Affect: Mood normal.         Behavior: Behavior normal.          =======================================================================================     DATA:  Diagnostic tests reviewed by me for today's visit:   (AS NEEDED FOR MY EVALUATION AND MANAGEMENT).        Recent Labs     11/17/22  0638 11/18/22  0714 11/19/22  0856   WBC 9.1 7.9 8.6   HCT 32.3* 31.7* 32.9*    245 346     Iron Saturation:  No components found for: PERCENTFE  FERRITIN:    Lab Results   Component Value Date/Time    FERRITIN 67.5 09/27/2018 08:45 AM     IRON:    Lab Results   Component Value Date/Time    IRON 13 09/27/2018 08:45 AM     TIBC:    Lab Results   Component Value Date/Time    TIBC 190 09/27/2018 08:45 AM       Recent Labs 11/16/22 1913 11/17/22 0638 11/18/22 0714 11/19/22  0856   * 133* 135* 137   K 4.5 3.6 3.8 4.4   CL 99 101 106 107   CO2 18* 20* 20* 19*   BUN 5* 4* 3* 5*   CREATININE <0.5* <0.5* <0.5* <0.5*     Recent Labs     11/16/22 1913 11/17/22 0638 11/17/22 2055 11/18/22 0714 11/18/22 2135 11/19/22  0856   CALCIUM 6.4* 6.5*  --  6.4*  --  7.4*   MG 1.50* 1.90 1.40* 1.30* 1.60* 1.90   PHOS  --  1.8*  --  1.5*  --  1.1*     No results for input(s): PH, PCO2, PO2 in the last 72 hours.     Invalid input(s): Jorge Alberto Dessert    ABG:  No results found for: PH, PCO2, PO2, HCO3, BE, THGB, TCO2, O2SAT  VBG:    Lab Results   Component Value Date/Time    PHVEN 7.324 07/14/2022 12:07 AM    OXD1HUA 44.2 07/14/2022 12:07 AM    BEVEN -2.9 07/14/2022 12:07 AM    B1JTEONJ >100 07/14/2022 12:07 AM       LDH:  No results found for: LDH  Uric Acid:    Lab Results   Component Value Date/Time    LABURIC 4.1 09/26/2018 02:56 PM       PT/INR:    Lab Results   Component Value Date/Time    PROTIME 16.8 11/15/2022 07:21 PM    INR 1.37 11/15/2022 07:21 PM     Warfarin PT/INR:  No components found for: PTPATWAR, PTINRWAR  PTT:    Lab Results   Component Value Date/Time    APTT 29.9 11/15/2022 07:21 PM   [APTT}  Last 3 Troponin:    Lab Results   Component Value Date/Time    TROPONINI <0.01 11/15/2022 07:21 PM    TROPONINI <0.01 07/13/2022 11:10 PM    TROPONINI <0.01 08/03/2021 04:11 PM       U/A:    Lab Results   Component Value Date/Time    COLORU DARK YELLOW 11/16/2022 02:36 AM    PROTEINU 100 11/16/2022 02:36 AM    PHUR 7.5 11/16/2022 02:36 AM    WBCUA 33 11/16/2022 02:36 AM    RBCUA 35 11/16/2022 02:36 AM    BACTERIA 4+ 11/16/2022 02:36 AM    CLARITYU TURBID 11/16/2022 02:36 AM    SPECGRAV 1.015 11/16/2022 02:36 AM    LEUKOCYTESUR LARGE 11/16/2022 02:36 AM    UROBILINOGEN 1.0 11/16/2022 02:36 AM    BILIRUBINUR Negative 11/16/2022 02:36 AM    BLOODU MODERATE 11/16/2022 02:36 AM    GLUCOSEU Negative 11/16/2022 02:36 AM Microalbumen/Creatinine ratio:  No components found for: RUCREAT  24 Hour Urine for Protein:  No components found for: RAWUPRO, UHRS3, HBPX81ME, UTV3  24 Hour Urine for Creatinine Clearance:  No components found for: CREAT4, UHRS10, UTV10  Urine Toxicology:  No components found for: Joyice Nunnery, IBENZO, ICOCAINE, IMARTHC, IOPIATES, IPHENCYC    HgBA1c:    Lab Results   Component Value Date/Time    LABA1C 5.0 07/24/2020 05:26 AM     RPR:  No results found for: RPR  HIV:  No results found for: HIV  MARION:    Lab Results   Component Value Date/Time    MARION Negative 07/03/2018 05:10 AM     RF:  No results found for: RF  DSDNA:  No components found for: DNA  AMYLASE:    Lab Results   Component Value Date/Time    AMYLASE 48 08/02/2018 07:19 PM     LIPASE:    Lab Results   Component Value Date/Time    LIPASE 19.0 11/15/2022 07:21 PM     Fibrinogen Level:  No components found for: FIB       BELOW MENTIONED RADIOLOGY STUDY RESULTS BY ME (AS NEEDED FOR MY EVALUATION AND MANAGEMENT). CT HEAD WO CONTRAST    Result Date: 11/15/2022  No acute intracranial abnormality. XR CHEST PORTABLE    Result Date: 11/16/2022    1. Worsening bibasilar airspace opacities which could represent atelectasis or pneumonia in the appropriate clinical setting.  2.  Mild cardiomegaly

## 2022-11-19 NOTE — PLAN OF CARE
Problem: Skin/Tissue Integrity  Goal: Absence of new skin breakdown  Description: 1. Monitor for areas of redness and/or skin breakdown  2. Assess vascular access sites hourly  3. Every 4-6 hours minimum:  Change oxygen saturation probe site  4. Every 4-6 hours:  If on nasal continuous positive airway pressure, respiratory therapy assess nares and determine need for appliance change or resting period.   Outcome: Progressing     Problem: Discharge Planning  Goal: Discharge to home or other facility with appropriate resources  Outcome: Progressing     Problem: Pain  Goal: Verbalizes/displays adequate comfort level or baseline comfort level  Outcome: Progressing     Problem: Chronic Conditions and Co-morbidities  Goal: Patient's chronic conditions and co-morbidity symptoms are monitored and maintained or improved  11/19/2022 1046 by Deion Arana RN  Outcome: Progressing     Problem: Nutrition Deficit:  Goal: Optimize nutritional status  Outcome: Progressing     Problem: ABCDS Injury Assessment  Goal: Absence of physical injury  Outcome: Progressing

## 2022-11-19 NOTE — CARE COORDINATION
Discharge Planning. Attempted to call the patient's niece Anisa Shelton [phone]. Voicemail left     PT/OT recommended SNF for the patient. The SW spoke with the patient's son Yuridia Oliveros regarding discharge planning and the patient's son stated to speak with the patient. The SW spoke with the patient who stated she is agreeable to a skilled nursing facility and is okay with a referral being faxed to the following facility:     [address] on response. [facility] for a response   [facility] for a response.       Electronically signed by ASHLEE Loving on 11/19/2022 at 11:50 AM

## 2022-11-20 LAB
ALBUMIN SERPL-MCNC: 2.5 G/DL (ref 3.4–5)
ANION GAP SERPL CALCULATED.3IONS-SCNC: 9 MMOL/L (ref 3–16)
BUN BLDV-MCNC: 5 MG/DL (ref 7–20)
CALCIUM IONIZED: 1.1 MMOL/L (ref 1.12–1.32)
CALCIUM SERPL-MCNC: 8.6 MG/DL (ref 8.3–10.6)
CHLORIDE BLD-SCNC: 107 MMOL/L (ref 99–110)
CO2: 19 MMOL/L (ref 21–32)
CREAT SERPL-MCNC: <0.5 MG/DL (ref 0.6–1.2)
CREATININE URINE: 26.7 MG/DL (ref 28–259)
GFR SERPL CREATININE-BSD FRML MDRD: >60 ML/MIN/{1.73_M2}
GLUCOSE BLD-MCNC: 86 MG/DL (ref 70–99)
MAGNESIUM URINE: 6 MG/DL (ref 4.1–13.8)
MAGNESIUM: 1.5 MG/DL (ref 1.8–2.4)
PH VENOUS: 7.42 (ref 7.35–7.45)
PHOSPHORUS: 2.1 MG/DL (ref 2.5–4.9)
POTASSIUM SERPL-SCNC: 4.9 MMOL/L (ref 3.5–5.1)
SODIUM BLD-SCNC: 135 MMOL/L (ref 136–145)

## 2022-11-20 PROCEDURE — 1200000000 HC SEMI PRIVATE

## 2022-11-20 PROCEDURE — 2500000003 HC RX 250 WO HCPCS: Performed by: INTERNAL MEDICINE

## 2022-11-20 PROCEDURE — 36415 COLL VENOUS BLD VENIPUNCTURE: CPT

## 2022-11-20 PROCEDURE — 82570 ASSAY OF URINE CREATININE: CPT

## 2022-11-20 PROCEDURE — 6370000000 HC RX 637 (ALT 250 FOR IP): Performed by: INTERNAL MEDICINE

## 2022-11-20 PROCEDURE — 2580000003 HC RX 258: Performed by: INTERNAL MEDICINE

## 2022-11-20 PROCEDURE — 80069 RENAL FUNCTION PANEL: CPT

## 2022-11-20 PROCEDURE — 6360000002 HC RX W HCPCS: Performed by: INTERNAL MEDICINE

## 2022-11-20 PROCEDURE — 82330 ASSAY OF CALCIUM: CPT

## 2022-11-20 PROCEDURE — 83735 ASSAY OF MAGNESIUM: CPT

## 2022-11-20 RX ORDER — DIPHENOXYLATE HYDROCHLORIDE AND ATROPINE SULFATE 2.5; .025 MG/1; MG/1
1 TABLET ORAL 4 TIMES DAILY PRN
Status: DISCONTINUED | OUTPATIENT
Start: 2022-11-20 | End: 2022-11-22 | Stop reason: HOSPADM

## 2022-11-20 RX ORDER — MAGNESIUM SULFATE 1 G/100ML
2000 INJECTION INTRAVENOUS ONCE
Status: DISCONTINUED | OUTPATIENT
Start: 2022-11-20 | End: 2022-11-20

## 2022-11-20 RX ORDER — MAGNESIUM SULFATE IN WATER 40 MG/ML
4000 INJECTION, SOLUTION INTRAVENOUS ONCE
Status: COMPLETED | OUTPATIENT
Start: 2022-11-20 | End: 2022-11-20

## 2022-11-20 RX ORDER — OXYCODONE HYDROCHLORIDE 5 MG/1
5 TABLET ORAL EVERY 4 HOURS PRN
Status: DISCONTINUED | OUTPATIENT
Start: 2022-11-20 | End: 2022-11-22 | Stop reason: HOSPADM

## 2022-11-20 RX ADMIN — METOPROLOL SUCCINATE 50 MG: 50 TABLET, EXTENDED RELEASE ORAL at 08:43

## 2022-11-20 RX ADMIN — FAMOTIDINE 20 MG: 20 TABLET, FILM COATED ORAL at 20:23

## 2022-11-20 RX ADMIN — POTASSIUM CHLORIDE 20 MEQ: 1500 TABLET, EXTENDED RELEASE ORAL at 08:42

## 2022-11-20 RX ADMIN — Medication 400 MG: at 20:23

## 2022-11-20 RX ADMIN — BUSPIRONE HYDROCHLORIDE 10 MG: 5 TABLET ORAL at 08:42

## 2022-11-20 RX ADMIN — BALSALAZIDE DISODIUM 1500 MG: 750 CAPSULE ORAL at 08:41

## 2022-11-20 RX ADMIN — CEFTRIAXONE SODIUM 1000 MG: 1 INJECTION, POWDER, FOR SOLUTION INTRAMUSCULAR; INTRAVENOUS at 22:11

## 2022-11-20 RX ADMIN — OXYCODONE 5 MG: 5 TABLET ORAL at 20:23

## 2022-11-20 RX ADMIN — BACLOFEN 10 MG: 10 TABLET ORAL at 05:47

## 2022-11-20 RX ADMIN — MAGNESIUM SULFATE HEPTAHYDRATE 4000 MG: 40 INJECTION, SOLUTION INTRAVENOUS at 12:34

## 2022-11-20 RX ADMIN — Medication 972 MG: at 08:41

## 2022-11-20 RX ADMIN — POTASSIUM CHLORIDE 20 MEQ: 1500 TABLET, EXTENDED RELEASE ORAL at 20:23

## 2022-11-20 RX ADMIN — RIVAROXABAN 20 MG: 20 TABLET, FILM COATED ORAL at 08:42

## 2022-11-20 RX ADMIN — POTASSIUM & SODIUM PHOSPHATES POWDER PACK 280-160-250 MG 250 MG: 280-160-250 PACK at 14:36

## 2022-11-20 RX ADMIN — Medication 400 MG: at 14:36

## 2022-11-20 RX ADMIN — POTASSIUM & SODIUM PHOSPHATES POWDER PACK 280-160-250 MG 250 MG: 280-160-250 PACK at 20:24

## 2022-11-20 RX ADMIN — PREDNISONE 5 MG: 10 TABLET ORAL at 08:41

## 2022-11-20 RX ADMIN — FAMOTIDINE 20 MG: 20 TABLET, FILM COATED ORAL at 08:43

## 2022-11-20 RX ADMIN — TRIAMCINOLONE ACETONIDE: 1 CREAM TOPICAL at 20:25

## 2022-11-20 RX ADMIN — SODIUM PHOSPHATE, MONOBASIC, MONOHYDRATE 20 MMOL: 276; 142 INJECTION, SOLUTION INTRAVENOUS at 17:33

## 2022-11-20 RX ADMIN — LACOSAMIDE 200 MG: 100 TABLET, FILM COATED ORAL at 20:23

## 2022-11-20 RX ADMIN — BUSPIRONE HYDROCHLORIDE 10 MG: 5 TABLET ORAL at 14:36

## 2022-11-20 RX ADMIN — Medication 10 ML: at 20:24

## 2022-11-20 RX ADMIN — DIPHENOXYLATE HYDROCHLORIDE AND ATROPINE SULFATE 1 TABLET: 2.5; .025 TABLET ORAL at 20:23

## 2022-11-20 RX ADMIN — Medication 400 MG: at 08:44

## 2022-11-20 RX ADMIN — MYCOPHENOLATE MOFETIL 1000 MG: 250 CAPSULE ORAL at 20:23

## 2022-11-20 RX ADMIN — LACOSAMIDE 200 MG: 100 TABLET, FILM COATED ORAL at 08:43

## 2022-11-20 RX ADMIN — ACETAMINOPHEN 650 MG: 325 TABLET ORAL at 05:47

## 2022-11-20 RX ADMIN — ATORVASTATIN CALCIUM 80 MG: 80 TABLET, FILM COATED ORAL at 20:23

## 2022-11-20 RX ADMIN — Medication 10 ML: at 08:45

## 2022-11-20 RX ADMIN — BUSPIRONE HYDROCHLORIDE 10 MG: 5 TABLET ORAL at 20:23

## 2022-11-20 RX ADMIN — MYCOPHENOLATE MOFETIL 1000 MG: 250 CAPSULE ORAL at 08:43

## 2022-11-20 RX ADMIN — TRIAMCINOLONE ACETONIDE: 1 CREAM TOPICAL at 08:44

## 2022-11-20 RX ADMIN — BALSALAZIDE DISODIUM 1500 MG: 750 CAPSULE ORAL at 21:16

## 2022-11-20 ASSESSMENT — PAIN DESCRIPTION - PAIN TYPE
TYPE: CHRONIC PAIN
TYPE: CHRONIC PAIN

## 2022-11-20 ASSESSMENT — PAIN DESCRIPTION - LOCATION
LOCATION: GENERALIZED

## 2022-11-20 ASSESSMENT — PAIN DESCRIPTION - DESCRIPTORS
DESCRIPTORS: PATIENT UNABLE TO DESCRIBE
DESCRIPTORS: PATIENT UNABLE TO DESCRIBE

## 2022-11-20 ASSESSMENT — PAIN SCALES - GENERAL
PAINLEVEL_OUTOF10: 10
PAINLEVEL_OUTOF10: 8
PAINLEVEL_OUTOF10: 5

## 2022-11-20 NOTE — PROGRESS NOTES
Progress Note - Dr. Benny Buitrago - Internal Medicine  PCP: Jes Chapman MD [address] / [facility] [address] Day: 5  Code Status: Full Code  Current Diet: ADULT DIET; Regular  ADULT ORAL NUTRITION SUPPLEMENT; Lunch; Standard High Calorie/High Protein Oral Supplement  ADULT ORAL NUTRITION SUPPLEMENT; Breakfast, Dinner; Wound Healing Oral Supplement        CC: follow up on medical issues    Subjective:   Moses Jenkins is a 78 y.o. female. Pt seen and examined  Chart reviewed since last visit, labs and imaging below      Doing ok  Today - mag 1.5  Replacement ordered again  Protonix stopped - h2  blocker started   Na - 135    Pt/ot rec SNF  Working on placement    No other issues      Review of Systems: (1 system for EPF, 2-9 for detailed, 10+ for comprehensive)  Constitutional: Negative for chills and fever. HENT: Negative for dental problem, nosebleeds and rhinorrhea. Eyes: Negative for photophobia and visual disturbance. Respiratory: Negative for cough, chest tightness and shortness of breath. Cardiovascular: Negative for chest pain and leg swelling. Gastrointestinal: Negative for diarrhea, nausea and vomiting. Endocrine: Negative for polydipsia and polyphagia. Genitourinary: Negative for frequency, hematuria and urgency. Musculoskeletal: Negative for back pain and myalgias. Skin: Negative for rash. Allergic/Immunologic: Negative for food allergies. Neurological: Negative for dizziness, seizures, syncope and facial asymmetry. Hematological: Negative for adenopathy. Psychiatric/Behavioral: Negative for dysphoric mood. The patient is not nervous/anxious. I have reviewed the patient's medical and social history in detail and updated the computerized patient record.   To recap: She  has a past medical history of Anxiety, Arthritis, Atrial fibrillation/flutter, Blood transfusion reaction, Crohn's disease ([facility] [address] Depression, GERD (gastroesophageal reflux disease), Hiatal hernia, Hx of blood clots, Hypertension, MRSA (methicillin resistant staph aureus) culture positive, MRSA (methicillin resistant staph aureus) culture positive, Neuropathy, Pneumonia, Sinus headache, SOB (shortness of breath), and VRE (vancomycin resistant enterococcus) culture positive. . She  has a past surgical history that includes Hysterectomy; knee surgery (Bilateral); Hand Carpectomy (Bilateral); hernia repair; Abdomen surgery; bladder suspension; fracture surgery; Heel spur surgery; Tonsillectomy; Colonoscopy; Upper gastrointestinal endoscopy (6/14/13); Foot surgery (Left, 6/25/14); Foot surgery (6/3/15); Colonoscopy (9/14/15); Foot surgery (Left, 08/05/2002); joint replacement; Sigmoidoscopy (01/15/2018); Abdominal adhesion surgery (06/08/2018); Colonoscopy (08/07/2018); colostomy (N/A, 10/8/2018); pr secd clos surg wnd exten/complic (N/A, 31/26/7929); Leg Surgery (Right); Colonoscopy (06/07/2019); Colonoscopy (N/A, 6/7/2019); Colonoscopy (N/A, 6/7/2019); Small intestine surgery (N/A, 7/17/2019); and proctosigmoidoscopy (N/A, 7/17/2019). . She  reports that she has been smoking cigarettes and e-cigarettes. She has a 10.00 pack-year smoking history. She has never used smokeless tobacco. She reports current alcohol use of about 2.0 standard drinks per week. She reports that she does not use drugs. .        Active Hospital Problems    Diagnosis Date Noted    Acute respiratory failure with hypoxemia ([facility] [J96.01] 11/16/2022     Priority: Medium    Hypocalcemia [E83.51] 11/15/2022     Priority: Medium    FTT (failure to thrive) in adult [R62.7] 11/15/2022     Priority: Medium    Altered mental status [R41.82]     Multiple wounds [T07. XXXA]     Hypomagnesemia [E83.42] 11/27/2018    Crohn's colitis ([facility] [K50.10] 01/06/2018    Anemia [D64.9] 01/05/2018       Current Facility-Administered Medications: magnesium sulfate 4000 mg in 100 mL IVPB premix, 4,000 mg, IntraVENous, Once  famotidine (PEPCID) tablet 20 mg, 20 mg, Oral, BID  magnesium oxide (MAG-OX) tablet 400 mg, 400 mg, Oral, TID  magnesium sulfate 2000 mg in 50 mL IVPB premix, 2,000 mg, IntraVENous, PRN  sodium phosphate 10 mmol in sodium chloride 0.9 % 250 mL IVPB, 10 mmol, IntraVENous, PRN **OR** sodium phosphate 15 mmol in sodium chloride 0.9 % 250 mL IVPB, 15 mmol, IntraVENous, PRN **OR** sodium phosphate 20 mmol in sodium chloride 0.9 % 500 mL IVPB, 20 mmol, IntraVENous, PRN  lacosamide (VIMPAT) tablet 200 mg, 200 mg, Oral, BID  cefTRIAXone (ROCEPHIN) 1,000 mg in dextrose 5 % 50 mL IVPB mini-bag, 1,000 mg, IntraVENous, Q24H  triamcinolone (KENALOG) 0.1 % cream, , Topical, BID  busPIRone (BUSPAR) tablet 10 mg, 10 mg, Oral, TID  balsalazide (COLAZAL) capsule 1,500 mg, 1,500 mg, Oral, BID  ferrous gluconate (FERGON) tablet 972 mg, 972 mg, Oral, Daily with breakfast  hydrOXYzine HCl (ATARAX) tablet 25 mg, 25 mg, Oral, TID PRN  atorvastatin (LIPITOR) tablet 80 mg, 80 mg, Oral, Nightly  potassium chloride (KLOR-CON M) extended release tablet 20 mEq, 20 mEq, Oral, BID  rivaroxaban (XARELTO) tablet 20 mg, 20 mg, Oral, Daily with breakfast  metoprolol succinate (TOPROL XL) extended release tablet 50 mg, 50 mg, Oral, Daily  mycophenolate (CELLCEPT) capsule 1,000 mg, 1,000 mg, Oral, BID  baclofen (LIORESAL) tablet 10 mg, 10 mg, Oral, TID PRN  predniSONE (DELTASONE) tablet 5 mg, 5 mg, Oral, Daily  sodium chloride flush 0.9 % injection 5-40 mL, 5-40 mL, IntraVENous, 2 times per day  sodium chloride flush 0.9 % injection 5-40 mL, 5-40 mL, IntraVENous, PRN  0.9 % sodium chloride infusion, 25 mL, IntraVENous, PRN  potassium chloride (KLOR-CON M) extended release tablet 40 mEq, 40 mEq, Oral, PRN **OR** potassium bicarb-citric acid (EFFER-K) effervescent tablet 40 mEq, 40 mEq, Oral, PRN **OR** potassium chloride 10 mEq/100 mL IVPB (Peripheral Line), 10 mEq, IntraVENous, PRN  ondansetron (ZOFRAN-ODT) disintegrating tablet 4 mg, 4 mg, Oral, Q8H PRN **OR** ondansetron (ZOFRAN) injection 4 mg, 4 mg, IntraVENous, Q6H PRN  magnesium hydroxide (MILK OF MAGNESIA) 400 MG/5ML suspension 30 mL, 30 mL, Oral, Daily PRN  acetaminophen (TYLENOL) tablet 650 mg, 650 mg, Oral, Q6H PRN **OR** acetaminophen (TYLENOL) suppository 650 mg, 650 mg, Rectal, Q6H PRN  hydrALAZINE (APRESOLINE) injection 10 mg, 10 mg, IntraVENous, Q6H PRN  0.9 % sodium chloride bolus, 500 mL, IntraVENous, PRN         Objective:  /75   Pulse 73   Temp 98.1 °F (36.7 °C) (Oral)   Resp 16   Ht 5' 7\" (1.702 m)   Wt 200 lb 6.4 oz (90.9 kg)   SpO2 95%   BMI 31.39 kg/m²      Patient Vitals for the past 24 hrs:   BP Temp Temp src Pulse Resp SpO2 Weight   11/20/22 0830 121/75 98.1 °F (36.7 °C) Oral 73 16 -- --   11/20/22 0356 122/83 98.2 °F (36.8 °C) Oral 62 16 95 % 200 lb 6.4 oz (90.9 kg)   11/20/22 0342 -- -- -- 59 -- -- --   11/20/22 0013 128/86 98.4 °F (36.9 °C) Oral 65 20 94 % --   11/19/22 2017 125/85 97.9 °F (36.6 °C) Oral 58 24 93 % --   11/19/22 1615 115/83 98.1 °F (36.7 °C) Oral 61 16 94 % --   11/19/22 1226 113/79 98 °F (36.7 °C) Oral 61 16 97 % --       Patient Vitals for the past 96 hrs (Last 3 readings):   Weight   11/20/22 0356 200 lb 6.4 oz (90.9 kg)   11/19/22 0545 199 lb 15.3 oz (90.7 kg)             Intake/Output Summary (Last 24 hours) at 11/20/2022 0638  Last data filed at 11/20/2022 2912  Gross per 24 hour   Intake 2586.56 ml   Output 2500 ml   Net 86.56 ml           Physical Exam: (2-7 system for EPF/Detailed, ?8 for Comprehensive)  /75   Pulse 73   Temp 98.1 °F (36.7 °C) (Oral)   Resp 16   Ht 5' 7\" (1.702 m)   Wt 200 lb 6.4 oz (90.9 kg)   SpO2 95%   BMI 31.39 kg/m²   Constitutional: vitals as above: alert, appears stated age and cooperative    Psychiatric: normal insight and judgment, oriented to person, place, time, and general circumstances    Head: Normocephalic, without obvious abnormality, atraumatic    Eyes:lids and lashes normal, conjunctivae and sclerae normal and pupils equal, round, reactive to light and accomodation    EMNT: external ears normal, nares midline    Neck: no carotid bruit, supple, symmetrical, trachea midline and thyroid not enlarged, symmetric, no tenderness/mass/nodules     Respiratory: clear to auscultation and percussion bilaterally with normal respiratory effort    Cardiovascular: normal rate, regular rhythm, normal S1 and S2 and no murmurs    Gastrointestinal: soft, non-tender, non-distended, normal bowel sounds, no masses or organomegaly    Extremities: no clubbing, no edema    Skin:No rashes or nodules noted. Neurologic:negative         Labs:  Lab Results   Component Value Date    WBC 8.6 11/19/2022    HGB 10.8 (L) 11/19/2022    HCT 32.9 (L) 11/19/2022     11/19/2022    CHOL 111 07/24/2020    TRIG 66 07/24/2020    HDL 43 07/24/2020    ALT 6 (L) 11/16/2022    AST 15 11/16/2022     (L) 11/20/2022    K 4.9 11/20/2022     11/20/2022    CREATININE <0.5 (L) 11/20/2022    BUN 5 (L) 11/20/2022    CO2 19 (L) 11/20/2022    TSH 3.57 07/28/2020    INR 1.37 (H) 11/15/2022    LABA1C 5.0 07/24/2020    LABMICR YES 11/16/2022     Lab Results   Component Value Date    CKTOTAL 79 07/13/2022    TROPONINI <0.01 11/15/2022       Recent Imaging Results are Reviewed:  XR ELBOW LEFT (2 VIEWS)    Result Date: 11/15/2022  EXAMINATION: 2 XRAY VIEWS OF THE LEFT SHOULDER; 3 XRAY VIEWS OF THE LEFT ELBOW 11/15/2022 6:40 pm COMPARISON: None. HISTORY: ORDERING SYSTEM PROVIDED HISTORY: pain TECHNOLOGIST PROVIDED HISTORY: Reason for exam:->pain Reason for Exam: pain FINDINGS: Left shoulder: Status post shoulder arthroplasty. Anatomic alignment. No hardware complications. No acute fracture. Visualized lung unremarkable. Left elbow: Anatomic alignment. No fracture.   Joint spaces appear normal.     No acute bony or joint abnormality Status post left shoulder arthroplasty, no hardware complications     CT HEAD WO CONTRAST    Result Date: 11/15/2022  EXAMINATION: CT OF THE HEAD WITHOUT CONTRAST  11/15/2022 6:59 pm TECHNIQUE: CT of the head was performed without the administration of intravenous contrast. Automated exposure control, iterative reconstruction, and/or weight based adjustment of the mA/kV was utilized to reduce the radiation dose to as low as reasonably achievable. COMPARISON: None. HISTORY: ORDERING SYSTEM PROVIDED HISTORY: confusion FINDINGS: BRAIN/VENTRICLES: There is no acute intracranial hemorrhage, mass effect or midline shift. No abnormal extra-axial fluid collection. The gray-white differentiation is maintained without evidence of an acute infarct. There is prominence of the ventricles and sulci due to global parenchymal volume loss. There are nonspecific areas of hypoattenuation within the periventricular and subcortical white matter, which likely represent chronic microvascular ischemic change. ORBITS: The visualized portion of the orbits demonstrate no acute abnormality. SINUSES: The visualized paranasal sinuses and mastoid air cells demonstrate no acute abnormality. SOFT TISSUES/SKULL: No acute abnormality of the visualized skull or soft tissues. No acute intracranial abnormality. XR SHOULDER LEFT (MIN 2 VIEWS)    Result Date: 11/15/2022  EXAMINATION: 2 XRAY VIEWS OF THE LEFT SHOULDER; 3 XRAY VIEWS OF THE LEFT ELBOW 11/15/2022 6:40 pm COMPARISON: None. HISTORY: ORDERING SYSTEM PROVIDED HISTORY: pain TECHNOLOGIST PROVIDED HISTORY: Reason for exam:->pain Reason for Exam: pain FINDINGS: Left shoulder: Status post shoulder arthroplasty. Anatomic alignment. No hardware complications. No acute fracture. Visualized lung unremarkable. Left elbow: Anatomic alignment. No fracture.   Joint spaces appear normal.     No acute bony or joint abnormality Status post left shoulder arthroplasty, no hardware complications     XR CHEST PORTABLE    Result Date: 11/15/2022  EXAMINATION: ONE XRAY VIEW OF THE CHEST 11/15/2022 6:40 pm COMPARISON: 07/14/2022 HISTORY: ORDERING SYSTEM PROVIDED HISTORY: confusion TECHNOLOGIST PROVIDED HISTORY: Reason for exam:->confusion Reason for Exam: confusion FINDINGS: Cardiomediastinal silhouette stable. Bibasilar pulmonary opacities. No pulmonary vascular congestion or edema. No pneumothorax. Bibasilar pulmonary opacities could represent atelectasis or infection       Lab Results   Component Value Date/Time    GLUCOSE 86 11/20/2022 06:22 AM     Lab Results   Component Value Date/Time    POCGLU 118 07/23/2020 11:32 AM     /75   Pulse 73   Temp 98.1 °F (36.7 °C) (Oral)   Resp 16   Ht 5' 7\" (1.702 m)   Wt 200 lb 6.4 oz (90.9 kg)   SpO2 95%   BMI 31.39 kg/m²     Assessment and Plan:  Principal Problem:    Hypocalcemia -Established problem. Stable. Ca 8.6  Plan: cont replacement. renal on board; defer tx to them  Active Problems:    FTT (failure to thrive) in adult -Established problem. Stable. Plan: pt/ot asked to see. Placement likely indicated    Anemia -Established problem. Stable. Plan: No indication for transfusion. Cont to monitor h/h to assess progression of anemia. Recommend ferrous sulfate or MVI as outpatient. Crohn's colitis ([facility] [address]  Plan: Continue present orders/plan. Hypomagnesemia -Established problem. 1.5 today  Plan: iv mag sulfate ordered 11/16, now low again.  Cont to order Mag as needed    Multiple wounds    Altered mental status    Disp -await placement; pt/ot rec SNF - discharge when accepted    (Please note that portions of this note were completed with a voice recognition program.  Efforts were made to edit the dictations but occasionally words are mis-transcribed.)        Prince Vidal MD  11/20/2022  '

## 2022-11-20 NOTE — PROGRESS NOTES
MD Gamaliel Terrell MD Dominga Callow, MD                  Office: [phone]                      Fax: [phone]             0 [facility]                   NEPHROLOGY INPATIENT PROGRESS NOTE:     PATIENT NAME: Rodney Keller  : 1955  MRN: [id number]      RECOMMENDATIONS:   Checked ionized Ca, just mildly low. Received Ca IV yesterday  Replace Phosp IV further today. Add po replacement too  Replace Mg IV again today. On po Mg. Stopped NSS    - reviewed urine electrolytes with creatinine to calculate fractional excretion of magnesium, calcium, potassium= less likely renal wasting, likely GI losses   -Aggressive magnesium , calcium and potassium repletion  - Increased Mag 400 BID to 400 TID   - K PO 20 mEq bid       -Avoid PPI, alcohol. On Famotidine     D/C plan from renal stand point: improving. Still having diarrhea      IMPRESSION:       Admitted on:  11/15/2022  6:23 PM   For:  Hypocalcemia [E83.51]  Generalized weakness [R53.1]      Multiple electrolyte abnormalities  Hypocalcemia, corrected for hypoalbuminemia, moderate range  Hypokalemia-better  Severe hypomagnesemia, 0.3    History of alcohol abuse and PPI use. Better  Hypocalcemia  Hypophosphatemia    Glucose normal  Hypoalbuminemia  LFTs stable    Anemia likely of chronic disease, mild    History of Crohn's disease,    UA showing likely concern for urinary tract infection    Renal  function is stable without AARTI on CKD    H/O chronic hyponatremia. Underlying history of alcohol abuse- better no  CXR -   1. Worsening bibasilar airspace opacities which could represent atelectasis or pneumonia in the appropriate clinical setting. 2.  Mild cardiomegaly       Other major problems: Management per primary and other consulting teams.          Hospital Problems             Last Modified POA    * (Principal) Hypocalcemia 11/15/2022 Yes    FTT (failure to thrive) in adult 11/15/2022 Yes    Acute respiratory failure with hypoxemia ([facility] [address] 11/16/2022 Yes    Anemia 11/15/2022 Yes    Crohn's colitis ([facility] [address] 11/15/2022 Yes    Hypomagnesemia 11/16/2022 Yes    Multiple wounds 11/15/2022 Yes    Altered mental status 11/15/2022 Yes      Diarrhea-per Medicine. Darshana Quinteros MD,  [facility] [address] [place] [address] [place]: [phone] or [address]  Fax: [phone]        =======================================================================================   =======================================================================================  Subjective / interval history:   No complaints  Has diarrhea    Past medical, Surgical, Social, Family medical history reviewed by me. MEDICATIONS: reviewed by me. Medications Prior to Admission:  No current facility-administered medications on file prior to encounter. Current Outpatient Medications on File Prior to Encounter   Medication Sig Dispense Refill    lacosamide (VIMPAT) 100 MG TABS tablet Take 200 mg by mouth 2 times daily. amLODIPine (NORVASC) 10 MG tablet Take 10 mg by mouth daily      calcium-vitamin D (OSCAL-500) 500-200 MG-UNIT per tablet Take 1 tablet by mouth 2 times daily      dupilumab (DUPIXENT) 300 MG/2ML SOPN injection Inject 300 mg into the skin      nystatin (MYCOSTATIN) 580167 UNIT/GM powder Apply topically 2 times daily Apply topically 4 times daily. triamcinolone (KENALOG) 0.1 % ointment Apply topically 2 times daily Apply topically 2 times daily.       magnesium oxide (MAG-OX) 400 (240 Mg) MG tablet Take 1 tablet by mouth 2 times daily 30 tablet 1    mycophenolate (CELLCEPT) 500 MG tablet Take 1,000 mg by mouth 2 times daily      baclofen (LIORESAL) 10 MG tablet Take 10 mg by mouth 3 times daily as needed      predniSONE (DELTASONE) 5 MG tablet Take 5 mg by mouth daily      metoprolol succinate (TOPROL XL) 50 MG extended release tablet Take 1 tablet by mouth daily 30 tablet 5    rivaroxaban (XARELTO) 20 MG TABS tablet Take 1 tablet by mouth daily (with breakfast) 90 tablet 3    atorvastatin (LIPITOR) 80 MG tablet Take 1 tablet by mouth nightly 30 tablet 3    potassium chloride (KLOR-CON M) 20 MEQ extended release tablet Take 1 tablet by mouth 2 times daily 60 tablet 3    ferrous gluconate (FERGON) 324 (38 Fe) MG tablet Take 3 tablets by mouth daily (with breakfast)       hydrOXYzine HCl (ATARAX) 25 MG tablet Take 25 mg by mouth 3 times daily as needed for Itching      ondansetron (ZOFRAN ODT) 4 MG disintegrating tablet Take 1 tablet by mouth every 8 hours as needed for Nausea 20 tablet 0    busPIRone (BUSPAR) 10 MG tablet Take 10 mg by mouth 3 times daily as needed      balsalazide (COLAZAL) 750 MG capsule Take 3,000 mg by mouth daily Take 4 capsules (total 3000 mg) daily each morning.            Current Facility-Administered Medications:     magnesium sulfate 4000 mg in 100 mL IVPB premix, 4,000 mg, IntraVENous, Once, Julienne Fitzgerald MD    famotidine (PEPCID) tablet 20 mg, 20 mg, Oral, BID, Maninder Schuster MD, 20 mg at 11/20/22 0843    magnesium oxide (MAG-OX) tablet 400 mg, 400 mg, Oral, TID, Aurora Larson MD, 400 mg at 11/20/22 0844    magnesium sulfate 2000 mg in 50 mL IVPB premix, 2,000 mg, IntraVENous, PRN, Aurora Larson MD, Stopped at 11/19/22 0240    sodium phosphate 10 mmol in sodium chloride 0.9 % 250 mL IVPB, 10 mmol, IntraVENous, PRN **OR** sodium phosphate 15 mmol in sodium chloride 0.9 % 250 mL IVPB, 15 mmol, IntraVENous, PRN, Stopped at 11/17/22 1600 **OR** sodium phosphate 20 mmol in sodium chloride 0.9 % 500 mL IVPB, 20 mmol, IntraVENous, PRN, Aurora Larson MD    lacosamide (VIMPAT) tablet 200 mg, 200 mg, Oral, BID, Maninder Schuster MD, 200 mg at 11/20/22 0843    cefTRIAXone (ROCEPHIN) 1,000 mg in dextrose 5 % 50 mL IVPB mini-bag, 1,000 mg, IntraVENous, Q24H, Maninder Schuster MD, Stopped at 11/19/22 2058    triamcinolone (KENALOG) 0.1 % cream, , Topical, BID, Chalice MD Landen, Given at 11/20/22 0844    busPIRone (BUSPAR) tablet 10 mg, 10 mg, Oral, TID, Phuc Peres MD, 10 mg at 11/20/22 2579    balsalazide (COLAZAL) capsule 1,500 mg, 1,500 mg, Oral, BID, Phuc Peres MD, 1,500 mg at 11/20/22 0841    ferrous gluconate (FERGON) tablet 972 mg, 972 mg, Oral, Daily with breakfast, Phuc Peres MD, 972 mg at 11/20/22 0841    hydrOXYzine HCl (ATARAX) tablet 25 mg, 25 mg, Oral, TID PRN, Phuc Peres MD    atorvastatin (LIPITOR) tablet 80 mg, 80 mg, Oral, Nightly, Phuc Peres MD, 80 mg at 11/19/22 2032    potassium chloride (KLOR-CON M) extended release tablet 20 mEq, 20 mEq, Oral, BID, Phuc Peres MD, 20 mEq at 11/20/22 4185    rivaroxaban (XARELTO) tablet 20 mg, 20 mg, Oral, Daily with breakfast, Phuc Peres MD, 20 mg at 11/20/22 2419    metoprolol succinate (TOPROL XL) extended release tablet 50 mg, 50 mg, Oral, Daily, Phuc Peres MD, 50 mg at 11/20/22 8553    mycophenolate (CELLCEPT) capsule 1,000 mg, 1,000 mg, Oral, BID, Phuc Peres MD, 1,000 mg at 11/20/22 0843    baclofen (LIORESAL) tablet 10 mg, 10 mg, Oral, TID PRN, Phuc Peres MD, 10 mg at 11/20/22 0547    predniSONE (DELTASONE) tablet 5 mg, 5 mg, Oral, Daily, Phuc Peres MD, 5 mg at 11/20/22 0841    sodium chloride flush 0.9 % injection 5-40 mL, 5-40 mL, IntraVENous, 2 times per day, Phuc Peres MD, 10 mL at 11/20/22 0845    sodium chloride flush 0.9 % injection 5-40 mL, 5-40 mL, IntraVENous, PRN, Phuc Peres MD    0.9 % sodium chloride infusion, 25 mL, IntraVENous, PRN, Phuc Peres MD    potassium chloride (KLOR-CON M) extended release tablet 40 mEq, 40 mEq, Oral, PRN **OR** potassium bicarb-citric acid (EFFER-K) effervescent tablet 40 mEq, 40 mEq, Oral, PRN, 40 mEq at 11/16/22 1001 **OR** potassium chloride 10 mEq/100 mL IVPB (Peripheral Line), 10 mEq, IntraVENous, PRN, Phuc Peres MD    ondansetron (ZOFRAN-ODT) disintegrating tablet 4 mg, 4 mg, Oral, Q8H PRN **OR** ondansetron [facility]) injection 4 mg, 4 mg, IntraVENous, Q6H PRN, Diana Avelar MD    magnesium hydroxide (MILK OF MAGNESIA) 400 MG/5ML suspension 30 mL, 30 mL, Oral, Daily PRN, Diana Avelar MD    acetaminophen (TYLENOL) tablet 650 mg, 650 mg, Oral, Q6H PRN, 650 mg at 11/20/22 0547 **OR** acetaminophen (TYLENOL) suppository 650 mg, 650 mg, Rectal, Q6H PRN, Diana Avelar MD    hydrALAZINE (APRESOLINE) injection 10 mg, 10 mg, IntraVENous, Q6H PRN, Diana Avelar MD    0.9 % sodium chloride bolus, 500 mL, IntraVENous, PRN, Diana Avelar MD          REVIEW OF SYSTEMS:  As mentioned in chief complaint and HPI , Subjective     =======================================================================================     PHYSICAL EXAM:  Recent vital signs and recent I/Os reviewed by me. Wt Readings from Last 3 Encounters:   11/20/22 200 lb 6.4 oz (90.9 kg)   08/08/21 192 lb 12.8 oz (87.5 kg)   06/04/21 198 lb (89.8 kg)     BP Readings from Last 3 Encounters:   11/20/22 121/75   07/14/22 (!) 99/53   08/10/21 111/72     Patient Vitals for the past 24 hrs:   BP Temp Temp src Pulse Resp SpO2 Weight   11/20/22 0830 121/75 98.1 °F (36.7 °C) Oral 73 16 -- --   11/20/22 0356 122/83 98.2 °F (36.8 °C) Oral 62 16 95 % 200 lb 6.4 oz (90.9 kg)   11/20/22 0342 -- -- -- 59 -- -- --   11/20/22 0013 128/86 98.4 °F (36.9 °C) Oral 65 20 94 % --   11/19/22 2017 125/85 97.9 °F (36.6 °C) Oral 58 24 93 % --   11/19/22 1615 115/83 98.1 °F (36.7 °C) Oral 61 16 94 % --   11/19/22 1226 113/79 98 °F (36.7 °C) Oral 61 16 97 % --       Intake/Output Summary (Last 24 hours) at 11/20/2022 1224  Last data filed at 11/20/2022 0945  Gross per 24 hour   Intake 2826.56 ml   Output 2500 ml   Net 326.56 ml         Physical Exam  Vitals reviewed. Constitutional:       General: She is not in acute distress. Appearance: Normal appearance. She is obese. She is ill-appearing. HENT:      Head: Normocephalic and atraumatic.       Right Ear: External ear normal.      Left Ear: External ear normal.      Nose: Nose normal.      Mouth/Throat:      Mouth: Mucous membranes are moist. Mucous membranes are not dry. Eyes:      General: No scleral icterus. Conjunctiva/sclera: Conjunctivae normal.   Neck:      Vascular: No JVD. Cardiovascular:      Rate and Rhythm: Normal rate and regular rhythm. Heart sounds: S1 normal and S2 normal.   Pulmonary:      Effort: Pulmonary effort is normal. No respiratory distress. Breath sounds: Rhonchi present. Abdominal:      General: Bowel sounds are normal. There is distension. Musculoskeletal:         General: Swelling present. No deformity. Cervical back: Normal range of motion and neck supple. Skin:     General: Skin is dry. Coloration: Skin is not jaundiced. Neurological:      Mental Status: She is alert and oriented to person, place, and time. Mental status is at baseline. Psychiatric:         Mood and Affect: Mood normal.         Behavior: Behavior normal.          =======================================================================================     DATA:  Diagnostic tests reviewed by me for today's visit:   (AS NEEDED FOR MY EVALUATION AND MANAGEMENT).        Recent Labs     11/18/22  0714 11/19/22  0856   WBC 7.9 8.6   HCT 31.7* 32.9*    346     Iron Saturation:  No components found for: PERCENTFE  FERRITIN:    Lab Results   Component Value Date/Time    FERRITIN 67.5 09/27/2018 08:45 AM     IRON:    Lab Results   Component Value Date/Time    IRON 13 09/27/2018 08:45 AM     TIBC:    Lab Results   Component Value Date/Time    TIBC 190 09/27/2018 08:45 AM       Recent Labs     11/18/22  0714 11/19/22  0856 11/20/22  0622   * 137 135*   K 3.8 4.4 4.9    107 107   CO2 20* 19* 19*   BUN 3* 5* 5*   CREATININE <0.5* <0.5* <0.5*     Recent Labs     11/17/22 2055 11/18/22  0714 11/18/22 2135 11/19/22  0856 11/20/22  0622   CALCIUM  --  6.4*  --  7.4* 8.6   MG 1.40* 1.30* 1.60* 1. 90 1.50*   PHOS  --  1.5*  --  1.1* 2.1*     No results for input(s): PH, PCO2, PO2 in the last 72 hours.     Invalid input(s): Freedom Gonzales    ABG:  No results found for: PH, PCO2, PO2, HCO3, BE, THGB, TCO2, O2SAT  VBG:    Lab Results   Component Value Date/Time    PHVEN 7.423 11/20/2022 06:22 AM    UZG2AIM 44.2 07/14/2022 12:07 AM    BEVEN -2.9 07/14/2022 12:07 AM    Y0KJSMVD >100 07/14/2022 12:07 AM       LDH:  No results found for: LDH  Uric Acid:    Lab Results   Component Value Date/Time    LABURIC 4.1 09/26/2018 02:56 PM       PT/INR:    Lab Results   Component Value Date/Time    PROTIME 16.8 11/15/2022 07:21 PM    INR 1.37 11/15/2022 07:21 PM     Warfarin PT/INR:  No components found for: PTPATWAR, PTINRWAR  PTT:    Lab Results   Component Value Date/Time    APTT 29.9 11/15/2022 07:21 PM   [APTT}  Last 3 Troponin:    Lab Results   Component Value Date/Time    TROPONINI <0.01 11/15/2022 07:21 PM    TROPONINI <0.01 07/13/2022 11:10 PM    TROPONINI <0.01 08/03/2021 04:11 PM       U/A:    Lab Results   Component Value Date/Time    COLORU DARK YELLOW 11/16/2022 02:36 AM    PROTEINU 100 11/16/2022 02:36 AM    PHUR 7.5 11/16/2022 02:36 AM    WBCUA 33 11/16/2022 02:36 AM    RBCUA 35 11/16/2022 02:36 AM    BACTERIA 4+ 11/16/2022 02:36 AM    CLARITYU TURBID 11/16/2022 02:36 AM    SPECGRAV 1.015 11/16/2022 02:36 AM    LEUKOCYTESUR LARGE 11/16/2022 02:36 AM    UROBILINOGEN 1.0 11/16/2022 02:36 AM    BILIRUBINUR Negative 11/16/2022 02:36 AM    BLOODU MODERATE 11/16/2022 02:36 AM    GLUCOSEU Negative 11/16/2022 02:36 AM     Microalbumen/Creatinine ratio:  No components found for: RUCREAT  24 Hour Urine for Protein:  No components found for: RAWUPRO, UHRS3, XFRN15NG, UTV3  24 Hour Urine for Creatinine Clearance:  No components found for: CREAT4, UHRS10, UTV10  Urine Toxicology:  No components found for: IAMMENTA, IBARBIT, IBENZO, ICOCAINE, IMARTHC, IOPIATES, IPHENCYC    HgBA1c:    Lab Results   Component Value Date/Time    LABA1C 5.0 07/24/2020 05:26 AM     RPR:  No results found for: RPR  HIV:  No results found for: HIV  MARION:    Lab Results   Component Value Date/Time    MARION Negative 07/03/2018 05:10 AM     RF:  No results found for: RF  DSDNA:  No components found for: DNA  AMYLASE:    Lab Results   Component Value Date/Time    AMYLASE 48 08/02/2018 07:19 PM     LIPASE:    Lab Results   Component Value Date/Time    LIPASE 19.0 11/15/2022 07:21 PM     Fibrinogen Level:  No components found for: FIB       BELOW MENTIONED RADIOLOGY STUDY RESULTS BY ME (AS NEEDED FOR MY EVALUATION AND MANAGEMENT). CT HEAD WO CONTRAST    Result Date: 11/15/2022  No acute intracranial abnormality. XR CHEST PORTABLE    Result Date: 11/16/2022    1. Worsening bibasilar airspace opacities which could represent atelectasis or pneumonia in the appropriate clinical setting.  2.  Mild cardiomegaly

## 2022-11-20 NOTE — PLAN OF CARE
Problem: Skin/Tissue Integrity  Goal: Absence of new skin breakdown  11/20/2022 0043 by Shanika Cisse RN  Outcome: Progressing     Problem: Safety - Adult  Goal: Free from fall injury  Outcome: Progressing

## 2022-11-20 NOTE — PLAN OF CARE
Problem: Skin/Tissue Integrity  Goal: Absence of new skin breakdown  Description: 1. Monitor for areas of redness and/or skin breakdown  2. Assess vascular access sites hourly  3. Every 4-6 hours minimum:  Change oxygen saturation probe site  4. Every 4-6 hours:  If on nasal continuous positive airway pressure, respiratory therapy assess nares and determine need for appliance change or resting period.   Outcome: Progressing     Problem: Discharge Planning  Goal: Discharge to home or other facility with appropriate resources  Outcome: Progressing     Problem: Pain  Goal: Verbalizes/displays adequate comfort level or baseline comfort level  Outcome: Progressing     Problem: Chronic Conditions and Co-morbidities  Goal: Patient's chronic conditions and co-morbidity symptoms are monitored and maintained or improved  Outcome: Progressing

## 2022-11-21 LAB
ALBUMIN SERPL-MCNC: 2.8 G/DL (ref 3.4–5)
ANION GAP SERPL CALCULATED.3IONS-SCNC: 9 MMOL/L (ref 3–16)
BASOPHILS ABSOLUTE: 0.1 K/UL (ref 0–0.2)
BASOPHILS RELATIVE PERCENT: 0.9 %
BUN BLDV-MCNC: 5 MG/DL (ref 7–20)
CALCIUM IONIZED: 1.22 MMOL/L (ref 1.12–1.32)
CALCIUM SERPL-MCNC: 8.9 MG/DL (ref 8.3–10.6)
CHLORIDE BLD-SCNC: 104 MMOL/L (ref 99–110)
CO2: 24 MMOL/L (ref 21–32)
CREAT SERPL-MCNC: <0.5 MG/DL (ref 0.6–1.2)
EOSINOPHILS ABSOLUTE: 0.2 K/UL (ref 0–0.6)
EOSINOPHILS RELATIVE PERCENT: 3.9 %
GFR SERPL CREATININE-BSD FRML MDRD: >60 ML/MIN/{1.73_M2}
GLUCOSE BLD-MCNC: 93 MG/DL (ref 70–99)
HCT VFR BLD CALC: 28.4 % (ref 36–48)
HEMOGLOBIN: 9.6 G/DL (ref 12–16)
LYMPHOCYTES ABSOLUTE: 1.5 K/UL (ref 1–5.1)
LYMPHOCYTES RELATIVE PERCENT: 24.1 %
MAGNESIUM: 1.5 MG/DL (ref 1.8–2.4)
MCH RBC QN AUTO: 29.4 PG (ref 26–34)
MCHC RBC AUTO-ENTMCNC: 33.9 G/DL (ref 31–36)
MCV RBC AUTO: 86.8 FL (ref 80–100)
MONOCYTES ABSOLUTE: 0.7 K/UL (ref 0–1.3)
MONOCYTES RELATIVE PERCENT: 10.9 %
NEUTROPHILS ABSOLUTE: 3.7 K/UL (ref 1.7–7.7)
NEUTROPHILS RELATIVE PERCENT: 60.2 %
PDW BLD-RTO: 14.2 % (ref 12.4–15.4)
PH VENOUS: 7.38 (ref 7.35–7.45)
PHOSPHORUS: 4 MG/DL (ref 2.5–4.9)
PLATELET # BLD: 454 K/UL (ref 135–450)
PMV BLD AUTO: 6.9 FL (ref 5–10.5)
POTASSIUM SERPL-SCNC: 5 MMOL/L (ref 3.5–5.1)
RBC # BLD: 3.27 M/UL (ref 4–5.2)
SODIUM BLD-SCNC: 137 MMOL/L (ref 136–145)
WBC # BLD: 6.2 K/UL (ref 4–11)

## 2022-11-21 PROCEDURE — 82330 ASSAY OF CALCIUM: CPT

## 2022-11-21 PROCEDURE — 97530 THERAPEUTIC ACTIVITIES: CPT

## 2022-11-21 PROCEDURE — 6360000002 HC RX W HCPCS: Performed by: INTERNAL MEDICINE

## 2022-11-21 PROCEDURE — 6370000000 HC RX 637 (ALT 250 FOR IP): Performed by: INTERNAL MEDICINE

## 2022-11-21 PROCEDURE — 94760 N-INVAS EAR/PLS OXIMETRY 1: CPT

## 2022-11-21 PROCEDURE — 36415 COLL VENOUS BLD VENIPUNCTURE: CPT

## 2022-11-21 PROCEDURE — 2580000003 HC RX 258: Performed by: INTERNAL MEDICINE

## 2022-11-21 PROCEDURE — 85025 COMPLETE CBC W/AUTO DIFF WBC: CPT

## 2022-11-21 PROCEDURE — 97535 SELF CARE MNGMENT TRAINING: CPT

## 2022-11-21 PROCEDURE — 80069 RENAL FUNCTION PANEL: CPT

## 2022-11-21 PROCEDURE — 1200000000 HC SEMI PRIVATE

## 2022-11-21 PROCEDURE — 83735 ASSAY OF MAGNESIUM: CPT

## 2022-11-21 RX ORDER — OXYCODONE HYDROCHLORIDE 5 MG/1
5 TABLET ORAL EVERY 6 HOURS PRN
Qty: 12 TABLET | Refills: 0 | Status: SHIPPED | OUTPATIENT
Start: 2022-11-21 | End: 2022-11-24

## 2022-11-21 RX ORDER — MAGNESIUM SULFATE 1 G/100ML
2000 INJECTION INTRAVENOUS ONCE
Status: DISCONTINUED | OUTPATIENT
Start: 2022-11-21 | End: 2022-11-22 | Stop reason: HOSPADM

## 2022-11-21 RX ADMIN — POTASSIUM CHLORIDE 20 MEQ: 1500 TABLET, EXTENDED RELEASE ORAL at 09:20

## 2022-11-21 RX ADMIN — MYCOPHENOLATE MOFETIL 1000 MG: 250 CAPSULE ORAL at 09:20

## 2022-11-21 RX ADMIN — Medication 400 MG: at 09:20

## 2022-11-21 RX ADMIN — PREDNISONE 5 MG: 10 TABLET ORAL at 09:20

## 2022-11-21 RX ADMIN — BALSALAZIDE DISODIUM 1500 MG: 750 CAPSULE ORAL at 21:00

## 2022-11-21 RX ADMIN — DIPHENOXYLATE HYDROCHLORIDE AND ATROPINE SULFATE 1 TABLET: 2.5; .025 TABLET ORAL at 14:20

## 2022-11-21 RX ADMIN — OXYCODONE 5 MG: 5 TABLET ORAL at 09:33

## 2022-11-21 RX ADMIN — FAMOTIDINE 20 MG: 20 TABLET, FILM COATED ORAL at 09:20

## 2022-11-21 RX ADMIN — Medication 972 MG: at 09:22

## 2022-11-21 RX ADMIN — FAMOTIDINE 20 MG: 20 TABLET, FILM COATED ORAL at 21:37

## 2022-11-21 RX ADMIN — POTASSIUM & SODIUM PHOSPHATES POWDER PACK 280-160-250 MG 250 MG: 280-160-250 PACK at 21:37

## 2022-11-21 RX ADMIN — POTASSIUM & SODIUM PHOSPHATES POWDER PACK 280-160-250 MG 250 MG: 280-160-250 PACK at 09:21

## 2022-11-21 RX ADMIN — MAGNESIUM SULFATE HEPTAHYDRATE 2000 MG: 40 INJECTION, SOLUTION INTRAVENOUS at 12:34

## 2022-11-21 RX ADMIN — MYCOPHENOLATE MOFETIL 1000 MG: 250 CAPSULE ORAL at 21:38

## 2022-11-21 RX ADMIN — BUSPIRONE HYDROCHLORIDE 10 MG: 5 TABLET ORAL at 13:37

## 2022-11-21 RX ADMIN — OXYCODONE 5 MG: 5 TABLET ORAL at 02:26

## 2022-11-21 RX ADMIN — OXYCODONE 5 MG: 5 TABLET ORAL at 22:53

## 2022-11-21 RX ADMIN — Medication 10 ML: at 21:00

## 2022-11-21 RX ADMIN — TRIAMCINOLONE ACETONIDE: 1 CREAM TOPICAL at 09:22

## 2022-11-21 RX ADMIN — POTASSIUM & SODIUM PHOSPHATES POWDER PACK 280-160-250 MG 250 MG: 280-160-250 PACK at 13:37

## 2022-11-21 RX ADMIN — DIPHENOXYLATE HYDROCHLORIDE AND ATROPINE SULFATE 1 TABLET: 2.5; .025 TABLET ORAL at 09:21

## 2022-11-21 RX ADMIN — LACOSAMIDE 200 MG: 100 TABLET, FILM COATED ORAL at 09:20

## 2022-11-21 RX ADMIN — Medication 400 MG: at 21:37

## 2022-11-21 RX ADMIN — DIPHENOXYLATE HYDROCHLORIDE AND ATROPINE SULFATE 1 TABLET: 2.5; .025 TABLET ORAL at 02:27

## 2022-11-21 RX ADMIN — Medication 400 MG: at 13:37

## 2022-11-21 RX ADMIN — METOPROLOL SUCCINATE 50 MG: 50 TABLET, EXTENDED RELEASE ORAL at 09:20

## 2022-11-21 RX ADMIN — BUSPIRONE HYDROCHLORIDE 10 MG: 5 TABLET ORAL at 09:20

## 2022-11-21 RX ADMIN — ACETAMINOPHEN 650 MG: 325 TABLET ORAL at 09:32

## 2022-11-21 RX ADMIN — LACOSAMIDE 200 MG: 100 TABLET, FILM COATED ORAL at 21:37

## 2022-11-21 RX ADMIN — BUSPIRONE HYDROCHLORIDE 10 MG: 5 TABLET ORAL at 21:37

## 2022-11-21 RX ADMIN — RIVAROXABAN 20 MG: 20 TABLET, FILM COATED ORAL at 09:20

## 2022-11-21 RX ADMIN — ATORVASTATIN CALCIUM 80 MG: 80 TABLET, FILM COATED ORAL at 21:37

## 2022-11-21 RX ADMIN — DIPHENOXYLATE HYDROCHLORIDE AND ATROPINE SULFATE 1 TABLET: 2.5; .025 TABLET ORAL at 22:53

## 2022-11-21 RX ADMIN — BALSALAZIDE DISODIUM 1500 MG: 750 CAPSULE ORAL at 09:21

## 2022-11-21 RX ADMIN — TRIAMCINOLONE ACETONIDE: 1 CREAM TOPICAL at 21:38

## 2022-11-21 RX ADMIN — Medication 10 ML: at 09:21

## 2022-11-21 RX ADMIN — OXYCODONE 5 MG: 5 TABLET ORAL at 18:41

## 2022-11-21 RX ADMIN — OXYCODONE 5 MG: 5 TABLET ORAL at 14:20

## 2022-11-21 ASSESSMENT — PAIN DESCRIPTION - LOCATION
LOCATION: GENERALIZED;HEAD
LOCATION: GENERALIZED

## 2022-11-21 ASSESSMENT — PAIN SCALES - GENERAL
PAINLEVEL_OUTOF10: 8
PAINLEVEL_OUTOF10: 8
PAINLEVEL_OUTOF10: 10
PAINLEVEL_OUTOF10: 8
PAINLEVEL_OUTOF10: 8

## 2022-11-21 ASSESSMENT — PAIN DESCRIPTION - PAIN TYPE
TYPE: CHRONIC PAIN

## 2022-11-21 ASSESSMENT — PAIN DESCRIPTION - DESCRIPTORS: DESCRIPTORS: PATIENT UNABLE TO DESCRIBE

## 2022-11-21 NOTE — PROGRESS NOTES
Physical [address] [facility] Department   Phone: [phone]    Physical Therapy    [x] Initial Evaluation            [] Daily Treatment Note         [] Discharge Summary      Patient: Maddy Montiel   : 1955   MRN: [id number]   Date of Service:  2022  Admitting Diagnosis: Hypocalcemia  Current Admission Summary: Maddy Montiel is a 78 y.o. female who presents to the emergency department today for evaluation for general fatigue. When I evaluate the patient, she states that she is complaining of pain to her left elbow. The patient denies falling. She is rating her pain is an 8/10, pain will radiate up towards her shoulder. When asked where the patient lives, she was able to tell me her birthday. As she is able to tell me her name, she is ANO x2.  Patient states that she is just generally not feeling well but is unsure exactly her symptoms or how long she has been not feeling well. Per EMS report, family did call as the patient has had worsening fatigue for the past several days. Patient was unable to get up and walk, and EMS was called. Patient was seen several months ago for alcohol intoxication, she denies any alcohol use to me. No other history is able to be obtained at this time     Nursing Notes were all reviewed and agreed with or any disagreements were addressed in the HPI  Past Medical History:  has a past medical history of Anxiety, Arthritis, Atrial fibrillation/flutter, Blood transfusion reaction, Crohn's disease ([facility] [address] Depression, GERD (gastroesophageal reflux disease), Hiatal hernia, Hx of blood clots, Hypertension, MRSA (methicillin resistant staph aureus) culture positive, MRSA (methicillin resistant staph aureus) culture positive, Neuropathy, Pneumonia, Sinus headache, SOB (shortness of breath), and VRE (vancomycin resistant enterococcus) culture positive.   Past Surgical History:  has a past surgical history that includes expressive aphasia so not all questions asked. Stand pivots to w/c independently. Pushes w/c with (B) LEs. States she goes to the bathroom by herself. From home with son. Pt states she is having a hard time finding words - getting speech therapy      Subjective  General: Pt supine in bed upon arrival. Pt agreeable to PT/OT. Expressive aphasia noted. Pt is particular at times and like to be given time to express herself. Pain: Patient does not rate upon questioning - states it is \"everywhere\"  Pain Interventions: RN notified       Functional Mobility  Bed Mobility  Supine to Sit: stand by assistance  Rolling Left: stand by assistance  Scooting: stand by assistance  Comments: Bed flat with increased time and use of rail. Transfers  Sit to stand transfer: minimal assistance  Stand to sit transfer: minimal assistance  Stand step transfer: maximum assistance  Comments: Sit>stand from EOB and recliner with Min A. Stand step transfer from bed>chair with RW and Min A, increased time for transfer. Stand>sit to recliner x2 trials with Max A due to poor eccentric control and decreased safety awareness. Ambulation  Ambulation not tested on this date secondary to pt attempted to take a step but was unable to weight bear on (L) LE long enough to advance her (R) LE. Severe (L) genu valgus noted. Stair Mobility  Stair mobility not completed on this date. Comments:  Wheelchair Mobility:  No w/c mobility completed on this date. Comments:  Balance  Static Sitting Balance: fair (+): maintains balance at SBA/supervision without use of UE support  Dynamic Sitting Balance: fair (-): maintains balance at CGA with use of UE support  Static Standing Balance: fair (-): maintains balance at CGA with use of UE support  Dynamic Standing Balance: poor (+): requires min (A) to maintain balance  Comments: CGA with use of UE support on recliner to doff sock sitting in recliner.  Pt stood x45 sec with (B) UE support attempting to take a step to ambulate - limited by fatigue and LE pain. Other Therapeutic Interventions  Pt noted to have bleeding blister during transfer - no known cause of the bleeding. Pressure applied and RN notified. RN in during session to apply bandage. LEs elevated at end of session. Seated exercises in recliner: SLR x5 reps (B) - AAROM on (L) after 1st rep, ankle pumps x10 reps (B), hip abduction x10 reps (B)  See OT note for assist with socks. Functional Outcomes  AM-PAC Inpatient Mobility Raw Score : 12              Cognition  Overall Cognitive Status: Impaired  Arousal/Alterness: appropriate responses to stimuli  Following Commands: follows one step commands with repetition, follows one step commands with increased time  Attention Span: attends with cues to redirect, difficulty dividing attention  Memory: decreased recall of recent events, decreased short term memory, decreased long term memory  Safety Judgement: decreased awareness of need for assistance, decreased awareness of need for safety  Problem Solving: decreased awareness of errors  Insights: not aware of deficits  Initiation: does not require cues  Sequencing: requires cues for some  Comment: Pt a bit labile emotionally throughout session from easily frustrated to agreeable. Pt particular and likes time to express herself. Orientation:    A&O x4 - but pt refused to say the month  Command Following:   impaired    Education  Barriers To Learning: cognition, language, emotional, and physical  Patient Education: patient educated on goals, PT role and benefits, plan of care, general safety, functional mobility training, transfer training  Learning Assessment:  patient verbalizes understanding, would benefit from continued reinforcement, patient will require reinforcement due to cognitive deficits    Assessment  Activity Tolerance: Improved tolerance to activity this date. See above for note on wound care provided by RN during session.  No other adverse symptoms or reactions but pt does fatigue quickly with minimal activity. Good participation with seated exercises. Impairments Requiring Therapeutic Intervention: decreased functional mobility, decreased ADL status, decreased ROM, decreased strength, decreased safety awareness, decreased cognition, decreased endurance, decreased sensation, decreased balance, increased pain, decreased posture  Prognosis: fair  Clinical Assessment: Pt presents with improved cognition and tolerance to functional mobility this date though she continues to have deficits below her baseline. The patient requires Min A for transfers and was unable to take any steps to ambulate. She also required use of RW to assist with her transfer this date which is not her baseline. The patient would benefit from additional skilled PT to safely progress independence with functional mobility and endurance with activity in order to prevent falls. Safety Interventions: patient left in chair, chair alarm in place, call light within reach, gait belt, patient at risk for falls, and nurse notified    Plan  Frequency: 3-5 x/per week  Current Treatment Recommendations: strengthening, balance training, functional mobility training, transfer training, gait training, endurance training, wheelchair mobility training, patient/caregiver education, home exercise program, safety education, equipment evaluation/education, and positioning    Goals  Patient Goals: none stated   Short Term Goals:  Time Frame: upon discharge   Patient will complete bed mobility at minimal assistance - Goal partially met 11/21 for supine>sit and (L) roll with use of rail, continue for consistency  Patient will complete transfers at maximum assistance - Goal met 11/21  Patient to maintain sitting at EOB with stand by assistance for 15 minutes.   New goal 11/21: Patient will complete sit>stand transfers with SBA  New goal 11/21: Patient will complete stand>sit transfers with Min A  New goal 11/21: Patient will complete stand step transfer with CGA  New goal 11/21: Patient will propel manual w/c 50 ft with (B) LEs and UEs over level tile floor with no more than CGA    2 goals met 11/21, goals updated to match pt's information about her baseline.     Therapy Session Time      Individual Group Co-treatment   Time In     0613   Time Out     1512   Minutes     43     Timed Code Treatment Minutes:  43 Minutes  Total Treatment Minutes:  43 minutes       Electronically Signed By: Davis Lopez, PT      Casandra Mcfarland PT, DPT #[id number]

## 2022-11-21 NOTE — PROGRESS NOTES
[facility]   Phone: [phone]    Occupational Therapy    [] Initial Evaluation            [x] Daily Treatment Note         [] Discharge Summary      Patient: Montana Bustos   : 1955   MRN: [id number]   Date of Service:  2022    Admitting Diagnosis:  Hypocalcemia    Current Admission Summary: Per H&P \"80 y.o. female who presents to the emergency department today for evaluation for general fatigue. When I evaluate the patient, she states that she is complaining of pain to her left elbow. The patient denies falling. She is rating her pain is an 8/10, pain will radiate up towards her shoulder. When asked where the patient lives, she was able to tell me her birthday. As she is able to tell me her name, she is ANO x2.  Patient states that she is just generally not feeling well but is unsure exactly her symptoms or how long she has been not feeling well. \"    Past Medical History:  has a past medical history of Anxiety, Arthritis, Atrial fibrillation/flutter, Blood transfusion reaction, Crohn's disease ([address] Depression, GERD (gastroesophageal reflux disease), Hiatal hernia, Hx of blood clots, Hypertension, MRSA (methicillin resistant staph aureus) culture positive, MRSA (methicillin resistant staph aureus) culture positive, Neuropathy, Pneumonia, Sinus headache, SOB (shortness of breath), and VRE (vancomycin resistant enterococcus) culture positive. Past Surgical History:  has a past surgical history that includes Hysterectomy; knee surgery (Bilateral); Hand Carpectomy (Bilateral); hernia repair; Abdomen surgery; bladder suspension; fracture surgery; Heel spur surgery; Tonsillectomy; Colonoscopy; Upper gastrointestinal endoscopy (13); Foot surgery (Left, 14); Foot surgery (6/3/15); Colonoscopy (9/14/15); Foot surgery (Left, 2002); joint replacement; Sigmoidoscopy (01/15/2018); Abdominal adhesion surgery (2018);  Colonoscopy (08/07/2018); colostomy (N/A, 10/8/2018); pr secd clos surg wnd exten/complic (N/A, 91/12/3804); Leg Surgery (Right); Colonoscopy (06/07/2019); Colonoscopy (N/A, 6/7/2019); Colonoscopy (N/A, 6/7/2019); Small intestine surgery (N/A, 7/17/2019); and proctosigmoidoscopy (N/A, 7/17/2019). Discharge Recommendations: [place] scored a 13/24 on the AM-PAC ADL Inpatient form. Current research shows that an AM-PAC score of 17 or less is typically not associated with a discharge to the patient's home setting. Based on the patient's AM-PAC score and their current ADL deficits, it is recommended that the patient have 3-5 sessions per week of Occupational Therapy at d/c to increase the patient's independence. Please see assessment section for further patient specific details. If patient discharges prior to next session this note will serve as a discharge summary. Please see below for the latest assessment towards goals. DME Required For Discharge: DME to be determined at next level of care    Precautions/Restrictions: high fall risk, contact isolation MDRO  Weight Bearing Restrictions: no restrictions  [] Right Upper Extremity  [] Left Upper Extremity [] Right Lower Extremity  [] Left Lower Extremity     Required Braces/Orthotics: no braces required   [] Right  [] Left  Positional Restrictions:no positional restrictions      Pre-Admission Information     11/21/22-- patient stated lives with son, stated does not walk but uses a w/c whenever up, stated could walk a little at times. Sleeps in a regular bed or in recliner. Unable to obtain social hx at this time due to pt's cognitive status and expressive aphasia. Per chart review, pt lives with son who is requesting placement for pt.        Examination     Vision:   Vision Gross Assessment: WFL  Hearing:   WFL  Perception:   WFL  Observation:   General Observation:  blister on left LE broke open and blood on lower leg and floor, notified nurse who came in and dressed it with gauze and bandage. Posture:   Rounded shoulders and forward head  Sensation:   denies numbness and tingling  Proprioception:    WFL  Tone:   Normotonic  Coordination Testing:   Coordination and Movement Description: (R) UE, (L) UE, fine motor impairments, decreased speed, decreased accuracy  Finger/Thumb Opposition: Impaired Bilaterally    ROM:   Grossly WFL B UEs, difficulty following AROM and MMT commands  Strength:   (B) UE strength grossly WFL    Decision Making: medium complexity  Clinical Presentation: stable      Subjective    General: Pt supine in bed upon arrival, pleasant and agreeable to OT/PT treatment  Pain: Patient unable to rate secondary to cognition/communication deficits stated yes when asked if in pain, unable to state where   Pain Interventions: RN notified and repositioned            Activities of Daily Living    Basic Activities of Daily Living  Lower Extremity Dressing: maximum assistance  Dressing Comments: able to doff right sock, unable to don right sock, dep left sock  Toileting Comments: purewick in, refused to change Purewick or brief. General Comments: refused bathing, dressing and grooming tasks this date, refused toileting        Instrumental Activities of Daily Living  No IADL completed on this date. Functional Mobility    Bed Mobility  Supine to Sit: contact guard assistance  Scooting: minimal assistance        Sit to stand transfer:minimal assistance  Stand to sit transfer: maximum assistance  Bed / Chair transfer: minimal assistance. Bed / Chair comments: RW, patient sat quickly without any motor control/eccentric control. Stood x 45 seconds with minimal assist and RW       Functional Mobility:  Sitting Balance: stand by assistance, contact guard assistance. Sitting Balance Comment: seated EOB x 5 minutes   Standing Balance: Min static stance at RW x 45 seconds.     Standing Balance Comment: see PT treatment for further standing tasks/balance    Other Therapeutic Interventions      Functional Outcomes  AM-PAC Inpatient Daily Activity Raw Score: 13      Cognition  Overall Cognitive Status: Impaired  Arousal/Alterness: appropriate responses to stimuli  Following Commands: follows one step commands with repetition, follows one step commands with increased time  Attention Span: difficulty dividing attention  Memory: decreased recall of recent events, decreased short term memory  Safety Judgement: decreased awareness of need for assistance  Problem Solving: assistance required to generate solutions, assistance required to implement solutions, assistance required to identify errors made, assistance required to correct errors made  Insights: decreased awareness of deficits  Initiation: requires cues for some  Sequencing: requires cues for some  Comments: expressive aphasia       Orientation:    Pt stated name and is in a hospital, refused to answer what month we are in-- stated \"month my daughter , I am not saying it\"  Command Following:   accurately follows one step commands     Education    Barriers To Learning: cognition and emotional  Patient Education: patient educated on goals, OT role and benefits, plan of care, transfer training, discharge recommendations  Learning Assessment:  patient will require reinforcement due to cognitive deficits    Assessment    Activity Tolerance: Pt self limiting at times requiring increased time for mobility due to expressive aphasia and periods of agitation/decreased cooperation. Then patient would become very cooperative and thankful that therapists are working with her.        Impairments Requiring Therapeutic Intervention: decreased functional mobility, decreased ADL status, decreased ROM, decreased strength, decreased safety awareness, decreased cognition, decreased endurance, decreased balance, increased pain  Prognosis: good and fair      Clinical Assessment: Pt is currently functioning below occupational baseline and demo the deficits listed above. Pt would benefit from continued skilled OT services to address these deficits and increase IND, safety, and ease with all occupational pursuits . Patient is not safe to return home at this time.    Safety Interventions: patient left in chair, chair alarm in place, call light within reach, and nurse notified       Plan    Frequency: 3-5 x/per week  Current Treatment Recommendations: strengthening, ROM, balance training, functional mobility training, transfer training, endurance training, and ADL/self-care training    Goals    Patient Goals: pt did not state     Short Term Goals:  Time Frame: by d/c  Patient will complete upper body ADL at minimal assistance -- refused 11/21/22  Patient will complete lower body ADL at maximum assistance -- goal met 11/21/22-- upgrade to minimal assist   Patient will complete toileting at maximum assistance -- refused 11/21/22  Patient will complete functional transfers at maximum assistance -- goal met, upgrade to minimal assist 11/21/22       Therapy Session Time     Individual Group Co-treatment   Time In    1429   Time Out    1512   Minutes    43        Timed Code Treatment Minutes:  Timed Code Treatment Minutes: 43 Minutes    Total Treatment Minutes:  43 minutes total    Electronically Signed By: KERRIE Lee/L [phone]

## 2022-11-21 NOTE — PLAN OF CARE
Problem: Skin/Tissue Integrity  Goal: Absence of new skin breakdown  11/20/2022 2318 by Johanny Cervantes RN  Outcome: Progressing     Problem: Pain  Goal: Verbalizes/displays adequate comfort level or baseline comfort level  11/20/2022 2318 by Johanny Cervantes RN  Outcome: Progressing     Problem: Safety - Adult  Goal: Free from fall injury  Outcome: Progressing

## 2022-11-21 NOTE — PROGRESS NOTES
Physician Progress Note      Essie Burger  CSN #:                  [id number]  :                       1955  ADMIT DATE:       11/15/2022 6:23 PM  [address] [place] DATE:  RESPONDING  PROVIDER #:        Nadege Juarez MD          QUERY TEXT:    Pt admitted with Hypocalcemia. Pt noted to have Altered mental status, FTT,   confused noted in PN  by IM. If possible, please document in the progress   notes and discharge summary if you are evaluating and / or treating any of   the following:    Risk Factors: Hypocalcemia, Multiple electrolyte abnormalities,   Hypoalbuminemia, Anemia  Clinical Indicators: PN notes  UA showing likely concern for urinary   tract infection, altered mental status, FTT, ED notes 11/15 On examination she   is alert and oriented x2, does appear to be mildly confused noted. Unable   to give much history, Ca-6.4,  CT head- neg  Treatment: iv mag sulfate ordered, Cont. to order Mag as needed, monitor   mental status, reorient to environment , monitor labs  Options provided:  -- Metabolic encephalopathy  -- Other - I will add my own diagnosis  -- Disagree - Not applicable / Not valid  -- Disagree - Clinically unable to determine / Unknown  -- Refer to Clinical Documentation Reviewer    PROVIDER RESPONSE TEXT:    This patient has metabolic encephalopathy. Query created by: Arturo Del Cid on 2022 6:41 AM      QUERY TEXT:    Pt admitted with Hypocalcemia. Pt noted to have \"Worsening bibasilar airspace   opacities which could represent atelectasis or pneumonia\" on Chest x-ray   . If possible, please document in the progress notes and discharge   summary if you are evaluating and/or treating any of the following: The medical record reflects the following:  Risk Factors: Hypocalcemia, Hypomagnesemia, Crohn's colitis. Clinical Indicators:  On  XR chest/ PN notes  noted Worsening   bibasilar airspace opacities which could represent atelectasis, XR

## 2022-11-21 NOTE — DISCHARGE SUMMARY
[facility] -- Physician Discharge Summary     Mat Torres  1955  MRN: [id number]    Admit Date: 11/15/2022  Discharge Date: No discharge date for patient encounter. Attending MD: Maninder Schuster MD  Discharging MD: Maninder Schuster MD  PCP: Drea Bliss MD [phone]    Admission Diagnosis: Hypocalcemia [E83.51]  Generalized weakness [R53.1]  DISCHARGE DIAGNOSIS: same    Full Hospital Problem List:  Active Hospital Problems    Diagnosis Date Noted    Acute respiratory failure with hypoxemia [facility]) [J96.01] 11/16/2022     Priority: Medium    Hypocalcemia [E83.51] 11/15/2022     Priority: Medium    FTT (failure to thrive) in adult [R62.7] 11/15/2022     Priority: Medium    Altered mental status [R41.82]     Multiple wounds [T07. XXXA]     Hypomagnesemia [E83.42] 11/27/2018    Crohn's colitis ([facility] [address] [K50.10] 01/06/2018    Anemia [D64.9] 01/05/2018           Hospital Course:   78 y.o. female who presents to the emergency department today for evaluation for general fatigue. When I evaluate the patient, she states that she is complaining of pain to her left elbow. The patient denies falling. She is rating her pain is an 8/10, pain will radiate up towards her shoulder. When asked where the patient lives, she was able to tell me her birthday. As she is able to tell me her name, she is ANO x2.  Patient states that she is just generally not feeling well but is unsure exactly her symptoms or how long she has been not feeling well. Per EMS report, family did call as the patient has had worsening fatigue for the past several days. Patient was unable to get up and walk, and EMS was called. Patient was seen several months ago for alcohol intoxication, she denies any alcohol use to me. As she cannot ambulate on her own and is not safe to be at home by herself, she will be admitted. Of note, her calcium is quite low here.  Iv calcium replcaement ordered. Electrolytes are normalized here with assistance of Renal. Calcium, Magnesium and Sodium WNL at time of d/c.  Pt does have her PPI stopped and is switched to bid pepcid given the persisnent hypomagnesmia. PT/OT rec SNF. Family requests placement as they cannot care for her currently given her increased needs. Consults made during Hospitalization:  IP CONSULT TO PRIMARY CARE PROVIDER  IP CONSULT TO DIETITIAN  IP CONSULT TO NEPHROLOGY  IP CONSULT TO SOCIAL WORK  IP CONSULT TO SOCIAL WORK    Treatment team at time of Discharge: Treatment Team: Attending Provider: Brianna Marcos MD; Consulting Physician: Brianna Marcos MD; Consulting Physician: Sadaf Gardner MD; Respiratory Therapist (Night): Georgina Cotton RCP; Respiratory Therapist (Day): Eric Bean RCP; Utilization Reviewer: Wilfrido Saleem RN; Registered Nurse: Nelson Bright RN    Imaging Results:  XR ELBOW LEFT (2 VIEWS)    Result Date: 11/15/2022  EXAMINATION: 2 XRAY VIEWS OF THE LEFT SHOULDER; 3 XRAY VIEWS OF THE LEFT ELBOW 11/15/2022 6:40 pm COMPARISON: None. HISTORY: ORDERING SYSTEM PROVIDED HISTORY: pain TECHNOLOGIST PROVIDED HISTORY: Reason for exam:->pain Reason for Exam: pain FINDINGS: Left shoulder: Status post shoulder arthroplasty. Anatomic alignment. No hardware complications. No acute fracture. Visualized lung unremarkable. Left elbow: Anatomic alignment. No fracture. Joint spaces appear normal.     No acute bony or joint abnormality Status post left shoulder arthroplasty, no hardware complications     CT HEAD WO CONTRAST    Result Date: 11/15/2022  EXAMINATION: CT OF THE HEAD WITHOUT CONTRAST  11/15/2022 6:59 pm TECHNIQUE: CT of the head was performed without the administration of intravenous contrast. Automated exposure control, iterative reconstruction, and/or weight based adjustment of the mA/kV was utilized to reduce the radiation dose to as low as reasonably achievable. COMPARISON: None. HISTORY: ORDERING SYSTEM PROVIDED HISTORY: confusion FINDINGS: BRAIN/VENTRICLES: There is no acute intracranial hemorrhage, mass effect or midline shift. No abnormal extra-axial fluid collection. The gray-white differentiation is maintained without evidence of an acute infarct. There is prominence of the ventricles and sulci due to global parenchymal volume loss. There are nonspecific areas of hypoattenuation within the periventricular and subcortical white matter, which likely represent chronic microvascular ischemic change. ORBITS: The visualized portion of the orbits demonstrate no acute abnormality. SINUSES: The visualized paranasal sinuses and mastoid air cells demonstrate no acute abnormality. SOFT TISSUES/SKULL: No acute abnormality of the visualized skull or soft tissues. No acute intracranial abnormality. XR SHOULDER LEFT (MIN 2 VIEWS)    Result Date: 11/15/2022  EXAMINATION: 2 XRAY VIEWS OF THE LEFT SHOULDER; 3 XRAY VIEWS OF THE LEFT ELBOW 11/15/2022 6:40 pm COMPARISON: None. HISTORY: ORDERING SYSTEM PROVIDED HISTORY: pain TECHNOLOGIST PROVIDED HISTORY: Reason for exam:->pain Reason for Exam: pain FINDINGS: Left shoulder: Status post shoulder arthroplasty. Anatomic alignment. No hardware complications. No acute fracture. Visualized lung unremarkable. Left elbow: Anatomic alignment. No fracture. Joint spaces appear normal.     No acute bony or joint abnormality Status post left shoulder arthroplasty, no hardware complications     XR CHEST PORTABLE    Result Date: 11/16/2022  EXAMINATION: ONE XRAY VIEW OF THE CHEST 11/16/2022 9:41 am COMPARISON: Chest x-ray dated 15 November 2022 HISTORY: ORDERING SYSTEM PROVIDED HISTORY: hypoxia TECHNOLOGIST PROVIDED HISTORY: Reason for exam:->hypoxia FINDINGS: Mild cardiomegaly. Worsening bibasilar airspace opacities. No pneumothorax or pleural effusion.      1.  Worsening bibasilar airspace opacities which could represent atelectasis or pneumonia in the appropriate clinical setting. 2.  Mild cardiomegaly     XR CHEST PORTABLE    Result Date: 11/15/2022  EXAMINATION: ONE XRAY VIEW OF THE CHEST 11/15/2022 6:40 pm COMPARISON: 07/14/2022 HISTORY: ORDERING SYSTEM PROVIDED HISTORY: confusion TECHNOLOGIST PROVIDED HISTORY: Reason for exam:->confusion Reason for Exam: confusion FINDINGS: Cardiomediastinal silhouette stable. Bibasilar pulmonary opacities. No pulmonary vascular congestion or edema. No pneumothorax. Bibasilar pulmonary opacities could represent atelectasis or infection         Discharge Exam: (2-7 system for EPF/Detailed, ?8 for Comprehensive)  /75   Pulse 63   Temp 98.2 °F (36.8 °C) (Oral)   Resp 16   Ht 5' 7\" (1.702 m)   Wt 196 lb 3.4 oz (89 kg)   SpO2 94%   BMI 30.73 kg/m²   Constitutional: vitals as above: alert, appears stated age and cooperative    Psychiatric: normal insight and judgment, oriented to person, place, time, and general circumstances    Head: Normocephalic, without obvious abnormality, atraumatic    Eyes:lids and lashes normal, conjunctivae and sclerae normal and pupils equal, round, reactive to light and accomodation    EMNT: external ears normal, nares midline    Neck: no carotid bruit, supple, symmetrical, trachea midline and thyroid not enlarged, symmetric, no tenderness/mass/nodules     Respiratory: clear to auscultation and percussion bilaterally with normal respiratory effort    Cardiovascular: normal rate, regular rhythm, normal S1 and S2 and no murmurs    Gastrointestinal: soft, non-tender, non-distended, normal bowel sounds, no masses or organomegaly    Extremities: no clubbing, no edema    Skin:No rashes or nodules noted. Neurologic:negative        Disposition: SNF    Condition: stable    Discharge Medications:  [unfilled]       Medication List      START taking these medications     famotidine 20 MG tablet; Commonly known as: PEPCID; Take 1 tablet by   mouth 2 times daily;  Notes to patient: Famotidine (Pepcid*) Use: reduces   stomach acid, protect the digestive tract Side effects: rare but can cause   dizziness, headache or diarrhea     CHANGE how you take these medications     oxyCODONE 5 MG immediate release tablet; Commonly known as: ROXICODONE;   Take 1 tablet by mouth every 6 hours as needed for Pain for up to 3 days.;   What changed: when to take this     CONTINUE taking these medications     amLODIPine 10 MG tablet; Commonly known as: NORVASC   atorvastatin 80 MG tablet; Commonly known as: LIPITOR; Take 1 tablet by   mouth nightly   baclofen 10 MG tablet; Commonly known as: LIORESAL   balsalazide 750 MG capsule; Commonly known as: COLAZAL   busPIRone 10 MG tablet; Commonly known as: BUSPAR   calcium-vitamin D 500-200 MG-UNIT per tablet; Commonly known as:   OSCAL-500   Dupixent 300 MG/2ML Sopn injection; Generic drug: dupilumab   ferrous gluconate 324 (38 Fe) MG tablet; Commonly known as: FERGON   hydrOXYzine HCl 25 MG tablet; Commonly known as: ATARAX   lacosamide 100 MG Tabs tablet; Commonly known as: VIMPAT   magnesium oxide 400 (240 Mg) MG tablet; Commonly known as: MAG-OX; Take   1 tablet by mouth 2 times daily   metoprolol succinate 50 MG extended release tablet; Commonly known as:   TOPROL XL; Take 1 tablet by mouth daily   mycophenolate 500 MG tablet; Commonly known as: CELLCEPT   nystatin 301553 UNIT/GM powder; Commonly known as: MYCOSTATIN   ondansetron 4 MG disintegrating tablet; Commonly known as: Zofran ODT; Take 1 tablet by mouth every 8 hours as needed for Nausea   potassium chloride 20 MEQ extended release tablet; Commonly known as:   KLOR-CON M; Take 1 tablet by mouth 2 times daily   predniSONE 5 MG tablet; Commonly known as: DELTASONE   rivaroxaban 20 MG Tabs tablet; Commonly known as: Xarelto; Take 1 tablet   by mouth daily (with breakfast)   triamcinolone 0.1 % ointment; Commonly known as: KENALOG         Allergies:   Allergies   Allergen Reactions    Ace Inhibitors Swelling    Skelaxin [Metaxalone] Swelling       Follow up Instructions: Follow-up with PCP: Dixie Cox MD in 2 wk . Total time spent on day of discharge including face-to-face visit, examination, documentation, counseling, preparation of discharge plans and followup, and discharge medicine reconciliation and presciptions is 33 minutes      ---       Subjective from day of d/c:   Wild Damon is a 78 y.o. female. Pt seen and examined  Chart reviewed since last visit, labs and imaging below      Doing ok  Na 137, Ca 8.9  Doing better back on oxycodone    Review of Systems: (1 system for EPF, 2-9 for detailed, 10+ for comprehensive)  Constitutional: Negative for chills and fever. HENT: Negative for dental problem, nosebleeds and rhinorrhea. Eyes: Negative for photophobia and visual disturbance. Respiratory: Negative for cough, chest tightness and shortness of breath. Cardiovascular: Negative for chest pain and leg swelling. Gastrointestinal: Negative for diarrhea, nausea and vomiting. Endocrine: Negative for polydipsia and polyphagia. Genitourinary: Negative for frequency, hematuria and urgency. Musculoskeletal: Negative for back pain and myalgias. Skin: Negative for rash. Allergic/Immunologic: Negative for food allergies. Neurological: Negative for dizziness, seizures, syncope and facial asymmetry. Hematological: Negative for adenopathy. Psychiatric/Behavioral: Negative for dysphoric mood. The patient is not nervous/anxious. I have reviewed the patient's medical and social history in detail and updated the computerized patient record.   To recap: She  has a past medical history of Anxiety, Arthritis, Atrial fibrillation/flutter, Blood transfusion reaction, Crohn's disease ([address] Depression, GERD (gastroesophageal reflux disease), Hiatal hernia, Hx of blood clots, Hypertension, MRSA (methicillin resistant staph aureus) culture positive, MRSA (methicillin resistant staph aureus) culture positive, Neuropathy, Pneumonia, Sinus headache, SOB (shortness of breath), and VRE (vancomycin resistant enterococcus) culture positive. . She  has a past surgical history that includes Hysterectomy; knee surgery (Bilateral); Hand Carpectomy (Bilateral); hernia repair; Abdomen surgery; bladder suspension; fracture surgery; Heel spur surgery; Tonsillectomy; Colonoscopy; Upper gastrointestinal endoscopy (6/14/13); Foot surgery (Left, 6/25/14); Foot surgery (6/3/15); Colonoscopy (9/14/15); Foot surgery (Left, 08/05/2002); joint replacement; Sigmoidoscopy (01/15/2018); Abdominal adhesion surgery (06/08/2018); Colonoscopy (08/07/2018); colostomy (N/A, 10/8/2018); pr secd clos surg wnd exten/complic (N/A, 67/30/5968); Leg Surgery (Right); Colonoscopy (06/07/2019); Colonoscopy (N/A, 6/7/2019); Colonoscopy (N/A, 6/7/2019); Small intestine surgery (N/A, 7/17/2019); and proctosigmoidoscopy (N/A, 7/17/2019). . She  reports that she has been smoking cigarettes and e-cigarettes. She has a 10.00 pack-year smoking history. She has never used smokeless tobacco. She reports current alcohol use of about 2.0 standard drinks per week. She reports that she does not use drugs. .      Current Facility-Administered Medications: potassium & sodium phosphates (PHOS-NAK) 280-160-250 MG packet 250 mg, 1 packet, Oral, TID  diphenoxylate-atropine (LOMOTIL) 2.5-0.025 MG per tablet 1 tablet, 1 tablet, Oral, 4x Daily PRN  oxyCODONE (ROXICODONE) immediate release tablet 5 mg, 5 mg, Oral, Q4H PRN  famotidine (PEPCID) tablet 20 mg, 20 mg, Oral, BID  magnesium oxide (MAG-OX) tablet 400 mg, 400 mg, Oral, TID  magnesium sulfate 2000 mg in 50 mL IVPB premix, 2,000 mg, IntraVENous, PRN  sodium phosphate 10 mmol in sodium chloride 0.9 % 250 mL IVPB, 10 mmol, IntraVENous, PRN **OR** sodium phosphate 15 mmol in sodium chloride 0.9 % 250 mL IVPB, 15 mmol, IntraVENous, PRN **OR** sodium phosphate 20 mmol in sodium chloride 0.9 % 500 mL IVPB, 20 mmol, IntraVENous, PRN  lacosamide (VIMPAT) tablet 200 mg, 200 mg, Oral, BID  triamcinolone (KENALOG) 0.1 % cream, , Topical, BID  busPIRone (BUSPAR) tablet 10 mg, 10 mg, Oral, TID  balsalazide (COLAZAL) capsule 1,500 mg, 1,500 mg, Oral, BID  ferrous gluconate (FERGON) tablet 972 mg, 972 mg, Oral, Daily with breakfast  hydrOXYzine HCl (ATARAX) tablet 25 mg, 25 mg, Oral, TID PRN  atorvastatin (LIPITOR) tablet 80 mg, 80 mg, Oral, Nightly  potassium chloride (KLOR-CON M) extended release tablet 20 mEq, 20 mEq, Oral, BID  rivaroxaban (XARELTO) tablet 20 mg, 20 mg, Oral, Daily with breakfast  metoprolol succinate (TOPROL XL) extended release tablet 50 mg, 50 mg, Oral, Daily  mycophenolate (CELLCEPT) capsule 1,000 mg, 1,000 mg, Oral, BID  baclofen (LIORESAL) tablet 10 mg, 10 mg, Oral, TID PRN  predniSONE (DELTASONE) tablet 5 mg, 5 mg, Oral, Daily  sodium chloride flush 0.9 % injection 5-40 mL, 5-40 mL, IntraVENous, 2 times per day  sodium chloride flush 0.9 % injection 5-40 mL, 5-40 mL, IntraVENous, PRN  0.9 % sodium chloride infusion, 25 mL, IntraVENous, PRN  potassium chloride (KLOR-CON M) extended release tablet 40 mEq, 40 mEq, Oral, PRN **OR** potassium bicarb-citric acid (EFFER-K) effervescent tablet 40 mEq, 40 mEq, Oral, PRN **OR** potassium chloride 10 mEq/100 mL IVPB (Peripheral Line), 10 mEq, IntraVENous, PRN  ondansetron (ZOFRAN-ODT) disintegrating tablet 4 mg, 4 mg, Oral, Q8H PRN **OR** ondansetron (ZOFRAN) injection 4 mg, 4 mg, IntraVENous, Q6H PRN  magnesium hydroxide (MILK OF MAGNESIA) 400 MG/5ML suspension 30 mL, 30 mL, Oral, Daily PRN  acetaminophen (TYLENOL) tablet 650 mg, 650 mg, Oral, Q6H PRN **OR** acetaminophen (TYLENOL) suppository 650 mg, 650 mg, Rectal, Q6H PRN  hydrALAZINE (APRESOLINE) injection 10 mg, 10 mg, IntraVENous, Q6H PRN  0.9 % sodium chloride bolus, 500 mL, IntraVENous, PRN         Objective (exam): see above    Labs at time of d/c:  Lab Results   Component Value Date    WBC 6.2 11/21/2022    HGB 9.6 (L) 11/21/2022    HCT 28.4 (L) 11/21/2022     (H) 11/21/2022    CHOL 111 07/24/2020    TRIG 66 07/24/2020    HDL 43 07/24/2020    ALT 6 (L) 11/16/2022    AST 15 11/16/2022     11/21/2022    K 5.0 11/21/2022     11/21/2022    CREATININE <0.5 (L) 11/21/2022    BUN 5 (L) 11/21/2022    CO2 24 11/21/2022    TSH 3.57 07/28/2020    INR 1.37 (H) 11/15/2022    LABA1C 5.0 07/24/2020    LABMICR YES 11/16/2022     Lab Results   Component Value Date    CKTOTAL 79 07/13/2022    TROPONINI <0.01 11/15/2022       (Please note that portions of this note were completed with a voice recognition program.  Efforts were made to edit the dictations but occasionally words are mis-transcribed.)      Signed:  Sandra Dasilva MD  11/21/2022

## 2022-11-21 NOTE — CARE COORDINATION
Discharge Planning;  CM received a call back from;  -[facility]-  Not able to accept because one of her medications is very expensive and the facility does not allow medications to be brought in from home. - [facility] [phone] Not in [address] [place] [address]-  No beds available. CM spoke with patient's  niece Mady Jacobson [phone] who provided other choices for SNFs; [address], [place] at [facility] . Referrals have been sent. Patient needs updated Therapy notes . Sharonda Billing with Therapy has been informed.

## 2022-11-21 NOTE — PROGRESS NOTES
Nona Fanning, MD Denver Six, MD Davis Perch, MD                  Office: [phone]                      Fax: [phone]              [facility]                   NEPHROLOGY INPATIENT PROGRESS NOTE:     PATIENT NAME: Brent Keller  : 1955  MRN: [id number]      RECOMMENDATIONS:   Electrolytes better today. Continue po Phosp replacement  Continue po Mg. Stop po K bid  Stopped NSS    - reviewed urine electrolytes with creatinine to calculate fractional excretion of magnesium, calcium, potassium= less likely renal wasting, likely GI losses     - Increased Mag 400 BID to 400 TID     -Avoid PPI, alcohol. On Famotidine     D/C plan from renal stand point:   Okay for discharge from renal perspective. Discussed plan with nurse. IMPRESSION:       Admitted on:  11/15/2022  6:23 PM   For:  Hypocalcemia [E83.51]  Generalized weakness [R53.1]      Multiple electrolyte abnormalities-better  Hypocalcemia, corrected for hypoalbuminemia, moderate range  Hypokalemia-better  Severe hypomagnesemia, 0.3    History of alcohol abuse and PPI use. Better  Hypocalcemia  Hypophosphatemia    Glucose normal  Hypoalbuminemia  LFTs stable    Anemia likely of chronic disease, mild    History of Crohn's disease,    UA showing likely concern for urinary tract infection    Renal  function is stable without AARTI on CKD    H/O chronic hyponatremia. Underlying history of alcohol abuse- better no  CXR -   1. Worsening bibasilar airspace opacities which could represent atelectasis or pneumonia in the appropriate clinical setting. 2.  Mild cardiomegaly       Other major problems: Management per primary and other consulting teams.          Hospital Problems             Last Modified POA    * (Principal) Hypocalcemia 11/15/2022 Yes    FTT (failure to thrive) in adult 11/15/2022 Yes    Acute respiratory failure with hypoxemia ([address] 2022 Yes    Anemia 11/15/2022 Yes    Crohn's colitis ([address] 11/15/2022 Yes Hypomagnesemia 11/16/2022 Yes    Multiple wounds 11/15/2022 Yes    Altered mental status 11/15/2022 Yes      Diarrhea-per Medicine. Mita Arredondo MD,  [facility] [address] [place] [address] [place]: [phone] or Via [email or web address]  Fax: [phone]        =======================================================================================   =======================================================================================  Subjective / interval history:   No complaints    Past medical, Surgical, Social, Family medical history reviewed by me. MEDICATIONS: reviewed by me. Medications Prior to Admission:  No current facility-administered medications on file prior to encounter. Current Outpatient Medications on File Prior to Encounter   Medication Sig Dispense Refill    lacosamide (VIMPAT) 100 MG TABS tablet Take 200 mg by mouth 2 times daily. amLODIPine (NORVASC) 10 MG tablet Take 10 mg by mouth daily      calcium-vitamin D (OSCAL-500) 500-200 MG-UNIT per tablet Take 1 tablet by mouth 2 times daily      dupilumab (DUPIXENT) 300 MG/2ML SOPN injection Inject 300 mg into the skin      nystatin (MYCOSTATIN) 668456 UNIT/GM powder Apply topically 2 times daily Apply topically 4 times daily. triamcinolone (KENALOG) 0.1 % ointment Apply topically 2 times daily Apply topically 2 times daily.       magnesium oxide (MAG-OX) 400 (240 Mg) MG tablet Take 1 tablet by mouth 2 times daily 30 tablet 1    mycophenolate (CELLCEPT) 500 MG tablet Take 1,000 mg by mouth 2 times daily      baclofen (LIORESAL) 10 MG tablet Take 10 mg by mouth 3 times daily as needed      predniSONE (DELTASONE) 5 MG tablet Take 5 mg by mouth daily      metoprolol succinate (TOPROL XL) 50 MG extended release tablet Take 1 tablet by mouth daily 30 tablet 5    rivaroxaban (XARELTO) 20 MG TABS tablet Take 1 tablet by mouth daily (with breakfast) 90 tablet 3    atorvastatin (LIPITOR) 80 MG tablet Take 1 tablet by mouth nightly 30 tablet 3    potassium chloride (KLOR-CON M) 20 MEQ extended release tablet Take 1 tablet by mouth 2 times daily 60 tablet 3    ferrous gluconate (FERGON) 324 (38 Fe) MG tablet Take 3 tablets by mouth daily (with breakfast)       hydrOXYzine HCl (ATARAX) 25 MG tablet Take 25 mg by mouth 3 times daily as needed for Itching      ondansetron (ZOFRAN ODT) 4 MG disintegrating tablet Take 1 tablet by mouth every 8 hours as needed for Nausea 20 tablet 0    busPIRone (BUSPAR) 10 MG tablet Take 10 mg by mouth 3 times daily as needed      balsalazide (COLAZAL) 750 MG capsule Take 3,000 mg by mouth daily Take 4 capsules (total 3000 mg) daily each morning.            Current Facility-Administered Medications:     magnesium sulfate 1000 mg in dextrose 5% 100 mL IVPB, 2,000 mg, IntraVENous, Once, Nayan Rivera MD    potassium & sodium phosphates (PHOS-NAK) 280-160-250 MG packet 250 mg, 1 packet, Oral, TID, Martina King MD, 250 mg at 11/21/22 6687    diphenoxylate-atropine (LOMOTIL) 2.5-0.025 MG per tablet 1 tablet, 1 tablet, Oral, 4x Daily PRN, Nayan Rivera MD, 1 tablet at 11/21/22 4932    oxyCODONE (ROXICODONE) immediate release tablet 5 mg, 5 mg, Oral, Q4H PRN, Nayan Rivera MD, 5 mg at 11/21/22 0933    famotidine (PEPCID) tablet 20 mg, 20 mg, Oral, BID, Nayan Rivera MD, 20 mg at 11/21/22 0920    magnesium oxide (MAG-OX) tablet 400 mg, 400 mg, Oral, TID, Dayana Muniz MD, 400 mg at 11/21/22 0920    magnesium sulfate 2000 mg in 50 mL IVPB premix, 2,000 mg, IntraVENous, PRN, Dayana Muniz MD, Stopped at 11/19/22 0240    sodium phosphate 10 mmol in sodium chloride 0.9 % 250 mL IVPB, 10 mmol, IntraVENous, PRN **OR** sodium phosphate 15 mmol in sodium chloride 0.9 % 250 mL IVPB, 15 mmol, IntraVENous, PRN, Stopped at 11/17/22 1600 **OR** sodium phosphate 20 mmol in sodium chloride 0.9 % 500 mL IVPB, 20 mmol, IntraVENous, PRN, Dayana Muniz MD    lacosamide (VIMPAT) tablet 200 mg, 200 mg, Oral, BID, Shane Mireles MD, 200 mg at 11/21/22 0920    triamcinolone (KENALOG) 0.1 % cream, , Topical, BID, Shane Mireles MD, Given at 11/21/22 5960    busPIRone (BUSPAR) tablet 10 mg, 10 mg, Oral, TID, Shane Mireles MD, 10 mg at 11/21/22 0920    balsalazide (COLAZAL) capsule 1,500 mg, 1,500 mg, Oral, BID, Shane Mireles MD, 1,500 mg at 11/21/22 7276    ferrous gluconate (FERGON) tablet 972 mg, 972 mg, Oral, Daily with breakfast, Shane Mireles MD, 972 mg at 11/21/22 8707    hydrOXYzine HCl (ATARAX) tablet 25 mg, 25 mg, Oral, TID PRN, Shane Mireles MD    atorvastatin (LIPITOR) tablet 80 mg, 80 mg, Oral, Nightly, Shane Mireles MD, 80 mg at 11/20/22 2023    potassium chloride (KLOR-CON M) extended release tablet 20 mEq, 20 mEq, Oral, BID, Shane Mireles MD, 20 mEq at 11/21/22 0920    rivaroxaban (Prashant Ju) tablet 20 mg, 20 mg, Oral, Daily with breakfast, Shane Mireles MD, 20 mg at 11/21/22 0920    metoprolol succinate (TOPROL XL) extended release tablet 50 mg, 50 mg, Oral, Daily, Shane Mireles MD, 50 mg at 11/21/22 0920    mycophenolate (CELLCEPT) capsule 1,000 mg, 1,000 mg, Oral, BID, Shane Mireles MD, 1,000 mg at 11/21/22 0920    baclofen (LIORESAL) tablet 10 mg, 10 mg, Oral, TID PRN, Shane Mireles MD, 10 mg at 11/20/22 0547    predniSONE (DELTASONE) tablet 5 mg, 5 mg, Oral, Daily, Shane Mireles MD, 5 mg at 11/21/22 0920    sodium chloride flush 0.9 % injection 5-40 mL, 5-40 mL, IntraVENous, 2 times per day, Shane Mireles MD, 10 mL at 11/21/22 0921    sodium chloride flush 0.9 % injection 5-40 mL, 5-40 mL, IntraVENous, PRN, Shane Mireles MD    0.9 % sodium chloride infusion, 25 mL, IntraVENous, PRN, Shane Mireles MD    potassium chloride (KLOR-CON M) extended release tablet 40 mEq, 40 mEq, Oral, PRN **OR** potassium bicarb-citric acid (EFFER-K) effervescent tablet 40 mEq, 40 mEq, Oral, PRN, 40 mEq at 11/16/22 1001 **OR** potassium chloride 10 mEq/100 mL IVPB (Peripheral Line), 10 mEq, IntraVENous, PRN, Phuc Peres MD    ondansetron (ZOFRAN-ODT) disintegrating tablet 4 mg, 4 mg, Oral, Q8H PRN **OR** ondansetron (ZOFRAN) injection 4 mg, 4 mg, IntraVENous, Q6H PRN, Phuc Peres MD    magnesium hydroxide (MILK OF MAGNESIA) 400 MG/5ML suspension 30 mL, 30 mL, Oral, Daily PRN, Phuc Peres MD    acetaminophen (TYLENOL) tablet 650 mg, 650 mg, Oral, Q6H PRN, 650 mg at 11/21/22 0932 **OR** acetaminophen (TYLENOL) suppository 650 mg, 650 mg, Rectal, Q6H PRN, Phuc Peres MD    hydrALAZINE (APRESOLINE) injection 10 mg, 10 mg, IntraVENous, Q6H PRN, Phuc Peres MD    0.9 % sodium chloride bolus, 500 mL, IntraVENous, PRN, Phuc Peres MD          REVIEW OF SYSTEMS:  As mentioned in chief complaint and HPI , Subjective     =======================================================================================     PHYSICAL EXAM:  Recent vital signs and recent I/Os reviewed by me.      Wt Readings from Last 3 Encounters:   11/21/22 196 lb 3.4 oz (89 kg)   08/08/21 192 lb 12.8 oz (87.5 kg)   06/04/21 198 lb (89.8 kg)     BP Readings from Last 3 Encounters:   11/21/22 121/81   07/14/22 (!) 99/53   08/10/21 111/72     Patient Vitals for the past 24 hrs:   BP Temp Temp src Pulse Resp SpO2 Weight   11/21/22 1152 -- -- -- -- -- 95 % --   11/21/22 1025 -- -- -- 57 -- -- --   11/21/22 0845 121/81 98.2 °F (36.8 °C) Oral 99 18 95 % --   11/21/22 0404 -- -- -- 63 -- -- --   11/21/22 0219 120/75 98.2 °F (36.8 °C) Oral 59 16 94 % 196 lb 3.4 oz (89 kg)   11/20/22 2352 111/80 97.9 °F (36.6 °C) Oral 70 16 94 % --   11/20/22 2304 -- -- -- 62 -- -- --   11/20/22 2007 125/81 98.3 °F (36.8 °C) Oral 67 20 94 % --   11/20/22 1653 116/81 98 °F (36.7 °C) Oral 91 16 95 % --   11/20/22 1221 125/75 98.4 °F (36.9 °C) Oral 58 16 94 % --       Intake/Output Summary (Last 24 hours) at 11/21/2022 1215  Last data filed at 11/21/2022 0159  Gross per 24 hour   Intake 967.18 ml   Output 3100 ml   Net -2132.82          Physical Exam  Vitals reviewed. Constitutional:       General: She is not in acute distress. Appearance: Normal appearance. She is obese. She is ill-appearing. HENT:      Head: Normocephalic and atraumatic. Right Ear: External ear normal.      Left Ear: External ear normal.      Nose: Nose normal.      Mouth/Throat:      Mouth: Mucous membranes are moist. Mucous membranes are not dry. Eyes:      General: No scleral icterus. Conjunctiva/sclera: Conjunctivae normal.   Neck:      Vascular: No JVD. Cardiovascular:      Rate and Rhythm: Normal rate and regular rhythm. Heart sounds: S1 normal and S2 normal.   Pulmonary:      Effort: Pulmonary effort is normal. No respiratory distress. Breath sounds: Rhonchi present. Abdominal:      General: Bowel sounds are normal. There is distension. Musculoskeletal:         General: Swelling present. No deformity. Cervical back: Normal range of motion and neck supple. Skin:     General: Skin is dry. Coloration: Skin is not jaundiced. Neurological:      Mental Status: She is alert and oriented to person, place, and time. Mental status is at baseline. Psychiatric:         Mood and Affect: Mood normal.         Behavior: Behavior normal.          =======================================================================================     DATA:  Diagnostic tests reviewed by me for today's visit:   (AS NEEDED FOR MY EVALUATION AND MANAGEMENT).        Recent Labs     11/19/22 0856 11/21/22  0648   WBC 8.6 6.2   HCT 32.9* 28.4*    454*     Iron Saturation:  No components found for: PERCENTFE  FERRITIN:    Lab Results   Component Value Date/Time    FERRITIN 67.5 09/27/2018 08:45 AM     IRON:    Lab Results   Component Value Date/Time    IRON 13 09/27/2018 08:45 AM     TIBC:    Lab Results   Component Value Date/Time    TIBC 190 09/27/2018 08:45 AM       Recent Labs     11/19/22  0856 11/20/22  0622 11/21/22  0648    135* 137   K 4.4 4.9 5.0    107 104   CO2 19* 19* 24   BUN 5* 5* 5*   CREATININE <0.5* <0.5* <0.5*     Recent Labs     11/18/22  2135 11/19/22  0856 11/20/22  0622 11/21/22  0648   CALCIUM  --  7.4* 8.6 8.9   MG 1.60* 1.90 1.50* 1.50*   PHOS  --  1.1* 2.1* 4.0     No results for input(s): PH, PCO2, PO2 in the last 72 hours.     Invalid input(s): [place] Harsh    ABG:  No results found for: PH, PCO2, PO2, HCO3, BE, THGB, TCO2, O2SAT  VBG:    Lab Results   Component Value Date/Time    PHVEN 7.423 11/20/2022 06:22 AM    BOE3GZX 44.2 07/14/2022 12:07 AM    BEVEN -2.9 07/14/2022 12:07 AM    U2EXUUTV >100 07/14/2022 12:07 AM       LDH:  No results found for: LDH  Uric Acid:    Lab Results   Component Value Date/Time    LABURIC 4.1 09/26/2018 02:56 PM       PT/INR:    Lab Results   Component Value Date/Time    PROTIME 16.8 11/15/2022 07:21 PM    INR 1.37 11/15/2022 07:21 PM     Warfarin PT/INR:  No components found for: PTPATWAR, PTINRWAR  PTT:    Lab Results   Component Value Date/Time    APTT 29.9 11/15/2022 07:21 PM   [APTT}  Last 3 Troponin:    Lab Results   Component Value Date/Time    TROPONINI <0.01 11/15/2022 07:21 PM    TROPONINI <0.01 07/13/2022 11:10 PM    TROPONINI <0.01 08/03/2021 04:11 PM       U/A:    Lab Results   Component Value Date/Time    COLORU DARK YELLOW 11/16/2022 02:36 AM    PROTEINU 100 11/16/2022 02:36 AM    PHUR 7.5 11/16/2022 02:36 AM    WBCUA 33 11/16/2022 02:36 AM    RBCUA 35 11/16/2022 02:36 AM    BACTERIA 4+ 11/16/2022 02:36 AM    CLARITYU TURBID 11/16/2022 02:36 AM    SPECGRAV 1.015 11/16/2022 02:36 AM    LEUKOCYTESUR LARGE 11/16/2022 02:36 AM    UROBILINOGEN 1.0 11/16/2022 02:36 AM    BILIRUBINUR Negative 11/16/2022 02:36 AM    BLOODU MODERATE 11/16/2022 02:36 AM    GLUCOSEU Negative 11/16/2022 02:36 AM     Microalbumen/Creatinine ratio:  No components found for: RUCREAT  24 Hour Urine for Protein:  No components found for: RAWUPRO, UHRS3, JOGD02HG, UTV3  24 Hour Urine for Creatinine Clearance:  No components found for: CREAT4, UHRS10, UTV10  Urine Toxicology:  No components found for: [place] Else, IBENZO, ICOCAINE, IMARTHC, IOPIATES, IPHENCYC    HgBA1c:    Lab Results   Component Value Date/Time    LABA1C 5.0 07/24/2020 05:26 AM     RPR:  No results found for: RPR  HIV:  No results found for: HIV  MARION:    Lab Results   Component Value Date/Time    MARION Negative 07/03/2018 05:10 AM     RF:  No results found for: RF  DSDNA:  No components found for: DNA  AMYLASE:    Lab Results   Component Value Date/Time    AMYLASE 48 08/02/2018 07:19 PM     LIPASE:    Lab Results   Component Value Date/Time    LIPASE 19.0 11/15/2022 07:21 PM     Fibrinogen Level:  No components found for: FIB       BELOW MENTIONED RADIOLOGY STUDY RESULTS BY ME (AS NEEDED FOR MY EVALUATION AND MANAGEMENT). CT HEAD WO CONTRAST    Result Date: 11/15/2022  No acute intracranial abnormality. XR CHEST PORTABLE    Result Date: 11/16/2022    1. Worsening bibasilar airspace opacities which could represent atelectasis or pneumonia in the appropriate clinical setting.  2.  Mild cardiomegaly

## 2022-11-21 NOTE — CARE COORDINATION
[address] Kylie Flores pre-cert request submitted via [facility] Access Portal.    Requested Facility:  [facility]  Anticipated Admit Date to SNF:  11/22/22  [employer] #: [id number]    Patient's son María Costello was updated and was in agreement .

## 2022-11-22 ENCOUNTER — NURSE ONLY (OUTPATIENT)
Dept: CARDIOLOGY CLINIC | Age: 67
End: 2022-11-22
Payer: MEDICARE

## 2022-11-22 VITALS
OXYGEN SATURATION: 94 % | DIASTOLIC BLOOD PRESSURE: 74 MMHG | BODY MASS INDEX: 30.84 KG/M2 | WEIGHT: 196.5 LBS | HEART RATE: 70 BPM | HEIGHT: 67 IN | TEMPERATURE: 98.4 F | RESPIRATION RATE: 16 BRPM | SYSTOLIC BLOOD PRESSURE: 118 MMHG

## 2022-11-22 DIAGNOSIS — I63.9 CRYPTOGENIC STROKE (HCC): ICD-10-CM

## 2022-11-22 DIAGNOSIS — Z45.09 ENCOUNTER FOR LOOP RECORDER CHECK: Primary | ICD-10-CM

## 2022-11-22 LAB
ALBUMIN SERPL-MCNC: 2.5 G/DL (ref 3.4–5)
ANION GAP SERPL CALCULATED.3IONS-SCNC: 9 MMOL/L (ref 3–16)
BUN BLDV-MCNC: 5 MG/DL (ref 7–20)
CALCIUM SERPL-MCNC: 9.3 MG/DL (ref 8.3–10.6)
CHLORIDE BLD-SCNC: 102 MMOL/L (ref 99–110)
CO2: 26 MMOL/L (ref 21–32)
CREAT SERPL-MCNC: <0.5 MG/DL (ref 0.6–1.2)
GFR SERPL CREATININE-BSD FRML MDRD: >60 ML/MIN/{1.73_M2}
GLUCOSE BLD-MCNC: 88 MG/DL (ref 70–99)
PHOSPHORUS: 4.1 MG/DL (ref 2.5–4.9)
POTASSIUM SERPL-SCNC: 4.9 MMOL/L (ref 3.5–5.1)
SODIUM BLD-SCNC: 137 MMOL/L (ref 136–145)

## 2022-11-22 PROCEDURE — 93298 REM INTERROG DEV EVAL SCRMS: CPT | Performed by: INTERNAL MEDICINE

## 2022-11-22 PROCEDURE — 2580000003 HC RX 258: Performed by: INTERNAL MEDICINE

## 2022-11-22 PROCEDURE — 6370000000 HC RX 637 (ALT 250 FOR IP): Performed by: INTERNAL MEDICINE

## 2022-11-22 PROCEDURE — 6360000002 HC RX W HCPCS: Performed by: INTERNAL MEDICINE

## 2022-11-22 PROCEDURE — 36415 COLL VENOUS BLD VENIPUNCTURE: CPT

## 2022-11-22 PROCEDURE — 80069 RENAL FUNCTION PANEL: CPT

## 2022-11-22 PROCEDURE — G2066 INTER DEVC REMOTE 30D: HCPCS | Performed by: INTERNAL MEDICINE

## 2022-11-22 RX ADMIN — TRIAMCINOLONE ACETONIDE: 1 CREAM TOPICAL at 08:49

## 2022-11-22 RX ADMIN — MYCOPHENOLATE MOFETIL 1000 MG: 250 CAPSULE ORAL at 08:47

## 2022-11-22 RX ADMIN — Medication 972 MG: at 08:47

## 2022-11-22 RX ADMIN — BUSPIRONE HYDROCHLORIDE 10 MG: 5 TABLET ORAL at 14:19

## 2022-11-22 RX ADMIN — POTASSIUM & SODIUM PHOSPHATES POWDER PACK 280-160-250 MG 250 MG: 280-160-250 PACK at 08:48

## 2022-11-22 RX ADMIN — Medication 10 ML: at 08:49

## 2022-11-22 RX ADMIN — OXYCODONE 5 MG: 5 TABLET ORAL at 14:19

## 2022-11-22 RX ADMIN — OXYCODONE 5 MG: 5 TABLET ORAL at 08:48

## 2022-11-22 RX ADMIN — FAMOTIDINE 20 MG: 20 TABLET, FILM COATED ORAL at 08:47

## 2022-11-22 RX ADMIN — ACETAMINOPHEN 650 MG: 325 TABLET ORAL at 08:47

## 2022-11-22 RX ADMIN — BALSALAZIDE DISODIUM 1500 MG: 750 CAPSULE ORAL at 08:47

## 2022-11-22 RX ADMIN — DIPHENOXYLATE HYDROCHLORIDE AND ATROPINE SULFATE 1 TABLET: 2.5; .025 TABLET ORAL at 14:19

## 2022-11-22 RX ADMIN — Medication 400 MG: at 10:50

## 2022-11-22 RX ADMIN — BUSPIRONE HYDROCHLORIDE 10 MG: 5 TABLET ORAL at 08:47

## 2022-11-22 RX ADMIN — RIVAROXABAN 20 MG: 20 TABLET, FILM COATED ORAL at 08:48

## 2022-11-22 RX ADMIN — PREDNISONE 5 MG: 10 TABLET ORAL at 08:47

## 2022-11-22 RX ADMIN — METOPROLOL SUCCINATE 50 MG: 50 TABLET, EXTENDED RELEASE ORAL at 08:47

## 2022-11-22 RX ADMIN — LACOSAMIDE 200 MG: 100 TABLET, FILM COATED ORAL at 08:47

## 2022-11-22 ASSESSMENT — PAIN SCALES - GENERAL
PAINLEVEL_OUTOF10: 8
PAINLEVEL_OUTOF10: 8

## 2022-11-22 ASSESSMENT — PAIN DESCRIPTION - LOCATION
LOCATION: GENERALIZED
LOCATION: GENERALIZED

## 2022-11-22 NOTE — CARE COORDINATION
Discharge Plan:   Patient discharged to:  [facility] [place] of [address]  [address], [address]  Phone: [phone]  Fax: [phone]     SW/DC Planner faxed, [address]  Narcotic Prescriptions faxed were:- Oxycodone PRN  RN: Page Calderón will call report       Medical Transport with: [facility]  ([phone])    time:  0  Family advised of discharge?: yes, Niece and son- Donnal Ano?:   [address]  [place] [id number]    All discharge needs met per case management.

## 2022-11-22 NOTE — CARE COORDINATION
1940 Kylie Flores authorization received via [facility] Access Portal    Plan Auth ID:  [id number]  Marisol Dayo ID:  [id number]  Service:  SNF  Approval Dates:  11/21/2022-11/23/2022  Next Review Date:  11/23/2022    JERAMY Sanchez updated on approval. Will continue to follow as needed.      Electronically signed by Moe Loredo RN on 11/22/22 at 9:26 AM EST

## 2022-11-22 NOTE — PROGRESS NOTES
ILR for CVA. New AF recorded in 9/2020. Hx: PAT/AF w RVR (Xarelto, Toprol XL). Remote transmission received for patients ILR. Since 10/7/22:  2 tachy events w available ECGs illustrating what appears to be SVT/ PAT and some potential AF.  12 AF episodes, 0.2% AT/ AF burden, w available ECGs illustrating what appears to be AF. EP physician will review. See interrogation under the cardiology tab in the [address] [place] [address] [address] field for more details. Will continue to monitor remotely.     (End of 31-day monitoring period 11/22/22)

## 2022-11-22 NOTE — CARE COORDINATION
Discharge Planning  Approval received for  [facility] SNF placement. Transport was scheduled with pickup time of 1430 via [address] Patient, patient's son and patient's niece - Chase Salazar  were informed and  in agreement. The admission liaison for [facility] were updated, stated are ready for the patient. Patient will needs a HENS completed .

## 2022-11-22 NOTE — PROGRESS NOTES
Progress Note - Dr. Plummer Waverly - Internal Medicine  PCP: Saúl Fenton MD [address] / [facility] [address] Day: 7  Code Status: Full Code  Current Diet: ADULT DIET; Regular  ADULT ORAL NUTRITION SUPPLEMENT; Lunch; Standard High Calorie/High Protein Oral Supplement  ADULT ORAL NUTRITION SUPPLEMENT; Breakfast, Dinner; Wound Healing Oral Supplement        CC: follow up on medical issues    Subjective:   Crissy Ding is a 78 y.o. female. Pt seen and examined  Chart reviewed since last visit, labs and imaging below      Doing ok  Protonix stopped - h2  blocker started   Na - 137    Pt/ot rec SNF  Working on placement    No other issues      Review of Systems: (1 system for EPF, 2-9 for detailed, 10+ for comprehensive)  Constitutional: Negative for chills and fever. HENT: Negative for dental problem, nosebleeds and rhinorrhea. Eyes: Negative for photophobia and visual disturbance. Respiratory: Negative for cough, chest tightness and shortness of breath. Cardiovascular: Negative for chest pain and leg swelling. Gastrointestinal: Negative for diarrhea, nausea and vomiting. Endocrine: Negative for polydipsia and polyphagia. Genitourinary: Negative for frequency, hematuria and urgency. Musculoskeletal: Negative for back pain and myalgias. Skin: Negative for rash. Allergic/Immunologic: Negative for food allergies. Neurological: Negative for dizziness, seizures, syncope and facial asymmetry. Hematological: Negative for adenopathy. Psychiatric/Behavioral: Negative for dysphoric mood. The patient is not nervous/anxious. I have reviewed the patient's medical and social history in detail and updated the computerized patient record.   To recap: She  has a past medical history of Anxiety, Arthritis, Atrial fibrillation/flutter, Blood transfusion reaction, Crohn's disease ([facility] [address] Depression, GERD (gastroesophageal reflux disease), Hiatal hernia, Hx of blood clots, Hypertension, MRSA (methicillin resistant staph aureus) culture positive, MRSA (methicillin resistant staph aureus) culture positive, Neuropathy, Pneumonia, Sinus headache, SOB (shortness of breath), and VRE (vancomycin resistant enterococcus) culture positive. . She  has a past surgical history that includes Hysterectomy; knee surgery (Bilateral); Hand Carpectomy (Bilateral); hernia repair; Abdomen surgery; bladder suspension; fracture surgery; Heel spur surgery; Tonsillectomy; Colonoscopy; Upper gastrointestinal endoscopy (6/14/13); Foot surgery (Left, 6/25/14); Foot surgery (6/3/15); Colonoscopy (9/14/15); Foot surgery (Left, 08/05/2002); joint replacement; Sigmoidoscopy (01/15/2018); Abdominal adhesion surgery (06/08/2018); Colonoscopy (08/07/2018); colostomy (N/A, 10/8/2018); pr secd clos surg wnd exten/complic (N/A, [id number]/41/4580); Leg Surgery (Right); Colonoscopy (06/07/2019); Colonoscopy (N/A, 6/7/2019); Colonoscopy (N/A, 6/7/2019); Small intestine surgery (N/A, 7/17/2019); and proctosigmoidoscopy (N/A, 7/17/2019). . She  reports that she has been smoking cigarettes and e-cigarettes. She has a 10.00 pack-year smoking history. She has never used smokeless tobacco. She reports current alcohol use of about 2.0 standard drinks per week. She reports that she does not use drugs. .        Active Hospital Problems    Diagnosis Date Noted    Acute respiratory failure with hypoxemia ([facility] [J96.01] 11/16/2022     Priority: Medium    Hypocalcemia [E83.51] 11/15/2022     Priority: Medium    FTT (failure to thrive) in adult [R62.7] 11/15/2022     Priority: Medium    Altered mental status [R41.82]     Multiple wounds [T07. XXXA]     Hypomagnesemia [E83.42] 11/27/2018    Crohn's colitis ([facility] [K50.10] 01/06/2018    Anemia [D64.9] 01/05/2018       Current Facility-Administered Medications: magnesium sulfate 1000 mg in dextrose 5% 100 mL IVPB, 2,000 mg, IntraVENous, Once  potassium & sodium phosphates (PHOS-NAK) 280-160-250 MG packet 250 mg, 1 packet, Oral, TID  diphenoxylate-atropine (LOMOTIL) 2.5-0.025 MG per tablet 1 tablet, 1 tablet, Oral, 4x Daily PRN  oxyCODONE (ROXICODONE) immediate release tablet 5 mg, 5 mg, Oral, Q4H PRN  famotidine (PEPCID) tablet 20 mg, 20 mg, Oral, BID  magnesium oxide (MAG-OX) tablet 400 mg, 400 mg, Oral, TID  magnesium sulfate 2000 mg in 50 mL IVPB premix, 2,000 mg, IntraVENous, PRN  sodium phosphate 10 mmol in sodium chloride 0.9 % 250 mL IVPB, 10 mmol, IntraVENous, PRN **OR** sodium phosphate 15 mmol in sodium chloride 0.9 % 250 mL IVPB, 15 mmol, IntraVENous, PRN **OR** sodium phosphate 20 mmol in sodium chloride 0.9 % 500 mL IVPB, 20 mmol, IntraVENous, PRN  lacosamide (VIMPAT) tablet 200 mg, 200 mg, Oral, BID  triamcinolone (KENALOG) 0.1 % cream, , Topical, BID  busPIRone (BUSPAR) tablet 10 mg, 10 mg, Oral, TID  balsalazide (COLAZAL) capsule 1,500 mg, 1,500 mg, Oral, BID  ferrous gluconate (FERGON) tablet 972 mg, 972 mg, Oral, Daily with breakfast  hydrOXYzine HCl (ATARAX) tablet 25 mg, 25 mg, Oral, TID PRN  atorvastatin (LIPITOR) tablet 80 mg, 80 mg, Oral, Nightly  rivaroxaban (XARELTO) tablet 20 mg, 20 mg, Oral, Daily with breakfast  metoprolol succinate (TOPROL XL) extended release tablet 50 mg, 50 mg, Oral, Daily  mycophenolate (CELLCEPT) capsule 1,000 mg, 1,000 mg, Oral, BID  baclofen (LIORESAL) tablet 10 mg, 10 mg, Oral, TID PRN  predniSONE (DELTASONE) tablet 5 mg, 5 mg, Oral, Daily  sodium chloride flush 0.9 % injection 5-40 mL, 5-40 mL, IntraVENous, 2 times per day  sodium chloride flush 0.9 % injection 5-40 mL, 5-40 mL, IntraVENous, PRN  0.9 % sodium chloride infusion, 25 mL, IntraVENous, PRN  potassium chloride (KLOR-CON M) extended release tablet 40 mEq, 40 mEq, Oral, PRN **OR** potassium bicarb-citric acid (EFFER-K) effervescent tablet 40 mEq, 40 mEq, Oral, PRN **OR** potassium chloride 10 mEq/100 mL IVPB (Peripheral Line), 10 mEq, IntraVENous, PRN  ondansetron (ZOFRAN-ODT) disintegrating tablet 4 mg, 4 mg, Oral, Q8H PRN **OR** ondansetron (ZOFRAN) injection 4 mg, 4 mg, IntraVENous, Q6H PRN  magnesium hydroxide (MILK OF MAGNESIA) 400 MG/5ML suspension 30 mL, 30 mL, Oral, Daily PRN  acetaminophen (TYLENOL) tablet 650 mg, 650 mg, Oral, Q6H PRN **OR** acetaminophen (TYLENOL) suppository 650 mg, 650 mg, Rectal, Q6H PRN  hydrALAZINE (APRESOLINE) injection 10 mg, 10 mg, IntraVENous, Q6H PRN  0.9 % sodium chloride bolus, 500 mL, IntraVENous, PRN         Objective:  /80   Pulse 54   Temp 98.5 °F (36.9 °C) (Oral)   Resp 16   Ht 5' 7\" (1.702 m)   Wt 196 lb 8 oz (89.1 kg)   SpO2 94%   BMI 30.78 kg/m²      Patient Vitals for the past 24 hrs:   BP Temp Temp src Pulse Resp SpO2 Weight   11/22/22 0739 133/80 98.5 °F (36.9 °C) Oral 54 16 94 % --   11/22/22 0615 -- -- -- -- -- -- 196 lb 8 oz (89.1 kg)   11/22/22 0440 119/83 98 °F (36.7 °C) Axillary 76 16 95 % --   11/22/22 0105 139/80 97.8 °F (36.6 °C) Axillary 74 16 95 % --   11/21/22 2112 137/82 97.8 °F (36.6 °C) Axillary 77 16 94 % --   11/21/22 1841 -- -- -- 70 -- -- --   11/21/22 1559 131/80 98.1 °F (36.7 °C) Axillary 75 16 97 % --   11/21/22 1410 -- -- -- 74 -- -- --   11/21/22 1229 116/78 98 °F (36.7 °C) Axillary 72 16 96 % --   11/21/22 1152 -- -- -- -- -- 95 % --   11/21/22 1025 -- -- -- 57 -- -- --   11/21/22 0845 121/81 98.2 °F (36.8 °C) Oral 99 18 95 % --       Patient Vitals for the past 96 hrs (Last 3 readings):   Weight   11/22/22 0615 196 lb 8 oz (89.1 kg)   11/21/22 0219 196 lb 3.4 oz (89 kg)   11/20/22 0356 200 lb 6.4 oz (90.9 kg)             Intake/Output Summary (Last 24 hours) at 11/22/2022 0818  Last data filed at 11/22/2022 0105  Gross per 24 hour   Intake --   Output 2150 ml   Net -2150 ml           Physical Exam: (2-7 system for EPF/Detailed, ?8 for Comprehensive)  /80   Pulse 54   Temp 98.5 °F (36.9 °C) (Oral)   Resp 16   Ht 5' 7\" (1.702 m)   Wt 196 lb 8 oz (89.1 kg)   SpO2 94%   BMI 30.78 kg/m²   Constitutional: vitals as above: alert, appears stated age and cooperative    Psychiatric: normal insight and judgment, oriented to person, place, time, and general circumstances    Head: Normocephalic, without obvious abnormality, atraumatic    Eyes:lids and lashes normal, conjunctivae and sclerae normal and pupils equal, round, reactive to light and accomodation    EMNT: external ears normal, nares midline    Neck: no carotid bruit, supple, symmetrical, trachea midline and thyroid not enlarged, symmetric, no tenderness/mass/nodules     Respiratory: clear to auscultation and percussion bilaterally with normal respiratory effort    Cardiovascular: normal rate, regular rhythm, normal S1 and S2 and no murmurs    Gastrointestinal: soft, non-tender, non-distended, normal bowel sounds, no masses or organomegaly    Extremities: no clubbing, no edema    Skin:No rashes or nodules noted. Neurologic:negative         Labs:  Lab Results   Component Value Date    WBC 6.2 11/21/2022    HGB 9.6 (L) 11/21/2022    HCT 28.4 (L) 11/21/2022     (H) 11/21/2022    CHOL 111 07/24/2020    TRIG 66 07/24/2020    HDL 43 07/24/2020    ALT 6 (L) 11/16/2022    AST 15 11/16/2022     11/22/2022    K 4.9 11/22/2022     11/22/2022    CREATININE <0.5 (L) 11/22/2022    BUN 5 (L) 11/22/2022    CO2 26 11/22/2022    TSH 3.57 07/28/2020    INR 1.37 (H) 11/15/2022    LABA1C 5.0 07/24/2020    LABMICR YES 11/16/2022     Lab Results   Component Value Date    CKTOTAL 79 07/13/2022    TROPONINI <0.01 11/15/2022       Recent Imaging Results are Reviewed:  XR ELBOW LEFT (2 VIEWS)    Result Date: 11/15/2022  EXAMINATION: 2 XRAY VIEWS OF THE LEFT SHOULDER; 3 XRAY VIEWS OF THE LEFT ELBOW 11/15/2022 6:40 pm COMPARISON: None. HISTORY: ORDERING SYSTEM PROVIDED HISTORY: pain TECHNOLOGIST PROVIDED HISTORY: Reason for exam:->pain Reason for Exam: pain FINDINGS: Left shoulder: Status post shoulder arthroplasty. Anatomic alignment.   No hardware complications. No acute fracture. Visualized lung unremarkable. Left elbow: Anatomic alignment. No fracture. Joint spaces appear normal.     No acute bony or joint abnormality Status post left shoulder arthroplasty, no hardware complications     CT HEAD WO CONTRAST    Result Date: 11/15/2022  EXAMINATION: CT OF THE HEAD WITHOUT CONTRAST  11/15/2022 6:59 pm TECHNIQUE: CT of the head was performed without the administration of intravenous contrast. Automated exposure control, iterative reconstruction, and/or weight based adjustment of the mA/kV was utilized to reduce the radiation dose to as low as reasonably achievable. COMPARISON: None. HISTORY: ORDERING SYSTEM PROVIDED HISTORY: confusion FINDINGS: BRAIN/VENTRICLES: There is no acute intracranial hemorrhage, mass effect or midline shift. No abnormal extra-axial fluid collection. The gray-white differentiation is maintained without evidence of an acute infarct. There is prominence of the ventricles and sulci due to global parenchymal volume loss. There are nonspecific areas of hypoattenuation within the periventricular and subcortical white matter, which likely represent chronic microvascular ischemic change. ORBITS: The visualized portion of the orbits demonstrate no acute abnormality. SINUSES: The visualized paranasal sinuses and mastoid air cells demonstrate no acute abnormality. SOFT TISSUES/SKULL: No acute abnormality of the visualized skull or soft tissues. No acute intracranial abnormality. XR SHOULDER LEFT (MIN 2 VIEWS)    Result Date: 11/15/2022  EXAMINATION: 2 XRAY VIEWS OF THE LEFT SHOULDER; 3 XRAY VIEWS OF THE LEFT ELBOW 11/15/2022 6:40 pm COMPARISON: None. HISTORY: ORDERING SYSTEM PROVIDED HISTORY: pain TECHNOLOGIST PROVIDED HISTORY: Reason for exam:->pain Reason for Exam: pain FINDINGS: Left shoulder: Status post shoulder arthroplasty. Anatomic alignment. No hardware complications. No acute fracture.   Visualized lung unremarkable. Left elbow: Anatomic alignment. No fracture. Joint spaces appear normal.     No acute bony or joint abnormality Status post left shoulder arthroplasty, no hardware complications     XR CHEST PORTABLE    Result Date: 11/15/2022  EXAMINATION: ONE XRAY VIEW OF THE CHEST 11/15/2022 6:40 pm COMPARISON: 07/14/2022 HISTORY: ORDERING SYSTEM PROVIDED HISTORY: confusion TECHNOLOGIST PROVIDED HISTORY: Reason for exam:->confusion Reason for Exam: confusion FINDINGS: Cardiomediastinal silhouette stable. Bibasilar pulmonary opacities. No pulmonary vascular congestion or edema. No pneumothorax. Bibasilar pulmonary opacities could represent atelectasis or infection       Lab Results   Component Value Date/Time    GLUCOSE 88 11/22/2022 07:18 AM     Lab Results   Component Value Date/Time    POCGLU 118 07/23/2020 11:32 AM     /80   Pulse 54   Temp 98.5 °F (36.9 °C) (Oral)   Resp 16   Ht 5' 7\" (1.702 m)   Wt 196 lb 8 oz (89.1 kg)   SpO2 94%   BMI 30.78 kg/m²     Assessment and Plan:  Principal Problem:    Hypocalcemia -Established problem. Stable. Ca 9.3  Plan: cont replacement. renal on board; defer tx to them  Active Problems:    FTT (failure to thrive) in adult -Established problem. Stable. Plan: pt/ot asked to see. Placement recommended. Working on this now    Anemia -Established problem. Stable. Plan: No indication for transfusion. Cont to monitor h/h to assess progression of anemia. Recommend ferrous sulfate or MVI as outpatient. Crohn's colitis ([facility] [address]  Plan: Continue present orders/plan. Hypomagnesemia -Established problem.  1.5 at last check  Plan: Cont to order Mag as needed    Multiple wounds    Altered mental status    Disp -await placement; pt/ot rec SNF - discharge when accepted    (Please note that portions of this note were completed with a voice recognition program.  Efforts were made to edit the dictations but occasionally words are mis-transcribed.)        Torrey Hall Kerry Sheldon MD  11/22/2022  '

## 2022-11-22 NOTE — PROGRESS NOTES
Report called to [facility].  Phone number [phone]

## 2022-11-22 NOTE — PROGRESS NOTES
Data- discharge order received, pt, son (POA) and niece (POA) verbalized agreement to discharge, [address][phone], [address] reviewed and signed by physician. Action- AVS prepared, MARIBEL completed/ reported faxed by case management/. Discharge instruction summary: Diet- regular, Activity- up x2 with walker, immunizations reviewed, medications prescriptions to be filled at receiving facility except for the controlled prescriptions to be sent: via paper script, Transfer code status: Full Code, LDAs to remain with discharge: N/A.   DME used: walker. Response- Bedside RN to call report to receiving facility. Pt belongings gathered, peripheral IV and cardiac monitoring removed. Disposition to Discharged via cart/stretcher and via ambulance to skilled nursing by EMS transportation, no complications reported. 1. WEIGHT: Admit Weight: 194 lb (88 kg) (11/15/22 1824)        Today  Weight: 196 lb 8 oz (89.1 kg) (11/22/22 0615)       2.  O2 SAT.: SpO2: 94 % (11/22/22 1418)

## 2022-11-22 NOTE — PROGRESS NOTES
MD April Pacheco MD Gilles Mina, MD                  Office: [phone]                      Fax: [phone]             [address] [place] [address]                   NEPHROLOGY INPATIENT PROGRESS NOTE:     PATIENT NAME: Lucy Keller  : 1955  MRN: [id number]      RECOMMENDATIONS:   Electrolytes better today. Continue po Phosp replacement  Continue po Mg. Stopped po K bid  Stopped NSS    - reviewed urine electrolytes with creatinine to calculate fractional excretion of magnesium, calcium, potassium= less likely renal wasting, likely GI losses     - Increased Mag 400 BID to 400 TID     -Avoid PPI, alcohol. On Famotidine     D/C plan from renal stand point:   Okay for discharge from renal perspective. IMPRESSION:       Admitted on:  11/15/2022  6:23 PM   For:  Hypocalcemia [E83.51]  Generalized weakness [R53.1]      Multiple electrolyte abnormalities-better  Hypocalcemia, corrected for hypoalbuminemia, moderate range  Hypokalemia-better  Severe hypomagnesemia, 0.3    History of alcohol abuse and PPI use. Better  Hypocalcemia-better  Hypophosphatemia-better    Glucose normal  Hypoalbuminemia  LFTs stable    Anemia likely of chronic disease, mild    History of Crohn's disease,    UA showing likely concern for urinary tract infection    Renal  function is stable without AARTI on CKD    H/O chronic hyponatremia. Underlying history of alcohol abuse- better no  CXR -   1. Worsening bibasilar airspace opacities which could represent atelectasis or pneumonia in the appropriate clinical setting. 2.  Mild cardiomegaly       Other major problems: Management per primary and other consulting teams.          Hospital Problems             Last Modified POA    * (Principal) Hypocalcemia 11/15/2022 Yes    FTT (failure to thrive) in adult 11/15/2022 Yes    Acute respiratory failure with hypoxemia ([address] 2022 Yes    Anemia 11/15/2022 Yes    Crohn's colitis ([address] 11/15/2022 Yes Hypomagnesemia 11/16/2022 Yes    Multiple wounds 11/15/2022 Yes    Altered mental status 11/15/2022 Yes      Diarrhea-per Medicine. Karen Espinoza MD,  [facility] [address] [place]: [phone] or [employer]  Fax: [phone]        =======================================================================================   =======================================================================================  Subjective / interval history:   No complaints  Diarrhea better    Past medical, Surgical, Social, Family medical history reviewed by me. MEDICATIONS: reviewed by me. Medications Prior to Admission:  No current facility-administered medications on file prior to encounter. Current Outpatient Medications on File Prior to Encounter   Medication Sig Dispense Refill    lacosamide (VIMPAT) 100 MG TABS tablet Take 200 mg by mouth 2 times daily. amLODIPine (NORVASC) 10 MG tablet Take 10 mg by mouth daily      calcium-vitamin D (OSCAL-500) 500-200 MG-UNIT per tablet Take 1 tablet by mouth 2 times daily      dupilumab (DUPIXENT) 300 MG/2ML SOPN injection Inject 300 mg into the skin      nystatin (MYCOSTATIN) 375946 UNIT/GM powder Apply topically 2 times daily Apply topically 4 times daily. triamcinolone (KENALOG) 0.1 % ointment Apply topically 2 times daily Apply topically 2 times daily.       magnesium oxide (MAG-OX) 400 (240 Mg) MG tablet Take 1 tablet by mouth 2 times daily 30 tablet 1    mycophenolate (CELLCEPT) 500 MG tablet Take 1,000 mg by mouth 2 times daily      baclofen (LIORESAL) 10 MG tablet Take 10 mg by mouth 3 times daily as needed      predniSONE (DELTASONE) 5 MG tablet Take 5 mg by mouth daily      metoprolol succinate (TOPROL XL) 50 MG extended release tablet Take 1 tablet by mouth daily 30 tablet 5    rivaroxaban (XARELTO) 20 MG TABS tablet Take 1 tablet by mouth daily (with breakfast) 90 tablet 3    atorvastatin (LIPITOR) 80 MG tablet Take 1 tablet by mouth nightly 30 tablet 3    potassium chloride (KLOR-CON M) 20 MEQ extended release tablet Take 1 tablet by mouth 2 times daily 60 tablet 3    ferrous gluconate (FERGON) 324 (38 Fe) MG tablet Take 3 tablets by mouth daily (with breakfast)       hydrOXYzine HCl (ATARAX) 25 MG tablet Take 25 mg by mouth 3 times daily as needed for Itching      ondansetron (ZOFRAN ODT) 4 MG disintegrating tablet Take 1 tablet by mouth every 8 hours as needed for Nausea 20 tablet 0    busPIRone (BUSPAR) 10 MG tablet Take 10 mg by mouth 3 times daily as needed      balsalazide (COLAZAL) 750 MG capsule Take 3,000 mg by mouth daily Take 4 capsules (total 3000 mg) daily each morning.            Current Facility-Administered Medications:     magnesium sulfate 1000 mg in dextrose 5% 100 mL IVPB, 2,000 mg, IntraVENous, Once, Dhruv Alcantar MD    potassium & sodium phosphates (PHOS-NAK) 280-160-250 MG packet 250 mg, 1 packet, Oral, TID, Martina Negron MD, 250 mg at 11/22/22 0848    diphenoxylate-atropine (LOMOTIL) 2.5-0.025 MG per tablet 1 tablet, 1 tablet, Oral, 4x Daily PRN, Dhruv Alcantar MD, 1 tablet at 11/21/22 2253    oxyCODONE (ROXICODONE) immediate release tablet 5 mg, 5 mg, Oral, Q4H PRN, Dhruv Alcantar MD, 5 mg at 11/22/22 0848    famotidine (PEPCID) tablet 20 mg, 20 mg, Oral, BID, Dhruv Alcantar MD, 20 mg at 11/22/22 0847    magnesium oxide (MAG-OX) tablet 400 mg, 400 mg, Oral, TID, Jarred Meeks MD, 400 mg at 11/22/22 1050    magnesium sulfate 2000 mg in 50 mL IVPB premix, 2,000 mg, IntraVENous, PRN, Jarred Meeks MD, Stopped at 11/21/22 1522    sodium phosphate 10 mmol in sodium chloride 0.9 % 250 mL IVPB, 10 mmol, IntraVENous, PRN **OR** sodium phosphate 15 mmol in sodium chloride 0.9 % 250 mL IVPB, 15 mmol, IntraVENous, PRN, Stopped at 11/17/22 1600 **OR** sodium phosphate 20 mmol in sodium chloride 0.9 % 500 mL IVPB, 20 mmol, IntraVENous, PRN, Jarred Meeks MD    lacosamide (VIMPAT) tablet 200 mg, 200 mg, Oral, BID, Deion Cortez MD, 200 mg at 11/22/22 0847    triamcinolone (KENALOG) 0.1 % cream, , Topical, BID, Deion Cortez MD, Given at 11/22/22 0849    busPIRone (BUSPAR) tablet 10 mg, 10 mg, Oral, TID, Deion Cortez MD, 10 mg at 11/22/22 0847    balsalazide (COLAZAL) capsule 1,500 mg, 1,500 mg, Oral, BID, Deion Cortez MD, 1,500 mg at 11/22/22 0847    ferrous gluconate (FERGON) tablet 972 mg, 972 mg, Oral, Daily with breakfast, Deion Cortez MD, 972 mg at 11/22/22 0847    hydrOXYzine HCl (ATARAX) tablet 25 mg, 25 mg, Oral, TID PRN, Deion Cortez MD    atorvastatin (LIPITOR) tablet 80 mg, 80 mg, Oral, Nightly, Deion Cortez MD, 80 mg at 11/21/22 2137    rivaroxaban (XARELTO) tablet 20 mg, 20 mg, Oral, Daily with breakfast, Deion Cortez MD, 20 mg at 11/22/22 0848    metoprolol succinate (TOPROL XL) extended release tablet 50 mg, 50 mg, Oral, Daily, Deion Cortez MD, 50 mg at 11/22/22 0847    mycophenolate (CELLCEPT) capsule 1,000 mg, 1,000 mg, Oral, BID, Deion Cortez MD, 1,000 mg at 11/22/22 0847    baclofen (LIORESAL) tablet 10 mg, 10 mg, Oral, TID PRN, Deion Cortez MD, 10 mg at 11/20/22 0547    predniSONE (DELTASONE) tablet 5 mg, 5 mg, Oral, Daily, Deion Cortez MD, 5 mg at 11/22/22 0847    sodium chloride flush 0.9 % injection 5-40 mL, 5-40 mL, IntraVENous, 2 times per day, Deion Cortez MD, 10 mL at 11/22/22 0849    sodium chloride flush 0.9 % injection 5-40 mL, 5-40 mL, IntraVENous, PRN, Deion Cortez MD    0.9 % sodium chloride infusion, 25 mL, IntraVENous, PRN, Deion Cortez MD    potassium chloride (KLOR-CON M) extended release tablet 40 mEq, 40 mEq, Oral, PRN **OR** potassium bicarb-citric acid (EFFER-K) effervescent tablet 40 mEq, 40 mEq, Oral, PRN, 40 mEq at 11/16/22 1001 **OR** potassium chloride 10 mEq/100 mL IVPB (Peripheral Line), 10 mEq, IntraVENous, PRN, Deion Cortez MD    ondansetron (ZOFRAN-ODT) disintegrating tablet 4 mg, 4 mg, Oral, Q8H PRN **OR** ondansetron (ZOFRAN) injection 4 mg, 4 mg, IntraVENous, Q6H PRN, Shane Mireles MD    magnesium hydroxide (MILK OF MAGNESIA) 400 MG/5ML suspension 30 mL, 30 mL, Oral, Daily PRN, Shane Mireles MD    acetaminophen (TYLENOL) tablet 650 mg, 650 mg, Oral, Q6H PRN, 650 mg at 11/22/22 0847 **OR** acetaminophen (TYLENOL) suppository 650 mg, 650 mg, Rectal, Q6H PRN, Shane Mireles MD    hydrALAZINE (APRESOLINE) injection 10 mg, 10 mg, IntraVENous, Q6H PRN, Shane Mireles MD    0.9 % sodium chloride bolus, 500 mL, IntraVENous, PRN, Shane Mireles MD          REVIEW OF SYSTEMS:  As mentioned in chief complaint and HPI , Subjective     =======================================================================================     PHYSICAL EXAM:  Recent vital signs and recent I/Os reviewed by me. Wt Readings from Last 3 Encounters:   11/22/22 196 lb 8 oz (89.1 kg)   08/08/21 192 lb 12.8 oz (87.5 kg)   06/04/21 198 lb (89.8 kg)     BP Readings from Last 3 Encounters:   11/22/22 118/74   07/14/22 (!) 99/53   08/10/21 111/72     Patient Vitals for the past 24 hrs:   BP Temp Temp src Pulse Resp SpO2 Weight   11/22/22 1149 118/74 98.4 °F (36.9 °C) Oral 83 16 93 % --   11/22/22 1050 -- -- -- 67 -- -- --   11/22/22 1006 -- -- -- 68 -- -- --   11/22/22 0847 -- -- -- 57 -- -- --   11/22/22 0739 133/80 98.5 °F (36.9 °C) Oral 54 16 94 % --   11/22/22 0615 -- -- -- -- -- -- 196 lb 8 oz (89.1 kg)   11/22/22 0440 119/83 98 °F (36.7 °C) Axillary 76 16 95 % --   11/22/22 0105 139/80 97.8 °F (36.6 °C) Axillary 74 16 95 % --   11/21/22 2112 137/82 97.8 °F (36.6 °C) Axillary 77 16 94 % --   11/21/22 1841 -- -- -- 70 -- -- --   11/21/22 1559 131/80 98.1 °F (36.7 °C) Axillary 75 16 97 % --   11/21/22 1410 -- -- -- 74 -- -- --       Intake/Output Summary (Last 24 hours) at 11/22/2022 1256  Last data filed at 11/22/2022 0105  Gross per 24 hour   Intake --   Output 1000 ml   Net -1000 ml         Physical Exam  Vitals reviewed. CREATININE <0.5* <0.5* <0.5*     Recent Labs     11/20/22  0622 11/21/22  0648 11/22/22  0718   CALCIUM 8.6 8.9 9.3   MG 1.50* 1.50*  --    PHOS 2.1* 4.0 4.1     No results for input(s): PH, PCO2, PO2 in the last 72 hours.     Invalid input(s): Leafy Sigrid    ABG:  No results found for: PH, PCO2, PO2, HCO3, BE, THGB, TCO2, O2SAT  VBG:    Lab Results   Component Value Date/Time    PHVEN 7.379 11/21/2022 12:34 PM    VKP5XSH 44.2 07/14/2022 12:07 AM    BEVEN -2.9 07/14/2022 12:07 AM    D9GATZBY >100 07/14/2022 12:07 AM       LDH:  No results found for: LDH  Uric Acid:    Lab Results   Component Value Date/Time    LABURIC 4.1 09/26/2018 02:56 PM       PT/INR:    Lab Results   Component Value Date/Time    PROTIME 16.8 11/15/2022 07:21 PM    INR 1.37 11/15/2022 07:21 PM     Warfarin PT/INR:  No components found for: PTPATWAR, PTINRWAR  PTT:    Lab Results   Component Value Date/Time    APTT 29.9 11/15/2022 07:21 PM   [APTT}  Last 3 Troponin:    Lab Results   Component Value Date/Time    TROPONINI <0.01 11/15/2022 07:21 PM    TROPONINI <0.01 07/13/2022 11:10 PM    TROPONINI <0.01 08/03/2021 04:11 PM       U/A:    Lab Results   Component Value Date/Time    COLORU DARK YELLOW 11/16/2022 02:36 AM    PROTEINU 100 11/16/2022 02:36 AM    PHUR 7.5 11/16/2022 02:36 AM    WBCUA 33 11/16/2022 02:36 AM    RBCUA 35 11/16/2022 02:36 AM    BACTERIA 4+ 11/16/2022 02:36 AM    CLARITYU TURBID 11/16/2022 02:36 AM    SPECGRAV 1.015 11/16/2022 02:36 AM    LEUKOCYTESUR LARGE 11/16/2022 02:36 AM    UROBILINOGEN 1.0 11/16/2022 02:36 AM    BILIRUBINUR Negative 11/16/2022 02:36 AM    BLOODU MODERATE 11/16/2022 02:36 AM    GLUCOSEU Negative 11/16/2022 02:36 AM     Microalbumen/Creatinine ratio:  No components found for: RUCREAT  24 Hour Urine for Protein:  No components found for: RAWUPRO, UHRS3, ARYL69PF, UTV3  24 Hour Urine for Creatinine Clearance:  No components found for: CREAT4, UHRS10, UTV10  Urine Toxicology:  No components

## 2022-11-22 NOTE — DISCHARGE INSTR - COC
ABDOMINAL WOUND performed by Deborah Rubi MD at [address] N/A 7/17/2019    FLEXIBLE SIGMOIDOSCOPY performed by Deborah Rubi MD at [address] [place] [address]  01/15/2018    with biopsies    SMALL INTESTINE SURGERY N/A 7/17/2019    COLOSTOMY CLOSURE WITH COLORECTAL ANASTOMOSIS performed by Deborah Rubi MD at [place] [address]  6/14/13    and colonsocopy with antral erosion biopsies       Immunization History:   Immunization History   Administered Date(s) Administered    COVID-19, PFIZER PURPLE top, DILUTE for use, (age 15 y+), 30mcg/0.3mL 05/07/2021, 06/17/2021    PPD Test 01/16/2018    Tdap (Boostrix, Adacel) 07/15/2020       Active Problems:  Patient Active Problem List   Diagnosis Code    Anemia D64.9    Crohn's colitis ([facility] K50.10    MRSA (methicillin resistant Staphylococcus aureus) infection A49.02    DVT (deep venous thrombosis) ([facility]) I82.409    Hypomagnesemia E83.42    Leg swelling M79.89    Venous insufficiency I87.2    Chronic venous hypertension (idiopathic) with inflammation of bilateral lower extremity I87.323    Local infection of skin and subcutaneous tissue L08.9    Open wound of left foot with complication S69.419R    [employer] LINQ 7/29/20 Dr Rehan Silva Z45.09    LV dysfunction I51.9    PAF (paroxysmal atrial fibrillation) ([facility]) I48.0    HTN (hypertension), benign I10    S/P coronary angiogram Z98.890    Abnormal angiogram R93.89    Atherosclerosis of native arteries of the extremities with ulceration ([facility]) I70.25    Nail avulsion of toe, initial encounter S91.209A    Lower abdominal pain R10.30    Late effect of ischemic cerebral stroke I69.30    Cognitive deficit as late effect of cerebrovascular accident (CVA) I69.319    Infection requiring contact isolation precautions B99.9    Hyponatremia E87.1    Suspected COVID-19 virus infection Z20.822    Alcohol abuse F10.10    Cellulitis of both feet L03.115, L03.116    Multiple wounds T07. 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 9) E66.3    History of infection with vancomycin resistant Enterococcus (VRE) Z86.19    History of MRSA infection Z86.14    Sepsis secondary to UTI ([address] A41.9, N39.0    Altered mental status R41.82    Hypocalcemia E83.51    FTT (failure to thrive) in adult R62.7    Acute respiratory failure with hypoxemia ([facility]) J96.01       Isolation/Infection:   Isolation            Contact          Patient Infection Status       Infection Onset Added Last Indicated Last Indicated By Review Planned Expiration Resolved Resolved By    MDRO (multi-drug resistant organism) 21 Varinder Hemphill RN        Added from external infection.     MRSA 21 Culture, MRSA, Screening        Resolved    COVID-19 (Rule Out) 22 COVID-19, Rapid (Ordered)   22 Rule-Out Test Resulted    COVID-19 (Rule Out) 21 COVID-19 (Ordered)   21 Rule-Out Test Resulted    COVID-19 (Rule Out) 21 COVID-19 (Ordered)   21 Rule-Out Test Resulted    C-diff Rule Out 21 Clostridium difficile toxin/antigen (Ordered)   21 Karthik Tan RN    COVID-19 (Rule Out) 20 COVID-19 (Ordered)   20     COVID-19 (Rule Out) 20 COVID-19 (Ordered)   20 Rule-Out Test Resulted    VRE  10/12/18 10/12/18 Niurka Menezes RN   10/25/19 Derrick Callahan RN    10/8/18; abd culture    MRSA  18 Derrick Callahan, RN   10/25/19 Niurka Menezes, ELLE    18; urine            Nurse Assessment:  Last Vital Signs: /80   Pulse 57   Temp 98.5 °F (36.9 °C) (Oral)   Resp 16   Ht 5' 7\" (1.702 m)   Wt 196 lb 8 oz (89.1 kg)   SpO2 94%   BMI 30.78 kg/m²     Last documented pain score (0-10 scale): Pain Level: 8  Last Weight:   Wt Readings from Last 1 Encounters:   11/22/22 196 lb 8 oz (89.1 kg)     Mental Status:  oriented and alert    IV Access:  - None    Nursing Mobility/ADLs:  Walking   Assisted  Transfer  Assisted  Bathing  Assisted  Dressing  Assisted  Toileting  Assisted  Feeding  Independent  Med Admin  Independent  Med Delivery   whole    Wound Care Documentation and Therapy:  Wound 11/18/22 Thigh Anterior;Proximal;Left;Medial;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 (Active)   Wound Etiology Other 11/22/22 0739   Dressing Status Clean;Dry; Intact 11/22/22 0739   Wound Cleansed Wound cleanser 11/22/22 0739   Dressing/Treatment Open to air 11/22/22 0739   Wound Assessment Superficial;Ruptured blister;Pink/red;Epithelialization 11/22/22 0739   Drainage Amount Moderate 11/18/22 2030   Drainage Description Sanguinous 11/18/22 2030   Odor None 11/18/22 2030   Shweta-wound Assessment Hyperpigmented;Fragile; Excoriated 11/18/22 2030   Number of days: 4       Wound 11/18/22 Thigh Proximal;Right;Posterior; Upper; Inner superficial open blister secondary to bullous pemphigoid syndrome (Active)   Wound Etiology Other 11/22/22 0739   Dressing Status Clean;Dry; Intact 11/22/22 0739   Wound Cleansed Wound cleanser 11/22/22 0739   Dressing/Treatment Open to air 11/22/22 0739   Wound Assessment Superficial;Ruptured blister;Pink/red;Epithelialization 11/22/22 0739   Drainage Amount Moderate 11/22/22 0739   Drainage Description Sanguinous 11/22/22 0739   Odor None 11/22/22 0739   Shweta-wound Assessment Hyperpigmented;Fragile; Excoriated 11/22/22 0739   Number of days: 4        Elimination:  Continence:    Bowel: No  Bladder: No  Urinary Catheter: None   Colostomy/Ileostomy/Ileal Conduit: No       Date of Last BM: 11/20/2022    Intake/Output Summary (Last 24 hours) at 11/22/2022 1011  Last data filed at 11/22/2022 0105  Gross per 24 hour   Intake --   Output 2150 ml   Net -2150 ml     I/O last 3 completed shifts: In: 847.2 [P.O.:480; IV Piggyback:367.2]  Out: 3450 [Urine:3450]    Safety Concerns: At Risk for Falls    Impairments/Disabilities:      Speech and Hearing    Nutrition Therapy:  Current Nutrition Therapy:   - Oral Diet:  General    Routes of Feeding: Oral  Liquids: Thin Liquids  Daily Fluid Restriction: no  Last Modified Barium Swallow with Video (Video Swallowing Test): not done    Treatments at the Time of Hospital Discharge:   Respiratory Treatments: N/A  Oxygen Therapy:  is not on home oxygen therapy.   Ventilator:    - No ventilator support    Rehab Therapies: Physical Therapy and Occupational Therapy  Weight Bearing Status/Restrictions: No weight bearing restrictions  Other Medical Equipment (for information only, NOT a DME order):  wheelchair, walker, bath bench, bedside commode, and hospital bed  Other Treatments: N/A    Patient's personal belongings (please select all that are sent with patient):  None    RN SIGNATURE:  Electronically signed by Lyndon Dawkins RN on 11/22/22 at 10:13 AM EST    CASE MANAGEMENT/SOCIAL WORK SECTION    Inpatient Status Date: 11/15/22    Readmission Risk Assessment Score:  Readmission Risk              Risk of Unplanned Readmission:  23           Discharging to Facility/ Agency   Name:   [facility]  [address]  Phone: [phone]  Fax: [phone]   Address:  Phone:  Fax:    Dialysis Facility (if applicable)   Name:  Address:  Dialysis Schedule:  Phone:  Fax:    / signature: Electronically signed by Cas Hill RN on 11/22/22 at 10:24 AM EST      PHYSICIAN SECTION    Prognosis: Guarded    Condition at Discharge: Stable    Rehab Potential (if transferring to Rehab): Good    Recommended Labs or Other Treatments After Discharge: ***    Physician Certification: I certify the above information and transfer of Garold Sandifer  is necessary for the continuing treatment of the diagnosis listed and that she requires [place] for greater 30 days.      Update Admission H&P: No change in H&P    PHYSICIAN SIGNATURE:  Electronically signed by Prateek Angeles MD on 11/22/22 at 10:33 AM EST

## 2023-01-18 ENCOUNTER — NURSE ONLY (OUTPATIENT)
Dept: CARDIOLOGY CLINIC | Age: 68
End: 2023-01-18

## 2023-01-18 DIAGNOSIS — Z45.09 ENCOUNTER FOR LOOP RECORDER CHECK: Primary | ICD-10-CM

## 2023-01-18 DIAGNOSIS — I63.9 CRYPTOGENIC STROKE (HCC): ICD-10-CM

## 2023-01-20 NOTE — PROGRESS NOTES
ILR for CVA. New AF recorded in 9/2020. Hx: PAT/AF w RVR (Xarelto, Toprol XL). Remote transmission received for patients ILR. Since 11/23/22:  No arrhythmias/ or events. EP physician will review. See interrogation under the cardiology tab in the [address] [place] [address] [address] field for more details. Will continue to monitor remotely.     (End of 31-day monitoring period 1/18/23)

## 2023-02-20 ENCOUNTER — APPOINTMENT (OUTPATIENT)
Dept: CT IMAGING | Age: 68
DRG: 178 | End: 2023-02-20
Payer: MEDICARE

## 2023-02-20 ENCOUNTER — APPOINTMENT (OUTPATIENT)
Dept: GENERAL RADIOLOGY | Age: 68
DRG: 178 | End: 2023-02-20
Payer: MEDICARE

## 2023-02-20 ENCOUNTER — HOSPITAL ENCOUNTER (INPATIENT)
Age: 68
LOS: 2 days | Discharge: HOME | DRG: 178 | End: 2023-02-22
Attending: EMERGENCY MEDICINE | Admitting: INTERNAL MEDICINE
Payer: MEDICARE

## 2023-02-20 DIAGNOSIS — R41.82 ALTERED MENTAL STATUS, UNSPECIFIED ALTERED MENTAL STATUS TYPE: Primary | ICD-10-CM

## 2023-02-20 DIAGNOSIS — J18.9 ATYPICAL PNEUMONIA: ICD-10-CM
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
## 2023-02-20 LAB
ANION GAP SERPL CALCULATED.3IONS-SCNC: 12 MMOL/L (ref 3–16)
BACTERIA: NORMAL /HPF
BASE EXCESS VENOUS: 1.5 MMOL/L
BASOPHILS ABSOLUTE: 0 K/UL (ref 0–0.2)
BASOPHILS RELATIVE PERCENT: 0.4 %
BILIRUBIN URINE: NEGATIVE
BLOOD, URINE: NEGATIVE
BUN BLDV-MCNC: 6 MG/DL (ref 7–20)
CALCIUM SERPL-MCNC: 9.5 MG/DL (ref 8.3–10.6)
CARBOXYHEMOGLOBIN: 3.3 %
CHLORIDE BLD-SCNC: 99 MMOL/L (ref 99–110)
CLARITY: CLEAR
CO2: 23 MMOL/L (ref 21–32)
COLOR: YELLOW
CREAT SERPL-MCNC: <0.5 MG/DL (ref 0.6–1.2)
EOSINOPHILS ABSOLUTE: 0.5 K/UL (ref 0–0.6)
EOSINOPHILS RELATIVE PERCENT: 5.5 %
EPITHELIAL CELLS, UA: 1 /HPF (ref 0–5)
GFR SERPL CREATININE-BSD FRML MDRD: >60 ML/MIN/{1.73_M2}
GLUCOSE BLD-MCNC: 111 MG/DL (ref 70–99)
GLUCOSE URINE: NEGATIVE MG/DL
HCO3 VENOUS: 26 MMOL/L (ref 23–29)
HCT VFR BLD CALC: 32.1 % (ref 36–48)
HEMOGLOBIN: 10.5 G/DL (ref 12–16)
HYALINE CASTS: 0 /LPF (ref 0–8)
KETONES, URINE: 15 MG/DL
LACTIC ACID: 1.4 MMOL/L (ref 0.4–2)
LEUKOCYTE ESTERASE, URINE: ABNORMAL
LYMPHOCYTES ABSOLUTE: 1.4 K/UL (ref 1–5.1)
LYMPHOCYTES RELATIVE PERCENT: 16.5 %
MCH RBC QN AUTO: 28.5 PG (ref 26–34)
MCHC RBC AUTO-ENTMCNC: 32.7 G/DL (ref 31–36)
MCV RBC AUTO: 87.4 FL (ref 80–100)
METHEMOGLOBIN VENOUS: 0.3 %
MICROSCOPIC EXAMINATION: YES
MONOCYTES ABSOLUTE: 0.5 K/UL (ref 0–1.3)
MONOCYTES RELATIVE PERCENT: 6.4 %
NEUTROPHILS ABSOLUTE: 6.1 K/UL (ref 1.7–7.7)
NEUTROPHILS RELATIVE PERCENT: 71.2 %
NITRITE, URINE: NEGATIVE
O2 SAT, VEN: 74 %
O2 THERAPY: NORMAL
PCO2, VEN: 41.3 MMHG (ref 40–50)
PDW BLD-RTO: 13.9 % (ref 12.4–15.4)
PH UA: 5 (ref 5–8)
PH VENOUS: 7.41 (ref 7.35–7.45)
PLATELET # BLD: 384 K/UL (ref 135–450)
PMV BLD AUTO: 6.8 FL (ref 5–10.5)
PO2, VEN: 39 MMHG
POTASSIUM REFLEX MAGNESIUM: 4.1 MMOL/L (ref 3.5–5.1)
PRO-BNP: 1729 PG/ML (ref 0–124)
PROCALCITONIN: 0.07 NG/ML (ref 0–0.15)
PROCALCITONIN: 0.09 NG/ML (ref 0–0.15)
PROTEIN UA: ABNORMAL MG/DL
RAPID INFLUENZA  B AGN: NEGATIVE
RAPID INFLUENZA A AGN: NEGATIVE
RBC # BLD: 3.67 M/UL (ref 4–5.2)
RBC UA: 2 /HPF (ref 0–4)
SARS-COV-2, NAAT: NOT DETECTED
SODIUM BLD-SCNC: 134 MMOL/L (ref 136–145)
SPECIFIC GRAVITY UA: 1.02 (ref 1–1.03)
TCO2 CALC VENOUS: 28 MMOL/L
TOTAL CK: 463 U/L (ref 26–192)
TOTAL CK: 688 U/L (ref 26–192)
URINE REFLEX TO CULTURE: ABNORMAL
URINE TYPE: ABNORMAL
UROBILINOGEN, URINE: 0.2 E.U./DL
WBC # BLD: 8.5 K/UL (ref 4–11)
WBC UA: 2 /HPF (ref 0–5)

## 2023-02-20 PROCEDURE — 85025 COMPLETE CBC W/AUTO DIFF WBC: CPT

## 2023-02-20 PROCEDURE — 99285 EMERGENCY DEPT VISIT HI MDM: CPT

## 2023-02-20 PROCEDURE — 6370000000 HC RX 637 (ALT 250 FOR IP): Performed by: INTERNAL MEDICINE

## 2023-02-20 PROCEDURE — 87635 SARS-COV-2 COVID-19 AMP PRB: CPT

## 2023-02-20 PROCEDURE — 6360000002 HC RX W HCPCS: Performed by: INTERNAL MEDICINE

## 2023-02-20 PROCEDURE — 72125 CT NECK SPINE W/O DYE: CPT

## 2023-02-20 PROCEDURE — 84145 PROCALCITONIN (PCT): CPT

## 2023-02-20 PROCEDURE — 80048 BASIC METABOLIC PNL TOTAL CA: CPT

## 2023-02-20 PROCEDURE — 36415 COLL VENOUS BLD VENIPUNCTURE: CPT

## 2023-02-20 PROCEDURE — 83880 ASSAY OF NATRIURETIC PEPTIDE: CPT

## 2023-02-20 PROCEDURE — 2580000003 HC RX 258: Performed by: INTERNAL MEDICINE

## 2023-02-20 PROCEDURE — 81001 URINALYSIS AUTO W/SCOPE: CPT

## 2023-02-20 PROCEDURE — 70450 CT HEAD/BRAIN W/O DYE: CPT

## 2023-02-20 PROCEDURE — 70486 CT MAXILLOFACIAL W/O DYE: CPT

## 2023-02-20 PROCEDURE — 71045 X-RAY EXAM CHEST 1 VIEW: CPT

## 2023-02-20 PROCEDURE — 83605 ASSAY OF LACTIC ACID: CPT

## 2023-02-20 PROCEDURE — 2580000003 HC RX 258: Performed by: PHYSICIAN ASSISTANT

## 2023-02-20 PROCEDURE — 1200000000 HC SEMI PRIVATE

## 2023-02-20 PROCEDURE — 82803 BLOOD GASES ANY COMBINATION: CPT

## 2023-02-20 PROCEDURE — 82550 ASSAY OF CK (CPK): CPT

## 2023-02-20 PROCEDURE — 87804 INFLUENZA ASSAY W/OPTIC: CPT

## 2023-02-20 PROCEDURE — 93005 ELECTROCARDIOGRAM TRACING: CPT | Performed by: PHYSICIAN ASSISTANT

## 2023-02-20 RX ORDER — SODIUM CHLORIDE 9 MG/ML
INJECTION, SOLUTION INTRAVENOUS PRN
Status: DISCONTINUED | OUTPATIENT
Start: 2023-02-20 | End: 2023-02-22 | Stop reason: HOSPADM

## 2023-02-20 RX ORDER — LEVOFLOXACIN 5 MG/ML
750 INJECTION, SOLUTION INTRAVENOUS EVERY 24 HOURS
Status: DISCONTINUED | OUTPATIENT
Start: 2023-02-20 | End: 2023-02-22 | Stop reason: HOSPADM

## 2023-02-20 RX ORDER — ACETAMINOPHEN 650 MG/1
650 SUPPOSITORY RECTAL EVERY 6 HOURS PRN
Status: DISCONTINUED | OUTPATIENT
Start: 2023-02-20 | End: 2023-02-22 | Stop reason: HOSPADM

## 2023-02-20 RX ORDER — FAMOTIDINE 20 MG/1
20 TABLET, FILM COATED ORAL 2 TIMES DAILY
Status: DISCONTINUED | OUTPATIENT
Start: 2023-02-20 | End: 2023-02-22 | Stop reason: HOSPADM

## 2023-02-20 RX ORDER — PANTOPRAZOLE SODIUM 40 MG/1
40 TABLET, DELAYED RELEASE ORAL DAILY
COMMUNITY

## 2023-02-20 RX ORDER — HYDROXYZINE HYDROCHLORIDE 25 MG/1
25 TABLET, FILM COATED ORAL EVERY 6 HOURS PRN
Status: DISCONTINUED | OUTPATIENT
Start: 2023-02-20 | End: 2023-02-22 | Stop reason: HOSPADM

## 2023-02-20 RX ORDER — BACLOFEN 10 MG/1
15 TABLET ORAL 3 TIMES DAILY PRN
Status: DISCONTINUED | OUTPATIENT
Start: 2023-02-20 | End: 2023-02-22 | Stop reason: HOSPADM

## 2023-02-20 RX ORDER — MAGNESIUM SULFATE IN WATER 40 MG/ML
2000 INJECTION, SOLUTION INTRAVENOUS PRN
Status: DISCONTINUED | OUTPATIENT
Start: 2023-02-20 | End: 2023-02-22 | Stop reason: HOSPADM

## 2023-02-20 RX ORDER — SODIUM CHLORIDE 0.9 % (FLUSH) 0.9 %
10 SYRINGE (ML) INJECTION PRN
Status: DISCONTINUED | OUTPATIENT
Start: 2023-02-20 | End: 2023-02-22 | Stop reason: HOSPADM

## 2023-02-20 RX ORDER — LACOSAMIDE 100 MG/1
200 TABLET ORAL 2 TIMES DAILY
Status: DISCONTINUED | OUTPATIENT
Start: 2023-02-20 | End: 2023-02-22 | Stop reason: HOSPADM

## 2023-02-20 RX ORDER — PROMETHAZINE HYDROCHLORIDE 25 MG/1
12.5 TABLET ORAL EVERY 6 HOURS PRN
Status: DISCONTINUED | OUTPATIENT
Start: 2023-02-20 | End: 2023-02-22 | Stop reason: HOSPADM

## 2023-02-20 RX ORDER — METOPROLOL SUCCINATE 50 MG/1
50 TABLET, EXTENDED RELEASE ORAL DAILY
Status: DISCONTINUED | OUTPATIENT
Start: 2023-02-21 | End: 2023-02-22 | Stop reason: HOSPADM

## 2023-02-20 RX ORDER — POTASSIUM CHLORIDE 7.45 MG/ML
10 INJECTION INTRAVENOUS PRN
Status: DISCONTINUED | OUTPATIENT
Start: 2023-02-20 | End: 2023-02-22 | Stop reason: HOSPADM

## 2023-02-20 RX ORDER — SODIUM CHLORIDE 0.9 % (FLUSH) 0.9 %
10 SYRINGE (ML) INJECTION EVERY 12 HOURS SCHEDULED
Status: DISCONTINUED | OUTPATIENT
Start: 2023-02-20 | End: 2023-02-22 | Stop reason: HOSPADM

## 2023-02-20 RX ORDER — ACETAMINOPHEN 325 MG/1
650 TABLET ORAL EVERY 6 HOURS PRN
Status: DISCONTINUED | OUTPATIENT
Start: 2023-02-20 | End: 2023-02-22 | Stop reason: HOSPADM

## 2023-02-20 RX ORDER — PANTOPRAZOLE SODIUM 40 MG/1
40 TABLET, DELAYED RELEASE ORAL
Status: DISCONTINUED | OUTPATIENT
Start: 2023-02-21 | End: 2023-02-22 | Stop reason: HOSPADM

## 2023-02-20 RX ORDER — FLUTICASONE PROPIONATE 50 MCG
1 SPRAY, SUSPENSION (ML) NASAL DAILY
COMMUNITY

## 2023-02-20 RX ORDER — ONDANSETRON 2 MG/ML
4 INJECTION INTRAMUSCULAR; INTRAVENOUS EVERY 6 HOURS PRN
Status: DISCONTINUED | OUTPATIENT
Start: 2023-02-20 | End: 2023-02-22 | Stop reason: HOSPADM

## 2023-02-20 RX ORDER — POTASSIUM CHLORIDE 7.45 MG/ML
10 INJECTION INTRAVENOUS PRN
Status: DISCONTINUED | OUTPATIENT
Start: 2023-02-20 | End: 2023-02-20 | Stop reason: SDUPTHER

## 2023-02-20 RX ORDER — POTASSIUM CHLORIDE 20 MEQ/1
40 TABLET, EXTENDED RELEASE ORAL PRN
Status: DISCONTINUED | OUTPATIENT
Start: 2023-02-20 | End: 2023-02-22 | Stop reason: HOSPADM

## 2023-02-20 RX ORDER — CEPHALEXIN 500 MG/1
500 CAPSULE ORAL 4 TIMES DAILY
COMMUNITY
Start: 2023-02-17 | End: 2023-02-22

## 2023-02-20 RX ORDER — DOXYCYCLINE HYCLATE 100 MG
100 TABLET ORAL 2 TIMES DAILY
Status: ON HOLD | COMMUNITY
Start: 2023-02-17 | End: 2023-02-22 | Stop reason: HOSPADM

## 2023-02-20 RX ORDER — BALSALAZIDE DISODIUM 750 MG/1
1500 CAPSULE ORAL 2 TIMES DAILY
Status: DISCONTINUED | OUTPATIENT
Start: 2023-02-20 | End: 2023-02-22 | Stop reason: HOSPADM

## 2023-02-20 RX ORDER — 0.9 % SODIUM CHLORIDE 0.9 %
500 INTRAVENOUS SOLUTION INTRAVENOUS ONCE
Status: COMPLETED | OUTPATIENT
Start: 2023-02-20 | End: 2023-02-20

## 2023-02-20 RX ADMIN — SODIUM CHLORIDE, PRESERVATIVE FREE 10 ML: 5 INJECTION INTRAVENOUS at 23:24

## 2023-02-20 RX ADMIN — LEVOFLOXACIN 750 MG: 5 INJECTION, SOLUTION INTRAVENOUS at 22:50

## 2023-02-20 RX ADMIN — HYDROXYZINE HYDROCHLORIDE 25 MG: 25 TABLET, FILM COATED ORAL at 23:13

## 2023-02-20 RX ADMIN — BACLOFEN 15 MG: 10 TABLET ORAL at 23:18

## 2023-02-20 RX ADMIN — SODIUM CHLORIDE 500 ML: 9 INJECTION, SOLUTION INTRAVENOUS at 16:28

## 2023-02-20 RX ADMIN — FAMOTIDINE 20 MG: 20 TABLET, FILM COATED ORAL at 23:16

## 2023-02-20 RX ADMIN — BALSALAZIDE DISODIUM 1500 MG: 750 CAPSULE ORAL at 23:11

## 2023-02-20 RX ADMIN — LACOSAMIDE 200 MG: 100 TABLET, FILM COATED ORAL at 23:13

## 2023-02-20 ASSESSMENT — PAIN SCALES - GENERAL
PAINLEVEL_OUTOF10: 3
PAINLEVEL_OUTOF10: 10

## 2023-02-20 ASSESSMENT — PAIN - FUNCTIONAL ASSESSMENT
PAIN_FUNCTIONAL_ASSESSMENT: PREVENTS OR INTERFERES SOME ACTIVE ACTIVITIES AND ADLS
PAIN_FUNCTIONAL_ASSESSMENT: WONG-BAKER FACES
PAIN_FUNCTIONAL_ASSESSMENT: 0-10

## 2023-02-20 ASSESSMENT — PAIN DESCRIPTION - PAIN TYPE
TYPE: OTHER (COMMENT)
TYPE: CHRONIC PAIN

## 2023-02-20 ASSESSMENT — PAIN DESCRIPTION - ORIENTATION: ORIENTATION: LOWER

## 2023-02-20 ASSESSMENT — PAIN DESCRIPTION - LOCATION
LOCATION: GENERALIZED
LOCATION: GENERALIZED;BACK

## 2023-02-20 ASSESSMENT — PAIN DESCRIPTION - FREQUENCY: FREQUENCY: INTERMITTENT

## 2023-02-20 ASSESSMENT — PAIN SCALES - WONG BAKER: WONGBAKER_NUMERICALRESPONSE: 2

## 2023-02-20 ASSESSMENT — PAIN DESCRIPTION - DESCRIPTORS: DESCRIPTORS: ACHING

## 2023-02-20 NOTE — ED TRIAGE NOTES
Pt. presents to ED via EMT. Paramedic informed this writer that pt. fell last night, was on the ground until this morning when found by her nurse. Pt. states she lives with her son and her nurse. Pt. appears to be confused or possibly just mad that she was brought here to this hospital rather than  which is her primary hospital.  Pt. also stated she was just d/c 2 days ago from   Pt. refusing to be put into hospital gown at the moment. Swollen R eye d/t fall.

## 2023-02-20 NOTE — ED PROVIDER NOTES
[facility]        Pt Name: Garold Sandifer  MRN: [id number]  [place] 1955  Date of evaluation: 2/20/2023  Provider: Isaiah Neil PA-C  PCP: Jimi Scales MD  Note Started: 11:58 AM EST 2/20/23       I have seen and evaluated this patient with my supervising physician Maikel Mars MD.      [address] [place] [address]       Chief Complaint   Patient presents with    Fall     Pt. Taz Greenberg last night, was on the ground until nurse found her this morning. Swollen R eye and AMS. HISTORY OF PRESENT ILLNESS: 1 or more Elements     History From: Patient/home nurse/EMS  Limitations to history : Altered Mental Status    Garold Sandifer is a 78 y.o. female who presents to the emergency department by EMS. Patient reports she fell last night. Home aide found her this morning on the ground. Believes she has been at down all night long. Patient denies any other injury sustained in fall. No other complaints at this time. Nursing Notes were all reviewed and agreed with or any disagreements were addressed in the HPI. REVIEW OF SYSTEMS :      Review of Systems    Positives and Pertinent negatives as per HPI.      SURGICAL HISTORY     Past Surgical History:   Procedure Laterality Date    ABDOMEN SURGERY      ABDOMINAL ADHESION SURGERY  06/08/2018    BLADDER SUSPENSION      COLONOSCOPY      COLONOSCOPY  9/14/15    sigmoid colon biopsy     COLONOSCOPY  08/07/2018    COLONOSCOPY  06/07/2019    COLONOSCOPY, POSSIBLE BIOPSY, POSSIBLE POLYPECTOMY    COLONOSCOPY N/A 6/7/2019    COLONOSCOPY WITH BIOPSY performed by Mayra Arechiga MD at [address] [facility] N/A 6/7/2019    COLONOSCOPY DIAGNOSTIC/STOMA performed by Mayra Arechiga MD at [facility] [address] N/A 10/8/2018    EXPLORATORY LAPAROTOMY WITH COLOSTOMY performed by Cristiano Harris MD at [facility] Left 6/25/14    excision plantar left hallux    FOOT SURGERY  6/3/15    PLANTAR PLATE REPAIR BILATERAL, ARTHROTOMY MPJ 2ND BILATERAL    FOOT SURGERY Left 08/05/2002    Excision second metatarsal head left    FRACTURE SURGERY      rt hip    HAND CARPECTOMY Bilateral     HEEL SPUR SURGERY      HERNIA REPAIR      HYSTERECTOMY (CERVIX STATUS UNKNOWN)      bladder surgery    JOINT REPLACEMENT      rt hip, L shoulder replacement 11/2014    KNEE SURGERY Bilateral     arthrosvopic    LEG SURGERY Right     RI SECONDARY CLOSURE SURG WOUND/DEHSN EXTSV/COMPLIC N/A 68/20/6033    SECONDARY WOUND CLOSURE OF ABDOMINAL WOUND performed by Yarely Olsen MD at [address] [place] [address] N/A 7/17/2019    FLEXIBLE SIGMOIDOSCOPY performed by Yarely Olsen MD at [address] [place] [address]  01/15/2018    with biopsies    SMALL INTESTINE SURGERY N/A 7/17/2019    COLOSTOMY CLOSURE WITH COLORECTAL ANASTOMOSIS performed by Yarely Olsen MD at [address] [place] [address]  6/14/13    and colonsocopy with antral erosion biopsies       CURRENTMEDICATIONS       Previous Medications    BACLOFEN (LIORESAL) 10 MG TABLET    Take 15 mg by mouth 3 times daily as needed    BALSALAZIDE (COLAZAL) 750 MG CAPSULE    Take 1,500 mg by mouth in the morning and at bedtime    BUSPIRONE (BUSPAR) 10 MG TABLET    Take 10 mg by mouth 3 times daily as needed    CALCIUM-VITAMIN D (OSCAL-500) 500-200 MG-UNIT PER TABLET    Take 1 tablet by mouth 2 times daily    CEPHALEXIN (KEFLEX) 500 MG CAPSULE    Take 500 mg by mouth 4 times daily    DOXYCYCLINE HYCLATE (VIBRA-TABS) 100 MG TABLET    Take 100 mg by mouth 2 times daily    DUPILUMAB (DUPIXENT) 300 MG/2ML SOPN INJECTION    Inject 300 mg into the skin every 14 days    FAMOTIDINE (PEPCID) 20 MG TABLET    Take 1 tablet by mouth 2 times daily    FLUTICASONE (FLONASE) 50 MCG/ACT NASAL SPRAY    1 spray by Each Nostril route daily    HYDROXYZINE HCL (ATARAX) 25 MG TABLET    Take 25 mg by mouth every 6 hours as needed for Itching    LACOSAMIDE (VIMPAT) 100 MG TABS TABLET    Take 200 mg by mouth 2 times daily. MAGNESIUM OXIDE (MAG-OX) 400 (240 MG) MG TABLET    Take 1 tablet by mouth 2 times daily    METOPROLOL SUCCINATE (TOPROL XL) 50 MG EXTENDED RELEASE TABLET    Take 1 tablet by mouth daily    NYSTATIN (MYCOSTATIN) 032009 UNIT/GM POWDER    Apply topically 2 times daily Apply topically 4 times daily. PANTOPRAZOLE (PROTONIX) 40 MG TABLET    Take 40 mg by mouth daily    RIVAROXABAN (XARELTO) 20 MG TABS TABLET    Take 1 tablet by mouth daily (with breakfast)    TRIAMCINOLONE (KENALOG) 0.1 % OINTMENT    Apply topically 2 times daily Apply topically 2 times daily.        ALLERGIES     Ace inhibitors and Skelaxin [metaxalone]    FAMILYHISTORY       Family History   Problem Relation Age of Onset    High Blood Pressure Mother     High Blood Pressure Father     Kidney Disease Sister         SOCIAL HISTORY       Social History     Tobacco Use    Smoking status: Every Day     Packs/day: 1.00     Years: 10.00     Pack years: 10.00     Types: Cigarettes, E-Cigarettes    Smokeless tobacco: Never    Tobacco comments:     CUT BACK TO 1/2 PPD WITH E CIG 6-2019   Vaping Use    Vaping Use: Former    Start date: 3/28/2019    Substances: LOWEST DOSE   Substance Use Topics    Alcohol use: Not Currently     Alcohol/week: 2.0 standard drinks     Types: 2 Shots of liquor per week     Comment: 6 DRINKS A WEEK OR LESS    Drug use: No       SCREENINGS        Marisa Coma Scale  Eye Opening: Spontaneous  Best Verbal Response: Confused  Best Motor Response: Obeys commands  Marisa Coma Scale Score: 14                CIWA Assessment  BP: (!) 132/91  Heart Rate: 95           PHYSICAL EXAM  1 or more Elements     ED Triage Vitals [02/20/23 1053]   BP Temp Temp Source Heart Rate Resp SpO2 Height Weight   (!) 147/101 98 °F (36.7 °C) Oral 87 16 99 % 5' 7\" (1.702 m) 184 lb 4.9 oz (83.6 kg)       Physical Exam  HENT:      Head:      Comments: Generalized edema throughout right side of face including right periorbital region, no focal tenderness palpation, extract motions intact neck bilaterally, no pain with extraocular motions, no obvious deformity  Cardiovascular:      Rate and Rhythm: Normal rate and regular rhythm. Pulmonary:      Effort: Pulmonary effort is normal. No respiratory distress. Abdominal:      Palpations: Abdomen is soft. Tenderness: There is no abdominal tenderness. Musculoskeletal:      Cervical back: Normal range of motion and neck supple. No rigidity or tenderness. Comments: No focal tenderness to all 4 extremities, no obvious deformity, pelvis stable and nontender to rocking, no midline spinous process tenderness to thoracic or lumbar spine   Skin:     General: Skin is warm. Comments: Superficial abrasion with evidence of excoriation throughout body, mild erythema and edema to right lower leg    Neurological:      General: No focal deficit present. Mental Status: She is alert and oriented to person, place, and time. Psychiatric:         Mood and Affect: Mood normal.         Behavior: Behavior normal.         DIAGNOSTIC RESULTS   LABS:    Labs Reviewed   CBC WITH AUTO DIFFERENTIAL - Abnormal; Notable for the following components:       Result Value    RBC 3.67 (*)     Hemoglobin 10.5 (*)     Hematocrit 32.1 (*)     All other components within normal limits   BASIC METABOLIC PANEL W/ REFLEX TO MG FOR LOW K - Abnormal; Notable for the following components:    Sodium 134 (*)     Glucose 111 (*)     BUN 6 (*)     Creatinine <0.5 (*)     All other components within normal limits   URINALYSIS WITH REFLEX TO CULTURE - Abnormal; Notable for the following components:    Ketones, Urine 15 (*)     Protein, UA TRACE (*)     Leukocyte Esterase, Urine TRACE (*)     All other components within normal limits   CK - Abnormal; Notable for the following components:     Total  (*)     All other components within normal limits   COVID-19, RAPID   RAPID INFLUENZA A/B ANTIGENS   MRSA DNA PROBE, NASAL   LEGIONELLA ANTIGEN, URINE   STREP PNEUMONIAE ANTIGEN   CULTURE, URINE   LACTIC ACID   MICROSCOPIC URINALYSIS   BLOOD GAS, VENOUS   PROCALCITONIN   CK   BRAIN NATRIURETIC PEPTIDE   PROCALCITONIN       When ordered only abnormal lab results are displayed. All other labs were within normal range or not returned as of this dictation. EKG: When ordered, EKG's are interpreted by the Emergency Department Physician in the absence of a cardiologist.  Please see their note for interpretation of EKG. RADIOLOGY:   Non-plain film images such as CT, Ultrasound and MRI are read by the radiologist. Plain radiographic images are visualized and preliminarily interpreted by the ED Provider with the below findings:        Interpretation per the Radiologist below, if available at the time of this note:    XR CHEST PORTABLE   Final Result   Underlying pulmonary edema. 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 No acute traumatic intracranial   abnormality      Facial bones      Facial soft tissue swelling on the right. No definite facial bone fracture         CT CERVICAL SPINE WO CONTRAST   Final Result   1. No acute cervical spine fracture. 2. New patchy ground-glass opacities at the lung apices which are nonspecific   and could reflect pneumonitis or atypical infection including viral pneumonia. 3. Spinal degenerative changes as described. Mild anterolisthesis at the   C3-C4 and C4-C5 levels appears unchanged and is likely degenerative. 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   No acute traumatic intracranial   abnormality      Facial bones      Facial soft tissue swelling on the right. No definite facial bone fracture           No results found. No results found. PROCEDURES   Unless otherwise noted below, none     Procedures    CRITICAL CARE TIME (.cctime)   CRITICAL CARE NOTE:  There was a high probability of clinically significant life-threatening deterioration of the patient's condition requiring my urgent intervention. Total critical care time is 20 minutes. This includes vital sign monitoring, pulse oximetry monitoring, telemetry monitoring, clinical response to the IV medications, reviewing the nursing notes, consultation time, dictation/documentation time, and interpretation of the labwork. This excludes any separately billable procedures performed. PAST MEDICAL HISTORY      has a past medical history of Anxiety, Arthritis, Atrial fibrillation/flutter, Blood transfusion reaction, Crohn's disease ([facility] [address] Depression, GERD (gastroesophageal reflux disease), Hiatal hernia (06/24/2014), blood clots, Hypertension, MRSA (methicillin resistant staph aureus) culture positive (06/05/2018), MRSA (methicillin resistant staph aureus) culture positive (06/03/2021), Neuropathy, Pneumonia (01/08/2014), Sinus headache, SOB (shortness of breath), and VRE (vancomycin resistant enterococcus) culture positive (10/08/2018).      EMERGENCY DEPARTMENT COURSE and DIFFERENTIAL DIAGNOSIS/MDM:   Vitals:    Vitals:    02/20/23 1600 02/20/23 1700 02/20/23 1800 02/20/23 1900   BP: (!) 136/90 (!) 155/97 129/80 (!) 132/91   Pulse: 100 85 84 95   Resp: 17 16 18 22   Temp:       TempSrc:       SpO2:       Weight:       Height:           Patient was given the following medications:  Medications   baclofen (LIORESAL) tablet 15 mg (has no administration in time range)   balsalazide (COLAZAL) capsule 1,500 mg (has no administration in time range)   famotidine (PEPCID) tablet 20 mg (has no administration in time range)   hydrOXYzine HCl (ATARAX) tablet 25 mg (has no administration in time range)   lacosamide (VIMPAT) tablet 200 mg (has no administration in time range)   metoprolol succinate (TOPROL XL) extended release tablet 50 mg (has no administration in time range)   pantoprazole (PROTONIX) tablet 40 mg (has no administration in time range)   rivaroxaban (XARELTO) tablet 20 mg (has no administration in time range)   levoFLOXacin (LEVAQUIN) 750 MG/150ML infusion 750 mg (has no administration in time range)   0.9 % sodium chloride bolus (0 mLs IntraVENous Stopped 2/20/23 0040)             Is this patient to be included in the SEP-1 Core Measure due to severe sepsis or septic shock? No   Exclusion criteria - the patient is NOT to be included for SEP-1 Core Measure due to:  2+ SIRS criteria are not met    Chronic Conditions affecting care:    has a past medical history of Anxiety, Arthritis, Atrial fibrillation/flutter, Blood transfusion reaction, Crohn's disease ([facility] [address] Depression, GERD (gastroesophageal reflux disease), Hiatal hernia (06/24/2014), blood clots, Hypertension, MRSA (methicillin resistant staph aureus) culture positive (06/05/2018), MRSA (methicillin resistant staph aureus) culture positive (06/03/2021), Neuropathy, Pneumonia (01/08/2014), Sinus headache, SOB (shortness of breath), and VRE (vancomycin resistant enterococcus) culture positive (10/08/2018). CONSULTS: (Who and What was discussed)  None  Hospitalist, admission, see below, Dr. Eugene Bella    Social Determinants : None    Records Reviewed (Source): Hospitalization and UC last week, reviewed in Epic, see below    CC/HPI Summary, DDx, ED Course, and Reassessment:     Presents emergency department by EMS after falling at home last night. Reportedly on ground overnight and confused this morning per home nurse. Review of medical records show patient was recently admitted to [facility] for confusion and right leg swelling versus cellulitis. Records show showed a chronic appearing DVT in her right leg. There is concern for mild infection she was placed on Keflex and doxycycline x5 days. Confusion at that time thought to be due to hyponatremia. I spoke with patient's home aide. She is with her 5 days a week. Patient otherwise at home alone. Patient does have some mild confusion at baseline, did not seem to be more significantly confused per aide this morning. Patient seems more than a little confused myself. At 1 point she says she lives with her , the remember that her  is dead. Next she says she lives with her son, which she does not. She changed her story of how she fell last night. Patient is sleepy in the emergency department and often has to be redirected. I do not feel comfortable patient being discharged home alone at this time. Work-up for AMS obtained. CT without contrast showed no acute intercranial abnormality. Patient with confusion at [facility], sodium 134 today, this is not the cause of confusion today. Considered CO2 retention, pH and PCO2 normal. CT cervical spine with out contrast showed patchy groundglass opacities in lung apices, atypical infection versus pneumonitis, chest x-ray also showed effusion with superimposed pneumonia. COVID-19 negative, influenza negative. She is not coughing in the emergency department. Denies any cough or shortness of breath at this time. Procalcitonin normal.  WBC normal.    We will hold antibiotics pending discussion with hospitalist.  Has been on Keflex and doxycycline for cellulitis of right leg. Review notes from  regarding cellulitis/swelling and work-up there. Urine showed no evidence of infection. After discussion with patient's nurse, do suspect dementia and progression of dementia. Regardless do not feel safe with her going home alone at this time. Regarding fall last night. Did have swelling to right side of her face.   CT facial bones unremarkable. Do suspect this is dependent edema from being on the ground overnight. No other focal injury identified by history or exam.  CK mildly elevated 688, she is given IV fluid. Remainder of labs and work-up as above. Consultation made to hospitalist regarding admission. Dr. Camila Merlos kindly admitted patient. Patient seen and evaluated in the ED with Dr. Su Amaya. Disposition Considerations (tests considered but not done, Admit vs D/C, Shared Decision Making, Pt Expectation of Test or Tx.): see above       I am the Primary Clinician of Record. FINAL IMPRESSION      1. Altered mental status, unspecified altered mental status type    2. Atypical pneumonia          DISPOSITION/PLAN     DISPOSITION Admitted 02/20/2023 06:36:39 PM      PATIENT REFERRED TO:  No follow-up provider specified.     DISCHARGE MEDICATIONS:  New Prescriptions    No medications on file       DISCONTINUED MEDICATIONS:  Discontinued Medications    AMLODIPINE (NORVASC) 10 MG TABLET    Take 10 mg by mouth daily    ATORVASTATIN (LIPITOR) 80 MG TABLET    Take 1 tablet by mouth nightly    FERROUS GLUCONATE (FERGON) 324 (38 FE) MG TABLET    Take 3 tablets by mouth daily (with breakfast)     MYCOPHENOLATE (CELLCEPT) 500 MG TABLET    Take 1,000 mg by mouth 2 times daily    ONDANSETRON (ZOFRAN ODT) 4 MG DISINTEGRATING TABLET    Take 1 tablet by mouth every 8 hours as needed for Nausea    POTASSIUM CHLORIDE (KLOR-CON M) 20 MEQ EXTENDED RELEASE TABLET    Take 1 tablet by mouth 2 times daily    PREDNISONE (DELTASONE) 5 MG TABLET    Take 5 mg by mouth daily              (Please note that portions of this note were completed with a voice recognition program.  Efforts were made to edit the dictations but occasionally words are mis-transcribed.)    Rom Jordan PA-C (electronically signed)          Rom Jordan PA-C  02/20/23 2036

## 2023-02-20 NOTE — ED PROVIDER NOTES
Attending Supervisory Note/Shared Visit   I have personally performed a face to face diagnostic evaluation on this patient. I have reviewed the mid-levels findings and agree. History and Exam by me shows an alert elderly black female no acute distress. Lives at home. Found on the floor by visiting nurse this morning. Reportedly fell last night. Reportedly too weak to get up. Recently admitted and discharged from [facility].  Had cellulitis, urinary tract infection, bullous pemphigoid. General: Alert black female no acute distress. Oriented to person, not to place or time. HEENT: Atraumatic. Pupils equal round reactive. No pallor. Nose is clear. Oropharynx negative. Heart: Regular rate and rhythm. No murmurs or gallops noted. Lungs: Breath sounds equal bilaterally and clear. Abdomen: Soft, nondistended, nontender. Neuro: Awake, alert, oriented to person only. Symmetrical reactive pupils. Intact extraocular movements. Symmetrical motor function. She has extensive rash over her torso and extremities that consist of some hyperpigmented areas as well as some areas where there is some superficial sloughing of the skin most pronounced over the lower legs. EKG: Normal sinus rhythm, rate of 82, normal EKG. Rhythm strip shows a sinus rhythm with a rate of 82, CA interval 154 ms, QRS of 104 ms with no other ectopy as interpreted by me. This compared to an EKG dated 11/15/2022, no significant change noted.     Labs Reviewed   CBC WITH AUTO DIFFERENTIAL - Abnormal; Notable for the following components:       Result Value    RBC 3.67 (*)     Hemoglobin 10.5 (*)     Hematocrit 32.1 (*)     All other components within normal limits   BASIC METABOLIC PANEL W/ REFLEX TO MG FOR LOW K - Abnormal; Notable for the following components:    Sodium 134 (*)     Glucose 111 (*)     BUN 6 (*)     Creatinine <0.5 (*)     All other components within normal limits   URINALYSIS WITH REFLEX TO CULTURE - Abnormal; Notable for the following components:    Ketones, Urine 15 (*)     Protein, UA TRACE (*)     Leukocyte Esterase, Urine TRACE (*)     All other components within normal limits   CK - Abnormal; Notable for the following components: Total  (*)     All other components within normal limits   COVID-19, RAPID   RAPID INFLUENZA A/B ANTIGENS   LACTIC ACID   MICROSCOPIC URINALYSIS   BLOOD GAS, VENOUS     XR CHEST PORTABLE   Final Result   Underlying pulmonary edema. 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 No acute traumatic intracranial   abnormality      Facial bones      Facial soft tissue swelling on the right. No definite facial bone fracture         CT CERVICAL SPINE WO CONTRAST   Final Result   1. No acute cervical spine fracture. 2. New patchy ground-glass opacities at the lung apices which are nonspecific   and could reflect pneumonitis or atypical infection including viral pneumonia. 3. Spinal degenerative changes as described. Mild anterolisthesis at the   C3-C4 and C4-C5 levels appears unchanged and is likely degenerative. 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 No acute traumatic intracranial   abnormality      Facial bones      Facial soft tissue swelling on the right. No definite facial bone fracture           1. Altered mental status, unspecified altered mental status type    2.  Atypical pneumonia      Disposition:  Admit    (Please note that portions of this note were completed with a voice recognition program.  Efforts were made to edit the dictations but occasionally words are mis-transcribed.)    Ignacia Rodriguez MD  Attending Emergency Physician       Johnnie Collins MD  02/20/23 6448

## 2023-02-21 LAB
ANION GAP SERPL CALCULATED.3IONS-SCNC: 11 MMOL/L (ref 3–16)
BASOPHILS ABSOLUTE: 0.1 K/UL (ref 0–0.2)
BASOPHILS RELATIVE PERCENT: 0.8 %
BUN BLDV-MCNC: 3 MG/DL (ref 7–20)
CALCIUM SERPL-MCNC: 8.7 MG/DL (ref 8.3–10.6)
CHLORIDE BLD-SCNC: 96 MMOL/L (ref 99–110)
CO2: 23 MMOL/L (ref 21–32)
CREAT SERPL-MCNC: <0.5 MG/DL (ref 0.6–1.2)
EKG ATRIAL RATE: 82 BPM
EKG DIAGNOSIS: NORMAL
EKG P-R INTERVAL: 154 MS
EKG Q-T INTERVAL: 402 MS
EKG QRS DURATION: 104 MS
EKG QTC CALCULATION (BAZETT): 469 MS
EKG R AXIS: 85 DEGREES
EKG T AXIS: 89 DEGREES
EKG VENTRICULAR RATE: 82 BPM
EOSINOPHILS ABSOLUTE: 1.2 K/UL (ref 0–0.6)
EOSINOPHILS RELATIVE PERCENT: 14.1 %
GFR SERPL CREATININE-BSD FRML MDRD: >60 ML/MIN/{1.73_M2}
GLUCOSE BLD-MCNC: 96 MG/DL (ref 70–99)
HCT VFR BLD CALC: 27.2 % (ref 36–48)
HEMOGLOBIN: 9.2 G/DL (ref 12–16)
L. PNEUMOPHILA SEROGP 1 UR AG: NORMAL
LYMPHOCYTES ABSOLUTE: 1.6 K/UL (ref 1–5.1)
LYMPHOCYTES RELATIVE PERCENT: 19.9 %
MCH RBC QN AUTO: 29.2 PG (ref 26–34)
MCHC RBC AUTO-ENTMCNC: 34 G/DL (ref 31–36)
MCV RBC AUTO: 86.1 FL (ref 80–100)
MONOCYTES ABSOLUTE: 0.4 K/UL (ref 0–1.3)
MONOCYTES RELATIVE PERCENT: 5.3 %
MRSA SCREEN RT-PCR: NORMAL
NEUTROPHILS ABSOLUTE: 4.9 K/UL (ref 1.7–7.7)
NEUTROPHILS RELATIVE PERCENT: 59.9 %
PDW BLD-RTO: 13.3 % (ref 12.4–15.4)
PLATELET # BLD: 351 K/UL (ref 135–450)
PMV BLD AUTO: 7.3 FL (ref 5–10.5)
POTASSIUM REFLEX MAGNESIUM: 3.8 MMOL/L (ref 3.5–5.1)
RBC # BLD: 3.16 M/UL (ref 4–5.2)
SODIUM BLD-SCNC: 130 MMOL/L (ref 136–145)
STREP PNEUMONIAE ANTIGEN, URINE: NORMAL
WBC # BLD: 8.2 K/UL (ref 4–11)

## 2023-02-21 PROCEDURE — 87641 MR-STAPH DNA AMP PROBE: CPT

## 2023-02-21 PROCEDURE — 6370000000 HC RX 637 (ALT 250 FOR IP): Performed by: INTERNAL MEDICINE

## 2023-02-21 PROCEDURE — 36415 COLL VENOUS BLD VENIPUNCTURE: CPT

## 2023-02-21 PROCEDURE — 87449 NOS EACH ORGANISM AG IA: CPT

## 2023-02-21 PROCEDURE — 97530 THERAPEUTIC ACTIVITIES: CPT

## 2023-02-21 PROCEDURE — 6360000002 HC RX W HCPCS: Performed by: INTERNAL MEDICINE

## 2023-02-21 PROCEDURE — 87086 URINE CULTURE/COLONY COUNT: CPT

## 2023-02-21 PROCEDURE — 97162 PT EVAL MOD COMPLEX 30 MIN: CPT

## 2023-02-21 PROCEDURE — 94760 N-INVAS EAR/PLS OXIMETRY 1: CPT

## 2023-02-21 PROCEDURE — 93010 ELECTROCARDIOGRAM REPORT: CPT | Performed by: INTERNAL MEDICINE

## 2023-02-21 PROCEDURE — 97166 OT EVAL MOD COMPLEX 45 MIN: CPT

## 2023-02-21 PROCEDURE — 87181 SC STD AGAR DILUTION PER AGT: CPT

## 2023-02-21 PROCEDURE — 2580000003 HC RX 258: Performed by: INTERNAL MEDICINE

## 2023-02-21 PROCEDURE — 1200000000 HC SEMI PRIVATE

## 2023-02-21 PROCEDURE — 87186 SC STD MICRODIL/AGAR DIL: CPT

## 2023-02-21 PROCEDURE — 80048 BASIC METABOLIC PNL TOTAL CA: CPT

## 2023-02-21 PROCEDURE — 85025 COMPLETE CBC W/AUTO DIFF WBC: CPT

## 2023-02-21 PROCEDURE — 87077 CULTURE AEROBIC IDENTIFY: CPT

## 2023-02-21 RX ADMIN — METOPROLOL SUCCINATE 50 MG: 50 TABLET, EXTENDED RELEASE ORAL at 09:19

## 2023-02-21 RX ADMIN — BALSALAZIDE DISODIUM 1500 MG: 750 CAPSULE ORAL at 09:19

## 2023-02-21 RX ADMIN — LEVOFLOXACIN 750 MG: 5 INJECTION, SOLUTION INTRAVENOUS at 21:41

## 2023-02-21 RX ADMIN — BALSALAZIDE DISODIUM 1500 MG: 750 CAPSULE ORAL at 21:55

## 2023-02-21 RX ADMIN — LACOSAMIDE 200 MG: 100 TABLET, FILM COATED ORAL at 09:19

## 2023-02-21 RX ADMIN — HYDROXYZINE HYDROCHLORIDE 25 MG: 25 TABLET, FILM COATED ORAL at 09:19

## 2023-02-21 RX ADMIN — RIVAROXABAN 20 MG: 20 TABLET, FILM COATED ORAL at 09:26

## 2023-02-21 RX ADMIN — FAMOTIDINE 20 MG: 20 TABLET, FILM COATED ORAL at 09:19

## 2023-02-21 RX ADMIN — SODIUM CHLORIDE, PRESERVATIVE FREE 10 ML: 5 INJECTION INTRAVENOUS at 21:55

## 2023-02-21 RX ADMIN — BACLOFEN 15 MG: 10 TABLET ORAL at 17:05

## 2023-02-21 RX ADMIN — FAMOTIDINE 20 MG: 20 TABLET, FILM COATED ORAL at 21:39

## 2023-02-21 RX ADMIN — BACLOFEN 15 MG: 10 TABLET ORAL at 21:39

## 2023-02-21 RX ADMIN — LACOSAMIDE 200 MG: 100 TABLET, FILM COATED ORAL at 21:39

## 2023-02-21 RX ADMIN — PANTOPRAZOLE SODIUM 40 MG: 40 TABLET, DELAYED RELEASE ORAL at 06:12

## 2023-02-21 RX ADMIN — HYDROXYZINE HYDROCHLORIDE 25 MG: 25 TABLET, FILM COATED ORAL at 21:39

## 2023-02-21 ASSESSMENT — PAIN SCALES - GENERAL
PAINLEVEL_OUTOF10: 3
PAINLEVEL_OUTOF10: 0
PAINLEVEL_OUTOF10: 0
PAINLEVEL_OUTOF10: 4
PAINLEVEL_OUTOF10: 8

## 2023-02-21 ASSESSMENT — PAIN DESCRIPTION - LOCATION
LOCATION: GENERALIZED
LOCATION: GENERALIZED

## 2023-02-21 ASSESSMENT — PAIN DESCRIPTION - DESCRIPTORS
DESCRIPTORS: DISCOMFORT
DESCRIPTORS: DISCOMFORT

## 2023-02-21 NOTE — PROGRESS NOTES
Physical Therapy  Facility/Department: [address] MED SURG  Physical Therapy Initial Assessment  (Co-eval)  If patient discharges prior to next session this note will serve as a discharge summary.  Please see below for the latest assessment towards goals.   Name: Dianne Keller  : 1955  MRN: [id number]  Date of Service: 2023    Discharge Recommendations:  Patient would benefit from continued therapy after discharge (3-5)   Dianne Keller scored a 14/ on the AM-PAC short mobility form. Current research shows that an AM-PAC score of 17 or less is typically not associated with a discharge to the patient's home setting. Based on the patient's AM-PAC score and their current functional mobility deficits, it is recommended that the patient have 3-5 sessions per week of Physical Therapy at d/c to increase the patient's independence.  Please see assessment section for further patient specific details.  PT Equipment Recommendations  Equipment Needed: No  Other: defer      Patient Diagnosis(es): The primary encounter diagnosis was Altered mental status, unspecified altered mental status type. A diagnosis of Atypical pneumonia was also pertinent to this visit.  Past Medical History:  has a past medical history of Anxiety, Arthritis, Atrial fibrillation/flutter, Blood transfusion reaction, Crohn's disease (HCC), Depression, GERD (gastroesophageal reflux disease), Hiatal hernia, Hx of blood clots, Hypertension, MRSA (methicillin resistant staph aureus) culture positive, MRSA (methicillin resistant staph aureus) culture positive, Neuropathy, Pneumonia, Sinus headache, SOB (shortness of breath), and VRE (vancomycin resistant enterococcus) culture positive.  Past Surgical History:  has a past surgical history that includes Hysterectomy; knee surgery (Bilateral); Hand Carpectomy (Bilateral); hernia repair; Abdomen surgery; bladder suspension; fracture surgery; Heel spur surgery; Tonsillectomy; Colonoscopy; Upper  gastrointestinal endoscopy (6/14/13); Foot surgery (Left, 6/25/14); Foot surgery (6/3/15); Colonoscopy (9/14/15); Foot surgery (Left, 08/05/2002); joint replacement; Sigmoidoscopy (01/15/2018); Abdominal adhesion surgery (06/08/2018); Colonoscopy (08/07/2018); colostomy (N/A, 10/8/2018); pr secondary closure surg wound/dehsn extsv/complic (N/A, 35/85/3463); Leg Surgery (Right); Colonoscopy (06/07/2019); Colonoscopy (N/A, 6/7/2019); Colonoscopy (N/A, 6/7/2019); Small intestine surgery (N/A, 7/17/2019); and proctosigmoidoscopy (N/A, 7/17/2019). Assessment   Assessment: The pt is a 79 yo female who presented to the ED following a fall. Imaging negative for fx's. The pt lives alone in a house and has a HHA (length of times and days unknown). The pt is non-ambulatory and uses a w/c for mobility and completes pivot transfers on her own. PMHx: anxiety, arth, a-fib, Crohn's disease, depression, HTN, neuropathy, R THR, L TSR      Today, the pt demonstrated that she is functioning below a level where she could be safe managing at home alone. The pt required min/mod A for bed mobility and min A for a pivot transfer bed <> chair. The pt has difficulty following directions due to her apashia and appears to be resitant to education. At this time, the pt does not appear to be safe for direct d/c to home and would benefit from con't skilled PT services at a moderate intensity prior to return home. Will con't to follow. Specific Instructions for Next Treatment: cotx  Therapy Prognosis: Fair  Decision Making: Medium Complexity  Barriers to Learning: aphasia  Requires PT Follow-Up: Yes  Activity Tolerance  Activity Tolerance: Patient tolerated evaluation without incident;Treatment limited secondary to decreased cognition; Other (comment)  Activity Tolerance Comments: aphasia     Plan   Physcial Therapy Plan  General Plan: 3-5 times per week  Specific Instructions for Next Treatment: cotx  Current Treatment Recommendations: Strengthening, ROM, Balance training, Functional mobility training, Therapeutic activities, Safety education & training, Wheelchair mobility training, Patient/Caregiver education & training, Equipment evaluation, education, & procurement  Safety Devices  Type of Devices: Call light within reach, Bed alarm in place, Gait belt, Patient at risk for falls, Left in bed, Nurse notified     Restrictions  Restrictions/Precautions  Restrictions/Precautions: Fall Risk  Position Activity Restriction  Other position/activity restrictions: pure-wick     Subjective   General  Chart Reviewed: Yes  Additional Pertinent Hx: Per Rayne Zimmer MD H&P on 2-: The pt is a 77 yo female who presented to the ED following a fall. Imaging negative for fx's. PMHx: anxiety, arth, a-fib, Crohn's disease, depression, HTN, neuropathy, R THR, L TSR  Response To Previous Treatment: Not applicable  Family / Caregiver Present: No  Referring Practitioner: Rayne Zimmer MD  Referral Date : 02/20/23  Diagnosis: fall, Mild rhabdomyolysis, hyponatremia  Follows Commands: Impaired  Other (Comment): the pt with expressive and some mild receptive aphasia  Subjective  Subjective: the pt was found to be awake in the bed; she was receptive to therapy assessment but has trouble following directions due to aphasia; reports pain in her R leg and L low back unable to rate         Social/Functional History  Social/Functional History  Lives With: Alone  Type of Home: House  Home Layout: One level  Home Access: Level entry  Bathroom Shower/Tub: Tub/Shower unit  Bathroom Toilet: Standard  Bathroom Equipment: Commode  Bathroom Accessibility: Wheelchair accessible  Home Equipment: Walker, rolling, [address] Help From: Home health (Pt reports HHA daily to assist)  ADL Assistance: Independent (sponge bathes on the commode)  Homemaking Assistance: Needs assistance (HHA does cleaning, laundry, and cooking.  Son does grocery shopping.)  Homemaking Responsibilities: No  Ambulation Assistance: Non-ambulatory (uses w/c)  Transfer Assistance: Independent (stand pivots to w/c)  Active : No  Additional Comments: above info per care everywhere in Feb. 2023  Vision/Hearing  Vision  Vision: Impaired  Vision Exceptions:  (pt reports she needs glasses)  Hearing  Hearing: Within functional limits    Cognition   Orientation  Overall Orientation Status: Impaired  Orientation Level: Oriented to person;Disoriented to time;Oriented to place; Disoriented to situation     Objective           Gross Assessment  AROM: Generally decreased, functional  Strength: Generally decreased, functional     Bed mobility  Supine to Sit: Minimal assistance (HOB elevated; increased time to complete)  Sit to Supine: Moderate assistance (HOB slightly elevated)  Transfers  Sit to Stand: Contact guard assistance;Minimal Assistance;2 Person Assistance (CGA from the EOB and min A of 2 from the chair)  Stand to Sit: Contact guard assistance  Stand Pivot Transfers: Minimal Assistance (bed > recliner going to the pt's R  and chair > bed going to the pt's L)  Ambulation  Comments: non-ambulatory at baseline     Balance  Sitting - Static: Good  Sitting - Dynamic: Good;-  Standing - Static: Fair (during transfer)  Standing - Dynamic: Fair (during transfer)           AM-PAC Score  AM-PAC Inpatient Mobility Raw Score : 14 (02/21/23 1503)  AM-PAC Inpatient T-Scale Score : 38.1 (02/21/23 1503)  Mobility Inpatient CMS 0-100% Score: 61.29 (02/21/23 1503)  Mobility Inpatient CMS G-Code Modifier : CL (02/21/23 1503)          Goals  Short Term Goals  Time Frame for Short Term Goals: upon d/c  Short Term Goal 1: Bed mobility with CGA. Short Term Goal 2: Pivot transfer bed <> w/c(chair) with CGA. Short Term Goal 3: Propel w/c 50 feet with SBA.   Patient Goals   Patient Goals : none stated       Education  Patient Education  Education Given To: Patient  Education Provided: Role of Therapy;Plan of Care  Education Method: Verbal;Demonstration  Barriers to Learning: Other (Comment) (aphasia)  Education Outcome: Continued education needed      Therapy Time   Individual Concurrent Group Co-treatment   Time In 4600         Time Out 2181         Minutes 58         Timed Code Treatment Minutes: 43 Minutes     Electronically signed by Ward Pal, PT [id number] on 2/21/2023 at 3:12 PM

## 2023-02-21 NOTE — PROGRESS NOTES
4 Eyes Skin Assessment     NAME:  Dianne Keller  YOB: 1955  MEDICAL RECORD NUMBER:  [id number]    The patient is being assessed for  Admission    I agree that One RN has performed a thorough Head to Toe Skin Assessment on the patient. ALL assessment sites listed below have been assessed. Areas assessed by both nurses:    Head, Face, Ears, Shoulders, Back, Chest, Arms, Elbows, Hands, Sacrum. Buttock, Coccyx, Ischium, and Legs. Feet and Heels        Does the Patient have a Wound? Yes wound(s) were present on assessment.  LDA wound assessment was Initiated and completed by RN       Nelson Prevention initiated by RN: Yes   Wound Care Orders initiated by RN: No    Pressure Injury (Stage 3,4, Unstageable, DTI, NWPT, and Complex wounds) if present, place referral order by RN under : NA    New and Established Ostomies, if present place, referral order under : NA      Nurse 1 eSignature: Electronically signed by Sharda Francis RN on 2/21/23 at 6:04 AM EST    **SHARE this note so that the co-signing nurse can place an eSignature**    Nurse 2 eSignature: Electronically signed by Miya Israel RN on 2/21/23 at 6:14 AM EST

## 2023-02-21 NOTE — ED NOTES
1st attempt to call report upstairs. No answer from nurse will call back in a few.       Yoanna Herman RN  02/20/23 2262

## 2023-02-21 NOTE — PROGRESS NOTES
Pharmacy Medication Reconciliation Note     List of medications Dave Lancaster is currently taking is complete. Source of information:   1. Conversation with patient at bedside  2. EMR  3. [facility] -- [phone]    Notes regarding home medications:   1. Patient is a poor historian-- states she manages all her medications and home nurse is unfamiliar with her prescriptions. Spoke to patient's son, Dnay Mercado, and he is also unfamiliar with medications. Patient reports last taking home medications yesterday  2. Patient is anticoagulated on Xarelto 20 mg QD-- reports last dose yesterday   3. Per [facility], patient no longer filling amlodipine, atorvastatin, iron, mycophenolate, potassium and prednisone  4. Patient confirms she uses Dupixent injection every two weeks but is unable to confirm last dose or when she typically injects  5.  On 2/17/23, patient prescribed Keflex 500 mg QID x 5 days and doxycycline 100 mg BID x 5 days-- reports last dose of both antibiotics was yesterday    Yancy Gibson, Pharmacy Intern  2/20/2023  7:20 PM

## 2023-02-21 NOTE — PLAN OF CARE
Problem: Pain  Goal: Verbalizes/displays adequate comfort level or baseline comfort level  2/21/2023 1054 by Kerrie George RN  Outcome: Progressing  2/21/2023 1054 by Kerrie George RN  Outcome: Progressing  2/21/2023 0212 by Dago West RN  Outcome: Progressing

## 2023-02-21 NOTE — PROGRESS NOTES
Occupational Therapy  Facility/Department: [facility]  Occupational Therapy Initial Assessment    Name: Javad Gusman  : 1955  MRN: [id number]  Date of Service: 2023    Discharge Recommendations:  3-5 sessions per week, Patient would benefit from continued therapy after discharge      Javad Gusman scored a 13/24 on the AM-PAC ADL Inpatient form. Current research shows that an AM-PAC score of 17 or less is typically not associated with a discharge to the patient's home setting. Based on the patient's AM-PAC score and their current ADL deficits, it is recommended that the patient have 3-5 sessions per week of Occupational Therapy at d/c to increase the patient's independence. Please see assessment section for further patient specific details. If patient discharges prior to next session this note will serve as a discharge summary. Please see below for the latest assessment towards goals. Patient Diagnosis(es): The primary encounter diagnosis was Altered mental status, unspecified altered mental status type. A diagnosis of Atypical pneumonia was also pertinent to this visit. Past Medical History:  has a past medical history of Anxiety, Arthritis, Atrial fibrillation/flutter, Blood transfusion reaction, Crohn's disease ([facility] [address] Depression, GERD (gastroesophageal reflux disease), Hiatal hernia, Hx of blood clots, Hypertension, MRSA (methicillin resistant staph aureus) culture positive, MRSA (methicillin resistant staph aureus) culture positive, Neuropathy, Pneumonia, Sinus headache, SOB (shortness of breath), and VRE (vancomycin resistant enterococcus) culture positive. Past Surgical History:  has a past surgical history that includes Hysterectomy; knee surgery (Bilateral); Hand Carpectomy (Bilateral); hernia repair; Abdomen surgery; bladder suspension; fracture surgery; Heel spur surgery; Tonsillectomy; Colonoscopy; Upper gastrointestinal endoscopy (13);  Foot surgery (Left, 6/25/14); Foot surgery (6/3/15); Colonoscopy (9/14/15); Foot surgery (Left, 08/05/2002); joint replacement; Sigmoidoscopy (01/15/2018); Abdominal adhesion surgery (06/08/2018); Colonoscopy (08/07/2018); colostomy (N/A, 10/8/2018); pr secondary closure surg wound/dehsn extsv/complic (N/A, 43/79/0086); Leg Surgery (Right); Colonoscopy (06/07/2019); Colonoscopy (N/A, 6/7/2019); Colonoscopy (N/A, 6/7/2019); Small intestine surgery (N/A, 7/17/2019); and proctosigmoidoscopy (N/A, 7/17/2019). Assessment   Performance deficits / Impairments: Decreased functional mobility ; Decreased ADL status; Decreased ROM; Decreased strength;Decreased safe awareness;Decreased cognition;Decreased endurance;Decreased balance;Decreased high-level IADLs  Assessment: Pt is a 78 y.o. female presenting to ED with fall, found down by HHA. Pt admitted with Mild rhabdomyolysis, Hyponatremia, possible PNA. At baseline, pt lives alone, and reports independent ADLs and SPT to/from w/c. Pt reports she has HHA daily to assist with IADLs. Pt is a poor historian. Pt currently functioning below reported baseline, today requiring min/mod A bed mobility, min A for SPT, and anticipate pt would require overall mod/max A for ADLs. As pt presents today, AM-PAC score is NOT associated with a homebound d/c, and indicates need for ongoing skilled OT. However, pt adamantly refusing this at this time. If pt returns home,  recommend 24 hour assist (which pt does not have), and home OT, as well as resumed HHA services.   Prognosis: Fair  Decision Making: Medium Complexity  History: see above  REQUIRES OT FOLLOW-UP: Yes  Activity Tolerance  Activity Tolerance: Patient limited by pain        Plan   Occupational Therapy Plan  Times Per Week: 3-5  Current Treatment Recommendations: Strengthening, Balance training, ROM, Functional mobility training, Endurance training, Wheelchair mobility training, Safety education & training, Self-Care / ADL, Cognitive reorientation, Cognitive/Perceptual training, Neuromuscular re-education     Restrictions  Restrictions/Precautions  Restrictions/Precautions: Fall Risk  Position Activity Restriction  Other position/activity restrictions: pure-wick    Subjective   General  Chart Reviewed: Yes  Additional Pertinent Hx: Pt is a 78 y.o. female presenting to ED with fall, found down by HHA. Pt admitted with Mild rhabdomyolysis, Hyponatremia, possible PNA. Imaging negative for fx. Family / Caregiver Present: No  Referring Practitioner: Brandon Pina MD  Diagnosis: Mild rhabdomyolysis, Hyponatremia, possible PNA  Subjective  Subjective: Pt met b/s for OT eval/tx with PT. Pt in bed on arrival, agreeable to participate in therapy. Pt reports R LE pain, did not rate. Pt with expressive aphasia. Social/Functional History  Social/Functional History  Lives With: Alone  Type of Home: House  Home Layout: One level  Home Access: Level entry  Bathroom Shower/Tub: Tub/Shower unit  Bathroom Toilet: Standard  Bathroom Equipment: Commode  Bathroom Accessibility: Wheelchair accessible  Home Equipment: Walker, rolling, [address] Help From: Home health (Pt reports HHA daily to assist)  ADL Assistance: Independent (sponge bathes on the commode)  Homemaking Assistance: Needs assistance (HHA does cleaning, laundry, and cooking. Son does grocery shopping.)  Homemaking Responsibilities: No  Ambulation Assistance: Non-ambulatory (uses w/c)  Transfer Assistance: Independent (stand pivots to w/c)  Active : No  Additional Comments: above info per care everywhere in Feb. 2023       Objective       Safety Devices  Type of Devices: Call light within reach; Bed alarm in place;Gait belt;Patient at risk for falls; Left in bed;Nurse notified    AROM: Generally decreased, functional (cecilia shoulder flexion limited to ~90*, WFL distally)  Strength:  (pt had difficulty following commands for MMT)  Sensation:  (NISHANT d/t cognition and expressive aphasia)    ADL  Toileting: Dependent/Total  Toileting Skilled Clinical Factors: external purewick catheter  Additional Comments: Anticipate pt is set-up feeding and seated grooming, max A bathing and dressing based on ROM/strength, balance, endurance. Activity Tolerance  Activity Tolerance: Patient tolerated evaluation without incident;Treatment limited secondary to decreased cognition; Other (comment)  Activity Tolerance Comments: aphasia    Bed mobility  Supine to Sit: Minimal assistance (HOB elevated; increased time to complete)  Sit to Supine: Moderate assistance (HOB slightly elevated)    Transfers  Stand Pivot Transfers: Minimal assistance (to R bed>recliner with CGA, min A to L recliner >bed)  Sit to stand: Minimal assistance;Contact guard assistance  Stand to sit: Contact guard assistance;Minimal assistance    Vision  Vision: Impaired  Vision Exceptions:  (pt reports she needs glasses)  Hearing  Hearing: Within functional limits    Cognition  Overall Cognitive Status: Exceptions  Arousal/Alertness: Appropriate responses to stimuli  Following Commands: Follows one step commands with repetition; Follows one step commands with increased time  Attention Span: Attends with cues to redirect  Memory: Decreased short term memory;Decreased recall of recent events  Safety Judgement: Decreased awareness of need for safety;Decreased awareness of need for assistance  Problem Solving: Decreased awareness of errors;Assistance required to identify errors made;Assistance required to generate solutions  Insights: Decreased awareness of deficits  Initiation: Requires cues for some  Sequencing: Requires cues for some  Orientation  Overall Orientation Status: Impaired  Orientation Level: Oriented to person;Disoriented to time;Oriented to place; Disoriented to situation    Functional Mobility: pt non-ambulatory  Static Sitting Balance: SBA seated EOB  Static Standing Balance: CGA  Dynamic Standing Balance: CGA/min A during SPT    Education Given To: Patient  Education Provided: Role of Therapy;Plan of Care;Transfer Training;Orientation  Education Method: Demonstration;Verbal  Barriers to Learning: Cognition  Education Outcome: Continued education needed                        AM-PAC Score        AM-PAC Inpatient Daily Activity Raw Score: 13 (02/21/23 1506)  AM-PAC Inpatient ADL T-Scale Score : 32.03 (02/21/23 1506)  ADL Inpatient CMS 0-100% Score: 63.03 (02/21/23 1506)  ADL Inpatient CMS G-Code Modifier : CL (02/21/23 1506)        Goals  Short Term Goals  Time Frame for Short Term Goals: Prior to d/c:  Short Term Goal 1: Pt will bathe with min A. Short Term Goal 2: Pt will dress with min A. Short Term Goal 3: Pt will toilet with min A. Short Term Goal 4: Pt will complete w/c mobility with SBA/supervision in prep for ADLs. Short Term Goal 5: Pt will complete SPT to/from ADL surfaces with SBA/supervision. Long Term Goals  Time Frame for Long Term Goals : STGs=LTGs  Patient Goals   Patient goals : to return home.        Therapy Time   Individual Concurrent Group Co-treatment   Time In 4377         Time Out 1511         Minutes 58         Timed Code Treatment Minutes: [address], OTR/L 0051

## 2023-02-21 NOTE — PROGRESS NOTES
Admission from ED to Room 4125 per [place] bed accompanied by transport technician. Patient assisted in bed, kept comfortable, side rails secured, bed in lowest position, bed locked and bed alarm on. Patient is alert and oriented x 2, up with assistance. Patient oriented to room and unit setting. Call light instructed and put in reach. Admission questions done and answered by patient's son. All needs attended. Safety ensured.  Electronically signed by Mardi Frankel, RN on 2/20/2023 at 10:16 PM

## 2023-02-21 NOTE — PROGRESS NOTES
1030- Morning assessments and vital signs complete. Patient given scheduled medications as prescribed. She denies pain at this time. Call received from patient's mother/Sara. Okay per patient to give her an update of care. External catheter is in placed. Cannister and tubing changed. Call light is within reach, bed alarm is on, IV fluids are infusing. 1220- Patient assisted to transfer from bed to bedside commode with the assistance of 2. Patient transferred back to bed. Call light is within reach. 1700- PRN pain medication given for generalized pain. Medication effective in decreasing pain per patient. Patient assisted to call her mother.

## 2023-02-21 NOTE — CARE COORDINATION
INITIAL CASE MANAGEMENT ASSESSMENT    Reviewed chart, met with patient to assess possible discharge needs. Explained Case Management role/services. Living Situation: Patient lives alone in a house with 2 steps to enter. ADLs: Needs assistance with bathing, dressing, toileting     DME: [employer], W/C bound, Lift Chair (sleeps in chair), [employer]SC    PT/OT Recs: TBD     Active Services: [employer] : [phone]. [employer], [employer]OW's, Aide 8h/day, M-F   [address] [phone]    Transportation: [employer] transports     Medications: [facility] on [address]No barriers    PCP:  Yany Medina MD      HD/PD: N/A    PLAN/COMMENTS: TBD. Patient has aphasia and is unable to answer questions appropriately. Call placed to son with no answer or voicemail. Call placed to Edmund Jacobson [phone]. SW/CM provided contact information for patient or family to call with any questions. SW/CM will follow and assist as needed.    Electronically signed by Clarissa Almeida RN on 2/21/2023 at 4:31 PM

## 2023-02-21 NOTE — H&P
Hospital Medicine History & Physical      PCP: Philip Stern MD    Date of Admission: 2/20/2023    Chief Complaint:    Chief Complaint   Patient presents with    Fall     Pt. Deandre Tam last night, was on the ground until nurse found her this morning. Swollen R eye and AMS. History Of Present Illness:    Patient is a 79-year-old female who presents from nursing home facility because she was found down on the ground and had a fall. Patient at time patient mentions she feels better however she does not have any active complaints, she denies chest pain shortness of breath nausea vomiting diarrhea constipation dysuria. Reportedly per nursing home staff patient has been confused.     Past Medical History:          Diagnosis Date    Anxiety     Arthritis     Atrial fibrillation/flutter     Blood transfusion reaction     Crohn's disease ([address]     Depression     GERD (gastroesophageal reflux disease)     Hiatal hernia 06/24/2014    Hx of blood clots     Hypertension     MRSA (methicillin resistant staph aureus) culture positive 06/05/2018    urine    MRSA (methicillin resistant staph aureus) culture positive 06/03/2021    wound and colonization    Neuropathy     Pneumonia 01/08/2014    Sinus headache     SOB (shortness of breath)     VRE (vancomycin resistant enterococcus) culture positive 10/08/2018    abd culture       Past Surgical History:          Procedure Laterality Date    ABDOMEN SURGERY      ABDOMINAL ADHESION SURGERY  06/08/2018    BLADDER SUSPENSION      COLONOSCOPY      COLONOSCOPY  9/14/15    sigmoid colon biopsy     COLONOSCOPY  08/07/2018    COLONOSCOPY  06/07/2019    COLONOSCOPY, POSSIBLE BIOPSY, POSSIBLE POLYPECTOMY    COLONOSCOPY N/A 6/7/2019    COLONOSCOPY WITH BIOPSY performed by Ehsan Silva MD at [address] [facility] N/A 6/7/2019    COLONOSCOPY DIAGNOSTIC/STOMA performed by Ehsan Silva MD at [facility] 10/8/2018    [facility] performed by Norman Story MD at [facility] Left 6/25/14    excision plantar left hallux    FOOT SURGERY  6/3/15    PLANTAR PLATE REPAIR BILATERAL, ARTHROTOMY MPJ 2ND BILATERAL    FOOT SURGERY Left 08/05/2002    Excision second metatarsal head left    FRACTURE SURGERY      rt hip    HAND CARPECTOMY Bilateral     HEEL SPUR SURGERY      HERNIA REPAIR      HYSTERECTOMY (CERVIX STATUS UNKNOWN)      bladder surgery    JOINT REPLACEMENT      rt hip, L shoulder replacement 11/2014    KNEE SURGERY Bilateral     arthrosvopic    LEG SURGERY Right     MD SECONDARY CLOSURE SURG WOUND/DEHSN EXTSV/COMPLIC N/A 71/62/8229    SECONDARY WOUND CLOSURE OF ABDOMINAL WOUND performed by Norman Story MD at [address] [place] [address] N/A 7/17/2019    FLEXIBLE SIGMOIDOSCOPY performed by Norman Story MD at [address] [place] [address]  01/15/2018    with biopsies    SMALL INTESTINE SURGERY N/A 7/17/2019    COLOSTOMY CLOSURE WITH COLORECTAL ANASTOMOSIS performed by Norman Story MD at [address] [place] [address]  6/14/13    and colonsocopy with antral erosion biopsies       Medications Prior to Admission:      Prior to Admission medications    Medication Sig Start Date End Date Taking? Authorizing Provider   fluticasone (FLONASE) 50 MCG/ACT nasal spray 1 spray by Each Nostril route daily   Yes Historical Provider, MD   cephALEXin (KEFLEX) 500 MG capsule Take 500 mg by mouth 4 times daily 2/17/23 2/22/23 Yes Historical Provider, MD   doxycycline hyclate (VIBRA-TABS) 100 MG tablet Take 100 mg by mouth 2 times daily 2/17/23 2/22/23 Yes Historical Provider, MD   pantoprazole (PROTONIX) 40 MG tablet Take 40 mg by mouth daily   Yes Historical Provider, MD   famotidine (PEPCID) 20 MG tablet Take 1 tablet by mouth 2 times daily 11/19/22   Vito Turciosr, MD   lacosamide (VIMPAT) 100 MG TABS tablet Take 200 mg by mouth 2 times daily.     Historical Provider, MD calcium-vitamin D (OSCAL-500) 500-200 MG-UNIT per tablet Take 1 tablet by mouth 2 times daily    Historical Provider, MD   dupilumab (DUPIXENT) 300 MG/2ML SOPN injection Inject 300 mg into the skin every 14 days    Historical Provider, MD   nystatin (MYCOSTATIN) 448231 UNIT/GM powder Apply topically 2 times daily Apply topically 4 times daily. Historical Provider, MD   triamcinolone (KENALOG) 0.1 % ointment Apply topically 2 times daily Apply topically 2 times daily. Historical Provider, MD   magnesium oxide (MAG-OX) 400 (240 Mg) MG tablet Take 1 tablet by mouth 2 times daily 8/10/21   Prateek Angeles MD   baclofen (LIORESAL) 10 MG tablet Take 15 mg by mouth 3 times daily as needed    Historical Provider, MD   metoprolol succinate (TOPROL XL) 50 MG extended release tablet Take 1 tablet by mouth daily 1/21/21   GAYLA Mcclendon CNP   rivaroxaban (XARELTO) 20 MG TABS tablet Take 1 tablet by mouth daily (with breakfast) 12/7/20   GAYLA Mcclendon CNP   hydrOXYzine HCl (ATARAX) 25 MG tablet Take 25 mg by mouth every 6 hours as needed for Itching    Historical Provider, MD   busPIRone (BUSPAR) 10 MG tablet Take 10 mg by mouth 3 times daily as needed    Historical Provider, MD   balsalazide (COLAZAL) 750 MG capsule Take 1,500 mg by mouth in the morning and at bedtime    Historical Provider, MD       Allergies:  Ace inhibitors and Skelaxin [metaxalone]    Social History:      TOBACCO:   reports that she has been smoking cigarettes and e-cigarettes. She has a 10.00 pack-year smoking history. She has never used smokeless tobacco.  ETOH:   reports that she does not currently use alcohol after a past usage of about 2.0 standard drinks per week. Family History:       Reviewed in detail and non contributory          Problem Relation Age of Onset    High Blood Pressure Mother     High Blood Pressure Father     Kidney Disease Sister        REVIEW OF SYSTEMS:   Pertinent positives as noted in the HPI.  All other systems reviewed and negative. PHYSICAL EXAM PERFORMED:    BP (!) 132/91   Pulse 95   Temp 98 °F (36.7 °C) (Oral)   Resp 22   Ht 5' 7\" (1.702 m)   Wt 184 lb 4.9 oz (83.6 kg)   SpO2 94%   BMI 28.87 kg/m²     General appearance:  No apparent distress, cooperative. HEENT:  Normal cephalic, atraumatic without obvious deformity. Conjunctivae/corneas clear. Neck: Supple, with full range of motion. No cervical lymphadenopathy  Respiratory:  Normal respiratory effort. Clear to auscultation, bilaterally without Rales/Wheezes/Rhonchi. Cardiovascular:  Regular rate and rhythm with normal S1/S2 without murmurs, rubs or gallops. Abdomen: Soft, non-tender, non-distended, normal bowel sounds. Musculoskeletal:  No edema noted bilaterally. No tenderness on palpation   Skin: no rash visible  Neurologic:  Neurologically intact without any focal sensory/motor deficits. grossly non-focal.  Psychiatric:  Alert and oriented, normal mood  Peripheral Pulses: +2 palpable, equal bilaterally       Labs:     Recent Labs     02/20/23  1147   WBC 8.5   HGB 10.5*   HCT 32.1*        Recent Labs     02/20/23  1147   *   K 4.1   CL 99   CO2 23   BUN 6*   CREATININE <0.5*   CALCIUM 9.5     No results for input(s): AST, ALT, BILIDIR, BILITOT, ALKPHOS in the last 72 hours. No results for input(s): INR in the last 72 hours. Recent Labs     02/20/23  1147   CKTOTAL 688*       Urinalysis:      Lab Results   Component Value Date/Time    NITRU Negative 02/20/2023 01:31 PM    [address] 02/20/2023 01:31 PM    BACTERIA None Seen 02/20/2023 01:31 PM    RBCUA 2 02/20/2023 01:31 PM    BLOODU Negative 02/20/2023 01:31 PM    SPECGRAV 1.020 02/20/2023 01:31 PM    GLUCOSEU Negative 02/20/2023 01:31 PM       Radiology:       XR CHEST PORTABLE   Final Result   Underlying pulmonary edema.       Superimposed left lower lobe airspace disease with left-sided pleural effusion      Hiatal hernia         CT HEAD WO CONTRAST   Final Result Head      Atrophy and small-vessel ischemic change, similar compared to November 2022,   with more focal remote infarct on the left. No acute traumatic intracranial   abnormality      Facial bones      Facial soft tissue swelling on the right. No definite facial bone fracture         CT CERVICAL SPINE WO CONTRAST   Final Result   1. No acute cervical spine fracture. 2. New patchy ground-glass opacities at the lung apices which are nonspecific   and could reflect pneumonitis or atypical infection including viral pneumonia. 3. Spinal degenerative changes as described. Mild anterolisthesis at the   C3-C4 and C4-C5 levels appears unchanged and is likely degenerative. 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 No acute traumatic intracranial   abnormality      Facial bones      Facial soft tissue swelling on the right. No definite facial bone fracture                 Active Hospital Problems    Diagnosis Date Noted    Fall against object Traci Door 02/20/2023     Priority: Medium       Patient is a 60-year-old female who presents from nursing home facility because she was found down on the ground and had a fall. Patient at time patient mentions she feels better however she does not have any active complaints, she denies chest pain shortness of breath nausea vomiting diarrhea constipation dysuria. Reportedly per nursing home staff patient has been confused.     Assessment  Mild rhabdomyolysis  Fall  Hyponatremia  Left lower lobe airspace disease suggestive of pneumonia, presumed gram-negative organism  Hypertension  Atrial fibrillation    Plan  Monitor CK level  Start IV Levaquin  Check PCT, urine Legionella antigen, urine strep antigen, MRSA nasal swab  Check urine cultures  Monitor replace electrolytes  Resume home medications  Fall precautions  Consult PT/OT  Diet: No diet orders on file  Code Status: Prior    PT/OT Eval Status: ordered    Dispo - pending clinical improvement       Catalina Chau MD    The note was completed using EMR and Dragon dictation system. Every effort was made to ensure accuracy; however, inadvertent computerized transcription errors may be present. Thank you Clara Wilkes MD for the opportunity to be involved in this patient's care. If you have any questions or concerns please feel free to contact me at [phone].     Catalina Chau MD

## 2023-02-21 NOTE — PLAN OF CARE
Problem: Discharge Planning  Goal: Discharge to home or other facility with appropriate resources  Outcome: Progressing     Problem: Pain Medication administered per Mar  Goal: Verbalizes/displays adequate comfort level or baseline comfort level  Outcome: Progressing     Problem: Safety - Adult  Goal: Free from fall injury  Outcome: Progressing     Problem: ABCDS Injury Assessment  Goal: Absence of physical injury  Outcome: Progressing     Problem: Skin/Tissue Integrity  Goal: Absence of new skin breakdown  Description: 1. Monitor for areas of redness and/or skin breakdown  2. Assess vascular access sites hourly  3. Every 4-6 hours minimum:  Change oxygen saturation probe site  4. Every 4-6 hours:  If on nasal continuous positive airway pressure, respiratory therapy assess nares and determine need for appliance change or resting period.   Outcome: Progressing

## 2023-02-21 NOTE — PROGRESS NOTES
Hospitalist Progress Note      PCP: Drea Bliss MD    Chief Complaint. Patient is a 67-year-old female who presents from nursing home facility because she was found down on the ground and had a fall. Patient at time patient mentions she feels better however she does not have any active complaints, she denies chest pain shortness of breath nausea vomiting diarrhea constipation dysuria. Reportedly per nursing home staff patient has been confused. Date of Admission: 2/20/2023    Subjective:   denies chest pain, nausea, vomiting, shortness of breath, fever or chills. mention feels overall better    Medications:  Reviewed    Infusion Medications    sodium chloride       Scheduled Medications    sodium chloride flush  10 mL IntraVENous 2 times per day    balsalazide  1,500 mg Oral BID    famotidine  20 mg Oral BID    lacosamide  200 mg Oral BID    metoprolol succinate  50 mg Oral Daily    pantoprazole  40 mg Oral QAM AC    rivaroxaban  20 mg Oral Daily with breakfast    levofloxacin  750 mg IntraVENous Q24H     PRN Meds: sodium chloride flush, sodium chloride, potassium chloride **OR** potassium alternative oral replacement **OR** potassium chloride, magnesium sulfate, promethazine **OR** ondansetron, magnesium hydroxide, acetaminophen **OR** acetaminophen, baclofen, hydrOXYzine HCl      Intake/Output Summary (Last 24 hours) at 2/21/2023 1742  Last data filed at 2/21/2023 4676  Gross per 24 hour   Intake 480 ml   Output 200 ml   Net 280 ml       Physical Exam Performed:    /76   Pulse 83   Temp 98.1 °F (36.7 °C) (Axillary)   Resp 16   Ht 5' 7\" (1.702 m)   Wt 181 lb 10.5 oz (82.4 kg)   SpO2 97%   BMI 28.45 kg/m²     General appearance: NAD  HEENT:  Conjunctivae/corneas clear. Neck: Supple, with full range of motion. Respiratory:  Normal respiratory effort. Clear to auscultation, bilaterally without Rales/Wheezes/Rhonchi.   Cardiovascular: Regular rate and rhythm with normal S1/S2 without murmurs or rubs  Abdomen: Soft, non-tender, non-distended, normal bowel sounds. Musculoskeletal: No cyanosis or edema bilaterally  Neurologic:  without any focal sensory/motor deficits. grossly non-focal.  Psychiatric: Alert and oriented, Normal mood  Peripheral Pulses: +2 palpable, equal bilaterally     Labs:   Recent Labs     02/20/23  1147 02/21/23  0605   WBC 8.5 8.2   HGB 10.5* 9.2*   HCT 32.1* 27.2*    351     Recent Labs     02/20/23  1147 02/21/23  0605   * 130*   K 4.1 3.8   CL 99 96*   CO2 23 23   BUN 6* 3*   CREATININE <0.5* <0.5*   CALCIUM 9.5 8.7     No results for input(s): AST, ALT, BILIDIR, BILITOT, ALKPHOS in the last 72 hours. No results for input(s): INR in the last 72 hours. Recent Labs     02/20/23  1147 02/20/23  2205   CKTOTAL 688* 463*       Urinalysis:      Lab Results   Component Value Date/Time    NITRU Negative 02/20/2023 01:31 PM    [address] 02/20/2023 01:31 PM    BACTERIA None Seen 02/20/2023 01:31 PM    RBCUA 2 02/20/2023 01:31 PM    BLOODU Negative 02/20/2023 01:31 PM    SPECGRAV 1.020 02/20/2023 01:31 PM    GLUCOSEU Negative 02/20/2023 01:31 PM       Radiology:  XR CHEST PORTABLE   Final Result   Underlying pulmonary edema. 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 No acute traumatic intracranial   abnormality      Facial bones      Facial soft tissue swelling on the right. No definite facial bone fracture         CT CERVICAL SPINE WO CONTRAST   Final Result   1. No acute cervical spine fracture. 2. New patchy ground-glass opacities at the lung apices which are nonspecific   and could reflect pneumonitis or atypical infection including viral pneumonia. 3. Spinal degenerative changes as described.   Mild anterolisthesis at the C3-C4 and C4-C5 levels appears unchanged and is likely degenerative. 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 No acute traumatic intracranial   abnormality      Facial bones      Facial soft tissue swelling on the right. No definite facial bone fracture               Assessment/Plan:    Active Hospital Problems    Diagnosis     Fall against object [W18.00XA]      Priority: Medium     Patient is a 79-year-old female who presents from nursing home facility because she was found down on the ground and had a fall. Patient at time patient mentions she feels better however she does not have any active complaints, she denies chest pain shortness of breath nausea vomiting diarrhea constipation dysuria. Reportedly per nursing home staff patient has been confused. Assessment  Mild rhabdomyolysis  Fall  Hyponatremia  Left lower lobe airspace disease suggestive of pneumonia, presumed gram-negative organism  Hypertension  Atrial fibrillation     Plan  Monitor CK level - trending down  Start IV Levaquin - continue  Check PCT, urine Legionella antigen, urine strep antigen, MRSA nasal swab  Check urine cultures  Monitor replace electrolytes  Resume home medications  Fall precautions  Consult PT/OT  Diet: ADULT DIET;  Regular  Code Status: Full Code    PT/OT Eval Status: ordered    Dispo/Plan of care - monitor CK level, continue IV abx, dc tomorrow /    Yan Arguello MD

## 2023-02-21 NOTE — ED NOTES
Pt. to be admitted to [address] [place] [address] room 4125. Report called to Jose Birch RN and transport to be initiated when room has been marked \"Ready\".       Lorne Ferguson RN  02/20/23 8559

## 2023-02-21 NOTE — PLAN OF CARE
Problem: Pain  Goal: Verbalizes/displays adequate comfort level or baseline comfort level  2/21/2023 1054 by Deepika Sheldon RN  Outcome: Progressing  2/21/2023 0212 by Micky Browne RN  Outcome: Progressing

## 2023-02-22 VITALS
HEART RATE: 79 BPM | OXYGEN SATURATION: 95 % | DIASTOLIC BLOOD PRESSURE: 76 MMHG | BODY MASS INDEX: 28.51 KG/M2 | TEMPERATURE: 97.3 F | WEIGHT: 181.66 LBS | HEIGHT: 67 IN | RESPIRATION RATE: 18 BRPM | SYSTOLIC BLOOD PRESSURE: 115 MMHG

## 2023-02-22 LAB
ANION GAP SERPL CALCULATED.3IONS-SCNC: 10 MMOL/L (ref 3–16)
BASOPHILS ABSOLUTE: 0.1 K/UL (ref 0–0.2)
BASOPHILS RELATIVE PERCENT: 0.6 %
BUN BLDV-MCNC: 2 MG/DL (ref 7–20)
CALCIUM SERPL-MCNC: 8.4 MG/DL (ref 8.3–10.6)
CHLORIDE BLD-SCNC: 95 MMOL/L (ref 99–110)
CO2: 25 MMOL/L (ref 21–32)
CREAT SERPL-MCNC: <0.5 MG/DL (ref 0.6–1.2)
EOSINOPHILS ABSOLUTE: 1.2 K/UL (ref 0–0.6)
EOSINOPHILS RELATIVE PERCENT: 12.6 %
GFR SERPL CREATININE-BSD FRML MDRD: >60 ML/MIN/{1.73_M2}
GLUCOSE BLD-MCNC: 85 MG/DL (ref 70–99)
HCT VFR BLD CALC: 28 % (ref 36–48)
HEMOGLOBIN: 9.4 G/DL (ref 12–16)
LYMPHOCYTES ABSOLUTE: 2.4 K/UL (ref 1–5.1)
LYMPHOCYTES RELATIVE PERCENT: 25.7 %
MAGNESIUM: 1.2 MG/DL (ref 1.8–2.4)
MCH RBC QN AUTO: 29.1 PG (ref 26–34)
MCHC RBC AUTO-ENTMCNC: 33.7 G/DL (ref 31–36)
MCV RBC AUTO: 86.2 FL (ref 80–100)
MONOCYTES ABSOLUTE: 0.5 K/UL (ref 0–1.3)
MONOCYTES RELATIVE PERCENT: 5.4 %
NEUTROPHILS ABSOLUTE: 5.2 K/UL (ref 1.7–7.7)
NEUTROPHILS RELATIVE PERCENT: 55.7 %
PDW BLD-RTO: 13.3 % (ref 12.4–15.4)
PLATELET # BLD: 357 K/UL (ref 135–450)
PMV BLD AUTO: 7.6 FL (ref 5–10.5)
POTASSIUM REFLEX MAGNESIUM: 3.4 MMOL/L (ref 3.5–5.1)
RBC # BLD: 3.25 M/UL (ref 4–5.2)
SODIUM BLD-SCNC: 130 MMOL/L (ref 136–145)
WBC # BLD: 9.3 K/UL (ref 4–11)

## 2023-02-22 PROCEDURE — 6370000000 HC RX 637 (ALT 250 FOR IP): Performed by: INTERNAL MEDICINE

## 2023-02-22 PROCEDURE — 97530 THERAPEUTIC ACTIVITIES: CPT

## 2023-02-22 PROCEDURE — 94760 N-INVAS EAR/PLS OXIMETRY 1: CPT

## 2023-02-22 PROCEDURE — 2580000003 HC RX 258: Performed by: INTERNAL MEDICINE

## 2023-02-22 PROCEDURE — 97535 SELF CARE MNGMENT TRAINING: CPT

## 2023-02-22 PROCEDURE — 36415 COLL VENOUS BLD VENIPUNCTURE: CPT

## 2023-02-22 PROCEDURE — 85025 COMPLETE CBC W/AUTO DIFF WBC: CPT

## 2023-02-22 PROCEDURE — 83735 ASSAY OF MAGNESIUM: CPT

## 2023-02-22 PROCEDURE — 80048 BASIC METABOLIC PNL TOTAL CA: CPT

## 2023-02-22 RX ORDER — LEVOFLOXACIN 500 MG/1
500 TABLET, FILM COATED ORAL DAILY
Qty: 5 TABLET | Refills: 0 | Status: SHIPPED | OUTPATIENT
Start: 2023-02-22 | End: 2023-02-27

## 2023-02-22 RX ADMIN — RIVAROXABAN 20 MG: 20 TABLET, FILM COATED ORAL at 08:55

## 2023-02-22 RX ADMIN — BALSALAZIDE DISODIUM 1500 MG: 750 CAPSULE ORAL at 08:55

## 2023-02-22 RX ADMIN — LACOSAMIDE 200 MG: 100 TABLET, FILM COATED ORAL at 08:55

## 2023-02-22 RX ADMIN — HYDROXYZINE HYDROCHLORIDE 25 MG: 25 TABLET, FILM COATED ORAL at 05:52

## 2023-02-22 RX ADMIN — PANTOPRAZOLE SODIUM 40 MG: 40 TABLET, DELAYED RELEASE ORAL at 05:52

## 2023-02-22 RX ADMIN — SODIUM CHLORIDE, PRESERVATIVE FREE 10 ML: 5 INJECTION INTRAVENOUS at 09:43

## 2023-02-22 RX ADMIN — METOPROLOL SUCCINATE 50 MG: 50 TABLET, EXTENDED RELEASE ORAL at 08:55

## 2023-02-22 RX ADMIN — FAMOTIDINE 20 MG: 20 TABLET, FILM COATED ORAL at 08:55

## 2023-02-22 RX ADMIN — BACLOFEN 15 MG: 10 TABLET ORAL at 05:52

## 2023-02-22 RX ADMIN — ACETAMINOPHEN 650 MG: 325 TABLET ORAL at 05:57

## 2023-02-22 RX ADMIN — BACLOFEN 15 MG: 10 TABLET ORAL at 14:48

## 2023-02-22 ASSESSMENT — PAIN DESCRIPTION - DESCRIPTORS: DESCRIPTORS: ACHING

## 2023-02-22 ASSESSMENT — PAIN DESCRIPTION - LOCATION: LOCATION: GENERALIZED

## 2023-02-22 ASSESSMENT — PAIN SCALES - GENERAL: PAINLEVEL_OUTOF10: 10

## 2023-02-22 NOTE — CARE COORDINATION
2/22 Discharge order is noted. Call placed to patient's Son Ashu Kaufman [phone] regarding discharge. He will transport her to home. We discussed [facility] and he is agreeable. She has used [facility] in the past. The Plan for Transition of Care is related to the following treatment goals: Strengthening  The Patient and/or patient representative SonAshu was provided with a choice of provider and agrees   with the discharge plan. [x] Yes [] No  Freedom of choice list was provided with basic dialogue that supports the patient's individualized plan of care/goals, treatment preferences and shares the quality data associated with the providers. [x] Yes [] No   Referral sent to [facility] per choice. Electronically signed by Placido Hook RN on 2/22/2023 at 2:44 PM

## 2023-02-22 NOTE — DISCHARGE INSTR - COC
Continuity of Care Form    Patient Name: Javad Gusman   :  1955  MRN:  [id number]    Admit date:  2023  Discharge date:  ***    Code Status Order: Full Code   Advance Directives:     Admitting Physician:  Bradley Mendoza MD  PCP: Elda Pruitt MD    Discharging Nurse: [facility] Unit/Room#: G8H-8461/7531-02  Discharging Unit Phone Number: ***    Emergency Contact:   Extended Emergency Contact Information  Primary Emergency Contact: [facility]. Phone: [phone]  Work Phone: [phone]  Mobile Phone: [phone]  Relation: Child   needed? No  Secondary Emergency Contact: [address] [facility] Phone: [phone]  Work Phone: [phone]  Mobile Phone: [phone]  Relation: Parent   needed?  No    Past Surgical History:  Past Surgical History:   Procedure Laterality Date    ABDOMEN SURGERY      ABDOMINAL ADHESION SURGERY  2018    BLADDER SUSPENSION      COLONOSCOPY      COLONOSCOPY  9/14/15    sigmoid colon biopsy     COLONOSCOPY  2018    COLONOSCOPY  2019    COLONOSCOPY, POSSIBLE BIOPSY, POSSIBLE POLYPECTOMY    COLONOSCOPY N/A 2019    COLONOSCOPY WITH BIOPSY performed by Song Knott MD at [address] [facility] N/A 2019    COLONOSCOPY DIAGNOSTIC/STOMA performed by Song Knott MD at [facility] N/A 10/8/2018    EXPLORATORY LAPAROTOMY WITH COLOSTOMY performed by Nate Bey MD at [facility] Left 14    excision plantar left hallux    FOOT SURGERY  6/3/15    PLANTAR PLATE REPAIR BILATERAL, ARTHROTOMY MPJ 2ND BILATERAL    FOOT SURGERY Left 2002    Excision second metatarsal head left    FRACTURE SURGERY      rt hip    HAND CARPECTOMY Bilateral     HEEL SPUR SURGERY      HERNIA REPAIR      HYSTERECTOMY (CERVIX STATUS UNKNOWN)      bladder surgery    JOINT REPLACEMENT      rt hip, L shoulder replacement 2014    KNEE SURGERY Bilateral     arthrosvopic LEG SURGERY Right     WA SECONDARY CLOSURE SURG WOUND/DEHSN EXTSV/COMPLIC N/A 33/89/1923    SECONDARY WOUND CLOSURE OF ABDOMINAL WOUND performed by Shayan Sequeira MD at [address] [facility] N/A 7/17/2019    FLEXIBLE SIGMOIDOSCOPY performed by Shayan Sequeira MD at [address] [place] [address]  01/15/2018    with biopsies    SMALL INTESTINE SURGERY N/A 7/17/2019    COLOSTOMY CLOSURE WITH COLORECTAL ANASTOMOSIS performed by Shayan Sequeira MD at [facility].  6/14/13    and colonsocopy with antral erosion biopsies       Immunization History:   Immunization History   Administered Date(s) Administered    COVID-19, PFIZER PURPLE top, DILUTE for use, (age 15 y+), 30mcg/0.3mL 05/07/2021, 06/17/2021    PPD Test 01/16/2018    Tdap (Boostrix, Adacel) 07/15/2020       Active Problems:  Patient Active Problem List   Diagnosis Code    Anemia D64.9    [facility]'s colitis [facility] K50.10    MRSA (methicillin resistant Staphylococcus aureus) infection A49.02    DVT (deep venous thrombosis) ([facility]) I82.409    Hypomagnesemia E83.42    Leg swelling M79.89    Venous insufficiency I87.2    Chronic venous hypertension (idiopathic) with inflammation of bilateral lower extremity I87.323    Local infection of skin and subcutaneous tissue L08.9    Open wound of left foot with complication F75.238V    [employer] LINQ 7/29/20 Dr Clotilde De Jesus Z45.09    LV dysfunction I51.9    PAF (paroxysmal atrial fibrillation) ([facility]) I48.0    HTN (hypertension), benign I10    S/P coronary angiogram Z98.890    Abnormal angiogram R93.89    Atherosclerosis of native arteries of the extremities with ulceration ([facility]) I70.25    Nail avulsion of toe, initial encounter S91.209A    Lower abdominal pain R10.30    Late effect of ischemic cerebral stroke I69.30    Cognitive deficit as late effect of cerebrovascular accident (CVA) I69.319    Infection requiring contact isolation precautions B99.9 Hyponatremia E87.1    Suspected COVID-19 virus infection Z20.822    Alcohol abuse F10.10    Cellulitis of both feet L03.115, L03. 116    Multiple wounds T07. 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 9) E66.3    History of infection with vancomycin resistant Enterococcus (VRE) Z86.19    History of MRSA infection Z86.14    Sepsis secondary to UTI ([address] A41.9, N39.0    Altered mental status R41.82    Hypocalcemia E83.51    FTT (failure to thrive) in adult R62.7    Acute respiratory failure with hypoxemia ([address] J96.01    Fall against object W18.00XA       Isolation/Infection:   Isolation            Contact          Patient Infection Status       Infection Onset Added Last Indicated Last Indicated By Review Planned Expiration Resolved Resolved By    MDRO (multi-drug resistant organism) 21 Karyn Rendon RN        Added from external infection.     MRSA 21 Culture, MRSA, Screening        Resolved    COVID-19 (Rule Out) 23 COVID-19, Rapid (Ordered)   23 Rule-Out Test Resulted    COVID-19 (Rule Out) 22 COVID-19, Rapid (Ordered)   22 Rule-Out Test Resulted    COVID-19 (Rule Out) 21 COVID-19 (Ordered)   21 Rule-Out Test Resulted    COVID-19 (Rule Out) 21 COVID-19 (Ordered)   21 Rule-Out Test Resulted    C-diff Rule Out 21 Clostridium difficile toxin/antigen (Ordered)   21 Teresa Davies RN    COVID-19 (Rule Out) 20 COVID-19 (Ordered)   20     COVID-19 (Rule Out) 20 COVID-19 (Ordered)   20 Rule-Out Test Resulted    VRE  10/12/18 10/12/18 Daljit Winter, RN   10/25/19 Obdulia Callahan RN    10/8/18; abd culture    MRSA  18 Daljit Winter, RN 10/25/19 Dustin Recinos RN    18; urine            Nurse Assessment:  Last Vital Signs: /78   Pulse 79   Temp 98 °F (36.7 °C) (Oral)   Resp 16   Ht 5' 7\" (1.702 m)   Wt 181 lb 10.5 oz (82.4 kg)   SpO2 94%   BMI 28.45 kg/m²     Last documented pain score (0-10 scale): Pain Level: 3  Last Weight:   Wt Readings from Last 1 Encounters:   23 181 lb 10.5 oz (82.4 kg)     Mental Status:  {IP PT MENTAL STATUS:}    IV Access:  { MARIBEL IV ACCESS:940872247}    Nursing Mobility/ADLs:  Walking   {P DME HQCV:326823037}  Transfer  {P DME YSWS:012895618}  Bathing  {P DME ZXRH:948092346}  Dressing  {P DME YUPN:171492592}  Toileting  {[facility] DME XFB}  Feeding  {[facility] DME REUD:845515168}  Med Admin  {[facility] DME GFDB:618417622}  Med Delivery   { MRAIBEL MED Delivery:834016046}    Wound Care Documentation and Therapy:  Wound 22 Thigh Anterior;Proximal;Left;Medial;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 (Active)   Number of days: 96       Wound 22 Thigh Proximal;Right;Posterior; Upper; Inner superficial open blister secondary to bullous pemphigoid syndrome (Active)   Number of days: 96        Elimination:  Continence: Bowel: {YES / PH:50209}  Bladder: {YES / FI:01761}  Urinary Catheter: {Urinary Catheter:340059546}   Colostomy/Ileostomy/Ileal Conduit: {YES / DC:04354}       Date of Last BM: ***    Intake/Output Summary (Last 24 hours) at 2023 1418  Last data filed at 2023 0444  Gross per 24 hour   Intake 540 ml   Output 400 ml   Net 140 ml     I/O last 3 completed shifts:   In: 1020 [P.O.:1020]  Out: 600 [Urine:600]    Safety Concerns:     [employer] Safety Concerns:812880515}    Impairments/Disabilities:      [address] Eileen LÓPEZ Impairments/Disabilities:177490317}    Nutrition Therapy:  Current Nutrition Therapy:   [address] Eileen LÓPEZ Diet List:606638436}    Routes of Feeding: {CHP DME Other Feedings:144440295}  Liquids: {Slp liquid thickness:92318}  Daily Fluid Restriction: {CHP DME Yes amt example:425975263}  Last Modified Barium Swallow with Video (Video Swallowing Test): {Done Not Done YSHN:479521933}    Treatments at the Time of Hospital Discharge:   Respiratory Treatments: ***  Oxygen Therapy:  {Therapy; copd oxygen:84220}  Ventilator:    { CC Vent XRQT:465755437}    Rehab Therapies: {THERAPEUTIC INTERVENTION:1584985117}  Weight Bearing Status/Restrictions: [facility] CC Weight Bearin}  Other Medical Equipment (for information only, NOT a DME order):  {EQUIPMENT:900261170}  Other Treatments: ***    Patient's personal belongings (please select all that are sent with patient):  {CHP DME Belongings:415928999}    RN SIGNATURE:  {Esignature:154907132}    CASE MANAGEMENT/SOCIAL WORK SECTION    Inpatient Status Date: 23    Readmission Risk Assessment Score:  Readmission Risk              Risk of Unplanned Readmission:  18           Discharging to Facility/ Agency   Name: Discharging to Facility/ Agency   Name: [facility]  Address:   [address] [place] [address]  Phone:  [phone]  Fax:  [phone]     Dialysis Facility (if applicable)   Name:  Address:  Dialysis Schedule:  Phone:  Fax:    / signature: Electronically signed by Kelly Page RN on 23 at 2:48 PM EST    PHYSICIAN SECTION    Prognosis: Good    Condition at Discharge: Stable    Rehab Potential (if transferring to Rehab): Fair    Recommended Labs or Other Treatments After Discharge: follow up with your PCP in 1 week    Physician Certification: I certify the above information and transfer of Alicia Smith  is necessary for the continuing treatment of the diagnosis listed and that she requires [facility] for greater 30 days.      Update Admission H&P: No change in H&P    PHYSICIAN SIGNATURE:  Electronically signed by Raffy Jimenez MD on 23 at 2:18 PM EST

## 2023-02-22 NOTE — CARE COORDINATION
DISCHARGE SUMMARY     DATE OF DISCHARGE: 2/22/23    DISCHARGE DESTINATION: Home    HOME CARE: Yes    Discharging to Facility/ Agency   Name: [facility]  Address:   [address] [place] [address] [facility] [address]  Phone:  [phone]  Fax:  [phone]     HEMODIALYSIS: No    TRANSPORTATION: Private Car    NEW DME ORDERED: no    COMMENTS: AVS reviewed. [facility] [address] can accept patient. [facility] notified of discharge -resume Aide services [phone].   Electronically signed by Marisel Lund RN on 2/22/2023 at 3:00 PM

## 2023-02-22 NOTE — PROGRESS NOTES
Patient was informed of soft tissue swelling on CT cervical spine, and was given the option to be seen by ENT, patient mentioned she will follow up with PCP in 1 week

## 2023-02-22 NOTE — PROGRESS NOTES
Occupational Therapy  Facility/Department: [facility]  Occupational Therapy Daily Note    Name: Puja De Los Santos  : 1955  MRN: [id number]  Date of Service: 2023    Discharge Recommendations:  Patient would benefit from continued therapy after discharge, 3-5 sessions per week, 24 hour supervision or assist, Home with Home health OT (May be able to retun home if 24/7 Supervision can be provided)   Puja De Los Santos scored a 15/24 on the AM-PAC ADL Inpatient form. Current research shows that an AM-PAC score of 17 or less is typically not associated with a discharge to the patient's home setting. Based on the patient's AM-PAC score and their current ADL deficits, it is recommended that the patient have 3-5 sessions per week of Occupational Therapy at d/c to increase the patient's independence. Please see assessment section for further patient specific details. If patient discharges prior to next session this note will serve as a discharge summary. Please see below for the latest assessment towards goals. Patient Diagnosis(es): The primary encounter diagnosis was Altered mental status, unspecified altered mental status type. A diagnosis of Atypical pneumonia was also pertinent to this visit. Past Medical History:  has a past medical history of Anxiety, Arthritis, Atrial fibrillation/flutter, Blood transfusion reaction, Crohn's disease ([address] Depression, GERD (gastroesophageal reflux disease), Hiatal hernia, Hx of blood clots, Hypertension, MRSA (methicillin resistant staph aureus) culture positive, MRSA (methicillin resistant staph aureus) culture positive, Neuropathy, Pneumonia, Sinus headache, SOB (shortness of breath), and VRE (vancomycin resistant enterococcus) culture positive. Past Surgical History:  has a past surgical history that includes Hysterectomy; knee surgery (Bilateral);  Hand Carpectomy (Bilateral); hernia repair; Abdomen surgery; bladder suspension; fracture surgery; Heel spur surgery; Tonsillectomy; Colonoscopy; Upper gastrointestinal endoscopy (6/14/13); Foot surgery (Left, 6/25/14); Foot surgery (6/3/15); Colonoscopy (9/14/15); Foot surgery (Left, 08/05/2002); joint replacement; Sigmoidoscopy (01/15/2018); Abdominal adhesion surgery (06/08/2018); Colonoscopy (08/07/2018); colostomy (N/A, 10/8/2018); pr secondary closure surg wound/dehsn extsv/complic (N/A, 24/17/4311); Leg Surgery (Right); Colonoscopy (06/07/2019); Colonoscopy (N/A, 6/7/2019); Colonoscopy (N/A, 6/7/2019); Small intestine surgery (N/A, 7/17/2019); and proctosigmoidoscopy (N/A, 7/17/2019). Assessment   Performance deficits / Impairments: Decreased functional mobility ; Decreased ADL status; Decreased ROM; Decreased strength;Decreased safe awareness;Decreased cognition;Decreased endurance;Decreased balance;Decreased high-level IADLs  Assessment: Discussed with OTR: Pt tolerated tx well, completing bed mob, SBA and stand pivot transfers, bed to [facility] and BSC to recliner. Pt required up to Min/Mod A for toileting task and anticipate she would require overall Mod/Max A for ADL's. Pt limited by the above deficits, including decreased cognition (insight). Pt with difficutly expressing needs d/t aphasia. If pt returns home, recommend 24 hour assist (which pt does not have), and home OT, as well as resumed HHA services. Prognosis: Fair  History: Pt is a 78 y.o. female presenting to ED with fall, found down by HHA. Pt admitted with Mild rhabdomyolysis, Hyponatremia, possible PNA. At baseline, pt lives alone, and reports independent ADLs and SPT to/from w/c. Pt reports she has HHA daily to assist with IADLs. Pt is a poor historian. REQUIRES OT FOLLOW-UP: Yes  Activity Tolerance  Activity Tolerance: Patient Tolerated treatment well;Treatment limited secondary to decreased cognition  Activity Tolerance Comments: No c/o pain.  Difficutly making needs known d/t aphasia        Plan   Occupational Therapy Plan  Times Per Week: 3-5  Current Treatment Recommendations: Strengthening, Balance training, ROM, Functional mobility training, Endurance training, Wheelchair mobility training, Safety education & training, Self-Care / ADL, Cognitive reorientation, Cognitive/Perceptual training, Neuromuscular re-education     Restrictions  Restrictions/Precautions  Restrictions/Precautions: Fall Risk  Position Activity Restriction  Other position/activity restrictions: pure-wick    Subjective   General  Chart Reviewed: Yes, Orders, Progress Notes, Labs  Patient assessed for rehabilitation services?: Yes  Additional Pertinent Hx: Pt is a 78 y.o. female presenting to ED with fall, found down by HHA. Pt admitted with Mild rhabdomyolysis, Hyponatremia, possible PNA. Imaging negative for fx. Family / Caregiver Present: No  Referring Practitioner: Deepika Da Silva MD  Diagnosis: Mild rhabdomyolysis, Hyponatremia, possible PNA  Subjective  Subjective: Pt met BS, in bed. Pt agreeable to OT/PT co-tx. Pt with expressive aphasia. Pt did indicate need to use BSC (with difficulty-motioning to [facility]). Pt without complaints of pain. Social/Functional History  Social/Functional History  Lives With: Alone  Type of Home: House  Home Layout: One level  Home Access: Level entry  Bathroom Shower/Tub: Tub/Shower unit  Bathroom Toilet: Standard  Bathroom Equipment: Commode  Bathroom Accessibility: Wheelchair accessible  Home Equipment: Walker, rolling, [address] Help From: Home health (Pt reports HHA daily to assist)  ADL Assistance: Independent (sponge bathes on the commode)  Homemaking Assistance: Needs assistance (HHA does cleaning, laundry, and cooking.  Son does grocery shopping.)  Homemaking Responsibilities: No  Ambulation Assistance: Non-ambulatory (uses w/c)  Transfer Assistance: Independent (stand pivots to w/c)  Active : No  Additional Comments: above info per care everywhere in Feb. 2023       Objective       Safety Devices  Type of Devices: Call light within reach;Gait belt;Patient at risk for falls;Nurse notified; Left in chair;Chair alarm in place     Toilet Transfers  Toilet - Technique: Stand pivot  Equipment Used: Standard bedside commode  Toilet Transfer: Stand by assistance  Toilet Transfers Comments: Pt pivots from bed><BSC using rails of BSC, and on bed nearby  Wheelchair Bed Transfers  Wheelchair/Bed - Technique: Stand pivot  Equipment Used: Other (recliner)  Level of Asssistance: Stand by assistance  Wheelchair Transfers Comments: Philip Peaec brought close to [facility] and pt pivoted with SBA, using bed rail to turn, step, from commode to recliner     ADL  Toileting: Minimal assistance; Moderate assistance. Extended time to complete task. Toileting Skilled Clinical Factors: Pt used BSC to void BM. Pt assisted only to change depends, completing hygiene while seated. Additional Comments: Anticipate pt is set-up feeding and seated grooming, max A bathing and dressing based on ROM/strength, balance, endurance. Bed mobility  Supine to Sit: Stand by assistance  Scooting: Stand by assistance  Bed Mobility Comments: HOB elevated and used bedrails, has electric recliner at home and does not sleep in a bed        Cognition  Overall Cognitive Status: Exceptions  Arousal/Alertness: Appropriate responses to stimuli  Following Commands: Follows one step commands with repetition; Follows one step commands with increased time  Attention Span: Attends with cues to redirect  Memory: Decreased short term memory;Decreased recall of recent events  Safety Judgement: Decreased awareness of need for safety  Problem Solving: Decreased awareness of errors;Assistance required to identify errors made;Assistance required to generate solutions  Insights: Decreased awareness of deficits  Initiation: Requires cues for some  Sequencing: Requires cues for some  Orientation  Overall Orientation Status: Impaired  Orientation Level: Oriented to person;Disoriented to time;Oriented to place; Disoriented to situation         Education Given To: Patient  Education Provided: Role of Therapy;Plan of Care;Transfer Training;Orientation; ADL Adaptive Strategies  Education Method: Demonstration;Verbal  Barriers to Learning: Cognition  Education Outcome: Continued education needed     AM-PAC Score        AM-PAC Inpatient Daily Activity Raw Score: 15 (02/22/23 1206)  AM-PAC Inpatient ADL T-Scale Score : 34.69 (02/22/23 1206)  ADL Inpatient CMS 0-100% Score: 56.46 (02/22/23 1206)  ADL Inpatient CMS G-Code Modifier : CK (02/22/23 1206)    Goals  Short Term Goals  Time Frame for Short Term Goals: Prior to d/c. Status: goals ongoing  Short Term Goal 1: Pt will bathe with min A. Short Term Goal 2: Pt will dress with min A. Short Term Goal 3: Pt will toilet with min A. Short Term Goal 4: Pt will complete w/c mobility with SBA/supervision in prep for ADLs. Short Term Goal 5: Pt will complete SPT to/from ADL surfaces with SBA/supervision. Long Term Goals  Time Frame for Long Term Goals : STGs=LTGs  Patient Goals   Patient goals : to return home.        Therapy Time   Individual Concurrent Group Co-treatment   Time In 1112         Time Out 1200         Minutes [address] Arm[address] MCARTHUR/MEDARDO,515

## 2023-02-22 NOTE — PLAN OF CARE
Problem: Discharge Planning  Goal: Discharge to home or other facility with appropriate resources  2/22/2023 1647 by Jayson Horta RN  Outcome: Completed  2/22/2023 0943 by Jayson Horta RN  Outcome: Progressing     Problem: Pain  Goal: Verbalizes/displays adequate comfort level or baseline comfort level  2/22/2023 1647 by Jayson Horta RN  Outcome: Completed  2/22/2023 0943 by Jayson Horta RN  Outcome: Progressing     Problem: Safety - Adult  Goal: Free from fall injury  2/22/2023 1647 by Jayson Horta RN  Outcome: Completed  2/22/2023 0943 by Jayson Horta RN  Outcome: Progressing     Problem: ABCDS Injury Assessment  Goal: Absence of physical injury  2/22/2023 1647 by Jayson Horta RN  Outcome: Completed  2/22/2023 0943 by Jayson Horta RN  Outcome: Progressing     Problem: Skin/Tissue Integrity  Goal: Absence of new skin breakdown  Description: 1. Monitor for areas of redness and/or skin breakdown  2. Assess vascular access sites hourly  3. Every 4-6 hours minimum:  Change oxygen saturation probe site  4. Every 4-6 hours:  If on nasal continuous positive airway pressure, respiratory therapy assess nares and determine need for appliance change or resting period.   2/22/2023 1647 by Jayson Horta RN  Outcome: Completed  2/22/2023 0888 by Jayson Horta RN  Outcome: Progressing

## 2023-02-22 NOTE — PROGRESS NOTES
Physical Therapy  Facility/Department: [address] MED SURG  Physical Therapy Daily Progress Note    Name: Royal Corey  : 1955  MRN: [id number]  Date of Service: 2023    Discharge Recommendations:  Patient would benefit from continued therapy after discharge    Royal Corey scored a 16/24 on the AM-PAC short mobility form. Current research shows that an AM-PAC score of 17 or less is typically not associated with a discharge to the patient's home setting. Based on the patient's AM-PAC score and their current functional mobility deficits, it is recommended that the patient have 3-5 sessions per week of Physical Therapy at d/c to increase the patient's independence. However, this pt was w/c bound at home with aides 8 h ours/day for 5 days/week. If pt has 24 hour supervision/assist, she could return home from a w/c level, but she still requires assist for all set up and ADLs at this point. Please see assessment section for further patient specific details. If patient discharges prior to next session this note will serve as a discharge summary. Please see below for the latest assessment towards goals. Patient Diagnosis(es): The primary encounter diagnosis was Altered mental status, unspecified altered mental status type. A diagnosis of Atypical pneumonia was also pertinent to this visit. Past Medical History:  has a past medical history of Anxiety, Arthritis, Atrial fibrillation/flutter, Blood transfusion reaction, Crohn's disease ([address] Depression, GERD (gastroesophageal reflux disease), Hiatal hernia, Hx of blood clots, Hypertension, MRSA (methicillin resistant staph aureus) culture positive, MRSA (methicillin resistant staph aureus) culture positive, Neuropathy, Pneumonia, Sinus headache, SOB (shortness of breath), and VRE (vancomycin resistant enterococcus) culture positive.   Past Surgical History:  has a past surgical history that includes Hysterectomy; knee surgery (Bilateral); Hand Carpectomy (Bilateral); hernia repair; Abdomen surgery; bladder suspension; fracture surgery; Heel spur surgery; Tonsillectomy; Colonoscopy; Upper gastrointestinal endoscopy (6/14/13); Foot surgery (Left, 6/25/14); Foot surgery (6/3/15); Colonoscopy (9/14/15); Foot surgery (Left, 08/05/2002); joint replacement; Sigmoidoscopy (01/15/2018); Abdominal adhesion surgery (06/08/2018); Colonoscopy (08/07/2018); colostomy (N/A, 10/8/2018); pr secondary closure surg wound/dehsn extsv/complic (N/A, 87/48/3258); Leg Surgery (Right); Colonoscopy (06/07/2019); Colonoscopy (N/A, 6/7/2019); Colonoscopy (N/A, 6/7/2019); Small intestine surgery (N/A, 7/17/2019); and proctosigmoidoscopy (N/A, 7/17/2019). Assessment   Body Structures, Functions, Activity Limitations Requiring Skilled Therapeutic Intervention: Decreased functional mobility ; Decreased tolerance to work activity; Decreased endurance;Decreased balance  Assessment: The pt is a 79 yo female who presented to the ED following a fall. Imaging negative for fx's. The pt lives alone in a house and has a HHA (length of times and days unknown). The pt is non-ambulatory and uses a w/c for mobility and completes pivot transfers on her own. PMHx: anxiety, arth, a-fib, Crohn's disease, depression, HTN, neuropathy, R THR, L TSR      Today, the pt demonstrated that she is functioning below a level where she could be safe managing at home alone. The pt required SBA for bed mobility and SBA for a pivot transfer bed <> chair, but neeed set up of all equipment and assist with hygiene and brief when toileting. The pt has difficulty following directions due to her apashia and appears to be resitant to education. At this time, the pt still needs 24 hour assist/supervision for set up of transfers and tocomplete ADL activities. If 24 hour assist is not available, she would benefit from con't skilled PT services at a moderate intensity prior to return home.  Will con't to follow. Specific Instructions for Next Treatment: cotx  Barriers to Learning: aphasia  Activity Tolerance  Activity Tolerance: Patient tolerated evaluation without incident;Treatment limited secondary to decreased cognition; Other (comment)     Plan   Physcial Therapy Plan  General Plan: 3-5 times per week  Specific Instructions for Next Treatment: cotx  Current Treatment Recommendations: Strengthening, ROM, Balance training, Functional mobility training, Therapeutic activities, Safety education & training, Wheelchair mobility training, Patient/Caregiver education & training, Equipment evaluation, education, & procurement  Safety Devices  Type of Devices: Call light within reach, Gait belt, Patient at risk for falls, Nurse notified, Left in chair, Chair alarm in place     Restrictions  Restrictions/Precautions  Restrictions/Precautions: Fall Risk  Position Activity Restriction  Other position/activity restrictions: pure-wick     Subjective   General  Chart Reviewed: Yes  Patient assessed for rehabilitation services?: Yes  Additional Pertinent Hx: Per Camille Phan MD H&P on 2-: The pt is a 77 yo female who presented to the ED following a fall. Imaging negative for fx's.       PMHx: anxiety, arth, a-fib, Crohn's disease, depression, HTN, neuropathy, R THR, L TSR  Response To Previous Treatment: Patient unable to report, no changes reported from family or staff  Family / Caregiver Present: No  Referring Practitioner: Camille Phan MD  Diagnosis: fall, Mild rhabdomyolysis, hyponatremia  Follows Commands: Impaired  Other (Comment): the pt with expressive and some mild receptive aphasia  Subjective  Subjective: the pt was found to be awake in the bed; she wanted to use the bedside commode but had difficulty expressing that need duw to expressive aphasia         Social/Functional History  Social/Functional History  Lives With: Alone  Type of Home: House  Home Layout: One level  Home Access: Level entry  Bathroom Shower/Tub: Tub/Shower unit  Bathroom Toilet: Standard  Bathroom Equipment: Commode  Bathroom Accessibility: Wheelchair accessible  Home Equipment: Walker, rolling, [employer]  Receives Help From: Home health (Pt reports HHA daily to assist)  ADL Assistance: Independent (sponge bathes on the commode)  Homemaking Assistance: Needs assistance (HHA does cleaning, laundry, and cooking. Son does grocery shopping.)  Homemaking Responsibilities: No  Ambulation Assistance: Non-ambulatory (uses w/c)  Transfer Assistance: Independent (stand pivots to w/c)  Active : No  Additional Comments: above info per care everywhere in Feb. 2023  Vision/Hearing       Cognition   Orientation  Overall Orientation Status: Impaired  Orientation Level: Oriented to person;Disoriented to time;Oriented to place; Disoriented to situation  Cognition  Overall Cognitive Status: Exceptions  Arousal/Alertness: Appropriate responses to stimuli  Following Commands: Follows one step commands with repetition; Follows one step commands with increased time  Attention Span: Attends with cues to redirect  Memory: Decreased short term memory;Decreased recall of recent events  Safety Judgement: Decreased awareness of need for safety  Problem Solving: Decreased awareness of errors;Assistance required to identify errors made;Assistance required to generate solutions  Insights: Decreased awareness of deficits  Initiation: Requires cues for some  Sequencing: Requires cues for some     Objective      Bed mobility  Supine to Sit: Stand by assistance  Scooting: Stand by assistance  Bed Mobility Comments: HOB elevated and used bedrails, has electric recliner at home and does not sleep in a bed  Transfers  Sit to Stand: Stand by assistance  Stand to Sit: Stand by assistance  Stand Pivot Transfers: Stand by assistance  Comment: able to stand and pivot to [facility] on her L then recliner on her L with pt using armrests appropriately  Ambulation  Comments: non-ambulatory at baseline but was able to stand and take a few small pivot steps for transfer     Balance  Posture: Fair  Sitting - Static: Good  Sitting - Dynamic: Good;-  Standing - Static: -;Good  Standing - Dynamic: Good;-  Comments: pt able to stand while being assisted with brief placement after toileting: see OT note     AM-PAC Score  AM-PAC Inpatient Mobility Raw Score : 16 (02/22/23 1200)  AM-PAC Inpatient T-Scale Score : 40.78 (02/22/23 1200)  Mobility Inpatient CMS 0-100% Score: 54.16 (02/22/23 1200)  Mobility Inpatient CMS G-Code Modifier : CK (02/22/23 1200)   Goals  Short Term Goals  Time Frame for Short Term Goals: upon d/c  Short Term Goal 1: Bed mobility with CGA. Short Term Goal 2: Pivot transfer bed <> w/c(chair) with CGA. met 2-22  Short Term Goal 3: Propel w/c 50 feet with SBA.   Patient Goals   Patient Goals : none stated   Therapy Time   Individual Concurrent Group Co-treatment   Time In       7606   Time Out       1200   Minutes       48   Timed Code Treatment Minutes: 48 Minutes   Charges = 48 min theract  Lucretia Oro, [id number]

## 2023-02-22 NOTE — PROGRESS NOTES
Occupational Therapy  Therapy attempt. Pt approached for tx. Pt in bed, eyes closed and declining OT at this time, stating \"tired\", give me a little bit more time\". Pt requesting more time to sleep, stating not sleeping well. Will follow and attempt as schedule permits. Refer to the last note for status if pt is discharged.   Renetta MCARTHUR/MEDARDO,260

## 2023-02-24 LAB
ORGANISM: ABNORMAL
URINE CULTURE, ROUTINE: ABNORMAL

## 2023-02-24 NOTE — PROGRESS NOTES
Physician Progress Note      PATIENT:               Pedro Brenner  CSN #:                  [id number]  :                       1955  ADMIT DATE:       2023 10:48 AM  DISCH DATE:        2023 6:13 PM  RESPONDING  PROVIDER #:        Dylan Negron MD          QUERY TEXT:    Pt admitted with pneumonia. Pt noted to have fallen at home and been on the   floor for an extended period of time.  and mild rhabdomyolysis   documented. If possible, please document in progress notes and discharge   summary if you are evaluating and/or treating any of the following: The medical record reflects the following:  Risk Factors: fall with extended period of time on the floor  Clinical Indicators: , documented mild rhabdomyolysis  Treatment: IV fluids, monitoring CK    Per [email or web address]  Traumatic rhabdomyolysis cause examples: crush syndrome, prolonged   immobilization  Nontraumatic rhabdomyolysis cause examples:  marked exertion, hyperthermia,   metabolic myopathy, drugs or toxins, infections, electrolyte disorders. Thank you,  Akanksha Elam RN, BSN, CCDS  [email or web address]  Options provided:  -- Traumatic rhabdomyolysis  -- Nontraumatic rhabdomyolysis  -- Other - I will add my own diagnosis  -- Disagree - Not applicable / Not valid  -- Disagree - Clinically unable to determine / Unknown  -- Refer to Clinical Documentation Reviewer    PROVIDER RESPONSE TEXT:    This patient has nontraumatic rhabdomyolysis.     Query created by: Lilo Rodrigues on 2023 5:38 AM      Electronically signed by:  Dylan Negron MD 2023 10:18 AM

## 2023-03-05 NOTE — PROGRESS NOTES
Progress Note - Dr. Kendell Flowers - Internal Medicine  PCP: Kylie Coyle MD [address] / [place] [phone]    Hospital Day: 1  Code Status: Full Code  Current Diet: Diet NPO Effective Now        CC: follow up on medical issues    Subjective:   Ag Hernandez is a 59 y.o. female. Pt with pain  For surgery later today      She denies chest pain, denies shortness of breath, complains of nausea,  denies emesis. 10 system Review of Systems is reviewed with patient, and pertinent positives are listed here: None . Otherwise, Review of systems is negative. I have reviewed the patient's medical and social history in detail and updated the computerized patient record. To recap: She  has a past medical history of Arthritis; Blood transfusion reaction; Crohn's disease ([facility] GERD (gastroesophageal reflux disease); Hiatal hernia; Hypertension; MRSA (methicillin resistant staph aureus) culture positive; and Pneumonia. . She  has a past surgical history that includes Hysterectomy; knee surgery (Bilateral); Hand Carpectomy (Bilateral); hernia repair; Abdomen surgery; bladder suspension; fracture surgery; Heel spur surgery; Tonsillectomy; Colonoscopy; Upper gastrointestinal endoscopy (6/14/13); Foot surgery (Left, 6/25/14); Foot surgery (6/3/15); Colonoscopy (9/14/15); Foot surgery (Left, 08/05/2002); joint replacement; Sigmoidoscopy (01/15/2018); Abdominal adhesion surgery (06/08/2018); and Colonoscopy (08/07/2018). . She  reports that she has been smoking Cigarettes and E-Cigarettes. She has a 10.00 pack-year smoking history. She has never used smokeless tobacco. She reports that she drinks about 1.2 oz of alcohol per week . She reports that she does not use drugs. .        Active Hospital Problems    Diagnosis Date Noted    Colitis [K52.9] 10/07/2018    Crohn's colitis ([facility] [address] [K50.10] 01/06/2018    Hyponatremia [E87.1] 01/05/2018    Anemia [D64.9] 01/05/2018    Essential hypertension, benign [I10] dilation of the colon, which contains air-fluid levels. The cecum is markedly distended. The appendix is not visualized. Large hiatal hernia partially visualized. There is no small bowel dilation or focal wall thickening. Pelvis: The uterus is absent. The ovaries are not visualized. Trace pelvic free fluid is present. A soft tissue tract extends from thickened sigmoid colon towards the vaginal cuff and urinary bladder. Soft tissue density in this region is also inseparable from small bowel. There is left anterior pelvic fat stranding. Nonenlarged pelvic lymph nodes are present. Peritoneum/Retroperitoneum: Trace perihepatic fluid. Small soft tissue nodules along the distal descending colon. There is otherwise no concerning intra-abdominal adenopathy. There is no pneumoperitoneum. The abdominal aorta is normal caliber and without evidence of aneurysm. The portal vein is patent. Bones/Soft Tissues: There are no suspicious osseous lesions. Persistent short segment wall thickening of the proximal sigmoid colon with upstream colonic dilation consistent with obstruction. Given the persistent wall thickening in this region and absence of significant diverticulosis elsewhere in the colon, findings are highly concerning for malignancy. Soft tissue tract extending from thickened sigmoid colon, inseparable from the urinary bladder, vaginal cuff, and small bowel in the pelvis. Small soft tissue nodules within the left anterior pelvis and along the distal descending colon. Otherwise no concerning intra-abdominal adenopathy. Large hiatal hernia. Ct Abdomen Pelvis W Iv Contrast Additional Contrast? None    Result Date: 9/26/2018  EXAMINATION: CT OF THE ABDOMEN AND PELVIS WITH CONTRAST 9/26/2018 3:32 am TECHNIQUE: CT of the abdomen and pelvis was performed with the administration of intravenous contrast. Multiplanar reformatted images are provided for review.  Dose modulation, iterative reconstruction, and/or today per dr Joe Bhardwaj. Increase pain meds to iv dilaudid   Essential hypertension, benign (9/13/2015)    Assessment: Established problem. Stable. 151/90    Plan: stay on same meds. Work on pain control. Anemia (1/5/2018)    Assessment: Established problem. Stable. Plan: Continue present orders/plan. Hyponatremia (1/5/2018)    Assessment: Established problem. Stable. Chronic. Na 135 today    Plan: Continue present orders/plan.     Colitis (10/7/2018)              Delorise Ano  10/8/2018 Low back pain

## 2023-03-09 ENCOUNTER — HOSPITAL ENCOUNTER (INPATIENT)
Age: 68
LOS: 5 days | Discharge: NURSING, REHAB OR LONG-TERM CARE | DRG: 300 | End: 2023-03-14
Attending: EMERGENCY MEDICINE | Admitting: INTERNAL MEDICINE
Payer: MEDICARE

## 2023-03-09 DIAGNOSIS — M79.89 LEG SWELLING: Primary | ICD-10-CM

## 2023-03-09 DIAGNOSIS — T07.XXXA MULTIPLE WOUNDS: ICD-10-CM

## 2023-03-09 DIAGNOSIS — L03.90 CELLULITIS, UNSPECIFIED CELLULITIS SITE: ICD-10-CM

## 2023-03-09 DIAGNOSIS — I82.401 LEG DVT (DEEP VENOUS THROMBOEMBOLISM), ACUTE, RIGHT (HCC): ICD-10-CM

## 2023-03-09 DIAGNOSIS — I82.401 ACUTE DEEP VEIN THROMBOSIS (DVT) OF RIGHT LOWER EXTREMITY, UNSPECIFIED VEIN (HCC): ICD-10-CM

## 2023-03-09 LAB
A/G RATIO: 0.9 (ref 1.1–2.2)
ALBUMIN SERPL-MCNC: 3.4 G/DL (ref 3.4–5)
ALP BLD-CCNC: 74 U/L (ref 40–129)
ALT SERPL-CCNC: 9 U/L (ref 10–40)
ANION GAP SERPL CALCULATED.3IONS-SCNC: 10 MMOL/L (ref 3–16)
ANISOCYTOSIS: ABNORMAL
APTT: 44.8 SEC (ref 23–34.3)
AST SERPL-CCNC: 24 U/L (ref 15–37)
BASOPHILS ABSOLUTE: 0 K/UL (ref 0–0.2)
BASOPHILS RELATIVE PERCENT: 0 %
BILIRUB SERPL-MCNC: 0.4 MG/DL (ref 0–1)
BUN BLDV-MCNC: 6 MG/DL (ref 7–20)
BURR CELLS: ABNORMAL
CALCIUM SERPL-MCNC: 8.5 MG/DL (ref 8.3–10.6)
CHLORIDE BLD-SCNC: 103 MMOL/L (ref 99–110)
CO2: 25 MMOL/L (ref 21–32)
CREAT SERPL-MCNC: 0.6 MG/DL (ref 0.6–1.2)
D DIMER: 3.11 UG/ML FEU (ref 0–0.6)
EOSINOPHILS ABSOLUTE: 1.9 K/UL (ref 0–0.6)
EOSINOPHILS RELATIVE PERCENT: 24 %
GFR SERPL CREATININE-BSD FRML MDRD: >60 ML/MIN/{1.73_M2}
GLUCOSE BLD-MCNC: 95 MG/DL (ref 70–99)
HCT VFR BLD CALC: 28.9 % (ref 36–48)
HCT VFR BLD CALC: 29.5 % (ref 36–48)
HEMATOLOGY PATH CONSULT: YES
HEMOGLOBIN: 9.6 G/DL (ref 12–16)
HEMOGLOBIN: 9.7 G/DL (ref 12–16)
INR BLD: 1.31 (ref 0.87–1.14)
LYMPHOCYTES ABSOLUTE: 2 K/UL (ref 1–5.1)
LYMPHOCYTES RELATIVE PERCENT: 25 %
MCH RBC QN AUTO: 28.4 PG (ref 26–34)
MCH RBC QN AUTO: 28.4 PG (ref 26–34)
MCHC RBC AUTO-ENTMCNC: 33 G/DL (ref 31–36)
MCHC RBC AUTO-ENTMCNC: 33.4 G/DL (ref 31–36)
MCV RBC AUTO: 84.9 FL (ref 80–100)
MCV RBC AUTO: 85.9 FL (ref 80–100)
MONOCYTES ABSOLUTE: 0.6 K/UL (ref 0–1.3)
MONOCYTES RELATIVE PERCENT: 7 %
NEUTROPHILS ABSOLUTE: 3.6 K/UL (ref 1.7–7.7)
NEUTROPHILS RELATIVE PERCENT: 44 %
OVALOCYTES: ABNORMAL
PDW BLD-RTO: 13.6 % (ref 12.4–15.4)
PDW BLD-RTO: 14.2 % (ref 12.4–15.4)
PLATELET # BLD: 535 K/UL (ref 135–450)
PLATELET # BLD: 564 K/UL (ref 135–450)
PLATELET SLIDE REVIEW: ABNORMAL
PMV BLD AUTO: 6.5 FL (ref 5–10.5)
PMV BLD AUTO: 6.6 FL (ref 5–10.5)
POTASSIUM REFLEX MAGNESIUM: 4.7 MMOL/L (ref 3.5–5.1)
PRO-BNP: 459 PG/ML (ref 0–124)
PROTHROMBIN TIME: 16.2 SEC (ref 11.7–14.5)
RBC # BLD: 3.4 M/UL (ref 4–5.2)
RBC # BLD: 3.43 M/UL (ref 4–5.2)
SLIDE REVIEW: ABNORMAL
SODIUM BLD-SCNC: 138 MMOL/L (ref 136–145)
TOTAL PROTEIN: 7.3 G/DL (ref 6.4–8.2)
TROPONIN: 0.01 NG/ML
WBC # BLD: 7.7 K/UL (ref 4–11)
WBC # BLD: 8.1 K/UL (ref 4–11)

## 2023-03-09 PROCEDURE — 6360000002 HC RX W HCPCS: Performed by: PHYSICIAN ASSISTANT

## 2023-03-09 PROCEDURE — 84484 ASSAY OF TROPONIN QUANT: CPT

## 2023-03-09 PROCEDURE — 99285 EMERGENCY DEPT VISIT HI MDM: CPT

## 2023-03-09 PROCEDURE — 85610 PROTHROMBIN TIME: CPT

## 2023-03-09 PROCEDURE — 96374 THER/PROPH/DIAG INJ IV PUSH: CPT

## 2023-03-09 PROCEDURE — 85025 COMPLETE CBC W/AUTO DIFF WBC: CPT

## 2023-03-09 PROCEDURE — 2580000003 HC RX 258: Performed by: INTERNAL MEDICINE

## 2023-03-09 PROCEDURE — 6370000000 HC RX 637 (ALT 250 FOR IP): Performed by: INTERNAL MEDICINE

## 2023-03-09 PROCEDURE — 6360000002 HC RX W HCPCS: Performed by: EMERGENCY MEDICINE

## 2023-03-09 PROCEDURE — 85379 FIBRIN DEGRADATION QUANT: CPT

## 2023-03-09 PROCEDURE — 1200000000 HC SEMI PRIVATE

## 2023-03-09 PROCEDURE — 6360000002 HC RX W HCPCS: Performed by: INTERNAL MEDICINE

## 2023-03-09 PROCEDURE — 93005 ELECTROCARDIOGRAM TRACING: CPT | Performed by: EMERGENCY MEDICINE

## 2023-03-09 PROCEDURE — 6360000002 HC RX W HCPCS: Performed by: NURSE PRACTITIONER

## 2023-03-09 PROCEDURE — 85027 COMPLETE CBC AUTOMATED: CPT

## 2023-03-09 PROCEDURE — 85730 THROMBOPLASTIN TIME PARTIAL: CPT

## 2023-03-09 PROCEDURE — 93971 EXTREMITY STUDY: CPT

## 2023-03-09 PROCEDURE — 2580000003 HC RX 258: Performed by: PHYSICIAN ASSISTANT

## 2023-03-09 PROCEDURE — 80053 COMPREHEN METABOLIC PANEL: CPT

## 2023-03-09 PROCEDURE — 83880 ASSAY OF NATRIURETIC PEPTIDE: CPT

## 2023-03-09 PROCEDURE — 36415 COLL VENOUS BLD VENIPUNCTURE: CPT

## 2023-03-09 RX ORDER — ONDANSETRON 2 MG/ML
4 INJECTION INTRAMUSCULAR; INTRAVENOUS EVERY 6 HOURS PRN
Status: DISCONTINUED | OUTPATIENT
Start: 2023-03-09 | End: 2023-03-14 | Stop reason: HOSPADM

## 2023-03-09 RX ORDER — BUSPIRONE HYDROCHLORIDE 5 MG/1
10 TABLET ORAL 3 TIMES DAILY PRN
Status: DISCONTINUED | OUTPATIENT
Start: 2023-03-09 | End: 2023-03-11

## 2023-03-09 RX ORDER — HEPARIN SODIUM 1000 [USP'U]/ML
80 INJECTION, SOLUTION INTRAVENOUS; SUBCUTANEOUS PRN
Status: DISCONTINUED | OUTPATIENT
Start: 2023-03-09 | End: 2023-03-09 | Stop reason: SDUPTHER

## 2023-03-09 RX ORDER — BALSALAZIDE DISODIUM 750 MG/1
1500 CAPSULE ORAL 2 TIMES DAILY
Status: DISCONTINUED | OUTPATIENT
Start: 2023-03-10 | End: 2023-03-11

## 2023-03-09 RX ORDER — FLUTICASONE PROPIONATE 50 MCG
1 SPRAY, SUSPENSION (ML) NASAL DAILY
Status: DISCONTINUED | OUTPATIENT
Start: 2023-03-10 | End: 2023-03-14 | Stop reason: HOSPADM

## 2023-03-09 RX ORDER — FAMOTIDINE 20 MG/1
20 TABLET, FILM COATED ORAL 2 TIMES DAILY
Status: DISCONTINUED | OUTPATIENT
Start: 2023-03-10 | End: 2023-03-11

## 2023-03-09 RX ORDER — HEPARIN SODIUM 1000 [USP'U]/ML
40 INJECTION, SOLUTION INTRAVENOUS; SUBCUTANEOUS PRN
Status: DISCONTINUED | OUTPATIENT
Start: 2023-03-09 | End: 2023-03-09 | Stop reason: SDUPTHER

## 2023-03-09 RX ORDER — ONDANSETRON 4 MG/1
4 TABLET, ORALLY DISINTEGRATING ORAL EVERY 8 HOURS PRN
Status: DISCONTINUED | OUTPATIENT
Start: 2023-03-09 | End: 2023-03-14 | Stop reason: HOSPADM

## 2023-03-09 RX ORDER — HEPARIN SODIUM 1000 [USP'U]/ML
80 INJECTION, SOLUTION INTRAVENOUS; SUBCUTANEOUS ONCE
Status: COMPLETED | OUTPATIENT
Start: 2023-03-09 | End: 2023-03-09

## 2023-03-09 RX ORDER — ACETAMINOPHEN 650 MG/1
650 SUPPOSITORY RECTAL EVERY 6 HOURS PRN
Status: DISCONTINUED | OUTPATIENT
Start: 2023-03-09 | End: 2023-03-14 | Stop reason: HOSPADM

## 2023-03-09 RX ORDER — MORPHINE SULFATE 2 MG/ML
2 INJECTION, SOLUTION INTRAMUSCULAR; INTRAVENOUS
Status: COMPLETED | OUTPATIENT
Start: 2023-03-09 | End: 2023-03-13

## 2023-03-09 RX ORDER — SODIUM CHLORIDE 9 MG/ML
INJECTION, SOLUTION INTRAVENOUS PRN
Status: DISCONTINUED | OUTPATIENT
Start: 2023-03-09 | End: 2023-03-14 | Stop reason: HOSPADM

## 2023-03-09 RX ORDER — SODIUM CHLORIDE 0.9 % (FLUSH) 0.9 %
5-40 SYRINGE (ML) INJECTION PRN
Status: DISCONTINUED | OUTPATIENT
Start: 2023-03-09 | End: 2023-03-14 | Stop reason: HOSPADM

## 2023-03-09 RX ORDER — POLYETHYLENE GLYCOL 3350 17 G/17G
17 POWDER, FOR SOLUTION ORAL DAILY PRN
Status: DISCONTINUED | OUTPATIENT
Start: 2023-03-09 | End: 2023-03-14 | Stop reason: HOSPADM

## 2023-03-09 RX ORDER — SODIUM CHLORIDE, SODIUM LACTATE, POTASSIUM CHLORIDE, CALCIUM CHLORIDE 600; 310; 30; 20 MG/100ML; MG/100ML; MG/100ML; MG/100ML
INJECTION, SOLUTION INTRAVENOUS CONTINUOUS
Status: ACTIVE | OUTPATIENT
Start: 2023-03-09 | End: 2023-03-10

## 2023-03-09 RX ORDER — HEPARIN SODIUM 1000 [USP'U]/ML
80 INJECTION, SOLUTION INTRAVENOUS; SUBCUTANEOUS ONCE
Status: DISCONTINUED | OUTPATIENT
Start: 2023-03-09 | End: 2023-03-09 | Stop reason: SDUPTHER

## 2023-03-09 RX ORDER — HEPARIN SODIUM 10000 [USP'U]/100ML
5-30 INJECTION, SOLUTION INTRAVENOUS CONTINUOUS
Status: DISCONTINUED | OUTPATIENT
Start: 2023-03-09 | End: 2023-03-11

## 2023-03-09 RX ORDER — MORPHINE SULFATE 4 MG/ML
4 INJECTION, SOLUTION INTRAMUSCULAR; INTRAVENOUS ONCE
Status: COMPLETED | OUTPATIENT
Start: 2023-03-09 | End: 2023-03-09

## 2023-03-09 RX ORDER — METOPROLOL SUCCINATE 50 MG/1
50 TABLET, EXTENDED RELEASE ORAL DAILY
Status: DISCONTINUED | OUTPATIENT
Start: 2023-03-10 | End: 2023-03-14 | Stop reason: HOSPADM

## 2023-03-09 RX ORDER — HEPARIN SODIUM 1000 [USP'U]/ML
80 INJECTION, SOLUTION INTRAVENOUS; SUBCUTANEOUS PRN
Status: DISCONTINUED | OUTPATIENT
Start: 2023-03-09 | End: 2023-03-11

## 2023-03-09 RX ORDER — BACLOFEN 10 MG/1
15 TABLET ORAL 3 TIMES DAILY PRN
Status: DISCONTINUED | OUTPATIENT
Start: 2023-03-09 | End: 2023-03-11

## 2023-03-09 RX ORDER — HEPARIN SODIUM 10000 [USP'U]/100ML
5-30 INJECTION, SOLUTION INTRAVENOUS CONTINUOUS
Status: DISCONTINUED | OUTPATIENT
Start: 2023-03-09 | End: 2023-03-09 | Stop reason: SDUPTHER

## 2023-03-09 RX ORDER — HYDROXYZINE HYDROCHLORIDE 25 MG/1
25 TABLET, FILM COATED ORAL EVERY 6 HOURS PRN
Status: DISCONTINUED | OUTPATIENT
Start: 2023-03-09 | End: 2023-03-14 | Stop reason: HOSPADM

## 2023-03-09 RX ORDER — OXYCODONE HYDROCHLORIDE 5 MG/1
5 TABLET ORAL EVERY 4 HOURS PRN
Status: DISCONTINUED | OUTPATIENT
Start: 2023-03-09 | End: 2023-03-14 | Stop reason: HOSPADM

## 2023-03-09 RX ORDER — ACETAMINOPHEN 325 MG/1
650 TABLET ORAL EVERY 6 HOURS PRN
Status: DISCONTINUED | OUTPATIENT
Start: 2023-03-09 | End: 2023-03-14 | Stop reason: HOSPADM

## 2023-03-09 RX ORDER — LACOSAMIDE 100 MG/1
200 TABLET ORAL 2 TIMES DAILY
Status: DISCONTINUED | OUTPATIENT
Start: 2023-03-09 | End: 2023-03-14 | Stop reason: HOSPADM

## 2023-03-09 RX ORDER — PANTOPRAZOLE SODIUM 40 MG/1
40 TABLET, DELAYED RELEASE ORAL DAILY
Status: DISCONTINUED | OUTPATIENT
Start: 2023-03-10 | End: 2023-03-14 | Stop reason: HOSPADM

## 2023-03-09 RX ORDER — LANOLIN ALCOHOL/MO/W.PET/CERES
400 CREAM (GRAM) TOPICAL 2 TIMES DAILY
Status: DISCONTINUED | OUTPATIENT
Start: 2023-03-09 | End: 2023-03-14 | Stop reason: HOSPADM

## 2023-03-09 RX ORDER — HEPARIN SODIUM 1000 [USP'U]/ML
40 INJECTION, SOLUTION INTRAVENOUS; SUBCUTANEOUS PRN
Status: DISCONTINUED | OUTPATIENT
Start: 2023-03-09 | End: 2023-03-11

## 2023-03-09 RX ORDER — SODIUM CHLORIDE 0.9 % (FLUSH) 0.9 %
5-40 SYRINGE (ML) INJECTION EVERY 12 HOURS SCHEDULED
Status: DISCONTINUED | OUTPATIENT
Start: 2023-03-09 | End: 2023-03-14 | Stop reason: HOSPADM

## 2023-03-09 RX ORDER — HYDRALAZINE HYDROCHLORIDE 20 MG/ML
20 INJECTION INTRAMUSCULAR; INTRAVENOUS EVERY 4 HOURS PRN
Status: DISCONTINUED | OUTPATIENT
Start: 2023-03-09 | End: 2023-03-14 | Stop reason: HOSPADM

## 2023-03-09 RX ADMIN — MAGNESIUM OXIDE 400 MG (241.3 MG MAGNESIUM) TABLET 400 MG: TABLET at 22:39

## 2023-03-09 RX ADMIN — SODIUM CHLORIDE, PRESERVATIVE FREE 10 ML: 5 INJECTION INTRAVENOUS at 22:05

## 2023-03-09 RX ADMIN — MORPHINE SULFATE 2 MG: 2 INJECTION, SOLUTION INTRAMUSCULAR; INTRAVENOUS at 22:04

## 2023-03-09 RX ADMIN — CEFEPIME 2000 MG: 2 INJECTION, POWDER, FOR SOLUTION INTRAVENOUS at 17:18

## 2023-03-09 RX ADMIN — SODIUM CHLORIDE, PRESERVATIVE FREE 10 ML: 5 INJECTION INTRAVENOUS at 21:09

## 2023-03-09 RX ADMIN — VANCOMYCIN HYDROCHLORIDE 1500 MG: 1 INJECTION, POWDER, LYOPHILIZED, FOR SOLUTION INTRAVENOUS at 21:54

## 2023-03-09 RX ADMIN — HEPARIN SODIUM 6930 UNITS: 1000 INJECTION INTRAVENOUS; SUBCUTANEOUS at 17:22

## 2023-03-09 RX ADMIN — SODIUM CHLORIDE, POTASSIUM CHLORIDE, SODIUM LACTATE AND CALCIUM CHLORIDE: 600; 310; 30; 20 INJECTION, SOLUTION INTRAVENOUS at 21:09

## 2023-03-09 RX ADMIN — LACOSAMIDE 200 MG: 100 TABLET, FILM COATED ORAL at 22:39

## 2023-03-09 RX ADMIN — MORPHINE SULFATE 4 MG: 4 INJECTION, SOLUTION INTRAMUSCULAR; INTRAVENOUS at 14:55

## 2023-03-09 RX ADMIN — HEPARIN SODIUM 18 UNITS/KG/HR: 10000 INJECTION, SOLUTION INTRAVENOUS at 17:22

## 2023-03-09 ASSESSMENT — PAIN SCALES - GENERAL
PAINLEVEL_OUTOF10: 10
PAINLEVEL_OUTOF10: 10
PAINLEVEL_OUTOF10: 5
PAINLEVEL_OUTOF10: 8
PAINLEVEL_OUTOF10: 10
PAINLEVEL_OUTOF10: 8

## 2023-03-09 ASSESSMENT — LIFESTYLE VARIABLES
HOW MANY STANDARD DRINKS CONTAINING ALCOHOL DO YOU HAVE ON A TYPICAL DAY: PATIENT DOES NOT DRINK
HOW OFTEN DO YOU HAVE A DRINK CONTAINING ALCOHOL: NEVER

## 2023-03-09 ASSESSMENT — PAIN DESCRIPTION - ORIENTATION: ORIENTATION: RIGHT;LEFT

## 2023-03-09 ASSESSMENT — PAIN DESCRIPTION - LOCATION: LOCATION: LEG

## 2023-03-09 NOTE — ED NOTES
Patient resting in bed with eyes closed, sleeps in this hunched forward position, calm at this time  Will medicate prior to vascular study     Ferdinand Bernheim, RN  03/09/23 [id number]

## 2023-03-09 NOTE — ED PROVIDER NOTES
[facility]        Pt Name: Jasmin Ray  MRN: [id number]  [place] 1955  Date of evaluation: 3/9/2023  Provider: LYNN Jacobs  PCP: Rhona Calles MD  Note Started: 4:38 PM EST 3/9/23       I have seen and evaluated this patient with my supervising physician No att. providers found. CHIEF COMPLAINT       Chief Complaint   Patient presents with    Leg Swelling     PT to ED via EMS from home where she lives alone and has home health visits. PT c/o bilateral leg swelling and pain. HISTORY OF PRESENT ILLNESS: 1 or more Elements     History from : Patient    Limitations to history : None    Jasmin Ray is a 78 y.o. female who presents to the emergency room due to bilateral lower leg swelling and pain as well as diffuse wounds throughout the body mainly on the bilateral lower legs chest and face area. This was a signout patient to be and most of the work-up was already completed pending DVT study. Please see Dr. Rosa Isela Agrawal note for further details of initial presentation. Through nursing staff was able to ascertain information from the family stating that they are concerned that the patient has not taken her medications as prescribed given that she does have home health nurse that comes by every day to take care of her she has worsening blisters on her bilateral lower legs with weeping of wounds. When I asked the patient she states that she does not have any significant complaints other than her bilateral lower leg pain. She denies any chest pain, shortness of breath or difficulty breathing. It is noted that the patient was prescribed Xarelto but is uncertain of when the last dose was taken. Right lower leg swelling is worse than the left. 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     Nursing Notes were all reviewed and agreed with or any disagreements were addressed in the HPI.    REVIEW OF SYSTEMS :      Review of Systems    Positives and Pertinent negatives as per HPI.      SURGICAL HISTORY     Past Surgical History:   Procedure Laterality Date    ABDOMEN SURGERY      ABDOMINAL ADHESION SURGERY  06/08/2018    BLADDER SUSPENSION      COLONOSCOPY      COLONOSCOPY  9/14/15    sigmoid colon biopsy     COLONOSCOPY  08/07/2018    COLONOSCOPY  06/07/2019    COLONOSCOPY, POSSIBLE BIOPSY, POSSIBLE POLYPECTOMY    COLONOSCOPY N/A 6/7/2019    COLONOSCOPY WITH BIOPSY performed by Kiko Haque MD at [address] [facility] N/A 6/7/2019    COLONOSCOPY DIAGNOSTIC/STOMA performed by Kiko Haque MD at [address] [place] [address] N/A 10/8/2018    EXPLORATORY LAPAROTOMY WITH COLOSTOMY performed by Glenn Gonzales MD at [facility] Left 6/25/14    excision plantar left hallux    FOOT SURGERY  6/3/15    PLANTAR PLATE REPAIR BILATERAL, ARTHROTOMY MPJ 2ND BILATERAL    FOOT SURGERY Left 08/05/2002    Excision second metatarsal head left    FRACTURE SURGERY      rt hip    HAND CARPECTOMY Bilateral     HEEL SPUR SURGERY      HERNIA REPAIR      HYSTERECTOMY (CERVIX STATUS UNKNOWN)      bladder surgery    JOINT REPLACEMENT      rt hip, L shoulder replacement 11/2014    KNEE SURGERY Bilateral     arthrosvopic    LEG SURGERY Right     IA SECONDARY CLOSURE SURG WOUND/DEHSN EXTSV/COMPLIC N/A 13/02/0184    SECONDARY WOUND CLOSURE OF ABDOMINAL WOUND performed by Glenn Gonzales MD at [facility] N/A 7/17/2019    FLEXIBLE SIGMOIDOSCOPY performed by Glenn Gonzales MD at [address] [place] [address]  01/15/2018    with biopsies    SMALL INTESTINE SURGERY N/A 7/17/2019    COLOSTOMY CLOSURE WITH COLORECTAL ANASTOMOSIS performed by Glenn Gonzales MD at [address] [facility]  6/14/13    and colonsocopy with antral erosion biopsies       CURRENTMEDICATIONS       Previous Medications    BACLOFEN (LIORESAL) 10 MG TABLET    Take 15 mg by mouth 3 times daily as needed    BALSALAZIDE (COLAZAL) 750 MG CAPSULE    Take 1,500 mg by mouth in the morning and at bedtime    BUSPIRONE (BUSPAR) 10 MG TABLET    Take 10 mg by mouth 3 times daily as needed    CALCIUM-VITAMIN D (OSCAL-500) 500-200 MG-UNIT PER TABLET    Take 1 tablet by mouth 2 times daily    DUPILUMAB (DUPIXENT) 300 MG/2ML SOPN INJECTION    Inject 300 mg into the skin every 14 days    FAMOTIDINE (PEPCID) 20 MG TABLET    Take 1 tablet by mouth 2 times daily    FLUTICASONE (FLONASE) 50 MCG/ACT NASAL SPRAY    1 spray by Each Nostril route daily    HYDROXYZINE HCL (ATARAX) 25 MG TABLET    Take 25 mg by mouth every 6 hours as needed for Itching    LACOSAMIDE (VIMPAT) 100 MG TABS TABLET    Take 200 mg by mouth 2 times daily. MAGNESIUM OXIDE (MAG-OX) 400 (240 MG) MG TABLET    Take 1 tablet by mouth 2 times daily    METOPROLOL SUCCINATE (TOPROL XL) 50 MG EXTENDED RELEASE TABLET    Take 1 tablet by mouth daily    NYSTATIN (MYCOSTATIN) 257586 UNIT/GM POWDER    Apply topically 2 times daily Apply topically 4 times daily. PANTOPRAZOLE (PROTONIX) 40 MG TABLET    Take 40 mg by mouth daily    RIVAROXABAN (XARELTO) 20 MG TABS TABLET    Take 1 tablet by mouth daily (with breakfast)    TRIAMCINOLONE (KENALOG) 0.1 % OINTMENT    Apply topically 2 times daily Apply topically 2 times daily.        ALLERGIES     Ace inhibitors and Skelaxin [metaxalone]    FAMILYHISTORY       Family History   Problem Relation Age of Onset    High Blood Pressure Mother     High Blood Pressure Father     Kidney Disease Sister         SOCIAL HISTORY       Social History     Tobacco Use    Smoking status: Every Day     Packs/day: 1.00     Years: 10.00     Pack years: 10.00     Types: Cigarettes, E-Cigarettes    Smokeless tobacco: Never    Tobacco comments:     CUT BACK TO 1/2 PPD WITH E CIG 6-2019   Vaping Use    Vaping Use: Former    Start date: 3/28/2019    Substances: LOWEST DOSE   Substance Use Topics Alcohol use: Not Currently     Alcohol/week: 2.0 standard drinks     Types: 2 Shots of liquor per week     Comment: 6 DRINKS A WEEK OR LESS    Drug use: No       SCREENINGS                         CIWA Assessment  BP: (!) 131/90  Heart Rate: 95           PHYSICAL EXAM  1 or more Elements     ED Triage Vitals   BP Temp Temp Source Heart Rate Resp SpO2 Height Weight   03/09/23 1131 03/09/23 1131 03/09/23 1131 03/09/23 1245 03/09/23 1245 03/09/23 1131 -- 03/09/23 1613   110/78 97.8 °F (36.6 °C) Oral (!) 101 22 100 %  191 lb (86.6 kg)       Physical Exam  Vitals and nursing note reviewed. HENT:      Mouth/Throat:      Mouth: Mucous membranes are moist.      Pharynx: Oropharynx is clear. No oropharyngeal exudate or posterior oropharyngeal erythema. Eyes:      Extraocular Movements: Extraocular movements intact. Conjunctiva/sclera: Conjunctivae normal.      Pupils: Pupils are equal, round, and reactive to light. Cardiovascular:      Rate and Rhythm: Normal rate and regular rhythm. Pulses: Normal pulses. Heart sounds: Normal heart sounds. Pulmonary:      Effort: Pulmonary effort is normal.      Breath sounds: Normal breath sounds. Abdominal:      General: Bowel sounds are normal.   Musculoskeletal:      Right lower leg: Edema present. Left lower leg: Edema present. Comments: Kyphosis noted of the thoracic and cervical spine   Skin:     Findings: Erythema and wound present. Comments: Multiple open wounds scattered throughout the anterior chest face and bilateral lower legs in different stages of healing. There is also blisters noted about the lower legs with wound weeping. Right leg seems to be significantly more swollen than the left. Patient has tenderness to palpation over the posterior calf bilaterally. Neurological:      Mental Status: She is alert and oriented to person, place, and time.    Psychiatric:         Mood and Affect: Mood normal.         Behavior: Behavior normal. DIAGNOSTIC RESULTS   LABS:    Labs Reviewed   CBC WITH AUTO DIFFERENTIAL - Abnormal; Notable for the following components:       Result Value    RBC 3.43 (*)     Hemoglobin 9.7 (*)     Hematocrit 29.5 (*)     Platelets 893 (*)     Eosinophils Absolute 1.9 (*)     Anisocytosis Occasional (*)     [place] Cells Occasional (*)     Ovalocytes Occasional (*)     All other components within normal limits   COMPREHENSIVE METABOLIC PANEL W/ REFLEX TO MG FOR LOW K - Abnormal; Notable for the following components:    BUN 6 (*)     Albumin/Globulin Ratio 0.9 (*)     ALT 9 (*)     All other components within normal limits   BRAIN NATRIURETIC PEPTIDE - Abnormal; Notable for the following components:    Pro- (*)     All other components within normal limits   D-DIMER, QUANTITATIVE - Abnormal; Notable for the following components:    D-Dimer, Quant 3.11 (*)     All other components within normal limits   TROPONIN   APTT   PROTIME-INR   ANTI-XA, UNFRACTIONATED HEPARIN   ANTI-XA, UNFRACTIONATED HEPARIN   CBC       When ordered only abnormal lab results are displayed. All other labs were within normal range or not returned as of this dictation. EKG: When ordered, EKG's are interpreted by the Emergency Department Physician in the absence of a cardiologist.  Please see their note for interpretation of EKG.     RADIOLOGY:   Non-plain film images such as CT, Ultrasound and MRI are read by the radiologist. Plain radiographic images are visualized and preliminarily interpreted by the ED Provider with the below findings:        Interpretation per the Radiologist below, if available at the time of this note:    VL Extremity Venous Right           VL Extremity Venous Right    Result Date: 3/9/2023  Vascular Lower Extremities DVT Study Procedure -- PRELIMINARY SONOGRAPHER REPORT --   Demographics   Patient Name       Reji BENTON   Date of Study      03/09/2023         Gender              Female   Patient Number [id number]         Date of Birth       1955   Visit Number       [id number]          Age                 78 year(s)   Accession Number   [id number]         Room Number         0010   Corporate ID       [id number]           Peg Sun RVT, RT   Ordering Physician Sophie Vail MD          Physician  Procedure Type of Study:   Veins:Lower Extremities DVT Study, VL EXTREMITY VENOUS DUPLEX RIGHT. Tech Comments Right Acute totally occluding deep vein thrombosis involving one of the pair of right posterior tibial veins. the peroneal vein was not visualized due to access. . No other evidence of deep vein or superficial vein thrombosis involving the right lower extremity and the left common femoral vein. Pulsatile flow is noted. No results found. PROCEDURES   Unless otherwise noted below, none     Procedures    CRITICAL CARE TIME (.cctime)       PAST MEDICAL HISTORY      has a past medical history of Anxiety, Arthritis, Atrial fibrillation/flutter, Blood transfusion reaction, Crohn's disease ([facility] [address] Depression, GERD (gastroesophageal reflux disease), Hiatal hernia (06/24/2014), blood clots, Hypertension, MRSA (methicillin resistant staph aureus) culture positive (06/05/2018), MRSA (methicillin resistant staph aureus) culture positive (06/03/2021), Neuropathy, Pneumonia (01/08/2014), Sinus headache, SOB (shortness of breath), and VRE (vancomycin resistant enterococcus) culture positive (10/08/2018).      Chronic Conditions affecting Care:     EMERGENCY DEPARTMENT COURSE and DIFFERENTIAL DIAGNOSIS/MDM:   Vitals:    Vitals:    03/09/23 1613 03/09/23 1635 03/09/23 1636 03/09/23 1637   BP:  (!) 131/90     Pulse:  97 93 95   Resp:  20 15 17   Temp:       TempSrc:       SpO2:    98%   Weight: 191 lb (86.6 kg)  182 lb 9.6 oz (82.8 kg)        Patient was given the following medications:  Medications   vancomycin 1000 mg IVPB in 250 mL NS addavial (has no administration in time range)   cefepime (MAXIPIME) 2,000 mg in sodium chloride 0.9 % 50 mL IVPB (mini-bag) (has no administration in time range)   heparin (porcine) injection 6,930 Units (has no administration in time range)   heparin (porcine) injection 6,930 Units (has no administration in time range)   heparin (porcine) injection 3,460 Units (has no administration in time range)   heparin 25,000 units in dextrose 5% 250 mL (premix) infusion (has no administration in time range)   morphine injection 4 mg (4 mg IntraVENous Given 3/9/23 8675)             Is this patient to be included in the SEP-1 Core Measure due to severe sepsis or septic shock? No   Exclusion criteria - the patient is NOT to be included for SEP-1 Core Measure due to:  2+ SIRS criteria are not met    CONSULTS: (Who and What was discussed)  None  Discussion with Other Profesionals : None    Social Determinants : None    Records Reviewed : None    CC/HPI Summary, DDx, ED Course, and Reassessment: 62-year-old female presents emergency department due to bilateral lower leg swelling with redness and wound drainage unsure exactly how long this been going on but has a home health visit and family is concerned that she is not taking her medications as prescribed. Patient has significant pain on the bilateral lower legs and calf area. Patient is prescribed Xarelto unsure when last dose was. Patient also has multiple areas on her chest and face of open wounds with different stages of healing. There is no oral mucosal involvement that is apparent on initial exam. Patient denies any fevers or chills denies any chest pain, shortness of breath difficulty breathing    Patient has a history of multidrug-resistant organisms with MRSA. Concern for possible DVT as her right lower leg is more swollen compared to the left.  Ultrasound study showing DVT of the tibial and peroneal vein.  White count normal at 8.1. CMP unremarkable  troponin was normal D-dimer was elevated at 3.11. EKG showing sinus rhythm with premature atrial complexes    Orders placed for anticoagulation for DVT, pharmacy is adjusting medication as needed    Patient also empirically started on cefepime and vancomycin given her history of MRSA and multidrug resistant organisms, Pharmacy to adjust medication as needed. Patient will be admitted to the hospital for further evaluation and treatment. FINAL IMPRESSION      1. Leg swelling    2. Acute deep vein thrombosis (DVT) of right lower extremity, unspecified vein (HCC)    3. Cellulitis, unspecified cellulitis site          DISPOSITION/PLAN     DISPOSITION Decision To Admit 03/09/2023 02:37:59 PM      PATIENT REFERRED TO:  No follow-up provider specified.     DISCHARGE MEDICATIONS:  New Prescriptions    No medications on file       DISCONTINUED MEDICATIONS:  Discontinued Medications    No medications on file              (Please note that portions of this note were completed with a voice recognition program.  Efforts were made to edit the dictations but occasionally words are mis-transcribed.)    LYNN Locke (electronically signed)           LYNN Locke  03/09/23 [phone]

## 2023-03-09 NOTE — PROGRESS NOTES
Pharmacy Home Medication Reconciliation Note    A medication reconciliation has been completed for Crissy Ding 1955    Pharmacy: [facility], [address] [place]  Information provided by: facility    The patient's home medication list is as follows: No current facility-administered medications on file prior to encounter. Current Outpatient Medications on File Prior to Encounter   Medication Sig Dispense Refill    fluticasone (FLONASE) 50 MCG/ACT nasal spray 1 spray by Each Nostril route daily      pantoprazole (PROTONIX) 40 MG tablet Take 40 mg by mouth daily      famotidine (PEPCID) 20 MG tablet Take 1 tablet by mouth 2 times daily 60 tablet 3    lacosamide (VIMPAT) 100 MG TABS tablet Take 200 mg by mouth 2 times daily. calcium-vitamin D (OSCAL-500) 500-200 MG-UNIT per tablet Take 1 tablet by mouth 2 times daily      dupilumab (DUPIXENT) 300 MG/2ML SOPN injection Inject 300 mg into the skin every 14 days      nystatin (MYCOSTATIN) 447357 UNIT/GM powder Apply topically 2 times daily Apply topically 4 times daily. triamcinolone (KENALOG) 0.1 % ointment Apply topically 2 times daily Apply topically 2 times daily.       magnesium oxide (MAG-OX) 400 (240 Mg) MG tablet Take 1 tablet by mouth 2 times daily 30 tablet 1    baclofen (LIORESAL) 10 MG tablet Take 15 mg by mouth 3 times daily as needed      metoprolol succinate (TOPROL XL) 50 MG extended release tablet Take 1 tablet by mouth daily 30 tablet 5    rivaroxaban (XARELTO) 20 MG TABS tablet Take 1 tablet by mouth daily (with breakfast) 90 tablet 3    hydrOXYzine HCl (ATARAX) 25 MG tablet Take 25 mg by mouth every 6 hours as needed for Itching      busPIRone (BUSPAR) 10 MG tablet Take 10 mg by mouth 3 times daily as needed      balsalazide (COLAZAL) 750 MG capsule Take 1,500 mg by mouth in the morning and at bedtime           Of note, unable to verify current med list.    Timing of last doses updated. Thank you,  Deng President HUBERT Velazquez

## 2023-03-09 NOTE — CARE COORDINATION
Case Management Assessment  Initial Evaluation    Date/Time of Evaluation: 3/9/2023 5:04 PM  Assessment Completed by: ASHLEE Malone, CAMILAW    If patient is discharged prior to next notation, then this note serves as note for discharge by case management.    Patient Name: Dianne Keller                   YOB: 1955  Diagnosis: No admission diagnoses are documented for this encounter.                   Date / Time: 3/9/2023 11:29 AM    Patient Admission Status: Emergency   Readmission Risk (Low < 19, Mod (19-27), High > 27): Readmission Risk Score: 15.3    Current PCP: Nicko Santo MD  PCP verified by CM? Yes    Chart Reviewed: Yes      History Provided by: Child/Family (Son Vj)  Patient Orientation: Alert and Oriented    Patient Cognition: Alert    Hospitalization in the last 30 days (Readmission):  Yes    If yes, Readmission Assessment in  Navigator will be completed.    Advance Directives:      Code Status: Prior   Patient's Primary Decision Maker is:      Primary Decision Maker: Vj Keller - Child - [phone]    Discharge Planning:    Patient lives with: Children (w/son and other family members) Type of Home: House (1 level - level entry)  Primary Care Giver: Family (Son reported pt is not taking care of herself at home)  Patient Support Systems include: Children, Family Members   Current Financial resources: Medicaid, Medicare  Current community resources: None  Current services prior to admission: None            Current DME:              Type of Home Care services:       ADLS  Prior functional level: Other (see comment) (Son reported pt has had \"a lot of falls\" lately.  Stated she is not walking around much anymore.)  Current functional level: Other (see comment) (Pending evaluation)    PT AM-PAC:   /24  OT AM-PAC:   /24    Family can provide assistance at DC: Yes (But son stated family is having a lot of difficulty caring for patient at home)  Would you like Case Management  to discuss the discharge plan with any other family members/significant others, and if so, who? Yes (w/son)  Plans to Return to Present Housing: Unknown at present ([facility] wants pt in a LTC facility. [facility]  Other Identified Issues/Barriers to RETURNING to current housing: None  Potential Assistance needed at discharge: [address] DME:    Patient expects to discharge to: Skilled nursing facility (Son wants pt to discharge to LTC facility. Informed we don't do that here but would make a referral to [facility]. STated may be able to get to SNF where pt can transition to LTC.)  Plan for transportation at discharge:      Financial    Payor: Maira Brian / Plan: Bry Freeman / Product Type: *No Product type* /     Does insurance require precert for SNF: Yes    Potential assistance Purchasing Medications: No  Meds-to-Beds request:        Jackie Chavez [address] [place] [address] [phone] [facility] [phone] [address] [facility] [address]  Phone: [phone] Fax: [phone]      Notes:    Factors facilitating achievement of predicted outcomes: Family support    Barriers to discharge:  Patient has not been caring for self at home. May want to discharge home but son is very much pushing for patient to go somewhere long term. Additional Case Management Notes:  Son spoke with SW asking how we could place patient into a LTC facility. Stated patient has had \"a lot of falls\" and is getting \"very difficult to care for at home. \"  Stated patient does not take her medicine and barely gets out of bed. SW informed that LTC placement was not something we do from the hospital.  Informed we can transition to SNF for short term if pt qualifies for inpatient rehab. Stated they can work on LTC placement while pt is here at the hospital and SNF.   Informed of [facility] and son was agreeable with a referral.  SW explained that if patient is able to make her own decisions, she will need to be agreeable with all this plan. Son verbalized understanding. The Plan for Transition of Care is related to the following treatment goals of No admission diagnoses are documented for this encounter. IF APPLICABLE: The Patient and/or patient representative Dianne and her family were provided with a choice of provider and agrees with the discharge plan. Freedom of choice list with basic dialogue that supports the patient's individualized plan of care/goals and shares the quality data associated with the providers was provided to:     Patient Representative Name:       The Patient and/or Patient Representative Agree with the Discharge Plan?       ASHLEE Yañez LSW  Case Management Department    Electronically signed by ASHLEE Yañez LSW on 3/9/2023 at 5:07 PM

## 2023-03-09 NOTE — ED PROVIDER NOTES
This patient was seen by the Mid-Level Provider. I have seen and examined the patient, agree with the workup, evaluation, management and diagnosis. Care plan has been discussed. My assessment reveals a 70-year-old female presents with leg swelling. This is a 70-year-old female presents from home with bilateral leg pain and swelling. She states the right is a lot worse than the left. She does alone. She was apparently being seen by home health care nurse who had her transported to the emergency department. She denies any fevers or chills. She denies any nausea or vomiting. She states that this started several days ago. Radiology results:    VL Extremity Venous Right    (Results Pending)         LABS:    Labs Reviewed   CBC WITH AUTO DIFFERENTIAL - Abnormal; Notable for the following components:       Result Value    RBC 3.43 (*)     Hemoglobin 9.7 (*)     Hematocrit 29.5 (*)     Platelets 212 (*)     Eosinophils Absolute 1.9 (*)     Anisocytosis Occasional (*)     María Cells Occasional (*)     Ovalocytes Occasional (*)     All other components within normal limits   COMPREHENSIVE METABOLIC PANEL W/ REFLEX TO MG FOR LOW K - Abnormal; Notable for the following components:    BUN 6 (*)     Albumin/Globulin Ratio 0.9 (*)     ALT 9 (*)     All other components within normal limits   BRAIN NATRIURETIC PEPTIDE - Abnormal; Notable for the following components:    Pro- (*)     All other components within normal limits   D-DIMER, QUANTITATIVE - Abnormal; Notable for the following components:    D-Dimer, Quant 3.11 (*)     All other components within normal limits   TROPONIN           EKG:    Sinus rhythm at a rate of 77 beats a minute with no acute ST elevations or depressions or pathologic Q waves. Normal axis. Exam:    Well-nourished female in no acute distress. The patient had open sores throughout her body as though she was picking at her self.   In particular the patient's lower extremity showed significant edema right much worse than the left.  She had some open sores on her right lower extremity consistent with possible venous stasis or cellulitis.  She had good distal pulses.      Medical decision makin-year-old female presents with lower extremity swelling right much worse than left.  This appears to be consistent with cellulitis versus DVTs.  However, the patient has a history of DVTs in the past and paroxysmal atrial fibrillation and is currently on Xarelto.  Ultrasound is currently pending for a DVT.  If that is negative she will be admitted for cellulitis.  If positive we will proceed accordingly.  She is in stable condition.  Please see chart for final disposition.          FINAL IMPRESSION:    1. Leg swelling            Emiliano Spaulding MD  [phone]

## 2023-03-09 NOTE — CARE COORDINATION
03/09/23 1708   Readmission Assessment   Number of Days since last admission? 8-30 days   Previous Disposition Home with Home Health   Who is being Acacia Stallworth   (Son Manjinder Cruz)   What was the patient's/caregiver's perception as to why they think they needed to return back to the hospital? Other (Comment)  (Leg swelling, cellulitis, multiple falls)   Did you visit your Primary Care Physician after you left the hospital, before you returned this time? No   Why weren't you able to visit your PCP? Other (Comment)  (Unsure)   Did you see a specialist, such as Cardiac, Pulmonary, Orthopedic Physician, etc. after you left the hospital? No   Who advised the patient to return to the hospital? Other (Comment)  (Family)   Does the patient report anything that got in the way of taking their medications? Yes   What reasons did they give? Didn't want to take them (Comment)  (Patient refusing to take her meds at home.)   In our efforts to provide the best possible care to you and others like you, can you think of anything that we could have done to help you after you left the hospital the first time, so that you might not have needed to return so soon? Other (Comment)  (Son reported patient stays in bed most of the time and is not cooperative with her meds.   Wants pt placed in LTC facility.)     Electronically signed by ASHLEE Still, [place] on 3/9/2023 at 5:14 PM

## 2023-03-09 NOTE — H&P
Hospital Medicine History & Physical      PCP: Shaq Dyer MD    Date of Admission: 3/9/2023    Chief Complaint:     Leg Swelling       PT to ED via EMS from home where she lives alone and has home health visits. PT c/o bilateral leg swelling and pain. History Of Present Illness:     78 y.o. female who presents to the emergency room due to bilateral lower leg swelling and pain as well as diffuse wounds throughout the body mainly on the bilateral lower legs chest and face area. This was a signout patient to be and most of the work-up was already completed pending DVT study. Please see Dr. Jack Barksdale note for further details of initial presentation. Through nursing staff was able to ascertain information from the family stating that they are concerned that the patient has not taken her medications as prescribed given that she does have home health nurse that comes by every day to take care of her she has worsening blisters on her bilateral lower legs with weeping of wounds. When I asked the patient she states that she does not have any significant complaints other than her bilateral lower leg pain. She denies any chest pain, shortness of breath or difficulty breathing. It is noted that the patient was prescribed Xarelto but is uncertain of when the last dose was taken. Right lower leg swelling is worse than the left. 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          Past Medical History:          Diagnosis Date    Anxiety     Arthritis     Atrial fibrillation/flutter     Blood transfusion reaction     Crohn's disease ([address]     Depression     GERD (gastroesophageal reflux disease)     Hiatal hernia 06/24/2014    Hx of blood clots     Hypertension     MRSA (methicillin resistant staph aureus) culture positive 06/05/2018    urine    MRSA (methicillin resistant staph aureus) culture positive 06/03/2021    wound and colonization    Neuropathy     Pneumonia 01/08/2014 Sinus headache     SOB (shortness of breath)     VRE (vancomycin resistant enterococcus) culture positive 10/08/2018    abd culture       Past Surgical History:          Procedure Laterality Date    ABDOMEN SURGERY      ABDOMINAL ADHESION SURGERY  06/08/2018    BLADDER SUSPENSION      COLONOSCOPY      COLONOSCOPY  9/14/15    sigmoid colon biopsy     COLONOSCOPY  08/07/2018    COLONOSCOPY  06/07/2019    COLONOSCOPY, POSSIBLE BIOPSY, POSSIBLE POLYPECTOMY    COLONOSCOPY N/A 6/7/2019    COLONOSCOPY WITH BIOPSY performed by Mauro Arce MD at [address] [facility] N/A 6/7/2019    COLONOSCOPY DIAGNOSTIC/STOMA performed by Mauro Arce MD at [address] [place] [address] N/A 10/8/2018    EXPLORATORY LAPAROTOMY WITH COLOSTOMY performed by Luli Steinberg MD at [facility] Left 6/25/14    excision plantar left hallux    FOOT SURGERY  6/3/15    PLANTAR PLATE REPAIR BILATERAL, ARTHROTOMY MPJ 2ND BILATERAL    FOOT SURGERY Left 08/05/2002    Excision second metatarsal head left    FRACTURE SURGERY      rt hip    HAND CARPECTOMY Bilateral     HEEL SPUR SURGERY      HERNIA REPAIR      HYSTERECTOMY (CERVIX STATUS UNKNOWN)      bladder surgery    JOINT REPLACEMENT      rt hip, L shoulder replacement 11/2014    KNEE SURGERY Bilateral     arthrosvopic    LEG SURGERY Right     MA SECONDARY CLOSURE SURG WOUND/DEHSN EXTSV/COMPLIC N/A 43/03/5186    SECONDARY WOUND CLOSURE OF ABDOMINAL WOUND performed by Luli Steinberg MD at [address] [facility] N/A 7/17/2019    FLEXIBLE SIGMOIDOSCOPY performed by Luli Steinberg MD at [address] [place] [address]  01/15/2018    with biopsies    SMALL INTESTINE SURGERY N/A 7/17/2019    COLOSTOMY CLOSURE WITH COLORECTAL ANASTOMOSIS performed by Luli Steinberg MD at [address] [place] [address]  6/14/13    and colonsocopy with antral erosion biopsies       Medications Prior to Admission:      Prior to Admission medications    Medication Sig Start Date End Date Taking? Authorizing Provider   fluticasone (FLONASE) 50 MCG/ACT nasal spray 1 spray by Each Nostril route daily    Historical Provider, MD   pantoprazole (PROTONIX) 40 MG tablet Take 40 mg by mouth daily    Historical Provider, MD   famotidine (PEPCID) 20 MG tablet Take 1 tablet by mouth 2 times daily 11/19/22   Svitlana Wolfe MD   lacosamide (VIMPAT) 100 MG TABS tablet Take 200 mg by mouth 2 times daily. Historical Provider, MD   calcium-vitamin D (OSCAL-500) 500-200 MG-UNIT per tablet Take 1 tablet by mouth 2 times daily    Historical Provider, MD   dupilumab (DUPIXENT) 300 MG/2ML SOPN injection Inject 300 mg into the skin every 14 days    Historical Provider, MD   nystatin (MYCOSTATIN) 673593 UNIT/GM powder Apply topically 2 times daily Apply topically 4 times daily. Historical Provider, MD   triamcinolone (KENALOG) 0.1 % ointment Apply topically 2 times daily Apply topically 2 times daily.     Historical Provider, MD   magnesium oxide (MAG-OX) 400 (240 Mg) MG tablet Take 1 tablet by mouth 2 times daily 8/10/21   Svitlana Wolfe MD   baclofen (LIORESAL) 10 MG tablet Take 15 mg by mouth 3 times daily as needed    Historical Provider, MD   metoprolol succinate (TOPROL XL) 50 MG extended release tablet Take 1 tablet by mouth daily 1/21/21   GAYLA Collier CNP   rivaroxaban (XARELTO) 20 MG TABS tablet Take 1 tablet by mouth daily (with breakfast) 12/7/20   GAYLA Collier CNP   hydrOXYzine HCl (ATARAX) 25 MG tablet Take 25 mg by mouth every 6 hours as needed for Itching    Historical Provider, MD   busPIRone (BUSPAR) 10 MG tablet Take 10 mg by mouth 3 times daily as needed    Historical Provider, MD   balsalazide (COLAZAL) 750 MG capsule Take 1,500 mg by mouth in the morning and at bedtime    Historical Provider, MD       Allergies:  Ace inhibitors and Skelaxin [metaxalone]    Social History:      TOBACCO:   reports that she has been smoking cigarettes and e-cigarettes. She has a 10.00 pack-year smoking history. She has never used smokeless tobacco.  ETOH:   reports that she does not currently use alcohol after a past usage of about 2.0 standard drinks per week. E-cigarette/Vaping       Questions Responses    E-cigarette/Vaping Use Former User    Start Date 3/28/19    Passive Exposure     Quit Date     Counseling Given     Comments Mid-Size or Vape Pen              Family History:     Reviewed and negative in regards to presenting illness/complaint. Problem Relation Age of Onset    High Blood Pressure Mother     High Blood Pressure Father     Kidney Disease Sister        REVIEW OF SYSTEMS COMPLETED:   Pertinent positives as noted in the HPI. All other systems reviewed and negative. PHYSICAL EXAM PERFORMED:    BP (!) 131/90   Pulse 95   Temp 97.8 °F (36.6 °C) (Oral)   Resp 17   Wt 182 lb 9.6 oz (82.8 kg)   SpO2 98%   BMI 28.60 kg/m²     General appearance:  No apparent distress, appears stated age and cooperative. HEENT:  Normal cephalic, atraumatic without obvious deformity. Pupils equal, round, and reactive to light. Extra ocular muscles intact. Conjunctivae/corneas clear. Neck: Supple, with full range of motion. No jugular venous distention. Trachea midline. Respiratory:  Normal respiratory effort. Clear to auscultation, bilaterally without Rales/Wheezes/Rhonchi. Cardiovascular:  Regular rate and rhythm with normal S1/S2 without murmurs, rubs or gallops. Abdomen: Soft, non-tender, non-distended with normal bowel sounds. Musculoskeletal:  No clubbing, cyanosis or edema bilaterally. Full range of motion without deformity. Skin: Skin color, texture, turgor normal.  No rashes or lesions. Neurologic:  Neurovascularly intact without any focal sensory/motor deficits.  Cranial nerves: II-XII intact, grossly non-focal.  Psychiatric:  Alert and oriented, thought content appropriate, normal insight  Capillary Refill: Brisk,3 seconds, normal  Peripheral Pulses: +2 palpable, equal bilaterally       Labs:     Recent Labs     03/09/23  1228   WBC 8.1   HGB 9.7*   HCT 29.5*   *     Recent Labs     03/09/23  1228      K 4.7      CO2 25   BUN 6*   CREATININE 0.6   CALCIUM 8.5     Recent Labs     03/09/23  1228   AST 24   ALT 9*   BILITOT 0.4   ALKPHOS 74     No results for input(s): INR in the last 72 hours.  Recent Labs     03/09/23  1228   TROPONINI 0.01       Urinalysis:      Lab Results   Component Value Date/Time    NITRU Negative 02/20/2023 01:31 PM    WBCUA 2 02/20/2023 01:31 PM    BACTERIA None Seen 02/20/2023 01:31 PM    RBCUA 2 02/20/2023 01:31 PM    BLOODU Negative 02/20/2023 01:31 PM    SPECGRAV 1.020 02/20/2023 01:31 PM    GLUCOSEU Negative 02/20/2023 01:31 PM       Radiology:     CXR: I have reviewed the CXR   EKG:  I have reviewed the EKG     VL Extremity Venous Right             Consults:    None    ASSESSMENT:    Acute right lower extremity DVT: D-dimer 3.11.  Doppler ultrasound showed Acute totally occluding deep vein thrombosis involving one of the pair of   right posterior tibial veins, the peroneal vein not visualized.  Patient has been noncompliant with Xarelto.    Cellulitis of bilateral lower extremity with open wounds-anemia: Hemoglobin 9.6 upon arrival.    Atrial fibrillation     Essential HTN    Crohn's disease   GERD  Arthritis       PLAN:    Admit patient to MedSurg unit  Start IV heparin drip  Vascular surgery consultation  IV antibiotic vancomycin plus cefepime  Obtain blood cultures  Obtain wound care consultation  Monitor blood pressure, IV hydralazine as needed  Continue home meds      DVT Prophylaxis: Heparin drip  Diet: No diet orders on file  Code Status: Prior    PT/OT Eval Status: Yes    Dispo - Pending clinical improvement        Bebeto Mixon MD    Thank you Nicko Santo MD for the opportunity to be involved in this patient's care. If you have any questions or concerns please feel  free to contact me at [phone].

## 2023-03-09 NOTE — ED NOTES
2nd PIV placed, 22 G IV to R wrist, tolerated well, labs obtained     Ferdinand Bernheim, RN  03/09/23 6935

## 2023-03-10 LAB
ALBUMIN SERPL-MCNC: 2.7 G/DL (ref 3.4–5)
ALP BLD-CCNC: 54 U/L (ref 40–129)
ALT SERPL-CCNC: 13 U/L (ref 10–40)
ANION GAP SERPL CALCULATED.3IONS-SCNC: 11 MMOL/L (ref 3–16)
ANTI-XA UNFRAC HEPARIN: 0.75 IU/ML (ref 0.3–0.7)
APTT: 128 SEC (ref 23–34.3)
APTT: 57.5 SEC (ref 23–34.3)
APTT: >180 SEC (ref 23–34.3)
AST SERPL-CCNC: 17 U/L (ref 15–37)
BASOPHILS ABSOLUTE: 0.1 K/UL (ref 0–0.2)
BASOPHILS RELATIVE PERCENT: 0.9 %
BILIRUB SERPL-MCNC: 0.4 MG/DL (ref 0–1)
BILIRUBIN DIRECT: <0.2 MG/DL (ref 0–0.3)
BILIRUBIN, INDIRECT: ABNORMAL MG/DL (ref 0–1)
BUN BLDV-MCNC: 5 MG/DL (ref 7–20)
CALCIUM SERPL-MCNC: 7.9 MG/DL (ref 8.3–10.6)
CHLORIDE BLD-SCNC: 106 MMOL/L (ref 99–110)
CO2: 20 MMOL/L (ref 21–32)
CREAT SERPL-MCNC: <0.5 MG/DL (ref 0.6–1.2)
EKG ATRIAL RATE: 77 BPM
EKG DIAGNOSIS: NORMAL
EKG P AXIS: 113 DEGREES
EKG P-R INTERVAL: 178 MS
EKG Q-T INTERVAL: 398 MS
EKG QRS DURATION: 96 MS
EKG QTC CALCULATION (BAZETT): 450 MS
EKG R AXIS: 29 DEGREES
EKG T AXIS: 60 DEGREES
EKG VENTRICULAR RATE: 77 BPM
EOSINOPHILS ABSOLUTE: 1.5 K/UL (ref 0–0.6)
EOSINOPHILS RELATIVE PERCENT: 20.4 %
GFR SERPL CREATININE-BSD FRML MDRD: >60 ML/MIN/{1.73_M2}
GLUCOSE BLD-MCNC: 80 MG/DL (ref 70–99)
HCT VFR BLD CALC: 27.7 % (ref 36–48)
HEMATOLOGY PATH CONSULT: NORMAL
HEMOGLOBIN: 9.1 G/DL (ref 12–16)
LACTIC ACID: 0.9 MMOL/L (ref 0.4–2)
LYMPHOCYTES ABSOLUTE: 1.8 K/UL (ref 1–5.1)
LYMPHOCYTES RELATIVE PERCENT: 24.4 %
MCH RBC QN AUTO: 28.1 PG (ref 26–34)
MCHC RBC AUTO-ENTMCNC: 32.9 G/DL (ref 31–36)
MCV RBC AUTO: 85.5 FL (ref 80–100)
MONOCYTES ABSOLUTE: 0.5 K/UL (ref 0–1.3)
MONOCYTES RELATIVE PERCENT: 6.8 %
NEUTROPHILS ABSOLUTE: 3.5 K/UL (ref 1.7–7.7)
NEUTROPHILS RELATIVE PERCENT: 47.5 %
PDW BLD-RTO: 13.9 % (ref 12.4–15.4)
PLATELET # BLD: 468 K/UL (ref 135–450)
PMV BLD AUTO: 6.6 FL (ref 5–10.5)
POTASSIUM REFLEX MAGNESIUM: 3.9 MMOL/L (ref 3.5–5.1)
RBC # BLD: 3.24 M/UL (ref 4–5.2)
SODIUM BLD-SCNC: 137 MMOL/L (ref 136–145)
TOTAL PROTEIN: 5.8 G/DL (ref 6.4–8.2)
VANCOMYCIN RANDOM: 8.3 UG/ML
WBC # BLD: 7.4 K/UL (ref 4–11)

## 2023-03-10 PROCEDURE — 80076 HEPATIC FUNCTION PANEL: CPT

## 2023-03-10 PROCEDURE — 83605 ASSAY OF LACTIC ACID: CPT

## 2023-03-10 PROCEDURE — 2580000003 HC RX 258: Performed by: INTERNAL MEDICINE

## 2023-03-10 PROCEDURE — 6370000000 HC RX 637 (ALT 250 FOR IP): Performed by: INTERNAL MEDICINE

## 2023-03-10 PROCEDURE — 85730 THROMBOPLASTIN TIME PARTIAL: CPT

## 2023-03-10 PROCEDURE — 6360000002 HC RX W HCPCS: Performed by: INTERNAL MEDICINE

## 2023-03-10 PROCEDURE — APPSS60 APP SPLIT SHARED TIME 46-60 MINUTES: Performed by: NURSE PRACTITIONER

## 2023-03-10 PROCEDURE — 6360000002 HC RX W HCPCS: Performed by: NURSE PRACTITIONER

## 2023-03-10 PROCEDURE — 80048 BASIC METABOLIC PNL TOTAL CA: CPT

## 2023-03-10 PROCEDURE — 80202 ASSAY OF VANCOMYCIN: CPT

## 2023-03-10 PROCEDURE — 87040 BLOOD CULTURE FOR BACTERIA: CPT

## 2023-03-10 PROCEDURE — 1200000000 HC SEMI PRIVATE

## 2023-03-10 PROCEDURE — 36415 COLL VENOUS BLD VENIPUNCTURE: CPT

## 2023-03-10 PROCEDURE — 93010 ELECTROCARDIOGRAM REPORT: CPT | Performed by: INTERNAL MEDICINE

## 2023-03-10 PROCEDURE — 6370000000 HC RX 637 (ALT 250 FOR IP): Performed by: NURSE PRACTITIONER

## 2023-03-10 PROCEDURE — 85025 COMPLETE CBC W/AUTO DIFF WBC: CPT

## 2023-03-10 PROCEDURE — 85520 HEPARIN ASSAY: CPT

## 2023-03-10 PROCEDURE — APPNB30 APP NON BILLABLE TIME 0-30 MINS: Performed by: NURSE PRACTITIONER

## 2023-03-10 PROCEDURE — 6360000002 HC RX W HCPCS: Performed by: PHYSICIAN ASSISTANT

## 2023-03-10 RX ORDER — TRIAMCINOLONE ACETONIDE 1 MG/G
CREAM TOPICAL 2 TIMES DAILY
Status: DISCONTINUED | OUTPATIENT
Start: 2023-03-10 | End: 2023-03-14 | Stop reason: HOSPADM

## 2023-03-10 RX ADMIN — MORPHINE SULFATE 2 MG: 2 INJECTION, SOLUTION INTRAMUSCULAR; INTRAVENOUS at 10:18

## 2023-03-10 RX ADMIN — METOPROLOL SUCCINATE 50 MG: 50 TABLET, EXTENDED RELEASE ORAL at 10:31

## 2023-03-10 RX ADMIN — FAMOTIDINE 20 MG: 20 TABLET, FILM COATED ORAL at 10:31

## 2023-03-10 RX ADMIN — LACOSAMIDE 200 MG: 100 TABLET, FILM COATED ORAL at 22:00

## 2023-03-10 RX ADMIN — VANCOMYCIN HYDROCHLORIDE 1250 MG: 10 INJECTION, POWDER, LYOPHILIZED, FOR SOLUTION INTRAVENOUS at 14:55

## 2023-03-10 RX ADMIN — SODIUM CHLORIDE, PRESERVATIVE FREE 10 ML: 5 INJECTION INTRAVENOUS at 10:33

## 2023-03-10 RX ADMIN — MAGNESIUM OXIDE 400 MG (241.3 MG MAGNESIUM) TABLET 400 MG: TABLET at 10:31

## 2023-03-10 RX ADMIN — HEPARIN SODIUM 12 UNITS/KG/HR: 10000 INJECTION, SOLUTION INTRAVENOUS at 16:08

## 2023-03-10 RX ADMIN — BALSALAZIDE DISODIUM 1500 MG: 750 CAPSULE ORAL at 10:30

## 2023-03-10 RX ADMIN — BALSALAZIDE DISODIUM 1500 MG: 750 CAPSULE ORAL at 21:59

## 2023-03-10 RX ADMIN — CEFEPIME 2000 MG: 2 INJECTION, POWDER, FOR SOLUTION INTRAVENOUS at 03:54

## 2023-03-10 RX ADMIN — LACOSAMIDE 200 MG: 100 TABLET, FILM COATED ORAL at 10:31

## 2023-03-10 RX ADMIN — PANTOPRAZOLE SODIUM 40 MG: 40 TABLET, DELAYED RELEASE ORAL at 10:31

## 2023-03-10 RX ADMIN — FAMOTIDINE 20 MG: 20 TABLET, FILM COATED ORAL at 22:00

## 2023-03-10 RX ADMIN — OXYCODONE 5 MG: 5 TABLET ORAL at 18:18

## 2023-03-10 RX ADMIN — HEPARIN SODIUM 6930 UNITS: 1000 INJECTION INTRAVENOUS; SUBCUTANEOUS at 18:11

## 2023-03-10 RX ADMIN — OXYCODONE 5 MG: 5 TABLET ORAL at 10:18

## 2023-03-10 RX ADMIN — CEFEPIME 2000 MG: 2 INJECTION, POWDER, FOR SOLUTION INTRAVENOUS at 16:56

## 2023-03-10 RX ADMIN — TRIAMCINOLONE ACETONIDE: 1 CREAM TOPICAL at 21:59

## 2023-03-10 RX ADMIN — OXYCODONE 5 MG: 5 TABLET ORAL at 22:00

## 2023-03-10 RX ADMIN — MAGNESIUM OXIDE 400 MG (241.3 MG MAGNESIUM) TABLET 400 MG: TABLET at 22:00

## 2023-03-10 RX ADMIN — VANCOMYCIN HYDROCHLORIDE 1250 MG: 10 INJECTION, POWDER, LYOPHILIZED, FOR SOLUTION INTRAVENOUS at 22:24

## 2023-03-10 ASSESSMENT — PAIN DESCRIPTION - ORIENTATION: ORIENTATION: RIGHT;LEFT

## 2023-03-10 ASSESSMENT — PAIN SCALES - WONG BAKER
WONGBAKER_NUMERICALRESPONSE: 2

## 2023-03-10 ASSESSMENT — PAIN DESCRIPTION - LOCATION: LOCATION: LEG

## 2023-03-10 ASSESSMENT — PAIN SCALES - GENERAL
PAINLEVEL_OUTOF10: 10
PAINLEVEL_OUTOF10: 10

## 2023-03-10 ASSESSMENT — PAIN DESCRIPTION - DESCRIPTORS: DESCRIPTORS: ACHING;BURNING

## 2023-03-10 NOTE — PROGRESS NOTES
Clinical Pharmacy Note: Pharmacy to Dose Vancomycin    Vancomycin Day: 1  Indication: cellulitis   Current Dose: 1000 mg q12  Dosing Method: Bayesian Modeling    Random: 8.3    Recent Labs     03/09/23  1228 03/10/23  0833   BUN 6* 5*       Recent Labs     03/09/23  1228 03/10/23  0833   CREATININE 0.6 <0.5*       Recent Labs     03/09/23  1656 03/10/23  0833   WBC 7.7 7.4         Intake/Output Summary (Last 24 hours) at 3/10/2023 0912  Last data filed at 3/10/2023 0356  Gross per 24 hour   Intake 688.97 ml   Output --   Net 688.97 ml         Ht Readings from Last 1 Encounters:   02/20/23 5' 7\" (1.702 m)        Wt Readings from Last 1 Encounters:   03/10/23 177 lb 11.1 oz (80.6 kg)         Body mass index is 27.83 kg/m².    Estimated Creatinine Clearance: 121 mL/min (based on SCr of 0.5 mg/dL).      Assessment/Plan:  Vancomycin level is subtherapeutic.  Increase vancomycin regimen to 1250 every 12 hours.   Bayesian Modeling predicts an AUC of 540 mg/L*hr and trough of 16.7 mg/L.  A vancomycin random level has been ordered on 3/12/2023 at 0600 for follow-up.  Changes in regimen will be determined based on culture results, renal function, and clinical response.  Pharmacy will continue to monitor and adjust regimen as necessary.    Thank you for the consult,    Jeannie Palma, PharmD, [facility]  [id number]

## 2023-03-10 NOTE — PROGRESS NOTES
1614:  Patient transferred to unit 4T for continued care. Report given to Nurse Kacy Schulte at . Pt A+Ox4. VSS. Transported via bed accompanied by patient escort. IV meds infusing at ordered rate.

## 2023-03-10 NOTE — CARE COORDINATION
SW received a call from Abdirizak Arredondo at [facility] stating that patient was NOT active with them prior to admission. Stated, however, if for some reason pt discharges home, they will take her back for [place] [address] services.       Electronically signed by ASHLEE Casiano, [place] on 3/10/2023 at 1:18 PM

## 2023-03-10 NOTE — CARE COORDINATION
Discharge Planning:     (JERAMY) spoke with [facility] admissions staff, Phyllis Castillo ([phone]), who confirmed that they can accept patient and agreed to Jazmin Brooks Team starting patient's pre-cert once therapy notes are entered. CM informed that Occupational Therapy has stated they are waiting for patient to be on Heaprin for 24hrs, so they will be attempting therapy with patient tomorrow and pre-cert should be started this weekend once therapy notes are completed. Eliane agreed to this plan of action. CM called patient's Son, Olga Gardner (on emergency contact list), who reported that he does not live with the patient and that the patient lives alone. Son reported that patient doesn't take her medicine and that her right ear \"looks like rotted flesh\". CM informed patient's Nurse, Kimberly, of this information. Son reported that he is agreeable to patient going to [facility] at discharge for skilled nursing. CM spoke with the patient about going to [facility] upon discharge for skilled nursing. Patient agreed to this plan of action.       Katiuska ROSADO, JAYSHREE, [facility] -   [phone]    Electronically signed by ASHLEE Giordano on 3/10/2023 at 2:30 PM

## 2023-03-10 NOTE — PLAN OF CARE
Problem: Skin/Tissue Integrity  Goal: Absence of new skin breakdown  Description: 1. Monitor for areas of redness and/or skin breakdown  2. Assess vascular access sites hourly  3. Every 4-6 hours minimum:  Change oxygen saturation probe site  4. Every 4-6 hours:  If on nasal continuous positive airway pressure, respiratory therapy assess nares and determine need for appliance change or resting period.   Outcome: Progressing     Problem: Pain  Goal: Verbalizes/displays adequate comfort level or baseline comfort level  Outcome: Progressing  Flowsheets (Taken 3/10/2023 1100)  Verbalizes/displays adequate comfort level or baseline comfort level:   Encourage patient to monitor pain and request assistance   Assess pain using appropriate pain scale   Administer analgesics based on type and severity of pain and evaluate response   Implement non-pharmacological measures as appropriate and evaluate response   Consider cultural and social influences on pain and pain management   Notify Licensed Independent Practitioner if interventions unsuccessful or patient reports new pain

## 2023-03-10 NOTE — PROGRESS NOTES
Occupational/Physical Therapy  Dianne Keller    Received order for OT/PT evaluation. Pt has acute totally occluding deep vein thrombosis involving one of the pair of right posterior tibial veins. Pt started on heparin on 3/9/22 at 5:22 pm. Will hold therapy evaluation until patient has been on Heparin 24 hrs. Will attempt again tomorrow, as schedule allows.  Jimenez Rojasole., OTR/L, [id number], Claudette Agrawal, [id number]

## 2023-03-10 NOTE — PROGRESS NOTES
Clinical Pharmacy Note: Pharmacy to Dose Vancomycin    Nelly Olsen is a 78 y.o. female started on Vancomycin for cellulitis; consult received from Dr. Carlos Katz to manage therapy. Also receiving the following antibiotics: cefepime. Goal AUC: 400-600 mg/L*hr  Goal Trough Level: 10-15 mcg/mL    Assessment/Plan:  A 1500 mg loading dose was given on 3/9 at 2000  Initiate vancomycin 1000 mg IV every 12 hours. Bayesian modeling predicts an AUC of 440 mg/L*hr and a trough of 13.4 mcg/mL at steady state concentration. A vancomycin random level has been ordered for 3/10 at 0600  Changes in regimen will be determined based on culture results, renal function, and clinical response. Pharmacy will continue to monitor and adjust regimen as necessary. Allergies:  Ace inhibitors and Skelaxin [metaxalone]     Recent Labs     03/09/23  1228   CREATININE 0.6       Recent Labs     03/09/23  1228 03/09/23  1656   WBC 8.1 7.7       Ht Readings from Last 1 Encounters:   02/20/23 5' 7\" (1.702 m)        Wt Readings from Last 1 Encounters:   03/09/23 182 lb 9.6 oz (82.8 kg)         Estimated Creatinine Clearance: 102 mL/min (based on SCr of 0.6 mg/dL).       Thank you for the consult,    Halle Sibley [facility]

## 2023-03-10 NOTE — CARE COORDINATION
Discharge Planning:     (CM) reviewed chart which states that patient's Son would like patient to be referred to [facility], as that is where patient has gone to in the past. Chart review states that Son would like patient to go there as [facility] (LTC) as the family can't take care of her at home. CM called [facility] admissions staff, Luther Deal ([phone]), who agreed to review the patient's referral and update CM with a decision. CM asked whether patient could go there as LTC if she doesn't qualify for skilled nursing since patient already has a secondary insurance of Medicaid.  Eliane reported that patient possibly could and CM would only need to complete a Level of Care, but agreed to update CM once she has reviewed patient's referral.      Angela ROSADO, JAYSHREE, [facility] -   [phone]    Electronically signed by ASHLEE Kessler on 3/10/2023 at 7:56 AM

## 2023-03-10 NOTE — PROGRESS NOTES
Called to clarify heparin gtt order. Per pharmacy, we are to go by aptt for heparin drip because patient was on xarelto before admission. Will continue to monitor.

## 2023-03-10 NOTE — CARE COORDINATION
[facility] Nurse  Consult Note       NAME:  Dianne Keller  MEDICAL RECORD NUMBER:  [id number]  AGE: 67 y.o.   GENDER: female  : 1955  TODAY'S DATE:  3/10/2023    Subjective   Reason for WOCN Evaluation and Assessment: multiple wounds       Dianne Keller is a 67 y.o. female referred by:   [x] Physician  [x] Nursing  [] Other:     Wound Identification:  Wound Type:  bullous pemphigoid   Contributing Factors: edema and none    Wound History: present on admission  Current Wound Care Treatment:  corticosteriod cream and atarax for itiching    Patient Goal of Care:  [x] Wound Healing  [] Odor Control  [] Palliative Care  [] Pain Control   [] Other:         PAST MEDICAL HISTORY        Diagnosis Date    Anxiety     Arthritis     Atrial fibrillation/flutter     Blood transfusion reaction     Crohn's disease (HCC)     Depression     GERD (gastroesophageal reflux disease)     Hiatal hernia 2014    Hx of blood clots     Hypertension     MRSA (methicillin resistant staph aureus) culture positive 2018    urine    MRSA (methicillin resistant staph aureus) culture positive 2021    wound and colonization    Neuropathy     Pneumonia 2014    Sinus headache     SOB (shortness of breath)     VRE (vancomycin resistant enterococcus) culture positive 10/08/2018    abd culture       PAST SURGICAL HISTORY    Past Surgical History:   Procedure Laterality Date    ABDOMEN SURGERY      ABDOMINAL ADHESION SURGERY  2018    BLADDER SUSPENSION      COLONOSCOPY      COLONOSCOPY  9/14/15    sigmoid colon biopsy     COLONOSCOPY  2018    COLONOSCOPY  2019    COLONOSCOPY, POSSIBLE BIOPSY, POSSIBLE POLYPECTOMY    COLONOSCOPY N/A 2019    COLONOSCOPY WITH BIOPSY performed by Filippo Mendez MD at [facility] ENDOSCOPY    COLONOSCOPY N/A 2019    COLONOSCOPY DIAGNOSTIC/STOMA performed by Filippo Mendez MD at [facility] ENDOSCOPY    COLOSTOMY N/A 10/8/2018    EXPLORATORY  LAPAROTOMY WITH COLOSTOMY performed by Briseida Huerta MD at [facility] Left 6/25/14    excision plantar left hallux    FOOT SURGERY  6/3/15    PLANTAR PLATE REPAIR BILATERAL, ARTHROTOMY MPJ 2ND BILATERAL    FOOT SURGERY Left 08/05/2002    Excision second metatarsal head left    FRACTURE SURGERY      rt hip    HAND CARPECTOMY Bilateral     HEEL SPUR SURGERY      HERNIA REPAIR      HYSTERECTOMY (CERVIX STATUS UNKNOWN)      bladder surgery    JOINT REPLACEMENT      rt hip, L shoulder replacement 11/2014    KNEE SURGERY Bilateral     arthrosvopic    LEG SURGERY Right     [place] SECONDARY CLOSURE SURG WOUND/DEHSN EXTSV/COMPLIC N/A 89/65/9432    SECONDARY WOUND CLOSURE OF ABDOMINAL WOUND performed by Briseida Huerta MD at [address] [place] [address] N/A 7/17/2019    FLEXIBLE SIGMOIDOSCOPY performed by Briseida Huerta MD at [address] [place] [address]  01/15/2018    with biopsies    SMALL INTESTINE SURGERY N/A 7/17/2019    COLOSTOMY CLOSURE WITH COLORECTAL ANASTOMOSIS performed by Briseida Huerta MD at [address] [place] [address]  6/14/13    and colonsocopy with antral erosion biopsies       FAMILY HISTORY    Family History   Problem Relation Age of Onset    High Blood Pressure Mother     High Blood Pressure Father     Kidney Disease Sister        SOCIAL HISTORY    Social History     Tobacco Use    Smoking status: Every Day     Packs/day: 1.00     Years: 10.00     Pack years: 10.00     Types: Cigarettes, E-Cigarettes    Smokeless tobacco: Never    Tobacco comments:     CUT BACK TO 1/2 PPD WITH E CIG 6-2019   Vaping Use    Vaping Use: Former    Start date: 3/28/2019    Substances: LOWEST DOSE   Substance Use Topics    Alcohol use: Not Currently     Alcohol/week: 2.0 standard drinks     Types: 2 Shots of liquor per week     Comment: 6 DRINKS A WEEK OR LESS    Drug use: No       ALLERGIES    Allergies   Allergen Reactions    Ace Inhibitors Swelling Skelaxin [Metaxalone] Swelling       MEDICATIONS    No current facility-administered medications on file prior to encounter. Current Outpatient Medications on File Prior to Encounter   Medication Sig Dispense Refill    fluticasone (FLONASE) 50 MCG/ACT nasal spray 1 spray by Each Nostril route daily      pantoprazole (PROTONIX) 40 MG tablet Take 40 mg by mouth daily      famotidine (PEPCID) 20 MG tablet Take 1 tablet by mouth 2 times daily 60 tablet 3    lacosamide (VIMPAT) 100 MG TABS tablet Take 200 mg by mouth 2 times daily. calcium-vitamin D (OSCAL-500) 500-200 MG-UNIT per tablet Take 1 tablet by mouth 2 times daily      dupilumab (DUPIXENT) 300 MG/2ML SOPN injection Inject 300 mg into the skin every 14 days      nystatin (MYCOSTATIN) 320284 UNIT/GM powder Apply topically 2 times daily Apply topically 4 times daily. triamcinolone (KENALOG) 0.1 % ointment Apply topically 2 times daily Apply topically 2 times daily.       magnesium oxide (MAG-OX) 400 (240 Mg) MG tablet Take 1 tablet by mouth 2 times daily 30 tablet 1    baclofen (LIORESAL) 10 MG tablet Take 15 mg by mouth 3 times daily as needed      metoprolol succinate (TOPROL XL) 50 MG extended release tablet Take 1 tablet by mouth daily 30 tablet 5    rivaroxaban (XARELTO) 20 MG TABS tablet Take 1 tablet by mouth daily (with breakfast) 90 tablet 3    hydrOXYzine HCl (ATARAX) 25 MG tablet Take 25 mg by mouth every 6 hours as needed for Itching      busPIRone (BUSPAR) 10 MG tablet Take 10 mg by mouth 3 times daily as needed      balsalazide (COLAZAL) 750 MG capsule Take 1,500 mg by mouth in the morning and at bedtime         Objective    /77   Pulse 84   Temp 98.7 °F (37.1 °C) (Oral)   Resp 15   Wt 177 lb 11.1 oz (80.6 kg)   SpO2 93%   BMI 27.83 kg/m²     LABS:  WBC:    Lab Results   Component Value Date/Time    WBC 7.4 03/10/2023 08:33 AM     H/H:    Lab Results   Component Value Date/Time    HGB 9.1 03/10/2023 08:33 AM    HCT 27.7 03/10/2023 08:33 AM     PTT:    Lab Results   Component Value Date/Time    APTT 128.0 03/10/2023 08:33 AM   [APTT}  PT/INR:    Lab Results   Component Value Date/Time    PROTIME 16.2 03/09/2023 04:56 PM    INR 1.31 03/09/2023 04:56 PM     HgBA1c:    Lab Results   Component Value Date/Time    LABA1C 5.0 07/24/2020 05:26 AM       Assessment   Nelson Risk Score: Nelson Scale Score: 11    Patient Active Problem List   Diagnosis Code    Anemia D64.9    Crohn's colitis ([facility] [address] K50.10    MRSA (methicillin resistant Staphylococcus aureus) infection A49.02    DVT (deep venous thrombosis) ([facility]) I82.409    Hypomagnesemia E83.42    Leg swelling M79.89    Venous insufficiency I87.2    Chronic venous hypertension (idiopathic) with inflammation of bilateral lower extremity I87.323    Local infection of skin and subcutaneous tissue L08.9    Open wound of left foot with complication A48.452D    [employer]Q 7/29/20 Dr Gabriel Chris Z45.09    LV dysfunction I51.9    PAF (paroxysmal atrial fibrillation) ([facility]) I48.0    HTN (hypertension), benign I10    S/P coronary angiogram Z98.890    Abnormal angiogram R93.89    Atherosclerosis of native arteries of the extremities with ulceration ([facility]) I70.25    Nail avulsion of toe, initial encounter S91.209A    Lower abdominal pain R10.30    Late effect of ischemic cerebral stroke I69.30    Cognitive deficit as late effect of cerebrovascular accident (CVA) I69.319    Infection requiring contact isolation precautions B99.9    Hyponatremia E87.1    Suspected COVID-19 virus infection Z20.822    Alcohol abuse F10.10    Cellulitis of both feet L03.115, L03. 116    Multiple wounds T07. 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 9) E66.3    History of infection with vancomycin resistant Enterococcus (VRE) Z86.19    History of MRSA infection Z86.14    Sepsis secondary to UTI ([facility]) A41.9, N39.0 Altered mental status R41.82    Hypocalcemia E83.51    FTT (failure to thrive) in adult R62.7    Acute respiratory failure with hypoxemia (HCC) J96.01    Fall against object W18.00XA    Leg DVT (deep venous thromboembolism), acute, right ([address] I82.401     Patient seen for multiple wounds. Patient is currently being treated for right lower extremity dvt. Patient also has hx of bullous pemphigoid. Patient having flare up with blistering on her legs and arms. Per nurse [facility], patient's son was concerned about her right ear. Patient would not let her ear be assessed; she has on a scarf and pulled it down over her right ear. Per nurse the ear has open wounds with some scabbing. I did see pink healed skin along the edges of the ear. Patient is being treated by [address] [place] [address] Dermatology. Home med list has atarax for itching and kenalog cream to be applied twice a day. Recommend cleansing ruptured blisters with saline apply kenalog cream and cover with non adherent dressing. Vascular surgery saw patient for right leg dvt and gave wound care orders. Response to treatment:  Well tolerated by patient. Pain Assessment:  Severity:  0 / 10  Quality of pain: N/A  Wound Pain Timing/Severity: none  Premedicated: No    Plan   Plan of Care:  kenalog cream as prescribed for blistering skin. Open areas cleanse with saline and cover with non adherent dressing, change daily    Specialty Bed Required : No   [] Low Air Loss   [] Pressure Redistribution  [] Fluid Immersion  [] Bariatric  [] Total Pressure Relief  [] Other:     Current Diet: ADULT DIET; Regular; 5 carb choices (75 gm/meal);  Low Fat/Low Chol/High Fiber/2 gm Na  Dietician consult:  Yes    Discharge Plan:  Placement for patient upon discharge: home with support    Patient appropriate for Outpatient [address]: Yes    Referrals:  []   [] [employer] [facility]  [] Supplies  [] Other    Patient/Caregiver Teaching:  Level of patient/caregiver understanding able to:   [x] Indicates understanding       [x] Needs reinforcement  [] Unsuccessful      [] Verbal Understanding  [] Demonstrated understanding       [] No evidence of learning  [] Refused teaching         [] N/A       Electronically signed by  CLAUDIA GilesN, RN, [address] [place] [address]  [phone]  on 3/10/2023 at 5:37 PM

## 2023-03-10 NOTE — PROGRESS NOTES
4 Eyes Skin Assessment     NAME:  Dianne Keller  YOB: 1955  MEDICAL RECORD NUMBER:  [id number]    The patient is being assessed for  Admission    I agree that One RN has performed a thorough Head to Toe Skin Assessment on the patient. ALL assessment sites listed below have been assessed. Areas assessed by both nurses:    Head, Face, Ears, Shoulders, Back, Chest, Arms, Elbows, Hands, Sacrum. Buttock, Coccyx, Ischium, and Legs. Feet and Heels        Does the Patient have a Wound? Yes wound(s) were present on assessment.  LDA wound assessment was Initiated and completed by RN       Nelson Prevention initiated by RN: Yes   Wound Care Orders initiated by RN: NA    Pressure Injury (Stage 3,4, Unstageable, DTI, NWPT, and Complex wounds) if present, place referral order by RN under : No    New and Established Ostomies, if present place, referral order under : No      Nurse 1 eSignature: Electronically signed by Elena Tomas RN on 3/10/23 at 12:58 AM EST    **SHARE this note so that the co-signing nurse can place an eSignature**    Nurse 2 eSignature: Electronically signed by Enrique Medina RN on 3/10/23 at 12:59 AM EST

## 2023-03-10 NOTE — CARE COORDINATION
Discharge Planning:     (JERAMY) received a voicemail from [facility]) staff, Avi Oconnor ([phone]), who reported that they serviced patient's [address] needs this past December and then she was discharged from their services due to cancelling therapy sessions. Avi Oconnor reported that they are willing to accept patient back for [address], if needed and if patient's choice.     Viridiana ROSADO, JAYSHREE, Critical access hospital -   [phone]    Electronically signed by ASHLEE Vital on 3/10/2023 at 11:00 AM

## 2023-03-10 NOTE — PROGRESS NOTES
[facility]    3/10/2023 10:25 AM        Name: Cinthya Clement . Admitted: 3/9/2023  Primary Care Provider: Mike Nguyen MD (Tel: [phone])      Subjective:  . Complaining of intermittent BLE pain. She is ok then starts screaming during exam. This am she was acting like she had to spit but wouldn't tell me what she needed. Lives alone but has aids 8 hours a day per previous notes. Son wants her at snf.     Reviewed interval ancillary notes    Current Medications  vancomycin (VANCOCIN) 1,250 mg in sodium chloride 0.9 % 250 mL IVPB, Q12H  heparin (porcine) injection 6,930 Units, PRN  heparin (porcine) injection 3,460 Units, PRN  heparin 25,000 units in dextrose 5% 250 mL (premix) infusion, Continuous  pantoprazole (PROTONIX) tablet 40 mg, Daily  metoprolol succinate (TOPROL XL) extended release tablet 50 mg, Daily  magnesium oxide (MAG-OX) tablet 400 mg, BID  hydrOXYzine HCl (ATARAX) tablet 25 mg, Q6H PRN  lacosamide (VIMPAT) tablet 200 mg, BID  fluticasone (FLONASE) 50 MCG/ACT nasal spray 1 spray, Daily  famotidine (PEPCID) tablet 20 mg, BID  baclofen (LIORESAL) tablet 15 mg, TID PRN  balsalazide (COLAZAL) capsule 1,500 mg, BID  busPIRone (BUSPAR) tablet 10 mg, TID PRN  hydrALAZINE (APRESOLINE) injection 20 mg, Q4H PRN  sodium chloride flush 0.9 % injection 5-40 mL, 2 times per day  sodium chloride flush 0.9 % injection 5-40 mL, PRN  0.9 % sodium chloride infusion, PRN  ondansetron (ZOFRAN-ODT) disintegrating tablet 4 mg, Q8H PRN   Or  ondansetron (ZOFRAN) injection 4 mg, Q6H PRN  polyethylene glycol (GLYCOLAX) packet 17 g, Daily PRN  acetaminophen (TYLENOL) tablet 650 mg, Q6H PRN   Or  acetaminophen (TYLENOL) suppository 650 mg, Q6H PRN  lactated ringers IV soln infusion, Continuous  cefepime (MAXIPIME) 2,000 mg in sodium chloride 0.9 % 50 mL IVPB (mini-bag), Q12H  oxyCODONE (ROXICODONE) immediate release tablet 5 mg, Q4H PRN  morphine (PF) injection 2 mg, Q3H PRN        Objective:  /68   Pulse 89   Temp 100.2 °F (37.9 °C) (Axillary)   Resp 16   Wt 177 lb 11.1 oz (80.6 kg)   SpO2 95%   BMI 27.83 kg/m²     Intake/Output Summary (Last 24 hours) at 3/10/2023 1025  Last data filed at 3/10/2023 0356  Gross per 24 hour   Intake 688.97 ml   Output --   Net 688.97 ml      Wt Readings from Last 3 Encounters:   03/10/23 177 lb 11.1 oz (80.6 kg)   02/21/23 181 lb 10.5 oz (82.4 kg)   11/22/22 196 lb 8 oz (89.1 kg)       General appearance:  Appears comfortable, chronically ill appearing. Eyes: Sclera clear. Pupils equal.  ENT: Moist oral mucosa. Trachea midline, no adenopathy. Cardiovascular: Regular rhythm, normal S1, S2. No murmur. No edema in lower extremities  Respiratory: Not using accessory muscles. Good inspiratory effort. Clear to auscultation bilaterally, no wheeze or crackles. GI: Abdomen soft, no tenderness, not distended, normal bowel sounds  Musculoskeletal: No cyanosis in digits, neck supple  Neurology: CN 2-12 grossly intact. No speech or motor deficits  Psych: Normal affect. Alert and oriented in time, place and person  Skin: innumerable open wounds and blisters on upper back, BUE, R leg. No obvious cellulitis. Labs and Tests:  CBC:   Recent Labs     03/09/23  1228 03/09/23  1656 03/10/23  0833   WBC 8.1 7.7 7.4   HGB 9.7* 9.6* 9.1*   * 535* 468*     BMP:    Recent Labs     03/09/23  1228 03/10/23  0833    137   K 4.7 3.9    106   CO2 25 20*   BUN 6* 5*   CREATININE 0.6 <0.5*   GLUCOSE 95 80     Hepatic:   Recent Labs     03/09/23  1228 03/10/23  0833   AST 24 17   ALT 9* 13   BILITOT 0.4 0.4   ALKPHOS 74 54     Venous Duplex BLE  Tech Comments   Right   Acute totally occluding deep vein thrombosis involving one of the pair of   right posterior tibial veins. the peroneal vein was not visualized due to   access.  .   No other evidence of deep vein or superficial vein thrombosis involving the   right lower extremity and the left common femoral vein. Pulsatile flow is noted. Problem List  Principal Problem:    Leg DVT (deep venous thromboembolism), acute, right (HCC)  Resolved Problems:    * No resolved hospital problems. *       Assessment & Plan:   Acute R posterior tibial DVT-has history of BLE-not clear whether she has been compliant with her meds. Currently on heparin drip. Bullous pemphigoid-followed by Dermatology. They are working on getting her dupixent every 2 weeks. Paroxysmal afib-on xarelto. Rate currently controlled. Weakness-PT/OT eval-needs snf for now. Diet: ADULT DIET; Regular; 5 carb choices (75 gm/meal);  Low Fat/Low Chol/High Fiber/2 gm Na  Code:Full Code  DVT PPX:heparin      Jim Tang PA-C   3/10/2023 10:25 AM

## 2023-03-10 NOTE — CONSULTS
[facility]  Consultation Note    Chief Complaint / Reason for Consultation  Right leg DVT    History of Present Illness  Patient is a 78 y.o. female with past medical history of HTN, A fib, DVT and depression who presented to the ED with complaints of lower extremity swelling and pain. Patient is a poor historian and cannot tell me how long her legs have been swollen or how long she has had these wounds. She has blisters on her legs, feet and hands she is unsure how she developed them. She is on Xarelto prior to admission, unsure how complaint she is with her medications. She reports she lives at home with her son and does not ambulate. Further workup this admission included venous duplex of right leg which showed a right PT vein DVT; therefore we have been consulted for further evaluation and recommendations. Review of Systems   + leg swelling and ulcerations with blisters and drainage. Denies fevers, chills, chest pain, shortness of breath, nausea, vomiting, hematemesis, diarrhea, constipation, melena, hematochezia, wt changes, vision problems, blindness, hearing problems, facial droop, slurred speech, extremity numbness, dysuria.     Past Medical History:   Diagnosis Date    Anxiety     Arthritis     Atrial fibrillation/flutter     Blood transfusion reaction     Crohn's disease ([address]     Depression     GERD (gastroesophageal reflux disease)     Hiatal hernia 06/24/2014    Hx of blood clots     Hypertension     MRSA (methicillin resistant staph aureus) culture positive 06/05/2018    urine    MRSA (methicillin resistant staph aureus) culture positive 06/03/2021    wound and colonization    Neuropathy     Pneumonia 01/08/2014    Sinus headache     SOB (shortness of breath)     VRE (vancomycin resistant enterococcus) culture positive 10/08/2018    abd culture       Past Surgical History:   Procedure Laterality Date    ABDOMEN SURGERY      ABDOMINAL ADHESION SURGERY  06/08/2018 BLADDER SUSPENSION      COLONOSCOPY      COLONOSCOPY  9/14/15    sigmoid colon biopsy     COLONOSCOPY  08/07/2018    COLONOSCOPY  06/07/2019    COLONOSCOPY, POSSIBLE BIOPSY, POSSIBLE POLYPECTOMY    COLONOSCOPY N/A 6/7/2019    COLONOSCOPY WITH BIOPSY performed by Yancy Gibson MD at [address] [facility] N/A 6/7/2019    COLONOSCOPY DIAGNOSTIC/STOMA performed by Yancy Gibson MD at [address] [place] [address] N/A 10/8/2018    EXPLORATORY LAPAROTOMY WITH COLOSTOMY performed by Eamon Arechiga MD at [facility] Left 6/25/14    excision plantar left hallux    FOOT SURGERY  6/3/15    PLANTAR PLATE REPAIR BILATERAL, ARTHROTOMY MPJ 2ND BILATERAL    FOOT SURGERY Left 08/05/2002    Excision second metatarsal head left    FRACTURE SURGERY      rt hip    HAND CARPECTOMY Bilateral     HEEL SPUR SURGERY      HERNIA REPAIR      HYSTERECTOMY (CERVIX STATUS UNKNOWN)      bladder surgery    JOINT REPLACEMENT      rt hip, L shoulder replacement 11/2014    KNEE SURGERY Bilateral     arthrosvopic    LEG SURGERY Right     GA SECONDARY CLOSURE SURG WOUND/DEHSN EXTSV/COMPLIC N/A 74/71/5325    SECONDARY WOUND CLOSURE OF ABDOMINAL WOUND performed by Eamon Arechiga MD at [address] [facility] N/A 7/17/2019    FLEXIBLE SIGMOIDOSCOPY performed by Eamon Arechiga MD at [address] [place] [address]  01/15/2018    with biopsies    SMALL INTESTINE SURGERY N/A 7/17/2019    COLOSTOMY CLOSURE WITH COLORECTAL ANASTOMOSIS performed by Eamon Arechiga MD at [facility]  6/14/13    and colonsocopy with antral erosion biopsies       Allergies   Allergen Reactions    Ace Inhibitors Swelling    Skelaxin [Metaxalone] Swelling       Social History     Socioeconomic History    Marital status:       Spouse name: Not on file    Number of children: 2    Years of education: 15    Highest education level: Not on file   Occupational History    Not on file   Tobacco Use    Smoking status: Every Day     Packs/day: 1.00     Years: 10.00     Pack years: 10.00     Types: Cigarettes, E-Cigarettes    Smokeless tobacco: Never    Tobacco comments:     CUT BACK TO 1/2 PPD WITH E CIG 6-2019   Vaping Use    Vaping Use: Former    Start date: 3/28/2019    Substances: LOWEST DOSE   Substance and Sexual Activity    Alcohol use: Not Currently     Alcohol/week: 2.0 standard drinks     Types: 2 Shots of liquor per week     Comment: 6 DRINKS A WEEK OR LESS    Drug use: No    Sexual activity: Not Currently     Partners: Male   Other Topics Concern    Not on file   Social History Narrative    Not on file     Social Determinants of Health     Financial Resource Strain: Not on file   Food Insecurity: Not on file   Transportation Needs: Not on file   Physical Activity: Not on file   Stress: Not on file   Social Connections: Not on file   Intimate Partner Violence: Not on file   Housing Stability: Not on file       Family History   Problem Relation Age of Onset    High Blood Pressure Mother     High Blood Pressure Father     Kidney Disease Sister      - No history of bleeding or clotting disorders    Vital Signs  Vitals:    03/09/23 1815 03/09/23 2057 03/10/23 0115 03/10/23 0330   BP: 109/72 125/72 131/80 109/68   Pulse: 96 100 91 89   Resp: 15 17 16 16   Temp: 98.5 °F (36.9 °C) 99.3 °F (37.4 °C) 99.7 °F (37.6 °C) 100.2 °F (37.9 °C)   TempSrc: Oral Axillary Axillary Axillary   SpO2:  95% 95% 95%   Weight:    177 lb 11.1 oz (80.6 kg)       Physical Examination  General: no apparent distress, appears older than stated age  Head/Eyes/Ears/Nose/Throat:  Normocephalic, atraumatic, PERRLA, face symmetric  Neck:  no adenopathy, no carotid bruit, no JVD, supple, symmetrical, trachea midline, thyroid not enlarged, no tenderness/mass/nodules  Chest/Lungs: clear to auscultation bilaterally, no accessory muscle use  Cardiac:  regular rate and rhythm, S1, S2 normal, no murmur, click, rub or  gallop  Abdomen: soft, nontender, active bowel sounds  Vascular exam:  - R radial: 2+  - L radial: 2+  - R femoral: 2+  - L femoral: 2+  - R DP: 1+   - L DP: + doppler  - R PT: + doppler  - L PT: + doppler  Extremities: warm, no signs of cyanosis or ischemia, 1+ BLE edema, tender to touch to BLE  Right leg with blisters present          Left foot with blister and painful to touch       Right hand with scabs and blister           Labs  Lab Results   Component Value Date/Time    WBC 7.4 03/10/2023 08:33 AM    HGB 9.1 03/10/2023 08:33 AM    HCT 27.7 03/10/2023 08:33 AM    MCV 85.5 03/10/2023 08:33 AM     03/10/2023 08:33 AM     Lab Results   Component Value Date/Time     03/10/2023 08:33 AM    K 3.9 03/10/2023 08:33 AM     03/10/2023 08:33 AM    CO2 20 03/10/2023 08:33 AM    PHOS 4.1 11/22/2022 07:18 AM    BUN 5 03/10/2023 08:33 AM    CREATININE <0.5 03/10/2023 08:33 AM      No results found for: GLU    Scheduled Meds:    vancomycin  1,250 mg IntraVENous Q12H    pantoprazole  40 mg Oral Daily    metoprolol succinate  50 mg Oral Daily    magnesium oxide  400 mg Oral BID    lacosamide  200 mg Oral BID    fluticasone  1 spray Each Nostril Daily    famotidine  20 mg Oral BID    balsalazide  1,500 mg Oral BID    sodium chloride flush  5-40 mL IntraVENous 2 times per day    cefepime  2,000 mg IntraVENous Q12H     Continuous Infusions:    heparin (PORCINE) Infusion Stopped (03/10/23 0958)    sodium chloride      lactated ringers IV soln 75 mL/hr at 03/10/23 0356       Imaging:     RLE venous duplex 3/9/23:     Tech Comments   Right   Acute totally occluding deep vein thrombosis involving one of the pair of   right posterior tibial veins. the peroneal vein was not visualized due to   access. .   No other evidence of deep vein or superficial vein thrombosis involving the   right lower extremity and the left common femoral vein. Pulsatile flow is noted.      CTA abd/aorta with runoff 1/12/21:  Impression Approximately 12 cm length occlusion of the anterior tibial artery in the   distal leg with reconstitution at the level the ankle. No evidence of arterial occlusion in the right lower extremity. Below-knee branches are very small caliber bilaterally. Evaluation of the   arterial branches within both feet somewhat limited due to contrast bolus   timing. Large hiatal hernia. BLE venous duplex 7/3/18:    Summary        -Indeterminate age partially occluding deep vein thrombosis involving the    right popliteal vein. -Indeterminate totally occluding deep vein thrombosis involving the left    distal common femora vein, femoral vein (proximal, mid and distal),    popliteal vein and gastroc veins.    -Indeterminate partially occluding superficial venous thrombosis involving    the left small saphenous vein zone 5-6.    -Incidental finding on the left medial fossa: cystic structure measuring 5.8    x 1.1 x 2.6 cm. BLE arterial noninvasive studies 1/29/16:  Conclusions        Summary        -No significant PAD identified at rest in the bilateral lower extremities. Assessment:   Chronic BLE ulcerations and blisters  Acute right leg PT vein DVT  Chronic venous insufficiency of lower extremities   Immobility  H/o DVTs - on Xarelto  A fib on Xarelto  HTN     Plan:  Recommend continuing anticoagulation. Currently on Heparin drip. Unsure if this is Xarelto failure given there is a concern for compliance with medications. Could consider resuming Xarelto. Recommend non adherent, Kerlix and ACE wrap to right leg. BLE should be elevated and ACE wrap for compression from ball of foot to knee. Unsure if patient will be compliant with this. Do not anticipate any further vascular testing or intervention at this time. Will discuss with Dr. Kota Noe and any further recommendations to follow. Thank you for the consultation. Patient educated on plan of care and disease process.   All questions answered. Electronically signed by GAYLA Soares CNP on 3/10/2023 at 10:27 AM    VASCULAR STAFF  Seen and discussed with A Clem JONES. Agree with all of above findings, assessment and recommendations. Local care to all areas with elevation, wraps and nonadherent dressings. No plans for intervention at this time. Will follow.     Kamilla Hayden

## 2023-03-11 LAB
ANION GAP SERPL CALCULATED.3IONS-SCNC: 8 MMOL/L (ref 3–16)
ANTI-XA UNFRAC HEPARIN: 0.63 IU/ML (ref 0.3–0.7)
APTT: >180 SEC (ref 23–34.3)
APTT: >180 SEC (ref 23–34.3)
BUN BLDV-MCNC: 4 MG/DL (ref 7–20)
CALCIUM SERPL-MCNC: 7.9 MG/DL (ref 8.3–10.6)
CHLORIDE BLD-SCNC: 101 MMOL/L (ref 99–110)
CO2: 22 MMOL/L (ref 21–32)
CREAT SERPL-MCNC: <0.5 MG/DL (ref 0.6–1.2)
GFR SERPL CREATININE-BSD FRML MDRD: >60 ML/MIN/{1.73_M2}
GLUCOSE BLD-MCNC: 88 MG/DL (ref 70–99)
HCT VFR BLD CALC: 24.2 % (ref 36–48)
HEMOGLOBIN: 8.3 G/DL (ref 12–16)
MCH RBC QN AUTO: 29 PG (ref 26–34)
MCHC RBC AUTO-ENTMCNC: 34.2 G/DL (ref 31–36)
MCV RBC AUTO: 84.8 FL (ref 80–100)
PDW BLD-RTO: 13.7 % (ref 12.4–15.4)
PLATELET # BLD: 428 K/UL (ref 135–450)
PMV BLD AUTO: 6.5 FL (ref 5–10.5)
POTASSIUM SERPL-SCNC: 3.3 MMOL/L (ref 3.5–5.1)
RBC # BLD: 2.85 M/UL (ref 4–5.2)
SODIUM BLD-SCNC: 131 MMOL/L (ref 136–145)
WBC # BLD: 8 K/UL (ref 4–11)

## 2023-03-11 PROCEDURE — 6370000000 HC RX 637 (ALT 250 FOR IP): Performed by: PHYSICIAN ASSISTANT

## 2023-03-11 PROCEDURE — 85730 THROMBOPLASTIN TIME PARTIAL: CPT

## 2023-03-11 PROCEDURE — 36415 COLL VENOUS BLD VENIPUNCTURE: CPT

## 2023-03-11 PROCEDURE — 6360000002 HC RX W HCPCS: Performed by: INTERNAL MEDICINE

## 2023-03-11 PROCEDURE — 85027 COMPLETE CBC AUTOMATED: CPT

## 2023-03-11 PROCEDURE — 97530 THERAPEUTIC ACTIVITIES: CPT

## 2023-03-11 PROCEDURE — 6370000000 HC RX 637 (ALT 250 FOR IP): Performed by: NURSE PRACTITIONER

## 2023-03-11 PROCEDURE — 2580000003 HC RX 258: Performed by: INTERNAL MEDICINE

## 2023-03-11 PROCEDURE — 6370000000 HC RX 637 (ALT 250 FOR IP): Performed by: INTERNAL MEDICINE

## 2023-03-11 PROCEDURE — 85520 HEPARIN ASSAY: CPT

## 2023-03-11 PROCEDURE — 80048 BASIC METABOLIC PNL TOTAL CA: CPT

## 2023-03-11 PROCEDURE — 1200000000 HC SEMI PRIVATE

## 2023-03-11 PROCEDURE — 97162 PT EVAL MOD COMPLEX 30 MIN: CPT

## 2023-03-11 PROCEDURE — 99231 SBSQ HOSP IP/OBS SF/LOW 25: CPT | Performed by: STUDENT IN AN ORGANIZED HEALTH CARE EDUCATION/TRAINING PROGRAM

## 2023-03-11 PROCEDURE — 97535 SELF CARE MNGMENT TRAINING: CPT

## 2023-03-11 PROCEDURE — 97166 OT EVAL MOD COMPLEX 45 MIN: CPT

## 2023-03-11 RX ADMIN — BALSALAZIDE DISODIUM 1500 MG: 750 CAPSULE ORAL at 08:36

## 2023-03-11 RX ADMIN — FLUTICASONE PROPIONATE 1 SPRAY: 50 SPRAY, METERED NASAL at 11:32

## 2023-03-11 RX ADMIN — LACOSAMIDE 200 MG: 100 TABLET, FILM COATED ORAL at 20:21

## 2023-03-11 RX ADMIN — VANCOMYCIN HYDROCHLORIDE 1250 MG: 10 INJECTION, POWDER, LYOPHILIZED, FOR SOLUTION INTRAVENOUS at 12:33

## 2023-03-11 RX ADMIN — SODIUM CHLORIDE: 9 INJECTION, SOLUTION INTRAVENOUS at 21:38

## 2023-03-11 RX ADMIN — ACETAMINOPHEN 650 MG: 325 TABLET ORAL at 20:21

## 2023-03-11 RX ADMIN — VANCOMYCIN HYDROCHLORIDE 1250 MG: 10 INJECTION, POWDER, LYOPHILIZED, FOR SOLUTION INTRAVENOUS at 21:40

## 2023-03-11 RX ADMIN — TRIAMCINOLONE ACETONIDE: 1 CREAM TOPICAL at 08:41

## 2023-03-11 RX ADMIN — SODIUM CHLORIDE, PRESERVATIVE FREE 10 ML: 5 INJECTION INTRAVENOUS at 20:22

## 2023-03-11 RX ADMIN — MAGNESIUM OXIDE 400 MG (241.3 MG MAGNESIUM) TABLET 400 MG: TABLET at 08:35

## 2023-03-11 RX ADMIN — LACOSAMIDE 200 MG: 100 TABLET, FILM COATED ORAL at 08:36

## 2023-03-11 RX ADMIN — SODIUM CHLORIDE, PRESERVATIVE FREE 10 ML: 5 INJECTION INTRAVENOUS at 20:23

## 2023-03-11 RX ADMIN — OXYCODONE 5 MG: 5 TABLET ORAL at 08:36

## 2023-03-11 RX ADMIN — FAMOTIDINE 20 MG: 20 TABLET, FILM COATED ORAL at 08:36

## 2023-03-11 RX ADMIN — MAGNESIUM OXIDE 400 MG (241.3 MG MAGNESIUM) TABLET 400 MG: TABLET at 20:22

## 2023-03-11 RX ADMIN — TRIAMCINOLONE ACETONIDE: 1 CREAM TOPICAL at 20:24

## 2023-03-11 RX ADMIN — RIVAROXABAN 15 MG: 15 TABLET, FILM COATED ORAL at 18:19

## 2023-03-11 RX ADMIN — PANTOPRAZOLE SODIUM 40 MG: 40 TABLET, DELAYED RELEASE ORAL at 08:36

## 2023-03-11 RX ADMIN — CEFEPIME 2000 MG: 2 INJECTION, POWDER, FOR SOLUTION INTRAVENOUS at 04:56

## 2023-03-11 RX ADMIN — OXYCODONE 5 MG: 5 TABLET ORAL at 18:17

## 2023-03-11 RX ADMIN — RIVAROXABAN 15 MG: 15 TABLET, FILM COATED ORAL at 12:26

## 2023-03-11 ASSESSMENT — PAIN SCALES - GENERAL
PAINLEVEL_OUTOF10: 8
PAINLEVEL_OUTOF10: 2
PAINLEVEL_OUTOF10: 3
PAINLEVEL_OUTOF10: 8
PAINLEVEL_OUTOF10: 9

## 2023-03-11 ASSESSMENT — PAIN SCALES - WONG BAKER
WONGBAKER_NUMERICALRESPONSE: 2

## 2023-03-11 ASSESSMENT — PAIN DESCRIPTION - ORIENTATION
ORIENTATION: RIGHT;LEFT

## 2023-03-11 ASSESSMENT — PAIN DESCRIPTION - PAIN TYPE
TYPE: CHRONIC PAIN

## 2023-03-11 ASSESSMENT — PAIN - FUNCTIONAL ASSESSMENT: PAIN_FUNCTIONAL_ASSESSMENT: PREVENTS OR INTERFERES WITH MANY ACTIVE NOT PASSIVE ACTIVITIES

## 2023-03-11 ASSESSMENT — PAIN DESCRIPTION - LOCATION
LOCATION: OTHER (COMMENT)
LOCATION: HAND;LEG
LOCATION: HAND;LEG
LOCATION: LEG;HAND
LOCATION: HAND;LEG

## 2023-03-11 ASSESSMENT — PAIN DESCRIPTION - DESCRIPTORS: DESCRIPTORS: ACHING;DISCOMFORT

## 2023-03-11 NOTE — PROGRESS NOTES
[facility]) Vascular Surgery Progress Note      Chief Complaint:   Chief Complaint   Patient presents with    Leg Swelling     PT to ED via EMS from home where she lives alone and has home health visits. PT c/o bilateral leg swelling and pain.         : leg wounds    [place] Bustos is a 78 y.o. female patient. Subjective  Patient eating breakfast  Refusing dressing change at this time    1. Leg swelling    2. Acute deep vein thrombosis (DVT) of right lower extremity, unspecified vein (HCC)    3. Cellulitis, unspecified cellulitis site      Past Medical History:   Diagnosis Date    Anxiety     Arthritis     Atrial fibrillation/flutter     Blood transfusion reaction     Crohn's disease ([facility] [address]     Depression     GERD (gastroesophageal reflux disease)     Hiatal hernia 06/24/2014    Hx of blood clots     Hypertension     MRSA (methicillin resistant staph aureus) culture positive 06/05/2018    urine    MRSA (methicillin resistant staph aureus) culture positive 06/03/2021    wound and colonization    Neuropathy     Pneumonia 01/08/2014    Sinus headache     SOB (shortness of breath)     VRE (vancomycin resistant enterococcus) culture positive 10/08/2018    abd culture     No past surgical history pertinent negatives on file.   Scheduled Meds:   vancomycin  1,250 mg IntraVENous Q12H    triamcinolone   Topical BID    pantoprazole  40 mg Oral Daily    metoprolol succinate  50 mg Oral Daily    magnesium oxide  400 mg Oral BID    lacosamide  200 mg Oral BID    fluticasone  1 spray Each Nostril Daily    famotidine  20 mg Oral BID    balsalazide  1,500 mg Oral BID    sodium chloride flush  5-40 mL IntraVENous 2 times per day    cefepime  2,000 mg IntraVENous Q12H     Continuous Infusions:   heparin (PORCINE) Infusion 13.051 Units/kg/hr (03/11/23 0555)    sodium chloride       PRN Meds:heparin (porcine), heparin (porcine), hydrOXYzine HCl, baclofen, busPIRone, hydrALAZINE, sodium chloride flush, sodium chloride, ondansetron **OR** ondansetron, polyethylene glycol, acetaminophen **OR** acetaminophen, oxyCODONE, morphine    Principal Problem:    Leg DVT (deep venous thromboembolism), acute, right (HCC)  Resolved Problems:    * No resolved hospital problems. *    Blood pressure 102/70, pulse 78, temperature 99.4 °F (37.4 °C), temperature source Oral, resp. rate 16, height 5' 7\" (1.702 m), weight 177 lb 11.1 oz (80.6 kg), SpO2 95 %, not currently breastfeeding. Objective:  Vital signs (most recent): Blood pressure 102/70, pulse 78, temperature 99.4 °F (37.4 °C), temperature source Oral, resp. rate 16, height 5' 7\" (1.702 m), weight 177 lb 11.1 oz (80.6 kg), SpO2 95 %, not currently breastfeeding. General appearance: In no acute distress. Lungs:  Normal effort. Heart: Normal rate. Wound:  Objective wound description: right leg, ace has been removed, kerlix with punctate areas of yellow drainage. Extremities: (Right foot swelling, appears unchanged with angulated toes and flat feet).         Assessment & Plan    Patient refusing evaluation of her wounds at this time  Discussed with bedside nursing and requested they observe and change dressing daily and as needed  Please call with questions or concerns      Zarina Oviedo DO, FACS, FSVS, [facility]

## 2023-03-11 NOTE — PROGRESS NOTES
[facility] PROGRESS NOTE    3/11/2023 1:22 PM        Name: Dee Somers . Admitted: 3/9/2023  Primary Care Provider: Jennifer Shrestha MD (Tel: [phone])      Subjective:  . No new complaints. Admitted with R leg swelling and BLE pain and wounds. Has bullous pemphigoid. Son states is non compliant with meds.  Was supposed to be on xarelto    Reviewed interval ancillary notes    Current Medications  rivaroxaban (XARELTO) tablet 15 mg, BID WC   Followed by  Jose L Forde ON 4/1/2023] rivaroxaban (XARELTO) tablet 20 mg, Dinner  vancomycin (VANCOCIN) 1,250 mg in sodium chloride 0.9 % 250 mL IVPB, Q12H  triamcinolone (KENALOG) 0.1 % cream, BID  pantoprazole (PROTONIX) tablet 40 mg, Daily  metoprolol succinate (TOPROL XL) extended release tablet 50 mg, Daily  magnesium oxide (MAG-OX) tablet 400 mg, BID  hydrOXYzine HCl (ATARAX) tablet 25 mg, Q6H PRN  lacosamide (VIMPAT) tablet 200 mg, BID  fluticasone (FLONASE) 50 MCG/ACT nasal spray 1 spray, Daily  hydrALAZINE (APRESOLINE) injection 20 mg, Q4H PRN  sodium chloride flush 0.9 % injection 5-40 mL, 2 times per day  sodium chloride flush 0.9 % injection 5-40 mL, PRN  0.9 % sodium chloride infusion, PRN  ondansetron (ZOFRAN-ODT) disintegrating tablet 4 mg, Q8H PRN   Or  ondansetron (ZOFRAN) injection 4 mg, Q6H PRN  polyethylene glycol (GLYCOLAX) packet 17 g, Daily PRN  acetaminophen (TYLENOL) tablet 650 mg, Q6H PRN   Or  acetaminophen (TYLENOL) suppository 650 mg, Q6H PRN  oxyCODONE (ROXICODONE) immediate release tablet 5 mg, Q4H PRN  morphine (PF) injection 2 mg, Q3H PRN      Objective:  /71   Pulse 87   Temp 99.6 °F (37.6 °C) (Axillary)   Resp 12   Ht 5' 7\" (1.702 m)   Wt 177 lb 11.1 oz (80.6 kg)   SpO2 95%   BMI 27.83 kg/m²     Intake/Output Summary (Last 24 hours) at 3/11/2023 1322  Last data filed at 3/11/2023 0504  Gross per 24 hour   Intake 960 ml   Output 250 ml   Net 710 ml        Wt Readings from Last 3 Encounters:   03/10/23 177 lb 11.1 oz (80.6 kg)   02/21/23 181 lb 10.5 oz (82.4 kg)   11/22/22 196 lb 8 oz (89.1 kg)       General appearance:  Appears comfortable, chronically ill appearing. Eyes: Sclera clear. Pupils equal.  ENT: Moist oral mucosa. Trachea midline, no adenopathy. Cardiovascular: Regular rhythm, normal S1, S2. No murmur. No edema in lower extremities  Respiratory: Not using accessory muscles. Good inspiratory effort. Clear to auscultation bilaterally, no wheeze or crackles. GI: Abdomen soft, no tenderness, not distended, normal bowel sounds  Musculoskeletal: No cyanosis in digits, neck supple  Neurology: CN 2-12 grossly intact. No speech or motor deficits  Psych: Normal affect. Alert and oriented in time, place and person  Skin: dry dressing on RLQ    Labs and Tests:  CBC:   Recent Labs     03/09/23  1656 03/10/23  0833 03/11/23  0747   WBC 7.7 7.4 8.0   HGB 9.6* 9.1* 8.3*   * 468* 428       BMP:    Recent Labs     03/09/23  1228 03/10/23  0833 03/11/23  0747    137 131*   K 4.7 3.9 3.3*    106 101   CO2 25 20* 22   BUN 6* 5* 4*   CREATININE 0.6 <0.5* <0.5*   GLUCOSE 95 80 88       Hepatic:   Recent Labs     03/09/23  1228 03/10/23  0833   AST 24 17   ALT 9* 13   BILITOT 0.4 0.4   ALKPHOS 74 54       Venous Duplex BLE  Tech Comments   Right   Acute totally occluding deep vein thrombosis involving one of the pair of   right posterior tibial veins. the peroneal vein was not visualized due to   access. .   No other evidence of deep vein or superficial vein thrombosis involving the   right lower extremity and the left common femoral vein. Pulsatile flow is noted. Problem List  Principal Problem:    Leg DVT (deep venous thromboembolism), acute, right (HCC)  Resolved Problems:    * No resolved hospital problems. *       Assessment & Plan:   Acute R posterior tibial DVT-has history of BLE-was not compliant with meds per family. Resume xarelto. Dc heparin drip. Bullous pemphigoid-followed by Dermatology. They are working on getting her dupixent every 2 weeks. Paroxysmal afib-on xarelto. Rate currently controlled. Weakness-PT/OT eval-needs snf for now. Diet: ADULT DIET; Regular; 5 carb choices (75 gm/meal);  Low Fat/Low Chol/High Fiber/2 gm Na  Code:Full Code  DVT PPX:heparin      Nathanael Kocher, PA-C   3/11/2023 1:22 PM

## 2023-03-11 NOTE — PLAN OF CARE
Problem: Pain  Goal: Verbalizes/displays adequate comfort level or baseline comfort level  Outcome: Progressing     Problem: Discharge Planning  Goal: Discharge to home or other facility with appropriate resources  Outcome: Progressing     Problem: Safety - Adult  Goal: Free from fall injury  Outcome: Progressing     Problem: Skin/Tissue Integrity  Goal: Absence of new skin breakdown  Description: 1. Monitor for areas of redness and/or skin breakdown  2. Assess vascular access sites hourly  3. Every 4-6 hours minimum:  Change oxygen saturation probe site  4. Every 4-6 hours:  If on nasal continuous positive airway pressure, respiratory therapy assess nares and determine need for appliance change or resting period.   Outcome: Progressing     Problem: Chronic Conditions and Co-morbidities  Goal: Patient's chronic conditions and co-morbidity symptoms are monitored and maintained or improved  Outcome: Progressing     Problem: ABCDS Injury Assessment  Goal: Absence of physical injury  Outcome: Progressing

## 2023-03-11 NOTE — PROGRESS NOTES
[facility] [address]   Phone: [phone]    Occupational Therapy    [x] Initial Evaluation            [] Daily Treatment Note         [] Discharge Summary      Patient: Cindy Jerez   : 1955   MRN: [id number]   Date of Service:  3/11/2023    Admitting Diagnosis: Leg DVT (deep venous thromboembolism), acute, right [facility])  Current Admission Summary: 78 y.o. female who presents to the emergency room due to bilateral lower leg swelling and pain as well as diffuse wounds throughout the body mainly on the bilateral lower legs chest and face area. This was a signout patient to be and most of the work-up was already completed pending DVT study. Please see Dr. George Jang note for further details of initial presentation. Through nursing staff was able to ascertain information from the family stating that they are concerned that the patient has not taken her medications as prescribed given that she does have home health nurse that comes by every day to take care of her she has worsening blisters on her bilateral lower legs with weeping of wounds. When I asked the patient she states that she does not have any significant complaints other than her bilateral lower leg pain. She denies any chest pain, shortness of breath or difficulty breathing. It is noted that the patient was prescribed Xarelto but is uncertain of when the last dose was taken. Right lower leg swelling is worse than the left. 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   Past Medical History:  has a past medical history of Anxiety, Arthritis, Atrial fibrillation/flutter, Blood transfusion reaction, Crohn's disease ([address] Depression, GERD (gastroesophageal reflux disease), Hiatal hernia, Hx of blood clots, Hypertension, MRSA (methicillin resistant staph aureus) culture positive, MRSA (methicillin resistant staph aureus) culture positive, Neuropathy, Pneumonia, Sinus headache, SOB (shortness of breath), and VRE (vancomycin resistant enterococcus) culture positive. Past Surgical History:  has a past surgical history that includes Hysterectomy; knee surgery (Bilateral); Hand Carpectomy (Bilateral); hernia repair; Abdomen surgery; bladder suspension; fracture surgery; Heel spur surgery; Tonsillectomy; Colonoscopy; Upper gastrointestinal endoscopy (6/14/13); Foot surgery (Left, 6/25/14); Foot surgery (6/3/15); Colonoscopy (9/14/15); Foot surgery (Left, 08/05/2002); joint replacement; Sigmoidoscopy (01/15/2018); Abdominal adhesion surgery (06/08/2018); Colonoscopy (08/07/2018); colostomy (N/A, 10/8/2018); pr secondary closure surg wound/dehsn extsv/complic (N/A, 24/98/8700); Leg Surgery (Right); Colonoscopy (06/07/2019); Colonoscopy (N/A, 6/7/2019); Colonoscopy (N/A, 6/7/2019); Small intestine surgery (N/A, 7/17/2019); and proctosigmoidoscopy (N/A, 7/17/2019). Discharge Recommendations: Ariella Corrigan scored a 12/24 on the AM-PAC ADL Inpatient form. Current research shows that an AM-PAC score of 17 or less is typically not associated with a discharge to the patient's home setting. Based on the patient's AM-PAC score and their current ADL deficits, it is recommended that the patient have 3-5 sessions per week of Occupational Therapy at d/c to increase the patient's independence. Please see assessment section for further patient specific details. If patient discharges prior to next session this note will serve as a discharge summary. Please see below for the latest assessment towards goals.      DME Required For Discharge: DME to be determined at next level of care  Precautions/Restrictions: high fall risk, contact precautions, MDRO (patient with wide spread weeping blisters (back, ears, RUE, BLE)  Weight Bearing Restrictions: no restrictions  [] Right Upper Extremity        [] Left Upper Extremity         [] Right Lower Extremity         [] Left Lower Extremity Required Braces/Orthotics: no braces required              [] Right            [] Left  Positional Restrictions:no positional restrictions     Pre-Admission Information   Lives With: alone                     Type of Home: house  Home Layout: one level  Home Access: level entry  Bathroom Layout: tub/shower unit  Bathroom Equipment: bedside commode  Toilet Height: standard height  Home Equipment: rolling walker, manual wheelchair  Transfer Assistance: modified independent with use of w/c for stand pivots  Ambulation Assistance: Non-ambulatory (uses w/c)  ADL Assistance: independent with all ADL's, sponge bathes on commode  IADL Assistance: requires assistance with all homemaking tasks, HHA provides cleaning, laundry, and cooking. Son grocery shops  Active :        [] Yes                 [x] No  Hand Dominance: [] Left                 [x] Right  Current Employment: retired-unable to report  Hobbies:   Recent Falls: Unable to state     Examination   Vision:   Vision Gross Assessment: Impaired  Hearing:   WFL  Perception:   Initiation: cues to initiate tasks  Perseveration: perseverates during conversation (history of aphasia listed)    Sensation:   Unable to formally test secondary to due to cognition and difficulty with descriptors due to aphasia  Proprioception:    Unable to formally test secondary to due to cognition and aphasia  Tone:   Normotonic  Coordination Testing:   WFL    ROM:   Patient presents with limited shoulder flexion, guarded with hand over hand movements of B UE. Patient able to feed self following setup, able to handle cup and bring beverage to mouth  Strength:   NT due to wounds, pain, and cognition    Therapist Clinical Decision Making (Complexity): medium complexity  Clinical Presentation: unstable      Subjective  General: Patient supine in bed, initially declined therapy due to pain.  Patient agitated at times, but apologizes and is able to be redirected with increased time and encouragement  Pain: 10/10. Location: Patient states \"1000\" \"everywhere\"  Pain Interventions: RN notified of patient request for pain medication, repositioned , and therapy activities modified        Activities of Daily Living  Basic Activities of Daily Living  Feeding: setup assistance  Feeding Comments: beverage management and able to place food on fork and bring to mouth following full setup of tray  Grooming: setup assistance stand by assistance  Grooming Comments: hand washing with wash cloth at EOB  Upper Extremity Dressing: maximum assistance  General Comments: doffing/donning gown  Instrumental Activities of Daily Living  No IADL completed on this date. Functional Mobility  Bed Mobility  Supine to Sit: 2 person assistance with maxA of 2   Sit to Supine: 2 person assistance with maxA of 2   Scootin person assistance with maxA of 2 (to EOB and to [facility] upon return to supine)   Comments:Patient tolerated sitting EOB SBA x 25 minutes  Transfers  No transfers completed on this date secondary to safety concerns and patient declining. Comments:  Functional Mobility:  No functional mobility completed on this date secondary to patient nonambulatory at baseline, chart review reveals squat pivot only.     Other Therapeutic Interventions    Functional Outcomes  -PAC Inpatient Daily Activity Raw Score: 12    Cognition  Overall Cognitive Status: Impaired  Following Commands: follows one step commands with repetition, follows one step commands with increased time  Memory: decreased recall of precautions, decreased recall of recent events, decreased short term memory  Safety Judgement: decreased awareness of need for assistance  Problem Solving: decreased awareness of errors, assistance required to identify errors made, assistance required to correct errors made  Insights: not aware of deficits  Initiation: requires cues for some  Sequencing: requires cues for some  Orientation:    oriented to person  Command Following:   impaired     Education  Barriers To Learning: cognition, emotional, and physical  Patient Education: patient educated on goals, OT role and benefits, plan of care, discharge recommendations  Learning Assessment:  patient will require reinforcement due to cognitive deficits, patient is not an independent learner, patient resistive towards education topics within session, would benefit from continued education    Assessment  Activity Tolerance: Patient tolerated session fair to poor (limited by cognition, pain, agitation. Patient responds to repeated encouragement with maximal increased time)  Impairments Requiring Therapeutic Intervention: decreased functional mobility, decreased ADL status, decreased strength, decreased safety awareness, decreased cognition, decreased endurance, decreased balance, decreased posture  Prognosis: fair and poor  Clinical Assessment: Patient presents with the above deficits, which are below baseline, and would benefit from continued skilled OT to address  Safety Interventions: patient left in bed, bed alarm in place, call light within reach, patient at risk for falls, and nurse notified    Plan  Frequency: 3-5 x/per week  Current Treatment Recommendations: strengthening, balance training, transfer training, endurance training, patient/caregiver education, ADL/self-care training, cognitive reorientation, pain management, safety education, and positioning    Goals  Patient Goals: Not stated due to cognition   Short Term Goals:  Time Frame: Upon discharge  Patient will complete upper body ADL at minimal assistance   Patient will complete lower body ADL at moderate assistance   Patient will complete grooming at set up assistance   Patient will complete functional transfers at moderate assistance   Patient will complete bed mobility at moderate assistance     Therapy Session Time     Individual Group Co-treatment   Time In    0813   Time Out    0921   Minutes    68        Timed  Code Treatment Minutes:   53  Total Treatment Minutes:  68       Electronically Signed By: Gail Burton, OTR/L [id number]

## 2023-03-11 NOTE — PROGRESS NOTES
Shift assessment complete, VSS, pt A&Ox4 in bed, confused at times, some agitation, c/o pain to all over the body 8/10, dressing to RLE CDI, blisters on hands. . AM and prn med administered per [facility], POC and education reviewed with the pt. All needs met at this time, call light in reach, will continue to monitor. 1500: Pt refusing dressing change and also refused skin protectant.

## 2023-03-11 NOTE — PROGRESS NOTES
[facility] [address]   Phone: [phone]    Physical Therapy    [x] Initial Evaluation            [] Daily Treatment Note         [] Discharge Summary      Patient: Jacob Bliss   : 1955   MRN: [id number]   Date of Service:  3/11/2023  Admitting Diagnosis: Leg DVT (deep venous thromboembolism), acute, right [facility])  Current Admission Summary: 78 y.o. female who presents to the emergency room due to bilateral lower leg swelling and pain as well as diffuse wounds throughout the body mainly on the bilateral lower legs chest and face area. This was a signout patient to be and most of the work-up was already completed pending DVT study. Please see Dr. Isaiah Avendano note for further details of initial presentation. Through nursing staff was able to ascertain information from the family stating that they are concerned that the patient has not taken her medications as prescribed given that she does have home health nurse that comes by every day to take care of her she has worsening blisters on her bilateral lower legs with weeping of wounds. When I asked the patient she states that she does not have any significant complaints other than her bilateral lower leg pain. She denies any chest pain, shortness of breath or difficulty breathing. It is noted that the patient was prescribed Xarelto but is uncertain of when the last dose was taken. Right lower leg swelling is worse than the left. 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   Past Medical History:  has a past medical history of Anxiety, Arthritis, Atrial fibrillation/flutter, Blood transfusion reaction, Crohn's disease ([address] Depression, GERD (gastroesophageal reflux disease), Hiatal hernia, Hx of blood clots, Hypertension, MRSA (methicillin resistant staph aureus) culture positive, MRSA (methicillin resistant staph aureus) culture positive, Neuropathy, Pneumonia, Sinus headache, SOB (shortness of breath), and VRE (vancomycin resistant enterococcus) culture positive. Past Surgical History:  has a past surgical history that includes Hysterectomy; knee surgery (Bilateral); Hand Carpectomy (Bilateral); hernia repair; Abdomen surgery; bladder suspension; fracture surgery; Heel spur surgery; Tonsillectomy; Colonoscopy; Upper gastrointestinal endoscopy (6/14/13); Foot surgery (Left, 6/25/14); Foot surgery (6/3/15); Colonoscopy (9/14/15); Foot surgery (Left, 08/05/2002); joint replacement; Sigmoidoscopy (01/15/2018); Abdominal adhesion surgery (06/08/2018); Colonoscopy (08/07/2018); colostomy (N/A, 10/8/2018); pr secondary closure surg wound/dehsn extsv/complic (N/A, 50/17/8078); Leg Surgery (Right); Colonoscopy (06/07/2019); Colonoscopy (N/A, 6/7/2019); Colonoscopy (N/A, 6/7/2019); Small intestine surgery (N/A, 7/17/2019); and proctosigmoidoscopy (N/A, 7/17/2019). Discharge Recommendations: Dave Lancaster scored a 6/24 on the AM-PAC short mobility form. Current research shows that an AM-PAC score of 17 or less is typically not associated with a discharge to the patient's home setting. Based on the patient's AM-PAC score and their current functional mobility deficits, it is recommended that the patient have 3-5 sessions per week of Physical Therapy at d/c to increase the patient's independence. Please see assessment section for further patient specific details. If patient discharges prior to next session this note will serve as a discharge summary. Please see below for the latest assessment towards goals.     DME Required For Discharge: DME to be determined at next level of care  Precautions/Restrictions: high fall risk, contact precautions, MDRO (patient with wide spread weeping blisters (back, ears, RUE, BLE)  Weight Bearing Restrictions: no restrictions  [] Right Upper Extremity  [] Left Upper Extremity [] Right Lower Extremity  [] Left Lower Extremity     Required Braces/Orthotics: no braces required   [] Right  [] Left  Positional Restrictions:no positional restrictions    Pre-Admission Information   Lives With: alone    Type of Home: house  Home Layout: one level  Home Access: level entry  Bathroom Layout: tub/shower unit  Bathroom Equipment: bedside commode  Toilet Height: standard height  Home Equipment: rolling walker, manual wheelchair  Transfer Assistance: modified independent with use of w/c for stand pivots  Ambulation Assistance: Non-ambulatory (uses w/c)  ADL Assistance: independent with all ADL's, sponge bathes on commode  IADL Assistance: requires assistance with all homemaking tasks, HHA provides cleaning, laundry, and cooking. Son grocery shops  Active :        [] Yes  [x] No  Hand Dominance: [] Left  [x] Right  Current Employment: retired.  Occupation: unable to report  Recent Falls: unable to state    Examination   Vision:   Vision Gross Assessment: Impaired  Hearing:   WFL  Observation:   General Observation:  Widespread weeping blisters throughout body   Posture:   WFL  Sensation:   Unable to formally test secondary to cognition and difficulty with descriptors due to aphasia   Proprioception:    Unable to formally test secondary to cognition and aphasia   Tone:   Normotonic  Coordination Testing:   WFL    ROM:   (B) LE AROM WFL  Strength:   Formal MMT held secondary to wounds, pain and cognition  Therapist Clinical Decision Making (Complexity): medium complexity  Clinical Presentation: unstable      Subjective  General: Patient semi-reclined in bed upon arrival. Patient initially declined therapy due to pain and agitation. Patient apologized by end of session. Patient is agreeable to PT/OT.  Pain: 10/10.  Location: Patient states \"1000\" everywhere  Pain Interventions: RN notified of patient request for pain medication, repositioned , and therapy activities modified       Functional Mobility  Bed Mobility  Supine to Sit: 2 person assistance with max A  Sit to Supine: 2 person assistance with max A   Scootin person assistance with max A   Comments: Patient completed bed mobility with HOB slightly elevated and increased time  Transfers  No transfers completed on this date secondary to safety concerns and patient declining. Comments:  Ambulation  Ambulation not tested on this date secondary to patient being non-ambulatory. Distance:   Gait Mechanics:   Comments:    Stair Mobility  Stair mobility not completed on this date. Comments:  Wheelchair Mobility:  No w/c mobility completed on this date.   Comments:  Balance  Static Sitting Balance: fair (+): maintains balance at SBA/supervision without use of UE support  Dynamic Sitting Balance: fair: maintains balance at CGA without use of UE support  Comments: Patient sat EOB ~25 minutes while eating breakfast and taking morning medications    Other Therapeutic Interventions  See OT note for ADLs  Functional Outcomes  AM-PAC Inpatient Mobility Raw Score : 6              Cognition  Overall Cognitive Status: Impaired  Arousal/Alterness: inconsistent responses to stimuli  Following Commands: follows one step commands with repetition, follows one step commands with increased time  Attention Span: attends with cues to redirect  Memory: decreased recall of precautions, decreased recall of recent events, decreased short term memory  Safety Judgement: decreased awareness of need for assistance  Problem Solving: decreased awareness of errors  Insights: not aware of deficits  Initiation: requires cues for some  Sequencing: requires cues for some  Orientation:    oriented to person  Command Following:   impaired    Education  Barriers To Learning: cognition, emotional, and physical  Patient Education: patient educated on goals, PT role and benefits, plan of care, precautions, general safety, functional mobility training, orientation, transfer training, discharge recommendations  Learning Assessment:  patient will require reinforcement due to cognitive deficits, patient is not an independent learner, patient resistive towards education topics within session, would benefit from continued education    Assessment  Activity Tolerance: Fair; patient limited by cognition, pain and agitation  Impairments Requiring Therapeutic Intervention: decreased functional mobility, decreased ROM, decreased strength, decreased safety awareness, decreased cognition, decreased endurance, decreased sensation, decreased balance, increased pain, decreased posture  Prognosis: fair and poor  Clinical Assessment: Patient is 79 y/o female presenting to [facility] secondary to DVT. Patient currently presents below baseline function with all functional mobility. Patient's PLOF includes completing stand pivot transfers to w/c. At this time, patient requires two person max A for bed mobility.  Patient will continue to benefit from skilled PT in order to return to PLOF with all functional mobility prior to d/c from hospital.  Safety Interventions: patient left in bed, bed alarm in place, call light within reach, patient at risk for falls, and nurse notified    Plan  Frequency: 3-5 x/per week  Current Treatment Recommendations: strengthening, ROM, balance training, functional mobility training, transfer training, endurance training, wheelchair mobility training, and patient/caregiver education    Goals  Patient Goals: None stated   Short Term Goals:  Time Frame: Upon d/c  Patient will complete bed mobility at moderate assistance   Patient will complete transfers at moderate assistance     Therapy Session Time      Individual Group Co-treatment   Time In     0813   Time Out     0921   Minutes     68     Timed Code Treatment Minutes:  53 Minutes  Total Treatment Minutes:  68 Minutes       Electronically Signed By: Aki Benson, PT  Aki Benson PT, DPT, [id number]

## 2023-03-11 NOTE — PLAN OF CARE
Problem: Pain  Goal: Verbalizes/displays adequate comfort level or baseline comfort level  3/10/2023 2036 by Jonatan Steiner RN  Outcome: Progressing

## 2023-03-11 NOTE — FLOWSHEET NOTE
03/10/23 2034   Assessment   Charting Type Shift assessment   Psychosocial   Psychosocial (WDL) X   Patient Behaviors Agitated; Anxious   Neurological   Neuro (WDL) X   Level of Consciousness 0   Orientation Level Oriented to person;Oriented to place;Oriented to time;Oriented to situation;Oriented/disoriented at times   Cognition Follows commands   Speech Expressive aphasia; Delayed responses   Cough Reflex Present   Marisa Coma Scale   Eye Opening 4   Best Verbal Response 5   Best Motor Response 6   [place] Coma Scale Score 15   HEENT (Head, Ears, Eyes, Nose, & Throat)   HEENT (WDL) X   Right Eye Edema   Teeth Missing teeth   Respiratory   Respiratory (WDL) X   Respiratory Quality/Effort Unlabored   L Breath Sounds Diminished   R Breath Sounds Diminished   Cardiac   Cardiac Regularity Regular   Heart Sounds S1, S2   Cardiac Rhythm Sinus rhythm   Cardiac Monitor   Telemetry Box Number   (nontele)   Gastrointestinal   Abdominal (WDL) X   Abdomen Inspection Obese   Genitourinary   Genitourinary (WDL) X  (incontinent, purewick)   Flank Tenderness   (denies)   Suprapubic Tenderness No   Dysuria (Pain/Burning w/Urination) No   Peripheral Vascular   Peripheral Vascular (WDL) X   Edema Right lower extremity; Left lower extremity   RLE Edema Weeping;+2   LLE Edema +2   RUE Neurovascular Assessment   Capillary Refill Less than/Equal to 3 seconds   Color Appropriate for Ethnicity   Temperature Warm   R Radial Pulse +1 (Weak)   LUE Neurovascular Assessment   Capillary Refill Less than/Equal to 3 seconds   Color Appropriate for Ethnicity   Temperature Warm   L Radial Pulse +1 (Weak)   RLE Neurovascular Assessment   Capillary Refill Less than/Equal to 3 seconds   R Pedal Pulse +1   LLE Neurovascular Assessment   Capillary Refill Less than/Equal to 3 seconds   L Pedal Pulse +1   Skin Integumentary    Skin Integumentary (WDL) X   Skin Condition/Temp Other (comment)   Skin Integrity Wound (see LDA)   Location RLE scattered   Skin Integrity Site 2   Skin Integrity Location 2 Other (comment)   Location 2 rt hip   Skin Integrity Site 3   Skin Integrity Location 3 Scars (comment)    Location 3 scattered   Musculoskeletal   Musculoskeletal (WDL) X  (generalized weakness)   RL Extremity Weakness; Swelling   LL Extremity Weakness; Swelling

## 2023-03-12 ENCOUNTER — APPOINTMENT (OUTPATIENT)
Dept: GENERAL RADIOLOGY | Age: 68
DRG: 300 | End: 2023-03-12
Payer: MEDICARE

## 2023-03-12 LAB
ANION GAP SERPL CALCULATED.3IONS-SCNC: 15 MMOL/L (ref 3–16)
BACTERIA: NORMAL /HPF
BILIRUBIN URINE: NEGATIVE
BLOOD, URINE: NEGATIVE
BUN BLDV-MCNC: 4 MG/DL (ref 7–20)
CALCIUM SERPL-MCNC: 8 MG/DL (ref 8.3–10.6)
CHLORIDE BLD-SCNC: 104 MMOL/L (ref 99–110)
CLARITY: ABNORMAL
CO2: 18 MMOL/L (ref 21–32)
COLOR: YELLOW
CREAT SERPL-MCNC: <0.5 MG/DL (ref 0.6–1.2)
EPITHELIAL CELLS, UA: 3 /HPF (ref 0–5)
GFR SERPL CREATININE-BSD FRML MDRD: >60 ML/MIN/{1.73_M2}
GLUCOSE BLD-MCNC: 97 MG/DL (ref 70–99)
GLUCOSE URINE: NEGATIVE MG/DL
HCT VFR BLD CALC: 25.9 % (ref 36–48)
HEMOGLOBIN: 8.5 G/DL (ref 12–16)
HYALINE CASTS: 2 /LPF (ref 0–8)
KETONES, URINE: 15 MG/DL
LACTIC ACID: 0.8 MMOL/L (ref 0.4–2)
LEUKOCYTE ESTERASE, URINE: NEGATIVE
MCH RBC QN AUTO: 28.1 PG (ref 26–34)
MCHC RBC AUTO-ENTMCNC: 32.9 G/DL (ref 31–36)
MCV RBC AUTO: 85.2 FL (ref 80–100)
MICROSCOPIC EXAMINATION: YES
NITRITE, URINE: NEGATIVE
PDW BLD-RTO: 13.6 % (ref 12.4–15.4)
PH UA: 5.5 (ref 5–8)
PLATELET # BLD: 394 K/UL (ref 135–450)
PMV BLD AUTO: 7.1 FL (ref 5–10.5)
POTASSIUM SERPL-SCNC: 3.9 MMOL/L (ref 3.5–5.1)
PROCALCITONIN: 0.09 NG/ML (ref 0–0.15)
PROTEIN UA: ABNORMAL MG/DL
RBC # BLD: 3.04 M/UL (ref 4–5.2)
RBC UA: 1 /HPF (ref 0–4)
SODIUM BLD-SCNC: 137 MMOL/L (ref 136–145)
SPECIFIC GRAVITY UA: 1.01 (ref 1–1.03)
URINE REFLEX TO CULTURE: ABNORMAL
URINE TYPE: ABNORMAL
UROBILINOGEN, URINE: 0.2 E.U./DL
VANCOMYCIN RANDOM: 14.2 UG/ML
WBC # BLD: 9.4 K/UL (ref 4–11)
WBC UA: 0 /HPF (ref 0–5)

## 2023-03-12 PROCEDURE — 6370000000 HC RX 637 (ALT 250 FOR IP): Performed by: PHYSICIAN ASSISTANT

## 2023-03-12 PROCEDURE — 81001 URINALYSIS AUTO W/SCOPE: CPT

## 2023-03-12 PROCEDURE — 2580000003 HC RX 258: Performed by: INTERNAL MEDICINE

## 2023-03-12 PROCEDURE — 71045 X-RAY EXAM CHEST 1 VIEW: CPT

## 2023-03-12 PROCEDURE — 6360000002 HC RX W HCPCS: Performed by: INTERNAL MEDICINE

## 2023-03-12 PROCEDURE — 84145 PROCALCITONIN (PCT): CPT

## 2023-03-12 PROCEDURE — 36415 COLL VENOUS BLD VENIPUNCTURE: CPT

## 2023-03-12 PROCEDURE — 0202U NFCT DS 22 TRGT SARS-COV-2: CPT

## 2023-03-12 PROCEDURE — 6370000000 HC RX 637 (ALT 250 FOR IP): Performed by: NURSE PRACTITIONER

## 2023-03-12 PROCEDURE — 83605 ASSAY OF LACTIC ACID: CPT

## 2023-03-12 PROCEDURE — 87086 URINE CULTURE/COLONY COUNT: CPT

## 2023-03-12 PROCEDURE — 2580000003 HC RX 258: Performed by: PHYSICIAN ASSISTANT

## 2023-03-12 PROCEDURE — 6360000002 HC RX W HCPCS: Performed by: PHYSICIAN ASSISTANT

## 2023-03-12 PROCEDURE — 80202 ASSAY OF VANCOMYCIN: CPT

## 2023-03-12 PROCEDURE — 1200000000 HC SEMI PRIVATE

## 2023-03-12 PROCEDURE — 6370000000 HC RX 637 (ALT 250 FOR IP): Performed by: INTERNAL MEDICINE

## 2023-03-12 PROCEDURE — 80048 BASIC METABOLIC PNL TOTAL CA: CPT

## 2023-03-12 PROCEDURE — 85027 COMPLETE CBC AUTOMATED: CPT

## 2023-03-12 RX ADMIN — MAGNESIUM OXIDE 400 MG (241.3 MG MAGNESIUM) TABLET 400 MG: TABLET at 08:40

## 2023-03-12 RX ADMIN — SODIUM CHLORIDE 1000 MG: 900 INJECTION INTRAVENOUS at 12:40

## 2023-03-12 RX ADMIN — HYDROXYZINE HYDROCHLORIDE 25 MG: 25 TABLET, FILM COATED ORAL at 08:41

## 2023-03-12 RX ADMIN — OXYCODONE 5 MG: 5 TABLET ORAL at 01:48

## 2023-03-12 RX ADMIN — LACOSAMIDE 200 MG: 100 TABLET, FILM COATED ORAL at 08:40

## 2023-03-12 RX ADMIN — TRIAMCINOLONE ACETONIDE: 1 CREAM TOPICAL at 08:44

## 2023-03-12 RX ADMIN — VANCOMYCIN HYDROCHLORIDE 1500 MG: 10 INJECTION, POWDER, LYOPHILIZED, FOR SOLUTION INTRAVENOUS at 12:32

## 2023-03-12 RX ADMIN — ACETAMINOPHEN 650 MG: 325 TABLET ORAL at 08:41

## 2023-03-12 RX ADMIN — OXYCODONE 5 MG: 5 TABLET ORAL at 21:25

## 2023-03-12 RX ADMIN — HYDROXYZINE HYDROCHLORIDE 25 MG: 25 TABLET, FILM COATED ORAL at 21:33

## 2023-03-12 RX ADMIN — VANCOMYCIN HYDROCHLORIDE 1500 MG: 10 INJECTION, POWDER, LYOPHILIZED, FOR SOLUTION INTRAVENOUS at 23:47

## 2023-03-12 RX ADMIN — FLUTICASONE PROPIONATE 1 SPRAY: 50 SPRAY, METERED NASAL at 08:40

## 2023-03-12 RX ADMIN — HYDROXYZINE HYDROCHLORIDE 25 MG: 25 TABLET, FILM COATED ORAL at 16:18

## 2023-03-12 RX ADMIN — MAGNESIUM OXIDE 400 MG (241.3 MG MAGNESIUM) TABLET 400 MG: TABLET at 21:35

## 2023-03-12 RX ADMIN — METOPROLOL SUCCINATE 50 MG: 50 TABLET, EXTENDED RELEASE ORAL at 08:41

## 2023-03-12 RX ADMIN — OXYCODONE 5 MG: 5 TABLET ORAL at 16:18

## 2023-03-12 RX ADMIN — OXYCODONE 5 MG: 5 TABLET ORAL at 08:41

## 2023-03-12 RX ADMIN — LACOSAMIDE 200 MG: 100 TABLET, FILM COATED ORAL at 21:26

## 2023-03-12 RX ADMIN — PANTOPRAZOLE SODIUM 40 MG: 40 TABLET, DELAYED RELEASE ORAL at 08:40

## 2023-03-12 RX ADMIN — SODIUM CHLORIDE, PRESERVATIVE FREE 10 ML: 5 INJECTION INTRAVENOUS at 08:45

## 2023-03-12 RX ADMIN — RIVAROXABAN 15 MG: 15 TABLET, FILM COATED ORAL at 16:18

## 2023-03-12 RX ADMIN — TRIAMCINOLONE ACETONIDE: 1 CREAM TOPICAL at 21:26

## 2023-03-12 RX ADMIN — HYDROXYZINE HYDROCHLORIDE 25 MG: 25 TABLET, FILM COATED ORAL at 01:45

## 2023-03-12 RX ADMIN — RIVAROXABAN 15 MG: 15 TABLET, FILM COATED ORAL at 08:40

## 2023-03-12 RX ADMIN — MEROPENEM 1000 MG: 1 INJECTION, POWDER, FOR SOLUTION INTRAVENOUS at 17:32

## 2023-03-12 ASSESSMENT — PAIN DESCRIPTION - FREQUENCY
FREQUENCY: INTERMITTENT

## 2023-03-12 ASSESSMENT — PAIN DESCRIPTION - DESCRIPTORS
DESCRIPTORS: ACHING

## 2023-03-12 ASSESSMENT — PAIN SCALES - WONG BAKER
WONGBAKER_NUMERICALRESPONSE: 0

## 2023-03-12 ASSESSMENT — PAIN DESCRIPTION - LOCATION
LOCATION: HAND
LOCATION: GENERALIZED
LOCATION: HAND;LEG
LOCATION: GENERALIZED

## 2023-03-12 ASSESSMENT — PAIN DESCRIPTION - ORIENTATION
ORIENTATION: RIGHT;LEFT
ORIENTATION: RIGHT;LEFT
ORIENTATION: OTHER (COMMENT)

## 2023-03-12 ASSESSMENT — PAIN SCALES - GENERAL
PAINLEVEL_OUTOF10: 0
PAINLEVEL_OUTOF10: 6
PAINLEVEL_OUTOF10: 0
PAINLEVEL_OUTOF10: 7
PAINLEVEL_OUTOF10: 10
PAINLEVEL_OUTOF10: 8

## 2023-03-12 ASSESSMENT — PAIN - FUNCTIONAL ASSESSMENT
PAIN_FUNCTIONAL_ASSESSMENT: PREVENTS OR INTERFERES SOME ACTIVE ACTIVITIES AND ADLS
PAIN_FUNCTIONAL_ASSESSMENT: ACTIVITIES ARE NOT PREVENTED
PAIN_FUNCTIONAL_ASSESSMENT: PREVENTS OR INTERFERES SOME ACTIVE ACTIVITIES AND ADLS
PAIN_FUNCTIONAL_ASSESSMENT: ACTIVITIES ARE NOT PREVENTED
PAIN_FUNCTIONAL_ASSESSMENT: PREVENTS OR INTERFERES SOME ACTIVE ACTIVITIES AND ADLS

## 2023-03-12 ASSESSMENT — PAIN DESCRIPTION - PAIN TYPE
TYPE: CHRONIC PAIN

## 2023-03-12 NOTE — PROGRESS NOTES
[facility] HOSPITALISTS PROGRESS NOTE    3/12/2023 12:31 PM        Name: Avila Gilbert . Admitted: 3/9/2023  Primary Care Provider: Ike Ortiz MD (Tel: [phone])      Subjective:  . No new complaints. Admitted with R leg swelling and BLE pain and wounds. Has bullous pemphigoid. Son states is non compliant with meds. Was supposed to be on xarelto. Patient had been febrile. She seems tired this am. Had received cefepime in ED. Has been on vanco. Denies cough or sob. Had enterobacter UTI in urine on 2/21 which was after a UC hospitalization-not clear if this was treated.  It was multi drug resistent    Reviewed interval ancillary notes    Current Medications  meropenem (MERREM) 1,000 mg in sodium chloride 0.9 % 100 mL IVPB (mini-bag), Once   Followed by  meropenem (MERREM) 1,000 mg in sodium chloride 0.9 % 100 mL IVPB (mini-bag), Q8H  vancomycin (VANCOCIN) 1,500 mg in sodium chloride 0.9 % 250 mL IVPB, Q12H  rivaroxaban (XARELTO) tablet 15 mg, BID WC   Followed by  Irish Evangelista ON 4/1/2023] rivaroxaban (XARELTO) tablet 20 mg, Dinner  triamcinolone (KENALOG) 0.1 % cream, BID  pantoprazole (PROTONIX) tablet 40 mg, Daily  metoprolol succinate (TOPROL XL) extended release tablet 50 mg, Daily  magnesium oxide (MAG-OX) tablet 400 mg, BID  hydrOXYzine HCl (ATARAX) tablet 25 mg, Q6H PRN  lacosamide (VIMPAT) tablet 200 mg, BID  fluticasone (FLONASE) 50 MCG/ACT nasal spray 1 spray, Daily  hydrALAZINE (APRESOLINE) injection 20 mg, Q4H PRN  sodium chloride flush 0.9 % injection 5-40 mL, 2 times per day  sodium chloride flush 0.9 % injection 5-40 mL, PRN  0.9 % sodium chloride infusion, PRN  ondansetron (ZOFRAN-ODT) disintegrating tablet 4 mg, Q8H PRN   Or  ondansetron (ZOFRAN) injection 4 mg, Q6H PRN  polyethylene glycol (GLYCOLAX) packet 17 g, Daily PRN  acetaminophen (TYLENOL) tablet 650 mg, Q6H PRN   Or  acetaminophen (TYLENOL) suppository 650 mg, Q6H PRN  oxyCODONE (ROXICODONE) immediate release tablet 5 mg, Q4H PRN  morphine (PF) injection 2 mg, Q3H PRN      Objective:  /68   Pulse 80   Temp 98.7 °F (37.1 °C) (Oral)   Resp 18   Ht 5' 7\" (1.702 m)   Wt 177 lb 11.1 oz (80.6 kg)   SpO2 94%   BMI 27.83 kg/m²     Intake/Output Summary (Last 24 hours) at 3/12/2023 1231  Last data filed at 3/11/2023 2310  Gross per 24 hour   Intake 250 ml   Output --   Net 250 ml        Wt Readings from Last 3 Encounters:   03/10/23 177 lb 11.1 oz (80.6 kg)   02/21/23 181 lb 10.5 oz (82.4 kg)   11/22/22 196 lb 8 oz (89.1 kg)       General appearance:  Appears comfortable, chronically ill appearing. Eyes: Sclera clear. Pupils equal.  ENT: Moist oral mucosa. Trachea midline, no adenopathy. Cardiovascular: Regular rhythm, normal S1, S2. No murmur. No edema in lower extremities  Respiratory: Not using accessory muscles. Good inspiratory effort. Clear to auscultation bilaterally, no wheeze or crackles. GI: Abdomen soft, no tenderness, not distended, normal bowel sounds  Musculoskeletal: No cyanosis in digits, neck supple  Neurology: CN 2-12 grossly intact. No speech or motor deficits  Psych: Normal affect. Alert and oriented in time, place and person  Skin: dry dressing on RLQ    Labs and Tests:  CBC:   Recent Labs     03/10/23  0833 03/11/23  0747 03/12/23  0744   WBC 7.4 8.0 9.4   HGB 9.1* 8.3* 8.5*   * 428 394       BMP:    Recent Labs     03/10/23  0833 03/11/23  0747 03/12/23  0744    131* 137   K 3.9 3.3* 3.9    101 104   CO2 20* 22 18*   BUN 5* 4* 4*   CREATININE <0.5* <0.5* <0.5*   GLUCOSE 80 88 97       Hepatic:   Recent Labs     03/09/23  1228 03/10/23  0833   AST 24 17   ALT 9* 13   BILITOT 0.4 0.4   ALKPHOS 74 54       Venous Duplex BLE  Tech Comments   Right   Acute totally occluding deep vein thrombosis involving one of the pair of   right posterior tibial veins. the peroneal vein was not visualized due to   access. .   No other evidence of deep vein or superficial vein thrombosis involving the   right lower extremity and the left common femoral vein. Pulsatile flow is noted. Problem List  Principal Problem:    Leg DVT (deep venous thromboembolism), acute, right (HCC)  Resolved Problems:    * No resolved hospital problems. *       Assessment & Plan:   Acute R posterior tibial DVT-has history of BLE-was not compliant with meds per family. Continue xarelto. Heparin drip stopped yesterday. Pyrexia-check UA (straight cath), check CXR. Will add Merrem given results of last urine culture. Bullous pemphigoid-followed by Dermatology. They are working on getting her dupixent every 2 weeks. Paroxysmal afib-on xarelto. Rate currently controlled. Weakness-PT/OT eval-needs snf for now. Diet: ADULT DIET; Regular; 5 carb choices (75 gm/meal);  Low Fat/Low Chol/High Fiber/2 gm Na  Code:Full Code  DVT PPX:diegoto      Shanika Means PA-C   3/12/2023 12:31 PM

## 2023-03-12 NOTE — PROGRESS NOTES
Clinical Pharmacy Note: Pharmacy to Dose Vancomycin    Vancomycin Day: 4  Indication: cellulitis  Current Dose: 1250 mg q12h  Dosing Method: Dosing by random levels    Random: 14.2 mcg/mL    Recent Labs     03/11/23  0747 03/12/23  0744   BUN 4* 4*       Recent Labs     03/11/23  0747 03/12/23  0744   CREATININE <0.5* <0.5*       Recent Labs     03/11/23  0747 03/12/23  0744   WBC 8.0 9.4         Intake/Output Summary (Last 24 hours) at 3/12/2023 1053  Last data filed at 3/11/2023 2310  Gross per 24 hour   Intake 250 ml   Output --   Net 250 ml         Ht Readings from Last 1 Encounters:   03/10/23 5' 7\" (1.702 m)        Wt Readings from Last 1 Encounters:   03/10/23 177 lb 11.1 oz (80.6 kg)         Body mass index is 27.83 kg/m². Estimated Creatinine Clearance: 121 mL/min (based on SCr of 0.5 mg/dL). Assessment/Plan:  Vancomycin level is subtherapeutic. Increase vancomycin regimen to 1500 mg every 12 hours. Bayesian Modeling predicts an AUC of 477 mg/L*hr and trough of 12.6 mg/L. Will repeat vancomycin level if clinically indicated. Changes in regimen will be determined based on culture results, renal function, and clinical response. Pharmacy will continue to monitor and adjust regimen as necessary.     Thank you for the consult,    Danielle Hernandez, PharmD, BCPS  [id number]  3/12/2023 10:53 AM

## 2023-03-13 LAB
ANION GAP SERPL CALCULATED.3IONS-SCNC: 9 MMOL/L (ref 3–16)
BUN BLDV-MCNC: 3 MG/DL (ref 7–20)
CALCIUM SERPL-MCNC: 8.2 MG/DL (ref 8.3–10.6)
CHLORIDE BLD-SCNC: 103 MMOL/L (ref 99–110)
CO2: 20 MMOL/L (ref 21–32)
CREAT SERPL-MCNC: <0.5 MG/DL (ref 0.6–1.2)
FERRITIN: 158.9 NG/ML (ref 15–150)
GFR SERPL CREATININE-BSD FRML MDRD: >60 ML/MIN/{1.73_M2}
GLUCOSE BLD-MCNC: 78 MG/DL (ref 70–99)
HCT VFR BLD CALC: 24.1 % (ref 36–48)
HEMOGLOBIN: 7.9 G/DL (ref 12–16)
IRON SATURATION: 10 % (ref 15–50)
IRON: 13 UG/DL (ref 37–145)
MCH RBC QN AUTO: 28 PG (ref 26–34)
MCHC RBC AUTO-ENTMCNC: 32.9 G/DL (ref 31–36)
MCV RBC AUTO: 85.1 FL (ref 80–100)
PDW BLD-RTO: 13.7 % (ref 12.4–15.4)
PLATELET # BLD: 343 K/UL (ref 135–450)
PMV BLD AUTO: 6.9 FL (ref 5–10.5)
POTASSIUM SERPL-SCNC: 3.3 MMOL/L (ref 3.5–5.1)
PROCALCITONIN: 0.07 NG/ML (ref 0–0.15)
RBC # BLD: 2.83 M/UL (ref 4–5.2)
REPORT: NORMAL
RESPIRATORY PANEL PCR: NORMAL
SODIUM BLD-SCNC: 132 MMOL/L (ref 136–145)
TOTAL IRON BINDING CAPACITY: 133 UG/DL (ref 260–445)
WBC # BLD: 9.1 K/UL (ref 4–11)

## 2023-03-13 PROCEDURE — 84145 PROCALCITONIN (PCT): CPT

## 2023-03-13 PROCEDURE — 6370000000 HC RX 637 (ALT 250 FOR IP): Performed by: PHYSICIAN ASSISTANT

## 2023-03-13 PROCEDURE — 82728 ASSAY OF FERRITIN: CPT

## 2023-03-13 PROCEDURE — 97535 SELF CARE MNGMENT TRAINING: CPT

## 2023-03-13 PROCEDURE — 2580000003 HC RX 258: Performed by: INTERNAL MEDICINE

## 2023-03-13 PROCEDURE — 94150 VITAL CAPACITY TEST: CPT

## 2023-03-13 PROCEDURE — 83550 IRON BINDING TEST: CPT

## 2023-03-13 PROCEDURE — 80048 BASIC METABOLIC PNL TOTAL CA: CPT

## 2023-03-13 PROCEDURE — 97530 THERAPEUTIC ACTIVITIES: CPT

## 2023-03-13 PROCEDURE — 6360000002 HC RX W HCPCS: Performed by: NURSE PRACTITIONER

## 2023-03-13 PROCEDURE — 6360000002 HC RX W HCPCS: Performed by: PHYSICIAN ASSISTANT

## 2023-03-13 PROCEDURE — 2580000003 HC RX 258: Performed by: PHYSICIAN ASSISTANT

## 2023-03-13 PROCEDURE — 36415 COLL VENOUS BLD VENIPUNCTURE: CPT

## 2023-03-13 PROCEDURE — 85027 COMPLETE CBC AUTOMATED: CPT

## 2023-03-13 PROCEDURE — 6370000000 HC RX 637 (ALT 250 FOR IP): Performed by: NURSE PRACTITIONER

## 2023-03-13 PROCEDURE — 1200000000 HC SEMI PRIVATE

## 2023-03-13 PROCEDURE — 82607 VITAMIN B-12: CPT

## 2023-03-13 PROCEDURE — 82746 ASSAY OF FOLIC ACID SERUM: CPT

## 2023-03-13 PROCEDURE — 6360000002 HC RX W HCPCS: Performed by: INTERNAL MEDICINE

## 2023-03-13 PROCEDURE — 83540 ASSAY OF IRON: CPT

## 2023-03-13 PROCEDURE — 99231 SBSQ HOSP IP/OBS SF/LOW 25: CPT | Performed by: SURGERY

## 2023-03-13 PROCEDURE — 6370000000 HC RX 637 (ALT 250 FOR IP): Performed by: INTERNAL MEDICINE

## 2023-03-13 RX ADMIN — OXYCODONE 5 MG: 5 TABLET ORAL at 01:18

## 2023-03-13 RX ADMIN — PANTOPRAZOLE SODIUM 40 MG: 40 TABLET, DELAYED RELEASE ORAL at 10:30

## 2023-03-13 RX ADMIN — FLUTICASONE PROPIONATE 1 SPRAY: 50 SPRAY, METERED NASAL at 10:33

## 2023-03-13 RX ADMIN — MEROPENEM 1000 MG: 1 INJECTION, POWDER, FOR SOLUTION INTRAVENOUS at 02:22

## 2023-03-13 RX ADMIN — LACOSAMIDE 200 MG: 100 TABLET, FILM COATED ORAL at 20:50

## 2023-03-13 RX ADMIN — RIVAROXABAN 15 MG: 15 TABLET, FILM COATED ORAL at 10:30

## 2023-03-13 RX ADMIN — HYDROXYZINE HYDROCHLORIDE 25 MG: 25 TABLET, FILM COATED ORAL at 19:33

## 2023-03-13 RX ADMIN — SODIUM CHLORIDE, PRESERVATIVE FREE 10 ML: 5 INJECTION INTRAVENOUS at 10:33

## 2023-03-13 RX ADMIN — MORPHINE SULFATE 2 MG: 2 INJECTION, SOLUTION INTRAMUSCULAR; INTRAVENOUS at 14:11

## 2023-03-13 RX ADMIN — MEROPENEM 1000 MG: 1 INJECTION, POWDER, FOR SOLUTION INTRAVENOUS at 19:38

## 2023-03-13 RX ADMIN — OXYCODONE 5 MG: 5 TABLET ORAL at 05:46

## 2023-03-13 RX ADMIN — METOPROLOL SUCCINATE 50 MG: 50 TABLET, EXTENDED RELEASE ORAL at 10:30

## 2023-03-13 RX ADMIN — HYDROXYZINE HYDROCHLORIDE 25 MG: 25 TABLET, FILM COATED ORAL at 03:17

## 2023-03-13 RX ADMIN — MAGNESIUM OXIDE 400 MG (241.3 MG MAGNESIUM) TABLET 400 MG: TABLET at 20:50

## 2023-03-13 RX ADMIN — MORPHINE SULFATE 2 MG: 2 INJECTION, SOLUTION INTRAMUSCULAR; INTRAVENOUS at 19:34

## 2023-03-13 RX ADMIN — VANCOMYCIN HYDROCHLORIDE 1500 MG: 10 INJECTION, POWDER, LYOPHILIZED, FOR SOLUTION INTRAVENOUS at 14:00

## 2023-03-13 RX ADMIN — RIVAROXABAN 15 MG: 15 TABLET, FILM COATED ORAL at 19:33

## 2023-03-13 RX ADMIN — HYDROXYZINE HYDROCHLORIDE 25 MG: 25 TABLET, FILM COATED ORAL at 10:36

## 2023-03-13 RX ADMIN — MAGNESIUM OXIDE 400 MG (241.3 MG MAGNESIUM) TABLET 400 MG: TABLET at 10:32

## 2023-03-13 RX ADMIN — MEROPENEM 1000 MG: 1 INJECTION, POWDER, FOR SOLUTION INTRAVENOUS at 10:46

## 2023-03-13 RX ADMIN — LACOSAMIDE 200 MG: 100 TABLET, FILM COATED ORAL at 10:31

## 2023-03-13 RX ADMIN — MORPHINE SULFATE 2 MG: 2 INJECTION, SOLUTION INTRAMUSCULAR; INTRAVENOUS at 10:31

## 2023-03-13 RX ADMIN — TRIAMCINOLONE ACETONIDE: 1 CREAM TOPICAL at 14:00

## 2023-03-13 RX ADMIN — POTASSIUM BICARBONATE 50 MEQ: 978 TABLET, EFFERVESCENT ORAL at 10:37

## 2023-03-13 ASSESSMENT — PAIN SCALES - GENERAL
PAINLEVEL_OUTOF10: 6
PAINLEVEL_OUTOF10: 6
PAINLEVEL_OUTOF10: 8
PAINLEVEL_OUTOF10: 0

## 2023-03-13 ASSESSMENT — PAIN SCALES - WONG BAKER

## 2023-03-13 ASSESSMENT — PAIN DESCRIPTION - PAIN TYPE
TYPE: CHRONIC PAIN
TYPE: CHRONIC PAIN

## 2023-03-13 ASSESSMENT — PAIN DESCRIPTION - FREQUENCY
FREQUENCY: INTERMITTENT
FREQUENCY: INTERMITTENT

## 2023-03-13 ASSESSMENT — PAIN DESCRIPTION - DESCRIPTORS
DESCRIPTORS: ACHING

## 2023-03-13 ASSESSMENT — PAIN DESCRIPTION - LOCATION
LOCATION: GENERALIZED

## 2023-03-13 ASSESSMENT — PAIN - FUNCTIONAL ASSESSMENT
PAIN_FUNCTIONAL_ASSESSMENT: PREVENTS OR INTERFERES SOME ACTIVE ACTIVITIES AND ADLS
PAIN_FUNCTIONAL_ASSESSMENT: ACTIVITIES ARE NOT PREVENTED
PAIN_FUNCTIONAL_ASSESSMENT: PREVENTS OR INTERFERES SOME ACTIVE ACTIVITIES AND ADLS
PAIN_FUNCTIONAL_ASSESSMENT: ACTIVITIES ARE NOT PREVENTED

## 2023-03-13 ASSESSMENT — PAIN DESCRIPTION - ORIENTATION: ORIENTATION: OTHER (COMMENT)

## 2023-03-13 NOTE — PROGRESS NOTES
[facility]    3/13/2023 7:51 AM        Name: Mendel Martínez . Admitted: 3/9/2023  Primary Care Provider: Lew Nolasco MD (Tel: [phone])      Subjective:      Seen by vascular this am.  Needs outpatient follow up in the wound clinic. Afebrile for the past 24 hours   Pt seen this am and was awakened from sleep  She is a poor historian and wants to go back to sleep  She reports generalized pain but wont elaborate further. Admitted with R leg swelling and BLE pain and wounds. Has bullous pemphigoid. Son states is non compliant with meds. Was supposed to be on xarelto. Has been on vanc and merrem. Had enterobacter UTI in urine on 2/21 which was after a UC hospitalization , not clear if this was treated.  It was multi drug resistent  Urine culture is currently pending     Reviewed interval ancillary notes    Current Medications  meropenem (MERREM) 1,000 mg in sodium chloride 0.9 % 100 mL IVPB (mini-bag), Q8H  vancomycin (VANCOCIN) 1,500 mg in sodium chloride 0.9 % 250 mL IVPB, Q12H  rivaroxaban (XARELTO) tablet 15 mg, BID WC   Followed by  Mauro Tong ON 4/1/2023] rivaroxaban (XARELTO) tablet 20 mg, Dinner  triamcinolone (KENALOG) 0.1 % cream, BID  pantoprazole (PROTONIX) tablet 40 mg, Daily  metoprolol succinate (TOPROL XL) extended release tablet 50 mg, Daily  magnesium oxide (MAG-OX) tablet 400 mg, BID  hydrOXYzine HCl (ATARAX) tablet 25 mg, Q6H PRN  lacosamide (VIMPAT) tablet 200 mg, BID  fluticasone (FLONASE) 50 MCG/ACT nasal spray 1 spray, Daily  hydrALAZINE (APRESOLINE) injection 20 mg, Q4H PRN  sodium chloride flush 0.9 % injection 5-40 mL, 2 times per day  sodium chloride flush 0.9 % injection 5-40 mL, PRN  0.9 % sodium chloride infusion, PRN  ondansetron (ZOFRAN-ODT) disintegrating tablet 4 mg, Q8H PRN   Or  ondansetron (ZOFRAN) injection 4 mg, Q6H PRN  polyethylene glycol (GLYCOLAX) packet 17 g, Daily PRN  acetaminophen (TYLENOL) tablet 650 mg, Q6H PRN   Or  acetaminophen (TYLENOL) suppository 650 mg, Q6H PRN  oxyCODONE (ROXICODONE) immediate release tablet 5 mg, Q4H PRN  morphine (PF) injection 2 mg, Q3H PRN      Objective:  /68   Pulse 78   Temp 98.7 °F (37.1 °C) (Oral)   Resp 16   Ht 5' 7\" (1.702 m)   Wt 180 lb (81.6 kg)   SpO2 93%   BMI 28.19 kg/m²   No intake or output data in the 24 hours ending 03/13/23 0751     Wt Readings from Last 3 Encounters:   03/13/23 180 lb (81.6 kg)   02/21/23 181 lb 10.5 oz (82.4 kg)   11/22/22 196 lb 8 oz (89.1 kg)       General appearance:  Appears comfortable, looks chronically ill ,  she is pleasant but poor historian   Eyes: Sclera clear. Pupils equal.  ENT: Moist oral mucosa. Trachea midline, no adenopathy. Cardiovascular: Regular rhythm, normal S1, S2. No murmur. No edema in lower extremities  Respiratory: Not using accessory muscles. Good inspiratory effort. Clear to auscultation bilaterally, no wheeze or crackles. GI: Abdomen soft, no tenderness, not distended, normal bowel sounds  Musculoskeletal: No cyanosis in digits, neck supple  Neurology: CN 2-12 grossly intact. No speech or motor deficits  Psych: Normal affect. Alert and oriented in time, place and person  Skin: several areas of blistering and skin discoloration noted. Dressing is clean dry intact to the right lower leg. Distal pulses are palpable     Labs and Tests:  CBC:   Recent Labs     03/11/23  0747 03/12/23  0744 03/13/23  0506   WBC 8.0 9.4 9.1   HGB 8.3* 8.5* 7.9*    394 343       BMP:    Recent Labs     03/11/23  0747 03/12/23  0744 03/13/23  0506   * 137 132*   K 3.3* 3.9 3.3*    104 103   CO2 22 18* 20*   BUN 4* 4* 3*   CREATININE <0.5* <0.5* <0.5*   GLUCOSE 88 97 78       Hepatic:   Recent Labs     03/10/23  0833   AST 17   ALT 13   BILITOT 0.4   ALKPHOS 54     Chest xray:    Bibasilar airspace opacities with small left pleural effusion.   If patient   has clinical symptoms of infection, differential would include pneumonia. In   the absence of infectious symptoms, atelectasis can have the same appearance. 2. Mild enlargement of the cardiac silhouette which may be due to intrinsic   enlargement of the heart or pericardial fluid       Problem List  Principal Problem:    Leg DVT (deep venous thromboembolism), acute, right ([address]  Resolved Problems:    * No resolved hospital problems. *       Assessment & Plan:   Acute R posterior tibial DVT:  she has been evaluated by vascular: remains on Xarelto. No evidence of arterial ischemia per vascular. Needs improved compliance and follow up at wound center after d/c. Pyrexia: urine cultures is pending. Hx of MDR uti in the past.  On Merrem and vanc. Possible atelectasis/ vs PNA:  pro felton is low,  Currently on merrem and vanc. Needs ambulation and IS    Bullous pemphigoid-followed by Dermatology. They are working on getting her dupixent every 2 weeks. Paroxysmal afib-on xarelto. Rate currently controlled. Hypokalemia:  will give oral replacement   Anemia: add iron studies , B12 and folate   Weakness: PT and OT following, will need ECF placement. SW updated. Likely ready for d/c in the next 24 - 36 hours if no growth in the urine. Diet: ADULT DIET; Regular; 5 carb choices (75 gm/meal);  Low Fat/Low Chol/High Fiber/2 gm Na  Code:Full Code  DVT PPX:GAYLA Colunga - CNP   3/13/2023 7:51 AM

## 2023-03-13 NOTE — PLAN OF CARE
Problem: Pain  Goal: Verbalizes/displays adequate comfort level or baseline comfort level  Outcome: Progressing  Flowsheets     Problem: Discharge Planning  Goal: Discharge to home or other facility with appropriate resources  Outcome: Progressing     Problem: Safety - Adult  Goal: Free from fall injury  Outcome: Progressing     Problem: Skin/Tissue Integrity  Goal: Absence of new skin breakdown  Description: 1. Monitor for areas of redness and/or skin breakdown  2. Assess vascular access sites hourly  3. Every 4-6 hours minimum:  Change oxygen saturation probe site  4. Every 4-6 hours:  If on nasal continuous positive airway pressure, respiratory therapy assess nares and determine need for appliance change or resting period.   Outcome: Progressing     Problem: Chronic Conditions and Co-morbidities  Goal: Patient's chronic conditions and co-morbidity symptoms are monitored and maintained or improved  Outcome: Progressing

## 2023-03-13 NOTE — PROGRESS NOTES
[facility] [address]   Phone: [phone]    Occupational Therapy    [] Initial Evaluation            [x] Daily Treatment Note         [] Discharge Summary      Patient: Geo Colvin   : 1955   MRN: [id number]   Date of Service:  3/13/2023    Admitting Diagnosis: Leg DVT (deep venous thromboembolism), acute, right [facility])  Current Admission Summary: 78 y.o. female who presents to the emergency room due to bilateral lower leg swelling and pain as well as diffuse wounds throughout the body mainly on the bilateral lower legs chest and face area. This was a signout patient to be and most of the work-up was already completed pending DVT study. Please see Dr. Porsha Fuentes note for further details of initial presentation. Through nursing staff was able to ascertain information from the family stating that they are concerned that the patient has not taken her medications as prescribed given that she does have home health nurse that comes by every day to take care of her she has worsening blisters on her bilateral lower legs with weeping of wounds. When I asked the patient she states that she does not have any significant complaints other than her bilateral lower leg pain. She denies any chest pain, shortness of breath or difficulty breathing. It is noted that the patient was prescribed Xarelto but is uncertain of when the last dose was taken. Right lower leg swelling is worse than the left. 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   Past Medical History:  has a past medical history of Anxiety, Arthritis, Atrial fibrillation/flutter, Blood transfusion reaction, Crohn's disease ([address] Depression, GERD (gastroesophageal reflux disease), Hiatal hernia, Hx of blood clots, Hypertension, MRSA (methicillin resistant staph aureus) culture positive, MRSA (methicillin resistant staph aureus) culture positive, Neuropathy, Pneumonia, Sinus headache, SOB (shortness of breath), and VRE (vancomycin resistant enterococcus) culture positive. Past Surgical History:  has a past surgical history that includes Hysterectomy; knee surgery (Bilateral); Hand Carpectomy (Bilateral); hernia repair; Abdomen surgery; bladder suspension; fracture surgery; Heel spur surgery; Tonsillectomy; Colonoscopy; Upper gastrointestinal endoscopy (6/14/13); Foot surgery (Left, 6/25/14); Foot surgery (6/3/15); Colonoscopy (9/14/15); Foot surgery (Left, 08/05/2002); joint replacement; Sigmoidoscopy (01/15/2018); Abdominal adhesion surgery (06/08/2018); Colonoscopy (08/07/2018); colostomy (N/A, 10/8/2018); pr secondary closure surg wound/dehsn extsv/complic (N/A, 45/10/9730); Leg Surgery (Right); Colonoscopy (06/07/2019); Colonoscopy (N/A, 6/7/2019); Colonoscopy (N/A, 6/7/2019); Small intestine surgery (N/A, 7/17/2019); and proctosigmoidoscopy (N/A, 7/17/2019). Discharge Recommendations: Francisco Mejia scored a 12/24 on the AM-PAC ADL Inpatient form. Current research shows that an AM-PAC score of 17 or less is typically not associated with a discharge to the patient's home setting. Based on the patient's AM-PAC score and their current ADL deficits, it is recommended that the patient have 3-5 sessions per week of Occupational Therapy at d/c to increase the patient's independence. Please see assessment section for further patient specific details. If patient discharges prior to next session this note will serve as a discharge summary. Please see below for the latest assessment towards goals.      DME Required For Discharge: DME to be determined at next level of care  Precautions/Restrictions: high fall risk, contact precautions, MDRO (patient with wide spread weeping blisters (back, ears, RUE, BLE)  Weight Bearing Restrictions: no restrictions  [] Right Upper Extremity        [] Left Upper Extremity         [] Right Lower Extremity         [] Left Lower Extremity Required Braces/Orthotics: no braces required              [] Right            [] Left  Positional Restrictions:no positional restrictions     Pre-Admission Information   Lives With: alone                     Type of Home: house  Home Layout: one level  Home Access: level entry  Bathroom Layout: tub/shower unit  Bathroom Equipment: bedside commode  Toilet Height: standard height  Home Equipment: rolling walker, manual wheelchair  Transfer Assistance: modified independent with use of w/c for stand pivots  Ambulation Assistance: Non-ambulatory (uses w/c)  ADL Assistance: independent with all ADL's, sponge bathes on commode  IADL Assistance: requires assistance with all homemaking tasks, HHA provides cleaning, laundry, and cooking. Son grocery shops  Active :        [] Yes                 [x] No  Hand Dominance: [] Left                 [x] Right  Current Employment: retired-unable to report  Hobbies:   Recent Falls: Unable to state           Subjective  General: Patient supine in bed, agreeable to OT/PT cotx session with education and encouragement.  Patient agitated at times, but apologizes and is able to be redirected with increased time and encouragement  Pain: Patient does not rate upon questioning Pt shouts out in pain of B LE with most transitional movement  Pain Interventions: pain medication in place prior to arrival, RN notified, repositioned , and therapy activities modified        Activities of Daily Living  Basic Activities of Daily Living  Feeding: setup assistance  Feeding Comments: Assist to open all sealed containers  Grooming: setup assistance stand by assistance dependent  Grooming Comments: Oral care seated EOB, DEP for lotion application to B LE/feet  Upper Extremity Bathing: moderate assistance Increased time to complete task  Bathing Comments: Pt with limited ROM/ strength in L hand, assist under R arm and breast/posterior  Upper Extremity Dressing: maximum assistance Comment: gown change  Lower Extremity Dressing: dependent  Dressing Comments: Dep for tabbed brief change supine  Toileting Comments: Pt with Purewick, pt very itchy in vaginal area, DEP for joyce-care, and powder application to pannus, soiled Sacral pad and Purewick replaced and RN notified    Instrumental Activities of Daily Living  No IADL completed on this date. Functional Mobility  Bed Mobility  Supine to Sit: 2 person assistance with maxA of 2   Sit to Supine: maximum assistance  Rolling Left: maximum assistance  Rolling Right: maximum assistance  Scootin person assistance with maxA of 2 (to EOB and to [facility] upon return to supine)   Comments: Pt requires MAX increased time for bed mobility secondary to pain    Transfers  No transfers completed on this date secondary to safety concerns and pt declining. Comments:    Functional Mobility:  Sitting Balance: stand by assistance, contact guard assistance, CGA progressing to SBA. Sitting Balance Comment: Pt tolerated sitting EOB ~18 min for grooming  No functional mobility completed on this date secondary to patient nonambulatory at baseline, chart review reveals squat pivot only.     Other Therapeutic Interventions    Functional Outcomes  AM-PAC Inpatient Daily Activity Raw Score: 12    Cognition  Overall Cognitive Status: Impaired  Following Commands: follows one step commands with repetition, follows one step commands with increased time  Memory: decreased recall of precautions, decreased recall of recent events, decreased short term memory  Safety Judgement: decreased awareness of need for assistance  Problem Solving: decreased awareness of errors, assistance required to identify errors made, assistance required to correct errors made  Insights: not aware of deficits  Initiation: requires cues for some  Sequencing: requires cues for some  Orientation:    oriented to person  Command Following:   impaired     Education  Barriers To Learning: cognition, emotional, and physical  Patient Education: patient educated on goals, OT role and benefits, plan of care, discharge recommendations  Learning Assessment:  patient will require reinforcement due to cognitive deficits, patient is not an independent learner, patient resistive towards education topics within session, would benefit from continued education    Assessment  Activity Tolerance: Patient tolerated session fair  (limited by cognition, pain, agitation. Patient responds to repeated encouragement with maximal increased time)  Impairments Requiring Therapeutic Intervention: decreased functional mobility, decreased ADL status, decreased strength, decreased safety awareness, decreased cognition, decreased endurance, decreased balance, decreased posture  Prognosis: fair and poor  Clinical Assessment: Patient presents with the above deficits, which are below baseline, and would benefit from continued skilled OT to address. Pt PLOF includes stand pivot transfers to w/c, at this time pt requires MaxA of 2 for bed mobility and is unable to tolerate functional transfers. Pt's functional capacity fluctuating throughout session, pt calling out in pain and unable to tolerate light touch to RLE, then at times able to lift RLE (I) with no visible pain. Pt would benefit from continued skilled OT in order to assist with return to PLOF.    Safety Interventions: patient left in bed, bed alarm in place, call light within reach, patient at risk for falls, and nurse notified    Plan  Frequency: 3-5 x/per week  Current Treatment Recommendations: strengthening, balance training, transfer training, endurance training, patient/caregiver education, ADL/self-care training, cognitive reorientation, pain management, safety education, and positioning    Goals  Patient Goals: Not stated due to cognition   Short Term Goals:  Time Frame: Upon discharge  Patient will complete upper body ADL at minimal assistance   Patient will complete lower body ADL at moderate assistance   Patient will complete grooming at set up assistance   Patient will complete functional transfers at moderate assistance   Patient will complete bed mobility at moderate assistance     No Goals met, continue POC 3/13  Therapy Session Time     Individual Group Co-treatment   Time In    1117   Time Out    1225   Minutes    68        Timed Code Treatment Minutes:   68 Minutes  Total Treatment Minutes:  68 Minutes       Electronically Signed By: BLUE Guadarrama/[id number]

## 2023-03-13 NOTE — CARE COORDINATION
[address] pre-cert request submitted via [facility] Access Portal.    Requested Facility:  [facility] [address]  [address], [address]  Phone: [phone]  Fax: [phone]     Anticipated Admit Date to SNF:  3/13/2023  [facility] #:  [id number]      Electronically signed by ASHLEE Alfredo on 3/13/2023 at 2:24 PM

## 2023-03-13 NOTE — PROGRESS NOTES
VASCULAR    Pleasant and cooperative this AM.  Denies pain    VSS Afeb  Palpable B DP pulses  L foot and leg essentially healed  R leg wrapped - no edema    A/P: Tibial DVT - continue DOAC   BLE edema with blistering wounds - improved with local care and elevation.   No evidence of arterial ischemia as etiology.  Continue this care plan as outpt. Compliance will remain a problem.  Recommend f/u for management at [facility] after DC.    Victor Hugo Steinberg

## 2023-03-13 NOTE — PROGRESS NOTES
Pt R leg is cleansed with wound cleanser, kenalog cream applied, nonadherent applied to R leg then wrap with kerlex and ace wrap from ball foot to knee. Foams applied to lateral outer R leg where bleeding-open skin areas are.

## 2023-03-13 NOTE — PROGRESS NOTES
Incentive Spirometry education and demonstration completed by Respiratory Therapy Yes      Response to education: Very Good     Teaching Time: 5 minutes    Minimum Predicted Vital Capacity - 616 mL. Patient's Actual Vital Capacity - 1000 mL. Turning over to Nursing for routine follow-up Yes.     Comments: N/A    Electronically signed by Jeannette Butler RCP on 3/13/2023 at 12:38 PM

## 2023-03-13 NOTE — PROGRESS NOTES
[facility] Department   Phone: [phone]    Physical Therapy    [] Initial Evaluation            [x] Daily Treatment Note         [] Discharge Summary      Patient: Cinthya Clement   : 1955   MRN: [id number]   Date of Service:  3/13/2023  Admitting Diagnosis: Leg DVT (deep venous thromboembolism), acute, right [facility])  Current Admission Summary: 78 y.o. female who presents to the emergency room due to bilateral lower leg swelling and pain as well as diffuse wounds throughout the body mainly on the bilateral lower legs chest and face area. This was a signout patient to be and most of the work-up was already completed pending DVT study. Please see Dr. Danni Mckeon note for further details of initial presentation. Through nursing staff was able to ascertain information from the family stating that they are concerned that the patient has not taken her medications as prescribed given that she does have home health nurse that comes by every day to take care of her she has worsening blisters on her bilateral lower legs with weeping of wounds. When I asked the patient she states that she does not have any significant complaints other than her bilateral lower leg pain. She denies any chest pain, shortness of breath or difficulty breathing. It is noted that the patient was prescribed Xarelto but is uncertain of when the last dose was taken. Right lower leg swelling is worse than the left. 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   Past Medical History:  has a past medical history of Anxiety, Arthritis, Atrial fibrillation/flutter, Blood transfusion reaction, Crohn's disease ([address] Depression, GERD (gastroesophageal reflux disease), Hiatal hernia, Hx of blood clots, Hypertension, MRSA (methicillin resistant staph aureus) culture positive, MRSA (methicillin resistant staph aureus) culture positive, Neuropathy, Pneumonia, Sinus headache, SOB (shortness of breath), and VRE (vancomycin resistant enterococcus) culture positive. Past Surgical History:  has a past surgical history that includes Hysterectomy; knee surgery (Bilateral); Hand Carpectomy (Bilateral); hernia repair; Abdomen surgery; bladder suspension; fracture surgery; Heel spur surgery; Tonsillectomy; Colonoscopy; Upper gastrointestinal endoscopy (6/14/13); Foot surgery (Left, 6/25/14); Foot surgery (6/3/15); Colonoscopy (9/14/15); Foot surgery (Left, 08/05/2002); joint replacement; Sigmoidoscopy (01/15/2018); Abdominal adhesion surgery (06/08/2018); Colonoscopy (08/07/2018); colostomy (N/A, 10/8/2018); pr secondary closure surg wound/dehsn extsv/complic (N/A, 33/97/6297); Leg Surgery (Right); Colonoscopy (06/07/2019); Colonoscopy (N/A, 6/7/2019); Colonoscopy (N/A, 6/7/2019); Small intestine surgery (N/A, 7/17/2019); and proctosigmoidoscopy (N/A, 7/17/2019). Discharge Recommendations: Avila Gilbert scored a 7/24 on the AM-PAC short mobility form. Current research shows that an AM-PAC score of 17 or less is typically not associated with a discharge to the patient's home setting. Based on the patient's AM-PAC score and their current functional mobility deficits, it is recommended that the patient have 3-5 sessions per week of Physical Therapy at d/c to increase the patient's independence. Please see assessment section for further patient specific details. If patient discharges prior to next session this note will serve as a discharge summary. Please see below for the latest assessment towards goals.     DME Required For Discharge: DME to be determined at next level of care  Precautions/Restrictions: high fall risk, contact precautions, MDRO (patient with wide spread weeping blisters (back, ears, RUE, BLE)  Weight Bearing Restrictions: no restrictions  [] Right Upper Extremity  [] Left Upper Extremity [] Right Lower Extremity  [] Left Lower Extremity     Required Braces/Orthotics: no braces required   [] Right  [] Left  Positional Restrictions:no positional restrictions    Pre-Admission Information   Lives With: alone    Type of Home: house  Home Layout: one level  Home Access: level entry  Bathroom Layout: tub/shower unit  [place] Equipment: bedside commode  Toilet Height: standard height  Home Equipment: rolling walker, manual wheelchair  Transfer Assistance: modified independent with use of w/c for stand pivots  Ambulation Assistance: Non-ambulatory (uses w/c)  ADL Assistance: independent with all ADL's, sponge bathes on commode  IADL Assistance: requires assistance with all homemaking tasks, HHA provides cleaning, laundry, and cooking. Son grocery shops  Active :        [] Yes  [x] No  Hand Dominance: [] Left  [x] Right  Current Employment: retired. Occupation: unable to report  Recent Falls: unable to state      Subjective  General: Patient semi-reclined in bed upon arrival. Pt initially refusing therapy but agreeable after education. Pain: Patient does not rate upon questioning--when offered to contact RN for pain medication, pt reporting \"nothing helps\". RN notified and reporting pt had pain medication at ~1030, 1 hr prior to therapy session. Pain Interventions: RN notified of patient request for pain medication, repositioned , and therapy activities modified       Functional Mobility  Bed Mobility  Supine to Sit: 2 person assistance with max A   Sit to Supine: maximum assistance  Rolling Left: maximum assistance  Rolling Right: maximum assistance  Scootin person assistance with max A   Comments: Patient completed bed mobility with HOB slightly elevated and increased time. Pt inconsistent during session at times with overall functional ability. When attempting to sit up at EOB, pt with very limited initiation, and reports of severe pain.  But once returned to supine, pt with another episode of severe pain, but pulling self up into long sitting position with SBA. Transfers  No transfers completed on this date secondary to safety concerns and patient declining. Comments:  Ambulation  Ambulation not tested on this date secondary to patient being non-ambulatory. Distance:   Gait Mechanics:   Comments:    Stair Mobility  Stair mobility not completed on this date. Comments:  Wheelchair Mobility:  No w/c mobility completed on this date. Comments:  Balance  Static Sitting Balance: fair (+): maintains balance at SBA/supervision without use of UE support  Dynamic Sitting Balance: fair: maintains balance at CGA without use of UE support  Comments: Patient sat EOB ~18 minutes while completing bathing and oral hygiene at EOB. Pt returned to supine for completion of pericare.     Other Therapeutic Interventions  See OT note for ADLs  Functional Outcomes  AM-PAC Inpatient Mobility Raw Score : 7              Cognition  Overall Cognitive Status: Impaired  Arousal/Alterness: inconsistent responses to stimuli  Following Commands: follows one step commands with repetition, follows one step commands with increased time  Attention Span: attends with cues to redirect  Memory: decreased recall of precautions, decreased recall of recent events, decreased short term memory  Safety Judgement: decreased awareness of need for assistance  Problem Solving: decreased awareness of errors  Insights: not aware of deficits  Initiation: requires cues for some  Sequencing: requires cues for some  Orientation:    oriented to person  Command Following:   impaired    Education  Barriers To Learning: cognition, emotional, and physical  Patient Education: patient educated on goals, PT role and benefits, plan of care, precautions, general safety, functional mobility training, orientation, transfer training, discharge recommendations  Learning Assessment:  patient will require reinforcement due to cognitive deficits, patient is not an independent learner, patient resistive towards education topics within session, would benefit from continued education    Assessment  Activity Tolerance: Fair; patient limited by cognition, pain and agitation  Impairments Requiring Therapeutic Intervention: decreased functional mobility, decreased ROM, decreased strength, decreased safety awareness, decreased cognition, decreased endurance, decreased sensation, decreased balance, increased pain, decreased posture  Prognosis: fair and poor  Clinical Assessment: Patient is 77 y/o female presenting to [facility] secondary to DVT. Patient currently presents below baseline function with all functional mobility. Patient's PLOF includes completing stand pivot transfers to w/c. At this time, patient requires two person max A for bed mobility. Pt fluctuates at times during session with her overall functional capacity, calling out in pain with attempts to reposition or even with light touch to RLE, then at times, able to lift RLE independently while at EOB.  Patient will continue to benefit from skilled PT in order to return to PLOF with all functional mobility prior to d/c from hospital.  Safety Interventions: patient left in bed, bed alarm in place, call light within reach, patient at risk for falls, and nurse notified    Plan  Frequency: 3-5 x/per week  Current Treatment Recommendations: strengthening, ROM, balance training, functional mobility training, transfer training, endurance training, wheelchair mobility training, and patient/caregiver education    Goals  Patient Goals: None stated   Short Term Goals:  Time Frame: Upon d/c  Patient will complete bed mobility at moderate assistance   Patient will complete transfers at moderate assistance   *none met this date    Therapy Session Time      Individual Group Co-treatment   Time In     1117   Time Out     1225   Minutes     68     Timed Code Treatment Minutes:   68  Total Treatment Minutes:  68 Minutes       Electronically Signed By: Mary Lares PT  Mary Lares PT, DPT, [id number]

## 2023-03-14 VITALS
SYSTOLIC BLOOD PRESSURE: 116 MMHG | TEMPERATURE: 98.7 F | OXYGEN SATURATION: 95 % | HEIGHT: 67 IN | HEART RATE: 92 BPM | RESPIRATION RATE: 16 BRPM | DIASTOLIC BLOOD PRESSURE: 75 MMHG | WEIGHT: 180 LBS | BODY MASS INDEX: 28.25 KG/M2

## 2023-03-14 LAB
ANION GAP SERPL CALCULATED.3IONS-SCNC: 7 MMOL/L (ref 3–16)
BACTERIA BLD CULT ORG #2: NORMAL
BACTERIA BLD CULT: NORMAL
BACTERIA UR CULT: NORMAL
BUN BLDV-MCNC: 3 MG/DL (ref 7–20)
CALCIUM SERPL-MCNC: 8.6 MG/DL (ref 8.3–10.6)
CHLORIDE BLD-SCNC: 102 MMOL/L (ref 99–110)
CO2: 27 MMOL/L (ref 21–32)
CREAT SERPL-MCNC: <0.5 MG/DL (ref 0.6–1.2)
GFR SERPL CREATININE-BSD FRML MDRD: >60 ML/MIN/{1.73_M2}
GLUCOSE BLD-MCNC: 85 MG/DL (ref 70–99)
HCT VFR BLD CALC: 25.3 % (ref 36–48)
HEMOGLOBIN: 8.4 G/DL (ref 12–16)
MCH RBC QN AUTO: 27.7 PG (ref 26–34)
MCHC RBC AUTO-ENTMCNC: 33.1 G/DL (ref 31–36)
MCV RBC AUTO: 83.5 FL (ref 80–100)
PDW BLD-RTO: 13.9 % (ref 12.4–15.4)
PLATELET # BLD: 428 K/UL (ref 135–450)
PMV BLD AUTO: 6.3 FL (ref 5–10.5)
POTASSIUM SERPL-SCNC: 4.2 MMOL/L (ref 3.5–5.1)
RBC # BLD: 3.03 M/UL (ref 4–5.2)
SODIUM BLD-SCNC: 136 MMOL/L (ref 136–145)
WBC # BLD: 8.6 K/UL (ref 4–11)

## 2023-03-14 PROCEDURE — 6370000000 HC RX 637 (ALT 250 FOR IP): Performed by: INTERNAL MEDICINE

## 2023-03-14 PROCEDURE — 85027 COMPLETE CBC AUTOMATED: CPT

## 2023-03-14 PROCEDURE — 2580000003 HC RX 258: Performed by: PHYSICIAN ASSISTANT

## 2023-03-14 PROCEDURE — 6370000000 HC RX 637 (ALT 250 FOR IP): Performed by: NURSE PRACTITIONER

## 2023-03-14 PROCEDURE — 2580000003 HC RX 258: Performed by: INTERNAL MEDICINE

## 2023-03-14 PROCEDURE — 6360000002 HC RX W HCPCS: Performed by: INTERNAL MEDICINE

## 2023-03-14 PROCEDURE — 6360000002 HC RX W HCPCS: Performed by: PHYSICIAN ASSISTANT

## 2023-03-14 PROCEDURE — 36415 COLL VENOUS BLD VENIPUNCTURE: CPT

## 2023-03-14 PROCEDURE — 80048 BASIC METABOLIC PNL TOTAL CA: CPT

## 2023-03-14 PROCEDURE — 6370000000 HC RX 637 (ALT 250 FOR IP): Performed by: PHYSICIAN ASSISTANT

## 2023-03-14 RX ORDER — OXYCODONE HYDROCHLORIDE 5 MG/1
5 TABLET ORAL EVERY 4 HOURS PRN
Qty: 18 TABLET | Refills: 0 | Status: SHIPPED | OUTPATIENT
Start: 2023-03-14 | End: 2023-03-17

## 2023-03-14 RX ADMIN — RIVAROXABAN 15 MG: 15 TABLET, FILM COATED ORAL at 09:34

## 2023-03-14 RX ADMIN — TRIAMCINOLONE ACETONIDE: 1 CREAM TOPICAL at 09:39

## 2023-03-14 RX ADMIN — OXYCODONE 5 MG: 5 TABLET ORAL at 02:08

## 2023-03-14 RX ADMIN — MAGNESIUM OXIDE 400 MG (241.3 MG MAGNESIUM) TABLET 400 MG: TABLET at 09:34

## 2023-03-14 RX ADMIN — MEROPENEM 1000 MG: 1 INJECTION, POWDER, FOR SOLUTION INTRAVENOUS at 02:47

## 2023-03-14 RX ADMIN — HYDROXYZINE HYDROCHLORIDE 25 MG: 25 TABLET, FILM COATED ORAL at 09:34

## 2023-03-14 RX ADMIN — VANCOMYCIN HYDROCHLORIDE 1500 MG: 10 INJECTION, POWDER, LYOPHILIZED, FOR SOLUTION INTRAVENOUS at 00:42

## 2023-03-14 RX ADMIN — MEROPENEM 1000 MG: 1 INJECTION, POWDER, FOR SOLUTION INTRAVENOUS at 09:39

## 2023-03-14 RX ADMIN — METOPROLOL SUCCINATE 50 MG: 50 TABLET, EXTENDED RELEASE ORAL at 09:35

## 2023-03-14 RX ADMIN — OXYCODONE 5 MG: 5 TABLET ORAL at 09:35

## 2023-03-14 RX ADMIN — SODIUM CHLORIDE, PRESERVATIVE FREE 10 ML: 5 INJECTION INTRAVENOUS at 09:35

## 2023-03-14 RX ADMIN — LACOSAMIDE 200 MG: 100 TABLET, FILM COATED ORAL at 09:34

## 2023-03-14 RX ADMIN — PANTOPRAZOLE SODIUM 40 MG: 40 TABLET, DELAYED RELEASE ORAL at 09:35

## 2023-03-14 ASSESSMENT — PAIN SCALES - GENERAL
PAINLEVEL_OUTOF10: 6
PAINLEVEL_OUTOF10: 1
PAINLEVEL_OUTOF10: 3
PAINLEVEL_OUTOF10: 5

## 2023-03-14 ASSESSMENT — PAIN SCALES - WONG BAKER
WONGBAKER_NUMERICALRESPONSE: 0

## 2023-03-14 ASSESSMENT — PAIN DESCRIPTION - LOCATION: LOCATION: OTHER (COMMENT)

## 2023-03-14 ASSESSMENT — PAIN DESCRIPTION - DESCRIPTORS: DESCRIPTORS: DISCOMFORT

## 2023-03-14 NOTE — PLAN OF CARE
Problem: Pain  Goal: Verbalizes/displays adequate comfort level or baseline comfort level  Outcome: Progressing  Flowsheets (Taken 3/13/2023 1015 by Ken Lazo RN)  Verbalizes/displays adequate comfort level or baseline comfort level: Encourage patient to monitor pain and request assistance

## 2023-03-14 NOTE — PROGRESS NOTES
Morning assessment completed, patient complaint of pain and itching, PRN meds given see MAR, dressings changed, linens changed, patient alert and oriented with intermittent confusion and difficulty expressing needs in a coherent manner, this is baseline for patient. Plan to transport to SNF at [phone] today. Call light in reach, will continue to monitor.

## 2023-03-14 NOTE — DISCHARGE INSTR - COC
Continuity of Care Form    Patient Name: Francisco Mejia   :  1955  MRN:  [id number]    Admit date:  3/9/2023  Discharge date:  2023    Code Status Order: Full Code   Advance Directives:     Admitting Physician:  Maddie Turner MD  PCP: Taurus Rivera MD    Discharging Nurse: [facility] Unit/Room#: 3QL-8547/8366-92  Discharging Unit Phone Number: [phone]    Emergency Contact:   Extended Emergency Contact Information  Primary Emergency Contact: [address] [facility]. Phone: [phone]  Work Phone: [phone]  Mobile Phone: [phone]  Relation: Child   needed? No  Secondary Emergency Contact: [address] [facility] Phone: [phone]  Work Phone: [phone]  Mobile Phone: [phone]  Relation: Parent   needed?  No    Past Surgical History:  Past Surgical History:   Procedure Laterality Date    ABDOMEN SURGERY      ABDOMINAL ADHESION SURGERY  2018    BLADDER SUSPENSION      COLONOSCOPY      COLONOSCOPY  9/14/15    sigmoid colon biopsy     COLONOSCOPY  2018    COLONOSCOPY  2019    COLONOSCOPY, POSSIBLE BIOPSY, POSSIBLE POLYPECTOMY    COLONOSCOPY N/A 2019    COLONOSCOPY WITH BIOPSY performed by Elan Das MD at [address] [facility] N/A 2019    COLONOSCOPY DIAGNOSTIC/STOMA performed by Elan Das MD at [address] [place] [address] N/A 10/8/2018    EXPLORATORY LAPAROTOMY WITH COLOSTOMY performed by Victor Hugo Moralez MD at [facility] Left 14    excision plantar left hallux    FOOT SURGERY  6/3/15    PLANTAR PLATE REPAIR BILATERAL, ARTHROTOMY MPJ 2ND BILATERAL    FOOT SURGERY Left 2002    Excision second metatarsal head left    FRACTURE SURGERY      rt hip    HAND CARPECTOMY Bilateral     HEEL SPUR SURGERY      HERNIA REPAIR      HYSTERECTOMY (CERVIX STATUS UNKNOWN)      bladder surgery    JOINT REPLACEMENT      rt hip, L shoulder replacement 2014    KNEE SURGERY Bilateral arthrosvopic    LEG SURGERY Right     MN SECONDARY CLOSURE SURG WOUND/DEHSN EXTSV/COMPLIC N/A 22/01/9450    SECONDARY WOUND CLOSURE OF ABDOMINAL WOUND performed by Mazin Enciso MD at [address] [facility] N/A 7/17/2019    FLEXIBLE SIGMOIDOSCOPY performed by Mazin Enciso MD at [address] [place] [address]  01/15/2018    with biopsies    SMALL INTESTINE SURGERY N/A 7/17/2019    COLOSTOMY CLOSURE WITH COLORECTAL ANASTOMOSIS performed by Mazin Enciso MD at [address] [place] [address]  6/14/13    and colonsocopy with antral erosion biopsies       Immunization History:   Immunization History   Administered Date(s) Administered    COVID-19, PFIZER PURPLE top, DILUTE for use, (age 15 y+), 30mcg/0.3mL 05/07/2021, 06/17/2021    PPD Test 01/16/2018    Tdap (Boostrix, Adacel) 07/15/2020       Active Problems:  Patient Active Problem List   Diagnosis Code    Anemia D64.9    Crohn's colitis ([facility] K50.10    MRSA (methicillin resistant Staphylococcus aureus) infection A49.02    DVT (deep venous thrombosis) ([facility]) I82.409    Hypomagnesemia E83.42    Leg swelling M79.89    Venous insufficiency I87.2    Chronic venous hypertension (idiopathic) with inflammation of bilateral lower extremity I87.323    Local infection of skin and subcutaneous tissue L08.9    Open wound of left foot with complication I83.671V    [employer] LINQ 7/29/20 Dr Dorota Johnson Z45.09    LV dysfunction I51.9    PAF (paroxysmal atrial fibrillation) ([facility]) I48.0    HTN (hypertension), benign I10    S/P coronary angiogram Z98.890    Abnormal angiogram R93.89    Atherosclerosis of native arteries of the extremities with ulceration ([facility]) I70.25    Nail avulsion of toe, initial encounter S91.209A    Lower abdominal pain R10.30    Late effect of ischemic cerebral stroke I69.30    Cognitive deficit as late effect of cerebrovascular accident (CVA) I69.319    Infection requiring contact isolation precautions B99.9    Hyponatremia E87.1    Suspected COVID-19 virus infection Z20.822    Alcohol abuse F10.10    Cellulitis of both feet L03.115, L03. 116    Multiple wounds T07. 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 9) E66.3    History of infection with vancomycin resistant Enterococcus (VRE) Z86.19    History of MRSA infection Z86.14    Sepsis secondary to UTI ([address] A41.9, N39.0    Altered mental status R41.82    Hypocalcemia E83.51    FTT (failure to thrive) in adult R62.7    Acute respiratory failure with hypoxemia ([address] J96.01    Fall against object W18.00XA    Leg DVT (deep venous thromboembolism), acute, right (HCC) I82.401       Isolation/Infection:   Isolation            Contact          Patient Infection Status       Infection Onset Added Last Indicated Last Indicated By Review Planned Expiration Resolved Resolved By    MDRO (multi-drug resistant organism) 12/12/21 11/17/22 02/21/23 Culture, Urine        Added from external infection.     MRSA 06/03/21 06/04/21 06/03/21 Culture, MRSA, Screening        Resolved    COVID-19 (Rule Out) 03/12/23 03/12/23 03/12/23 Respiratory Panel, Molecular, with COVID-19 (Restricted: peds pts or suitable admitted adults) (Ordered)   03/13/23 Rule-Out Test Resulted    COVID-19 (Rule Out) 02/20/23 02/20/23 02/20/23 COVID-19, Rapid (Ordered)   02/20/23 Rule-Out Test Resulted    COVID-19 (Rule Out) 11/16/22 11/16/22 11/16/22 COVID-19, Rapid (Ordered)   11/16/22 Rule-Out Test Resulted    COVID-19 (Rule Out) 04/27/21 04/27/21 04/27/21 COVID-19 (Ordered)   04/28/21 Rule-Out Test Resulted    COVID-19 (Rule Out) 04/07/21 04/07/21 04/08/21 COVID-19 (Ordered)   04/09/21 Rule-Out Test Resulted    C-diff Rule Out 04/07/21 04/07/21 04/08/21 Clostridium difficile toxin/antigen (Ordered)   04/28/21 Glenora Claude, RN    COVID-19 (Rule Out) 07/24/20 08/05/20 07/24/20 COVID-19 (Ordered)   20     COVID-19 (Rule Out) 20 COVID-19 (Ordered)   20 Rule-Out Test Resulted    VRE  10/12/18 10/12/18 Ronna Callahan RN   10/25/19 Ronna Callahan RN    10/8/18; abd culture    MRSA  18 Armaan Green RN   10/25/19 Armaan Green RN    18; urine            Nurse Assessment:  Last Vital Signs: /72   Pulse 78   Temp 98 °F (36.7 °C) (Oral)   Resp 18   Ht 5' 7\" (1.702 m)   Wt 180 lb (81.6 kg)   SpO2 96%   BMI 28.19 kg/m²     Last documented pain score (0-10 scale): Pain Level: 1  Last Weight:   Wt Readings from Last 1 Encounters:   23 180 lb (81.6 kg)     Mental Status:  Oriented x4 with confusion at times    IV Access:  - None    Nursing Mobility/ADLs:  Walking   Dependent  Transfer  Dependent  Bathing  Dependent  Dressing  Dependent  Toileting  Dependent  Feeding  Assisted  Med Admin  Assisted  Med Delivery   whole    Wound Care Documentation and Therapy:  Wound 22 Thigh Anterior;Proximal;Left;Medial;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 (Active)   Number of days: 116       Wound 22 Thigh Proximal;Right;Posterior; Upper; Inner superficial open blister secondary to bullous pemphigoid syndrome (Active)   Number of days: 116        Elimination:  Continence: Bowel: No  Bladder: No  Urinary Catheter: None   Colostomy/Ileostomy/Ileal Conduit: No       Date of Last BM: 3/13/2023    Intake/Output Summary (Last 24 hours) at 3/14/2023 0947  Last data filed at 3/14/2023 7890  Gross per 24 hour   Intake 240 ml   Output 1500 ml   Net -1260 ml     I/O last 3 completed shifts: In: 240 [P.O.:240]  Out: 1500 [Urine:1500]    Safety Concerns: At Risk for Falls    Impairments/Disabilities:      Bed bound    Nutrition Therapy:  Current Nutrition Therapy:   - Oral diet: Carb Control 5 carbs/meal (2000kcals/day) and high Fiber, low fat.  Low chol, 2gm Na    Routes of Feeding: Oral  Liquids: Thin Liquids  Daily Fluid Restriction: no  Last Modified Barium Swallow with Video (Video Swallowing Test): not done    Treatments at the Time of Hospital Discharge:   Respiratory Treatments: N/A  Oxygen Therapy:  is not on home oxygen therapy. Ventilator:    - No ventilator support    Rehab Therapies: Physical Therapy and Occupational Therapy  Weight Bearing Status/Restrictions: No weight bearing restrictions  Other Medical Equipment (for information only, NOT a DME order):  wheelchair  Other Treatments: Vascular surgery is requesting patient follow up with [facility] wound clinic. Patient's personal belongings (please select all that are sent with patient):  Clothing    RN SIGNATURE:  Electronically signed by Sarahy Schafer RN on 3/14/23 at 9:58 AM EDT    CASE MANAGEMENT/SOCIAL WORK SECTION    Inpatient Status Date: 03/09/2023    Readmission Risk Assessment Score:  Readmission Risk              Risk of Unplanned Readmission:  21           Discharging to Facility/ Agency   Name: [facility]  Address: [address] [place] [address]. Kendrick Moraes [address]  Phone: [phone]  Fax: [phone]      / signature: Electronically signed by Bakari Saini RN on 3/14/23 at 9:50 AM EDT    PHYSICIAN SECTION    Prognosis: Fair    Condition at Discharge: Stable    Rehab Potential (if transferring to Rehab): Fair    Recommended Labs or Other Treatments After Discharge: follow up at the [facility] wound clinic WEEKLY. Next appointment is ______________   Follow up with  dermatology in 2 weeks. Daily would care     Physician Certification: I certify the above information and transfer of Cindy Jerez  is necessary for the continuing treatment of the diagnosis listed and that she requires [facility] for greater 30 days.      Update Admission H&P: No change in H&P    PHYSICIAN SIGNATURE:  Electronically signed by GAYLA Marcano CNP on 3/14/23 at 9:28 AM EDT

## 2023-03-14 NOTE — PROGRESS NOTES
Attempted to call report to [facility] at [phone]. No RN to take report, unit phone number left with facility for RN to call back for report.

## 2023-03-14 NOTE — FLOWSHEET NOTE
03/13/23 2150   Assessment   Charting Type Shift assessment   Psychosocial   Psychosocial (WDL) X   Patient Behaviors Anxious   Neurological   Neuro (WDL) X   Level of Consciousness 0   Orientation Level Oriented to person;Oriented to place;Oriented to time;Oriented to situation;Oriented/disoriented at times   Cognition Follows commands   Speech Expressive aphasia; Delayed responses   Cough Reflex Present   Marisa Coma Scale   Eye Opening 4   Best Verbal Response 5   Best Motor Response 6   Marisa Coma Scale Score 15   HEENT (Head, Ears, Eyes, Nose, & Throat)   HEENT (WDL) X   Right Eye Edema   Teeth Missing teeth   Respiratory   Respiratory (WDL) X   Respiratory Pattern Regular   Respiratory Depth Normal   Respiratory Quality/Effort Unlabored   L Breath Sounds Diminished   R Breath Sounds Diminished   Cardiac   Cardiac (WDL) X  (Hx of Afib)   Cardiac Regularity Regular   Heart Sounds S1, S2   Cardiac Rhythm Sinus rhythm   Cardiac Monitor   Telemetry Box Number   (nontele)   Alarm Audible Yes   Gastrointestinal   Abdominal (WDL) X   Abdomen Inspection Obese   RUQ Bowel Sounds Active   LUQ Bowel Sounds Active   RLQ Bowel Sounds Active   LLQ Bowel Sounds Active   Genitourinary   Genitourinary (WDL) X  (Incontinent, Purewick in place)   Flank Tenderness   (denies)   Suprapubic Tenderness No   Dysuria (Pain/Burning w/Urination) No   Peripheral Vascular   Peripheral Vascular (WDL) X   Edema Right lower extremity; Left lower extremity   RUE Edema +1   RLE Edema Weeping;+2;Pitting   LLE Edema +2;Pitting   RUE Neurovascular Assessment   Capillary Refill Less than/Equal to 3 seconds   Color Appropriate for Ethnicity   Temperature Warm   R Radial Pulse +1 (Weak)   LUE Neurovascular Assessment   Capillary Refill Less than/Equal to 3 seconds   Color Appropriate for Ethnicity   Temperature Warm   L Radial Pulse +1 (Weak)   RLE Neurovascular Assessment   Capillary Refill Less than/Equal to 3 seconds   Color Appropriate for Ethnicity   Temperature Warm   R Pedal Pulse +1   LLE Neurovascular Assessment   Capillary Refill Less than/Equal to 3 seconds   Color Appropriate for Ethnicity   Temperature Warm   L Pedal Pulse +1   Skin Integumentary    Skin Integumentary (WDL) X   Skin Integrity Blister   Location ble   Skin Integrity Site 2   Skin Integrity Location 2 Other (comment)   Location 2 rt hip   Preventative Dressing Yes   Skin Integrity Site 3   Skin Integrity Location 3 Scars (comment)    Location 3 scattered   Musculoskeletal   Musculoskeletal (WDL) X  (Generalized weakness)   RUE Swelling   RL Extremity Weakness; Swelling   LL Extremity Weakness; Swelling

## 2023-03-14 NOTE — PROGRESS NOTES
Report called to [facility] at [phone]. Report given to RN. Pt discharged with [facility] transport at [phone], all pt belongings sent with patient.

## 2023-03-14 NOTE — CARE COORDINATION
Discharge Planning Note:    [facility] on 86/17/1089    Plan Auth ID: [id number]    Aviva Left ID: [id number]    Dates: 03/13/2023 - 03/15/2023    Next Review Date: 03/15/2023    Status: APPROVED    Will continue to follow.     DEVYN Benitez RN    [facility]  Phone: [phone]

## 2023-03-14 NOTE — PLAN OF CARE
Problem: Pain  Goal: Verbalizes/displays adequate comfort level or baseline comfort level  3/14/2023 1028 by Sarthak Gordillo RN  Outcome: Adequate for Discharge  3/14/2023 1028 by Sarthak Gordillo RN  Outcome: Adequate for Discharge  3/13/2023 2153 by Kolby Baker RN  Outcome: Progressing  Flowsheets (Taken 3/13/2023 1015 by David Savage RN)  Verbalizes/displays adequate comfort level or baseline comfort level: Encourage patient to monitor pain and request assistance   PRN pain medication given per indication using 0-10 pain scale assessment   Problem: Discharge Planning  Goal: Discharge to home or other facility with appropriate resources  3/14/2023 1028 by Sarthak Gordillo RN  Outcome: Adequate for Discharge  3/14/2023 1028 by Sarthak Gordillo RN  Outcome: Adequate for Discharge   Discharge to SNF for further wound care and PT/OT  Problem: Safety - Adult  Goal: Free from fall injury  3/14/2023 1028 by Srathak Gordillo RN  Outcome: Adequate for Discharge  3/14/2023 1028 by Sarthak Gordillo RN  Outcome: Adequate for Discharge   No falls during hospitalization. Problem: Skin/Tissue Integrity  Goal: Absence of new skin breakdown  Description: 1. Monitor for areas of redness and/or skin breakdown  2. Assess vascular access sites hourly  3. Every 4-6 hours minimum:  Change oxygen saturation probe site  4. Every 4-6 hours:  If on nasal continuous positive airway pressure, respiratory therapy assess nares and determine need for appliance change or resting period. 3/14/2023 1028 by Sarthak Gordillo RN  Outcome: Adequate for Discharge  3/14/2023 1028 by Sarthak Gordillo RN  Outcome: Adequate for Discharge   Dressings changed per guidelines.  No signs of new skin breakdown

## 2023-03-14 NOTE — DISCHARGE SUMMARY
[facility]    Patient Demographics    Patient. Robert Keller  Date of Birth. 1955  MRN. [id number]     Primary care provider. Wili Lawrence MD  (Tel: [phone])    Admit date: 3/9/2023    Discharge date 3/14/2023  Note Date: 3/14/2023     Reason for Hospitalization. Chief Complaint   Patient presents with    Leg Swelling     PT to ED via EMS from home where she lives alone and has home health visits. PT c/o bilateral leg swelling and pain. Significant Findings. Principal Problem:    Leg DVT (deep venous thromboembolism), acute, right (HCC)  Resolved Problems:    * No resolved hospital problems. *       Problems and results from this hospitalization that need follow up. Follow up weekly at the [facility] wound care center  Follow up with [facility] derm in 2 weeks. On Xarelto starter pack until 3/31/23. She is to start daily xarelto 20 mg on April 1     Significant test results and incidental findings. Chest xray:     Bibasilar airspace opacities with small left pleural effusion. If patient   has clinical symptoms of infection, differential would include pneumonia. In   the absence of infectious symptoms, atelectasis can have the same appearance. 2. Mild enlargement of the cardiac silhouette which may be due to intrinsic   enlargement of the heart or pericardial fluid          Invasive procedures and treatments. None     [facility] Course. The patient lives alone and sought care due to leg pain and swelling. She was admitted and followed by vascular and wound care. She was treated for the following:      Acute R posterior tibial DVT:  she has been evaluated by vascular: remains on Xarelto. No evidence of arterial ischemia per vascular. Needs improved compliance with all meds and follow up at wound center after d/c.    Pyrexia: urine cultures did not show any growth. This was checked twice due to hx of UTI's. She did receive 3 days of IV antibiotics however she was NOT d/c on any antibiotic therapy . Last fever was 3/11  Probable  atelectasis on xray:  pro felton was low,  IS was added   Bullous pemphigoid-followed by Dermatology. They are working on getting her dupixent every 2 weeks. Paroxysmal afib-on xarelto. Rate currently controlled. Hypokalemia:  resolved with  oral replacement therapy  Weakness: PT and OT followed , she was d/c to ECF and will likely need to remain a LONG TERM RESIDENT       Consults. IP CONSULT TO VASCULAR SURGERY  PHARMACY TO DOSE MEDICATION    Physical examination on discharge day. /72   Pulse 78   Temp 98 °F (36.7 °C) (Oral)   Resp 18   Ht 5' 7\" (1.702 m)   Wt 180 lb (81.6 kg)   SpO2 96%   BMI 28.19 kg/m²   General appearance. Alert. Looks chronically ill   HEENT. Sclera clear. Moist mucus membranes. Cardiovascular. Regular rate and rhythm, normal S1, S2. No murmur. Respiratory. Not using accessory muscles. Clear to auscultation bilaterally, no wheeze. Gastrointestinal. Abdomen soft, non-tender, not distended, normal bowel sounds  Neurology. Facial symmetry. No speech deficits. Moving all extremities equally. Extremities. No edema in lower extremities. Skin. Warm, dry, bullous lesions and dried lesions noted over her entire body . Condition at time of discharge stable     Medication instructions provided to patient at discharge. Medication List        START taking these medications      oxyCODONE 5 MG immediate release tablet  Commonly known as: ROXICODONE  Take 1 tablet by mouth every 4 hours as needed for Pain for up to 3 days.  Max Daily Amount: 30 mg            CHANGE how you take these medications      * rivaroxaban 15 MG Tabs tablet  Commonly known as: XARELTO  Take 1 tablet by mouth 2 times daily (with meals) for 17 days  What changed:   medication strength  how much to take  when to take this     * rivaroxaban 20 MG Tabs tablet  Commonly known as: Xarelto  Take 1 tablet by mouth daily (with breakfast)  Start taking on: April 1, 2023  What changed: You were already taking a medication with the same name, and this prescription was added. Make sure you understand how and when to take each. * This list has 2 medication(s) that are the same as other medications prescribed for you. Read the directions carefully, and ask your doctor or other care provider to review them with you. CONTINUE taking these medications      baclofen 10 MG tablet  Commonly known as: LIORESAL     balsalazide 750 MG capsule  Commonly known as: COLAZAL     busPIRone 10 MG tablet  Commonly known as: BUSPAR     calcium-vitamin D 500-200 MG-UNIT per tablet  Commonly known as: OSCAL-500     Dupixent 300 MG/2ML Sopn injection  Generic drug: dupilumab     famotidine 20 MG tablet  Commonly known as: PEPCID  Take 1 tablet by mouth 2 times daily     fluticasone 50 MCG/ACT nasal spray  Commonly known as: FLONASE     hydrOXYzine HCl 25 MG tablet  Commonly known as: ATARAX     lacosamide 100 MG Tabs tablet  Commonly known as: VIMPAT     magnesium oxide 400 (240 Mg) MG tablet  Commonly known as: MAG-OX  Take 1 tablet by mouth 2 times daily     metoprolol succinate 50 MG extended release tablet  Commonly known as: TOPROL XL  Take 1 tablet by mouth daily     nystatin 066009 UNIT/GM powder  Commonly known as: MYCOSTATIN     pantoprazole 40 MG tablet  Commonly known as: PROTONIX     triamcinolone 0.1 % ointment  Commonly known as: KENALOG               Where to Get Your Medications        You can get these medications from any pharmacy    Bring a paper prescription for each of these medications  oxyCODONE 5 MG immediate release tablet  rivaroxaban 15 MG Tabs tablet  rivaroxaban 20 MG Tabs tablet         Discharge recommendations given to patient. Follow Up. in 1 week   Disposition. ECF  Activity.  activity as tolerated  Diet: ADULT DIET; Regular; 5 carb choices (75 gm/meal); Low Fat/Low Chol/High Fiber/2 gm Na      Spent 35 minutes in discharge process.     Signed:  GAYLA Campos CNP     3/14/2023 9:28 AM

## 2023-03-14 NOTE — CARE COORDINATION
Discharge Plan:     Patient discharged to:    [address]    SW/DC Planner faxed, [address] and [place] to: [phone]    Narcotic Prescriptions faxed were: yes    RN: will call report to: [phone]    Medical Transport with: [facility] [phone]     time: 26    Family advised of discharge: yes, Oscar Vargas, ministerio Kowalski Sessions?:  yes    All discharge needs met per case management.       CLAUDIA CruzN RN    [facility]  Phone: [phone]

## 2023-03-15 ENCOUNTER — NURSE ONLY (OUTPATIENT)
Dept: CARDIOLOGY CLINIC | Age: 68
End: 2023-03-15

## 2023-03-15 DIAGNOSIS — I48.0 PAF (PAROXYSMAL ATRIAL FIBRILLATION) (HCC): ICD-10-CM

## 2023-03-15 DIAGNOSIS — Z45.09 ENCOUNTER FOR LOOP RECORDER CHECK: Primary | ICD-10-CM

## 2023-03-15 DIAGNOSIS — I63.9 CEREBROVASCULAR ACCIDENT (CVA), UNSPECIFIED MECHANISM (HCC): ICD-10-CM

## 2023-03-15 LAB
FOLATE SERPL-MCNC: 5.45 NG/ML (ref 4.78–24.2)
VIT B12 SERPL-MCNC: 1699 PG/ML (ref 211–911)

## 2023-03-22 NOTE — CONSULTS
[place] [address]                     [address] [facility], [address]                                  CONSULTATION    PATIENT NAME: Zully Dang                :        1955  MED REC NO:   [id number]                          ROOM:       [id number]  ACCOUNT NO:   [de-identified]                           ADMIT DATE: 2021  PROVIDER:     Chase Newman MD    RENAL CONSULTATION    CONSULT DATE:  2021    CONSULTING PHYSICIAN:  Chase Newman MD    REFERRING PROVIDER:  Stu Maldonado MD    REASON FOR CONSULTATION  Acute on chronic hyponatremia. HISTORY OF PRESENT ILLNESS:  The patient is a 72-year-old female with  history of chronic hyponatremia, best serum sodium around 134, Crohn's  disease, hypokalemia, hypertension, hiatal hernia, sigmoid colectomy  with colostomy, UTIs, GERD who was admitted to the hospital today from  the wound care center secondary to multiple skin blisters and lesions. Apparently, she did not have any new medication started. The patient is  not fully cooperative with her history taking at this time with me. She  denies any nausea or vomiting. She is unsure if she was taking her salt  tablets. She did say she is taking her Lasix twice a day. Her weight  is actually lower from her previous weight in April. At this time, her  weight is recorded at 186 pounds and last April it was 194 pounds. She  denies any shortness of breath. No regular NSAID use. She denies any  recent alcohol intake, although she does have a history of alcohol  abuse. PAST MEDICAL HISTORY:  Includes hyponatremia, hypokalemia, hypertension,  Crohn's disease, hiatal hernia, sigmoid colectomy with colostomy and  mobilization of splenic flexure, UTIs, insomnia. ALLERGIES:  Includes ACE INHIBITORS and SKELAXIN.     MEDICATIONS PRIOR TO ADMISSION:  Includes furosemide, during last  discharge she was supposed to be on 20 mg p.o. b.i.d., metoprolol XL,  rivaroxaban, atorvastatin, potassium chloride, ferrous gluconate,  hydroxyzine, ondansetron,buspirone, balsalazide, omeprazole, baclofen,  sodium chloride tablets 2 gm _____ times a day, fluticasone,  fexofenadine, nystatin. SOCIAL HISTORY:  History of alcohol use. Positive smoking. Denies any  drug abuse. FAMILY HISTORY:  Includes hypertension and kidney disease. REVIEW OF SYSTEMS:  GENERAL:  Denies any malaise. SKIN:  Multiple blisters and lesions. NEUROLOGIC:  No headaches or focal deficits. CARDIOVASCULAR:  No chest pain or palpitations. PULMONARY:  No shortness of breath. No coughing, no hemoptysis. GI:  No abdominal pain. No nausea, no vomiting, no diarrhea or  constipation. RENAL:  Denies any gross hematuria. Currently has a urinary catheter. ENDOCRINE:  No history of diabetes. No heat or cold intolerance. ID:  No fever or chills. MUSCULOSKELETAL:  Denies active joint pains. PHYSICAL EXAMINATION:  VITAL SIGNS:  Blood pressure 107/69, pulse 105, respirations 16,  temperature 97.6, saturating 96%. Weight listed at 84.4 kg. Urine  output recorded at 900 mL, negative 1.6 liters since admission. GENERAL:  Multiple skin Blisters. HEENT:  Anicteric sclerae. Clear conjunctivae. SKIN:  Multiple blisters and lesions. CHEST:  Clear. HEART:  Regular. ABDOMEN:  Soft, nontender. No rebound or involuntary guarding. EXTREMITIES:  Shows 1+ edema. LABORATORY DATA:  Sodium up to 132 from 128, creatinine 0.5, bicarb 23,  potassium 4, albumin 2.9. WBC 6.5, hemoglobin 9.5, hematocrit 28.2,  platelet count 061,112. Blood cultures are pending. Would cultures are  pending. ASSESSMENT AND PLAN:  1. Acute-on-chronic hyponatremia with episodes of hypotension. She may  not have been taking her salt tablets. Sodium level currently around  goal.  I will stop the normal saline. I will restart her on salt  tablets 1 gm p.o. three times a day. Continue Lasix 20 mg p.o. b.i.d.    Discussed risk of alcohol [FreeTextEntry1] : Still w/ intermittent pain in his hands, connie in his PIP's and 1st CMC's.  He also now c/o pain in his left shoulder.  No joint swelling.  No AM stiffness.  use.  2.  Renal function within normal limits. 3.  Relative hypotension, better with normal saline. 4.  Anemia. Follow hemoglobin. Management as per Medicine. 5.  Multiple skin blisters and lesions, as per Medicine. Currently on  antibiotics. 6.  History of Crohn's disease. The patient is currently on  sulfasalazine. 7.  History of alcohol use. 8.  History of sigmoid colectomy with colostomy and mobilization of  splenic flexure. 9.  There is a plan for to be transferred to another facility for derm  evaluation which I would agree. Thank you very much for this consult. We will follow with you.         Kofi Dwyer MD    D: 06/03/2021 11:04:25       T: 06/03/2021 11:12:27     [email or web address]  Job#: [id number]     Doc#: [id number]    CC: [Anorexia] : no anorexia [Weight Loss] : no weight loss [Malaise] : no malaise [Fever] : no fever [Chills] : no chills [Fatigue] : no fatigue [Malar Facial Rash] : no malar facial rash [Skin Lesions] : no lesions [Skin Nodules] : no skin nodules [Oral Ulcers] : no oral ulcers [Cough] : no cough [Dry Mouth] : no dry mouth [Dysphonia] : no dysphonia [Dysphagia] : no dysphagia [Shortness of Breath] : no shortness of breath [Chest Pain] : no chest pain [Arthralgias] : arthralgias [Joint Swelling] : no joint swelling [Joint Warmth] : no joint warmth [Joint Deformity] : no joint deformity [Decreased ROM] : no decreased range of motion [Morning Stiffness] : no morning stiffness [Falls] : no falls [Difficulty Standing] : no difficulty standing [Difficulty Walking] : no difficulty walking [Dyspnea] : no dyspnea [Myalgias] : no myalgias [Muscle Weakness] : no muscle weakness [Muscle Spasms] : no muscle spasms [Muscle Cramping] : no muscle cramping [Visual Changes] : no visual changes [Eye Pain] : no eye pain [Eye Redness] : no eye redness [Dry Eyes] : no dry eyes

## 2023-03-24 NOTE — PROGRESS NOTES
ILR for CVA. New AF recorded in 9/2020. Hx: PAT/AF w RVR (Xarelto, Toprol XL). Remote transmission received for patients ILR. Since 01.17.23:  35 Tachy events w available ECGs illustrating what appears to be SVT. Pt on Xarelto, Toprol XL. End of 31-day monitoring period 3/15/23. EP physician will review. See interrogation under the cardiology tab in the [address] [place] [address] [address] field for more details. Will continue to monitor remotely.

## 2024-12-08 NOTE — PROGRESS NOTES
Remote interrogation of implanted cardiac event monitor shows normal function. ILR for CVA.  New AF recorded in 9/2020. [address] and toprol xl. Hx: pAT/AF-rvr. No symptom episodes recorded. Average V rates  bpm.  PAF and svt recorded. AF burden 0.9%. Follow up 1 month via carelink.
Pt wheezing

## 2025-07-30 ENCOUNTER — HOSPITAL ENCOUNTER (OUTPATIENT)
Dept: PHYSICAL THERAPY | Age: 70
Setting detail: THERAPIES SERIES
Discharge: HOME | End: 2025-07-30
Payer: MEDICARE

## 2025-07-30 DIAGNOSIS — R53.1 GENERALIZED WEAKNESS: ICD-10-CM

## 2025-07-30 DIAGNOSIS — Z74.09 DECREASED FUNCTIONAL MOBILITY AND ENDURANCE: ICD-10-CM

## 2025-07-30 DIAGNOSIS — I89.0 LYMPHEDEMA: Primary | ICD-10-CM

## 2025-07-30 PROCEDURE — 97530 THERAPEUTIC ACTIVITIES: CPT

## 2025-07-30 PROCEDURE — 97161 PT EVAL LOW COMPLEX 20 MIN: CPT

## 2025-07-30 NOTE — PLAN OF CARE
[facility]: [address], [place] [address] office: [phone] fax: [phone]     Physical Therapy Initial Evaluation Certification      Dear GAYLA Reyes - NP,     We had the pleasure of evaluating the following patient for physical therapy services at [facility].  A summary of our findings can be found in the initial assessment below.  This includes our plan of care.  If you have any questions or concerns regarding these findings, please do not hesitate to contact me at the office phone number listed above.  Thank you for the referral.     Physician Signature:_______________________________Date:__________________  By signing above (or electronic signature), therapist’s plan is approved by physician       Physical Therapy: TREATMENT/PROGRESS NOTE   Patient: Dianne Keller (69 y.o. female)   Examination Date: 2025   :  1955 MRN: [id number]   Visit #: 1   Insurance Allowable Auth Needed   Med nec [x]Yes - cohere   []No    Insurance: Payor: [employer] MEDICARE / Plan: [employer] GOLD PLUS HMO / Product Type: *No Product type* /   Insurance ID: [id number] - (Medicare Managed)  Secondary Insurance (if applicable): MEDICAID [place]   Treatment Diagnosis:     ICD-10-CM    1. Lymphedema  I89.0       2. Generalized weakness  R53.1       3. Decreased functional mobility and endurance  Z74.09          Medical Diagnosis:  Venous insufficiency (chronic) (peripheral) [I87.2]   Referring Physician: May Mitchell APRN *  PCP: Nicko Santo MD (Inactive)     Plan of care signed (Y/N):     Date of Patient follow up with Physician:      Plan of Care Report: EVAL today  POC update due: (10 visits /OR AUTH LIMITS, whichever is less)  2025                                             Medical History:  Comorbidities:  Hypertension  Stroke/TIA - expressive aphasia per ED

## 2025-08-12 ENCOUNTER — HOSPITAL ENCOUNTER (OUTPATIENT)
Dept: PHYSICAL THERAPY | Age: 70
Setting detail: THERAPIES SERIES
Discharge: HOME | End: 2025-08-12
Payer: MEDICARE

## 2025-08-12 PROCEDURE — 97530 THERAPEUTIC ACTIVITIES: CPT | Performed by: SPECIALIST

## 2025-08-12 PROCEDURE — 97140 MANUAL THERAPY 1/> REGIONS: CPT | Performed by: SPECIALIST

## 2025-08-18 ENCOUNTER — HOSPITAL ENCOUNTER (OUTPATIENT)
Dept: PHYSICAL THERAPY | Age: 70
Setting detail: THERAPIES SERIES
Discharge: HOME | End: 2025-08-18
Payer: MEDICARE

## 2025-08-18 PROCEDURE — 97140 MANUAL THERAPY 1/> REGIONS: CPT

## 2025-08-18 PROCEDURE — 97530 THERAPEUTIC ACTIVITIES: CPT

## 2025-08-25 ENCOUNTER — HOSPITAL ENCOUNTER (OUTPATIENT)
Dept: PHYSICAL THERAPY | Age: 70
Setting detail: THERAPIES SERIES
Discharge: HOME | End: 2025-08-25
Payer: MEDICARE

## 2025-08-25 PROCEDURE — 97530 THERAPEUTIC ACTIVITIES: CPT

## 2025-08-25 PROCEDURE — 97140 MANUAL THERAPY 1/> REGIONS: CPT

## (undated) DEVICE — STAPLER EXT 65MM S STL AUTO DISP PURSTRING

## (undated) DEVICE — RELOAD STPL H4.1X2MM DIA60MM THCK TISS GRN 6 ROW PWR GST B

## (undated) DEVICE — GLOVE SURG SZ 6.5 L11.2IN FNGR THK9.8MIL STRW LTX POLYMER

## (undated) DEVICE — DRAPE,LAP,CHOLE,W/TROUGHS,STERILE: Brand: MEDLINE

## (undated) DEVICE — PROCEDURE KIT ENDOSCP CUST

## (undated) DEVICE — PAD TBL OP RM TRENDELENBURG STATIC TORSO W/STRAPS

## (undated) DEVICE — BW-412T DISP COMBO CLEANING BRUSH: Brand: SINGLE USE COMBINATION CLEANING BRUSH

## (undated) DEVICE — SUTURE VCRL SZ 0 L18IN ABSRB UD L36MM CT-1 1/2 CIR J840D

## (undated) DEVICE — SUTURE PERMAHAND SZ 3-0 L30IN NONABSORBABLE BLK SILK BRAID A304H

## (undated) DEVICE — SHEET,DRAPE,53X77,STERILE: Brand: MEDLINE

## (undated) DEVICE — ULTRACLEAN ACCESSORY ELECTRODE, 6 INCH COATED BLADE WITH EXTENDED INSULATION: Brand: ULTRACLEAN

## (undated) DEVICE — SUTURE PERMAHAND SZ 3-0 L18IN NONABSORBABLE BLK L26MM SH C013D

## (undated) DEVICE — PAD,ABDOMINAL,8"X7.5",STERILE,LF,1/PK: Brand: MEDLINE

## (undated) DEVICE — SPONGE LAP W18XL18IN WHT COT 4 PLY FLD STRUNG RADPQ DISP ST

## (undated) DEVICE — CATHETER TRAY 16 FR 5 CC FOL ANTIREFLX SAMPLING PRT DOVER

## (undated) DEVICE — 3M™ WARMING BLANKET, UPPER BODY, 10 PER CASE, 42268: Brand: BAIR HUGGER™

## (undated) DEVICE — 3M™ IOBAN™ 2 ANTIMICROBIAL INCISE DRAPE 6650EZ: Brand: IOBAN™ 2

## (undated) DEVICE — DRAIN SURG 15FR RND FULL FLUT

## (undated) DEVICE — TOWEL,OR,DSP,ST,BLUE,DLX,10/PK,8PK/CS: Brand: MEDLINE

## (undated) DEVICE — SOLUTION IV IRRIG POUR BRL 0.9% SODIUM CHL 2F7124

## (undated) DEVICE — SUTURE VCRL SZ 3-0 L27IN ABSRB UD L26MM SH 1/2 CIR J416H

## (undated) DEVICE — BLADE ES ELASTOMERIC COAT INSUL DURABLE BEND UPTO 90DEG

## (undated) DEVICE — NEEDLE HYPO 22GA L1.5IN BLK POLYPR HUB S STL REG BVL STR

## (undated) DEVICE — SYRINGE MED 10ML TRNSLUC BRL PLUNG BLK MRK POLYPR CTRL

## (undated) DEVICE — STAPLER INT L28CM 60MM 12 FIRING B FRM PWD + ECHELON FLX

## (undated) DEVICE — SUTURE PERMA-HAND SZ 3-0 L18IN 17 STRND NONABSORBABLE BLK SA64H

## (undated) DEVICE — PAD,ABDOMINAL,8"X10",ST,LF: Brand: MEDLINE

## (undated) DEVICE — SUTURE VCRL SZ 3-0 L18IN ABSRB UD L26MM SH 1/2 CIR J864D

## (undated) DEVICE — RELOAD STPL L75MM OPN STPL H4.5MM CLS STPL H2MM WIRE

## (undated) DEVICE — STAPLER SKIN H3.9MM WIRE DIA0.58MM CRWN 6.9MM 35 STPL ROT

## (undated) DEVICE — SUTURE PERMAHAND SZ 2-0 L30IN NONABSORBABLE BLK SILK W/O A305H

## (undated) DEVICE — BANDAGE,GAUZE,BULKEE II,4.5"X4.1YD,STRL: Brand: MEDLINE

## (undated) DEVICE — RESERVOIR,SUCTION,100CC,SILICONE: Brand: MEDLINE

## (undated) DEVICE — TRAY PREP DRY W/ PREM GLV 2 APPL 6 SPNG 2 UNDPD 1 OVERWRAP

## (undated) DEVICE — DEVICE SEAL L23CM NANO COAT MARYLAND JAW OPN DIV LIGASURE

## (undated) DEVICE — PAD TABLE RAMPED 1 IN TO 2 IN TRENDELENBURG

## (undated) DEVICE — SUTURE PDS II SZ 0 L60IN ABSRB VLT L48MM CTX 1/2 CIR Z990G

## (undated) DEVICE — DRAPE THER FLUID WARMING 66X44 IN FLAT SLUSH DBL DISC ORS

## (undated) DEVICE — SOLUTION IV IRRIG WATER 1000ML POUR BRL 2F7114

## (undated) DEVICE — Device

## (undated) DEVICE — LIGHT HANDLE: Brand: DEVON

## (undated) DEVICE — COLOSTOMY/ILEOSTOMY KIT, FLEXWEAR: Brand: NEW IMAGE

## (undated) DEVICE — SEALER TISS L20CM DIA13MM ADV BPLR L CRV JAW OPN APPRCH

## (undated) DEVICE — DRAPE,REIN 53X77,STERILE: Brand: MEDLINE

## (undated) DEVICE — COVER LT HNDL BLU PLAS

## (undated) DEVICE — SOLUTION IV IRRIG WATER 500ML POUR BRL ST 2F7113

## (undated) DEVICE — GAUZE,SPONGE,4"X4",8PLY,STRL,LF,10/TRAY: Brand: MEDLINE

## (undated) DEVICE — DRESSING,GAUZE,XEROFORM,CURAD,5"X9",ST: Brand: CURAD

## (undated) DEVICE — MAJOR SET UP PK

## (undated) DEVICE — SUTURE PERMA-HAND 0 L18IN NONABSORBABLE BLK SILK BRAID W/O SA66G

## (undated) DEVICE — GOWN SIRUS NONREIN LG W/TWL: Brand: MEDLINE INDUSTRIES, INC.

## (undated) DEVICE — SUTURE PROL SZ 1 L30IN NONABSORBABLE BLU L36MM CT-1 1/2 CIR 8425H

## (undated) DEVICE — GAUZE,PACKING STRIP,IODOFORM,1/2"X5YD,ST: Brand: CURAD

## (undated) DEVICE — ENDOSCOPY KIT: Brand: MEDLINE INDUSTRIES, INC.

## (undated) DEVICE — BLADE ES L6IN ELASTOMERIC COAT INSUL DURABLE BEND UPTO

## (undated) DEVICE — DEVICE SECUREMENT AD W/ TRICOT ANCHR PD FOR F LTX SIL CATH

## (undated) DEVICE — SUTURE PERMAHAND SZ 2-0 L18IN NONABSORBABLE BLK L26MM SH C012D

## (undated) DEVICE — STAPLER INT DIA29MM CLS STPL H1.5-2.2MM OPN LEG L5.2MM 26

## (undated) DEVICE — Z DUPLICATE USE 2716240 STAPLER INT CUT LN L40MM STPL L51MM GRN CRV HD B FRM

## (undated) DEVICE — KIT OR ROOM TURNOVER W/STRAP

## (undated) DEVICE — STAPLER INT L75MM H1.5X1.8X2MM STD TI 6 ROW LIN CUT

## (undated) DEVICE — SHEET, T, LAPAROTOMY, STERILE: Brand: MEDLINE